# Patient Record
Sex: MALE | Race: WHITE | NOT HISPANIC OR LATINO | Employment: OTHER | URBAN - METROPOLITAN AREA
[De-identification: names, ages, dates, MRNs, and addresses within clinical notes are randomized per-mention and may not be internally consistent; named-entity substitution may affect disease eponyms.]

---

## 2019-02-19 ENCOUNTER — HISTORICAL (OUTPATIENT)
Dept: ADMINISTRATIVE | Facility: HOSPITAL | Age: 54
End: 2019-02-19

## 2019-04-02 ENCOUNTER — HISTORICAL (OUTPATIENT)
Dept: ADMINISTRATIVE | Facility: HOSPITAL | Age: 54
End: 2019-04-02

## 2019-05-16 ENCOUNTER — HISTORICAL (OUTPATIENT)
Dept: ADMINISTRATIVE | Facility: HOSPITAL | Age: 54
End: 2019-05-16

## 2019-06-19 ENCOUNTER — HISTORICAL (OUTPATIENT)
Dept: ADMINISTRATIVE | Facility: HOSPITAL | Age: 54
End: 2019-06-19

## 2019-07-25 ENCOUNTER — HISTORICAL (OUTPATIENT)
Dept: ADMINISTRATIVE | Facility: HOSPITAL | Age: 54
End: 2019-07-25

## 2019-08-22 ENCOUNTER — HISTORICAL (OUTPATIENT)
Dept: ADMINISTRATIVE | Facility: HOSPITAL | Age: 54
End: 2019-08-22

## 2019-09-16 ENCOUNTER — HISTORICAL (OUTPATIENT)
Dept: LAB | Facility: HOSPITAL | Age: 54
End: 2019-09-16

## 2019-09-16 LAB
ABS NEUT (OLG): 4.32 X10(3)/MCL (ref 2.1–9.2)
ALBUMIN SERPL-MCNC: 4.2 GM/DL (ref 3.4–5)
ALBUMIN/GLOB SERPL: 1.2 {RATIO}
ALP SERPL-CCNC: 139 UNIT/L (ref 50–136)
ALT SERPL-CCNC: 31 UNIT/L (ref 12–78)
AST SERPL-CCNC: 31 UNIT/L (ref 15–37)
BASOPHILS # BLD AUTO: 0.1 X10(3)/MCL (ref 0–0.2)
BASOPHILS NFR BLD AUTO: 1 %
BILIRUB SERPL-MCNC: 0.5 MG/DL (ref 0.2–1)
BILIRUBIN DIRECT+TOT PNL SERPL-MCNC: 0.1 MG/DL (ref 0–0.2)
BILIRUBIN DIRECT+TOT PNL SERPL-MCNC: 0.4 MG/DL (ref 0–0.8)
BUN SERPL-MCNC: 7 MG/DL (ref 7–18)
CALCIUM SERPL-MCNC: 9.6 MG/DL (ref 8.5–10.1)
CHLORIDE SERPL-SCNC: 96 MMOL/L (ref 98–107)
CHOLEST SERPL-MCNC: 227 MG/DL (ref 0–200)
CHOLEST/HDLC SERPL: 3.8 {RATIO} (ref 0–5)
CO2 SERPL-SCNC: 31 MMOL/L (ref 21–32)
CREAT SERPL-MCNC: 0.8 MG/DL (ref 0.7–1.3)
EOSINOPHIL # BLD AUTO: 0.2 X10(3)/MCL (ref 0–0.9)
EOSINOPHIL NFR BLD AUTO: 2 %
ERYTHROCYTE [DISTWIDTH] IN BLOOD BY AUTOMATED COUNT: 12.7 % (ref 11.5–17)
GLOBULIN SER-MCNC: 3.6 GM/DL (ref 2.4–3.5)
GLUCOSE SERPL-MCNC: 92 MG/DL (ref 74–106)
HCT VFR BLD AUTO: 51.8 % (ref 42–52)
HDLC SERPL-MCNC: 59 MG/DL (ref 35–60)
HGB BLD-MCNC: 17 GM/DL (ref 14–18)
LDLC SERPL CALC-MCNC: 131 MG/DL (ref 0–129)
LYMPHOCYTES # BLD AUTO: 2.1 X10(3)/MCL (ref 0.6–4.6)
LYMPHOCYTES NFR BLD AUTO: 28 %
MCH RBC QN AUTO: 33.2 PG (ref 27–31)
MCHC RBC AUTO-ENTMCNC: 32.8 GM/DL (ref 33–36)
MCV RBC AUTO: 101.2 FL (ref 80–94)
MONOCYTES # BLD AUTO: 1 X10(3)/MCL (ref 0.1–1.3)
MONOCYTES NFR BLD AUTO: 13 %
NEUTROPHILS # BLD AUTO: 4.32 X10(3)/MCL (ref 2.1–9.2)
NEUTROPHILS NFR BLD AUTO: 56 %
PLATELET # BLD AUTO: 208 X10(3)/MCL (ref 130–400)
PMV BLD AUTO: 10.2 FL (ref 9.4–12.4)
POTASSIUM SERPL-SCNC: 4.3 MMOL/L (ref 3.5–5.1)
PROT SERPL-MCNC: 7.8 GM/DL (ref 6.4–8.2)
PSA SERPL-MCNC: 0.53 NG/ML (ref 0–4)
RBC # BLD AUTO: 5.12 X10(6)/MCL (ref 4.7–6.1)
SODIUM SERPL-SCNC: 135 MMOL/L (ref 136–145)
TRIGL SERPL-MCNC: 184 MG/DL (ref 30–150)
TSH SERPL-ACNC: 1.63 MIU/L (ref 0.36–3.74)
VLDLC SERPL CALC-MCNC: 37 MG/DL
WBC # SPEC AUTO: 7.7 X10(3)/MCL (ref 4.5–11.5)

## 2021-08-24 ENCOUNTER — HISTORICAL (OUTPATIENT)
Dept: ADMINISTRATIVE | Facility: HOSPITAL | Age: 56
End: 2021-08-24

## 2022-01-01 ENCOUNTER — HOSPITAL ENCOUNTER (OUTPATIENT)
Dept: RADIOLOGY | Facility: HOSPITAL | Age: 57
Discharge: HOME | End: 2022-10-31
Attending: STUDENT IN AN ORGANIZED HEALTH CARE EDUCATION/TRAINING PROGRAM
Payer: MEDICARE

## 2022-01-01 ENCOUNTER — HOSPITAL ENCOUNTER (OUTPATIENT)
Dept: RADIOLOGY | Facility: HOSPITAL | Age: 57
Discharge: HOME | End: 2022-12-30
Attending: STUDENT IN AN ORGANIZED HEALTH CARE EDUCATION/TRAINING PROGRAM
Payer: MEDICARE

## 2022-01-01 ENCOUNTER — LAB VISIT (OUTPATIENT)
Dept: LAB | Facility: HOSPITAL | Age: 57
End: 2022-01-01
Attending: NURSE PRACTITIONER
Payer: MEDICARE

## 2022-01-01 ENCOUNTER — HOSPITAL ENCOUNTER (INPATIENT)
Facility: HOSPITAL | Age: 57
LOS: 7 days | Discharge: HOME | DRG: 854 | End: 2022-10-25
Attending: FAMILY MEDICINE | Admitting: STUDENT IN AN ORGANIZED HEALTH CARE EDUCATION/TRAINING PROGRAM
Payer: MEDICARE

## 2022-01-01 ENCOUNTER — TELEPHONE (OUTPATIENT)
Dept: HEMATOLOGY/ONCOLOGY | Facility: HOSPITAL | Age: 57
End: 2022-01-01
Payer: MEDICARE

## 2022-01-01 ENCOUNTER — OFFICE VISIT (OUTPATIENT)
Dept: OTOLARYNGOLOGY | Facility: CLINIC | Age: 57
DRG: 871 | End: 2022-01-01
Payer: MEDICARE

## 2022-01-01 ENCOUNTER — OFFICE VISIT (OUTPATIENT)
Dept: HEMATOLOGY/ONCOLOGY | Facility: CLINIC | Age: 57
End: 2022-01-01
Payer: MEDICARE

## 2022-01-01 ENCOUNTER — INFUSION (OUTPATIENT)
Dept: INFUSION THERAPY | Facility: HOSPITAL | Age: 57
End: 2022-01-01
Attending: INTERNAL MEDICINE
Payer: MEDICARE

## 2022-01-01 ENCOUNTER — ANESTHESIA (OUTPATIENT)
Dept: SURGERY | Facility: HOSPITAL | Age: 57
DRG: 854 | End: 2022-01-01
Payer: MEDICARE

## 2022-01-01 ENCOUNTER — PATIENT OUTREACH (OUTPATIENT)
Dept: ADMINISTRATIVE | Facility: CLINIC | Age: 57
End: 2022-01-01
Payer: MEDICARE

## 2022-01-01 ENCOUNTER — OFFICE VISIT (OUTPATIENT)
Dept: INTERNAL MEDICINE | Facility: CLINIC | Age: 57
End: 2022-01-01
Payer: MEDICARE

## 2022-01-01 ENCOUNTER — LAB VISIT (OUTPATIENT)
Dept: LAB | Facility: HOSPITAL | Age: 57
End: 2022-01-01
Attending: INTERNAL MEDICINE
Payer: MEDICARE

## 2022-01-01 ENCOUNTER — HOSPITAL ENCOUNTER (INPATIENT)
Facility: HOSPITAL | Age: 57
LOS: 12 days | Discharge: HOME HEALTH | DRG: 871 | End: 2022-12-21
Attending: INTERNAL MEDICINE | Admitting: INTERNAL MEDICINE
Payer: MEDICARE

## 2022-01-01 ENCOUNTER — ANESTHESIA EVENT (OUTPATIENT)
Dept: SURGERY | Facility: HOSPITAL | Age: 57
DRG: 854 | End: 2022-01-01
Payer: MEDICARE

## 2022-01-01 ENCOUNTER — CLINICAL SUPPORT (OUTPATIENT)
Dept: HEMATOLOGY/ONCOLOGY | Facility: CLINIC | Age: 57
End: 2022-01-01
Payer: MEDICARE

## 2022-01-01 ENCOUNTER — HOSPITAL ENCOUNTER (OUTPATIENT)
Dept: RADIOLOGY | Facility: HOSPITAL | Age: 57
Discharge: HOME | End: 2022-10-13
Attending: NURSE PRACTITIONER
Payer: MEDICARE

## 2022-01-01 ENCOUNTER — OFFICE VISIT (OUTPATIENT)
Dept: OTOLARYNGOLOGY | Facility: CLINIC | Age: 57
End: 2022-01-01
Payer: MEDICARE

## 2022-01-01 ENCOUNTER — PATIENT MESSAGE (OUTPATIENT)
Dept: HEMATOLOGY/ONCOLOGY | Facility: CLINIC | Age: 57
End: 2022-01-01
Payer: MEDICARE

## 2022-01-01 ENCOUNTER — PATIENT MESSAGE (OUTPATIENT)
Dept: HEMATOLOGY/ONCOLOGY | Facility: CLINIC | Age: 57
End: 2022-01-01

## 2022-01-01 ENCOUNTER — DOCUMENTATION ONLY (OUTPATIENT)
Dept: OTOLARYNGOLOGY | Facility: CLINIC | Age: 57
End: 2022-01-01

## 2022-01-01 ENCOUNTER — TELEPHONE (OUTPATIENT)
Dept: HEMATOLOGY/ONCOLOGY | Facility: CLINIC | Age: 57
End: 2022-01-01
Payer: MEDICARE

## 2022-01-01 ENCOUNTER — DOCUMENTATION ONLY (OUTPATIENT)
Dept: REHABILITATION | Facility: HOSPITAL | Age: 57
End: 2022-01-01
Payer: MEDICARE

## 2022-01-01 VITALS
WEIGHT: 155 LBS | OXYGEN SATURATION: 95 % | TEMPERATURE: 98 F | SYSTOLIC BLOOD PRESSURE: 116 MMHG | DIASTOLIC BLOOD PRESSURE: 79 MMHG | HEART RATE: 104 BPM | BODY MASS INDEX: 22.96 KG/M2 | HEIGHT: 69 IN | DIASTOLIC BLOOD PRESSURE: 71 MMHG | OXYGEN SATURATION: 98 % | TEMPERATURE: 99 F | HEIGHT: 69 IN | SYSTOLIC BLOOD PRESSURE: 122 MMHG | BODY MASS INDEX: 22.51 KG/M2 | WEIGHT: 152 LBS | HEART RATE: 114 BPM

## 2022-01-01 VITALS
OXYGEN SATURATION: 97 % | WEIGHT: 150.56 LBS | BODY MASS INDEX: 22.3 KG/M2 | TEMPERATURE: 99 F | DIASTOLIC BLOOD PRESSURE: 45 MMHG | SYSTOLIC BLOOD PRESSURE: 97 MMHG | HEART RATE: 113 BPM | RESPIRATION RATE: 11 BRPM | HEIGHT: 69 IN

## 2022-01-01 VITALS
DIASTOLIC BLOOD PRESSURE: 75 MMHG | BODY MASS INDEX: 21.43 KG/M2 | HEART RATE: 106 BPM | SYSTOLIC BLOOD PRESSURE: 123 MMHG | WEIGHT: 145.19 LBS

## 2022-01-01 VITALS
SYSTOLIC BLOOD PRESSURE: 109 MMHG | WEIGHT: 151 LBS | TEMPERATURE: 99 F | BODY MASS INDEX: 22.3 KG/M2 | BODY MASS INDEX: 22.36 KG/M2 | HEIGHT: 69 IN | RESPIRATION RATE: 16 BRPM | SYSTOLIC BLOOD PRESSURE: 109 MMHG | DIASTOLIC BLOOD PRESSURE: 67 MMHG | WEIGHT: 151 LBS | HEART RATE: 111 BPM | DIASTOLIC BLOOD PRESSURE: 65 MMHG | HEART RATE: 109 BPM

## 2022-01-01 VITALS
HEART RATE: 125 BPM | WEIGHT: 150.38 LBS | SYSTOLIC BLOOD PRESSURE: 97 MMHG | DIASTOLIC BLOOD PRESSURE: 64 MMHG | TEMPERATURE: 99 F | BODY MASS INDEX: 22.21 KG/M2

## 2022-01-01 VITALS
OXYGEN SATURATION: 95 % | BODY MASS INDEX: 22.36 KG/M2 | SYSTOLIC BLOOD PRESSURE: 103 MMHG | WEIGHT: 151 LBS | TEMPERATURE: 98 F | HEIGHT: 69 IN | HEART RATE: 104 BPM | DIASTOLIC BLOOD PRESSURE: 67 MMHG

## 2022-01-01 VITALS
TEMPERATURE: 98 F | BODY MASS INDEX: 22.9 KG/M2 | WEIGHT: 155.13 LBS | DIASTOLIC BLOOD PRESSURE: 59 MMHG | SYSTOLIC BLOOD PRESSURE: 99 MMHG | OXYGEN SATURATION: 94 % | HEART RATE: 114 BPM

## 2022-01-01 VITALS
BODY MASS INDEX: 22.22 KG/M2 | OXYGEN SATURATION: 94 % | HEIGHT: 69 IN | DIASTOLIC BLOOD PRESSURE: 70 MMHG | HEART RATE: 114 BPM | RESPIRATION RATE: 18 BRPM | WEIGHT: 150 LBS | SYSTOLIC BLOOD PRESSURE: 118 MMHG

## 2022-01-01 VITALS
SYSTOLIC BLOOD PRESSURE: 93 MMHG | OXYGEN SATURATION: 93 % | WEIGHT: 156 LBS | HEART RATE: 115 BPM | HEIGHT: 69 IN | TEMPERATURE: 99 F | DIASTOLIC BLOOD PRESSURE: 56 MMHG | BODY MASS INDEX: 23.11 KG/M2

## 2022-01-01 VITALS
WEIGHT: 152.56 LBS | TEMPERATURE: 98 F | HEIGHT: 69 IN | HEART RATE: 99 BPM | BODY MASS INDEX: 22.6 KG/M2 | DIASTOLIC BLOOD PRESSURE: 67 MMHG | RESPIRATION RATE: 15 BRPM | OXYGEN SATURATION: 92 % | SYSTOLIC BLOOD PRESSURE: 106 MMHG

## 2022-01-01 VITALS
DIASTOLIC BLOOD PRESSURE: 64 MMHG | HEART RATE: 108 BPM | HEIGHT: 69 IN | WEIGHT: 146 LBS | SYSTOLIC BLOOD PRESSURE: 110 MMHG | OXYGEN SATURATION: 99 % | RESPIRATION RATE: 20 BRPM | BODY MASS INDEX: 21.62 KG/M2

## 2022-01-01 DIAGNOSIS — C32.9 LARYNGEAL CANCER: Primary | ICD-10-CM

## 2022-01-01 DIAGNOSIS — C77.9 REGIONAL LYMPH NODE METASTASIS PRESENT: ICD-10-CM

## 2022-01-01 DIAGNOSIS — J44.9 CHRONIC OBSTRUCTIVE PULMONARY DISEASE, UNSPECIFIED COPD TYPE: ICD-10-CM

## 2022-01-01 DIAGNOSIS — C34.92 SQUAMOUS CELL CARCINOMA OF BOTH LUNGS: ICD-10-CM

## 2022-01-01 DIAGNOSIS — C34.91 SQUAMOUS CELL CARCINOMA OF BOTH LUNGS: ICD-10-CM

## 2022-01-01 DIAGNOSIS — C32.9 LARYNGEAL CANCER: ICD-10-CM

## 2022-01-01 DIAGNOSIS — Z45.2 PICC (PERIPHERALLY INSERTED CENTRAL CATHETER) IN PLACE: ICD-10-CM

## 2022-01-01 DIAGNOSIS — C34.91 MALIGNANT NEOPLASM OF RIGHT LUNG, UNSPECIFIED PART OF LUNG: ICD-10-CM

## 2022-01-01 DIAGNOSIS — R56.9 NEW ONSET SEIZURE: Primary | ICD-10-CM

## 2022-01-01 DIAGNOSIS — Z93.0 TRACHEOSTOMY DEPENDENT: ICD-10-CM

## 2022-01-01 DIAGNOSIS — R50.81 NEUTROPENIC FEVER: ICD-10-CM

## 2022-01-01 DIAGNOSIS — J15.1 PNEUMONIA DUE TO PSEUDOMONAS SPECIES, UNSPECIFIED LATERALITY, UNSPECIFIED PART OF LUNG: ICD-10-CM

## 2022-01-01 DIAGNOSIS — B40.9 BLASTOMYCOTIC INFECTION: ICD-10-CM

## 2022-01-01 DIAGNOSIS — R53.83 FATIGUE, UNSPECIFIED TYPE: ICD-10-CM

## 2022-01-01 DIAGNOSIS — R13.10 DYSPHAGIA, UNSPECIFIED TYPE: ICD-10-CM

## 2022-01-01 DIAGNOSIS — D70.9 NEUTROPENIC FEVER: Primary | ICD-10-CM

## 2022-01-01 DIAGNOSIS — L03.221 CELLULITIS AND ABSCESS OF NECK: Primary | ICD-10-CM

## 2022-01-01 DIAGNOSIS — U07.1 PNEUMONIA DUE TO COVID-19 VIRUS: ICD-10-CM

## 2022-01-01 DIAGNOSIS — D72.829 LEUKOCYTOSIS, UNSPECIFIED TYPE: ICD-10-CM

## 2022-01-01 DIAGNOSIS — Z92.3 H/O HEAD AND NECK RADIATION: ICD-10-CM

## 2022-01-01 DIAGNOSIS — R07.9 ACUTE CHEST PAIN: ICD-10-CM

## 2022-01-01 DIAGNOSIS — D70.9 NEUTROPENIC FEVER: ICD-10-CM

## 2022-01-01 DIAGNOSIS — R56.9 NEW ONSET SEIZURE: ICD-10-CM

## 2022-01-01 DIAGNOSIS — L03.221 CELLULITIS AND ABSCESS OF NECK: ICD-10-CM

## 2022-01-01 DIAGNOSIS — R50.81 NEUTROPENIC FEVER: Primary | ICD-10-CM

## 2022-01-01 DIAGNOSIS — L02.11 CELLULITIS AND ABSCESS OF NECK: ICD-10-CM

## 2022-01-01 DIAGNOSIS — A41.9 SEPSIS, DUE TO UNSPECIFIED ORGANISM, UNSPECIFIED WHETHER ACUTE ORGAN DYSFUNCTION PRESENT: ICD-10-CM

## 2022-01-01 DIAGNOSIS — C34.92 SQUAMOUS CELL CARCINOMA OF BOTH LUNGS: Primary | ICD-10-CM

## 2022-01-01 DIAGNOSIS — E87.1 HYPONATREMIA: ICD-10-CM

## 2022-01-01 DIAGNOSIS — J12.82 PNEUMONIA DUE TO COVID-19 VIRUS: ICD-10-CM

## 2022-01-01 DIAGNOSIS — J43.8 OTHER EMPHYSEMA: ICD-10-CM

## 2022-01-01 DIAGNOSIS — C34.91 SQUAMOUS CELL CARCINOMA OF BOTH LUNGS: Primary | ICD-10-CM

## 2022-01-01 DIAGNOSIS — R06.02 SHORTNESS OF BREATH: ICD-10-CM

## 2022-01-01 DIAGNOSIS — J18.9 PNEUMONIA: ICD-10-CM

## 2022-01-01 DIAGNOSIS — L02.11 CELLULITIS AND ABSCESS OF NECK: Primary | ICD-10-CM

## 2022-01-01 DIAGNOSIS — J18.9 COMMUNITY ACQUIRED PNEUMONIA, UNSPECIFIED LATERALITY: Primary | ICD-10-CM

## 2022-01-01 DIAGNOSIS — S22.060S COMPRESSION FRACTURE OF T7 VERTEBRA, SEQUELA: ICD-10-CM

## 2022-01-01 DIAGNOSIS — L25.9 CONTACT DERMATITIS, UNSPECIFIED CONTACT DERMATITIS TYPE, UNSPECIFIED TRIGGER: ICD-10-CM

## 2022-01-01 DIAGNOSIS — J18.9 COMMUNITY ACQUIRED PNEUMONIA OF RIGHT MIDDLE LOBE OF LUNG: ICD-10-CM

## 2022-01-01 LAB
1,3 BETA GLUCAN SER QL: ABNORMAL
1,3 BETA GLUCAN SER QL: NEGATIVE
1,3 BETA GLUCAN SER-MCNC: <31 PG/ML
A FUMIGATUS IGG SER-MCNC: 20.5 MG/L
ABS NEUT CALC (OHS): 0.04 X10(3)/MCL (ref 2.1–9.2)
ABS NEUT CALC (OHS): 0.34 X10(3)/MCL (ref 2.1–9.2)
ABS NEUT CALC (OHS): 1.19 X10(3)/MCL (ref 2.1–9.2)
ABS NEUT CALC (OHS): 2.97 X10(3)/MCL (ref 2.1–9.2)
ABS NEUT CALC (OHS): 3.86 X10(3)/MCL (ref 2.1–9.2)
ADV 40+41 DNA STL QL NAA+NON-PROBE: NOT DETECTED
ALBUMIN SERPL-MCNC: 1.7 GM/DL (ref 3.5–5)
ALBUMIN SERPL-MCNC: 1.8 GM/DL (ref 3.5–5)
ALBUMIN SERPL-MCNC: 1.8 GM/DL (ref 3.5–5)
ALBUMIN SERPL-MCNC: 1.9 G/DL (ref 3.5–5)
ALBUMIN SERPL-MCNC: 1.9 G/DL (ref 3.5–5)
ALBUMIN SERPL-MCNC: 2 G/DL (ref 3.5–5)
ALBUMIN SERPL-MCNC: 2 G/DL (ref 3.5–5)
ALBUMIN SERPL-MCNC: 2.1 G/DL (ref 3.5–5)
ALBUMIN SERPL-MCNC: 2.2 G/DL (ref 3.5–5)
ALBUMIN SERPL-MCNC: 2.2 GM/DL (ref 3.5–5)
ALBUMIN SERPL-MCNC: 2.3 G/DL (ref 3.5–5)
ALBUMIN SERPL-MCNC: 2.8 GM/DL (ref 3.5–5)
ALBUMIN SERPL-MCNC: 2.9 G/DL (ref 3.5–5)
ALBUMIN SERPL-MCNC: 2.9 GM/DL (ref 3.5–5)
ALBUMIN SERPL-MCNC: 3 GM/DL (ref 3.5–5)
ALBUMIN SERPL-MCNC: 3.1 GM/DL (ref 3.5–5)
ALBUMIN SERPL-MCNC: 3.1 GM/DL (ref 3.5–5)
ALBUMIN SERPL-MCNC: 3.6 GM/DL (ref 3.5–5)
ALBUMIN/GLOB SERPL: 0.4 RATIO (ref 1.1–2)
ALBUMIN/GLOB SERPL: 0.5 RATIO (ref 1.1–2)
ALBUMIN/GLOB SERPL: 0.7 RATIO (ref 1.1–2)
ALBUMIN/GLOB SERPL: 0.8 RATIO (ref 1.1–2)
ALBUMIN/GLOB SERPL: 0.9 RATIO (ref 1.1–2)
ALP SERPL-CCNC: 116 UNIT/L (ref 40–150)
ALP SERPL-CCNC: 55 UNIT/L (ref 40–150)
ALP SERPL-CCNC: 55 UNIT/L (ref 40–150)
ALP SERPL-CCNC: 56 UNIT/L (ref 40–150)
ALP SERPL-CCNC: 57 UNIT/L (ref 40–150)
ALP SERPL-CCNC: 59 UNIT/L (ref 40–150)
ALP SERPL-CCNC: 61 UNIT/L (ref 40–150)
ALP SERPL-CCNC: 61 UNIT/L (ref 40–150)
ALP SERPL-CCNC: 63 UNIT/L (ref 40–150)
ALP SERPL-CCNC: 64 UNIT/L (ref 40–150)
ALP SERPL-CCNC: 64 UNIT/L (ref 40–150)
ALP SERPL-CCNC: 67 UNIT/L (ref 40–150)
ALP SERPL-CCNC: 68 UNIT/L (ref 40–150)
ALP SERPL-CCNC: 68 UNIT/L (ref 40–150)
ALP SERPL-CCNC: 70 UNIT/L (ref 40–150)
ALP SERPL-CCNC: 73 UNIT/L (ref 40–150)
ALP SERPL-CCNC: 83 UNIT/L (ref 40–150)
ALP SERPL-CCNC: 86 UNIT/L (ref 40–150)
ALP SERPL-CCNC: 86 UNIT/L (ref 40–150)
ALP SERPL-CCNC: 87 UNIT/L (ref 40–150)
ALT SERPL-CCNC: 10 UNIT/L (ref 0–55)
ALT SERPL-CCNC: 11 UNIT/L (ref 0–55)
ALT SERPL-CCNC: 11 UNIT/L (ref 0–55)
ALT SERPL-CCNC: 12 UNIT/L (ref 0–55)
ALT SERPL-CCNC: 16 UNIT/L (ref 0–55)
ALT SERPL-CCNC: 24 UNIT/L (ref 0–55)
ALT SERPL-CCNC: 6 UNIT/L (ref 0–55)
ALT SERPL-CCNC: 6 UNIT/L (ref 0–55)
ALT SERPL-CCNC: 7 UNIT/L (ref 0–55)
ALT SERPL-CCNC: 8 UNIT/L (ref 0–55)
ALT SERPL-CCNC: 9 UNIT/L (ref 0–55)
ALT SERPL-CCNC: 9 UNIT/L (ref 0–55)
ANION GAP SERPL CALC-SCNC: 11 MEQ/L
ANION GAP SERPL CALC-SCNC: 12 MEQ/L
ANION GAP SERPL CALC-SCNC: 13 MEQ/L
ANION GAP SERPL CALC-SCNC: 9 MEQ/L
ANISOCYTOSIS BLD QL SMEAR: ABNORMAL
ANISOCYTOSIS BLD QL SMEAR: SLIGHT
APPEARANCE UR: ABNORMAL
APPEARANCE UR: CLEAR
AST SERPL-CCNC: 11 UNIT/L (ref 5–34)
AST SERPL-CCNC: 11 UNIT/L (ref 5–34)
AST SERPL-CCNC: 12 UNIT/L (ref 5–34)
AST SERPL-CCNC: 12 UNIT/L (ref 5–34)
AST SERPL-CCNC: 13 UNIT/L (ref 5–34)
AST SERPL-CCNC: 14 UNIT/L (ref 5–34)
AST SERPL-CCNC: 15 UNIT/L (ref 5–34)
AST SERPL-CCNC: 16 UNIT/L (ref 5–34)
AST SERPL-CCNC: 19 UNIT/L (ref 5–34)
AST SERPL-CCNC: 21 UNIT/L (ref 5–34)
AST SERPL-CCNC: 23 UNIT/L (ref 5–34)
AST SERPL-CCNC: 23 UNIT/L (ref 5–34)
AST SERPL-CCNC: 27 UNIT/L (ref 5–34)
ASTRO TYP 1-8 RNA STL QL NAA+NON-PROBE: NOT DETECTED
B DERMAT AB SER QL: NEGATIVE
B DERMAT AG UR QL IA: DETECTED
B DERMAT AG UR QL IA: NOT DETECTED
B-HCG SERPL QL: 1 %
BACTERIA #/AREA URNS AUTO: ABNORMAL /HPF
BACTERIA #/AREA URNS AUTO: ABNORMAL /HPF
BACTERIA BLD CULT: NORMAL
BACTERIA SPEC CULT: ABNORMAL
BACTERIA SPEC CULT: ABNORMAL
BACTERIA SPEC CULT: NO GROWTH
BACTERIA STL CULT: ABNORMAL
BACTERIA STL CULT: ABNORMAL
BASOPHILS # BLD AUTO: 0.01 X10(3)/MCL (ref 0–0.2)
BASOPHILS # BLD AUTO: 0.02 X10(3)/MCL (ref 0–0.2)
BASOPHILS # BLD AUTO: 0.03 X10(3)/MCL (ref 0–0.2)
BASOPHILS # BLD AUTO: 0.04 X10(3)/MCL (ref 0–0.2)
BASOPHILS # BLD AUTO: 0.05 X10(3)/MCL (ref 0–0.2)
BASOPHILS # BLD AUTO: 0.05 X10(3)/MCL (ref 0–0.2)
BASOPHILS # BLD AUTO: 0.06 X10(3)/MCL (ref 0–0.2)
BASOPHILS # BLD AUTO: 0.07 X10(3)/MCL (ref 0–0.2)
BASOPHILS # BLD AUTO: 0.07 X10(3)/MCL (ref 0–0.2)
BASOPHILS # BLD AUTO: 0.09 X10(3)/MCL (ref 0–0.2)
BASOPHILS NFR BLD AUTO: 0.2 %
BASOPHILS NFR BLD AUTO: 0.3 %
BASOPHILS NFR BLD AUTO: 0.4 %
BASOPHILS NFR BLD AUTO: 0.5 %
BASOPHILS NFR BLD AUTO: 0.5 %
BASOPHILS NFR BLD AUTO: 0.6 %
BASOPHILS NFR BLD AUTO: 0.7 %
BASOPHILS NFR BLD AUTO: 1.2 %
BASOPHILS NFR BLD AUTO: 1.3 %
BASOPHILS NFR BLD AUTO: 1.5 %
BASOPHILS NFR BLD MANUAL: 0 X10(3)/MCL (ref 0–0.2)
BASOPHILS NFR BLD MANUAL: 0.06 X10(3)/MCL (ref 0–0.2)
BASOPHILS NFR BLD MANUAL: 1 % (ref 0–2)
BASOPHILS NFR BLD MANUAL: 1 % (ref 0–2)
BILIRUB UR QL STRIP.AUTO: NEGATIVE MG/DL
BILIRUB UR QL STRIP.AUTO: NEGATIVE MG/DL
BILIRUBIN DIRECT+TOT PNL SERPL-MCNC: 0.1 MG/DL
BILIRUBIN DIRECT+TOT PNL SERPL-MCNC: 0.2 MG/DL
BILIRUBIN DIRECT+TOT PNL SERPL-MCNC: 0.3 MG/DL
BILIRUBIN DIRECT+TOT PNL SERPL-MCNC: 0.4 MG/DL
BILIRUBIN DIRECT+TOT PNL SERPL-MCNC: 0.5 MG/DL
BILIRUBIN DIRECT+TOT PNL SERPL-MCNC: 0.5 MG/DL
BILIRUBIN DIRECT+TOT PNL SERPL-MCNC: 0.6 MG/DL
BILIRUBIN DIRECT+TOT PNL SERPL-MCNC: 0.7 MG/DL
BUN SERPL-MCNC: 10.3 MG/DL (ref 8.4–25.7)
BUN SERPL-MCNC: 10.9 MG/DL (ref 8.4–25.7)
BUN SERPL-MCNC: 11 MG/DL (ref 8.4–25.7)
BUN SERPL-MCNC: 11 MG/DL (ref 8.4–25.7)
BUN SERPL-MCNC: 11.6 MG/DL (ref 8.4–25.7)
BUN SERPL-MCNC: 11.7 MG/DL (ref 8.4–25.7)
BUN SERPL-MCNC: 12 MG/DL (ref 8.4–25.7)
BUN SERPL-MCNC: 12 MG/DL (ref 8.4–25.7)
BUN SERPL-MCNC: 12.2 MG/DL (ref 8.4–25.7)
BUN SERPL-MCNC: 12.9 MG/DL (ref 8.4–25.7)
BUN SERPL-MCNC: 13.2 MG/DL (ref 8.4–25.7)
BUN SERPL-MCNC: 13.9 MG/DL (ref 8.4–25.7)
BUN SERPL-MCNC: 13.9 MG/DL (ref 8.4–25.7)
BUN SERPL-MCNC: 14 MG/DL (ref 8.4–25.7)
BUN SERPL-MCNC: 14.2 MG/DL (ref 8.4–25.7)
BUN SERPL-MCNC: 15.6 MG/DL (ref 8.4–25.7)
BUN SERPL-MCNC: 16.5 MG/DL (ref 8.4–25.7)
BUN SERPL-MCNC: 17.5 MG/DL (ref 8.4–25.7)
BUN SERPL-MCNC: 17.6 MG/DL (ref 8.4–25.7)
BUN SERPL-MCNC: 17.8 MG/DL (ref 8.4–25.7)
BUN SERPL-MCNC: 18.5 MG/DL (ref 8.4–25.7)
BUN SERPL-MCNC: 4.3 MG/DL (ref 8.4–25.7)
BUN SERPL-MCNC: 7.6 MG/DL (ref 8.4–25.7)
BUN SERPL-MCNC: 7.9 MG/DL (ref 8.4–25.7)
BUN SERPL-MCNC: 8.4 MG/DL (ref 8.4–25.7)
BUN SERPL-MCNC: 8.5 MG/DL (ref 8.4–25.7)
BUN SERPL-MCNC: 9.2 MG/DL (ref 8.4–25.7)
BUN SERPL-MCNC: 9.3 MG/DL (ref 8.4–25.7)
BUN SERPL-MCNC: 9.6 MG/DL (ref 8.4–25.7)
C CAYETANENSIS DNA STL QL NAA+NON-PROBE: NOT DETECTED
C COLI+JEJ+UPSA DNA STL QL NAA+NON-PROBE: NOT DETECTED
CALCIUM SERPL-MCNC: 10 MG/DL (ref 8.4–10.2)
CALCIUM SERPL-MCNC: 10.1 MG/DL (ref 8.4–10.2)
CALCIUM SERPL-MCNC: 10.2 MG/DL (ref 8.4–10.2)
CALCIUM SERPL-MCNC: 10.2 MG/DL (ref 8.4–10.2)
CALCIUM SERPL-MCNC: 10.4 MG/DL (ref 8.4–10.2)
CALCIUM SERPL-MCNC: 10.6 MG/DL (ref 8.4–10.2)
CALCIUM SERPL-MCNC: 10.8 MG/DL (ref 8.4–10.2)
CALCIUM SERPL-MCNC: 7.8 MG/DL (ref 8.4–10.2)
CALCIUM SERPL-MCNC: 8 MG/DL (ref 8.4–10.2)
CALCIUM SERPL-MCNC: 8.1 MG/DL (ref 8.4–10.2)
CALCIUM SERPL-MCNC: 8.3 MG/DL (ref 8.4–10.2)
CALCIUM SERPL-MCNC: 8.5 MG/DL (ref 8.4–10.2)
CALCIUM SERPL-MCNC: 8.9 MG/DL (ref 8.4–10.2)
CALCIUM SERPL-MCNC: 9 MG/DL (ref 8.4–10.2)
CALCIUM SERPL-MCNC: 9.1 MG/DL (ref 8.4–10.2)
CALCIUM SERPL-MCNC: 9.2 MG/DL (ref 8.4–10.2)
CALCIUM SERPL-MCNC: 9.2 MG/DL (ref 8.4–10.2)
CALCIUM SERPL-MCNC: 9.4 MG/DL (ref 8.4–10.2)
CALCIUM SERPL-MCNC: 9.5 MG/DL (ref 8.4–10.2)
CALCIUM SERPL-MCNC: 9.6 MG/DL (ref 8.4–10.2)
CALCIUM SERPL-MCNC: 9.7 MG/DL (ref 8.4–10.2)
CALCIUM SERPL-MCNC: 9.7 MG/DL (ref 8.4–10.2)
CALCIUM SERPL-MCNC: 9.8 MG/DL (ref 8.4–10.2)
CHLORIDE SERPL-SCNC: 100 MMOL/L (ref 98–107)
CHLORIDE SERPL-SCNC: 100 MMOL/L (ref 98–107)
CHLORIDE SERPL-SCNC: 101 MMOL/L (ref 98–107)
CHLORIDE SERPL-SCNC: 102 MMOL/L (ref 98–107)
CHLORIDE SERPL-SCNC: 103 MMOL/L (ref 98–107)
CHLORIDE SERPL-SCNC: 104 MMOL/L (ref 98–107)
CHLORIDE SERPL-SCNC: 105 MMOL/L (ref 98–107)
CHLORIDE SERPL-SCNC: 88 MMOL/L (ref 98–107)
CHLORIDE SERPL-SCNC: 90 MMOL/L (ref 98–107)
CHLORIDE SERPL-SCNC: 93 MMOL/L (ref 98–107)
CHLORIDE SERPL-SCNC: 93 MMOL/L (ref 98–107)
CHLORIDE SERPL-SCNC: 94 MMOL/L (ref 98–107)
CHLORIDE SERPL-SCNC: 95 MMOL/L (ref 98–107)
CHLORIDE SERPL-SCNC: 95 MMOL/L (ref 98–107)
CHLORIDE SERPL-SCNC: 96 MMOL/L (ref 98–107)
CHLORIDE SERPL-SCNC: 97 MMOL/L (ref 98–107)
CHLORIDE SERPL-SCNC: 97 MMOL/L (ref 98–107)
CHLORIDE SERPL-SCNC: 98 MMOL/L (ref 98–107)
CHLORIDE SERPL-SCNC: 98 MMOL/L (ref 98–107)
CHLORIDE SERPL-SCNC: 99 MMOL/L (ref 98–107)
CLOSTRIDIUM DIFFICILE GDH ANTIGEN (OHS): NEGATIVE
CLOSTRIDIUM DIFFICILE TOXIN A/B (OHS): NEGATIVE
CO2 SERPL-SCNC: 14 MMOL/L (ref 22–29)
CO2 SERPL-SCNC: 20 MMOL/L (ref 22–29)
CO2 SERPL-SCNC: 23 MMOL/L (ref 22–29)
CO2 SERPL-SCNC: 24 MMOL/L (ref 22–29)
CO2 SERPL-SCNC: 25 MMOL/L (ref 22–29)
CO2 SERPL-SCNC: 26 MMOL/L (ref 22–29)
CO2 SERPL-SCNC: 27 MMOL/L (ref 22–29)
CO2 SERPL-SCNC: 28 MMOL/L (ref 22–29)
CO2 SERPL-SCNC: 29 MMOL/L (ref 22–29)
CO2 SERPL-SCNC: 30 MMOL/L (ref 22–29)
CO2 SERPL-SCNC: 31 MMOL/L (ref 22–29)
CO2 SERPL-SCNC: 32 MMOL/L (ref 22–29)
COLOR UR AUTO: YELLOW
COLOR UR AUTO: YELLOW
CORRECTED TEMPERATURE (PCO2): 33 MMHG (ref 35–45)
CORRECTED TEMPERATURE (PH): 7.51 (ref 7.35–7.45)
CORRECTED TEMPERATURE (PO2): 46 MMHG
CORTIS 1H P CHAL SERPL-SCNC: 18.5 UG/DL
CORTIS 30M P CHAL SERPL-SCNC: 17.9 UG/DL
CORTIS SERPL-SCNC: 9.7 UG/DL
CREAT SERPL-MCNC: 0.59 MG/DL (ref 0.73–1.18)
CREAT SERPL-MCNC: 0.64 MG/DL (ref 0.73–1.18)
CREAT SERPL-MCNC: 0.66 MG/DL (ref 0.73–1.18)
CREAT SERPL-MCNC: 0.67 MG/DL (ref 0.73–1.18)
CREAT SERPL-MCNC: 0.68 MG/DL (ref 0.73–1.18)
CREAT SERPL-MCNC: 0.68 MG/DL (ref 0.73–1.18)
CREAT SERPL-MCNC: 0.7 MG/DL (ref 0.73–1.18)
CREAT SERPL-MCNC: 0.72 MG/DL (ref 0.73–1.18)
CREAT SERPL-MCNC: 0.73 MG/DL (ref 0.73–1.18)
CREAT SERPL-MCNC: 0.73 MG/DL (ref 0.73–1.18)
CREAT SERPL-MCNC: 0.74 MG/DL (ref 0.73–1.18)
CREAT SERPL-MCNC: 0.75 MG/DL (ref 0.73–1.18)
CREAT SERPL-MCNC: 0.75 MG/DL (ref 0.73–1.18)
CREAT SERPL-MCNC: 0.77 MG/DL (ref 0.73–1.18)
CREAT SERPL-MCNC: 0.79 MG/DL (ref 0.73–1.18)
CREAT SERPL-MCNC: 0.81 MG/DL (ref 0.73–1.18)
CREAT SERPL-MCNC: 0.82 MG/DL (ref 0.73–1.18)
CREAT SERPL-MCNC: 0.85 MG/DL (ref 0.73–1.18)
CREAT SERPL-MCNC: 0.85 MG/DL (ref 0.73–1.18)
CREAT SERPL-MCNC: 0.87 MG/DL (ref 0.73–1.18)
CREAT SERPL-MCNC: 0.93 MG/DL (ref 0.73–1.18)
CREAT SERPL-MCNC: 0.95 MG/DL (ref 0.73–1.18)
CREAT SERPL-MCNC: 0.98 MG/DL (ref 0.73–1.18)
CREAT SERPL-MCNC: 0.99 MG/DL (ref 0.73–1.18)
CREAT SERPL-MCNC: 0.99 MG/DL (ref 0.73–1.18)
CREAT SERPL-MCNC: 1 MG/DL (ref 0.73–1.18)
CREAT SERPL-MCNC: 1.08 MG/DL (ref 0.73–1.18)
CREAT SERPL-MCNC: 1.09 MG/DL (ref 0.73–1.18)
CREAT SERPL-MCNC: 1.11 MG/DL (ref 0.73–1.18)
CREAT/UREA NIT SERPL: 12
CREAT/UREA NIT SERPL: 14
CREAT/UREA NIT SERPL: 15
CREAT/UREA NIT SERPL: 17
CRYPTOSP DNA STL QL NAA+NON-PROBE: NOT DETECTED
E COLI O157 DNA STL QL NAA+NON-PROBE: NOT DETECTED
E HISTOLYT DNA STL QL NAA+NON-PROBE: NOT DETECTED
EAEC PAA PLAS AGGR+AATA ST NAA+NON-PRB: NOT DETECTED
EC STX1+STX2 GENES STL QL NAA+NON-PROBE: NOT DETECTED
EOSINOPHIL # BLD AUTO: 0 X10(3)/MCL (ref 0–0.9)
EOSINOPHIL # BLD AUTO: 0.01 X10(3)/MCL (ref 0–0.9)
EOSINOPHIL # BLD AUTO: 0.02 X10(3)/MCL (ref 0–0.9)
EOSINOPHIL # BLD AUTO: 0.03 X10(3)/MCL (ref 0–0.9)
EOSINOPHIL # BLD AUTO: 0.04 X10(3)/MCL (ref 0–0.9)
EOSINOPHIL # BLD AUTO: 0.04 X10(3)/MCL (ref 0–0.9)
EOSINOPHIL # BLD AUTO: 0.05 X10(3)/MCL (ref 0–0.9)
EOSINOPHIL # BLD AUTO: 0.05 X10(3)/MCL (ref 0–0.9)
EOSINOPHIL # BLD AUTO: 0.19 X10(3)/MCL (ref 0–0.9)
EOSINOPHIL # BLD AUTO: 0.21 X10(3)/MCL (ref 0–0.9)
EOSINOPHIL # BLD AUTO: 0.23 X10(3)/MCL (ref 0–0.9)
EOSINOPHIL # BLD AUTO: 0.26 X10(3)/MCL (ref 0–0.9)
EOSINOPHIL # BLD AUTO: 0.31 X10(3)/MCL (ref 0–0.9)
EOSINOPHIL # BLD AUTO: 0.34 X10(3)/MCL (ref 0–0.9)
EOSINOPHIL NFR BLD AUTO: 0 %
EOSINOPHIL NFR BLD AUTO: 0.1 %
EOSINOPHIL NFR BLD AUTO: 0.2 %
EOSINOPHIL NFR BLD AUTO: 0.3 %
EOSINOPHIL NFR BLD AUTO: 0.5 %
EOSINOPHIL NFR BLD AUTO: 0.6 %
EOSINOPHIL NFR BLD AUTO: 0.6 %
EOSINOPHIL NFR BLD AUTO: 0.7 %
EOSINOPHIL NFR BLD AUTO: 0.9 %
EOSINOPHIL NFR BLD AUTO: 1.4 %
EOSINOPHIL NFR BLD AUTO: 2.5 %
EOSINOPHIL NFR BLD AUTO: 2.7 %
EOSINOPHIL NFR BLD AUTO: 3.3 %
EOSINOPHIL NFR BLD AUTO: 4.1 %
EOSINOPHIL NFR BLD AUTO: 5.6 %
EOSINOPHIL NFR BLD AUTO: 5.6 %
EOSINOPHIL NFR BLD MANUAL: 0.03 X10(3)/MCL (ref 0–0.9)
EOSINOPHIL NFR BLD MANUAL: 0.39 X10(3)/MCL (ref 0–0.9)
EOSINOPHIL NFR BLD MANUAL: 2 % (ref 0–8)
EOSINOPHIL NFR BLD MANUAL: 7 % (ref 0–8)
EPEC EAE GENE STL QL NAA+NON-PROBE: NOT DETECTED
ERYTHROCYTE [DISTWIDTH] IN BLOOD BY AUTOMATED COUNT: 12 % (ref 11.5–17)
ERYTHROCYTE [DISTWIDTH] IN BLOOD BY AUTOMATED COUNT: 12.4 % (ref 11.6–14.4)
ERYTHROCYTE [DISTWIDTH] IN BLOOD BY AUTOMATED COUNT: 12.5 % (ref 11.5–17)
ERYTHROCYTE [DISTWIDTH] IN BLOOD BY AUTOMATED COUNT: 12.7 % (ref 11.5–17)
ERYTHROCYTE [DISTWIDTH] IN BLOOD BY AUTOMATED COUNT: 12.8 % (ref 11.5–17)
ERYTHROCYTE [DISTWIDTH] IN BLOOD BY AUTOMATED COUNT: 12.9 % (ref 11.5–17)
ERYTHROCYTE [DISTWIDTH] IN BLOOD BY AUTOMATED COUNT: 13 % (ref 11.5–17)
ERYTHROCYTE [DISTWIDTH] IN BLOOD BY AUTOMATED COUNT: 13 % (ref 11.6–14.4)
ERYTHROCYTE [DISTWIDTH] IN BLOOD BY AUTOMATED COUNT: 13.1 % (ref 11.5–17)
ERYTHROCYTE [DISTWIDTH] IN BLOOD BY AUTOMATED COUNT: 13.1 % (ref 11.6–14.4)
ERYTHROCYTE [DISTWIDTH] IN BLOOD BY AUTOMATED COUNT: 13.2 % (ref 11.6–14.4)
ERYTHROCYTE [DISTWIDTH] IN BLOOD BY AUTOMATED COUNT: 15.2 % (ref 11.5–17)
ERYTHROCYTE [DISTWIDTH] IN BLOOD BY AUTOMATED COUNT: 15.4 % (ref 11.5–17)
ERYTHROCYTE [DISTWIDTH] IN BLOOD BY AUTOMATED COUNT: 15.5 % (ref 11.5–17)
ERYTHROCYTE [DISTWIDTH] IN BLOOD BY AUTOMATED COUNT: 15.8 % (ref 11.5–17)
ERYTHROCYTE [DISTWIDTH] IN BLOOD BY AUTOMATED COUNT: 15.9 % (ref 11.5–17)
ERYTHROCYTE [DISTWIDTH] IN BLOOD BY AUTOMATED COUNT: 16 % (ref 11.5–17)
ERYTHROCYTE [DISTWIDTH] IN BLOOD BY AUTOMATED COUNT: 16.1 % (ref 11.5–17)
ETEC LTA+ST1A+ST1B TOX ST NAA+NON-PROBE: NOT DETECTED
FECAL LEUKOCYTE (OHS): POSITIVE
FERRITIN SERPL-MCNC: 1336.18 NG/ML (ref 21.81–274.66)
FLUAV AG UPPER RESP QL IA.RAPID: NOT DETECTED
FLUBV AG UPPER RESP QL IA.RAPID: NOT DETECTED
G LAMBLIA DNA STL QL NAA+NON-PROBE: NOT DETECTED
GALACTOMANNAN AG SERPL IA-ACNC: <0.5 INDEX
GALACTOMANNAN AG SERPL IA-ACNC: NORMAL
GFR SERPLBLD CREATININE-BSD FMLA CKD-EPI: >60 MLS/MIN/1.73/M2
GLOBULIN SER-MCNC: 3.1 GM/DL (ref 2.4–3.5)
GLOBULIN SER-MCNC: 3.3 GM/DL (ref 2.4–3.5)
GLOBULIN SER-MCNC: 3.5 GM/DL (ref 2.4–3.5)
GLOBULIN SER-MCNC: 3.6 GM/DL (ref 2.4–3.5)
GLOBULIN SER-MCNC: 3.6 GM/DL (ref 2.4–3.5)
GLOBULIN SER-MCNC: 3.8 GM/DL (ref 2.4–3.5)
GLOBULIN SER-MCNC: 3.9 GM/DL (ref 2.4–3.5)
GLOBULIN SER-MCNC: 4.1 GM/DL (ref 2.4–3.5)
GLOBULIN SER-MCNC: 4.2 GM/DL (ref 2.4–3.5)
GLOBULIN SER-MCNC: 4.3 GM/DL (ref 2.4–3.5)
GLOBULIN SER-MCNC: 4.5 GM/DL (ref 2.4–3.5)
GLOBULIN SER-MCNC: 4.6 GM/DL (ref 2.4–3.5)
GLOBULIN SER-MCNC: 4.9 GM/DL (ref 2.4–3.5)
GLOBULIN SER-MCNC: 5 GM/DL (ref 2.4–3.5)
GLOBULIN SER-MCNC: 5.1 GM/DL (ref 2.4–3.5)
GLOBULIN SER-MCNC: 5.2 GM/DL (ref 2.4–3.5)
GLOBULIN SER-MCNC: 5.5 GM/DL (ref 2.4–3.5)
GLOBULIN SER-MCNC: 5.8 GM/DL (ref 2.4–3.5)
GLUCOSE SERPL-MCNC: 101 MG/DL (ref 74–100)
GLUCOSE SERPL-MCNC: 102 MG/DL (ref 74–100)
GLUCOSE SERPL-MCNC: 102 MG/DL (ref 74–100)
GLUCOSE SERPL-MCNC: 104 MG/DL (ref 74–100)
GLUCOSE SERPL-MCNC: 105 MG/DL (ref 74–100)
GLUCOSE SERPL-MCNC: 106 MG/DL (ref 74–100)
GLUCOSE SERPL-MCNC: 107 MG/DL (ref 74–100)
GLUCOSE SERPL-MCNC: 109 MG/DL (ref 74–100)
GLUCOSE SERPL-MCNC: 113 MG/DL (ref 74–100)
GLUCOSE SERPL-MCNC: 113 MG/DL (ref 74–100)
GLUCOSE SERPL-MCNC: 116 MG/DL (ref 74–100)
GLUCOSE SERPL-MCNC: 122 MG/DL (ref 74–100)
GLUCOSE SERPL-MCNC: 148 MG/DL (ref 74–100)
GLUCOSE SERPL-MCNC: 148 MG/DL (ref 74–100)
GLUCOSE SERPL-MCNC: 85 MG/DL (ref 74–100)
GLUCOSE SERPL-MCNC: 88 MG/DL (ref 74–100)
GLUCOSE SERPL-MCNC: 91 MG/DL (ref 74–100)
GLUCOSE SERPL-MCNC: 91 MG/DL (ref 74–100)
GLUCOSE SERPL-MCNC: 92 MG/DL (ref 74–100)
GLUCOSE SERPL-MCNC: 92 MG/DL (ref 74–100)
GLUCOSE SERPL-MCNC: 93 MG/DL (ref 74–100)
GLUCOSE SERPL-MCNC: 95 MG/DL (ref 74–100)
GLUCOSE SERPL-MCNC: 96 MG/DL (ref 74–100)
GLUCOSE SERPL-MCNC: 96 MG/DL (ref 74–100)
GLUCOSE SERPL-MCNC: 97 MG/DL (ref 74–100)
GLUCOSE SERPL-MCNC: 98 MG/DL (ref 74–100)
GLUCOSE SERPL-MCNC: 99 MG/DL (ref 74–100)
GLUCOSE UR QL STRIP.AUTO: NORMAL MG/DL
GLUCOSE UR QL STRIP.AUTO: NORMAL MG/DL
GRAM STN SPEC: ABNORMAL
GRAM STN SPEC: NORMAL
GRAN CASTS #/AREA URNS LPF: ABNORMAL /LPF
H CAPSUL AB SERPL ID: NEGATIVE
H CAPSUL AG UR QL IA: NOT DETECTED
H CAPSUL AG UR-MCNC: NOT DETECTED NG/ML
H CAPSUL MYC AB TITR SER CF: NEGATIVE {TITER}
H CAPSUL YST AB TITR SER CF: NEGATIVE {TITER}
HCO3 UR-SCNC: 26.3 MMOL/L (ref 22–26)
HCT VFR BLD AUTO: 28.7 % (ref 42–52)
HCT VFR BLD AUTO: 29.5 % (ref 42–52)
HCT VFR BLD AUTO: 29.8 % (ref 42–52)
HCT VFR BLD AUTO: 29.9 % (ref 42–52)
HCT VFR BLD AUTO: 30.3 % (ref 42–52)
HCT VFR BLD AUTO: 31.1 % (ref 42–52)
HCT VFR BLD AUTO: 31.1 % (ref 42–52)
HCT VFR BLD AUTO: 31.6 % (ref 42–52)
HCT VFR BLD AUTO: 31.9 % (ref 42–52)
HCT VFR BLD AUTO: 32.1 % (ref 42–52)
HCT VFR BLD AUTO: 32.7 % (ref 42–52)
HCT VFR BLD AUTO: 32.9 % (ref 42–52)
HCT VFR BLD AUTO: 33.4 % (ref 42–52)
HCT VFR BLD AUTO: 33.6 % (ref 42–52)
HCT VFR BLD AUTO: 33.6 % (ref 42–52)
HCT VFR BLD AUTO: 34.3 % (ref 42–52)
HCT VFR BLD AUTO: 35.6 % (ref 42–52)
HCT VFR BLD AUTO: 35.7 % (ref 42–52)
HCT VFR BLD AUTO: 35.8 % (ref 42–52)
HCT VFR BLD AUTO: 36.9 % (ref 42–52)
HCT VFR BLD AUTO: 37.4 % (ref 42–52)
HCT VFR BLD AUTO: 39.6 % (ref 42–52)
HCT VFR BLD AUTO: 39.8 % (ref 42–52)
HCT VFR BLD AUTO: 41.9 % (ref 42–52)
HCT VFR BLD AUTO: 44.8 % (ref 42–52)
HEMATOLOGIST REVIEW: NORMAL
HGB BLD-MCNC: 10 GM/DL (ref 14–18)
HGB BLD-MCNC: 10.1 GM/DL (ref 14–18)
HGB BLD-MCNC: 10.2 GM/DL (ref 14–18)
HGB BLD-MCNC: 10.3 GM/DL (ref 14–18)
HGB BLD-MCNC: 10.6 GM/DL (ref 14–18)
HGB BLD-MCNC: 10.6 GM/DL (ref 14–18)
HGB BLD-MCNC: 10.9 GM/DL (ref 14–18)
HGB BLD-MCNC: 11 GM/DL (ref 14–18)
HGB BLD-MCNC: 11.2 GM/DL (ref 14–18)
HGB BLD-MCNC: 11.3 GM/DL (ref 14–18)
HGB BLD-MCNC: 11.7 GM/DL (ref 14–18)
HGB BLD-MCNC: 11.7 GM/DL (ref 14–18)
HGB BLD-MCNC: 11.8 GM/DL (ref 14–18)
HGB BLD-MCNC: 12.4 GM/DL (ref 14–18)
HGB BLD-MCNC: 12.6 GM/DL (ref 14–18)
HGB BLD-MCNC: 13.1 GM/DL (ref 14–18)
HGB BLD-MCNC: 13.2 G/DL (ref 12–18)
HGB BLD-MCNC: 13.5 GM/DL (ref 14–18)
HGB BLD-MCNC: 13.5 GM/DL (ref 14–18)
HGB BLD-MCNC: 14.5 GM/DL (ref 14–18)
HGB BLD-MCNC: 9.6 GM/DL (ref 14–18)
HIV 1+2 AB+HIV1 P24 AG SERPL QL IA: NONREACTIVE
HYALINE CASTS #/AREA URNS LPF: ABNORMAL /LPF
HYALINE CASTS #/AREA URNS LPF: ABNORMAL /LPF
IMM GRANULOCYTES # BLD AUTO: 0 X10(3)/MCL (ref 0–0.04)
IMM GRANULOCYTES # BLD AUTO: 0 X10(3)/MCL (ref 0–0.04)
IMM GRANULOCYTES # BLD AUTO: 0.01 X10(3)/MCL (ref 0–0.04)
IMM GRANULOCYTES # BLD AUTO: 0.03 X10(3)/MCL (ref 0–0.04)
IMM GRANULOCYTES # BLD AUTO: 0.05 X10(3)/MCL (ref 0–0.04)
IMM GRANULOCYTES # BLD AUTO: 0.06 X10(3)/MCL (ref 0–0.04)
IMM GRANULOCYTES # BLD AUTO: 0.06 X10(3)/MCL (ref 0–0.04)
IMM GRANULOCYTES # BLD AUTO: 0.08 X10(3)/MCL (ref 0–0.04)
IMM GRANULOCYTES # BLD AUTO: 0.09 X10(3)/MCL (ref 0–0.04)
IMM GRANULOCYTES # BLD AUTO: 0.1 X10(3)/MCL (ref 0–0.04)
IMM GRANULOCYTES # BLD AUTO: 0.1 X10(3)/MCL (ref 0–0.04)
IMM GRANULOCYTES # BLD AUTO: 0.11 X10(3)/MCL (ref 0–0.04)
IMM GRANULOCYTES # BLD AUTO: 0.12 X10(3)/MCL (ref 0–0.04)
IMM GRANULOCYTES # BLD AUTO: 0.15 X10(3)/MCL (ref 0–0.04)
IMM GRANULOCYTES # BLD AUTO: 0.16 X10(3)/MCL (ref 0–0.04)
IMM GRANULOCYTES # BLD AUTO: 0.28 X10(3)/MCL (ref 0–0.04)
IMM GRANULOCYTES # BLD AUTO: 0.29 X10(3)/MCL (ref 0–0.04)
IMM GRANULOCYTES # BLD AUTO: 0.29 X10(3)/MCL (ref 0–0.04)
IMM GRANULOCYTES # BLD AUTO: 0.33 X10(3)/MCL (ref 0–0.04)
IMM GRANULOCYTES # BLD AUTO: 0.4 X10(3)/MCL (ref 0–0.04)
IMM GRANULOCYTES # BLD AUTO: 0.59 X10(3)/MCL (ref 0–0.04)
IMM GRANULOCYTES NFR BLD AUTO: 0 %
IMM GRANULOCYTES NFR BLD AUTO: 0 %
IMM GRANULOCYTES NFR BLD AUTO: 0.3 %
IMM GRANULOCYTES NFR BLD AUTO: 0.4 %
IMM GRANULOCYTES NFR BLD AUTO: 0.5 %
IMM GRANULOCYTES NFR BLD AUTO: 0.5 %
IMM GRANULOCYTES NFR BLD AUTO: 0.6 %
IMM GRANULOCYTES NFR BLD AUTO: 0.7 %
IMM GRANULOCYTES NFR BLD AUTO: 0.7 %
IMM GRANULOCYTES NFR BLD AUTO: 0.9 %
IMM GRANULOCYTES NFR BLD AUTO: 1 %
IMM GRANULOCYTES NFR BLD AUTO: 1.3 %
IMM GRANULOCYTES NFR BLD AUTO: 1.5 %
IMM GRANULOCYTES NFR BLD AUTO: 1.6 %
IMM GRANULOCYTES NFR BLD AUTO: 1.8 %
IMM GRANULOCYTES NFR BLD AUTO: 1.9 %
IMM GRANULOCYTES NFR BLD AUTO: 2.2 %
IMM GRANULOCYTES NFR BLD AUTO: 2.8 %
IMM GRANULOCYTES NFR BLD AUTO: 2.8 %
IMM GRANULOCYTES NFR BLD AUTO: 4 %
IMM GRANULOCYTES NFR BLD AUTO: 5.2 %
IMM GRANULOCYTES NFR BLD AUTO: 5.4 %
IMM GRANULOCYTES NFR BLD AUTO: 5.6 %
IMM GRANULOCYTES NFR BLD AUTO: 5.6 %
IMM GRANULOCYTES NFR BLD AUTO: 7.2 %
IRON SATN MFR SERPL: 15 % (ref 20–50)
IRON SERPL-MCNC: 21 UG/DL (ref 65–175)
KETONES UR QL STRIP.AUTO: NEGATIVE MG/DL
KETONES UR QL STRIP.AUTO: NEGATIVE MG/DL
LACTATE SERPL-SCNC: 0.8 MMOL/L (ref 0.5–2.2)
LACTATE SERPL-SCNC: 1 MMOL/L (ref 0.5–2.2)
LACTATE SERPL-SCNC: 1.3 MMOL/L (ref 0.5–2.2)
LACTATE SERPL-SCNC: 1.8 MMOL/L (ref 0.5–2.2)
LEGIONELLA AG UR QL: NEGATIVE
LEUKO NEG CONT (OHS): NEGATIVE
LEUKO POS CONT (OHS): POSITIVE
LEUKOCYTE ESTERASE UR QL STRIP.AUTO: NEGATIVE UNIT/L
LEUKOCYTE ESTERASE UR QL STRIP.AUTO: NEGATIVE UNIT/L
LYMPHOCYTES # BLD AUTO: 0.39 X10(3)/MCL (ref 0.6–4.6)
LYMPHOCYTES # BLD AUTO: 0.4 X10(3)/MCL (ref 0.6–4.6)
LYMPHOCYTES # BLD AUTO: 0.42 X10(3)/MCL (ref 0.6–4.6)
LYMPHOCYTES # BLD AUTO: 0.47 X10(3)/MCL (ref 0.6–4.6)
LYMPHOCYTES # BLD AUTO: 0.49 X10(3)/MCL (ref 0.6–4.6)
LYMPHOCYTES # BLD AUTO: 0.57 X10(3)/MCL (ref 0.6–4.6)
LYMPHOCYTES # BLD AUTO: 0.58 X10(3)/MCL (ref 0.6–4.6)
LYMPHOCYTES # BLD AUTO: 0.61 X10(3)/MCL (ref 0.6–4.6)
LYMPHOCYTES # BLD AUTO: 0.66 X10(3)/MCL (ref 0.6–4.6)
LYMPHOCYTES # BLD AUTO: 0.69 X10(3)/MCL (ref 0.6–4.6)
LYMPHOCYTES # BLD AUTO: 0.71 X10(3)/MCL (ref 0.6–4.6)
LYMPHOCYTES # BLD AUTO: 0.74 X10(3)/MCL (ref 0.6–4.6)
LYMPHOCYTES # BLD AUTO: 0.76 X10(3)/MCL (ref 0.6–4.6)
LYMPHOCYTES # BLD AUTO: 0.78 X10(3)/MCL (ref 0.6–4.6)
LYMPHOCYTES # BLD AUTO: 0.89 X10(3)/MCL (ref 0.6–4.6)
LYMPHOCYTES # BLD AUTO: 0.91 X10(3)/MCL (ref 0.6–4.6)
LYMPHOCYTES # BLD AUTO: 0.93 X10(3)/MCL (ref 0.6–4.6)
LYMPHOCYTES # BLD AUTO: 1.07 X10(3)/MCL (ref 0.6–4.6)
LYMPHOCYTES # BLD AUTO: 1.3 X10(3)/MCL (ref 0.6–4.6)
LYMPHOCYTES # BLD AUTO: 1.32 X10(3)/MCL (ref 0.6–4.6)
LYMPHOCYTES NFR BLD AUTO: 10 %
LYMPHOCYTES NFR BLD AUTO: 10.1 %
LYMPHOCYTES NFR BLD AUTO: 11.6 %
LYMPHOCYTES NFR BLD AUTO: 12.2 %
LYMPHOCYTES NFR BLD AUTO: 12.7 %
LYMPHOCYTES NFR BLD AUTO: 12.7 %
LYMPHOCYTES NFR BLD AUTO: 14.5 %
LYMPHOCYTES NFR BLD AUTO: 14.6 %
LYMPHOCYTES NFR BLD AUTO: 15 %
LYMPHOCYTES NFR BLD AUTO: 15.9 %
LYMPHOCYTES NFR BLD AUTO: 16.5 %
LYMPHOCYTES NFR BLD AUTO: 27.3 %
LYMPHOCYTES NFR BLD AUTO: 3.9 %
LYMPHOCYTES NFR BLD AUTO: 3.9 %
LYMPHOCYTES NFR BLD AUTO: 4.4 %
LYMPHOCYTES NFR BLD AUTO: 5.9 %
LYMPHOCYTES NFR BLD AUTO: 7.3 %
LYMPHOCYTES NFR BLD AUTO: 8.8 %
LYMPHOCYTES NFR BLD AUTO: 8.9 %
LYMPHOCYTES NFR BLD AUTO: 9.4 %
LYMPHOCYTES NFR BLD MANUAL: 0.07 X10(3)/MCL
LYMPHOCYTES NFR BLD MANUAL: 0.19 X10(3)/MCL
LYMPHOCYTES NFR BLD MANUAL: 0.26 X10(3)/MCL
LYMPHOCYTES NFR BLD MANUAL: 0.31 X10(3)/MCL
LYMPHOCYTES NFR BLD MANUAL: 0.73 X10(3)/MCL
LYMPHOCYTES NFR BLD MANUAL: 13 % (ref 13–40)
LYMPHOCYTES NFR BLD MANUAL: 18 % (ref 13–40)
LYMPHOCYTES NFR BLD MANUAL: 25 % (ref 13–40)
LYMPHOCYTES NFR BLD MANUAL: 32 % (ref 13–40)
LYMPHOCYTES NFR BLD MANUAL: 8 % (ref 13–40)
M BLASTOMYCES AG VALUE: <1.3 NG/ML
M BLASTOMYCES AG VALUE: NOT DETECTED NG/ML
MAGNESIUM SERPL-MCNC: 1.5 MG/DL (ref 1.6–2.6)
MAGNESIUM SERPL-MCNC: 1.5 MG/DL (ref 1.6–2.6)
MAGNESIUM SERPL-MCNC: 1.6 MG/DL (ref 1.6–2.6)
MAGNESIUM SERPL-MCNC: 1.7 MG/DL (ref 1.6–2.6)
MAGNESIUM SERPL-MCNC: 1.8 MG/DL (ref 1.6–2.6)
MAGNESIUM SERPL-MCNC: 1.8 MG/DL (ref 1.6–2.6)
MAGNESIUM SERPL-MCNC: 1.9 MG/DL (ref 1.6–2.6)
MAGNESIUM SERPL-MCNC: 2 MG/DL (ref 1.6–2.6)
MAGNESIUM SERPL-MCNC: 2 MG/DL (ref 1.6–2.6)
MAGNESIUM SERPL-MCNC: 2.1 MG/DL (ref 1.6–2.6)
MAGNESIUM SERPL-MCNC: 2.1 MG/DL (ref 1.6–2.6)
MAGNESIUM SERPL-MCNC: 2.2 MG/DL (ref 1.6–2.6)
MAGNESIUM SERPL-MCNC: 2.4 MG/DL (ref 1.6–2.6)
MAGNESIUM SERPL-MCNC: 2.6 MG/DL (ref 1.6–2.6)
MAGNESIUM SERPL-MCNC: 2.7 MG/DL (ref 1.6–2.6)
MCH RBC QN AUTO: 31.9 PG
MCH RBC QN AUTO: 32 PG
MCH RBC QN AUTO: 32.1 PG
MCH RBC QN AUTO: 32.2 PG
MCH RBC QN AUTO: 32.2 PG
MCH RBC QN AUTO: 32.3 PG (ref 27–31)
MCH RBC QN AUTO: 32.5 PG (ref 27–31)
MCH RBC QN AUTO: 32.6 PG
MCH RBC QN AUTO: 32.7 PG
MCH RBC QN AUTO: 32.7 PG (ref 27–31)
MCH RBC QN AUTO: 32.8 PG
MCH RBC QN AUTO: 32.8 PG (ref 27–31)
MCH RBC QN AUTO: 32.9 PG (ref 27–31)
MCH RBC QN AUTO: 32.9 PG (ref 27–31)
MCH RBC QN AUTO: 33 PG (ref 27–31)
MCH RBC QN AUTO: 33.1 PG (ref 27–31)
MCH RBC QN AUTO: 33.2 PG (ref 27–31)
MCH RBC QN AUTO: 33.3 PG (ref 27–31)
MCH RBC QN AUTO: 33.4 PG (ref 27–31)
MCH RBC QN AUTO: 33.4 PG (ref 27–31)
MCH RBC QN AUTO: 33.5 PG (ref 27–31)
MCH RBC QN AUTO: 33.6 PG (ref 27–31)
MCH RBC QN AUTO: 33.8 PG (ref 27–31)
MCHC RBC AUTO-ENTMCNC: 31.7 MG/DL (ref 33–36)
MCHC RBC AUTO-ENTMCNC: 32.2 MG/DL (ref 33–36)
MCHC RBC AUTO-ENTMCNC: 32.4 MG/DL (ref 33–36)
MCHC RBC AUTO-ENTMCNC: 32.6 MG/DL (ref 33–36)
MCHC RBC AUTO-ENTMCNC: 32.7 MG/DL (ref 33–36)
MCHC RBC AUTO-ENTMCNC: 32.9 MG/DL (ref 33–36)
MCHC RBC AUTO-ENTMCNC: 32.9 MG/DL (ref 33–36)
MCHC RBC AUTO-ENTMCNC: 33 MG/DL (ref 33–36)
MCHC RBC AUTO-ENTMCNC: 33.1 MG/DL (ref 33–36)
MCHC RBC AUTO-ENTMCNC: 33.2 MG/DL (ref 33–36)
MCHC RBC AUTO-ENTMCNC: 33.2 MG/DL (ref 33–36)
MCHC RBC AUTO-ENTMCNC: 33.3 MG/DL (ref 33–36)
MCHC RBC AUTO-ENTMCNC: 33.4 MG/DL (ref 33–36)
MCHC RBC AUTO-ENTMCNC: 33.6 MG/DL (ref 33–36)
MCHC RBC AUTO-ENTMCNC: 33.7 MG/DL (ref 33–36)
MCHC RBC AUTO-ENTMCNC: 33.9 MG/DL (ref 33–36)
MCHC RBC AUTO-ENTMCNC: 34 MG/DL (ref 33–36)
MCHC RBC AUTO-ENTMCNC: 34.1 MG/DL (ref 33–36)
MCHC RBC AUTO-ENTMCNC: 34.4 MG/DL (ref 33–36)
MCHC RBC AUTO-ENTMCNC: 34.8 MG/DL (ref 33–36)
MCV RBC AUTO: 100.6 FL (ref 80–94)
MCV RBC AUTO: 101 FL (ref 80–94)
MCV RBC AUTO: 101.1 FL (ref 80–94)
MCV RBC AUTO: 101.3 FL (ref 80–94)
MCV RBC AUTO: 101.5 FL (ref 80–94)
MCV RBC AUTO: 101.6 FL (ref 80–94)
MCV RBC AUTO: 102.1 FL (ref 80–94)
MCV RBC AUTO: 102.4 FL (ref 80–94)
MCV RBC AUTO: 105 FL (ref 80–94)
MCV RBC AUTO: 92.8 FL (ref 80–94)
MCV RBC AUTO: 95.3 FL (ref 80–94)
MCV RBC AUTO: 96.4 FL (ref 80–94)
MCV RBC AUTO: 96.6 FL (ref 80–94)
MCV RBC AUTO: 96.7 FL (ref 80–94)
MCV RBC AUTO: 96.8 FL (ref 80–94)
MCV RBC AUTO: 96.8 FL (ref 80–94)
MCV RBC AUTO: 96.9 FL (ref 80–94)
MCV RBC AUTO: 97.3 FL (ref 80–94)
MCV RBC AUTO: 97.3 FL (ref 80–94)
MCV RBC AUTO: 97.4 FL (ref 80–94)
MCV RBC AUTO: 97.5 FL (ref 80–94)
MCV RBC AUTO: 97.6 FL (ref 80–94)
MCV RBC AUTO: 97.7 FL (ref 80–94)
MCV RBC AUTO: 98 FL (ref 80–94)
MCV RBC AUTO: 99.7 FL (ref 80–94)
MONOCYTES # BLD AUTO: 0.02 X10(3)/MCL (ref 0.1–1.3)
MONOCYTES # BLD AUTO: 0.51 X10(3)/MCL (ref 0.1–1.3)
MONOCYTES # BLD AUTO: 0.74 X10(3)/MCL (ref 0.1–1.3)
MONOCYTES # BLD AUTO: 0.81 X10(3)/MCL (ref 0.1–1.3)
MONOCYTES # BLD AUTO: 0.83 X10(3)/MCL (ref 0.1–1.3)
MONOCYTES # BLD AUTO: 0.83 X10(3)/MCL (ref 0.1–1.3)
MONOCYTES # BLD AUTO: 0.86 X10(3)/MCL (ref 0.1–1.3)
MONOCYTES # BLD AUTO: 0.87 X10(3)/MCL (ref 0.1–1.3)
MONOCYTES # BLD AUTO: 0.91 X10(3)/MCL (ref 0.1–1.3)
MONOCYTES # BLD AUTO: 0.96 X10(3)/MCL (ref 0.1–1.3)
MONOCYTES # BLD AUTO: 0.97 X10(3)/MCL (ref 0.1–1.3)
MONOCYTES # BLD AUTO: 1.05 X10(3)/MCL (ref 0.1–1.3)
MONOCYTES # BLD AUTO: 1.05 X10(3)/MCL (ref 0.1–1.3)
MONOCYTES # BLD AUTO: 1.15 X10(3)/MCL (ref 0.1–1.3)
MONOCYTES # BLD AUTO: 1.17 X10(3)/MCL (ref 0.1–1.3)
MONOCYTES # BLD AUTO: 1.18 X10(3)/MCL (ref 0.1–1.3)
MONOCYTES # BLD AUTO: 1.26 X10(3)/MCL (ref 0.1–1.3)
MONOCYTES # BLD AUTO: 1.56 X10(3)/MCL (ref 0.1–1.3)
MONOCYTES # BLD AUTO: 2.82 X10(3)/MCL (ref 0.1–1.3)
MONOCYTES # BLD AUTO: 3.19 X10(3)/MCL (ref 0.1–1.3)
MONOCYTES NFR BLD AUTO: 1.4 %
MONOCYTES NFR BLD AUTO: 10 %
MONOCYTES NFR BLD AUTO: 10.4 %
MONOCYTES NFR BLD AUTO: 13.7 %
MONOCYTES NFR BLD AUTO: 13.9 %
MONOCYTES NFR BLD AUTO: 14.7 %
MONOCYTES NFR BLD AUTO: 14.7 %
MONOCYTES NFR BLD AUTO: 14.8 %
MONOCYTES NFR BLD AUTO: 15.3 %
MONOCYTES NFR BLD AUTO: 15.4 %
MONOCYTES NFR BLD AUTO: 16.4 %
MONOCYTES NFR BLD AUTO: 17.4 %
MONOCYTES NFR BLD AUTO: 17.9 %
MONOCYTES NFR BLD AUTO: 18 %
MONOCYTES NFR BLD AUTO: 18.5 %
MONOCYTES NFR BLD AUTO: 18.5 %
MONOCYTES NFR BLD AUTO: 19.1 %
MONOCYTES NFR BLD AUTO: 19.3 %
MONOCYTES NFR BLD AUTO: 7.2 %
MONOCYTES NFR BLD AUTO: 8.9 %
MONOCYTES NFR BLD MANUAL: 0.07 X10(3)/MCL (ref 0.1–1.3)
MONOCYTES NFR BLD MANUAL: 0.07 X10(3)/MCL (ref 0.1–1.3)
MONOCYTES NFR BLD MANUAL: 0.17 X10(3)/MCL (ref 0.1–1.3)
MONOCYTES NFR BLD MANUAL: 0.18 X10(3)/MCL (ref 0.1–1.3)
MONOCYTES NFR BLD MANUAL: 0.56 X10(3)/MCL (ref 0.1–1.3)
MONOCYTES NFR BLD MANUAL: 10 % (ref 2–11)
MONOCYTES NFR BLD MANUAL: 10 % (ref 2–11)
MONOCYTES NFR BLD MANUAL: 12 % (ref 2–11)
MONOCYTES NFR BLD MANUAL: 2 % (ref 2–11)
MONOCYTES NFR BLD MANUAL: 60 % (ref 2–11)
MRSA PCR SCRN (OHS): NOT DETECTED
MTB AND RIF RESISTANCE PNL ISLT/SPM: NOT DETECTED
MTB AND RIF RESISTANCE PNL ISLT/SPM: NOT DETECTED
MUCOUS THREADS URNS QL MICRO: ABNORMAL /LPF
MYELOCYTES NFR BLD MANUAL: 1 %
MYELOCYTES NFR BLD MANUAL: 1 %
NEUTROPHILS # BLD AUTO: 0.9 X10(3)/MCL (ref 2.1–9.2)
NEUTROPHILS # BLD AUTO: 13.6 X10(3)/MCL (ref 2.1–9.2)
NEUTROPHILS # BLD AUTO: 14.7 X10(3)/MCL (ref 2.1–9.2)
NEUTROPHILS # BLD AUTO: 3 X10(3)/MCL (ref 2.1–9.2)
NEUTROPHILS # BLD AUTO: 3.34 X10(3)/MCL (ref 2.1–9.2)
NEUTROPHILS # BLD AUTO: 3.49 X10(3)/MCL (ref 2.1–9.2)
NEUTROPHILS # BLD AUTO: 3.7 X10(3)/MCL (ref 2.1–9.2)
NEUTROPHILS # BLD AUTO: 3.9 X10(3)/MCL (ref 2.1–9.2)
NEUTROPHILS # BLD AUTO: 4.5 X10(3)/MCL (ref 2.1–9.2)
NEUTROPHILS # BLD AUTO: 4.53 X10(3)/MCL (ref 2.1–9.2)
NEUTROPHILS # BLD AUTO: 4.56 X10(3)/MCL (ref 2.1–9.2)
NEUTROPHILS # BLD AUTO: 4.64 X10(3)/MCL (ref 2.1–9.2)
NEUTROPHILS # BLD AUTO: 4.65 X10(3)/MCL (ref 2.1–9.2)
NEUTROPHILS # BLD AUTO: 6.2 X10(3)/MCL (ref 2.1–9.2)
NEUTROPHILS # BLD AUTO: 6.3 X10(3)/MCL (ref 2.1–9.2)
NEUTROPHILS # BLD AUTO: 8.14 X10(3)/MCL (ref 2.1–9.2)
NEUTROPHILS # BLD AUTO: 8.5 X10(3)/MCL (ref 2.1–9.2)
NEUTROPHILS # BLD AUTO: 8.94 X10(3)/MCL (ref 2.1–9.2)
NEUTROPHILS NFR BLD AUTO: 57.1 %
NEUTROPHILS NFR BLD AUTO: 63 %
NEUTROPHILS NFR BLD AUTO: 63.6 %
NEUTROPHILS NFR BLD AUTO: 63.8 %
NEUTROPHILS NFR BLD AUTO: 63.9 %
NEUTROPHILS NFR BLD AUTO: 65 %
NEUTROPHILS NFR BLD AUTO: 65.7 %
NEUTROPHILS NFR BLD AUTO: 66 %
NEUTROPHILS NFR BLD AUTO: 66.4 %
NEUTROPHILS NFR BLD AUTO: 73.7 %
NEUTROPHILS NFR BLD AUTO: 74.2 %
NEUTROPHILS NFR BLD AUTO: 74.9 %
NEUTROPHILS NFR BLD AUTO: 75.2 %
NEUTROPHILS NFR BLD AUTO: 76.5 %
NEUTROPHILS NFR BLD AUTO: 77.8 %
NEUTROPHILS NFR BLD AUTO: 78.8 %
NEUTROPHILS NFR BLD AUTO: 80 %
NEUTROPHILS NFR BLD AUTO: 86.7 %
NEUTROPHILS NFR BLD MANUAL: 12 % (ref 47–80)
NEUTROPHILS NFR BLD MANUAL: 40 % (ref 47–80)
NEUTROPHILS NFR BLD MANUAL: 60 % (ref 47–80)
NEUTROPHILS NFR BLD MANUAL: 64 % (ref 47–80)
NEUTROPHILS NFR BLD MANUAL: 68 % (ref 47–80)
NEUTS BAND NFR BLD MANUAL: 10 % (ref 0–11)
NEUTS BAND NFR BLD MANUAL: 16 % (ref 0–11)
NEUTS BAND NFR BLD MANUAL: 26 % (ref 0–11)
NITRITE UR QL STRIP.AUTO: NEGATIVE
NITRITE UR QL STRIP.AUTO: NEGATIVE
NOROVIRUS GI+II RNA STL QL NAA+NON-PROBE: NOT DETECTED
NRBC BLD AUTO-RTO: 0 %
NRBC BLD AUTO-RTO: 0 % (ref 0–1)
OSMOLALITY SERPL: 256 MOSM/KG (ref 280–300)
OSMOLALITY UR: 497 MOSM/KG (ref 300–1300)
P SHIGELLOIDES DNA STL QL NAA+NON-PROBE: NOT DETECTED
PCO2 BLDA: 33 MMHG (ref 35–45)
PH SMN: 7.51 [PH] (ref 7.35–7.45)
PH UR STRIP.AUTO: 5.5 [PH]
PH UR STRIP.AUTO: 6.5 [PH]
PHOSPHATE SERPL-MCNC: 1.3 MG/DL (ref 2.3–4.7)
PHOSPHATE SERPL-MCNC: 1.5 MG/DL (ref 2.3–4.7)
PHOSPHATE SERPL-MCNC: 1.6 MG/DL (ref 2.3–4.7)
PHOSPHATE SERPL-MCNC: 1.8 MG/DL (ref 2.3–4.7)
PHOSPHATE SERPL-MCNC: 2.2 MG/DL (ref 2.3–4.7)
PHOSPHATE SERPL-MCNC: 2.6 MG/DL (ref 2.3–4.7)
PHOSPHATE SERPL-MCNC: 2.7 MG/DL (ref 2.3–4.7)
PHOSPHATE SERPL-MCNC: 2.9 MG/DL (ref 2.3–4.7)
PHOSPHATE SERPL-MCNC: 3.1 MG/DL (ref 2.3–4.7)
PHOSPHATE SERPL-MCNC: 3.6 MG/DL (ref 2.3–4.7)
PHOSPHATE SERPL-MCNC: 3.9 MG/DL (ref 2.3–4.7)
PHOSPHATE SERPL-MCNC: 3.9 MG/DL (ref 2.3–4.7)
PHOSPHATE SERPL-MCNC: 4.1 MG/DL (ref 2.3–4.7)
PHOSPHATE SERPL-MCNC: 4.1 MG/DL (ref 2.3–4.7)
PHOSPHATE SERPL-MCNC: 4.2 MG/DL (ref 2.3–4.7)
PHOSPHATE SERPL-MCNC: 4.4 MG/DL (ref 2.3–4.7)
PHOSPHATE SERPL-MCNC: 4.8 MG/DL (ref 2.3–4.7)
PLATELET # BLD AUTO: 110 X10(3)/MCL (ref 130–400)
PLATELET # BLD AUTO: 110 X10(3)/MCL (ref 140–371)
PLATELET # BLD AUTO: 143 X10(3)/MCL (ref 140–371)
PLATELET # BLD AUTO: 196 X10(3)/MCL (ref 130–400)
PLATELET # BLD AUTO: 204 X10(3)/MCL (ref 140–371)
PLATELET # BLD AUTO: 232 X10(3)/MCL (ref 130–400)
PLATELET # BLD AUTO: 240 X10(3)/MCL (ref 140–371)
PLATELET # BLD AUTO: 250 X10(3)/MCL (ref 140–371)
PLATELET # BLD AUTO: 258 X10(3)/MCL (ref 130–400)
PLATELET # BLD AUTO: 259 X10(3)/MCL (ref 130–400)
PLATELET # BLD AUTO: 260 X10(3)/MCL (ref 130–400)
PLATELET # BLD AUTO: 264 X10(3)/MCL (ref 140–371)
PLATELET # BLD AUTO: 265 X10(3)/MCL (ref 130–400)
PLATELET # BLD AUTO: 272 X10(3)/MCL (ref 140–371)
PLATELET # BLD AUTO: 286 X10(3)/MCL (ref 140–371)
PLATELET # BLD AUTO: 313 X10(3)/MCL (ref 130–400)
PLATELET # BLD AUTO: 313 X10(3)/MCL (ref 130–400)
PLATELET # BLD AUTO: 328 X10(3)/MCL (ref 130–400)
PLATELET # BLD AUTO: 361 X10(3)/MCL (ref 130–400)
PLATELET # BLD AUTO: 65 X10(3)/MCL (ref 130–400)
PLATELET # BLD AUTO: 66 X10(3)/MCL (ref 130–400)
PLATELET # BLD AUTO: 71 X10(3)/MCL (ref 130–400)
PLATELET # BLD AUTO: 81 X10(3)/MCL (ref 130–400)
PLATELET # BLD AUTO: 91 X10(3)/MCL (ref 130–400)
PLATELET # BLD AUTO: 95 X10(3)/MCL (ref 130–400)
PLATELET # BLD EST: ABNORMAL 10*3/UL
PLATELET # BLD EST: ADEQUATE 10*3/UL
PLATELETS.RETICULATED NFR BLD AUTO: 3.9 % (ref 0.9–11.2)
PLATELETS.RETICULATED NFR BLD AUTO: 5.7 % (ref 0.9–11.2)
PMV BLD AUTO: 10 FL (ref 9.4–12.4)
PMV BLD AUTO: 10 FL (ref 9.4–12.4)
PMV BLD AUTO: 10.1 FL (ref 7.4–10.4)
PMV BLD AUTO: 10.2 FL (ref 7.4–10.4)
PMV BLD AUTO: 10.2 FL (ref 9.4–12.4)
PMV BLD AUTO: 10.2 FL (ref 9.4–12.4)
PMV BLD AUTO: 10.4 FL (ref 7.4–10.4)
PMV BLD AUTO: 10.6 FL (ref 7.4–10.4)
PMV BLD AUTO: 10.6 FL (ref 7.4–10.4)
PMV BLD AUTO: 10.7 FL (ref 7.4–10.4)
PMV BLD AUTO: 10.9 FL (ref 7.4–10.4)
PMV BLD AUTO: 8.6 FL (ref 9.4–12.4)
PMV BLD AUTO: 8.9 FL (ref 7.4–10.4)
PMV BLD AUTO: 9.4 FL (ref 7.4–10.4)
PMV BLD AUTO: 9.4 FL (ref 7.4–10.4)
PMV BLD AUTO: 9.5 FL (ref 7.4–10.4)
PMV BLD AUTO: 9.6 FL (ref 7.4–10.4)
PMV BLD AUTO: 9.7 FL (ref 7.4–10.4)
PMV BLD AUTO: 9.8 FL (ref 9.4–12.4)
PMV BLD AUTO: 9.9 FL (ref 9.4–12.4)
PMV BLD AUTO: 9.9 FL (ref 9.4–12.4)
PO2 BLDA: 46 MMHG
POC BASE DEFICIT: 3.6 MMOL/L (ref -2–2)
POC COHB: 2.5 % (ref 0.5–1.5)
POC METHB: 1.2 % (ref 0–1.5)
POC O2HB: 86.5 % (ref 94–100)
POC PERFORMED BY: ABNORMAL
POC SATURATED O2: 86.3 % (ref 90–100)
POC TEMPERATURE: 37 C
POCT GLUCOSE: 117 MG/DL (ref 70–110)
POTASSIUM SERPL-SCNC: 2.9 MMOL/L (ref 3.5–5.1)
POTASSIUM SERPL-SCNC: 2.9 MMOL/L (ref 3.5–5.1)
POTASSIUM SERPL-SCNC: 3.3 MMOL/L (ref 3.5–5.1)
POTASSIUM SERPL-SCNC: 3.3 MMOL/L (ref 3.5–5.1)
POTASSIUM SERPL-SCNC: 3.5 MMOL/L (ref 3.5–5.1)
POTASSIUM SERPL-SCNC: 3.6 MMOL/L (ref 3.5–5.1)
POTASSIUM SERPL-SCNC: 3.6 MMOL/L (ref 3.5–5.1)
POTASSIUM SERPL-SCNC: 3.7 MMOL/L (ref 3.5–5.1)
POTASSIUM SERPL-SCNC: 3.8 MMOL/L (ref 3.5–5.1)
POTASSIUM SERPL-SCNC: 3.9 MMOL/L (ref 3.5–5.1)
POTASSIUM SERPL-SCNC: 4 MMOL/L (ref 3.5–5.1)
POTASSIUM SERPL-SCNC: 4 MMOL/L (ref 3.5–5.1)
POTASSIUM SERPL-SCNC: 4.1 MMOL/L (ref 3.5–5.1)
POTASSIUM SERPL-SCNC: 4.2 MMOL/L (ref 3.5–5.1)
POTASSIUM SERPL-SCNC: 4.3 MMOL/L (ref 3.5–5.1)
POTASSIUM SERPL-SCNC: 4.3 MMOL/L (ref 3.5–5.1)
POTASSIUM SERPL-SCNC: 4.4 MMOL/L (ref 3.5–5.1)
POTASSIUM SERPL-SCNC: 4.5 MMOL/L (ref 3.5–5.1)
POTASSIUM SERPL-SCNC: 4.6 MMOL/L (ref 3.5–5.1)
POTASSIUM SERPL-SCNC: 4.6 MMOL/L (ref 3.5–5.1)
POTASSIUM SERPL-SCNC: 5.7 MMOL/L (ref 3.5–5.1)
PROT SERPL-MCNC: 4.8 GM/DL (ref 6.4–8.3)
PROT SERPL-MCNC: 5.8 GM/DL (ref 6.4–8.3)
PROT SERPL-MCNC: 5.9 GM/DL (ref 6.4–8.3)
PROT SERPL-MCNC: 6 GM/DL (ref 6.4–8.3)
PROT SERPL-MCNC: 6.1 GM/DL (ref 6.4–8.3)
PROT SERPL-MCNC: 6.3 GM/DL (ref 6.4–8.3)
PROT SERPL-MCNC: 6.4 GM/DL (ref 6.4–8.3)
PROT SERPL-MCNC: 6.4 GM/DL (ref 6.4–8.3)
PROT SERPL-MCNC: 6.5 GM/DL (ref 6.4–8.3)
PROT SERPL-MCNC: 6.7 GM/DL (ref 6.4–8.3)
PROT SERPL-MCNC: 6.9 GM/DL (ref 6.4–8.3)
PROT SERPL-MCNC: 6.9 GM/DL (ref 6.4–8.3)
PROT SERPL-MCNC: 7.1 GM/DL (ref 6.4–8.3)
PROT SERPL-MCNC: 7.2 GM/DL (ref 6.4–8.3)
PROT SERPL-MCNC: 7.2 GM/DL (ref 6.4–8.3)
PROT SERPL-MCNC: 7.4 GM/DL (ref 6.4–8.3)
PROT SERPL-MCNC: 7.6 GM/DL (ref 6.4–8.3)
PROT SERPL-MCNC: 7.7 GM/DL (ref 6.4–8.3)
PROT SERPL-MCNC: 8.1 GM/DL (ref 6.4–8.3)
PROT UR QL STRIP.AUTO: ABNORMAL MG/DL
PROT UR QL STRIP.AUTO: NEGATIVE MG/DL
RBC # BLD AUTO: 2.93 X10(6)/MCL (ref 4.7–6.1)
RBC # BLD AUTO: 3.05 X10(6)/MCL (ref 4.7–6.1)
RBC # BLD AUTO: 3.06 X10(6)/MCL (ref 4.7–6.1)
RBC # BLD AUTO: 3.08 X10(6)/MCL (ref 4.7–6.1)
RBC # BLD AUTO: 3.09 X10(6)/MCL (ref 4.7–6.1)
RBC # BLD AUTO: 3.1 X10(6)/MCL (ref 4.7–6.1)
RBC # BLD AUTO: 3.12 X10(6)/MCL (ref 4.7–6.1)
RBC # BLD AUTO: 3.16 X10(6)/MCL (ref 4.7–6.1)
RBC # BLD AUTO: 3.2 X10(6)/MCL (ref 4.7–6.1)
RBC # BLD AUTO: 3.22 X10(6)/MCL (ref 4.7–6.1)
RBC # BLD AUTO: 3.29 X10(6)/MCL (ref 4.7–6.1)
RBC # BLD AUTO: 3.34 X10(6)/MCL (ref 4.7–6.1)
RBC # BLD AUTO: 3.35 X10(6)/MCL (ref 4.7–6.1)
RBC # BLD AUTO: 3.36 X10(6)/MCL (ref 4.7–6.1)
RBC # BLD AUTO: 3.4 X10(6)/MCL (ref 4.7–6.1)
RBC # BLD AUTO: 3.4 X10(6)/MCL (ref 4.7–6.1)
RBC # BLD AUTO: 3.62 X10(6)/MCL (ref 4.7–6.1)
RBC # BLD AUTO: 3.66 X10(6)/MCL (ref 4.7–6.1)
RBC # BLD AUTO: 3.67 X10(6)/MCL (ref 4.7–6.1)
RBC # BLD AUTO: 3.86 X10(6)/MCL (ref 4.7–6.1)
RBC # BLD AUTO: 3.87 X10(6)/MCL (ref 4.7–6.1)
RBC # BLD AUTO: 4.07 X10(6)/MCL (ref 4.7–6.1)
RBC # BLD AUTO: 4.09 X10(6)/MCL (ref 4.7–6.1)
RBC # BLD AUTO: 4.13 X10(6)/MCL (ref 4.7–6.1)
RBC # BLD AUTO: 4.43 X10(6)/MCL (ref 4.7–6.1)
RBC #/AREA URNS AUTO: ABNORMAL /HPF
RBC #/AREA URNS AUTO: ABNORMAL /HPF
RBC MORPH BLD: ABNORMAL
RBC MORPH BLD: NORMAL
RBC UR QL AUTO: ABNORMAL UNIT/L
RBC UR QL AUTO: NEGATIVE UNIT/L
RHODAMINE-AURAMINE STN SPEC: NORMAL
RVA RNA STL QL NAA+NON-PROBE: NOT DETECTED
S ENT+BONG DNA STL QL NAA+NON-PROBE: NOT DETECTED
SAPO I+II+IV+V RNA STL QL NAA+NON-PROBE: NOT DETECTED
SARS-COV-2 RNA RESP QL NAA+PROBE: NOT DETECTED
SHIGELLA SP+EIEC IPAH ST NAA+NON-PROBE: NOT DETECTED
SODIUM SERPL-SCNC: 124 MMOL/L (ref 136–145)
SODIUM SERPL-SCNC: 128 MMOL/L (ref 136–145)
SODIUM SERPL-SCNC: 129 MMOL/L (ref 136–145)
SODIUM SERPL-SCNC: 130 MMOL/L (ref 136–145)
SODIUM SERPL-SCNC: 131 MMOL/L (ref 136–145)
SODIUM SERPL-SCNC: 132 MMOL/L (ref 136–145)
SODIUM SERPL-SCNC: 133 MMOL/L (ref 136–145)
SODIUM SERPL-SCNC: 133 MMOL/L (ref 136–145)
SODIUM SERPL-SCNC: 134 MMOL/L (ref 136–145)
SODIUM SERPL-SCNC: 135 MMOL/L (ref 136–145)
SODIUM SERPL-SCNC: 136 MMOL/L (ref 136–145)
SODIUM SERPL-SCNC: 136 MMOL/L (ref 136–145)
SODIUM SERPL-SCNC: 137 MMOL/L (ref 136–145)
SODIUM SERPL-SCNC: 137 MMOL/L (ref 136–145)
SODIUM SERPL-SCNC: 138 MMOL/L (ref 136–145)
SODIUM SERPL-SCNC: 139 MMOL/L (ref 136–145)
SODIUM SERPL-SCNC: 140 MMOL/L (ref 136–145)
SODIUM SERPL-SCNC: 141 MMOL/L (ref 136–145)
SODIUM SERPL-SCNC: 142 MMOL/L (ref 136–145)
SODIUM SERPL-SCNC: 143 MMOL/L (ref 136–145)
SODIUM UR-SCNC: 28.3 MMOL/L
SP GR UR STRIP.AUTO: 1.02
SP GR UR STRIP.AUTO: 1.02
SPECIMEN SOURCE: ABNORMAL
SQUAMOUS #/AREA URNS LPF: ABNORMAL /HPF
SQUAMOUS #/AREA URNS LPF: ABNORMAL /HPF
TIBC SERPL-MCNC: 123 UG/DL (ref 69–240)
TIBC SERPL-MCNC: 144 UG/DL (ref 250–450)
TRANSFERRIN SERPL-MCNC: 125 MG/DL (ref 174–364)
TSH SERPL-ACNC: 2.37 UIU/ML (ref 0.35–4.94)
UROBILINOGEN UR STRIP-ACNC: NORMAL MG/DL
UROBILINOGEN UR STRIP-ACNC: NORMAL MG/DL
V CHOL+PARA+VUL DNA STL QL NAA+NON-PROBE: NOT DETECTED
V CHOLERAE DNA STL QL NAA+NON-PROBE: NOT DETECTED
VANCOMYCIN TROUGH SERPL-MCNC: 9.9 UG/ML (ref 15–20)
VANCOMYCIN TROUGH SERPL-MCNC: <1.4 UG/ML (ref 15–20)
WBC # SPEC AUTO: 0.3 X10(3)/MCL (ref 4.5–11.5)
WBC # SPEC AUTO: 0.6 X10(3)/MCL (ref 4.5–11.5)
WBC # SPEC AUTO: 1.4 X10(3)/MCL (ref 4.5–11.5)
WBC # SPEC AUTO: 1.7 X10(3)/MCL (ref 4.5–11.5)
WBC # SPEC AUTO: 10.3 X10(3)/MCL (ref 4.5–11.5)
WBC # SPEC AUTO: 10.5 X10(3)/MCL (ref 4.5–11.5)
WBC # SPEC AUTO: 11.4 X10(3)/MCL (ref 4.5–11.5)
WBC # SPEC AUTO: 17.8 X10(3)/MCL (ref 4.5–11.5)
WBC # SPEC AUTO: 18.3 X10(3)/MCL (ref 4.5–11.5)
WBC # SPEC AUTO: 3.3 X10(3)/MCL (ref 4.5–11.5)
WBC # SPEC AUTO: 4.5 X10(3)/MCL (ref 4.5–11.5)
WBC # SPEC AUTO: 4.5 X10(3)/MCL (ref 4.5–11.5)
WBC # SPEC AUTO: 4.7 X10(3)/MCL (ref 4.5–11.5)
WBC # SPEC AUTO: 5.2 X10(3)/MCL (ref 4.5–11.5)
WBC # SPEC AUTO: 5.5 X10(3)/MCL (ref 4.5–11.5)
WBC # SPEC AUTO: 5.6 X10(3)/MCL (ref 4.5–11.5)
WBC # SPEC AUTO: 5.6 X10(3)/MCL (ref 4.5–11.5)
WBC # SPEC AUTO: 5.8 X10(3)/MCL (ref 4.5–11.5)
WBC # SPEC AUTO: 6.1 X10(3)/MCL (ref 4.5–11.5)
WBC # SPEC AUTO: 6.2 X10(3)/MCL (ref 4.5–11.5)
WBC # SPEC AUTO: 7 X10(3)/MCL (ref 4.5–11.5)
WBC # SPEC AUTO: 7.1 X10(3)/MCL (ref 4.5–11.5)
WBC # SPEC AUTO: 8.2 X10(3)/MCL (ref 4.5–11.5)
WBC # SPEC AUTO: 8.3 X10(3)/MCL (ref 4.5–11.5)
WBC # SPEC AUTO: 8.4 X10(3)/MCL (ref 4.5–11.5)
WBC #/AREA URNS AUTO: ABNORMAL /HPF
WBC #/AREA URNS AUTO: ABNORMAL /HPF
Y ENTEROCOL DNA STL QL NAA+NON-PROBE: NOT DETECTED

## 2022-01-01 PROCEDURE — 94640 AIRWAY INHALATION TREATMENT: CPT | Mod: XB

## 2022-01-01 PROCEDURE — A9552 F18 FDG: HCPCS

## 2022-01-01 PROCEDURE — 85025 COMPLETE CBC W/AUTO DIFF WBC: CPT

## 2022-01-01 PROCEDURE — 25000003 PHARM REV CODE 250: Performed by: STUDENT IN AN ORGANIZED HEALTH CARE EDUCATION/TRAINING PROGRAM

## 2022-01-01 PROCEDURE — 63600175 PHARM REV CODE 636 W HCPCS: Performed by: STUDENT IN AN ORGANIZED HEALTH CARE EDUCATION/TRAINING PROGRAM

## 2022-01-01 PROCEDURE — 84100 ASSAY OF PHOSPHORUS: CPT | Performed by: STUDENT IN AN ORGANIZED HEALTH CARE EDUCATION/TRAINING PROGRAM

## 2022-01-01 PROCEDURE — 25500020 PHARM REV CODE 255

## 2022-01-01 PROCEDURE — 85027 COMPLETE CBC AUTOMATED: CPT | Performed by: STUDENT IN AN ORGANIZED HEALTH CARE EDUCATION/TRAINING PROGRAM

## 2022-01-01 PROCEDURE — 36415 COLL VENOUS BLD VENIPUNCTURE: CPT | Performed by: STUDENT IN AN ORGANIZED HEALTH CARE EDUCATION/TRAINING PROGRAM

## 2022-01-01 PROCEDURE — 94761 N-INVAS EAR/PLS OXIMETRY MLT: CPT

## 2022-01-01 PROCEDURE — 80053 COMPREHEN METABOLIC PANEL: CPT | Performed by: FAMILY MEDICINE

## 2022-01-01 PROCEDURE — 83735 ASSAY OF MAGNESIUM: CPT

## 2022-01-01 PROCEDURE — 87077 CULTURE AEROBIC IDENTIFY: CPT | Performed by: STUDENT IN AN ORGANIZED HEALTH CARE EDUCATION/TRAINING PROGRAM

## 2022-01-01 PROCEDURE — 99999 PR PBB SHADOW E&M-EST. PATIENT-LVL IV: CPT | Mod: PBBFAC,,, | Performed by: INTERNAL MEDICINE

## 2022-01-01 PROCEDURE — 87556 M.TUBERCULO DNA AMP PROBE: CPT | Performed by: STUDENT IN AN ORGANIZED HEALTH CARE EDUCATION/TRAINING PROGRAM

## 2022-01-01 PROCEDURE — 25000003 PHARM REV CODE 250

## 2022-01-01 PROCEDURE — 87449 NOS EACH ORGANISM AG IA: CPT | Mod: 90 | Performed by: STUDENT IN AN ORGANIZED HEALTH CARE EDUCATION/TRAINING PROGRAM

## 2022-01-01 PROCEDURE — 99285 EMERGENCY DEPT VISIT HI MDM: CPT | Mod: 25

## 2022-01-01 PROCEDURE — 11000001 HC ACUTE MED/SURG PRIVATE ROOM

## 2022-01-01 PROCEDURE — 25000242 PHARM REV CODE 250 ALT 637 W/ HCPCS: Performed by: STUDENT IN AN ORGANIZED HEALTH CARE EDUCATION/TRAINING PROGRAM

## 2022-01-01 PROCEDURE — 63600175 PHARM REV CODE 636 W HCPCS: Performed by: NURSE ANESTHETIST, CERTIFIED REGISTERED

## 2022-01-01 PROCEDURE — 63600175 PHARM REV CODE 636 W HCPCS: Performed by: INTERNAL MEDICINE

## 2022-01-01 PROCEDURE — 99215 OFFICE O/P EST HI 40 MIN: CPT | Mod: S$PBB,,, | Performed by: INTERNAL MEDICINE

## 2022-01-01 PROCEDURE — 99900026 HC AIRWAY MAINTENANCE (STAT)

## 2022-01-01 PROCEDURE — A4216 STERILE WATER/SALINE, 10 ML: HCPCS | Performed by: STUDENT IN AN ORGANIZED HEALTH CARE EDUCATION/TRAINING PROGRAM

## 2022-01-01 PROCEDURE — 94640 AIRWAY INHALATION TREATMENT: CPT

## 2022-01-01 PROCEDURE — 81001 URINALYSIS AUTO W/SCOPE: CPT | Performed by: FAMILY MEDICINE

## 2022-01-01 PROCEDURE — 96413 CHEMO IV INFUSION 1 HR: CPT

## 2022-01-01 PROCEDURE — 21400001 HC TELEMETRY ROOM

## 2022-01-01 PROCEDURE — 87449 NOS EACH ORGANISM AG IA: CPT | Performed by: STUDENT IN AN ORGANIZED HEALTH CARE EDUCATION/TRAINING PROGRAM

## 2022-01-01 PROCEDURE — 96376 TX/PRO/DX INJ SAME DRUG ADON: CPT

## 2022-01-01 PROCEDURE — 85025 COMPLETE CBC W/AUTO DIFF WBC: CPT | Performed by: STUDENT IN AN ORGANIZED HEALTH CARE EDUCATION/TRAINING PROGRAM

## 2022-01-01 PROCEDURE — 27100171 HC OXYGEN HIGH FLOW UP TO 24 HOURS

## 2022-01-01 PROCEDURE — 99999 PR PBB SHADOW E&M-EST. PATIENT-LVL IV: ICD-10-PCS | Mod: PBBFAC,,, | Performed by: NURSE PRACTITIONER

## 2022-01-01 PROCEDURE — 94760 N-INVAS EAR/PLS OXIMETRY 1: CPT

## 2022-01-01 PROCEDURE — 99214 OFFICE O/P EST MOD 30 MIN: CPT | Mod: PBBFAC,25,27 | Performed by: INTERNAL MEDICINE

## 2022-01-01 PROCEDURE — 97162 PT EVAL MOD COMPLEX 30 MIN: CPT

## 2022-01-01 PROCEDURE — 93005 ELECTROCARDIOGRAM TRACING: CPT

## 2022-01-01 PROCEDURE — 80053 COMPREHEN METABOLIC PANEL: CPT | Performed by: STUDENT IN AN ORGANIZED HEALTH CARE EDUCATION/TRAINING PROGRAM

## 2022-01-01 PROCEDURE — 96415 CHEMO IV INFUSION ADDL HR: CPT

## 2022-01-01 PROCEDURE — 97530 THERAPEUTIC ACTIVITIES: CPT

## 2022-01-01 PROCEDURE — 87102 FUNGUS ISOLATION CULTURE: CPT | Performed by: INTERNAL MEDICINE

## 2022-01-01 PROCEDURE — 27000221 HC OXYGEN, UP TO 24 HOURS

## 2022-01-01 PROCEDURE — 96360 HYDRATION IV INFUSION INIT: CPT

## 2022-01-01 PROCEDURE — 80053 COMPREHEN METABOLIC PANEL: CPT

## 2022-01-01 PROCEDURE — 99214 PR OFFICE/OUTPT VISIT, EST, LEVL IV, 30-39 MIN: ICD-10-PCS | Mod: S$PBB,,, | Performed by: NURSE PRACTITIONER

## 2022-01-01 PROCEDURE — 83735 ASSAY OF MAGNESIUM: CPT | Performed by: STUDENT IN AN ORGANIZED HEALTH CARE EDUCATION/TRAINING PROGRAM

## 2022-01-01 PROCEDURE — 99214 OFFICE O/P EST MOD 30 MIN: CPT | Mod: PBBFAC | Performed by: INTERNAL MEDICINE

## 2022-01-01 PROCEDURE — 87385 HISTOPLASMA CAPSUL AG IA: CPT | Performed by: STUDENT IN AN ORGANIZED HEALTH CARE EDUCATION/TRAINING PROGRAM

## 2022-01-01 PROCEDURE — 36000705 HC OR TIME LEV I EA ADD 15 MIN: Performed by: STUDENT IN AN ORGANIZED HEALTH CARE EDUCATION/TRAINING PROGRAM

## 2022-01-01 PROCEDURE — 99900035 HC TECH TIME PER 15 MIN (STAT)

## 2022-01-01 PROCEDURE — 97535 SELF CARE MNGMENT TRAINING: CPT

## 2022-01-01 PROCEDURE — 96368 THER/DIAG CONCURRENT INF: CPT

## 2022-01-01 PROCEDURE — 96372 THER/PROPH/DIAG INJ SC/IM: CPT | Performed by: FAMILY MEDICINE

## 2022-01-01 PROCEDURE — 36415 COLL VENOUS BLD VENIPUNCTURE: CPT

## 2022-01-01 PROCEDURE — 82533 TOTAL CORTISOL: CPT | Performed by: STUDENT IN AN ORGANIZED HEALTH CARE EDUCATION/TRAINING PROGRAM

## 2022-01-01 PROCEDURE — 87040 BLOOD CULTURE FOR BACTERIA: CPT | Performed by: INTERNAL MEDICINE

## 2022-01-01 PROCEDURE — C9113 INJ PANTOPRAZOLE SODIUM, VIA: HCPCS | Performed by: STUDENT IN AN ORGANIZED HEALTH CARE EDUCATION/TRAINING PROGRAM

## 2022-01-01 PROCEDURE — 87389 HIV-1 AG W/HIV-1&-2 AB AG IA: CPT | Performed by: STUDENT IN AN ORGANIZED HEALTH CARE EDUCATION/TRAINING PROGRAM

## 2022-01-01 PROCEDURE — 85027 COMPLETE CBC AUTOMATED: CPT | Performed by: INTERNAL MEDICINE

## 2022-01-01 PROCEDURE — 99999 PR PBB SHADOW E&M-EST. PATIENT-LVL IV: ICD-10-PCS | Mod: PBBFAC,,, | Performed by: INTERNAL MEDICINE

## 2022-01-01 PROCEDURE — 87206 SMEAR FLUORESCENT/ACID STAI: CPT | Performed by: STUDENT IN AN ORGANIZED HEALTH CARE EDUCATION/TRAINING PROGRAM

## 2022-01-01 PROCEDURE — 85007 BL SMEAR W/DIFF WBC COUNT: CPT | Performed by: STUDENT IN AN ORGANIZED HEALTH CARE EDUCATION/TRAINING PROGRAM

## 2022-01-01 PROCEDURE — 27000207 HC ISOLATION

## 2022-01-01 PROCEDURE — 63600175 PHARM REV CODE 636 W HCPCS: Mod: JG | Performed by: STUDENT IN AN ORGANIZED HEALTH CARE EDUCATION/TRAINING PROGRAM

## 2022-01-01 PROCEDURE — 96375 TX/PRO/DX INJ NEW DRUG ADDON: CPT

## 2022-01-01 PROCEDURE — 99215 PR OFFICE/OUTPT VISIT, EST, LEVL V, 40-54 MIN: ICD-10-PCS | Mod: S$PBB,,, | Performed by: INTERNAL MEDICINE

## 2022-01-01 PROCEDURE — 63600175 PHARM REV CODE 636 W HCPCS: Performed by: NURSE PRACTITIONER

## 2022-01-01 PROCEDURE — 36415 COLL VENOUS BLD VENIPUNCTURE: CPT | Performed by: OTOLARYNGOLOGY

## 2022-01-01 PROCEDURE — 37000008 HC ANESTHESIA 1ST 15 MINUTES: Performed by: STUDENT IN AN ORGANIZED HEALTH CARE EDUCATION/TRAINING PROGRAM

## 2022-01-01 PROCEDURE — 87040 BLOOD CULTURE FOR BACTERIA: CPT | Performed by: FAMILY MEDICINE

## 2022-01-01 PROCEDURE — 80202 ASSAY OF VANCOMYCIN: CPT | Performed by: STUDENT IN AN ORGANIZED HEALTH CARE EDUCATION/TRAINING PROGRAM

## 2022-01-01 PROCEDURE — 96361 HYDRATE IV INFUSION ADD-ON: CPT

## 2022-01-01 PROCEDURE — 87305 ASPERGILLUS AG IA: CPT | Performed by: STUDENT IN AN ORGANIZED HEALTH CARE EDUCATION/TRAINING PROGRAM

## 2022-01-01 PROCEDURE — 0240U COVID/FLU A&B PCR: CPT | Performed by: INTERNAL MEDICINE

## 2022-01-01 PROCEDURE — 99214 OFFICE O/P EST MOD 30 MIN: CPT | Mod: PBBFAC,27,25 | Performed by: NURSE PRACTITIONER

## 2022-01-01 PROCEDURE — 85060 BLOOD SMEAR INTERPRETATION: CPT | Performed by: INTERNAL MEDICINE

## 2022-01-01 PROCEDURE — 87070 CULTURE OTHR SPECIMN AEROBIC: CPT | Performed by: INTERNAL MEDICINE

## 2022-01-01 PROCEDURE — 63600175 PHARM REV CODE 636 W HCPCS: Performed by: FAMILY MEDICINE

## 2022-01-01 PROCEDURE — 25000003 PHARM REV CODE 250: Performed by: INTERNAL MEDICINE

## 2022-01-01 PROCEDURE — 63600175 PHARM REV CODE 636 W HCPCS

## 2022-01-01 PROCEDURE — 63600175 PHARM REV CODE 636 W HCPCS: Performed by: SPECIALIST

## 2022-01-01 PROCEDURE — 99214 OFFICE O/P EST MOD 30 MIN: CPT | Mod: S$PBB,,, | Performed by: NURSE PRACTITIONER

## 2022-01-01 PROCEDURE — 25500020 PHARM REV CODE 255: Performed by: STUDENT IN AN ORGANIZED HEALTH CARE EDUCATION/TRAINING PROGRAM

## 2022-01-01 PROCEDURE — 27000186 HC CIRCUIT, HFJV

## 2022-01-01 PROCEDURE — 99214 OFFICE O/P EST MOD 30 MIN: CPT | Mod: ,,, | Performed by: INTERNAL MEDICINE

## 2022-01-01 PROCEDURE — 20000000 HC ICU ROOM

## 2022-01-01 PROCEDURE — 87507 IADNA-DNA/RNA PROBE TQ 12-25: CPT | Performed by: STUDENT IN AN ORGANIZED HEALTH CARE EDUCATION/TRAINING PROGRAM

## 2022-01-01 PROCEDURE — 37000009 HC ANESTHESIA EA ADD 15 MINS: Performed by: STUDENT IN AN ORGANIZED HEALTH CARE EDUCATION/TRAINING PROGRAM

## 2022-01-01 PROCEDURE — 84443 ASSAY THYROID STIM HORMONE: CPT

## 2022-01-01 PROCEDURE — 71260 CT THORAX DX C+: CPT | Mod: TC

## 2022-01-01 PROCEDURE — C1751 CATH, INF, PER/CENT/MIDLINE: HCPCS

## 2022-01-01 PROCEDURE — 99213 OFFICE O/P EST LOW 20 MIN: CPT | Mod: PBBFAC | Performed by: STUDENT IN AN ORGANIZED HEALTH CARE EDUCATION/TRAINING PROGRAM

## 2022-01-01 PROCEDURE — 99232 SBSQ HOSP IP/OBS MODERATE 35: CPT | Mod: GC,,, | Performed by: INTERNAL MEDICINE

## 2022-01-01 PROCEDURE — 87641 MR-STAPH DNA AMP PROBE: CPT | Performed by: STUDENT IN AN ORGANIZED HEALTH CARE EDUCATION/TRAINING PROGRAM

## 2022-01-01 PROCEDURE — 99999 PR PBB SHADOW E&M-EST. PATIENT-LVL IV: CPT | Mod: PBBFAC,,, | Performed by: NURSE PRACTITIONER

## 2022-01-01 PROCEDURE — 63600175 PHARM REV CODE 636 W HCPCS: Mod: JG

## 2022-01-01 PROCEDURE — 84100 ASSAY OF PHOSPHORUS: CPT

## 2022-01-01 PROCEDURE — 83935 ASSAY OF URINE OSMOLALITY: CPT | Performed by: STUDENT IN AN ORGANIZED HEALTH CARE EDUCATION/TRAINING PROGRAM

## 2022-01-01 PROCEDURE — 74220 X-RAY XM ESOPHAGUS 1CNTRST: CPT | Mod: TC

## 2022-01-01 PROCEDURE — 85025 COMPLETE CBC W/AUTO DIFF WBC: CPT | Performed by: FAMILY MEDICINE

## 2022-01-01 PROCEDURE — 78815 PET IMAGE W/CT SKULL-THIGH: CPT | Mod: TC,PS

## 2022-01-01 PROCEDURE — 87102 FUNGUS ISOLATION CULTURE: CPT | Performed by: STUDENT IN AN ORGANIZED HEALTH CARE EDUCATION/TRAINING PROGRAM

## 2022-01-01 PROCEDURE — 82803 BLOOD GASES ANY COMBINATION: CPT

## 2022-01-01 PROCEDURE — 36600 WITHDRAWAL OF ARTERIAL BLOOD: CPT

## 2022-01-01 PROCEDURE — 25000003 PHARM REV CODE 250: Performed by: NURSE PRACTITIONER

## 2022-01-01 PROCEDURE — 99238 HOSP IP/OBS DSCHRG MGMT 30/<: CPT | Mod: GC,,, | Performed by: INTERNAL MEDICINE

## 2022-01-01 PROCEDURE — 83605 ASSAY OF LACTIC ACID: CPT | Performed by: FAMILY MEDICINE

## 2022-01-01 PROCEDURE — 84300 ASSAY OF URINE SODIUM: CPT | Performed by: STUDENT IN AN ORGANIZED HEALTH CARE EDUCATION/TRAINING PROGRAM

## 2022-01-01 PROCEDURE — 86698 HISTOPLASMA ANTIBODY: CPT | Performed by: STUDENT IN AN ORGANIZED HEALTH CARE EDUCATION/TRAINING PROGRAM

## 2022-01-01 PROCEDURE — 97166 OT EVAL MOD COMPLEX 45 MIN: CPT

## 2022-01-01 PROCEDURE — 27200966 HC CLOSED SUCTION SYSTEM

## 2022-01-01 PROCEDURE — 96417 CHEMO IV INFUS EACH ADDL SEQ: CPT

## 2022-01-01 PROCEDURE — 83605 ASSAY OF LACTIC ACID: CPT | Performed by: INTERNAL MEDICINE

## 2022-01-01 PROCEDURE — 83605 ASSAY OF LACTIC ACID: CPT | Performed by: STUDENT IN AN ORGANIZED HEALTH CARE EDUCATION/TRAINING PROGRAM

## 2022-01-01 PROCEDURE — 87116 MYCOBACTERIA CULTURE: CPT | Performed by: INTERNAL MEDICINE

## 2022-01-01 PROCEDURE — 80053 COMPREHEN METABOLIC PANEL: CPT | Performed by: INTERNAL MEDICINE

## 2022-01-01 PROCEDURE — 83540 ASSAY OF IRON: CPT | Performed by: STUDENT IN AN ORGANIZED HEALTH CARE EDUCATION/TRAINING PROGRAM

## 2022-01-01 PROCEDURE — 87116 MYCOBACTERIA CULTURE: CPT | Performed by: STUDENT IN AN ORGANIZED HEALTH CARE EDUCATION/TRAINING PROGRAM

## 2022-01-01 PROCEDURE — 36000704 HC OR TIME LEV I 1ST 15 MIN: Performed by: STUDENT IN AN ORGANIZED HEALTH CARE EDUCATION/TRAINING PROGRAM

## 2022-01-01 PROCEDURE — 86606 ASPERGILLUS ANTIBODY: CPT | Performed by: STUDENT IN AN ORGANIZED HEALTH CARE EDUCATION/TRAINING PROGRAM

## 2022-01-01 PROCEDURE — 31720 CLEARANCE OF AIRWAYS: CPT

## 2022-01-01 PROCEDURE — 87045 FECES CULTURE AEROBIC BACT: CPT | Performed by: STUDENT IN AN ORGANIZED HEALTH CARE EDUCATION/TRAINING PROGRAM

## 2022-01-01 PROCEDURE — 99214 PR OFFICE/OUTPT VISIT, EST, LEVL IV, 30-39 MIN: ICD-10-PCS | Mod: ,,, | Performed by: INTERNAL MEDICINE

## 2022-01-01 PROCEDURE — 36569 INSJ PICC 5 YR+ W/O IMAGING: CPT

## 2022-01-01 PROCEDURE — 31575 DIAGNOSTIC LARYNGOSCOPY: CPT | Mod: PBBFAC | Performed by: STUDENT IN AN ORGANIZED HEALTH CARE EDUCATION/TRAINING PROGRAM

## 2022-01-01 PROCEDURE — 99232 PR SUBSEQUENT HOSPITAL CARE,LEVL II: ICD-10-PCS | Mod: GC,,, | Performed by: INTERNAL MEDICINE

## 2022-01-01 PROCEDURE — 86612 BLASTOMYCES ANTIBODY: CPT | Performed by: STUDENT IN AN ORGANIZED HEALTH CARE EDUCATION/TRAINING PROGRAM

## 2022-01-01 PROCEDURE — 25500020 PHARM REV CODE 255: Performed by: OTOLARYNGOLOGY

## 2022-01-01 PROCEDURE — 99238 PR HOSPITAL DISCHARGE DAY,<30 MIN: ICD-10-PCS | Mod: GC,,, | Performed by: INTERNAL MEDICINE

## 2022-01-01 PROCEDURE — 71000033 HC RECOVERY, INTIAL HOUR: Performed by: STUDENT IN AN ORGANIZED HEALTH CARE EDUCATION/TRAINING PROGRAM

## 2022-01-01 PROCEDURE — 82728 ASSAY OF FERRITIN: CPT | Performed by: STUDENT IN AN ORGANIZED HEALTH CARE EDUCATION/TRAINING PROGRAM

## 2022-01-01 PROCEDURE — 27201112

## 2022-01-01 PROCEDURE — 36415 COLL VENOUS BLD VENIPUNCTURE: CPT | Performed by: FAMILY MEDICINE

## 2022-01-01 PROCEDURE — 83930 ASSAY OF BLOOD OSMOLALITY: CPT | Performed by: STUDENT IN AN ORGANIZED HEALTH CARE EDUCATION/TRAINING PROGRAM

## 2022-01-01 PROCEDURE — 87899 AGENT NOS ASSAY W/OPTIC: CPT | Performed by: STUDENT IN AN ORGANIZED HEALTH CARE EDUCATION/TRAINING PROGRAM

## 2022-01-01 PROCEDURE — 89055 LEUKOCYTE ASSESSMENT FECAL: CPT | Performed by: STUDENT IN AN ORGANIZED HEALTH CARE EDUCATION/TRAINING PROGRAM

## 2022-01-01 PROCEDURE — 96365 THER/PROPH/DIAG IV INF INIT: CPT

## 2022-01-01 PROCEDURE — 96367 TX/PROPH/DG ADDL SEQ IV INF: CPT

## 2022-01-01 PROCEDURE — 99214 OFFICE O/P EST MOD 30 MIN: CPT | Mod: PBBFAC,25 | Performed by: STUDENT IN AN ORGANIZED HEALTH CARE EDUCATION/TRAINING PROGRAM

## 2022-01-01 PROCEDURE — 31624 DX BRONCHOSCOPE/LAVAGE: CPT

## 2022-01-01 PROCEDURE — 81001 URINALYSIS AUTO W/SCOPE: CPT | Performed by: STUDENT IN AN ORGANIZED HEALTH CARE EDUCATION/TRAINING PROGRAM

## 2022-01-01 PROCEDURE — 87070 CULTURE OTHR SPECIMN AEROBIC: CPT | Performed by: STUDENT IN AN ORGANIZED HEALTH CARE EDUCATION/TRAINING PROGRAM

## 2022-01-01 PROCEDURE — 99214 OFFICE O/P EST MOD 30 MIN: CPT | Mod: PBBFAC | Performed by: NURSE PRACTITIONER

## 2022-01-01 PROCEDURE — 25000242 PHARM REV CODE 250 ALT 637 W/ HCPCS: Performed by: INTERNAL MEDICINE

## 2022-01-01 PROCEDURE — 86318 IA INFECTIOUS AGENT ANTIBODY: CPT | Performed by: STUDENT IN AN ORGANIZED HEALTH CARE EDUCATION/TRAINING PROGRAM

## 2022-01-01 RX ORDER — AMPICILLIN AND SULBACTAM 2; 1 G/1; G/1
3 INJECTION, POWDER, FOR SOLUTION INTRAMUSCULAR; INTRAVENOUS
Status: COMPLETED | OUTPATIENT
Start: 2022-01-01 | End: 2022-01-01

## 2022-01-01 RX ORDER — EPINEPHRINE 0.3 MG/.3ML
0.3 INJECTION SUBCUTANEOUS ONCE AS NEEDED
Status: DISCONTINUED | OUTPATIENT
Start: 2022-01-01 | End: 2022-01-01 | Stop reason: HOSPADM

## 2022-01-01 RX ORDER — MIDAZOLAM HYDROCHLORIDE 1 MG/ML
INJECTION INTRAMUSCULAR; INTRAVENOUS
Status: DISPENSED
Start: 2022-01-01 | End: 2022-01-01

## 2022-01-01 RX ORDER — FENTANYL CITRATE 50 UG/ML
INJECTION, SOLUTION INTRAMUSCULAR; INTRAVENOUS
Status: DISCONTINUED | OUTPATIENT
Start: 2022-01-01 | End: 2022-01-01

## 2022-01-01 RX ORDER — DEXAMETHASONE SODIUM PHOSPHATE 4 MG/ML
INJECTION, SOLUTION INTRA-ARTICULAR; INTRALESIONAL; INTRAMUSCULAR; INTRAVENOUS; SOFT TISSUE
Status: DISCONTINUED | OUTPATIENT
Start: 2022-01-01 | End: 2022-01-01

## 2022-01-01 RX ORDER — MAGNESIUM SULFATE HEPTAHYDRATE 40 MG/ML
2 INJECTION, SOLUTION INTRAVENOUS ONCE
Status: COMPLETED | OUTPATIENT
Start: 2022-01-01 | End: 2022-01-01

## 2022-01-01 RX ORDER — ITRACONAZOLE 100 MG/1
200 CAPSULE ORAL DAILY
Status: CANCELLED | OUTPATIENT
Start: 2022-01-01

## 2022-01-01 RX ORDER — BUDESONIDE 0.5 MG/2ML
0.5 INHALANT ORAL EVERY 12 HOURS
Status: DISCONTINUED | OUTPATIENT
Start: 2022-01-01 | End: 2022-01-01 | Stop reason: HOSPADM

## 2022-01-01 RX ORDER — GUAIFENESIN 600 MG/1
600 TABLET, EXTENDED RELEASE ORAL 2 TIMES DAILY PRN
Status: DISCONTINUED | OUTPATIENT
Start: 2022-01-01 | End: 2022-01-01 | Stop reason: HOSPADM

## 2022-01-01 RX ORDER — NALOXONE HCL 0.4 MG/ML
0.02 VIAL (ML) INJECTION
Status: DISCONTINUED | OUTPATIENT
Start: 2022-01-01 | End: 2022-01-01 | Stop reason: HOSPADM

## 2022-01-01 RX ORDER — HYDROCODONE BITARTRATE AND ACETAMINOPHEN 5; 325 MG/1; MG/1
1 TABLET ORAL EVERY 6 HOURS PRN
Status: DISCONTINUED | OUTPATIENT
Start: 2022-01-01 | End: 2022-01-01 | Stop reason: HOSPADM

## 2022-01-01 RX ORDER — SODIUM CHLORIDE 9 MG/ML
INJECTION, SOLUTION INTRAVENOUS
Status: DISCONTINUED | OUTPATIENT
Start: 2022-01-01 | End: 2022-01-01

## 2022-01-01 RX ORDER — BACITRACIN 500 [USP'U]/G
OINTMENT TOPICAL
Status: DISPENSED
Start: 2022-01-01 | End: 2022-01-01

## 2022-01-01 RX ORDER — POTASSIUM CHLORIDE 20 MEQ/1
40 TABLET, EXTENDED RELEASE ORAL ONCE
Status: COMPLETED | OUTPATIENT
Start: 2022-01-01 | End: 2022-01-01

## 2022-01-01 RX ORDER — SODIUM CHLORIDE 0.9 % (FLUSH) 0.9 %
10 SYRINGE (ML) INJECTION
Status: DISCONTINUED | OUTPATIENT
Start: 2022-01-01 | End: 2022-01-01 | Stop reason: HOSPADM

## 2022-01-01 RX ORDER — LIDOCAINE HYDROCHLORIDE 10 MG/ML
10 INJECTION, SOLUTION EPIDURAL; INFILTRATION; INTRACAUDAL; PERINEURAL ONCE
Status: DISCONTINUED | OUTPATIENT
Start: 2022-01-01 | End: 2022-01-01 | Stop reason: HOSPADM

## 2022-01-01 RX ORDER — AMOXICILLIN AND CLAVULANATE POTASSIUM 875; 125 MG/1; MG/1
1 TABLET, FILM COATED ORAL EVERY 12 HOURS
Status: DISCONTINUED | OUTPATIENT
Start: 2022-01-01 | End: 2022-01-01 | Stop reason: HOSPADM

## 2022-01-01 RX ORDER — FAMOTIDINE 10 MG/ML
20 INJECTION INTRAVENOUS
Status: COMPLETED | OUTPATIENT
Start: 2022-01-01 | End: 2022-01-01

## 2022-01-01 RX ORDER — DIPHENHYDRAMINE HCL 25 MG
25 CAPSULE ORAL NIGHTLY PRN
Status: DISCONTINUED | OUTPATIENT
Start: 2022-01-01 | End: 2022-01-01 | Stop reason: HOSPADM

## 2022-01-01 RX ORDER — ONDANSETRON 8 MG/1
8 TABLET, ORALLY DISINTEGRATING ORAL EVERY 8 HOURS PRN
Qty: 60 TABLET | Refills: 5 | Status: ON HOLD | OUTPATIENT
Start: 2022-01-01 | End: 2022-01-01 | Stop reason: HOSPADM

## 2022-01-01 RX ORDER — IPRATROPIUM BROMIDE AND ALBUTEROL SULFATE 2.5; .5 MG/3ML; MG/3ML
3 SOLUTION RESPIRATORY (INHALATION) EVERY 6 HOURS PRN
Status: DISCONTINUED | OUTPATIENT
Start: 2022-01-01 | End: 2022-01-01 | Stop reason: HOSPADM

## 2022-01-01 RX ORDER — TRIPROLIDINE/PSEUDOEPHEDRINE 2.5MG-60MG
600 TABLET ORAL EVERY 6 HOURS PRN
Status: DISCONTINUED | OUTPATIENT
Start: 2022-01-01 | End: 2022-01-01 | Stop reason: HOSPADM

## 2022-01-01 RX ORDER — LEVOFLOXACIN 25 MG/ML
750 SOLUTION ORAL DAILY
Qty: 630 ML | Refills: 0 | Status: SHIPPED | OUTPATIENT
Start: 2022-01-01 | End: 2023-01-01

## 2022-01-01 RX ORDER — DIPHENHYDRAMINE HYDROCHLORIDE 50 MG/ML
50 INJECTION INTRAMUSCULAR; INTRAVENOUS
Status: COMPLETED | OUTPATIENT
Start: 2022-01-01 | End: 2022-01-01

## 2022-01-01 RX ORDER — SODIUM CHLORIDE 9 MG/ML
INJECTION, SOLUTION INTRAVENOUS CONTINUOUS
Status: DISCONTINUED | OUTPATIENT
Start: 2022-01-01 | End: 2022-01-01

## 2022-01-01 RX ORDER — COSYNTROPIN 0.25 MG/ML
0.25 INJECTION, POWDER, FOR SOLUTION INTRAMUSCULAR; INTRAVENOUS ONCE
Status: COMPLETED | OUTPATIENT
Start: 2022-01-01 | End: 2022-01-01

## 2022-01-01 RX ORDER — AMOXICILLIN AND CLAVULANATE POTASSIUM 875; 125 MG/1; MG/1
1 TABLET, FILM COATED ORAL EVERY 12 HOURS
Qty: 28 TABLET | Refills: 0 | Status: SHIPPED | OUTPATIENT
Start: 2022-01-01 | End: 2022-01-01

## 2022-01-01 RX ORDER — FENTANYL CITRATE 50 UG/ML
INJECTION, SOLUTION INTRAMUSCULAR; INTRAVENOUS
Status: DISCONTINUED
Start: 2022-01-01 | End: 2022-01-01 | Stop reason: HOSPADM

## 2022-01-01 RX ORDER — MIDAZOLAM HYDROCHLORIDE 1 MG/ML
6 INJECTION INTRAMUSCULAR; INTRAVENOUS ONCE
Status: COMPLETED | OUTPATIENT
Start: 2022-01-01 | End: 2022-01-01

## 2022-01-01 RX ORDER — IPRATROPIUM BROMIDE AND ALBUTEROL SULFATE 2.5; .5 MG/3ML; MG/3ML
3 SOLUTION RESPIRATORY (INHALATION) EVERY 4 HOURS PRN
Status: DISCONTINUED | OUTPATIENT
Start: 2022-01-01 | End: 2022-01-01 | Stop reason: HOSPADM

## 2022-01-01 RX ORDER — HYDROXYZINE PAMOATE 25 MG/1
25 CAPSULE ORAL EVERY 6 HOURS PRN
Status: DISCONTINUED | OUTPATIENT
Start: 2022-01-01 | End: 2022-01-01 | Stop reason: HOSPADM

## 2022-01-01 RX ORDER — LEVOFLOXACIN 750 MG/1
750 TABLET ORAL DAILY
Qty: 21 TABLET | Refills: 0 | Status: SHIPPED | OUTPATIENT
Start: 2022-01-01 | End: 2022-01-01 | Stop reason: HOSPADM

## 2022-01-01 RX ORDER — IPRATROPIUM BROMIDE AND ALBUTEROL SULFATE 2.5; .5 MG/3ML; MG/3ML
3 SOLUTION RESPIRATORY (INHALATION)
Status: COMPLETED | OUTPATIENT
Start: 2022-01-01 | End: 2022-01-01

## 2022-01-01 RX ORDER — PANTOPRAZOLE SODIUM 40 MG/10ML
40 INJECTION, POWDER, LYOPHILIZED, FOR SOLUTION INTRAVENOUS EVERY 24 HOURS
Status: DISCONTINUED | OUTPATIENT
Start: 2022-01-01 | End: 2022-01-01 | Stop reason: HOSPADM

## 2022-01-01 RX ORDER — DIPHENHYDRAMINE HYDROCHLORIDE 50 MG/ML
50 INJECTION INTRAMUSCULAR; INTRAVENOUS ONCE AS NEEDED
Status: DISCONTINUED | OUTPATIENT
Start: 2022-01-01 | End: 2022-01-01 | Stop reason: HOSPADM

## 2022-01-01 RX ORDER — HYDROMORPHONE HYDROCHLORIDE 1 MG/ML
0.2 INJECTION, SOLUTION INTRAMUSCULAR; INTRAVENOUS; SUBCUTANEOUS EVERY 10 MIN PRN
Status: CANCELLED | OUTPATIENT
Start: 2022-01-01

## 2022-01-01 RX ORDER — LEVOFLOXACIN 5 MG/ML
750 INJECTION, SOLUTION INTRAVENOUS
Status: DISCONTINUED | OUTPATIENT
Start: 2022-01-01 | End: 2022-01-01 | Stop reason: HOSPADM

## 2022-01-01 RX ORDER — FENTANYL CITRATE 50 UG/ML
50 INJECTION, SOLUTION INTRAMUSCULAR; INTRAVENOUS ONCE
Status: COMPLETED | OUTPATIENT
Start: 2022-01-01 | End: 2022-01-01

## 2022-01-01 RX ORDER — TRIPROLIDINE/PSEUDOEPHEDRINE 2.5MG-60MG
600 TABLET ORAL
Status: COMPLETED | OUTPATIENT
Start: 2022-01-01 | End: 2022-01-01

## 2022-01-01 RX ORDER — PROPOFOL 10 MG/ML
VIAL (ML) INTRAVENOUS
Status: DISCONTINUED | OUTPATIENT
Start: 2022-01-01 | End: 2022-01-01

## 2022-01-01 RX ORDER — HEPARIN 100 UNIT/ML
500 SYRINGE INTRAVENOUS
Status: DISCONTINUED | OUTPATIENT
Start: 2022-01-01 | End: 2022-01-01 | Stop reason: HOSPADM

## 2022-01-01 RX ORDER — ACETAMINOPHEN 160 MG/5ML
1000 SUSPENSION ORAL
Status: COMPLETED | OUTPATIENT
Start: 2022-01-01 | End: 2022-01-01

## 2022-01-01 RX ORDER — MAGNESIUM SULFATE HEPTAHYDRATE 40 MG/ML
4 INJECTION, SOLUTION INTRAVENOUS ONCE
Status: COMPLETED | OUTPATIENT
Start: 2022-01-01 | End: 2022-01-01

## 2022-01-01 RX ORDER — MEPERIDINE HYDROCHLORIDE 25 MG/ML
25 INJECTION INTRAMUSCULAR; INTRAVENOUS; SUBCUTANEOUS ONCE
Status: DISCONTINUED | OUTPATIENT
Start: 2022-01-01 | End: 2022-01-01

## 2022-01-01 RX ORDER — DIPHENHYDRAMINE HYDROCHLORIDE 50 MG/ML
INJECTION INTRAMUSCULAR; INTRAVENOUS
Status: DISPENSED
Start: 2022-01-01 | End: 2022-01-01

## 2022-01-01 RX ORDER — ACETYLCYSTEINE 100 MG/ML
4 SOLUTION ORAL; RESPIRATORY (INHALATION) 3 TIMES DAILY
Qty: 30 ML | Refills: 2 | OUTPATIENT
Start: 2022-01-01 | End: 2023-01-01

## 2022-01-01 RX ORDER — SODIUM CHLORIDE 0.9 % (FLUSH) 0.9 %
10 SYRINGE (ML) INJECTION
Status: CANCELLED | OUTPATIENT
Start: 2022-01-01

## 2022-01-01 RX ORDER — HEPARIN 100 UNIT/ML
5 SYRINGE INTRAVENOUS
Status: DISCONTINUED | OUTPATIENT
Start: 2022-01-01 | End: 2022-01-01 | Stop reason: HOSPADM

## 2022-01-01 RX ORDER — MEPERIDINE HYDROCHLORIDE 25 MG/ML
12.5 INJECTION INTRAMUSCULAR; INTRAVENOUS; SUBCUTANEOUS ONCE
Status: ACTIVE | OUTPATIENT
Start: 2022-01-01 | End: 2022-01-01

## 2022-01-01 RX ORDER — POTASSIUM CHLORIDE 20 MEQ/1
40 TABLET, EXTENDED RELEASE ORAL ONCE
Status: DISCONTINUED | OUTPATIENT
Start: 2022-01-01 | End: 2022-01-01

## 2022-01-01 RX ORDER — OXYCODONE AND ACETAMINOPHEN 5; 325 MG/1; MG/1
2 TABLET ORAL ONCE
Status: DISCONTINUED | OUTPATIENT
Start: 2022-01-01 | End: 2022-01-01 | Stop reason: HOSPADM

## 2022-01-01 RX ORDER — HYDROCORTISONE 1 %
CREAM (GRAM) TOPICAL 2 TIMES DAILY
Status: DISCONTINUED | OUTPATIENT
Start: 2022-01-01 | End: 2022-01-01 | Stop reason: HOSPADM

## 2022-01-01 RX ORDER — MIDAZOLAM HYDROCHLORIDE 1 MG/ML
2 INJECTION INTRAMUSCULAR; INTRAVENOUS ONCE AS NEEDED
Status: COMPLETED | OUTPATIENT
Start: 2022-01-01 | End: 2022-01-01

## 2022-01-01 RX ORDER — MEPERIDINE HYDROCHLORIDE 25 MG/ML
12.5 INJECTION INTRAMUSCULAR; INTRAVENOUS; SUBCUTANEOUS EVERY 10 MIN PRN
Status: CANCELLED | OUTPATIENT
Start: 2022-01-01 | End: 2022-01-01

## 2022-01-01 RX ORDER — SODIUM,POTASSIUM PHOSPHATES 280-250MG
2 POWDER IN PACKET (EA) ORAL ONCE
Status: COMPLETED | OUTPATIENT
Start: 2022-01-01 | End: 2022-01-01

## 2022-01-01 RX ORDER — ITRACONAZOLE 100 MG/1
200 CAPSULE ORAL 3 TIMES DAILY
Status: DISCONTINUED | OUTPATIENT
Start: 2022-01-01 | End: 2022-01-01

## 2022-01-01 RX ORDER — SODIUM CHLORIDE 9 MG/ML
1000 INJECTION, SOLUTION INTRAVENOUS
Status: ACTIVE | OUTPATIENT
Start: 2022-01-01 | End: 2022-01-01

## 2022-01-01 RX ORDER — ACETYLCYSTEINE 100 MG/ML
4 SOLUTION ORAL; RESPIRATORY (INHALATION)
Status: DISCONTINUED | OUTPATIENT
Start: 2022-01-01 | End: 2022-01-01 | Stop reason: HOSPADM

## 2022-01-01 RX ORDER — SODIUM CHLORIDE 0.9 % (FLUSH) 0.9 %
10 SYRINGE (ML) INJECTION EVERY 6 HOURS
Status: DISCONTINUED | OUTPATIENT
Start: 2022-01-01 | End: 2022-01-01 | Stop reason: HOSPADM

## 2022-01-01 RX ORDER — HYDROMORPHONE HYDROCHLORIDE 1 MG/ML
0.2 INJECTION, SOLUTION INTRAMUSCULAR; INTRAVENOUS; SUBCUTANEOUS EVERY 5 MIN PRN
Status: DISCONTINUED | OUTPATIENT
Start: 2022-01-01 | End: 2022-01-01

## 2022-01-01 RX ORDER — ONDANSETRON 2 MG/ML
INJECTION INTRAMUSCULAR; INTRAVENOUS
Status: DISCONTINUED | OUTPATIENT
Start: 2022-01-01 | End: 2022-01-01

## 2022-01-01 RX ORDER — FAMOTIDINE 20 MG/1
20 TABLET, FILM COATED ORAL 2 TIMES DAILY
Status: DISCONTINUED | OUTPATIENT
Start: 2022-01-01 | End: 2022-01-01 | Stop reason: HOSPADM

## 2022-01-01 RX ORDER — ACETAMINOPHEN 325 MG/1
650 TABLET ORAL EVERY 8 HOURS PRN
Status: DISCONTINUED | OUTPATIENT
Start: 2022-01-01 | End: 2022-01-01

## 2022-01-01 RX ORDER — NYSTATIN 100000 U/G
CREAM TOPICAL 2 TIMES DAILY
Qty: 15 G | Refills: 5 | Status: ON HOLD | OUTPATIENT
Start: 2022-01-01 | End: 2023-01-01 | Stop reason: HOSPADM

## 2022-01-01 RX ORDER — ACETAMINOPHEN 325 MG/1
650 TABLET ORAL EVERY 4 HOURS PRN
Status: DISCONTINUED | OUTPATIENT
Start: 2022-01-01 | End: 2022-01-01

## 2022-01-01 RX ORDER — FAMOTIDINE 20 MG/1
20 TABLET, FILM COATED ORAL NIGHTLY
Status: DISCONTINUED | OUTPATIENT
Start: 2022-01-01 | End: 2022-01-01 | Stop reason: HOSPADM

## 2022-01-01 RX ORDER — HYDROMORPHONE HYDROCHLORIDE 1 MG/ML
INJECTION, SOLUTION INTRAMUSCULAR; INTRAVENOUS; SUBCUTANEOUS
Status: COMPLETED
Start: 2022-01-01 | End: 2022-01-01

## 2022-01-01 RX ORDER — DIPHENHYDRAMINE HCL 12.5MG/5ML
25 ELIXIR ORAL EVERY 6 HOURS PRN
Status: DISCONTINUED | OUTPATIENT
Start: 2022-01-01 | End: 2022-01-01

## 2022-01-01 RX ORDER — TRIPROLIDINE/PSEUDOEPHEDRINE 2.5MG-60MG
600 TABLET ORAL EVERY 6 HOURS PRN
Qty: 354 ML | Refills: 0 | Status: SHIPPED | OUTPATIENT
Start: 2022-01-01 | End: 2022-01-01

## 2022-01-01 RX ORDER — DIPHENHYDRAMINE HYDROCHLORIDE 50 MG/ML
50 INJECTION INTRAMUSCULAR; INTRAVENOUS EVERY 6 HOURS PRN
Status: DISCONTINUED | OUTPATIENT
Start: 2022-01-01 | End: 2022-01-01 | Stop reason: HOSPADM

## 2022-01-01 RX ORDER — ENOXAPARIN SODIUM 100 MG/ML
40 INJECTION SUBCUTANEOUS EVERY 24 HOURS
Status: DISCONTINUED | OUTPATIENT
Start: 2022-01-01 | End: 2022-01-01 | Stop reason: HOSPADM

## 2022-01-01 RX ORDER — PANTOPRAZOLE SODIUM 20 MG/1
TABLET, DELAYED RELEASE ORAL
COMMUNITY
Start: 2022-07-17 | End: 2022-01-01 | Stop reason: SDUPTHER

## 2022-01-01 RX ORDER — ONDANSETRON 2 MG/ML
4 INJECTION INTRAMUSCULAR; INTRAVENOUS ONCE
Status: DISCONTINUED | OUTPATIENT
Start: 2022-01-01 | End: 2022-01-01 | Stop reason: HOSPADM

## 2022-01-01 RX ORDER — SODIUM CHLORIDE, SODIUM LACTATE, POTASSIUM CHLORIDE, CALCIUM CHLORIDE 600; 310; 30; 20 MG/100ML; MG/100ML; MG/100ML; MG/100ML
INJECTION, SOLUTION INTRAVENOUS CONTINUOUS
Status: CANCELLED | OUTPATIENT
Start: 2022-01-01 | End: 2022-01-01

## 2022-01-01 RX ORDER — ONDANSETRON 2 MG/ML
4 INJECTION INTRAMUSCULAR; INTRAVENOUS ONCE AS NEEDED
Status: CANCELLED | OUTPATIENT
Start: 2022-01-01 | End: 2034-05-17

## 2022-01-01 RX ORDER — PROCHLORPERAZINE EDISYLATE 5 MG/ML
5 INJECTION INTRAMUSCULAR; INTRAVENOUS ONCE AS NEEDED
Status: DISCONTINUED | OUTPATIENT
Start: 2022-01-01 | End: 2022-01-01

## 2022-01-01 RX ORDER — HYDROMORPHONE HYDROCHLORIDE 1 MG/ML
0.5 INJECTION, SOLUTION INTRAMUSCULAR; INTRAVENOUS; SUBCUTANEOUS EVERY 5 MIN PRN
Status: DISCONTINUED | OUTPATIENT
Start: 2022-01-01 | End: 2022-01-01

## 2022-01-01 RX ORDER — GLUCAGON 1 MG
1 KIT INJECTION
Status: DISCONTINUED | OUTPATIENT
Start: 2022-01-01 | End: 2022-01-01 | Stop reason: HOSPADM

## 2022-01-01 RX ORDER — SODIUM CHLORIDE 0.9 % (FLUSH) 0.9 %
10 SYRINGE (ML) INJECTION EVERY 12 HOURS PRN
Status: DISCONTINUED | OUTPATIENT
Start: 2022-01-01 | End: 2022-01-01 | Stop reason: HOSPADM

## 2022-01-01 RX ORDER — FONDAPARINUX SODIUM 2.5 MG/.5ML
2.5 INJECTION SUBCUTANEOUS DAILY
Status: DISCONTINUED | OUTPATIENT
Start: 2022-01-01 | End: 2022-01-01

## 2022-01-01 RX ORDER — FAMOTIDINE 10 MG/ML
20 INJECTION INTRAVENOUS
Status: CANCELLED | OUTPATIENT
Start: 2022-01-01

## 2022-01-01 RX ORDER — IBUPROFEN 200 MG
16 TABLET ORAL
Status: DISCONTINUED | OUTPATIENT
Start: 2022-01-01 | End: 2022-01-01 | Stop reason: HOSPADM

## 2022-01-01 RX ORDER — ACETAMINOPHEN 325 MG/1
650 TABLET ORAL EVERY 6 HOURS PRN
Status: DISCONTINUED | OUTPATIENT
Start: 2022-01-01 | End: 2022-01-01 | Stop reason: HOSPADM

## 2022-01-01 RX ORDER — ACETAMINOPHEN 650 MG/20.3ML
650 LIQUID ORAL EVERY 6 HOURS PRN
Status: DISCONTINUED | OUTPATIENT
Start: 2022-01-01 | End: 2022-01-01 | Stop reason: HOSPADM

## 2022-01-01 RX ORDER — DIPHENHYDRAMINE HYDROCHLORIDE 50 MG/ML
25 INJECTION INTRAMUSCULAR; INTRAVENOUS ONCE AS NEEDED
Status: DISCONTINUED | OUTPATIENT
Start: 2022-01-01 | End: 2022-01-01 | Stop reason: HOSPADM

## 2022-01-01 RX ORDER — POTASSIUM CHLORIDE 7.45 MG/ML
10 INJECTION INTRAVENOUS
Status: COMPLETED | OUTPATIENT
Start: 2022-01-01 | End: 2022-01-01

## 2022-01-01 RX ORDER — HEPARIN 100 UNIT/ML
500 SYRINGE INTRAVENOUS
Status: CANCELLED | OUTPATIENT
Start: 2022-01-01

## 2022-01-01 RX ORDER — SODIUM CHLORIDE 9 MG/ML
INJECTION, SOLUTION INTRAVENOUS CONTINUOUS
Status: ACTIVE | OUTPATIENT
Start: 2022-01-01 | End: 2022-01-01

## 2022-01-01 RX ORDER — MEPERIDINE HYDROCHLORIDE 25 MG/ML
25 INJECTION INTRAMUSCULAR; INTRAVENOUS; SUBCUTANEOUS ONCE
Status: ACTIVE | OUTPATIENT
Start: 2022-01-01 | End: 2022-01-01

## 2022-01-01 RX ORDER — MIDODRINE HYDROCHLORIDE 5 MG/1
5 TABLET ORAL 2 TIMES DAILY WITH MEALS
Status: DISCONTINUED | OUTPATIENT
Start: 2022-01-01 | End: 2022-01-01 | Stop reason: HOSPADM

## 2022-01-01 RX ORDER — IBUPROFEN 200 MG
24 TABLET ORAL
Status: DISCONTINUED | OUTPATIENT
Start: 2022-01-01 | End: 2022-01-01 | Stop reason: HOSPADM

## 2022-01-01 RX ORDER — HYDROCODONE BITARTRATE AND ACETAMINOPHEN 7.5; 325 MG/15ML; MG/15ML
15 SOLUTION ORAL EVERY 8 HOURS PRN
Status: DISCONTINUED | OUTPATIENT
Start: 2022-01-01 | End: 2022-01-01 | Stop reason: HOSPADM

## 2022-01-01 RX ORDER — EPINEPHRINE 0.3 MG/.3ML
0.3 INJECTION SUBCUTANEOUS ONCE AS NEEDED
Status: CANCELLED | OUTPATIENT
Start: 2022-01-01

## 2022-01-01 RX ORDER — ACETAMINOPHEN 325 MG/1
650 TABLET ORAL EVERY 6 HOURS PRN
Status: DISCONTINUED | OUTPATIENT
Start: 2022-01-01 | End: 2022-01-01

## 2022-01-01 RX ORDER — IPRATROPIUM BROMIDE AND ALBUTEROL SULFATE 2.5; .5 MG/3ML; MG/3ML
3 SOLUTION RESPIRATORY (INHALATION) ONCE AS NEEDED
Status: DISCONTINUED | OUTPATIENT
Start: 2022-01-01 | End: 2022-01-01

## 2022-01-01 RX ORDER — SODIUM CHLORIDE, SODIUM LACTATE, POTASSIUM CHLORIDE, CALCIUM CHLORIDE 600; 310; 30; 20 MG/100ML; MG/100ML; MG/100ML; MG/100ML
INJECTION, SOLUTION INTRAVENOUS CONTINUOUS
Status: DISCONTINUED | OUTPATIENT
Start: 2022-01-01 | End: 2022-01-01 | Stop reason: HOSPADM

## 2022-01-01 RX ORDER — DIPHENHYDRAMINE HYDROCHLORIDE 50 MG/ML
50 INJECTION INTRAMUSCULAR; INTRAVENOUS ONCE AS NEEDED
Status: CANCELLED | OUTPATIENT
Start: 2022-01-01

## 2022-01-01 RX ORDER — ENOXAPARIN SODIUM 100 MG/ML
40 INJECTION SUBCUTANEOUS EVERY 24 HOURS
Status: DISCONTINUED | OUTPATIENT
Start: 2022-01-01 | End: 2022-01-01

## 2022-01-01 RX ORDER — LIDOCAINE HYDROCHLORIDE 40 MG/ML
2 INJECTION, SOLUTION RETROBULBAR
Status: DISPENSED | OUTPATIENT
Start: 2022-01-01

## 2022-01-01 RX ORDER — MUPIROCIN 20 MG/G
OINTMENT TOPICAL 2 TIMES DAILY
Status: COMPLETED | OUTPATIENT
Start: 2022-01-01 | End: 2022-01-01

## 2022-01-01 RX ORDER — OXYCODONE HYDROCHLORIDE 5 MG/1
5 TABLET ORAL EVERY 4 HOURS PRN
Qty: 18 TABLET | Refills: 0 | Status: SHIPPED | OUTPATIENT
Start: 2022-01-01 | End: 2022-01-01

## 2022-01-01 RX ADMIN — SODIUM CHLORIDE, PRESERVATIVE FREE 10 ML: 5 INJECTION INTRAVENOUS at 12:12

## 2022-01-01 RX ADMIN — PIPERACILLIN AND TAZOBACTAM 4.5 G: 4; .5 INJECTION, POWDER, LYOPHILIZED, FOR SOLUTION INTRAVENOUS; PARENTERAL at 06:10

## 2022-01-01 RX ADMIN — MAGNESIUM SULFATE HEPTAHYDRATE 2 G: 40 INJECTION, SOLUTION INTRAVENOUS at 08:12

## 2022-01-01 RX ADMIN — HYDROCODONE BITARTRATE AND ACETAMINOPHEN 15 ML: 7.5; 325 SOLUTION ORAL at 09:10

## 2022-01-01 RX ADMIN — FONDAPARINUX SODIUM 2.5 MG: 2.5 INJECTION, SOLUTION SUBCUTANEOUS at 08:12

## 2022-01-01 RX ADMIN — PIPERACILLIN AND TAZOBACTAM 4.5 G: 4; .5 INJECTION, POWDER, LYOPHILIZED, FOR SOLUTION INTRAVENOUS; PARENTERAL at 05:12

## 2022-01-01 RX ADMIN — ACETYLCYSTEINE 4 ML: 100 SOLUTION ORAL; RESPIRATORY (INHALATION) at 03:12

## 2022-01-01 RX ADMIN — HYDROXYZINE PAMOATE 25 MG: 25 CAPSULE ORAL at 08:10

## 2022-01-01 RX ADMIN — HYDROMORPHONE HYDROCHLORIDE 0.5 MG: 1 INJECTION, SOLUTION INTRAMUSCULAR; INTRAVENOUS; SUBCUTANEOUS at 09:10

## 2022-01-01 RX ADMIN — IPRATROPIUM BROMIDE AND ALBUTEROL SULFATE 3 ML: 2.5; .5 SOLUTION RESPIRATORY (INHALATION) at 07:10

## 2022-01-01 RX ADMIN — SODIUM CHLORIDE: 9 INJECTION, SOLUTION INTRAVENOUS at 10:12

## 2022-01-01 RX ADMIN — FAMOTIDINE 20 MG: 20 TABLET, FILM COATED ORAL at 08:12

## 2022-01-01 RX ADMIN — IPRATROPIUM BROMIDE AND ALBUTEROL SULFATE 3 ML: 2.5; .5 SOLUTION RESPIRATORY (INHALATION) at 07:12

## 2022-01-01 RX ADMIN — MAGNESIUM SULFATE HEPTAHYDRATE 2 G: 40 INJECTION, SOLUTION INTRAVENOUS at 09:12

## 2022-01-01 RX ADMIN — PIPERACILLIN AND TAZOBACTAM 4.5 G: 4; .5 INJECTION, POWDER, LYOPHILIZED, FOR SOLUTION INTRAVENOUS; PARENTERAL at 12:12

## 2022-01-01 RX ADMIN — DEXAMETHASONE SODIUM PHOSPHATE 8 MG: 4 INJECTION, SOLUTION INTRA-ARTICULAR; INTRALESIONAL; INTRAMUSCULAR; INTRAVENOUS; SOFT TISSUE at 08:10

## 2022-01-01 RX ADMIN — ACETYLCYSTEINE 4 ML: 100 SOLUTION ORAL; RESPIRATORY (INHALATION) at 07:12

## 2022-01-01 RX ADMIN — MIDAZOLAM 6 MG: 1 INJECTION INTRAMUSCULAR; INTRAVENOUS at 08:12

## 2022-01-01 RX ADMIN — PIPERACILLIN AND TAZOBACTAM 4.5 G: 4; .5 INJECTION, POWDER, LYOPHILIZED, FOR SOLUTION INTRAVENOUS; PARENTERAL at 01:12

## 2022-01-01 RX ADMIN — SODIUM CHLORIDE, PRESERVATIVE FREE 10 ML: 5 INJECTION INTRAVENOUS at 06:12

## 2022-01-01 RX ADMIN — NOREPINEPHRINE BITARTRATE 0.5 MCG/KG/MIN: 1 INJECTION, SOLUTION, CONCENTRATE INTRAVENOUS at 01:12

## 2022-01-01 RX ADMIN — HYDROCODONE BITARTRATE AND ACETAMINOPHEN 15 ML: 7.5; 325 SOLUTION ORAL at 08:10

## 2022-01-01 RX ADMIN — PIPERACILLIN AND TAZOBACTAM 4.5 G: 4; .5 INJECTION, POWDER, LYOPHILIZED, FOR SOLUTION INTRAVENOUS; PARENTERAL at 04:12

## 2022-01-01 RX ADMIN — SODIUM CHLORIDE, POTASSIUM CHLORIDE, SODIUM LACTATE AND CALCIUM CHLORIDE: 600; 310; 30; 20 INJECTION, SOLUTION INTRAVENOUS at 07:10

## 2022-01-01 RX ADMIN — IPRATROPIUM BROMIDE AND ALBUTEROL SULFATE 3 ML: 2.5; .5 SOLUTION RESPIRATORY (INHALATION) at 02:12

## 2022-01-01 RX ADMIN — IPRATROPIUM BROMIDE AND ALBUTEROL SULFATE 3 ML: 2.5; .5 SOLUTION RESPIRATORY (INHALATION) at 11:12

## 2022-01-01 RX ADMIN — ACETAMINOPHEN 1001.6 MG: 160 SUSPENSION ORAL at 11:12

## 2022-01-01 RX ADMIN — BUDESONIDE 0.5 MG: 0.5 SUSPENSION RESPIRATORY (INHALATION) at 09:10

## 2022-01-01 RX ADMIN — MAGNESIUM SULFATE HEPTAHYDRATE 4 G: 40 INJECTION, SOLUTION INTRAVENOUS at 06:12

## 2022-01-01 RX ADMIN — HYDROCORTISONE: 10 CREAM TOPICAL at 11:10

## 2022-01-01 RX ADMIN — IPRATROPIUM BROMIDE AND ALBUTEROL SULFATE 3 ML: .5; 3 SOLUTION RESPIRATORY (INHALATION) at 12:12

## 2022-01-01 RX ADMIN — NOREPINEPHRINE BITARTRATE 0.02 MCG/KG/MIN: 1 INJECTION, SOLUTION, CONCENTRATE INTRAVENOUS at 05:12

## 2022-01-01 RX ADMIN — POTASSIUM CHLORIDE 40 MEQ: 1500 TABLET, EXTENDED RELEASE ORAL at 09:12

## 2022-01-01 RX ADMIN — ENOXAPARIN SODIUM 40 MG: 40 INJECTION SUBCUTANEOUS at 05:12

## 2022-01-01 RX ADMIN — SODIUM CHLORIDE, PRESERVATIVE FREE 10 ML: 5 INJECTION INTRAVENOUS at 11:12

## 2022-01-01 RX ADMIN — IOHEXOL 15 ML: 300 INJECTION, SOLUTION INTRAVENOUS at 03:10

## 2022-01-01 RX ADMIN — IPRATROPIUM BROMIDE AND ALBUTEROL SULFATE 3 ML: 2.5; .5 SOLUTION RESPIRATORY (INHALATION) at 08:10

## 2022-01-01 RX ADMIN — VANCOMYCIN HYDROCHLORIDE 1000 MG: 1 INJECTION, POWDER, LYOPHILIZED, FOR SOLUTION INTRAVENOUS at 06:12

## 2022-01-01 RX ADMIN — IPRATROPIUM BROMIDE AND ALBUTEROL SULFATE 3 ML: 2.5; .5 SOLUTION RESPIRATORY (INHALATION) at 06:12

## 2022-01-01 RX ADMIN — FAMOTIDINE 20 MG: 20 TABLET, FILM COATED ORAL at 09:12

## 2022-01-01 RX ADMIN — MICAFUNGIN SODIUM 100 MG: 100 INJECTION, POWDER, LYOPHILIZED, FOR SOLUTION INTRAVENOUS at 08:12

## 2022-01-01 RX ADMIN — ACETYLCYSTEINE 4 ML: 100 SOLUTION ORAL; RESPIRATORY (INHALATION) at 08:12

## 2022-01-01 RX ADMIN — DOXYCYCLINE 100 MG: 100 INJECTION, POWDER, LYOPHILIZED, FOR SOLUTION INTRAVENOUS at 03:12

## 2022-01-01 RX ADMIN — ONDANSETRON 4 MG: 2 INJECTION INTRAMUSCULAR; INTRAVENOUS at 08:10

## 2022-01-01 RX ADMIN — DIPHENHYDRAMINE HYDROCHLORIDE 25 MG: 12.5 SOLUTION ORAL at 06:10

## 2022-01-01 RX ADMIN — PIPERACILLIN AND TAZOBACTAM 4.5 G: 4; .5 INJECTION, POWDER, LYOPHILIZED, FOR SOLUTION INTRAVENOUS; PARENTERAL at 10:10

## 2022-01-01 RX ADMIN — HYDROCORTISONE: 10 CREAM TOPICAL at 08:10

## 2022-01-01 RX ADMIN — ITRACONAZOLE 200 MG: 100 CAPSULE ORAL at 08:12

## 2022-01-01 RX ADMIN — SODIUM CHLORIDE 1000 ML: 9 INJECTION, SOLUTION INTRAVENOUS at 12:12

## 2022-01-01 RX ADMIN — SODIUM CHLORIDE, PRESERVATIVE FREE 10 ML: 5 INJECTION INTRAVENOUS at 01:12

## 2022-01-01 RX ADMIN — NOREPINEPHRINE BITARTRATE 0.1 MCG/KG/MIN: 1 INJECTION, SOLUTION, CONCENTRATE INTRAVENOUS at 05:12

## 2022-01-01 RX ADMIN — ENOXAPARIN SODIUM 40 MG: 40 INJECTION SUBCUTANEOUS at 04:10

## 2022-01-01 RX ADMIN — PIPERACILLIN AND TAZOBACTAM 4.5 G: 4; .5 INJECTION, POWDER, LYOPHILIZED, FOR SOLUTION INTRAVENOUS; PARENTERAL at 03:10

## 2022-01-01 RX ADMIN — SODIUM CHLORIDE 200 MG: 9 INJECTION, SOLUTION INTRAVENOUS at 10:11

## 2022-01-01 RX ADMIN — POTASSIUM PHOSPHATE, MONOBASIC AND POTASSIUM PHOSPHATE, DIBASIC 30 MMOL: 224; 236 INJECTION, SOLUTION, CONCENTRATE INTRAVENOUS at 07:12

## 2022-01-01 RX ADMIN — SODIUM CHLORIDE 1000 ML: 9 INJECTION, SOLUTION INTRAVENOUS at 02:12

## 2022-01-01 RX ADMIN — SODIUM CHLORIDE, PRESERVATIVE FREE 10 ML: 5 INJECTION INTRAVENOUS at 05:12

## 2022-01-01 RX ADMIN — DIPHENHYDRAMINE HYDROCHLORIDE 50 MG: 50 INJECTION INTRAMUSCULAR; INTRAVENOUS at 03:12

## 2022-01-01 RX ADMIN — FENTANYL CITRATE 50 MCG: 50 INJECTION, SOLUTION INTRAMUSCULAR; INTRAVENOUS at 08:12

## 2022-01-01 RX ADMIN — DIPHENHYDRAMINE HYDROCHLORIDE 25 MG: 12.5 SOLUTION ORAL at 03:10

## 2022-01-01 RX ADMIN — BUDESONIDE 0.5 MG: 0.5 SUSPENSION RESPIRATORY (INHALATION) at 07:10

## 2022-01-01 RX ADMIN — SODIUM CHLORIDE 1000 ML: 9 INJECTION, SOLUTION INTRAVENOUS at 01:12

## 2022-01-01 RX ADMIN — GUAIFENESIN 600 MG: 600 TABLET ORAL at 09:10

## 2022-01-01 RX ADMIN — MUPIROCIN: 20 OINTMENT TOPICAL at 09:12

## 2022-01-01 RX ADMIN — PIPERACILLIN AND TAZOBACTAM 4.5 G: 4; .5 INJECTION, POWDER, LYOPHILIZED, FOR SOLUTION INTRAVENOUS; PARENTERAL at 11:10

## 2022-01-01 RX ADMIN — IPRATROPIUM BROMIDE AND ALBUTEROL SULFATE 3 ML: 2.5; .5 SOLUTION RESPIRATORY (INHALATION) at 03:12

## 2022-01-01 RX ADMIN — IPRATROPIUM BROMIDE AND ALBUTEROL SULFATE 3 ML: 2.5; .5 SOLUTION RESPIRATORY (INHALATION) at 05:12

## 2022-01-01 RX ADMIN — LEVOFLOXACIN 750 MG: 5 INJECTION, SOLUTION INTRAVENOUS at 10:12

## 2022-01-01 RX ADMIN — AZITHROMYCIN MONOHYDRATE 500 MG: 500 INJECTION, POWDER, LYOPHILIZED, FOR SOLUTION INTRAVENOUS at 11:12

## 2022-01-01 RX ADMIN — BUDESONIDE 0.5 MG: 0.5 SUSPENSION RESPIRATORY (INHALATION) at 08:10

## 2022-01-01 RX ADMIN — PIPERACILLIN AND TAZOBACTAM 4.5 G: 4; .5 INJECTION, POWDER, LYOPHILIZED, FOR SOLUTION INTRAVENOUS; PARENTERAL at 07:10

## 2022-01-01 RX ADMIN — MUPIROCIN: 20 OINTMENT TOPICAL at 08:12

## 2022-01-01 RX ADMIN — DIPHENHYDRAMINE HYDROCHLORIDE 25 MG: 25 CAPSULE ORAL at 08:12

## 2022-01-01 RX ADMIN — DOXYCYCLINE 100 MG: 100 INJECTION, POWDER, LYOPHILIZED, FOR SOLUTION INTRAVENOUS at 04:12

## 2022-01-01 RX ADMIN — SODIUM CHLORIDE: 9 INJECTION, SOLUTION INTRAVENOUS at 03:12

## 2022-01-01 RX ADMIN — SODIUM CHLORIDE, POTASSIUM CHLORIDE, SODIUM LACTATE AND CALCIUM CHLORIDE 2124 ML: 600; 310; 30; 20 INJECTION, SOLUTION INTRAVENOUS at 11:10

## 2022-01-01 RX ADMIN — ACETYLCYSTEINE 4 ML: 100 SOLUTION ORAL; RESPIRATORY (INHALATION) at 02:12

## 2022-01-01 RX ADMIN — IPRATROPIUM BROMIDE AND ALBUTEROL SULFATE 3 ML: 2.5; .5 SOLUTION RESPIRATORY (INHALATION) at 01:10

## 2022-01-01 RX ADMIN — POTASSIUM CHLORIDE 10 MEQ: 7.46 INJECTION, SOLUTION INTRAVENOUS at 10:12

## 2022-01-01 RX ADMIN — AMPICILLIN SODIUM AND SULBACTAM SODIUM 3 G: 2; 1 INJECTION, POWDER, FOR SOLUTION INTRAMUSCULAR; INTRAVENOUS at 07:10

## 2022-01-01 RX ADMIN — AMPHOTERICIN B, DIMYRISTOYLPHOSPHATIDYLCHOLINE, DL- AND DIMYRISTOYLPHOSPHATIDYLGLYCEROL, DL- 340 MG: 5; 3.4; 1.5 INJECTION INTRAVENOUS at 02:12

## 2022-01-01 RX ADMIN — FONDAPARINUX SODIUM 2.5 MG: 2.5 INJECTION, SOLUTION SUBCUTANEOUS at 09:12

## 2022-01-01 RX ADMIN — DIPHENHYDRAMINE HYDROCHLORIDE 50 MG: 50 INJECTION, SOLUTION INTRAMUSCULAR; INTRAVENOUS at 09:11

## 2022-01-01 RX ADMIN — MIDODRINE HYDROCHLORIDE 5 MG: 5 TABLET ORAL at 09:12

## 2022-01-01 RX ADMIN — POTASSIUM BICARBONATE 50 MEQ: 978 TABLET, EFFERVESCENT ORAL at 05:12

## 2022-01-01 RX ADMIN — DIPHENHYDRAMINE HYDROCHLORIDE 25 MG: 12.5 SOLUTION ORAL at 11:10

## 2022-01-01 RX ADMIN — DIPHENHYDRAMINE HYDROCHLORIDE 25 MG: 25 CAPSULE ORAL at 09:12

## 2022-01-01 RX ADMIN — ALTEPLASE 2 MG: 2.2 INJECTION, POWDER, LYOPHILIZED, FOR SOLUTION INTRAVENOUS at 12:12

## 2022-01-01 RX ADMIN — PIPERACILLIN AND TAZOBACTAM 4.5 G: 4; .5 INJECTION, POWDER, LYOPHILIZED, FOR SOLUTION INTRAVENOUS; PARENTERAL at 09:12

## 2022-01-01 RX ADMIN — FAMOTIDINE 20 MG: 20 TABLET ORAL at 09:10

## 2022-01-01 RX ADMIN — PIPERACILLIN AND TAZOBACTAM 4.5 G: 4; .5 INJECTION, POWDER, LYOPHILIZED, FOR SOLUTION INTRAVENOUS; PARENTERAL at 08:10

## 2022-01-01 RX ADMIN — ZOLEDRONIC ACID 4 MG: 4 INJECTION, SOLUTION, CONCENTRATE INTRAVENOUS at 11:12

## 2022-01-01 RX ADMIN — HYDROXYZINE PAMOATE 25 MG: 25 CAPSULE ORAL at 09:10

## 2022-01-01 RX ADMIN — SODIUM CHLORIDE: 9 INJECTION, SOLUTION INTRAVENOUS at 05:12

## 2022-01-01 RX ADMIN — DEXTROSE MONOHYDRATE: 50 INJECTION, SOLUTION INTRAVENOUS at 03:12

## 2022-01-01 RX ADMIN — PANTOPRAZOLE SODIUM 40 MG: 40 INJECTION, POWDER, FOR SOLUTION INTRAVENOUS at 09:12

## 2022-01-01 RX ADMIN — PIPERACILLIN AND TAZOBACTAM 4.5 G: 4; .5 INJECTION, POWDER, LYOPHILIZED, FOR SOLUTION INTRAVENOUS; PARENTERAL at 08:12

## 2022-01-01 RX ADMIN — IPRATROPIUM BROMIDE AND ALBUTEROL SULFATE 3 ML: 2.5; .5 SOLUTION RESPIRATORY (INHALATION) at 10:12

## 2022-01-01 RX ADMIN — SODIUM PHOSPHATE, MONOBASIC, MONOHYDRATE AND SODIUM PHOSPHATE, DIBASIC, ANHYDROUS 20.01 MMOL: 276; 142 INJECTION, SOLUTION INTRAVENOUS at 09:12

## 2022-01-01 RX ADMIN — NOREPINEPHRINE BITARTRATE 0.2 MCG/KG/MIN: 1 INJECTION, SOLUTION, CONCENTRATE INTRAVENOUS at 02:12

## 2022-01-01 RX ADMIN — IOHEXOL 100 ML: 350 INJECTION, SOLUTION INTRAVENOUS at 10:10

## 2022-01-01 RX ADMIN — IPRATROPIUM BROMIDE AND ALBUTEROL SULFATE 3 ML: 2.5; .5 SOLUTION RESPIRATORY (INHALATION) at 09:10

## 2022-01-01 RX ADMIN — MICAFUNGIN SODIUM 100 MG: 100 INJECTION, POWDER, LYOPHILIZED, FOR SOLUTION INTRAVENOUS at 06:12

## 2022-01-01 RX ADMIN — POTASSIUM CHLORIDE 10 MEQ: 7.46 INJECTION, SOLUTION INTRAVENOUS at 11:12

## 2022-01-01 RX ADMIN — POTASSIUM PHOSPHATE, MONOBASIC AND POTASSIUM PHOSPHATE, DIBASIC 30 MMOL: 224; 236 INJECTION, SOLUTION, CONCENTRATE INTRAVENOUS at 10:12

## 2022-01-01 RX ADMIN — ACETYLCYSTEINE 4 ML: 100 SOLUTION ORAL; RESPIRATORY (INHALATION) at 06:12

## 2022-01-01 RX ADMIN — DIATRIZOATE MEGLUMINE AND DIATRIZOATE SODIUM 30 ML: 660; 100 LIQUID ORAL; RECTAL at 09:10

## 2022-01-01 RX ADMIN — MIDODRINE HYDROCHLORIDE 5 MG: 5 TABLET ORAL at 08:12

## 2022-01-01 RX ADMIN — HYDROMORPHONE HYDROCHLORIDE 0.5 MG: 1 INJECTION, SOLUTION INTRAMUSCULAR; INTRAVENOUS; SUBCUTANEOUS at 01:10

## 2022-01-01 RX ADMIN — ACETYLCYSTEINE 4 ML: 100 SOLUTION ORAL; RESPIRATORY (INHALATION) at 11:12

## 2022-01-01 RX ADMIN — PANTOPRAZOLE SODIUM 40 MG: 40 INJECTION, POWDER, FOR SOLUTION INTRAVENOUS at 08:12

## 2022-01-01 RX ADMIN — FAMOTIDINE 20 MG: 20 TABLET ORAL at 08:10

## 2022-01-01 RX ADMIN — IOHEXOL 100 ML: 300 INJECTION, SOLUTION INTRAVENOUS at 03:12

## 2022-01-01 RX ADMIN — IPRATROPIUM BROMIDE AND ALBUTEROL SULFATE 3 ML: 2.5; .5 SOLUTION RESPIRATORY (INHALATION) at 08:12

## 2022-01-01 RX ADMIN — DIPHENHYDRAMINE HYDROCHLORIDE 25 MG: 25 CAPSULE ORAL at 07:12

## 2022-01-01 RX ADMIN — MAGNESIUM SULFATE HEPTAHYDRATE 4 G: 40 INJECTION, SOLUTION INTRAVENOUS at 07:12

## 2022-01-01 RX ADMIN — IBUPROFEN 600 MG: 100 SUSPENSION ORAL at 12:12

## 2022-01-01 RX ADMIN — LEVOFLOXACIN 750 MG: 5 INJECTION, SOLUTION INTRAVENOUS at 11:12

## 2022-01-01 RX ADMIN — FENTANYL CITRATE 100 MCG: 50 INJECTION, SOLUTION INTRAMUSCULAR; INTRAVENOUS at 08:10

## 2022-01-01 RX ADMIN — HYDROCORTISONE: 10 CREAM TOPICAL at 09:10

## 2022-01-01 RX ADMIN — IPRATROPIUM BROMIDE AND ALBUTEROL SULFATE 3 ML: 2.5; .5 SOLUTION RESPIRATORY (INHALATION) at 12:10

## 2022-01-01 RX ADMIN — SODIUM CHLORIDE 1 G: 900 INJECTION INTRAVENOUS at 11:12

## 2022-01-01 RX ADMIN — AMOXICILLIN AND CLAVULANATE POTASSIUM 1 TABLET: 875; 125 TABLET, FILM COATED ORAL at 09:10

## 2022-01-01 RX ADMIN — POTASSIUM BICARBONATE 50 MEQ: 978 TABLET, EFFERVESCENT ORAL at 10:12

## 2022-01-01 RX ADMIN — IPRATROPIUM BROMIDE AND ALBUTEROL SULFATE 3 ML: 2.5; .5 SOLUTION RESPIRATORY (INHALATION) at 09:12

## 2022-01-01 RX ADMIN — VANCOMYCIN HYDROCHLORIDE 750 MG: 750 INJECTION, POWDER, LYOPHILIZED, FOR SOLUTION INTRAVENOUS at 05:12

## 2022-01-01 RX ADMIN — ITRACONAZOLE 200 MG: 100 CAPSULE ORAL at 03:12

## 2022-01-01 RX ADMIN — MIDODRINE HYDROCHLORIDE 5 MG: 5 TABLET ORAL at 05:12

## 2022-01-01 RX ADMIN — PIPERACILLIN AND TAZOBACTAM 4.5 G: 4; .5 INJECTION, POWDER, LYOPHILIZED, FOR SOLUTION INTRAVENOUS; PARENTERAL at 04:10

## 2022-01-01 RX ADMIN — AMPICILLIN SODIUM AND SULBACTAM SODIUM 3 G: 2; 1 INJECTION, POWDER, FOR SOLUTION INTRAMUSCULAR; INTRAVENOUS at 04:10

## 2022-01-01 RX ADMIN — NOREPINEPHRINE BITARTRATE 0.3 MCG/KG/MIN: 1 INJECTION, SOLUTION, CONCENTRATE INTRAVENOUS at 02:12

## 2022-01-01 RX ADMIN — SODIUM CHLORIDE 1000 ML: 9 INJECTION, SOLUTION INTRAVENOUS at 04:12

## 2022-01-01 RX ADMIN — MIDAZOLAM 2 MG: 1 INJECTION INTRAMUSCULAR; INTRAVENOUS at 07:10

## 2022-01-01 RX ADMIN — PROPOFOL 140 MG: 10 INJECTION, EMULSION INTRAVENOUS at 08:10

## 2022-01-01 RX ADMIN — VANCOMYCIN HYDROCHLORIDE 1750 MG: 1 INJECTION, POWDER, LYOPHILIZED, FOR SOLUTION INTRAVENOUS at 05:12

## 2022-01-01 RX ADMIN — SODIUM CHLORIDE, PRESERVATIVE FREE 10 ML: 5 INJECTION INTRAVENOUS at 07:12

## 2022-01-01 RX ADMIN — ENOXAPARIN SODIUM 40 MG: 40 INJECTION SUBCUTANEOUS at 04:12

## 2022-01-01 RX ADMIN — COSYNTROPIN 0.25 MG: 0.25 INJECTION, POWDER, LYOPHILIZED, FOR SOLUTION INTRAVENOUS at 08:12

## 2022-01-01 RX ADMIN — AMPICILLIN SODIUM AND SULBACTAM SODIUM 3 G: 2; 1 INJECTION, POWDER, FOR SOLUTION INTRAMUSCULAR; INTRAVENOUS at 03:10

## 2022-01-01 RX ADMIN — IPRATROPIUM BROMIDE AND ALBUTEROL SULFATE 3 ML: 2.5; .5 SOLUTION RESPIRATORY (INHALATION) at 12:12

## 2022-01-01 RX ADMIN — PIPERACILLIN AND TAZOBACTAM 4.5 G: 4; .5 INJECTION, POWDER, LYOPHILIZED, FOR SOLUTION INTRAVENOUS; PARENTERAL at 01:10

## 2022-01-01 RX ADMIN — DOXYCYCLINE 100 MG: 100 INJECTION, POWDER, LYOPHILIZED, FOR SOLUTION INTRAVENOUS at 02:12

## 2022-01-01 RX ADMIN — HYDROCORTISONE: 10 CREAM TOPICAL at 10:10

## 2022-01-01 RX ADMIN — GUAIFENESIN 600 MG: 600 TABLET ORAL at 08:10

## 2022-01-01 RX ADMIN — LEVOFLOXACIN 750 MG: 5 INJECTION, SOLUTION INTRAVENOUS at 01:12

## 2022-01-01 RX ADMIN — HYDROCODONE BITARTRATE AND ACETAMINOPHEN 15 ML: 7.5; 325 SOLUTION ORAL at 10:10

## 2022-01-01 RX ADMIN — PALONOSETRON 0.25 MG: 0.25 INJECTION, SOLUTION INTRAVENOUS at 09:11

## 2022-01-01 RX ADMIN — ACETYLCYSTEINE 4 ML: 100 SOLUTION ORAL; RESPIRATORY (INHALATION) at 05:12

## 2022-01-01 RX ADMIN — SODIUM CHLORIDE: 9 INJECTION, SOLUTION INTRAVENOUS at 11:12

## 2022-01-01 RX ADMIN — HYDROXYZINE PAMOATE 25 MG: 25 CAPSULE ORAL at 10:10

## 2022-01-01 RX ADMIN — PIPERACILLIN AND TAZOBACTAM 4.5 G: 4; .5 INJECTION, POWDER, LYOPHILIZED, FOR SOLUTION INTRAVENOUS; PARENTERAL at 06:12

## 2022-01-01 RX ADMIN — HEPARIN 500 UNITS: 100 SYRINGE at 01:11

## 2022-01-01 RX ADMIN — AMPICILLIN SODIUM AND SULBACTAM SODIUM 3 G: 2; 1 INJECTION, POWDER, FOR SOLUTION INTRAMUSCULAR; INTRAVENOUS at 10:10

## 2022-01-01 RX ADMIN — SODIUM CHLORIDE: 9 INJECTION, SOLUTION INTRAVENOUS at 01:12

## 2022-01-01 RX ADMIN — POTASSIUM, SODIUM PHOSPHATES 280 MG-160 MG-250 MG ORAL POWDER PACKET 2 PACKET: POWDER IN PACKET at 07:12

## 2022-01-01 RX ADMIN — IPRATROPIUM BROMIDE AND ALBUTEROL SULFATE 3 ML: 2.5; .5 SOLUTION RESPIRATORY (INHALATION) at 03:10

## 2022-01-01 RX ADMIN — ACETAMINOPHEN 650 MG: 650 SOLUTION ORAL at 03:12

## 2022-01-01 RX ADMIN — ENOXAPARIN SODIUM 40 MG: 40 INJECTION SUBCUTANEOUS at 06:10

## 2022-01-01 RX ADMIN — APREPITANT 130 MG: 130 INJECTION, EMULSION INTRAVENOUS at 10:11

## 2022-01-01 RX ADMIN — VANCOMYCIN HYDROCHLORIDE 750 MG: 750 INJECTION, POWDER, LYOPHILIZED, FOR SOLUTION INTRAVENOUS at 04:12

## 2022-01-01 RX ADMIN — FAMOTIDINE 20 MG: 20 TABLET ORAL at 10:10

## 2022-01-01 RX ADMIN — FENTANYL CITRATE 50 MCG: 50 INJECTION, SOLUTION INTRAMUSCULAR; INTRAVENOUS at 08:10

## 2022-01-01 RX ADMIN — MEPERIDINE HYDROCHLORIDE 25 MG: 25 INJECTION INTRAMUSCULAR; INTRAVENOUS; SUBCUTANEOUS at 03:12

## 2022-01-01 RX ADMIN — POTASSIUM CHLORIDE 10 MEQ: 7.46 INJECTION, SOLUTION INTRAVENOUS at 12:12

## 2022-01-01 RX ADMIN — CARBOPLATIN 770 MG: 10 INJECTION INTRAVENOUS at 02:11

## 2022-01-01 RX ADMIN — IOHEXOL 100 ML: 350 INJECTION, SOLUTION INTRAVENOUS at 10:12

## 2022-01-01 RX ADMIN — DEXTROSE MONOHYDRATE: 50 INJECTION, SOLUTION INTRAVENOUS at 02:12

## 2022-01-01 RX ADMIN — DIPHENHYDRAMINE HYDROCHLORIDE 25 MG: 12.5 SOLUTION ORAL at 02:10

## 2022-01-01 RX ADMIN — SODIUM CHLORIDE 1000 ML: 9 INJECTION, SOLUTION INTRAVENOUS at 06:12

## 2022-01-01 RX ADMIN — IBUPROFEN 600 MG: 100 SUSPENSION ORAL at 03:10

## 2022-01-01 RX ADMIN — FAMOTIDINE 20 MG: 10 INJECTION INTRAVENOUS at 09:11

## 2022-01-01 RX ADMIN — ENOXAPARIN SODIUM 40 MG: 40 INJECTION SUBCUTANEOUS at 07:10

## 2022-01-01 RX ADMIN — IPRATROPIUM BROMIDE AND ALBUTEROL SULFATE 3 ML: 2.5; .5 SOLUTION RESPIRATORY (INHALATION) at 01:12

## 2022-01-01 RX ADMIN — MICAFUNGIN SODIUM 100 MG: 100 INJECTION, POWDER, LYOPHILIZED, FOR SOLUTION INTRAVENOUS at 07:12

## 2022-01-01 RX ADMIN — MAGNESIUM SULFATE HEPTAHYDRATE 2 G: 40 INJECTION, SOLUTION INTRAVENOUS at 12:12

## 2022-01-01 RX ADMIN — POTASSIUM CHLORIDE 10 MEQ: 7.46 INJECTION, SOLUTION INTRAVENOUS at 03:12

## 2022-01-01 RX ADMIN — PANTOPRAZOLE SODIUM 40 MG: 40 INJECTION, POWDER, FOR SOLUTION INTRAVENOUS at 01:12

## 2022-01-01 RX ADMIN — MAGNESIUM SULFATE HEPTAHYDRATE 2 G: 40 INJECTION, SOLUTION INTRAVENOUS at 11:12

## 2022-01-01 RX ADMIN — PROPOFOL 30 MG: 10 INJECTION, EMULSION INTRAVENOUS at 08:10

## 2022-01-01 RX ADMIN — PACLITAXEL 366 MG: 6 INJECTION, SOLUTION INTRAVENOUS at 11:11

## 2022-01-01 RX ADMIN — SODIUM PHOSPHATE, MONOBASIC, MONOHYDRATE AND SODIUM PHOSPHATE, DIBASIC, ANHYDROUS 15 MMOL: 276; 142 INJECTION, SOLUTION INTRAVENOUS at 08:12

## 2022-01-01 RX ADMIN — LEVOFLOXACIN 750 MG: 5 INJECTION, SOLUTION INTRAVENOUS at 12:12

## 2022-01-01 RX ADMIN — DIPHENHYDRAMINE HYDROCHLORIDE 25 MG: 12.5 SOLUTION ORAL at 09:10

## 2022-01-01 RX ADMIN — ACETAMINOPHEN 650 MG: 325 TABLET ORAL at 11:12

## 2022-01-01 RX ADMIN — Medication 500 UNITS: at 11:12

## 2022-01-01 RX ADMIN — PIPERACILLIN AND TAZOBACTAM 4.5 G: 4; .5 INJECTION, POWDER, LYOPHILIZED, FOR SOLUTION INTRAVENOUS; PARENTERAL at 12:10

## 2022-01-01 RX ADMIN — AMPICILLIN SODIUM AND SULBACTAM SODIUM 3 G: 2; 1 INJECTION, POWDER, FOR SOLUTION INTRAMUSCULAR; INTRAVENOUS at 11:10

## 2022-03-10 ENCOUNTER — HISTORICAL (OUTPATIENT)
Dept: ADMINISTRATIVE | Facility: HOSPITAL | Age: 57
End: 2022-03-10

## 2022-03-23 ENCOUNTER — HISTORICAL (OUTPATIENT)
Dept: RADIOLOGY | Facility: HOSPITAL | Age: 57
End: 2022-03-23

## 2022-03-23 ENCOUNTER — HISTORICAL (OUTPATIENT)
Dept: ADMINISTRATIVE | Facility: HOSPITAL | Age: 57
End: 2022-03-23

## 2022-03-23 LAB
ABS NEUT (OLG): 4.59 (ref 2.1–9.2)
APTT PPP: 26.3 S (ref 23.4–34.9)
BUN SERPL-MCNC: 6.9 MG/DL (ref 8.4–25.7)
CALCIUM SERPL-MCNC: 10.3 MG/DL (ref 8.4–10.2)
CHLORIDE SERPL-SCNC: 95 MMOL/L (ref 98–107)
CO2 SERPL-SCNC: 36 MMOL/L (ref 22–29)
CREAT SERPL-MCNC: 0.9 MG/DL (ref 0.72–1.25)
CREAT/UREA NIT SERPL: 8
ERYTHROCYTE [DISTWIDTH] IN BLOOD BY AUTOMATED COUNT: 13.1 % (ref 11.5–17)
GLUCOSE SERPL-MCNC: 113 MG/DL (ref 74–100)
HCT VFR BLD AUTO: 50.4 % (ref 42–52)
HEMOLYSIS INTERF INDEX SERPL-ACNC: 9
HGB BLD-MCNC: 16.7 G/DL (ref 14–18)
ICTERIC INTERF INDEX SERPL-ACNC: 0
INR PPP: 0.9 (ref 2–3)
LIPEMIC INTERF INDEX SERPL-ACNC: 13
MCH RBC QN AUTO: 34.3 PG (ref 27–31)
MCHC RBC AUTO-ENTMCNC: 33.1 G/DL (ref 33–36)
MCV RBC AUTO: 103.5 FL (ref 80–94)
NRBC BLD AUTO-RTO: 0 % (ref 0–0.2)
PLATELET # BLD AUTO: 249 10*3/UL (ref 130–400)
PMV BLD AUTO: 9.7 FL (ref 7.4–10.4)
POTASSIUM SERPL-SCNC: 5.1 MMOL/L (ref 3.5–5.1)
PROTHROMBIN TIME: 12.3 S (ref 11.7–14.5)
RBC # BLD AUTO: 4.87 10*6/UL (ref 4.7–6.1)
SODIUM SERPL-SCNC: 140 MMOL/L (ref 136–145)
WBC # SPEC AUTO: 8 10*3/UL (ref 4.5–11.5)

## 2022-03-30 ENCOUNTER — HISTORICAL (OUTPATIENT)
Dept: SURGERY | Facility: HOSPITAL | Age: 57
End: 2022-03-30

## 2022-05-10 ENCOUNTER — OFFICE VISIT (OUTPATIENT)
Dept: ORTHOPEDICS | Facility: CLINIC | Age: 57
End: 2022-05-10
Payer: MEDICARE

## 2022-05-10 ENCOUNTER — HOSPITAL ENCOUNTER (OUTPATIENT)
Dept: RADIOLOGY | Facility: CLINIC | Age: 57
Discharge: HOME | End: 2022-05-10
Attending: ORTHOPAEDIC SURGERY
Payer: MEDICARE

## 2022-05-10 VITALS
SYSTOLIC BLOOD PRESSURE: 133 MMHG | DIASTOLIC BLOOD PRESSURE: 81 MMHG | HEIGHT: 69 IN | TEMPERATURE: 98 F | BODY MASS INDEX: 22.81 KG/M2 | WEIGHT: 154 LBS | HEART RATE: 118 BPM

## 2022-05-10 DIAGNOSIS — S42.351D CLOSED DISPLACED COMMINUTED FRACTURE OF SHAFT OF RIGHT HUMERUS WITH ROUTINE HEALING, SUBSEQUENT ENCOUNTER: Primary | ICD-10-CM

## 2022-05-10 PROCEDURE — 99024 POSTOP FOLLOW-UP VISIT: CPT | Mod: POP,,, | Performed by: ORTHOPAEDIC SURGERY

## 2022-05-10 PROCEDURE — 99024 PR POST-OP FOLLOW-UP VISIT: ICD-10-PCS | Mod: POP,,, | Performed by: ORTHOPAEDIC SURGERY

## 2022-05-10 RX ORDER — ASPIRIN 81 MG/1
81 TABLET ORAL DAILY
Status: ON HOLD | COMMUNITY
End: 2022-07-07 | Stop reason: HOSPADM

## 2022-05-10 RX ORDER — FERROUS SULFATE, DRIED 160(50) MG
1 TABLET, EXTENDED RELEASE ORAL 2 TIMES DAILY WITH MEALS
Status: ON HOLD | COMMUNITY
End: 2022-06-23 | Stop reason: SDUPTHER

## 2022-05-10 NOTE — PROGRESS NOTES
History reviewed. No pertinent past medical history.    Past Surgical History:   Procedure Laterality Date    HIP SURGERY      HUMERUS FRACTURE SURGERY         Current Outpatient Medications   Medication Sig    aspirin (ECOTRIN) 81 MG EC tablet Take 81 mg by mouth once daily.    calcium-vitamin D3 (OS-CARMELO 500 + D3) 500 mg-5 mcg (200 unit) per tablet Take 1 tablet by mouth 2 (two) times daily with meals.    multivitamin capsule Take 1 capsule by mouth once daily.     No current facility-administered medications for this visit.       Review of patient's allergies indicates:  No Known Allergies    Family History   Family history unknown: Yes       Social History     Socioeconomic History    Marital status:    Tobacco Use    Smoking status: Light Tobacco Smoker    Smokeless tobacco: Never Used   Substance and Sexual Activity    Alcohol use: Yes    Drug use: Never       Chief Complaint:   Chief Complaint   Patient presents with    Right Shoulder - Pain    Right Elbow - Pain       History of present illness:  Patient is status post ORIF of right proximal humerus.  Overall is doing fairly well.  Has no complaints of pain or other issues.  Has not started her      Review of Systems:    Constitution: Negative for chills, fever, and sweats.  Negative for unexplained weight loss.    HENT:  Negative for headaches and blurry vision.    Cardiovascular:Negative for chest pain or irregular heart beat. Negative for hypertension.    Respiratory:  Negative for cough and shortness of breath.    Gastrointestinal: Negative for abdominal pain, heartburn, melena, nausea, and vomitting.    Genitourinary:  Negative bladder incontinence and dysuria.    Musculoskeletal:  See HPI    Neurological: Negative for numbness.    Psychiatric/Behavioral: Negative for depression.  The patient is not nervous/anxious.      Endocrine: Negative for polyuria    Hematologic/Lymphatic: Negative for bleeding problem.  Does not bruise/bleed  easily.    Skin: Negative for poor would healing and rash      Physical Examination:    Vital Signs:    Vitals:    05/10/22 0937   BP: 133/81   Pulse: (!) 118   Temp: 98.2 °F (36.8 °C)       Body mass index is 22.74 kg/m².    General: No acute distress, alert and oriented, healthy appearing    HEENT: Head is atraumatic, mucous membranes are moist    Neck: Supples, no JVD    Cardiovascular: Palpable dorsalis pedis and posterior tibial pulses, regular rate and rhythm to those pulses    Lungs: Breathing non-labored    Skin: no rashes appreciated    Neurologic: Can flex and extend knees, ankles, and toes. Sensation is grossly intact    ROM:  Patient's incision is clean and dry.  He has brisk capillary refill distally.  Sensation is intact distally.  Has limited range of motion of the shoulder.  Port flexion is only to about 95.  External rotation 30.  Also limited extension of his elbow to 20.  Full flexion.    X-rays:  AP lateral right humerus reviewed.  Patient's fracture alignment is unchanged.  Overall is doing very well from a healing standpoint with interval callus and consolidation of this fracture line.     Assessment::  Status post ORIF of right humerus fracture    Plan:  Plan to get him to physical therapy.  He can begin weight-bearing to his right elbow and shoulder.  Range of motion of right elbow and shoulder pain we will see him back in 6-8 weeks with repeat x-rays of the right humerus.    This note was created using [employer] voice recognition software that occasionally misinterpreted phrases or words.    Consult note is delivered via Epic messaging service.

## 2022-05-14 NOTE — OP NOTE
DATE OF SURGERY:    03/30/2022    SURGEON:  Garo Burrell MD  ASSISTANT:  Dvaid Carrasco    PREOPERATIVE DIAGNOSIS:  Right humeral shaft fracture.    POSTOPERATIVE DIAGNOSIS:  Right humeral shaft fracture.    PROCEDURES:  Open reduction internal fixation, right proximal humerus/humeral shaft fracture.    STATEMENT OF ASSISTANT NECESSITY:  A certified assistant's skilled set of hands was necessary for proper patient positioning as well as assistance with closure.    IMPLANTS:  Include [employer]s periarticular proximal humerus 3.5 plate.  We used a 10 hole plate.  We used 3.5 cortical screws in the shaft x4.  We used 2.7 lag screws x2.  We used 4-0 locking screws in the humeral head x4.    INDICATIONS:  The patient is a 56-year-old male who is well known to me with a history of a fall where he sustained a proximal humerus fracture.  We attempted nonoperative treatment with a brace as well as a sling.  Unfortunately, it went on to develop a malunion.  I discussed all treatment options with the patient.  The risks, benefits, and alternatives to operative and nonoperative intervention were discussed in detail.  All questions answered.  No guarantees were made.  Despite these risks, she elected to proceed with surgical intervention.    PROCEDURE IN DETAIL:  Patient was seen preoperatively in the preop holding area.  She was marked and consented for surgery, taken to the operating room where she was placed under general endotracheal anesthesia.  The right arm was prepped in the normal sterile fashion.  Timeout was performed, verifying correct patient, site, side, and procedure.  All were in agreement.  Standard anterolateral approach to the shoulder was performed centered overlying the coracoid, taken down to the lateral aspect of the distal humerus.  The skin was incised overlying the cephalic vein.  This was taken laterally.  We identified the biceps, developed a plane on the lateral aspect of the biceps.  Took  the biceps medially, identified the brachioradialis and brachialis.  A plane between these 2 was developed and these were both retracted medially and laterally exposing the proximal humerus.  I went down directly on the bone, identified the fracture, extended proximally and distally.  I identified the radial nerve and it was protected laterally.  We then cleaned out the fracture site, which had some early callus.  We were able to get anatomic reduction using point-to-point clamps.  After significant cleaning of the fracture, patient had a long oblique fracture fragment.  After anatomic reduction was performed, we were able to lag this with two 2.7 lag screws.  Fracture line was maintained and confirmed on AP and lateral views.  We then attempted to place a 4.5 large frag plate; however, given the proximal extent of the fracture, we were unable to place this and stay outside of the zone of injury and outside of the fracture fragment.  Therefore, a 3.5 periarticular plate was placed on the lateral aspect of the humerus.  One screw was placed proximally, 1 screw was placed distally, sucking the plate down.  It was slightly distal, therefore, we translated it proximally.  After we got appropriate placement of the plate, we placed 4 screws in the humeral head, all of appropriate length.  These were all lockers.  We then placed 2 more screws distally in the shaft.  Fracture line was maintained rotationally and stability was maintained.  Afterward, we copiously irrigated with normal saline.  We closed with #1 Vicryl, 2-0 Vicryl, 2-0 Monocryl subcutaneous tissues, and staples on the skin.  Patient was placed in a sling.  He will be discharged home today and follow up with me in 2 weeks.        ______________________________  MD JESUSITA Elizabeth/URM  DD:  03/30/2022  Time:  08:39AM  DT:  03/30/2022  Time:  09:02AM  Job #:  [id number]

## 2022-06-09 ENCOUNTER — TELEPHONE (OUTPATIENT)
Dept: ORTHOPEDICS | Facility: CLINIC | Age: 57
End: 2022-06-09
Payer: MEDICARE

## 2022-06-09 DIAGNOSIS — J38.1 POLYP OF VOCAL CORD AND LARYNX: Primary | ICD-10-CM

## 2022-06-09 NOTE — TELEPHONE ENCOUNTER
Patient's wife called to cancel his appointment on 6/14/22.  He has a growth on his vocal cords and has to have a biopsy and will be getting a trache.  She said it is very serious and she will reschedule his appointment after this is taken care of.

## 2022-06-10 ENCOUNTER — ANESTHESIA (OUTPATIENT)
Dept: SURGERY | Facility: HOSPITAL | Age: 57
DRG: 004 | End: 2022-06-10
Payer: MEDICARE

## 2022-06-10 ENCOUNTER — HOSPITAL ENCOUNTER (INPATIENT)
Facility: HOSPITAL | Age: 57
LOS: 13 days | Discharge: HOME HEALTH | DRG: 004 | End: 2022-06-23
Attending: EMERGENCY MEDICINE | Admitting: HOSPITALIST
Payer: MEDICARE

## 2022-06-10 ENCOUNTER — ANESTHESIA EVENT (OUTPATIENT)
Dept: SURGERY | Facility: HOSPITAL | Age: 57
DRG: 004 | End: 2022-06-10
Payer: MEDICARE

## 2022-06-10 DIAGNOSIS — J96.02 ACUTE RESPIRATORY FAILURE WITH HYPERCAPNIA: Primary | ICD-10-CM

## 2022-06-10 DIAGNOSIS — R06.00 DYSPNEA: ICD-10-CM

## 2022-06-10 DIAGNOSIS — Z93.0 TRACHEOSTOMY DEPENDENT: ICD-10-CM

## 2022-06-10 DIAGNOSIS — F17.210 CIGARETTE SMOKER: ICD-10-CM

## 2022-06-10 DIAGNOSIS — R29.898 WEAKNESS OF BOTH LOWER EXTREMITIES: ICD-10-CM

## 2022-06-10 DIAGNOSIS — R00.0 TACHYCARDIA: ICD-10-CM

## 2022-06-10 DIAGNOSIS — R13.10 DYSPHAGIA, UNSPECIFIED TYPE: ICD-10-CM

## 2022-06-10 DIAGNOSIS — I82.409 DVT (DEEP VENOUS THROMBOSIS): ICD-10-CM

## 2022-06-10 DIAGNOSIS — J38.3 VOCAL CORD MASS: ICD-10-CM

## 2022-06-10 DIAGNOSIS — R13.12 OROPHARYNGEAL DYSPHAGIA: ICD-10-CM

## 2022-06-10 DIAGNOSIS — R60.9 SWELLING: ICD-10-CM

## 2022-06-10 DIAGNOSIS — J38.7 MASS OF LARYNX: ICD-10-CM

## 2022-06-10 DIAGNOSIS — J44.1 COPD EXACERBATION: ICD-10-CM

## 2022-06-10 DIAGNOSIS — R79.89 ELEVATED D-DIMER: ICD-10-CM

## 2022-06-10 DIAGNOSIS — R06.02 SHORTNESS OF BREATH: ICD-10-CM

## 2022-06-10 PROBLEM — R03.0 FINDING OF ABOVE NORMAL BLOOD PRESSURE: Status: ACTIVE | Noted: 2022-06-10

## 2022-06-10 PROBLEM — S72.043A: Status: ACTIVE | Noted: 2022-06-10

## 2022-06-10 LAB
ALBUMIN SERPL-MCNC: 3.6 GM/DL (ref 3.5–5)
ALBUMIN/GLOB SERPL: 0.9 RATIO (ref 1.1–2)
ALP SERPL-CCNC: 121 UNIT/L (ref 40–150)
ALT SERPL-CCNC: 45 UNIT/L (ref 0–55)
ANION GAP SERPL CALC-SCNC: 10 MEQ/L
ANION GAP SERPL CALC-SCNC: 12 MEQ/L
ANION GAP SERPL CALC-SCNC: 9 MEQ/L
AST SERPL-CCNC: 53 UNIT/L (ref 5–34)
BASE EXCESS ARTERIAL: NORMAL
BASOPHILS # BLD AUTO: 0.03 X10(3)/MCL (ref 0–0.2)
BASOPHILS NFR BLD AUTO: 0.4 %
BILIRUBIN DIRECT+TOT PNL SERPL-MCNC: 0.6 MG/DL
BNP BLD-MCNC: 427.7 PG/ML
BUN SERPL-MCNC: 10.2 MG/DL (ref 8.4–25.7)
BUN SERPL-MCNC: 10.5 MG/DL (ref 8.4–25.7)
BUN SERPL-MCNC: 10.6 MG/DL (ref 8.4–25.7)
BUN SERPL-MCNC: 9.6 MG/DL (ref 8.4–25.7)
CALCIUM SERPL-MCNC: 8.6 MG/DL (ref 8.4–10.2)
CALCIUM SERPL-MCNC: 8.7 MG/DL (ref 8.4–10.2)
CALCIUM SERPL-MCNC: 9.3 MG/DL (ref 8.4–10.2)
CALCIUM SERPL-MCNC: 9.3 MG/DL (ref 8.4–10.2)
CHLORIDE SERPL-SCNC: 75 MMOL/L (ref 98–107)
CHLORIDE SERPL-SCNC: 75 MMOL/L (ref 98–107)
CHLORIDE SERPL-SCNC: 81 MMOL/L (ref 98–107)
CHLORIDE SERPL-SCNC: 82 MMOL/L (ref 98–107)
CO2 SERPL-SCNC: 32 MMOL/L (ref 22–29)
CO2 SERPL-SCNC: 32 MMOL/L (ref 22–29)
CO2 SERPL-SCNC: 37 MMOL/L (ref 22–29)
CO2 SERPL-SCNC: 38 MMOL/L (ref 22–29)
CORRECTED TEMPERATURE (PCO2): 146 MMHG (ref 19–50)
CORRECTED TEMPERATURE (PCO2): 42 MMHG (ref 35–45)
CORRECTED TEMPERATURE (PCO2): 44 MMHG (ref 35–45)
CORRECTED TEMPERATURE (PCO2): 97 MMHG (ref 19–50)
CORRECTED TEMPERATURE (PH): 7.1 (ref 7.29–7.61)
CORRECTED TEMPERATURE (PH): 7.26 (ref 7.29–7.61)
CORRECTED TEMPERATURE (PH): 7.51 (ref 7.35–7.45)
CORRECTED TEMPERATURE (PH): 7.53 (ref 7.35–7.45)
CORRECTED TEMPERATURE (PO2): 115 MMHG (ref 80–100)
CORRECTED TEMPERATURE (PO2): 129 MMHG (ref 80–100)
CORRECTED TEMPERATURE (PO2): 463 MMHG (ref 80–100)
CORRECTED TEMPERATURE (PO2): 67 MMHG (ref 80–100)
CREAT SERPL-MCNC: 0.65 MG/DL (ref 0.73–1.18)
CREAT SERPL-MCNC: 0.69 MG/DL (ref 0.73–1.18)
CREAT SERPL-MCNC: 0.74 MG/DL (ref 0.73–1.18)
CREAT SERPL-MCNC: 0.77 MG/DL (ref 0.73–1.18)
CREAT/UREA NIT SERPL: 14
CREAT/UREA NIT SERPL: 14
CREAT/UREA NIT SERPL: 16
D DIMER PPP IA.FEU-MCNC: 3.59 UG/ML FEU (ref 0–0.5)
EOSINOPHIL # BLD AUTO: 0 X10(3)/MCL (ref 0–0.9)
EOSINOPHIL NFR BLD AUTO: 0 %
ERYTHROCYTE [DISTWIDTH] IN BLOOD BY AUTOMATED COUNT: 14.3 % (ref 11.5–17)
FLUAV AG UPPER RESP QL IA.RAPID: NOT DETECTED
FLUBV AG UPPER RESP QL IA.RAPID: NOT DETECTED
GLOBULIN SER-MCNC: 4.2 GM/DL (ref 2.4–3.5)
GLUCOSE SERPL-MCNC: 109 MG/DL (ref 74–100)
GLUCOSE SERPL-MCNC: 110 MG/DL (ref 74–100)
GLUCOSE SERPL-MCNC: 118 MG/DL (ref 74–100)
GLUCOSE SERPL-MCNC: 121 MG/DL (ref 74–100)
HCO3 ARTERIAL: NORMAL
HCO3 UR-SCNC: 35.1 MMOL/L
HCO3 UR-SCNC: 35.1 MMOL/L
HCO3 UR-SCNC: 43.5 MMOL/L
HCO3 UR-SCNC: 45.3 MMOL/L
HCT VFR BLD AUTO: 57.6 % (ref 42–52)
HGB BLD-MCNC: 17.6 G/DL (ref 12–16)
HGB BLD-MCNC: 18.1 G/DL (ref 12–16)
HGB BLD-MCNC: 18.1 GM/DL (ref 14–18)
HGB BLD-MCNC: NORMAL G/DL
IMM GRANULOCYTES # BLD AUTO: 0.05 X10(3)/MCL (ref 0–0.02)
IMM GRANULOCYTES NFR BLD AUTO: 0.7 % (ref 0–0.43)
LYMPHOCYTES # BLD AUTO: 0.86 X10(3)/MCL (ref 0.6–4.6)
LYMPHOCYTES NFR BLD AUTO: 11.4 %
MCH RBC QN AUTO: 33 PG (ref 27–31)
MCHC RBC AUTO-ENTMCNC: 31.4 MG/DL (ref 33–36)
MCV RBC AUTO: 105.1 FL (ref 80–94)
MONOCYTES # BLD AUTO: 0.77 X10(3)/MCL (ref 0.1–1.3)
MONOCYTES NFR BLD AUTO: 10.2 %
NEUTROPHILS # BLD AUTO: 5.8 X10(3)/MCL (ref 2.1–9.2)
NEUTROPHILS NFR BLD AUTO: 77.3 %
NRBC BLD AUTO-RTO: 0 %
PCO2 BLDA: 146 MMHG (ref 19–50)
PCO2 BLDA: 42 MMHG (ref 35–45)
PCO2 BLDA: 44 MMHG (ref 35–45)
PCO2 BLDA: 97 MMHG (ref 19–50)
PCO2 BLDA: NORMAL MM[HG]
PH SMN: 7.1 [PH] (ref 7.29–7.61)
PH SMN: 7.26 [PH] (ref 7.29–7.61)
PH SMN: 7.51 [PH] (ref 7.35–7.45)
PH SMN: 7.53 [PH] (ref 7.35–7.45)
PH SMN: NORMAL [PH]
PLATELET # BLD AUTO: 180 X10(3)/MCL (ref 130–400)
PMV BLD AUTO: 10.6 FL (ref 9.4–12.4)
PO2 BLDA: 115 MMHG (ref 80–100)
PO2 BLDA: 129 MMHG (ref 80–100)
PO2 BLDA: 463 MMHG (ref 80–100)
PO2 BLDA: 67 MMHG (ref 80–100)
PO2 BLDA: NORMAL MM[HG]
POC BASE DEFICIT: 10.5 MMOL/L (ref -2–2)
POC BASE DEFICIT: 10.6 MMOL/L (ref -2–2)
POC BASE DEFICIT: 11 MMOL/L (ref -2–2)
POC BASE DEFICIT: 7.8 MMOL/L (ref -2–2)
POC COHB: 11.2 %
POC COHB: 3.2 %
POC COHB: 3.9 %
POC COHB: 8 %
POC COHB: NORMAL
POC IONIZED CALCIUM: 1.06 MMOL/L (ref 1.12–1.23)
POC IONIZED CALCIUM: 1.07 MMOL/L (ref 1.12–1.23)
POC IONIZED CALCIUM: 1.11 MMOL/L (ref 1.12–1.23)
POC IONIZED CALCIUM: 1.12 MMOL/L (ref 1.12–1.23)
POC IONIZED CALCIUM: NORMAL
POC METHB: 0.8 % (ref 0.4–2)
POC METHB: 1.2 % (ref 0.4–1.5)
POC METHB: 1.3 % (ref 0.4–1.5)
POC METHB: 1.4 % (ref 0.4–1.5)
POC METHB: NORMAL
POC O2HB: 82.6 % (ref 94–97)
POC O2HB: 90.5 % (ref 94–97)
POC O2HB: 93.3 % (ref 94–97)
POC O2HB: 94.2 % (ref 94–97)
POC O2HB: NORMAL
POC SATURATED O2: 100 %
POC SATURATED O2: 89.9 %
POC SATURATED O2: 98.9 %
POC SATURATED O2: 99.2 %
POC TEMPERATURE: 37 °C
POTASSIUM BLD-SCNC: 5 MMOL/L (ref 3.5–5)
POTASSIUM BLD-SCNC: 5 MMOL/L (ref 3.5–5)
POTASSIUM BLD-SCNC: 5.4 MMOL/L (ref 3.5–5)
POTASSIUM BLD-SCNC: 5.8 MMOL/L (ref 3.5–5)
POTASSIUM BLD-SCNC: NORMAL MMOL/L
POTASSIUM SERPL-SCNC: 5 MMOL/L (ref 3.5–5.1)
POTASSIUM SERPL-SCNC: 5.4 MMOL/L (ref 3.5–5.1)
POTASSIUM SERPL-SCNC: 5.8 MMOL/L (ref 3.5–5.1)
POTASSIUM SERPL-SCNC: 6.4 MMOL/L (ref 3.5–5.1)
PROT SERPL-MCNC: 7.8 GM/DL (ref 6.4–8.3)
RBC # BLD AUTO: 5.48 X10(6)/MCL (ref 4.7–6.1)
SARS-COV-2 RNA RESP QL NAA+PROBE: NOT DETECTED
SATURATED O2 ARTERIAL, I-STAT: NORMAL
SODIUM BLD-SCNC: 115 MMOL/L (ref 119–161)
SODIUM BLD-SCNC: 116 MMOL/L (ref 119–161)
SODIUM BLD-SCNC: 116 MMOL/L (ref 119–161)
SODIUM BLD-SCNC: 119 MMOL/L (ref 137–145)
SODIUM BLD-SCNC: NORMAL MMOL/L
SODIUM SERPL-SCNC: 121 MMOL/L (ref 136–145)
SODIUM SERPL-SCNC: 122 MMOL/L (ref 136–145)
SODIUM SERPL-SCNC: 123 MMOL/L (ref 136–145)
SODIUM SERPL-SCNC: 126 MMOL/L (ref 136–145)
SPECIMEN SOURCE: ABNORMAL
TROPONIN I SERPL-MCNC: 0.02 NG/ML (ref 0–0.04)
WBC # SPEC AUTO: 7.5 X10(3)/MCL (ref 4.5–11.5)

## 2022-06-10 PROCEDURE — 82803 BLOOD GASES ANY COMBINATION: CPT

## 2022-06-10 PROCEDURE — 84484 ASSAY OF TROPONIN QUANT: CPT | Performed by: EMERGENCY MEDICINE

## 2022-06-10 PROCEDURE — 93005 ELECTROCARDIOGRAM TRACING: CPT

## 2022-06-10 PROCEDURE — 99291 CRITICAL CARE FIRST HOUR: CPT | Mod: 25

## 2022-06-10 PROCEDURE — 96374 THER/PROPH/DIAG INJ IV PUSH: CPT

## 2022-06-10 PROCEDURE — 99900035 HC TECH TIME PER 15 MIN (STAT)

## 2022-06-10 PROCEDURE — 94660 CPAP INITIATION&MGMT: CPT

## 2022-06-10 PROCEDURE — 63600175 PHARM REV CODE 636 W HCPCS: Performed by: NURSE ANESTHETIST, CERTIFIED REGISTERED

## 2022-06-10 PROCEDURE — 94640 AIRWAY INHALATION TREATMENT: CPT

## 2022-06-10 PROCEDURE — 36415 COLL VENOUS BLD VENIPUNCTURE: CPT | Performed by: EMERGENCY MEDICINE

## 2022-06-10 PROCEDURE — 37799 UNLISTED PX VASCULAR SURGERY: CPT

## 2022-06-10 PROCEDURE — 83880 ASSAY OF NATRIURETIC PEPTIDE: CPT | Performed by: EMERGENCY MEDICINE

## 2022-06-10 PROCEDURE — 20000000 HC ICU ROOM

## 2022-06-10 PROCEDURE — 25000003 PHARM REV CODE 250: Performed by: OTOLARYNGOLOGY

## 2022-06-10 PROCEDURE — 87636 SARSCOV2 & INF A&B AMP PRB: CPT | Performed by: STUDENT IN AN ORGANIZED HEALTH CARE EDUCATION/TRAINING PROGRAM

## 2022-06-10 PROCEDURE — 25000003 PHARM REV CODE 250: Performed by: EMERGENCY MEDICINE

## 2022-06-10 PROCEDURE — 85025 COMPLETE CBC W/AUTO DIFF WBC: CPT | Performed by: EMERGENCY MEDICINE

## 2022-06-10 PROCEDURE — 94002 VENT MGMT INPAT INIT DAY: CPT

## 2022-06-10 PROCEDURE — 27000221 HC OXYGEN, UP TO 24 HOURS

## 2022-06-10 PROCEDURE — 36600 WITHDRAWAL OF ARTERIAL BLOOD: CPT

## 2022-06-10 PROCEDURE — 31720 CLEARANCE OF AIRWAYS: CPT

## 2022-06-10 PROCEDURE — 63600175 PHARM REV CODE 636 W HCPCS: Performed by: EMERGENCY MEDICINE

## 2022-06-10 PROCEDURE — 94761 N-INVAS EAR/PLS OXIMETRY MLT: CPT

## 2022-06-10 PROCEDURE — 63600175 PHARM REV CODE 636 W HCPCS: Performed by: INTERNAL MEDICINE

## 2022-06-10 PROCEDURE — 80053 COMPREHEN METABOLIC PANEL: CPT | Performed by: EMERGENCY MEDICINE

## 2022-06-10 PROCEDURE — 25000242 PHARM REV CODE 250 ALT 637 W/ HCPCS: Performed by: INTERNAL MEDICINE

## 2022-06-10 PROCEDURE — 37000008 HC ANESTHESIA 1ST 15 MINUTES: Performed by: OTOLARYNGOLOGY

## 2022-06-10 PROCEDURE — 37000009 HC ANESTHESIA EA ADD 15 MINS: Performed by: OTOLARYNGOLOGY

## 2022-06-10 PROCEDURE — 25000242 PHARM REV CODE 250 ALT 637 W/ HCPCS: Performed by: EMERGENCY MEDICINE

## 2022-06-10 PROCEDURE — 63600175 PHARM REV CODE 636 W HCPCS

## 2022-06-10 PROCEDURE — 27201423 OPTIME MED/SURG SUP & DEVICES STERILE SUPPLY: Performed by: OTOLARYNGOLOGY

## 2022-06-10 PROCEDURE — 99900026 HC AIRWAY MAINTENANCE (STAT)

## 2022-06-10 PROCEDURE — 36000707: Performed by: OTOLARYNGOLOGY

## 2022-06-10 PROCEDURE — 85379 FIBRIN DEGRADATION QUANT: CPT | Performed by: EMERGENCY MEDICINE

## 2022-06-10 PROCEDURE — 25000003 PHARM REV CODE 250: Performed by: NURSE ANESTHETIST, CERTIFIED REGISTERED

## 2022-06-10 PROCEDURE — 36000706: Performed by: OTOLARYNGOLOGY

## 2022-06-10 PROCEDURE — 27000190 HC CPAP FULL FACE MASK W/VALVE

## 2022-06-10 RX ORDER — FENTANYL CITRATE 50 UG/ML
50 INJECTION, SOLUTION INTRAMUSCULAR; INTRAVENOUS
Status: DISCONTINUED | OUTPATIENT
Start: 2022-06-12 | End: 2022-06-16

## 2022-06-10 RX ORDER — LIDOCAINE HYDROCHLORIDE 40 MG/ML
INJECTION, SOLUTION RETROBULBAR
Status: DISPENSED
Start: 2022-06-10 | End: 2022-06-11

## 2022-06-10 RX ORDER — METHYLPREDNISOLONE SOD SUCC 125 MG
125 VIAL (EA) INJECTION ONCE
Status: COMPLETED | OUTPATIENT
Start: 2022-06-10 | End: 2022-06-10

## 2022-06-10 RX ORDER — PROPOFOL 10 MG/ML
VIAL (ML) INTRAVENOUS
Status: DISCONTINUED | OUTPATIENT
Start: 2022-06-10 | End: 2022-06-20

## 2022-06-10 RX ORDER — PROPOFOL 10 MG/ML
0-50 INJECTION, EMULSION INTRAVENOUS CONTINUOUS
Status: DISCONTINUED | OUTPATIENT
Start: 2022-06-10 | End: 2022-06-11

## 2022-06-10 RX ORDER — KETAMINE HYDROCHLORIDE 100 MG/ML
INJECTION, SOLUTION INTRAMUSCULAR; INTRAVENOUS
Status: DISCONTINUED | OUTPATIENT
Start: 2022-06-10 | End: 2022-06-20

## 2022-06-10 RX ORDER — IPRATROPIUM BROMIDE AND ALBUTEROL SULFATE 2.5; .5 MG/3ML; MG/3ML
3 SOLUTION RESPIRATORY (INHALATION) ONCE
Status: COMPLETED | OUTPATIENT
Start: 2022-06-10 | End: 2022-06-10

## 2022-06-10 RX ORDER — ROCURONIUM BROMIDE 10 MG/ML
INJECTION, SOLUTION INTRAVENOUS
Status: DISCONTINUED | OUTPATIENT
Start: 2022-06-10 | End: 2022-06-20

## 2022-06-10 RX ORDER — PROPOFOL 10 MG/ML
INJECTION, EMULSION INTRAVENOUS
Status: COMPLETED
Start: 2022-06-10 | End: 2022-06-10

## 2022-06-10 RX ORDER — LIDOCAINE HYDROCHLORIDE AND EPINEPHRINE 10; 10 MG/ML; UG/ML
INJECTION, SOLUTION INFILTRATION; PERINEURAL
Status: DISCONTINUED | OUTPATIENT
Start: 2022-06-10 | End: 2022-06-10 | Stop reason: HOSPADM

## 2022-06-10 RX ORDER — CALCIUM GLUCONATE 20 MG/ML
INJECTION, SOLUTION INTRAVENOUS
Status: DISCONTINUED
Start: 2022-06-10 | End: 2022-06-10 | Stop reason: WASHOUT

## 2022-06-10 RX ORDER — IPRATROPIUM BROMIDE AND ALBUTEROL SULFATE 2.5; .5 MG/3ML; MG/3ML
3 SOLUTION RESPIRATORY (INHALATION) ONCE
Status: DISCONTINUED | OUTPATIENT
Start: 2022-06-10 | End: 2022-06-10

## 2022-06-10 RX ORDER — ENOXAPARIN SODIUM 100 MG/ML
40 INJECTION SUBCUTANEOUS EVERY 24 HOURS
Status: DISCONTINUED | OUTPATIENT
Start: 2022-06-11 | End: 2022-06-24 | Stop reason: HOSPADM

## 2022-06-10 RX ORDER — LIDOCAINE HYDROCHLORIDE 20 MG/ML
INJECTION, SOLUTION EPIDURAL; INFILTRATION; INTRACAUDAL; PERINEURAL
Status: DISCONTINUED | OUTPATIENT
Start: 2022-06-10 | End: 2022-06-20

## 2022-06-10 RX ORDER — MIDAZOLAM HYDROCHLORIDE 1 MG/ML
INJECTION INTRAMUSCULAR; INTRAVENOUS
Status: DISCONTINUED | OUTPATIENT
Start: 2022-06-10 | End: 2022-06-20

## 2022-06-10 RX ORDER — SODIUM CHLORIDE 9 MG/ML
INJECTION, SOLUTION INTRAVENOUS CONTINUOUS
Status: DISCONTINUED | OUTPATIENT
Start: 2022-06-10 | End: 2022-06-13

## 2022-06-10 RX ORDER — CALCIUM CHLORIDE INJECTION 100 MG/ML
INJECTION, SOLUTION INTRAVENOUS
Status: DISCONTINUED
Start: 2022-06-10 | End: 2022-06-10 | Stop reason: WASHOUT

## 2022-06-10 RX ORDER — PHENYLEPHRINE HCL IN 0.9% NACL 1 MG/10 ML
SYRINGE (ML) INTRAVENOUS
Status: DISCONTINUED | OUTPATIENT
Start: 2022-06-10 | End: 2022-06-20

## 2022-06-10 RX ORDER — IPRATROPIUM BROMIDE AND ALBUTEROL SULFATE 2.5; .5 MG/3ML; MG/3ML
3 SOLUTION RESPIRATORY (INHALATION) EVERY 4 HOURS
Status: DISCONTINUED | OUTPATIENT
Start: 2022-06-10 | End: 2022-06-15

## 2022-06-10 RX ORDER — FENTANYL CITRATE 50 UG/ML
50 INJECTION, SOLUTION INTRAMUSCULAR; INTRAVENOUS
Status: DISPENSED | OUTPATIENT
Start: 2022-06-10 | End: 2022-06-11

## 2022-06-10 RX ADMIN — METHYLPREDNISOLONE SODIUM SUCCINATE 125 MG: 125 INJECTION, POWDER, FOR SOLUTION INTRAMUSCULAR; INTRAVENOUS at 09:06

## 2022-06-10 RX ADMIN — SODIUM CHLORIDE, SODIUM GLUCONATE, SODIUM ACETATE, POTASSIUM CHLORIDE AND MAGNESIUM CHLORIDE: 526; 502; 368; 37; 30 INJECTION, SOLUTION INTRAVENOUS at 01:06

## 2022-06-10 RX ADMIN — IPRATROPIUM BROMIDE AND ALBUTEROL SULFATE 3 ML: 2.5; .5 SOLUTION RESPIRATORY (INHALATION) at 08:06

## 2022-06-10 RX ADMIN — PROPOFOL 10 MCG/KG/MIN: 10 INJECTION, EMULSION INTRAVENOUS at 04:06

## 2022-06-10 RX ADMIN — IPRATROPIUM BROMIDE AND ALBUTEROL SULFATE 3 ML: .5; 2.5 SOLUTION RESPIRATORY (INHALATION) at 09:06

## 2022-06-10 RX ADMIN — ROCURONIUM BROMIDE 50 MG: 10 SOLUTION INTRAVENOUS at 01:06

## 2022-06-10 RX ADMIN — PROPOFOL 50 MG: 10 INJECTION, EMULSION INTRAVENOUS at 02:06

## 2022-06-10 RX ADMIN — LIDOCAINE HYDROCHLORIDE 80 MG: 20 INJECTION, SOLUTION EPIDURAL; INFILTRATION; INTRACAUDAL; PERINEURAL at 01:06

## 2022-06-10 RX ADMIN — FENTANYL CITRATE 50 MCG: 50 INJECTION, SOLUTION INTRAMUSCULAR; INTRAVENOUS at 09:06

## 2022-06-10 RX ADMIN — KETAMINE HYDROCHLORIDE 50 MG: 100 INJECTION, SOLUTION, CONCENTRATE INTRAMUSCULAR; INTRAVENOUS at 01:06

## 2022-06-10 RX ADMIN — MIDAZOLAM HYDROCHLORIDE 2 MG: 1 INJECTION, SOLUTION INTRAMUSCULAR; INTRAVENOUS at 01:06

## 2022-06-10 RX ADMIN — GLYCOPYRROLATE 0.2 MG: 0.2 INJECTION, SOLUTION INTRAMUSCULAR; INTRAVENOUS at 01:06

## 2022-06-10 RX ADMIN — Medication 100 MCG: at 02:06

## 2022-06-10 RX ADMIN — PROPOFOL 20 MG: 10 INJECTION, EMULSION INTRAVENOUS at 02:06

## 2022-06-10 RX ADMIN — KETAMINE HYDROCHLORIDE 30 MG: 100 INJECTION, SOLUTION, CONCENTRATE INTRAMUSCULAR; INTRAVENOUS at 01:06

## 2022-06-10 RX ADMIN — CALCIUM GLUCONATE 1 G: 94 INJECTION, SOLUTION INTRAVENOUS at 01:06

## 2022-06-10 RX ADMIN — SODIUM CHLORIDE: 9 INJECTION, SOLUTION INTRAVENOUS at 12:06

## 2022-06-10 RX ADMIN — PROPOFOL 10 MG: 10 INJECTION, EMULSION INTRAVENOUS at 02:06

## 2022-06-10 NOTE — H&P
[facility] - Periop Services  Otorhinolaryngology-Head & Neck Surgery  History & Physical    Patient Name: Josafat Boudreaux  MRN: [id number]  Admission Date: 6/10/2022  Attending Physician: Junior Alonso MD  Primary Care Provider: Tony Bertrand MD    Patient information was obtained from ER records.     Subjective:     Chief Complaint/Reason for Admission:    57 y/o WM with history of chronic smoking, hypertension and  COPD who was recently diagnosed with a mass on his vocal cords comes to ED after his wife called EMS for SOB.  It is reported a ground EMS crew called [facility] for transfer. Annemarie says pt was on [place] O2 when they arrived and pt did not seem to be in any distress for them.He was given one neb. Pt arrive here on O2 and actually denies felling SOB outside of his usual.  He is not on home O2.   denies CP, fevers, chills, or syncope. +hoarseness since Nov 2021     The history is provided by the patient and the EMS personnel. No  was used.   Shortness of Breath  This is a chronic problem. The current episode started 6 to 12 hours ago. The problem has been rapidly improving. Pertinent negatives include no fever, no headaches, no cough, no sputum production, no hemoptysis, no chest pain, no leg pain and no leg swelling. He has tried beta-agonist inhalers for the symptoms. Associated medical issues include COPD.        History of Present Illness:  56 y.o. male presents with above    No current facility-administered medications on file prior to encounter.     Current Outpatient Medications on File Prior to Encounter   Medication Sig    aspirin (ECOTRIN) 81 MG EC tablet Take 81 mg by mouth once daily.    calcium-vitamin D3 (OS-CARMELO 500 + D3) 500 mg-5 mcg (200 unit) per tablet Take 1 tablet by mouth 2 (two) times daily with meals.    multivitamin capsule Take 1 capsule by mouth once daily.       Review of patient's allergies indicates:  No Known Allergies    Past  Medical History:   Diagnosis Date    COPD (chronic obstructive pulmonary disease)     Hypertension      Past Surgical History:   Procedure Laterality Date    HIP SURGERY      HUMERUS FRACTURE SURGERY       Family History     Family history is unknown by patient.        Tobacco Use    Smoking status: Current Every Day Smoker     Types: Cigarettes    Smokeless tobacco: Never Used   Substance and Sexual Activity    Alcohol use: Yes    Drug use: Never    Sexual activity: Not on file     Review of Systems  Objective:     Vital Signs (Most Recent):  Temp: 99 °F (37.2 °C) (06/10/22 0903)  Pulse: 97 (06/10/22 1323)  Resp: 20 (06/10/22 1323)  BP: (!) 165/100 (06/10/22 1300)  SpO2: (!) 91 % (06/10/22 1323) Vital Signs (24h Range):  Temp:  [97.5 °F (36.4 °C)-99 °F (37.2 °C)] 99 °F (37.2 °C)  Pulse:  [] 97  Resp:  [16-28] 20  SpO2:  [89 %-100 %] 91 %  BP: (126-165)/() 165/100     Weight: 68 kg (150 lb)  Body mass index is 22.15 kg/m².    Physical Exam    Significant Labs:  ABGs:   Recent Labs   Lab 06/10/22  1256   PH 7.10*   PCO2 146*   HCO3 45.3   POCSATURATED 100.0     CMP:   Recent Labs   Lab 06/10/22  0923 06/10/22  1109   CALCIUM 9.3 9.3   ALBUMIN 3.6  --    * 122*   K 6.4* 5.8*   CO2 37* 38*   BUN 10.2 10.5   CREATININE 0.77 0.74   ALKPHOS 121  --    ALT 45  --    AST 53*  --    BILITOT 0.6  --        Significant Diagnostics:  I have reviewed and interpreted all pertinent imaging results/findings within the past 24 hours.    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

## 2022-06-10 NOTE — H&P
Pulmonary & Critical Care Medicine      ICU H&P Note    Reason for Admission: Laryngeal Mass s/p Laryngectomy/Tracheostomy    HPI:   57 y/o  male presenting to our facility via EMS due to shortness of breath. According to the wife, Mr. Boudreaux woke up this morning severely short of breath. She said she checked his pulse ox which read as 30% and stated he was struggling to breath. Ground EMS was called but patient was reportedly severely ill needing [facility] which is how he arrived.  Of note, per the wife he had a history voice hoarseness since November 2021 but had this never investigated until 6/6/2022. 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 ENT was consulted and he was taken to the OR for emergent tracheostomy.     Past Medical History:   Diagnosis Date    COPD (chronic obstructive pulmonary disease)     Hypertension        Past Surgical History:   Procedure Laterality Date    HIP SURGERY      HUMERUS FRACTURE SURGERY         Social History:     Social History     Socioeconomic History    Marital status:    Tobacco Use    Smoking status: Current Every Day Smoker     Types: Cigarettes    Smokeless tobacco: Never Used   Substance and Sexual Activity    Alcohol use: Yes    Drug use: Never       Family History   Family history unknown: Yes       Drug Allergies:   Review of patient's allergies indicates:  No Known Allergies    Current Infusions:   sodium chloride 0.9% 125 mL/hr at 06/10/22 1248       Scheduled Medications:     albuterol-ipratropium  3 mL Nebulization Once    LIDOcaine (PF)           PRN Medications:       Review of Systems:   A comprehensive 14-point review of systems was performed, and is negative except for those items mentioned above in the HPI section of this note.     Vital Signs:    Vitals:    06/10/22 1323   BP:    Pulse: 97   Resp: 20   Temp:        Fluid Balance:     Intake/Output Summary  (Last 24 hours) at 6/10/2022 1444  Last data filed at 6/10/2022 1415  Gross per 24 hour   Intake 500 ml   Output --   Net 500 ml       Physical Exam:   General: Ill appearing  male laying in bed with trach on MV  HEENT: Normocephalic and atraumatic.   Neck: Supple without JVD. Trachea midline with fresh trach in place  Cardiac: Regular rate, normal sinus rhythm, S1,S2 heard, no murmurs, rubs, or gallops, symmetric pulses at 2+ in distal extremities.  Respiratory: Symmetric chest rise. Stridulous breath sounds bilaterally  Abdomen: Soft, NT/ND. Soft quiet bowel sounds  Extremities: No gross deformities. No lower extremity audrey  Skin: warm and dry  Neuro: Sedated and intubated. Non responsive. He does not track with eyes open. Pupils are symmetrically pinpoint. Absent corneal. Absent gag and cough.      Ventilator  Oxygen Concentration (%):  [100] 100  26/500/5/80    Personal Review and Summary of Prior Diagnostics    Laboratory Studies:   Recent Labs   Lab 06/10/22  1256   PH 7.10*   PCO2 146*   PO2 463*   HCO3 45.3   POCSATURATED 100.0     Recent Labs   Lab 06/10/22  0923   WBC 7.5   RBC 5.48   HGB 18.1*   HCT 57.6*      .1*   MCH 33.0*   MCHC 31.4*     Recent Labs   Lab 06/10/22  1109   GLUCOSE 121*   *   K 5.8*   CO2 38*   BUN 10.5   CREATININE 0.74       Microbiology Data:   Microbiology Results (last 7 days)     ** No results found for the last 168 hours. 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 Repeat Bmp in 1 hour if Potassium elevated on ABG      Code:Full  Consults:ENT  GI Ppx:Famotidine  DVT Ppx:Lovenox 40  Fluids:NS @ 125      Harvey MOULTON  DO [place]  6/10/2022  [facility] IM PGY1

## 2022-06-10 NOTE — ANESTHESIA PREPROCEDURE EVALUATION
06/10/2022  Josafat Boudreaux is a 56 y.o., male.      Pre-op Assessment    I have reviewed the Patient Summary Reports.     I have reviewed the Nursing Notes. I have reviewed the NPO Status.   I have reviewed the Medications.     Review of Systems  Anesthesia Hx:  No problems with previous Anesthesia    Cardiovascular:   Hypertension    Pulmonary:   COPD Shortness of breath Hx vocal cord mass per wife       Physical Exam  General: Well nourished, Lethargic and Unconscious        Anesthesia Plan  Type of Anesthesia, risks & benefits discussed:    Anesthesia Type: Gen ETT  Airway Plan: Direct and Video  Informed Consent: Informed consent signed with the Patient representative and all parties understand the risks and agree with anesthesia plan.  All questions answered.   ASA Score: 3 Emergent  Day of Surgery Review of History & Physical: H&P Update referred to the surgeon/provider.    Ready For Surgery From Anesthesia Perspective.     .

## 2022-06-10 NOTE — ED PROVIDER NOTES
Encounter Date: 6/10/2022    SCRIBE #1 NOTE: I, Stephanie Moreau, am scribing for, and in the presence of,  LANI Hernandez MD. I have scribed the following portions of the note - the EKG reading. Other sections scribed: PE, ROS, HPI.       History     Chief Complaint   Patient presents with    Shortness of Breath     SOB, mass on vocal chords dx x1 week ago. Scheduled trach 7/6/2022. Hx COPD. Wheezing present     57 y/o WM with history of chronic smoking, hypertension and  COPD who was recently diagnosed with a mass on his vocal cords comes to ED after his wife called EMS for SOB.  It is reported a ground EMS crew called [facility] for transfer. [facility] says pt was on [place] O2 when they arrived and pt did not seem to be in any distress for them.He was given one neb. Pt arrive here on O2 and actually denies felling SOB outside of his usual.  He is not on home O2.   denies CP, fevers, chills, or syncope. +hoarseness since Nov 2021    The history is provided by the patient and the EMS personnel. No  was used.   Shortness of Breath  This is a chronic problem. The current episode started 6 to 12 hours ago. The problem has been rapidly improving. Pertinent negatives include no fever, no headaches, no cough, no sputum production, no hemoptysis, no chest pain, no leg pain and no leg swelling. He has tried beta-agonist inhalers for the symptoms. Associated medical issues include COPD.     Review of patient's allergies indicates:  No Known Allergies  Past Medical History:   Diagnosis Date    COPD (chronic obstructive pulmonary disease)     Hypertension      Past Surgical History:   Procedure Laterality Date    HIP SURGERY      HUMERUS FRACTURE SURGERY       Family History   Family history unknown: Yes     Social History     Tobacco Use    Smoking status: Current Every Day Smoker     Types: Cigarettes    Smokeless tobacco: Never Used   Substance Use Topics    Alcohol use: Yes    Drug use: Never     Review  of Systems   Constitutional: Negative.  Negative for chills, diaphoresis and fever.   HENT: Positive for voice change.    Eyes: Negative.    Respiratory: Positive for shortness of breath (although pt actually denies to me). Negative for cough, hemoptysis and sputum production.    Cardiovascular: Negative.  Negative for chest pain, palpitations and leg swelling.   Gastrointestinal: Negative.    Endocrine: Negative.    Genitourinary: Negative.    Musculoskeletal: Negative.    Skin: Negative.    Allergic/Immunologic: Negative.    Neurological: Negative.  Negative for headaches.   Hematological: Negative.    Psychiatric/Behavioral: Negative.    All other systems reviewed and are negative.      Physical Exam     Initial Vitals [06/10/22 0806]   BP Pulse Resp Temp SpO2   (!) 145/104 110 (!) 22 97.5 °F (36.4 °C) 100 %      MAP       --         Physical Exam    Nursing note and vitals reviewed.  Constitutional: He does not appear ill.   Looks much older than stated age, speaks in a whisper; looks a bit sob to me   HENT:   Head: Normocephalic and atraumatic.   Gray coloration to nose and face   Eyes: Conjunctivae and EOM are normal. Pupils are equal, round, and reactive to light.   Neck: Trachea normal. Stridor present.   Cardiovascular: Regular rhythm. Tachycardia present.    Pulmonary/Chest: Tachypnea (looks a bit tachynpeic to me) noted. No respiratory distress. He has decreased breath sounds in the left upper field and the left lower field. He has no wheezes. He has no rhonchi. He has no rales.   Abdominal: Abdomen is soft. Bowel sounds are normal. There is no abdominal tenderness.   Musculoskeletal:         General: Normal range of motion.      Lumbar back: No tenderness.      Comments: No edema     Neurological: He is alert and oriented to person, place, and time. He has normal strength.   Skin: Skin is warm and intact.   Nose and face slightly gray   Psychiatric: He has a normal mood and affect.         ED Course    Critical Care    Date/Time: 6/10/2022 10:56 AM  Performed by: Shauna Hernandez MD  Authorized by: Shauna Hernandez MD   Total critical care time (exclusive of procedural time) : 55 minutes  Critical care time was exclusive of separately billable procedures and treating other patients.  Critical care was necessary to treat or prevent imminent or life-threatening deterioration of the following conditions: respiratory failure.  Critical care was time spent personally by me on the following activities: interpretation of cardiac output measurements, evaluation of patient's response to treatment, examination of patient, obtaining history from patient or surrogate, ordering and performing treatments and interventions, ordering and review of laboratory studies, ordering and review of radiographic studies, pulse oximetry, re-evaluation of patient's condition and review of old charts.        Labs Reviewed   COMPREHENSIVE METABOLIC PANEL - Abnormal; Notable for the following components:       Result Value    Sodium Level 121 (*)     Potassium Level 6.4 (*)     Chloride 75 (*)     Carbon Dioxide 37 (*)     Glucose Level 110 (*)     Globulin 4.2 (*)     Albumin/Globulin Ratio 0.9 (*)     Aspartate Aminotransferase 53 (*)     All other components within normal limits   B-TYPE NATRIURETIC PEPTIDE - Abnormal; Notable for the following components:    Natriuretic Peptide 427.7 (*)     All other components within normal limits   D DIMER, QUANTITATIVE - Abnormal; Notable for the following components:    D-Dimer 3.59 (*)     All other components within normal limits   CBC WITH DIFFERENTIAL - Abnormal; Notable for the following components:    Hgb 18.1 (*)     Hct 57.6 (*)     .1 (*)     MCH 33.0 (*)     MCHC 31.4 (*)     IG# 0.05 (*)     IG% 0.7 (*)     All other components within normal limits   BASIC METABOLIC PANEL - Abnormal; Notable for the following components:    Sodium Level 122 (*)     Potassium Level 5.8 (*)      Chloride 75 (*)     Carbon Dioxide 38 (*)     Glucose Level 121 (*)     All other components within normal limits   COVID/FLU A&B PCR - Normal   TROPONIN I - Normal   CBC W/ AUTO DIFFERENTIAL    Narrative:     The following orders were created for panel order CBC Auto Differential.  Procedure                               Abnormality         Status                     ---------                               -----------         ------                     CBC with Differential[[id number]]        Abnormal            Final result                 Please view results for these tests on the individual orders.     EKG Readings: (Independently Interpreted)   Initial Reading: No STEMI. Previous EKG Date: March 2022, no sig change. Rhythm: Sinus Tachycardia. Heart Rate: 109. Ectopy: No Ectopy. T Waves Flipped: V2 and V3.   Time: 0806     ECG Results          EKG 12-lead (In process)  Result time 06/10/22 11:09:55    In process by Interface, Lab In [facility] (06/10/22 11:09:55)                 Narrative:    Test Reason : J96.02,    Vent. Rate : 109 BPM     Atrial Rate : 109 BPM     P-R Int : 144 ms          QRS Dur : 072 ms      QT Int : 330 ms       P-R-T Axes : 086 104 059 degrees     QTc Int : 444 ms    Sinus tachycardia  Right atrial enlargement  Rightward axis  Pulmonary disease pattern  Septal infarct ,age undetermined  Abnormal ECG  No previous ECGs available    Referred By: [email or web address]   SELF           Confirmed By:                             Imaging Results          X-Ray Chest 1 View (Final result)  Result time 06/10/22 09:19:43    Final result by Eric Dwyer MD (06/10/22 09:19:43)                 Impression:      No acute cardiopulmonary process.      Electronically signed by: Eric Dwyer  Date:    06/10/2022  Time:    09:19             Narrative:    EXAMINATION:  XR CHEST 1 VIEW    CLINICAL HISTORY:  sob;    TECHNIQUE:  Single view of the chest    COMPARISON:  03/23/2022    FINDINGS:  No focal opacification,  pleural effusion, or pneumothorax.    The cardiomediastinal silhouette is within normal limits.    No acute osseous abnormality.                                 Medications   albuterol-ipratropium 2.5 mg-0.5 mg/3 mL nebulizer solution 3 mL (has no administration in time range)   calcium gluconate 1g in D5W 50mL (ready to mix system) (1 g Intravenous New Bag 6/10/22 1320)   0.9%  NaCl infusion ( Intravenous New Bag 6/10/22 1248)   methylPREDNISolone sodium succinate injection 125 mg (125 mg Intravenous Given 6/10/22 0941)   albuterol-ipratropium 2.5 mg-0.5 mg/3 mL nebulizer solution 3 mL (3 mLs Nebulization Given 6/10/22 0948)     Medical Decision Making:   History:   I obtained history from: EMS provider.       <> Summary of History: See hpi    Old Medical Records: I decided to obtain old medical records.  Differential Diagnosis:   Copd exacerbation, PE, worsening laryngeal mass/obstruction, USA, MI, CAD, pneumonia  Clinical Tests:   Lab Tests: Ordered and Reviewed  Radiological Study: Ordered and Reviewed  Medical Tests: Reviewed and Ordered            Attending Attestation:           Physician Attestation for Scribe:  Physician Attestation Statement for Scribe #1: ILANI MD, reviewed documentation, as scribed by Stephanie Moreau in my presence, and it is both accurate and complete.             ED Course as of 06/10/22 1325   Fri Casey 10, 2022   0919 Calling ENT to get more information; have ordered cxr, neb, solumedrol, O2 per NC    [RD]   0920 POCT ARTERIAL BLOOD GAS(!!)  PT with acute resp failure; wife at bedside now. She said pt does look better now than a few hours ago. His pox was very low at home before she called 911. (70's down to 40's). She said he tried to smoke as EMS arrived. I will try bipap if pt tolerates but ENT notified: Gavin: Torres alvarez PA called back and made aware.  May need that trach today;  they will see ; pt is awake and alert at this time [RD]   1050 WE will recheck K:serum  is 6.4 but 5.4 on abg;I will recheck; pt is asleep on bipap ; wife at bedside; she says he looks so much better; she spoke personally to Dr MARY Alonso and was told Junior Alonso would come do the trach [place]. I will call ICU for admission; spoke to Dr Vasquez who agrees with ICU admit [RD]   1230 Basic Metabolic Panel(!)  Repeat K still elevated. Will give another neb, calcium gluconate, would like to use amp D50 followed by insulin but no amps available. Staff has experienced dropping glucose levels just using the d10 with insulin so will hold off on that. Check another abg as well [RD]   1322 Repeat ABG is worse with pco2 up to 146 (O2 463). I gave update to Junior Alonso , informed Dr Vasquez in ICU (pt was still here in ED waiting for bed) and called the OR myself informing them of need for OR bed for tracheostomy. Wife aware of plan; remains at bedside [RD]      ED Course User Index  [RD] Shauna Hernandez MD             Clinical Impression:   Final diagnoses:  [J96.02] Acute respiratory failure with hypercapnia (Primary)  [J38.7] Mass of larynx  [F17.210] Cigarette smoker  [J44.1] COPD exacerbation          ED Disposition Condition    Admit               Shauna Hernandez MD  06/10/22 1104       Shauna Hernandez MD  06/10/22 1325

## 2022-06-10 NOTE — TRANSFER OF CARE
Anesthesia Transfer of Care Note    Patient: Josafat Boudreaux    Procedure(s) Performed: Procedure(s) (LRB):  CREATION, TRACHEOSTOMY (N/A)    Patient location: ICU    Anesthesia Type: general    Transport from OR: Upon arrival to PACU/ICU, patient attached to ventilator and auscultated to confirm bilateral breath sounds and adequate TV. Transported from OR intubated on 100% O2 by AMBU with adequate controlled ventilation. Continuous ECG monitoring in transport. Continuous SpO2 monitoring in transport. Continuos invasive BP monitoring in transport    Post pain: adequate analgesia    Post assessment: no apparent anesthetic complications and tolerated procedure well    Post vital signs: stable    Level of consciousness: sedated    Nausea/Vomiting: no nausea/vomiting    Complications: none    Transfer of care protocol was followed      Last vitals:   Visit Vitals  BP (!) 165/100   Pulse 97   Temp 37.2 °C (99 °F) (Oral)   Resp 20   Wt 68 kg (150 lb)   SpO2 (!) 91%   BMI 22.15 kg/m²

## 2022-06-10 NOTE — ANESTHESIA PROCEDURE NOTES
Arterial    Diagnosis: Emergent trach    Patient location during procedure: done in OR  Procedure start time: 6/10/2022 2:05 PM  Timeout: 6/10/2022 2:04 PM  Procedure end time: 6/10/2022 2:07 PM    Staffing  Authorizing Provider: Kyaw Olmstead DO  Performing Provider: Machelle Stone CRNA    Anesthesiologist was present at the time of the procedure.    Preanesthetic Checklist  Completed: patient identified, IV checked, site marked, risks and benefits discussed, surgical consent, monitors and equipment checked, pre-op evaluation, timeout performed and anesthesia consent givenArterial  Skin Prep: chlorhexidine gluconate  Local Infiltration: none  Orientation: right  Location: radial    Catheter Size: 20 G  Catheter placement by Anatomical landmarks. Heme positive aspiration all ports. Insertion Attempts: 1  Assessment  Dressing: secured with tape and tegaderm  Patient: Tolerated well

## 2022-06-11 LAB
ANION GAP SERPL CALC-SCNC: 10 MEQ/L
ANION GAP SERPL CALC-SCNC: 12 MEQ/L
BUN SERPL-MCNC: 10.4 MG/DL (ref 8.4–25.7)
BUN SERPL-MCNC: 10.8 MG/DL (ref 8.4–25.7)
CALCIUM SERPL-MCNC: 8.6 MG/DL (ref 8.4–10.2)
CALCIUM SERPL-MCNC: 8.6 MG/DL (ref 8.4–10.2)
CHLORIDE SERPL-SCNC: 82 MMOL/L (ref 98–107)
CHLORIDE SERPL-SCNC: 83 MMOL/L (ref 98–107)
CO2 SERPL-SCNC: 31 MMOL/L (ref 22–29)
CO2 SERPL-SCNC: 32 MMOL/L (ref 22–29)
CREAT SERPL-MCNC: 0.74 MG/DL (ref 0.73–1.18)
CREAT SERPL-MCNC: 0.74 MG/DL (ref 0.73–1.18)
CREAT/UREA NIT SERPL: 14
CREAT/UREA NIT SERPL: 15
GLUCOSE SERPL-MCNC: 101 MG/DL (ref 74–100)
GLUCOSE SERPL-MCNC: 98 MG/DL (ref 74–100)
POTASSIUM SERPL-SCNC: 5 MMOL/L (ref 3.5–5.1)
POTASSIUM SERPL-SCNC: 5.2 MMOL/L (ref 3.5–5.1)
SODIUM SERPL-SCNC: 124 MMOL/L (ref 136–145)
SODIUM SERPL-SCNC: 126 MMOL/L (ref 136–145)

## 2022-06-11 PROCEDURE — 80048 BASIC METABOLIC PNL TOTAL CA: CPT | Performed by: STUDENT IN AN ORGANIZED HEALTH CARE EDUCATION/TRAINING PROGRAM

## 2022-06-11 PROCEDURE — C9113 INJ PANTOPRAZOLE SODIUM, VIA: HCPCS | Performed by: NURSE PRACTITIONER

## 2022-06-11 PROCEDURE — 94761 N-INVAS EAR/PLS OXIMETRY MLT: CPT

## 2022-06-11 PROCEDURE — 63600175 PHARM REV CODE 636 W HCPCS: Performed by: INTERNAL MEDICINE

## 2022-06-11 PROCEDURE — 94003 VENT MGMT INPAT SUBQ DAY: CPT

## 2022-06-11 PROCEDURE — 20000000 HC ICU ROOM

## 2022-06-11 PROCEDURE — 63600175 PHARM REV CODE 636 W HCPCS: Performed by: STUDENT IN AN ORGANIZED HEALTH CARE EDUCATION/TRAINING PROGRAM

## 2022-06-11 PROCEDURE — 63600175 PHARM REV CODE 636 W HCPCS: Performed by: NURSE ANESTHETIST, CERTIFIED REGISTERED

## 2022-06-11 PROCEDURE — 25000242 PHARM REV CODE 250 ALT 637 W/ HCPCS: Performed by: INTERNAL MEDICINE

## 2022-06-11 PROCEDURE — 63600175 PHARM REV CODE 636 W HCPCS: Performed by: NURSE PRACTITIONER

## 2022-06-11 PROCEDURE — S4991 NICOTINE PATCH NONLEGEND: HCPCS | Performed by: NURSE PRACTITIONER

## 2022-06-11 PROCEDURE — 36415 COLL VENOUS BLD VENIPUNCTURE: CPT | Performed by: STUDENT IN AN ORGANIZED HEALTH CARE EDUCATION/TRAINING PROGRAM

## 2022-06-11 PROCEDURE — 25000003 PHARM REV CODE 250: Performed by: NURSE PRACTITIONER

## 2022-06-11 PROCEDURE — 94640 AIRWAY INHALATION TREATMENT: CPT

## 2022-06-11 PROCEDURE — 99900026 HC AIRWAY MAINTENANCE (STAT)

## 2022-06-11 PROCEDURE — 27200966 HC CLOSED SUCTION SYSTEM

## 2022-06-11 PROCEDURE — 27000221 HC OXYGEN, UP TO 24 HOURS

## 2022-06-11 RX ORDER — DIPHENHYDRAMINE HYDROCHLORIDE 50 MG/ML
25 INJECTION INTRAMUSCULAR; INTRAVENOUS NIGHTLY PRN
Status: DISPENSED | OUTPATIENT
Start: 2022-06-11 | End: 2022-06-16

## 2022-06-11 RX ORDER — PANTOPRAZOLE SODIUM 40 MG/10ML
40 INJECTION, POWDER, LYOPHILIZED, FOR SOLUTION INTRAVENOUS DAILY
Status: DISCONTINUED | OUTPATIENT
Start: 2022-06-11 | End: 2022-06-19

## 2022-06-11 RX ORDER — DIPHENHYDRAMINE HYDROCHLORIDE 50 MG/ML
25 INJECTION INTRAMUSCULAR; INTRAVENOUS NIGHTLY PRN
Status: DISCONTINUED | OUTPATIENT
Start: 2022-06-11 | End: 2022-06-11

## 2022-06-11 RX ORDER — IBUPROFEN 200 MG
1 TABLET ORAL DAILY
Status: DISCONTINUED | OUTPATIENT
Start: 2022-06-11 | End: 2022-06-24 | Stop reason: HOSPADM

## 2022-06-11 RX ADMIN — IPRATROPIUM BROMIDE AND ALBUTEROL SULFATE 3 ML: 2.5; .5 SOLUTION RESPIRATORY (INHALATION) at 03:06

## 2022-06-11 RX ADMIN — IPRATROPIUM BROMIDE AND ALBUTEROL SULFATE 3 ML: 2.5; .5 SOLUTION RESPIRATORY (INHALATION) at 12:06

## 2022-06-11 RX ADMIN — ENOXAPARIN SODIUM 40 MG: 40 INJECTION SUBCUTANEOUS at 05:06

## 2022-06-11 RX ADMIN — PANTOPRAZOLE SODIUM 40 MG: 40 INJECTION, POWDER, LYOPHILIZED, FOR SOLUTION INTRAVENOUS at 12:06

## 2022-06-11 RX ADMIN — NICOTINE 1 PATCH: 21 PATCH, EXTENDED RELEASE TRANSDERMAL at 12:06

## 2022-06-11 RX ADMIN — PROPOFOL 1000 MG: 10 INJECTION, EMULSION INTRAVENOUS at 12:06

## 2022-06-11 RX ADMIN — DIPHENHYDRAMINE HYDROCHLORIDE 25 MG: 50 INJECTION, SOLUTION INTRAMUSCULAR; INTRAVENOUS at 08:06

## 2022-06-11 RX ADMIN — IPRATROPIUM BROMIDE AND ALBUTEROL SULFATE 3 ML: 2.5; .5 SOLUTION RESPIRATORY (INHALATION) at 04:06

## 2022-06-11 RX ADMIN — IPRATROPIUM BROMIDE AND ALBUTEROL SULFATE 3 ML: 2.5; .5 SOLUTION RESPIRATORY (INHALATION) at 08:06

## 2022-06-11 NOTE — PROGRESS NOTES
57 y/o  male presenting to [facility] 6/10/22 via [facility] due to shortness of breath. According to the wife, Mr. Boudreaux woke up this morning severely short of breath. She said she checked his pulse ox which read as 30% and stated he was struggling to breath. Of note, per the wife he had a history voice hoarseness since November 2021 but had this never investigated until 6/6/2022. 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 ENT was consulted and he was taken to the OR for emergent tracheostomy. Tolerated procedure well.     MEDICATIONS: reviewed in the eMR    SUBJECTIVE: no issues overnight reported. On small dose of propofol. On mechanical ventilatory support via ventilator. No IV fluids in progress. Nurse Acosta reports apparently NG was unable to be placed in OR. Discussed plan to consult RT for vent removal to TC tolerance and ST consultation for swallowing evaluation.    OBJECTIVE: vitals: temp 98.6, blood pressure 92/68, HR 98, oxygen saturation 97% on 35% fio2, RR 22  I/O: 979 CC IN / 1,100 CC OUT; BM X1  GEN: Middle aged  male, no acute distress  HEENT: normocephalic, atraumatic, sclera non-icteric, oral mucous membranes moist  NECK: supple, no JVD, no thyromegaly, no adenopathy  LUNGS: clear but diminished bilaterally, non-labored, symmetrical expansion   HEART: RRR, S1, S2, no mummurs, gallops, rubs  ABDOMEN: soft, non-tender, non-distended, active bowel sounds  EXTREMITIES: no clubbing, cyanosis, or edema  NEURO: no focal deficits appreciated    LABORATORY:   Recent Results (from the past 24 hour(s))   Basic Metabolic Panel    Collection Time: 06/10/22 11:09 AM   Result Value Ref Range    Sodium Level 122 (L) 136 - 145 mmol/L    Potassium Level 5.8 (H) 3.5 - 5.1 mmol/L    Chloride 75 (L) 98 - 107 mmol/L    Carbon Dioxide 38 (H) 22 - 29 mmol/L    Glucose Level 121 (H) 74 - 100 mg/dL    Blood Urea Nitrogen 10.5 8.4 - 25.7 mg/dL     Creatinine 0.74 0.73 - 1.18 mg/dL    BUN/Creatinine Ratio 14     Calcium Level Total 9.3 8.4 - 10.2 mg/dL    Estimated GFR-Non  >60 mls/min/1.73/m2    Anion Gap 9.0 mEq/L   POCT ARTERIAL BLOOD GAS    Collection Time: 06/10/22 12:56 PM   Result Value Ref Range    POC PH 7.10 (AA) 7.29 - 7.61    POC PCO2 146 (AA) 19.0 - 50.0 mmHg    POC PO2 463 (A) 80.0 - 100 mmHg    POC HEMOGLOBIN 18.1 (A) 12.0 - 16.0 g/dL    POC SATURATED O2 100.0 %    POC O2Hb 90.5 (A) 94.0 - 97.0 %    POC COHb 8.0 %    POC MetHb 1.4 0.40 - 1.5 %    POC Potassium 5.8 (A) 3.5 - 5.0 mmol/l    POC Sodium 116 (AA) 119 - 161 mmol/l    POC Ionized Calcium 1.12 1.12 - 1.23 mmol/l    Correct Temperature (PH) 7.10 (AA) 7.29 - 7.61    Corrected Temperature (pCO2) 146 (AA) 19.0 - 50.0 mmHg    Corrected Temperature (pO2) 463 (A) 80.0 - 100 mmHg    POC HCO3 45.3 mmol/l    Base Deficit 7.8 (A) -2.0 - 2.0 mmol/l    POC Temp 37.0 °C    Specimen source Arterial sample    POCT ARTERIAL BLOOD GAS Blood Gas    Collection Time: 06/10/22  1:00 PM   Result Value Ref Range    POC PH      POC PCO2      POC PO2      POC Sodium      POC Potassium      POC Ionized Calcium      POC HEMOGLOBIN      POC O2Hb      POC COHb      POC MetHb      POC PCO2      Base Excess, Arterial      O2 Sat, Art      HCO3, Arterial     POCT ARTERIAL BLOOD GAS Blood Gas, Lytes (NA,K,ICA,HCT)    Collection Time: 06/10/22  4:30 PM   Result Value Ref Range    POC PH      POC PCO2      POC PO2      POC Sodium      POC Potassium      POC Ionized Calcium      POC HEMOGLOBIN      POC O2Hb      POC COHb      POC MetHb      POC PCO2      Base Excess, Arterial      O2 Sat, Art      HCO3, Arterial     POCT ARTERIAL BLOOD GAS    Collection Time: 06/10/22  4:32 PM   Result Value Ref Range    POC PH 7.51 (A) 7.35 - 7.45    POC PCO2 44 35 - 45 mmHg    POC PO2 115 (A) 80.0 - 100 mmHg    POC HEMOGLOBIN 17.6 (A) 12.0 - 16.0 g/dL    POC SATURATED O2 98.9 %    POC O2Hb 93.3 (A) 94.0 - 97.0 %    POC  COHb 3.9 %    POC MetHb 1.3 0.40 - 1.5 %    POC Potassium 5.0 3.5 - 5.0 mmol/l    POC Sodium 116 (AA) 119 - 161 mmol/l    POC Ionized Calcium 1.06 (A) 1.12 - 1.23 mmol/l    Correct Temperature (PH) 7.51 (A) 7.35 - 7.45    Corrected Temperature (pCO2) 44 35 - 45 mmHg    Corrected Temperature (pO2) 115 (A) 80.0 - 100 mmHg    POC HCO3 35.1 mmol/l    Base Deficit 10.5 (A) -2.00 - 2.00 mmol/l    POC Temp 37.0 °C    Specimen source Arterial sample    POCT ARTERIAL BLOOD GAS    Collection Time: 06/10/22  6:10 PM   Result Value Ref Range    POC PH 7.53 (A) 7.35 - 7.45    POC PCO2 42 35 - 45 mmHg    POC PO2 129 (A) 80.0 - 100 mmHg    POC HEMOGLOBIN 17.6 (A) 12.0 - 16.0 g/dL    POC SATURATED O2 99.2 %    POC O2Hb 94.2 94.0 - 97.0 %    POC COHb 3.2 %    POC MetHb 1.2 0.40 - 1.5 %    POC Potassium 5.0 3.5 - 5.0 mmol/l    POC Sodium 119 (A) 137 - 145 mmol/l    POC Ionized Calcium 1.07 (A) 1.12 - 1.23 mmol/l    Correct Temperature (PH) 7.53 (A) 7.35 - 7.45    Corrected Temperature (pCO2) 42 35 - 45 mmHg    Corrected Temperature (pO2) 129 (A) 80.0 - 100 mmHg    POC HCO3 35.1 mmol/l    Base Deficit 11.0 (A) -2.00 - 2.00 mmol/l    POC Temp 37.0 °C    Specimen source Arterial sample    POCT ARTERIAL BLOOD GAS Blood Gas    Collection Time: 06/10/22  6:13 PM   Result Value Ref Range    POC PH      POC PCO2      POC PO2      POC Sodium      POC Potassium      POC Ionized Calcium      POC HEMOGLOBIN      POC O2Hb      POC COHb      POC MetHb      POC PCO2      Base Excess, Arterial      O2 Sat, Art      HCO3, Arterial     Basic Metabolic Panel    Collection Time: 06/10/22  7:45 PM   Result Value Ref Range    Sodium Level 123 (L) 136 - 145 mmol/L    Potassium Level 5.4 (H) 3.5 - 5.1 mmol/L    Chloride 81 (L) 98 - 107 mmol/L    Carbon Dioxide 32 (H) 22 - 29 mmol/L    Glucose Level 118 (H) 74 - 100 mg/dL    Blood Urea Nitrogen 10.6 8.4 - 25.7 mg/dL    Creatinine 0.65 (L) 0.73 - 1.18 mg/dL    BUN/Creatinine Ratio 16     Calcium Level Total  8.6 8.4 - 10.2 mg/dL    Estimated GFR-Non  >60 mls/min/1.73/m2    Anion Gap 10.0 mEq/L   Basic Metabolic Panel    Collection Time: 06/10/22 10:03 PM   Result Value Ref Range    Sodium Level 126 (L) 136 - 145 mmol/L    Potassium Level 5.0 3.5 - 5.1 mmol/L    Chloride 82 (L) 98 - 107 mmol/L    Carbon Dioxide 32 (H) 22 - 29 mmol/L    Glucose Level 109 (H) 74 - 100 mg/dL    Blood Urea Nitrogen 9.6 8.4 - 25.7 mg/dL    Creatinine 0.69 (L) 0.73 - 1.18 mg/dL    BUN/Creatinine Ratio 14     Calcium Level Total 8.7 8.4 - 10.2 mg/dL    Estimated GFR-Non  >60 mls/min/1.73/m2    Anion Gap 12.0 mEq/L   Basic Metabolic Panel    Collection Time: 06/11/22  1:45 AM   Result Value Ref Range    Sodium Level 124 (L) 136 - 145 mmol/L    Potassium Level 5.0 3.5 - 5.1 mmol/L    Chloride 83 (L) 98 - 107 mmol/L    Carbon Dioxide 31 (H) 22 - 29 mmol/L    Glucose Level 98 74 - 100 mg/dL    Blood Urea Nitrogen 10.8 8.4 - 25.7 mg/dL    Creatinine 0.74 0.73 - 1.18 mg/dL    BUN/Creatinine Ratio 15     Calcium Level Total 8.6 8.4 - 10.2 mg/dL    Estimated GFR-Non  >60 mls/min/1.73/m2    Anion Gap 10.0 mEq/L   Basic Metabolic Panel    Collection Time: 06/11/22  4:57 AM   Result Value Ref Range    Sodium Level 126 (L) 136 - 145 mmol/L    Potassium Level 5.2 (H) 3.5 - 5.1 mmol/L    Chloride 82 (L) 98 - 107 mmol/L    Carbon Dioxide 32 (H) 22 - 29 mmol/L    Glucose Level 101 (H) 74 - 100 mg/dL    Blood Urea Nitrogen 10.4 8.4 - 25.7 mg/dL    Creatinine 0.74 0.73 - 1.18 mg/dL    BUN/Creatinine Ratio 14     Calcium Level Total 8.6 8.4 - 10.2 mg/dL    Estimated GFR-Non  >60 mls/min/1.73/m2    Anion Gap 12.0 mEq/L     IMAGING/DIAGNOSTICS: no new radiologic data to review for today    IMPRESSION:  1. 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 Type 2 Resp failure  3. COPD  4. Tobacco abuse   5. Alcohol abuse  6. Hypertension - stable   9. Polycythemia likely s/t above  10.  Hyperkalemia - Lokelma not given due to no enteral route access   11. Hyponatremia     PLAN:  1. Discussed case with intensivist. Will consult RT to evaluate for ventilator removal to TC as tolerated. Will also consult ST to evaluate as well.  2. Currently w/out NG due to reported difficulty inserting in surgery  3. Follow up ENT recommendations. Unclear if biopsies taken.  4. DVT prophylaxis w/lovenox; GI prophylaxis - add IV protonix 40 mg  5. D/C Propofol   6. NS IV fluids @ 75 cc/hr  7. Nicotine patch     Further to follow

## 2022-06-12 LAB
ALBUMIN SERPL-MCNC: 2.6 GM/DL (ref 3.5–5)
ALBUMIN/GLOB SERPL: 0.9 RATIO (ref 1.1–2)
ALP SERPL-CCNC: 77 UNIT/L (ref 40–150)
ALT SERPL-CCNC: 16 UNIT/L (ref 0–55)
AST SERPL-CCNC: 15 UNIT/L (ref 5–34)
BASOPHILS # BLD AUTO: 0.02 X10(3)/MCL (ref 0–0.2)
BASOPHILS NFR BLD AUTO: 0.2 %
BILIRUBIN DIRECT+TOT PNL SERPL-MCNC: 0.8 MG/DL
BUN SERPL-MCNC: 6.9 MG/DL (ref 8.4–25.7)
CALCIUM SERPL-MCNC: 8.4 MG/DL (ref 8.4–10.2)
CHLORIDE SERPL-SCNC: 92 MMOL/L (ref 98–107)
CO2 SERPL-SCNC: 32 MMOL/L (ref 22–29)
CREAT SERPL-MCNC: 0.6 MG/DL (ref 0.73–1.18)
EOSINOPHIL # BLD AUTO: 0.01 X10(3)/MCL (ref 0–0.9)
EOSINOPHIL NFR BLD AUTO: 0.1 %
ERYTHROCYTE [DISTWIDTH] IN BLOOD BY AUTOMATED COUNT: 14.7 % (ref 11.5–17)
GLOBULIN SER-MCNC: 2.9 GM/DL (ref 2.4–3.5)
GLUCOSE SERPL-MCNC: 79 MG/DL (ref 74–100)
HCT VFR BLD AUTO: 49.7 % (ref 42–52)
HGB BLD-MCNC: 15.9 GM/DL (ref 14–18)
IMM GRANULOCYTES # BLD AUTO: 0.05 X10(3)/MCL (ref 0–0.02)
IMM GRANULOCYTES NFR BLD AUTO: 0.5 % (ref 0–0.43)
LYMPHOCYTES # BLD AUTO: 0.83 X10(3)/MCL (ref 0.6–4.6)
LYMPHOCYTES NFR BLD AUTO: 8.4 %
MAGNESIUM SERPL-MCNC: 2 MG/DL (ref 1.6–2.6)
MCH RBC QN AUTO: 32.4 PG (ref 27–31)
MCHC RBC AUTO-ENTMCNC: 32 MG/DL (ref 33–36)
MCV RBC AUTO: 101.2 FL (ref 80–94)
MONOCYTES # BLD AUTO: 1.04 X10(3)/MCL (ref 0.1–1.3)
MONOCYTES NFR BLD AUTO: 10.6 %
NEUTROPHILS # BLD AUTO: 7.9 X10(3)/MCL (ref 2.1–9.2)
NEUTROPHILS NFR BLD AUTO: 80.2 %
NRBC BLD AUTO-RTO: 0 %
OSMOLALITY SERPL: 268 MOSM/KG (ref 280–300)
OSMOLALITY UR: 419 MOSM/KG (ref 300–1300)
PHOSPHATE SERPL-MCNC: 3.6 MG/DL (ref 2.3–4.7)
PLATELET # BLD AUTO: 153 X10(3)/MCL (ref 130–400)
PMV BLD AUTO: 9.9 FL (ref 9.4–12.4)
POTASSIUM SERPL-SCNC: 4.5 MMOL/L (ref 3.5–5.1)
PROT SERPL-MCNC: 5.5 GM/DL (ref 6.4–8.3)
RBC # BLD AUTO: 4.91 X10(6)/MCL (ref 4.7–6.1)
SODIUM SERPL-SCNC: 128 MMOL/L (ref 136–145)
SODIUM UR-SCNC: 81 MMOL/L
WBC # SPEC AUTO: 9.8 X10(3)/MCL (ref 4.5–11.5)

## 2022-06-12 PROCEDURE — 20000000 HC ICU ROOM

## 2022-06-12 PROCEDURE — 92611 MOTION FLUOROSCOPY/SWALLOW: CPT

## 2022-06-12 PROCEDURE — 36415 COLL VENOUS BLD VENIPUNCTURE: CPT | Performed by: STUDENT IN AN ORGANIZED HEALTH CARE EDUCATION/TRAINING PROGRAM

## 2022-06-12 PROCEDURE — 25000242 PHARM REV CODE 250 ALT 637 W/ HCPCS: Performed by: INTERNAL MEDICINE

## 2022-06-12 PROCEDURE — 25000003 PHARM REV CODE 250: Performed by: NURSE PRACTITIONER

## 2022-06-12 PROCEDURE — 63600175 PHARM REV CODE 636 W HCPCS: Performed by: STUDENT IN AN ORGANIZED HEALTH CARE EDUCATION/TRAINING PROGRAM

## 2022-06-12 PROCEDURE — 80053 COMPREHEN METABOLIC PANEL: CPT | Performed by: STUDENT IN AN ORGANIZED HEALTH CARE EDUCATION/TRAINING PROGRAM

## 2022-06-12 PROCEDURE — 63600175 PHARM REV CODE 636 W HCPCS: Performed by: INTERNAL MEDICINE

## 2022-06-12 PROCEDURE — 63600175 PHARM REV CODE 636 W HCPCS: Performed by: NURSE PRACTITIONER

## 2022-06-12 PROCEDURE — A9698 NON-RAD CONTRAST MATERIALNOC: HCPCS | Performed by: INTERNAL MEDICINE

## 2022-06-12 PROCEDURE — 85025 COMPLETE CBC W/AUTO DIFF WBC: CPT | Performed by: STUDENT IN AN ORGANIZED HEALTH CARE EDUCATION/TRAINING PROGRAM

## 2022-06-12 PROCEDURE — 83930 ASSAY OF BLOOD OSMOLALITY: CPT | Performed by: INTERNAL MEDICINE

## 2022-06-12 PROCEDURE — 27000221 HC OXYGEN, UP TO 24 HOURS

## 2022-06-12 PROCEDURE — 83735 ASSAY OF MAGNESIUM: CPT | Performed by: STUDENT IN AN ORGANIZED HEALTH CARE EDUCATION/TRAINING PROGRAM

## 2022-06-12 PROCEDURE — C9113 INJ PANTOPRAZOLE SODIUM, VIA: HCPCS | Performed by: NURSE PRACTITIONER

## 2022-06-12 PROCEDURE — 94761 N-INVAS EAR/PLS OXIMETRY MLT: CPT

## 2022-06-12 PROCEDURE — 84300 ASSAY OF URINE SODIUM: CPT | Performed by: INTERNAL MEDICINE

## 2022-06-12 PROCEDURE — 25500020 PHARM REV CODE 255: Performed by: INTERNAL MEDICINE

## 2022-06-12 PROCEDURE — 94640 AIRWAY INHALATION TREATMENT: CPT

## 2022-06-12 PROCEDURE — S4991 NICOTINE PATCH NONLEGEND: HCPCS | Performed by: NURSE PRACTITIONER

## 2022-06-12 PROCEDURE — 99900026 HC AIRWAY MAINTENANCE (STAT)

## 2022-06-12 PROCEDURE — 83935 ASSAY OF URINE OSMOLALITY: CPT | Performed by: INTERNAL MEDICINE

## 2022-06-12 PROCEDURE — 84100 ASSAY OF PHOSPHORUS: CPT | Performed by: STUDENT IN AN ORGANIZED HEALTH CARE EDUCATION/TRAINING PROGRAM

## 2022-06-12 PROCEDURE — 92610 EVALUATE SWALLOWING FUNCTION: CPT

## 2022-06-12 RX ADMIN — DIPHENHYDRAMINE HYDROCHLORIDE 25 MG: 50 INJECTION, SOLUTION INTRAMUSCULAR; INTRAVENOUS at 09:06

## 2022-06-12 RX ADMIN — BARIUM SULFATE 10 ML: 0.81 POWDER, FOR SUSPENSION ORAL at 02:06

## 2022-06-12 RX ADMIN — IPRATROPIUM BROMIDE AND ALBUTEROL SULFATE 3 ML: 2.5; .5 SOLUTION RESPIRATORY (INHALATION) at 12:06

## 2022-06-12 RX ADMIN — ENOXAPARIN SODIUM 40 MG: 40 INJECTION SUBCUTANEOUS at 05:06

## 2022-06-12 RX ADMIN — NICOTINE 1 PATCH: 21 PATCH, EXTENDED RELEASE TRANSDERMAL at 09:06

## 2022-06-12 RX ADMIN — IPRATROPIUM BROMIDE AND ALBUTEROL SULFATE 3 ML: 2.5; .5 SOLUTION RESPIRATORY (INHALATION) at 08:06

## 2022-06-12 RX ADMIN — IPRATROPIUM BROMIDE AND ALBUTEROL SULFATE 3 ML: 2.5; .5 SOLUTION RESPIRATORY (INHALATION) at 03:06

## 2022-06-12 RX ADMIN — PANTOPRAZOLE SODIUM 40 MG: 40 INJECTION, POWDER, LYOPHILIZED, FOR SOLUTION INTRAVENOUS at 09:06

## 2022-06-12 NOTE — PROGRESS NOTES
55 y/o  male presenting to [facility] 6/10/22 via [facility] due to shortness of breath. According to the wife, Mr. Boudreaux woke up this morning severely short of breath. She said she checked his pulse ox which read as 30% and stated he was struggling to breath. Of note, per the wife he had a history voice hoarseness since November 2021 but had this never investigated until 6/6/2022. 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 ENT was consulted and he was taken to the OR for emergent tracheostomy. Tolerated procedure well.     MEDICATIONS: reviewed     SUBJECTIVE: no issues overnight reported.  Off of mechanical ventilation and tolerating trach collar.      OBJECTIVE: vitals: temp 98.8, blood pressure 131/72, , oxygen saturation 93% on trach collar, RR 16  I/O: 979 CC IN / 1,100 CC OUT; BM X1  GEN: Middle aged  male, no acute distress  HEENT: normocephalic, atraumatic, sclera non-icteric, oral mucous membranes moist  NECK: supple, no JVD, no thyromegaly, no adenopathy  LUNGS: clear but diminished bilaterally, non-labored, symmetrical expansion   HEART: RRR, S1, S2, no mummurs, gallops, rubs  ABDOMEN: soft, non-tender, non-distended, active bowel sounds  EXTREMITIES: no clubbing, cyanosis, or edema  NEURO: no focal deficits appreciated    LABORATORY:   Recent Results (from the past 24 hour(s))   Comprehensive Metabolic Panel    Collection Time: 06/12/22  6:32 AM   Result Value Ref Range    Sodium Level 128 (L) 136 - 145 mmol/L    Potassium Level 4.5 3.5 - 5.1 mmol/L    Chloride 92 (L) 98 - 107 mmol/L    Carbon Dioxide 32 (H) 22 - 29 mmol/L    Glucose Level 79 74 - 100 mg/dL    Blood Urea Nitrogen 6.9 (L) 8.4 - 25.7 mg/dL    Creatinine 0.60 (L) 0.73 - 1.18 mg/dL    Calcium Level Total 8.4 8.4 - 10.2 mg/dL    Protein Total 5.5 (L) 6.4 - 8.3 gm/dL    Albumin Level 2.6 (L) 3.5 - 5.0 gm/dL    Globulin 2.9 2.4 - 3.5 gm/dL    Albumin/Globulin  Ratio 0.9 (L) 1.1 - 2.0 ratio    Bilirubin Total 0.8 <=1.5 mg/dL    Alkaline Phosphatase 77 40 - 150 unit/L    Alanine Aminotransferase 16 0 - 55 unit/L    Aspartate Aminotransferase 15 5 - 34 unit/L    Estimated GFR-Non  >60 mls/min/1.73/m2   Magnesium    Collection Time: 06/12/22  6:32 AM   Result Value Ref Range    Magnesium Level 2.00 1.60 - 2.60 mg/dL   Phosphorus    Collection Time: 06/12/22  6:32 AM   Result Value Ref Range    Phosphorus Level 3.6 2.3 - 4.7 mg/dL   CBC with Differential    Collection Time: 06/12/22  6:32 AM   Result Value Ref Range    WBC 9.8 4.5 - 11.5 x10(3)/mcL    RBC 4.91 4.70 - 6.10 x10(6)/mcL    Hgb 15.9 14.0 - 18.0 gm/dL    Hct 49.7 42.0 - 52.0 %    .2 (H) 80.0 - 94.0 fL    MCH 32.4 (H) 27.0 - 31.0 pg    MCHC 32.0 (L) 33.0 - 36.0 mg/dL    RDW 14.7 11.5 - 17.0 %    Platelet 153 130 - 400 x10(3)/mcL    MPV 9.9 9.4 - 12.4 fL    Neut % 80.2 %    Lymph % 8.4 %    Mono % 10.6 %    Eos % 0.1 %    Basophil % 0.2 %    Lymph # 0.83 0.6 - 4.6 x10(3)/mcL    Neut # 7.9 2.1 - 9.2 x10(3)/mcL    Mono # 1.04 0.1 - 1.3 x10(3)/mcL    Eos # 0.01 0 - 0.9 x10(3)/mcL    Baso # 0.02 0 - 0.2 x10(3)/mcL    IG# 0.05 (H) 0 - 0.0155 x10(3)/mcL    IG% 0.5 (H) 0 - 0.43 %    NRBC% 0.0 %     IMAGING/DIAGNOSTICS: no new radiologic data to review for today    IMPRESSION:  1. 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 Tobacco abuse   3. H/o Alcohol abuse  4. Hyponatremia    PLAN:  -Patient tolerating trach collar.  Would keep off of mechanical ventilation as his respiratory issues previously were more related to extrinsic compression from his neck mass.  Would not expect him to require mechanical ventilation at all in the near future.  -Per report ENT is going to evaluate and potentially biopsy neck mass in the near future.  They can always consult pulmonology outpatient if needed 2 biopsy patient's lung mass and mediastinal adenopathy.  Can refer to [facility] if needed in this  regard.  -hemodynamically stable and can downgrade to floor.  -urine, serum osm ordered to workup hyponatremia

## 2022-06-12 NOTE — PROCEDURES
"Speech Language Pathology Department  Clinical Swallow Evaluation    Patient Name:  Josafat Boudreaux   MRN:  [id number]  Admitting Diagnosis: <principal problem not specified>    Recommendations:                 General Recommendations:  Modified barium swallow study  Diet recommendations: NPO  General Precautions: Standard,      History:     Past Medical History:   Diagnosis Date    COPD (chronic obstructive pulmonary disease)     Hypertension        Past Surgical History:   Procedure Laterality Date    HIP SURGERY      HUMERUS FRACTURE SURGERY         Home Diet: Regular and thin liquids  Current Method of Nutrition: NPO    Patient complaint: Mouthed words "I'm thirsty."    Subjective     Patient awake, alert and cooperative, aphonic secondary to trach.    Patient goals: Pt stated he is thirsty.     Pain/Comfort:  · 1/10, c/o "tenderness" at trach site when swallowing    Respiratory Status: trach collar    Objective:     Oral Musculature Evaluation  · WFL    Consistency Fed By Oral Symptoms Pharyngeal Symptoms   Ice Chips self none none   Thin liquids by cup self none Cough after swallow                                     Assessment:     Pt s/p tracheostomy due to mass on vocal cords.  Swallow reflex functional given trials of ice chips and thin liquids, with cough after thin liquid by cup trial.  Instrumental assessment of swallow function indicated (MBS).    Goals: Pt will tolerate least restrictive diet with no clinical s/sx of aspiration.  Multidisciplinary Problems     SLP Goals        Problem: SLP    Goal Priority Disciplines Outcome   SLP Goal     SLP Ongoing, Progressing                   Plan:     Modified Barium Swallow Study     Time Tracking:     SLP Treatment Date:   6/12/2022  Speech Start Time:  0850  Speech Stop Time:   0905    Speech Total Time (min):  15 min    Billable minutes:  Swallow and Oral Function Evaluation, 15 min       06/12/2022           "

## 2022-06-12 NOTE — PLAN OF CARE
Problem: SLP  Goal: SLP Goal   LTG:  Pt will tolerate least restrictive diet with no clinical s/sx of aspiration.   STG:  Pt will perform base of tongue and laryngeal elevation exercises with minimal assistance.      6/12/2022 1516 by Kelsey Gardner, CCC-SLP  Outcome: Ongoing, Progressing

## 2022-06-12 NOTE — PT/OT/SLP EVAL
Speech Language Pathology Department  Modified Barium Swallow Study    Patient Name:  Josafat Boudreaux   MRN:  [id number]  Diagnosis: <principal problem not specified>     Recommendations:     Recommendations:                General Recommendations:  Dysphagia therapy and Speaking valve evaluation  Diet recommendations:  NPO, Liquid Diet Level: NPO   General Precautions: Standard, aspiration    History:     Pt less than 48 hours s/p tracheotomy due to mass on vocal cords.  A Modified Barium Swallow Study was conducted to assess the efficiency of his swallow function, rule out aspiration and make recommendations regarding safe dietary consistencies, effective compensatory strategies, and safe eating environment.     Past Medical History:   Diagnosis Date    COPD (chronic obstructive pulmonary disease)     Hypertension        Home Diet: Regular and thin liquids  Current Method of Nutrition: NPO      Subjective:     Patient awake, alert and cooperative.    Fluoroscopic Results:     Current Respiratory Status: 06/12/22 Room air    Oral Musculature Evaluation:   Oral Musculature: WFL   Dentition: present and adequate   Mandibular Strength and Mobility: WFL   Oral Labial Strength and Mobility: WFL   Lingual Strength and Mobility: WFL   Buccal Strength and Mobility: WFL   Volitional Cough: present   Volitional Swallow: difficulty initiating   Voice Prior to PO Intake: aphonic secondary to trach    Visualization  · Patient was seen in the lateral view  · Tracheostomy tube visualized in place/ One Way Speaking Valve absent    Consistencies trialed:  Thin liquids by cup, mildly-thick liquids by spoon and cup, moderately-thick liquids by spoon, puree and ice chips.    Oral Phase:   Unremarkable    Pharyngeal Phase:   Reduced base of tongue retraction, reduced laryngeal excursion, and reduced UES opening resulting in persistent pharyngeal residue after the swallow, which was silently aspirated during subsequent  swallows.  Silent aspiration of pharyngeal residue occurred throughout the study across all trials.  Aspirated materials appeared to remain superior of trach cuff.  At the end of the trials, the pt used excessive effort to cough a majority of the aspirated material to the larynx for successful re-swallow into the esophagus.  Trace aspiration remained in trachea superior to cuff.  Pt is unable to protect airway with PO intake at this time, recommend NPO, non-oral meds.  SLP to confer with MD regarding candidacy for speaking valve trials.    Cervical Esophageal Phase:   Decreased UES opening with all trials.      Assessment:     Pt presented with moderate/severe pharyngeal dysphagia with silent aspiration of pharyngeal residue across all trials.    Goals:   LTG:  Pt will tolerate least restrictive diet with no clinical s/sx of aspiration.  STG: Pt will perform base of tongue and laryngeal elevation exercises with minimal assistance.    Plan:     · Patient to be seen:  5 x/week   · Plan of Care expires:  07/03/22  · Plan of Care reviewed with:  patient  · SLP Follow-Up:  Yes      Time Tracking:   SLP Treatment Date:   06/12/22  Speech Start Time:  1420  Speech Stop Time:  1450     Speech Total Time (min):  30 min    Billable minutes: Motion Fluoroscopic Evaluation, Video Recording, 30 min       06/12/2022

## 2022-06-13 LAB
ALBUMIN SERPL-MCNC: 2.4 GM/DL (ref 3.5–5)
ALBUMIN/GLOB SERPL: 0.6 RATIO (ref 1.1–2)
ALP SERPL-CCNC: 74 UNIT/L (ref 40–150)
ALT SERPL-CCNC: 13 UNIT/L (ref 0–55)
AST SERPL-CCNC: 14 UNIT/L (ref 5–34)
BASOPHILS # BLD AUTO: 0.04 X10(3)/MCL (ref 0–0.2)
BASOPHILS NFR BLD AUTO: 0.4 %
BILIRUBIN DIRECT+TOT PNL SERPL-MCNC: 0.8 MG/DL
BUN SERPL-MCNC: 5.3 MG/DL (ref 8.4–25.7)
CALCIUM SERPL-MCNC: 8.9 MG/DL (ref 8.4–10.2)
CHLORIDE SERPL-SCNC: 93 MMOL/L (ref 98–107)
CO2 SERPL-SCNC: 28 MMOL/L (ref 22–29)
CREAT SERPL-MCNC: 0.57 MG/DL (ref 0.73–1.18)
EOSINOPHIL # BLD AUTO: 0.01 X10(3)/MCL (ref 0–0.9)
EOSINOPHIL NFR BLD AUTO: 0.1 %
ERYTHROCYTE [DISTWIDTH] IN BLOOD BY AUTOMATED COUNT: 14.5 % (ref 11.5–17)
GLOBULIN SER-MCNC: 3.9 GM/DL (ref 2.4–3.5)
GLUCOSE SERPL-MCNC: 64 MG/DL (ref 74–100)
HCT VFR BLD AUTO: 51.8 % (ref 42–52)
HGB BLD-MCNC: 16 GM/DL (ref 14–18)
IMM GRANULOCYTES # BLD AUTO: 0.06 X10(3)/MCL (ref 0–0.02)
IMM GRANULOCYTES NFR BLD AUTO: 0.6 % (ref 0–0.43)
LYMPHOCYTES # BLD AUTO: 0.82 X10(3)/MCL (ref 0.6–4.6)
LYMPHOCYTES NFR BLD AUTO: 7.7 %
MCH RBC QN AUTO: 32.5 PG (ref 27–31)
MCHC RBC AUTO-ENTMCNC: 30.9 MG/DL (ref 33–36)
MCV RBC AUTO: 105.3 FL (ref 80–94)
MONOCYTES # BLD AUTO: 1.04 X10(3)/MCL (ref 0.1–1.3)
MONOCYTES NFR BLD AUTO: 9.8 %
NEUTROPHILS # BLD AUTO: 8.7 X10(3)/MCL (ref 2.1–9.2)
NEUTROPHILS NFR BLD AUTO: 81.4 %
NRBC BLD AUTO-RTO: 0 %
PLATELET # BLD AUTO: 151 X10(3)/MCL (ref 130–400)
PMV BLD AUTO: 9.7 FL (ref 9.4–12.4)
POTASSIUM SERPL-SCNC: 4 MMOL/L (ref 3.5–5.1)
PROT SERPL-MCNC: 6.3 GM/DL (ref 6.4–8.3)
RBC # BLD AUTO: 4.92 X10(6)/MCL (ref 4.7–6.1)
SODIUM SERPL-SCNC: 135 MMOL/L (ref 136–145)
WBC # SPEC AUTO: 10.7 X10(3)/MCL (ref 4.5–11.5)

## 2022-06-13 PROCEDURE — 63600175 PHARM REV CODE 636 W HCPCS: Performed by: NURSE PRACTITIONER

## 2022-06-13 PROCEDURE — 63600175 PHARM REV CODE 636 W HCPCS: Performed by: STUDENT IN AN ORGANIZED HEALTH CARE EDUCATION/TRAINING PROGRAM

## 2022-06-13 PROCEDURE — S4991 NICOTINE PATCH NONLEGEND: HCPCS | Performed by: NURSE PRACTITIONER

## 2022-06-13 PROCEDURE — 92526 ORAL FUNCTION THERAPY: CPT

## 2022-06-13 PROCEDURE — 80053 COMPREHEN METABOLIC PANEL: CPT | Performed by: OTOLARYNGOLOGY

## 2022-06-13 PROCEDURE — 85025 COMPLETE CBC W/AUTO DIFF WBC: CPT | Performed by: OTOLARYNGOLOGY

## 2022-06-13 PROCEDURE — C9113 INJ PANTOPRAZOLE SODIUM, VIA: HCPCS | Performed by: NURSE PRACTITIONER

## 2022-06-13 PROCEDURE — 27000221 HC OXYGEN, UP TO 24 HOURS

## 2022-06-13 PROCEDURE — 94640 AIRWAY INHALATION TREATMENT: CPT

## 2022-06-13 PROCEDURE — 63600175 PHARM REV CODE 636 W HCPCS: Performed by: INTERNAL MEDICINE

## 2022-06-13 PROCEDURE — 92597 ORAL SPEECH DEVICE EVAL: CPT

## 2022-06-13 PROCEDURE — 36415 COLL VENOUS BLD VENIPUNCTURE: CPT | Performed by: OTOLARYNGOLOGY

## 2022-06-13 PROCEDURE — 20000000 HC ICU ROOM

## 2022-06-13 PROCEDURE — 94761 N-INVAS EAR/PLS OXIMETRY MLT: CPT

## 2022-06-13 PROCEDURE — 25000242 PHARM REV CODE 250 ALT 637 W/ HCPCS: Performed by: INTERNAL MEDICINE

## 2022-06-13 PROCEDURE — 25000003 PHARM REV CODE 250: Performed by: NURSE PRACTITIONER

## 2022-06-13 RX ORDER — DEXTROSE MONOHYDRATE AND SODIUM CHLORIDE 5; .9 G/100ML; G/100ML
INJECTION, SOLUTION INTRAVENOUS CONTINUOUS
Status: DISCONTINUED | OUTPATIENT
Start: 2022-06-13 | End: 2022-06-16

## 2022-06-13 RX ORDER — LIDOCAINE HYDROCHLORIDE 40 MG/ML
4 SOLUTION TOPICAL
Status: DISCONTINUED | OUTPATIENT
Start: 2022-06-14 | End: 2022-06-24 | Stop reason: HOSPADM

## 2022-06-13 RX ORDER — LIDOCAINE HYDROCHLORIDE 20 MG/ML
15 SOLUTION OROPHARYNGEAL ONCE
Status: DISCONTINUED | OUTPATIENT
Start: 2022-06-14 | End: 2022-06-16

## 2022-06-13 RX ADMIN — IPRATROPIUM BROMIDE AND ALBUTEROL SULFATE 3 ML: 2.5; .5 SOLUTION RESPIRATORY (INHALATION) at 04:06

## 2022-06-13 RX ADMIN — DEXTROSE AND SODIUM CHLORIDE: 5; 900 INJECTION, SOLUTION INTRAVENOUS at 06:06

## 2022-06-13 RX ADMIN — IPRATROPIUM BROMIDE AND ALBUTEROL SULFATE 3 ML: 2.5; .5 SOLUTION RESPIRATORY (INHALATION) at 11:06

## 2022-06-13 RX ADMIN — IPRATROPIUM BROMIDE AND ALBUTEROL SULFATE 3 ML: 2.5; .5 SOLUTION RESPIRATORY (INHALATION) at 08:06

## 2022-06-13 RX ADMIN — IPRATROPIUM BROMIDE AND ALBUTEROL SULFATE 3 ML: 2.5; .5 SOLUTION RESPIRATORY (INHALATION) at 07:06

## 2022-06-13 RX ADMIN — PANTOPRAZOLE SODIUM 40 MG: 40 INJECTION, POWDER, LYOPHILIZED, FOR SOLUTION INTRAVENOUS at 08:06

## 2022-06-13 RX ADMIN — DIPHENHYDRAMINE HYDROCHLORIDE 25 MG: 50 INJECTION, SOLUTION INTRAMUSCULAR; INTRAVENOUS at 09:06

## 2022-06-13 RX ADMIN — IPRATROPIUM BROMIDE AND ALBUTEROL SULFATE 3 ML: 2.5; .5 SOLUTION RESPIRATORY (INHALATION) at 12:06

## 2022-06-13 RX ADMIN — NICOTINE 1 PATCH: 21 PATCH, EXTENDED RELEASE TRANSDERMAL at 08:06

## 2022-06-13 NOTE — PT/OT/SLP PROGRESS
Speech Language Pathology Evaluation  One Way Speaking Valve Evaluation    Patient Name:  Josafat Boudreaux   MRN:  [id number]  Admitting Diagnosis: Vocal cord mass    IMPORTANT  CAUTION: CUFF MUST ALWAYS BE DEFLATED WHEN VALVE IN USE    Recommendations:                 General Recommendations:  GI evaluation and Dysphagia therapy  Diet recommendations:  NPO, NPO   General Precautions: Standard, aspiration  Communication strategies:  go to room if call light pushed and white board in room    History:     Past Medical History:   Diagnosis Date    COPD (chronic obstructive pulmonary disease)     Hypertension        Past Surgical History:   Procedure Laterality Date    HIP SURGERY      HUMERUS FRACTURE SURGERY      TRACHEOSTOMY N/A 6/10/2022    Procedure: CREATION, TRACHEOSTOMY;  Surgeon: Junior Alonso MD;  Location: [facility];  Service: ENT;  Laterality: N/A;       Social History: Patient lives with wife.    Modified Barium Swallow 6/12    Subjective     Patient awake, alert and cooperative.    Patient goals: talk/eat/drink     Objective:     Secretion Management:  ·  Nursing in to provide suction via tracheostomy    Pain/Comfort:  Pain Rating 1: 0/10    Respiratory Status: 10 L/min via trach collar    Barriers to Learning: none    Oral Musculature Evaluation  Oral Musculature: WFL  Dentition: present and adequate  Secretion Management: problems swallowing secretions    Trach/Vent:  · Tracheostomy Type  · Shiley  · Tracheostomy Size: 8.0  · Tolerates cuff deflated: No  · If not, were there changes in vitals signs or signs of discomfort: increased cough and discomfort, vitals stable  · Speaking valve NOT placed    Pt participate in dysphagia tx.  Pt tolerated thermal stimulation to the anterior faucial pillars x10 with 100% swallow responses.  Laryngeal excursion limited.  Delay varied 3-4 seconds.  Pt completed the Pam Maneuver x5.    Assessment:     Pt presents with impaired verbal communication secondary  to tracheostomy for laryngeal mass and continues to present with dysphagia and remains unsafe for PO intake.    Plan:     · Patient to be seen:  5 x/week   · Plan of Care expires:  07/03/22  · Plan of Care reviewed with:  patient   · SLP Follow-Up:  Yes       Discharge recommendations:  home health speech therapy   Barriers to Discharge:  Severity of impairment    Time Tracking:     SLP Treatment Date:   06/13/22  Speech Start Time:  1030  Speech Stop Time:  1055     Speech Total Time (min):  25 min  Billable minutes: Evaluation Use/Fit Voice Prosthetic Device, 10 minutes  Treatment of Swallow Dysfunction, 15 minutes       06/13/2022

## 2022-06-13 NOTE — PROGRESS NOTES
[facility] - 7th Floor ICU  Adult Nutrition  Progress Note    SUMMARY       Recommendations    Recommendation/Intervention: Once PEG placed and tube feeding to start, consider: Diabetisource AC @ 80ml/hr (goal rate) -Noted no hx of DM, more closely meets protein needs.    Assessment and Plan    Nutrition Problem  Inadequate Oral Intake    Related to (etiology):   Current condition    Signs and Symptoms (as evidenced by):   NPO    Interventions(treatment strategy):  Modified composition of enteral nutrition, Modified rate of enteral nutrition and Collaboration with other providers    Nutrition Diagnosis Status:   New      Goals: Provide adequate nutrition to meet est needs.  Nutrition Goal Status: new  Communication of RD Recs: reviewed with RN    Malnutrition Assessment  Malnutrition Type: other (see comments) (does not meet criteria)  Weight Loss (Malnutrition): other (see comments) (unable to eval)  Energy Intake (Malnutrition): other (see comments) (unable to eval)  Subcutaneous Fat (Malnutrition): other (see comments) (does not meet criteria)  Muscle Mass (Malnutrition): mild depletion  Fluid Accumulation (Malnutrition): other (see comments) (does not meet criteria)  Hand  Strength, Left (Malnutrition): unable to eval  Hand  Strength, Right (Malnutrition): unable to eval   [place] (Muscle Loss): mild depletion  Clavicle Bone Region (Muscle Loss): mild depletion     Reason for Assessment    Reason For Assessment: other (see comments) (Plans for PEG placement)  Diagnosis: other (see comments) (Acute hypoxemic respiratory failure s/p emergent tracheostomy  2.4 cm supraglottic mass with additional metastatic appearing lymph nodes  Acute hyponatremia)  Relevant Medical History: COPD  General Information Comments:   6/13/22: Pt with recent trach. To remain NPO per SLP. Plans for PEG placement per RN. Will provide tube feeding recommendations for when appropriate to start. No kcal from  "meds.    Nutrition Risk Screen    Nutrition Risk Screen: tube feeding or parenteral nutrition    Anthropometrics    Temp: 98.8 °F (37.1 °C)  Height: 5' 9.02" (175.3 cm)  Height (inches): 69.02 in  Weight Method: Stated  Weight: 68 kg (150 lb)  Weight (lb): 150 lb  Ideal Body Weight (IBW), Male: 160.12 lb  BMI Grade: 18.5-24.9 - normal    Lab/Procedures/Meds    Pertinent Labs Reviewed: reviewed  Pertinent Labs Comments: 6/13/22 Na 135, Glu 64  Pertinent Medications Reviewed: reviewed  Pertinent Medications Comments: D5-1/2NS @ 75ml/hr    Estimated/Assessed Needs    Weight Used For Calorie Calculations: 68 kg (149 lb 14.6 oz)  Energy Calorie Requirements (kcal): 1950kcal  Energy Need Method: [place]-St Jeor (1.3 stress factor)  Protein Requirements: 95gm  Weight Used For Protein Calculations: 68 kg (149 lb 14.6 oz)  Fluid Requirements (mL): 1950ml  RDA Method (mL): 1950    Nutrition Prescription Ordered    Current Diet Order: NPO    Evaluation of Received Nutrient/Fluid Intake    Energy Calories Required: not meeting needs  Protein Required: not meeting needs  % Intake of Estimated Energy Needs: 0 - 25 %  % Meal Intake: NPO    Nutrition Risk    Level of Risk/Frequency of Follow-up: high     Monitor and Evaluation    Food and Nutrient Intake: energy intake     Nutrition Follow-Up    RD Follow-up?: Yes    "

## 2022-06-13 NOTE — CONSULTS
[facility] - 7th Floor ICU  Pulmonary Critical Care Note    Patient Name: Josafat Boudreaux  MRN: [id number]  Admission Date: 6/10/2022  Hospital Length of Stay: 3 days  Code Status: Full Code  Attending Provider: Charanjit Hancock MD  Primary Care Provider: Tony Bertrand MD     Subjective:     HPI:   This is a 56-year-old male who presented to [facility] 6/10/22 via [facility] due to shortness of breath. According to the wife, Mr. Boudreaux woke up this morning severely short of breath. She said she checked his pulse ox which read as 30% and stated he was struggling to breath. Of note, per the wife he had a history voice hoarseness since November 2021 but had this never investigated until 6/6/2022. 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    Hospital Course/Significant events:  ENT was consulted and he was taken to the OR for emergent tracheostomy.  He was removed from mechanical ventilation placed on trach collar on 06/11.  He was transferred to Hospital Medicine on 06/12/2022     24 Hour Interval History:  He remains stable on trach collar at 40% FiO2.  He was evaluated by speech yesterday, demonstrated moderate/severe pharyngeal dysphagia with silent aspiration, remains NPO.  Pulmonology is being consulted for bronchoscopy to evaluate mediastinal adenopathy.    Past Medical History:   Diagnosis Date    COPD (chronic obstructive pulmonary disease)     Hypertension        Social History     Socioeconomic History    Marital status:    Tobacco Use    Smoking status: Current Every Day Smoker     Types: Cigarettes    Smokeless tobacco: Never Used   Substance and Sexual Activity    Alcohol use: Yes    Drug use: Never           Objective:     Current Outpatient Medications   Medication Instructions    aspirin (ECOTRIN) 81 mg, Oral, Daily    calcium-vitamin D3 (OS-CARMELO 500 + D3) 500 mg-5 mcg (200 unit) per tablet 1 tablet, Oral,  2 times daily with meals    multivitamin capsule 1 capsule, Oral, Daily       Current Inpatient Medications   albuterol-ipratropium  3 mL Nebulization Q4H    enoxaparin  40 mg Subcutaneous Daily    nicotine  1 patch Transdermal Daily    pantoprazole  40 mg Intravenous Daily    sodium zirconium cyclosilicate  5 g Oral Once           Intake/Output Summary (Last 24 hours) at 6/13/2022 1046  Last data filed at 6/13/2022 0600  Gross per 24 hour   Intake 1692 ml   Output 1400 ml   Net 292 ml           Vital Signs (Most Recent):  Temp: 98.8 °F (37.1 °C) (06/13/22 0400)  Pulse: 96 (06/13/22 0739)  Resp: 20 (06/13/22 0739)  BP: 127/85 (06/13/22 0600)  SpO2: (!) 94 % (06/13/22 0739)  Body mass index is 22.14 kg/m².  Weight: 68 kg (150 lb) Vital Signs (24h Range):  Temp:  [97.7 °F (36.5 °C)-99 °F (37.2 °C)] 98.8 °F (37.1 °C)  Pulse:  [] 96  Resp:  [10-29] 20  SpO2:  [92 %-100 %] 94 %  BP: (111-156)/(69-85) 127/85     Physical Exam  Constitutional:       Appearance: Normal appearance.   HENT:      Head: Normocephalic and atraumatic.      Mouth/Throat:      Comments: Tracheostomy present  Cardiovascular:      Rate and Rhythm: Normal rate and regular rhythm.   Pulmonary:      Effort: Pulmonary effort is normal.      Breath sounds: Normal breath sounds.   Abdominal:      General: Abdomen is flat.      Palpations: Abdomen is soft.   Neurological:      General: No focal deficit present.      Mental Status: He is alert and oriented to person, place, and time.           Mechanical ventilation support:  Vent Mode: (S) Standby (06/11/22 1820)  Set Rate: 6 BPM (weaned to 6) (06/11/22 1241)  Vt Set: 500 mL (06/11/22 1232)  Pressure Support: 10 cmH20 (06/11/22 1620)  PEEP/CPAP: 5 cmH20 (06/11/22 1620)  Oxygen Concentration (%): 40 (06/13/22 0739)  Peak Airway Pressure: 196 cmH2O (06/11/22 1620)  Total Ve: 7.8 mL (06/11/22 1620)  F/VT Ratio<105 (RSBI): (!) 28.93 (06/11/22 1620)    Lines/Drains/Airways     Airway  Duration                 Airway - Non-Surgical 06/10/22 1331 Endotracheal Tube 2 days         Surgical Airway 06/10/22 1354 Shiley Cuffed 2 days          Arterial Line  Duration           Arterial Line 06/10/22 1405 Right Radial 2 days          Peripheral Intravenous Line  Duration                Peripheral IV - Single Lumen 06/10/22 0911 20 G Right Antecubital 3 days         Peripheral IV - Single Lumen 06/10/22 1325 20 G Left Hand 2 days                Significant Labs:    Lab Results   Component Value Date    WBC 10.7 06/13/2022    HGB 16.0 06/13/2022    HCT 51.8 06/13/2022    .3 (H) 06/13/2022     06/13/2022         BMP  Lab Results   Component Value Date     (L) 06/13/2022    K 4.0 06/13/2022    CO2 28 06/13/2022    BUN 5.3 (L) 06/13/2022    CREATININE 0.57 (L) 06/13/2022    CALCIUM 8.9 06/13/2022    EGFRNONAA >60 06/13/2022       ABG  Recent Labs   Lab 06/10/22  1810   PH 7.53*   PO2 129*   PCO2 42   HCO3 35.1           Significant Imaging:  I have reviewed all pertinent imaging within the past 24 hours.        Assessment/Plan:     Assessment    1. 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 Tobacco abuse   3. H/o Alcohol abuse  4. Hyponatremia    Plan  Patient is slated for laryngectomy on 07/05/2022.  ENT would like bronchoscopy due to mediastinal adenopathy, PEG tube, and PET scan prior to surgery.  Continue supplemental oxygen, wean as tolerated.  Current be on trach collar at 40%  Will discuss with MD regarding possible bronchoscopy this week; patient may warrant CT of thorax, it does not appear this has been previously done    DVT Prophylaxis: lovenox  GI Prophylaxis:  Protonix          OCTAVIO Perales  Pulmonary Critical Care Medicine  [facility] - 7th Floor ICU

## 2022-06-13 NOTE — H&P
[facility] Hospital Medicine History & Physical Examination       Patient Name: Josafat Boudreaux  MRN: [id number]  Patient Class: IP- Inpatient   Admission Date: 6/10/2022  8:59 AM  Length of Stay: 2  Admitting Service: Hospital Medicine   Attending Physician: Nicolas Maria MD   Primary Care Provider: Tony Bertrand MD  History source: EMR, patient and/or patient's family    CHIEF COMPLAINT   Shortness of Breath (SOB, mass on vocal chords dx x1 week ago. Scheduled trach 7/6/2022. Hx COPD. Wheezing present)      HISTORY OF PRESENT ILLNESS:   Mr. Boudreaux is currently s/p tracheostomy with dysphagia, so most of the information is gathered from the medical record.  He is a 56 year old male who presented to the ED on 6/10/22 via [facility] with c/o SOB.  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  He had a trach scheduled for 7/6/2022.  Per ER MD note, the patient's wife states that his RA O2 was down to the 40s when she called EMS.  During his ED stay, he was placed on BiPap, but his CO2 continued to increase, and ENT was consulted for emergent trach and he was admitted to ICU.  He was extubated on 6/11, and is tolerating trach collar well.  Today's ED lab work was notable for Na 128, improved from previous.  He was deemed appropriate to transfer to the floor and he was admitted to hospital medicine for further management with ENT to continue to follow.    PAST MEDICAL HISTORY:     COPD  Tobacco use    PAST SURGICAL HISTORY:     Right hip replacement  Right humerus ORIF    ALLERGIES:   Patient has no known allergies.    FAMILY HISTORY:   Reviewed and non-contributory     SOCIAL HISTORY:     Social History     Tobacco Use    Smoking status: Current Every Day Smoker     Types: Cigarettes     Smokeless tobacco: Never Used   Substance Use Topics    Alcohol use: Yes        HOME MEDICATIONS:     Prior to Admission medications    Medication Sig Start Date End Date Taking? Authorizing Provider   aspirin (ECOTRIN) 81 MG EC tablet Take 81 mg by mouth once daily.   Yes Historical Provider   calcium-vitamin D3 (OS-CARMELO 500 + D3) 500 mg-5 mcg (200 unit) per tablet Take 1 tablet by mouth 2 (two) times daily with meals.   Yes Historical Provider   multivitamin capsule Take 1 capsule by mouth once daily.   Yes Historical Provider       REVIEW OF SYSTEMS:   Except as documented, all other systems reviewed and negative     PHYSICAL EXAM:   T 99 °F (37.2 °C)   /73   P 105   RR 19   O2 (!) 94 %  GENERAL: awake, alert, oriented and in no acute distress, non-toxic appearing   HEENT: normocephalic atraumatic. Tracheostomy present  NECK: supple   LUNGS: Clear bilaterally, no wheezing or rales, no accessory muscle use   CVS: Regular rate and rhythm, normal peripheral perfusion  ABD: Soft, non-tender, non-distended, bowel sounds present  EXTREMITIES: no clubbing or cyanosis  SKIN: Warm, dry.   NEURO: alert and oriented, grossly without focal deficits   PSYCHIATRIC: Cooperative    LABS AND IMAGING:     Recent Labs     06/10/22  0923 06/12/22  0632   WBC 7.5 9.8   RBC 5.48 4.91   HGB 18.1* 15.9   HCT 57.6* 49.7   .1* 101.2*   MCH 33.0* 32.4*   MCHC 31.4* 32.0*   RDW 14.3 14.7    153     No results for input(s): LACTIC in the last 72 hours.  Recent Labs     06/10/22  0923   D-DIMER 3.59*     No results for input(s): HGBA1C, CHOL, TRIG, LDL, VLDL, HDL in the last 72 hours.   Recent Labs     06/10/22  0923 06/10/22  1109 06/11/22  0457 06/12/22  0632   *   < > 126* 128*   K 6.4*   < > 5.2* 4.5   CHLORIDE 75*   < > 82* 92*   CO2 37*   < > 32* 32*   BUN 10.2   < > 10.4 6.9*   CREATININE 0.77   < > 0.74 0.60*   GLUCOSE 110*   < > 101* 79   CALCIUM 9.3   < > 8.6 8.4   MG  --   --   --  2.00   PHOS  --   --    --  3.6   ALBUMIN 3.6  --   --  2.6*   GLOBULIN 4.2*  --   --  2.9   ALKPHOS 121  --   --  77   ALT 45  --   --  16   AST 53*  --   --  15   BILITOT 0.6  --   --  0.8    < > = values in this interval not displayed.     Recent Labs     06/10/22  0923   .7*   TROPONINI 0.016          Fl Modified Barium Swallow Speech  See Speech Therapist Notes    This procedure was auto-finalized.      ASSESSMENT & PLAN:     1.  Acute hypoxemic respiratory failure s/p emergent tracheostomy  2.  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  Acute hyponatremia    Hx:COPD, tobacco use    PLAN:  -Continue O2 delivery per trach collar  -ENT continuing to follow  -Continue IVF hydration  -Pain control  -Duonebs as needed  -Labs in AM      I, Sirisha Giordano NP have reviewed and discussed this case with Dr. Maria.  Please see addendum for further assessment and plan from attending MD.    DVT prophylaxis: Lovenox  Code status: Full       __________________________________________________________________  I, Dr. Nicolas Maria assumed care of this patient.  For the patient encounter, I performed the substantive portion of the visit, I reviewed the NPPA documentation, treatment plan, and medical decision making.  I had face to face time with this patient.  I have personally reviewed the labs and test results that are presently available. I have reviewed or attempted to review medical records based upon their availability. If patient was admitted under observational status it is with my approval.      Patient is a 56-year-old male with recently diagnosed laryngeal mass, who presented in acute respiratory distress requiring emergent tracheostomy due to extrinsic compression on 06/10/2022.  ENT is following, performed a tracheostomy, and plans to biopsy the laryngeal mass or pathological lymph nodes for diagnostic evaluation soon.  He did have a elevated D-dimer on admission so will obtain a V/Q scan  since he is still requiring oxygen supplementation through his tracheostomy, as well as bilateral lower extremity venous ultrasounds.  He still has not passed his swallow evaluation, and is yet to be determined if he will ultimately require PEG tube placement.  Continuing daily speech therapy evals.  Add dextrose to IV fluids.    Time seen: 11PM   Nicolas Maria MD

## 2022-06-13 NOTE — PROGRESS NOTES
[facility]  Hospital Medicine Progress Note        Chief Complaint: Inpatient Follow-up for respiratory failure    HPI:   56 year old  s/p tracheostomy with dysphagia, so most of the information is gathered from the medical record.  He is a 56 year old male who presented to the ED on 6/10/22 via [facility] with c/o SOB.  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  He had a trach scheduled for 7/6/2022.  Per ER MD note, the patient's wife states that his RA O2 was down to the 40s when she called EMS.  During his ED stay, he was placed on BiPap, but his CO2 continued to increase, and ENT was consulted for emergent trach and he was admitted to ICU.  He was extubated on 6/11, and is tolerating trach collar well.  Today's ED lab work was notable for Na 128, improved from previous.  He was deemed appropriate to transfer to the floor and he was admitted to hospital medicine for further management with ENT to continue to follow.    Interval Hx:  Patient remains stable with trach collar. 94% oxygen saturation at 40% concentration. NPO for now, failed swallow study.  More awake today.     Objective/physical exam:  Gen: NC AT, Awake, alert, and oriented x4  HEENT: PERRL EOMI normal conjunctiva, neck supple +Trach  Cardiac: Regular rhythm and rate, no murmur, rubs, or gallops  Respiratory: Clear to auscultation bilaterally. No wheezes, rales, or crackles  Gastrointestinal: soft, nondistended, nontender, +bowel sounds  Musculoskeletal: Normal ROM, no pertinent deformities  Neuro: no focal deficits noted, speech clear  Psych: appropriate mood/affect      VITAL SIGNS: 24 HRS MIN & MAX LAST   Temp  Min: 97.7 °F (36.5 °C)  Max: 99 °F (37.2 °C) 98.8 °F (37.1 °C)   BP  Min: 111/72  Max: 156/84 127/85   Pulse  Min: 89  Max:  112  96   Resp  Min: 10  Max: 29 20   SpO2  Min: 92 %  Max: 100 % (!) 94 %       Recent Labs   Lab 06/10/22  0922   WBC 7.5 9.8 10.7   RBC 5.48 4.91 4.92   HGB 18.1* 15.9 16.0   HCT 57.6* 49.7 51.8   .1* 101.2* 105.3*   MCH 33.0* 32.4* 32.5*   MCHC 31.4* 32.0* 30.9*   RDW 14.3 14.7 14.5    153 151   MPV 10.6 9.9 9.7       Recent Labs   Lab 06/10/22  0923 06/10/22  0925 06/10/22  1256 06/10/22  1632 06/10/22  1810 06/10/22  1945 227 22   *   < >  --   --   --    < > 126* 128* 135*   K 6.4*   < >  --   --   --    < > 5.2* 4.5 4.0   CO2 37*   < >  --   --   --    < > 32* 32* 28   BUN 10.2   < >  --   --   --    < > 10.4 6.9* 5.3*   CREATININE 0.77   < >  --   --   --    < > 0.74 0.60* 0.57*   CALCIUM 9.3   < >  --   --   --    < > 8.6 8.4 8.9   PH  --    < > 7.10* 7.51* 7.53*  --   --   --   --    MG  --   --   --   --   --   --   --  2.00  --    ALBUMIN 3.6  --   --   --   --   --   --  2.6* 2.4*   ALKPHOS 121  --   --   --   --   --   --  77 74   ALT 45  --   --   --   --   --   --  16 13   AST 53*  --   --   --   --   --   --  15 14   BILITOT 0.6  --   --   --   --   --   --  0.8 0.8    < > = values in this interval not displayed.          Microbiology Results (last 7 days)     ** No results found for the last 168 hours. 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  Speech therapy to re-evaluate today.  Oxygen saturation stable on trach collar.  Otherwise labs and vitals are stable.  Continue p.r.n. analgesia as needed.  Supraglottic mass to be biopsied by ENT    Patient condition:  Stable    Anticipated discharge and Disposition: TBD      All diagnosis and differential diagnosis have been reviewed; assessment and plan has been documented; I have personally reviewed the labs and test results that are presently available; I have reviewed the patients medication list; I have reviewed the consulting providers response and recommendations. I have reviewed or attempted to review medical records based upon their availability    All of the patient's questions have been  addressed and answered. Patient's is agreeable to the above stated plan. I will continue to monitor closely and make adjustments to medical management as needed.  _____________________________________________________________________      Radiology:  X-Ray Chest 1 View  Narrative: EXAMINATION:  XR CHEST 1 VIEW    CLINICAL HISTORY:  V/Q scan;    TECHNIQUE:  AP chest    COMPARISON:  Chest x-ray dated 06/10/2022    FINDINGS:  A tracheostomy is in place. There is increased opacity in the right upper lung with likely layering right pleural effusion. There is also a small left basilar pleural effusion. There is no definite visible pneumothorax.  Impression: Small bilateral pleural effusions with increased airspace opacity in the right upper lung.    Electronically signed by: Gela Liao  Date:    06/13/2022  Time:    06:57      Charanjit Hancock MD   06/13/2022

## 2022-06-14 ENCOUNTER — ANESTHESIA EVENT (OUTPATIENT)
Dept: ENDOSCOPY | Facility: HOSPITAL | Age: 57
DRG: 004 | End: 2022-06-14
Payer: MEDICARE

## 2022-06-14 ENCOUNTER — ANESTHESIA (OUTPATIENT)
Dept: ENDOSCOPY | Facility: HOSPITAL | Age: 57
DRG: 004 | End: 2022-06-14
Payer: MEDICARE

## 2022-06-14 PROCEDURE — 27201423 OPTIME MED/SURG SUP & DEVICES STERILE SUPPLY: Performed by: INTERNAL MEDICINE

## 2022-06-14 PROCEDURE — 63600175 PHARM REV CODE 636 W HCPCS: Performed by: INTERNAL MEDICINE

## 2022-06-14 PROCEDURE — 25000003 PHARM REV CODE 250: Performed by: NURSE ANESTHETIST, CERTIFIED REGISTERED

## 2022-06-14 PROCEDURE — 27000221 HC OXYGEN, UP TO 24 HOURS

## 2022-06-14 PROCEDURE — 37000009 HC ANESTHESIA EA ADD 15 MINS: Performed by: INTERNAL MEDICINE

## 2022-06-14 PROCEDURE — 31629 BRONCHOSCOPY/NEEDLE BX EACH: CPT

## 2022-06-14 PROCEDURE — 20000000 HC ICU ROOM

## 2022-06-14 PROCEDURE — 37000008 HC ANESTHESIA 1ST 15 MINUTES: Performed by: INTERNAL MEDICINE

## 2022-06-14 PROCEDURE — 94640 AIRWAY INHALATION TREATMENT: CPT

## 2022-06-14 PROCEDURE — 25000003 PHARM REV CODE 250: Performed by: NURSE PRACTITIONER

## 2022-06-14 PROCEDURE — 63600175 PHARM REV CODE 636 W HCPCS: Performed by: NURSE PRACTITIONER

## 2022-06-14 PROCEDURE — 43246 EGD PLACE GASTROSTOMY TUBE: CPT | Performed by: INTERNAL MEDICINE

## 2022-06-14 PROCEDURE — 25000242 PHARM REV CODE 250 ALT 637 W/ HCPCS: Performed by: INTERNAL MEDICINE

## 2022-06-14 PROCEDURE — C9113 INJ PANTOPRAZOLE SODIUM, VIA: HCPCS | Performed by: NURSE PRACTITIONER

## 2022-06-14 PROCEDURE — 99900026 HC AIRWAY MAINTENANCE (STAT)

## 2022-06-14 PROCEDURE — 63600175 PHARM REV CODE 636 W HCPCS: Performed by: NURSE ANESTHETIST, CERTIFIED REGISTERED

## 2022-06-14 PROCEDURE — S4991 NICOTINE PATCH NONLEGEND: HCPCS | Performed by: NURSE PRACTITIONER

## 2022-06-14 PROCEDURE — 63600175 PHARM REV CODE 636 W HCPCS

## 2022-06-14 PROCEDURE — 63600175 PHARM REV CODE 636 W HCPCS: Performed by: STUDENT IN AN ORGANIZED HEALTH CARE EDUCATION/TRAINING PROGRAM

## 2022-06-14 PROCEDURE — 94761 N-INVAS EAR/PLS OXIMETRY MLT: CPT

## 2022-06-14 PROCEDURE — 99900025 HC BRONCHOSCOPY-ASST (STAT)

## 2022-06-14 PROCEDURE — 27202055 HC BRONCHOSCOPE, DISP

## 2022-06-14 RX ORDER — MIDAZOLAM HYDROCHLORIDE 1 MG/ML
INJECTION INTRAMUSCULAR; INTRAVENOUS
Status: COMPLETED
Start: 2022-06-14 | End: 2022-06-14

## 2022-06-14 RX ORDER — GLYCOPYRROLATE 0.2 MG/ML
INJECTION INTRAMUSCULAR; INTRAVENOUS
Status: DISCONTINUED | OUTPATIENT
Start: 2022-06-14 | End: 2022-06-14

## 2022-06-14 RX ORDER — GLYCOPYRROLATE 0.2 MG/ML
INJECTION INTRAMUSCULAR; INTRAVENOUS
Status: COMPLETED
Start: 2022-06-14 | End: 2022-06-14

## 2022-06-14 RX ORDER — PROPOFOL 10 MG/ML
VIAL (ML) INTRAVENOUS
Status: COMPLETED
Start: 2022-06-14 | End: 2022-06-14

## 2022-06-14 RX ORDER — CEFAZOLIN SODIUM 1 G/3ML
INJECTION, POWDER, FOR SOLUTION INTRAMUSCULAR; INTRAVENOUS
Status: DISCONTINUED | OUTPATIENT
Start: 2022-06-14 | End: 2022-06-14

## 2022-06-14 RX ORDER — FENTANYL CITRATE 50 UG/ML
INJECTION, SOLUTION INTRAMUSCULAR; INTRAVENOUS
Status: COMPLETED
Start: 2022-06-14 | End: 2022-06-14

## 2022-06-14 RX ORDER — LIDOCAINE HYDROCHLORIDE 10 MG/ML
INJECTION, SOLUTION EPIDURAL; INFILTRATION; INTRACAUDAL; PERINEURAL
Status: DISPENSED
Start: 2022-06-14 | End: 2022-06-15

## 2022-06-14 RX ORDER — CEFAZOLIN SODIUM 1 G/3ML
INJECTION, POWDER, FOR SOLUTION INTRAMUSCULAR; INTRAVENOUS
Status: COMPLETED
Start: 2022-06-14 | End: 2022-06-14

## 2022-06-14 RX ORDER — PROPOFOL 10 MG/ML
INJECTION, EMULSION INTRAVENOUS CONTINUOUS PRN
Status: DISCONTINUED | OUTPATIENT
Start: 2022-06-14 | End: 2022-06-14

## 2022-06-14 RX ADMIN — FENTANYL CITRATE 150 MCG: 50 INJECTION, SOLUTION INTRAMUSCULAR; INTRAVENOUS at 08:06

## 2022-06-14 RX ADMIN — PANTOPRAZOLE SODIUM 40 MG: 40 INJECTION, POWDER, LYOPHILIZED, FOR SOLUTION INTRAVENOUS at 09:06

## 2022-06-14 RX ADMIN — PROPOFOL 100 MCG/KG/MIN: 10 INJECTION, EMULSION INTRAVENOUS at 01:06

## 2022-06-14 RX ADMIN — IPRATROPIUM BROMIDE AND ALBUTEROL SULFATE 3 ML: 2.5; .5 SOLUTION RESPIRATORY (INHALATION) at 08:06

## 2022-06-14 RX ADMIN — GLYCOPYRROLATE 0.2 MG: 0.2 INJECTION INTRAMUSCULAR; INTRAVENOUS at 01:06

## 2022-06-14 RX ADMIN — CEFAZOLIN 1 G: 330 INJECTION, POWDER, FOR SOLUTION INTRAMUSCULAR; INTRAVENOUS at 01:06

## 2022-06-14 RX ADMIN — SODIUM CHLORIDE, SODIUM GLUCONATE, SODIUM ACETATE, POTASSIUM CHLORIDE AND MAGNESIUM CHLORIDE: 526; 502; 368; 37; 30 INJECTION, SOLUTION INTRAVENOUS at 01:06

## 2022-06-14 RX ADMIN — MIDAZOLAM HYDROCHLORIDE 5 MG: 1 INJECTION, SOLUTION INTRAMUSCULAR; INTRAVENOUS at 08:06

## 2022-06-14 RX ADMIN — NICOTINE 1 PATCH: 21 PATCH, EXTENDED RELEASE TRANSDERMAL at 09:06

## 2022-06-14 RX ADMIN — ENOXAPARIN SODIUM 40 MG: 40 INJECTION SUBCUTANEOUS at 04:06

## 2022-06-14 RX ADMIN — DIPHENHYDRAMINE HYDROCHLORIDE 25 MG: 50 INJECTION, SOLUTION INTRAMUSCULAR; INTRAVENOUS at 09:06

## 2022-06-14 RX ADMIN — IPRATROPIUM BROMIDE AND ALBUTEROL SULFATE 3 ML: 2.5; .5 SOLUTION RESPIRATORY (INHALATION) at 04:06

## 2022-06-14 RX ADMIN — DEXTROSE MONOHYDRATE 1 G: 5 INJECTION INTRAVENOUS at 01:06

## 2022-06-14 RX ADMIN — IPRATROPIUM BROMIDE AND ALBUTEROL SULFATE 3 ML: 2.5; .5 SOLUTION RESPIRATORY (INHALATION) at 12:06

## 2022-06-14 NOTE — ANESTHESIA PREPROCEDURE EVALUATION
06/14/2022  Mr. Boudreaux is currently s/p tracheostomy with dysphagia, so most of the information is gathered from the medical record.  He is a 56 year old male who presented to the ED on 6/10/22 via [facility] with c/o SOB.  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  He had a trach scheduled for 7/6/2022.  Per ER MD note, the patient's wife states that his RA O2 was down to the 40s when she called EMS.  During his ED stay, he was placed on BiPap, but his CO2 continued to increase, and ENT was consulted for emergent trach and he was admitted to ICU.  He was extubated on 6/11, and is tolerating trach collar well.  Today's ED lab work was notable for Na 128, improved from previous.  He was deemed appropriate to transfer to the floor and he was admitted to hospital medicine for further management with ENT to continue to follow.The history is provided by the patient and the EMS personnel.    PEG TUBE (N/A )    Pre-op Assessment    I have reviewed the Patient Summary Reports.     I have reviewed the Nursing Notes. I have reviewed the NPO Status.   I have reviewed the Medications.     Review of Systems  Anesthesia Hx:  No problems with previous Anesthesia  Denies Family Hx of Anesthesia complications.   Denies Personal Hx of Anesthesia complications.   Social:  Former Smoker    Hematology/Oncology:  Hematology Normal   Oncology Normal     EENT/Dental:   See PI   Cardiovascular:   Exercise tolerance: poor Denies Hypertension.  Denies CAD.     Denies Orthopnea.  Functional Capacity good / => 4 METS    Pulmonary:  Pulmonary Normal    Renal/:   Denies Chronic Renal Disease.     Hepatic/GI:  Hepatic/GI Normal     Musculoskeletal:  Musculoskeletal Normal    Endocrine:  Endocrine Normal  Denies Morbid Obesity / BMI > 40  Dermatological:  Skin Normal    Psych:  Psychiatric Normal           Physical Exam  General: Somnolent, Alert and Oriented    Airway:  Mallampati: III   Mouth Opening: Normal  TM Distance: Normal  Tongue: Normal  Neck ROM: Normal ROM  Pre-Existing Airway: Tracheostomy tube    Dental:  Intact    Chest/Lungs:  Clear to auscultation, Normal Respiratory Rate    Heart:  Rate: Normal  Rhythm: Regular Rhythm        Anesthesia Plan  Type of Anesthesia, risks & benefits discussed:    Anesthesia Type: Gen ETT, Gen Natural Airway  Intra-op Monitoring Plan: Standard ASA Monitors  Post Op Pain Control Plan: multimodal analgesia  Induction:  IV  Airway Plan: Direct  Informed Consent: Informed consent signed with the Patient and all parties understand the risks and agree with anesthesia plan.  All questions answered. Patient consented to blood products? Yes  ASA Score: 4  Day of Surgery Review of History & Physical: H&P Update referred to the surgeon/provider.    Ready For Surgery From Anesthesia Perspective.     .

## 2022-06-14 NOTE — PROCEDURES
[facility]  Bronchoscopy   Procedure Note    SUMMARY     Date of Procedure: 6/14/2022    Procedure: Endobronchial biopsy    Performed by: Bridger Jordan MD    Pre-Procedure Diagnosis: lung mass/neck mass    Post-Procedure Diagnosis: same but additionally endobronchial lesion at RUL    Anesthesia: Moderate Sedation        Description of the Findings of the Procedure:     Patient was consented for the procedure with all risk and benefit of the procedure explained in detail.  Patient was given the opportunity to ask questions and all concerns were answered.  The bronchocope was inserted into the main airway via the tracheotomy. An anatomical survey was done of the main airways and the subsegmental bronchus.      Tracheotomy appears in adequate positioning above malissa.  Patient had moderate thick white secretions throughout especially a proximal regions.  These airways were cleared.  Left lung evaluation showed no endobronchial lesions.  Right lung evaluation showed no endobronchial lesions at right middle lobe, right lower lobe, however patient did have an endobronchial lesion at right upper lobe takeoff.  This lesion was biopsied multiple times at bedside in the ICU.  Adequate tissue obtained.  Mild bleeding noted which stops subsequently with cold saline.  Patient tolerated procedure well.  Versed and fentanyl used for sedation at bedside.  No significant hypoxic episodes or hypotensive episodes noted during procedure.      Complications: None; patient tolerated the procedure well.    Estimated Blood Loss (EBL): Minimal           Specimens: Sent        Condition: Good        Disposition: ICU - extubated and stable.    Bridger Jordan MD  [facility]

## 2022-06-14 NOTE — PROGRESS NOTES
[facility]  Hospital Medicine Progress Note        Chief Complaint: Inpatient Follow-up for vocal cord mass    HPI:   56 year old  s/p tracheostomy with dysphagia, so most of the information is gathered from the medical record.  He is a 56 year old male who presented to the ED on 6/10/22 via [facility] with c/o SOB.  It appears that he had some hoarseness starting in November 2021 that kept getting progressively worse and was recently diagnosed with a 2.4 cm supraglottic mass with additional metastatic appearing lymph nodes. on his vocal cords.  He had a trach scheduled for 7/6/2022.  Per ER MD note, the patient's wife states that his RA O2 was down to the 40s when she called EMS.  During his ED stay, he was placed on BiPap, but his CO2 continued to increase, and ENT was consulted for emergent trach and he was admitted to ICU.  He was extubated on 6/11, and is tolerating trach collar well.  Today's ED lab work was notable for Na 128, improved from previous.  He was deemed appropriate to transfer to the floor and he was admitted to hospital medicine for further management with ENT to continue to follow.    Interval Hx:  Patient sitting up in bed watching TV.  He communicates with a white board.  Denies any current pain.    Objective/physical exam:  General: In no acute distress, afebrile  HEENT: PERRL EOMI normal conjunctiva, neck supple +Trach  Chest: Clear to auscultation bilaterally  Heart: RRR, +S1, S2, no appreciable murmur  Abdomen: Soft, nontender, BS +  MSK: Warm, no lower extremity edema, no clubbing or cyanosis  Neurologic: Alert and oriented x4, Cranial nerve II-XII intact, Strength 5/5 in all 4 extremities    VITAL SIGNS: 24 HRS MIN & MAX LAST   Temp  Min: 98 °F (36.7 °C)  Max: 98.8 °F (37.1 °C) 98 °F (36.7 °C)   BP  Min: 116/77  Max: 148/82 134/76   Pulse  Min: 95  Max: 117  98   Resp  Min: 0  Max: 29 (!) 6   SpO2  Min: 86 %  Max: 97 % (!) 90 %       Recent Labs   Lab  06/10/22  0923 06/12/22  0632 06/13/22  0229   WBC 7.5 9.8 10.7   RBC 5.48 4.91 4.92   HGB 18.1* 15.9 16.0   HCT 57.6* 49.7 51.8   .1* 101.2* 105.3*   MCH 33.0* 32.4* 32.5*   MCHC 31.4* 32.0* 30.9*   RDW 14.3 14.7 14.5    153 151   MPV 10.6 9.9 9.7       Recent Labs   Lab 06/10/22  0923 06/10/22  0925 06/10/22  1256 06/10/22  1632 06/10/22  1810 06/10/22  1945 227 22   *   < >  --   --   --    < > 126* 128* 135*   K 6.4*   < >  --   --   --    < > 5.2* 4.5 4.0   CO2 37*   < >  --   --   --    < > 32* 32* 28   BUN 10.2   < >  --   --   --    < > 10.4 6.9* 5.3*   CREATININE 0.77   < >  --   --   --    < > 0.74 0.60* 0.57*   CALCIUM 9.3   < >  --   --   --    < > 8.6 8.4 8.9   PH  --    < > 7.10* 7.51* 7.53*  --   --   --   --    MG  --   --   --   --   --   --   --  2.00  --    ALBUMIN 3.6  --   --   --   --   --   --  2.6* 2.4*   ALKPHOS 121  --   --   --   --   --   --  77 74   ALT 45  --   --   --   --   --   --  16 13   AST 53*  --   --   --   --   --   --  15 14   BILITOT 0.6  --   --   --   --   --   --  0.8 0.8    < > = values in this interval not displayed.          Microbiology Results (last 7 days)     ** No results found for the last 168 hours. **           See below for Radiology    Scheduled Med:   fentaNYL        midazolam        albuterol-ipratropium  3 mL Nebulization Q4H    ceFAZolin (ANCEF) IVPB  1 g Intravenous Once    enoxaparin  40 mg Subcutaneous Daily    LIDOcaine HCl 2%  15 mL Mucous Membrane Once    nicotine  1 patch Transdermal Daily    pantoprazole  40 mg Intravenous Daily    sodium zirconium cyclosilicate  5 g Oral Once        Continuous Infusions:   dextrose 5 % and 0.9 % NaCl 75 mL/hr at 22 1827        PRN Meds:  diphenhydrAMINE, [] fentaNYL **FOLLOWED BY** fentaNYL, LIDOcaine HCL 4%, lorazepam       Assessment/Plan:   Acute hypoxemic respiratory failure s/p emergent tracheostomy  2.4 cm supraglottic mass  with additional metastatic appearing lymph nodes  Acute hyponatremia  Hx:COPD, tobacco use     Continue trach care per ENT.  Pulmonary was consulted for possible EUS but unfortunately unable to pass the scope through trach.  Cardiovascular surgery has been consulted for potential mediastinoscopy with biopsy.  GI on board as well for PEG tube placement.  Otherwise his vitals are stable.  He is comfortable.  Continue p.r.n. analgesia     Patient condition:  Stable    Anticipated discharge and Disposition: TBD      All diagnosis and differential diagnosis have been reviewed; assessment and plan has been documented; I have personally reviewed the labs and test results that are presently available; I have reviewed the patients medication list; I have reviewed the consulting providers response and recommendations. I have reviewed or attempted to review medical records based upon their availability    All of the patient's questions have been  addressed and answered. Patient's is agreeable to the above stated plan. I will continue to monitor closely and make adjustments to medical management as needed.  _____________________________________________________________________      Radiology:  NM Lung Scan Ventilation Perfusion  Narrative: EXAMINATION:  NM LUNG VENTILATION AND PERFUSION IMAGING    CLINICAL HISTORY:  Pulmonary embolism (PE) suspected, positive D-dimer;    TECHNIQUE:  Ventilation and perfusion scans were performed in multiple projections. 37.4 mCi of Technetium-99m Pyrophosphate aerosol was used for ventilation scan and 5.5 mCi of Technitium-99m MAA was administered intravenously for the perfusion scan.    COMPARISON:  Chest radiograph June 13, 2022    FINDINGS:  Patient is on ventilator and ventilation images were not successful.    Perfusion images show unremarkable MAA activity within bilateral lungs.  There are no segmental or subsegmental perfusion defects  Impression: Very low probability for acute pulmonary  embolism.    Electronically signed by: Clay Campos  Date:    06/13/2022  Time:    10:12  X-Ray Chest 1 View  Narrative: EXAMINATION:  XR CHEST 1 VIEW    CLINICAL HISTORY:  V/Q scan;    TECHNIQUE:  AP chest    COMPARISON:  Chest x-ray dated 06/10/2022    FINDINGS:  A tracheostomy is in place. There is increased opacity in the right upper lung with likely layering right pleural effusion. There is also a small left basilar pleural effusion. There is no definite visible pneumothorax.  Impression: Small bilateral pleural effusions with increased airspace opacity in the right upper lung.    Electronically signed by: Gela Liao  Date:    06/13/2022  Time:    06:57      Charanjit Hanccok MD   06/14/2022

## 2022-06-14 NOTE — TRANSFER OF CARE
"Anesthesia Transfer of Care Note    Patient: Josafat Boudreaux    Procedure(s) Performed: Procedure(s) (LRB):  PEG TUBE (N/A)    Patient location: PACU    Anesthesia Type: general    Transport from OR: Transported from OR on room air with adequate spontaneous ventilation. Transported from OR on 2-3 L/min O2 by NC with adequate spontaneous ventilation    Post pain: adequate analgesia    Post assessment: no apparent anesthetic complications    Post vital signs: stable    Level of consciousness: awake    Nausea/Vomiting: no nausea/vomiting    Complications: none    Transfer of care protocol was followed      Last vitals:   Visit Vitals  /74 (BP Location: Left arm, Patient Position: Lying)   Pulse (!) 115   Temp 36.8 °C (98.2 °F) (Temporal)   Resp 19   Ht 5' 9.02" (1.753 m)   Wt 68 kg (150 lb)   SpO2 95%   BMI 22.14 kg/m²     "

## 2022-06-14 NOTE — PT/OT/SLP PROGRESS
Attempted swallow therapy; pt undergoing procedure at bedside with MD present.Attempted swallow therapy; pt undergoing procedure at bedside with MD present.

## 2022-06-14 NOTE — PROGRESS NOTES
[facility] - 7th Floor ICU  Pulmonary Critical Care Note    Patient Name: Josafat Boudreaux  MRN: [id number]  Admission Date: 6/10/2022  Hospital Length of Stay: 4 days  Code Status: Full Code  Attending Provider: Charanjit Hancock MD  Primary Care Provider: Tony Bertrand MD     Subjective:     HPI:   This is a 56-year-old male who presented to [facility] 6/10/22 via [facility] due to shortness of breath. According to the wife, Mr. Boudreaux woke up this morning severely short of breath. She said she checked his pulse ox which read as 30% and stated he was struggling to breath. Of note, per the wife he had a history voice hoarseness since November 2021 but had this never investigated until 6/6/2022. 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    Hospital Course/Significant events:  ENT was consulted and he was taken to the OR for emergent tracheostomy.  He was removed from mechanical ventilation placed on trach collar on 06/11.  He was transferred to Hospital Medicine on 06/12/2022     24 Hour Interval History:  Resting comfortably this morning on trach collar.  Still has significant cough and moderate mucus production.  Denies shortness of breath this morning.  Plans for possible PEG tube noted per Dr. Conrad.    Past Medical History:   Diagnosis Date    COPD (chronic obstructive pulmonary disease)     Hypertension        Social History     Socioeconomic History    Marital status:    Tobacco Use    Smoking status: Current Every Day Smoker     Types: Cigarettes    Smokeless tobacco: Never Used   Substance and Sexual Activity    Alcohol use: Yes    Drug use: Never           Objective:     Current Outpatient Medications   Medication Instructions    aspirin (ECOTRIN) 81 mg, Oral, Daily    calcium-vitamin D3 (OS-CARMELO 500 + D3) 500 mg-5 mcg (200 unit) per tablet 1 tablet, Oral, 2 times daily with meals    multivitamin capsule 1  capsule, Oral, Daily       Current Inpatient Medications   albuterol-ipratropium  3 mL Nebulization Q4H    ceFAZolin (ANCEF) IVPB  1 g Intravenous Once    enoxaparin  40 mg Subcutaneous Daily    LIDOcaine HCl 2%  15 mL Mucous Membrane Once    nicotine  1 patch Transdermal Daily    pantoprazole  40 mg Intravenous Daily    sodium zirconium cyclosilicate  5 g Oral Once           Intake/Output Summary (Last 24 hours) at 6/14/2022 0709  Last data filed at 6/14/2022 0600  Gross per 24 hour   Intake 999.25 ml   Output 950 ml   Net 49.25 ml         Vital Signs (Most Recent):  Temp: 98 °F (36.7 °C) (06/14/22 0400)  Pulse: 98 (06/14/22 0600)  Resp: (!) 6 (06/14/22 0600)  BP: 134/76 (06/14/22 0600)  SpO2: (!) 90 % (06/14/22 0600)  Body mass index is 22.14 kg/m².  Weight: 68 kg (150 lb) Vital Signs (24h Range):  Temp:  [98 °F (36.7 °C)-98.8 °F (37.1 °C)] 98 °F (36.7 °C)  Pulse:  [] 98  Resp:  [0-29] 6  SpO2:  [86 %-97 %] 90 %  BP: (116-148)/(74-92) 134/76     Physical Exam    Constitutional:       Appearance: Normal appearance.   HENT:      Head: Normocephalic and atraumatic.      Mouth/Throat:      Comments: Tracheostomy present  Cardiovascular:      Rate and Rhythm: Normal rate and regular rhythm.   Pulmonary:      Effort: Pulmonary effort is normal.      Breath sounds: Normal breath sounds.   Abdominal:      General: Abdomen is flat.      Palpations: Abdomen is soft.   Neurological:      General: No focal deficit present.      Mental Status: He is alert and oriented to person, place, and time.      Lines/Drains/Airways     Airway  Duration                Airway - Non-Surgical 06/10/22 1331 Endotracheal Tube 3 days         Surgical Airway 06/10/22 1354 Shiley Cuffed 3 days          Arterial Line  Duration           Arterial Line 06/10/22 1405 Right Radial 3 days          Peripheral Intravenous Line  Duration                Peripheral IV - Single Lumen 06/10/22 0911 20 G Right Antecubital 3 days         Peripheral  IV - Single Lumen 06/10/22 1325 20 G Left Hand 3 days                Significant Labs:    Lab Results   Component Value Date    WBC 10.7 06/13/2022    HGB 16.0 06/13/2022    HCT 51.8 06/13/2022    .3 (H) 06/13/2022     06/13/2022         BMP  Lab Results   Component Value Date     (L) 06/13/2022    K 4.0 06/13/2022    CO2 28 06/13/2022    BUN 5.3 (L) 06/13/2022    CREATININE 0.57 (L) 06/13/2022    CALCIUM 8.9 06/13/2022    EGFRNONAA >60 06/13/2022       ABG  Recent Labs   Lab 06/10/22  1810   PH 7.53*   PO2 129*   PCO2 42   HCO3 35.1           Significant Imaging:  I have reviewed all pertinent imaging within the past 24 hours.        Assessment/Plan:     Assessment  1. 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 Tobacco abuse   3. H/o Alcohol abuse  4. Hyponatremia    Plan  EBUS is not an option with patient's current tracheostomy tube.  These cope will not pass through this tracheostomy tube.  Performing through the vocal cords also not a good option given the location of tumors on his vocal cord.  I believe mediastinoscopy is the appropriate choice for biopsy of the mediastinal nodes.  I will consult CV surgery to evaluate.    DVT Prophylaxis:  Lovenox  GI Prophylaxis:  Protonix     Greater than 30 minutes of critical care was time spent personally by me on the following activities: development of treatment plan with patient or surrogate and bedside caregivers, discussions with consultants, evaluation of patient's response to treatment, examination of patient, ordering and performing treatments and interventions, ordering and review of laboratory studies, ordering and review of radiographic studies, pulse oximetry, re-evaluation of patient's condition.  This critical care time did not overlap with that of any other provider or involve time for any procedures.     JAVIER Tena MD  Pulmonary Critical Care Medicine  [facility] - 7th Floor ICU

## 2022-06-15 PROBLEM — R13.10 DYSPHAGIA: Status: ACTIVE | Noted: 2022-06-15

## 2022-06-15 PROCEDURE — 63600175 PHARM REV CODE 636 W HCPCS: Performed by: INTERNAL MEDICINE

## 2022-06-15 PROCEDURE — 94640 AIRWAY INHALATION TREATMENT: CPT

## 2022-06-15 PROCEDURE — 81001 URINALYSIS AUTO W/SCOPE: CPT | Performed by: INTERNAL MEDICINE

## 2022-06-15 PROCEDURE — C9113 INJ PANTOPRAZOLE SODIUM, VIA: HCPCS | Performed by: NURSE PRACTITIONER

## 2022-06-15 PROCEDURE — S4991 NICOTINE PATCH NONLEGEND: HCPCS | Performed by: NURSE PRACTITIONER

## 2022-06-15 PROCEDURE — 11000001 HC ACUTE MED/SURG PRIVATE ROOM

## 2022-06-15 PROCEDURE — 87040 BLOOD CULTURE FOR BACTERIA: CPT | Performed by: INTERNAL MEDICINE

## 2022-06-15 PROCEDURE — 25000242 PHARM REV CODE 250 ALT 637 W/ HCPCS: Performed by: INTERNAL MEDICINE

## 2022-06-15 PROCEDURE — 63600175 PHARM REV CODE 636 W HCPCS: Performed by: NURSE PRACTITIONER

## 2022-06-15 PROCEDURE — 99900026 HC AIRWAY MAINTENANCE (STAT)

## 2022-06-15 PROCEDURE — 25000003 PHARM REV CODE 250: Performed by: INTERNAL MEDICINE

## 2022-06-15 PROCEDURE — 87641 MR-STAPH DNA AMP PROBE: CPT | Performed by: INTERNAL MEDICINE

## 2022-06-15 PROCEDURE — 92526 ORAL FUNCTION THERAPY: CPT

## 2022-06-15 PROCEDURE — 36415 COLL VENOUS BLD VENIPUNCTURE: CPT | Performed by: INTERNAL MEDICINE

## 2022-06-15 PROCEDURE — 94761 N-INVAS EAR/PLS OXIMETRY MLT: CPT

## 2022-06-15 PROCEDURE — 25000003 PHARM REV CODE 250: Performed by: NURSE PRACTITIONER

## 2022-06-15 PROCEDURE — 63600175 PHARM REV CODE 636 W HCPCS: Performed by: STUDENT IN AN ORGANIZED HEALTH CARE EDUCATION/TRAINING PROGRAM

## 2022-06-15 PROCEDURE — 87077 CULTURE AEROBIC IDENTIFY: CPT | Performed by: INTERNAL MEDICINE

## 2022-06-15 PROCEDURE — 93005 ELECTROCARDIOGRAM TRACING: CPT

## 2022-06-15 PROCEDURE — 27000221 HC OXYGEN, UP TO 24 HOURS

## 2022-06-15 RX ORDER — ACETAMINOPHEN 650 MG/20.3ML
650 LIQUID ORAL ONCE
Status: COMPLETED | OUTPATIENT
Start: 2022-06-15 | End: 2022-06-15

## 2022-06-15 RX ORDER — LEVALBUTEROL 1.25 MG/.5ML
1.25 SOLUTION, CONCENTRATE RESPIRATORY (INHALATION) EVERY 8 HOURS
Status: DISCONTINUED | OUTPATIENT
Start: 2022-06-15 | End: 2022-06-24 | Stop reason: HOSPADM

## 2022-06-15 RX ADMIN — ACETAMINOPHEN 650 MG: 650 SOLUTION ORAL at 07:06

## 2022-06-15 RX ADMIN — BACITRACIN ZINC, NEOMYCIN SULFATE, AND POLYMYXIN B SULFATE: 400; 3.5; 5 OINTMENT TOPICAL at 08:06

## 2022-06-15 RX ADMIN — LORAZEPAM 0.5 MG: 2 INJECTION, SOLUTION INTRAMUSCULAR; INTRAVENOUS at 05:06

## 2022-06-15 RX ADMIN — IPRATROPIUM BROMIDE AND ALBUTEROL SULFATE 3 ML: 2.5; .5 SOLUTION RESPIRATORY (INHALATION) at 01:06

## 2022-06-15 RX ADMIN — PANTOPRAZOLE SODIUM 40 MG: 40 INJECTION, POWDER, LYOPHILIZED, FOR SOLUTION INTRAVENOUS at 08:06

## 2022-06-15 RX ADMIN — ENOXAPARIN SODIUM 40 MG: 40 INJECTION SUBCUTANEOUS at 05:06

## 2022-06-15 RX ADMIN — NICOTINE 1 PATCH: 21 PATCH, EXTENDED RELEASE TRANSDERMAL at 08:06

## 2022-06-15 RX ADMIN — DIPHENHYDRAMINE HYDROCHLORIDE 25 MG: 50 INJECTION, SOLUTION INTRAMUSCULAR; INTRAVENOUS at 09:06

## 2022-06-15 RX ADMIN — IPRATROPIUM BROMIDE AND ALBUTEROL SULFATE 3 ML: 2.5; .5 SOLUTION RESPIRATORY (INHALATION) at 04:06

## 2022-06-15 RX ADMIN — IPRATROPIUM BROMIDE AND ALBUTEROL SULFATE 3 ML: 2.5; .5 SOLUTION RESPIRATORY (INHALATION) at 08:06

## 2022-06-15 NOTE — ANESTHESIA POSTPROCEDURE EVALUATION
Anesthesia Post Evaluation    Patient: Josafat Boudreaux    Procedure(s) Performed: Procedure(s) (LRB):  PEG TUBE (N/A)    Final Anesthesia Type: general      Patient location during evaluation: PACU  Patient participation: Yes- Able to Participate  Level of consciousness: awake and alert and oriented  Post-procedure vital signs: reviewed and stable  Pain management: adequate  Airway patency: patent  STACEY mitigation strategies: Verification of full reversal of neuromuscular block  PONV status at discharge: No PONV  Anesthetic complications: no      Cardiovascular status: blood pressure returned to baseline and stable  Respiratory status: spontaneous ventilation and unassisted  Hydration status: euvolemic  Follow-up not needed.  Comments: [facility]          Vitals Value Taken Time   /78 06/15/22 1030   Temp 37.7 °C (99.9 °F) 06/15/22 1030   Pulse 121 06/15/22 1030   Resp 22 06/15/22 1011   SpO2 91 % 06/15/22 1030   Vitals shown include unvalidated device data.      No case tracking events are documented in the log.      Pain/Geetha Score: Pain Rating Prior to Med Admin: -- (procedural) (6/14/2022  8:45 AM)  Geetha Score: 9 (6/14/2022  2:28 PM)

## 2022-06-15 NOTE — SUBJECTIVE & OBJECTIVE
Subjective:     Interval History:    Initial History of Present Illness (HPI):  6/13/22: This is a 56 y.o. male  Who was brought to ER by EMS on 6/10/22 with shortness of breath. 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 He was scheduled for trach placement on 7/6/22. Emergency Trach was placed on day of admission. He is now on trach collar.  We are consulted for PEG placement.  ST evaluation revealed moderate/severe pharyngeal dysphagia with silent aspiration of pharyngeal residue across all trials.  No upper abdominal wall scars.  No fever. WBC WNL.  On low dose Lovenox. Platelet count WNL.     6/14/22: PEG tube placed without difficulty     6/15/22: Tape removed from PEG site. Gauze removed from PEG site. Site satisfactory without bleeding, erythema, or excoriation. Abdominal binder on and to remain on AAT. Patient denies pain at PEG site. Okay to use PEG tube for feedings and medications. Change all medications to liquid form when possible.     Patient c/o dry mouth and feeling thirsty. Due to the severe pharyngeal dysphagia and silent aspiration prior to PEG placement, will continue with orders for nothing by mouth.    Review of Systems   Constitutional:  Negative for chills, fever and unexpected weight change.   HENT:  Positive for trouble swallowing. Negative for mouth sores and sore throat.         Dry mouth   Eyes:  Negative for discharge and visual disturbance.   Respiratory:  Negative for apnea, cough, shortness of breath and wheezing.    Cardiovascular:  Negative for chest pain, palpitations and leg swelling.   Gastrointestinal:  Negative for abdominal distention, abdominal pain, blood in stool, nausea and vomiting.        Denies heartburn, nausea, vomiting, dysphagia, globus sensation, hematemesis, epigastric pain.   Denies Abdominal pain, tenderness  Denies constipation, diarrhea, BRBPR   Skin:  Negative for color change.   Neurological:  Negative for dizziness, syncope, weakness and  headaches.   Psychiatric/Behavioral:  Negative for agitation, confusion and hallucinations.    Objective:     Vital Signs (Most Recent):  Temp: 100.1 °F (37.8 °C) (06/15/22 0800)  Pulse: (!) 113 (06/15/22 0853)  Resp: (!) 26 (06/15/22 0853)  BP: (!) 145/81 (06/15/22 0430)  SpO2: (!) 91 % (06/15/22 0853)   Vital Signs (24h Range):  Temp:  [97.9 °F (36.6 °C)-100.1 °F (37.8 °C)] 100.1 °F (37.8 °C)  Pulse:  [] 113  Resp:  [14-40] 26  SpO2:  [85 %-98 %] 91 %  BP: (104-157)/(66-92) 145/81     Weight: 68 kg (150 lb) (06/10/22 0942)  Body mass index is 22.14 kg/m².      Intake/Output Summary (Last 24 hours) at 6/15/2022 1116  Last data filed at 6/15/2022 0600  Gross per 24 hour   Intake 1920 ml   Output 0 ml   Net 1920 ml       Lines/Drains/Airways       Drain  Duration                  Gastrostomy/Enterostomy 06/14/22 1415 Percutaneous endoscopic gastrostomy (PEG) feeding <1 day              Airway  Duration                  Airway - Non-Surgical 06/10/22 1331 Endotracheal Tube 4 days         Surgical Airway 06/10/22 1354 Shiley Cuffed 4 days              Arterial Line  Duration             Arterial Line 06/10/22 1405 Right Radial 4 days              Peripheral Intravenous Line  Duration                  Peripheral IV - Single Lumen 06/10/22 0911 20 G Right Antecubital 5 days         Peripheral IV - Single Lumen 06/10/22 1325 20 G Left Hand 4 days                    Physical Exam  Constitutional:       General: He is not in acute distress.  HENT:      Head: Normocephalic.      Nose: No congestion or rhinorrhea.      Mouth/Throat:      Mouth: Mucous membranes are dry.   Eyes:      Extraocular Movements: Extraocular movements intact.      Pupils: Pupils are equal, round, and reactive to light.   Cardiovascular:      Rate and Rhythm: Normal rate and regular rhythm.      Pulses: Normal pulses.      Heart sounds: Normal heart sounds.   Pulmonary:      Effort: Pulmonary effort is normal. No respiratory distress.       Breath sounds: Normal breath sounds. No stridor. No wheezing or rhonchi.      Comments: Via tracheostomy  Abdominal:      General: Bowel sounds are normal. There is no distension.      Palpations: Abdomen is soft.      Tenderness: There is no abdominal tenderness. There is no guarding.   Skin:     General: Skin is warm and dry.      Coloration: Skin is not jaundiced or pale.   Neurological:      General: No focal deficit present.      Mental Status: He is alert and oriented to person, place, and time.   Psychiatric:         Mood and Affect: Mood normal.         Behavior: Behavior normal.       Significant Labs:  Recent Lab Results       None              Significant Imaging:  none

## 2022-06-15 NOTE — PROVATION PATIENT INSTRUCTIONS
Discharge Summary/Instructions after an Endoscopic Procedure  Patient Name: Josafat Boudreaux  Patient MRN: [id number]  Patient YOB: 1965 Tuesday, June 14, 2022  Aaliyah Conrad III, MD  Dear patient,  As a result of recent federal legislation (The Federal Cures Act), you may   receive lab or pathology results from your procedure in your [facility]   account before your physician is able to contact you. Your physician or   their representative will relay the results to you with their   recommendations at their soonest availability.  Thank you,  RESTRICTIONS:  During your procedure today, you received medications for sedation.  These   medications may affect your judgment, balance and coordination.  Therefore,   for 24 hours, you have the following restrictions:   - DO NOT drive a car, operate machinery, make legal/financial decisions,   sign important papers or drink alcohol.    ACTIVITY:  Today: no heavy lifting, straining or running due to procedural   sedation/anesthesia.  The following day: return to full activity including work.  DIET:  Eat and drink normally unless instructed otherwise.     TREATMENT FOR COMMON SIDE EFFECTS:  - Mild abdominal pain, nausea, belching, bloating or excessive gas:  rest,   eat lightly and use a heating pad.  - Sore Throat: treat with throat lozenges and/or gargle with warm salt   water.  - Because air was used during the procedure, expelling large amounts of air   from your rectum or belching is normal.  - If a bowel prep was taken, you may not have a bowel movement for 1-3 days.    This is normal.  SYMPTOMS TO WATCH FOR AND REPORT TO YOUR PHYSICIAN:  1. Abdominal pain or bloating, other than gas cramps.  2. Chest pain.  3. Back pain.  4. Signs of infection such as: chills or fever occurring within 24 hours   after the procedure.  5. Rectal bleeding, which would show as bright red, maroon, or black stools.   (A tablespoon of blood from the rectum is not serious, especially  if   hemorrhoids are present.)  6. Vomiting.  7. Weakness or dizziness.  GO DIRECTLY TO THE NEAREST EMERGENCY ROOM IF YOU HAVE ANY OF THE FOLLOWING:      Difficulty breathing              Chills and/or fever over 101 F   Persistent vomiting and/or vomiting blood   Severe abdominal pain   Severe chest pain   Black, tarry stools   Bleeding- more than one tablespoon   Any other symptom or condition that you feel may need urgent attention  Your doctor recommends these additional instructions:  If any biopsies were taken, your doctors clinic will contact you in 1 to 2   weeks with any results.  - Please follow the post-PEG recommendations.  For questions, problems or results please call your physician - Aaliyah Conrad III, MD at Work:  [phone].  [facility], EMERGENCY ROOM PHONE NUMBER: [phone]  IF A COMPLICATION OR EMERGENCY SITUATION ARISES AND YOU ARE UNABLE TO REACH   YOUR PHYSICIAN - GO DIRECTLY TO THE EMERGENCY ROOM.  Aaliyah Conrad III, MD  6/14/2022 1:43:18 PM  This report has been verified and signed electronically.  Dear patient,  As a result of recent federal legislation (The Federal Cures Act), you may   receive lab or pathology results from your procedure in your [facility]   account before your physician is able to contact you. Your physician or   their representative will relay the results to you with their   recommendations at their soonest availability.  Thank you,  PROVATION

## 2022-06-15 NOTE — ASSESSMENT & PLAN NOTE
Okay to use PEG tube for feedings, flushes, medications.   Abdominal binder on at all times  Site satisfactory with external bumper noted at 3 cm to skin

## 2022-06-15 NOTE — PT/OT/SLP PROGRESS
Speech Language Pathology Department  Dysphagia Therapy    Patient Name:  Josafat Boudreaux   MRN:  [id number]  Admitting Diagnosis: Vocal cord mass    Recommendations:                 Diet recommendations:  NPO, Liquid Diet Level: NPO       Subjective     Patient awake and appeared irritated.    Patient goals: Mouthed he wanted ice chips.     Respiratory Status: trach collar, 6L    Objective:     Oral Musculature Evaluation:   Oral Musculature: WFL   Dentition: present and adequate   Secretion Management: problems swallowing secretions   Mandibular Strength and Mobility: WFL   Oral Labial Strength and Mobility: WFL   Lingual Strength and Mobility: WFL   Buccal Strength and Mobility: WFL   Volitional Cough: present   Volitional Swallow: difficulty initiating   Voice Prior to PO Intake: aphonic secondary to trach    Pt completed Pam maneuver independently x3 with reactionary coughing and mucus production via trach after each.  RN entered room to suction x1.  SLP also provided external suction at mouth of trach.    Educate pt and wife on importance of oral care given increased risk of aspiration, as shown on recent MBS.  Dr. Emory Alonso entered the room at the end of the session; SLP updated him on MBS results.  Dr. Alonso requested the MBS be repeated to trial ice chips and thin liquids with the cuff inflated with 3 CC's of O2 and a verbal cue for the pt to reswallow.      Assessment:     Pt continues with oropharyngeal dysphagia requiring alternate means of nutrition.  Limited repetitions of swallow exercises due to reactionary cough.  Pt reported he does swallow exercises independently throughout the day.    Goals:   Multidisciplinary Problems     SLP Goals        Problem: SLP    Goal Priority Disciplines Outcome   SLP Goal     SLP Ongoing, Progressing                   Plan:     Will continue to follow and tx as appropriate.    · Patient to be seen:  5 x/week   · Plan of Care expires:   07/04/22  · Plan of Care reviewed with:  patient   · SLP Follow-Up:  Yes      Time Tracking:     SLP Treatment Date:   06/15/22  Speech Start Time:  1210  Speech Stop Time:  1230     Speech Total Time (min):  20 min    Billable minutes:  Treatment of Swallow Dysfunction, 20 min       06/15/2022

## 2022-06-15 NOTE — PLAN OF CARE
06/15/22 1322   Discharge Assessment   Assessment Type Discharge Planning Assessment   Source of Information other (see comments)  (pt's wifekymberly--[phone])   Reason For Admission vocal cord mass   Lives With spouse  (Pt lives with wifeKymberly in a single story home w 4 steps to enter and one rail)   Do you expect to return to your current living situation? Yes   Do you have help at home or someone to help you manage your care at home? Yes   Who are your caregiver(s) and their phone number(s)? pt's wifeKymberly---[phone]   Prior to hospitilization cognitive status: Alert/Oriented   Current cognitive status: Alert/Oriented   Walking or Climbing Stairs Difficulty none   Dressing/Bathing Difficulty none   Home Layout Able to live on 1st floor   Equipment Currently Used at Home lift device  (lift chair)   Patient currently being followed by outpatient case management? No   Do you currently have service(s) that help you manage your care at home? No   Who is going to help you get home at discharge? pt's wifeKymberly   How do you get to doctors appointments?   (Driving himeself prior to hospital stay)   Are you on dialysis? No   Discharge Plan A   (Home with HH services)   DME Needed Upon Discharge  other (see comments)  (TBD pt will require tube feeds)   Discharge Plan discussed with: Spouse/sig other   Name(s) and Number(s) pt's wifeKymberly---[phone]   Discharge Barriers Identified None   Relationship/Environment   Name(s) of Who Lives With Patient Pt's kymberly mancia       Pt's PCP is Dr Bertrand. Pt had hip sx in the past and used [facility]. Pt uses [facility] in [place]. Pt was not working due to being disabled. Pt was able to drive and cook prior to hospital stay.    Pt's wife does not want pt to be placed in a SNF, swing bed or ltac. She wants to bring pt home and she can care for pt. She wants to use [facility] upon dc. Follow for dc needs.   ambulatory

## 2022-06-15 NOTE — PROGRESS NOTES
Pulmonary & Critical Care Medicine   Progress Note      Presenting History:    This is a 56-year-old male who presented to [facility] 6/10/22 via [facility] due to shortness of breath. According to the wife, Mr. Boudreaux woke up this morning severely short of breath. She said she checked his pulse ox which read as 30% and stated he was struggling to breath. Of note, per the wife he had a history voice hoarseness since 2021 but had this never investigated until 2022. 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    Objective/Subjective:  Bronchoscopy was performed yesterday by Dr Reyes.  Endobronchial lesion was identified  In the right upper lobe.  Biopsy results are pending.  Patient is resting comfortably this morning on trach collar.  Complains of being thirsty.    Scheduled Medications:    albuterol-ipratropium  3 mL Nebulization Q4H    enoxaparin  40 mg Subcutaneous Daily    LIDOcaine HCl 2%  15 mL Mucous Membrane Once    neomycin-bacitracin-polymyxin   Topical (Top) Daily    nicotine  1 patch Transdermal Daily    pantoprazole  40 mg Intravenous Daily    sodium zirconium cyclosilicate  5 g Oral Once       PRN Medications:   diphenhydrAMINE, [] fentaNYL **FOLLOWED BY** fentaNYL, LIDOcaine HCL 4%, lorazepam      Infusions:     dextrose 5 % and 0.9 % NaCl 75 mL/hr at 22 1827         Fluid Balance:     Intake/Output Summary (Last 24 hours) at 6/15/2022 0852  Last data filed at 6/15/2022 0600  Gross per 24 hour   Intake 1920 ml   Output 0 ml   Net 1920 ml           Vital Signs:   Vitals:    06/15/22 0430   BP: (!) 145/81   Pulse: 104   Resp: (!) 31   Temp:          Physical Exam  Constitutional:       Appearance: Normal appearance.   HENT:      Head: Normocephalic and atraumatic.      Mouth/Throat:      Comments: Tracheostomy present  Cardiovascular:      Rate and Rhythm: Normal rate and regular rhythm.   Pulmonary:       Effort: Pulmonary effort is normal.      Breath sounds: Normal breath sounds.   Abdominal:      General: Abdomen is flat.      Palpations: Abdomen is soft.   Neurological:      General: No focal deficit present.      Mental Status: He is alert and oriented to person, place, and time.    Laboratory Studies:   No results for input(s): PH, PCO2, PO2, HCO3, POCSATURATED, BE in the last 24 hours.  No results for input(s): WBC, RBC, HGB, HCT, PLT, MCV, MCH, MCHC in the last 24 hours.  No results for input(s): GLUCOSE, NA, K, CL, CO2, BUN, CREATININE, MG in the last 24 hours.    Invalid input(s):  CALCIUM      Microbiology Data:   Microbiology Results (last 7 days)     ** No results found for the last 168 hours. **            Imaging:   CV Ultrasound doppler venous legs bilat  There was no evidence of deep or superficial vein thrombosis in the   bilateral lower extremities.          Assessment and Plan    Assessment  1. 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 Tobacco abuse   3. H/o Alcohol abuse  4. Hyponatremia        Plan:  Endobronchial lesion found on bronchoscopy yesterday by Dr Reyes.  Await results of that.  No reason for mediastinoscopy at this time.  Respiratory status stable.  Continue speech therapy and mobilize as tolerated.        JAVIER Tena MD  6/15/2022  Pulmonology/Critical Care

## 2022-06-15 NOTE — HPI
Initial History of Present Illness (HPI):  6/13/22: This is a 56 y.o. male  Who was brought to ER by EMS on 6/10/22 with shortness of breath. 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 He was scheduled for trach placement on 7/6/22. Emergency Trach was placed on day of admission. He is now on trach collar.  We are consulted for PEG placement.  ST evaluation revealed moderate/severe pharyngeal dysphagia with silent aspiration of pharyngeal residue across all trials.  No upper abdominal wall scars.  No fever. WBC WNL.  On low dose Lovenox. Platelet count WNL.    6/14/22: PEG tube placed without difficulty    6/15/22: Tape removed from PEG site. Gauze removed from PEG site. Site satisfactory without bleeding, erythema, or excoriation. Abdominal binder on and to remain on AAT. Patient denies pain at PEG site. Okay to use PEG tube for feedings and medications. Change all medications to liquid form when possible.    Patient c/o dry mouth and feeling thirsty. Due to the severe pharyngeal dysphagia and silent aspiration prior to PEG placement, will continue with orders for nothing by mouth.

## 2022-06-15 NOTE — PROGRESS NOTES
The documentation recorded by the scribe/NP accurately reflects the service I personally performed and the decisions made by me.       Estuardo Klein MD  [facility]  Gastroenterology  Progress Note    Patient Name: Josafat Boudreaux  MRN: [id number]  Admission Date: 6/10/2022  Hospital Length of Stay: 5 days  Code Status: Full Code   Attending Provider: Kristin Todd MD  Consulting Provider: MD Noemi Matamoros FNP  Primary Care Physician: Tony Bertrand MD  Principal Problem: Vocal cord mass      Subjective:     Interval History:    Initial History of Present Illness (HPI):  6/13/22: This is a 56 y.o. male  Who was brought to ER by EMS on 6/10/22 with shortness of breath. 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 He was scheduled for trach placement on 7/6/22. Emergency Trach was placed on day of admission. He is now on trach collar.  We are consulted for PEG placement.  ST evaluation revealed moderate/severe pharyngeal dysphagia with silent aspiration of pharyngeal residue across all trials.  No upper abdominal wall scars.  No fever. WBC WNL.  On low dose Lovenox. Platelet count WNL.     6/14/22: PEG tube placed without difficulty     6/15/22: Tape removed from PEG site. Gauze removed from PEG site. Site satisfactory without bleeding, erythema, or excoriation. Abdominal binder on and to remain on AAT. Patient denies pain at PEG site. Okay to use PEG tube for feedings and medications. Change all medications to liquid form when possible.     Patient c/o dry mouth and feeling thirsty. Due to the severe pharyngeal dysphagia and silent aspiration prior to PEG placement, will continue with orders for nothing by mouth.    Review of Systems   Constitutional:  Negative for chills, fever and unexpected weight change.   HENT:  Positive for trouble swallowing. Negative for mouth sores and sore throat.         Dry mouth   Eyes:   Negative for discharge and visual disturbance.   Respiratory:  Negative for apnea, cough, shortness of breath and wheezing.    Cardiovascular:  Negative for chest pain, palpitations and leg swelling.   Gastrointestinal:  Negative for abdominal distention, abdominal pain, blood in stool, nausea and vomiting.        Denies heartburn, nausea, vomiting, dysphagia, globus sensation, hematemesis, epigastric pain.   Denies Abdominal pain, tenderness  Denies constipation, diarrhea, BRBPR   Skin:  Negative for color change.   Neurological:  Negative for dizziness, syncope, weakness and headaches.   Psychiatric/Behavioral:  Negative for agitation, confusion and hallucinations.    Objective:     Vital Signs (Most Recent):  Temp: 100.1 °F (37.8 °C) (06/15/22 0800)  Pulse: (!) 113 (06/15/22 0853)  Resp: (!) 26 (06/15/22 0853)  BP: (!) 145/81 (06/15/22 0430)  SpO2: (!) 91 % (06/15/22 0853)   Vital Signs (24h Range):  Temp:  [97.9 °F (36.6 °C)-100.1 °F (37.8 °C)] 100.1 °F (37.8 °C)  Pulse:  [] 113  Resp:  [14-40] 26  SpO2:  [85 %-98 %] 91 %  BP: (104-157)/(66-92) 145/81     Weight: 68 kg (150 lb) (06/10/22 0942)  Body mass index is 22.14 kg/m².      Intake/Output Summary (Last 24 hours) at 6/15/2022 1116  Last data filed at 6/15/2022 0600  Gross per 24 hour   Intake 1920 ml   Output 0 ml   Net 1920 ml       Lines/Drains/Airways       Drain  Duration                  Gastrostomy/Enterostomy 06/14/22 1415 Percutaneous endoscopic gastrostomy (PEG) feeding <1 day              Airway  Duration                  Airway - Non-Surgical 06/10/22 1331 Endotracheal Tube 4 days         Surgical Airway 06/10/22 1354 Shiley Cuffed 4 days              Arterial Line  Duration             Arterial Line 06/10/22 1405 Right Radial 4 days              Peripheral Intravenous Line  Duration                  Peripheral IV - Single Lumen 06/10/22 0911 20 G Right Antecubital 5 days         Peripheral IV - Single Lumen 06/10/22 1325 20 G Left Hand 4  days                    Physical Exam  Constitutional:       General: He is not in acute distress.  HENT:      Head: Normocephalic.      Nose: No congestion or rhinorrhea.      Mouth/Throat:      Mouth: Mucous membranes are dry.   Eyes:      Extraocular Movements: Extraocular movements intact.      Pupils: Pupils are equal, round, and reactive to light.   Cardiovascular:      Rate and Rhythm: Normal rate and regular rhythm.      Pulses: Normal pulses.      Heart sounds: Normal heart sounds.   Pulmonary:      Effort: Pulmonary effort is normal. No respiratory distress.      Breath sounds: Normal breath sounds. No stridor. No wheezing or rhonchi.      Comments: Via tracheostomy  Abdominal:      General: Bowel sounds are normal. There is no distension.      Palpations: Abdomen is soft.      Tenderness: There is no abdominal tenderness. There is no guarding.   Skin:     General: Skin is warm and dry.      Coloration: Skin is not jaundiced or pale.   Neurological:      General: No focal deficit present.      Mental Status: He is alert and oriented to person, place, and time.   Psychiatric:         Mood and Affect: Mood normal.         Behavior: Behavior normal.       Significant Labs:  Recent Lab Results       None              Significant Imaging:  none    Assessment/Plan:     Dysphagia  Okay to use PEG tube for feedings, flushes, medications.   Abdominal binder on at all times  Site satisfactory with external bumper noted at 3 cm to skin  Continue PEG tube feedings per dietary's recommendations      Thank you for your consult. I will sign off. Please contact us if you have any additional questions.    OCTAVIO Elaien acting as scribe for Estuardo Klein MD  Gastroenterology  [facility]

## 2022-06-15 NOTE — PROGRESS NOTES
[facility]  Hospital Medicine Progress Note        Chief Complaint: Inpatient Follow-up for Acute hypoxic respiratory failure    HPI:   56 year old  s/p tracheostomy with dysphagia, so most of the information is gathered from the medical record.  He is a 56 year old male who presented to the ED on 6/10/22 via [facility] with c/o SOB.  It appears that he had some hoarseness starting in November 2021 that kept getting progressively worse and was recently diagnosed with a 2.4 cm supraglottic mass with additional metastatic appearing lymph nodes. on his vocal cords.  He had a trach scheduled for 7/6/2022.  Per ER MD note, the patient's wife states that his RA O2 was down to the 40s when she called EMS.  During his ED stay, he was placed on BiPap, but his CO2 continued to increase, and ENT was consulted for emergent trach and he was admitted to ICU.  He was extubated on 6/11, and is tolerating trach collar well.  Today's ED lab work was notable for Na 128, improved from previous.  He was deemed appropriate to transfer to the floor and he was admitted to hospital medicine for further management with ENT to continue to follow.    Interval Hx:   T-max 99.9.  Persistently tachycardic, tachypneic.  Currently saturating well on trach.  When seen and examined at bedside he was alert, comfortably resting in the bed.  Daughter was at bedside.  He has no new complaints or concerns except for intermittent cough and copious secretions.  No labs were available for this morning    Objective/physical exam:  Vitals:    06/15/22 0930 06/15/22 0945 06/15/22 1000 06/15/22 1030   BP: 139/74 129/72 (!) 141/75 131/78   Pulse: (!) 120 (!) 118 (!) 118 (!) 121   Resp: (!) 21 (!) 32 17    Temp:    99.9 °F (37.7 °C)   TempSrc:    Oral   SpO2: (!) 86% (!) 88% (!) 88% (!) 91%   Weight:       Height:         General: In no acute distress, afebrile, flushed face  HEENT: Trach  Respiratory: Clear to auscultation  bilaterally  Cardiovascular: S1, S2, no appreciable murmur  Abdomen: Soft, nontender, BS +  MSK: Warm, no lower extremity edema, no clubbing or cyanosis  Neurologic: Alert and oriented x4, moving all extremities with good strength     Lab Results   Component Value Date     (L) 2022    K 4.0 2022    CO2 28 2022    BUN 5.3 (L) 2022    CREATININE 0.57 (L) 2022    CALCIUM 8.9 2022    EGFRNONAA >60 2022      Lab Results   Component Value Date    ALT 13 2022    AST 14 2022    ALKPHOS 74 2022    BILITOT 0.8 2022      Lab Results   Component Value Date    WBC 10.7 2022    HGB 16.0 2022    HCT 51.8 2022    .3 (H) 2022     2022           Medications:   albuterol-ipratropium  3 mL Nebulization Q4H    enoxaparin  40 mg Subcutaneous Daily    LIDOcaine HCl 2%  15 mL Mucous Membrane Once    neomycin-bacitracin-polymyxin   Topical (Top) Daily    nicotine  1 patch Transdermal Daily    pantoprazole  40 mg Intravenous Daily    sodium zirconium cyclosilicate  5 g Oral Once      diphenhydrAMINE, [] fentaNYL **FOLLOWED BY** fentaNYL, LIDOcaine HCL 4%, lorazepam     Assessment/Plan:    Acute hypoxic respiratory failure requiring emergent tracheostomy  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  Nutrition consulted.  Keep NPO.  Tube feeds as tolerated.  GI signed off.  SLP following  - Bronchoscopy revealed endobronchial lesion in the right upper lobe and biopsies were obtained.  Follow-up on pathology.  Pulmonary following.  No indication for mediastinoscopy now..  Continue trach care.  Continue bronchodilator therapy    Lovenox  Protonix  Labs for tomorrow      Alonzo Tian MD

## 2022-06-16 LAB
APPEARANCE UR: CLEAR
BACTERIA #/AREA URNS AUTO: NORMAL /HPF
BILIRUB UR QL STRIP.AUTO: ABNORMAL MG/DL
COLOR UR AUTO: ABNORMAL
GLUCOSE UR QL STRIP.AUTO: NEGATIVE MG/DL
KETONES UR QL STRIP.AUTO: NEGATIVE MG/DL
LEUKOCYTE ESTERASE UR QL STRIP.AUTO: NEGATIVE UNIT/L
MRSA PCR SCRN (OHS): NOT DETECTED
NITRITE UR QL STRIP.AUTO: NEGATIVE
PH UR STRIP.AUTO: 6 [PH]
PROT UR QL STRIP.AUTO: ABNORMAL MG/DL
RBC #/AREA URNS AUTO: <5 /HPF
RBC UR QL AUTO: NEGATIVE UNIT/L
SP GR UR STRIP.AUTO: 1.02 (ref 1–1.03)
SQUAMOUS #/AREA URNS AUTO: <4 /HPF
UROBILINOGEN UR STRIP-ACNC: 2 MG/DL
WBC #/AREA URNS AUTO: <5 /HPF

## 2022-06-16 PROCEDURE — 25500020 PHARM REV CODE 255: Performed by: INTERNAL MEDICINE

## 2022-06-16 PROCEDURE — 27000221 HC OXYGEN, UP TO 24 HOURS

## 2022-06-16 PROCEDURE — 25000003 PHARM REV CODE 250: Performed by: INTERNAL MEDICINE

## 2022-06-16 PROCEDURE — 11000001 HC ACUTE MED/SURG PRIVATE ROOM

## 2022-06-16 PROCEDURE — 63600175 PHARM REV CODE 636 W HCPCS: Performed by: INTERNAL MEDICINE

## 2022-06-16 PROCEDURE — 63600175 PHARM REV CODE 636 W HCPCS: Performed by: NURSE PRACTITIONER

## 2022-06-16 PROCEDURE — C9113 INJ PANTOPRAZOLE SODIUM, VIA: HCPCS | Performed by: NURSE PRACTITIONER

## 2022-06-16 PROCEDURE — 25000003 PHARM REV CODE 250: Performed by: NURSE PRACTITIONER

## 2022-06-16 PROCEDURE — 99900035 HC TECH TIME PER 15 MIN (STAT)

## 2022-06-16 PROCEDURE — 25000242 PHARM REV CODE 250 ALT 637 W/ HCPCS: Performed by: INTERNAL MEDICINE

## 2022-06-16 PROCEDURE — 92611 MOTION FLUOROSCOPY/SWALLOW: CPT

## 2022-06-16 PROCEDURE — 99900031 HC PATIENT EDUCATION (STAT)

## 2022-06-16 PROCEDURE — 99900026 HC AIRWAY MAINTENANCE (STAT)

## 2022-06-16 PROCEDURE — S4991 NICOTINE PATCH NONLEGEND: HCPCS | Performed by: NURSE PRACTITIONER

## 2022-06-16 PROCEDURE — 94761 N-INVAS EAR/PLS OXIMETRY MLT: CPT

## 2022-06-16 PROCEDURE — A9698 NON-RAD CONTRAST MATERIALNOC: HCPCS | Performed by: INTERNAL MEDICINE

## 2022-06-16 PROCEDURE — 94640 AIRWAY INHALATION TREATMENT: CPT

## 2022-06-16 RX ORDER — DOCUSATE SODIUM 50 MG/5ML
100 LIQUID ORAL 2 TIMES DAILY
Status: DISCONTINUED | OUTPATIENT
Start: 2022-06-16 | End: 2022-06-24 | Stop reason: HOSPADM

## 2022-06-16 RX ORDER — HYDROCODONE BITARTRATE AND ACETAMINOPHEN 5; 325 MG/1; MG/1
1 TABLET ORAL EVERY 6 HOURS PRN
Status: DISCONTINUED | OUTPATIENT
Start: 2022-06-16 | End: 2022-06-24 | Stop reason: HOSPADM

## 2022-06-16 RX ORDER — DIPHENHYDRAMINE HYDROCHLORIDE 50 MG/ML
25 INJECTION INTRAMUSCULAR; INTRAVENOUS NIGHTLY PRN
Status: DISCONTINUED | OUTPATIENT
Start: 2022-06-16 | End: 2022-06-19

## 2022-06-16 RX ORDER — POLYETHYLENE GLYCOL 3350 17 G/17G
17 POWDER, FOR SOLUTION ORAL DAILY PRN
Status: DISCONTINUED | OUTPATIENT
Start: 2022-06-16 | End: 2022-06-18

## 2022-06-16 RX ORDER — MUPIROCIN 20 MG/G
OINTMENT TOPICAL 2 TIMES DAILY
Status: DISCONTINUED | OUTPATIENT
Start: 2022-06-16 | End: 2022-06-24 | Stop reason: HOSPADM

## 2022-06-16 RX ORDER — MORPHINE SULFATE 4 MG/ML
4 INJECTION, SOLUTION INTRAMUSCULAR; INTRAVENOUS EVERY 6 HOURS PRN
Status: DISCONTINUED | OUTPATIENT
Start: 2022-06-16 | End: 2022-06-24 | Stop reason: HOSPADM

## 2022-06-16 RX ADMIN — HYDROCODONE BITARTRATE AND ACETAMINOPHEN 1 TABLET: 5; 325 TABLET ORAL at 08:06

## 2022-06-16 RX ADMIN — LEVALBUTEROL HYDROCHLORIDE 1.25 MG: 1.25 SOLUTION, CONCENTRATE RESPIRATORY (INHALATION) at 04:06

## 2022-06-16 RX ADMIN — DIPHENHYDRAMINE HYDROCHLORIDE 25 MG: 50 INJECTION, SOLUTION INTRAMUSCULAR; INTRAVENOUS at 10:06

## 2022-06-16 RX ADMIN — NICOTINE 1 PATCH: 21 PATCH, EXTENDED RELEASE TRANSDERMAL at 09:06

## 2022-06-16 RX ADMIN — BARIUM SULFATE 10 ML: 0.81 POWDER, FOR SUSPENSION ORAL at 02:06

## 2022-06-16 RX ADMIN — PANTOPRAZOLE SODIUM 40 MG: 40 INJECTION, POWDER, LYOPHILIZED, FOR SOLUTION INTRAVENOUS at 09:06

## 2022-06-16 RX ADMIN — LEVALBUTEROL HYDROCHLORIDE 1.25 MG: 1.25 SOLUTION, CONCENTRATE RESPIRATORY (INHALATION) at 08:06

## 2022-06-16 RX ADMIN — ENOXAPARIN SODIUM 40 MG: 40 INJECTION SUBCUTANEOUS at 04:06

## 2022-06-16 RX ADMIN — LEVALBUTEROL HYDROCHLORIDE 1.25 MG: 1.25 SOLUTION, CONCENTRATE RESPIRATORY (INHALATION) at 12:06

## 2022-06-16 RX ADMIN — BACITRACIN ZINC, NEOMYCIN SULFATE, AND POLYMYXIN B SULFATE: 400; 3.5; 5 OINTMENT TOPICAL at 09:06

## 2022-06-16 RX ADMIN — DEXTROSE AND SODIUM CHLORIDE: 5; 900 INJECTION, SOLUTION INTRAVENOUS at 04:06

## 2022-06-16 RX ADMIN — DOCUSATE SODIUM LIQUID 100 MG: 100 LIQUID ORAL at 08:06

## 2022-06-16 RX ADMIN — MUPIROCIN: 20 OINTMENT TOPICAL at 12:06

## 2022-06-16 RX ADMIN — MUPIROCIN: 20 OINTMENT TOPICAL at 09:06

## 2022-06-16 NOTE — PLAN OF CARE
Problem: SLP  Goal: SLP Goal  Description: LTG: Tolerate least restrictive PO diet with no clinical signs/sx aspiration    STGs:  1.  Complete base of tongue and laryngeal strengthening exercises with min cues  2.  Tolerate thermal stim x10 with 100% effortful swallows and delay less than 2 seconds.  Outcome: Ongoing, Progressing

## 2022-06-16 NOTE — PROCEDURES
Speech Language Pathology Department  Modified Barium Swallow Study    Patient Name:  Josafat Boudreaux   MRN:  [id number]  Diagnosis: Vocal cord mass     Recommendations:     Recommendations:                General Recommendations:  Dysphagia therapy and One Way Speaking Valve trials with SLP  Diet recommendations:  NPO (except for ice chips)  Swallow Strategies/Precautions: additional swallow per bite/sip and medications via PEG  General Precautions: Standard, aspiration    History:     A Modified Barium Swallow Study was repeated to assess the efficiency of his/her swallow function, rule out aspiration and make recommendations regarding safe dietary consistencies, effective compensatory strategies, and safe eating environment.     Past Medical History:   Diagnosis Date    COPD (chronic obstructive pulmonary disease)     Hypertension        Home Diet: Regular and thin liquids  Current Method of Nutrition: Tube feeding (PEG)    Subjective:     Patient awake, alert and cooperative.    Fluoroscopic Results:     Current Respiratory Status: 06/16/22    Oral Musculature Evaluation:   Oral Musculature: WFL   Dentition: present and adequate   Secretion Management: adequate   Mucosal Quality: good   Mandibular Strength and Mobility: WFL   Oral Labial Strength and Mobility: WFL   Lingual Strength and Mobility: WFL   Buccal Strength and Mobility: WFL   Volitional Cough: present   Volitional Swallow: difficulty initiating   Voice Prior to PO Intake: no phonation    Visualization  · Patient was seen in the lateral view    Oral Phase:    Prolonged/disorganized bolus formation   Loss of bolus control    Pharyngeal Phase:     Consistency Laryngeal Penetration Aspiration Residue Strategy   Ice chips None None None N/A   Thin liquid by cup After, did NOT clear None Moderate vallecular/pyriform sinus Additional swallows did NOT fully clear residue   Puree None None None None   Thin liquid by straw After, did NOT  clear None Moderate vallecular/pyriform sinus       Swallow delay with spill to the pyriform sinuses   Reduced base of tongue retraction   Reduced hyolaryngeal excursion   Reduced airway protection    Cervical Esophageal Phase:    UES appeared to accommodate all bolus types without stasis or retrograde movement visualized    Additional Comments:  Additional swallows to clear pharyngeal residue resulted in fatigue    Assessment:     Pt continues to exhibit moderate oropharyngeal dysphagia with laryngeal penetration of thin liquids which did NOT clear placing pt at HIGH risk for aspiration.    Goals:   Multidisciplinary Problems     SLP Goals        Problem: SLP    Goal Priority Disciplines Outcome   SLP Goal     SLP Ongoing, Progressing   Description: LTG: Tolerate least restrictive PO diet with no clinical signs/sx aspiration    STGs:  1.  Complete base of tongue and laryngeal strengthening exercises with min cues  2.  Tolerate thermal stim x10 with 100% effortful swallows and delay less than 2 seconds.                   Plan:     · Patient to be seen:  5 x/week   · Plan of Care expires:  06/26/22  · Plan of Care reviewed with:  patient   · SLP Follow-Up:  Yes      Time Tracking:   SLP Treatment Date:   06/16/22  Speech Start Time:  1415  Speech Stop Time:  1435     Speech Total Time (min):  20 min    Billable minutes: Motion Fluoroscopic Evaluation, Video Recording, 20 minutes       06/16/2022

## 2022-06-16 NOTE — NURSING
WOCN consult-57 y/o male in with vocal cord mass.   .  Hx of smoker htn and copd.     He has a trach with some erosion noted-see photo.  Care put in place and care instructions to nurse Linda.

## 2022-06-16 NOTE — PROGRESS NOTES
Pulmonary & Critical Care Medicine   Progress Note      Presenting History:    This is a 56-year-old male who presented to [facility] 6/10/22 via [facility] due to shortness of breath. According to the wife, Mr. Boudreaux woke up severely short of breath. She said she checked his pulse ox which read as 30% and stated he was struggling to breath. Of note, per the wife he had a history voice hoarseness since 2021 but had this never investigated until 2022. 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 He was taken for emergent tracheostomy on 6/10/22. He underwent bronchoscopy with biopsies of right upper lobe endobronchial lesion on . Pathology pending.     Objective/Subjective:  On 10L trach collar this morning. Still with secretions from trach and wife reports foul smell from under trach dressing.     Scheduled Medications:    enoxaparin  40 mg Subcutaneous Daily    levalbuterol  1.25 mg Nebulization Q8H    LIDOcaine HCl 2%  15 mL Mucous Membrane Once    neomycin-bacitracin-polymyxin   Topical (Top) Daily    nicotine  1 patch Transdermal Daily    pantoprazole  40 mg Intravenous Daily    sodium zirconium cyclosilicate  5 g Oral Once       PRN Medications:   diphenhydrAMINE, [] fentaNYL **FOLLOWED BY** fentaNYL, LIDOcaine HCL 4%, lorazepam      Infusions:     dextrose 5 % and 0.9 % NaCl 75 mL/hr at 22 1827         Fluid Balance:     Intake/Output Summary (Last 24 hours) at 2022 1015  Last data filed at 2022 0700  Gross per 24 hour   Intake 900 ml   Output 350 ml   Net 550 ml           Vital Signs:   Vitals:    22 0818   BP: 117/70   Pulse: (!) 118   Resp: 19   Temp: 98.9 °F (37.2 °C)         Physical Exam  Constitutional:       Appearance: Normal appearance.   HENT:      Head: Normocephalic and atraumatic.      Mouth/Throat:      Comments: Tracheostomy present  Cardiovascular:      Rate and Rhythm: Normal  rate and regular rhythm.   Pulmonary:      Effort: Pulmonary effort is normal.      Breath sounds: Rhonchi throughout  Abdominal:      General: Abdomen is flat.      Palpations: Abdomen is soft.   Neurological:      General: No focal deficit present.      Mental Status: He is alert and oriented to person, place, and time.    Laboratory Studies:   No results for input(s): PH, PCO2, PO2, HCO3, POCSATURATED, BE in the last 24 hours.  No results for input(s): WBC, RBC, HGB, HCT, PLT, MCV, MCH, MCHC in the last 24 hours.  No results for input(s): GLUCOSE, NA, K, CL, CO2, BUN, CREATININE, MG in the last 24 hours.    Invalid input(s):  CALCIUM      Microbiology Data:   Microbiology Results (last 7 days)     Procedure Component Value Units Date/Time    Respiratory Culture [[id number]] Collected: 06/15/22 2232    Order Status: Resulted Specimen: Sputum, Expectorated Updated: 06/15/22 2232    Blood Culture [[id number]] Collected: 06/15/22 1842    Order Status: Resulted Specimen: Blood Updated: 06/15/22 1848            Imaging:   CV Ultrasound doppler venous legs bilat  There was no evidence of deep or superficial vein thrombosis in the   bilateral lower extremities.        Assessment and Plan    Assessment  1. Laryngeal mass s/p tracheostomy 6/10/22 and EBUS with biopsy of RUL endobronchial lesion on 6/14 path pending  2. Tobacco abuse   3. H/o Alcohol abuse  4.  Hypoxemic respiratory failure         Plan:  Respiratory and blood cultures pending  Labs in AM  Pathology pending  Encouraged mobilization and IS      OCTAVIO Rodriguez  6/16/2022  Pulmonology/Critical Care

## 2022-06-16 NOTE — PROGRESS NOTES
[facility] MEDICINE PROGRESS NOTE      Patient Name: Josafat Boudreaux  MRN: [id number]  Admission Date: 6/10/2022  8:59 AM  Status: IP- Inpatient   Length of Stay: 6 days  Face-to-Face encounter date: 06/16/2022         CHIEF COMPLAINT  Hospital follow up for Acute hypoxic respiratory failure    HOSPITAL COURSE  56 year old  s/p tracheostomy with dysphagia, so most of the information is gathered from the medical record.  He is a 56 year old male who presented to the ED on 6/10/22 via [facility] with c/o SOB.  It appears that he had some hoarseness starting in November 2021 that kept getting progressively worse and was recently diagnosed with a 2.4 cm supraglottic mass with additional metastatic appearing lymph nodes. on his vocal cords.  He had a trach scheduled for 7/6/2022.  Per ER MD note, the patient's wife states that his RA O2 was down to the 40s when she called EMS.  During his ED stay, he was placed on BiPap, but his CO2 continued to increase, and ENT was consulted for emergent trach and he was admitted to ICU.  He was extubated on 6/11, and is tolerating trach collar well.  Today's ED lab work was notable for Na 128, improved from previous.  He was deemed appropriate to transfer to the floor and he was admitted to hospital medicine for further management with ENT to continue to follow. He underwent bronchoscopy with biopsies of right upper lobe endobronchial lesion on 6/14. Pathology pending.    Today (06/16/2022):  He denies any chest pain or shortness of breath.  He reports no bowel movement in about 5 days.  No nausea, vomiting abdominal pain.  He is passing flatus.  Wife at bedside.    OBJECTIVE    VITAL SIGNS: 24 HRS MIN & MAX LAST   Temp  Min: 98.7 °F (37.1 °C)  Max: 101.8 °F (38.8 °C) 99.1 °F (37.3 °C)   BP  Min: 109/70  Max: 129/80 116/86   Pulse  Min: 106  Max: 119  (Abnormal) 118   Resp  Min: 14  Max: 20 18   SpO2  Min: 92 %  Max: 97 % 96 %        LABS/MICROBIOLOGY/MEDICATIONS/DIAGNOSTICS  I have reviewed all pertinent lab results within the past 24 hours.    Recent Labs   Lab 06/10/22  0923 06/12/22 0632 06/13/22 0229   WBC 7.5 9.8 10.7   RBC 5.48 4.91 4.92   HGB 18.1* 15.9 16.0   HCT 57.6* 49.7 51.8   .1* 101.2* 105.3*   MCH 33.0* 32.4* 32.5*   MCHC 31.4* 32.0* 30.9*   RDW 14.3 14.7 14.5    153 151   MPV 10.6 9.9 9.7       Recent Labs   Lab 06/10/22  0923 06/10/22  0925 06/10/22  1256 06/10/22  1632 06/10/22  1810 06/10/22  1945 06/11/22  0457 06/12/22 0632 06/13/22 0229   *   < >  --   --   --    < > 126* 128* 135*   K 6.4*   < >  --   --   --    < > 5.2* 4.5 4.0   CO2 37*   < >  --   --   --    < > 32* 32* 28   BUN 10.2   < >  --   --   --    < > 10.4 6.9* 5.3*   CREATININE 0.77   < >  --   --   --    < > 0.74 0.60* 0.57*   CALCIUM 9.3   < >  --   --   --    < > 8.6 8.4 8.9   PH  --    < > 7.10* 7.51* 7.53*  --   --   --   --    MG  --   --   --   --   --   --   --  2.00  --    ALBUMIN 3.6  --   --   --   --   --   --  2.6* 2.4*   ALKPHOS 121  --   --   --   --   --   --  77 74   ALT 45  --   --   --   --   --   --  16 13   AST 53*  --   --   --   --   --   --  15 14   BILITOT 0.6  --   --   --   --   --   --  0.8 0.8    < > = values in this interval not displayed.       No results for input(s): WBC, RBC, HGB, HCT, MCV, MCH, MCHC, RDW, ESR in the last 72 hours.    Invalid input(s): PLATELET, CRP2  No results for input(s): CHLORIDE, CO2, BUN, CREATININE, GLUCOSE, CALCIUM, MAGNESIUM, PHOSPHORUS, ALBUMIN, ALKPHOS, ALT, AST, INR, APTT, DDIMER, BNP, CPK, TROPONINI in the last 72 hours.    Invalid input(s): SODIUM, POTASSIUM    Microbiology Results (last 7 days)     Procedure Component Value Units Date/Time    Respiratory Culture [[id number]] Collected: 06/15/22 2232    Order Status: Resulted Specimen: Sputum, Expectorated Updated: 06/15/22 2232    Blood Culture [[id number]] Collected: 06/15/22 1842    Order Status: Resulted  Specimen: Blood Updated: 06/15/22 1848           Fl Modified Barium Swallow Speech  See Speech Therapist Notes    This procedure was auto-finalized.        Scheduled Med:   enoxaparin  40 mg Subcutaneous Daily    levalbuterol  1.25 mg Nebulization Q8H    LIDOcaine HCl 2%  15 mL Mucous Membrane Once    mupirocin   Topical (Top) BID    neomycin-bacitracin-polymyxin   Topical (Top) Daily    nicotine  1 patch Transdermal Daily    pantoprazole  40 mg Intravenous Daily    sodium zirconium cyclosilicate  5 g Oral Once        Continuous Infusions:   dextrose 5 % and 0.9 % NaCl 75 mL/hr at 22 1656        PRN Meds:  diphenhydrAMINE, [] fentaNYL **FOLLOWED BY** fentaNYL, LIDOcaine HCL 4%, lorazepam       PHYSICAL EXAM  Constitutional: Appears well-developed, well-nourished and appears stated age. No acute distress.   Head/Eyes/Ears/Nose/Mouth/Throat: Normocephalic, atraumatic. PERRLA, EOM intact. Conjunctive clear, sclera white. Pinna no masses, lesions, tenderness, drainage. Pharynx pink.  Neck: No JVD present. Normal alignment and mobility. + tracheostomy, on trach collar.  Cardiovascular: Regular rate and rhythm, S1 / S2, no systolic or diastolic murmur, no rubs or gallops.  Pulmonary/Chest: Effort normal. No respiratory distress. Breath sounds normal. No rales, rhonchi, rubs or wheezes.    Abdominal: Flat, soft, non-tender, non-distended, no rebound tenderness or guarding, normal bowel sounds in all four quadrants, + PEG tube.  Musculoskeletal: Extremities symmetric, no tenderness, weakness, or swelling. Maintains flexion against resistance.   Extremities:  Nails: No clubbing, cyanosis, petechiae or nodes. 2+ bilateral pedal and radial pulses with absence of bilateral lower ext edema.  Neurological: Alert and oriented to person, place, and time. Cooperative. Speech clear, appropriate. No dysarthria. Cranial nerves II-XII grossly intact. No focal deficits.  Psychiatric: No depression, anxiety or  agitation. Normal affect, no hallucinations or suicidal ideations, no pressured speech.  Skin: Skin is warm and dry. Intact, no clubbing, decubitis, lesions, rashes or erythema.      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  Follow-up on pathology.  Pulmonary following.  No indication for mediastinoscopy now.  Continue trach care  Continue bronchodilator therapy  DVT Prophylaxis: Lovenox    Patient condition:  Fair    Anticipated discharge and disposition: Home with  when medically ready  __________________________________________________________________________    All diagnosis and differential diagnosis have been reviewed; assessment and plan have been documented. I have personally reviewed the test results that are presently available; I have reviewed the patient's medication list. I have reviewed the consulting providers' recommendations. I have reviewed or attempted to review medical records based upon their availability.  All of the patient's questions have been addressed and answered. Patient is agreeable to the above stated plan. I will continue to monitor closely and make adjustment to medical management as needed.    This document was created with the assistance of a voice recognition software. There may be transcription errors as a result of using this technology, however minimal. Effort has been made to ensure accuracy of  transcription but any obvious errors or omissions should be clarified with the author of this document.        Ra Quintanilla MD   [facility]  06/16/2022

## 2022-06-16 NOTE — PLAN OF CARE
Problem: Adult Inpatient Plan of Care  Goal: Plan of Care Review  Outcome: Ongoing, Progressing  Goal: Patient-Specific Goal (Individualized)  Outcome: Ongoing, Progressing  Goal: Absence of Hospital-Acquired Illness or Injury  Outcome: Ongoing, Progressing  Goal: Optimal Comfort and Wellbeing  Outcome: Ongoing, Progressing  Goal: Readiness for Transition of Care  Outcome: Ongoing, Progressing     Problem: Communication Impairment (Mechanical Ventilation, Invasive)  Goal: Effective Communication  Outcome: Ongoing, Progressing     Problem: Device-Related Complication Risk (Mechanical Ventilation, Invasive)  Goal: Optimal Device Function  Outcome: Ongoing, Progressing     Problem: Inability to Wean (Mechanical Ventilation, Invasive)  Goal: Mechanical Ventilation Liberation  Outcome: Ongoing, Progressing     Problem: Nutrition Impairment (Mechanical Ventilation, Invasive)  Goal: Optimal Nutrition Delivery  Outcome: Ongoing, Progressing     Problem: Skin and Tissue Injury (Mechanical Ventilation, Invasive)  Goal: Absence of Device-Related Skin and Tissue Injury  Outcome: Ongoing, Progressing     Problem: Ventilator-Induced Lung Injury (Mechanical Ventilation, Invasive)  Goal: Absence of Ventilator-Induced Lung Injury  Outcome: Ongoing, Progressing     Problem: Communication Impairment (Artificial Airway)  Goal: Effective Communication  Outcome: Ongoing, Progressing     Problem: Device-Related Complication Risk (Artificial Airway)  Goal: Optimal Device Function  Outcome: Ongoing, Progressing     Problem: Skin and Tissue Injury (Artificial Airway)  Goal: Absence of Device-Related Skin or Tissue Injury  Outcome: Ongoing, Progressing     Problem: Noninvasive Ventilation Acute  Goal: Effective Unassisted Ventilation and Oxygenation  Outcome: Ongoing, Progressing     Problem: Skin Injury Risk Increased  Goal: Skin Health and Integrity  Outcome: Ongoing, Progressing

## 2022-06-17 LAB
ALBUMIN SERPL-MCNC: 1.9 GM/DL (ref 3.5–5)
ALBUMIN/GLOB SERPL: 0.5 RATIO (ref 1.1–2)
ALP SERPL-CCNC: 77 UNIT/L (ref 40–150)
ALT SERPL-CCNC: 19 UNIT/L (ref 0–55)
AST SERPL-CCNC: 25 UNIT/L (ref 5–34)
BASOPHILS # BLD AUTO: 0.05 X10(3)/MCL (ref 0–0.2)
BASOPHILS NFR BLD AUTO: 0.6 %
BILIRUBIN DIRECT+TOT PNL SERPL-MCNC: 0.4 MG/DL
BUN SERPL-MCNC: 10.5 MG/DL (ref 8.4–25.7)
CALCIUM SERPL-MCNC: 8.4 MG/DL (ref 8.4–10.2)
CHLORIDE SERPL-SCNC: 96 MMOL/L (ref 98–107)
CO2 SERPL-SCNC: 36 MMOL/L (ref 22–29)
CREAT SERPL-MCNC: 0.54 MG/DL (ref 0.73–1.18)
EOSINOPHIL # BLD AUTO: 0.28 X10(3)/MCL (ref 0–0.9)
EOSINOPHIL NFR BLD AUTO: 3.6 %
ERYTHROCYTE [DISTWIDTH] IN BLOOD BY AUTOMATED COUNT: 14.6 % (ref 11.5–17)
FOLATE SERPL-MCNC: 15.5 NG/ML (ref 7–31.4)
GLOBULIN SER-MCNC: 3.5 GM/DL (ref 2.4–3.5)
GLUCOSE SERPL-MCNC: 118 MG/DL (ref 74–100)
HCT VFR BLD AUTO: 50.1 % (ref 42–52)
HGB BLD-MCNC: 15.2 GM/DL (ref 14–18)
IMM GRANULOCYTES # BLD AUTO: 0.04 X10(3)/MCL (ref 0–0.02)
IMM GRANULOCYTES NFR BLD AUTO: 0.5 % (ref 0–0.43)
LYMPHOCYTES # BLD AUTO: 0.71 X10(3)/MCL (ref 0.6–4.6)
LYMPHOCYTES NFR BLD AUTO: 9.1 %
MAGNESIUM SERPL-MCNC: 1.8 MG/DL (ref 1.6–2.6)
MCH RBC QN AUTO: 31.8 PG (ref 27–31)
MCHC RBC AUTO-ENTMCNC: 30.3 MG/DL (ref 33–36)
MCV RBC AUTO: 104.8 FL (ref 80–94)
MONOCYTES # BLD AUTO: 1.01 X10(3)/MCL (ref 0.1–1.3)
MONOCYTES NFR BLD AUTO: 13 %
NEUTROPHILS # BLD AUTO: 5.7 X10(3)/MCL (ref 2.1–9.2)
NEUTROPHILS NFR BLD AUTO: 73.2 %
NRBC BLD AUTO-RTO: 0 %
PHOSPHATE SERPL-MCNC: 2.9 MG/DL (ref 2.3–4.7)
PLATELET # BLD AUTO: 196 X10(3)/MCL (ref 130–400)
PMV BLD AUTO: 10.9 FL (ref 9.4–12.4)
POTASSIUM SERPL-SCNC: 4.2 MMOL/L (ref 3.5–5.1)
PROT SERPL-MCNC: 5.4 GM/DL (ref 6.4–8.3)
RBC # BLD AUTO: 4.78 X10(6)/MCL (ref 4.7–6.1)
SODIUM SERPL-SCNC: 137 MMOL/L (ref 136–145)
VIT B12 SERPL-MCNC: 563 PG/ML (ref 213–816)
WBC # SPEC AUTO: 7.8 X10(3)/MCL (ref 4.5–11.5)

## 2022-06-17 PROCEDURE — 63600175 PHARM REV CODE 636 W HCPCS: Performed by: INTERNAL MEDICINE

## 2022-06-17 PROCEDURE — 25000003 PHARM REV CODE 250: Performed by: NURSE PRACTITIONER

## 2022-06-17 PROCEDURE — 99900031 HC PATIENT EDUCATION (STAT)

## 2022-06-17 PROCEDURE — 99900026 HC AIRWAY MAINTENANCE (STAT)

## 2022-06-17 PROCEDURE — 27000221 HC OXYGEN, UP TO 24 HOURS

## 2022-06-17 PROCEDURE — 92597 ORAL SPEECH DEVICE EVAL: CPT

## 2022-06-17 PROCEDURE — 25000242 PHARM REV CODE 250 ALT 637 W/ HCPCS: Performed by: INTERNAL MEDICINE

## 2022-06-17 PROCEDURE — 94761 N-INVAS EAR/PLS OXIMETRY MLT: CPT

## 2022-06-17 PROCEDURE — 11000001 HC ACUTE MED/SURG PRIVATE ROOM

## 2022-06-17 PROCEDURE — 82746 ASSAY OF FOLIC ACID SERUM: CPT | Performed by: INTERNAL MEDICINE

## 2022-06-17 PROCEDURE — 97162 PT EVAL MOD COMPLEX 30 MIN: CPT

## 2022-06-17 PROCEDURE — 63600175 PHARM REV CODE 636 W HCPCS: Performed by: NURSE PRACTITIONER

## 2022-06-17 PROCEDURE — 80053 COMPREHEN METABOLIC PANEL: CPT | Performed by: INTERNAL MEDICINE

## 2022-06-17 PROCEDURE — 85025 COMPLETE CBC W/AUTO DIFF WBC: CPT | Performed by: INTERNAL MEDICINE

## 2022-06-17 PROCEDURE — 84100 ASSAY OF PHOSPHORUS: CPT | Performed by: INTERNAL MEDICINE

## 2022-06-17 PROCEDURE — C9113 INJ PANTOPRAZOLE SODIUM, VIA: HCPCS | Performed by: NURSE PRACTITIONER

## 2022-06-17 PROCEDURE — 36415 COLL VENOUS BLD VENIPUNCTURE: CPT | Performed by: INTERNAL MEDICINE

## 2022-06-17 PROCEDURE — 94640 AIRWAY INHALATION TREATMENT: CPT

## 2022-06-17 PROCEDURE — 82607 VITAMIN B-12: CPT | Performed by: INTERNAL MEDICINE

## 2022-06-17 PROCEDURE — 25000003 PHARM REV CODE 250: Performed by: INTERNAL MEDICINE

## 2022-06-17 PROCEDURE — 97535 SELF CARE MNGMENT TRAINING: CPT

## 2022-06-17 PROCEDURE — 83735 ASSAY OF MAGNESIUM: CPT | Performed by: INTERNAL MEDICINE

## 2022-06-17 PROCEDURE — S4991 NICOTINE PATCH NONLEGEND: HCPCS | Performed by: NURSE PRACTITIONER

## 2022-06-17 PROCEDURE — 97166 OT EVAL MOD COMPLEX 45 MIN: CPT

## 2022-06-17 RX ORDER — BUDESONIDE 0.5 MG/2ML
0.5 INHALANT ORAL EVERY 12 HOURS
Status: DISCONTINUED | OUTPATIENT
Start: 2022-06-17 | End: 2022-06-24 | Stop reason: HOSPADM

## 2022-06-17 RX ORDER — FUROSEMIDE 10 MG/ML
20 INJECTION INTRAMUSCULAR; INTRAVENOUS ONCE
Status: DISCONTINUED | OUTPATIENT
Start: 2022-06-17 | End: 2022-06-17

## 2022-06-17 RX ORDER — FUROSEMIDE 10 MG/ML
40 INJECTION INTRAMUSCULAR; INTRAVENOUS ONCE
Status: COMPLETED | OUTPATIENT
Start: 2022-06-17 | End: 2022-06-17

## 2022-06-17 RX ADMIN — DOCUSATE SODIUM LIQUID 100 MG: 100 LIQUID ORAL at 08:06

## 2022-06-17 RX ADMIN — BACITRACIN ZINC, NEOMYCIN SULFATE, AND POLYMYXIN B SULFATE: 400; 3.5; 5 OINTMENT TOPICAL at 08:06

## 2022-06-17 RX ADMIN — MUPIROCIN: 20 OINTMENT TOPICAL at 08:06

## 2022-06-17 RX ADMIN — HYDROCODONE BITARTRATE AND ACETAMINOPHEN 1 TABLET: 5; 325 TABLET ORAL at 09:06

## 2022-06-17 RX ADMIN — DOCUSATE SODIUM LIQUID 100 MG: 100 LIQUID ORAL at 09:06

## 2022-06-17 RX ADMIN — DIPHENHYDRAMINE HYDROCHLORIDE 25 MG: 50 INJECTION, SOLUTION INTRAMUSCULAR; INTRAVENOUS at 09:06

## 2022-06-17 RX ADMIN — LEVALBUTEROL HYDROCHLORIDE 1.25 MG: 1.25 SOLUTION, CONCENTRATE RESPIRATORY (INHALATION) at 04:06

## 2022-06-17 RX ADMIN — LEVALBUTEROL HYDROCHLORIDE 1.25 MG: 1.25 SOLUTION, CONCENTRATE RESPIRATORY (INHALATION) at 08:06

## 2022-06-17 RX ADMIN — ENOXAPARIN SODIUM 40 MG: 40 INJECTION SUBCUTANEOUS at 06:06

## 2022-06-17 RX ADMIN — PANTOPRAZOLE SODIUM 40 MG: 40 INJECTION, POWDER, LYOPHILIZED, FOR SOLUTION INTRAVENOUS at 08:06

## 2022-06-17 RX ADMIN — LEVALBUTEROL HYDROCHLORIDE 1.25 MG: 1.25 SOLUTION, CONCENTRATE RESPIRATORY (INHALATION) at 12:06

## 2022-06-17 RX ADMIN — NICOTINE 1 PATCH: 21 PATCH, EXTENDED RELEASE TRANSDERMAL at 08:06

## 2022-06-17 RX ADMIN — FUROSEMIDE 40 MG: 10 INJECTION, SOLUTION INTRAMUSCULAR; INTRAVENOUS at 01:06

## 2022-06-17 RX ADMIN — MUPIROCIN: 20 OINTMENT TOPICAL at 09:06

## 2022-06-17 RX ADMIN — BUDESONIDE 0.5 MG: 0.5 INHALANT ORAL at 07:06

## 2022-06-17 NOTE — PROGRESS NOTES
[facility] [address]  Adult Nutrition  Progress Note    SUMMARY       Recommendations    Recommendation/Intervention: Tube Feeding recs: Diabetisource goal rate 80ml/hr (based on est run time 20h/day); provides: 1950 kcal, 96 gm protein, 1305 ml free water - free water flush rec:80ml q3h  Goals: Provide adequate nutrition to meet est needs.  Nutrition Goal Status: goal met  Communication of RD Recs: reviewed with RN    Assessment and Plan    Nutrition Problem  Inadequate Oral Intake     Related to (etiology):   Current condition     Signs and Symptoms (as evidenced by):   NPO     Interventions(treatment strategy):  Modified composition of enteral nutrition, Modified rate of enteral nutrition and Collaboration with other providers     Nutrition Diagnosis Status:   continues       Malnutrition Assessment  Malnutrition Type: other (see comments) (does not meet criteria)          Weight Loss (Malnutrition): other (see comments) (unable to eval)  Energy Intake (Malnutrition): other (see comments) (unable to eval)  Subcutaneous Fat (Malnutrition): other (see comments) (does not meet criteria)  Muscle Mass (Malnutrition): mild depletion  Fluid Accumulation (Malnutrition): other (see comments) (does not meet criteria)  Hand  Strength, Left (Malnutrition): unable to eval  Hand  Strength, Right (Malnutrition): unable to eval       Rastafarian Region (Muscle Loss): mild depletion  Clavicle Bone Region (Muscle Loss): mild depletion                 Reason for Assessment    Reason For Assessment: other (see comments) (Plans for PEG placement)  Diagnosis: other (see comments) (Acute hypoxemic respiratory failure s/p emergent tracheostomy  2.4 cm supraglottic mass with additional metastatic appearing lymph nodes  Acute hyponatremia)  Relevant Medical History: COPD  General Information Comments: tolerating tube feeding a goal    Nutrition Risk Screen    Nutrition Risk Screen: tube feeding or  "parenteral nutrition    Nutrition/Diet History    Spiritual, Cultural Beliefs, Scientology Practices, Values that Affect Care: no    Anthropometrics    Temp: 98.5 °F (36.9 °C)  Height: 5' 9.02" (175.3 cm)  Height (inches): 69.02 in  Weight Method: Standard Scale  Weight: 68 kg (150 lb)  Weight (lb): 150 lb  Ideal Body Weight (IBW), Male: 160.12 lb  BMI Grade: 18.5-24.9 - normal       Lab/Procedures/Meds    Pertinent Labs Reviewed: reviewed  Pertinent Labs Comments: 6/17: Cl 96, Glu 118, Crea0.54  Pertinent Medications Reviewed: reviewed  Pertinent Medications Comments: docusate    Physical Findings/Assessment         Estimated/Assessed Needs    Weight Used For Calorie Calculations: 68 kg (149 lb 14.6 oz)  Energy Calorie Requirements (kcal): 1950kcal  Energy Need Method: Lincoln-St Jeor (1.3 stress factor)  Protein Requirements: 95gm  Weight Used For Protein Calculations: 68 kg (149 lb 14.6 oz)  Fluid Requirements (mL): 1950ml     RDA Method (mL): 1950         Nutrition Prescription Ordered    Current Diet Order: NPO    Evaluation of Received Nutrient/Fluid Intake    Enteral Calories (kcal): 1920  Enteral Protein (gm): 96  % Kcal Needs: 100  % Protein Needs: 100  Energy Calories Required: meeting needs  Protein Required: meeting needs  Tolerance: tolerating  % Intake of Estimated Energy Needs: 75 - 100 %  % Meal Intake: NPO    Nutrition Risk    Level of Risk/Frequency of Follow-up: high     Monitor and Evaluation    Food and Nutrient Intake: enteral nutrition intake  Anthropometric Measurements: weight change     Nutrition Follow-Up    RD Follow-up?: Yes    "

## 2022-06-17 NOTE — PROGRESS NOTES
Pulmonary & Critical Care Medicine   Progress Note      Presenting History:    This is a 56-year-old male who presented to [facility] 6/10/22 via [facility] due to shortness of breath. According to the wife, Mr. Boudreaux woke up severely short of breath. She said she checked his pulse ox which read as 30% and stated he was struggling to breath. Of note, per the wife he had a history voice hoarseness since November 2021 but had this never investigated until 6/6/2022. 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 He was taken for emergent tracheostomy on 6/10/22. He underwent bronchoscopy with biopsies of right upper lobe endobronchial lesion on 6/14. Pathology pending.     Objective/Subjective:  On 10L 60% FiO2 trach collar this morning.  No major issues or changes overnight.    Scheduled Medications:    budesonide  0.5 mg Nebulization Q12H    docusate  100 mg Per G Tube BID    enoxaparin  40 mg Subcutaneous Daily    furosemide (LASIX) injection  40 mg Intravenous Once    levalbuterol  1.25 mg Nebulization Q8H    mupirocin   Topical (Top) BID    neomycin-bacitracin-polymyxin   Topical (Top) Daily    nicotine  1 patch Transdermal Daily    pantoprazole  40 mg Intravenous Daily       PRN Medications:   diphenhydrAMINE, HYDROcodone-acetaminophen, LIDOcaine HCL 4%, lorazepam, morphine, polyethylene glycol      Infusions:          Fluid Balance:   No intake or output data in the 24 hours ending 06/17/22 1132        Vital Signs:   Vitals:    06/17/22 1128   BP: 125/79   Pulse: (!) 112   Resp: 19   Temp: 98.5 °F (36.9 °C)         Physical Exam  Constitutional:       Appearance: Normal appearance.   HENT:      Head: Normocephalic and atraumatic.      Mouth/Throat:      Comments: Tracheostomy present  Cardiovascular:      Rate and Rhythm: Normal rate and regular rhythm.   Pulmonary:      Effort: Pulmonary effort is normal.      Breath sounds: Rhonchi  throughout, diminished breath sounds in all lung fields  Abdominal:      General: Abdomen is flat.      Palpations: Abdomen is soft.   Neurological:      General: No focal deficit present.      Mental Status: He is alert and oriented to person, place, and time.    Laboratory Studies:   No results for input(s): PH, PCO2, PO2, HCO3, POCSATURATED, BE in the last 24 hours.  Recent Labs   Lab 06/17/22  0438   WBC 7.8   RBC 4.78   HGB 15.2   HCT 50.1      .8*   MCH 31.8*   MCHC 30.3*     Recent Labs   Lab 06/17/22  0438   GLUCOSE 118*      K 4.2   CO2 36*   BUN 10.5   CREATININE 0.54*   MG 1.80         Microbiology Data:   Microbiology Results (last 7 days)     Procedure Component Value Units Date/Time    Respiratory Culture [[id number]]  (Abnormal) Collected: 06/15/22 2232    Order Status: Completed Specimen: Sputum, Expectorated Updated: 06/17/22 0810     Respiratory Culture Normal respiratory julieth     GRAM STAIN Quality 1+       Moderate Gram positive cocci      Few Gram Negative Rods      Moderate Gram Positive Rods    Blood Culture [[id number]]  (Normal) Collected: 06/15/22 1842    Order Status: Completed Specimen: Blood Updated: 06/16/22 2203     CULTURE, BLOOD ([facility]) No Growth At 24 Hours          No new imaging to review    Assessment and Plan    Assessment  1. Laryngeal mass s/p tracheostomy 6/10/22 and EBUS with biopsy of RUL endobronchial lesion on 6/14 path pending  2. Tobacco abuse   3. H/o Alcohol abuse  4.  Hypoxemic respiratory failure         Plan:  -Continue supplement oxygen to maintain saturation 88-92%  -sputum culture is polymicrobial may be normal oral julieth, wait for final data, currently stable not on antibiotics  -BNP elevated in the 400s patient would benefit from IV diuresis will give a dose of Lasix, last chest x-ray several days ago demonstrates pleural effusions in addition to edema and some mild atelectasis in the right upper lobe, may require further diuresis  -maintain  tracheostomy until patient is evaluated by ENT as an outpatient with plans for laryngeal surgery for the above mass  -Pathology pending from EBUS  -Encouraged mobilization and IS    Will need formal pulmonary function testing given to significant tobacco abuse history when appropriate as an outpatient    Nothing further to add from a pulmonology perspective.  Continue to titrate oxygen to the above goals.  Patient to be discharged to home or to rehab facility with oxygen.  Recommendations as above.  We will sign off.  Please call with questions.    Tony Mejia MD  6/17/2022  Pulmonology/Critical Care

## 2022-06-17 NOTE — PT/OT/SLP EVAL
Speech Language Pathology Evaluation  One Way Speaking Valve Evaluation    Patient Name:  Josafat Boudreaux   MRN:  [id number]  Admitting Diagnosis: Vocal cord mass    IMPORTANT  CAUTION: CUFF MUST ALWAYS BE DEFLATED WHEN VALVE IN USE    Recommendations:                 General Recommendations:  One Way Speaking Valve trials with SLP only  Diet recommendations:  NPO, NPO   Aspiration Precautions: Medications via PEG   General Precautions: Standard, aspiration  Communication strategies:  personal white board    History:     Past Medical History:   Diagnosis Date    COPD (chronic obstructive pulmonary disease)     Dysphagia     Hypertension     Vocal cord mass        Past Surgical History:   Procedure Laterality Date    HIP SURGERY      HUMERUS FRACTURE SURGERY      TRACHEOSTOMY N/A 6/10/2022    Procedure: CREATION, TRACHEOSTOMY;  Surgeon: Junior Alonso MD;  [place] [facility] [place]  Service: ENT;  Laterality: N/A;       Social History: Patient lives with spouse.    Modified Barium Swallow 6/16/2022    Subjective     Patient awake, alert and oriented x4.    Patient goals: to talk     Objective:     Pain/Comfort:  · Pain Rating 1: 0/10    Respiratory Status: Trach collar    Barriers to Learning: none    Oral Musculature Evaluation  · Oral Musculature: WFL  · Dentition: present and adequate  · Secretion Management: adequate  · Mucosal Quality: good  · Mandibular Strength and Mobility: WFL  · Oral Labial Strength and Mobility: WFL  · Lingual Strength and Mobility: WFL  · Buccal Strength and Mobility: WFL  · Volitional Cough: present  · Volitional Swallow: difficulty initiating  · Voice Prior to PO Intake: no phonation    Trach/Vent:  · Tracheostomy Type  · Shiley  · Tracheostomy Size: 8.0  · Tolerates cuff deflated: Yes  · If not, were there changes in vitals signs or signs of discomfort: n/a    One Way Speaking Valve Placement:  Type of speaking valve: One Way Speaking Valve  Purple    Sp02 before trial: 93  Sp02  during trial: 95  Time on valve: 10 minutes  Phonation on exhalation: none  Achieved phonation on 0% of words  Overall speech intelligibility: poor    Patient reaction: Flat Affect    Education provided: Laryngectomy patient guide      Assessment:     Pt presents with impaired verbal communication secondary to tracheostomy due to laryngeal mass.    Goals:   Multidisciplinary Problems     SLP Goals        Problem: SLP    Goal Priority Disciplines Outcome   SLP Goal     SLP Ongoing, Progressing   Description: LTG: Tolerate least restrictive PO diet with no clinical signs/sx aspiration    STGs:  1.  Complete base of tongue and laryngeal strengthening exercises with min cues  2.  Tolerate thermal stim x10 with 100% effortful swallows and delay less than 2 seconds.    LTG: Tolerate speaking valve during all waking hours with no signs/sx respiratory distress/compromise  STG: Tolerate speaking valve x1 hour with no signs/sx respiratory distress/compromise                   Plan:     · Patient to be seen:  5 x/week   · Plan of Care expires:  06/26/22  · Plan of Care reviewed with:  patient, spouse   · SLP Follow-Up:  Yes       Discharge recommendations:  outpatient speech therapy   Barriers to Discharge:  None    Time Tracking:     SLP Treatment Date:   06/17/22  Speech Start Time:  1445  Speech Stop Time:  1515     Speech Total Time (min):  30 min    Billable minutes: Evaluation Use/Fit Voice Prosthetic Device, 15 minutes  Self Care/Home Management, 15 minutes      06/17/2022

## 2022-06-17 NOTE — PLAN OF CARE
Problem: Physical Therapy  Goal: Physical Therapy Goal  Description: Goals to be met by: 22     Patient will increase functional independence with mobility by performin. Supine to sit with Set-up [place]  2. Sit to supine with Set-up [place]  3. Sit to stand transfer with Supervision  4. Gait  x 400 feet with Supervision using No Assistive Device.   5. Increased functional strength to 5/5 in BLE's    Outcome: Ongoing, Progressing

## 2022-06-17 NOTE — PROGRESS NOTES
[facility] MEDICINE PROGRESS NOTE      Patient Name: Josafat Boudreaux  MRN: [id number]  Admission Date: 6/10/2022  8:59 AM  Status: IP- Inpatient   Length of Stay: 7 days  Face-to-Face encounter date: 06/17/2022       CHIEF COMPLAINT  Hospital follow up for Acute hypoxic respiratory failure    HOSPITAL COURSE  56 year old  s/p tracheostomy with dysphagia, so most of the information is gathered from the medical record.  He is a 56 year old male who presented to the ED on 6/10/22 via [facility] with c/o SOB.  It appears that he had some hoarseness starting in November 2021 that kept getting progressively worse and was recently diagnosed with a 2.4 cm supraglottic mass with additional metastatic appearing lymph nodes. on his vocal cords.  He had a trach scheduled for 7/6/2022.  Per ER MD note, the patient's wife states that his RA O2 was down to the 40s when she called EMS.  During his ED stay, he was placed on BiPap, but his CO2 continued to increase, and ENT was consulted for emergent trach and he was admitted to ICU.  He was extubated on 6/11, and is tolerating trach collar well.  Today's ED lab work was notable for Na 128, improved from previous.  He was deemed appropriate to transfer to the floor and he was admitted to hospital medicine for further management with ENT to continue to follow. He underwent bronchoscopy with biopsies of right upper lobe endobronchial lesion on 6/14. Pathology pending.    Today (06/17/2022):  He denies any chest pain or shortness of breath.  He is on 10 L via trach collar.  He is yet to have a bowel movement but feels as if he would call soon and does not want any escalation in his laxatives.  Denies abdominal pain. No nausea, vomiting.  He is passing flatus.  Wife at bedside.    OBJECTIVE    VITAL SIGNS: 24 HRS MIN & MAX LAST   Temp  Min: 98.3 °F (36.8 °C)  Max: 99.9 °F (37.7 °C) 99.9 °F (37.7 °C)   BP  Min: 99/69  Max: 143/74 (Abnormal) 143/74    Pulse  Min: 109  Max: 122  (Abnormal) 120   Resp  Min: 16  Max: 20 18   SpO2  Min: 90 %  Max: 99 % 95 %       LABS/MICROBIOLOGY/MEDICATIONS/DIAGNOSTICS  I have reviewed all pertinent lab results within the past 24 hours.    Recent Labs   Lab 06/12/22 0632 06/13/22 0229 06/17/22 0438   WBC 9.8 10.7 7.8   RBC 4.91 4.92 4.78   HGB 15.9 16.0 15.2   HCT 49.7 51.8 50.1   .2* 105.3* 104.8*   MCH 32.4* 32.5* 31.8*   MCHC 32.0* 30.9* 30.3*   RDW 14.7 14.5 14.6    151 196   MPV 9.9 9.7 10.9       Recent Labs   Lab 06/10/22  1632 06/10/22  1810 06/10/22  1945 06/12/22 0632 06/13/22 0229 06/17/22 0438   NA  --   --    < > 128* 135* 137   K  --   --    < > 4.5 4.0 4.2   CO2  --   --    < > 32* 28 36*   BUN  --   --    < > 6.9* 5.3* 10.5   CREATININE  --   --    < > 0.60* 0.57* 0.54*   CALCIUM  --   --    < > 8.4 8.9 8.4   PH 7.51* 7.53*  --   --   --   --    MG  --   --   --  2.00  --  1.80   ALBUMIN  --   --   --  2.6* 2.4* 1.9*   ALKPHOS  --   --   --  77 74 77   ALT  --   --   --  16 13 19   AST  --   --   --  15 14 25   BILITOT  --   --   --  0.8 0.8 0.4    < > = values in this interval not displayed.       Recent Labs     06/17/22 0438   WBC 7.8   RBC 4.78   HGB 15.2   HCT 50.1   .8*   MCH 31.8*   MCHC 30.3*   RDW 14.6     Recent Labs     06/17/22 0438   CHLORIDE 96*   CO2 36*   BUN 10.5   CREATININE 0.54*   GLUCOSE 118*   CALCIUM 8.4   ALBUMIN 1.9*   ALKPHOS 77   ALT 19   AST 25       Microbiology Results (last 7 days)     Procedure Component Value Units Date/Time    Respiratory Culture [[id number]]  (Abnormal) Collected: 06/15/22 2232    Order Status: Completed Specimen: Sputum, Expectorated Updated: 06/17/22 0810     Respiratory Culture Normal respiratory julieth     GRAM STAIN Quality 1+       Moderate Gram positive cocci      Few Gram Negative Rods      Moderate Gram Positive Rods    Blood Culture [[id number]]  (Normal) Collected: 06/15/22 1842    Order Status: Completed Specimen: Blood  Updated: 06/16/22 2203     CULTURE, BLOOD ([facility]) No Growth At 24 Hours           CV Ultrasound doppler venous legs bilat  · There is no evidence of a right lower extremity DVT.  · There is no evidence of a left lower extremity DVT.     There is no evidence of deep or superficial vein thrombosis in bilateral   lower extremities.         Scheduled Med:   budesonide  0.5 mg Nebulization Q12H    docusate  100 mg Per G Tube BID    enoxaparin  40 mg Subcutaneous Daily    levalbuterol  1.25 mg Nebulization Q8H    mupirocin   Topical (Top) BID    neomycin-bacitracin-polymyxin   Topical (Top) Daily    nicotine  1 patch Transdermal Daily    pantoprazole  40 mg Intravenous Daily        Continuous Infusions:       PRN Meds:  diphenhydrAMINE, HYDROcodone-acetaminophen, LIDOcaine HCL 4%, lorazepam, morphine, polyethylene glycol       PHYSICAL EXAM  Constitutional: Appears well-developed, well-nourished and appears stated age. No acute distress.   Head/Eyes/Ears/Nose/Mouth/Throat: Normocephalic, atraumatic. PERRLA, EOM intact. Conjunctive clear, sclera white. Pinna no masses, lesions, tenderness, drainage. Pharynx pink.  Neck: No JVD present. Normal alignment and mobility. + tracheostomy, on trach collar.  Cardiovascular: Regular rate and rhythm, S1 / S2, no systolic or diastolic murmur, no rubs or gallops.  Pulmonary/Chest: Effort normal. No respiratory distress. Breath sounds normal. No rales, rhonchi, rubs or wheezes.    Abdominal: Flat, soft, non-tender, non-distended, no rebound tenderness or guarding, normal bowel sounds in all four quadrants, + PEG tube.  Musculoskeletal: Extremities symmetric, no tenderness, weakness, or swelling. Maintains flexion against resistance.   Extremities:  Nails: No clubbing, cyanosis, petechiae or nodes. 2+ bilateral pedal and radial pulses with 1+ bilateral lower ext edema.  Neurological: Alert and oriented to person, place, and time. Cooperative. Speech clear, appropriate. No  dysarthria. Cranial nerves II-XII grossly intact. No focal deficits.  Psychiatric: No depression, anxiety or agitation. Normal affect, no hallucinations or suicidal ideations, no pressured speech.  Skin: Skin is warm and dry. Intact, no clubbing, decubitis, lesions, rashes or erythema.      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  Follow-up on pathology.  Pulmonary following.  No indication for mediastinoscopy now  Continue trach care. Wean oxygen as tolerated.  Keep SaO2 greater than 88%  Continue bronchodilator therapy  Patient to follow-up with his oncologist Dr. Cao after discharge.  He is also to follow up with his ENT physician for scheduled laryngeal surgery.  Outpatient PFTs  DVT Prophylaxis: Lovenox    Patient condition:  Fair    Anticipated discharge and disposition: Home with  when medically ready  __________________________________________________________________________    All diagnosis and differential diagnosis have been reviewed; assessment and plan have been documented. I have personally reviewed the test results that are presently available; I have reviewed the patient's medication list. I have reviewed the consulting providers' recommendations. I have reviewed or attempted to review medical records based upon their availability.  All of the patient's questions have been addressed and  answered. Patient is agreeable to the above stated plan. I will continue to monitor closely and make adjustment to medical management as needed.    This document was created with the assistance of a voice recognition software. There may be transcription errors as a result of using this technology, however minimal. Effort has been made to ensure accuracy of transcription but any obvious errors or omissions should be clarified with the author of this document.        Ra Quintanilla MD   [facility]  06/17/2022

## 2022-06-17 NOTE — PLAN OF CARE
Problem: Occupational Therapy  Goal: Occupational Therapy Goal  Description: Goals to be met by: 7/15    Patient will increase functional independence with ADLs by performing:    UE Dressing with Modified [place].  LE Dressing with Modified Ong.  Grooming while standing with Modified Ong.  Toileting from bedside commode with Modified Ong for hygiene and clothing management.   Toilet transfer to bedside commode with Modified Ong.    Outcome: Ongoing, Progressing

## 2022-06-17 NOTE — PT/OT/SLP EVAL
Occupational Therapy   Evaluation    Name: Josafat Boudreaux  MRN: [id number]  Admitting Diagnosis:  Vocal cord mass  Recent Surgery: Procedure(s) (LRB):  PEG TUBE (N/A) 3 Days Post-Op    Recommendations:     Discharge Recommendations: home health PT, outpatient PT, outpatient OT  Discharge Equipment Recommendations:     Barriers to discharge:       Assessment:     Josafat Boudreaux is a 56 y.o. male with a medical diagnosis of Vocal cord mass.  He presents with the following Performance deficits affecting function: weakness, impaired endurance, impaired functional mobilty, gait instability, impaired balance, decreased safety awareness, impaired self care skills.      Rehab Prognosis: Good; patient would benefit from acute skilled OT services to address these deficits and reach maximum level of function.       Plan:     Patient to be seen 5 x/week to address the above listed problems via self-care/home management, therapeutic activities, therapeutic exercises  · Plan of Care Expires:    · Plan of Care Reviewed with:      Subjective     Chief Complaint: --  Patient/Family Comments/goals: --    Occupational Profile:  Living Environment: lives with wife in mobile home with 5 steps rail on L   Previous level of function: independent   Roles and Routines: drives   Equipment Used at Home:     Assistance upon Discharge: wife     Pain/Comfort:  ·      Patients cultural, spiritual, [employer]ar[employer] conflicts given the current situation:      Objective:     Communicated with: nrsg prior to session.  Patient found HOB elevated with oxygen, telemetry, Tracheostomy upon OT entry to room.    General Precautions: Standard,     Orthopedic Precautions:    Braces:    Respiratory Status: trach collar 10 liters    Occupational Performance:    Bed Mobility:    · Patient completed Supine to Sit with supervision  · Patient completed Sit to Supine with supervision    Functional Mobility/Transfers:  · Patient completed Sit <> Stand Transfer  with minimum assistance  with  rolling walker   · Functional Mobility: slight unsteadiness when ambulating backwards with RW     Activities of Daily Living:  · Grooming: supervision .  · Upper Body Dressing: minimum assistance .  · Lower Body Dressing: minimum assistance for balance  · Toileting: minimum assistance with RW     Cognitive/Visual Perceptual:  Cognitive/Psychosocial Skills:     -       Follows Commands/attention:Follows multistep  commands    Physical Exam:  Upper Extremity Strength:    -       Right Upper Extremity: Deficits: previous injury limiting ROM above shoulder height  -       Left Upper Extremity: 5/5    AMPAC 6 Click ADL:  AMPAC Total Score: 18        Education:    Patient left up in chair with all lines intact, call button in reach and wife present    GOALS:   Multidisciplinary Problems     Occupational Therapy Goals        Problem: Occupational Therapy    Goal Priority Disciplines Outcome Interventions   Occupational Therapy Goal     OT, PT/OT Ongoing, Progressing    Description: Goals to be met by: 7/15    Patient will increase functional independence with ADLs by performing:    UE Dressing with Modified [place].  LE Dressing with Modified [place].  Grooming while standing with Modified [place].  Toileting from bedside commode with Modified [place] for hygiene and clothing management.   Toilet transfer to bedside commode with Modified [place].                     History:     Past Medical History:   Diagnosis Date    COPD (chronic obstructive pulmonary disease)     Dysphagia     Hypertension     Vocal cord mass        Past Surgical History:   Procedure Laterality Date    HIP SURGERY      HUMERUS FRACTURE SURGERY      TRACHEOSTOMY N/A 6/10/2022    Procedure: CREATION, TRACHEOSTOMY;  Surgeon: Junior Alonso MD;  Location: [facility];  Service: ENT;  Laterality: N/A;       Time Tracking:     OT Date of Treatment:    OT Start Time: 0922  OT Stop Time: 0943  OT  Total Time (min): 21 min    Billable Minutes:Evaluation Moderate Complexity 21min    6/17/2022

## 2022-06-17 NOTE — PT/OT/SLP EVAL
Physical Therapy Evaluation    Patient Name:  Josafat Boudreaux   MRN:  [id number]    Recommendations:     Discharge Recommendations:  home health PT, outpatient PT   Discharge Equipment Recommendations: none   Barriers to discharge: None    Assessment:     Josafat Boudreaux is a 56 y.o. male admitted with a medical diagnosis of Vocal cord mass. Patient with hoarseness since November of 2021, but was never investigated until 6/6/22. CT showed left supraglottic mass. ENT was consulted and he was taken to OR for emergent tracheostomy. Patient lives with spouse in mobile home with 5 steps (rail on left) to enter. He was independent.  He presents with the following impairments/functional limitations:  weakness, impaired endurance, impaired functional mobilty, gait instability, impaired balance, decreased safety awareness. He required MIN A for bed mobility. Ambulated ~15 ft (forward & backwards) with CGA. Balance was fair/poor. Attribute unsteadiness to extended time in bed over the last week. I am recommending outpatient PT at discharge. Progress patient as tolerated.     Rehab Prognosis: Good; patient would benefit from acute skilled PT services to address these deficits and reach maximum level of function.    Recent Surgery: Procedure(s) (LRB):  PEG TUBE (N/A) 3 Days Post-Op    Plan:     During this hospitalization, patient to be seen 6 x/week to address the identified rehab impairments via gait training, therapeutic activities, therapeutic exercises, neuromuscular re-education and progress toward the following goals:    · Plan of Care Expires:  07/17/22    Subjective     Chief Complaint: none  Patient/Family Comments/goals: none  Pain/Comfort:  · Pain Rating 1: 0/10    Patients cultural, spiritual, Yazdanism conflicts given the current situation: no    Living Environment:  Mobile home with 5 steps (rail on left) to enter.   Prior to admission, patients level of function was independent.  Equipment used at  home: none.  DME owned (not currently used): rolling walker.  Upon discharge, patient will have assistance from spouse.    Objective:     Communicated with nurse prior to session.  Patient found HOB elevated with Tracheostomy, oxygen, telemetry  upon PT entry to room.    General Precautions: Standard, aspiration   Orthopedic Precautions:N/A   Braces: N/A  Respiratory Status: Tracheostomy. On 10L/min. Sats 90%-95% during session    Exams:  · Cognitive Exam:  Patient is oriented to Person, Place, Time and Situation  · Sensation:    · -       Intact  · RLE ROM: WFL  · RLE Strength: 4/5 grossly  · LLE ROM: WFL  · LLE Strength: 4/5 grossly    Functional Mobility:  · Bed Mobility:     · Supine to Sit: minimum assistance  · Transfers:     · Sit to Stand:  contact guard assistance with rolling walker  · Gait: 15 ft with RW & CGA  · Balance: fair    AM-PAC 6 CLICK MOBILITY  Total Score:19     Patient left up in chair with all lines intact, call button in reach and spouse present.    GOALS:   Multidisciplinary Problems     Physical Therapy Goals        Problem: Physical Therapy    Goal Priority Disciplines Outcome Goal Variances Interventions   Physical Therapy Goal     PT, PT/OT Ongoing, Progressing     Description: Goals to be met by: 22     Patient will increase functional independence with mobility by performin. Supine to sit with Set-up [place]  2. Sit to supine with Set-up [place]  3. Sit to stand transfer with Supervision  4. Gait  x 400 feet with Supervision using No Assistive Device.   5. Increased functional strength to 5/5 in BLE's                     History:     Past Medical History:   Diagnosis Date    COPD (chronic obstructive pulmonary disease)     Dysphagia     Hypertension     Vocal cord mass        Past Surgical History:   Procedure Laterality Date    HIP SURGERY      HUMERUS FRACTURE SURGERY      TRACHEOSTOMY N/A 6/10/2022    Procedure: CREATION, TRACHEOSTOMY;  Surgeon: Junior ALMANZAR  MD Gavin;  Location: [facility];  Service: ENT;  Laterality: N/A;       Time Tracking:     PT Received On: 06/17/22  PT Start Time: 0922     PT Stop Time: 0945  PT Total Time (min): 23 min     Billable Minutes: Evaluation 23 minutes      06/17/2022

## 2022-06-18 LAB
ANION GAP SERPL CALC-SCNC: 9 MEQ/L
AV INDEX (PROSTH): 0.83
AV MEAN GRADIENT: 4 MMHG
AV PEAK GRADIENT: 8 MMHG
AV VALVE AREA: 2.6 CM2
AV VELOCITY RATIO: 0.83
B-LACTAMASE USUAL SUSC ISLT: POSITIVE
BACTERIA SPEC CULT: ABNORMAL
BASOPHILS # BLD AUTO: 0.06 X10(3)/MCL (ref 0–0.2)
BASOPHILS NFR BLD AUTO: 0.7 %
BUN SERPL-MCNC: 13.3 MG/DL (ref 8.4–25.7)
CALCIUM SERPL-MCNC: 8.7 MG/DL (ref 8.4–10.2)
CHLORIDE SERPL-SCNC: 87 MMOL/L (ref 98–107)
CO2 SERPL-SCNC: 39 MMOL/L (ref 22–29)
CREAT SERPL-MCNC: 0.59 MG/DL (ref 0.73–1.18)
CREAT/UREA NIT SERPL: 23
DOP CALC AO PEAK VEL: 1.38 M/S
DOP CALC AO VTI: 19.9 CM
DOP CALC LVOT AREA: 3.1 CM2
DOP CALC LVOT DIAMETER: 2 CM
DOP CALC LVOT PEAK VEL: 1.14 M/S
DOP CALC LVOT STROKE VOLUME: 51.81 CM3
DOP CALC MV VTI: 15.9 CM
DOP CALCLVOT PEAK VEL VTI: 16.5 CM
E WAVE DECELERATION TIME: 198 MSEC
E/A RATIO: 0.86
E/E' RATIO: 6.75 M/S
EJECTION FRACTION: 65 %
EOSINOPHIL # BLD AUTO: 0.33 X10(3)/MCL (ref 0–0.9)
EOSINOPHIL NFR BLD AUTO: 3.8 %
ERYTHROCYTE [DISTWIDTH] IN BLOOD BY AUTOMATED COUNT: 14.5 % (ref 11.5–17)
GLUCOSE SERPL-MCNC: 125 MG/DL (ref 74–100)
GRAM STN SPEC: ABNORMAL
HCT VFR BLD AUTO: 50.8 % (ref 42–52)
HGB BLD-MCNC: 16.1 GM/DL (ref 14–18)
IMM GRANULOCYTES # BLD AUTO: 0.03 X10(3)/MCL (ref 0–0.02)
IMM GRANULOCYTES NFR BLD AUTO: 0.3 % (ref 0–0.43)
LEFT ATRIUM SIZE: 2.1 CM
LEFT ATRIUM VOLUME INDEX MOD: 14.2 ML/M2
LEFT ATRIUM VOLUME MOD: 26 CM3
LEFT VENTRICLE DIASTOLIC VOLUME INDEX: 32.79 ML/M2
LEFT VENTRICLE DIASTOLIC VOLUME: 60 ML
LEFT VENTRICLE SYSTOLIC VOLUME INDEX: 12.8 ML/M2
LEFT VENTRICLE SYSTOLIC VOLUME: 23.4 ML
LV LATERAL E/E' RATIO: 5.4 M/S
LV SEPTAL E/E' RATIO: 9 M/S
LVOT MG: 3 MMHG
LVOT MV: 0.72 CM/S
LYMPHOCYTES # BLD AUTO: 1.05 X10(3)/MCL (ref 0.6–4.6)
LYMPHOCYTES NFR BLD AUTO: 12.2 %
MAGNESIUM SERPL-MCNC: 1.9 MG/DL (ref 1.6–2.6)
MCH RBC QN AUTO: 32.7 PG (ref 27–31)
MCHC RBC AUTO-ENTMCNC: 31.7 MG/DL (ref 33–36)
MCV RBC AUTO: 103 FL (ref 80–94)
MONOCYTES # BLD AUTO: 1.09 X10(3)/MCL (ref 0.1–1.3)
MONOCYTES NFR BLD AUTO: 12.7 %
MV MEAN GRADIENT: 2 MMHG
MV PEAK A VEL: 0.94 M/S
MV PEAK E VEL: 0.81 M/S
MV PEAK GRADIENT: 3 MMHG
MV STENOSIS PRESSURE HALF TIME: 53 MS
MV VALVE AREA BY CONTINUITY EQUATION: 3.26 CM2
MV VALVE AREA P 1/2 METHOD: 4.15 CM2
NEUTROPHILS # BLD AUTO: 6 X10(3)/MCL (ref 2.1–9.2)
NEUTROPHILS NFR BLD AUTO: 70.3 %
NRBC BLD AUTO-RTO: 0 %
PLATELET # BLD AUTO: 225 X10(3)/MCL (ref 130–400)
PMV BLD AUTO: 10.9 FL (ref 9.4–12.4)
POTASSIUM SERPL-SCNC: 3.8 MMOL/L (ref 3.5–5.1)
PV PEAK VELOCITY: 1.02 CM/S
RA PRESSURE: 3 MMHG
RBC # BLD AUTO: 4.93 X10(6)/MCL (ref 4.7–6.1)
SODIUM SERPL-SCNC: 135 MMOL/L (ref 136–145)
TDI LATERAL: 0.15 M/S
TDI SEPTAL: 0.09 M/S
TDI: 0.12 M/S
TRICUSPID ANNULAR PLANE SYSTOLIC EXCURSION: 2.32 CM
WBC # SPEC AUTO: 8.6 X10(3)/MCL (ref 4.5–11.5)

## 2022-06-18 PROCEDURE — 63600175 PHARM REV CODE 636 W HCPCS: Performed by: NURSE PRACTITIONER

## 2022-06-18 PROCEDURE — S4991 NICOTINE PATCH NONLEGEND: HCPCS | Performed by: NURSE PRACTITIONER

## 2022-06-18 PROCEDURE — 94761 N-INVAS EAR/PLS OXIMETRY MLT: CPT

## 2022-06-18 PROCEDURE — 11000001 HC ACUTE MED/SURG PRIVATE ROOM

## 2022-06-18 PROCEDURE — 94640 AIRWAY INHALATION TREATMENT: CPT

## 2022-06-18 PROCEDURE — 25000003 PHARM REV CODE 250: Performed by: INTERNAL MEDICINE

## 2022-06-18 PROCEDURE — 99900035 HC TECH TIME PER 15 MIN (STAT)

## 2022-06-18 PROCEDURE — C9113 INJ PANTOPRAZOLE SODIUM, VIA: HCPCS | Performed by: NURSE PRACTITIONER

## 2022-06-18 PROCEDURE — 85025 COMPLETE CBC W/AUTO DIFF WBC: CPT | Performed by: INTERNAL MEDICINE

## 2022-06-18 PROCEDURE — 83735 ASSAY OF MAGNESIUM: CPT | Performed by: INTERNAL MEDICINE

## 2022-06-18 PROCEDURE — 36415 COLL VENOUS BLD VENIPUNCTURE: CPT | Performed by: INTERNAL MEDICINE

## 2022-06-18 PROCEDURE — 25000242 PHARM REV CODE 250 ALT 637 W/ HCPCS: Performed by: INTERNAL MEDICINE

## 2022-06-18 PROCEDURE — 80048 BASIC METABOLIC PNL TOTAL CA: CPT | Performed by: INTERNAL MEDICINE

## 2022-06-18 PROCEDURE — 27000221 HC OXYGEN, UP TO 24 HOURS

## 2022-06-18 PROCEDURE — 99900026 HC AIRWAY MAINTENANCE (STAT)

## 2022-06-18 PROCEDURE — 63600175 PHARM REV CODE 636 W HCPCS: Performed by: INTERNAL MEDICINE

## 2022-06-18 PROCEDURE — 25000003 PHARM REV CODE 250: Performed by: NURSE PRACTITIONER

## 2022-06-18 RX ORDER — POLYETHYLENE GLYCOL 3350 17 G/17G
17 POWDER, FOR SOLUTION ORAL 2 TIMES DAILY
Status: DISCONTINUED | OUTPATIENT
Start: 2022-06-18 | End: 2022-06-19

## 2022-06-18 RX ORDER — LACTULOSE 10 G/15ML
15 SOLUTION ORAL EVERY 6 HOURS PRN
Status: DISCONTINUED | OUTPATIENT
Start: 2022-06-18 | End: 2022-06-24 | Stop reason: HOSPADM

## 2022-06-18 RX ADMIN — NICOTINE 1 PATCH: 21 PATCH, EXTENDED RELEASE TRANSDERMAL at 08:06

## 2022-06-18 RX ADMIN — LEVALBUTEROL HYDROCHLORIDE 1.25 MG: 1.25 SOLUTION, CONCENTRATE RESPIRATORY (INHALATION) at 12:06

## 2022-06-18 RX ADMIN — LEVOFLOXACIN 750 MG: 500 TABLET, FILM COATED ORAL at 09:06

## 2022-06-18 RX ADMIN — MUPIROCIN: 20 OINTMENT TOPICAL at 08:06

## 2022-06-18 RX ADMIN — BACITRACIN ZINC, NEOMYCIN SULFATE, AND POLYMYXIN B SULFATE: 400; 3.5; 5 OINTMENT TOPICAL at 08:06

## 2022-06-18 RX ADMIN — POLYETHYLENE GLYCOL 3350 17 G: 17 POWDER, FOR SOLUTION ORAL at 09:06

## 2022-06-18 RX ADMIN — BUDESONIDE 0.5 MG: 0.5 INHALANT ORAL at 08:06

## 2022-06-18 RX ADMIN — MUPIROCIN: 20 OINTMENT TOPICAL at 09:06

## 2022-06-18 RX ADMIN — PANTOPRAZOLE SODIUM 40 MG: 40 INJECTION, POWDER, LYOPHILIZED, FOR SOLUTION INTRAVENOUS at 08:06

## 2022-06-18 RX ADMIN — LEVALBUTEROL HYDROCHLORIDE 1.25 MG: 1.25 SOLUTION, CONCENTRATE RESPIRATORY (INHALATION) at 08:06

## 2022-06-18 RX ADMIN — HYDROCODONE BITARTRATE AND ACETAMINOPHEN 1 TABLET: 5; 325 TABLET ORAL at 09:06

## 2022-06-18 RX ADMIN — DIPHENHYDRAMINE HYDROCHLORIDE 25 MG: 50 INJECTION, SOLUTION INTRAMUSCULAR; INTRAVENOUS at 09:06

## 2022-06-18 RX ADMIN — DOCUSATE SODIUM LIQUID 100 MG: 100 LIQUID ORAL at 08:06

## 2022-06-18 RX ADMIN — DOCUSATE SODIUM LIQUID 100 MG: 100 LIQUID ORAL at 09:06

## 2022-06-18 RX ADMIN — LEVALBUTEROL HYDROCHLORIDE 1.25 MG: 1.25 SOLUTION, CONCENTRATE RESPIRATORY (INHALATION) at 11:06

## 2022-06-18 RX ADMIN — LEVALBUTEROL HYDROCHLORIDE 1.25 MG: 1.25 SOLUTION, CONCENTRATE RESPIRATORY (INHALATION) at 04:06

## 2022-06-18 RX ADMIN — ENOXAPARIN SODIUM 40 MG: 40 INJECTION SUBCUTANEOUS at 05:06

## 2022-06-18 NOTE — PLAN OF CARE
Problem: Adult Inpatient Plan of Care  Goal: Plan of Care Review  Outcome: Ongoing, Progressing  Goal: Patient-Specific Goal (Individualized)  Outcome: Ongoing, Progressing  Goal: Absence of Hospital-Acquired Illness or Injury  Outcome: Ongoing, Progressing  Goal: Optimal Comfort and Wellbeing  Outcome: Ongoing, Progressing  Goal: Readiness for Transition of Care  Outcome: Ongoing, Progressing     Problem: Communication Impairment (Mechanical Ventilation, Invasive)  Goal: Effective Communication  Outcome: Ongoing, Progressing     Problem: Device-Related Complication Risk (Mechanical Ventilation, Invasive)  Goal: Optimal Device Function  Outcome: Ongoing, Progressing     Problem: Inability to Wean (Mechanical Ventilation, Invasive)  Goal: Mechanical Ventilation Liberation  Outcome: Ongoing, Progressing     Problem: Nutrition Impairment (Mechanical Ventilation, Invasive)  Goal: Optimal Nutrition Delivery  Outcome: Ongoing, Progressing     Problem: Skin and Tissue Injury (Mechanical Ventilation, Invasive)  Goal: Absence of Device-Related Skin and Tissue Injury  Outcome: Ongoing, Progressing     Problem: Ventilator-Induced Lung Injury (Mechanical Ventilation, Invasive)  Goal: Absence of Ventilator-Induced Lung Injury  Outcome: Ongoing, Progressing     Problem: Communication Impairment (Artificial Airway)  Goal: Effective Communication  Outcome: Ongoing, Progressing     Problem: Device-Related Complication Risk (Artificial Airway)  Goal: Optimal Device Function  Outcome: Ongoing, Progressing     Problem: Skin and Tissue Injury (Artificial Airway)  Goal: Absence of Device-Related Skin or Tissue Injury  Outcome: Ongoing, Progressing     Problem: Noninvasive Ventilation Acute  Goal: Effective Unassisted Ventilation and Oxygenation  Outcome: Ongoing, Progressing     Problem: Skin Injury Risk Increased  Goal: Skin Health and Integrity  Outcome: Ongoing, Progressing     Problem: Fall Injury Risk  Goal: Absence of Fall and  Fall-Related Injury  Outcome: Ongoing, Progressing

## 2022-06-18 NOTE — PLAN OF CARE
Problem: Adult Inpatient Plan of Care  Goal: Plan of Care Review  6/18/2022 0640 by Alia Liao RN  Outcome: Ongoing, Progressing  6/18/2022 0639 by Alia Liao RN  Outcome: Ongoing, Progressing  Goal: Patient-Specific Goal (Individualized)  6/18/2022 0640 by Alia Liao RN  Outcome: Ongoing, Progressing  6/18/2022 0639 by Alia Liao RN  Outcome: Ongoing, Progressing  Goal: Absence of Hospital-Acquired Illness or Injury  6/18/2022 0640 by Alia Liao RN  Outcome: Ongoing, Progressing  6/18/2022 0639 by Alia Liao RN  Outcome: Ongoing, Progressing  Goal: Optimal Comfort and Wellbeing  6/18/2022 0640 by Alia Liao RN  Outcome: Ongoing, Progressing  6/18/2022 0639 by Alia Liao RN  Outcome: Ongoing, Progressing  Goal: Readiness for Transition of Care  6/18/2022 0640 by Alia Liao RN  Outcome: Ongoing, Progressing  6/18/2022 0639 by Alia Liao RN  Outcome: Ongoing, Progressing     Problem: Communication Impairment (Mechanical Ventilation, Invasive)  Goal: Effective Communication  6/18/2022 0640 by Alia Liao RN  Outcome: Ongoing, Progressing  6/18/2022 0639 by Alia Liao RN  Outcome: Ongoing, Progressing     Problem: Device-Related Complication Risk (Mechanical Ventilation, Invasive)  Goal: Optimal Device Function  6/18/2022 0640 by Alia Liao RN  Outcome: Ongoing, Progressing  6/18/2022 0639 by Alia Liao RN  Outcome: Ongoing, Progressing     Problem: Inability to Wean (Mechanical Ventilation, Invasive)  Goal: Mechanical Ventilation Liberation  6/18/2022 0640 by Alia Liao RN  Outcome: Ongoing, Progressing  6/18/2022 0639 by Alia Liao RN  Outcome: Ongoing, Progressing     Problem: Nutrition Impairment (Mechanical Ventilation, Invasive)  Goal: Optimal Nutrition Delivery  6/18/2022 0640 by Alia Liao RN  Outcome: Ongoing, Progressing  6/18/2022 0639 by Alia Liao RN  Outcome: Ongoing, Progressing     Problem: Skin and Tissue Injury (Mechanical Ventilation,  Invasive)  Goal: Absence of Device-Related Skin and Tissue Injury  6/18/2022 0640 by Alia Liao RN  Outcome: Ongoing, Progressing  6/18/2022 0639 by Alia Liao RN  Outcome: Ongoing, Progressing     Problem: Ventilator-Induced Lung Injury (Mechanical Ventilation, Invasive)  Goal: Absence of Ventilator-Induced Lung Injury  6/18/2022 0640 by Alia Liao RN  Outcome: Ongoing, Progressing  6/18/2022 0639 by Alia Liao RN  Outcome: Ongoing, Progressing     Problem: Communication Impairment (Artificial Airway)  Goal: Effective Communication  6/18/2022 0640 by Alia Liao RN  Outcome: Ongoing, Progressing  6/18/2022 0639 by Alia Liao RN  Outcome: Ongoing, Progressing     Problem: Device-Related Complication Risk (Artificial Airway)  Goal: Optimal Device Function  6/18/2022 0640 by Alia Liao RN  Outcome: Ongoing, Progressing  6/18/2022 0639 by Alia Liao RN  Outcome: Ongoing, Progressing     Problem: Skin and Tissue Injury (Artificial Airway)  Goal: Absence of Device-Related Skin or Tissue Injury  6/18/2022 0640 by Alia Liao RN  Outcome: Ongoing, Progressing  6/18/2022 0639 by Alia Liao RN  Outcome: Ongoing, Progressing     Problem: Noninvasive Ventilation Acute  Goal: Effective Unassisted Ventilation and Oxygenation  6/18/2022 0640 by Alia Liao RN  Outcome: Ongoing, Progressing  6/18/2022 0639 by Alia Liao RN  Outcome: Ongoing, Progressing     Problem: Skin Injury Risk Increased  Goal: Skin Health and Integrity  6/18/2022 0640 by Alia Liao RN  Outcome: Ongoing, Progressing  6/18/2022 0639 by Alia Liao RN  Outcome: Ongoing, Progressing     Problem: Fall Injury Risk  Goal: Absence of Fall and Fall-Related Injury  6/18/2022 0640 by Alia Liao RN  Outcome: Ongoing, Progressing  6/18/2022 0639 by Alia Liao RN  Outcome: Ongoing, Progressing

## 2022-06-18 NOTE — PROGRESS NOTES
[facility]  HOSPITAL MEDICINE PROGRESS NOTE      Patient Name: Josafat Boudreaux  MRN: [id number]  Admission Date: 6/10/2022  8:59 AM  Status: IP- Inpatient   Length of Stay: 8 days  Face-to-Face encounter date: 06/18/2022       CHIEF COMPLAINT  Hospital follow up for Acute hypoxic respiratory failure    HOSPITAL COURSE  56 year old  s/p tracheostomy with dysphagia, so most of the information is gathered from the medical record.  He is a 56 year old male who presented to the ED on 6/10/22 via [facility] with c/o SOB.  It appears that he had some hoarseness starting in November 2021 that kept getting progressively worse and was recently diagnosed with a 2.4 cm supraglottic mass with additional metastatic appearing lymph nodes. on his vocal cords.  He had a trach scheduled for 7/6/2022.  Per ER MD note, the patient's wife states that his RA O2 was down to the 40s when she called EMS.  During his ED stay, he was placed on BiPap, but his CO2 continued to increase, and ENT was consulted for emergent trach and he was admitted to ICU.  He was extubated on 6/11, and is tolerating trach collar well.  Today's ED lab work was notable for Na 128, improved from previous.  He was deemed appropriate to transfer to the floor and he was admitted to hospital medicine for further management with ENT to continue to follow. He underwent bronchoscopy with biopsies of right upper lobe endobronchial lesion on 6/14. Pathology pending.    Today (06/18/2022):  He denies any chest pain or shortness of breath.  He remains on 10 L via trach collar.  He is yet to have a bowel movement.  Denies abdominal pain, nausea or vomiting.  He is passing flatus. He has swelling to his right upper extremity. Denies any pain. He had surgical repair of fracture in his right arm few months ago. Patient had low grade fever yesterday evening. None so far since then. Wife at bedside. Reports patient's HR usually 105,  106.    OBJECTIVE    VITAL SIGNS: 24 HRS MIN & MAX LAST   Temp  Min: 98.5 °F (36.9 °C)  Max: 100.6 °F (38.1 °C) 98.5 °F (36.9 °C)   BP  Min: 109/66  Max: 143/74 110/71   Pulse  Min: 111  Max: 121  (Abnormal) 111   Resp  Min: 11  Max: 20 12   SpO2  Min: 91 %  Max: 97 % 97 %       LABS/MICROBIOLOGY/MEDICATIONS/DIAGNOSTICS  I have reviewed all pertinent lab results within the past 24 hours.    Recent Labs   Lab 06/13/22 0229 06/17/22 0438 06/18/22  0404   WBC 10.7 7.8 8.6   RBC 4.92 4.78 4.93   HGB 16.0 15.2 16.1   HCT 51.8 50.1 50.8   .3* 104.8* 103.0*   MCH 32.5* 31.8* 32.7*   MCHC 30.9* 30.3* 31.7*   RDW 14.5 14.6 14.5    196 225   MPV 9.7 10.9 10.9       Recent Labs   Lab 06/12/22 0632 06/13/22 0229 06/17/22 0438 06/18/22  0404   * 135* 137 135*   K 4.5 4.0 4.2 3.8   CO2 32* 28 36* 39*   BUN 6.9* 5.3* 10.5 13.3   CREATININE 0.60* 0.57* 0.54* 0.59*   CALCIUM 8.4 8.9 8.4 8.7   MG 2.00  --  1.80 1.90   ALBUMIN 2.6* 2.4* 1.9*  --    ALKPHOS 77 74 77  --    ALT 16 13 19  --    AST 15 14 25  --    BILITOT 0.8 0.8 0.4  --        Recent Labs     06/17/22 0438 06/18/22  0404   WBC 7.8 8.6   RBC 4.78 4.93   HGB 15.2 16.1   HCT 50.1 50.8   .8* 103.0*   MCH 31.8* 32.7*   MCHC 30.3* 31.7*   RDW 14.6 14.5     Recent Labs     06/17/22 0438 06/18/22  0404   CHLORIDE 96* 87*   CO2 36* 39*   BUN 10.5 13.3   CREATININE 0.54* 0.59*   GLUCOSE 118* 125*   CALCIUM 8.4 8.7   ALBUMIN 1.9*  --    ALKPHOS 77  --    ALT 19  --    AST 25  --        Microbiology Results (last 7 days)     Procedure Component Value Units Date/Time    Respiratory Culture [[id number]]  (Abnormal) Collected: 06/15/22 2232    Order Status: Completed Specimen: Sputum, Expectorated Updated: 06/18/22 1209     Respiratory Culture Many Moraxella catarrhalis     Comment: with normal respiratory julieth  Beta-Lactamase Positive  For indicated treatment of Moraxella catarrhalis the drugs of choice are as follows:  Cephalosporins,  Beta-lactamase- stable Penicillins, Tetracycline, and Trimethoprim- Sulfamethoxazole.        Beta Lactamase Positive     GRAM STAIN Quality 1+       Moderate Gram positive cocci      Few Gram Negative Rods      Moderate Gram Positive Rods    Narrative:      Resistant to Penicillin by beta-lactamase    Blood Culture [[id number]]  (Normal) Collected: 06/15/22 1842    Order Status: Completed Specimen: Blood Updated: 06/17/22 2203     CULTURE, BLOOD ([facility]) No Growth At 48 Hours           Echo  · The estimated ejection fraction is 60-65%.  · Normal systolic function.  · Grade I left ventricular diastolic dysfunction.  · Mild right atrial enlargement.  · Normal central venous pressure (3 mmHg).           Scheduled Med:   budesonide  0.5 mg Nebulization Q12H    docusate  100 mg Per G Tube BID    enoxaparin  40 mg Subcutaneous Daily    levalbuterol  1.25 mg Nebulization Q8H    mupirocin   Topical (Top) BID    neomycin-bacitracin-polymyxin   Topical (Top) Daily    nicotine  1 patch Transdermal Daily    pantoprazole  40 mg Intravenous Daily        Continuous Infusions:       PRN Meds:  diphenhydrAMINE, HYDROcodone-acetaminophen, LIDOcaine HCL 4%, lorazepam, morphine, polyethylene glycol       PHYSICAL EXAM  Constitutional: Appears well-developed, well-nourished and appears stated age. No acute distress.   Head/Eyes/Ears/Nose/Mouth/Throat: Normocephalic, atraumatic. PERRLA, EOM intact. Conjunctive clear, sclera white. Pinna no masses, lesions, tenderness, drainage. Pharynx pink.  Neck: No JVD present. Normal alignment and mobility. + tracheostomy, on trach collar.  Cardiovascular: Regular rate and rhythm, S1 / S2, no systolic or diastolic murmur, no rubs or gallops.  Pulmonary/Chest: Effort normal. No respiratory distress. Breath sounds normal. No rales, rhonchi, rubs or wheezes.    Abdominal: Flat, soft, non-tender, non-distended, no rebound tenderness or guarding, normal bowel sounds in all four quadrants, + PEG  tube.  Musculoskeletal: Extremities symmetric, no tenderness, weakness, or swelling. Maintains flexion against resistance.   Extremities:  Nails: No clubbing, cyanosis, petechiae or nodes. 2+ bilateral pedal and radial pulses with 1+ bilateral lower ext edema. Right upper extremity diffuse swelling, non tender, no erythema.  Neurological: Alert and oriented to person, place, and time. Cooperative. Speech clear, appropriate. No dysarthria. Cranial nerves II-XII grossly intact. No focal deficits.  Psychiatric: No depression, anxiety or agitation. Normal affect, no hallucinations or suicidal ideations, no pressured speech.  Skin: Skin is warm and dry. Intact, no clubbing, decubitis, lesions, rashes or erythema.      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 SIRS  Moraxella catarrhalis + Respiratory culture   Right upper extremity swelling      PLAN  Currently on 10 liters/minute oxygen via trach collar.  Wean oxygen as tolerated.  Keep SaO2 greater than 88%  Respiratory culture grew Moraxella catarrhalis and blood cultures NGTD  Levaquin x 5 days  Incentive spirometry  Continue ST, PT and OT   Bowel regimen  Tube feeds as tolerated  Bronchoscopy revealed endobronchial lesion in the right upper lobe and biopsies were obtained.  Follow-up on pathology.  No indication for mediastinoscopy now  Continue [facility] care.   Continue bronchodilator therapy  Patient to follow-up with his oncologist Dr. Cao after discharge.  He is also to follow up with his ENT physician for scheduled laryngeal surgery.  Outpatient PFTs  Check right upper  extremity Doppler ultrasound to rule out DVT  DVT Prophylaxis: Lovenox    Patient condition:  Fair    Anticipated discharge and disposition: Home with HH when medically ready  __________________________________________________________________________    All diagnosis and differential diagnosis have been reviewed; assessment and plan have been documented. I have personally reviewed the test results that are presently available; I have reviewed the patient's medication list. I have reviewed the consulting providers' recommendations. I have reviewed or attempted to review medical records based upon their availability.  All of the patient's questions have been addressed and answered. Patient is agreeable to the above stated plan. I will continue to monitor closely and make adjustment to medical management as needed.    This document was created with the assistance of a voice recognition software. There may be transcription errors as a result of using this technology, however minimal. Effort has been made to ensure accuracy of transcription but any obvious errors or omissions should be clarified with the author of this document.        Ra Quintanilla MD   [facility]  06/18/2022

## 2022-06-19 PROCEDURE — 63600175 PHARM REV CODE 636 W HCPCS: Performed by: INTERNAL MEDICINE

## 2022-06-19 PROCEDURE — 25000242 PHARM REV CODE 250 ALT 637 W/ HCPCS: Performed by: INTERNAL MEDICINE

## 2022-06-19 PROCEDURE — 99900035 HC TECH TIME PER 15 MIN (STAT)

## 2022-06-19 PROCEDURE — 63600175 PHARM REV CODE 636 W HCPCS: Performed by: NURSE PRACTITIONER

## 2022-06-19 PROCEDURE — 25000003 PHARM REV CODE 250: Performed by: NURSE PRACTITIONER

## 2022-06-19 PROCEDURE — 94640 AIRWAY INHALATION TREATMENT: CPT

## 2022-06-19 PROCEDURE — 99900026 HC AIRWAY MAINTENANCE (STAT)

## 2022-06-19 PROCEDURE — 94761 N-INVAS EAR/PLS OXIMETRY MLT: CPT

## 2022-06-19 PROCEDURE — 27000221 HC OXYGEN, UP TO 24 HOURS

## 2022-06-19 PROCEDURE — 11000001 HC ACUTE MED/SURG PRIVATE ROOM

## 2022-06-19 PROCEDURE — S4991 NICOTINE PATCH NONLEGEND: HCPCS | Performed by: NURSE PRACTITIONER

## 2022-06-19 PROCEDURE — 25000003 PHARM REV CODE 250: Performed by: INTERNAL MEDICINE

## 2022-06-19 PROCEDURE — C9113 INJ PANTOPRAZOLE SODIUM, VIA: HCPCS | Performed by: NURSE PRACTITIONER

## 2022-06-19 RX ORDER — POLYETHYLENE GLYCOL 3350 17 G/17G
17 POWDER, FOR SOLUTION ORAL 2 TIMES DAILY PRN
Status: DISCONTINUED | OUTPATIENT
Start: 2022-06-19 | End: 2022-06-24 | Stop reason: HOSPADM

## 2022-06-19 RX ORDER — DIPHENHYDRAMINE HCL 25 MG
25 CAPSULE ORAL EVERY 6 HOURS PRN
Status: DISCONTINUED | OUTPATIENT
Start: 2022-06-19 | End: 2022-06-24 | Stop reason: HOSPADM

## 2022-06-19 RX ORDER — HYDROCORTISONE 1 %
CREAM (GRAM) TOPICAL 2 TIMES DAILY
Status: DISCONTINUED | OUTPATIENT
Start: 2022-06-19 | End: 2022-06-24 | Stop reason: HOSPADM

## 2022-06-19 RX ORDER — PANTOPRAZOLE SODIUM 40 MG/1
40 FOR SUSPENSION ORAL DAILY
Status: DISCONTINUED | OUTPATIENT
Start: 2022-06-20 | End: 2022-06-24 | Stop reason: HOSPADM

## 2022-06-19 RX ADMIN — LEVALBUTEROL HYDROCHLORIDE 1.25 MG: 1.25 SOLUTION, CONCENTRATE RESPIRATORY (INHALATION) at 11:06

## 2022-06-19 RX ADMIN — LEVOFLOXACIN 750 MG: 500 TABLET, FILM COATED ORAL at 09:06

## 2022-06-19 RX ADMIN — ENOXAPARIN SODIUM 40 MG: 40 INJECTION SUBCUTANEOUS at 04:06

## 2022-06-19 RX ADMIN — BUDESONIDE 0.5 MG: 0.5 INHALANT ORAL at 09:06

## 2022-06-19 RX ADMIN — POLYETHYLENE GLYCOL 3350 17 G: 17 POWDER, FOR SOLUTION ORAL at 09:06

## 2022-06-19 RX ADMIN — HYDROCORTISONE: 1 CREAM TOPICAL at 08:06

## 2022-06-19 RX ADMIN — DIPHENHYDRAMINE HYDROCHLORIDE 25 MG: 25 CAPSULE ORAL at 08:06

## 2022-06-19 RX ADMIN — MUPIROCIN: 20 OINTMENT TOPICAL at 08:06

## 2022-06-19 RX ADMIN — BACITRACIN ZINC, NEOMYCIN SULFATE, AND POLYMYXIN B SULFATE: 400; 3.5; 5 OINTMENT TOPICAL at 09:06

## 2022-06-19 RX ADMIN — DOCUSATE SODIUM LIQUID 100 MG: 100 LIQUID ORAL at 08:06

## 2022-06-19 RX ADMIN — NICOTINE 1 PATCH: 21 PATCH, EXTENDED RELEASE TRANSDERMAL at 09:06

## 2022-06-19 RX ADMIN — PANTOPRAZOLE SODIUM 40 MG: 40 INJECTION, POWDER, LYOPHILIZED, FOR SOLUTION INTRAVENOUS at 09:06

## 2022-06-19 RX ADMIN — LEVALBUTEROL HYDROCHLORIDE 1.25 MG: 1.25 SOLUTION, CONCENTRATE RESPIRATORY (INHALATION) at 03:06

## 2022-06-19 RX ADMIN — BUDESONIDE 0.5 MG: 0.5 INHALANT ORAL at 11:06

## 2022-06-19 RX ADMIN — DOCUSATE SODIUM LIQUID 100 MG: 100 LIQUID ORAL at 09:06

## 2022-06-19 RX ADMIN — MUPIROCIN: 20 OINTMENT TOPICAL at 09:06

## 2022-06-19 NOTE — PROGRESS NOTES
[facility] HOSPITAL MEDICINE PROGRESS NOTE      Patient Name: Josafat Boudreaux  MRN: [id number]  Admission Date: 6/10/2022  8:59 AM  Status: IP- Inpatient   Length of Stay: 9 days  Face-to-Face encounter date: 06/19/2022       CHIEF COMPLAINT  Hospital follow up for Acute hypoxic respiratory failure    HOSPITAL COURSE  56 year old  s/p tracheostomy with dysphagia, so most of the information is gathered from the medical record.  He is a 56 year old male who presented to the ED on 6/10/22 via [facility] with c/o SOB.  It appears that he had some hoarseness starting in November 2021 that kept getting progressively worse and was recently diagnosed with a 2.4 cm supraglottic mass with additional metastatic appearing lymph nodes. on his vocal cords.  He had a trach scheduled for 7/6/2022.  Per ER MD note, the patient's wife states that his RA O2 was down to the 40s when she called EMS.  During his ED stay, he was placed on BiPap, but his CO2 continued to increase, and ENT was consulted for emergent trach and he was admitted to ICU.  He was extubated on 6/11, and is tolerating trach collar well.  Today's ED lab work was notable for Na 128, improved from previous.  He was deemed appropriate to transfer to the floor and he was admitted to hospital medicine for further management with ENT to continue to follow. He underwent bronchoscopy with biopsies of right upper lobe endobronchial lesion on 6/14. Pathology pending.  Weaned to 6L/Min via trach collar.     Today (06/19/2022):  He had 2 bowel movements. He denies any chest pain or shortness of breath. No further fever. He has a rash on his abdomen and proximal thighs, itching, has been present for about 2 days, started before he began taking the Levaquin.    OBJECTIVE    VITAL SIGNS: 24 HRS MIN & MAX LAST   Temp  Min: 98.6 °F (37 °C)  Max: 99.4 °F (37.4 °C) 98.6 °F (37 °C)   BP  Min: 109/67  Max: 129/74 113/69   Pulse  Min: 89  Max: 116  107    Resp  Min: 17  Max: 20 20   SpO2  Min: 91 %  Max: 96 % 95 %       LABS/MICROBIOLOGY/MEDICATIONS/DIAGNOSTICS  I have reviewed all pertinent lab results within the past 24 hours.    Recent Labs   Lab 06/13/22 0229 06/17/22 0438 06/18/22  0404   WBC 10.7 7.8 8.6   RBC 4.92 4.78 4.93   HGB 16.0 15.2 16.1   HCT 51.8 50.1 50.8   .3* 104.8* 103.0*   MCH 32.5* 31.8* 32.7*   MCHC 30.9* 30.3* 31.7*   RDW 14.5 14.6 14.5    196 225   MPV 9.7 10.9 10.9       Recent Labs   Lab 06/13/22 0229 06/17/22 0438 06/18/22  0404   * 137 135*   K 4.0 4.2 3.8   CO2 28 36* 39*   BUN 5.3* 10.5 13.3   CREATININE 0.57* 0.54* 0.59*   CALCIUM 8.9 8.4 8.7   MG  --  1.80 1.90   ALBUMIN 2.4* 1.9*  --    ALKPHOS 74 77  --    ALT 13 19  --    AST 14 25  --    BILITOT 0.8 0.4  --        Recent Labs     06/17/22 0438 06/18/22  0404   WBC 7.8 8.6   RBC 4.78 4.93   HGB 15.2 16.1   HCT 50.1 50.8   .8* 103.0*   MCH 31.8* 32.7*   MCHC 30.3* 31.7*   RDW 14.6 14.5     Recent Labs     06/17/22 0438 06/18/22  0404   CHLORIDE 96* 87*   CO2 36* 39*   BUN 10.5 13.3   CREATININE 0.54* 0.59*   GLUCOSE 118* 125*   CALCIUM 8.4 8.7   ALBUMIN 1.9*  --    ALKPHOS 77  --    ALT 19  --    AST 25  --        Microbiology Results (last 7 days)     Procedure Component Value Units Date/Time    Blood Culture [[id number]]  (Normal) Collected: 06/15/22 1842    Order Status: Completed Specimen: Blood Updated: 06/18/22 2203     CULTURE, BLOOD ([facility]) No Growth At 72 Hours    Respiratory Culture [[id number]]  (Abnormal) Collected: 06/15/22 2232    Order Status: Completed Specimen: Sputum, Expectorated Updated: 06/18/22 1209     Respiratory Culture Many Moraxella catarrhalis     Comment: with normal respiratory julieth  Beta-Lactamase Positive  For indicated treatment of Moraxella catarrhalis the drugs of choice are as follows:  Cephalosporins, Beta-lactamase- stable Penicillins, Tetracycline, and Trimethoprim- Sulfamethoxazole.        Beta Lactamase  Positive     GRAM STAIN Quality 1+       Moderate Gram positive cocci      Few Gram Negative Rods      Moderate Gram Positive Rods    Narrative:      Resistant to Penicillin by beta-lactamase           Echo  · The estimated ejection fraction is 60-65%.  · Normal systolic function.  · Grade I left ventricular diastolic dysfunction.  · Mild right atrial enlargement.  · Normal central venous pressure (3 mmHg).           Scheduled Med:   budesonide  0.5 mg Nebulization Q12H    docusate  100 mg Per G Tube BID    enoxaparin  40 mg Subcutaneous Daily    levalbuterol  1.25 mg Nebulization Q8H    levoFLOXacin  750 mg Per G Tube Daily    mupirocin   Topical (Top) BID    neomycin-bacitracin-polymyxin   Topical (Top) Daily    nicotine  1 patch Transdermal Daily    pantoprazole  40 mg Intravenous Daily    polyethylene glycol  17 g Per G Tube BID        Continuous Infusions:       PRN Meds:  diphenhydrAMINE, HYDROcodone-acetaminophen, lactulose 10 gram/15 ml, LIDOcaine HCL 4%, lorazepam, morphine       PHYSICAL EXAM  Constitutional: Appears well-developed, well-nourished and appears stated age. No acute distress.   Head/Eyes/Ears/Nose/Mouth/Throat: Normocephalic, atraumatic. PERRLA, EOM intact. Conjunctive clear, sclera white. Pinna no masses, lesions, tenderness, drainage. Pharynx pink.  Neck: No JVD present. Normal alignment and mobility. + tracheostomy, on trach collar.  Cardiovascular: Regular rate and rhythm, S1 / S2, no systolic or diastolic murmur, no rubs or gallops.  Pulmonary/Chest: Effort normal. No respiratory distress. Breath sounds normal. No rales, rhonchi, rubs or wheezes.    Abdominal: Flat, soft, non-tender, non-distended, no rebound tenderness or guarding, normal bowel sounds in all four quadrants, + PEG tube.  Musculoskeletal: Extremities symmetric, no tenderness, weakness, or swelling. Maintains flexion against resistance.   Extremities:  Nails: No clubbing, cyanosis, petechiae or nodes. 2+  bilateral pedal and radial pulses with 1+ bilateral lower ext edema. Right upper extremity diffuse swelling, non tender, no erythema.  Neurological: Alert and oriented to person, place, and time. Cooperative. Speech clear, appropriate. No dysarthria. Cranial nerves II-XII grossly intact. No focal deficits.  Psychiatric: No depression, anxiety or agitation. Normal affect, no hallucinations or suicidal ideations, no pressured speech.  Skin: Erythematous macular rash on abdomen, back, proximal thighs.      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 SIRS  Moraxella catarrhalis + Respiratory culture   Right upper extremity swelling  Superficial right basilic vein thrombosis   Rash: unclear etiology, ? Drug allergy    PLAN  Currently on 6 liters/minute oxygen via trach collar.  Wean oxygen as tolerated.  Keep SaO2 greater than 88%  Respiratory culture grew Moraxella catarrhalis and blood cultures NGTD  On Levaquin x 5 days course  Incentive spirometry  Continue ST, PT and OT   Bowel regimen  Tube feeds as tolerated  Bronchoscopy revealed endobronchial lesion in the right upper lobe and biopsies were obtained.  Follow-up on pathology.  No indication for mediastinoscopy now  Continue trach care.   Continue bronchodilator therapy  Patient to follow-up with his oncologist Dr. Cao after discharge.  He is also to follow up with his ENT physician for scheduled laryngeal surgery.  Outpatient PFTs  Warm compress, elevate right upper extremity  Topical hydrocortisone, prn benadryl, monitor rash    DVT Prophylaxis:  Lovenox    Patient condition:  Fair    Anticipated discharge and disposition: Home with HH when medically ready  __________________________________________________________________________    All diagnosis and differential diagnosis have been reviewed; assessment and plan have been documented. I have personally reviewed the test results that are presently available; I have reviewed the patient's medication list. I have reviewed the consulting providers' recommendations. I have reviewed or attempted to review medical records based upon their availability.  All of the patient's questions have been addressed and answered. Patient is agreeable to the above stated plan. I will continue to monitor closely and make adjustment to medical management as needed.    This document was created with the assistance of a voice recognition software. There may be transcription errors as a result of using this technology, however minimal. Effort has been made to ensure accuracy of transcription but any obvious errors or omissions should be clarified with the author of this document.        Ra Quintanilla MD   [facility]  06/19/2022

## 2022-06-20 LAB
ANION GAP SERPL CALC-SCNC: 7 MEQ/L
BACTERIA BLD CULT: NORMAL
BASOPHILS # BLD AUTO: 0.04 X10(3)/MCL (ref 0–0.2)
BASOPHILS NFR BLD AUTO: 0.5 %
BUN SERPL-MCNC: 11.4 MG/DL (ref 8.4–25.7)
CALCIUM SERPL-MCNC: 9.1 MG/DL (ref 8.4–10.2)
CHLORIDE SERPL-SCNC: 90 MMOL/L (ref 98–107)
CO2 SERPL-SCNC: 33 MMOL/L (ref 22–29)
CREAT SERPL-MCNC: 0.55 MG/DL (ref 0.73–1.18)
CREAT/UREA NIT SERPL: 21
DHEA SERPL-MCNC: NORMAL
EOSINOPHIL # BLD AUTO: 0.26 X10(3)/MCL (ref 0–0.9)
EOSINOPHIL NFR BLD AUTO: 3.5 %
ERYTHROCYTE [DISTWIDTH] IN BLOOD BY AUTOMATED COUNT: 14.3 % (ref 11.5–17)
ESTROGEN SERPL-MCNC: NORMAL PG/ML
GLUCOSE SERPL-MCNC: 123 MG/DL (ref 74–100)
HCT VFR BLD AUTO: 53.5 % (ref 42–52)
HGB BLD-MCNC: 16.3 GM/DL (ref 14–18)
IMM GRANULOCYTES # BLD AUTO: 0.04 X10(3)/MCL (ref 0–0.02)
IMM GRANULOCYTES NFR BLD AUTO: 0.5 % (ref 0–0.43)
INSULIN SERPL-ACNC: NORMAL U[IU]/ML
LAB AP CLINICAL INFORMATION: NORMAL
LAB AP GROSS DESCRIPTION: NORMAL
LAB AP REPORT FOOTNOTES: NORMAL
LYMPHOCYTES # BLD AUTO: 0.76 X10(3)/MCL (ref 0.6–4.6)
LYMPHOCYTES NFR BLD AUTO: 10.3 %
MCH RBC QN AUTO: 31.4 PG (ref 27–31)
MCHC RBC AUTO-ENTMCNC: 30.5 MG/DL (ref 33–36)
MCV RBC AUTO: 103.1 FL (ref 80–94)
MONOCYTES # BLD AUTO: 0.79 X10(3)/MCL (ref 0.1–1.3)
MONOCYTES NFR BLD AUTO: 10.7 %
NEUTROPHILS # BLD AUTO: 5.5 X10(3)/MCL (ref 2.1–9.2)
NEUTROPHILS NFR BLD AUTO: 74.5 %
NRBC BLD AUTO-RTO: 0 %
PLATELET # BLD AUTO: 269 X10(3)/MCL (ref 130–400)
PMV BLD AUTO: 11.2 FL (ref 9.4–12.4)
POTASSIUM SERPL-SCNC: 4.7 MMOL/L (ref 3.5–5.1)
RBC # BLD AUTO: 5.19 X10(6)/MCL (ref 4.7–6.1)
SODIUM SERPL-SCNC: 130 MMOL/L (ref 136–145)
T3RU NFR SERPL: NORMAL %
WBC # SPEC AUTO: 7.4 X10(3)/MCL (ref 4.5–11.5)

## 2022-06-20 PROCEDURE — 25000242 PHARM REV CODE 250 ALT 637 W/ HCPCS: Performed by: INTERNAL MEDICINE

## 2022-06-20 PROCEDURE — 92526 ORAL FUNCTION THERAPY: CPT

## 2022-06-20 PROCEDURE — 85025 COMPLETE CBC W/AUTO DIFF WBC: CPT | Performed by: INTERNAL MEDICINE

## 2022-06-20 PROCEDURE — 36415 COLL VENOUS BLD VENIPUNCTURE: CPT | Performed by: INTERNAL MEDICINE

## 2022-06-20 PROCEDURE — 94640 AIRWAY INHALATION TREATMENT: CPT

## 2022-06-20 PROCEDURE — 25000003 PHARM REV CODE 250: Performed by: NURSE PRACTITIONER

## 2022-06-20 PROCEDURE — 99900035 HC TECH TIME PER 15 MIN (STAT)

## 2022-06-20 PROCEDURE — 25000003 PHARM REV CODE 250: Performed by: INTERNAL MEDICINE

## 2022-06-20 PROCEDURE — 27000221 HC OXYGEN, UP TO 24 HOURS

## 2022-06-20 PROCEDURE — 97530 THERAPEUTIC ACTIVITIES: CPT | Mod: CQ

## 2022-06-20 PROCEDURE — 63600175 PHARM REV CODE 636 W HCPCS: Performed by: INTERNAL MEDICINE

## 2022-06-20 PROCEDURE — 92507 TX SP LANG VOICE COMM INDIV: CPT

## 2022-06-20 PROCEDURE — 94761 N-INVAS EAR/PLS OXIMETRY MLT: CPT

## 2022-06-20 PROCEDURE — 99900026 HC AIRWAY MAINTENANCE (STAT)

## 2022-06-20 PROCEDURE — 80048 BASIC METABOLIC PNL TOTAL CA: CPT | Performed by: INTERNAL MEDICINE

## 2022-06-20 PROCEDURE — 11000001 HC ACUTE MED/SURG PRIVATE ROOM

## 2022-06-20 PROCEDURE — 99900031 HC PATIENT EDUCATION (STAT)

## 2022-06-20 PROCEDURE — S4991 NICOTINE PATCH NONLEGEND: HCPCS | Performed by: NURSE PRACTITIONER

## 2022-06-20 RX ORDER — IPRATROPIUM BROMIDE AND ALBUTEROL SULFATE 2.5; .5 MG/3ML; MG/3ML
3 SOLUTION RESPIRATORY (INHALATION) EVERY 4 HOURS PRN
Status: DISCONTINUED | OUTPATIENT
Start: 2022-06-20 | End: 2022-06-24 | Stop reason: HOSPADM

## 2022-06-20 RX ORDER — FUROSEMIDE 10 MG/ML
40 INJECTION INTRAMUSCULAR; INTRAVENOUS ONCE
Status: COMPLETED | OUTPATIENT
Start: 2022-06-20 | End: 2022-06-20

## 2022-06-20 RX ADMIN — FUROSEMIDE 40 MG: 10 INJECTION, SOLUTION INTRAMUSCULAR; INTRAVENOUS at 02:06

## 2022-06-20 RX ADMIN — MUPIROCIN: 20 OINTMENT TOPICAL at 09:06

## 2022-06-20 RX ADMIN — BUDESONIDE 0.5 MG: 0.5 INHALANT ORAL at 07:06

## 2022-06-20 RX ADMIN — BUDESONIDE 0.5 MG: 0.5 INHALANT ORAL at 09:06

## 2022-06-20 RX ADMIN — PANTOPRAZOLE SODIUM 40 MG: 40 GRANULE, DELAYED RELEASE ORAL at 08:06

## 2022-06-20 RX ADMIN — MUPIROCIN: 20 OINTMENT TOPICAL at 08:06

## 2022-06-20 RX ADMIN — LEVALBUTEROL HYDROCHLORIDE 1.25 MG: 1.25 SOLUTION, CONCENTRATE RESPIRATORY (INHALATION) at 03:06

## 2022-06-20 RX ADMIN — DOCUSATE SODIUM LIQUID 100 MG: 100 LIQUID ORAL at 09:06

## 2022-06-20 RX ADMIN — NICOTINE 1 PATCH: 21 PATCH, EXTENDED RELEASE TRANSDERMAL at 08:06

## 2022-06-20 RX ADMIN — HYDROCODONE BITARTRATE AND ACETAMINOPHEN 1 TABLET: 5; 325 TABLET ORAL at 09:06

## 2022-06-20 RX ADMIN — LEVALBUTEROL HYDROCHLORIDE 1.25 MG: 1.25 SOLUTION, CONCENTRATE RESPIRATORY (INHALATION) at 12:06

## 2022-06-20 RX ADMIN — HYDROCORTISONE: 1 CREAM TOPICAL at 09:06

## 2022-06-20 RX ADMIN — HYDROCORTISONE: 1 CREAM TOPICAL at 08:06

## 2022-06-20 RX ADMIN — BACITRACIN ZINC, NEOMYCIN SULFATE, AND POLYMYXIN B SULFATE: 400; 3.5; 5 OINTMENT TOPICAL at 08:06

## 2022-06-20 RX ADMIN — LEVOFLOXACIN 750 MG: 500 TABLET, FILM COATED ORAL at 09:06

## 2022-06-20 RX ADMIN — ENOXAPARIN SODIUM 40 MG: 40 INJECTION SUBCUTANEOUS at 05:06

## 2022-06-20 RX ADMIN — LEVALBUTEROL HYDROCHLORIDE 1.25 MG: 1.25 SOLUTION, CONCENTRATE RESPIRATORY (INHALATION) at 07:06

## 2022-06-20 RX ADMIN — DOCUSATE SODIUM LIQUID 100 MG: 100 LIQUID ORAL at 08:06

## 2022-06-20 RX ADMIN — DIPHENHYDRAMINE HYDROCHLORIDE 25 MG: 25 CAPSULE ORAL at 09:06

## 2022-06-20 NOTE — PT/OT/SLP PROGRESS
Speech Language Pathology Department  Progress Note    Patient Name:  Josafat Boudreaux   MRN:  [id number]  Admitting Diagnosis: Vocal cord mass    Recommendations:                 General Recommendations:  Dysphagia therapy and One Way Speaking Valve trials with SLP only  Communication strategies:  white board in room    Discharge recommendations:  outpatient speech therapy   Barriers to Discharge:  None    Subjective     Patient awake, alert and cooperative.    Patient goals: to eat/drink     Pain/Comfort:  · Pain Rating 1: 0/10    Respiratory Status: 10L/min via trach collar    Objective:     Cuff deflate and speaking valve placed with no phonation appreciated.  O2 saturation remained above 97, however pt c/o SOB.  Valve removed and cuff reinflated.    Pt tolerated thermal stimulation to the anterior faucial pillars x10 with 100% swallow responses.  Laryngeal excursion good.  Delay 2-3 seconds.  Pt completed the Pam maneuver x5.    Assessment:     Pt continues to present with dysphagia requiring alternate means of nutrition as well as impaired verbal communication secondary to tracheostomy and VF mass.    Goals:   Multidisciplinary Problems     SLP Goals        Problem: SLP    Goal Priority Disciplines Outcome   SLP Goal     SLP Ongoing, Progressing   Description: LTG: Tolerate least restrictive PO diet with no clinical signs/sx aspiration    STGs:  1.  Complete base of tongue and laryngeal strengthening exercises with min cues  2.  Tolerate thermal stim x10 with 100% effortful swallows and delay less than 2 seconds.    LTG: Tolerate speaking valve during all waking hours with no signs/sx respiratory distress/compromise  STG: Tolerate speaking valve x1 hour with no signs/sx respiratory distress/compromise                   Plan:     Will continue to follow and tx as appropriate.    Patient to be seen:  5 x/week   Plan of Care expires:  06/26/22  Plan of Care reviewed with:  patient, spouse   SLP Follow-Up:   Yes      Time Tracking:     SLP Treatment Date:   06/20/22  Speech Start Time:  1500  Speech Stop Time:  1520     Speech Total Time (min):  20 min    Billable minutes:  Speech/Language Therapy, 10 minutes  Treatment of Swallow Dysfunction, 10 minutes       06/20/2022

## 2022-06-20 NOTE — ANESTHESIA POSTPROCEDURE EVALUATION
Anesthesia Post Evaluation    Patient: Josafat Boudreaux    Procedure(s) Performed: Procedure(s) (LRB):  CREATION, TRACHEOSTOMY (N/A)    Final Anesthesia Type: general      Patient location during evaluation: PACU  Patient participation: Yes- Able to Participate  Level of consciousness: awake and alert  Post-procedure vital signs: reviewed and stable  Pain management: adequate  Airway patency: patent    PONV status at discharge: No PONV  Anesthetic complications: no      Cardiovascular status: blood pressure returned to baseline  Respiratory status: spontaneous ventilation and unassisted  Hydration status: euvolemic  Follow-up needed           Vitals Value Taken Time   /67 06/20/22 0416   Temp 37.3 °C (99.1 °F) 06/20/22 0416   Pulse 108 06/20/22 0416   Resp 18 06/20/22 0020   SpO2 97 % 06/20/22 0416         No case tracking events are documented in the log.      Pain/Geetha Score: No data recorded

## 2022-06-20 NOTE — PT/OT/SLP PROGRESS
Physical Therapy         Treatment        Joasfat Boudreaux   MRN: [id number]     PT Received On: 06/20/22  PT Start Time: 1014     PT Stop Time: 1040    PT Total Time (min): 26 min       Billable Minutes:  Therapeutic Activity 26  Total Minutes: 26    Treatment Type: Treatment  PT/PTA: PTA     PTA Visit Number: 1       General Precautions: Standard, aspiration  Orthopedic Precautions: Orthopedic Precautions : N/A   Braces:      Spiritual, Cultural Beliefs, Sabianism Practices, Values that Affect Care: no    Subjective:  Communicated with nurse prior to session.         Objective:  Patient found supine in bed, with Patient found with: Tracheostomy, oxygen, telemetry    Functional Mobility:  Bed Mobility:   Supine to sit: Contact Guard Assistance   Sit to supine: Activity did not occur   Rolling: Contact Guard Assistance   Scooting: Contact Guard Assistance    Balance:   Static Sit: FAIR+: Able to take MINIMAL challenges from all directions  Dynamic Sit:  FAIR+: Maintains balance through MINIMAL excursions of active trunk motion  Static Stand: FAIR+: Takes MINIMAL challenges from all directions  Dynamic stand: FAIR+: Needs CLOSE SUPERVISION during gait and is able to right self with minor LOB    Transfer Training:  Sit to stand:Contact Guard Assistance with Rolling Walker x5 trials     Gait Training:  Pt amb x5 ft from EOB to bedside chair without AD with CGA and assistance for line management.      Additional Treatment:  Pt performed standing BLE therex with RW for UE support x15 reps including hip flexion and calf raises.     Activity Tolerance:  Patient tolerated treatment well    Patient left up in chair with all lines intact and call button in reach.    Assessment:  Josafat Boudreaux is a 56 y.o. male with a medical diagnosis of Vocal cord mass. He presents with increased standing balance and endurance.    Rehab potential is excellent.    Activity tolerance: Excellent    Discharge recommendations: Discharge  Facility/Level of Care Needs: home health PT     Equipment recommendations: Equipment Needed After Discharge: walker, rolling     GOALS:   Multidisciplinary Problems     Physical Therapy Goals        Problem: Physical Therapy    Goal Priority Disciplines Outcome Goal Variances Interventions   Physical Therapy Goal     PT, PT/OT Ongoing, Progressing     Description: Goals to be met by: 22     Patient will increase functional independence with mobility by performin. Supine to sit with Set-up Lawn  2. Sit to supine with Set-up Lawn  3. Sit to stand transfer with Supervision  4. Gait  x 400 feet with Supervision using No Assistive Device.   5. Increased functional strength to 5/5 in BLE's                     PLAN:    Patient to be seen 6 x/week  to address the above listed problems via gait training, therapeutic activities  Plan of Care expires: 22  Plan of Care reviewed with: patient, spouse         2022

## 2022-06-20 NOTE — ANESTHESIA POSTPROCEDURE EVALUATION
Anesthesia Post Evaluation    Patient: Josafat Boudreaux    Procedure(s) Performed: Procedure(s) (LRB):  CREATION, TRACHEOSTOMY (N/A)    [facility] Anesthesia Post Op Evaluation      Vitals Value Taken Time   /67 06/20/22 0416   Temp 37.3 °C (99.1 °F) 06/20/22 0416   Pulse 108 06/20/22 0416   Resp 18 06/20/22 0020   SpO2 97 % 06/20/22 0416         No case tracking events are documented in the log.      Pain/Geetha Score: No data recorded

## 2022-06-20 NOTE — PROGRESS NOTES
[facility] - [facility]  Adult Nutrition  Progress Note    SUMMARY       Recommendations    Recommendation/Intervention: Tube Feeding recs: Diabetisource goal rate 80ml/hr (based on est run time 20h/day); provides: 1950 kcal, 96 gm protein, 1305 ml free water - free water flush rec:80ml q3h    Bolus tube feeding: Diabetisource 250ml 6-7 times daily; 100ml free water flush 6-7 times daily  - may give feeding as 500ml tid with 250 ml extra feed or spread out as best tolerated by pt    Goals: Provide adequate nutrition to meet est needs.  Nutrition Goal Status: goal met  Communication of RD Recs: reviewed with RN    Assessment and Plan    Nutrition Problem  Inadequate Oral Intake     Related to (etiology):   Current condition     Signs and Symptoms (as evidenced by):   NPO     Interventions(treatment strategy):  Modified composition of enteral nutrition, Modified rate of enteral nutrition and Collaboration with other providers     Nutrition Diagnosis Status:   continues       Malnutrition Assessment  Malnutrition Type: other (see comments) (does not meet criteria)          Weight Loss (Malnutrition): other (see comments) (unable to eval)  Energy Intake (Malnutrition): other (see comments) (unable to eval)  Subcutaneous Fat (Malnutrition): other (see comments) (does not meet criteria)  Muscle Mass (Malnutrition): mild depletion  Fluid Accumulation (Malnutrition): other (see comments) (does not meet criteria)  Hand  Strength, Left (Malnutrition): unable to eval  Hand  Strength, Right (Malnutrition): unable to eval       Protestant Region (Muscle Loss): mild depletion  Clavicle Bone Region (Muscle Loss): mild depletion                 Reason for Assessment    Reason For Assessment: other (see comments) (Plans for PEG placement)  Diagnosis: other (see comments) (Acute hypoxemic respiratory failure s/p emergent tracheostomy  2.4 cm supraglottic mass with additional metastatic appearing lymph  "nodes  Acute hyponatremia)  Relevant Medical History: COPD  General Information Comments: tolerating tube feeding a goal  6/20: tolerating tube feeding; bolus recs provided    Nutrition Risk Screen    Nutrition Risk Screen: tube feeding or parenteral nutrition    Nutrition/Diet History    Spiritual, Cultural Beliefs, Hindu Practices, Values that Affect Care: no    Anthropometrics    Temp: 98.4 °F (36.9 °C)  Height: 5' 9.02" (175.3 cm)  Height (inches): 69.02 in  Weight Method: Standard Scale  Weight: 68 kg (150 lb)  Weight (lb): 150 lb  Ideal Body Weight (IBW), Male: 160.12 lb  BMI Grade: 18.5-24.9 - normal       Lab/Procedures/Meds    Pertinent Labs Reviewed: reviewed  Pertinent Labs Comments: 6/20: Na 130, Cl 90, Crea 0.55,  Glu 123  Pertinent Medications Reviewed: reviewed  Pertinent Medications Comments: docusate    Physical Findings/Assessment         Estimated/Assessed Needs    Weight Used For Calorie Calculations: 68 kg (149 lb 14.6 oz)  Energy Calorie Requirements (kcal): 1950kcal  Energy Need Method: Hemphill-St Jeor (1.3 stress factor)  Protein Requirements: 95gm  Weight Used For Protein Calculations: 68 kg (149 lb 14.6 oz)  Fluid Requirements (mL): 1950ml     RDA Method (mL): 1950         Nutrition Prescription Ordered    Current Diet Order: NPO    Evaluation of Received Nutrient/Fluid Intake    Enteral Calories (kcal): 1920  Enteral Protein (gm): 96  % Kcal Needs: 100  % Protein Needs: 100  Energy Calories Required: meeting needs  Protein Required: meeting needs  Tolerance: tolerating  % Intake of Estimated Energy Needs: 75 - 100 %  % Meal Intake: NPO    Nutrition Risk    Level of Risk/Frequency of Follow-up: high     Monitor and Evaluation    Food and Nutrient Intake: enteral nutrition intake  Anthropometric Measurements: weight change     Nutrition Follow-Up    RD Follow-up?: Yes    "

## 2022-06-20 NOTE — PROGRESS NOTES
[facility]  HOSPITAL MEDICINE PROGRESS NOTE      Patient Name: Josafat Boudreaux  MRN: [id number]  Admission Date: 6/10/2022  8:59 AM  Status: IP- Inpatient   Length of Stay: 10 days  Face-to-Face encounter date: 06/20/2022       CHIEF COMPLAINT  Hospital follow up for Acute hypoxic respiratory failure    HOSPITAL COURSE  56 year old  s/p tracheostomy with dysphagia, so most of the information is gathered from the medical record.  He is a 56 year old male who presented to the ED on 6/10/22 via [facility] with c/o SOB.  It appears that he had some hoarseness starting in November 2021 that kept getting progressively worse and was recently diagnosed with a 2.4 cm supraglottic mass with additional metastatic appearing lymph nodes. on his vocal cords.  He had a trach scheduled for 7/6/2022.  Per ER MD note, the patient's wife states that his RA O2 was down to the 40s when she called EMS.  During his ED stay, he was placed on BiPap, but his CO2 continued to increase, and ENT was consulted for emergent trach and he was admitted to ICU.  He was extubated on 6/11, and is tolerating trach collar well.  Today's ED lab work was notable for Na 128, improved from previous.  He was deemed appropriate to transfer to the floor and he was admitted to hospital medicine for further management with ENT to continue to follow. He underwent bronchoscopy with biopsies of right upper lobe endobronchial lesion on 6/14. Pathology pending.  Weaned to 6L/Min via trach collar. VQ scan showed low prob of PE. Doppler US showed no evidence of deep or superficial vein thrombosis in bilateral lower extremities. RUE doppler US showed no evidence of deep vein thrombosis in the right upper extremity. A superficial thrombosis was identified in the right basilic vein.    Today (06/20/2022):  He was weaned to 5L/min yesterday but was increased back to 10L/min overnight due to hypoxia. He denies any chest pain or shortness of  breath. No further fever. Rash on his abdomen and proximal thighs, persists but not increasing. Still with itching. Wife at bedside.    OBJECTIVE    VITAL SIGNS: 24 HRS MIN & MAX LAST   Temp  Min: 98.3 °F (36.8 °C)  Max: 99.3 °F (37.4 °C) 98.4 °F (36.9 °C)   BP  Min: 110/67  Max: 122/58 117/66   Pulse  Min: 79  Max: 108  103   Resp  Min: 18  Max: 20 20   SpO2  Min: 93 %  Max: 97 % 97 %       LABS/MICROBIOLOGY/MEDICATIONS/DIAGNOSTICS  I have reviewed all pertinent lab results within the past 24 hours.    Recent Labs   Lab 06/17/22 0438 06/18/22 0404 06/20/22 0445   WBC 7.8 8.6 7.4   RBC 4.78 4.93 5.19   HGB 15.2 16.1 16.3   HCT 50.1 50.8 53.5*   .8* 103.0* 103.1*   MCH 31.8* 32.7* 31.4*   MCHC 30.3* 31.7* 30.5*   RDW 14.6 14.5 14.3    225 269   MPV 10.9 10.9 11.2       Recent Labs   Lab 06/17/22 0438 06/18/22 0404 06/20/22 0445    135* 130*   K 4.2 3.8 4.7   CO2 36* 39* 33*   BUN 10.5 13.3 11.4   CREATININE 0.54* 0.59* 0.55*   CALCIUM 8.4 8.7 9.1   MG 1.80 1.90  --    ALBUMIN 1.9*  --   --    ALKPHOS 77  --   --    ALT 19  --   --    AST 25  --   --    BILITOT 0.4  --   --        Recent Labs     06/18/22 0404 06/20/22 0445   WBC 8.6 7.4   RBC 4.93 5.19   HGB 16.1 16.3   HCT 50.8 53.5*   .0* 103.1*   MCH 32.7* 31.4*   MCHC 31.7* 30.5*   RDW 14.5 14.3     Recent Labs     06/18/22 0404 06/20/22 0445   CHLORIDE 87* 90*   CO2 39* 33*   BUN 13.3 11.4   CREATININE 0.59* 0.55*   GLUCOSE 125* 123*   CALCIUM 8.7 9.1       Microbiology Results (last 7 days)     Procedure Component Value Units Date/Time    Blood Culture [[id number]]  (Normal) Collected: 06/15/22 1842    Order Status: Completed Specimen: Blood Updated: 06/19/22 2203     CULTURE, BLOOD ([facility]) No Growth At 96 Hours    Respiratory Culture [[id number]]  (Abnormal) Collected: 06/15/22 2232    Order Status: Completed Specimen: Sputum, Expectorated Updated: 06/18/22 1209     Respiratory Culture Many Moraxella catarrhalis     Comment:  with normal respiratory julieth  Beta-Lactamase Positive  For indicated treatment of Moraxella catarrhalis the drugs of choice are as follows:  Cephalosporins, Beta-lactamase- stable Penicillins, Tetracycline, and Trimethoprim- Sulfamethoxazole.        Beta Lactamase Positive     GRAM STAIN Quality 1+       Moderate Gram positive cocci      Few Gram Negative Rods      Moderate Gram Positive Rods    Narrative:      Resistant to Penicillin by beta-lactamase         ENDOBRONCHIAL MASS RUL, CYTOLOGY (TWO CELL BLOCK SLIDES):     NEARLY ACELLULAR SPECIMEN CONSISTING OF FRESH BLOOD CLOT.     NO ATYPICAL OR MALIGNANT CELLS IDENTIFIED      Echo  · The estimated ejection fraction is 60-65%.  · Normal systolic function.  · Grade I left ventricular diastolic dysfunction.  · Mild right atrial enlargement.  · Normal central venous pressure (3 mmHg).         Scheduled Med:   budesonide  0.5 mg Nebulization Q12H    docusate  100 mg Per G Tube BID    enoxaparin  40 mg Subcutaneous Daily    hydrocortisone   Topical (Top) BID    levalbuterol  1.25 mg Nebulization Q8H    levoFLOXacin  750 mg Per G Tube Daily    mupirocin   Topical (Top) BID    neomycin-bacitracin-polymyxin   Topical (Top) Daily    nicotine  1 patch Transdermal Daily    pantoprazole  40 mg Per G Tube Daily        Continuous Infusions:       PRN Meds:  albuterol-ipratropium, diphenhydrAMINE, HYDROcodone-acetaminophen, lactulose 10 gram/15 ml, LIDOcaine HCL 4%, lorazepam, morphine, polyethylene glycol       PHYSICAL EXAM  Constitutional: Appears well-developed, well-nourished and appears stated age. No acute distress.   Head/Eyes/Ears/Nose/Mouth/Throat: Normocephalic, atraumatic. PERRLA, EOM intact. Conjunctive clear, sclera white. Pinna no masses, lesions, tenderness, drainage. Pharynx pink.  Neck: No JVD present. Normal alignment and mobility. + tracheostomy, on trach collar.  Cardiovascular: Regular rate and rhythm, S1 / S2, no systolic or diastolic murmur, no  rubs or gallops.  Pulmonary/Chest: Effort normal. No respiratory distress. Breath sounds normal. No rales, rhonchi, rubs or wheezes.    Abdominal: Flat, soft, non-tender, non-distended, no rebound tenderness or guarding, normal bowel sounds in all four quadrants, + PEG tube.  Musculoskeletal: Extremities symmetric, no tenderness, weakness, or swelling. Maintains flexion against resistance.   Extremities:  Nails: No clubbing, cyanosis, petechiae or nodes. 2+ bilateral pedal and radial pulses with 1+ bilateral lower ext edema. Right upper extremity diffuse swelling, non tender, no erythema.  Neurological: Alert and oriented to person, place, and time. Cooperative. Speech clear, appropriate. No dysarthria. Cranial nerves II-XII grossly intact. No focal deficits.  Psychiatric: No depression, anxiety or agitation. Normal affect, no hallucinations or suicidal ideations, no pressured speech.  Skin: Erythematous macular rash on abdomen, back, proximal thighs.      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 SIRS  Moraxella catarrhalis + Respiratory culture   Right upper extremity swelling  Superficial right basilic vein thrombosis   Rash: unclear etiology, ? Drug allergy  Small bilateral pleural effusions     PLAN  Currently on 10 liters/minute oxygen via trach collar.  Wean oxygen as tolerated.  Keep SaO2 greater than 88%  Give IV Lasix 40 mg x1, Check repeat CXR  Respiratory culture grew Moraxella catarrhalis and blood cultures NGTD  On Levaquin x 5 days course  Incentive spirometry  Continue ST, PT and OT   Bowel  regimen  Tube feeds as tolerated  Bronchoscopy revealed endobronchial lesion in the right upper lobe and biopsies were obtained. No indication for mediastinoscopy now  Continue trach care.   Continue bronchodilator therapy  Patient to follow-up with his oncologist Dr. Cao after discharge.  He is also to follow up with his ENT physician for scheduled laryngeal surgery.  Outpatient PFTs  Warm compress, elevate right upper extremity  Topical hydrocortisone, prn benadryl, monitor rash    DVT Prophylaxis: Lovenox    Patient condition:  Fair    Anticipated discharge and disposition: Home with  when medically ready  __________________________________________________________________________    All diagnosis and differential diagnosis have been reviewed; assessment and plan have been documented. I have personally reviewed the test results that are presently available; I have reviewed the patient's medication list. I have reviewed the consulting providers' recommendations. I have reviewed or attempted to review medical records based upon their availability.  All of the patient's questions have been addressed and answered. Patient is agreeable to the above stated plan. I will continue to monitor closely and make adjustment to medical management as needed.    This document was created with the assistance of a voice recognition software. There may be transcription errors as a result of using this technology, however minimal. Effort has been made to ensure accuracy of transcription but any obvious errors or omissions should be clarified with the author of this document.        Ra Quintanilla MD   [facility]  06/20/2022

## 2022-06-21 PROBLEM — Z93.0 TRACHEOSTOMY DEPENDENT: Status: ACTIVE | Noted: 2022-06-21

## 2022-06-21 LAB
ANION GAP SERPL CALC-SCNC: 8 MEQ/L
BUN SERPL-MCNC: 14.3 MG/DL (ref 8.4–25.7)
CALCIUM SERPL-MCNC: 9.2 MG/DL (ref 8.4–10.2)
CHLORIDE SERPL-SCNC: 87 MMOL/L (ref 98–107)
CO2 SERPL-SCNC: 39 MMOL/L (ref 22–29)
CREAT SERPL-MCNC: 0.6 MG/DL (ref 0.73–1.18)
CREAT/UREA NIT SERPL: 24
GLUCOSE SERPL-MCNC: 120 MG/DL (ref 74–100)
POTASSIUM SERPL-SCNC: 4.5 MMOL/L (ref 3.5–5.1)
SODIUM SERPL-SCNC: 134 MMOL/L (ref 136–145)

## 2022-06-21 PROCEDURE — S4991 NICOTINE PATCH NONLEGEND: HCPCS | Performed by: NURSE PRACTITIONER

## 2022-06-21 PROCEDURE — 99900026 HC AIRWAY MAINTENANCE (STAT)

## 2022-06-21 PROCEDURE — 99900031 HC PATIENT EDUCATION (STAT)

## 2022-06-21 PROCEDURE — 25000242 PHARM REV CODE 250 ALT 637 W/ HCPCS: Performed by: INTERNAL MEDICINE

## 2022-06-21 PROCEDURE — 94761 N-INVAS EAR/PLS OXIMETRY MLT: CPT

## 2022-06-21 PROCEDURE — 25000003 PHARM REV CODE 250: Performed by: INTERNAL MEDICINE

## 2022-06-21 PROCEDURE — 63600175 PHARM REV CODE 636 W HCPCS: Performed by: INTERNAL MEDICINE

## 2022-06-21 PROCEDURE — 80048 BASIC METABOLIC PNL TOTAL CA: CPT | Performed by: INTERNAL MEDICINE

## 2022-06-21 PROCEDURE — 92526 ORAL FUNCTION THERAPY: CPT

## 2022-06-21 PROCEDURE — 94640 AIRWAY INHALATION TREATMENT: CPT

## 2022-06-21 PROCEDURE — 27000221 HC OXYGEN, UP TO 24 HOURS

## 2022-06-21 PROCEDURE — 25000003 PHARM REV CODE 250: Performed by: NURSE PRACTITIONER

## 2022-06-21 PROCEDURE — 11000001 HC ACUTE MED/SURG PRIVATE ROOM

## 2022-06-21 PROCEDURE — 36415 COLL VENOUS BLD VENIPUNCTURE: CPT | Performed by: INTERNAL MEDICINE

## 2022-06-21 PROCEDURE — 97530 THERAPEUTIC ACTIVITIES: CPT | Mod: CQ

## 2022-06-21 RX ADMIN — LEVALBUTEROL HYDROCHLORIDE 1.25 MG: 1.25 SOLUTION, CONCENTRATE RESPIRATORY (INHALATION) at 08:06

## 2022-06-21 RX ADMIN — LEVALBUTEROL HYDROCHLORIDE 1.25 MG: 1.25 SOLUTION, CONCENTRATE RESPIRATORY (INHALATION) at 12:06

## 2022-06-21 RX ADMIN — BACITRACIN ZINC, NEOMYCIN SULFATE, AND POLYMYXIN B SULFATE: 400; 3.5; 5 OINTMENT TOPICAL at 08:06

## 2022-06-21 RX ADMIN — LEVALBUTEROL HYDROCHLORIDE 1.25 MG: 1.25 SOLUTION, CONCENTRATE RESPIRATORY (INHALATION) at 03:06

## 2022-06-21 RX ADMIN — DOCUSATE SODIUM LIQUID 100 MG: 100 LIQUID ORAL at 08:06

## 2022-06-21 RX ADMIN — LEVOFLOXACIN 750 MG: 500 TABLET, FILM COATED ORAL at 09:06

## 2022-06-21 RX ADMIN — MUPIROCIN: 20 OINTMENT TOPICAL at 08:06

## 2022-06-21 RX ADMIN — PANTOPRAZOLE SODIUM 40 MG: 40 GRANULE, DELAYED RELEASE ORAL at 08:06

## 2022-06-21 RX ADMIN — DIPHENHYDRAMINE HYDROCHLORIDE 25 MG: 25 CAPSULE ORAL at 09:06

## 2022-06-21 RX ADMIN — HYDROCORTISONE: 1 CREAM TOPICAL at 08:06

## 2022-06-21 RX ADMIN — BUDESONIDE 0.5 MG: 0.5 INHALANT ORAL at 08:06

## 2022-06-21 RX ADMIN — NICOTINE 1 PATCH: 21 PATCH, EXTENDED RELEASE TRANSDERMAL at 08:06

## 2022-06-21 RX ADMIN — ENOXAPARIN SODIUM 40 MG: 40 INJECTION SUBCUTANEOUS at 05:06

## 2022-06-21 RX ADMIN — MUPIROCIN: 20 OINTMENT TOPICAL at 09:06

## 2022-06-21 RX ADMIN — HYDROCORTISONE: 1 CREAM TOPICAL at 09:06

## 2022-06-21 RX ADMIN — DOCUSATE SODIUM LIQUID 100 MG: 100 LIQUID ORAL at 09:06

## 2022-06-21 RX ADMIN — HYDROCODONE BITARTRATE AND ACETAMINOPHEN 1 TABLET: 5; 325 TABLET ORAL at 09:06

## 2022-06-21 NOTE — PROGRESS NOTES
[facility]  HOSPITAL MEDICINE PROGRESS NOTE      Patient Name: Josafat Boudreaux  MRN: [id number]  Admission Date: 6/10/2022  8:59 AM  Status: IP- Inpatient   Length of Stay: 11 days  Face-to-Face encounter date: 06/21/2022       CHIEF COMPLAINT  Hospital follow up for Acute hypoxic respiratory failure    HOSPITAL COURSE  56 year old  s/p tracheostomy with dysphagia, so most of the information is gathered from the medical record.  He is a 56 year old male who presented to the ED on 6/10/22 via [facility] with c/o SOB.  It appears that he had some hoarseness starting in November 2021 that kept getting progressively worse and was recently diagnosed with a 2.4 cm supraglottic mass with additional metastatic appearing lymph nodes. on his vocal cords.  He had a trach scheduled for 7/6/2022.  Per ER MD note, the patient's wife states that his RA O2 was down to the 40s when she called EMS.  During his ED stay, he was placed on BiPap, but his CO2 continued to increase, and ENT was consulted for emergent trach and he was admitted to ICU.  He was extubated on 6/11, and is tolerating trach collar well.  Today's ED lab work was notable for Na 128, improved from previous.  He was deemed appropriate to transfer to the floor and he was admitted to hospital medicine for further management with ENT to continue to follow. He underwent bronchoscopy with biopsies of right upper lobe endobronchial lesion on 6/14. Pathology pending.  Weaned to 6L/Min via trach collar. VQ scan showed low prob of PE. Doppler US showed no evidence of deep or superficial vein thrombosis in bilateral lower extremities. RUE doppler US showed no evidence of deep vein thrombosis in the right upper extremity. A superficial thrombosis was identified in the right basilic vein.    Today (06/21/2022):  He is on 40% oxygen (4L/min). He denies any chest pain or shortness of breath. No further fever. Rash on his abdomen and proximal thighs,  persists but not increasing. Wife at bedside.    OBJECTIVE    VITAL SIGNS: 24 HRS MIN & MAX LAST   Temp  Min: 97.7 °F (36.5 °C)  Max: 99 °F (37.2 °C) 98.1 °F (36.7 °C)   BP  Min: 106/74  Max: 132/80 113/72   Pulse  Min: 92  Max: 105  101   Resp  Min: 16  Max: 22 16   SpO2  Min: 92 %  Max: 98 % 96 %       LABS/MICROBIOLOGY/MEDICATIONS/DIAGNOSTICS  I have reviewed all pertinent lab results within the past 24 hours.    Recent Labs   Lab 06/17/22 0438 06/18/22 0404 06/20/22 0445   WBC 7.8 8.6 7.4   RBC 4.78 4.93 5.19   HGB 15.2 16.1 16.3   HCT 50.1 50.8 53.5*   .8* 103.0* 103.1*   MCH 31.8* 32.7* 31.4*   MCHC 30.3* 31.7* 30.5*   RDW 14.6 14.5 14.3    225 269   MPV 10.9 10.9 11.2       Recent Labs   Lab 06/17/22 0438 06/18/22 0404 06/20/22 0445 06/21/22  0330    135* 130* 134*   K 4.2 3.8 4.7 4.5   CO2 36* 39* 33* 39*   BUN 10.5 13.3 11.4 14.3   CREATININE 0.54* 0.59* 0.55* 0.60*   CALCIUM 8.4 8.7 9.1 9.2   MG 1.80 1.90  --   --    ALBUMIN 1.9*  --   --   --    ALKPHOS 77  --   --   --    ALT 19  --   --   --    AST 25  --   --   --    BILITOT 0.4  --   --   --        Recent Labs     06/20/22 0445   WBC 7.4   RBC 5.19   HGB 16.3   HCT 53.5*   .1*   MCH 31.4*   MCHC 30.5*   RDW 14.3     Recent Labs     06/20/22 0445 06/21/22  0330   CHLORIDE 90* 87*   CO2 33* 39*   BUN 11.4 14.3   CREATININE 0.55* 0.60*   GLUCOSE 123* 120*   CALCIUM 9.1 9.2       Microbiology Results (last 7 days)     Procedure Component Value Units Date/Time    Blood Culture [[id number]]  (Normal) Collected: 06/15/22 1842    Order Status: Completed Specimen: Blood Updated: 06/20/22 2203     CULTURE, BLOOD ([facility]) No Growth at 5 days    Respiratory Culture [[id number]]  (Abnormal) Collected: 06/15/22 2232    Order Status: Completed Specimen: Sputum, Expectorated Updated: 06/18/22 1209     Respiratory Culture Many Moraxella catarrhalis     Comment: with normal respiratory julieth  Beta-Lactamase Positive  For indicated  treatment of Moraxella catarrhalis the drugs of choice are as follows:  Cephalosporins, Beta-lactamase- stable Penicillins, Tetracycline, and Trimethoprim- Sulfamethoxazole.        Beta Lactamase Positive     GRAM STAIN Quality 1+       Moderate Gram positive cocci      Few Gram Negative Rods      Moderate Gram Positive Rods    Narrative:      Resistant to Penicillin by beta-lactamase         ENDOBRONCHIAL MASS RUL, CYTOLOGY (TWO CELL BLOCK SLIDES):     NEARLY ACELLULAR SPECIMEN CONSISTING OF FRESH BLOOD CLOT.     NO ATYPICAL OR MALIGNANT CELLS IDENTIFIED      CV Ultrasound doppler venous arm right  No evidence of deep vein thrombosis in the right upper extremity.  A superficial thrombosis was identified in the right basilic vein.  X-Ray Chest 1 View  Narrative: EXAMINATION:  XR CHEST 1 VIEW    CPT 97695    CLINICAL HISTORY:  hypoxia;    COMPARISON:  June 13, 2022    FINDINGS:  Mediastinal and cardiac silhouettes to be within normal limits there is slight blunting of the right costophrenic angle which may indicate the presence of a right-sided pleural effusion.    Slightly more confluent opacities identified at the right base early infiltrate cannot be completely excluded.    No other focal consolidative changes.    Improved aeration in the right upper lobe  Impression: Changes of right-sided pleural effusion.    Slightly more confluent opacities at the right base infiltrate cannot be completely excluded.    Improved aeration in the right upper lobe.    No other focal consolidative changes    Electronically signed by: Madhav Lockhart  Date:    06/20/2022  Time:    14:24      Scheduled Med:   budesonide  0.5 mg Nebulization Q12H    docusate  100 mg Per G Tube BID    enoxaparin  40 mg Subcutaneous Daily    hydrocortisone   Topical (Top) BID    levalbuterol  1.25 mg Nebulization Q8H    levoFLOXacin  750 mg Per G Tube Daily    mupirocin   Topical (Top) BID    neomycin-bacitracin-polymyxin   Topical (Top) Daily     nicotine  1 patch Transdermal Daily    pantoprazole  40 mg Per G Tube Daily        Continuous Infusions:       PRN Meds:  albuterol-ipratropium, diphenhydrAMINE, HYDROcodone-acetaminophen, lactulose 10 gram/15 ml, LIDOcaine HCL 4%, lorazepam, morphine, polyethylene glycol       PHYSICAL EXAM  Constitutional: Appears well-developed, well-nourished and appears stated age. No acute distress.   Head/Eyes/Ears/Nose/Mouth/Throat: Normocephalic, atraumatic. PERRLA, EOM intact. Conjunctive clear, sclera white. Pinna no masses, lesions, tenderness, drainage. Pharynx pink.  Neck: No JVD present. Normal alignment and mobility. + tracheostomy, on trach collar.  Cardiovascular: Regular rate and rhythm, S1 / S2, no systolic or diastolic murmur, no rubs or gallops.  Pulmonary/Chest: Effort normal. No respiratory distress. Breath sounds normal. No rales, rhonchi, rubs or wheezes.    Abdominal: Flat, soft, non-tender, non-distended, no rebound tenderness or guarding, normal bowel sounds in all four quadrants, + PEG tube.  Musculoskeletal: Extremities symmetric, no tenderness, weakness, or swelling. Maintains flexion against resistance.   Extremities:  Nails: No clubbing, cyanosis, petechiae or nodes. 2+ bilateral pedal and radial pulses with 1+ bilateral lower ext edema. Right upper extremity diffuse swelling, non tender, no erythema.  Neurological: Alert and oriented to person, place, and time. Cooperative. Speech clear, appropriate. No dysarthria. Cranial nerves II-XII grossly intact. No focal deficits.  Psychiatric: No depression, anxiety or agitation. Normal affect, no hallucinations or suicidal ideations, no pressured speech.  Skin: Erythematous macular rash on abdomen, back, proximal thighs.      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 SIRS  Moraxella catarrhalis + Respiratory culture   Right upper extremity swelling  Superficial right basilic vein thrombosis   Rash: unclear etiology, ? Drug allergy  Small bilateral pleural effusions     PLAN  Currently on 4 liters/minute oxygen via trach collar.  Wean oxygen as tolerated.  Keep SaO2 greater than 88%  Respiratory culture grew Moraxella catarrhalis and blood cultures NGTD  On Levaquin x 5 days course  Incentive spirometry  Continue ST, PT and OT   Bowel regimen  Tube feeds as tolerated  Bronchoscopy revealed endobronchial lesion in the right upper lobe and biopsies were obtained. No indication for mediastinoscopy now  Continue trach care.   Continue bronchodilator therapy  Patient to follow-up with his oncologist Dr. Cao after discharge.  He is also to follow up with his ENT physician for scheduled laryngeal surgery.  Outpatient PFTs  Warm compress, elevate right upper extremity  Topical hydrocortisone, prn benadryl, monitor rash  Family educated on trach care, PEG tube feedings  DVT Prophylaxis: Lovenox    Patient condition:  Fair    Anticipated discharge and disposition: Home with  PT and OT, skilled nurse in 1-2 days. Outpatient speech therapy following laryngectomy  __________________________________________________________________________    All diagnosis and differential diagnosis have been reviewed; assessment and plan have been documented. I have personally reviewed the test results that are presently available; I have reviewed the patient's medication list. I have reviewed the consulting providers' recommendations. I have reviewed or attempted to review medical records based upon their availability.  All of the patient's questions have been addressed and answered.  Patient is agreeable to the above stated plan. I will continue to monitor closely and make adjustment to medical management as needed.    This document was created with the assistance of a voice recognition software. There may be transcription errors as a result of using this technology, however minimal. Effort has been made to ensure accuracy of transcription but any obvious errors or omissions should be clarified with the author of this document.        Ra Quintanilla MD   [facility] Medicine  06/21/2022

## 2022-06-21 NOTE — PT/OT/SLP PROGRESS
Physical Therapy         Treatment        Josafat Boudreaux   MRN: [id number]     PT Received On: 06/21/22  PT Start Time: 1126     PT Stop Time: 1145    PT Total Time (min): 19 min       Billable Minutes:  Therapeutic Activity 19  Total Minutes: 19    Treatment Type: Treatment  PT/PTA: PTA     PTA Visit Number: 2       General Precautions: Standard, aspiration  Orthopedic Precautions: Orthopedic Precautions : N/A   Braces:      Spiritual, Cultural Beliefs, Baptism Practices, Values that Affect Care: no    Subjective:  Communicated with nurse prior to session.         Objective:  Patient found supine in bed, with Patient found with: oxygen, Tracheostomy, telemetry, PEG Tube    Functional Mobility:  Bed Mobility:   Supine to sit: Standby Assistance   Sit to supine: Standby Assistance   Rolling: Standby Assistance   Scooting: Standby Assistance    Balance:   Static Sit: GOOD: Takes MODERATE challenges from all directions  Dynamic Sit:  GOOD: Maintains balance through MODERATE excursions of active trunk movement  Static Stand: FAIR+: Takes MINIMAL challenges from all directions  Dynamic stand: FAIR: Needs CONTACT GUARD during gait    Transfer Training:  Sit to stand:Stand-by Assistance with No Assistive Device x3 trials with assistance for line management    Gait Training:  Pt performed 5 trials of forward/backward stepping for 10ft without AD and Marilou/CGA with 2 noted self righted LOB.       Additional Treatment:  Pt performed standing step taps to 5 inch step x10 reps with L HHA and Marilou to maintain balance with 2 LOB requiring assistance to correct.     Activity Tolerance:  Patient tolerated treatment well    Patient left up in chair with all lines intact and call button in reach.    Assessment:  Josafat Boudreaux is a 56 y.o. male with a medical diagnosis of Tracheostomy dependent. He presents with increased balance and endurance.    Rehab potential is good.    Activity tolerance: Good    Discharge  recommendations: Discharge Facility/Level of Care Needs: home health PT     Equipment recommendations: Equipment Needed After Discharge: walker, rolling     GOALS:   Multidisciplinary Problems     Physical Therapy Goals        Problem: Physical Therapy    Goal Priority Disciplines Outcome Goal Variances Interventions   Physical Therapy Goal     PT, PT/OT Ongoing, Progressing     Description: Goals to be met by: 22     Patient will increase functional independence with mobility by performin. Supine to sit with Set-up Catron  2. Sit to supine with Set-up Catron  3. Sit to stand transfer with Supervision  4. Gait  x 400 feet with Supervision using No Assistive Device.   5. Increased functional strength to 5/5 in BLE's                     PLAN:    Patient to be seen 6 x/week  to address the above listed problems via gait training, therapeutic activities  Plan of Care expires: 22  Plan of Care reviewed with: patient, spouse         2022

## 2022-06-21 NOTE — PT/OT/SLP PROGRESS
Speech Language Pathology Department  Dysphagia Therapy    Patient Name:  Josafat Boudreaux   MRN:  [id number]  Admitting Diagnosis: Laryngeal mass    Recommendations:                 General Recommendations:  Dysphagia therapy  Diet recommendations:  NPO, Liquid Diet Level: NPO   Aspiration Precautions: medications via PEG    Discharge recommendations:  outpatient speech therapy following laryngectomy  Barriers to Discharge:  None    Subjective     Patient awake, alert and cooperative.    Patient goals: to eat/drink     Pain/Comfort:  · Pain Rating 1: 0/10    Respiratory Status: 10L/min via trach collar    Objective:     Oral Musculature Evaluation:   Oral Musculature: WFL   Dentition: present and adequate   Secretion Management: adequate   Mucosal Quality: good   Mandibular Strength and Mobility: WFL   Oral Labial Strength and Mobility: WFL   Lingual Strength and Mobility: WFL   Buccal Strength and Mobility: WFL   Voice Prior to PO Intake: no phonation    Pt tolerated thermal stimulation to the anterior faucial pillars x10 with 100% swallow responses.  Laryngeal excursion fair.  Delay varied 2-3 seconds.  Pt completed the Pam maneuver x2.  Pt tolerated 4oz puree with no overt/clinical signs/sx aspiration.    Assessment:     Pt continues to present with dysphagia requiring alternate means of nutrition.    Goals:   Multidisciplinary Problems     SLP Goals        Problem: SLP    Goal Priority Disciplines Outcome   SLP Goal     SLP Ongoing, Progressing   Description: LTG: Tolerate least restrictive PO diet with no clinical signs/sx aspiration    STGs:  1.  Complete base of tongue and laryngeal strengthening exercises with min cues  2.  Tolerate thermal stim x10 with 100% effortful swallows and delay less than 2 seconds.    LTG: Tolerate speaking valve during all waking hours with no signs/sx respiratory distress/compromise  STG: Tolerate speaking valve x1 hour with no signs/sx respiratory  distress/compromise                   Plan:     Will continue to follow and tx as appropriate.    Patient to be seen:  5 x/week   Plan of Care expires:  06/26/22  Plan of Care reviewed with:  patient, spouse   SLP Follow-Up:  Yes       Time Tracking:     SLP Treatment Date:   06/21/22  Speech Start Time:  1355  Speech Stop Time:  1415     Speech Total Time (min):  20 min    Billable minutes:  Treatment of Swallow Dysfunction, 20 minutes       06/21/2022

## 2022-06-21 NOTE — PT/OT/SLP PROGRESS
Occupational Therapy      Patient Name:  Josafat Boudreaux   MRN:  [id number]    Patient not seen today secondary to refusal 2/2 fatigue  . Will follow-up 6/22.    6/21/2022

## 2022-06-22 PROCEDURE — 25000003 PHARM REV CODE 250: Performed by: INTERNAL MEDICINE

## 2022-06-22 PROCEDURE — 97116 GAIT TRAINING THERAPY: CPT | Mod: CQ

## 2022-06-22 PROCEDURE — 99900031 HC PATIENT EDUCATION (STAT)

## 2022-06-22 PROCEDURE — 11000001 HC ACUTE MED/SURG PRIVATE ROOM

## 2022-06-22 PROCEDURE — 92526 ORAL FUNCTION THERAPY: CPT

## 2022-06-22 PROCEDURE — 27000221 HC OXYGEN, UP TO 24 HOURS

## 2022-06-22 PROCEDURE — 94761 N-INVAS EAR/PLS OXIMETRY MLT: CPT

## 2022-06-22 PROCEDURE — 97530 THERAPEUTIC ACTIVITIES: CPT | Mod: CQ

## 2022-06-22 PROCEDURE — 25000242 PHARM REV CODE 250 ALT 637 W/ HCPCS: Performed by: INTERNAL MEDICINE

## 2022-06-22 PROCEDURE — 99900035 HC TECH TIME PER 15 MIN (STAT)

## 2022-06-22 PROCEDURE — 63600175 PHARM REV CODE 636 W HCPCS: Performed by: INTERNAL MEDICINE

## 2022-06-22 PROCEDURE — S4991 NICOTINE PATCH NONLEGEND: HCPCS | Performed by: NURSE PRACTITIONER

## 2022-06-22 PROCEDURE — 94640 AIRWAY INHALATION TREATMENT: CPT

## 2022-06-22 PROCEDURE — 99900026 HC AIRWAY MAINTENANCE (STAT)

## 2022-06-22 PROCEDURE — 97535 SELF CARE MNGMENT TRAINING: CPT | Mod: CO

## 2022-06-22 PROCEDURE — 25000003 PHARM REV CODE 250: Performed by: NURSE PRACTITIONER

## 2022-06-22 RX ADMIN — MUPIROCIN: 20 OINTMENT TOPICAL at 09:06

## 2022-06-22 RX ADMIN — DOCUSATE SODIUM LIQUID 100 MG: 100 LIQUID ORAL at 08:06

## 2022-06-22 RX ADMIN — LEVALBUTEROL HYDROCHLORIDE 1.25 MG: 1.25 SOLUTION, CONCENTRATE RESPIRATORY (INHALATION) at 04:06

## 2022-06-22 RX ADMIN — BUDESONIDE 0.5 MG: 0.5 INHALANT ORAL at 12:06

## 2022-06-22 RX ADMIN — LEVALBUTEROL HYDROCHLORIDE 1.25 MG: 1.25 SOLUTION, CONCENTRATE RESPIRATORY (INHALATION) at 12:06

## 2022-06-22 RX ADMIN — HYDROCORTISONE: 1 CREAM TOPICAL at 09:06

## 2022-06-22 RX ADMIN — MUPIROCIN: 20 OINTMENT TOPICAL at 08:06

## 2022-06-22 RX ADMIN — PANTOPRAZOLE SODIUM 40 MG: 40 GRANULE, DELAYED RELEASE ORAL at 09:06

## 2022-06-22 RX ADMIN — DIPHENHYDRAMINE HYDROCHLORIDE 25 MG: 25 CAPSULE ORAL at 08:06

## 2022-06-22 RX ADMIN — BACITRACIN ZINC, NEOMYCIN SULFATE, AND POLYMYXIN B SULFATE: 400; 3.5; 5 OINTMENT TOPICAL at 09:06

## 2022-06-22 RX ADMIN — DOCUSATE SODIUM LIQUID 100 MG: 100 LIQUID ORAL at 09:06

## 2022-06-22 RX ADMIN — NICOTINE 1 PATCH: 21 PATCH, EXTENDED RELEASE TRANSDERMAL at 09:06

## 2022-06-22 RX ADMIN — BUDESONIDE 0.5 MG: 0.5 INHALANT ORAL at 08:06

## 2022-06-22 RX ADMIN — LEVOFLOXACIN 750 MG: 500 TABLET, FILM COATED ORAL at 09:06

## 2022-06-22 RX ADMIN — ENOXAPARIN SODIUM 40 MG: 40 INJECTION SUBCUTANEOUS at 05:06

## 2022-06-22 RX ADMIN — LEVALBUTEROL HYDROCHLORIDE 1.25 MG: 1.25 SOLUTION, CONCENTRATE RESPIRATORY (INHALATION) at 08:06

## 2022-06-22 RX ADMIN — HYDROCORTISONE: 1 CREAM TOPICAL at 08:06

## 2022-06-22 NOTE — PROGRESS NOTES
[facility]  HOSPITAL MEDICINE PROGRESS NOTE      Patient Name: Josafat Boudreaux  MRN: [id number]  Admission Date: 6/10/2022  8:59 AM  Status: IP- Inpatient   Length of Stay: 12 days  Face-to-Face encounter date: 06/22/2022       CHIEF COMPLAINT  Hospital follow up for Acute hypoxic respiratory failure    HOSPITAL COURSE  56 year old  s/p tracheostomy with dysphagia, so most of the information is gathered from the medical record.  He is a 56 year old male who presented to the ED on 6/10/22 via [facility] with c/o SOB.  It appears that he had some hoarseness starting in November 2021 that kept getting progressively worse and was recently diagnosed with a 2.4 cm supraglottic mass with additional metastatic appearing lymph nodes. on his vocal cords.  He had a trach scheduled for 7/6/2022.  Per ER MD note, the patient's wife states that his RA O2 was down to the 40s when she called EMS.  During his ED stay, he was placed on BiPap, but his CO2 continued to increase, and ENT was consulted for emergent trach and he was admitted to ICU.  He was extubated on 6/11, and is tolerating trach collar well.  Today's ED lab work was notable for Na 128, improved from previous.  He was deemed appropriate to transfer to the floor and he was admitted to hospital medicine for further management with ENT to continue to follow. He underwent bronchoscopy with biopsies of right upper lobe endobronchial lesion on 6/14. Pathology pending.  Weaned to 6L/Min via trach collar. VQ scan showed low prob of PE. Doppler US showed no evidence of deep or superficial vein thrombosis in bilateral lower extremities. RUE doppler US showed no evidence of deep vein thrombosis in the right upper extremity. A superficial thrombosis was identified in the right basilic vein.    Today (06/22/2022):  He is on 35% oxygen (3-4L/min). He denies any chest pain or shortness of breath. No further fever. Rash on his abdomen and proximal thighs  improving. Wife at bedside. PT to work with patient on going up and down stairs today.    OBJECTIVE    VITAL SIGNS: 24 HRS MIN & MAX LAST   Temp  Min: 97.9 °F (36.6 °C)  Max: 98.9 °F (37.2 °C) 98.8 °F (37.1 °C)   BP  Min: 113/73  Max: 133/63 117/62   Pulse  Min: 97  Max: 102  100   Resp  Min: 16  Max: 20 19   SpO2  Min: 92 %  Max: 97 % (Abnormal) 94 %       LABS/MICROBIOLOGY/MEDICATIONS/DIAGNOSTICS  I have reviewed all pertinent lab results within the past 24 hours.    Recent Labs   Lab 06/17/22 0438 06/18/22 0404 06/20/22 0445   WBC 7.8 8.6 7.4   RBC 4.78 4.93 5.19   HGB 15.2 16.1 16.3   HCT 50.1 50.8 53.5*   .8* 103.0* 103.1*   MCH 31.8* 32.7* 31.4*   MCHC 30.3* 31.7* 30.5*   RDW 14.6 14.5 14.3    225 269   MPV 10.9 10.9 11.2       Recent Labs   Lab 06/17/22 0438 06/18/22 0404 06/20/22 0445 06/21/22  0330    135* 130* 134*   K 4.2 3.8 4.7 4.5   CO2 36* 39* 33* 39*   BUN 10.5 13.3 11.4 14.3   CREATININE 0.54* 0.59* 0.55* 0.60*   CALCIUM 8.4 8.7 9.1 9.2   MG 1.80 1.90  --   --    ALBUMIN 1.9*  --   --   --    ALKPHOS 77  --   --   --    ALT 19  --   --   --    AST 25  --   --   --    BILITOT 0.4  --   --   --        Recent Labs     06/20/22 0445   WBC 7.4   RBC 5.19   HGB 16.3   HCT 53.5*   .1*   MCH 31.4*   MCHC 30.5*   RDW 14.3     Recent Labs     06/20/22 0445 06/21/22  0330   CHLORIDE 90* 87*   CO2 33* 39*   BUN 11.4 14.3   CREATININE 0.55* 0.60*   GLUCOSE 123* 120*   CALCIUM 9.1 9.2       Microbiology Results (last 7 days)     Procedure Component Value Units Date/Time    Blood Culture [[id number]]  (Normal) Collected: 06/15/22 1842    Order Status: Completed Specimen: Blood Updated: 06/20/22 2203     CULTURE, BLOOD ([facility]) No Growth at 5 days    Respiratory Culture [[id number]]  (Abnormal) Collected: 06/15/22 2232    Order Status: Completed Specimen: Sputum, Expectorated Updated: 06/18/22 1209     Respiratory Culture Many Moraxella catarrhalis     Comment: with normal respiratory  julieth  Beta-Lactamase Positive  For indicated treatment of Moraxella catarrhalis the drugs of choice are as follows:  Cephalosporins, Beta-lactamase- stable Penicillins, Tetracycline, and Trimethoprim- Sulfamethoxazole.        Beta Lactamase Positive     GRAM STAIN Quality 1+       Moderate Gram positive cocci      Few Gram Negative Rods      Moderate Gram Positive Rods    Narrative:      Resistant to Penicillin by beta-lactamase         ENDOBRONCHIAL MASS RUL, CYTOLOGY (TWO CELL BLOCK SLIDES):     NEARLY ACELLULAR SPECIMEN CONSISTING OF FRESH BLOOD CLOT.     NO ATYPICAL OR MALIGNANT CELLS IDENTIFIED      CV Ultrasound doppler venous arm right  No evidence of deep vein thrombosis in the right upper extremity.  A superficial thrombosis was identified in the right basilic vein.  X-Ray Chest 1 View  Narrative: EXAMINATION:  XR CHEST 1 VIEW    CPT 07279    CLINICAL HISTORY:  hypoxia;    COMPARISON:  June 13, 2022    FINDINGS:  Mediastinal and cardiac silhouettes to be within normal limits there is slight blunting of the right costophrenic angle which may indicate the presence of a right-sided pleural effusion.    Slightly more confluent opacities identified at the right base early infiltrate cannot be completely excluded.    No other focal consolidative changes.    Improved aeration in the right upper lobe  Impression: Changes of right-sided pleural effusion.    Slightly more confluent opacities at the right base infiltrate cannot be completely excluded.    Improved aeration in the right upper lobe.    No other focal consolidative changes    Electronically signed by: Madhav Lockhart  Date:    06/20/2022  Time:    14:24      Scheduled Med:   budesonide  0.5 mg Nebulization Q12H    docusate  100 mg Per G Tube BID    enoxaparin  40 mg Subcutaneous Daily    hydrocortisone   Topical (Top) BID    levalbuterol  1.25 mg Nebulization Q8H    mupirocin   Topical (Top) BID    neomycin-bacitracin-polymyxin   Topical (Top)  Daily    nicotine  1 patch Transdermal Daily    pantoprazole  40 mg Per G Tube Daily        Continuous Infusions:       PRN Meds:  albuterol-ipratropium, diphenhydrAMINE, HYDROcodone-acetaminophen, lactulose 10 gram/15 ml, LIDOcaine HCL 4%, lorazepam, morphine, polyethylene glycol       PHYSICAL EXAM  Constitutional: Appears well-developed, well-nourished and appears stated age. No acute distress.   Head/Eyes/Ears/Nose/Mouth/Throat: Normocephalic, atraumatic. PERRLA, EOM intact. Conjunctive clear, sclera white. Pinna no masses, lesions, tenderness, drainage. Pharynx pink.  Neck: No JVD present. Normal alignment and mobility. + tracheostomy, on trach collar.  Cardiovascular: Regular rate and rhythm, S1 / S2, no systolic or diastolic murmur, no rubs or gallops.  Pulmonary/Chest: Effort normal. No respiratory distress. Breath sounds normal. No rales, rhonchi, rubs or wheezes.    Abdominal: Flat, soft, non-tender, non-distended, no rebound tenderness or guarding, normal bowel sounds in all four quadrants, + PEG tube.  Musculoskeletal: Extremities symmetric, no tenderness, weakness, or swelling. Maintains flexion against resistance.   Extremities:  Nails: No clubbing, cyanosis, petechiae or nodes. 2+ bilateral pedal and radial pulses with 1+ bilateral lower ext edema. Right upper extremity diffuse swelling, non tender, no erythema.  Neurological: Alert and oriented to person, place, and time. Cooperative. Speech clear, appropriate. No dysarthria. Cranial nerves II-XII grossly intact. No focal deficits.  Psychiatric: No depression, anxiety or agitation. Normal affect, no hallucinations or suicidal ideations, no pressured speech.  Skin: Erythematous macular rash on abdomen, back, proximal thighs, improving.      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 SIRS  Moraxella catarrhalis + Respiratory culture   Right upper extremity swelling  Superficial right basilic vein thrombosis   Rash: unclear etiology, ? Drug allergy  Small bilateral pleural effusions     PLAN  Currently on 3-4 liters/minute oxygen via trach collar.  Wean oxygen as tolerated.  Keep SaO2 greater than 88%  Respiratory culture grew Moraxella catarrhalis and blood cultures NGTD  Completed Levaquin x 5 days course  Incentive spirometry  Continue ST, PT and OT   Bowel regimen  Tube feeds as tolerated  Bronchoscopy revealed endobronchial lesion in the right upper lobe and biopsies were obtained. No indication for mediastinoscopy now  Continue trach care.   Continue bronchodilator therapy  Patient to follow-up with his oncologist Dr. Cao after discharge.  He is also to follow up with his ENT physician for scheduled laryngeal surgery.  Outpatient PFTs  Warm compress, elevate right upper extremity  Topical hydrocortisone, prn benadryl, monitor rash  Family educated on trach care, PEG tube feedings  DVT Prophylaxis: Lovenox    Patient condition:  Fair    Anticipated discharge and disposition: Home with  PT and OT, skilled nurse tomorrow. Outpatient speech therapy following laryngectomy  __________________________________________________________________________    All diagnosis and differential diagnosis have been reviewed; assessment and plan have been documented. I have personally reviewed the test results that are presently available; I have reviewed the patient's medication list. I have reviewed the consulting providers' recommendations. I have reviewed or attempted to review medical records based upon their availability.  All of the patient's questions have been  addressed and answered. Patient is agreeable to the above stated plan. I will continue to monitor closely and make adjustment to medical management as needed.    This document was created with the assistance of a voice recognition software. There may be transcription errors as a result of using this technology, however minimal. Effort has been made to ensure accuracy of transcription but any obvious errors or omissions should be clarified with the author of this document.        Ra Quintanilla MD   [facility] Medicine  06/22/2022

## 2022-06-22 NOTE — PT/OT/SLP PROGRESS
Occupational Therapy  Treatment    Josafat Boudreaux   MRN: [id number]   Admitting Diagnosis: Tracheostomy dependent    OT Date of Treatment: 06/22/22   OT Start Time: 1527  OT Stop Time: 1555  OT Total Time (min): 28 min     Billable Minutes:  Self Care/Home Management 28  Total Minutes: 28     OT/MOR: MOR     MOR Visit Number: 1    General Precautions: Standard, fall  Spiritual, Cultural Beliefs, Buddhism Practices, Values that Affect Care: no    Subjective:  Communicated with nurse prior to session.    Objective:  Patient found with: PEG Tube, peripheral IV, Tracheostomy, telemetry, oxygen    Functional Mobility:  Bed Mobility:   Supine to sit: Standby Assistance   Sit to supine: Standby Assistance   Rolling: Supervision or Set-up Assistance   Scooting: Standby Assistance    Transfer Training:   Toilet Transfer:  Pt Step Transfer with SBA/CGA with No Assistive Device    Grooming:  Patient performed face washing with SBA/CGA at standing at sink.    Toilet Training:  Pt performed toileting with Minimal Assistance at Commode.    Balance:   Static Sit: GOOD: Takes MODERATE challenges from all directions  Dynamic Sit:  GOOD: Maintains balance through MODERATE excursions of active trunk movement  Static Stand: GOOD: Takes MODERATE challenges from all directions  Dynamic stand: GOOD: Needs SUPERVISION only during gait and able to self right with moderate LOB    Patient left HOB elevated with call button in reach and wife present    ASSESSMENT:  Josafat Boudreaux is a 56 y.o. male with a medical diagnosis of Tracheostomy dependent and presents with decreased ADL skills, decreased strength and decreased Ax tolerance.    Rehab potential is good    Activity tolerance: Good    Discharge recommendations: home with home health     Equipment recommendations: walker, rolling     GOALS:   Multidisciplinary Problems     Occupational Therapy Goals        Problem: Occupational Therapy    Goal Priority Disciplines Outcome  Interventions   Occupational Therapy Goal     OT, PT/OT Ongoing, Progressing    Description: Goals to be met by: 7/15    Patient will increase functional independence with ADLs by performing:    UE Dressing with Modified [place].  LE Dressing with Modified [place].  Grooming while standing with Modified [place].  Toileting from bedside commode with Modified [place] for hygiene and clothing management.   Toilet transfer to bedside commode with Modified [place].                     Plan:  Patient to be seen 5 x/week to address the above listed problems via self-care/home management, therapeutic activities, therapeutic exercises  Plan of Care reviewed with: patient, spouse    06/22/2022

## 2022-06-22 NOTE — PT/OT/SLP PROGRESS
Physical Therapy         Treatment        Josafat Boudreaux   MRN: [id number]     PT Received On: 06/22/22  PT Start Time: 1527     PT Stop Time: 1555    PT Total Time (min): 28 min       Billable Minutes:  Gait [id number] and Therapeutic Activity 13  Total Minutes: 28    Treatment Type: Treatment  PT/PTA: PTA     PTA Visit Number: 3       General Precautions: Standard, aspiration  Orthopedic Precautions: Orthopedic Precautions : N/A   Braces:      Spiritual, Cultural Beliefs, Sabianism Practices, Values that Affect Care: no    Subjective:  Communicated with nurse prior to session.         Objective:  Patient found supine in bed, with Patient found with: oxygen, PEG Tube, Tracheostomy    Functional Mobility:  Bed Mobility:   Supine to sit: Standby Assistance   Sit to supine: Standby Assistance   Rolling: Standby Assistance   Scooting: Standby Assistance    Balance:   Static Sit: GOOD: Takes MODERATE challenges from all directions  Dynamic Sit:  GOOD: Maintains balance through MODERATE excursions of active trunk movement  Static Stand: FAIR+: Takes MINIMAL challenges from all directions  Dynamic stand: FAIR+: Needs CLOSE SUPERVISION during gait and is able to right self with minor LOB    Transfer Training:  Sit to stand:Contact Guard Assistance with No Assistive Device x4 trials with assistance for line management    Gait Training:  Patient gait trained   35  feet on level tile with No Assistive Device with Contact Guard Assistance.  Pt with demonstarting a  reciprocal gait with decreased stephanie and decreased step length.Impairments contributing to gait deviations include impaired balance      Activity Tolerance:  Patient tolerated treatment well    Patient left HOB elevated with all lines intact, call button in reach and family present.    Assessment:  Josafat Boudreaux is a 56 y.o. male with a medical diagnosis of Tracheostomy dependent. He presents with increased gait endurance.    Rehab potential is  excellent.    Activity tolerance: Excellent    Discharge recommendations: Discharge Facility/Level of Care Needs: home health PT     Equipment recommendations: Equipment Needed After Discharge: walker, rolling     GOALS:   Multidisciplinary Problems     Physical Therapy Goals        Problem: Physical Therapy    Goal Priority Disciplines Outcome Goal Variances Interventions   Physical Therapy Goal     PT, PT/OT Ongoing, Progressing     Description: Goals to be met by: 22     Patient will increase functional independence with mobility by performin. Supine to sit with Set-up [place]  2. Sit to supine with Set-up [place]  3. Sit to stand transfer with Supervision  4. Gait  x 400 feet with Supervision using No Assistive Device.   5. Increased functional strength to 5/5 in BLE's                     PLAN:    Patient to be seen 6 x/week  to address the above listed problems via gait training, therapeutic activities  Plan of Care expires: 22  Plan of Care reviewed with: patient, spouse, friend         2022

## 2022-06-22 NOTE — PT/OT/SLP PROGRESS
Speech Language Pathology Department  Dysphagia Therapy    Patient Name:  Josafat Boudreaux   MRN:  [id number]  Admitting Diagnosis: Tracheostomy dependent    Recommendations:                 General Recommendations:  Dysphagia therapy  Diet recommendations:  NPO, Liquid Diet Level: NPO   Aspiration Precautions: medications via PEG    Discharge recommendations:  outpatient speech therapy (after laryngectomy)   Barriers to Discharge:  None    Subjective     Patient awake, alert and cooperative.    Patient goals: to eat/drink     Pain/Comfort:  · Pain Rating 1: 0/10    Respiratory Status: trach collar    Objective:     Oral Musculature Evaluation:   Oral Musculature: WFL   Dentition: present and adequate   Secretion Management: adequate   Mucosal Quality: good   Mandibular Strength and Mobility: WFL   Oral Labial Strength and Mobility: WFL   Lingual Strength and Mobility: WFL   Buccal Strength and Mobility: WFL    Pt tolerated thermal stimulation to the anterior faucial pillars x10 with 100% swallow responses.  Laryngeal excursion fair.  Delay 3-4 seconds.  Pt tolerate 4oz puree trial with no overt/clinical signs/sx aspiration.  Occasional cues required for an additional swallow.    Assessment:     Pt continues to present with dysphagia requiring alternate means of nutrition.    Goals:   Multidisciplinary Problems     SLP Goals        Problem: SLP    Goal Priority Disciplines Outcome   SLP Goal     SLP Ongoing, Progressing   Description: LTG: Tolerate least restrictive PO diet with no clinical signs/sx aspiration    STGs:  1.  Complete base of tongue and laryngeal strengthening exercises with min cues  2.  Tolerate thermal stim x10 with 100% effortful swallows and delay less than 2 seconds.    LTG: Tolerate speaking valve during all waking hours with no signs/sx respiratory distress/compromise  STG: Tolerate speaking valve x1 hour with no signs/sx respiratory distress/compromise                   Plan:      Will continue to follow and tx as appropriate.    Patient to be seen:  5 x/week   Plan of Care expires:  06/26/22  Plan of Care reviewed with:  patient   SLP Follow-Up:  Yes       Time Tracking:     SLP Treatment Date:   06/22/22  Speech Start Time:  1330  Speech Stop Time:  1350     Speech Total Time (min):  20 min    Billable minutes:  Treatment of Swallow Dysfunction, 20 minutes       06/22/2022

## 2022-06-23 VITALS
BODY MASS INDEX: 22.22 KG/M2 | SYSTOLIC BLOOD PRESSURE: 112 MMHG | HEART RATE: 98 BPM | RESPIRATION RATE: 18 BRPM | OXYGEN SATURATION: 94 % | HEIGHT: 69 IN | WEIGHT: 150 LBS | TEMPERATURE: 99 F | DIASTOLIC BLOOD PRESSURE: 71 MMHG

## 2022-06-23 LAB
ALBUMIN SERPL-MCNC: 2.4 GM/DL (ref 3.5–5)
ALBUMIN/GLOB SERPL: 0.5 RATIO (ref 1.1–2)
ALP SERPL-CCNC: 83 UNIT/L (ref 40–150)
ALT SERPL-CCNC: 12 UNIT/L (ref 0–55)
AST SERPL-CCNC: 18 UNIT/L (ref 5–34)
BASOPHILS # BLD AUTO: 0.08 X10(3)/MCL (ref 0–0.2)
BASOPHILS NFR BLD AUTO: 1 %
BILIRUBIN DIRECT+TOT PNL SERPL-MCNC: 0.4 MG/DL
BUN SERPL-MCNC: 13.2 MG/DL (ref 8.4–25.7)
CALCIUM SERPL-MCNC: 9.7 MG/DL (ref 8.4–10.2)
CHLORIDE SERPL-SCNC: 90 MMOL/L (ref 98–107)
CO2 SERPL-SCNC: 34 MMOL/L (ref 22–29)
CREAT SERPL-MCNC: 0.61 MG/DL (ref 0.73–1.18)
EOSINOPHIL # BLD AUTO: 0.21 X10(3)/MCL (ref 0–0.9)
EOSINOPHIL NFR BLD AUTO: 2.7 %
ERYTHROCYTE [DISTWIDTH] IN BLOOD BY AUTOMATED COUNT: 13.8 % (ref 11.5–17)
GLOBULIN SER-MCNC: 4.9 GM/DL (ref 2.4–3.5)
GLUCOSE SERPL-MCNC: 117 MG/DL (ref 74–100)
HCT VFR BLD AUTO: 50.2 % (ref 42–52)
HGB BLD-MCNC: 16 GM/DL (ref 14–18)
IMM GRANULOCYTES # BLD AUTO: 0.05 X10(3)/MCL (ref 0–0.02)
IMM GRANULOCYTES NFR BLD AUTO: 0.6 % (ref 0–0.43)
LYMPHOCYTES # BLD AUTO: 1.29 X10(3)/MCL (ref 0.6–4.6)
LYMPHOCYTES NFR BLD AUTO: 16.7 %
MAGNESIUM SERPL-MCNC: 2.4 MG/DL (ref 1.6–2.6)
MCH RBC QN AUTO: 31.5 PG (ref 27–31)
MCHC RBC AUTO-ENTMCNC: 31.9 MG/DL (ref 33–36)
MCV RBC AUTO: 98.8 FL (ref 80–94)
MONOCYTES # BLD AUTO: 0.92 X10(3)/MCL (ref 0.1–1.3)
MONOCYTES NFR BLD AUTO: 11.9 %
NEUTROPHILS # BLD AUTO: 5.2 X10(3)/MCL (ref 2.1–9.2)
NEUTROPHILS NFR BLD AUTO: 67.1 %
NRBC BLD AUTO-RTO: 0 %
PHOSPHATE SERPL-MCNC: 3.6 MG/DL (ref 2.3–4.7)
PLATELET # BLD AUTO: 351 X10(3)/MCL (ref 130–400)
PMV BLD AUTO: 11.1 FL (ref 9.4–12.4)
POTASSIUM SERPL-SCNC: 4.3 MMOL/L (ref 3.5–5.1)
PROT SERPL-MCNC: 7.3 GM/DL (ref 6.4–8.3)
RBC # BLD AUTO: 5.08 X10(6)/MCL (ref 4.7–6.1)
SODIUM SERPL-SCNC: 134 MMOL/L (ref 136–145)
WBC # SPEC AUTO: 7.7 X10(3)/MCL (ref 4.5–11.5)

## 2022-06-23 PROCEDURE — 25000242 PHARM REV CODE 250 ALT 637 W/ HCPCS: Performed by: INTERNAL MEDICINE

## 2022-06-23 PROCEDURE — 99900031 HC PATIENT EDUCATION (STAT)

## 2022-06-23 PROCEDURE — 25000003 PHARM REV CODE 250: Performed by: NURSE PRACTITIONER

## 2022-06-23 PROCEDURE — 97535 SELF CARE MNGMENT TRAINING: CPT

## 2022-06-23 PROCEDURE — 25000003 PHARM REV CODE 250: Performed by: INTERNAL MEDICINE

## 2022-06-23 PROCEDURE — 94640 AIRWAY INHALATION TREATMENT: CPT

## 2022-06-23 PROCEDURE — 36415 COLL VENOUS BLD VENIPUNCTURE: CPT | Performed by: INTERNAL MEDICINE

## 2022-06-23 PROCEDURE — 85025 COMPLETE CBC W/AUTO DIFF WBC: CPT | Performed by: INTERNAL MEDICINE

## 2022-06-23 PROCEDURE — 99900026 HC AIRWAY MAINTENANCE (STAT)

## 2022-06-23 PROCEDURE — 94761 N-INVAS EAR/PLS OXIMETRY MLT: CPT

## 2022-06-23 PROCEDURE — 27000221 HC OXYGEN, UP TO 24 HOURS

## 2022-06-23 PROCEDURE — 80053 COMPREHEN METABOLIC PANEL: CPT | Performed by: INTERNAL MEDICINE

## 2022-06-23 PROCEDURE — 83735 ASSAY OF MAGNESIUM: CPT | Performed by: INTERNAL MEDICINE

## 2022-06-23 PROCEDURE — S4991 NICOTINE PATCH NONLEGEND: HCPCS | Performed by: NURSE PRACTITIONER

## 2022-06-23 PROCEDURE — 84100 ASSAY OF PHOSPHORUS: CPT | Performed by: INTERNAL MEDICINE

## 2022-06-23 RX ORDER — IBUPROFEN 200 MG
1 TABLET ORAL DAILY
Refills: 0
Start: 2022-06-23 | End: 2022-07-27

## 2022-06-23 RX ORDER — FERROUS SULFATE, DRIED 160(50) MG
1 TABLET, EXTENDED RELEASE ORAL 2 TIMES DAILY WITH MEALS
Start: 2022-06-23 | End: 2022-08-09

## 2022-06-23 RX ORDER — IPRATROPIUM BROMIDE AND ALBUTEROL SULFATE 2.5; .5 MG/3ML; MG/3ML
3 SOLUTION RESPIRATORY (INHALATION) EVERY 4 HOURS PRN
Qty: 90 ML | Refills: 1 | Status: SHIPPED | OUTPATIENT
Start: 2022-06-23 | End: 2023-01-01 | Stop reason: SDUPTHER

## 2022-06-23 RX ORDER — DIPHENHYDRAMINE HCL 25 MG
25 CAPSULE ORAL EVERY 6 HOURS PRN
Qty: 90 CAPSULE | Refills: 1 | Status: ON HOLD | OUTPATIENT
Start: 2022-06-23 | End: 2023-01-01 | Stop reason: HOSPADM

## 2022-06-23 RX ORDER — POLYETHYLENE GLYCOL 3350 17 G/17G
17 POWDER, FOR SOLUTION ORAL 2 TIMES DAILY PRN
Qty: 30 PACKET | Refills: 1 | Status: SHIPPED | OUTPATIENT
Start: 2022-06-23 | End: 2022-07-27

## 2022-06-23 RX ORDER — HYDROCORTISONE 1 %
CREAM (GRAM) TOPICAL 2 TIMES DAILY PRN
Qty: 30 G | Refills: 1 | Status: SHIPPED | OUTPATIENT
Start: 2022-06-23 | End: 2022-01-01

## 2022-06-23 RX ORDER — BUDESONIDE 0.5 MG/2ML
0.5 INHALANT ORAL EVERY 12 HOURS
Qty: 90 ML | Refills: 1 | Status: SHIPPED | OUTPATIENT
Start: 2022-06-23 | End: 2022-01-01

## 2022-06-23 RX ORDER — DOCUSATE SODIUM 50 MG/5ML
100 LIQUID ORAL 2 TIMES DAILY PRN
Qty: 473 ML | Refills: 1 | Status: SHIPPED | OUTPATIENT
Start: 2022-06-23 | End: 2022-07-27

## 2022-06-23 RX ADMIN — BACITRACIN ZINC, NEOMYCIN SULFATE, AND POLYMYXIN B SULFATE: 400; 3.5; 5 OINTMENT TOPICAL at 10:06

## 2022-06-23 RX ADMIN — DOCUSATE SODIUM LIQUID 100 MG: 100 LIQUID ORAL at 10:06

## 2022-06-23 RX ADMIN — LEVALBUTEROL HYDROCHLORIDE 1.25 MG: 1.25 SOLUTION, CONCENTRATE RESPIRATORY (INHALATION) at 11:06

## 2022-06-23 RX ADMIN — PANTOPRAZOLE SODIUM 40 MG: 40 GRANULE, DELAYED RELEASE ORAL at 10:06

## 2022-06-23 RX ADMIN — NICOTINE 1 PATCH: 21 PATCH, EXTENDED RELEASE TRANSDERMAL at 10:06

## 2022-06-23 RX ADMIN — BUDESONIDE 0.5 MG: 0.5 INHALANT ORAL at 11:06

## 2022-06-23 RX ADMIN — MUPIROCIN: 20 OINTMENT TOPICAL at 10:06

## 2022-06-23 RX ADMIN — HYDROCORTISONE: 1 CREAM TOPICAL at 10:06

## 2022-06-23 RX ADMIN — LEVALBUTEROL HYDROCHLORIDE 1.25 MG: 1.25 SOLUTION, CONCENTRATE RESPIRATORY (INHALATION) at 12:06

## 2022-06-23 NOTE — PLAN OF CARE
06/23/22 1148   Medicare Message   Important Message from Medicare regarding Discharge Appeal Rights Explained to patient/caregiver

## 2022-06-23 NOTE — OP NOTE
[facility]                       [address] [place] [address]    PATIENT NAME:      ABILIO MATUTE  YOB: 1965  CSN:               [id number]  MRN:               [id number]  ADMIT DATE:        06/10/2022 08:59:00  PHYSICIAN:         Junior Alonso MD                          OPERATIVE REPORT      DATE OF SURGERY:    06/10/2022 20:36:00    SURGEON:  Junior Alonso MD    PREOPERATIVE DIAGNOSES:    1. Respiratory failure.  2. Laryngeal mass.    PROCEDURE PERFORMED:  Emergent tracheotomy.    ANESTHESIA:  General.    INDICATION FOR PROCEDURE:  This is a gentleman with a laryngeal mass with   progressively worsening stridor and difficulty with breathing.  The patient was   emergently brought up to the OR, where I attempted to intubate the patient and   it turned into emergent tracheotomy.    PROCEDURE IN DETAIL:  Patient was brought to the operative suite, placed in   supine position.  After multiple attempts at intubation, the patient was   beginning to lose his airway, and so it was elected to go ahead and perform   emergent awake trach.  A horizontal incision was made through the skin and   subcutaneous tissue with the 15 blade and Bovie cautery was used to incise the   subcutaneous tissue and dissection through retraction with the trach retractor   allowed for lateralization of the strap muscles.  The thyroid gland was divided   with the Bovie cautery and the patient's tracheal rings were identified.  A 15   blade was used to incise the trachea and it was exposing the patient's   endolumen.  A #8 Shiley was placed.  End-tidal CO2 was used to confirm   placement.  The patient tolerated the procedure well without complications.      ______________________________  Junior Alonso MD    RPC/AQS  DD:  06/22/2022  Time:  01:50PM  DT:  06/22/2022  Time:  08:47PM  Job #:  [id number]      OPERATIVE  REPORT

## 2022-06-23 NOTE — PLAN OF CARE
06/23/22 1025   Final Note   Assessment Type Final Discharge Note   Anticipated Discharge Disposition Home-Health   Hospital Resources/Appts/Education Provided Post-Acute resouces added to AVS   Post-Acute Status   Post-Acute Authorization Home Health;HME   HME Status Set-up Complete/Auth obtained  ([facility] for all DME and home O2; [facility] for tube feeds)   Home Health Status Set-up Complete/Auth obtained  (Complete Home Health)

## 2022-06-23 NOTE — DISCHARGE SUMMARY
[facility] Hospital Medicine Discharge Summary        Patient Name: Josafat Boudreaux  MRN: [id number]  Admit Date: 6/10/2022  Discharge Date and Time: 6/23/2022  12:34 PM  Status: IP- Inpatient   Hospital Length of Stay: 13 days  Admitting Physician:Nicolas Maria MD    Discharging Physician: Ra Quintanilla MD.  Primary Care Physician: Tony Bertrand MD  Principal Problem: Tracheostomy dependent    Consults (From admission, onward)        Status Ordering Provider     Inpatient consult to Gastroenterology  Once        Provider:  Aaliyah Conrad III, MD    Completed MARK NAVARRO     Inpatient consult to Pulmonology  Once        Provider:  OLIVIA Tena MD    Completed MARK NAVARRO     Inpatient consult to Respiratory Care  Once        Provider:  (Not yet assigned)    DARIUSZ Sin     Inpatient consult to ENT  Once        Provider:  Emory Alonso MD    Acknowledged VLADIMIR MEDRANO          Discharge Diagnoses:  Acute hypoxic respiratory failure requiring emergent tracheostomy  6/10/22   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 SIRS  Moraxella catarrhalis + Respiratory culture   Right upper extremity swelling  Superficial right basilic vein thrombosis   Rash: unclear etiology, much improved  Small bilateral pleural effusions     Hospital Course:   56 year old  s/p tracheostomy with dysphagia, so most of the information is gathered from the medical record.  He is a 56 year old male who presented to the ED on 6/10/22 via [facility] with c/o SOB.  It appears that he had some hoarseness starting in November 2021 that kept getting progressively  worse and was recently diagnosed with a 2.4 cm supraglottic mass with additional metastatic appearing lymph nodes. on his vocal cords.  He had a trach scheduled for 7/6/2022.  Per ER MD note, the patient's wife states that his RA O2 was down to the 40s when she called EMS.  During his ED stay, he was placed on BiPap, but his CO2 continued to increase, and ENT was consulted for emergent trach and he was admitted to ICU.  He was extubated on 6/11, and is tolerating trach collar well.  He was deemed appropriate to transfer to the floor and he was admitted to hospital medicine for further management on 6/12/22. He had PEG tube placed on 6/14/22. He underwent bronchoscopy with biopsies of right upper lobe endobronchial lesion on 6/14. Pathology with no atypical cells or malignancy identified. Weaned to 35% oxygen (3-4L/min) via trach collar. VQ scan showed low prob of PE. Doppler US showed no evidence of deep or superficial vein thrombosis in bilateral lower extremities. RUE doppler US showed no evidence of deep vein thrombosis in the right upper extremity. A superficial thrombosis was identified in the right basilic vein. via trach collar. VQ scan showed low prob of PE. Doppler US showed no evidence of deep or superficial vein thrombosis in bilateral lower extremities. RUE doppler US showed no evidence of deep vein thrombosis in the right upper extremity. A superficial thrombosis was identified in the right basilic vein. Respiratory culture grew Moraxella catarrhalis. He received Levaquin x 5 days. Blood cultures NGTD. He had rash on his abdomen and proximal thighs which started before he was placed on Levaquin This improved with topical steroid cream. He is clinically improved and medically stable for discharge home. He denies any complaints today. He is to follow-up with his oncologist Dr. Cao and with his ENT physician for scheduled laryngeal surgery. His PCP is to refer him for outpatient PFTs when he is recovered  from his laryngeal surgery.        VITAL SIGNS: 24 HRS MIN & MAX LAST   Temp  Min: 98.7 °F (37.1 °C)  Max: 99.7 °F (37.6 °C) 99.1 °F (37.3 °C)   BP  Min: 111/69  Max: 123/80 112/71   Pulse  Min: 98  Max: 107  98   Resp  Min: 10  Max: 20 18   SpO2  Min: 91 %  Max: 97 % (Abnormal) 94 %       Physical Exam:  Constitutional: Appears well-developed, well-nourished and appears stated age. No acute distress.   Head/Eyes/Ears/Nose/Mouth/Throat: Normocephalic, atraumatic. PERRLA, EOM intact. Conjunctive clear, sclera white. Pinna no masses, lesions, tenderness, drainage. Pharynx pink.  Neck: No JVD present. Normal alignment and mobility. + tracheostomy, on trach collar.  Cardiovascular: Regular rate and rhythm, S1 / S2, no systolic or diastolic murmur, no rubs or gallops.  Pulmonary/Chest: Effort normal. No respiratory distress. Breath sounds normal. No rales, rhonchi, rubs or wheezes.    Abdominal: Flat, soft, non-tender, non-distended, no rebound tenderness or guarding, normal bowel sounds in all four quadrants, + PEG tube.  Musculoskeletal: Extremities symmetric, no tenderness, weakness, or swelling. Maintains flexion against resistance.   Extremities:  Nails: No clubbing, cyanosis, petechiae or nodes. 2+ bilateral pedal and radial pulses with trace bilateral lower ext edema. Right upper extremity diffuse swelling, non tender, no erythema, improved.  Neurological: Alert and oriented to person, place, and time. Cooperative. Speech clear, appropriate. No dysarthria. Cranial nerves II-XII grossly intact. No focal deficits.  Psychiatric: No depression, anxiety or agitation. Normal affect, no hallucinations or suicidal ideations, no pressured speech.  Skin: Erythematous macular rash on abdomen, back, proximal thighs, much improved.    Procedures Performed: Procedure(s) (LRB):  PEG TUBE (N/A)       Significant Diagnostic Studies: See Full reports for all details  Recent Labs   Lab 06/18/22  0404 06/20/22  9260 06/23/22  8655    WBC 8.6 7.4 7.7   RBC 4.93 5.19 5.08   HGB 16.1 16.3 16.0   HCT 50.8 53.5* 50.2   .0* 103.1* 98.8*   MCH 32.7* 31.4* 31.5*   MCHC 31.7* 30.5* 31.9*   RDW 14.5 14.3 13.8    269 351   MPV 10.9 11.2 11.1       Recent Labs   Lab 06/17/22  0438 06/18/22  0404 06/20/22  0445 06/21/22  0330 06/23/22  0333    135* 130* 134* 134*   K 4.2 3.8 4.7 4.5 4.3   CO2 36* 39* 33* 39* 34*   BUN 10.5 13.3 11.4 14.3 13.2   CREATININE 0.54* 0.59* 0.55* 0.60* 0.61*   CALCIUM 8.4 8.7 9.1 9.2 9.7   MG 1.80 1.90  --   --  2.40   ALBUMIN 1.9*  --   --   --  2.4*   ALKPHOS 77  --   --   --  83   ALT 19  --   --   --  12   AST 25  --   --   --  18   BILITOT 0.4  --   --   --  0.4       Recent Labs     06/23/22  0333   WBC 7.7   RBC 5.08   HGB 16.0   HCT 50.2   MCV 98.8*   MCH 31.5*   MCHC 31.9*   RDW 13.8     Recent Labs     06/21/22  0330 06/23/22  0333   CHLORIDE 87* 90*   CO2 39* 34*   BUN 14.3 13.2   CREATININE 0.60* 0.61*   GLUCOSE 120* 117*   CALCIUM 9.2 9.7   ALBUMIN  --  2.4*   ALKPHOS  --  83   ALT  --  12   AST  --  18       Microbiology Results (last 7 days)     Procedure Component Value Units Date/Time    Blood Culture [[id number]]  (Normal) Collected: 06/15/22 1842    Order Status: Completed Specimen: Blood Updated: 06/20/22 2203     CULTURE, BLOOD ([facility]) No Growth at 5 days    Respiratory Culture [[id number]]  (Abnormal) Collected: 06/15/22 1168    Order Status: Completed Specimen: Sputum, Expectorated Updated: 06/18/22 1209     Respiratory Culture Many Moraxella catarrhalis     Comment: with normal respiratory julieth  Beta-Lactamase Positive  For indicated treatment of Moraxella catarrhalis the drugs of choice are as follows:  Cephalosporins, Beta-lactamase- stable Penicillins, Tetracycline, and Trimethoprim- Sulfamethoxazole.        Beta Lactamase Positive     GRAM STAIN Quality 1+       Moderate Gram positive cocci      Few Gram Negative Rods      Moderate Gram Positive Rods    Narrative:      Resistant to  Penicillin by beta-lactamase           CV Ultrasound doppler venous arm right  No evidence of deep vein thrombosis in the right upper extremity.  A superficial thrombosis was identified in the right basilic vein.  X-Ray Chest 1 View  Narrative: EXAMINATION:  XR CHEST 1 VIEW    CPT 27637    CLINICAL HISTORY:  hypoxia;    COMPARISON:  June 13, 2022    FINDINGS:  Mediastinal and cardiac silhouettes to be within normal limits there is slight blunting of the right costophrenic angle which may indicate the presence of a right-sided pleural effusion.    Slightly more confluent opacities identified at the right base early infiltrate cannot be completely excluded.    No other focal consolidative changes.    Improved aeration in the right upper lobe  Impression: Changes of right-sided pleural effusion.    Slightly more confluent opacities at the right base infiltrate cannot be completely excluded.    Improved aeration in the right upper lobe.    No other focal consolidative changes    Electronically signed by: Madhav Lockhart  Date:    06/20/2022  Time:    14:24        Recent Results (from the past 24 hour(s))   Comprehensive Metabolic Panel    Collection Time: 06/23/22  3:33 AM   Result Value Ref Range    Sodium Level 134 (L) 136 - 145 mmol/L    Potassium Level 4.3 3.5 - 5.1 mmol/L    Chloride 90 (L) 98 - 107 mmol/L    Carbon Dioxide 34 (H) 22 - 29 mmol/L    Glucose Level 117 (H) 74 - 100 mg/dL    Blood Urea Nitrogen 13.2 8.4 - 25.7 mg/dL    Creatinine 0.61 (L) 0.73 - 1.18 mg/dL    Calcium Level Total 9.7 8.4 - 10.2 mg/dL    Protein Total 7.3 6.4 - 8.3 gm/dL    Albumin Level 2.4 (L) 3.5 - 5.0 gm/dL    Globulin 4.9 (H) 2.4 - 3.5 gm/dL    Albumin/Globulin Ratio 0.5 (L) 1.1 - 2.0 ratio    Bilirubin Total 0.4 <=1.5 mg/dL    Alkaline Phosphatase 83 40 - 150 unit/L    Alanine Aminotransferase 12 0 - 55 unit/L    Aspartate Aminotransferase 18 5 - 34 unit/L    Estimated GFR-Non  >60 mls/min/1.73/m2   Phosphorus     Collection Time: 06/23/22  3:33 AM   Result Value Ref Range    Phosphorus Level 3.6 2.3 - 4.7 mg/dL   Magnesium    Collection Time: 06/23/22  3:33 AM   Result Value Ref Range    Magnesium Level 2.40 1.60 - 2.60 mg/dL   CBC with Differential    Collection Time: 06/23/22  3:33 AM   Result Value Ref Range    WBC 7.7 4.5 - 11.5 x10(3)/mcL    RBC 5.08 4.70 - 6.10 x10(6)/mcL    Hgb 16.0 14.0 - 18.0 gm/dL    Hct 50.2 42.0 - 52.0 %    MCV 98.8 (H) 80.0 - 94.0 fL    MCH 31.5 (H) 27.0 - 31.0 pg    MCHC 31.9 (L) 33.0 - 36.0 mg/dL    RDW 13.8 11.5 - 17.0 %    Platelet 351 130 - 400 x10(3)/mcL    MPV 11.1 9.4 - 12.4 fL    Neut % 67.1 %    Lymph % 16.7 %    Mono % 11.9 %    Eos % 2.7 %    Basophil % 1.0 %    Lymph # 1.29 0.6 - 4.6 x10(3)/mcL    Neut # 5.2 2.1 - 9.2 x10(3)/mcL    Mono # 0.92 0.1 - 1.3 x10(3)/mcL    Eos # 0.21 0 - 0.9 x10(3)/mcL    Baso # 0.08 0 - 0.2 x10(3)/mcL    IG# 0.05 (H) 0 - 0.0155 x10(3)/mcL    IG% 0.6 (H) 0 - 0.43 %    NRBC% 0.0 %       X-Ray Chest 1 View    Result Date: 6/20/2022  EXAMINATION: XR CHEST 1 VIEW CPT 59578 CLINICAL HISTORY: hypoxia; COMPARISON: June 13, 2022 FINDINGS: Mediastinal and cardiac silhouettes to be within normal limits there is slight blunting of the right costophrenic angle which may indicate the presence of a right-sided pleural effusion. Slightly more confluent opacities identified at the right base early infiltrate cannot be completely excluded. No other focal consolidative changes. Improved aeration in the right upper lobe     Changes of right-sided pleural effusion. Slightly more confluent opacities at the right base infiltrate cannot be completely excluded. Improved aeration in the right upper lobe. No other focal consolidative changes Electronically signed by: Madhav Lockhart Date:    06/20/2022 Time:    14:24    X-Ray Chest 1 View    Result Date: 6/13/2022  EXAMINATION: XR CHEST 1 VIEW CLINICAL HISTORY: V/Q scan; TECHNIQUE: AP chest COMPARISON: Chest x-ray dated  06/10/2022 FINDINGS: A tracheostomy is in place. There is increased opacity in the right upper lung with likely layering right pleural effusion. There is also a small left basilar pleural effusion. There is no definite visible pneumothorax.     Small bilateral pleural effusions with increased airspace opacity in the right upper lung. Electronically signed by: Gela Liao Date:    06/13/2022 Time:    06:57    X-Ray Chest 1 View    Result Date: 6/10/2022  EXAMINATION: XR CHEST 1 VIEW CLINICAL HISTORY: sob; TECHNIQUE: Single view of the chest COMPARISON: 03/23/2022 FINDINGS: No focal opacification, pleural effusion, or pneumothorax. The cardiomediastinal silhouette is within normal limits. No acute osseous abnormality.     No acute cardiopulmonary process. Electronically signed by: Eric Dwyer Date:    06/10/2022 Time:    09:19    X-Ray Abdomen AP 1 View    Result Date: 6/16/2022  EXAMINATION: XR ABDOMEN AP 1 VIEW CLINICAL HISTORY: constipation; TECHNIQUE: AP view of the abdomen. COMPARISON: Chest x-ray dated 06/13/2022 FINDINGS: Contrast is seen within the distal small bowel and colon.  There is a normal colonic stool volume.  The bowel gas pattern is nonobstructive.  There is a small right pleural effusion with left basilar subsegmental atelectasis.     Nonobstructive bowel gas pattern. Electronically signed by: Gela Liao Date:    06/16/2022 Time:    17:48    CT Soft Tissue Neck W WO Contrast    Result Date: 6/6/2022  EXAMINATION: CT SOFT TISSUE NECK W WO CONTRAST CLINICAL HISTORY: Polyp of vocal cord and larynx TECHNIQUE: Helical noncontrast and postcontrast acquisition of the neck is performed from the skull base through the aortopulmonic window.  Axial, sagittal, coronal reformats reviewed. Automated exposure control or weight adjusted mA / kV utilized per standard protocol. COMPARISON: None FINDINGS: The included brain shows no hemorrhage, mass, mass effect, or midline shift.  There is no abnormal  intracranial enhancement. The paranasal sinuses and mastoid air cells are well aerated.  The parotid glands, submandibular glands, and thyroid gland appear unremarkable. A metastatic right level III cervical lymph node is present with short axis measurement of 1.6 cm.  This lymph node contains internal cystic change, typical of metastatic squamous cell carcinoma.  A left level III cervical lymph node shows short axis measurements of 1 cm, and is suspicious. A left supraglottic mass measures approximately 2.4 cm in diameter (axial post-contrast image 39), presumably corresponding to the primary neoplasm.  Metastatic lymph nodes are also present in the inferior right paratracheal position, measuring 1.3 cm in short axis.  Metastatic lymph nodes are present in the superior right hilum, measuring 1.8 cm in short axis. Mild emphysema is observed in the lung apices.  No pulmonary nodule is detected.  Disc disease is advanced at C5-C6.  The subcutaneous soft tissues are unremarkable.     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 Metastatic right level III lymph node with internal cystic change.  Suspicious left level III lymph node. Metastatic lymph nodes in the inferior right paratracheal position and superior right hilum. Electronically signed by: Gamal Hastings Date:    06/06/2022 Time:    15:01    Fl Modified Barium Swallow Speech    Result Date: 6/16/2022  See Speech Therapist Notes This procedure was auto-finalized.    Fl Modified Barium Swallow Speech    Result Date: 6/12/2022  See Speech Therapist Notes This procedure was auto-finalized.    Echo    Result Date: 6/18/2022  · The estimated ejection fraction is 60-65%. · Normal systolic function. · Grade I left ventricular diastolic dysfunction. · Mild right atrial enlargement. · Normal central venous pressure (3 mmHg).      CV Ultrasound doppler venous arm right    Result Date: 6/20/2022  No evidence of  deep vein thrombosis in the right upper extremity. A superficial thrombosis was identified in the right basilic vein.    CV Ultrasound doppler venous legs bilat    Result Date: 6/17/2022  · There is no evidence of a right lower extremity DVT. · There is no evidence of a left lower extremity DVT.  There is no evidence of deep or superficial vein thrombosis in bilateral lower extremities.     CV Ultrasound doppler venous legs bilat    Result Date: 6/14/2022  There was no evidence of deep or superficial vein thrombosis in the bilateral lower extremities.     NM Lung Scan Ventilation Perfusion    Result Date: 6/13/2022  EXAMINATION: NM LUNG VENTILATION AND PERFUSION IMAGING CLINICAL HISTORY: Pulmonary embolism (PE) suspected, positive D-dimer; TECHNIQUE: Ventilation and perfusion scans were performed in multiple projections. 37.4 mCi of Technetium-99m Pyrophosphate aerosol was used for ventilation scan and 5.5 mCi of Technitium-99m MAA was administered intravenously for the perfusion scan. COMPARISON: Chest radiograph June 13, 2022 FINDINGS: Patient is on ventilator and ventilation images were not successful. Perfusion images show unremarkable MAA activity within bilateral lungs.  There are no segmental or subsegmental perfusion defects     Very low probability for acute pulmonary embolism. Electronically signed by: Clay Campos Date:    06/13/2022 Time:    10:12  - pulls last radiology orders       Explained in detail to the patient about the discharge plan, medications, and follow-up visits. Pt understands and agrees with the treatment plan    Discharged Condition: stable  Disposition: Home-Health Care [facility]   Discharge Medications:      Medication List      Start taking these medications    albuterol-ipratropium 2.5 mg-0.5 mg/3 mL nebulizer solution  Commonly known as: DUO-NEB  Take 3 mLs by nebulization every 4 (four) hours as needed for Shortness of Breath or Wheezing. Rescue     budesonide 0.5 mg/2 mL nebulizer  solution  Commonly known as: PULMICORT  Take 2 mLs (0.5 mg total) by nebulization every 12 (twelve) hours. Controller     diphenhydrAMINE 25 mg capsule  Commonly known as: BENADRYL  1 capsule (25 mg total) by Per G Tube route every 6 (six) hours as needed for Itching.     docusate 50 mg/5 mL liquid  Commonly known as: COLACE  10 mLs (100 mg total) by Per G Tube route 2 (two) times daily as needed (constipation).     hydrocortisone 1 % cream  Apply topically 2 (two) times daily as needed (rash). Apply to rash     neomycin-bacitracin-polymyxin ointment  Commonly known as: NEOSPORIN  Apply topically once daily. Apply to PEG site     nicotine 21 mg/24 hr  Commonly known as: NICODERM CQ  Place 1 patch onto the skin once daily.     polyethylene glycol 17 gram Pwpk  Commonly known as: GLYCOLAX  17 g by Per G Tube route 2 (two) times daily as needed (constipation).        Change how you take these medications    calcium-vitamin D3 500 mg-5 mcg (200 unit) per tablet  Commonly known as: OS-CARMELO 500 + D3  1 tablet by Per G Tube route 2 (two) times daily with meals.  What changed: how to take this     multivitamin capsule  1 capsule by Per G Tube route once daily.  What changed: how to take this        Continue taking these medications    aspirin 81 MG EC tablet  Commonly known as: ECOTRIN           Where to Get Your Medications      These medications were sent to [facility] - [address] [address]    Phone: [phone]   · albuterol-ipratropium 2.5 mg-0.5 mg/3 mL nebulizer solution  · budesonide 0.5 mg/2 mL nebulizer solution  · diphenhydrAMINE 25 mg capsule  · docusate 50 mg/5 mL liquid  · hydrocortisone 1 % cream  · neomycin-bacitracin-polymyxin ointment  · polyethylene glycol 17 gram Pwpk     Information about where to get these medications is not yet available    Ask your nurse or doctor about these medications  · calcium-vitamin D3 500 mg-5 mcg (200  "unit) per tablet  · multivitamin capsule  · nicotine 21 mg/24 hr        Follow up:     Follow-up Information     Tony Bertrand MD Follow up in 1 week(s).    Specialty: Internal Medicine  Contact information:  [address] [place] [address] [phone]             Tony Cao MD. Schedule an appointment as soon as possible for a visit.    Specialties: Oncology, Hematology and Oncology  Contact information:  [address] [facility] [address]  [place] [id number] [phone]             Follow up with ENT surgeon as scheduled Follow up.           [facility] [place] Follow up.    Contact information:  [address]  [place] [id number] [phone]             [facility] Follow up.    Why: for PEG tube feedings  Contact information:  [address] [facility] [address]  [facility] [id number] [phone]           [facility] Follow up.    Specialties: DME Provider, Pharmacist  Contact information:  [address] [facility] [address]  [phone]                       Discharge Procedure Orders   TRACH CARE SUPPLIES FOR HOME USE     Order Specific Question Answer Comments   Height: 5' 9.02" (1.753 m)    Weight: 68 kg (150 lb)    Length of need (1-99 months): 99 months   Trach type: Shiley    Trach cannula: Cuff    Size Romansh: 8    Trach care kits (per month)(1-30): 30    Trach collar? Yes    Disposable inter-cannula? Yes    Speaking valve? No    Trach cap? Yes    Split drain gauze? Yes    Does patient have medical equipment at home? none      ENTERAL NUTRITION FOR HOME USE     Order Specific Question Answer Comments   Height: 5' 9.02" (1.753 m)    Weight: 68 kg (150 lb)    Does the patient have permanent non-function or disease of the structures that normally permit food to reach or be absorbed from the small bowel? Yes    Does the patient require tube feedings to provide sufficient nutrients to maintain weight and strength " "commensurate with the patient's overall health status? Yes    Select Formula: Other (specify in comments) Diabetiscource AC   Method of administration: Gravity    Does the patient have a documented allergy or intolerance to semi-synthetic nutrients? No    Rate/frequency of administration and/or number of calories per 24 hr period: 250ml 6-7 times per day; free water flush 100ml 6-7 times per day or 50ml before and after feeds    List of separately billed items: Supply kits    Length of need (1-99 months)? 99 months     COMMODE FOR HOME USE     Order Specific Question Answer Comments   Type: Standard    Height: 5' 9.02" (1.753 m)    Weight: 68 kg (150 lb)    Does patient have medical equipment at home? none    Length of need (1-99 months): 99 months   Vendor: Other (use comments)    Expected Date of Delivery: 6/21/2022      SUCTION MACHINE FOR HOME USE   Order Comments: Include portable suction machine also     Order Specific Question Answer Comments   Height: 5' 9.02" (1.753 m)    Weight: 68 kg (150 lb)    Type of suction: Intermittent    Frequency: QID and PRN   Disposable cannisters? Yes    Connective tubing? Yes    Yankauer? Yes    Disposable suction catheters? Yes    Catheter size Bulgarian: 14    Quantity of catheters (per month): 60    Length of need (1-99 months): 99 months   Does patient have medical equipment at home? none    Vendor: Other (use comments)    Expected Date of Delivery: 6/21/2022      OXYGEN FOR HOME USE     Order Specific Question Answer Comments   Liter Flow 4    Duration Continuous    Qualifying Test Performed at: Rest    Oxygen saturation: 86    Portable mode: continuous    Route mask trach collar   Device: home concentrator with portable concentrator    Length of need (in months): 99 mos    Patient condition with qualifying saturation COPD    Height: 5' 9.02" (1.753 m)    Weight: 68 kg (150 lb)    Alternative treatment measures have been tried or considered and deemed clinically ineffective. " "Yes    Vendor: Other (use comments)    Expected Date of Delivery: 6/21/2022      NEBULIZER FOR HOME USE     Order Specific Question Answer Comments   Height: 5' 9.02" (1.753 m)    Weight: 68 kg (150 lb)    Does patient have medical equipment at home? none    Length of need (1-99 months): 99 months   Vendor: Other (use comments)    Expected Date of Delivery: 6/21/2022      NEBULIZER KIT (SUPPLIES) FOR HOME USE     Order Specific Question Answer Comments   Height: 5' 9.02" (1.753 m)    Weight: 68 kg (150 lb)    Does patient have medical equipment at home? none    Length of need (1-99 months): 99 months   Mask or Mouthpiece? Mask trach collar   Vendor: Other (use comments)    Expected Date of Delivery: 6/21/2022      WALKER FOR HOME USE     Order Specific Question Answer Comments   Type of Walker: Adult (5'4"-6'6")    With wheels? Yes    Height: 5' 9.02" (1.753 m)    Weight: 68 kg (150 lb)    Length of need (1-99 months): 99 months   Does patient have medical equipment at home? none    Please check all that apply: Patient's condition impairs ambulation.    Please check all that apply: Patient is unable to safely ambulate without equipment.    Please check all that apply: Patient needs help to get in and out of chair.    Vendor: Other (use comments)    Expected Date of Delivery: 6/21/2022      SUBSEQUENT HOME HEALTH ORDERS   Order Comments: Arrange Home Health services including PT/OT  PCP: Jerzy     Order Specific Question Answer Comments   What Home Health Agency is the patient currently using? Other/External [facility]     Tube Feedings/Formulas   Order Comments: Diabetisource 250ml 6-7 times daily; 100ml free water flush 6-7 times daily  - may give feeding as 500ml tid with 250 ml extra feed or spread out as best tolerated by pt     Order Specific Question Answer Comments   Select Adult Formula: Diabetasource AC    Route: Gastrostomy      Activity as tolerated       For further questions, please contact " hospitalist office    Discharge time: 50 minutes        Ra Quintanilla MD   [facility]  06/23/2022

## 2022-06-24 ENCOUNTER — PATIENT OUTREACH (OUTPATIENT)
Dept: ADMINISTRATIVE | Facility: CLINIC | Age: 57
End: 2022-06-24
Payer: MEDICARE

## 2022-06-24 ENCOUNTER — TELEPHONE (OUTPATIENT)
Dept: PULMONOLOGY | Facility: HOSPITAL | Age: 57
End: 2022-06-24
Payer: MEDICARE

## 2022-06-24 NOTE — PROGRESS NOTES
C3 nurse attempted to contact Tony Bertrand MD for a TCC post hospital discharge follow up call. No answer. Left voicemail with callback information. The patient does not have a scheduled HOSFU appointment. Message sent to PCP staff for assistance with scheduling visit with patient.

## 2022-06-24 NOTE — TELEPHONE ENCOUNTER
Called to inform patient's wife that biopsy was negative for malignancy however specimen was acellular.  This does not mean that this is not malignancy related, just at biopsy did not show malignant cells.  Not able to leave voicemail.  Additionally tried calling chain afterwards however patient does have a trach so therefore would not be able to talk on phone.  No answer on his phone either.  Not able to leave voicemail.

## 2022-06-26 ENCOUNTER — HOSPITAL ENCOUNTER (INPATIENT)
Facility: HOSPITAL | Age: 57
LOS: 11 days | Discharge: HOME | DRG: 208 | End: 2022-07-07
Attending: EMERGENCY MEDICINE | Admitting: INTERNAL MEDICINE
Payer: MEDICARE

## 2022-06-26 DIAGNOSIS — J96.22 ACUTE ON CHRONIC RESPIRATORY FAILURE WITH HYPOXIA AND HYPERCAPNIA: Primary | ICD-10-CM

## 2022-06-26 DIAGNOSIS — R06.02 SHORTNESS OF BREATH: ICD-10-CM

## 2022-06-26 DIAGNOSIS — J38.3 VOCAL CORD MASS: ICD-10-CM

## 2022-06-26 DIAGNOSIS — R65.10 SIRS (SYSTEMIC INFLAMMATORY RESPONSE SYNDROME): ICD-10-CM

## 2022-06-26 DIAGNOSIS — G93.40 ACUTE ENCEPHALOPATHY: ICD-10-CM

## 2022-06-26 DIAGNOSIS — J96.02 ACUTE RESPIRATORY ACIDOSIS: ICD-10-CM

## 2022-06-26 DIAGNOSIS — Z93.0 TRACHEOSTOMY DEPENDENT: ICD-10-CM

## 2022-06-26 DIAGNOSIS — B99.9 INFECTION: ICD-10-CM

## 2022-06-26 DIAGNOSIS — R56.9 SEIZURE-LIKE ACTIVITY: ICD-10-CM

## 2022-06-26 DIAGNOSIS — E87.5 HYPERKALEMIA: ICD-10-CM

## 2022-06-26 DIAGNOSIS — J96.21 ACUTE ON CHRONIC RESPIRATORY FAILURE WITH HYPOXIA AND HYPERCAPNIA: Primary | ICD-10-CM

## 2022-06-26 DIAGNOSIS — R13.10 DYSPHAGIA, UNSPECIFIED TYPE: ICD-10-CM

## 2022-06-26 LAB
ALBUMIN SERPL-MCNC: 0.6 GM/DL (ref 3.5–5)
ALBUMIN/GLOB SERPL: 0.7 RATIO (ref 1.1–2)
ALP SERPL-CCNC: 20 UNIT/L (ref 40–150)
ALT SERPL-CCNC: <5 UNIT/L (ref 0–55)
AMPHET UR QL SCN: NEGATIVE
APPEARANCE UR: CLEAR
APPEARANCE UR: CLEAR
APTT PPP: 30.6 SECONDS (ref 23.2–33.7)
AST SERPL-CCNC: 6 UNIT/L (ref 5–34)
BACTERIA #/AREA URNS AUTO: ABNORMAL /HPF
BACTERIA #/AREA URNS AUTO: NORMAL /HPF
BARBITURATE SCN PRESENT UR: NEGATIVE
BASOPHILS # BLD AUTO: 0.17 X10(3)/MCL (ref 0–0.2)
BASOPHILS # BLD AUTO: 0.18 X10(3)/MCL (ref 0–0.2)
BASOPHILS NFR BLD AUTO: 1 %
BASOPHILS NFR BLD AUTO: 1.1 %
BENZODIAZ UR QL SCN: NEGATIVE
BILIRUB UR QL STRIP.AUTO: NEGATIVE MG/DL
BILIRUB UR QL STRIP.AUTO: NEGATIVE MG/DL
BILIRUBIN DIRECT+TOT PNL SERPL-MCNC: 0.1 MG/DL
BNP BLD-MCNC: 37.4 PG/ML
BUN SERPL-MCNC: 6.2 MG/DL (ref 8.4–25.7)
CALCIUM SERPL-MCNC: 6.7 MG/DL (ref 8.4–10.2)
CANNABINOIDS UR QL SCN: NEGATIVE
CHLORIDE SERPL-SCNC: 104 MMOL/L (ref 98–107)
CK SERPL-CCNC: 8 U/L (ref 30–200)
CO2 SERPL-SCNC: 16 MMOL/L (ref 22–29)
COCAINE UR QL SCN: NEGATIVE
COLOR UR AUTO: YELLOW
COLOR UR AUTO: YELLOW
CORRECTED TEMPERATURE (PCO2): 89 MMHG (ref 19–50)
CORRECTED TEMPERATURE (PH): 7.23 (ref 7.29–7.61)
CORRECTED TEMPERATURE (PO2): 181 MMHG (ref 80–100)
CREAT SERPL-MCNC: 0.23 MG/DL (ref 0.73–1.18)
EOSINOPHIL # BLD AUTO: 0.06 X10(3)/MCL (ref 0–0.9)
EOSINOPHIL # BLD AUTO: 0.11 X10(3)/MCL (ref 0–0.9)
EOSINOPHIL NFR BLD AUTO: 0.4 %
EOSINOPHIL NFR BLD AUTO: 0.7 %
ERYTHROCYTE [DISTWIDTH] IN BLOOD BY AUTOMATED COUNT: 14.1 % (ref 11.5–17)
ERYTHROCYTE [DISTWIDTH] IN BLOOD BY AUTOMATED COUNT: 14.5 % (ref 11.5–17)
ETHANOL SERPL-MCNC: <10 MG/DL
FENTANYL UR QL SCN: NEGATIVE
GLOBULIN SER-MCNC: 0.9 GM/DL (ref 2.4–3.5)
GLUCOSE SERPL-MCNC: 83 MG/DL (ref 74–100)
GLUCOSE UR QL STRIP.AUTO: NEGATIVE MG/DL
GLUCOSE UR QL STRIP.AUTO: NEGATIVE MG/DL
HCO3 UR-SCNC: 37.3 MMOL/L
HCT VFR BLD AUTO: 55.3 % (ref 42–52)
HCT VFR BLD AUTO: 56.2 % (ref 42–52)
HGB BLD-MCNC: 17.1 G/DL (ref 12–16)
HGB BLD-MCNC: 17.3 GM/DL (ref 14–18)
HGB BLD-MCNC: 18.2 GM/DL (ref 14–18)
IMM GRANULOCYTES # BLD AUTO: 0.18 X10(3)/MCL (ref 0–0.02)
IMM GRANULOCYTES # BLD AUTO: 0.19 X10(3)/MCL (ref 0–0.02)
IMM GRANULOCYTES NFR BLD AUTO: 1.1 % (ref 0–0.43)
IMM GRANULOCYTES NFR BLD AUTO: 1.1 % (ref 0–0.43)
INR BLD: 0.99 (ref 0–1.3)
KETONES UR QL STRIP.AUTO: NEGATIVE MG/DL
KETONES UR QL STRIP.AUTO: NEGATIVE MG/DL
LACTATE SERPL-SCNC: 0.8 MMOL/L (ref 0.5–2.2)
LACTATE SERPL-SCNC: 4.2 MMOL/L (ref 0.5–2.2)
LEUKOCYTE ESTERASE UR QL STRIP.AUTO: NEGATIVE UNIT/L
LEUKOCYTE ESTERASE UR QL STRIP.AUTO: NEGATIVE UNIT/L
LYMPHOCYTES # BLD AUTO: 0.96 X10(3)/MCL (ref 0.6–4.6)
LYMPHOCYTES # BLD AUTO: 4.57 X10(3)/MCL (ref 0.6–4.6)
LYMPHOCYTES NFR BLD AUTO: 27 %
LYMPHOCYTES NFR BLD AUTO: 5.9 %
MCH RBC QN AUTO: 31.9 PG (ref 27–31)
MCH RBC QN AUTO: 32 PG (ref 27–31)
MCHC RBC AUTO-ENTMCNC: 31.3 MG/DL (ref 33–36)
MCHC RBC AUTO-ENTMCNC: 32.4 MG/DL (ref 33–36)
MCV RBC AUTO: 102 FL (ref 80–94)
MCV RBC AUTO: 98.8 FL (ref 80–94)
MDMA UR QL SCN: NEGATIVE
MONOCYTES # BLD AUTO: 1.34 X10(3)/MCL (ref 0.1–1.3)
MONOCYTES # BLD AUTO: 1.74 X10(3)/MCL (ref 0.1–1.3)
MONOCYTES NFR BLD AUTO: 10.3 %
MONOCYTES NFR BLD AUTO: 8.2 %
NEUTROPHILS # BLD AUTO: 10.1 X10(3)/MCL (ref 2.1–9.2)
NEUTROPHILS # BLD AUTO: 13.7 X10(3)/MCL (ref 2.1–9.2)
NEUTROPHILS NFR BLD AUTO: 59.9 %
NEUTROPHILS NFR BLD AUTO: 83.3 %
NITRITE UR QL STRIP.AUTO: NEGATIVE
NITRITE UR QL STRIP.AUTO: NEGATIVE
NRBC BLD AUTO-RTO: 0 %
NRBC BLD AUTO-RTO: 0 %
OPIATES UR QL SCN: NEGATIVE
PCO2 BLDA: 89 MMHG (ref 19–50)
PCP UR QL: NEGATIVE
PH SMN: 7.23 [PH] (ref 7.29–7.61)
PH UR STRIP.AUTO: 5.5 [PH]
PH UR STRIP.AUTO: 6 [PH]
PH UR: 5.5 [PH] (ref 3–11)
PLATELET # BLD AUTO: 431 X10(3)/MCL (ref 130–400)
PLATELET # BLD AUTO: 523 X10(3)/MCL (ref 130–400)
PMV BLD AUTO: 12.6 FL (ref 9.4–12.4)
PMV BLD AUTO: 9.9 FL (ref 9.4–12.4)
PO2 BLDA: 181 MMHG (ref 80–100)
POC BASE DEFICIT: 5.4 MMOL/L (ref -2–2)
POC COHB: 1.5 %
POC IONIZED CALCIUM: 1.28 MMOL/L (ref 1.12–1.23)
POC METHB: 1.1 % (ref 0.4–1.5)
POC O2HB: 97 % (ref 94–97)
POC SATURATED O2: 99.4 %
POC TEMPERATURE: 37 °C
POTASSIUM BLD-SCNC: 4.1 MMOL/L (ref 3.5–5)
POTASSIUM SERPL-SCNC: 4.3 MMOL/L (ref 3.5–5.1)
PROT SERPL-MCNC: 1.5 GM/DL (ref 6.4–8.3)
PROT UR QL STRIP.AUTO: ABNORMAL MG/DL
PROT UR QL STRIP.AUTO: NEGATIVE MG/DL
PROTHROMBIN TIME: 13 SECONDS (ref 12.5–14.5)
RBC # BLD AUTO: 5.42 X10(6)/MCL (ref 4.7–6.1)
RBC # BLD AUTO: 5.69 X10(6)/MCL (ref 4.7–6.1)
RBC #/AREA URNS AUTO: <5 /HPF
RBC #/AREA URNS AUTO: <5 /HPF
RBC UR QL AUTO: NEGATIVE UNIT/L
RBC UR QL AUTO: NEGATIVE UNIT/L
SODIUM BLD-SCNC: 134 MMOL/L (ref 137–145)
SODIUM SERPL-SCNC: 134 MMOL/L (ref 136–145)
SP GR UR STRIP.AUTO: 1.01 (ref 1–1.03)
SP GR UR STRIP.AUTO: 1.01 (ref 1–1.03)
SPECIFIC GRAVITY, URINE AUTO (.000) (OHS): 1.01 (ref 1–1.03)
SPECIMEN SOURCE: ABNORMAL
SQUAMOUS #/AREA URNS AUTO: <5 /HPF
SQUAMOUS #/AREA URNS AUTO: <5 /HPF
UROBILINOGEN UR STRIP-ACNC: 0.2 MG/DL
UROBILINOGEN UR STRIP-ACNC: 1 MG/DL
WBC # SPEC AUTO: 16.4 X10(3)/MCL (ref 4.5–11.5)
WBC # SPEC AUTO: 16.9 X10(3)/MCL (ref 4.5–11.5)
WBC #/AREA URNS AUTO: 7 /HPF
WBC #/AREA URNS AUTO: <5 /HPF

## 2022-06-26 PROCEDURE — 80053 COMPREHEN METABOLIC PANEL: CPT | Performed by: EMERGENCY MEDICINE

## 2022-06-26 PROCEDURE — 87070 CULTURE OTHR SPECIMN AEROBIC: CPT | Performed by: STUDENT IN AN ORGANIZED HEALTH CARE EDUCATION/TRAINING PROGRAM

## 2022-06-26 PROCEDURE — 25000003 PHARM REV CODE 250: Performed by: EMERGENCY MEDICINE

## 2022-06-26 PROCEDURE — 63600175 PHARM REV CODE 636 W HCPCS: Performed by: STUDENT IN AN ORGANIZED HEALTH CARE EDUCATION/TRAINING PROGRAM

## 2022-06-26 PROCEDURE — 36620 INSERTION CATHETER ARTERY: CPT

## 2022-06-26 PROCEDURE — 94660 CPAP INITIATION&MGMT: CPT

## 2022-06-26 PROCEDURE — 99900035 HC TECH TIME PER 15 MIN (STAT)

## 2022-06-26 PROCEDURE — 94761 N-INVAS EAR/PLS OXIMETRY MLT: CPT

## 2022-06-26 PROCEDURE — 85730 THROMBOPLASTIN TIME PARTIAL: CPT | Performed by: EMERGENCY MEDICINE

## 2022-06-26 PROCEDURE — 99291 CRITICAL CARE FIRST HOUR: CPT | Mod: 25

## 2022-06-26 PROCEDURE — 85025 COMPLETE CBC W/AUTO DIFF WBC: CPT | Performed by: EMERGENCY MEDICINE

## 2022-06-26 PROCEDURE — 25000003 PHARM REV CODE 250

## 2022-06-26 PROCEDURE — 20000000 HC ICU ROOM

## 2022-06-26 PROCEDURE — 63600175 PHARM REV CODE 636 W HCPCS: Performed by: EMERGENCY MEDICINE

## 2022-06-26 PROCEDURE — 87040 BLOOD CULTURE FOR BACTERIA: CPT | Performed by: EMERGENCY MEDICINE

## 2022-06-26 PROCEDURE — 96374 THER/PROPH/DIAG INJ IV PUSH: CPT

## 2022-06-26 PROCEDURE — 82077 ASSAY SPEC XCP UR&BREATH IA: CPT | Performed by: EMERGENCY MEDICINE

## 2022-06-26 PROCEDURE — 93005 ELECTROCARDIOGRAM TRACING: CPT

## 2022-06-26 PROCEDURE — 96375 TX/PRO/DX INJ NEW DRUG ADDON: CPT

## 2022-06-26 PROCEDURE — 27000221 HC OXYGEN, UP TO 24 HOURS

## 2022-06-26 PROCEDURE — 80307 DRUG TEST PRSMV CHEM ANLYZR: CPT | Performed by: EMERGENCY MEDICINE

## 2022-06-26 PROCEDURE — 81001 URINALYSIS AUTO W/SCOPE: CPT | Performed by: STUDENT IN AN ORGANIZED HEALTH CARE EDUCATION/TRAINING PROGRAM

## 2022-06-26 PROCEDURE — 85610 PROTHROMBIN TIME: CPT | Performed by: EMERGENCY MEDICINE

## 2022-06-26 PROCEDURE — 96365 THER/PROPH/DIAG IV INF INIT: CPT

## 2022-06-26 PROCEDURE — 51702 INSERT TEMP BLADDER CATH: CPT

## 2022-06-26 PROCEDURE — 81001 URINALYSIS AUTO W/SCOPE: CPT | Performed by: EMERGENCY MEDICINE

## 2022-06-26 PROCEDURE — 99292 CRITICAL CARE ADDL 30 MIN: CPT

## 2022-06-26 PROCEDURE — 36415 COLL VENOUS BLD VENIPUNCTURE: CPT | Performed by: EMERGENCY MEDICINE

## 2022-06-26 PROCEDURE — 63600175 PHARM REV CODE 636 W HCPCS

## 2022-06-26 PROCEDURE — 99900026 HC AIRWAY MAINTENANCE (STAT)

## 2022-06-26 PROCEDURE — 25000003 PHARM REV CODE 250: Performed by: STUDENT IN AN ORGANIZED HEALTH CARE EDUCATION/TRAINING PROGRAM

## 2022-06-26 PROCEDURE — 83605 ASSAY OF LACTIC ACID: CPT | Performed by: EMERGENCY MEDICINE

## 2022-06-26 PROCEDURE — 83880 ASSAY OF NATRIURETIC PEPTIDE: CPT | Performed by: EMERGENCY MEDICINE

## 2022-06-26 PROCEDURE — 82550 ASSAY OF CK (CPK): CPT | Performed by: EMERGENCY MEDICINE

## 2022-06-26 RX ORDER — FAMOTIDINE 10 MG/ML
20 INJECTION INTRAVENOUS EVERY 12 HOURS
Status: DISCONTINUED | OUTPATIENT
Start: 2022-06-26 | End: 2022-07-07 | Stop reason: HOSPADM

## 2022-06-26 RX ORDER — LORAZEPAM 2 MG/ML
2 INJECTION INTRAMUSCULAR
Status: COMPLETED | OUTPATIENT
Start: 2022-06-26 | End: 2022-06-26

## 2022-06-26 RX ORDER — NOREPINEPHRINE BITARTRATE/D5W 8 MG/250ML
0-3 PLASTIC BAG, INJECTION (ML) INTRAVENOUS CONTINUOUS
Status: DISCONTINUED | OUTPATIENT
Start: 2022-06-26 | End: 2022-07-02

## 2022-06-26 RX ORDER — SODIUM CHLORIDE, SODIUM LACTATE, POTASSIUM CHLORIDE, CALCIUM CHLORIDE 600; 310; 30; 20 MG/100ML; MG/100ML; MG/100ML; MG/100ML
INJECTION, SOLUTION INTRAVENOUS CONTINUOUS
Status: DISCONTINUED | OUTPATIENT
Start: 2022-06-26 | End: 2022-07-02

## 2022-06-26 RX ORDER — LORAZEPAM 2 MG/ML
INJECTION INTRAMUSCULAR
Status: COMPLETED
Start: 2022-06-26 | End: 2022-06-26

## 2022-06-26 RX ORDER — CALCIUM GLUCONATE 20 MG/ML
1 INJECTION, SOLUTION INTRAVENOUS ONCE
Status: COMPLETED | OUTPATIENT
Start: 2022-06-26 | End: 2022-06-26

## 2022-06-26 RX ORDER — SODIUM CHLORIDE 0.9 % (FLUSH) 0.9 %
10 SYRINGE (ML) INJECTION
Status: DISCONTINUED | OUTPATIENT
Start: 2022-06-26 | End: 2022-07-07 | Stop reason: HOSPADM

## 2022-06-26 RX ORDER — NOREPINEPHRINE BITARTRATE/D5W 8 MG/250ML
PLASTIC BAG, INJECTION (ML) INTRAVENOUS
Status: COMPLETED
Start: 2022-06-26 | End: 2022-06-26

## 2022-06-26 RX ORDER — FAMOTIDINE 10 MG/ML
20 INJECTION INTRAVENOUS 2 TIMES DAILY
Status: DISCONTINUED | OUTPATIENT
Start: 2022-06-26 | End: 2022-06-26

## 2022-06-26 RX ORDER — VANCOMYCIN HCL IN 5 % DEXTROSE 1G/250ML
1000 PLASTIC BAG, INJECTION (ML) INTRAVENOUS
Status: DISCONTINUED | OUTPATIENT
Start: 2022-06-27 | End: 2022-06-28

## 2022-06-26 RX ORDER — LEVETIRACETAM 15 MG/ML
1500 INJECTION INTRAVASCULAR
Status: COMPLETED | OUTPATIENT
Start: 2022-06-26 | End: 2022-06-26

## 2022-06-26 RX ORDER — ENOXAPARIN SODIUM 100 MG/ML
40 INJECTION SUBCUTANEOUS EVERY 24 HOURS
Status: DISCONTINUED | OUTPATIENT
Start: 2022-06-26 | End: 2022-06-26

## 2022-06-26 RX ORDER — ENOXAPARIN SODIUM 100 MG/ML
40 INJECTION SUBCUTANEOUS EVERY 24 HOURS
Status: DISCONTINUED | OUTPATIENT
Start: 2022-06-26 | End: 2022-07-07 | Stop reason: HOSPADM

## 2022-06-26 RX ORDER — IPRATROPIUM BROMIDE AND ALBUTEROL SULFATE 2.5; .5 MG/3ML; MG/3ML
3 SOLUTION RESPIRATORY (INHALATION) EVERY 4 HOURS PRN
Status: DISCONTINUED | OUTPATIENT
Start: 2022-06-26 | End: 2022-07-07 | Stop reason: HOSPADM

## 2022-06-26 RX ORDER — CHLORHEXIDINE GLUCONATE ORAL RINSE 1.2 MG/ML
15 SOLUTION DENTAL 2 TIMES DAILY
Status: DISCONTINUED | OUTPATIENT
Start: 2022-06-26 | End: 2022-07-07 | Stop reason: HOSPADM

## 2022-06-26 RX ORDER — LEVETIRACETAM 10 MG/ML
1000 INJECTION INTRAVASCULAR EVERY 12 HOURS
Status: DISCONTINUED | OUTPATIENT
Start: 2022-06-26 | End: 2022-06-29

## 2022-06-26 RX ADMIN — Medication 0.02 MCG/KG/MIN: at 09:06

## 2022-06-26 RX ADMIN — NOREPINEPHRINE BITARTRATE 0.02 MCG/KG/MIN: 8 INJECTION, SOLUTION INTRAVENOUS at 09:06

## 2022-06-26 RX ADMIN — SODIUM CHLORIDE, POTASSIUM CHLORIDE, SODIUM LACTATE AND CALCIUM CHLORIDE 1770 ML: 600; 310; 30; 20 INJECTION, SOLUTION INTRAVENOUS at 06:06

## 2022-06-26 RX ADMIN — VANCOMYCIN HYDROCHLORIDE 2000 MG: 1 INJECTION, POWDER, LYOPHILIZED, FOR SOLUTION INTRAVENOUS at 06:06

## 2022-06-26 RX ADMIN — SODIUM CHLORIDE, POTASSIUM CHLORIDE, SODIUM LACTATE AND CALCIUM CHLORIDE: 600; 310; 30; 20 INJECTION, SOLUTION INTRAVENOUS at 08:06

## 2022-06-26 RX ADMIN — CALCIUM GLUCONATE 1 G: 20 INJECTION, SOLUTION INTRAVENOUS at 09:06

## 2022-06-26 RX ADMIN — LEVETIRACETAM 1000 MG: 10 INJECTION INTRAVENOUS at 11:06

## 2022-06-26 RX ADMIN — DOXYCYCLINE 100 MG: 100 INJECTION, POWDER, LYOPHILIZED, FOR SOLUTION INTRAVENOUS at 10:06

## 2022-06-26 RX ADMIN — SODIUM CHLORIDE, POTASSIUM CHLORIDE, SODIUM LACTATE AND CALCIUM CHLORIDE: 600; 310; 30; 20 INJECTION, SOLUTION INTRAVENOUS at 09:06

## 2022-06-26 RX ADMIN — SODIUM CHLORIDE 1000 ML: 9 INJECTION, SOLUTION INTRAVENOUS at 04:06

## 2022-06-26 RX ADMIN — FAMOTIDINE 20 MG: 10 INJECTION INTRAVENOUS at 09:06

## 2022-06-26 RX ADMIN — LORAZEPAM 2 MG: 2 INJECTION INTRAMUSCULAR; INTRAVENOUS at 09:06

## 2022-06-26 RX ADMIN — SODIUM CHLORIDE, POTASSIUM CHLORIDE, SODIUM LACTATE AND CALCIUM CHLORIDE 1000 ML: 600; 310; 30; 20 INJECTION, SOLUTION INTRAVENOUS at 09:06

## 2022-06-26 RX ADMIN — LORAZEPAM 2 MG: 2 INJECTION INTRAMUSCULAR; INTRAVENOUS at 04:06

## 2022-06-26 RX ADMIN — ENOXAPARIN SODIUM 40 MG: 40 INJECTION SUBCUTANEOUS at 08:06

## 2022-06-26 RX ADMIN — LORAZEPAM 1 MG: 2 INJECTION INTRAMUSCULAR; INTRAVENOUS at 09:06

## 2022-06-26 RX ADMIN — CHLORHEXIDINE GLUCONATE 0.12% ORAL RINSE 15 ML: 1.2 LIQUID ORAL at 09:06

## 2022-06-26 RX ADMIN — LEVETIRACETAM 1500 MG: 15 INJECTION INTRAVENOUS at 04:06

## 2022-06-26 RX ADMIN — PIPERACILLIN SODIUM AND TAZOBACTAM SODIUM 4.5 G: 4; .5 INJECTION, POWDER, LYOPHILIZED, FOR SOLUTION INTRAVENOUS at 06:06

## 2022-06-26 NOTE — ED NOTES
Assumed care at this time. Pt unresponsive and not responding to pain/verbal. Recent trach. RT at bedside. Pt placed on bipap. Tachycardic. Hx of seizures.

## 2022-06-26 NOTE — ED PROVIDER NOTES
Encounter Date: 6/26/2022       History     Chief Complaint   Patient presents with    Shortness of Breath     C/o worsening intermittent sob. Seen in er yesterday for same complaint. Recent trach placement. Unresponsive on arrival to er     56-year-old male who just discharged this study after admission for shortness of breath presents to the ED via EMS initially for evaluation of shortness of breath however upon pulling up into the ambulance [place] he began having a generalized tonic-clonic seizure.  EMS reports that their truck is not well ventilated and he was becoming overheated.  He was tachycardic EN route but saturating well on his home oxygen and was alert and oriented throughout transport  He has no h/o seizure, apparently inner cannula was out of place    The history is provided by the EMS personnel. The history is limited by the condition of the patient. No  was used.   Illness   The current episode started just prior to arrival. The problem occurs frequently. The problem has been unchanged. The pain is at a severity of 0/10. Nothing relieves the symptoms. Nothing aggravates the symptoms.     Review of patient's allergies indicates:  No Known Allergies  Past Medical History:   Diagnosis Date    COPD (chronic obstructive pulmonary disease)     Dysphagia     Hypertension     Vocal cord mass      Past Surgical History:   Procedure Laterality Date    HIP SURGERY      HUMERUS FRACTURE SURGERY      TRACHEOSTOMY N/A 6/10/2022    Procedure: CREATION, TRACHEOSTOMY;  Surgeon: Junior Alonso MD;  Location: [facility];  Service: ENT;  Laterality: N/A;     Family History   Family history unknown: Yes     Social History     Tobacco Use    Smoking status: Current Every Day Smoker     Types: Cigarettes    Smokeless tobacco: Never Used   Substance Use Topics    Alcohol use: Yes    Drug use: Never     Review of Systems   Unable to perform ROS: Mental status change       Physical Exam      Initial Vitals   BP Pulse Resp Temp SpO2   06/26/22 1634 06/26/22 1634 06/26/22 1634 06/26/22 1638 06/26/22 1634   (!) 190/103 (!) 143 20 97.9 °F (36.6 °C) (!) 94 %      MAP       --                Physical Exam    Nursing note and vitals reviewed.  Constitutional: He appears well-developed. He is diaphoretic. He appears distressed.   HENT:   Head: Normocephalic and atraumatic.   Nose: Nose normal.   Eyes: Conjunctivae are normal. Pupils are equal, round, and reactive to light.   Neck: No tracheal deviation present.   Trach in place.  Inner cannula clogged and being suctioned   Cardiovascular: Regular rhythm, normal heart sounds and intact distal pulses.   tachycardic   Pulmonary/Chest: He is in respiratory distress. He has no wheezes. He exhibits no tenderness.   coarse   Abdominal: Abdomen is soft. Bowel sounds are normal. He exhibits no distension and no mass. There is no abdominal tenderness.   Peg in place There is no rebound and no guarding.   Musculoskeletal:         General: No tenderness or edema.     Neurological:   unresponsive   Skin: Skin is warm. Capillary refill takes less than 2 seconds. No rash and no abscess noted. No erythema. No pallor.   Purple face         ED Course   Critical Care    Date/Time: 6/26/2022 5:44 PM  Performed by: Deidre Mederos MD  Authorized by: Deidre Mederos MD   Direct patient critical care time: 35 minutes  Additional history critical care time: 10 minutes  Ordering / reviewing critical care time: 15 minutes  Documentation critical care time: 12 minutes  Consulting other physicians critical care time: 8 minutes  Consult with family critical care time: 8 minutes  Total critical care time (exclusive of procedural time) : 88 minutes  Critical care was necessary to treat or prevent imminent or life-threatening deterioration of the following conditions: circulatory failure, CNS failure or compromise, respiratory failure and sepsis.  Critical care was time spent  personally by me on the following activities: ventilator management, discussions with consultants, evaluation of patient's response to treatment, obtaining history from patient or surrogate, ordering and review of laboratory studies, pulse oximetry, review of old charts, development of treatment plan with patient or surrogate, examination of patient, ordering and performing treatments and interventions, ordering and review of radiographic studies and re-evaluation of patient's condition.        Labs Reviewed   CK - Abnormal; Notable for the following components:       Result Value    Creatine Kinase 8 (*)     All other components within normal limits   URINALYSIS, REFLEX TO URINE CULTURE - Abnormal; Notable for the following components:    Protein, UA Trace (*)     All other components within normal limits   CBC WITH DIFFERENTIAL - Abnormal; Notable for the following components:    WBC 16.9 (*)     Hgb 18.2 (*)     Hct 56.2 (*)     MCV 98.8 (*)     MCH 32.0 (*)     MCHC 32.4 (*)     Platelet 523 (*)     MPV 12.6 (*)     Neut # 10.1 (*)     Mono # 1.74 (*)     IG# 0.19 (*)     IG% 1.1 (*)     All other components within normal limits   LACTIC ACID, PLASMA - Abnormal; Notable for the following components:    Lactic Acid Level 4.2 (*)     All other components within normal limits   CBC WITH DIFFERENTIAL - Abnormal; Notable for the following components:    WBC 16.4 (*)     Hct 55.3 (*)     .0 (*)     MCH 31.9 (*)     MCHC 31.3 (*)     Platelet 431 (*)     Neut # 13.7 (*)     Mono # 1.34 (*)     IG# 0.18 (*)     IG% 1.1 (*)     All other components within normal limits   URINALYSIS, MICROSCOPIC - Abnormal; Notable for the following components:    WBC, UA 7 (*)     All other components within normal limits   B-TYPE NATRIURETIC PEPTIDE - Normal   PROTIME-INR - Normal   APTT - Normal   DRUG SCREEN, URINE (BEAKER) - Normal    Narrative:     Cut off concentrations:    Amphetamines - 1000 ng/ml  Barbiturates - 200  ng/ml  Benzodiazepine - 200 ng/ml  Cannabinoids (THC) - 50 ng/ml  Cocaine - 300 ng/ml  Fentanyl - 1.0 ng/ml  MDMA - 500 ng/ml  Opiates - 300 ng/ml   Phencyclidine (PCP) - 25 ng/ml    Specimen submitted for drug analysis and tested for pH and specific gravity in order to evaluate sample integrity. Suspect tampering if specific gravity is <1.003 and/or pH is not within the range of 4.5 - 8.0  False negatives may result form substances such as bleach added to urine.  False positives may result for the presence of a substance with similar chemical structure to the drug or its metabolite.    This test provides only a PRELIMINARY analytical test result. A more specific alternate chemical method must be used in order to obtain a confirmed analytical result. Gas chromatography/mass spectrometry (GC/MS) is the preferred confirmatory method. Other chemical confirmation methods are available. Clinical consideration and professional judgement should be applied to any drug of abuse test result, particularly when preliminary positive results are used.    Positive results will be confirmed only at the physicians request. Unconfirmed screening results are to be used only for medical purposes (treatment).        ALCOHOL,MEDICAL (ETHANOL) - Normal   BLOOD CULTURE [facility]   BLOOD CULTURE [facility]   RESPIRATORY CULTURE ([facility])   CBC W/ AUTO DIFFERENTIAL    Narrative:     The following orders were created for panel order CBC auto differential.  Procedure                               Abnormality         Status                     ---------                               -----------         ------                     CBC with Differential[[id number]]        Abnormal            Final result                 Please view results for these tests on the individual orders.   CBC W/ AUTO DIFFERENTIAL    Narrative:     The following orders were created for panel order CBC auto differential.  Procedure                               Abnormality         Status                      ---------                               -----------         ------                     CBC with Differential[[id number]]        Abnormal            Final result                 Please view results for these tests on the individual orders.   COMPREHENSIVE METABOLIC PANEL   COMPREHENSIVE METABOLIC PANEL   URINALYSIS, REFLEX TO URINE CULTURE   LACTIC ACID, PLASMA     EKG Readings: (Independently Interpreted)   Initial Reading: No STEMI. Rhythm: Sinus Tachycardia. Heart Rate: 138. Clinical Impression: Sinus Tachycardia   1744  Sinus tachycardia with right atrial enlargement  Rightward axis, poor r wave progression       Imaging Results          X-Ray Chest 1 View (In process)                CT Head Without Contrast (Final result)  Result time 06/26/22 17:02:16    Final result by Simone Valencia MD (06/26/22 17:02:16)                 Impression:      No acute intracranial abnormality.  Findings consistent with microangiopathy.  Other secondary findings as noted.      Electronically signed by: Simone Valencia MD  Date:    06/26/2022  Time:    17:02             Narrative:    EXAMINATION:  CT HEAD WITHOUT CONTRAST    CLINICAL HISTORY:  Seizure, new-onset, no history of trauma;    TECHNIQUE:  Multiple cross-section of the head were obtained without the use of contrast.  Coronal and sagittal reformatted images were obtained.  An automated dose exposure technique was utilized.  This limits radiation does the patient.    COMPARISON:  None    FINDINGS:  No acute ischemic changes or infarct.  No intraparenchymal hemorrhage or contusion.  No mass, mass effect midline shift, edema, or extra-axial fluid collection.  The gray-white matter differentiation maintained.  Hypodensities in the periventricular deep cortical white matter consistent with nonspecific small-vessel ischemia.  The cerebral sulci and basal cisterns are normal.  No hydrocephalus.    The globes are intact.  The paranasal sinuses and mastoid air cells are  well pneumatized.  Dental amalgam is identified.                              X-Rays:   Independently Interpreted Readings:   Chest X-Ray: Bilateral reticulonodular opacities     Medications   piperacillin-tazobactam (ZOSYN) 4.5 g in dextrose 5 % in water (D5W) 5 % 100 mL IVPB (MB+) (0 g Intravenous Stopped 6/26/22 1830)   vancomycin (VANCOCIN) 2,000 mg in dextrose 5 % 500 mL IVPB (2,000 mg Intravenous New Bag 6/26/22 1800)   sodium chloride 0.9% flush 10 mL (has no administration in time range)   chlorhexidine 0.12 % solution 15 mL (has no administration in time range)   enoxaparin injection 40 mg (40 mg Subcutaneous Given 6/26/22 2000)   famotidine (PF) injection 20 mg (has no administration in time range)   vancomycin - pharmacy to dose (has no administration in time range)   lactated ringers infusion ( Intravenous New Bag 6/26/22 2015)   albuterol-ipratropium 2.5 mg-0.5 mg/3 mL nebulizer solution 3 mL (has no administration in time range)   vancomycin in dextrose 5 % 1 gram/250 mL IVPB 1,000 mg (has no administration in time range)   sodium chloride 0.9% bolus 1,000 mL (0 mLs Intravenous Stopped 6/26/22 1745)   lorazepam injection 2 mg (2 mg Intravenous Given 6/26/22 1645)   levETIRAcetam in NaCl (iso-os) IVPB 1,500 mg (0 mg Intravenous Stopped 6/26/22 1715)   lactated ringers bolus 1,770 mL (0 mL/kg × 59 kg Intravenous Stopped 6/26/22 1900)     Medical Decision Making:   History:   I obtained history from: EMS provider and someone other than patient.  Old Medical Records: I decided to obtain old medical records.  Old Records Summarized: records from previous admission(s).  Initial Assessment:   Seizure, shortness of breath  Differential Diagnosis:   Hypoxia and hypercapnia, mets, electrolyte derangement, sepsis, uti, acidosis  Independently Interpreted Test(s):   I have ordered and independently interpreted X-rays - see prior notes.  I have ordered and independently interpreted EKG Reading(s) - see prior  "notes  Clinical Tests:   Lab Tests: Ordered and Reviewed  The following lab test(s) were unremarkable: CBC, CMP, Troponin, BNP, Urinalysis and Lactate  Radiological Study: Ordered and Reviewed  Medical Tests: Reviewed and Ordered  Sepsis Perfusion Assessment: "I attest a sepsis perfusion exam was performed within 6 hours of sepsis, severe sepsis, or septic shock presentation, following fluid resuscitation."    Sepsis Perfusion Assessment Complete: 6/26/2022 7:34 PM    ED Management:  Suspect he was relatively hypoxic and hypercapneic compounded with being in a hot environemtn over an hour resulting in seizure, unclear?  Given ivf resuscitation and broad specrum antibiotics  Other:   I have discussed this case with another health care provider.             ED Course as of 06/26/22 1935   Sun Jun 26, 2022   1652 Mucous plug removed [BS]   1711 Became more hypoxic, attempted saline suction without relief, bagged with improvement,w ill obtain abg and place on bipap on vent [BS]   1713 Pt was en route with ems for quite some time as theyr eportedly had issues finding the hospital, he potentially was retaining and then worsened causing ?seizure? unclear [BS]   1730 I update dhte patient's wife [BS]   1734 Per his wife he was doing well and then began having some trouble breathing.  She changed his inner cannula with improvement and given albuterol which resulted in tachycardia but he was having worsening shortness of breath and she was unable to reach his ENT in the on call to order to come to the ED for evaluation prompting called EMS [BS]   1804 Discussed with icu resident [BS]   1816 There is no wheezing, no longer diminished with adjusting trach. Suspect his acidosis was much more profound as his abg was obtaineda fter bagging and being ont eh vent for over a half hour [BS]   1822 Vent settings adjusted [BS]      ED Course User Index  [BS] Deidre Mederos MD             Clinical Impression:   Final " diagnoses:  [R06.02] Shortness of breath  [R65.10] SIRS (systemic inflammatory response syndrome)  [R56.9] Seizure-like activity  [G93.40] Acute encephalopathy  [J96.21, J96.22] Acute on chronic respiratory failure with hypoxia and hypercapnia (Primary)  [E87.2] Acute respiratory acidosis          ED Disposition Condition    Admit               Deidre Mederos MD  06/26/22 1938

## 2022-06-27 PROBLEM — R56.9 NEW ONSET SEIZURE: Status: ACTIVE | Noted: 2022-06-27

## 2022-06-27 LAB
ANION GAP SERPL CALC-SCNC: 7 MEQ/L
BUN SERPL-MCNC: 7.9 MG/DL (ref 8.4–25.7)
CALCIUM SERPL-MCNC: 9.6 MG/DL (ref 8.4–10.2)
CHLORIDE SERPL-SCNC: 99 MMOL/L (ref 98–107)
CO2 SERPL-SCNC: 31 MMOL/L (ref 22–29)
CREAT SERPL-MCNC: 0.5 MG/DL (ref 0.73–1.18)
CREAT/UREA NIT SERPL: 16
GLUCOSE SERPL-MCNC: 137 MG/DL (ref 74–100)
MAGNESIUM SERPL-MCNC: 1.8 MG/DL (ref 1.6–2.6)
PHOSPHATE SERPL-MCNC: 3 MG/DL (ref 2.3–4.7)
POTASSIUM SERPL-SCNC: 3.9 MMOL/L (ref 3.5–5.1)
SODIUM SERPL-SCNC: 137 MMOL/L (ref 136–145)

## 2022-06-27 PROCEDURE — 36415 COLL VENOUS BLD VENIPUNCTURE: CPT | Performed by: STUDENT IN AN ORGANIZED HEALTH CARE EDUCATION/TRAINING PROGRAM

## 2022-06-27 PROCEDURE — 63600175 PHARM REV CODE 636 W HCPCS: Performed by: STUDENT IN AN ORGANIZED HEALTH CARE EDUCATION/TRAINING PROGRAM

## 2022-06-27 PROCEDURE — 95816 EEG AWAKE AND DROWSY: CPT | Mod: 26,,, | Performed by: SPECIALIST

## 2022-06-27 PROCEDURE — 27000221 HC OXYGEN, UP TO 24 HOURS

## 2022-06-27 PROCEDURE — 94002 VENT MGMT INPAT INIT DAY: CPT

## 2022-06-27 PROCEDURE — 99223 1ST HOSP IP/OBS HIGH 75: CPT | Mod: ,,, | Performed by: SPECIALIST

## 2022-06-27 PROCEDURE — 25000003 PHARM REV CODE 250: Performed by: STUDENT IN AN ORGANIZED HEALTH CARE EDUCATION/TRAINING PROGRAM

## 2022-06-27 PROCEDURE — 99900026 HC AIRWAY MAINTENANCE (STAT)

## 2022-06-27 PROCEDURE — 84100 ASSAY OF PHOSPHORUS: CPT | Performed by: STUDENT IN AN ORGANIZED HEALTH CARE EDUCATION/TRAINING PROGRAM

## 2022-06-27 PROCEDURE — 99223 PR INITIAL HOSPITAL CARE,LEVL III: ICD-10-PCS | Mod: ,,, | Performed by: SPECIALIST

## 2022-06-27 PROCEDURE — 80048 BASIC METABOLIC PNL TOTAL CA: CPT | Performed by: STUDENT IN AN ORGANIZED HEALTH CARE EDUCATION/TRAINING PROGRAM

## 2022-06-27 PROCEDURE — 25500020 PHARM REV CODE 255: Performed by: INTERNAL MEDICINE

## 2022-06-27 PROCEDURE — 63600175 PHARM REV CODE 636 W HCPCS: Performed by: EMERGENCY MEDICINE

## 2022-06-27 PROCEDURE — 25000003 PHARM REV CODE 250: Performed by: EMERGENCY MEDICINE

## 2022-06-27 PROCEDURE — C9113 INJ PANTOPRAZOLE SODIUM, VIA: HCPCS | Performed by: STUDENT IN AN ORGANIZED HEALTH CARE EDUCATION/TRAINING PROGRAM

## 2022-06-27 PROCEDURE — 99900035 HC TECH TIME PER 15 MIN (STAT)

## 2022-06-27 PROCEDURE — 20000000 HC ICU ROOM

## 2022-06-27 PROCEDURE — 83735 ASSAY OF MAGNESIUM: CPT | Performed by: STUDENT IN AN ORGANIZED HEALTH CARE EDUCATION/TRAINING PROGRAM

## 2022-06-27 PROCEDURE — 27200966 HC CLOSED SUCTION SYSTEM

## 2022-06-27 PROCEDURE — 94761 N-INVAS EAR/PLS OXIMETRY MLT: CPT

## 2022-06-27 PROCEDURE — 95816 PR EEG,W/AWAKE & DROWSY RECORD: ICD-10-PCS | Mod: 26,,, | Performed by: SPECIALIST

## 2022-06-27 RX ORDER — MUPIROCIN 20 MG/G
OINTMENT TOPICAL DAILY
Status: DISCONTINUED | OUTPATIENT
Start: 2022-06-27 | End: 2022-07-07 | Stop reason: HOSPADM

## 2022-06-27 RX ORDER — DIPHENHYDRAMINE HCL 25 MG
25 CAPSULE ORAL ONCE
Status: COMPLETED | OUTPATIENT
Start: 2022-06-27 | End: 2022-06-27

## 2022-06-27 RX ORDER — PANTOPRAZOLE SODIUM 40 MG/10ML
40 INJECTION, POWDER, LYOPHILIZED, FOR SOLUTION INTRAVENOUS ONCE
Status: COMPLETED | OUTPATIENT
Start: 2022-06-27 | End: 2022-06-27

## 2022-06-27 RX ADMIN — IOPAMIDOL 100 ML: 755 INJECTION, SOLUTION INTRAVENOUS at 01:06

## 2022-06-27 RX ADMIN — DOXYCYCLINE 100 MG: 100 INJECTION, POWDER, LYOPHILIZED, FOR SOLUTION INTRAVENOUS at 11:06

## 2022-06-27 RX ADMIN — PIPERACILLIN SODIUM AND TAZOBACTAM SODIUM 4.5 G: 4; .5 INJECTION, POWDER, LYOPHILIZED, FOR SOLUTION INTRAVENOUS at 02:06

## 2022-06-27 RX ADMIN — PIPERACILLIN SODIUM AND TAZOBACTAM SODIUM 4.5 G: 4; .5 INJECTION, POWDER, LYOPHILIZED, FOR SOLUTION INTRAVENOUS at 09:06

## 2022-06-27 RX ADMIN — CHLORHEXIDINE GLUCONATE 0.12% ORAL RINSE 15 ML: 1.2 LIQUID ORAL at 08:06

## 2022-06-27 RX ADMIN — CHLORHEXIDINE GLUCONATE 0.12% ORAL RINSE 15 ML: 1.2 LIQUID ORAL at 09:06

## 2022-06-27 RX ADMIN — DIPHENHYDRAMINE HYDROCHLORIDE 25 MG: 25 CAPSULE ORAL at 08:06

## 2022-06-27 RX ADMIN — FAMOTIDINE 20 MG: 10 INJECTION INTRAVENOUS at 08:06

## 2022-06-27 RX ADMIN — PANTOPRAZOLE SODIUM 40 MG: 40 INJECTION, POWDER, FOR SOLUTION INTRAVENOUS at 02:06

## 2022-06-27 RX ADMIN — VANCOMYCIN HYDROCHLORIDE 1000 MG: 1 INJECTION, POWDER, LYOPHILIZED, FOR SOLUTION INTRAVENOUS at 05:06

## 2022-06-27 RX ADMIN — FAMOTIDINE 20 MG: 10 INJECTION INTRAVENOUS at 09:06

## 2022-06-27 RX ADMIN — ENOXAPARIN SODIUM 40 MG: 40 INJECTION SUBCUTANEOUS at 05:06

## 2022-06-27 RX ADMIN — LEVETIRACETAM 1000 MG: 10 INJECTION INTRAVENOUS at 08:06

## 2022-06-27 RX ADMIN — LEVETIRACETAM 1000 MG: 10 INJECTION INTRAVENOUS at 09:06

## 2022-06-27 NOTE — H&P
[facility] - Emergency Dept  Pulmonary Critical Care Note    Patient Name: Josafat Boudreaux  MRN: [id number]  Admission Date: 6/26/2022  Hospital Length of Stay: 0 days  Code Status: Full Code  Attending Provider: Deidre Mederos MD  Primary Care Provider: Tony Bertrand MD     Subjective:     HPI:   This is a 56-year old male w/ hx of laryngeal mass, COPD (no PFTs on file) and hypertension who presented to the ED with a chief complaint of shortness of breath. Patient also presented to the ED yesterday for the same complaint but was sent home. Family notes that he was doing fine last night but woke up this AM complaining of shortness of breath. Per family, was noted to be saturating in the low 90s initially but then started to desaturate, prompting EMS to be called. Per family, EMS was took longer to get to the hospital and by the time he got to [facility], he was tachypnic and desaturating. Patient was bagged in the ED but unable to get good saturation and had to be placed on the ventilator. Of note, patient reportedly had a seizure in the ED that was resolved with 2 mg of Ativan. Patient was started on Keppra 1500 mg. CXR was concerning for a pneumonia and he was started on Vancomycin and Zosyn. The patient was then admitted to the ICU.    Hospital Course/Significant events:      24 Hour Interval History:      Past Medical History:   Diagnosis Date    COPD (chronic obstructive pulmonary disease)     Dysphagia     Hypertension     Vocal cord mass        Social History     Socioeconomic History    Marital status:    Tobacco Use    Smoking status: Current Every Day Smoker     Types: Cigarettes    Smokeless tobacco: Never Used   Substance and Sexual Activity    Alcohol use: Yes    Drug use: Never           Objective:     Current Outpatient Medications   Medication Instructions    albuterol-ipratropium (DUO-NEB) 2.5 mg-0.5 mg/3 mL nebulizer solution 3 mLs, Nebulization, Every 4  hours PRN, Rescue    aspirin (ECOTRIN) 81 mg, Oral, Daily    budesonide (PULMICORT) 0.5 mg, Nebulization, Every 12 hours, Controller    calcium-vitamin D3 (OS-CARMELO 500 + D3) 500 mg-5 mcg (200 unit) per tablet 1 tablet, Per G Tube, 2 times daily with meals    diphenhydrAMINE (BENADRYL) 25 mg, Per G Tube, Every 6 hours PRN    docusate (COLACE) 100 mg, Per G Tube, 2 times daily PRN    hydrocortisone 1 % cream Topical (Top), 2 times daily PRN, Apply to rash    multivitamin capsule 1 capsule, Per G Tube, Daily    neomycin-bacitracin-polymyxin (NEOSPORIN) ointment Topical (Top), Daily, Apply to PEG site    nicotine (NICODERM CQ) 21 mg/24 hr 1 patch, Transdermal, Daily    polyethylene glycol (GLYCOLAX) 17 g, Per G Tube, 2 times daily PRN       Current Inpatient Medications   chlorhexidine  15 mL Mouth/Throat BID    enoxaparin  40 mg Subcutaneous Daily    famotidine (PF)  20 mg Intravenous Q12H    levetiracetam IV  1,500 mg Intravenous ED 1 Time    lorazepam  2 mg Intravenous ED 1 Time    piperacillin-tazobactam (ZOSYN) IVPB  4.5 g Intravenous Q8H    sodium chloride 0.9%  1,000 mL Intravenous ED 1 Time    vancomycin (VANCOCIN) IVPB  2,000 mg Intravenous ED 1 Time         No intake or output data in the 24 hours ending 06/26/22 1900    Review of Systems   Unable to perform ROS: Mental status change        Vital Signs (Most Recent):  Temp: 97.9 °F (36.6 °C) (06/26/22 1638)  Pulse: (!) 115 (06/26/22 1824)  Resp: (!) 22 (06/26/22 1824)  BP: 100/61 (06/26/22 1824)  SpO2: 100 % (06/26/22 1824)  Body mass index is 19.2 kg/m².  Weight: 59 kg (130 lb) Vital Signs (24h Range):  Temp:  [97.9 °F (36.6 °C)] 97.9 °F (36.6 °C)  Pulse:  [115-143] 115  Resp:  [20-25] 22  SpO2:  [93 %-100 %] 100 %  BP: ()/() 100/61     Physical Exam  Vitals and nursing note reviewed.   Constitutional:       Comments: Patient is lying comfortably in the bed but unable to fully arouse, likely s/p seizure   HENT:      Right Ear:  Tympanic membrane normal.      Left Ear: Tympanic membrane normal.      Nose: Nose normal. No congestion.      Mouth/Throat:      Mouth: Mucous membranes are moist.   Eyes:      Extraocular Movements: Extraocular movements intact.      Pupils: Pupils are equal, round, and reactive to light.      Comments: Pupils are 2+ mm bilaterally   Cardiovascular:      Rate and Rhythm: Normal rate and regular rhythm.      Pulses: Normal pulses.      Heart sounds: No murmur heard.  Pulmonary:      Effort: Pulmonary effort is normal. No respiratory distress.      Breath sounds: Normal breath sounds. No wheezing.   Abdominal:      General: Abdomen is flat. There is no distension.      Palpations: Abdomen is soft.      Tenderness: There is no abdominal tenderness.   Musculoskeletal:      Cervical back: Normal range of motion and neck supple. No tenderness.   Skin:     General: Skin is warm and dry.      Findings: No erythema.   Neurological:      Comments: Unable to assess neurologically           Mechanical ventilation support:  Oxygen Concentration (%): 60 (06/26/22 1800)    Lines/Drains/Airways     Drain  Duration                Gastrostomy/Enterostomy 06/14/22 1415 Percutaneous endoscopic gastrostomy (PEG) feeding 12 days          Airway  Duration                Surgical Airway 06/10/22 1354 Shiley Cuffed 16 days          Peripheral Intravenous Line  Duration                Peripheral IV - Single Lumen 06/19/22 1231 20 G Distal;Left;Posterior Forearm 7 days         Peripheral IV - Single Lumen 06/25/22 0945 20 G Anterior;Left Forearm 1 day         Peripheral IV - Single Lumen 06/26/22 1753 20 G Anterior;Distal;Right Wrist <1 day                Significant Labs:    Lab Results   Component Value Date    WBC 16.4 (H) 06/26/2022    HGB 17.3 06/26/2022    HCT 55.3 (H) 06/26/2022    .0 (H) 06/26/2022     (H) 06/26/2022         BMP  Lab Results   Component Value Date     (L) 06/25/2022    K 4.8 06/25/2022    CO2 34  (H) 06/25/2022    BUN 14.4 06/25/2022    CREATININE 0.67 (L) 06/25/2022    CALCIUM 10.2 06/25/2022    EGFRNONAA >60 06/25/2022       ABG  Recent Labs   Lab 06/26/22  1810   PH 7.23*   PO2 181*   PCO2 89*   HCO3 37.3           Significant Imaging:  I have reviewed all pertinent imaging within the past 24 hours.        Assessment/Plan:     Assessment  1. Type II respiratory failure requiring mechanical ventilation  2. Lactic Acidosis  3. Community-Acquired Pneumonia  4. New-onset seizure  5. 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 Hx of COPD  7. Hx of HTN      Plan  1. Continue with mechanical ventilation. Wean FiO2 as tolerated. Goal SpO2 > 88%.  2. Given 1 L bolus of NS. Will start maintenance fluids and check next lactic acid.  3. Started on Vancomycin and Zosyn in the ED. Will continue. Blood cultures and respiratory cultures are pending.  4. Will order EEG. Started on Keppra 1500 mg by ED.  5. Will started duonebs q4 PRN.  6. Will hold off antihypertensives in the setting of hypotension.  7. Spoke to the patient's wife, who confirms that he is FULL CODE.    DVT Prophylaxis: Lovenox  GI Prophylaxis: famotidine     Greater than 30 minutes of critical care was time spent personally by me on the following activities: development of treatment plan with patient or surrogate and bedside caregivers, discussions with consultants, evaluation of patient's response to treatment, examination of patient, ordering and performing treatments and interventions, ordering and review of laboratory studies, ordering and review of radiographic studies, pulse oximetry, re-evaluation of patient's condition.  This critical care time did not overlap with that of any other provider or involve time for any procedures.     Junior Jones MD  Pulmonary Critical Care Medicine  [facility] - Emergency Dept

## 2022-06-27 NOTE — PROCEDURES
"     Procedures    2022     EEG REPORT    PATIENT: Josafat Boudreaux  : 1965    INTERPRETING PHYSICIAN: Bijan Arguello     Reason for EEG: seizures yest      Duration of recordin minutes     This EEG is performed with the patient described as awake and drowsy.     The resting posterior background activity consists of symmetrical background theta and beta activity.    Lateralizing, focal, or epileptiform features were not present.    Technical character: good      EKG: sinus      IMPRESSION: This is a nonspecific EEG with diffuse slow activity.     Bijan Arguello MD    Josafat Boudreaux is a 56 y.o. male patient.    Temp: 97.8 °F (36.6 °C) (22 0400)  Pulse: 81 (22 0715)  Resp: (!) 24 (22 0715)  BP: (!) 162/92 (22 0700)  SpO2: 99 % (22 0715)  Weight: 59 kg (130 lb) (22 1634)  Height: 5' 9" (175.3 cm) (22 1634)      Current Facility-Administered Medications:     albuterol-ipratropium 2.5 mg-0.5 mg/3 mL nebulizer solution 3 mL, 3 mL, Nebulization, Q4H PRN, Junior Jones MD    chlorhexidine 0.12 % solution 15 mL, 15 mL, Mouth/Throat, BID, Junior Jones MD, 15 mL at 22 0926    doxycycline (VIBRAMYCIN) 100 mg in dextrose 5 % 250 mL IVPB, 100 mg, Intravenous, Q12H, Franc Taylor MD, Stopped at 22 2333    enoxaparin injection 40 mg, 40 mg, Subcutaneous, Daily, Junior Jones MD, 40 mg at 22    famotidine (PF) injection 20 mg, 20 mg, Intravenous, Q12H, Junior Jones MD, 20 mg at 22 09    lactated ringers infusion, , Intravenous, Continuous, Junior Jones MD, Last Rate: 100 mL/hr at 22 2100, New Bag at 22    levETIRAcetam in NaCl (iso-os) IVPB 1,000 mg, 1,000 mg, Intravenous, Q12H, Franc Taylor MD, Last Rate: 200 mL/hr at 22, 1,000 mg at 22    NORepinephrine bitartrate 8 mg in dextrose 5% 250 mL infusion, 0-3 mcg/kg/min, Intravenous, Continuous, Junior Jones MD, Last Rate: 11.1 " mL/hr at 06/26/22 2216, 0.1 mcg/kg/min at 06/26/22 2216    sodium chloride 0.9% flush 10 mL, 10 mL, Intravenous, PRN, Junior Jones MD    Pharmacy to dose Vancomycin consult, , , Once **AND** vancomycin - pharmacy to dose, , Intravenous, pharmacy to manage frequency, Junior Jones MD    vancomycin in dextrose 5 % 1 gram/250 mL IVPB 1,000 mg, 1,000 mg, Intravenous, Q12H, Junior Jones MD, Last Rate: 166.7 mL/hr at 06/27/22 0532, 1,000 mg at 06/27/22 0532  Current Outpatient Medications   Medication Instructions    albuterol-ipratropium (DUO-NEB) 2.5 mg-0.5 mg/3 mL nebulizer solution 3 mLs, Nebulization, Every 4 hours PRN, Rescue    aspirin (ECOTRIN) 81 mg, Oral, Daily    budesonide (PULMICORT) 0.5 mg, Nebulization, Every 12 hours, Controller    calcium-vitamin D3 (OS-CARMELO 500 + D3) 500 mg-5 mcg (200 unit) per tablet 1 tablet, Per G Tube, 2 times daily with meals    diphenhydrAMINE (BENADRYL) 25 mg, Per G Tube, Every 6 hours PRN    docusate (COLACE) 100 mg, Per G Tube, 2 times daily PRN    hydrocortisone 1 % cream Topical (Top), 2 times daily PRN, Apply to rash    multivitamin capsule 1 capsule, Per G Tube, Daily    neomycin-bacitracin-polymyxin (NEOSPORIN) ointment Topical (Top), Daily, Apply to PEG site    nicotine (NICODERM CQ) 21 mg/24 hr 1 patch, Transdermal, Daily    polyethylene glycol (GLYCOLAX) 17 g, Per G Tube, 2 times daily PRN

## 2022-06-27 NOTE — CONSULTS
[facility] - 7th Floor ICU  Neurology  Consult Note    Patient Name: Josafat Boudreaux  MRN: [id number]  Admission Date: 6/26/2022  Hospital Length of Stay: 1 days  Code Status: Full Code   Attending Provider: OLIVIA Tena MD   Consulting Provider: Janette Rowell [facility]  Primary Care Physician: Tony Bertrand MD  Principal Problem:<principal problem not specified>    Inpatient consult to Neurology  Consult performed by: OCTAVIO San  Consult ordered by: OLIVIA Tena MD         Subjective:     Chief Complaint:       HPI:   56-year-old male with PMH laryngeal mass, COPD, and HTN who presented to ED on 06/26 for c/o shortness of breath.  Per EMS report patient became tachypneic, desaturating, and required bag-mask ventilation en route.  Subsequently placed on mechanical ventilator.  Patient was noted to have seizure-like activity in ED which resolved with administration of 2 mg of Ativan and was started on Keppra 1000 mg BID. CT head unrevealing for acute intracranial abnormalities. CTA head/neck negative for hemodynamically significant stenosis, LVO, or aneurysm, revealed pulmonary infectious process to RUL and laryngeal mass. Labs on admission revealed WBC 16.4 and plts 431.       Past Medical History:   Diagnosis Date    COPD (chronic obstructive pulmonary disease)     Dysphagia     Hypertension     Vocal cord mass        Past Surgical History:   Procedure Laterality Date    HIP SURGERY      HUMERUS FRACTURE SURGERY      INSERT ARTERIAL LINE  6/26/2022         TRACHEOSTOMY N/A 6/10/2022    Procedure: CREATION, TRACHEOSTOMY;  Surgeon: Junior Alonso MD;  Location: [facility];  Service: ENT;  Laterality: N/A;       Review of patient's allergies indicates:  No Known Allergies    Current Neurological Medications:     No current facility-administered medications on file prior to encounter.     Current Outpatient Medications on File Prior to Encounter   Medication Sig     albuterol-ipratropium (DUO-NEB) 2.5 mg-0.5 mg/3 mL nebulizer solution Take 3 mLs by nebulization every 4 (four) hours as needed for Shortness of Breath or Wheezing. Rescue    aspirin (ECOTRIN) 81 MG EC tablet Take 81 mg by mouth once daily.    budesonide (PULMICORT) 0.5 mg/2 mL nebulizer solution Take 2 mLs (0.5 mg total) by nebulization every 12 (twelve) hours. Controller    calcium-vitamin D3 (OS-CARMELO 500 + D3) 500 mg-5 mcg (200 unit) per tablet 1 tablet by Per G Tube route 2 (two) times daily with meals.    diphenhydrAMINE (BENADRYL) 25 mg capsule 1 capsule (25 mg total) by Per G Tube route every 6 (six) hours as needed for Itching.    docusate (COLACE) 50 mg/5 mL liquid 10 mLs (100 mg total) by Per G Tube route 2 (two) times daily as needed (constipation).    hydrocortisone 1 % cream Apply topically 2 (two) times daily as needed (rash). Apply to rash    multivitamin capsule 1 capsule by Per G Tube route once daily.    neomycin-bacitracin-polymyxin (NEOSPORIN) ointment Apply topically once daily. Apply to PEG site    nicotine (NICODERM CQ) 21 mg/24 hr Place 1 patch onto the skin once daily.    polyethylene glycol (GLYCOLAX) 17 gram PwPk 17 g by Per G Tube route 2 (two) times daily as needed (constipation).     Family History       Family history is unknown by patient.          Tobacco Use    Smoking status: Current Every Day Smoker     Types: Cigarettes    Smokeless tobacco: Never Used   Substance and Sexual Activity    Alcohol use: Yes    Drug use: Never    Sexual activity: Not on file     Review of Systems  Limited 2/2 tracheostomy  Objective:     Vital Signs (Most Recent):  Temp: 97.8 °F (36.6 °C) (06/27/22 0400)  Pulse: 81 (06/27/22 0715)  Resp: (!) 24 (06/27/22 0715)  BP: (!) 162/92 (06/27/22 0700)  SpO2: 99 % (06/27/22 0715)   Vital Signs (24h Range):  Temp:  [97.8 °F (36.6 °C)-98.7 °F (37.1 °C)] 97.8 °F (36.6 °C)  Pulse:  [] 81  Resp:  [18-32] 24  SpO2:  [87 %-100 %] 99 %  BP:  ()/() 162/92  Arterial Line BP: ()/(53-83) 61/53     Weight: 59 kg (130 lb)  Body mass index is 19.2 kg/m².    Physical Exam  Constitutional:       Comments: Tracheostomy on mechanical ventilation   HENT:      Head: Normocephalic.      Nose: Nose normal.      Mouth/Throat:      Mouth: Mucous membranes are moist.      Pharynx: Oropharynx is clear.   Eyes:      Extraocular Movements: Extraocular movements intact and EOM normal.      Pupils: Pupils are equal, round, and reactive to light.   Skin:     General: Skin is warm.   Neurological:      Mental Status: He is easily aroused.   Psychiatric:         Behavior: Behavior is cooperative.       NEUROLOGICAL EXAMINATION:     MENTAL STATUS   Follows 2 step commands.   Speech: (UTO 2/2 tracheostomy  )  Level of consciousness: arousable by verbal stimuli    CRANIAL NERVES     CN II   Visual acuity: (makes eye contact and tracks examiner)    CN III, IV, VI   Pupils are equal, round, and reactive to light.  Extraocular motions are normal.   Conjugate gaze: present    CN V   Right corneal reflex: normal  Left corneal reflex: normal    CN VII   Facial expression full, symmetric.     CN XII   CN XII normal.     MOTOR EXAM   Muscle bulk: decreased    Strength   Right deltoid: 4/5  Left deltoid: 4/5  Right biceps: 4/5  Left biceps: 4/5  Right triceps: 4/5  Left triceps: 4/5  Right quadriceps: 3/5  Left quadriceps: 3/5  Right hamstring: 3/5  Left hamstring: 3/5    REFLEXES     Reflexes   Right ankle clonus: absent  Left ankle clonus: absent    SENSORY EXAM   Light touch normal.     Significant Labs:   Recent Lab Results  (Last 5 results in the past 24 hours)        06/27/22  0548   06/26/22  2230   06/26/22  1847   06/26/22  1815   06/26/22  1810        Base Deficit         5.4  Comment: Result is outside patient normal (reference) range.       Correct Temperature (PH)         7.23  Comment: Result is outside panic range (critical limits).       Corrected  Temperature (pCO2)         89  Comment: Result is outside panic range (critical limits).       Corrected Temperature (pO2)         181  Comment: Result is outside patient normal (reference) range.       Phencyclidine       Negative         POC COHb         1.5       POC MetHb         1.1       POC O2Hb         97.0       Specimen source         Arterial sample       Albumin/Globulin Ratio     0.7           Albumin     0.6           Alcohol, Serum     <10.0           Alkaline Phosphatase     20           ALT     <5           Amphetamine Screen, Ur       Negative         Anion Gap 7.0               Appearance, UA   Clear     Clear         AST     6           Bacteria, UA   None Seen     None Seen         Barbiturate Screen, Ur       Negative         Benzodiazepine Screen, Urine       Negative         BILIRUBIN TOTAL     0.1           Bilirubin, UA   Negative     Negative         BUN 7.9     6.2           BUN/CREAT RATIO 16               Calcium 9.6     6.7  Comment: Critical Result called and verified by verbal readback to: Seda Knight on 06/26/2022 at 19:42. Reported by [id number].           Cannabinoids, Urine       Negative         Chloride 99     104           CO2 31     16           Cocaine (Metab.)       Negative         Color, UA   Yellow     Yellow         CPK     8           Creatinine 0.50     0.23           eGFR if non African American >60     >60           Fentanyl, Urine       Negative         Globulin, Total     0.9           Glucose 137     83           Glucose, UA   Negative     Negative         Gram Stain Result   Quality 3+  [P]                No bacteria seen   [P]             Ketones, UA   Negative     Negative         Lactate, Tay   0.8             Leukocytes, UA   Negative     Negative         Magnesium 1.80               MDMA, Urine       Negative         NITRITE UA   Negative     Negative         Occult Blood UA   Negative     Negative         Opiate Scrn, Ur       Negative         pH, UA   6.0      5.5         pH, Urine       5.5         Phosphorus 3.0               POC HCO3         37.3       POC HEMOGLOBIN         17.1  Comment: Result is outside patient normal (reference) range.       POC Ionized Calcium         1.28  Comment: Result is outside patient normal (reference) range.       POC PCO2         89  Comment: Result is outside panic range (critical limits).       POC PH         7.23  Comment: Result is outside panic range (critical limits).       POC PO2         181  Comment: Result is outside patient normal (reference) range.       POC Potassium         4.1       POC SATURATED O2         99.4       POC Sodium         134  Comment: Result is outside patient normal (reference) range.       POC Temp         37.0       Potassium 3.9     4.3           PROTEIN TOTAL     1.5           Protein, UA   Negative     Trace         RBC, UA   <5     <5         Sodium 137     134           Specific Gravity,UA   1.008     1.013         Specific Gravity, Urine Auto       1.013         Squam Epithel, UA   <5     <5         Urobilinogen, UA   0.2     1.0         WBC, UA   <5     7                                 [P] - Preliminary Result               Significant Imaging:   CT head w/o 6/26/2022:  FINDINGS:  No acute ischemic changes or infarct.  No intraparenchymal hemorrhage or contusion.  No mass, mass effect midline shift, edema, or extra-axial fluid collection.  The gray-white matter differentiation maintained.  Hypodensities in the periventricular deep cortical white matter consistent with nonspecific small-vessel ischemia.  The cerebral sulci and basal cisterns are normal.  No hydrocephalus.     The globes are intact.  The paranasal sinuses and mastoid air cells are well pneumatized.  Dental amalgam is identified.     Impression:     No acute intracranial abnormality.  Findings consistent with microangiopathy.  Other secondary findings as noted.    CTA head/neck 6/26/2022:     Findings:     Carotid arteries were  assessed in accordance with the NASCET criteria.     Intracranial Vascular structures:     Internal carotid arteries:Unremarkable.     Middle cerebral arteries:Unremarkable.     Anterior cerebral arteries:Unremarkable.     Vertebral arteries:The vertebrobasilar junction is unremarkable. Both vertebral arteries are patent and normal in caliber.     Basilar artery:Unremarkable.     Posterior cerebral arteries:Unremarkable.     Neck Vascular structures:The visualized aorta and origin of the great vessels of the neck appear unremarkable.     Carotids:     Common carotid arteries:The right common carotid arteries appear unremarkable.     Internal carotid artery:The right internal carotid arteries appear unremarkable. There is mild stenosis at the origin of left proximal internal carotid artery (series 6, image 75 and series 15, image 20) secondary to dense calcified atheromatous plaque. The remainder of the cervical segment of the left internal carotid artery appears unremarkable.     Vertebral arteries:The origins of both vertebral arteries are unremarkable. Both vertebral arteries are patent and normal in caliber.     Brain parenchyma:No abnormal brain parenchymal enhancement is seen.     Miscellaneous:There is consolidation is right upper lobe. Additional ground-glass opacities are also seen on the remainder of the visualized lungs. These findings are consistent with an infectious process. Small right pleural effusion is seen. A tracheostomy tube is seen in stable position. There is an ill-defined soft tissue mass in the right perihilar region which measures approximately 3.1 x 2.9 x 4.6 cm (series 6, image 136), of concern for neoplasm. There is an ill-defined enhancing soft tissue mass in the glottis infiltrating into the paraglottic spaces and the subglottic region with obliteration of the airway, of concern for a neoplasm (series 6, image 86).  This was also described on recent CT soft tissue neck June 6, 2022      Impression:  Impression:     1. There is mild stenosis at the origin of left proximal internal carotid artery (series 6, image 75 and series 15, image 20) secondary to dense calcified atheromatous plaque.     2. There is consolidation is right upper lobe. Additional ground-glass opacities are also seen on the remainder of the visualized lungs. These findings are consistent with an infectious process. Small right pleural effusion is seen. There is an ill-defined soft tissue mass in the right perihilar region which measures approximately 3.1 x 2.9 x 4.6 cm (series 6, image 136), of concern for neoplasm. Correlate clinically as regards further evaluation and followup with CT chest. There is an ill-defined enhancing soft tissue mass in the glottis infiltrating into the paraglottic spaces and the subglottic region with obliteration of the airway, of concern for a neoplasm (series 6, image 86).     3. Unremarkable CT angiogram of the     I have reviewed all pertinent imaging results/findings within the past 24 hours.    Assessment and Plan:     New onset seizure  Await EEG results  Continue keppra 1 gm BID  Seizure precautions  Antimicrobial management per primary team          VTE Risk Mitigation (From admission, onward)         Ordered     enoxaparin injection 40 mg  Daily         06/26/22 1854     Place sequential compression device  Until discontinued         06/26/22 1854     IP VTE HIGH RISK PATIENT  Once         06/26/22 1854                Thank you for your consult. Further recommendations to follow per Md Janette Rowell, [facility]  Inpatient Neurology  [facility] - 7th Floor ICU

## 2022-06-27 NOTE — NURSING
Pt brought downstairs for a STAT CTA of head/neck. Pt unresponsive the entire time downstairs. Vitals WNL still on levo gtt. Once we arrived back to the unit, the pt opened his eyes, followed all commands, and gave a thumbs up. Pt was reoriented to what had happened to him and the care he was receiving. Pt states he is in no pain.     Dr. Arguello called soon after pt awoke and RN updated Dr. Arguello on pt's response.     Pt's wife, Flor, called and updated on pt's response and all questions were answered. Will continue to monitor pt closely.

## 2022-06-27 NOTE — HPI
56-year-old male with PMH laryngeal mass, COPD, and HTN who presented to ED on 06/26 for c/o shortness of breath.  Per EMS report patient became tachypneic, desaturating, and required bag-mask ventilation en route.  Subsequently placed on mechanical ventilator.  Patient was noted to have seizure-like activity in ED which resolved with administration of 2 mg of Ativan and was started on Keppra 1000 mg BID. CT head unrevealing for acute intracranial abnormalities. CTA head/neck negative for hemodynamically significant stenosis, LVO, or aneurysm, revealed pulmonary infectious process to RUL and laryngeal mass. Labs on admission revealed WBC 16.4 and plts 431.

## 2022-06-27 NOTE — SIGNIFICANT EVENT
Shortly after admit, @ 2144 pt started having a tonic clonic seizure, going apneic on the vent, and desatting.   2144- Dr. Greene called to come evaluate pt  2145- 2mg ativan IV given  Bagging pt, pt still desatting, pt saline/bag suctioned and removed large plug  2149- pt still seizing, 1mg ativan IV given  2150- pt stopped seizing, placed back on ventilator on SIMV mode. Increased O2 requirements @ this time. Pt now on 70%.   [place] of labs drawn, resp culture,  chest xray done to make sure trach in place.   2318- Arterial line placed due to pt needing levo gtt since admission to the ICU.   2331- Neurology consult placed and paged. Waiting on call back.     Will continue to monitor closely. Pt placed on his side and seizure precautions maintained.     Updated pt's wife Flor on phone of all events and answered all questions.

## 2022-06-27 NOTE — SUBJECTIVE & OBJECTIVE
Past Medical History:   Diagnosis Date    COPD (chronic obstructive pulmonary disease)     Dysphagia     Hypertension     Vocal cord mass        Past Surgical History:   Procedure Laterality Date    HIP SURGERY      HUMERUS FRACTURE SURGERY      INSERT ARTERIAL LINE  6/26/2022         TRACHEOSTOMY N/A 6/10/2022    Procedure: CREATION, TRACHEOSTOMY;  Surgeon: Junior Alonso MD;  Location: [facility];  Service: ENT;  Laterality: N/A;       Review of patient's allergies indicates:  No Known Allergies    Current Neurological Medications:     No current facility-administered medications on file prior to encounter.     Current Outpatient Medications on File Prior to Encounter   Medication Sig    albuterol-ipratropium (DUO-NEB) 2.5 mg-0.5 mg/3 mL nebulizer solution Take 3 mLs by nebulization every 4 (four) hours as needed for Shortness of Breath or Wheezing. Rescue    aspirin (ECOTRIN) 81 MG EC tablet Take 81 mg by mouth once daily.    budesonide (PULMICORT) 0.5 mg/2 mL nebulizer solution Take 2 mLs (0.5 mg total) by nebulization every 12 (twelve) hours. Controller    calcium-vitamin D3 (OS-CARMELO 500 + D3) 500 mg-5 mcg (200 unit) per tablet 1 tablet by Per G Tube route 2 (two) times daily with meals.    diphenhydrAMINE (BENADRYL) 25 mg capsule 1 capsule (25 mg total) by Per G Tube route every 6 (six) hours as needed for Itching.    docusate (COLACE) 50 mg/5 mL liquid 10 mLs (100 mg total) by Per G Tube route 2 (two) times daily as needed (constipation).    hydrocortisone 1 % cream Apply topically 2 (two) times daily as needed (rash). Apply to rash    multivitamin capsule 1 capsule by Per G Tube route once daily.    neomycin-bacitracin-polymyxin (NEOSPORIN) ointment Apply topically once daily. Apply to PEG site    nicotine (NICODERM CQ) 21 mg/24 hr Place 1 patch onto the skin once daily.    polyethylene glycol (GLYCOLAX) 17 gram PwPk 17 g by Per G Tube route 2 (two) times daily as needed (constipation).     Family History        Family history is unknown by patient.          Tobacco Use    Smoking status: Current Every Day Smoker     Types: Cigarettes    Smokeless tobacco: Never Used   Substance and Sexual Activity    Alcohol use: Yes    Drug use: Never    Sexual activity: Not on file     Review of Systems  Limited 2/2 tracheostomy  Objective:     Vital Signs (Most Recent):  Temp: 97.8 °F (36.6 °C) (06/27/22 0400)  Pulse: 81 (06/27/22 0715)  Resp: (!) 24 (06/27/22 0715)  BP: (!) 162/92 (06/27/22 0700)  SpO2: 99 % (06/27/22 0715)   Vital Signs (24h Range):  Temp:  [97.8 °F (36.6 °C)-98.7 °F (37.1 °C)] 97.8 °F (36.6 °C)  Pulse:  [] 81  Resp:  [18-32] 24  SpO2:  [87 %-100 %] 99 %  BP: ()/() 162/92  Arterial Line BP: ()/(53-83) 61/53     Weight: 59 kg (130 lb)  Body mass index is 19.2 kg/m².    Physical Exam  Constitutional:       Comments: Tracheostomy on mechanical ventilation   HENT:      Head: Normocephalic.      Nose: Nose normal.      Mouth/Throat:      Mouth: Mucous membranes are moist.      Pharynx: Oropharynx is clear.   Eyes:      Extraocular Movements: Extraocular movements intact and EOM normal.      Pupils: Pupils are equal, round, and reactive to light.   Skin:     General: Skin is warm.   Neurological:      Mental Status: He is easily aroused.   Psychiatric:         Behavior: Behavior is cooperative.       NEUROLOGICAL EXAMINATION:     MENTAL STATUS   Follows 2 step commands.   Speech: (UTO 2/2 tracheostomy  )  Level of consciousness: arousable by verbal stimuli    CRANIAL NERVES     CN II   Visual acuity: (makes eye contact and tracks examiner)    CN III, IV, VI   Pupils are equal, round, and reactive to light.  Extraocular motions are normal.   Conjugate gaze: present    CN V   Right corneal reflex: normal  Left corneal reflex: normal    CN VII   Facial expression full, symmetric.     CN XII   CN XII normal.     MOTOR EXAM   Muscle bulk: decreased    Strength   Right deltoid: 4/5  Left deltoid:  4/5  Right biceps: 4/5  Left biceps: 4/5  Right triceps: 4/5  Left triceps: 4/5  Right quadriceps: 3/5  Left quadriceps: 3/5  Right hamstring: 3/5  Left hamstring: 3/5    REFLEXES     Reflexes   Right ankle clonus: absent  Left ankle clonus: absent    SENSORY EXAM   Light touch normal.     Significant Labs:   Recent Lab Results  (Last 5 results in the past 24 hours)        06/27/22  0548   06/26/22  2230   06/26/22  1847   06/26/22  1815   06/26/22  1810        Base Deficit         5.4  Comment: Result is outside patient normal (reference) range.       Correct Temperature (PH)         7.23  Comment: Result is outside panic range (critical limits).       Corrected Temperature (pCO2)         89  Comment: Result is outside panic range (critical limits).       Corrected Temperature (pO2)         181  Comment: Result is outside patient normal (reference) range.       Phencyclidine       Negative         POC COHb         1.5       POC MetHb         1.1       POC O2Hb         97.0       Specimen source         Arterial sample       Albumin/Globulin Ratio     0.7           Albumin     0.6           Alcohol, Serum     <10.0           Alkaline Phosphatase     20           ALT     <5           Amphetamine Screen, Ur       Negative         Anion Gap 7.0               Appearance, UA   Clear     Clear         AST     6           Bacteria, UA   None Seen     None Seen         Barbiturate Screen, Ur       Negative         Benzodiazepine Screen, Urine       Negative         BILIRUBIN TOTAL     0.1           Bilirubin, UA   Negative     Negative         BUN 7.9     6.2           BUN/CREAT RATIO 16               Calcium 9.6     6.7  Comment: Critical Result called and verified by verbal readback to: Seda Knight on 06/26/2022 at 19:42. Reported by [id number].           Cannabinoids, Urine       Negative         Chloride 99     104           CO2 31     16           Cocaine (Metab.)       Negative         Color, UA   Yellow     Yellow          CPK     8           Creatinine 0.50     0.23           eGFR if non African American >60     >60           Fentanyl, Urine       Negative         Globulin, Total     0.9           Glucose 137     83           Glucose, UA   Negative     Negative         Gram Stain Result   Quality 3+  [P]                No bacteria seen   [P]             Ketones, UA   Negative     Negative         Lactate, Tay   0.8             Leukocytes, UA   Negative     Negative         Magnesium 1.80               MDMA, Urine       Negative         NITRITE UA   Negative     Negative         Occult Blood UA   Negative     Negative         Opiate Scrn, Ur       Negative         pH, UA   6.0     5.5         pH, Urine       5.5         Phosphorus 3.0               POC HCO3         37.3       POC HEMOGLOBIN         17.1  Comment: Result is outside patient normal (reference) range.       POC Ionized Calcium         1.28  Comment: Result is outside patient normal (reference) range.       POC PCO2         89  Comment: Result is outside panic range (critical limits).       POC PH         7.23  Comment: Result is outside panic range (critical limits).       POC PO2         181  Comment: Result is outside patient normal (reference) range.       POC Potassium         4.1       POC SATURATED O2         99.4       POC Sodium         134  Comment: Result is outside patient normal (reference) range.       POC Temp         37.0       Potassium 3.9     4.3           PROTEIN TOTAL     1.5           Protein, UA   Negative     Trace         RBC, UA   <5     <5         Sodium 137     134           Specific Gravity,UA   1.008     1.013         Specific Gravity, Urine Auto       1.013         Squam Epithel, UA   <5     <5         Urobilinogen, UA   0.2     1.0         WBC, UA   <5     7                                 [P] - Preliminary Result               Significant Imaging:   CT head w/o 6/26/2022:  FINDINGS:  No acute ischemic changes or infarct.  No  intraparenchymal hemorrhage or contusion.  No mass, mass effect midline shift, edema, or extra-axial fluid collection.  The gray-white matter differentiation maintained.  Hypodensities in the periventricular deep cortical white matter consistent with nonspecific small-vessel ischemia.  The cerebral sulci and basal cisterns are normal.  No hydrocephalus.     The globes are intact.  The paranasal sinuses and mastoid air cells are well pneumatized.  Dental amalgam is identified.     Impression:     No acute intracranial abnormality.  Findings consistent with microangiopathy.  Other secondary findings as noted.    CTA head/neck 6/26/2022:     Findings:     Carotid arteries were assessed in accordance with the NASCET criteria.     Intracranial Vascular structures:     Internal carotid arteries:Unremarkable.     Middle cerebral arteries:Unremarkable.     Anterior cerebral arteries:Unremarkable.     Vertebral arteries:The vertebrobasilar junction is unremarkable. Both vertebral arteries are patent and normal in caliber.     Basilar artery:Unremarkable.     Posterior cerebral arteries:Unremarkable.     Neck Vascular structures:The visualized aorta and origin of the great vessels of the neck appear unremarkable.     Carotids:     Common carotid arteries:The right common carotid arteries appear unremarkable.     Internal carotid artery:The right internal carotid arteries appear unremarkable. There is mild stenosis at the origin of left proximal internal carotid artery (series 6, image 75 and series 15, image 20) secondary to dense calcified atheromatous plaque. The remainder of the cervical segment of the left internal carotid artery appears unremarkable.     Vertebral arteries:The origins of both vertebral arteries are unremarkable. Both vertebral arteries are patent and normal in caliber.     Brain parenchyma:No abnormal brain parenchymal enhancement is seen.     Miscellaneous:There is consolidation is right upper lobe.  Additional ground-glass opacities are also seen on the remainder of the visualized lungs. These findings are consistent with an infectious process. Small right pleural effusion is seen. A tracheostomy tube is seen in stable position. There is an ill-defined soft tissue mass in the right perihilar region which measures approximately 3.1 x 2.9 x 4.6 cm (series 6, image 136), of concern for neoplasm. There is an ill-defined enhancing soft tissue mass in the glottis infiltrating into the paraglottic spaces and the subglottic region with obliteration of the airway, of concern for a neoplasm (series 6, image 86).  This was also described on recent CT soft tissue neck June 6, 2022     Impression:  Impression:     1. There is mild stenosis at the origin of left proximal internal carotid artery (series 6, image 75 and series 15, image 20) secondary to dense calcified atheromatous plaque.     2. There is consolidation is right upper lobe. Additional ground-glass opacities are also seen on the remainder of the visualized lungs. These findings are consistent with an infectious process. Small right pleural effusion is seen. There is an ill-defined soft tissue mass in the right perihilar region which measures approximately 3.1 x 2.9 x 4.6 cm (series 6, image 136), of concern for neoplasm. Correlate clinically as regards further evaluation and followup with CT chest. There is an ill-defined enhancing soft tissue mass in the glottis infiltrating into the paraglottic spaces and the subglottic region with obliteration of the airway, of concern for a neoplasm (series 6, image 86).     3. Unremarkable CT angiogram of the     I have reviewed all pertinent imaging results/findings within the past 24 hours.

## 2022-06-27 NOTE — ASSESSMENT & PLAN NOTE
Await EEG results  Continue keppra 1 gm BID  Seizure precautions  Antimicrobial management per primary team

## 2022-06-27 NOTE — NURSING
WOCN consult-55 y/o male discharged less than a week ago with issues of vocal cord mass hx of htn and copd.    Came back with new onset seizure.     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   See photo-care initiated and will follow up in a few days.

## 2022-06-27 NOTE — PROGRESS NOTES
Received a page last night about Mr Boudreaux having a seizure for which he was started on Keppra 1000 mg BID. CT Head and EEG ordered per Neuro recs. Neurology consulted.

## 2022-06-27 NOTE — PROGRESS NOTES
Pharmacokinetic Initial Assessment: IV Vancomycin    Assessment/Plan:    Initiate intravenous vancomycin with loading dose of 2000 mg once followed by a maintenance dose of vancomycin 1000mg IV every 12 hours  Desired empiric serum trough concentration is 15 to 20 mcg/mL  Draw vancomycin trough level 60 min prior to fourth dose on 6/28 at approximately 0500  Pharmacy will continue to follow and monitor vancomycin.      Please contact pharmacy at extension [phone] with any questions regarding this assessment.     Thank you for the consult,   Natalio De La Rosa       Patient brief summary:  Josafat Boudreaux is a 56 y.o. male initiated on antimicrobial therapy with IV Vancomycin for treatment of suspected  PNA    Drug Allergies:   Review of patient's allergies indicates:  No Known Allergies    Actual Body Weight:   59 kg    Renal Function:   Estimated Creatinine Clearance: 102.7 mL/min (A) (based on SCr of 0.67 mg/dL (L)).,     Dialysis Method (if applicable):  N/A    CBC (last 72 hours):  Recent Labs   Lab Result Units 06/25/22  0745 06/26/22  1713 06/26/22  1754   WBC x10(3)/mcL 11.3 16.9* 16.4*   Hgb gm/dL 17.2 18.2* 17.3   Hct % 53.4* 56.2* 55.3*   Platelet x10(3)/mcL 455* 523* 431*   Mono % % 10.3 10.3 8.2   Eos % % 1.0 0.7 0.4   Basophil % % 1.2 1.0 1.1       Metabolic Panel (last 72 hours):  Recent Labs   Lab Result Units 06/25/22  0745 06/26/22  1815   Sodium Level mmol/L 135*  --    Potassium Level mmol/L 4.8  --    Chloride mmol/L 93*  --    Carbon Dioxide mmol/L 34*  --    Glucose Level mg/dL 113*  --    Glucose, UA mg/dL  --  Negative   Blood Urea Nitrogen mg/dL 14.4  --    Creatinine mg/dL 0.67*  --    Albumin Level gm/dL 2.7*  --    Bilirubin Total mg/dL 0.5  --    Alkaline Phosphatase unit/L 89  --    Aspartate Aminotransferase unit/L 20  --    Alanine Aminotransferase unit/L 15  --        Drug levels (last 3 results):  No results for input(s): VANCOMYCINRA, VANCORANDOM, VANCOMYCINPE, VANCOPEAK,  VANCOMYCINTR, VANCOTROUGH in the last 72 hours.    Microbiologic Results:  Microbiology Results (last 7 days)       Procedure Component Value Units Date/Time    Respiratory Culture [[id number]]     Order Status: Sent Specimen: Sputum from Endotracheal Aspirate     Blood culture x two cultures. Draw prior to antibiotics. [[id number]] Collected: 06/26/22 1802    Order Status: Resulted Specimen: Blood Updated: 06/26/22 1810    Blood culture x two cultures. Draw prior to antibiotics. [[id number]] Collected: 06/26/22 1754    Order Status: Resulted Specimen: Blood Updated: 06/26/22 1810

## 2022-06-27 NOTE — PROCEDURES
Arterial Catheter Insertion Procedure Note     Procedure: Insertion of Arterial Catheter     Indications: Hemodynamic Monitoring     Procedure Details   Procedure consent covered under global consent.    Maximum sterile technique was used including antiseptics, cap, sterile gloves, sterile gown, hand hygiene, mask and sterile maximum barrier drape.     Under sterile conditions the skin above the L radial artery was prepped with Chloroprep and covered with a sterile drape. A 20-gauge needle was used to identify the artery which was then inserted into the artery. A guide wire was then passed easily through the needle. The needle was then withdrawn. A arterial catheter was then inserted into the vessel over the guide wire. The catheter was sutured into place.     Ultrasound/Sonosite was used during the procedure.     Findings:   There were no changes to vital signs. Catheter was flushed with 10 cc NS. Patient tolerated procedure well. EBL < 5 ML.

## 2022-06-28 LAB
ANION GAP SERPL CALC-SCNC: 4 MEQ/L
BUN SERPL-MCNC: 7.7 MG/DL (ref 8.4–25.7)
CALCIUM SERPL-MCNC: 9.2 MG/DL (ref 8.4–10.2)
CHLORIDE SERPL-SCNC: 100 MMOL/L (ref 98–107)
CO2 SERPL-SCNC: 30 MMOL/L (ref 22–29)
CREAT SERPL-MCNC: 0.54 MG/DL (ref 0.73–1.18)
CREAT/UREA NIT SERPL: 14
GLUCOSE SERPL-MCNC: 126 MG/DL (ref 74–100)
MAGNESIUM SERPL-MCNC: 1.8 MG/DL (ref 1.6–2.6)
PCO2 BLDA: 48 MMHG
PH SMN: 7.48 [PH] (ref 7.35–7.45)
PHOSPHATE SERPL-MCNC: 2.8 MG/DL (ref 2.3–4.7)
PO2 BLDA: 77 MMHG
POC BASE DEFICIT: 10.7 MMOL/L
POC HCO3: 35.7 MMOL/L
POC IONIZED CALCIUM: 1.25 MMOL/L (ref 1.12–1.23)
POC SATURATED O2: 96 %
POC TEMPERATURE: 37 C
POTASSIUM BLD-SCNC: 3.4 MMOL/L
POTASSIUM SERPL-SCNC: 4.4 MMOL/L (ref 3.5–5.1)
SODIUM BLD-SCNC: 132 MMOL/L (ref 137–145)
SODIUM SERPL-SCNC: 134 MMOL/L (ref 136–145)
SPECIMEN SOURCE: ABNORMAL
VANCOMYCIN TROUGH SERPL-MCNC: 31.4 UG/ML (ref 15–20)

## 2022-06-28 PROCEDURE — 99900031 HC PATIENT EDUCATION (STAT)

## 2022-06-28 PROCEDURE — 94003 VENT MGMT INPAT SUBQ DAY: CPT

## 2022-06-28 PROCEDURE — 99900026 HC AIRWAY MAINTENANCE (STAT)

## 2022-06-28 PROCEDURE — 25000003 PHARM REV CODE 250: Performed by: STUDENT IN AN ORGANIZED HEALTH CARE EDUCATION/TRAINING PROGRAM

## 2022-06-28 PROCEDURE — 27200966 HC CLOSED SUCTION SYSTEM

## 2022-06-28 PROCEDURE — 83735 ASSAY OF MAGNESIUM: CPT | Performed by: STUDENT IN AN ORGANIZED HEALTH CARE EDUCATION/TRAINING PROGRAM

## 2022-06-28 PROCEDURE — 36600 WITHDRAWAL OF ARTERIAL BLOOD: CPT

## 2022-06-28 PROCEDURE — 82803 BLOOD GASES ANY COMBINATION: CPT

## 2022-06-28 PROCEDURE — 20000000 HC ICU ROOM

## 2022-06-28 PROCEDURE — 63600175 PHARM REV CODE 636 W HCPCS: Performed by: STUDENT IN AN ORGANIZED HEALTH CARE EDUCATION/TRAINING PROGRAM

## 2022-06-28 PROCEDURE — 36415 COLL VENOUS BLD VENIPUNCTURE: CPT | Performed by: STUDENT IN AN ORGANIZED HEALTH CARE EDUCATION/TRAINING PROGRAM

## 2022-06-28 PROCEDURE — 80202 ASSAY OF VANCOMYCIN: CPT | Performed by: STUDENT IN AN ORGANIZED HEALTH CARE EDUCATION/TRAINING PROGRAM

## 2022-06-28 PROCEDURE — 84100 ASSAY OF PHOSPHORUS: CPT | Performed by: STUDENT IN AN ORGANIZED HEALTH CARE EDUCATION/TRAINING PROGRAM

## 2022-06-28 PROCEDURE — 80048 BASIC METABOLIC PNL TOTAL CA: CPT | Performed by: STUDENT IN AN ORGANIZED HEALTH CARE EDUCATION/TRAINING PROGRAM

## 2022-06-28 PROCEDURE — 99900035 HC TECH TIME PER 15 MIN (STAT)

## 2022-06-28 PROCEDURE — 25000003 PHARM REV CODE 250: Performed by: INTERNAL MEDICINE

## 2022-06-28 PROCEDURE — 94761 N-INVAS EAR/PLS OXIMETRY MLT: CPT

## 2022-06-28 PROCEDURE — 27000221 HC OXYGEN, UP TO 24 HOURS

## 2022-06-28 RX ORDER — DIPHENHYDRAMINE HCL 25 MG
25 CAPSULE ORAL NIGHTLY PRN
Status: DISCONTINUED | OUTPATIENT
Start: 2022-06-28 | End: 2022-07-07 | Stop reason: HOSPADM

## 2022-06-28 RX ORDER — IBUPROFEN 200 MG
1 TABLET ORAL DAILY
Status: DISCONTINUED | OUTPATIENT
Start: 2022-06-29 | End: 2022-07-07 | Stop reason: HOSPADM

## 2022-06-28 RX ADMIN — LEVETIRACETAM 1000 MG: 10 INJECTION INTRAVENOUS at 08:06

## 2022-06-28 RX ADMIN — DIPHENHYDRAMINE HYDROCHLORIDE 25 MG: 25 CAPSULE ORAL at 08:06

## 2022-06-28 RX ADMIN — FAMOTIDINE 20 MG: 10 INJECTION INTRAVENOUS at 08:06

## 2022-06-28 RX ADMIN — CHLORHEXIDINE GLUCONATE 0.12% ORAL RINSE 15 ML: 1.2 LIQUID ORAL at 12:06

## 2022-06-28 RX ADMIN — DOXYCYCLINE 100 MG: 100 INJECTION, POWDER, LYOPHILIZED, FOR SOLUTION INTRAVENOUS at 01:06

## 2022-06-28 RX ADMIN — DOXYCYCLINE 100 MG: 100 INJECTION, POWDER, LYOPHILIZED, FOR SOLUTION INTRAVENOUS at 10:06

## 2022-06-28 RX ADMIN — MUPIROCIN: 20 OINTMENT TOPICAL at 12:06

## 2022-06-28 RX ADMIN — ENOXAPARIN SODIUM 40 MG: 40 INJECTION SUBCUTANEOUS at 06:06

## 2022-06-28 RX ADMIN — VANCOMYCIN HYDROCHLORIDE 1000 MG: 1 INJECTION, POWDER, LYOPHILIZED, FOR SOLUTION INTRAVENOUS at 05:06

## 2022-06-28 RX ADMIN — CHLORHEXIDINE GLUCONATE 0.12% ORAL RINSE 15 ML: 1.2 LIQUID ORAL at 08:06

## 2022-06-28 NOTE — PLAN OF CARE
Problem: Adult Inpatient Plan of Care  Goal: Absence of Hospital-Acquired Illness or Injury  Outcome: Ongoing, Progressing  Goal: Optimal Comfort and Wellbeing  Outcome: Ongoing, Progressing     Problem: Skin Injury Risk Increased  Goal: Skin Health and Integrity  Outcome: Ongoing, Progressing     Problem: Impaired Wound Healing  Goal: Optimal Wound Healing  Outcome: Ongoing, Progressing     Problem: Fall Injury Risk  Goal: Absence of Fall and Fall-Related Injury  Outcome: Ongoing, Progressing     Problem: Communication Impairment (Mechanical Ventilation, Invasive)  Goal: Effective Communication  Outcome: Ongoing, Progressing

## 2022-06-28 NOTE — PROGRESS NOTES
"[facility] - 7th Floor ICU  Adult Nutrition  Progress Note    SUMMARY       Recommendations    Recommendation/Intervention: Tube feeding recommendation: Impact Peptide 1.5 @ 55ml/hr (goal rate)      Assessment and Plan    Nutrition Problem  Malnutrition    Related to (etiology):   Chronic condition    Signs and Symptoms (as evidenced by):   Wt loss, muscle wasting    Interventions(treatment strategy):  Modified composition of enteral nutrition, Modified rate of enteral nutrition and Collaboration with other providers    Nutrition Diagnosis Status:   New    Goals: Provides adequate nutrition to meet est needs.  Nutrition Goal Status: new  Communication of RD Recs: reviewed with RN    Malnutrition Assessment  Malnutrition Type: acute illness or injury (mild)  Weight Loss (Malnutrition): greater than 5% in 1 month (comparing previous admit wt to current wt.)  Energy Intake (Malnutrition): other (see comments) (unable to eval)  Subcutaneous Fat (Malnutrition): other (see comments) (does not meet criteria)  Muscle Mass (Malnutrition): mild depletion  Hand  Strength, Left (Malnutrition): unable to eval  Hand  Strength, Right (Malnutrition): unable to eval   [place] (Muscle Loss): mild depletion   Edema (Fluid Accumulation): 0-->no edema present     Reason for Assessment    Reason For Assessment: consult, new tube feeding    Diagnosis: 1. Type II respiratory failure requiring mechanical ventilation  2. Lactic Acidosis  3. Community-Acquired Pneumonia  4. 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 No kcal from meds.    Nutrition Risk Screen    Nutrition Risk Screen: tube feeding or parenteral nutrition    Nutrition/Diet History    Food Allergies: NKFA    Anthropometrics    Temp: 98.6 °F (37 °C)  Height: 5' 9.02" (175.3 cm)  Height (inches): 69.02 " in  Weight Method: Stated  Weight: 59 kg (130 lb)  Weight (lb): 130 lb  Ideal Body Weight (IBW), Male: 160.12 lb  % Ideal Body Weight, Male (lb): 81.25 %  BMI (Calculated): 19.2  BMI Grade: 18.5-24.9 - normal  Usual Body Weight (UBW), k.9 kg (Previous admit wt)  % Usual Body Weight: 84.54  % Weight Change From Usual Weight: -15.64 %     Lab/Procedures/Meds    Pertinent Labs Reviewed: reviewed  Pertinent Labs Comments:  Na 134, Glu 126  Pertinent Medications Reviewed: reviewed  Pertinent Medications Comments: LR @ 100ml/hr    Estimated/Assessed Needs    Weight Used For Calorie Calculations: 59 kg (130 lb 1.1 oz)  Energy Calorie Requirements (kcal): 1616kcal  Energy Need Method: [facility]  Protein Requirements: 89gm  Weight Used For Protein Calculations: 59 kg (130 lb 1.1 oz)  Estimated Fluid Requirement Method: RDA Method  RDA Method (mL): 1616     Nutrition Prescription Ordered    Current Diet Order: NPO    Evaluation of Received Nutrient/Fluid Intake    Energy Calories Required: not meeting needs  Protein Required: not meeting needs  % Intake of Estimated Energy Needs: 0 - 25 %  % Meal Intake: NPO    Nutrition Risk    Level of Risk/Frequency of Follow-up: high     Monitor and Evaluation    Food and Nutrient Intake: energy intake     Nutrition Follow-Up    RD Follow-up?: Yes

## 2022-06-28 NOTE — PROGRESS NOTES
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  Emergent tracheotomy was performed during prior hospitalization and PEG tube was eventually performed also.  Additionally was noted to have a right upper lobe endobronchial lesion on bronchoscopy which was biopsied however nondiagnostic in regards to pathology.  He is readmitted for hypoxic respiratory failure and imaging consistent with right upper lobe pneumonia, concern for postobstructive pneumonia in this setting.  Was started on broad-spectrum antibiotics, culture negative thus far.  Was on trach collar at home without any major issues prior to his pneumonia episode.      Interval History/Significant Events:  No major events noted overnight.  Patient reports he is feeling better.  Leukocytosis persists.  Respiratory cultures have not grown any pathogens.  He    Objective:     Vital Signs (Most Recent):  Temp: 98.6 °F (37 °C) (06/28/22 0400)  Pulse: 92 (06/28/22 0700)  Resp: (!) 22 (06/28/22 0700)  BP: 127/74 (06/28/22 0700)  SpO2: (!) 94 % (06/28/22 0700) Vital Signs (24h Range):  Temp:  [98.3 °F (36.8 °C)-98.6 °F (37 °C)] 98.6 °F (37 °C)  Pulse:  [] 92  Resp:  [16-25] 22  SpO2:  [90 %-100 %] 94 %  BP: ()/() 127/74  Arterial Line BP: ()/(45-52) 104/52     Weight: 59 kg (130 lb)  Body mass index is 19.19 kg/m².      Intake/Output Summary (Last 24 hours) at 6/28/2022 1009  Last data filed at 6/28/2022 0502  Gross per 24 hour   Intake 787 ml   Output 500 ml   Net 287 ml       Physical  Exam  GENERAL: NAD, A & 0 X 3, calm, trach in place  CARDIAC: RRR, NO m/g/r  PULM: CTA BL, No wheezing or rales  ABD: NT/ND/S. Bowel sounds heard.  Peg tube in place tolerating tube feeds  EXT: no cyanosis, clubbing, or edema, peripheral pulses intact  NEURO: no obvious neurosensory deficits, no dysarthria noted  PSYCH: no agitation or anxiety   EYES: tracks examiner around room, pupil reactive to light  NECK: trachea midline, no obvious JVD      Vents:  Vent Mode: PRVC SIMV (06/28/22 0450)  Ventilator Initiated: Yes (06/27/22 0020)  Set Rate: 22 BPM (06/28/22 0450)  Vt Set: 480 mL (06/28/22 0450)  Pressure Support: 10 cmH20 (06/28/22 0450)  PEEP/CPAP: 8 cmH20 (06/28/22 0450)  Oxygen Concentration (%): 30 (06/28/22 0450)  Peak Airway Pressure: 20 cmH2O (06/28/22 0450)  Total Ve: 9.5 mL (06/28/22 0450)  F/VT Ratio<105 (RSBI): (!) 50.34 (06/28/22 0450)    Lines/Drains/Airways     Drain  Duration                Gastrostomy/Enterostomy 06/14/22 1415 Percutaneous endoscopic gastrostomy (PEG) feeding 13 days          Airway  Duration                Surgical Airway 06/10/22 1354 Shiley Cuffed 17 days          Peripheral Intravenous Line  Duration                Peripheral IV - Single Lumen 06/19/22 1231 20 G Distal;Left;Posterior Forearm 8 days         Peripheral IV - Single Lumen 06/26/22 2100 18 G Right Antecubital 1 day                Significant Labs:    CBC/Anemia Profile:  Recent Labs   Lab 06/26/22  1713 06/26/22  1754   WBC 16.9* 16.4*   HGB 18.2* 17.3   HCT 56.2* 55.3*   * 431*   MCV 98.8* 102.0*   RDW 14.5 14.1        Chemistries:  Recent Labs   Lab 06/26/22  1847 06/27/22  0548 06/28/22  0648   * 137 134*   K 4.3 3.9 4.4   CO2 16* 31* 30*   BUN 6.2* 7.9* 7.7*   CREATININE 0.23* 0.50* 0.54*   CALCIUM 6.7* 9.6 9.2   ALBUMIN 0.6*  --   --    BILITOT 0.1  --   --    ALKPHOS 20*  --   --    ALT <5  --   --    AST 6  --   --    GLUCOSE 83 137* 126*   MG  --  1.80 1.80   PHOS  --  3.0 2.8          Significant Imaging:  I have reviewed all pertinent imaging results/findings within the past 24 hours.    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  Continue Zosyn for now.  Will transition to levofloxacin tomorrow and continue for a total of 7 days including antibiotics already received.  -will continue with attempts to get patient back to trach collar tomorrow.  For now continue with mechanical ventilation.  Continue with tube feeds.  Continue with trach care.  -still plan is for eventual ENT surgical intervention with biopsy of laryngeal mass.  This is not need to be done inpatient and once patient is stable enough for discharge home and they can follow-up with ENT with plan for eventual surgical intervention.  -will assume and a bronchial lesion and adenopathy on CT scan is the same pathological process as patient's laryngeal mass      Critical Care Time: 31 minutes       Critical care was time spent personally by me on the following activities: development of treatment plan with patient or surrogate and bedside caregivers, discussions with consultants, evaluation of patient's response to treatment, examination of patient, ordering and performing treatments and interventions, ordering and review of laboratory studies, ordering and review of radiographic studies, pulse oximetry, re-evaluation of patient's condition.  This critical care time did not overlap with that of any other provider or involve time for any procedures.     Bridger Jordan MD  Critical Care - Medicine  [facility] - 7th Floor ICU

## 2022-06-28 NOTE — PLAN OF CARE
Problem: Adult Inpatient Plan of Care  Goal: Plan of Care Review  Outcome: Ongoing, Progressing  Goal: Patient-Specific Goal (Individualized)  Outcome: Ongoing, Progressing  Goal: Absence of Hospital-Acquired Illness or Injury  Outcome: Ongoing, Progressing  Goal: Optimal Comfort and Wellbeing  Outcome: Ongoing, Progressing  Goal: Readiness for Transition of Care  Outcome: Ongoing, Progressing     Problem: Skin Injury Risk Increased  Goal: Skin Health and Integrity  Outcome: Ongoing, Progressing     Problem: Infection  Goal: Absence of Infection Signs and Symptoms  Outcome: Ongoing, Progressing     Problem: Impaired Wound Healing  Goal: Optimal Wound Healing  Outcome: Ongoing, Progressing     Problem: Fall Injury Risk  Goal: Absence of Fall and Fall-Related Injury  Outcome: Ongoing, Progressing     Problem: Fall Injury Risk  Goal: Absence of Fall and Fall-Related Injury  Outcome: Ongoing, Progressing     Problem: Communication Impairment (Mechanical Ventilation, Invasive)  Goal: Effective Communication  Outcome: Ongoing, Progressing     Problem: Device-Related Complication Risk (Mechanical Ventilation, Invasive)  Goal: Optimal Device Function  Outcome: Ongoing, Progressing     Problem: Inability to Wean (Mechanical Ventilation, Invasive)  Goal: Mechanical Ventilation Liberation  Outcome: Ongoing, Progressing     Problem: Nutrition Impairment (Mechanical Ventilation, Invasive)  Goal: Optimal Nutrition Delivery  Outcome: Ongoing, Progressing     Problem: Ventilator-Induced Lung Injury (Mechanical Ventilation, Invasive)  Goal: Absence of Ventilator-Induced Lung Injury  Outcome: Ongoing, Progressing     Problem: Skin and Tissue Injury (Mechanical Ventilation, Invasive)  Goal: Absence of Device-Related Skin and Tissue Injury  Outcome: Ongoing, Progressing     Problem: Communication Impairment (Artificial Airway)  Goal: Effective Communication  Outcome: Ongoing, Progressing     Problem: Device-Related Complication Risk  (Artificial Airway)  Goal: Optimal Device Function  Outcome: Ongoing, Progressing     Problem: Skin and Tissue Injury (Artificial Airway)  Goal: Absence of Device-Related Skin or Tissue Injury  Outcome: Ongoing, Progressing

## 2022-06-28 NOTE — PROGRESS NOTES
Pharmacokinetic Assessment Follow Up: IV Vancomycin    Vancomycin serum concentration assessment(s):    The trough level was drawn incorrectly and cannot be used to guide therapy at this time.    Vancomycin Regimen Plan:    Continue regimen to Vancomycin 1000 mg IV every 12 hours with next serum trough concentration measured at 1700 prior to next dose on 6/28    Drug levels (last 3 results):  Recent Labs   Lab Result Units 06/28/22  0648   Vancomycin Trough ug/ml 31.4*       Pharmacy will continue to follow and monitor vancomycin.    Please contact pharmacy at extension [phone] for questions regarding this assessment.    Thank you for the consult,   Natalio De La Rosa       Patient brief summary:  Josafat Boudreaux is a 56 y.o. male initiated on antimicrobial therapy with IV Vancomycin for treatment of PNA    The patient's current regimen is 1000 mg IV vancomycin every 12 hours    Drug Allergies:   Review of patient's allergies indicates:  No Known Allergies    Actual Body Weight:   59 kg    Renal Function:   Estimated Creatinine Clearance: 127.5 mL/min (A) (based on SCr of 0.54 mg/dL (L)).,     Dialysis Method (if applicable):  N/A    CBC (last 72 hours):  Recent Labs   Lab Result Units 06/26/22  1713 06/26/22  1754   WBC x10(3)/mcL 16.9* 16.4*   Hgb gm/dL 18.2* 17.3   Hct % 56.2* 55.3*   Platelet x10(3)/mcL 523* 431*   Mono % % 10.3 8.2   Eos % % 0.7 0.4   Basophil % % 1.0 1.1       Metabolic Panel (last 72 hours):  Recent Labs   Lab Result Units 06/26/22  1815 06/26/22  1847 06/26/22  2230 06/27/22  0548 06/28/22  0648   Sodium Level mmol/L  --  134*  --  137 134*   Potassium Level mmol/L  --  4.3  --  3.9 4.4   Chloride mmol/L  --  104  --  99 100   Carbon Dioxide mmol/L  --  16*  --  31* 30*   Glucose Level mg/dL  --  83  --  137* 126*   Glucose, UA mg/dL Negative  --  Negative  --   --    Blood Urea Nitrogen mg/dL  --  6.2*  --  7.9* 7.7*   Creatinine mg/dL  --  0.23*  --  0.50* 0.54*   Albumin Level gm/dL  --   0.6*  --   --   --    Bilirubin Total mg/dL  --  0.1  --   --   --    Alkaline Phosphatase unit/L  --  20*  --   --   --    Aspartate Aminotransferase unit/L  --  6  --   --   --    Alanine Aminotransferase unit/L  --  <5  --   --   --    Magnesium Level mg/dL  --   --   --  1.80 1.80   Phosphorus Level mg/dL  --   --   --  3.0 2.8       Vancomycin Administrations:  vancomycin given in the last 96 hours                     vancomycin in dextrose 5 % 1 gram/250 mL IVPB 1,000 mg (mg) 1,000 mg New Bag 06/28/22 0502     1,000 mg New Bag 06/27/22 1748     1,000 mg New Bag  0532    vancomycin (VANCOCIN) 2,000 mg in dextrose 5 % 500 mL IVPB (mg) 2,000 mg New Bag 06/26/22 1800                    Microbiologic Results:  Microbiology Results (last 7 days)       Procedure Component Value Units Date/Time    Respiratory Culture [[id number]] Collected: 06/26/22 2230    Order Status: Completed Specimen: Sputum from Endotracheal Aspirate Updated: 06/28/22 0648     Respiratory Culture No Growth At 24 Hours     GRAM STAIN Quality 3+      No bacteria seen     Blood culture x two cultures. Draw prior to antibiotics. [[id number]]  (Normal) Collected: 06/26/22 1754    Order Status: Completed Specimen: Blood Updated: 06/27/22 1903     CULTURE, BLOOD ([facility]) No Growth At 24 Hours    Blood culture x two cultures. Draw prior to antibiotics. [[id number]]  (Normal) Collected: 06/26/22 1802    Order Status: Completed Specimen: Blood Updated: 06/27/22 1903     CULTURE, BLOOD ([facility]) No Growth At 24 Hours

## 2022-06-29 LAB
ANION GAP SERPL CALC-SCNC: 4 MEQ/L
BACTERIA SPT CULT: NORMAL
BUN SERPL-MCNC: 8.7 MG/DL (ref 8.4–25.7)
CALCIUM SERPL-MCNC: 9.7 MG/DL (ref 8.4–10.2)
CHLORIDE SERPL-SCNC: 102 MMOL/L (ref 98–107)
CO2 SERPL-SCNC: 33 MMOL/L (ref 22–29)
CREAT SERPL-MCNC: 0.53 MG/DL (ref 0.73–1.18)
CREAT/UREA NIT SERPL: 16
GLUCOSE SERPL-MCNC: 122 MG/DL (ref 74–100)
GRAM STN SPEC: NORMAL
GRAM STN SPEC: NORMAL
MAGNESIUM SERPL-MCNC: 1.8 MG/DL (ref 1.6–2.6)
PHOSPHATE SERPL-MCNC: 3.6 MG/DL (ref 2.3–4.7)
POTASSIUM SERPL-SCNC: 3.6 MMOL/L (ref 3.5–5.1)
SODIUM SERPL-SCNC: 139 MMOL/L (ref 136–145)

## 2022-06-29 PROCEDURE — 63600175 PHARM REV CODE 636 W HCPCS: Performed by: STUDENT IN AN ORGANIZED HEALTH CARE EDUCATION/TRAINING PROGRAM

## 2022-06-29 PROCEDURE — 94761 N-INVAS EAR/PLS OXIMETRY MLT: CPT

## 2022-06-29 PROCEDURE — 84100 ASSAY OF PHOSPHORUS: CPT | Performed by: STUDENT IN AN ORGANIZED HEALTH CARE EDUCATION/TRAINING PROGRAM

## 2022-06-29 PROCEDURE — 25000003 PHARM REV CODE 250: Performed by: INTERNAL MEDICINE

## 2022-06-29 PROCEDURE — 99900035 HC TECH TIME PER 15 MIN (STAT)

## 2022-06-29 PROCEDURE — S4991 NICOTINE PATCH NONLEGEND: HCPCS | Performed by: STUDENT IN AN ORGANIZED HEALTH CARE EDUCATION/TRAINING PROGRAM

## 2022-06-29 PROCEDURE — 36415 COLL VENOUS BLD VENIPUNCTURE: CPT | Performed by: STUDENT IN AN ORGANIZED HEALTH CARE EDUCATION/TRAINING PROGRAM

## 2022-06-29 PROCEDURE — 99900026 HC AIRWAY MAINTENANCE (STAT)

## 2022-06-29 PROCEDURE — 80048 BASIC METABOLIC PNL TOTAL CA: CPT | Performed by: STUDENT IN AN ORGANIZED HEALTH CARE EDUCATION/TRAINING PROGRAM

## 2022-06-29 PROCEDURE — 20000000 HC ICU ROOM

## 2022-06-29 PROCEDURE — 92610 EVALUATE SWALLOWING FUNCTION: CPT

## 2022-06-29 PROCEDURE — 27000221 HC OXYGEN, UP TO 24 HOURS

## 2022-06-29 PROCEDURE — 27200966 HC CLOSED SUCTION SYSTEM

## 2022-06-29 PROCEDURE — 25000003 PHARM REV CODE 250: Performed by: STUDENT IN AN ORGANIZED HEALTH CARE EDUCATION/TRAINING PROGRAM

## 2022-06-29 PROCEDURE — 94003 VENT MGMT INPAT SUBQ DAY: CPT

## 2022-06-29 PROCEDURE — 83735 ASSAY OF MAGNESIUM: CPT | Performed by: STUDENT IN AN ORGANIZED HEALTH CARE EDUCATION/TRAINING PROGRAM

## 2022-06-29 RX ADMIN — DOXYCYCLINE 100 MG: 100 INJECTION, POWDER, LYOPHILIZED, FOR SOLUTION INTRAVENOUS at 11:06

## 2022-06-29 RX ADMIN — MUPIROCIN: 20 OINTMENT TOPICAL at 12:06

## 2022-06-29 RX ADMIN — DOXYCYCLINE 100 MG: 100 INJECTION, POWDER, LYOPHILIZED, FOR SOLUTION INTRAVENOUS at 12:06

## 2022-06-29 RX ADMIN — FAMOTIDINE 20 MG: 10 INJECTION INTRAVENOUS at 09:06

## 2022-06-29 RX ADMIN — DIPHENHYDRAMINE HYDROCHLORIDE 25 MG: 25 CAPSULE ORAL at 09:06

## 2022-06-29 RX ADMIN — LEVETIRACETAM 1000 MG: 10 INJECTION INTRAVENOUS at 08:06

## 2022-06-29 RX ADMIN — ENOXAPARIN SODIUM 40 MG: 40 INJECTION SUBCUTANEOUS at 04:06

## 2022-06-29 RX ADMIN — NICOTINE 1 PATCH: 21 PATCH, EXTENDED RELEASE TRANSDERMAL at 08:06

## 2022-06-29 RX ADMIN — CHLORHEXIDINE GLUCONATE 0.12% ORAL RINSE 15 ML: 1.2 LIQUID ORAL at 08:06

## 2022-06-29 NOTE — PLAN OF CARE
Problem: Adult Inpatient Plan of Care  Goal: Plan of Care Review  Outcome: Ongoing, Progressing  Goal: Patient-Specific Goal (Individualized)  Outcome: Ongoing, Progressing  Goal: Absence of Hospital-Acquired Illness or Injury  Outcome: Ongoing, Progressing  Goal: Optimal Comfort and Wellbeing  Outcome: Ongoing, Progressing  Goal: Readiness for Transition of Care  Outcome: Ongoing, Progressing     Problem: Skin Injury Risk Increased  Goal: Skin Health and Integrity  Outcome: Ongoing, Progressing     Problem: Infection  Goal: Absence of Infection Signs and Symptoms  Outcome: Ongoing, Progressing     Problem: Communication Impairment (Artificial Airway)  Goal: Effective Communication  Outcome: Ongoing, Progressing     Problem: Communication Impairment (Mechanical Ventilation, Invasive)  Goal: Effective Communication  Outcome: Ongoing, Progressing     Problem: Skin and Tissue Injury (Artificial Airway)  Goal: Absence of Device-Related Skin or Tissue Injury  Outcome: Ongoing, Progressing

## 2022-06-29 NOTE — PROGRESS NOTES
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  Emergent tracheotomy was performed during prior hospitalization and PEG tube was eventually performed also.  Additionally was noted to have a right upper lobe endobronchial lesion on bronchoscopy which was biopsied however nondiagnostic in regards to pathology.  He is readmitted for hypoxic respiratory failure and imaging consistent with right upper lobe pneumonia, concern for postobstructive pneumonia in this setting.  Was started on broad-spectrum antibiotics, culture negative thus far.  Was on trach collar at home without any major issues prior to his pneumonia episode.      Interval History/Significant Events:  No acute events overnight.  Patients family at bedside; report significant improvement.  Pt interactive and reports feeling well overall. Cultures Negative. Afebrile.     Objective:     Vital Signs (Most Recent):  Temp: 98.4 °F (36.9 °C) (06/29/22 0000)  Pulse: 93 (06/29/22 0500)  Resp: 20 (06/29/22 0500)  BP: 119/68 (06/29/22 0500)  SpO2: (!) 93 % (06/29/22 0500) Vital Signs (24h Range):  Temp:  [98.4 °F (36.9 °C)-98.9 °F (37.2 °C)] 98.4 °F (36.9 °C)  Pulse:  [] 93  Resp:  [13-25] 20  SpO2:  [93 %-100 %] 93 %  BP: (101-140)/(64-80) 119/68     Weight: 59 kg (130 lb)  Body mass index is 19.19 kg/m².      Intake/Output Summary (Last 24 hours) at 6/29/2022 0954  Last data filed at 6/29/2022 0800  Gross per 24 hour   Intake 556 ml   Output 2400 ml   Net -1844 ml       Physical Exam  GENERAL: NAD, A & 0 X 3,  calm, trach in place  CARDIAC: RRR  PULM: CTA BL, No wheezing   ABD: NT/ND/S. + Bowel sounds. Peg tube in place.  NEURO: no obvious neurosensory deficits  PSYCH: no agitation or anxiety   EYES: tracks examiner around room, pupil reactive to light  NECK: trachea midline, no obvious JVD      Vents:  Vent Mode: PRVC SIMV (06/29/22 0410)  Ventilator Initiated: Yes (06/27/22 0020)  Set Rate: 18 BPM (06/29/22 0410)  Vt Set: 480 mL (06/29/22 0410)  Pressure Support: 10 cmH20 (06/29/22 0410)  PEEP/CPAP: 8 cmH20 (06/29/22 0410)  Oxygen Concentration (%): 30 (06/29/22 0410)  Peak Airway Pressure: 22 cmH2O (06/29/22 0410)  Total Ve: 8.7 mL (06/29/22 0410)  F/VT Ratio<105 (RSBI): (!) 49.59 (06/29/22 0410)    Lines/Drains/Airways     Drain  Duration                Gastrostomy/Enterostomy 06/14/22 1415 Percutaneous endoscopic gastrostomy (PEG) feeding 14 days          Airway  Duration                Surgical Airway 06/10/22 1354 Shiley Cuffed 18 days          Peripheral Intravenous Line  Duration                Peripheral IV - Single Lumen 06/19/22 1231 20 G Distal;Left;Posterior Forearm 9 days         Peripheral IV - Single Lumen 06/26/22 2100 18 G Right Antecubital 2 days                Significant Labs:    CBC/Anemia Profile:  No results for input(s): WBC, HGB, HCT, PLT, MCV, RDW, IRON, FERRITIN, RETIC, FOLATE, [id number], OCCULTBLOOD in the last 48 hours.     Chemistries:  Recent Labs   Lab 06/28/22  0648 06/29/22  0217   * 139   K 4.4 3.6   CO2 30* 33*   BUN 7.7* 8.7   CREATININE 0.54* 0.53*   CALCIUM 9.2 9.7   GLUCOSE 126* 122*   MG 1.80 1.80   PHOS 2.8 3.6         Significant Imaging:  I have reviewed all pertinent imaging results/findings within the past 24 hours.    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 Continue for total 7 days.   - Currently on CPAP. Attempt transition to trach collar today.    - Discontinue Keppra  - Continue with tube feeds.    - Continue with trach care.  - Plan for ENT surgical intervention with biopsy of laryngeal mass outpatient .  - will assume bronchial lesion and adenopathy on CT scan is the same pathological process as patient's laryngeal mass      Critical Care Time: 31 minutes       Critical care was time spent personally by me on the following activities: development of treatment plan with patient or surrogate and bedside caregivers, discussions with consultants, evaluation of patient's response to treatment, examination of patient, ordering and performing treatments and interventions, ordering and review of laboratory studies, ordering and review of radiographic studies, pulse oximetry, re-evaluation of patient's condition.  This critical care time did not overlap with that of any other provider or involve time for any procedures.       Kelsey De Dios MD  Critical Care - Medicine  [facility] - 7th Floor ICU

## 2022-06-29 NOTE — PT/OT/SLP EVAL
Speech Language Pathology Department  Clinical Swallow Evaluation    Patient Name:  Josafat Boudreaux   MRN:  [id number]  Admitting Diagnosis: hypoxic respiratory failure likely secondary to postobstructive pneumonia    Recommendations:                 General Recommendations:  SLP follow up with nursing and pt x1 regarding tolerance of ice chips/sips of water and continued laryngectomy education  Diet recommendations:  NPO, ok for ice chips and small sips of water  Swallow Strategies/Precautions: medications via PEG  General Precautions: Standard, aspiration    History:     Past Medical History:   Diagnosis Date    COPD (chronic obstructive pulmonary disease)     Dysphagia     Hypertension     Vocal cord mass        Past Surgical History:   Procedure Laterality Date    HIP SURGERY      HUMERUS FRACTURE SURGERY      INSERT ARTERIAL LINE  6/26/2022         TRACHEOSTOMY N/A 6/10/2022    Procedure: CREATION, TRACHEOSTOMY;  Surgeon: Junior Alonso MD;  Location[facility];  Service: ENT;  Laterality: N/A;     Pt known to this clinician from previous admission at which time MBS completed and pt cleared for ice chips and small sips of water.  At that time, pt tolerated trials of puree solids with no clinical signs/sx aspiration.    Home Diet: PEG tube feedings  Current Method of Nutrition: Tube feeding (PEG)    Subjective     Patient awake, alert and oriented x4.    Patient goals: to eat/drink     Pain/Comfort: Pain Rating 1: 0/10    Respiratory Status: trach collar    Objective:     Oral Musculature Evaluation  · Oral Musculature: WFL  · Dentition: present and adequate  · Secretion Management: adequate  · Mucosal Quality: good  · Mandibular Strength and Mobility: WFL  · Oral Labial Strength and Mobility: WFL  · Lingual Strength and Mobility: WFL  · Voice Prior to PO Intake: aphonic due to laryngeal mass/tracheostomy    Consistency Fed By Oral Symptoms Pharyngeal Symptoms   Ice chips SLP None None   Thin liquid  by spoon SLP None None   Thin liquid by cup Self None None     Assessment:     Pt continues to present with dysphagia requiring nonoral means of nutrition.  Dysphagia tx is not indicated at this time as pt is scheduled for a laryngectomy on 7/5.  Pt to follow up with SLP post-operatively as an OP.    Plan:     Plan of Care reviewed with:  patient   SLP Follow-Up:  Yes x1    Time Tracking:     SLP Treatment Date:   06/29/22  Speech Start Time:  1500  Speech Stop Time:  1515     Speech Total Time (min):  15 min    Billable minutes:  Swallow and Oral Function Evaluation, 15 minutes     06/29/2022

## 2022-06-29 NOTE — PLAN OF CARE
Problem: Adult Inpatient Plan of Care  Goal: Plan of Care Review  Outcome: Ongoing, Progressing  Goal: Patient-Specific Goal (Individualized)  Outcome: Ongoing, Progressing  Goal: Absence of Hospital-Acquired Illness or Injury  Outcome: Ongoing, Progressing  Goal: Optimal Comfort and Wellbeing  Outcome: Ongoing, Progressing  Goal: Readiness for Transition of Care  Outcome: Ongoing, Progressing     Problem: Skin Injury Risk Increased  Goal: Skin Health and Integrity  Outcome: Ongoing, Progressing     Problem: Infection  Goal: Absence of Infection Signs and Symptoms  Outcome: Ongoing, Progressing     Problem: Impaired Wound Healing  Goal: Optimal Wound Healing  Outcome: Ongoing, Progressing     Problem: Fall Injury Risk  Goal: Absence of Fall and Fall-Related Injury  Outcome: Ongoing, Progressing     Problem: Communication Impairment (Mechanical Ventilation, Invasive)  Goal: Effective Communication  Outcome: Ongoing, Progressing     Problem: Device-Related Complication Risk (Mechanical Ventilation, Invasive)  Goal: Optimal Device Function  Outcome: Ongoing, Progressing     Problem: Inability to Wean (Mechanical Ventilation, Invasive)  Goal: Mechanical Ventilation Liberation  Outcome: Ongoing, Progressing     Problem: Nutrition Impairment (Mechanical Ventilation, Invasive)  Goal: Optimal Nutrition Delivery  Outcome: Ongoing, Progressing     Problem: Skin and Tissue Injury (Mechanical Ventilation, Invasive)  Goal: Absence of Device-Related Skin and Tissue Injury  Outcome: Ongoing, Progressing     Problem: Ventilator-Induced Lung Injury (Mechanical Ventilation, Invasive)  Goal: Absence of Ventilator-Induced Lung Injury  Outcome: Ongoing, Progressing     Problem: Communication Impairment (Artificial Airway)  Goal: Effective Communication  Outcome: Ongoing, Progressing     Problem: Device-Related Complication Risk (Artificial Airway)  Goal: Optimal Device Function  Outcome: Ongoing, Progressing     Problem: Skin and  Tissue Injury (Artificial Airway)  Goal: Absence of Device-Related Skin or Tissue Injury  Outcome: Ongoing, Progressing

## 2022-06-30 LAB
ANION GAP SERPL CALC-SCNC: 6 MEQ/L
BASOPHILS # BLD AUTO: 0.11 X10(3)/MCL (ref 0–0.2)
BASOPHILS NFR BLD AUTO: 1.2 %
BUN SERPL-MCNC: 9.8 MG/DL (ref 8.4–25.7)
CALCIUM SERPL-MCNC: 10.3 MG/DL (ref 8.4–10.2)
CHLORIDE SERPL-SCNC: 101 MMOL/L (ref 98–107)
CO2 SERPL-SCNC: 33 MMOL/L (ref 22–29)
CREAT SERPL-MCNC: 0.54 MG/DL (ref 0.73–1.18)
CREAT/UREA NIT SERPL: 18
EOSINOPHIL # BLD AUTO: 0.4 X10(3)/MCL (ref 0–0.9)
EOSINOPHIL NFR BLD AUTO: 4.3 %
ERYTHROCYTE [DISTWIDTH] IN BLOOD BY AUTOMATED COUNT: 13.9 % (ref 11.5–17)
GLUCOSE SERPL-MCNC: 116 MG/DL (ref 74–100)
HCT VFR BLD AUTO: 46.7 % (ref 42–52)
HGB BLD-MCNC: 14.7 GM/DL (ref 14–18)
IMM GRANULOCYTES # BLD AUTO: 0.05 X10(3)/MCL (ref 0–0.02)
IMM GRANULOCYTES NFR BLD AUTO: 0.5 % (ref 0–0.43)
LYMPHOCYTES # BLD AUTO: 1.71 X10(3)/MCL (ref 0.6–4.6)
LYMPHOCYTES NFR BLD AUTO: 18.5 %
MAGNESIUM SERPL-MCNC: 1.9 MG/DL (ref 1.6–2.6)
MCH RBC QN AUTO: 31 PG (ref 27–31)
MCHC RBC AUTO-ENTMCNC: 31.5 MG/DL (ref 33–36)
MCV RBC AUTO: 98.5 FL (ref 80–94)
MONOCYTES # BLD AUTO: 1.18 X10(3)/MCL (ref 0.1–1.3)
MONOCYTES NFR BLD AUTO: 12.8 %
NEUTROPHILS # BLD AUTO: 5.8 X10(3)/MCL (ref 2.1–9.2)
NEUTROPHILS NFR BLD AUTO: 62.7 %
NRBC BLD AUTO-RTO: 0 %
PHOSPHATE SERPL-MCNC: 3.6 MG/DL (ref 2.3–4.7)
PLATELET # BLD AUTO: 370 X10(3)/MCL (ref 130–400)
PMV BLD AUTO: 11 FL (ref 7.4–10.4)
POTASSIUM SERPL-SCNC: 3.8 MMOL/L (ref 3.5–5.1)
RBC # BLD AUTO: 4.74 X10(6)/MCL (ref 4.7–6.1)
SODIUM SERPL-SCNC: 140 MMOL/L (ref 136–145)
WBC # SPEC AUTO: 9.2 X10(3)/MCL (ref 4.5–11.5)

## 2022-06-30 PROCEDURE — 36415 COLL VENOUS BLD VENIPUNCTURE: CPT | Performed by: STUDENT IN AN ORGANIZED HEALTH CARE EDUCATION/TRAINING PROGRAM

## 2022-06-30 PROCEDURE — 85025 COMPLETE CBC W/AUTO DIFF WBC: CPT | Performed by: INTERNAL MEDICINE

## 2022-06-30 PROCEDURE — 83735 ASSAY OF MAGNESIUM: CPT | Performed by: STUDENT IN AN ORGANIZED HEALTH CARE EDUCATION/TRAINING PROGRAM

## 2022-06-30 PROCEDURE — 99223 1ST HOSP IP/OBS HIGH 75: CPT | Mod: ,,, | Performed by: INTERNAL MEDICINE

## 2022-06-30 PROCEDURE — 25000003 PHARM REV CODE 250: Performed by: STUDENT IN AN ORGANIZED HEALTH CARE EDUCATION/TRAINING PROGRAM

## 2022-06-30 PROCEDURE — S4991 NICOTINE PATCH NONLEGEND: HCPCS | Performed by: STUDENT IN AN ORGANIZED HEALTH CARE EDUCATION/TRAINING PROGRAM

## 2022-06-30 PROCEDURE — 97535 SELF CARE MNGMENT TRAINING: CPT

## 2022-06-30 PROCEDURE — 84100 ASSAY OF PHOSPHORUS: CPT | Performed by: STUDENT IN AN ORGANIZED HEALTH CARE EDUCATION/TRAINING PROGRAM

## 2022-06-30 PROCEDURE — 27000221 HC OXYGEN, UP TO 24 HOURS

## 2022-06-30 PROCEDURE — 99223 PR INITIAL HOSPITAL CARE,LEVL III: ICD-10-PCS | Mod: ,,, | Performed by: INTERNAL MEDICINE

## 2022-06-30 PROCEDURE — 80048 BASIC METABOLIC PNL TOTAL CA: CPT | Performed by: STUDENT IN AN ORGANIZED HEALTH CARE EDUCATION/TRAINING PROGRAM

## 2022-06-30 PROCEDURE — 20000000 HC ICU ROOM

## 2022-06-30 PROCEDURE — 94761 N-INVAS EAR/PLS OXIMETRY MLT: CPT

## 2022-06-30 RX ADMIN — FAMOTIDINE 20 MG: 10 INJECTION INTRAVENOUS at 08:06

## 2022-06-30 RX ADMIN — CHLORHEXIDINE GLUCONATE 0.12% ORAL RINSE 15 ML: 1.2 LIQUID ORAL at 08:06

## 2022-06-30 RX ADMIN — DOXYCYCLINE 100 MG: 100 INJECTION, POWDER, LYOPHILIZED, FOR SOLUTION INTRAVENOUS at 11:06

## 2022-06-30 RX ADMIN — NICOTINE 1 PATCH: 21 PATCH, EXTENDED RELEASE TRANSDERMAL at 08:06

## 2022-06-30 RX ADMIN — DIPHENHYDRAMINE HYDROCHLORIDE 25 MG: 25 CAPSULE ORAL at 08:06

## 2022-06-30 NOTE — PLAN OF CARE
Problem: Adult Inpatient Plan of Care  Goal: Plan of Care Review  6/30/2022 0314 by Junior Malloy RN  Outcome: Ongoing, Progressing  6/30/2022 0310 by Junior Malloy RN  Outcome: Ongoing, Progressing  Goal: Patient-Specific Goal (Individualized)  6/30/2022 0314 by Junior Malloy RN  Outcome: Ongoing, Progressing  6/30/2022 0310 by Junior Malloy RN  Outcome: Ongoing, Progressing  Goal: Absence of Hospital-Acquired Illness or Injury  6/30/2022 0314 by Junior Malloy RN  Outcome: Ongoing, Progressing  6/30/2022 0310 by Junior Malloy RN  Outcome: Ongoing, Progressing  Goal: Optimal Comfort and Wellbeing  6/30/2022 0314 by Junior Malloy RN  Outcome: Ongoing, Progressing  6/30/2022 0310 by Junior Malloy RN  Outcome: Ongoing, Progressing  Goal: Readiness for Transition of Care  6/30/2022 0314 by Junior Malloy RN  Outcome: Ongoing, Progressing  6/30/2022 0310 by Junior Malloy RN  Outcome: Ongoing, Progressing     Problem: Skin Injury Risk Increased  Goal: Skin Health and Integrity  6/30/2022 0314 by Junior Malloy RN  Outcome: Ongoing, Progressing  6/30/2022 0310 by Junior Malloy RN  Outcome: Ongoing, Progressing     Problem: Infection  Goal: Absence of Infection Signs and Symptoms  6/30/2022 0314 by Junior Malloy RN  Outcome: Ongoing, Progressing  6/30/2022 0310 by Junior Malloy RN  Outcome: Ongoing, Progressing     Problem: Impaired Wound Healing  Goal: Optimal Wound Healing  6/30/2022 0314 by Junior Malloy RN  Outcome: Ongoing, Progressing  6/30/2022 0310 by Junior Malloy RN  Outcome: Ongoing, Progressing     Problem: Fall Injury Risk  Goal: Absence of Fall and Fall-Related Injury  6/30/2022 0314 by Junior Malloy RN  Outcome: Ongoing, Progressing  6/30/2022 0310 by Junior Malloy RN  Outcome: Ongoing, Progressing     Problem: Communication Impairment (Mechanical Ventilation, Invasive)  Goal: Effective Communication  6/30/2022 0314 by Junior Malloy RN  Outcome: Ongoing, Progressing  6/30/2022 0310 by Junior  JESSENIA Malloy  Outcome: Ongoing, Progressing     Problem: Device-Related Complication Risk (Mechanical Ventilation, Invasive)  Goal: Optimal Device Function  6/30/2022 0314 by Junior Malloy RN  Outcome: Ongoing, Progressing  6/30/2022 0310 by Junior Malloy RN  Outcome: Ongoing, Progressing     Problem: Inability to Wean (Mechanical Ventilation, Invasive)  Goal: Mechanical Ventilation Liberation  6/30/2022 0314 by Junior Malloy RN  Outcome: Ongoing, Progressing  6/30/2022 0310 by Junior Malloy RN  Outcome: Ongoing, Progressing     Problem: Nutrition Impairment (Mechanical Ventilation, Invasive)  Goal: Optimal Nutrition Delivery  6/30/2022 0314 by Junior Malloy RN  Outcome: Ongoing, Progressing  6/30/2022 0310 by Junior Malloy RN  Outcome: Ongoing, Progressing     Problem: Skin and Tissue Injury (Mechanical Ventilation, Invasive)  Goal: Absence of Device-Related Skin and Tissue Injury  6/30/2022 0314 by Junior Malloy RN  Outcome: Ongoing, Progressing  6/30/2022 0310 by Junior Malloy RN  Outcome: Ongoing, Progressing     Problem: Ventilator-Induced Lung Injury (Mechanical Ventilation, Invasive)  Goal: Absence of Ventilator-Induced Lung Injury  6/30/2022 0314 by Junior Malloy RN  Outcome: Ongoing, Progressing  6/30/2022 0310 by Junior Malloy RN  Outcome: Ongoing, Progressing     Problem: Communication Impairment (Artificial Airway)  Goal: Effective Communication  6/30/2022 0314 by Junior Malloy RN  Outcome: Ongoing, Progressing  6/30/2022 0310 by Junior Malloy RN  Outcome: Ongoing, Progressing     Problem: Device-Related Complication Risk (Artificial Airway)  Goal: Optimal Device Function  6/30/2022 0314 by Junior Malloy RN  Outcome: Ongoing, Progressing  6/30/2022 0310 by Junior Malloy RN  Outcome: Ongoing, Progressing     Problem: Skin and Tissue Injury (Artificial Airway)  Goal: Absence of Device-Related Skin or Tissue Injury  6/30/2022 0314 by Junior Malloy RN  Outcome: Ongoing, Progressing  6/30/2022  0310 by Junior Malloy, RN  Outcome: Ongoing, Progressing

## 2022-06-30 NOTE — PROGRESS NOTES
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  Emergent tracheotomy was performed during prior hospitalization and PEG tube was eventually performed also.  Additionally was noted to have a right upper lobe endobronchial lesion on bronchoscopy which was biopsied however nondiagnostic in regards to pathology.  He is readmitted for hypoxic respiratory failure and imaging consistent with right upper lobe pneumonia, concern for postobstructive pneumonia in this setting.  Was started on broad-spectrum antibiotics, culture negative thus far.  Was on trach collar at home without any major issues prior to his pneumonia episode.      Interval History/Significant Events:  No major events noted overnight.  Trach collar last 24h without any issues.     Objective:     Vital Signs (Most Recent):  Temp: 98.4 °F (36.9 °C) (06/30/22 0400)  Pulse: 91 (06/30/22 0700)  Resp: (!) 29 (06/30/22 0700)  BP: 112/68 (06/30/22 0700)  SpO2: (!) 94 % (06/30/22 0700) Vital Signs (24h Range):  Temp:  [98.4 °F (36.9 °C)-99.1 °F (37.3 °C)] 98.4 °F (36.9 °C)  Pulse:  [] 91  Resp:  [13-36] 29  SpO2:  [89 %-98 %] 94 %  BP: (112-145)/(66-81) 112/68     Weight: 59 kg (130 lb)  Body mass index is 19.19 kg/m².      Intake/Output Summary (Last 24 hours) at 6/30/2022 0832  Last data filed at 6/30/2022 0600  Gross per 24 hour   Intake 855 ml   Output 1550 ml   Net -695 ml       Physical Exam  GENERAL: NAD, A & 0 X 3, calm, trach in place  CARDIAC: RRR, NO m/g/r  PULM: CTA BL, No wheezing or rales  ABD:  NT/ND/S. Bowel sounds heard.  Peg tube in place tolerating tube feeds  EXT: no cyanosis, clubbing, or edema, peripheral pulses intact  NEURO: no obvious neurosensory deficits, no dysarthria noted  PSYCH: no agitation or anxiety   EYES: tracks examiner around room, pupil reactive to light  NECK: trachea midline, no obvious JVD      Vents:  Vent Mode: CPAP PSV (06/29/22 0815)  Ventilator Initiated: Yes (06/27/22 0020)  Set Rate: 18 BPM (06/29/22 0410)  Vt Set: 480 mL (06/29/22 0410)  Pressure Support: 10 cmH20 (06/29/22 0815)  PEEP/CPAP: 8 cmH20 (06/29/22 0815)  Oxygen Concentration (%): 60 (06/30/22 0430)  Peak Airway Pressure: 19 cmH2O (06/29/22 0815)  Total Ve: 7.4 mL (06/29/22 0815)  F/VT Ratio<105 (RSBI): (!) 20.65 (06/29/22 0815)    Lines/Drains/Airways     Drain  Duration                Gastrostomy/Enterostomy 06/14/22 1415 Percutaneous endoscopic gastrostomy (PEG) feeding 15 days          Airway  Duration                Surgical Airway 06/10/22 1354 Shiley Cuffed 19 days          Peripheral Intravenous Line  Duration                Peripheral IV - Single Lumen 06/29/22 2146 20 G Anterior;Left Wrist <1 day                Significant Labs:    CBC/Anemia Profile:  Recent Labs   Lab 06/30/22  0142   WBC 9.2   HGB 14.7   HCT 46.7      MCV 98.5*   RDW 13.9        Chemistries:  Recent Labs   Lab 06/29/22  0217 06/30/22  0142    140   K 3.6 3.8   CO2 33* 33*   BUN 8.7 9.8   CREATININE 0.53* 0.54*   CALCIUM 9.7 10.3*   GLUCOSE 122* 116*   MG 1.80 1.90   PHOS 3.6 3.6         Significant Imaging:  I have reviewed all pertinent imaging results/findings within the past 24 hours.    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  Continue with trach care.  -still plan is for eventual ENT surgical intervention with biopsy of laryngeal mass.  This is not need to be done inpatient and once patient is stable enough for discharge home and they can follow-up with ENT with plan for eventual surgical intervention.  -will assume endobbronchial lesion and adenopathy on CT scan is the same pathological process as patient's laryngeal mass  -lovenox for dvt prophylaxis  -can downgrade to tele floor    Bridger Jordan MD  Critical Care - Medicine  [facility] - 7th Floor ICU

## 2022-06-30 NOTE — PT/OT/SLP DISCHARGE
Speech Language Pathology Department  Discharge Summary    Patient Name:  Josafat Boudreaux   MRN:  [id number]  Admitting Diagnosis: hypoxic respiratory failure likely secondary to postobstructive pneumonia    Recommendations:     General Recommendations:  Follow-up not indicated   Diet recommendations:  NPO, Liquid Diet Level: NPO   Aspiration Precautions: Medications via PEG, OK for ice chips and small sips of water as tolerated   General Precautions: Standard, aspiration   Communication strategies:  provide increased time to answer    Evaluations :     Clinical Swallow Evaluation: 6/29/2022    Assessment:     Pt presents with dysphagia requiring nonoral means of nutrition.    Patient Education:     Education provided to patient and spouse regarding safety of PO intake, continued home exercise program, and SLP POC following planned laryngectomy.  Pt's wife encouraged to call with any questions.    Time Tracking:     SLP Treatment Date:   06/30/22  Speech Start Time:  0930  Speech Stop Time:  0940     Speech Total Time (min):  10 min    Billable minutes:  Self Care/Home Management, 10 minutes       06/30/2022

## 2022-07-01 LAB
ANION GAP SERPL CALC-SCNC: 8 MEQ/L
BACTERIA BLD CULT: NORMAL
BACTERIA BLD CULT: NORMAL
BUN SERPL-MCNC: 14.3 MG/DL (ref 8.4–25.7)
CALCIUM SERPL-MCNC: 10.2 MG/DL (ref 8.4–10.2)
CHLORIDE SERPL-SCNC: 98 MMOL/L (ref 98–107)
CO2 SERPL-SCNC: 32 MMOL/L (ref 22–29)
CREAT SERPL-MCNC: 0.56 MG/DL (ref 0.73–1.18)
CREAT/UREA NIT SERPL: 26
GLUCOSE SERPL-MCNC: 122 MG/DL (ref 74–100)
MAGNESIUM SERPL-MCNC: 1.9 MG/DL (ref 1.6–2.6)
PHOSPHATE SERPL-MCNC: 4.1 MG/DL (ref 2.3–4.7)
POTASSIUM SERPL-SCNC: 4.8 MMOL/L (ref 3.5–5.1)
SODIUM SERPL-SCNC: 138 MMOL/L (ref 136–145)

## 2022-07-01 PROCEDURE — 36415 COLL VENOUS BLD VENIPUNCTURE: CPT | Performed by: STUDENT IN AN ORGANIZED HEALTH CARE EDUCATION/TRAINING PROGRAM

## 2022-07-01 PROCEDURE — 84100 ASSAY OF PHOSPHORUS: CPT | Performed by: STUDENT IN AN ORGANIZED HEALTH CARE EDUCATION/TRAINING PROGRAM

## 2022-07-01 PROCEDURE — 27000221 HC OXYGEN, UP TO 24 HOURS

## 2022-07-01 PROCEDURE — 25000003 PHARM REV CODE 250: Performed by: INTERNAL MEDICINE

## 2022-07-01 PROCEDURE — 25000003 PHARM REV CODE 250: Performed by: STUDENT IN AN ORGANIZED HEALTH CARE EDUCATION/TRAINING PROGRAM

## 2022-07-01 PROCEDURE — 99233 PR SUBSEQUENT HOSPITAL CARE,LEVL III: ICD-10-PCS | Mod: ,,, | Performed by: INTERNAL MEDICINE

## 2022-07-01 PROCEDURE — 97162 PT EVAL MOD COMPLEX 30 MIN: CPT

## 2022-07-01 PROCEDURE — 20000000 HC ICU ROOM

## 2022-07-01 PROCEDURE — S4991 NICOTINE PATCH NONLEGEND: HCPCS | Performed by: STUDENT IN AN ORGANIZED HEALTH CARE EDUCATION/TRAINING PROGRAM

## 2022-07-01 PROCEDURE — 80048 BASIC METABOLIC PNL TOTAL CA: CPT | Performed by: STUDENT IN AN ORGANIZED HEALTH CARE EDUCATION/TRAINING PROGRAM

## 2022-07-01 PROCEDURE — 99233 SBSQ HOSP IP/OBS HIGH 50: CPT | Mod: ,,, | Performed by: INTERNAL MEDICINE

## 2022-07-01 PROCEDURE — 94761 N-INVAS EAR/PLS OXIMETRY MLT: CPT

## 2022-07-01 PROCEDURE — 83735 ASSAY OF MAGNESIUM: CPT | Performed by: STUDENT IN AN ORGANIZED HEALTH CARE EDUCATION/TRAINING PROGRAM

## 2022-07-01 PROCEDURE — 99900035 HC TECH TIME PER 15 MIN (STAT)

## 2022-07-01 PROCEDURE — 63600175 PHARM REV CODE 636 W HCPCS: Performed by: STUDENT IN AN ORGANIZED HEALTH CARE EDUCATION/TRAINING PROGRAM

## 2022-07-01 RX ADMIN — CHLORHEXIDINE GLUCONATE 0.12% ORAL RINSE 15 ML: 1.2 LIQUID ORAL at 08:07

## 2022-07-01 RX ADMIN — FAMOTIDINE 20 MG: 10 INJECTION INTRAVENOUS at 09:07

## 2022-07-01 RX ADMIN — MUPIROCIN: 20 OINTMENT TOPICAL at 08:07

## 2022-07-01 RX ADMIN — DIPHENHYDRAMINE HYDROCHLORIDE 25 MG: 25 CAPSULE ORAL at 09:07

## 2022-07-01 RX ADMIN — NICOTINE 1 PATCH: 21 PATCH, EXTENDED RELEASE TRANSDERMAL at 08:07

## 2022-07-01 RX ADMIN — ENOXAPARIN SODIUM 40 MG: 40 INJECTION SUBCUTANEOUS at 06:07

## 2022-07-01 RX ADMIN — FAMOTIDINE 20 MG: 10 INJECTION INTRAVENOUS at 08:07

## 2022-07-01 RX ADMIN — DOXYCYCLINE 100 MG: 100 INJECTION, POWDER, LYOPHILIZED, FOR SOLUTION INTRAVENOUS at 12:07

## 2022-07-01 RX ADMIN — DOXYCYCLINE 100 MG: 100 INJECTION, POWDER, LYOPHILIZED, FOR SOLUTION INTRAVENOUS at 11:07

## 2022-07-01 NOTE — PROGRESS NOTES
Progress Note  Hospital Medicine    Patient Name: Josafat Boudreaux  YOB: 1965    Admit Date: 6/26/2022                     LOS: 5    SUBJECTIVE:     Reason for Admission:  <principal problem not specified>  See H&P for detailed presentating history and ROS.      Interval history:  56-year-old male who last week underwent trach went back home, developed some hypoxemia and was brought to the hospital by ambulance, and is expecting to have surgery on Tuesday by Dr. Emory sahni.  Patient already being downgraded the pending on a regular room.  Case discussed with the nurse Ms. Allen and there is no acute concerns with him apparently he will complete vancomycin and continues diabetes treatment.  Patient tolerating well tube feeding, physical therapy will get involved today with him.  I will be rounding through the holiday this week in a in a prone is expected to be discharged home on the and to have surgery on Tuesday      OBJECTIVE:     Vital Signs Range (Last 24H):  Temp:  [98.1 °F (36.7 °C)-99 °F (37.2 °C)]   Pulse:  []   Resp:  [9-30]   BP: (109-150)/(64-87)   SpO2:  [91 %-98 %] Body mass index is 19.19 kg/m².  Wt Readings from Last 3 Encounters:   06/28/22 1001 59 kg (130 lb)   06/26/22 1634 59 kg (130 lb)   06/25/22 0634 70.3 kg (155 lb)   06/10/22 0942 68 kg (150 lb)       I & O (Last 24H):    Intake/Output Summary (Last 24 hours) at 7/1/2022 0922  Last data filed at 7/1/2022 0822  Gross per 24 hour   Intake 2032 ml   Output 1650 ml   Net 382 ml       Physical Exam:  Patient alert awake in bed in no obvious distress in the company of his wife and family member.  Vital signs reviewed all within normal limits saturating at 93%  Trach site looks clean, patient unable to talk due to the recent trach  Heart is slightly tachy no murmurs  Lungs diffuse rhonchi bilaterally on anterior and posterior approach  Abdomen is peg site looks clean bowel sounds are positive no  tenderness  Extremities no clubbing cyanosis or edema with full range of motion    Diagnostic Results:  Lab Results   Component Value Date    WBC 9.2 06/30/2022    HGB 14.7 06/30/2022    HCT 46.7 06/30/2022    MCV 98.5 (H) 06/30/2022     06/30/2022     Recent Labs   Lab 07/01/22  0548      K 4.8   CO2 32*   BUN 14.3   CREATININE 0.56*   CALCIUM 10.2   MG 1.90     Lab Results   Component Value Date    INR 0.99 06/26/2022    INR 0.99 06/25/2022    INR 0.9 03/23/2022    PROTIME 13.0 06/26/2022    PROTIME 13.0 06/25/2022    PROTIME 12.3 03/23/2022     No results found for: HGBA1C  No results for input(s): POCTGLUCOSE in the last 72 hours.    ASSESSMENT/PLAN:     Active Hospital Problems    Diagnosis  POA    New onset seizure [R56.9]  No      Resolved Hospital Problems   No resolved problems to display.        Problems Addressed Today:    New onset seizure  Await EEG results  Continue keppra 1 gm BID  Seizure precautions  Antimicrobial management per primary team          DISCHARGE PLANNING:       Signing Physician:  Heraclio Garcia MD

## 2022-07-01 NOTE — PLAN OF CARE
Problem: Physical Therapy  Goal: Physical Therapy Goal  Description: Goals to be met by: 2022    Patient will increase functional independence with mobility by performin. Sit to stand transfer with [place].  2. Gait  x 200 feet with Modified [place] using LRAD.   3. Patient will ascend/descend 4 steps with Modified [place] using LRAD.    2022 1450 by Marely Johnson, GUERO  Outcome: Ongoing, Progressing

## 2022-07-01 NOTE — PT/OT/SLP EVAL
Physical Therapy Evaluation    Patient Name:  Josafat Boudreaux   MRN:  [id number]    Recommendations:     Discharge Recommendations:  home, home with home health (family care)   Discharge Equipment Recommendations:  (TBD)   Barriers to discharge:  Impaired functional mobility tolerance    Assessment:     Josafat Boudreaux is a 56 y.o. male admitted with a medical diagnosis of new onset seizure, hypoxic respiratory failure s/p trach & PEG placement.  He presents with the following impairments/functional limitations:  impaired endurance, impaired functional mobilty.    Rehab Prognosis: Good; patient would benefit from acute skilled PT services to address these deficits and reach maximum level of function.    Recent Surgery: s/p trach & PEG  Plan:     During this hospitalization, patient to be seen daily to address the identified rehab impairments via gait training, therapeutic activities, therapeutic exercises, neuromuscular re-education and progress toward the following goals:    Plan of Care Expires:  7/22/22    Subjective     Chief Complaint: none stated  Patient/Family Comments/goals: return to PLOF  Pain/Comfort:  Pain Rating 1: 0/10  Pain Rating 2: 0/10    Patients cultural, spiritual, Judaism conflicts given the current situation: no    Living Environment:  Patient lives with wife in [facility] with 4 steps to enter.  Prior to admission, patients level of function was independent with ADLs.  Equipment used at home: walker, rolling, bedside commode, shower chair, oxygen, suction machine.  DME owned (not currently used): none.  Upon discharge, patient will have assistance from wife and daughter.    Objective:     Communicated with RN prior to session.  Patient found HOB elevated with PEG Tube, oxygen, peripheral IV, pulse ox (continuous), telemetry, Tracheostomy, SCD, blood pressure cuff  upon PT entry to room.    General Precautions: Standard,     Orthopedic Precautions:N/A   Braces: N/A  Respiratory Status:  High flow [id number] L/min, concentration 60% trach collar at rest. **respiratory therapist present to assist in transitioning pt to oxytrach mask for ambulation** Pt on 8L via O2 tank w/ oxytrach mask on to walk.    Exams:  RLE ROM: WNL  RLE Strength: grossly 5/5  LLE ROM: WNL  LLE Strength: grossly 5/5    Functional Mobility:  Bed Mobility:     Supine to Sit: minimum assistance  Transfers:     Sit to Stand:  stand by assistance with rolling walker  Gait: Patient ambulated with decreased gait speed and decreased bilateral step length. He ambulated a total of 200 feet with RW and CGA-Marilou.      AM-PAC 6 CLICK MOBILITY  Total Score:22     Patient left HOB elevated with all lines intact and call button in reach.    GOALS:   Multidisciplinary Problems       Physical Therapy Goals          Problem: Physical Therapy    Goal Priority Disciplines Outcome Goal Variances Interventions   Physical Therapy Goal     PT, PT/OT Ongoing, Progressing     Description: Goals to be met by: 2022    Patient will increase functional independence with mobility by performin. Sit to stand transfer with [place].  2. Gait  x 200 feet with Modified [place] using LRAD.   3. Patient will ascend/descend 4 steps with Modified [place] using LRAD.                         History:     Past Medical History:   Diagnosis Date    COPD (chronic obstructive pulmonary disease)     Dysphagia     Hypertension     Vocal cord mass        Past Surgical History:   Procedure Laterality Date    HIP SURGERY      HUMERUS FRACTURE SURGERY      INSERT ARTERIAL LINE  2022         TRACHEOSTOMY N/A 6/10/2022    Procedure: CREATION, TRACHEOSTOMY;  Surgeon: Junior Alonso MD;  Location: [facility];  Service: ENT;  Laterality: N/A;       Time Tracking:     PT Received On: 22  PT Start Time: 956     PT Stop Time:   PT Total Time (min): 39 min     Billable Minutes: Evaluation x 39 minutes; mod complexity      2022

## 2022-07-01 NOTE — PLAN OF CARE
Problem: Physical Therapy  Goal: Physical Therapy Goal  Description: Goals to be met by: 2022    Patient will increase functional independence with mobility by performin. Sit to stand transfer with Modified [place]  2. Gait  x 200 feet with Modified [place] using Rolling Walker.     Outcome: Ongoing, Progressing

## 2022-07-01 NOTE — PROGRESS NOTES
[facility] - 7th Floor ICU  Adult Nutrition  Progress Note    SUMMARY       Recommendations    Recommendation/Intervention: Tube feeding recommendation: Impact Peptide 1.5 @ 65ml/hr (goal rate)  Provides:  1950kcal (106% est needs)  121gm protein (136% est needs)  990ml free water     Will need additional ~40ml/hr free water flushes to meet est fluid needs. (Total free water: 1790ml /98% est needs)      Assessment and Plan    Nutrition Problem  Malnutrition    Related to (etiology):   Chronic condition    Signs and Symptoms (as evidenced by):   Wt loss, muscle wasting    Interventions(treatment strategy):  Modified composition of enteral nutrition, Modified rate of enteral nutrition and Collaboration with other providers    Nutrition Diagnosis Status:   Continues    Goals: Provides adequate nutrition to meet est needs.  Nutrition Goal Status: Continues  Communication of RD Recs: reviewed with RN    Malnutrition Assessment  Malnutrition Type: acute illness or injury (mild)  Weight Loss (Malnutrition): greater than 5% in 1 month (comparing previous admit wt to current wt.)  Energy Intake (Malnutrition): other (see comments) (unable to eval)  Subcutaneous Fat (Malnutrition): other (see comments) (does not meet criteria)  Muscle Mass (Malnutrition): mild depletion  Hand  Strength, Left (Malnutrition): unable to eval  Hand  Strength, Right (Malnutrition): unable to eval   [place] (Muscle Loss): mild depletion   Edema (Fluid Accumulation): 0-->no edema present     Reason for Assessment    Reason For Assessment: consult, new tube feeding    Diagnosis: 1. Type II respiratory failure requiring mechanical ventilation  2. Lactic Acidosis  3. Community-Acquired Pneumonia  4. 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 No kcal from meds.    7/1/22: Pt now  "off vent. Updated est needs. Tolerating at current rate per RN. Noted partial thickness wound (coccyx).    Nutrition Risk Screen    Nutrition Risk Screen: tube feeding or parenteral nutrition    Nutrition/Diet History    Spiritual, Cultural Beliefs, Protestant Practices, Values that Affect Care: no  Food Allergies: NKFA    Anthropometrics    Temp: 98.1 °F (36.7 °C)  Height: 5' 9.02" (175.3 cm)  Height (inches): 69.02 in  Weight Method: Stated  Weight: 59 kg (130 lb)  Weight (lb): 130 lb  Ideal Body Weight (IBW), Male: 160.12 lb  % Ideal Body Weight, Male (lb): 81.25 %  BMI (Calculated): 19.2  BMI Grade: 18.5-24.9 - normal  Usual Body Weight (UBW), k.9 kg (Previous admit wt)  % Usual Body Weight: 84.54  % Weight Change From Usual Weight: -15.64 %        Lab/Procedures/Meds    Pertinent Labs Reviewed: reviewed  Pertinent Labs Comments:  Glu 122  Pertinent Medications Reviewed: reviewed  Pertinent Medications Comments: noted    Estimated/Assessed Needs    Weight Used For Calorie Calculations: 59 kg (130 lb 1.1 oz)  Energy Calorie Requirements (kcal): 1833kcal  Energy Need Method: [place]  Protein Requirements: 89gm (1.5g/kg)  Weight Used For Protein Calculations: 59 kg (130 lb 1.1 oz)  Estimated Fluid Requirement Method: RDA Method  RDA Method (mL): 1833     Nutrition Prescription Ordered    Current Diet Order: NPO  Current Nutrition Support Formula Ordered: Impact Peptide 1.5  Current Nutrition Support Rate Ordered: 55 (ml)    Evaluation of Received Nutrient/Fluid Intake    Enteral Calories (kcal): 1650  Enteral Protein (gm): 102.3  Enteral (Free Water) Fluid (mL): 836  % Kcal Needs: 90%  % Protein Needs: 115%  Energy Calories Required: meeting needs  Protein Required: meeting needs  % Intake of Estimated Energy Needs: 75 - 100 %  % Meal Intake: NPO    Nutrition Risk    Level of Risk/Frequency of Follow-up: high     Monitor and Evaluation    Food and Nutrient Intake: energy intake     Nutrition " Follow-Up    RD Follow-up?: Yes

## 2022-07-01 NOTE — H&P
[facility]                       [address] [id number]    PATIENT NAME:       ABILIO MATUTE  YOB: 1965  CSN:                [id number]   MRN:                [id number]  ADMIT DATE:         06/26/2022 17:04:00  PHYSICIAN:          Tony Bertrand MD                        HISTORY AND PHYSICAL      CHIEF COMPLAINT:  Admitted for respiratory failure and seizure, now down graded   from ICU to floor and being transferred to my service.    HISTORY OF PRESENT ILLNESS:  The patient is a 56-year-old man not very well   known to me, but I did see him nearly 3 years ago in the office once.  He was a   new patient to be established.  He was medically stable at that time, but I did   recommend that he quit smoking and limit his alcohol intake.  At any rate, he   was lost to followup until recently when he was moved out of the ICU.  He was   admitted a few weeks ago for respiratory failure related to a recently diagnosed   laryngeal mass.  There was also a right upper lobe lung lesion that was   eventually biopsied but the pathology was nondiagnostic.  The patient was   scheduled for outpatient biopsy of the lung lesion by Dr. Emory Alonso, Sr.,   and that is actually scheduled for July 5th.  He developed respiratory failure   and was admitted about 3 weeks ago, and was discharged home for a few days with   a trach, but had another episode of respiratory failure and required   readmission.  On that readmission, he also had an apparent grand mal seizure   that was witnessed, but without evidence of recurrence.  He was found to have a   right upper lobe pneumonia and is on day 4 of 7 days of IV antibiotics for that   condition.      Currently, he is awake and alert and has a trach and has no complaints.    CURRENT MEDICATIONS:    1. DuoNeb treatments q.4 hours.   2. Doxycycline 100 IV q.12.   3. Lovenox  40 q.24 hours.   4. Pepcid 20 q.12 IV.   5. Nicotine patch 21 mg daily.     He was on Keppra intravenously, that has been stopped.  He also was on   vancomycin for a period of time and Zosyn, but was later switched to vibramycin.    He was being treated by the intensive care service.    ALLERGIES:  NONE KNOWN.     PAST MEDICAL HISTORY:  Largely unremarkable except for some orthopedic problems   including fractures and surgical repairs.  He denies a history of hypertension.    The chart mentions COPD, but I think that has been unquantitated.  There is no   history of seizure disorder.    SOCIAL HISTORY:  He was drinking about 4 beers a day, but has not had a drink in   probably 3 weeks.  He was smoking only a few cigarettes per day at time of   admission.    FAMILY HISTORY:  Not available.    REVIEW OF SYSTEMS:  Essentially not available.  He is only able to nod yes or no.  He is certainly   in no distress and not having any pain.  No shortness of breath either.    PHYSICAL EXAMINATION:  VITAL SIGNS:  He is afebrile.  Blood pressure 112/68, heart rate 100,   respiratory rate 18, O2 sat 96%.   GENERAL:  He is in no distress.  He is of average build.  He is alert and   appropriate.   HEENT:  Grossly unremarkable.   NECK:  Trach in place.   HEART:  Regular rate and rhythm.  Again the rate is borderline fast.   LUNGS:  Mild diffuse rhonchi.   ABDOMEN:  Nontender without mass or hepatosplenomegaly.   EXTREMITIES:  Without clubbing, cyanosis or edema.  Foot pulses are palpable.    LABORATORY STUDIES:  Include an essentially normal CBC now.  His white count and   H and H were elevated on admission.  Chemistry profile is notable for a mildly   elevated blood sugar at 116, calcium slightly high at 10.3 but prior levels had   been okay.    IMAGING:  Chest x-ray:  Reveals a mass in the right upper lobe and also some new   infiltrates compared to x-rays done a couple of weeks ago.      CTA head and neck:  Revealed no significant  abnormality.  There was evidence of   mild carotid stenosis.  That was at the origin of the left proximal internal   carotid artery.  Also consolidation of right upper lobe was noted.      EEG:  Showed no specific findings.    IMPRESSION:    1. Status post respiratory failure.  This has occurred twice related to   obstruction of his larynx.  He appears to be stable now.   2. Right upper lobe pneumonia.  Perhaps aspiration.  Perhaps postobstructive   related to tumor.   3. Seizure disorder.   4. Probable chronic obstructive pulmonary disease.   5. History of ethanol excess.  Sounds like he has been off alcohol at least 3   weeks.   6. Status post percutaneous endoscopic gastrostomy placement for nutritional   purposes.   7. Mild carotid stenosis.    PLAN:  Will continue with current meds.  Will follow Dr. Arguello's advice for   continuing Keppra 500 b.i.d. and for him not to drive for the near future.  Will   complete 7 days of antibiotic therapy and likely discharge him at the end of   that time so that he can get his outpatient surgical center tracheolaryngeal   biopsy done.        ______________________________  Tony Bertrand MD    MSA/AQS  DD:  06/30/2022  Time:  06:56PM  DT:  06/30/2022  Time:  09:55PM  Job #:  [id number]      HISTORY AND PHYSICAL

## 2022-07-02 LAB
ANION GAP SERPL CALC-SCNC: 4 MEQ/L
BUN SERPL-MCNC: 18.9 MG/DL (ref 8.4–25.7)
CALCIUM SERPL-MCNC: 10.5 MG/DL (ref 8.4–10.2)
CHLORIDE SERPL-SCNC: 95 MMOL/L (ref 98–107)
CO2 SERPL-SCNC: 37 MMOL/L (ref 22–29)
CREAT SERPL-MCNC: 0.59 MG/DL (ref 0.73–1.18)
CREAT/UREA NIT SERPL: 32
GLUCOSE SERPL-MCNC: 111 MG/DL (ref 74–100)
MAGNESIUM SERPL-MCNC: 2 MG/DL (ref 1.6–2.6)
PHOSPHATE SERPL-MCNC: 3.6 MG/DL (ref 2.3–4.7)
POTASSIUM SERPL-SCNC: 4.7 MMOL/L (ref 3.5–5.1)
SODIUM SERPL-SCNC: 136 MMOL/L (ref 136–145)

## 2022-07-02 PROCEDURE — 99232 SBSQ HOSP IP/OBS MODERATE 35: CPT | Mod: ,,, | Performed by: INTERNAL MEDICINE

## 2022-07-02 PROCEDURE — 84100 ASSAY OF PHOSPHORUS: CPT | Performed by: STUDENT IN AN ORGANIZED HEALTH CARE EDUCATION/TRAINING PROGRAM

## 2022-07-02 PROCEDURE — 99900026 HC AIRWAY MAINTENANCE (STAT)

## 2022-07-02 PROCEDURE — S4991 NICOTINE PATCH NONLEGEND: HCPCS | Performed by: STUDENT IN AN ORGANIZED HEALTH CARE EDUCATION/TRAINING PROGRAM

## 2022-07-02 PROCEDURE — 25000003 PHARM REV CODE 250: Performed by: STUDENT IN AN ORGANIZED HEALTH CARE EDUCATION/TRAINING PROGRAM

## 2022-07-02 PROCEDURE — 63600175 PHARM REV CODE 636 W HCPCS: Performed by: STUDENT IN AN ORGANIZED HEALTH CARE EDUCATION/TRAINING PROGRAM

## 2022-07-02 PROCEDURE — 94761 N-INVAS EAR/PLS OXIMETRY MLT: CPT

## 2022-07-02 PROCEDURE — 80048 BASIC METABOLIC PNL TOTAL CA: CPT | Performed by: STUDENT IN AN ORGANIZED HEALTH CARE EDUCATION/TRAINING PROGRAM

## 2022-07-02 PROCEDURE — 99232 PR SUBSEQUENT HOSPITAL CARE,LEVL II: ICD-10-PCS | Mod: ,,, | Performed by: INTERNAL MEDICINE

## 2022-07-02 PROCEDURE — 36415 COLL VENOUS BLD VENIPUNCTURE: CPT | Performed by: STUDENT IN AN ORGANIZED HEALTH CARE EDUCATION/TRAINING PROGRAM

## 2022-07-02 PROCEDURE — 11000001 HC ACUTE MED/SURG PRIVATE ROOM

## 2022-07-02 PROCEDURE — 83735 ASSAY OF MAGNESIUM: CPT | Performed by: STUDENT IN AN ORGANIZED HEALTH CARE EDUCATION/TRAINING PROGRAM

## 2022-07-02 PROCEDURE — 27000221 HC OXYGEN, UP TO 24 HOURS

## 2022-07-02 RX ORDER — ACETAMINOPHEN 325 MG/1
650 TABLET ORAL EVERY 6 HOURS PRN
Status: DISCONTINUED | OUTPATIENT
Start: 2022-07-02 | End: 2022-07-07 | Stop reason: HOSPADM

## 2022-07-02 RX ADMIN — CHLORHEXIDINE GLUCONATE 0.12% ORAL RINSE 15 ML: 1.2 LIQUID ORAL at 08:07

## 2022-07-02 RX ADMIN — ENOXAPARIN SODIUM 40 MG: 40 INJECTION SUBCUTANEOUS at 05:07

## 2022-07-02 RX ADMIN — DOXYCYCLINE 100 MG: 100 INJECTION, POWDER, LYOPHILIZED, FOR SOLUTION INTRAVENOUS at 11:07

## 2022-07-02 RX ADMIN — DIPHENHYDRAMINE HYDROCHLORIDE 25 MG: 25 CAPSULE ORAL at 08:07

## 2022-07-02 RX ADMIN — FAMOTIDINE 20 MG: 10 INJECTION INTRAVENOUS at 08:07

## 2022-07-02 RX ADMIN — NICOTINE 1 PATCH: 21 PATCH, EXTENDED RELEASE TRANSDERMAL at 08:07

## 2022-07-02 RX ADMIN — MUPIROCIN: 20 OINTMENT TOPICAL at 09:07

## 2022-07-02 NOTE — PROGRESS NOTES
Progress Note  Hospital Medicine    Patient Name: Josafat Boudreaux  YOB: 1965    Admit Date: 6/26/2022                     LOS: 6    SUBJECTIVE:     Reason for Admission:  <principal problem not specified>  See H&P for detailed presentating history and ROS.      Interval history:  Patient pending on surgery by ENT, at this moment patient has been downgraded to room 9 all 7. Other than constipation no other major concerns referred by the wife with the patient or the nurse Ms. Allen      OBJECTIVE:     Vital Signs Range (Last 24H):  Temp:  [98.1 °F (36.7 °C)-98.6 °F (37 °C)]   Pulse:  []   Resp:  [12-34]   BP: (112-124)/(59-73)   SpO2:  [90 %-98 %] Body mass index is 19.19 kg/m².  Wt Readings from Last 3 Encounters:   06/28/22 1001 59 kg (130 lb)   06/26/22 1634 59 kg (130 lb)   06/25/22 0634 70.3 kg (155 lb)   06/10/22 0942 68 kg (150 lb)       I & O (Last 24H):    Intake/Output Summary (Last 24 hours) at 7/2/2022 1142  Last data filed at 7/2/2022 0600  Gross per 24 hour   Intake 3068 ml   Output 1100 ml   Net 1968 ml       Physical Exam:  Patient sitting in the recliner in the company of his wife  No obvious respiratory distress  Patient alert follow commands answers all questions appropriately  HEENT trach site looks clean  Heart distant heart sounds regular rhythm  Lungs diffuse rhonchi bilaterally with prolonged expiratory phase  Abdomen peg site looks clean bowel sounds are positive  Extremities no clubbing cyanosis or edema    Diagnostic Results:  Lab Results   Component Value Date    WBC 9.2 06/30/2022    HGB 14.7 06/30/2022    HCT 46.7 06/30/2022    MCV 98.5 (H) 06/30/2022     06/30/2022     Recent Labs   Lab 07/02/22  0158      K 4.7   CO2 37*   BUN 18.9   CREATININE 0.59*   CALCIUM 10.5*   MG 2.00     Lab Results   Component Value Date    INR 0.99 06/26/2022    INR 0.99 06/25/2022    INR 0.9 03/23/2022    PROTIME 13.0 06/26/2022    PROTIME 13.0 06/25/2022    PROTIME  12.3 03/23/2022     No results found for: HGBA1C  No results for input(s): POCTGLUCOSE in the last 72 hours.    ASSESSMENT/PLAN:     Active Hospital Problems    Diagnosis  POA    New onset seizure [R56.9]  No      Resolved Hospital Problems   No resolved problems to display.        Problems Addressed Today:    New onset seizure  Await EEG results  Continue keppra 1 gm BID  Seizure precautions  Antimicrobial management per primary team          DISCHARGE PLANNING:       Signing Physician:  Heraclio Garcia MD

## 2022-07-03 LAB
ANION GAP SERPL CALC-SCNC: 7 MEQ/L
BUN SERPL-MCNC: 21.6 MG/DL (ref 8.4–25.7)
CALCIUM SERPL-MCNC: 10.8 MG/DL (ref 8.4–10.2)
CHLORIDE SERPL-SCNC: 94 MMOL/L (ref 98–107)
CO2 SERPL-SCNC: 35 MMOL/L (ref 22–29)
CREAT SERPL-MCNC: 0.58 MG/DL (ref 0.73–1.18)
CREAT/UREA NIT SERPL: 37
GLUCOSE SERPL-MCNC: 124 MG/DL (ref 74–100)
MAGNESIUM SERPL-MCNC: 2 MG/DL (ref 1.6–2.6)
PHOSPHATE SERPL-MCNC: 3.6 MG/DL (ref 2.3–4.7)
POTASSIUM SERPL-SCNC: 5 MMOL/L (ref 3.5–5.1)
SODIUM SERPL-SCNC: 136 MMOL/L (ref 136–145)

## 2022-07-03 PROCEDURE — 27000221 HC OXYGEN, UP TO 24 HOURS

## 2022-07-03 PROCEDURE — 99232 PR SUBSEQUENT HOSPITAL CARE,LEVL II: ICD-10-PCS | Mod: ,,, | Performed by: INTERNAL MEDICINE

## 2022-07-03 PROCEDURE — 80048 BASIC METABOLIC PNL TOTAL CA: CPT | Performed by: STUDENT IN AN ORGANIZED HEALTH CARE EDUCATION/TRAINING PROGRAM

## 2022-07-03 PROCEDURE — 94761 N-INVAS EAR/PLS OXIMETRY MLT: CPT

## 2022-07-03 PROCEDURE — 99900026 HC AIRWAY MAINTENANCE (STAT)

## 2022-07-03 PROCEDURE — 99232 SBSQ HOSP IP/OBS MODERATE 35: CPT | Mod: ,,, | Performed by: INTERNAL MEDICINE

## 2022-07-03 PROCEDURE — 25000003 PHARM REV CODE 250: Performed by: STUDENT IN AN ORGANIZED HEALTH CARE EDUCATION/TRAINING PROGRAM

## 2022-07-03 PROCEDURE — S4991 NICOTINE PATCH NONLEGEND: HCPCS | Performed by: STUDENT IN AN ORGANIZED HEALTH CARE EDUCATION/TRAINING PROGRAM

## 2022-07-03 PROCEDURE — 36415 COLL VENOUS BLD VENIPUNCTURE: CPT | Performed by: STUDENT IN AN ORGANIZED HEALTH CARE EDUCATION/TRAINING PROGRAM

## 2022-07-03 PROCEDURE — 25000242 PHARM REV CODE 250 ALT 637 W/ HCPCS: Performed by: INTERNAL MEDICINE

## 2022-07-03 PROCEDURE — 99900035 HC TECH TIME PER 15 MIN (STAT)

## 2022-07-03 PROCEDURE — 25000003 PHARM REV CODE 250: Performed by: INTERNAL MEDICINE

## 2022-07-03 PROCEDURE — 11000001 HC ACUTE MED/SURG PRIVATE ROOM

## 2022-07-03 PROCEDURE — 83735 ASSAY OF MAGNESIUM: CPT | Performed by: STUDENT IN AN ORGANIZED HEALTH CARE EDUCATION/TRAINING PROGRAM

## 2022-07-03 PROCEDURE — 63600175 PHARM REV CODE 636 W HCPCS: Performed by: STUDENT IN AN ORGANIZED HEALTH CARE EDUCATION/TRAINING PROGRAM

## 2022-07-03 PROCEDURE — 84100 ASSAY OF PHOSPHORUS: CPT | Performed by: STUDENT IN AN ORGANIZED HEALTH CARE EDUCATION/TRAINING PROGRAM

## 2022-07-03 RX ORDER — IPRATROPIUM BROMIDE 0.5 MG/2.5ML
0.5 SOLUTION RESPIRATORY (INHALATION) EVERY 8 HOURS
Status: DISCONTINUED | OUTPATIENT
Start: 2022-07-03 | End: 2022-07-07 | Stop reason: HOSPADM

## 2022-07-03 RX ADMIN — CHLORHEXIDINE GLUCONATE 0.12% ORAL RINSE 15 ML: 1.2 LIQUID ORAL at 08:07

## 2022-07-03 RX ADMIN — FAMOTIDINE 20 MG: 10 INJECTION INTRAVENOUS at 08:07

## 2022-07-03 RX ADMIN — ACETAMINOPHEN 650 MG: 325 TABLET, FILM COATED ORAL at 09:07

## 2022-07-03 RX ADMIN — ACETAMINOPHEN 650 MG: 325 TABLET, FILM COATED ORAL at 08:07

## 2022-07-03 RX ADMIN — DIPHENHYDRAMINE HYDROCHLORIDE 25 MG: 25 CAPSULE ORAL at 08:07

## 2022-07-03 RX ADMIN — DOXYCYCLINE 100 MG: 100 INJECTION, POWDER, LYOPHILIZED, FOR SOLUTION INTRAVENOUS at 11:07

## 2022-07-03 RX ADMIN — FAMOTIDINE 20 MG: 10 INJECTION INTRAVENOUS at 09:07

## 2022-07-03 RX ADMIN — DOXYCYCLINE 100 MG: 100 INJECTION, POWDER, LYOPHILIZED, FOR SOLUTION INTRAVENOUS at 10:07

## 2022-07-03 RX ADMIN — CHLORHEXIDINE GLUCONATE 0.12% ORAL RINSE 15 ML: 1.2 LIQUID ORAL at 09:07

## 2022-07-03 RX ADMIN — ENOXAPARIN SODIUM 40 MG: 40 INJECTION SUBCUTANEOUS at 05:07

## 2022-07-03 RX ADMIN — ACETAMINOPHEN 650 MG: 325 TABLET, FILM COATED ORAL at 12:07

## 2022-07-03 RX ADMIN — MUPIROCIN: 20 OINTMENT TOPICAL at 09:07

## 2022-07-03 RX ADMIN — IPRATROPIUM BROMIDE 0.5 MG: 0.5 SOLUTION RESPIRATORY (INHALATION) at 11:07

## 2022-07-03 RX ADMIN — NICOTINE 1 PATCH: 21 PATCH, EXTENDED RELEASE TRANSDERMAL at 09:07

## 2022-07-03 NOTE — PROGRESS NOTES
Progress Note  Hospital Medicine    Patient Name: Josafat Boudreaux  YOB: 1965    Admit Date: 6/26/2022                     LOS: 7    SUBJECTIVE:     Reason for Admission:  <principal problem not specified>  See H&P for detailed presentating history and ROS.      Interval history:  Still pending on possible surgery in the near future by ENT.    OBJECTIVE:     Vital Signs Range (Last 24H):  Temp:  [98 °F (36.7 °C)-98.5 °F (36.9 °C)]   Pulse:  []   Resp:  [18-20]   BP: (115-129)/(70-76)   SpO2:  [90 %-100 %] Body mass index is 19.19 kg/m².  Wt Readings from Last 3 Encounters:   06/28/22 1001 59 kg (130 lb)   06/26/22 1634 59 kg (130 lb)   06/25/22 0634 70.3 kg (155 lb)   06/10/22 0942 68 kg (150 lb)       I & O (Last 24H):    Intake/Output Summary (Last 24 hours) at 7/3/2022 1507  Last data filed at 7/3/2022 0500  Gross per 24 hour   Intake 2009 ml   Output 650 ml   Net 1359 ml       Physical Exam:  Patient alert sitting in the chair watching TV no obvious respiratory distress the company of his wife  Thick sputum is noted through the trach, with that said will go ahead and start him on neb treatments around the clock neck supple  Heart distant heart sounds regular rhythm  Lungs diffuse rhonchi bilaterally with prolonged expiratory phase  Abdomen benign peg site  clean  Extremities no edema    Diagnostic Results:  Lab Results   Component Value Date    WBC 9.2 06/30/2022    HGB 14.7 06/30/2022    HCT 46.7 06/30/2022    MCV 98.5 (H) 06/30/2022     06/30/2022     Recent Labs   Lab 07/03/22  0607      K 5.0   CO2 35*   BUN 21.6   CREATININE 0.58*   CALCIUM 10.8*   MG 2.00     Lab Results   Component Value Date    INR 0.99 06/26/2022    INR 0.99 06/25/2022    INR 0.9 03/23/2022    PROTIME 13.0 06/26/2022    PROTIME 13.0 06/25/2022    PROTIME 12.3 03/23/2022     No results found for: HGBA1C  No results for input(s): POCTGLUCOSE in the last 72 hours.    ASSESSMENT/PLAN:     Active  Hospital Problems    Diagnosis  POA    New onset seizure [R56.9]  No      Resolved Hospital Problems   No resolved problems to display.        Problems Addressed Today:    New onset seizure  Await EEG results  Continue keppra 1 gm BID  Seizure precautions  Antimicrobial management per primary team          DISCHARGE PLANNING:       Signing Physician:  Heraclio Garcia MD

## 2022-07-03 NOTE — PLAN OF CARE
Problem: Adult Inpatient Plan of Care  Goal: Plan of Care Review  Outcome: Ongoing, Progressing  Goal: Patient-Specific Goal (Individualized)  Outcome: Ongoing, Progressing  Goal: Absence of Hospital-Acquired Illness or Injury  Outcome: Ongoing, Progressing  Goal: Optimal Comfort and Wellbeing  Outcome: Ongoing, Progressing  Goal: Readiness for Transition of Care  Outcome: Ongoing, Progressing     Problem: Skin Injury Risk Increased  Goal: Skin Health and Integrity  Outcome: Ongoing, Progressing     Problem: Infection  Goal: Absence of Infection Signs and Symptoms  Outcome: Ongoing, Progressing     Problem: Impaired Wound Healing  Goal: Optimal Wound Healing  Outcome: Ongoing, Progressing     Problem: Fall Injury Risk  Goal: Absence of Fall and Fall-Related Injury  Outcome: Ongoing, Progressing     Problem: Communication Impairment (Mechanical Ventilation, Invasive)  Goal: Effective Communication  Outcome: Ongoing, Progressing     Problem: Device-Related Complication Risk (Mechanical Ventilation, Invasive)  Goal: Optimal Device Function  Outcome: Ongoing, Progressing     Problem: Inability to Wean (Mechanical Ventilation, Invasive)  Goal: Mechanical Ventilation Liberation  Outcome: Ongoing, Progressing     Problem: Skin and Tissue Injury (Mechanical Ventilation, Invasive)  Goal: Absence of Device-Related Skin and Tissue Injury  Outcome: Ongoing, Progressing     Problem: Communication Impairment (Artificial Airway)  Goal: Effective Communication  Outcome: Ongoing, Progressing     Problem: Device-Related Complication Risk (Artificial Airway)  Goal: Optimal Device Function  Outcome: Ongoing, Progressing     Problem: Skin and Tissue Injury (Artificial Airway)  Goal: Absence of Device-Related Skin or Tissue Injury  Outcome: Ongoing, Progressing

## 2022-07-04 LAB
ANION GAP SERPL CALC-SCNC: 10 MEQ/L
BUN SERPL-MCNC: 21 MG/DL (ref 8.4–25.7)
CALCIUM SERPL-MCNC: 11.1 MG/DL (ref 8.4–10.2)
CHLORIDE SERPL-SCNC: 94 MMOL/L (ref 98–107)
CO2 SERPL-SCNC: 33 MMOL/L (ref 22–29)
CREAT SERPL-MCNC: 0.56 MG/DL (ref 0.73–1.18)
CREAT/UREA NIT SERPL: 38
GLUCOSE SERPL-MCNC: 118 MG/DL (ref 74–100)
MAGNESIUM SERPL-MCNC: 1.9 MG/DL (ref 1.6–2.6)
PHOSPHATE SERPL-MCNC: 3.9 MG/DL (ref 2.3–4.7)
POTASSIUM SERPL-SCNC: 5.2 MMOL/L (ref 3.5–5.1)
SODIUM SERPL-SCNC: 137 MMOL/L (ref 136–145)

## 2022-07-04 PROCEDURE — 63600175 PHARM REV CODE 636 W HCPCS: Performed by: STUDENT IN AN ORGANIZED HEALTH CARE EDUCATION/TRAINING PROGRAM

## 2022-07-04 PROCEDURE — 36415 COLL VENOUS BLD VENIPUNCTURE: CPT | Performed by: STUDENT IN AN ORGANIZED HEALTH CARE EDUCATION/TRAINING PROGRAM

## 2022-07-04 PROCEDURE — 99900035 HC TECH TIME PER 15 MIN (STAT)

## 2022-07-04 PROCEDURE — 99233 PR SUBSEQUENT HOSPITAL CARE,LEVL III: ICD-10-PCS | Mod: ,,, | Performed by: INTERNAL MEDICINE

## 2022-07-04 PROCEDURE — S4991 NICOTINE PATCH NONLEGEND: HCPCS | Performed by: STUDENT IN AN ORGANIZED HEALTH CARE EDUCATION/TRAINING PROGRAM

## 2022-07-04 PROCEDURE — 25000003 PHARM REV CODE 250: Performed by: STUDENT IN AN ORGANIZED HEALTH CARE EDUCATION/TRAINING PROGRAM

## 2022-07-04 PROCEDURE — 94761 N-INVAS EAR/PLS OXIMETRY MLT: CPT

## 2022-07-04 PROCEDURE — 25000242 PHARM REV CODE 250 ALT 637 W/ HCPCS: Performed by: INTERNAL MEDICINE

## 2022-07-04 PROCEDURE — 25000242 PHARM REV CODE 250 ALT 637 W/ HCPCS: Performed by: STUDENT IN AN ORGANIZED HEALTH CARE EDUCATION/TRAINING PROGRAM

## 2022-07-04 PROCEDURE — 27000221 HC OXYGEN, UP TO 24 HOURS

## 2022-07-04 PROCEDURE — 84100 ASSAY OF PHOSPHORUS: CPT | Performed by: STUDENT IN AN ORGANIZED HEALTH CARE EDUCATION/TRAINING PROGRAM

## 2022-07-04 PROCEDURE — 99900026 HC AIRWAY MAINTENANCE (STAT)

## 2022-07-04 PROCEDURE — 80048 BASIC METABOLIC PNL TOTAL CA: CPT | Performed by: STUDENT IN AN ORGANIZED HEALTH CARE EDUCATION/TRAINING PROGRAM

## 2022-07-04 PROCEDURE — 99233 SBSQ HOSP IP/OBS HIGH 50: CPT | Mod: ,,, | Performed by: INTERNAL MEDICINE

## 2022-07-04 PROCEDURE — 25000003 PHARM REV CODE 250: Performed by: INTERNAL MEDICINE

## 2022-07-04 PROCEDURE — 94640 AIRWAY INHALATION TREATMENT: CPT

## 2022-07-04 PROCEDURE — 83735 ASSAY OF MAGNESIUM: CPT | Performed by: STUDENT IN AN ORGANIZED HEALTH CARE EDUCATION/TRAINING PROGRAM

## 2022-07-04 PROCEDURE — 11000001 HC ACUTE MED/SURG PRIVATE ROOM

## 2022-07-04 RX ORDER — POLYETHYLENE GLYCOL 3350 17 G/17G
17 POWDER, FOR SOLUTION ORAL DAILY
Status: DISCONTINUED | OUTPATIENT
Start: 2022-07-04 | End: 2022-07-07 | Stop reason: HOSPADM

## 2022-07-04 RX ORDER — GUAIFENESIN 100 MG/5ML
200 SOLUTION ORAL EVERY 4 HOURS PRN
Status: DISCONTINUED | OUTPATIENT
Start: 2022-07-04 | End: 2022-07-07 | Stop reason: HOSPADM

## 2022-07-04 RX ADMIN — DOXYCYCLINE 100 MG: 100 INJECTION, POWDER, LYOPHILIZED, FOR SOLUTION INTRAVENOUS at 11:07

## 2022-07-04 RX ADMIN — ENOXAPARIN SODIUM 40 MG: 40 INJECTION SUBCUTANEOUS at 05:07

## 2022-07-04 RX ADMIN — MUPIROCIN: 20 OINTMENT TOPICAL at 09:07

## 2022-07-04 RX ADMIN — GUAIFENESIN 200 MG: 200 SOLUTION ORAL at 05:07

## 2022-07-04 RX ADMIN — CHLORHEXIDINE GLUCONATE 0.12% ORAL RINSE 15 ML: 1.2 LIQUID ORAL at 09:07

## 2022-07-04 RX ADMIN — IPRATROPIUM BROMIDE 0.5 MG: 0.5 SOLUTION RESPIRATORY (INHALATION) at 11:07

## 2022-07-04 RX ADMIN — ACETAMINOPHEN 650 MG: 325 TABLET, FILM COATED ORAL at 09:07

## 2022-07-04 RX ADMIN — IPRATROPIUM BROMIDE 0.5 MG: 0.5 SOLUTION RESPIRATORY (INHALATION) at 08:07

## 2022-07-04 RX ADMIN — NICOTINE 1 PATCH: 21 PATCH, EXTENDED RELEASE TRANSDERMAL at 09:07

## 2022-07-04 RX ADMIN — DOXYCYCLINE 100 MG: 100 INJECTION, POWDER, LYOPHILIZED, FOR SOLUTION INTRAVENOUS at 10:07

## 2022-07-04 RX ADMIN — IPRATROPIUM BROMIDE AND ALBUTEROL SULFATE 3 ML: 2.5; .5 SOLUTION RESPIRATORY (INHALATION) at 05:07

## 2022-07-04 RX ADMIN — FAMOTIDINE 20 MG: 10 INJECTION INTRAVENOUS at 09:07

## 2022-07-04 RX ADMIN — POLYETHYLENE GLYCOL 3350 17 G: 17 POWDER, FOR SOLUTION ORAL at 05:07

## 2022-07-04 RX ADMIN — IPRATROPIUM BROMIDE 0.5 MG: 0.5 SOLUTION RESPIRATORY (INHALATION) at 05:07

## 2022-07-04 RX ADMIN — DIPHENHYDRAMINE HYDROCHLORIDE 25 MG: 25 CAPSULE ORAL at 09:07

## 2022-07-04 NOTE — PROGRESS NOTES
Progress Note  Hospital Medicine    Patient Name: Josafat Boudreaux  YOB: 1965    Admit Date: 6/26/2022                     LOS: 8    SUBJECTIVE:     Reason for Admission:  <principal problem not specified>  See H&P for detailed presentating history and ROS.      Interval history:  Patient's white refers secretions through the trach and more thick at this moment, we will go ahead and add a mucolytic, also requesting Protonix, and pain medication as well as stool softer.  This moment the plan was to discharge him today I will think is appropriate even though his oxygen now is down to 40%, will re-evaluate on daily basis.      OBJECTIVE:     Vital Signs Range (Last 24H):  Temp:  [97.5 °F (36.4 °C)-98.9 °F (37.2 °C)]   Pulse:  []   Resp:  [16-22]   BP: (110-133)/(68-80)   SpO2:  [94 %-99 %] Body mass index is 19.19 kg/m².  Wt Readings from Last 3 Encounters:   06/28/22 1001 59 kg (130 lb)   06/26/22 1634 59 kg (130 lb)   06/25/22 0634 70.3 kg (155 lb)   06/10/22 0942 68 kg (150 lb)       I & O (Last 24H):    Intake/Output Summary (Last 24 hours) at 7/4/2022 1346  Last data filed at 7/4/2022 0400  Gross per 24 hour   Intake 1812 ml   Output 850 ml   Net 962 ml       Physical Exam:  Patient alert no distress watching TV in his bed.  HEENT trachea looks good thick secretions are noted today trach no obvious respiratory distress no evidence of respiratory distress by using accessory muscles  Heart distant heart sounds regular rhythm  Lungs diffuse rhonchi bilaterally no expiratory wheezing  Peg site looks clean bowel sounds are positive  Extremities no edema    Diagnostic Results:  Lab Results   Component Value Date    WBC 9.2 06/30/2022    HGB 14.7 06/30/2022    HCT 46.7 06/30/2022    MCV 98.5 (H) 06/30/2022     06/30/2022     Recent Labs   Lab 07/04/22  0517      K 5.2*   CO2 33*   BUN 21.0   CREATININE 0.56*   CALCIUM 11.1*   MG 1.90     Lab Results   Component Value Date    INR  0.99 06/26/2022    INR 0.99 06/25/2022    INR 0.9 03/23/2022    PROTIME 13.0 06/26/2022    PROTIME 13.0 06/25/2022    PROTIME 12.3 03/23/2022     No results found for: HGBA1C  No results for input(s): POCTGLUCOSE in the last 72 hours.    ASSESSMENT/PLAN:     Active Hospital Problems    Diagnosis  POA    New onset seizure [R56.9]  No      Resolved Hospital Problems   No resolved problems to display.        Problems Addressed Today:    New onset seizure  Await EEG results  Continue keppra 1 gm BID  Seizure precautions  Antimicrobial management per primary team          DISCHARGE PLANNING:       Signing Physician:  Heraclio Garcia MD

## 2022-07-05 LAB
ANION GAP SERPL CALC-SCNC: 6 MEQ/L
BASOPHILS # BLD AUTO: 0.1 X10(3)/MCL (ref 0–0.2)
BASOPHILS NFR BLD AUTO: 0.9 %
BUN SERPL-MCNC: 21.9 MG/DL (ref 8.4–25.7)
CALCIUM SERPL-MCNC: 11.1 MG/DL (ref 8.4–10.2)
CHLORIDE SERPL-SCNC: 94 MMOL/L (ref 98–107)
CO2 SERPL-SCNC: 37 MMOL/L (ref 22–29)
CREAT SERPL-MCNC: 0.56 MG/DL (ref 0.73–1.18)
CREAT/UREA NIT SERPL: 39
EOSINOPHIL # BLD AUTO: 0.57 X10(3)/MCL (ref 0–0.9)
EOSINOPHIL NFR BLD AUTO: 5 %
ERYTHROCYTE [DISTWIDTH] IN BLOOD BY AUTOMATED COUNT: 14.1 % (ref 11.5–17)
GLUCOSE SERPL-MCNC: 130 MG/DL (ref 74–100)
HCT VFR BLD AUTO: 45.9 % (ref 42–52)
HGB BLD-MCNC: 15 GM/DL (ref 14–18)
IMM GRANULOCYTES # BLD AUTO: 0.06 X10(3)/MCL (ref 0–0.04)
IMM GRANULOCYTES NFR BLD AUTO: 0.5 %
LYMPHOCYTES # BLD AUTO: 2.34 X10(3)/MCL (ref 0.6–4.6)
LYMPHOCYTES NFR BLD AUTO: 20.6 %
MAGNESIUM SERPL-MCNC: 2 MG/DL (ref 1.6–2.6)
MCH RBC QN AUTO: 31.4 PG (ref 27–31)
MCHC RBC AUTO-ENTMCNC: 32.7 MG/DL (ref 33–36)
MCV RBC AUTO: 96 FL (ref 80–94)
MONOCYTES # BLD AUTO: 1.22 X10(3)/MCL (ref 0.1–1.3)
MONOCYTES NFR BLD AUTO: 10.7 %
NEUTROPHILS # BLD AUTO: 7.1 X10(3)/MCL (ref 2.1–9.2)
NEUTROPHILS NFR BLD AUTO: 62.3 %
NRBC BLD AUTO-RTO: 0 %
PHOSPHATE SERPL-MCNC: 3.7 MG/DL (ref 2.3–4.7)
PLATELET # BLD AUTO: 282 X10(3)/MCL (ref 130–400)
PMV BLD AUTO: 10.4 FL (ref 7.4–10.4)
POTASSIUM SERPL-SCNC: 4.7 MMOL/L (ref 3.5–5.1)
RBC # BLD AUTO: 4.78 X10(6)/MCL (ref 4.7–6.1)
SODIUM SERPL-SCNC: 137 MMOL/L (ref 136–145)
WBC # SPEC AUTO: 11.4 X10(3)/MCL (ref 4.5–11.5)

## 2022-07-05 PROCEDURE — 85025 COMPLETE CBC W/AUTO DIFF WBC: CPT | Performed by: INTERNAL MEDICINE

## 2022-07-05 PROCEDURE — 11000001 HC ACUTE MED/SURG PRIVATE ROOM

## 2022-07-05 PROCEDURE — 25000003 PHARM REV CODE 250: Performed by: STUDENT IN AN ORGANIZED HEALTH CARE EDUCATION/TRAINING PROGRAM

## 2022-07-05 PROCEDURE — 36415 COLL VENOUS BLD VENIPUNCTURE: CPT | Performed by: INTERNAL MEDICINE

## 2022-07-05 PROCEDURE — S4991 NICOTINE PATCH NONLEGEND: HCPCS | Performed by: STUDENT IN AN ORGANIZED HEALTH CARE EDUCATION/TRAINING PROGRAM

## 2022-07-05 PROCEDURE — 25000242 PHARM REV CODE 250 ALT 637 W/ HCPCS: Performed by: INTERNAL MEDICINE

## 2022-07-05 PROCEDURE — 94640 AIRWAY INHALATION TREATMENT: CPT

## 2022-07-05 PROCEDURE — 99233 PR SUBSEQUENT HOSPITAL CARE,LEVL III: ICD-10-PCS | Mod: ,,, | Performed by: INTERNAL MEDICINE

## 2022-07-05 PROCEDURE — 99233 SBSQ HOSP IP/OBS HIGH 50: CPT | Mod: ,,, | Performed by: INTERNAL MEDICINE

## 2022-07-05 PROCEDURE — 97116 GAIT TRAINING THERAPY: CPT

## 2022-07-05 PROCEDURE — 25000003 PHARM REV CODE 250: Performed by: INTERNAL MEDICINE

## 2022-07-05 PROCEDURE — 36415 COLL VENOUS BLD VENIPUNCTURE: CPT | Performed by: STUDENT IN AN ORGANIZED HEALTH CARE EDUCATION/TRAINING PROGRAM

## 2022-07-05 PROCEDURE — 94761 N-INVAS EAR/PLS OXIMETRY MLT: CPT

## 2022-07-05 PROCEDURE — 27000221 HC OXYGEN, UP TO 24 HOURS

## 2022-07-05 PROCEDURE — 99900035 HC TECH TIME PER 15 MIN (STAT)

## 2022-07-05 PROCEDURE — 80048 BASIC METABOLIC PNL TOTAL CA: CPT | Performed by: STUDENT IN AN ORGANIZED HEALTH CARE EDUCATION/TRAINING PROGRAM

## 2022-07-05 PROCEDURE — 84100 ASSAY OF PHOSPHORUS: CPT | Performed by: STUDENT IN AN ORGANIZED HEALTH CARE EDUCATION/TRAINING PROGRAM

## 2022-07-05 PROCEDURE — 83735 ASSAY OF MAGNESIUM: CPT | Performed by: STUDENT IN AN ORGANIZED HEALTH CARE EDUCATION/TRAINING PROGRAM

## 2022-07-05 PROCEDURE — 63600175 PHARM REV CODE 636 W HCPCS: Performed by: STUDENT IN AN ORGANIZED HEALTH CARE EDUCATION/TRAINING PROGRAM

## 2022-07-05 RX ADMIN — FAMOTIDINE 20 MG: 10 INJECTION INTRAVENOUS at 10:07

## 2022-07-05 RX ADMIN — DOXYCYCLINE 100 MG: 100 INJECTION, POWDER, LYOPHILIZED, FOR SOLUTION INTRAVENOUS at 11:07

## 2022-07-05 RX ADMIN — ENOXAPARIN SODIUM 40 MG: 40 INJECTION SUBCUTANEOUS at 05:07

## 2022-07-05 RX ADMIN — DIPHENHYDRAMINE HYDROCHLORIDE 25 MG: 25 CAPSULE ORAL at 09:07

## 2022-07-05 RX ADMIN — IPRATROPIUM BROMIDE 0.5 MG: 0.5 SOLUTION RESPIRATORY (INHALATION) at 08:07

## 2022-07-05 RX ADMIN — MUPIROCIN: 20 OINTMENT TOPICAL at 09:07

## 2022-07-05 RX ADMIN — CHLORHEXIDINE GLUCONATE 0.12% ORAL RINSE 15 ML: 1.2 LIQUID ORAL at 09:07

## 2022-07-05 RX ADMIN — DOXYCYCLINE 100 MG: 100 INJECTION, POWDER, LYOPHILIZED, FOR SOLUTION INTRAVENOUS at 12:07

## 2022-07-05 RX ADMIN — ACETAMINOPHEN 650 MG: 325 TABLET, FILM COATED ORAL at 09:07

## 2022-07-05 RX ADMIN — GUAIFENESIN 200 MG: 200 SOLUTION ORAL at 09:07

## 2022-07-05 RX ADMIN — NICOTINE 1 PATCH: 21 PATCH, EXTENDED RELEASE TRANSDERMAL at 09:07

## 2022-07-05 RX ADMIN — POLYETHYLENE GLYCOL 3350 17 G: 17 POWDER, FOR SOLUTION ORAL at 09:07

## 2022-07-05 RX ADMIN — IPRATROPIUM BROMIDE 0.5 MG: 0.5 SOLUTION RESPIRATORY (INHALATION) at 04:07

## 2022-07-05 RX ADMIN — FAMOTIDINE 20 MG: 10 INJECTION INTRAVENOUS at 09:07

## 2022-07-05 NOTE — PROGRESS NOTES
[facility] - 7th Floor ICU  Adult Nutrition  Progress Note    SUMMARY       Recommendations    Recommendation/Intervention: Tube feeding recommendation: Impact Peptide 1.5 @ 65ml/hr (goal rate)  Provides:  1950kcal (106% est needs)  121gm protein (136% est needs)  990ml free water     Will need additional ~40ml/hr free water flushes to meet est fluid needs. (Total free water: 1790ml /98% est needs)      Assessment and Plan    Nutrition Problem  Malnutrition    Related to (etiology):   Chronic condition    Signs and Symptoms (as evidenced by):   Wt loss, muscle wasting    Interventions(treatment strategy):  Modified composition of enteral nutrition, Modified rate of enteral nutrition and Collaboration with other providers    Nutrition Diagnosis Status:   Continues    Goals: Provides adequate nutrition to meet est needs.  Nutrition Goal Status: Continues  Communication of RD Recs: reviewed with RN    Malnutrition Assessment  Malnutrition Type: acute illness or injury (mild)  Weight Loss (Malnutrition): greater than 5% in 1 month (comparing previous admit wt to current wt.)  Energy Intake (Malnutrition): other (see comments) (unable to eval)  Subcutaneous Fat (Malnutrition): other (see comments) (does not meet criteria)  Muscle Mass (Malnutrition): mild depletion  Hand  Strength, Left (Malnutrition): unable to eval  Hand  Strength, Right (Malnutrition): unable to eval   [place] (Muscle Loss): mild depletion   Edema (Fluid Accumulation): 0-->no edema present     Reason for Assessment    Reason For Assessment: consult, new tube feeding    Diagnosis: 1. Type II respiratory failure requiring mechanical ventilation  2. Lactic Acidosis  3. Community-Acquired Pneumonia  4. 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 No kcal from meds.    7/1/22: Pt now  "off vent. Updated est needs. Tolerating at current rate per RN. Noted partial thickness wound (coccyx).    : pt tolerating tube feeding per nurse; had stopped it earlier due to residual of 100ml, now restarted; did report a very small BM this morning per family member; noted miralax started yesterday    Nutrition Risk Screen    Nutrition Risk Screen: tube feeding or parenteral nutrition    Nutrition/Diet History    Spiritual, Cultural Beliefs, Oriental orthodox Practices, Values that Affect Care: no  Food Allergies: NKFA    Anthropometrics    Temp: 97.6 °F (36.4 °C)  Height: 5' 9.02" (175.3 cm)  Height (inches): 69.02 in  Weight Method: Stated  Weight: 59 kg (130 lb)  Weight (lb): 130 lb  Ideal Body Weight (IBW), Male: 160.12 lb  % Ideal Body Weight, Male (lb): 81.25 %  BMI (Calculated): 19.2  BMI Grade: 18.5-24.9 - normal  Usual Body Weight (UBW), k.9 kg (Previous admit wt)  % Usual Body Weight: 84.54  % Weight Change From Usual Weight: -15.64 %        Lab/Procedures/Meds    Pertinent Labs Reviewed: reviewed  Pertinent Labs Comments:  Cl 94 Crea 0.56 Glu 130 Ca 11.1  Pertinent Medications Reviewed: reviewed  Pertinent Medications Comments: famotidine, miralax    Estimated/Assessed Needs    Weight Used For Calorie Calculations: 59 kg (130 lb 1.1 oz)  Energy Calorie Requirements (kcal): 1833kcal  Energy Need Method: [facility]  Protein Requirements: 89gm (1.5g/kg)  Weight Used For Protein Calculations: 59 kg (130 lb 1.1 oz)  Estimated Fluid Requirement Method: RDA Method  RDA Method (mL): 1833     Nutrition Prescription Ordered    Current Diet Order: NPO  Current Nutrition Support Formula Ordered: Impact Peptide 1.5  Current Nutrition Support Rate Ordered: 55 (ml)    Evaluation of Received Nutrient/Fluid Intake    Enteral Calories (kcal): 1650  Enteral Protein (gm): 102.3  Enteral (Free Water) Fluid (mL): 836  % Kcal Needs: 90%  % Protein Needs: 115%  Energy Calories Required: meeting needs  Protein Required: " meeting needs  % Intake of Estimated Energy Needs: 75 - 100 %  % Meal Intake: NPO    Nutrition Risk    Level of Risk/Frequency of Follow-up: high     Monitor and Evaluation    Food and Nutrient Intake: energy intake     Nutrition Follow-Up    RD Follow-up?: Yes

## 2022-07-05 NOTE — PROGRESS NOTES
Progress Note  Hospital Medicine    Patient Name: Josafat Boudreaux  YOB: 1965    Admit Date: 6/26/2022                     LOS: 9    SUBJECTIVE:     Reason for Admission:  <principal problem not specified>  See H&P for detailed presentating history and ROS.      Interval history:  Patient with at uneventful night except for cough.  Patient very tired this morning, we still pending on next plan of care regarding surgery.  With that said family is requesting and re-evaluation of the trach and by pulmonologist, a consult has been placed.      OBJECTIVE:     Vital Signs Range (Last 24H):  Temp:  [97.6 °F (36.4 °C)-98.7 °F (37.1 °C)]   Pulse:  []   Resp:  [15-20]   BP: (106-140)/(51-82)   SpO2:  [90 %-100 %] Body mass index is 19.19 kg/m².  Wt Readings from Last 3 Encounters:   06/28/22 1001 59 kg (130 lb)   06/26/22 1634 59 kg (130 lb)   06/25/22 0634 70.3 kg (155 lb)   06/10/22 0942 68 kg (150 lb)       I & O (Last 24H):    Intake/Output Summary (Last 24 hours) at 7/5/2022 1202  Last data filed at 7/4/2022 2237  Gross per 24 hour   Intake 0 ml   Output 150 ml   Net -150 ml       Physical Exam:  Patient sleepy but arousable looks extremely tired, according to the wife in the room he did not sleep well at all last night  HEENT is facial muscles are symmetric trach area looks clean but there was increased amount of secretions very thick in no labored breathing  Heart distant heart sounds  Lungs diffuse rhonchi bilaterally on posterior approach is sort of a silent chest  Abdomen peg site looks clean  Extremities no edema    Diagnostic Results:  Lab Results   Component Value Date    WBC 11.4 07/05/2022    HGB 15.0 07/05/2022    HCT 45.9 07/05/2022    MCV 96.0 (H) 07/05/2022     07/05/2022     Recent Labs   Lab 07/05/22  0533      K 4.7   CO2 37*   BUN 21.9   CREATININE 0.56*   CALCIUM 11.1*   MG 2.00     Lab Results   Component Value Date    INR 0.99 06/26/2022    INR 0.99 06/25/2022     INR 0.9 03/23/2022    PROTIME 13.0 06/26/2022    PROTIME 13.0 06/25/2022    PROTIME 12.3 03/23/2022     No results found for: HGBA1C  No results for input(s): POCTGLUCOSE in the last 72 hours.    ASSESSMENT/PLAN:     Active Hospital Problems    Diagnosis  POA    New onset seizure [R56.9]  No      Resolved Hospital Problems   No resolved problems to display.        Problems Addressed Today:    New onset seizure  Await EEG results  Continue keppra 1 gm BID  Seizure precautions  Antimicrobial management per primary team      Pre consult pulmonologist for opinion please  Continue same medication treatment, we may have to reconsult the ENT as well for next plan of care regarding surgery.  Patient still on 40% or higher of oxygen, wife very anxious to go home like this since apparently the surgery has been rescheduled for the 14    DISCHARGE PLANNING:       Signing Physician:  Heraclio Garcia MD

## 2022-07-05 NOTE — PHYSICIAN QUERY
PT Name: Josafat Boudreaux  MR #: [id number]     Documentation Clarification      CDS/: Areli GARZA, RN               Contact information: [email or web address]    This form is a permanent document in the medical record.     Query Date: July 5, 2022    By submitting this query, we are merely seeking further clarification of documentation. Please utilize your independent clinical judgment when addressing the question(s) below.    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      He underwent bronchoscopy with biopsies of right upper lobe endobronchial lesion on 6/14. Pathology with no atypical cells or malignancy identified.      Discharge Summary 6/23/22      3) concern for malignancy-undifferentiated at this time   -will assume and a bronchial lesion and adenopathy on CT scan is the same pathological process as patient's laryngeal mass     PROCEDURE PERFORMED:  Emergent tracheotomy.    INDICATION FOR PROCEDURE:  This is a gentleman with a laryngeal mass with progressively worsening stridor and difficulty with breathing.  The patient was emergently brought up to the OR, where I attempted to intubate the patient and it turned into emergent tracheotomy.      Additionally was noted to have a right upper lobe endobronchial lesion on bronchoscopy which was biopsied however nondiagnostic in regards to pathology.      Critical care medicine 6/28/22       Otolaryngology Op note 6/10/22             Critical Care Medicine progress 6/30/22                                                                          Provider, please provide diagnosis or diagnoses associated with above clinical findings. Thank you.    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

## 2022-07-05 NOTE — PT/OT/SLP PROGRESS
Physical Therapy  Treatment    Josafat Boudreaux   MRN: [id number]   Admitting Diagnosis: <principal problem not specified>    PT Received On: 07/05/22  PT Start Time: 1000     PT Stop Time: 1017    PT Total Time (min): 17 min       Billable Minutes:  Gait Training 17 min    Treatment Type: Treatment  PT/PTA: PTA     PTA Visit Number: 1       General Precautions: Standard,    Orthopedic Precautions: N/A   Respiratory Status: oxy trach    Spiritual, Cultural Beliefs, Holiness Practices, Values that Affect Care: no    Subjective:  Communicated with RN prior to session.      Objective:   Pt semi supine in bed upon arrival to room.     Functional Mobility:  Bed Mobility:   Sup<>sit, SBA for safety    Transfers:  Sit<>std w/ RW, CGA for safety    Gait:   Pt ambulated 200'x2 w/ seated rest break inbetween w/ RW, CGA for safety. Pt returned to sitting up in chair w/ all needs in reach. Spouse present throughout.        AM-PAC 6 CLICK MOBILITY  How much help from another person does this patient currently need?   1 = Unable, Total/Dependent Assistance  2 = A lot, Maximum/Moderate Assistance  3 = A little, Minimum/Contact Guard/Supervision  4 = None, Modified [place]/Independent    Turning over in bed (including adjusting bedclothes, sheets and blankets)?: 3  Sitting down on and standing up from a chair with arms (e.g., wheelchair, bedside commode, etc.): 3  Moving from lying on back to sitting on the side of the bed?: 3  Moving to and from a bed to a chair (including a wheelchair)?: 3  Need to walk in hospital room?: 3  Climbing 3-5 steps with a railing?: 3  Basic Mobility Total Score: 18    AM-PAC Raw Score CMS G-Code Modifier Level of Impairment Assistance   6 % Total / Unable   7 - 9 CM 80 - 100% Maximal Assist   10 - 14 CL 60 - 80% Moderate Assist   15 - 19 CK 40 - 60% Moderate Assist   20 - 22 CJ 20 - 40% Minimal Assist   23 CI 1-20% SBA / CGA   24 CH 0% Independent/ Mod I     Patient left up in chair  with all lines intact.    Assessment:  Josafat Boudreaux is a 56 y.o. male with a medical diagnosis of seizure, respiratory failure. Pt tolerated mobility well today. Continue POC.    Rehab identified problem list/impairments: Rehab identified problem list/impairments: weakness, impaired endurance, impaired balance    Rehab potential is excellent.    Activity tolerance: Good    GOALS:   Multidisciplinary Problems     Physical Therapy Goals        Problem: Physical Therapy    Goal Priority Disciplines Outcome Goal Variances Interventions   Physical Therapy Goal     PT, PT/OT Ongoing, Progressing     Description: Goals to be met by: 2022    Patient will increase functional independence with mobility by performin. Sit to stand transfer with [place].  2. Gait  x 200 feet with Modified [place] using LRAD.   3. Patient will ascend/descend 4 steps with Modified [place] using LRAD.                     PLAN:    Patient to be seen daily  to address the above listed problems via gait training, therapeutic activities, therapeutic exercises  Plan of Care expires:    Plan of Care reviewed with: patient, spouse         2022

## 2022-07-06 DIAGNOSIS — J38.7 LARYNGEAL MASS: Primary | ICD-10-CM

## 2022-07-06 DIAGNOSIS — C32.9 LARYNGEAL CANCER: ICD-10-CM

## 2022-07-06 LAB
ANION GAP SERPL CALC-SCNC: 7 MEQ/L
BUN SERPL-MCNC: 24 MG/DL (ref 8.4–25.7)
CALCIUM SERPL-MCNC: 11.6 MG/DL (ref 8.4–10.2)
CHLORIDE SERPL-SCNC: 96 MMOL/L (ref 98–107)
CO2 SERPL-SCNC: 34 MMOL/L (ref 22–29)
CREAT SERPL-MCNC: 0.62 MG/DL (ref 0.73–1.18)
CREAT/UREA NIT SERPL: 39
GLUCOSE SERPL-MCNC: 132 MG/DL (ref 74–100)
MAGNESIUM SERPL-MCNC: 2.1 MG/DL (ref 1.6–2.6)
PHOSPHATE SERPL-MCNC: 3.6 MG/DL (ref 2.3–4.7)
POTASSIUM SERPL-SCNC: 4.2 MMOL/L (ref 3.5–5.1)
SODIUM SERPL-SCNC: 137 MMOL/L (ref 136–145)

## 2022-07-06 PROCEDURE — 25000003 PHARM REV CODE 250: Performed by: STUDENT IN AN ORGANIZED HEALTH CARE EDUCATION/TRAINING PROGRAM

## 2022-07-06 PROCEDURE — 97116 GAIT TRAINING THERAPY: CPT | Mod: CQ

## 2022-07-06 PROCEDURE — 94761 N-INVAS EAR/PLS OXIMETRY MLT: CPT

## 2022-07-06 PROCEDURE — 94640 AIRWAY INHALATION TREATMENT: CPT

## 2022-07-06 PROCEDURE — 25000003 PHARM REV CODE 250: Performed by: INTERNAL MEDICINE

## 2022-07-06 PROCEDURE — 63600175 PHARM REV CODE 636 W HCPCS: Performed by: STUDENT IN AN ORGANIZED HEALTH CARE EDUCATION/TRAINING PROGRAM

## 2022-07-06 PROCEDURE — 25000242 PHARM REV CODE 250 ALT 637 W/ HCPCS: Performed by: INTERNAL MEDICINE

## 2022-07-06 PROCEDURE — 84100 ASSAY OF PHOSPHORUS: CPT | Performed by: STUDENT IN AN ORGANIZED HEALTH CARE EDUCATION/TRAINING PROGRAM

## 2022-07-06 PROCEDURE — 99900026 HC AIRWAY MAINTENANCE (STAT)

## 2022-07-06 PROCEDURE — 83735 ASSAY OF MAGNESIUM: CPT | Performed by: STUDENT IN AN ORGANIZED HEALTH CARE EDUCATION/TRAINING PROGRAM

## 2022-07-06 PROCEDURE — 97530 THERAPEUTIC ACTIVITIES: CPT | Mod: CQ

## 2022-07-06 PROCEDURE — 25000242 PHARM REV CODE 250 ALT 637 W/ HCPCS: Performed by: STUDENT IN AN ORGANIZED HEALTH CARE EDUCATION/TRAINING PROGRAM

## 2022-07-06 PROCEDURE — S4991 NICOTINE PATCH NONLEGEND: HCPCS | Performed by: STUDENT IN AN ORGANIZED HEALTH CARE EDUCATION/TRAINING PROGRAM

## 2022-07-06 PROCEDURE — 11000001 HC ACUTE MED/SURG PRIVATE ROOM

## 2022-07-06 PROCEDURE — 80048 BASIC METABOLIC PNL TOTAL CA: CPT | Performed by: STUDENT IN AN ORGANIZED HEALTH CARE EDUCATION/TRAINING PROGRAM

## 2022-07-06 PROCEDURE — 36415 COLL VENOUS BLD VENIPUNCTURE: CPT | Performed by: STUDENT IN AN ORGANIZED HEALTH CARE EDUCATION/TRAINING PROGRAM

## 2022-07-06 PROCEDURE — 27000221 HC OXYGEN, UP TO 24 HOURS

## 2022-07-06 PROCEDURE — 99900035 HC TECH TIME PER 15 MIN (STAT)

## 2022-07-06 RX ORDER — SODIUM CHLORIDE 0.9 % (FLUSH) 0.9 %
10 SYRINGE (ML) INJECTION
Status: DISCONTINUED | OUTPATIENT
Start: 2022-07-06 | End: 2022-07-07 | Stop reason: HOSPADM

## 2022-07-06 RX ORDER — LIDOCAINE HYDROCHLORIDE 10 MG/ML
1 INJECTION, SOLUTION EPIDURAL; INFILTRATION; INTRACAUDAL; PERINEURAL ONCE
Status: CANCELLED | OUTPATIENT
Start: 2022-07-06 | End: 2022-07-06

## 2022-07-06 RX ORDER — METRONIDAZOLE 500 MG/100ML
500 INJECTION, SOLUTION INTRAVENOUS
Status: DISCONTINUED | OUTPATIENT
Start: 2022-07-06 | End: 2022-07-07 | Stop reason: HOSPADM

## 2022-07-06 RX ORDER — DEXTROSE MONOHYDRATE AND SODIUM CHLORIDE 5; .45 G/100ML; G/100ML
INJECTION, SOLUTION INTRAVENOUS CONTINUOUS
Status: CANCELLED | OUTPATIENT
Start: 2022-07-06

## 2022-07-06 RX ORDER — NYSTATIN 100000 U/G
CREAM TOPICAL 2 TIMES DAILY
Status: DISCONTINUED | OUTPATIENT
Start: 2022-07-06 | End: 2022-07-07 | Stop reason: HOSPADM

## 2022-07-06 RX ADMIN — GUAIFENESIN 200 MG: 200 SOLUTION ORAL at 08:07

## 2022-07-06 RX ADMIN — NYSTATIN: 100000 CREAM TOPICAL at 12:07

## 2022-07-06 RX ADMIN — CHLORHEXIDINE GLUCONATE 0.12% ORAL RINSE 15 ML: 1.2 LIQUID ORAL at 09:07

## 2022-07-06 RX ADMIN — IPRATROPIUM BROMIDE 0.5 MG: 0.5 SOLUTION RESPIRATORY (INHALATION) at 07:07

## 2022-07-06 RX ADMIN — CHLORHEXIDINE GLUCONATE 0.12% ORAL RINSE 15 ML: 1.2 LIQUID ORAL at 08:07

## 2022-07-06 RX ADMIN — FAMOTIDINE 20 MG: 10 INJECTION INTRAVENOUS at 09:07

## 2022-07-06 RX ADMIN — NICOTINE 1 PATCH: 21 PATCH, EXTENDED RELEASE TRANSDERMAL at 08:07

## 2022-07-06 RX ADMIN — IPRATROPIUM BROMIDE AND ALBUTEROL SULFATE 3 ML: 2.5; .5 SOLUTION RESPIRATORY (INHALATION) at 12:07

## 2022-07-06 RX ADMIN — IPRATROPIUM BROMIDE AND ALBUTEROL SULFATE 3 ML: 2.5; .5 SOLUTION RESPIRATORY (INHALATION) at 04:07

## 2022-07-06 RX ADMIN — MUPIROCIN: 20 OINTMENT TOPICAL at 09:07

## 2022-07-06 RX ADMIN — DIPHENHYDRAMINE HYDROCHLORIDE 25 MG: 25 CAPSULE ORAL at 09:07

## 2022-07-06 RX ADMIN — DOXYCYCLINE 100 MG: 100 INJECTION, POWDER, LYOPHILIZED, FOR SOLUTION INTRAVENOUS at 11:07

## 2022-07-06 RX ADMIN — ENOXAPARIN SODIUM 40 MG: 40 INJECTION SUBCUTANEOUS at 05:07

## 2022-07-06 RX ADMIN — POLYETHYLENE GLYCOL 3350 17 G: 17 POWDER, FOR SOLUTION ORAL at 08:07

## 2022-07-06 RX ADMIN — NYSTATIN: 100000 CREAM TOPICAL at 09:07

## 2022-07-06 RX ADMIN — FAMOTIDINE 20 MG: 10 INJECTION INTRAVENOUS at 10:07

## 2022-07-06 RX ADMIN — IPRATROPIUM BROMIDE 0.5 MG: 0.5 SOLUTION RESPIRATORY (INHALATION) at 12:07

## 2022-07-06 RX ADMIN — IPRATROPIUM BROMIDE AND ALBUTEROL SULFATE 3 ML: 2.5; .5 SOLUTION RESPIRATORY (INHALATION) at 08:07

## 2022-07-06 NOTE — PROGRESS NOTES
Progress Note  Hospital Medicine    Patient Name: Josafat Boudreaux  YOB: 1965    Admit Date: 6/26/2022                     LOS: 10    SUBJECTIVE:     Reason for Admission:  <principal problem not specified>  See H&P for detailed presentating history and ROS.      Interval history:       OBJECTIVE:     Vital Signs Range (Last 24H):  Temp:  [97.5 °F (36.4 °C)-99.3 °F (37.4 °C)]   Pulse:  []   Resp:  [18-20]   BP: (113-121)/(58-77)   SpO2:  [95 %-99 %] Body mass index is 19.19 kg/m².  Wt Readings from Last 3 Encounters:   06/28/22 1001 59 kg (130 lb)   06/26/22 1634 59 kg (130 lb)   06/25/22 0634 70.3 kg (155 lb)   06/10/22 0942 68 kg (150 lb)       I & O (Last 24H):    Intake/Output Summary (Last 24 hours) at 7/6/2022 1323  Last data filed at 7/6/2022 0900  Gross per 24 hour   Intake 5159 ml   Output 725 ml   Net 4434 ml       Physical Exam:  Patient awake in bed in the company of his wife in no obvious distress, but feels very tired and fatigued.  Again the family was requesting to read consult pulmonologist and Dr. Vasquez very kindly and promptly came already evaluated the patient and per his recommendations.  Thank you so much Dr. Vasquez       HEENT trach in place no erythema around it thickening mucous as noted patient in no distress  Heart is distant heart sounds  Lungs diffuse rhonchi bilaterally no audible wheezing  Abdomen benign soft and depressible  Extremities no edema    Diagnostic Results:  Lab Results   Component Value Date    WBC 11.4 07/05/2022    HGB 15.0 07/05/2022    HCT 45.9 07/05/2022    MCV 96.0 (H) 07/05/2022     07/05/2022     Recent Labs   Lab 07/06/22 0512      K 4.2   CO2 34*   BUN 24.0   CREATININE 0.62*   CALCIUM 11.6*   MG 2.10     Lab Results   Component Value Date    INR 0.99 06/26/2022    INR 0.99 06/25/2022    INR 0.9 03/23/2022    PROTIME 13.0 06/26/2022    PROTIME 13.0 06/25/2022    PROTIME 12.3 03/23/2022     No results found for: HGBA1C  No  results for input(s): POCTGLUCOSE in the last 72 hours.    ASSESSMENT/PLAN:     Active Hospital Problems    Diagnosis  POA    New onset seizure [R56.9]  No      Resolved Hospital Problems   No resolved problems to display.        Problems Addressed Today:    New onset seizure  Await EEG results  Continue keppra 1 gm BID  Seizure precautions  Antimicrobial management per primary team          DISCHARGE PLANNING:     Consult case management for home health all services at home, oxygen, PT OT  Signing Physician:  Heraclio Garcia MD

## 2022-07-06 NOTE — PT/OT/SLP PROGRESS
Physical Therapy         Treatment        Josafat Boudreaux   MRN: [id number]     PT Received On: 07/06/22  PT Start Time: 1048     PT Stop Time: 1114    PT Total Time (min): 26 min       Billable Minutes:  Gait [id number] and Therapeutic Activity 10  Total Minutes: 26    Treatment Type: Treatment  PT/PTA: PTA     PTA Visit Number: 2       General Precautions: Standard,    Orthopedic Precautions: Orthopedic Precautions : N/A   Braces:      Spiritual, Cultural Beliefs, Islam Practices, Values that Affect Care: no    Subjective:  Communicated with nurse prior to session.         Objective:  Patient found sitting in chair, with Patient found with: PEG Tube, Tracheostomy, telemetry    Balance:   Static Stand: GOOD-: Takes MODERATE challenges from all directions inconsistently  Dynamic stand: GOOD-: Needs SUPERVISION only during gait and able to self right with moderate LOB inconsistently    Transfer Training:  Sit to stand:Supervision or Set-up Assistance with No Assistive Device x3 trials    Gait Training:  Patient gait trained   350  feet on level tile with No Assistive Device with Contact Guard Assistance.  Pt with demonstarting a  reciprocal gait with decreased stephanie and decreased step length.Impairments contributing to gait deviations include impaired balance and decreased strength        Activity Tolerance:  Patient tolerated treatment well    Patient left HOB elevated with all lines intact and call button in reach.    Assessment:  Josafat Boudreaux is a 56 y.o. male with a medical diagnosis of <principal problem not specified>. He presents with increased endurance and balance.    Rehab potential is excellent.    Activity tolerance: Excellent    Discharge recommendations: Discharge Facility/Level of Care Needs: home health PT     Equipment recommendations:       GOALS:   Multidisciplinary Problems     Physical Therapy Goals        Problem: Physical Therapy    Goal Priority Disciplines Outcome Goal  Variances Interventions   Physical Therapy Goal     PT, PT/OT Ongoing, Progressing     Description: Goals to be met by: 2022    Patient will increase functional independence with mobility by performin. Sit to stand transfer with [place].  2. Gait  x 200 feet with Modified [place] using LRAD.   3. Patient will ascend/descend 4 steps with Modified [place] using LRAD.                     PLAN:    Patient to be seen daily  to address the above listed problems via gait training, therapeutic activities  Plan of Care expires:    Plan of Care reviewed with: patient, spouse         2022

## 2022-07-06 NOTE — PROGRESS NOTES
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  Emergent tracheotomy was performed during prior hospitalization and PEG tube was eventually performed also.  Additionally was noted to have a right upper lobe endobronchial lesion on bronchoscopy which was biopsied however nondiagnostic in regards to pathology.  He is readmitted for hypoxic respiratory failure and imaging consistent with right upper lobe pneumonia, concern for postobstructive pneumonia in this setting.  Was started on broad-spectrum antibiotics, culture negative thus far.  Was on trach collar at home without any major issues prior to his pneumonia episode.      Interval History/Significant Events:    Pulmonary I was asked to re-evaluate the patient today.  He currently is awake and alert.  He is sitting up in a chair.  He has some thick mucus production.  It is fairly stable.  He has been afebrile.  His FiO2 requirement is 35%.  Outpatient ENT surgery is planned in the next week or 2.     Objective:     Vital Signs (Most Recent):  Temp: 98.1 °F (36.7 °C) (07/06/22 0700)  Pulse: 105 (07/06/22 0724)  Resp: 18 (07/06/22 0724)  BP: 114/72 (07/06/22 0700)  SpO2: 95 % (07/06/22 0724) Vital Signs (24h Range):  Temp:  [97.6 °F (36.4 °C)-99.3 °F (37.4 °C)] 98.1 °F (36.7 °C)  Pulse:  [] 105  Resp:  [18-20] 18  SpO2:  [95 %-99 %] 95 %  BP: (113-126)/(58-82) 114/72     Weight: 59 kg (130 lb)  Body mass index is 19.19 kg/m².      Intake/Output Summary (Last 24 hours) at 7/6/2022 1023  Last data filed at  7/6/2022 0900  Gross per 24 hour   Intake 5159 ml   Output 725 ml   Net 4434 ml       Physical Exam  GENERAL: NAD, A & 0 X 3, calm, trach in place  CARDIAC: RRR, NO m/g/r  PULM: CTA BL, No wheezing or rales.  Bilateral rhonchi  ABD: NT/ND/S. Bowel sounds heard.  Peg tube in place tolerating tube feeds  EXT: no cyanosis, clubbing, or edema, peripheral pulses intact  NEURO: no obvious neurosensory deficits, no dysarthria noted  PSYCH: no agitation or anxiety   EYES: tracks examiner around room, pupil reactive to light  NECK: trachea midline, no obvious JVD      Vents:  35% trach collar    Lines/Drains/Airways     Drain  Duration                Gastrostomy/Enterostomy 06/14/22 1415 Percutaneous endoscopic gastrostomy (PEG) feeding 21 days          Airway  Duration                Surgical Airway 06/10/22 1354 Shiley Cuffed 25 days          Peripheral Intravenous Line  Duration                Peripheral IV - Single Lumen 07/02/22 1000 20 G Anterior;Left;Proximal Forearm 4 days                Significant Labs:    CBC/Anemia Profile:  Recent Labs   Lab 07/05/22  0009   WBC 11.4   HGB 15.0   HCT 45.9      MCV 96.0*   RDW 14.1        Chemistries:  Recent Labs   Lab 07/05/22  0533 07/06/22  0512    137   K 4.7 4.2   CO2 37* 34*   BUN 21.9 24.0   CREATININE 0.56* 0.62*   CALCIUM 11.1* 11.6*   GLUCOSE 130* 132*   MG 2.00 2.10   PHOS 3.7 3.6         Significant Imaging:  I have reviewed all pertinent imaging results/findings within the past 24 hours.  Chest x-ray shows near complete resolution of right lung infiltrate.  There is a right hilar mass.    Medication: Current meds reviewed    Assessment/Plan:     Assessment  1) hypoxic respiratory failure likely secondary to postobstructive pneumonia.  This is both clinically and radiographically essentially cleared.  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  The patient has a nearly month old surgically placed trach and therefore the tract should be stable.  He has supplemental oxygen and a suction machine at home.  Home health also evaluates the patient.  The wife appears to be  capable of caring for him.  Other family members are also available including an RN.    Plan  - Plan is for eventual ENT surgical intervention with biopsy of laryngeal mass as an outpatient in the next week or 2.   -will assume endobbronchial lesion and adenopathy on CT scan is the same pathological process as patient's laryngeal mass  -lovenox for dvt prophylaxis  -outpatient follow-up with Oncology and/or radiation therapy has already been arranged.  He likely will need a PET scan with those appointments.  - long discussion with the patient in the a.m. wife at bedside.  They are satisfied with the discussion and have no further questions.  Pulmonary will sign off at this time.    Arnold Vasquez MD  Critical Care - Medicine  [facility] - 7th Floor ICU

## 2022-07-07 ENCOUNTER — ANESTHESIA EVENT (OUTPATIENT)
Dept: SURGERY | Facility: HOSPITAL | Age: 57
DRG: 012 | End: 2022-07-07
Payer: MEDICARE

## 2022-07-07 VITALS
OXYGEN SATURATION: 96 % | DIASTOLIC BLOOD PRESSURE: 75 MMHG | HEIGHT: 69 IN | SYSTOLIC BLOOD PRESSURE: 127 MMHG | TEMPERATURE: 97 F | WEIGHT: 130.06 LBS | HEART RATE: 100 BPM | RESPIRATION RATE: 20 BRPM | BODY MASS INDEX: 19.26 KG/M2

## 2022-07-07 DIAGNOSIS — Z01.818 OTHER SPECIFIED PRE-OPERATIVE EXAMINATION: Primary | ICD-10-CM

## 2022-07-07 LAB
ANION GAP SERPL CALC-SCNC: 7 MEQ/L
BUN SERPL-MCNC: 23.2 MG/DL (ref 8.4–25.7)
CALCIUM SERPL-MCNC: 11.1 MG/DL (ref 8.4–10.2)
CHLORIDE SERPL-SCNC: 95 MMOL/L (ref 98–107)
CO2 SERPL-SCNC: 34 MMOL/L (ref 22–29)
CREAT SERPL-MCNC: 0.61 MG/DL (ref 0.73–1.18)
CREAT/UREA NIT SERPL: 38
GLUCOSE SERPL-MCNC: 155 MG/DL (ref 74–100)
MAGNESIUM SERPL-MCNC: 2.1 MG/DL (ref 1.6–2.6)
PHOSPHATE SERPL-MCNC: 3.4 MG/DL (ref 2.3–4.7)
POTASSIUM SERPL-SCNC: 4.2 MMOL/L (ref 3.5–5.1)
SODIUM SERPL-SCNC: 136 MMOL/L (ref 136–145)

## 2022-07-07 PROCEDURE — 80048 BASIC METABOLIC PNL TOTAL CA: CPT | Performed by: STUDENT IN AN ORGANIZED HEALTH CARE EDUCATION/TRAINING PROGRAM

## 2022-07-07 PROCEDURE — 25000003 PHARM REV CODE 250: Performed by: STUDENT IN AN ORGANIZED HEALTH CARE EDUCATION/TRAINING PROGRAM

## 2022-07-07 PROCEDURE — 97530 THERAPEUTIC ACTIVITIES: CPT | Mod: CQ

## 2022-07-07 PROCEDURE — 94640 AIRWAY INHALATION TREATMENT: CPT

## 2022-07-07 PROCEDURE — S4991 NICOTINE PATCH NONLEGEND: HCPCS | Performed by: STUDENT IN AN ORGANIZED HEALTH CARE EDUCATION/TRAINING PROGRAM

## 2022-07-07 PROCEDURE — 84100 ASSAY OF PHOSPHORUS: CPT | Performed by: STUDENT IN AN ORGANIZED HEALTH CARE EDUCATION/TRAINING PROGRAM

## 2022-07-07 PROCEDURE — 83735 ASSAY OF MAGNESIUM: CPT | Performed by: STUDENT IN AN ORGANIZED HEALTH CARE EDUCATION/TRAINING PROGRAM

## 2022-07-07 PROCEDURE — 25000003 PHARM REV CODE 250: Performed by: INTERNAL MEDICINE

## 2022-07-07 PROCEDURE — 94761 N-INVAS EAR/PLS OXIMETRY MLT: CPT

## 2022-07-07 PROCEDURE — 25000242 PHARM REV CODE 250 ALT 637 W/ HCPCS: Performed by: INTERNAL MEDICINE

## 2022-07-07 PROCEDURE — 97116 GAIT TRAINING THERAPY: CPT | Mod: CQ

## 2022-07-07 PROCEDURE — 36415 COLL VENOUS BLD VENIPUNCTURE: CPT | Performed by: STUDENT IN AN ORGANIZED HEALTH CARE EDUCATION/TRAINING PROGRAM

## 2022-07-07 PROCEDURE — 99900026 HC AIRWAY MAINTENANCE (STAT)

## 2022-07-07 PROCEDURE — 27000221 HC OXYGEN, UP TO 24 HOURS

## 2022-07-07 PROCEDURE — 99900035 HC TECH TIME PER 15 MIN (STAT)

## 2022-07-07 PROCEDURE — 25000242 PHARM REV CODE 250 ALT 637 W/ HCPCS: Performed by: STUDENT IN AN ORGANIZED HEALTH CARE EDUCATION/TRAINING PROGRAM

## 2022-07-07 RX ORDER — IPRATROPIUM BROMIDE AND ALBUTEROL SULFATE 2.5; .5 MG/3ML; MG/3ML
3 SOLUTION RESPIRATORY (INHALATION) EVERY 6 HOURS
Status: DISCONTINUED | OUTPATIENT
Start: 2022-07-07 | End: 2022-07-07 | Stop reason: HOSPADM

## 2022-07-07 RX ORDER — GUAIFENESIN 100 MG/5ML
200 SOLUTION ORAL EVERY 4 HOURS PRN
Qty: 118 ML | Refills: 0 | Status: SHIPPED | OUTPATIENT
Start: 2022-07-07 | End: 2022-07-07

## 2022-07-07 RX ORDER — IPRATROPIUM BROMIDE AND ALBUTEROL SULFATE 2.5; .5 MG/3ML; MG/3ML
3 SOLUTION RESPIRATORY (INHALATION) EVERY 6 HOURS
Qty: 75 ML | Refills: 0 | Status: SHIPPED | OUTPATIENT
Start: 2022-07-07 | End: 2022-08-09

## 2022-07-07 RX ORDER — NYSTATIN 100000 U/G
CREAM TOPICAL 2 TIMES DAILY
Qty: 30 G | Refills: 1 | Status: SHIPPED | OUTPATIENT
Start: 2022-07-07 | End: 2022-07-27

## 2022-07-07 RX ORDER — DOXYCYCLINE 100 MG/1
100 CAPSULE ORAL 2 TIMES DAILY
Qty: 12 CAPSULE | Refills: 0 | Status: ON HOLD | OUTPATIENT
Start: 2022-07-07 | End: 2022-07-16 | Stop reason: HOSPADM

## 2022-07-07 RX ORDER — MUPIROCIN 20 MG/G
OINTMENT TOPICAL DAILY
Qty: 15 G | Refills: 0 | Status: SHIPPED | OUTPATIENT
Start: 2022-07-07 | End: 2022-07-27

## 2022-07-07 RX ORDER — ACETAMINOPHEN 325 MG/1
650 TABLET ORAL EVERY 6 HOURS PRN
Qty: 30 TABLET | Refills: 1 | Status: SHIPPED | OUTPATIENT
Start: 2022-07-07 | End: 2022-08-09

## 2022-07-07 RX ORDER — IPRATROPIUM BROMIDE 0.5 MG/2.5ML
500 SOLUTION RESPIRATORY (INHALATION) EVERY 8 HOURS
Qty: 75 ML | Refills: 0 | Status: SHIPPED | OUTPATIENT
Start: 2022-07-07 | End: 2022-07-27

## 2022-07-07 RX ADMIN — POLYETHYLENE GLYCOL 3350 17 G: 17 POWDER, FOR SOLUTION ORAL at 10:07

## 2022-07-07 RX ADMIN — IPRATROPIUM BROMIDE AND ALBUTEROL SULFATE 3 ML: 2.5; .5 SOLUTION RESPIRATORY (INHALATION) at 12:07

## 2022-07-07 RX ADMIN — IPRATROPIUM BROMIDE AND ALBUTEROL SULFATE 3 ML: 2.5; .5 SOLUTION RESPIRATORY (INHALATION) at 04:07

## 2022-07-07 RX ADMIN — MUPIROCIN: 20 OINTMENT TOPICAL at 09:07

## 2022-07-07 RX ADMIN — NYSTATIN: 100000 CREAM TOPICAL at 10:07

## 2022-07-07 RX ADMIN — NICOTINE 1 PATCH: 21 PATCH, EXTENDED RELEASE TRANSDERMAL at 10:07

## 2022-07-07 RX ADMIN — DOXYCYCLINE 100 MG: 100 INJECTION, POWDER, LYOPHILIZED, FOR SOLUTION INTRAVENOUS at 10:07

## 2022-07-07 RX ADMIN — IPRATROPIUM BROMIDE AND ALBUTEROL SULFATE 3 ML: 2.5; .5 SOLUTION RESPIRATORY (INHALATION) at 11:07

## 2022-07-07 RX ADMIN — IPRATROPIUM BROMIDE AND ALBUTEROL SULFATE 3 ML: 2.5; .5 SOLUTION RESPIRATORY (INHALATION) at 01:07

## 2022-07-07 RX ADMIN — CHLORHEXIDINE GLUCONATE 0.12% ORAL RINSE 15 ML: 1.2 LIQUID ORAL at 10:07

## 2022-07-07 RX ADMIN — FAMOTIDINE 20 MG: 10 INJECTION INTRAVENOUS at 10:07

## 2022-07-07 RX ADMIN — IPRATROPIUM BROMIDE AND ALBUTEROL SULFATE 3 ML: 2.5; .5 SOLUTION RESPIRATORY (INHALATION) at 07:07

## 2022-07-07 NOTE — PT/OT/SLP PROGRESS
Physical Therapy         Treatment        Josafat Boudreaux   MRN: [id number]     PT Received On: 07/07/22  PT Start Time: 1144     PT Stop Time: 1207    PT Total Time (min): 23 min       Billable Minutes:  Gait Rounldat34 and Therapeutic Activity 10  Total Minutes: 23    Treatment Type: Treatment  PT/PTA: PTA     PTA Visit Number: 3       General Precautions: Standard,    Orthopedic Precautions: Orthopedic Precautions : N/A   Braces:      Spiritual, Cultural Beliefs, Mandaen Practices, Values that Affect Care: no    Subjective:  Communicated with nurse prior to session.         Objective:  Patient found sitting up in chair, with Patient found with: Tracheostomy, PEG Tube, oxygen, telemetry      Balance:   Static Stand: GOOD-: Takes MODERATE challenges from all directions inconsistently  Dynamic stand: GOOD-: Needs SUPERVISION only during gait and able to self right with moderate LOB inconsistently    Transfer Training:  Sit to stand:Supervision or Set-up Assistance with No Assistive Device x3 trials    Gait Training:  Patient gait trained   300  feet on level tile with No Assistive Device with Stand-by Assistance.  Pt with demonstarting a  reciprocal gait with decreased stephanie and decreased step length.Impairments contributing to gait deviations include impaired balance    Activity Tolerance:  Patient tolerated treatment well    Patient left up in chair with all lines intact and call button in reach.    Assessment:  Josafat Boudreaux is a 56 y.o. male with a medical diagnosis of Vocal cord mass. He presents with increased gait and balance.    Rehab potential is excellent.    Activity tolerance: Excellent    Discharge recommendations: Discharge Facility/Level of Care Needs: home health PT     Equipment recommendations:       GOALS:   Multidisciplinary Problems     Physical Therapy Goals        Problem: Physical Therapy    Goal Priority Disciplines Outcome Goal Variances Interventions   Physical Therapy Goal      PT, PT/OT Ongoing, Progressing     Description: Goals to be met by: 2022    Patient will increase functional independence with mobility by performin. Sit to stand transfer with [place].  2. Gait  x 200 feet with Modified [place] using LRAD.   3. Patient will ascend/descend 4 steps with Modified [place] using LRAD.                     PLAN:    Patient to be seen daily  to address the above listed problems via gait training, therapeutic activities  Plan of Care expires:    Plan of Care reviewed with: patient, spouse         2022

## 2022-07-07 NOTE — PROGRESS NOTES
[facility] - 7th Floor ICU  Adult Nutrition  Progress Note    SUMMARY       Recommendations    Recommendation/Intervention:   May switch to Diabetisource 7 cans per day for home tube feeding (was on this prior to admit)  Provides:  2100 kcal, 105 gm protein, 1428 ml free water    Free water flush rec: 100ml 7 times /day      Assessment and Plan    Nutrition Problem  Malnutrition    Related to (etiology):   Chronic condition    Signs and Symptoms (as evidenced by):   Wt loss, muscle wasting    Interventions(treatment strategy):  Modified composition of enteral nutrition, Modified rate of enteral nutrition and Collaboration with other providers    Nutrition Diagnosis Status:   Continues    Goals: Provides adequate nutrition to meet est needs.  Nutrition Goal Status: Continues  Communication of RD Recs: reviewed with RN    Malnutrition Assessment  Malnutrition Type: acute illness or injury (mild)  Weight Loss (Malnutrition): greater than 5% in 1 month (comparing previous admit wt to current wt.)  Energy Intake (Malnutrition): other (see comments) (unable to eval)  Subcutaneous Fat (Malnutrition): other (see comments) (does not meet criteria)  Muscle Mass (Malnutrition): mild depletion  Hand  Strength, Left (Malnutrition): unable to eval  Hand  Strength, Right (Malnutrition): unable to eval   [place] (Muscle Loss): mild depletion   Edema (Fluid Accumulation): 0-->no edema present     Reason for Assessment    Reason For Assessment: consult, new tube feeding    Diagnosis: 1. Type II respiratory failure requiring mechanical ventilation  2. Lactic Acidosis  3. Community-Acquired Pneumonia  4. 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 No kcal from meds.    7/1/22: Pt now off vent. Updated est needs. Tolerating at current rate per RN. Noted partial  "thickness wound (coccyx).    : pt tolerating tube feeding per nurse; had stopped it earlier due to residual of 100ml, now restarted; did report a very small BM this morning per family member; noted miralax started yesterday    : pt tolerating tube feeding; plans to d/c today, may switch back to home tube feeding Diabetisource 7 cans per day bolus    Nutrition Risk Screen    Nutrition Risk Screen: tube feeding or parenteral nutrition    Nutrition/Diet History    Spiritual, Cultural Beliefs, Presybeterian Practices, Values that Affect Care: no  Food Allergies: NKFA    Anthropometrics    Temp: 98.2 °F (36.8 °C)  Height: 5' 9" (175.3 cm)  Height (inches): 69 in  Weight Method: Bed Scale  Weight: 59 kg (130 lb 1.1 oz)  Weight (lb): 130.07 lb  Ideal Body Weight (IBW), Male: 160 lb  % Ideal Body Weight, Male (lb): 81.29 %  BMI (Calculated): 19.2  BMI Grade: 18.5-24.9 - normal  Usual Body Weight (UBW), k.9 kg (Previous admit wt)  % Usual Body Weight: 84.54  % Weight Change From Usual Weight: -15.64 %        Lab/Procedures/Meds    Pertinent Labs Reviewed: reviewed  Pertinent Labs Comments:  Cl 94 Crea 0.56 Glu 130 Ca 11.1  Pertinent Medications Reviewed: reviewed  Pertinent Medications Comments: famotidine, miralax    Estimated/Assessed Needs    Weight Used For Calorie Calculations: 59 kg (130 lb 1.1 oz)  Energy Calorie Requirements (kcal): 1833kcal  Energy Need Method: Nicholas- Otisor  Protein Requirements: 89gm (1.5g/kg)  Weight Used For Protein Calculations: 59 kg (130 lb 1.1 oz)  Estimated Fluid Requirement Method: RDA Method  RDA Method (mL): 1833     Nutrition Prescription Ordered    Current Diet Order: NPO  Current Nutrition Support Formula Ordered: Impact Peptide 1.5  Current Nutrition Support Rate Ordered: 65 (ml)    Evaluation of Received Nutrient/Fluid Intake    Enteral Calories (kcal): 1950  Enteral Protein (gm): 122.3  Enteral (Free Water) Fluid (mL): 836  % Kcal Needs: 100%  % Protein Needs: " 137%  Energy Calories Required: meeting needs  Protein Required: meeting needs  % Intake of Estimated Energy Needs: 75 - 100 %  % Meal Intake: NPO    Nutrition Risk    Level of Risk/Frequency of Follow-up: high     Monitor and Evaluation    Food and Nutrient Intake: energy intake     Nutrition Follow-Up    RD Follow-up?: Yes

## 2022-07-07 NOTE — ANESTHESIA PREPROCEDURE EVALUATION
07/07/2022  Josafat Boudreaux is a 56 y.o., male.    COVID STATUS: VACCINE X 2 (MODERNA, LAST DOSE 9/23/21)  BETA-BLOCKER: NONE    PAT NURSE PHONE INTERVIEW 7/7/22    PROBLEM LIST:  -  LARYNGEAL MASS, DYSPHAGIA (Dx 6/6/22)      - HOSP. 6/10-6/22/22         - S/P 6/10/22 TRACH         - S/P 6/14/22 PEG, RUL ENDOBRONCHIAL MASS Bx         Final Diagnosis   ENDOBRONCHIAL MASS RUL, CYTOLOGY (TWO CELL BLOCK SLIDES):     NEARLY ACELLULAR SPECIMEN CONSISTING OF FRESH BLOOD CLOT.     NO ATYPICAL OR MALIGNANT CELLS IDENTIFIED.         - HOSP. 6/25/22 - 7/7/22 w/ACUTE on CHRONIC RESP. FAILURE, HYPOXIA, HYPERCAPNIA, SIRS, ACUTE ENCEPHALOPATHY, HYPERKALEMIA, SEIZURE  -  HTN (NO MEDs)  -  NEW ONSET SEIZURE 6/25/22 x 2  -  MILD LEFT CAROTID ARTERY DISEASE  -  6/18/22 RIGHT UE VENOUS US = A superficial thrombosis was identified in the right basilic vein.  -  PAST SMOKER - QUIT 6/5/22, 20+ PPY - O2 @ 3L/NC CONTINUOUS; NEB Tx 4x/DAY      - 7/6/22 CXR = Bibasilar subsegmental atelectasis.  -  Hx ETOH DAILY (per PATRICIA)    AM Rx DOS: DUONEB TX PRN, PULMICORT    ORDERS -   SURGEON: 6/26/22 EKG; 7/5/22 CBC; 7/6/22 CXR; 7/7/22 BMP; NO NEW ORDERS  ANESTHESIA: T&S    Pre-op Assessment          Review of Systems      Physical Exam  General: Well nourished    Airway:  TM Distance: Normal  Tongue: Normal  Neck ROM: Normal ROM  Pre-Existing Airway: Tracheostomy tube    Dental:  Intact    Chest/Lungs:  Clear to auscultation, Normal Respiratory Rate    Heart:  Rate: Normal  Rhythm: Regular Rhythm        Anesthesia Plan  Type of Anesthesia, risks & benefits discussed:    Anesthesia Type: Gen ETT  Intra-op Monitoring Plan: Standard ASA Monitors  Post Op Pain Control Plan: multimodal analgesia and IV/PO Opioids PRN  Induction:  IV  Airway Plan: Direct and Via Tracheostomy  Informed Consent: Informed consent signed with the Patient and all  parties understand the risks and agree with anesthesia plan.  All questions answered.   ASA Score: 3  Day of Surgery Review of History & Physical: H&P Update referred to the surgeon/provider.I have interviewed and examined the patient. I have reviewed the patient's H&P dated: There are no significant changes. H&P completed by Anesthesiologist.    Ready For Surgery From Anesthesia Perspective.     .      6/18/22 ECHO  Summary    · The estimated ejection fraction is 60-65%.  · Normal systolic function.  · Grade I left ventricular diastolic dysfunction.  · Mild right atrial enlargement.  · Normal central venous pressure (3 mmHg).      6/17/22 CT HEAD/NECK  Findings:     Carotid arteries were assessed in accordance with the NASCET criteria.     Intracranial Vascular structures:     Internal carotid arteries:Unremarkable.     Middle cerebral arteries:Unremarkable.     Anterior cerebral arteries:Unremarkable.     Vertebral arteries:The vertebrobasilar junction is unremarkable. Both vertebral arteries are patent and normal in caliber.     Basilar artery:Unremarkable.     Posterior cerebral arteries:Unremarkable.     Neck Vascular structures:The visualized aorta and origin of the great vessels of the neck appear unremarkable.     Carotids:     Common carotid arteries:The right common carotid arteries appear unremarkable.     Internal carotid artery:The right internal carotid arteries appear unremarkable. There is mild stenosis at the origin of left proximal internal carotid artery (series 6, image 75 and series 15, image 20) secondary to dense calcified atheromatous plaque. The remainder of the cervical segment of the left internal carotid artery appears unremarkable.     Vertebral arteries:The origins of both vertebral arteries are unremarkable. Both vertebral arteries are patent and normal in caliber.     Brain parenchyma:No abnormal brain parenchymal enhancement is seen.     Miscellaneous:There is consolidation is right  upper lobe. Additional ground-glass opacities are also seen on the remainder of the visualized lungs. These findings are consistent with an infectious process. Small right pleural effusion is seen. A tracheostomy tube is seen in stable position. There is an ill-defined soft tissue mass in the right perihilar region which measures approximately 3.1 x 2.9 x 4.6 cm (series 6, image 136), of concern for neoplasm. There is an ill-defined enhancing soft tissue mass in the glottis infiltrating into the paraglottic spaces and the subglottic region with obliteration of the airway, of concern for a neoplasm (series 6, image 86).  This was also described on recent CT soft tissue neck June 6, 2022     Impression:  Impression:     1. There is mild stenosis at the origin of left proximal internal carotid artery (series 6, image 75 and series 15, image 20) secondary to dense calcified atheromatous plaque.     2. There is consolidation is right upper lobe. Additional ground-glass opacities are also seen on the remainder of the visualized lungs. These findings are consistent with an infectious process. Small right pleural effusion is seen. There is an ill-defined soft tissue mass in the right perihilar region which measures approximately 3.1 x 2.9 x 4.6 cm (series 6, image 136), of concern for neoplasm. Correlate clinically as regards further evaluation and followup with CT chest. There is an ill-defined enhancing soft tissue mass in the glottis infiltrating into the paraglottic spaces and the subglottic region with obliteration of the airway, of concern for a neoplasm (series 6, image 86).     3. Unremarkable CT angiogram of the head. Details and other findings as described above.    7/6/22 CXR  FINDINGS:  Tracheostomy is in place.  There is improved aeration of the right upper lung.  There is bibasilar subsegmental atelectasis.  There is no definite visible pneumothorax.     Impression:     1. Improved aeration of the right upper  lung.  2. Bibasilar subsegmental atelectasis.    6/14/22 ANESTHESIA

## 2022-07-07 NOTE — PLAN OF CARE
D/C order noted- spoke to pt and spouse re: d/c.  Spouse states they have all DME at home, but would like to continue with DuoNebs at home as it does not raise his heart rate.  Pt is worried about the cost- called [facility] in [facility] and was informed as he is taking here the cost would be $24 for the month.  Updated Ms. Boudreaux and she states they can afford that cost.  Spoke to nurse Mckeon with request to call Dr. Sage to order Duo Nebs for d/c and sent communication on the Chat.  Pt's spouse also wants to d/c home on the TF he has been on here- Impact Peptide 1.5.  Called Enteral provider [facility]# [phone] and spoke to Ronda- she states that the pt currently is on Diabeta Source   7 can bolus/day  which provides 2100 kcal/24 hr.  Ronda further states they cannot get Impact Peptide but can provide Isosource 1.5 if he needs more calories.  I contacted MELISSA hawk [phone] seeing pt here and she reviewed pt's EMR and states that he could d/c back on Diabeta Source as was taking at home.  I called Mrs. Boudreaux and informed of above re: TF formula- she states she is agreeable to d/c home with Diabeta Source as before, but she would like to speak with MELISSA as she does have a question.  I contacted Ronda and informed pt will resume his home enterals as before- no change at this time.  D/C info sent to [facility]  per [facility]- Ms. Boudreaux states she has contacted them about d/c today.

## 2022-07-07 NOTE — PROGRESS NOTES
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       Discharged Condition: admit problems have stabilized   resolved  (patient significantly improved, on oxygen, trach looks good, secretions are slowly decreasing, Dr. Medrano and seen you has contacted the patient and the wife and he needs to be discharged home today for a follow-up appointment with the ENT clinic at [facility] tomorrow to have surgery on Monday.      HOSPITAL COURSE:    Initial Presentation:     56-year-old male patient Dr. 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  Since then he he went home and came back diagnosed with right upper lobe pneumonia the concerns were for postobstructive pneumonia.  Patient retrieved vancomycin and doxycycline, to hospitalization patient has been seen by pulmonology is, as well as the ENT and the plan has been prior to the 4th of July to discharge him home to have surgery at [facility] on Monday.  Throughout  Hospitalization  main concern was constipation and increased secretions through the trach, for that we added some Mucinex increase the fluid intake per PEG tube cough syrup etc..  Due to aggressive antibiotic therapy patient is not circumcised, developed some phimosis  some ketoconazole cream to apply twice a day.  Other than that he is in good spirits ready  to go home, wife had a phone conversation with Dr. Alonso  on yesterday came will proceed to  discharge him home so he can make an appointment to [facility] clinic.    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 Tony Bertrand  Future Appointments   Date Time Provider Department Center   7/8/2022 10:30 AM OCTAVIO Stuart [facility] ENT [facility]   7/12/2022  2:00 PM [facility]       Follow-up Plans from This Hospitalization:  Sx on Monday  Then FU with Dr birch in 2 weeks     Last CBC/BMP/HgbA1c (if applicable):  Recent Results (from the past 336 hour(s))   CBC with Differential    Collection Time: 07/05/22 12:09 AM   Result Value Ref Range    WBC 11.4 4.5 - 11.5 x10(3)/mcL    Hgb 15.0 14.0 - 18.0 gm/dL    Hct 45.9 42.0 - 52.0 %    Platelet 282 130 - 400 x10(3)/mcL   CBC with Differential    Collection Time: 06/30/22  1:42 AM   Result Value Ref Range    WBC 9.2 4.5 - 11.5 x10(3)/mcL    Hgb 14.7 14.0 - 18.0 gm/dL    Hct 46.7 42.0 - 52.0 %    Platelet 370 130 - 400 x10(3)/mcL   CBC with Differential    Collection Time: 06/26/22  5:54 PM   Result Value Ref Range    WBC 16.4 (H) 4.5 - 11.5 x10(3)/mcL    Hgb 17.3 14.0 - 18.0 gm/dL    Hct 55.3 (H) 42.0 - 52.0 %    Platelet 431 (H) 130 - 400 x10(3)/mcL     Recent Results (from the past 336 hour(s))   Basic metabolic panel    Collection Time: 07/07/22  6:07 AM   Result Value Ref Range    Sodium Level 136 136 - 145 mmol/L    Potassium Level 4.2 3.5 - 5.1 mmol/L     Carbon Dioxide 34 (H) 22 - 29 mmol/L    Glucose Level 155 (H) 74 - 100 mg/dL    Blood Urea Nitrogen 23.2 8.4 - 25.7 mg/dL    Creatinine 0.61 (L) 0.73 - 1.18 mg/dL    Calcium Level Total 11.1 (H) 8.4 - 10.2 mg/dL   Basic metabolic panel    Collection Time: 07/06/22  5:12 AM   Result Value Ref Range    Sodium Level 137 136 - 145 mmol/L    Potassium Level 4.2 3.5 - 5.1 mmol/L    Carbon Dioxide 34 (H) 22 - 29 mmol/L    Glucose Level 132 (H) 74 - 100 mg/dL    Blood Urea Nitrogen 24.0 8.4 - 25.7 mg/dL    Creatinine 0.62 (L) 0.73 - 1.18 mg/dL    Calcium Level Total 11.6 (H) 8.4 - 10.2 mg/dL   Basic metabolic panel    Collection Time: 07/05/22  5:33 AM   Result Value Ref Range    Sodium Level 137 136 - 145 mmol/L    Potassium Level 4.7 3.5 - 5.1 mmol/L    Carbon Dioxide 37 (H) 22 - 29 mmol/L    Glucose Level 130 (H) 74 - 100 mg/dL    Blood Urea Nitrogen 21.9 8.4 - 25.7 mg/dL    Creatinine 0.56 (L) 0.73 - 1.18 mg/dL    Calcium Level Total 11.1 (H) 8.4 - 10.2 mg/dL     No results found for: HGBA1C    Discharge Medication List:     Medication List      START taking these medications    acetaminophen 325 MG tablet  Commonly known as: TYLENOL  2 tablets (650 mg total) by Per G Tube route every 6 (six) hours as needed.     doxycycline 100 MG Cap  Commonly known as: VIBRAMYCIN  Take 1 capsule (100 mg total) by mouth 2 (two) times daily.     ipratropium 0.02 % nebulizer solution  Commonly known as: ATROVENT  Take 2.5 mLs (500 mcg total) by nebulization every 8 (eight) hours. Rescue     mupirocin 2 % ointment  Commonly known as: BACTROBAN  Apply topically once daily.     nystatin cream  Commonly known as: MYCOSTATIN  Apply topically 2 (two) times daily.        CONTINUE taking these medications    albuterol-ipratropium 2.5 mg-0.5 mg/3 mL nebulizer solution  Commonly known as: DUO-NEB  Take 3 mLs by nebulization every 4 (four) hours as needed for Shortness of Breath or Wheezing. Rescue     budesonide 0.5 mg/2 mL nebulizer  solution  Commonly known as: PULMICORT  Take 2 mLs (0.5 mg total) by nebulization every 12 (twelve) hours. Controller     calcium-vitamin D3 500 mg-5 mcg (200 unit) per tablet  Commonly known as: OS-CARMELO 500 + D3  1 tablet by Per G Tube route 2 (two) times daily with meals.     diphenhydrAMINE 25 mg capsule  Commonly known as: BENADRYL  1 capsule (25 mg total) by Per G Tube route every 6 (six) hours as needed for Itching.     docusate 50 mg/5 mL liquid  Commonly known as: COLACE  10 mLs (100 mg total) by Per G Tube route 2 (two) times daily as needed (constipation).     hydrocortisone 1 % cream  Apply topically 2 (two) times daily as needed (rash). Apply to rash     nicotine 21 mg/24 hr  Commonly known as: NICODERM CQ  Place 1 patch onto the skin once daily.     polyethylene glycol 17 gram Pwpk  Commonly known as: GLYCOLAX  17 g by Per G Tube route 2 (two) times daily as needed (constipation).        STOP taking these medications    aspirin 81 MG EC tablet  Commonly known as: ECOTRIN     multivitamin capsule     neomycin-bacitracin-polymyxin ointment  Commonly known as: NEOSPORIN           Where to Get Your Medications      [facility] [address] [address]    Phone: [phone]   · acetaminophen 325 MG tablet  · doxycycline 100 MG Cap  · ipratropium 0.02 % nebulizer solution  · mupirocin 2 % ointment  · nystatin cream         Patient Instructions:  No discharge procedures on file.    Signing Physician:  Heraclio Garcia MD

## 2022-07-08 ENCOUNTER — PATIENT OUTREACH (OUTPATIENT)
Dept: ADMINISTRATIVE | Facility: CLINIC | Age: 57
End: 2022-07-08
Payer: MEDICARE

## 2022-07-08 ENCOUNTER — OFFICE VISIT (OUTPATIENT)
Dept: OTOLARYNGOLOGY | Facility: CLINIC | Age: 57
DRG: 012 | End: 2022-07-08
Payer: MEDICARE

## 2022-07-08 VITALS
BODY MASS INDEX: 19.99 KG/M2 | HEART RATE: 112 BPM | TEMPERATURE: 99 F | WEIGHT: 135 LBS | HEIGHT: 69 IN | SYSTOLIC BLOOD PRESSURE: 112 MMHG | DIASTOLIC BLOOD PRESSURE: 73 MMHG | RESPIRATION RATE: 18 BRPM

## 2022-07-08 DIAGNOSIS — J38.3 VOCAL CORD MASS: ICD-10-CM

## 2022-07-08 DIAGNOSIS — Z93.0 TRACHEOSTOMY DEPENDENT: Primary | ICD-10-CM

## 2022-07-08 PROCEDURE — 31525 DX LARYNGOSCOPY EXCL NB: CPT | Mod: PBBFAC | Performed by: STUDENT IN AN ORGANIZED HEALTH CARE EDUCATION/TRAINING PROGRAM

## 2022-07-08 PROCEDURE — 99215 OFFICE O/P EST HI 40 MIN: CPT | Mod: PBBFAC | Performed by: NURSE PRACTITIONER

## 2022-07-08 PROCEDURE — 99204 OFFICE O/P NEW MOD 45 MIN: CPT | Mod: 25,S$PBB,, | Performed by: NURSE PRACTITIONER

## 2022-07-08 PROCEDURE — 99204 PR OFFICE/OUTPT VISIT, NEW, LEVL IV, 45-59 MIN: ICD-10-PCS | Mod: 25,S$PBB,, | Performed by: NURSE PRACTITIONER

## 2022-07-08 NOTE — PROGRESS NOTES
The scope used for the exam was:  Flexible scope ENF-P4  Serial Number:  1)    [id number]    [x]   2)    [id number]    []   3)    [id number]    []   4)    [id number]    []   5)    [id number]    []   6)    [id number]    []       The scope used for the exam was:  Rigid scope   Serial Number:  1)   [id number]    []   2)   6282    []   3)   7330    []   4)    3384   []   5)    0824   []   6)    5554   []     7)   7425    []   8)   2240    []   9)   1109    []

## 2022-07-08 NOTE — PROGRESS NOTES
C3 nurse attempted to contact Josafat Alfonso Boudreaux for a TCC post hospital discharge follow up call. The patient is unable to conduct the call @ this time. The patient requested a callback.  The patient does have an appointment with Tawanna Beauchamp (ENT) on 7/8/2022 @ 1030.       The patient does not have a scheduled HOSFU appointment within 7-14 days post hospital discharge date 7/7/2022. Message sent to Physician staff to assist with HOSFU appointment scheduling.

## 2022-07-08 NOTE — PROGRESS NOTES
[facility]  Otolaryngology Clinic Note    Josafat Boudreaux  Encounter Date: 7/8/2022  YOB: 1965  Physician: Lisseth Krueger MD    Chief Complaint: Laryngeal mass    HPI: Josafat Boudreaux is a 56 y.o. male referred to clinic by Dr. Emory Alonso for laryngeal mass. Patients wife provides history. She states that patient first noticed hoarseness in November 2021. Got progressively worse. Presented to Dr. Alonso in early June 2022 for these complaints. Was noted to have a laryngeal mass on flexible laryngoscopy. Several days after the flexible laryngoscopy patient developed worsening swelling of the airway and presented to the ER in respiratory distress. He underwent emergent tracheostomy with Dr. Junior Alonso. Patient was subsequently discharged home but readmitted shortly after with seizures. He spent 3 days in the ICU on the ventilator. During hospitalization, patient underwent PEG tube placement. He reports that he was not having any issues with PO intake prior to the tracheostomy placement. Now he is PEG dependent. Only taking some ice chips by mouth. He presents today to discuss laryngectomy.     ROS:   General: Negative except per HPI  Skin: Denies rash, ulcer, or lesion.  Eyes: Denies vision changes or diplopia.  Ears: Negative except per HPI  Nose: Negative except per HPI  Throat/mouth: Negative except per HPI  Cardiovascular: Negative except per HPI  Respiratory: Negative except per HPI  Neck: Negative except per HPI  Endocrine: Negative except per HPI  Neurologic: Negative except per HPI    Other 10-point review of systems negative except per HPI      Review of patient's allergies indicates:  No Known Allergies    Past Medical History:   Diagnosis Date    Cancer     COPD (chronic obstructive pulmonary disease)     Dysphagia     Hypertension     Vocal cord mass        Past Surgical History:   Procedure Laterality Date    HIP SURGERY      HUMERUS  FRACTURE SURGERY      INSERT ARTERIAL LINE  2022         TRACHEOSTOMY N/A 6/10/2022    Procedure: CREATION, TRACHEOSTOMY;  Surgeon: Junior Alonso MD;  Location: [facility];  Service: ENT;  Laterality: N/A;       Social History     Socioeconomic History    Marital status:    Tobacco Use    Smoking status: Former Smoker     Types: Cigarettes     Quit date: 2022     Years since quittin.0    Smokeless tobacco: Never Used   Substance and Sexual Activity    Alcohol use: Never    Drug use: Never       Family History   Family history unknown: Yes       Outpatient Encounter Medications as of 2022   Medication Sig Dispense Refill    acetaminophen (TYLENOL) 325 MG tablet 2 tablets (650 mg total) by Per G Tube route every 6 (six) hours as needed. 30 tablet 1    albuterol-ipratropium (DUO-NEB) 2.5 mg-0.5 mg/3 mL nebulizer solution Take 3 mLs by nebulization every 4 (four) hours as needed for Shortness of Breath or Wheezing. Rescue 90 mL 1    albuterol-ipratropium (DUO-NEB) 2.5 mg-0.5 mg/3 mL nebulizer solution Take 3 mLs by nebulization every 6 (six) hours. Rescue 75 mL 0    calcium-vitamin D3 (OS-CARMELO 500 + D3) 500 mg-5 mcg (200 unit) per tablet 1 tablet by Per G Tube route 2 (two) times daily with meals.      diphenhydrAMINE (BENADRYL) 25 mg capsule 1 capsule (25 mg total) by Per G Tube route every 6 (six) hours as needed for Itching. 90 capsule 1    doxycycline (VIBRAMYCIN) 100 MG Cap Take 1 capsule (100 mg total) by mouth 2 (two) times daily. 12 capsule 0    hydrocortisone 1 % cream Apply topically 2 (two) times daily as needed (rash). Apply to rash 30 g 1    ipratropium (ATROVENT) 0.02 % nebulizer solution Take 2.5 mLs (500 mcg total) by nebulization every 8 (eight) hours. Rescue 75 mL 0    mupirocin (BACTROBAN) 2 % ointment Apply topically once daily. 15 g 0    nicotine (NICODERM CQ) 21 mg/24 hr Place 1 patch onto the skin once daily.  0    nystatin (MYCOSTATIN) cream Apply  topically 2 (two) times daily. 30 g 1    budesonide (PULMICORT) 0.5 mg/2 mL nebulizer solution Take 2 mLs (0.5 mg total) by nebulization every 12 (twelve) hours. Controller (Patient not taking: Reported on 7/8/2022) 90 mL 1    docusate (COLACE) 50 mg/5 mL liquid 10 mLs (100 mg total) by Per G Tube route 2 (two) times daily as needed (constipation). (Patient not taking: Reported on 7/8/2022) 473 mL 1    polyethylene glycol (GLYCOLAX) 17 gram PwPk 17 g by Per G Tube route 2 (two) times daily as needed (constipation). (Patient not taking: Reported on 7/8/2022) 30 packet 1    [DISCONTINUED] aspirin (ECOTRIN) 81 MG EC tablet Take 81 mg by mouth once daily.       Facility-Administered Encounter Medications as of 7/8/2022   Medication Dose Route Frequency Provider Last Rate Last Admin    [DISCONTINUED] acetaminophen tablet 650 mg  650 mg Per G Tube Q6H PRN Heraclio Sage MD   650 mg at 07/05/22 2121    [DISCONTINUED] albuterol-ipratropium 2.5 mg-0.5 mg/3 mL nebulizer solution 3 mL  3 mL Nebulization Q4H PRN Junior Jones MD   3 mL at 07/07/22 1126    [DISCONTINUED] albuterol-ipratropium 2.5 mg-0.5 mg/3 mL nebulizer solution 3 mL  3 mL Nebulization Q6H Heraclio Sage MD   3 mL at 07/07/22 1336    [DISCONTINUED] chlorhexidine 0.12 % solution 15 mL  15 mL Mouth/Throat BID Junior Jones MD   15 mL at 07/07/22 1036    [DISCONTINUED] diphenhydrAMINE capsule 25 mg  25 mg Oral Nightly PRN Tony Jorge DO   25 mg at 07/06/22 2134    [DISCONTINUED] doxycycline (VIBRAMYCIN) 100 mg in dextrose 5 % 250 mL IVPB  100 mg Intravenous Q12H Heraclio Sage  mL/hr at 07/07/22 1052 100 mg at 07/07/22 1052    [DISCONTINUED] enoxaparin injection 40 mg  40 mg Subcutaneous Daily Junior Jones MD   40 mg at 07/06/22 1706    [DISCONTINUED] famotidine (PF) injection 20 mg  20 mg Intravenous Q12H Junior Jones MD   20 mg at 07/07/22 1036    [DISCONTINUED] guaiFENesin 100 mg/5 ml syrup 200 mg  200 mg Oral Q4H PRN Heraclio Sage MD    "200 mg at 07/06/22 0811    [DISCONTINUED] ipratropium 0.02 % nebulizer solution 0.5 mg  0.5 mg Nebulization Q8H Heraclio Sage MD   0.5 mg at 07/06/22 0724    [DISCONTINUED] mupirocin 2 % ointment   Topical (Top) Daily OLIVIA Tena MD   Given at 07/07/22 0900    [DISCONTINUED] nicotine 21 mg/24 hr 1 patch  1 patch Transdermal Daily Tony Jorge DO   1 patch at 07/07/22 1041    [DISCONTINUED] nystatin cream   Topical (Top) BID Heraclio Sage MD   Given at 07/07/22 1042    [DISCONTINUED] polyethylene glycol packet 17 g  17 g Oral Daily Heraclio Sage MD   17 g at 07/07/22 1037    [DISCONTINUED] sodium chloride 0.9% flush 10 mL  10 mL Intravenous PRN Junior Jones MD           Physical Exam:  Vitals:    07/08/22 1029   BP: 112/73   BP Location: Right arm   Patient Position: Sitting   BP Method: Medium (Automatic)   Pulse: (!) 112   Resp: 18   Temp: 98.5 °F (36.9 °C)   TempSrc: Oral   Weight: 61.2 kg (135 lb)   Height: 5' 9" (1.753 m)       Constitutional  General Appearance: ill appearing, no respiratory distress.   HEENT  Eyes: PEERLA, EOMI, normal conjunctivae  Ears: Hearing well at conversation level   AD: auricle normal, EAC normal, TM intact, no RICHARD   AS: auricle normal, EAC normal, TM intact, no RICHARD  Nose: septum midline, no inferior turbinate hypertrophy, no polyps  OC/OP: dentition moderate, no oral lesions, tongue/FOM/BOT- soft, no leukoplakia/ulcerations/ tenderness  Nasopharynx, Hypopharynx, and Larynx:    Indirect: attempted, limited view due to patient intolerance  Neck: soft, non-tender, no palpable lymph nodes; tracheostomy in place with trach collar   Neuro: CN II - XII intact bilaterally  Cardiovascular: peripheral pulses palpable  Respiratory: non-labored respirations  Psychiatric: oriented to time, place and person, no depression, anxiety or agitation    Procedures:Flexible Fiberoptic Laryngoscopy/Nasopharyngoscopy via right nare    Procedure in Detail: Informed consent was obtained from " the patient after explanation of procedure, indications, risks and benefits. Flexible endoscopy was performed through the nasal passages. The nasal cavity, nasopharynx, oropharynx, hypopharynx and larynx were adequately visualized. The true vocal cords and arytenoids were examined during phonation and repose.    Anesthesia: None  Adverse Events: None  Resident Surgeon: Lisseth Krueger MD  Blood loss: none  Condition: good    Findings:  NP/OP: no masses/lesions of NC, eustachian tube, fossa of Rosenmuller, no adenoid hypertrophy  BOT/vallecula: no lingual hypertrophy, no masses/lesions  Piriform sinuses/post-cricoid: no masses/lesions, no pooling of secretions  Epiglottis: lingual and laryngeal surfaces within normal limits  Endolarynx with large lesion of the glottis obscuring visualization of the false and true vocal cords. There is significant pooling of secretions.   Bilateral piriforms appear uninvolved.      Pertinent Data:  ? LABS:  ? AUDIO:  ? CT Scan:    Imaging:   I personally reviewed the following images:    Summary of Outside Records:      Assessment/Plan:  Josafat Boudreuax is a 56 y.o. male with laryngeal mass s/p tracheostomy. He presents as a referral from Dr. Emory Alonso for consideration of total laryngectomy.   We discussed the procedure in detail. We will plan to begin with a panendoscopy with biopsy and frozen section for diagnosis. We discussed that if the pathology is equivocal or nondiagnostic we likely will not proceed with surgical intervention. However if biopsy confirms squamous cell carcinoma, we will proceed with laryngectomy. We discussed the expected post operative course for laryngectomy including likely 5-7 day hospitalization. We discussed potential complications including pain, bleeding, infection, breakdown of wounds, pharyngeal leak, hypothyroidism, hypocalcemia related to parathyroid etiology. Patient and wife expressed understanding and wish to proceed. We will plan for  intervention on Monday 7/11.    -       Lisseth Krueger MD  [facility] Department of Otolaryngology  -[place]

## 2022-07-08 NOTE — H&P (VIEW-ONLY)
[facility]  Otolaryngology Clinic Note    Josafat Boudreaux  Encounter Date: 7/8/2022  YOB: 1965  Physician: Lisseth Krueger MD    Chief Complaint: Laryngeal mass    HPI: Josafat Boudreaux is a 56 y.o. male referred to clinic by Dr. Emory Alonso for laryngeal mass. Patients wife provides history. She states that patient first noticed hoarseness in November 2021. Got progressively worse. Presented to Dr. Alonso in early June 2022 for these complaints. Was noted to have a laryngeal mass on flexible laryngoscopy. Several days after the flexible laryngoscopy patient developed worsening swelling of the airway and presented to the ER in respiratory distress. He underwent emergent tracheostomy with Dr. Junior Alonso. Patient was subsequently discharged home but readmitted shortly after with seizures. He spent 3 days in the ICU on the ventilator. During hospitalization, patient underwent PEG tube placement. He reports that he was not having any issues with PO intake prior to the tracheostomy placement. Now he is PEG dependent. Only taking some ice chips by mouth. He presents today to discuss laryngectomy.     ROS:   General: Negative except per HPI  Skin: Denies rash, ulcer, or lesion.  Eyes: Denies vision changes or diplopia.  Ears: Negative except per HPI  Nose: Negative except per HPI  Throat/mouth: Negative except per HPI  Cardiovascular: Negative except per HPI  Respiratory: Negative except per HPI  Neck: Negative except per HPI  Endocrine: Negative except per HPI  Neurologic: Negative except per HPI    Other 10-point review of systems negative except per HPI      Review of patient's allergies indicates:  No Known Allergies    Past Medical History:   Diagnosis Date    Cancer     COPD (chronic obstructive pulmonary disease)     Dysphagia     Hypertension     Vocal cord mass        Past Surgical History:   Procedure Laterality Date    HIP SURGERY      HUMERUS  FRACTURE SURGERY      INSERT ARTERIAL LINE  2022         TRACHEOSTOMY N/A 6/10/2022    Procedure: CREATION, TRACHEOSTOMY;  Surgeon: Junior Alonso MD;  Location: [facility];  Service: ENT;  Laterality: N/A;       Social History     Socioeconomic History    Marital status:    Tobacco Use    Smoking status: Former Smoker     Types: Cigarettes     Quit date: 2022     Years since quittin.0    Smokeless tobacco: Never Used   Substance and Sexual Activity    Alcohol use: Never    Drug use: Never       Family History   Family history unknown: Yes       Outpatient Encounter Medications as of 2022   Medication Sig Dispense Refill    acetaminophen (TYLENOL) 325 MG tablet 2 tablets (650 mg total) by Per G Tube route every 6 (six) hours as needed. 30 tablet 1    albuterol-ipratropium (DUO-NEB) 2.5 mg-0.5 mg/3 mL nebulizer solution Take 3 mLs by nebulization every 4 (four) hours as needed for Shortness of Breath or Wheezing. Rescue 90 mL 1    albuterol-ipratropium (DUO-NEB) 2.5 mg-0.5 mg/3 mL nebulizer solution Take 3 mLs by nebulization every 6 (six) hours. Rescue 75 mL 0    calcium-vitamin D3 (OS-CARMELO 500 + D3) 500 mg-5 mcg (200 unit) per tablet 1 tablet by Per G Tube route 2 (two) times daily with meals.      diphenhydrAMINE (BENADRYL) 25 mg capsule 1 capsule (25 mg total) by Per G Tube route every 6 (six) hours as needed for Itching. 90 capsule 1    doxycycline (VIBRAMYCIN) 100 MG Cap Take 1 capsule (100 mg total) by mouth 2 (two) times daily. 12 capsule 0    hydrocortisone 1 % cream Apply topically 2 (two) times daily as needed (rash). Apply to rash 30 g 1    ipratropium (ATROVENT) 0.02 % nebulizer solution Take 2.5 mLs (500 mcg total) by nebulization every 8 (eight) hours. Rescue 75 mL 0    mupirocin (BACTROBAN) 2 % ointment Apply topically once daily. 15 g 0    nicotine (NICODERM CQ) 21 mg/24 hr Place 1 patch onto the skin once daily.  0    nystatin (MYCOSTATIN) cream Apply  topically 2 (two) times daily. 30 g 1    budesonide (PULMICORT) 0.5 mg/2 mL nebulizer solution Take 2 mLs (0.5 mg total) by nebulization every 12 (twelve) hours. Controller (Patient not taking: Reported on 7/8/2022) 90 mL 1    docusate (COLACE) 50 mg/5 mL liquid 10 mLs (100 mg total) by Per G Tube route 2 (two) times daily as needed (constipation). (Patient not taking: Reported on 7/8/2022) 473 mL 1    polyethylene glycol (GLYCOLAX) 17 gram PwPk 17 g by Per G Tube route 2 (two) times daily as needed (constipation). (Patient not taking: Reported on 7/8/2022) 30 packet 1    [DISCONTINUED] aspirin (ECOTRIN) 81 MG EC tablet Take 81 mg by mouth once daily.       Facility-Administered Encounter Medications as of 7/8/2022   Medication Dose Route Frequency Provider Last Rate Last Admin    [DISCONTINUED] acetaminophen tablet 650 mg  650 mg Per G Tube Q6H PRN Heraclio Sage MD   650 mg at 07/05/22 2121    [DISCONTINUED] albuterol-ipratropium 2.5 mg-0.5 mg/3 mL nebulizer solution 3 mL  3 mL Nebulization Q4H PRN Junior Jones MD   3 mL at 07/07/22 1126    [DISCONTINUED] albuterol-ipratropium 2.5 mg-0.5 mg/3 mL nebulizer solution 3 mL  3 mL Nebulization Q6H Heraclio Sage MD   3 mL at 07/07/22 1336    [DISCONTINUED] chlorhexidine 0.12 % solution 15 mL  15 mL Mouth/Throat BID Junior Jones MD   15 mL at 07/07/22 1036    [DISCONTINUED] diphenhydrAMINE capsule 25 mg  25 mg Oral Nightly PRN Tony Jorge DO   25 mg at 07/06/22 2134    [DISCONTINUED] doxycycline (VIBRAMYCIN) 100 mg in dextrose 5 % 250 mL IVPB  100 mg Intravenous Q12H Heraclio Sage  mL/hr at 07/07/22 1052 100 mg at 07/07/22 1052    [DISCONTINUED] enoxaparin injection 40 mg  40 mg Subcutaneous Daily Junior Jones MD   40 mg at 07/06/22 1706    [DISCONTINUED] famotidine (PF) injection 20 mg  20 mg Intravenous Q12H Junior Jones MD   20 mg at 07/07/22 1036    [DISCONTINUED] guaiFENesin 100 mg/5 ml syrup 200 mg  200 mg Oral Q4H PRN Heraclio Sage MD    "200 mg at 07/06/22 0811    [DISCONTINUED] ipratropium 0.02 % nebulizer solution 0.5 mg  0.5 mg Nebulization Q8H Heraclio Sage MD   0.5 mg at 07/06/22 0724    [DISCONTINUED] mupirocin 2 % ointment   Topical (Top) Daily OLIVIA Tena MD   Given at 07/07/22 0900    [DISCONTINUED] nicotine 21 mg/24 hr 1 patch  1 patch Transdermal Daily Tony Jorge DO   1 patch at 07/07/22 1041    [DISCONTINUED] nystatin cream   Topical (Top) BID Heraclio Sage MD   Given at 07/07/22 1042    [DISCONTINUED] polyethylene glycol packet 17 g  17 g Oral Daily Heraclio Sage MD   17 g at 07/07/22 1037    [DISCONTINUED] sodium chloride 0.9% flush 10 mL  10 mL Intravenous PRN Junior Jones MD           Physical Exam:  Vitals:    07/08/22 1029   BP: 112/73   BP Location: Right arm   Patient Position: Sitting   BP Method: Medium (Automatic)   Pulse: (!) 112   Resp: 18   Temp: 98.5 °F (36.9 °C)   TempSrc: Oral   Weight: 61.2 kg (135 lb)   Height: 5' 9" (1.753 m)       Constitutional  General Appearance: ill appearing, no respiratory distress.   HEENT  Eyes: PEERLA, EOMI, normal conjunctivae  Ears: Hearing well at conversation level   AD: auricle normal, EAC normal, TM intact, no RICHARD   AS: auricle normal, EAC normal, TM intact, no RICHARD  Nose: septum midline, no inferior turbinate hypertrophy, no polyps  OC/OP: dentition moderate, no oral lesions, tongue/FOM/BOT- soft, no leukoplakia/ulcerations/ tenderness  Nasopharynx, Hypopharynx, and Larynx:    Indirect: attempted, limited view due to patient intolerance  Neck: soft, non-tender, no palpable lymph nodes; tracheostomy in place with trach collar   Neuro: CN II - XII intact bilaterally  Cardiovascular: peripheral pulses palpable  Respiratory: non-labored respirations  Psychiatric: oriented to time, place and person, no depression, anxiety or agitation    Procedures:Flexible Fiberoptic Laryngoscopy/Nasopharyngoscopy via right nare    Procedure in Detail: Informed consent was obtained from " the patient after explanation of procedure, indications, risks and benefits. Flexible endoscopy was performed through the nasal passages. The nasal cavity, nasopharynx, oropharynx, hypopharynx and larynx were adequately visualized. The true vocal cords and arytenoids were examined during phonation and repose.    Anesthesia: None  Adverse Events: None  Resident Surgeon: Lisseth Krueger MD  Blood loss: none  Condition: good    Findings:  NP/OP: no masses/lesions of NC, eustachian tube, fossa of Rosenmuller, no adenoid hypertrophy  BOT/vallecula: no lingual hypertrophy, no masses/lesions  Piriform sinuses/post-cricoid: no masses/lesions, no pooling of secretions  Epiglottis: lingual and laryngeal surfaces within normal limits  Endolarynx with large lesion of the glottis obscuring visualization of the false and true vocal cords. There is significant pooling of secretions.   Bilateral piriforms appear uninvolved.      Pertinent Data:  ? LABS:  ? AUDIO:  ? CT Scan:    Imaging:   I personally reviewed the following images:    Summary of Outside Records:      Assessment/Plan:  Josafat Boudreaux is a 56 y.o. male with laryngeal mass s/p tracheostomy. He presents as a referral from Dr. Emory Alonso for consideration of total laryngectomy.   We discussed the procedure in detail. We will plan to begin with a panendoscopy with biopsy and frozen section for diagnosis. We discussed that if the pathology is equivocal or nondiagnostic we likely will not proceed with surgical intervention. However if biopsy confirms squamous cell carcinoma, we will proceed with laryngectomy. We discussed the expected post operative course for laryngectomy including likely 5-7 day hospitalization. We discussed potential complications including pain, bleeding, infection, breakdown of wounds, pharyngeal leak, hypothyroidism, hypocalcemia related to parathyroid etiology. Patient and wife expressed understanding and wish to proceed. We will plan for  intervention on Monday 7/11.    -       Lisseth Krueger MD  [facility] Department of Otolaryngology  -[place]

## 2022-07-11 ENCOUNTER — HOSPITAL ENCOUNTER (INPATIENT)
Facility: HOSPITAL | Age: 57
LOS: 5 days | Discharge: HOME HEALTH | DRG: 012 | End: 2022-07-16
Attending: OTOLARYNGOLOGY | Admitting: OTOLARYNGOLOGY
Payer: MEDICARE

## 2022-07-11 ENCOUNTER — ANESTHESIA (OUTPATIENT)
Dept: SURGERY | Facility: HOSPITAL | Age: 57
DRG: 012 | End: 2022-07-11
Payer: MEDICARE

## 2022-07-11 DIAGNOSIS — Z01.818 OTHER SPECIFIED PRE-OPERATIVE EXAMINATION: ICD-10-CM

## 2022-07-11 DIAGNOSIS — C32.9 LARYNGEAL CANCER: Primary | ICD-10-CM

## 2022-07-11 DIAGNOSIS — J38.7 LARYNGEAL MASS: ICD-10-CM

## 2022-07-11 LAB
ABORH RETYPE: NORMAL
ALBUMIN SERPL-MCNC: 2.4 GM/DL (ref 3.5–5)
ALBUMIN/GLOB SERPL: 0.5 RATIO (ref 1.1–2)
ALP SERPL-CCNC: 66 UNIT/L (ref 40–150)
ALT SERPL-CCNC: 15 UNIT/L (ref 0–55)
AST SERPL-CCNC: 20 UNIT/L (ref 5–34)
BASOPHILS # BLD AUTO: 0.03 X10(3)/MCL (ref 0–0.2)
BASOPHILS NFR BLD AUTO: 0.2 %
BILIRUBIN DIRECT+TOT PNL SERPL-MCNC: 0.5 MG/DL
BUN SERPL-MCNC: 15.6 MG/DL (ref 8.4–25.7)
CALCIUM SERPL-MCNC: 9.9 MG/DL (ref 8.4–10.2)
CHLORIDE SERPL-SCNC: 100 MMOL/L (ref 98–107)
CO2 SERPL-SCNC: 28 MMOL/L (ref 22–29)
CREAT SERPL-MCNC: 0.65 MG/DL (ref 0.73–1.18)
EOSINOPHIL # BLD AUTO: 0.01 X10(3)/MCL (ref 0–0.9)
EOSINOPHIL NFR BLD AUTO: 0.1 %
ERYTHROCYTE [DISTWIDTH] IN BLOOD BY AUTOMATED COUNT: 14.2 % (ref 11.5–17)
GLOBULIN SER-MCNC: 4.4 GM/DL (ref 2.4–3.5)
GLUCOSE SERPL-MCNC: 154 MG/DL (ref 74–100)
GROUP & RH: NORMAL
HCT VFR BLD AUTO: 41.8 % (ref 42–52)
HGB BLD-MCNC: 13.4 GM/DL (ref 14–18)
IMM GRANULOCYTES # BLD AUTO: 0.07 X10(3)/MCL (ref 0–0.04)
IMM GRANULOCYTES NFR BLD AUTO: 0.5 %
INDIRECT COOMBS GEL: NORMAL
LYMPHOCYTES # BLD AUTO: 1.36 X10(3)/MCL (ref 0.6–4.6)
LYMPHOCYTES NFR BLD AUTO: 9.5 %
MCH RBC QN AUTO: 30.7 PG (ref 27–31)
MCHC RBC AUTO-ENTMCNC: 32.1 MG/DL (ref 33–36)
MCV RBC AUTO: 95.7 FL (ref 80–94)
MONOCYTES # BLD AUTO: 0.83 X10(3)/MCL (ref 0.1–1.3)
MONOCYTES NFR BLD AUTO: 5.8 %
NEUTROPHILS # BLD AUTO: 12 X10(3)/MCL (ref 2.1–9.2)
NEUTROPHILS NFR BLD AUTO: 83.9 %
NRBC BLD AUTO-RTO: 0 %
PLATELET # BLD AUTO: 214 X10(3)/MCL (ref 130–400)
PMV BLD AUTO: 10.7 FL (ref 7.4–10.4)
POTASSIUM SERPL-SCNC: 4.5 MMOL/L (ref 3.5–5.1)
PROT SERPL-MCNC: 6.8 GM/DL (ref 6.4–8.3)
PTH-INTACT SERPL-MCNC: 21.7 PG/ML (ref 8.7–77)
RBC # BLD AUTO: 4.37 X10(6)/MCL (ref 4.7–6.1)
SODIUM SERPL-SCNC: 139 MMOL/L (ref 136–145)
T4 FREE SERPL-MCNC: 0.97 NG/DL (ref 0.7–1.48)
TSH SERPL-ACNC: 2.12 UIU/ML (ref 0.35–4.94)
WBC # SPEC AUTO: 14.3 X10(3)/MCL (ref 4.5–11.5)

## 2022-07-11 PROCEDURE — 83970 ASSAY OF PARATHORMONE: CPT | Performed by: STUDENT IN AN ORGANIZED HEALTH CARE EDUCATION/TRAINING PROGRAM

## 2022-07-11 PROCEDURE — 25000003 PHARM REV CODE 250

## 2022-07-11 PROCEDURE — 63600175 PHARM REV CODE 636 W HCPCS: Performed by: NURSE ANESTHETIST, CERTIFIED REGISTERED

## 2022-07-11 PROCEDURE — 84439 ASSAY OF FREE THYROXINE: CPT | Performed by: STUDENT IN AN ORGANIZED HEALTH CARE EDUCATION/TRAINING PROGRAM

## 2022-07-11 PROCEDURE — 63600175 PHARM REV CODE 636 W HCPCS: Performed by: STUDENT IN AN ORGANIZED HEALTH CARE EDUCATION/TRAINING PROGRAM

## 2022-07-11 PROCEDURE — 20000000 HC ICU ROOM

## 2022-07-11 PROCEDURE — 27100171 HC OXYGEN HIGH FLOW UP TO 24 HOURS

## 2022-07-11 PROCEDURE — 37000009 HC ANESTHESIA EA ADD 15 MINS: Performed by: OTOLARYNGOLOGY

## 2022-07-11 PROCEDURE — 85025 COMPLETE CBC W/AUTO DIFF WBC: CPT | Performed by: STUDENT IN AN ORGANIZED HEALTH CARE EDUCATION/TRAINING PROGRAM

## 2022-07-11 PROCEDURE — 36000709 HC OR TIME LEV III EA ADD 15 MIN: Performed by: OTOLARYNGOLOGY

## 2022-07-11 PROCEDURE — 63600175 PHARM REV CODE 636 W HCPCS: Performed by: ANESTHESIOLOGY

## 2022-07-11 PROCEDURE — 36000708 HC OR TIME LEV III 1ST 15 MIN: Performed by: OTOLARYNGOLOGY

## 2022-07-11 PROCEDURE — L8509 TRACH-ESOPH VOICE PROS MD IN: HCPCS | Performed by: OTOLARYNGOLOGY

## 2022-07-11 PROCEDURE — 71000033 HC RECOVERY, INTIAL HOUR: Performed by: OTOLARYNGOLOGY

## 2022-07-11 PROCEDURE — 80053 COMPREHEN METABOLIC PANEL: CPT | Performed by: STUDENT IN AN ORGANIZED HEALTH CARE EDUCATION/TRAINING PROGRAM

## 2022-07-11 PROCEDURE — 86850 RBC ANTIBODY SCREEN: CPT | Performed by: NURSE PRACTITIONER

## 2022-07-11 PROCEDURE — 36415 COLL VENOUS BLD VENIPUNCTURE: CPT | Performed by: OTOLARYNGOLOGY

## 2022-07-11 PROCEDURE — 25000003 PHARM REV CODE 250: Performed by: NURSE ANESTHETIST, CERTIFIED REGISTERED

## 2022-07-11 PROCEDURE — 36415 COLL VENOUS BLD VENIPUNCTURE: CPT

## 2022-07-11 PROCEDURE — 37000008 HC ANESTHESIA 1ST 15 MINUTES: Performed by: OTOLARYNGOLOGY

## 2022-07-11 PROCEDURE — 25000003 PHARM REV CODE 250: Performed by: STUDENT IN AN ORGANIZED HEALTH CARE EDUCATION/TRAINING PROGRAM

## 2022-07-11 PROCEDURE — 27201423 OPTIME MED/SURG SUP & DEVICES STERILE SUPPLY: Performed by: OTOLARYNGOLOGY

## 2022-07-11 PROCEDURE — S5010 5% DEXTROSE AND 0.45% SALINE: HCPCS | Performed by: STUDENT IN AN ORGANIZED HEALTH CARE EDUCATION/TRAINING PROGRAM

## 2022-07-11 PROCEDURE — 84443 ASSAY THYROID STIM HORMONE: CPT | Performed by: STUDENT IN AN ORGANIZED HEALTH CARE EDUCATION/TRAINING PROGRAM

## 2022-07-11 PROCEDURE — S0030 INJECTION, METRONIDAZOLE: HCPCS | Performed by: STUDENT IN AN ORGANIZED HEALTH CARE EDUCATION/TRAINING PROGRAM

## 2022-07-11 RX ORDER — ENOXAPARIN SODIUM 100 MG/ML
40 INJECTION SUBCUTANEOUS DAILY
Status: DISCONTINUED | OUTPATIENT
Start: 2022-07-12 | End: 2022-07-16 | Stop reason: HOSPADM

## 2022-07-11 RX ORDER — DIPHENHYDRAMINE HYDROCHLORIDE 50 MG/ML
6.25 INJECTION INTRAMUSCULAR; INTRAVENOUS ONCE AS NEEDED
Status: DISCONTINUED | OUTPATIENT
Start: 2022-07-11 | End: 2022-07-11

## 2022-07-11 RX ORDER — CEFAZOLIN SODIUM 2 G/50ML
2 SOLUTION INTRAVENOUS
Status: COMPLETED | OUTPATIENT
Start: 2022-07-11 | End: 2022-07-11

## 2022-07-11 RX ORDER — CELECOXIB 200 MG/1
200 CAPSULE ORAL 2 TIMES DAILY
Status: DISCONTINUED | OUTPATIENT
Start: 2022-07-11 | End: 2022-07-16 | Stop reason: HOSPADM

## 2022-07-11 RX ORDER — DEXTROSE MONOHYDRATE AND SODIUM CHLORIDE 5; .45 G/100ML; G/100ML
INJECTION, SOLUTION INTRAVENOUS CONTINUOUS
Status: DISCONTINUED | OUTPATIENT
Start: 2022-07-11 | End: 2022-07-11

## 2022-07-11 RX ORDER — LIDOCAINE HYDROCHLORIDE 10 MG/ML
1 INJECTION, SOLUTION EPIDURAL; INFILTRATION; INTRACAUDAL; PERINEURAL ONCE
Status: DISCONTINUED | OUTPATIENT
Start: 2022-07-11 | End: 2022-07-11

## 2022-07-11 RX ORDER — OXYCODONE HYDROCHLORIDE 5 MG/1
5 TABLET ORAL
Status: DISCONTINUED | OUTPATIENT
Start: 2022-07-11 | End: 2022-07-11

## 2022-07-11 RX ORDER — DROPERIDOL 2.5 MG/ML
0.25 INJECTION, SOLUTION INTRAMUSCULAR; INTRAVENOUS ONCE AS NEEDED
Status: DISCONTINUED | OUTPATIENT
Start: 2022-07-11 | End: 2022-07-11

## 2022-07-11 RX ORDER — HYDROMORPHONE HYDROCHLORIDE 1 MG/ML
0.2 INJECTION, SOLUTION INTRAMUSCULAR; INTRAVENOUS; SUBCUTANEOUS EVERY 5 MIN PRN
Status: DISCONTINUED | OUTPATIENT
Start: 2022-07-11 | End: 2022-07-11

## 2022-07-11 RX ORDER — SODIUM CHLORIDE, SODIUM LACTATE, POTASSIUM CHLORIDE, CALCIUM CHLORIDE 600; 310; 30; 20 MG/100ML; MG/100ML; MG/100ML; MG/100ML
INJECTION, SOLUTION INTRAVENOUS CONTINUOUS
Status: DISCONTINUED | OUTPATIENT
Start: 2022-07-11 | End: 2022-07-11

## 2022-07-11 RX ORDER — GABAPENTIN 300 MG/1
300 CAPSULE ORAL 3 TIMES DAILY
Status: DISCONTINUED | OUTPATIENT
Start: 2022-07-11 | End: 2022-07-16 | Stop reason: HOSPADM

## 2022-07-11 RX ORDER — ONDANSETRON 2 MG/ML
INJECTION INTRAMUSCULAR; INTRAVENOUS
Status: DISCONTINUED | OUTPATIENT
Start: 2022-07-11 | End: 2022-07-11

## 2022-07-11 RX ORDER — HYDROMORPHONE HYDROCHLORIDE 1 MG/ML
INJECTION, SOLUTION INTRAMUSCULAR; INTRAVENOUS; SUBCUTANEOUS
Status: DISCONTINUED | OUTPATIENT
Start: 2022-07-11 | End: 2022-07-11

## 2022-07-11 RX ORDER — ACETAMINOPHEN 325 MG/1
650 TABLET ORAL EVERY 6 HOURS
Status: DISCONTINUED | OUTPATIENT
Start: 2022-07-11 | End: 2022-07-16 | Stop reason: HOSPADM

## 2022-07-11 RX ORDER — POLYETHYLENE GLYCOL 3350 17 G/17G
17 POWDER, FOR SOLUTION ORAL DAILY
Status: DISCONTINUED | OUTPATIENT
Start: 2022-07-11 | End: 2022-07-16 | Stop reason: HOSPADM

## 2022-07-11 RX ORDER — DEXTROSE MONOHYDRATE AND SODIUM CHLORIDE 5; .45 G/100ML; G/100ML
INJECTION, SOLUTION INTRAVENOUS CONTINUOUS
Status: DISCONTINUED | OUTPATIENT
Start: 2022-07-11 | End: 2022-07-13

## 2022-07-11 RX ORDER — MORPHINE SULFATE 10 MG/ML
2 INJECTION INTRAMUSCULAR; INTRAVENOUS; SUBCUTANEOUS EVERY 4 HOURS PRN
Status: DISCONTINUED | OUTPATIENT
Start: 2022-07-11 | End: 2022-07-16 | Stop reason: HOSPADM

## 2022-07-11 RX ORDER — LIDOCAINE HCL/EPINEPHRINE/PF 2%-1:200K
VIAL (ML) INJECTION
Status: DISPENSED
Start: 2022-07-11 | End: 2022-07-11

## 2022-07-11 RX ORDER — LIDOCAINE HYDROCHLORIDE 20 MG/ML
INJECTION, SOLUTION EPIDURAL; INFILTRATION; INTRACAUDAL; PERINEURAL
Status: DISCONTINUED | OUTPATIENT
Start: 2022-07-11 | End: 2022-07-11

## 2022-07-11 RX ORDER — BACITRACIN 500 [USP'U]/G
OINTMENT TOPICAL
Status: DISPENSED
Start: 2022-07-11 | End: 2022-07-11

## 2022-07-11 RX ORDER — OXYCODONE HYDROCHLORIDE 5 MG/1
5 TABLET ORAL EVERY 6 HOURS PRN
Status: DISCONTINUED | OUTPATIENT
Start: 2022-07-11 | End: 2022-07-16 | Stop reason: HOSPADM

## 2022-07-11 RX ORDER — METRONIDAZOLE 500 MG/100ML
500 INJECTION, SOLUTION INTRAVENOUS
Status: COMPLETED | OUTPATIENT
Start: 2022-07-11 | End: 2022-07-14

## 2022-07-11 RX ORDER — ONDANSETRON 2 MG/ML
4 INJECTION INTRAMUSCULAR; INTRAVENOUS EVERY 8 HOURS PRN
Status: DISCONTINUED | OUTPATIENT
Start: 2022-07-11 | End: 2022-07-16 | Stop reason: HOSPADM

## 2022-07-11 RX ORDER — PROPOFOL 10 MG/ML
VIAL (ML) INTRAVENOUS
Status: DISCONTINUED | OUTPATIENT
Start: 2022-07-11 | End: 2022-07-11

## 2022-07-11 RX ORDER — DEXAMETHASONE SODIUM PHOSPHATE 4 MG/ML
INJECTION, SOLUTION INTRA-ARTICULAR; INTRALESIONAL; INTRAMUSCULAR; INTRAVENOUS; SOFT TISSUE
Status: DISCONTINUED | OUTPATIENT
Start: 2022-07-11 | End: 2022-07-11

## 2022-07-11 RX ORDER — DOCUSATE SODIUM 100 MG/1
100 CAPSULE, LIQUID FILLED ORAL 2 TIMES DAILY
Status: DISCONTINUED | OUTPATIENT
Start: 2022-07-11 | End: 2022-07-14

## 2022-07-11 RX ORDER — ROCURONIUM BROMIDE 10 MG/ML
INJECTION, SOLUTION INTRAVENOUS
Status: DISCONTINUED | OUTPATIENT
Start: 2022-07-11 | End: 2022-07-11

## 2022-07-11 RX ORDER — BACITRACIN ZINC 500 UNIT/G
OINTMENT (GRAM) TOPICAL
Status: DISCONTINUED | OUTPATIENT
Start: 2022-07-11 | End: 2022-07-11 | Stop reason: HOSPADM

## 2022-07-11 RX ORDER — FENTANYL CITRATE 50 UG/ML
INJECTION, SOLUTION INTRAMUSCULAR; INTRAVENOUS
Status: DISCONTINUED | OUTPATIENT
Start: 2022-07-11 | End: 2022-07-11

## 2022-07-11 RX ADMIN — SODIUM CHLORIDE 2 G: 9 INJECTION, SOLUTION INTRAVENOUS at 08:07

## 2022-07-11 RX ADMIN — OXYCODONE HYDROCHLORIDE 5 MG: 5 TABLET ORAL at 04:07

## 2022-07-11 RX ADMIN — CELECOXIB 200 MG: 200 CAPSULE ORAL at 08:07

## 2022-07-11 RX ADMIN — WHITE PETROLATUM: 1.75 OINTMENT TOPICAL at 08:07

## 2022-07-11 RX ADMIN — FENTANYL CITRATE 50 MCG: 50 INJECTION, SOLUTION INTRAMUSCULAR; INTRAVENOUS at 08:07

## 2022-07-11 RX ADMIN — PHENYLEPHRINE HYDROCHLORIDE 20 MCG/MIN: 10 INJECTION INTRAVENOUS at 07:07

## 2022-07-11 RX ADMIN — FENTANYL CITRATE 25 MCG: 50 INJECTION, SOLUTION INTRAMUSCULAR; INTRAVENOUS at 09:07

## 2022-07-11 RX ADMIN — GABAPENTIN 300 MG: 300 CAPSULE ORAL at 03:07

## 2022-07-11 RX ADMIN — ACETAMINOPHEN 650 MG: 325 TABLET ORAL at 05:07

## 2022-07-11 RX ADMIN — SUGAMMADEX 200 MG: 100 INJECTION, SOLUTION INTRAVENOUS at 01:07

## 2022-07-11 RX ADMIN — CEFAZOLIN SODIUM 2 G: 2 SOLUTION INTRAVENOUS at 11:07

## 2022-07-11 RX ADMIN — HYDROMORPHONE HYDROCHLORIDE 0.2 MG: 1 INJECTION, SOLUTION INTRAMUSCULAR; INTRAVENOUS; SUBCUTANEOUS at 02:07

## 2022-07-11 RX ADMIN — PROPOFOL 150 MG: 10 INJECTION, EMULSION INTRAVENOUS at 07:07

## 2022-07-11 RX ADMIN — DOCUSATE SODIUM 100 MG: 100 CAPSULE, LIQUID FILLED ORAL at 08:07

## 2022-07-11 RX ADMIN — HYDROMORPHONE HYDROCHLORIDE 0.25 MG: 1 INJECTION, SOLUTION INTRAMUSCULAR; INTRAVENOUS; SUBCUTANEOUS at 01:07

## 2022-07-11 RX ADMIN — DEXTROSE AND SODIUM CHLORIDE: 5; 450 INJECTION, SOLUTION INTRAVENOUS at 03:07

## 2022-07-11 RX ADMIN — ONDANSETRON 4 MG: 2 INJECTION INTRAMUSCULAR; INTRAVENOUS at 11:07

## 2022-07-11 RX ADMIN — ROCURONIUM BROMIDE 30 MG: 10 SOLUTION INTRAVENOUS at 07:07

## 2022-07-11 RX ADMIN — OXYCODONE HYDROCHLORIDE 5 MG: 5 TABLET ORAL at 10:07

## 2022-07-11 RX ADMIN — FENTANYL CITRATE 25 MCG: 50 INJECTION, SOLUTION INTRAMUSCULAR; INTRAVENOUS at 11:07

## 2022-07-11 RX ADMIN — GABAPENTIN 300 MG: 300 CAPSULE ORAL at 08:07

## 2022-07-11 RX ADMIN — SODIUM CHLORIDE, POTASSIUM CHLORIDE, SODIUM LACTATE AND CALCIUM CHLORIDE: 600; 310; 30; 20 INJECTION, SOLUTION INTRAVENOUS at 09:07

## 2022-07-11 RX ADMIN — DEXAMETHASONE SODIUM PHOSPHATE 12 MG: 4 INJECTION, SOLUTION INTRA-ARTICULAR; INTRALESIONAL; INTRAMUSCULAR; INTRAVENOUS; SOFT TISSUE at 07:07

## 2022-07-11 RX ADMIN — ROCURONIUM BROMIDE 30 MG: 10 SOLUTION INTRAVENOUS at 09:07

## 2022-07-11 RX ADMIN — METRONIDAZOLE 500 MG: 500 INJECTION, SOLUTION INTRAVENOUS at 03:07

## 2022-07-11 RX ADMIN — CEFAZOLIN SODIUM 2 G: 2 SOLUTION INTRAVENOUS at 07:07

## 2022-07-11 RX ADMIN — LIDOCAINE HYDROCHLORIDE 100 MG: 20 INJECTION, SOLUTION EPIDURAL; INFILTRATION; INTRACAUDAL; PERINEURAL at 07:07

## 2022-07-11 RX ADMIN — SODIUM CHLORIDE: 9 INJECTION, SOLUTION INTRAVENOUS at 07:07

## 2022-07-11 RX ADMIN — FENTANYL CITRATE 100 MCG: 50 INJECTION, SOLUTION INTRAMUSCULAR; INTRAVENOUS at 07:07

## 2022-07-11 RX ADMIN — SODIUM CHLORIDE, POTASSIUM CHLORIDE, SODIUM LACTATE AND CALCIUM CHLORIDE: 600; 310; 30; 20 INJECTION, SOLUTION INTRAVENOUS at 06:07

## 2022-07-11 RX ADMIN — METRONIDAZOLE 500 MG: 500 INJECTION, SOLUTION INTRAVENOUS at 11:07

## 2022-07-11 RX ADMIN — POLYETHYLENE GLYCOL 3350 17 G: 17 POWDER, FOR SOLUTION ORAL at 03:07

## 2022-07-11 NOTE — ANESTHESIA POSTPROCEDURE EVALUATION
Anesthesia Post Evaluation    Patient: Josafat Boudreaux    Procedure(s) Performed: Procedure(s) (LRB):  LARYNGECTOMY, WITH RADICAL NECK DISSECTION (Bilateral)  LARYNGOSCOPY, DIRECT, DIAGNOSTIC, WITH BRONCHOSCOPY AND ESOPHAGOSCOPY (N/A)    Final Anesthesia Type: general      Patient location during evaluation: PACU  Patient participation: Yes- Able to Participate  Level of consciousness: awake and alert  Post-procedure vital signs: reviewed and stable  Pain management: adequate  Airway patency: patent    PONV status at discharge: No PONV  Anesthetic complications: no      Cardiovascular status: hemodynamically stable  Respiratory status: unassisted  Hydration status: euvolemic  Follow-up not needed.          Vitals Value Taken Time   BP 96/56 07/11/22 1407   Temp 36.2 °C (97.2 °F) 07/11/22 1357   Pulse 107 07/11/22 1407   Resp 16 07/11/22 1410   SpO2 93 % 07/11/22 1407         No case tracking events are documented in the log.      Pain/Geetha Score: Pain Rating Prior to Med Admin: 8 (7/11/2022  2:10 PM)

## 2022-07-11 NOTE — BRIEF OP NOTE
[facility]  Surgery Department  Operative Note    SUMMARY     Date of Procedure: 7/11/2022     Procedure:   Direct laryngoscopy, bronchoscopy, esophagoscopy   Laryngectomy  Bilateral neck dissection     Surgeon(s) and Role:     * Emory Alonso MD - Primary     * Lisseth Krueger MD - Co-Surgeon    Assisting Surgeon: None    Pre-Operative Diagnosis: Laryngeal mass    Post-Operative Diagnosis: Squamous cell carcinoma larynx    Anesthesia: General    Operative Findings (including complications, if any): Large ulcerative mass of the endolarynx.     Description of Technical Procedures: See operative dictation    Estimated Blood Loss (EBL): See anesthesia records           Implants: * No implants in log *    Specimens:   Specimen (24h ago, onward)             Start     Ordered    07/11/22 0815  Cytology, Fluid/Wash/[place]  RELEASE UPON ORDERING        Question:  Release to patient  Answer:  Immediate    07/11/22 0815    07/11/22 0749  Specimen to Pathology  RELEASE UPON ORDERING        References:    Click here for ordering Quick Tip   Question:  Release to patient  Answer:  Immediate    07/11/22 0749                        Condition: Stable    Disposition: ICU - extubated and stable.    Attestation: I was present and scrubbed for the entire procedure.

## 2022-07-11 NOTE — ANESTHESIA PROCEDURE NOTES
Arterial    Diagnosis: laryngeal mass    Patient location during procedure: done in OR  Procedure start time: 7/11/2022 7:52 AM  Timeout: 7/11/2022 7:52 AM  Procedure end time: 7/11/2022 8:00 AM    Staffing  Authorizing Provider: Keisha Wilson MD  Performing Provider: Gamal Vaughan CRNA    Anesthesiologist was present at the time of the procedure.    Preanesthetic Checklist  Completed: patient identified, risks and benefits discussed, pre-op evaluation and timeout performedArterial  Skin Prep: alcohol swabs  Local Infiltration: none  Orientation: right  Location: radial    Catheter Size: 20 G  Catheter placement by Anatomical landmarks. Heme positive aspiration all ports. Insertion Attempts: 2  Assessment  Dressing: secured with tape and tegaderm

## 2022-07-11 NOTE — TRANSFER OF CARE
Anesthesia Transfer of Care Note    Patient: Josafat Boudreaux    Procedure(s) Performed: Procedure(s) (LRB):  LARYNGECTOMY, WITH RADICAL NECK DISSECTION (Bilateral)  LARYNGOSCOPY, DIRECT, DIAGNOSTIC, WITH BRONCHOSCOPY AND ESOPHAGOSCOPY (N/A)    Patient location: PACU    Anesthesia Type: general    Transport from OR: Transported from OR on room air with adequate spontaneous ventilation    Post pain: adequate analgesia    Post assessment: no apparent anesthetic complications and tolerated procedure well    Post vital signs: stable    Level of consciousness: awake and sedated    Nausea/Vomiting: no nausea/vomiting    Complications: none    Transfer of care protocol was followed      Last vitals:   Visit Vitals  /72 (BP Location: Right arm, Patient Position: Lying)   Pulse 102   Temp 36 °C (96.8 °F) (Temporal)   Resp 20   Wt 61.2 kg (134 lb 14.7 oz)   SpO2 100%   BMI 19.92 kg/m²

## 2022-07-11 NOTE — PLAN OF CARE
Problem: Adult Inpatient Plan of Care  Goal: Plan of Care Review  Outcome: Ongoing, Progressing  Goal: Optimal Comfort and Wellbeing  Outcome: Ongoing, Progressing  Goal: Readiness for Transition of Care  Outcome: Ongoing, Progressing     Problem: Infection  Goal: Absence of Infection Signs and Symptoms  Outcome: Ongoing, Progressing

## 2022-07-11 NOTE — ANESTHESIA PROCEDURE NOTES
Intubation    Date/Time: 7/11/2022 7:25 AM  Performed by: Gamal Vaughan CRNA  Authorized by: Keisha Wilson MD     Intubation:     Induction:  Intravenous    Intubated:  Postinduction    Mask Ventilation:  Easy mask    Attempts:  1    Attempted By:  Student (OLEGARIO Herrera)    Method of Intubation:  Direct    Difficult Airway Encountered?: No      Complications:  None    Airway Device:  Coil wire tube    Airway Device Size:  8.0    Style/Cuff Inflation:  Cuffed (inflated to minimal occlusive pressure)    Inflation Amount (mL):  7    Secured at:  The skin level of trach    Placement Verified By:  Capnometry    Complicating Factors:  None    Findings Post-Intubation:  BS equal bilateral

## 2022-07-12 ENCOUNTER — TELEPHONE (OUTPATIENT)
Dept: INTERNAL MEDICINE | Facility: CLINIC | Age: 57
End: 2022-07-12
Payer: MEDICARE

## 2022-07-12 LAB
ANION GAP SERPL CALC-SCNC: 9 MEQ/L
BASOPHILS # BLD AUTO: 0.03 X10(3)/MCL (ref 0–0.2)
BASOPHILS NFR BLD AUTO: 0.3 %
BUN SERPL-MCNC: 17.5 MG/DL (ref 8.4–25.7)
CALCIUM SERPL-MCNC: 9.1 MG/DL (ref 8.4–10.2)
CHLORIDE SERPL-SCNC: 100 MMOL/L (ref 98–107)
CO2 SERPL-SCNC: 27 MMOL/L (ref 22–29)
CREAT SERPL-MCNC: 0.67 MG/DL (ref 0.73–1.18)
CREAT/UREA NIT SERPL: 26
EOSINOPHIL # BLD AUTO: 0.1 X10(3)/MCL (ref 0–0.9)
EOSINOPHIL NFR BLD AUTO: 0.9 %
ERYTHROCYTE [DISTWIDTH] IN BLOOD BY AUTOMATED COUNT: 14.4 % (ref 11.5–17)
GLUCOSE SERPL-MCNC: 120 MG/DL (ref 74–100)
HCT VFR BLD AUTO: 37.7 % (ref 42–52)
HGB BLD-MCNC: 11.7 GM/DL (ref 14–18)
IMM GRANULOCYTES # BLD AUTO: 0.05 X10(3)/MCL (ref 0–0.04)
IMM GRANULOCYTES NFR BLD AUTO: 0.4 %
LYMPHOCYTES # BLD AUTO: 2.46 X10(3)/MCL (ref 0.6–4.6)
LYMPHOCYTES NFR BLD AUTO: 21.9 %
M AVIUM PARATB TISS QL ZN STN: NORMAL
MCH RBC QN AUTO: 30.7 PG (ref 27–31)
MCHC RBC AUTO-ENTMCNC: 31 MG/DL (ref 33–36)
MCV RBC AUTO: 99 FL (ref 80–94)
MONOCYTES # BLD AUTO: 1.4 X10(3)/MCL (ref 0.1–1.3)
MONOCYTES NFR BLD AUTO: 12.5 %
NEUTROPHILS # BLD AUTO: 7.2 X10(3)/MCL (ref 2.1–9.2)
NEUTROPHILS NFR BLD AUTO: 64 %
NRBC BLD AUTO-RTO: 0 %
PLATELET # BLD AUTO: 193 X10(3)/MCL (ref 130–400)
PMV BLD AUTO: 10.5 FL (ref 7.4–10.4)
POTASSIUM SERPL-SCNC: 3.7 MMOL/L (ref 3.5–5.1)
RBC # BLD AUTO: 3.81 X10(6)/MCL (ref 4.7–6.1)
SODIUM SERPL-SCNC: 136 MMOL/L (ref 136–145)
WBC # SPEC AUTO: 11.2 X10(3)/MCL (ref 4.5–11.5)

## 2022-07-12 PROCEDURE — 11000001 HC ACUTE MED/SURG PRIVATE ROOM

## 2022-07-12 PROCEDURE — 80048 BASIC METABOLIC PNL TOTAL CA: CPT | Performed by: STUDENT IN AN ORGANIZED HEALTH CARE EDUCATION/TRAINING PROGRAM

## 2022-07-12 PROCEDURE — S5010 5% DEXTROSE AND 0.45% SALINE: HCPCS | Performed by: STUDENT IN AN ORGANIZED HEALTH CARE EDUCATION/TRAINING PROGRAM

## 2022-07-12 PROCEDURE — 94761 N-INVAS EAR/PLS OXIMETRY MLT: CPT

## 2022-07-12 PROCEDURE — 27100171 HC OXYGEN HIGH FLOW UP TO 24 HOURS

## 2022-07-12 PROCEDURE — S0030 INJECTION, METRONIDAZOLE: HCPCS | Performed by: STUDENT IN AN ORGANIZED HEALTH CARE EDUCATION/TRAINING PROGRAM

## 2022-07-12 PROCEDURE — 97530 THERAPEUTIC ACTIVITIES: CPT

## 2022-07-12 PROCEDURE — 97162 PT EVAL MOD COMPLEX 30 MIN: CPT

## 2022-07-12 PROCEDURE — 63600175 PHARM REV CODE 636 W HCPCS: Performed by: STUDENT IN AN ORGANIZED HEALTH CARE EDUCATION/TRAINING PROGRAM

## 2022-07-12 PROCEDURE — 25000003 PHARM REV CODE 250: Performed by: STUDENT IN AN ORGANIZED HEALTH CARE EDUCATION/TRAINING PROGRAM

## 2022-07-12 PROCEDURE — 25000003 PHARM REV CODE 250: Performed by: OTOLARYNGOLOGY

## 2022-07-12 PROCEDURE — 36415 COLL VENOUS BLD VENIPUNCTURE: CPT | Performed by: STUDENT IN AN ORGANIZED HEALTH CARE EDUCATION/TRAINING PROGRAM

## 2022-07-12 PROCEDURE — 85025 COMPLETE CBC W/AUTO DIFF WBC: CPT | Performed by: STUDENT IN AN ORGANIZED HEALTH CARE EDUCATION/TRAINING PROGRAM

## 2022-07-12 RX ORDER — MUPIROCIN 20 MG/G
OINTMENT TOPICAL 2 TIMES DAILY
Status: DISCONTINUED | OUTPATIENT
Start: 2022-07-12 | End: 2022-07-16 | Stop reason: HOSPADM

## 2022-07-12 RX ORDER — PANTOPRAZOLE SODIUM 40 MG/1
40 FOR SUSPENSION ORAL DAILY
Status: DISCONTINUED | OUTPATIENT
Start: 2022-07-12 | End: 2022-07-16 | Stop reason: HOSPADM

## 2022-07-12 RX ADMIN — CELECOXIB 200 MG: 200 CAPSULE ORAL at 08:07

## 2022-07-12 RX ADMIN — GABAPENTIN 300 MG: 300 CAPSULE ORAL at 08:07

## 2022-07-12 RX ADMIN — MUPIROCIN: 20 OINTMENT TOPICAL at 08:07

## 2022-07-12 RX ADMIN — SODIUM CHLORIDE 2 G: 9 INJECTION, SOLUTION INTRAVENOUS at 04:07

## 2022-07-12 RX ADMIN — ENOXAPARIN SODIUM 40 MG: 40 INJECTION SUBCUTANEOUS at 09:07

## 2022-07-12 RX ADMIN — ACETAMINOPHEN 650 MG: 325 TABLET ORAL at 06:07

## 2022-07-12 RX ADMIN — ACETAMINOPHEN 650 MG: 325 TABLET ORAL at 12:07

## 2022-07-12 RX ADMIN — MUPIROCIN: 20 OINTMENT TOPICAL at 09:07

## 2022-07-12 RX ADMIN — OXYCODONE HYDROCHLORIDE 5 MG: 5 TABLET ORAL at 08:07

## 2022-07-12 RX ADMIN — SODIUM CHLORIDE 2 G: 9 INJECTION, SOLUTION INTRAVENOUS at 08:07

## 2022-07-12 RX ADMIN — WHITE PETROLATUM: 1.75 OINTMENT TOPICAL at 08:07

## 2022-07-12 RX ADMIN — METRONIDAZOLE 500 MG: 500 INJECTION, SOLUTION INTRAVENOUS at 03:07

## 2022-07-12 RX ADMIN — ACETAMINOPHEN 650 MG: 325 TABLET ORAL at 01:07

## 2022-07-12 RX ADMIN — GABAPENTIN 300 MG: 300 CAPSULE ORAL at 09:07

## 2022-07-12 RX ADMIN — OXYCODONE HYDROCHLORIDE 5 MG: 5 TABLET ORAL at 10:07

## 2022-07-12 RX ADMIN — PANTOPRAZOLE SODIUM 40 MG: 40 GRANULE, DELAYED RELEASE ORAL at 04:07

## 2022-07-12 RX ADMIN — METRONIDAZOLE 500 MG: 500 INJECTION, SOLUTION INTRAVENOUS at 11:07

## 2022-07-12 RX ADMIN — DOCUSATE SODIUM 100 MG: 100 CAPSULE, LIQUID FILLED ORAL at 08:07

## 2022-07-12 RX ADMIN — OXYCODONE HYDROCHLORIDE 5 MG: 5 TABLET ORAL at 05:07

## 2022-07-12 RX ADMIN — POLYETHYLENE GLYCOL 3350 17 G: 17 POWDER, FOR SOLUTION ORAL at 09:07

## 2022-07-12 RX ADMIN — WHITE PETROLATUM: 1.75 OINTMENT TOPICAL at 09:07

## 2022-07-12 RX ADMIN — DEXTROSE AND SODIUM CHLORIDE: 5; 450 INJECTION, SOLUTION INTRAVENOUS at 01:07

## 2022-07-12 RX ADMIN — SODIUM CHLORIDE 2 G: 9 INJECTION, SOLUTION INTRAVENOUS at 02:07

## 2022-07-12 RX ADMIN — METRONIDAZOLE 500 MG: 500 INJECTION, SOLUTION INTRAVENOUS at 07:07

## 2022-07-12 RX ADMIN — DOCUSATE SODIUM 100 MG: 100 CAPSULE, LIQUID FILLED ORAL at 09:07

## 2022-07-12 RX ADMIN — GABAPENTIN 300 MG: 300 CAPSULE ORAL at 03:07

## 2022-07-12 RX ADMIN — CELECOXIB 200 MG: 200 CAPSULE ORAL at 09:07

## 2022-07-12 RX ADMIN — MORPHINE SULFATE 2 MG: 10 INJECTION INTRAVENOUS at 03:07

## 2022-07-12 RX ADMIN — ACETAMINOPHEN 650 MG: 325 TABLET ORAL at 11:07

## 2022-07-12 RX ADMIN — ACETAMINOPHEN 650 MG: 325 TABLET ORAL at 05:07

## 2022-07-12 NOTE — PROGRESS NOTES
[facility] OTOLARYNGOLOGY PROGRESS NOTE    Josafat Boudreaux 1965 07/12/2022    Interval HPI: Josafat Boudreaux is a 56 y.o. 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    Subjective: No issues overnight. Pressures occasionally soft but asymptomatic. Pain well controlled.     Diet: Diet NPO    Allergies: Review of patient's allergies indicates:  No Known Allergies     Medications:   Current Facility-Administered Medications   Medication Dose Route Frequency Provider Last Rate Last Admin    acetaminophen tablet 650 mg  650 mg Per G Tube Q6H Lisseth Krueger MD   650 mg at 07/12/22 0521    ceFAZolin (ANCEF) 2 g in sodium chloride 0.9% 50 mL IVPB  2 g Intravenous Q8H Lisseth Krueger MD   Stopped at 07/12/22 0430    celecoxib capsule 200 mg  200 mg Oral BID Lisseth Krueger MD   200 mg at 07/11/22 2030    dextrose 5 % and 0.45 % NaCl infusion   Intravenous Continuous Lisseth Krueger  mL/hr at 07/12/22 0109 New Bag at 07/12/22 0109    docusate sodium capsule 100 mg  100 mg Oral BID Lisseth Krueger MD   100 mg at 07/11/22 2029    enoxaparin injection 40 mg  40 mg Subcutaneous Daily Lisseth Krueger MD        gabapentin capsule 300 mg  300 mg Oral TID Lisseth Krueger MD   300 mg at 07/11/22 2029    metronidazole IVPB 500 mg  500 mg Intravenous Q8H Lisseth Krueger  mL/hr at 07/12/22 0709 500 mg at 07/12/22 0709    morphine injection 2 mg  2 mg Intravenous Q4H PRN Lisseth Krueger MD   2 mg at 07/12/22 0331    ondansetron injection 4 mg  4 mg Intravenous Q8H PRN Lisseth Krueger MD        oxyCODONE immediate release tablet 5 mg  5 mg Oral Q6H PRN Lisseth Krueger MD   5 mg at 07/12/22 0500    polyethylene glycol packet 17 g  17 g Oral Daily Lisseth Krueger MD   17 g at 07/11/22 1549    white petrolatum 41 % ointment   Topical (Top) BID Lisseth Krueger MD   Given at 07/11/22 2030     Scheduled Meds:   acetaminophen  650 mg Per G Tube Q6H    ceFAZolin (ANCEF) IVPB  2 g  Intravenous Q8H    celecoxib  200 mg Oral BID    docusate sodium  100 mg Oral BID    enoxaparin  40 mg Subcutaneous Daily    gabapentin  300 mg Oral TID    metronidazole  500 mg Intravenous Q8H    polyethylene glycol  17 g Oral Daily    white petrolatum   Topical (Top) BID     Continuous Infusions:   dextrose 5 % and 0.45 % NaCl 100 mL/hr at 07/12/22 0109     PRN Meds:.morphine, ondansetron, oxyCODONE    Objective:  VITAL SIGNS: 24 HR MIN & MAX LAST   Temp  Min: 97.7 °F (36.5 °C)  Max: 99 °F (37.2 °C)  97.7 °F (36.5 °C)   BP  Min: 86/52  Max: 107/60  106/65    Pulse  Min: 82  Max: 110  86    Resp  Min: 14  Max: 21  16    SpO2  Min: 92 %  Max: 100 %  100 %         Physical Exam:  GEN- NAD, AAO  HEAD/FACE-  NC, AT  EYES - EOMI PERRLA  NOSE-  Midline, no rhinorrhea, atraumatic; no external deformity  EARS - external ears normal, grossly normal hearing.   ORAL CAVITY/ORPHARYNX - MMM, no lesions.   NECK - bilateral apron incisions with staples c/d/i. Neck is soft without significant swelling, edema, or induration. Stoma patent with sutures intact. Pam tube in place, removed and cleaned. UCHE drains with appropriate serosanguinous output  RESP - Non labored breathing, on humidified trach collar   EXT- Moves all extremities freely    Laboratory:   Lab Results   Component Value Date/Time    WBC 11.2 07/12/2022 04:17 AM    HGB 11.7 (L) 07/12/2022 04:17 AM    HCT 37.7 (L) 07/12/2022 04:17 AM    CHLORIDE 100 07/12/2022 04:17 AM    CO2 27 07/12/2022 04:17 AM    BUN 17.5 07/12/2022 04:17 AM    CREATININE 0.67 (L) 07/12/2022 04:17 AM    GLUCOSE 120 (H) 07/12/2022 04:17 AM    CALCIUM 9.1 07/12/2022 04:17 AM    ALBUMIN 2.4 (L) 07/11/2022 02:44 PM    AST 20 07/11/2022 02:44 PM    ALT 15 07/11/2022 02:44 PM    ALKPHOS 66 07/11/2022 02:44 PM       Cultures:  Microbiology Results (last 7 days)     Procedure Component Value Units Date/Time    Fungal Culture [[id number]] Collected: 07/11/22 0814    Order Status: Sent Specimen:  Respiratory from Bronchial Wash, RLL Updated: 07/11/22 1005    AFB Smear [[id number]] Collected: 07/11/22 0859    Order Status: Sent Specimen: Respiratory from Bronchial Wash, LLL     Fungal Culture [[id number]] Collected: 07/11/22 0859    Order Status: Sent Specimen: Respiratory from Bronchial Wash, LLL     AFB Smear [[id number]] Collected: 07/11/22 0814    Order Status: Sent Specimen: Respiratory from Bronchial Wash, RLL Updated: 07/11/22 0836    AFB Smear [[id number]] Collected: 07/11/22 0801    Order Status: Canceled Specimen: Respiratory from Bronchial Wash, LLL     Fungal Culture [[id number]] Collected: 07/11/22 0801    Order Status: Canceled Specimen: Respiratory from Bronchial Wash, LLL           Radiology:     Assessment/Plan:   Josafat Boudreaux is a 56 y.o. male s/p total laryngectomy, bilateral neck dissections 7/11/22. POD1.     - Remove roque tube at least twice daily for cleaning, and as necessary   - Clean crusting on neck with half strength peroxide. Apply thin layer of aquaphor to incisions twice daily   - Continue humidified trach collar at all times   - Remove calixto today. Follow up UOP.   - Discontinue arterial line  - Start trickle tube feeds today. Advance to goal. Nutrition consulted for additional recommendations.   - OOB ambulate, PT consult placed  - Will consult speech therapy for laryngectomy teaching   - Continue multimodal pain control.    Dispo: transfer to floor today

## 2022-07-12 NOTE — PLAN OF CARE
07/12/22 1107   Discharge Assessment   Assessment Type Discharge Planning Assessment   Confirmed/corrected address, phone number and insurance Yes   Source of Information family   If unable to respond/provide information was family/caregiver contacted? Yes   Contact Name/Number Kymberly Boudreaux ([phone])   When was your last doctors appointment?   (Tony Bertrand)   Communicated MOISES with patient/caregiver Date not available/Unable to determine   Reason For Admission Laryngeal Berny/Andres   Lives With spouse   Facility Arrived From: Home   Do you expect to return to your current living situation? Yes   Do you have help at home or someone to help you manage your care at home? Yes   Who are your caregiver(s) and their phone number(s)? Kymberly Boudreaux ([phone])   Prior to hospitilization cognitive status: Alert/Oriented   Current cognitive status: Alert/Oriented   Walking or Climbing Stairs Difficulty ambulation difficulty, requires equipment   Mobility Management RW   Dressing/Bathing Difficulty bathing difficulty, requires equipment   Dressing/Bathing Management Shower Chair   Equipment Currently Used at Home suction machine;oxygen;walker, rolling;shower chair;bedside commode   Patient currently being followed by outpatient case management? No   Do you currently have service(s) that help you manage your care at home? Yes   Name and Contact number of agency [facility]   Is the pt/caregiver preference to resume services with current agency Yes   Do you take prescription medications? Yes  (Melissa Rendon)   Do you have any problems affording any of your prescribed medications? No   Is the patient taking medications as prescribed? yes   Who is going to help you get home at discharge? Spouse   How do you get to doctors appointments? family or friend will provide   Are you on dialysis? No   Discharge Plan A Home with family;Home Health   DME Needed Upon Discharge  none   Discharge Plan discussed with:  Spouse/sig other   Name(s) and Number(s) Kymberly Boudreaux   Discharge Barriers Identified None   Relationship/Environment   Name(s) of Who Lives With Patient Kymberly Boudreaux   Left message requesting order to resume  services upon discharge with Dr. Emory Alonso's Care Team ([phone])

## 2022-07-12 NOTE — PT/OT/SLP PROGRESS
Missed Treatment Session - cancel note      Physical Therapy      Patient Name:  Josafat Boudreaux   MRN:  [id number]    Patient not seen today secondary to Other (Comment) (patient just placed back in bed w/ assistance from pt's nurse Bubba and JASMIN ARSHAD, pt's wife in room, therapy session cx'ed). Will follow-up 07/13/2022.

## 2022-07-12 NOTE — PT/OT/SLP EVAL
Physical Therapy Evaluation    Patient Name:  Josafat Boudreaux   MRN:  [id number]    Recommendations:     Discharge Recommendations:  other (see comments) (home w/ responsible caregiver w/ HH)   Discharge Equipment Recommendations: walker, rolling, shower chair   Barriers to discharge: None    Assessment:     Josafat Boudreaux is a 56 y.o. male admitted with a medical diagnosis of    s/p total laryngectomy, bilateral neck dissections 7/11/22    Patient Active Problem List   Diagnosis    Finding of above normal blood pressure    Closed basicervical fracture of femur    Shortness of breath    Vocal cord mass    Dysphagia    Tracheostomy dependent    New onset seizure     He presents with the following impairments/functional limitations:  weakness, impaired endurance, impaired functional mobilty, gait instability, impaired balance, impaired self care skills.    Rehab Prognosis: Good; patient would benefit from acute skilled PT services to address these deficits and reach maximum level of function.    -continued OOB, up to chair, ambulation w/ rolling walker w/ supervision/assistance w/ progression as tolerated/appropriate    Recent Surgery: Procedure(s) (LRB):  LARYNGECTOMY, WITH RADICAL NECK DISSECTION (Bilateral)  LARYNGOSCOPY, DIRECT, DIAGNOSTIC, WITH BRONCHOSCOPY AND ESOPHAGOSCOPY (N/A) 1 Day Post-Op    Plan:     During this hospitalization, patient to be seen  (3-5 TIMES/WEEK) to address the identified rehab impairments via gait training, therapeutic activities, therapeutic exercises and progress toward the following goals:    · Plan of Care Expires:  08/09/22    Subjective     Chief Complaint: hurting  Patient/Family Comments/goals: return home to [facility]  Pain/Comfort:  · Pain Rating 1: 7/10 (pre-activity)  · Location 1:  (surgical site)  · Pain Addressed 1: Nurse notified  · Pain Rating Post-Intervention 1: 7/10 (post-activity)    Patients cultural, spiritual, Jainism conflicts given the current  situation: no    Living Environment:  -lives w/ wife in mobile home w/ 4 steps w/ handrail on Lt ascending steps w/ tub/shower & tub-shower combo (pt bathes in tub)    Prior to admission, patients level of function was independent in ADL's and self care.  Equipment used at home: walker, rolling, cane, straight, shower chair, bedside commode, oxygen, suction machine.  DME owned (not currently used): single point cane.  Upon discharge, patient will have assistance from pt's wife.    -pt is Rt hand dominant    -pain complaints as documented  -pt denied - extremity tingling/numbness, lightheadedness/dizziness, new vision impairment    Objective:     Communicated with pt's nurse Bubba prior to session.  Patient's nurse Bubba into room during treatment session.  Patient found supine in bed w/ HOB rails up and HOB elevated with arterial line, oxygen, NG tube, UCHE drain, peripheral IV, blood pressure cuff, pulse ox (continuous), PEG Tube (Rt wrist splint)  upon PT entry to room.    General Precautions: Standard, other (see comments) (post-op, standard)   Orthopedic Precautions:Full weight bearing   Braces:  (Rt wrist splint)  Respiratory Status: trach collar 02 35% humidified    Exams:  · Cognitive Exam:  Patient is oriented to Person  · Fine Motor Coordination:    · -       Intact  Left hand thumb/finger opposition skills and Right hand thumb/finger opposition skills  · Gross Motor Coordination:  bilat LE toe taps - WFL's  · Sensation:    · -       Intact  light/touch bilat distal feet/toes  · RUE ROM: WFL  · RUE Strength: WFL  · LUE ROM: WFL  · LUE Strength: WFL  · RLE ROM: WFL  · RLE Strength: WFL  · LLE ROM: WFL  · LLE Strength: WFL    Functional Mobility:  · Bed Mobility:     · Rolling Right: minimum assistance  · Scooting: minimum assistance  · Supine to Sit: minimum assistance  · Transfers:     · Sit to Stand:  minimum assistance with rolling walker  · Gait: minimum assistance w/ rolling walker 130ft x1 w/ standing  pause x10 seconds      ADDITIONAL TREATMENT SESSION  THER ACT x2 - 23 minutes  -edge of bed sitting balance activities: wt shifting, scooting, repositioning at edge of bed  -bilat  socks adjusted  -2nd back gown donned  -slowed/guarded mvmts  -cues for proper sequencing/technique  -pt transitioned from bed to bedside chair (recliner) w/ pillow support behind pt's back  -time consuming - all activities    Balance Training  Static Sitting:  Patient performed static sitting on level surface  using no device with Calvert and no verbal cues.     Dynamic Sitting:  Patient performed dynamic sitting on level surface using no device with Modified Calvert and minimal verbal cues during minimal excursions.    Balance Training  Static Standing:  Patient performed static standing on level surface  using rolling walker with Supervision and no verbal cues.      07/12/22  Supine  Pre-activity 07/12/22  Sitting  Post activity   BP: 110/86 124/80   Pulse: 69 96   Resp: 23    SpO2:  100%       Patient left up in chair with all lines intact, call button in reach, pt's nurse Bubba notified and tray table at bedside, suction yanker to wall suction, trach collar 02, NGT clamped.    GOALS:   Multidisciplinary Problems     Physical Therapy Goals        Problem: Physical Therapy    Goal Priority Disciplines Outcome Goal Variances Interventions   Physical Therapy Goal     PT, PT/OT      Description: Goals to be met by: DISCHARGE     Patient will increase functional independence with mobility by performing:    -. Supine to sit with Modified Calvert  -. Sit to supine with Modified Calvert  -. Rolling to Left and Right with Modified Calvert  -. Sit to stand transfer with Supervision  -. Gait  x 130ft x2 with Supervision using Rolling Walker  -. Ascend/descend 4 steps with left Handrails Supervision using No Assistive Device                     History:     Past Medical History:   Diagnosis Date    Cancer     COPD  (chronic obstructive pulmonary disease)     Dysphagia     Hypertension     Vocal cord mass        Past Surgical History:   Procedure Laterality Date    HIP SURGERY      HUMERUS FRACTURE SURGERY      INSERT ARTERIAL LINE  6/26/2022         TRACHEOSTOMY N/A 6/10/2022    Procedure: CREATION, TRACHEOSTOMY;  Surgeon: Junior Alonso MD;  Location: [facility];  Service: ENT;  Laterality: N/A;       Time Tracking:     PT Received On: 07/12/22  PT Start Time: 0925     PT Stop Time: 1011  PT Total Time (min): 46 min     Billable Minutes: Evaluation 23 and Therapeutic Activity 23      07/12/2022

## 2022-07-12 NOTE — CONSULTS
[facility] - ICU  Adult Nutrition  Consult Note    SUMMARY     Recommendations    Recommendation/Intervention: 1. Consulted for TF recs; changed TF to Isosource 1.5--@ 15ml/hr; to increase to goal rate 60ml/hr (23 hr cycle ) to provide 2070 calories, 94 gm protein, 1054 ml free water. Flush tube with 125ml water every 3 hrs.   2. When stable/tolerating goal TF rate--then may consider change to bolus feeding schedule. Pt will need 6 cans per day to meet nutrient needs; Isosource 1.5--6 can per day = 2250 calories, 102 gm protein  . 3. MVI/fe   4. biweekly wt.   Will monitor nutrition status  Goals: Meet > 75% nutrition needs  Nutrition Goal Status: new  Communication of RD Recs: reviewed with RN    Assessment and Plan  Pt NPO; sitting in chair; requested for me to obtain hx from his wife. Called wife; discussed case--pt was on bolus TF PTA --Diabetic source 6 can per day; no hx DM; + wt loss ; approx 10% over past month; wife reported pt had lost a lot wt at time of peg placement; was not receiving nutriton approx 1 week during that time frame. Discussed changing to Isosource --more concentrated formula to provide additional calories/assist wt gain.    Labs acknowledged. + fat/muscle wasting noted during PA> . Per EMR--? + coccyx wound ; TF should have ample protein to promote healing.     Nutrition Problem  Malnutrition    Related to (etiology):   Laryngeal CA     Signs and Symptoms (as evidenced by):   10% wt loss past month; + orbital fat loss; + temporal wasting     Interventions(treatment strategy):  Modified composition of enteral nutrition, Modified rate of enteral nutrition and Collaboration with other providers    Nutrition Diagnosis Status:   New      Malnutrition Assessment  Malnutrition Type: chronic illness, other (see comments) (moderate chronic malnutrition)          Weight Loss (Malnutrition): greater than 5% in 1 month  Energy Intake (Malnutrition): other (see comments) (pt on TF; per wife  "tolerated well; does not meet criteria)  Subcutaneous Fat (Malnutrition): mild depletion  Muscle Mass (Malnutrition): mild depletion  Fluid Accumulation (Malnutrition):  (does not meet criteia)   Orbital Region (Subcutaneous Fat Loss): mild depletion  Upper Arm Region (Subcutaneous Fat Loss): mild depletion   [place] Region (Muscle Loss): mild depletion  Clavicle Bone Region (Muscle Loss): mild depletion  Clavicle and Acromion Bone Region (Muscle Loss): mild depletion   Reason for Assessment    Reason For Assessment: consult, new tube feeding, NPO/clear liquids x 5 days  Diagnosis: cancer diagnosis/related complications, other (see comments) (Laryngeal Mass--S/p total laryngectomy, B neck dissection)  Relevant Medical History: laryngeal mass, seizure, trach, peg, COPD, HTN, dysphagia    Nutrition Risk Screen    Nutrition Risk Screen: dysphagia or difficulty swallowing, tube feeding or parenteral nutrition    Nutrition/Diet History    Patient Reported Diet/Restrictions/Preferences: other (see comments) (home TF Diabetic source AC --6 can per day via peg)  Spiritual, Cultural Beliefs, Sikh Practices, Values that Affect Care: no  Factors Affecting Nutritional Intake: altered gastrointestinal function, difficulty/impaired swallowing, NPO  Nutrition Support Formula Prior to Admit: Diabetasource  Nutrtion Support Frequency Prior to Admit: Diabetic source 6 can per day--1800 calories, 90 gm protein ,1224 ml free water    Anthropometrics    Temp: 98.5 °F (36.9 °C)  Height: 5' 9" (175.3 cm)  Height (inches): 69 in  Weight Method: Standard Scale  Weight: 61.2 kg (134 lb 14.7 oz)  Weight (lb): 134.92 lb  Ideal Body Weight (IBW), Male: 160 lb  % Ideal Body Weight, Male (lb): 84.33 %  % Ideal Body Weight Malnutrition: 80-90% - mild deficit  BMI (Calculated): 19.9  BMI Grade: 18.5-24.9 - normal  Weight Loss: unintentional  Usual Body Weight (UBW), k kg (apporx 10% wt loss over 1 month)  % Usual Body Weight: 90.19  % " Weight Change From Usual Weight: -10 %       Lab/Procedures/Meds    Pertinent Labs Reviewed: reviewed  Pertinent Labs Comments: (7-12) H/H 11.7/37.7(L) Gluc 120 Bun 17.5 Cr 0.6 K 3.7  Pertinent Medications Reviewed: reviewed  Pertinent Medications Comments: IVF D5 & 0.45% NaCL @ 50ml/hr; ancef      Estimated/Assessed Needs    Weight Used For Calorie Calculations: 61.2 kg (134 lb 14.7 oz)  Energy Calorie Requirements (kcal): 2142kcal/d; 35 wilma/kg --wt gain  Energy Need Method: Kcal/kg  Protein Requirements: 92 gm protein/d ; 1.5 gm/kg  Weight Used For Protein Calculations: 61.2 kg (134 lb 14.7 oz)     Estimated Fluid Requirement Method: RDA Method  RDA Method (mL): 2142         Nutrition Prescription Ordered    Current Diet Order: NPO / peg tube  Current Nutrition Support Formula Ordered: Isosource 1.5  Current Nutrition Support Rate Ordered: 60 (ml)    Evaluation of Received Nutrient/Fluid Intake    Energy Calories Required: not meeting needs, other (see comments) (TF to begin today; adjusted TF formula/goal rate)  Protein Required: not meeting needs  Fluid Required: not meeting needs  Comments: Pt to begin TF today; changed TF formula to Isosource 1.5--goal rate 60ml/hr; 125ml water flush every 3 hr.  Tolerance: other (see comments) (to begin TF today; per wife pt was tolerating TF at home PTA)  % Intake of Estimated Energy Needs: 0 - 25 %  % Meal Intake: 0 - 25 %    Nutrition Risk    Level of Risk/Frequency of Follow-up: high (4,0,3,?/0,4-11)       Monitor and Evaluation    Food and Nutrient Intake: enteral nutrition intake  Food and Nutrient Adminstration: enteral and parenteral nutrition administration  Anthropometric Measurements: weight  Biochemical Data, Medical Tests and Procedures: electrolyte and renal panel       Nutrition Follow-Up    RD Follow-up?: Yes

## 2022-07-13 DIAGNOSIS — Z93.0 TRACHEOSTOMY DEPENDENT: Primary | ICD-10-CM

## 2022-07-13 DIAGNOSIS — R13.10 DYSPHAGIA, UNSPECIFIED TYPE: ICD-10-CM

## 2022-07-13 LAB
ANION GAP SERPL CALC-SCNC: 8 MEQ/L
BASOPHILS # BLD AUTO: 0.05 X10(3)/MCL (ref 0–0.2)
BASOPHILS NFR BLD AUTO: 0.6 %
BUN SERPL-MCNC: 11.7 MG/DL (ref 8.4–25.7)
CALCIUM SERPL-MCNC: 9.2 MG/DL (ref 8.4–10.2)
CHLORIDE SERPL-SCNC: 103 MMOL/L (ref 98–107)
CO2 SERPL-SCNC: 30 MMOL/L (ref 22–29)
CREAT SERPL-MCNC: 0.59 MG/DL (ref 0.73–1.18)
CREAT/UREA NIT SERPL: 20
EOSINOPHIL # BLD AUTO: 0.2 X10(3)/MCL (ref 0–0.9)
EOSINOPHIL NFR BLD AUTO: 2.2 %
ERYTHROCYTE [DISTWIDTH] IN BLOOD BY AUTOMATED COUNT: 14.2 % (ref 11.5–17)
GLUCOSE SERPL-MCNC: 126 MG/DL (ref 74–100)
HCT VFR BLD AUTO: 37 % (ref 42–52)
HGB BLD-MCNC: 11.5 GM/DL (ref 14–18)
IMM GRANULOCYTES # BLD AUTO: 0.03 X10(3)/MCL (ref 0–0.04)
IMM GRANULOCYTES NFR BLD AUTO: 0.3 %
LYMPHOCYTES # BLD AUTO: 2.02 X10(3)/MCL (ref 0.6–4.6)
LYMPHOCYTES NFR BLD AUTO: 22.5 %
MCH RBC QN AUTO: 30.3 PG (ref 27–31)
MCHC RBC AUTO-ENTMCNC: 31.1 MG/DL (ref 33–36)
MCV RBC AUTO: 97.4 FL (ref 80–94)
MONOCYTES # BLD AUTO: 1.06 X10(3)/MCL (ref 0.1–1.3)
MONOCYTES NFR BLD AUTO: 11.8 %
NEUTROPHILS # BLD AUTO: 5.6 X10(3)/MCL (ref 2.1–9.2)
NEUTROPHILS NFR BLD AUTO: 62.6 %
NRBC BLD AUTO-RTO: 0 %
PLATELET # BLD AUTO: 191 X10(3)/MCL (ref 130–400)
PMV BLD AUTO: 11.1 FL (ref 7.4–10.4)
POTASSIUM SERPL-SCNC: 3.5 MMOL/L (ref 3.5–5.1)
RBC # BLD AUTO: 3.8 X10(6)/MCL (ref 4.7–6.1)
SODIUM SERPL-SCNC: 141 MMOL/L (ref 136–145)
WBC # SPEC AUTO: 9 X10(3)/MCL (ref 4.5–11.5)

## 2022-07-13 PROCEDURE — 36415 COLL VENOUS BLD VENIPUNCTURE: CPT | Performed by: STUDENT IN AN ORGANIZED HEALTH CARE EDUCATION/TRAINING PROGRAM

## 2022-07-13 PROCEDURE — 94761 N-INVAS EAR/PLS OXIMETRY MLT: CPT

## 2022-07-13 PROCEDURE — S0030 INJECTION, METRONIDAZOLE: HCPCS | Performed by: STUDENT IN AN ORGANIZED HEALTH CARE EDUCATION/TRAINING PROGRAM

## 2022-07-13 PROCEDURE — 63600175 PHARM REV CODE 636 W HCPCS: Performed by: STUDENT IN AN ORGANIZED HEALTH CARE EDUCATION/TRAINING PROGRAM

## 2022-07-13 PROCEDURE — 85025 COMPLETE CBC W/AUTO DIFF WBC: CPT | Performed by: STUDENT IN AN ORGANIZED HEALTH CARE EDUCATION/TRAINING PROGRAM

## 2022-07-13 PROCEDURE — 11000001 HC ACUTE MED/SURG PRIVATE ROOM

## 2022-07-13 PROCEDURE — 27000221 HC OXYGEN, UP TO 24 HOURS

## 2022-07-13 PROCEDURE — 31720 CLEARANCE OF AIRWAYS: CPT

## 2022-07-13 PROCEDURE — 25000003 PHARM REV CODE 250: Performed by: STUDENT IN AN ORGANIZED HEALTH CARE EDUCATION/TRAINING PROGRAM

## 2022-07-13 PROCEDURE — 80048 BASIC METABOLIC PNL TOTAL CA: CPT | Performed by: STUDENT IN AN ORGANIZED HEALTH CARE EDUCATION/TRAINING PROGRAM

## 2022-07-13 PROCEDURE — 92524 BEHAVRAL QUALIT ANALYS VOICE: CPT

## 2022-07-13 RX ADMIN — POLYETHYLENE GLYCOL 3350 17 G: 17 POWDER, FOR SOLUTION ORAL at 12:07

## 2022-07-13 RX ADMIN — ACETAMINOPHEN 650 MG: 325 TABLET ORAL at 05:07

## 2022-07-13 RX ADMIN — METRONIDAZOLE 500 MG: 500 INJECTION, SOLUTION INTRAVENOUS at 03:07

## 2022-07-13 RX ADMIN — SODIUM CHLORIDE 2 G: 9 INJECTION, SOLUTION INTRAVENOUS at 05:07

## 2022-07-13 RX ADMIN — METRONIDAZOLE 500 MG: 500 INJECTION, SOLUTION INTRAVENOUS at 07:07

## 2022-07-13 RX ADMIN — GABAPENTIN 300 MG: 300 CAPSULE ORAL at 04:07

## 2022-07-13 RX ADMIN — ENOXAPARIN SODIUM 40 MG: 40 INJECTION SUBCUTANEOUS at 09:07

## 2022-07-13 RX ADMIN — GABAPENTIN 300 MG: 300 CAPSULE ORAL at 09:07

## 2022-07-13 RX ADMIN — ACETAMINOPHEN 650 MG: 325 TABLET ORAL at 06:07

## 2022-07-13 RX ADMIN — SODIUM CHLORIDE 2 G: 9 INJECTION, SOLUTION INTRAVENOUS at 02:07

## 2022-07-13 RX ADMIN — OXYCODONE HYDROCHLORIDE 5 MG: 5 TABLET ORAL at 10:07

## 2022-07-13 RX ADMIN — CELECOXIB 200 MG: 200 CAPSULE ORAL at 09:07

## 2022-07-13 RX ADMIN — WHITE PETROLATUM: 1.75 OINTMENT TOPICAL at 10:07

## 2022-07-13 RX ADMIN — MUPIROCIN: 20 OINTMENT TOPICAL at 10:07

## 2022-07-13 RX ADMIN — DOCUSATE SODIUM 100 MG: 100 CAPSULE, LIQUID FILLED ORAL at 09:07

## 2022-07-13 RX ADMIN — PANTOPRAZOLE SODIUM 40 MG: 40 GRANULE, DELAYED RELEASE ORAL at 09:07

## 2022-07-13 RX ADMIN — ACETAMINOPHEN 650 MG: 325 TABLET ORAL at 02:07

## 2022-07-13 RX ADMIN — SODIUM CHLORIDE 2 G: 9 INJECTION, SOLUTION INTRAVENOUS at 10:07

## 2022-07-13 RX ADMIN — CELECOXIB 200 MG: 200 CAPSULE ORAL at 12:07

## 2022-07-13 RX ADMIN — MUPIROCIN: 20 OINTMENT TOPICAL at 09:07

## 2022-07-13 NOTE — TELEPHONE ENCOUNTER
Advised Mayela at [facility] that order was placed into system. Verbalized understanding.

## 2022-07-13 NOTE — TELEPHONE ENCOUNTER
Mayela with [facility] is calling regarding previous message about home health services.  Please return her call at [phone].

## 2022-07-13 NOTE — PT/OT/SLP EVAL
[facility]  Speech Therapy Evaluation -INPATIENT  Laryngectomy  Date: 07/13/2022    Name: Josafat Boudreaux   MRN: [id number]    Therapy Diagnosis: s/p Total laryngectomy  Physician: Emory Alonso II,*    PATIENT IS A NECK-BREATHER - DO NOT ORALLY INTUBATE    Time In:  1310   Time Out:  1400     Procedure Min.   Speech Language Voice Therapy  50   Total Untimed Units: 50  Charges Billed/# of units: 1    Precautions: Standard  Subjective    Chief Complaint: neck soreness    AFFECTnormal affect    Pain:   5/10  Pain Location / Description: neck  Current Medical History: Josafat Boudreaux is a 56 y.o. male referred by Dr. Alonso for post laryngectomy and alaryngeal communication education to maximize alaryngeal communication without respiratory compromise.      Past Medical History:   Past Medical History:   Diagnosis Date    Cancer     COPD (chronic obstructive pulmonary disease)     Dysphagia     Hypertension     Vocal cord mass     Josafat Boudreaux  has a past surgical history that includes Humerus fracture surgery; Hip surgery; Tracheostomy (N/A, 6/10/2022); and Insert arterial line (6/26/2022).     ENT Progress Note 7/8/2022  Chief Complaint: Laryngeal mass     HPI: Josafat Boudreaux is a 56 y.o. male referred to clinic by Dr. Emory Alonso for laryngeal mass. Patients wife provides history. She states that patient first noticed hoarseness in November 2021. Got progressively worse. Presented to Dr. Alonso in early June 2022 for these complaints. Was noted to have a laryngeal mass on flexible laryngoscopy. Several days after the flexible laryngoscopy patient developed worsening swelling of the airway and presented to the ER in respiratory distress. He underwent emergent tracheostomy with Dr. Junior Alonso. Patient was subsequently discharged home but readmitted shortly after with seizures. He spent 3 days in the ICU on the ventilator. During hospitalization,  patient underwent PEG tube placement. He reports that he was not having any issues with PO intake prior to the tracheostomy placement. Now he is PEG dependent. Only taking some ice chips by mouth. He presents today to discuss laryngectomy.    Assessment/Plan:  Josafat Boudreaux is a 56 y.o. male with laryngeal mass s/p tracheostomy. He presents as a referral from Dr. Emory Alonso for consideration of total laryngectomy.   We discussed the procedure in detail. We will plan to begin with a panendoscopy with biopsy and frozen section for diagnosis. We discussed that if the pathology is equivocal or nondiagnostic we likely will not proceed with surgical intervention. However if biopsy confirms squamous cell carcinoma, we will proceed with laryngectomy. We discussed the expected post operative course for laryngectomy including likely 5-7 day hospitalization. We discussed potential complications including pain, bleeding, infection, breakdown of wounds, pharyngeal leak, hypothyroidism, hypocalcemia related to parathyroid etiology. Patient and wife expressed understanding and wish to proceed. We will plan for intervention on Monday 7/11.     Lisseth Krueger MD  [facility] Department of Otolaryngology  HO-V    Medical Hx and Allergies: [facility] Review of patient's allergies indicates:  No Known Allergies    Current Voice Function: aphonic s/p total laryngectomy   Current Level of Swallow Function/Complaints:  NPO, currently using suction to manage secretions  Prior Therapy:  None noted    TEP ASSESSMENT   Initial Fitting: no TEP in place    Re-fitting:no    TEP Initial Fitting Date: NA    TEP Current Status:Pt is currently wearing a 10/55 [employer] Standard Pam tube with [employer] Life Home Push to talk HME    TEP Patient Complaints:NA    TEP Accessories: 10/55 [employer] Standard Pam tube with [employer] Push to talk HME    Stoma Size:  adequate, staples remain in place     Lymphedema:  yes    Treatment   Total  Treatment Time Separate from Evaluation: n/a   no treatment performed 2/2 time to complete evaluation.    Education: Plan of Care, role of SLP in care and stoma, HME, roque tube care  were discussed with pt. Patient and family members expressed understanding. SLP provided educational handouts from [employer] for patient and family to review.    Assessment       LongTerm Goals:  Current Progress:   1.  Patient will utilize alaryngeal communication to express basic and medical wants and needs >90% of the time.  Initial       Short Term Goals:  Current Progress:   1. Patient will demonstrate care and use of HME system for maximum pulmonary benefit >90% of the time. Initial   2.  Patient will effectively use alaryngeal communciation via written language, mouthing of words with >90% accuracy.  Initial   3. Patient will increase laryngectomy tube usage daily or PRN to maintain and increase stomal patency.  Initial     Mr. 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 He is currently utilizing written language and mouthing of words to communication with medical staff. He will not use alaryngeal speech until he receives a secondary voice prosthesis. Patient with seondary TEP planned post radiation per Dr. Larsen, ENT.  Mr. Boudreaux requires the use of a laryngectomy tube at all times to keep the airway patent and prevent stomal stenosis. HMEs are required daily for mucus management and pulmonary optimization .     Plan     SLP intervention is warranted 1-3 times a week or PRN for communication needs for a minimum of 1 year due to the chronic nature of the impairment.     Additional Information     Educated patient on pre and post-op anatomy with the use of illustrations, discussing changes in communication, swallowing, and pulmonary function.    Educated patient on communication options following total laryngectomy, including electrolarynx, esophageal speech, and TEP (trachea-esophageal  voice prosthesis). Patient is interested in using TEP for primary means of communication. Educated patient on use and care of TEP. Samples of TEP provided for patient to see.      Educated patient on post-op changes in pulmonary function - primary and single respiratory tract is through stoma, kept patent through the use of the laryngectomy tube.   HME cassettes provide moisture and humidification and should be worn 24 hours daily, changed every 24 hours for maximum benefit and mucus management. Patient agrees with the use of HME cassettes. Samples of roque tube and HMEs provided for patient to see.     All comments and questions addressed during session. Educational written material provided for patient, including those provided by [employer].     Courtney Padilla MS, CCC-SLP  07/13/2022

## 2022-07-13 NOTE — PROGRESS NOTES
[facility] OTOLARYNGOLOGY PROGRESS NOTE    Josafat Boudreaux 1965 07/13/2022    CC: No chief complaint on file.    Interval HPI: Josafat Boudreaux is a 56 y.o. 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     Subjective: No issues overnight. Dexter removed yesterday and voiding appropriately. Tolerating tube feeds via PEG at 50cc/hr this morning. Had some reflux last night for which he was started on protonix. Reflux improved this morning. Ambulated around floor yesterday.     Diet: Diet NPO    Allergies: Review of patient's allergies indicates:  No Known Allergies  Medications:   Current Facility-Administered Medications   Medication Dose Route Frequency Provider Last Rate Last Admin    acetaminophen tablet 650 mg  650 mg Per G Tube Q6H Lisseth Krueger MD   650 mg at 07/13/22 0509    ceFAZolin (ANCEF) 2 g in sodium chloride 0.9% 50 mL IVPB  2 g Intravenous Q8H Lisseth Krueger MD   Stopped at 07/13/22 0540    celecoxib capsule 200 mg  200 mg Oral BID Lisseth Krueger MD   200 mg at 07/12/22 2033    docusate sodium capsule 100 mg  100 mg Oral BID Lisseth Krueger MD   100 mg at 07/12/22 2033    enoxaparin injection 40 mg  40 mg Subcutaneous Daily Lisseth Krueger MD   40 mg at 07/13/22 0921    gabapentin capsule 300 mg  300 mg Oral TID Lisseth Krueger MD   300 mg at 07/13/22 0917    metronidazole IVPB 500 mg  500 mg Intravenous Q8H Lisseth Krueger MD   Stopped at 07/13/22 0815    morphine injection 2 mg  2 mg Intravenous Q4H PRN Lisseth Krueger MD   2 mg at 07/12/22 0331    mupirocin 2 % ointment   Nasal BID Emory Alonso MD   Given at 07/13/22 0925    ondansetron injection 4 mg  4 mg Intravenous Q8H PRN Lisseth Krueger MD        oxyCODONE immediate release tablet 5 mg  5 mg Oral Q6H PRN Lisseth Krueger MD   5 mg at 07/12/22 2033    pantoprazole suspension 40 mg  40 mg Per G Tube Daily Candi Mckeon MD   40 mg at 07/13/22 0902    polyethylene glycol packet 17  g  17 g Oral Daily Lisseth Krueger MD   17 g at 07/12/22 0905    white petrolatum 41 % ointment   Topical (Top) BID Lisseth Krueger MD   Given at 07/12/22 2039     Scheduled Meds:   acetaminophen  650 mg Per G Tube Q6H    ceFAZolin (ANCEF) IVPB  2 g Intravenous Q8H    celecoxib  200 mg Oral BID    docusate sodium  100 mg Oral BID    enoxaparin  40 mg Subcutaneous Daily    gabapentin  300 mg Oral TID    metronidazole  500 mg Intravenous Q8H    mupirocin   Nasal BID    pantoprazole  40 mg Per G Tube Daily    polyethylene glycol  17 g Oral Daily    white petrolatum   Topical (Top) BID     Continuous Infusions:  PRN Meds:.morphine, ondansetron, oxyCODONE    Objective:  VITAL SIGNS: 24 HR MIN & MAX LAST   Temp  Min: 98.4 °F (36.9 °C)  Max: 98.8 °F (37.1 °C)  98.4 °F (36.9 °C)   BP  Min: 102/63  Max: 124/80  104/64    Pulse  Min: 88  Max: 109  96    Resp  Min: 16  Max: 18  16    SpO2  Min: 93 %  Max: 99 %  (!) 93 %         Physical Exam:  GEN- NAD, AAO  HEAD/FACE-  NC, AT  EYES - EOMI PERRLA  NOSE-  Midline, no rhinorrhea, atraumatic; no external deformity  EARS - external ears normal, grossly normal hearing.   ORAL CAVITY/ORPHARYNX - MMM, no lesions.   NECK - bilateral apron incisions with staples c/d/i. Neck is soft without significant swelling, edema, or induration. Stoma patent with sutures intact. Pam tube in place, removed and cleaned. UCHE drains with appropriate serosanguinous output  RESP - Non labored breathing, on humidified trach collar   EXT- Moves all extremities freely    Laboratory:   Lab Results   Component Value Date/Time    WBC 9.0 07/13/2022 03:33 AM    HGB 11.5 (L) 07/13/2022 03:33 AM    HCT 37.0 (L) 07/13/2022 03:33 AM    CHLORIDE 103 07/13/2022 03:33 AM    CO2 30 (H) 07/13/2022 03:33 AM    BUN 11.7 07/13/2022 03:33 AM    CREATININE 0.59 (L) 07/13/2022 03:33 AM    GLUCOSE 126 (H) 07/13/2022 03:33 AM    CALCIUM 9.2 07/13/2022 03:33 AM    ALBUMIN 2.4 (L) 07/11/2022 02:44 PM    AST 20  07/11/2022 02:44 PM    ALT 15 07/11/2022 02:44 PM    ALKPHOS 66 07/11/2022 02:44 PM       Cultures:  Microbiology Results (last 7 days)     Procedure Component Value Units Date/Time    AFB Smear [[id number]] Collected: 07/11/22 0814    Order Status: Completed Specimen: Respiratory from Bronchial Wash, RLL Updated: 07/12/22 0952     AFB Smear No AFB seen (Direct smear only)    Fungal Culture [[id number]] Collected: 07/11/22 0814    Order Status: Resulted Specimen: Respiratory from Bronchial Wash, RLL Updated: 07/11/22 1005    AFB Smear [[id number]] Collected: 07/11/22 0859    Order Status: Sent Specimen: Respiratory from Bronchial Wash, LLL     Fungal Culture [[id number]] Collected: 07/11/22 0859    Order Status: Sent Specimen: Respiratory from Bronchial Wash, LLL     AFB Smear [[id number]] Collected: 07/11/22 0801    Order Status: Canceled Specimen: Respiratory from Bronchial Wash, LLL     Fungal Culture [[id number]] Collected: 07/11/22 0801    Order Status: Canceled Specimen: Respiratory from Bronchial Wash, LLL           Radiology:     Assessment/Plan:   Assessment/Plan:   Josafat Boudreaux is a 56 y.o. male s/p total laryngectomy, bilateral neck dissections 7/11/22. POD1.      - Remove roque tube at least twice daily for cleaning, and as necessary   - Clean crusting on neck with half strength peroxide. Apply thin layer of aquaphor to incisions twice daily   - Continue humidified trach collar at all times or wear HME  - Tube feeds via PEG tube. Advance to goal. Appreciate nutrition recommendations.   - OOB ambulate, PT consult placed  - Unfortunately patient was initially seen at an outside office and did not have much laryngectomy education prior to surgery. Wife has been very good about reading online however will consult speech therapy for laryngectomy teaching   - Continue multimodal pain control.

## 2022-07-13 NOTE — PLAN OF CARE
Call placed to Dr. Emory Alonso's care team (ENT) at [phone] and was informed that patient is not one of their patients. Call placed to Dr. Emory Alonso's (internest) care team, P: [phone] and left message stating that an order is needed to resume HH. Will follow.

## 2022-07-13 NOTE — PT/OT/SLP PROGRESS
Physical Therapy  Missed Treatment Note    Patient Name:  Josafat Boudreaux   MRN:  [id number]    Patient not seen today secondary to Other (Comment) (ST in room for teaching). Will follow-up as scheduled.

## 2022-07-14 PROCEDURE — 25000003 PHARM REV CODE 250: Performed by: STUDENT IN AN ORGANIZED HEALTH CARE EDUCATION/TRAINING PROGRAM

## 2022-07-14 PROCEDURE — 11000001 HC ACUTE MED/SURG PRIVATE ROOM

## 2022-07-14 PROCEDURE — 63600175 PHARM REV CODE 636 W HCPCS: Performed by: STUDENT IN AN ORGANIZED HEALTH CARE EDUCATION/TRAINING PROGRAM

## 2022-07-14 PROCEDURE — 36410 VNPNXR 3YR/> PHY/QHP DX/THER: CPT

## 2022-07-14 PROCEDURE — 97535 SELF CARE MNGMENT TRAINING: CPT

## 2022-07-14 PROCEDURE — 94761 N-INVAS EAR/PLS OXIMETRY MLT: CPT

## 2022-07-14 PROCEDURE — 97116 GAIT TRAINING THERAPY: CPT

## 2022-07-14 PROCEDURE — C1751 CATH, INF, PER/CENT/MIDLINE: HCPCS

## 2022-07-14 PROCEDURE — 76937 US GUIDE VASCULAR ACCESS: CPT

## 2022-07-14 PROCEDURE — S0030 INJECTION, METRONIDAZOLE: HCPCS | Performed by: STUDENT IN AN ORGANIZED HEALTH CARE EDUCATION/TRAINING PROGRAM

## 2022-07-14 RX ORDER — DOCUSATE SODIUM 50 MG/5ML
100 LIQUID ORAL DAILY
Status: DISCONTINUED | OUTPATIENT
Start: 2022-07-14 | End: 2022-07-16 | Stop reason: HOSPADM

## 2022-07-14 RX ORDER — FAMOTIDINE 20 MG/1
20 TABLET, FILM COATED ORAL NIGHTLY
Status: DISCONTINUED | OUTPATIENT
Start: 2022-07-14 | End: 2022-07-16 | Stop reason: HOSPADM

## 2022-07-14 RX ADMIN — ACETAMINOPHEN 650 MG: 325 TABLET ORAL at 06:07

## 2022-07-14 RX ADMIN — GABAPENTIN 300 MG: 300 CAPSULE ORAL at 09:07

## 2022-07-14 RX ADMIN — WHITE PETROLATUM: 1.75 OINTMENT TOPICAL at 09:07

## 2022-07-14 RX ADMIN — ACETAMINOPHEN 650 MG: 325 TABLET ORAL at 12:07

## 2022-07-14 RX ADMIN — MUPIROCIN: 20 OINTMENT TOPICAL at 08:07

## 2022-07-14 RX ADMIN — POLYETHYLENE GLYCOL 3350 17 G: 17 POWDER, FOR SOLUTION ORAL at 09:07

## 2022-07-14 RX ADMIN — DOCUSATE SODIUM 100 MG: 50 LIQUID ORAL at 09:07

## 2022-07-14 RX ADMIN — METRONIDAZOLE 500 MG: 500 INJECTION, SOLUTION INTRAVENOUS at 12:07

## 2022-07-14 RX ADMIN — CELECOXIB 200 MG: 200 CAPSULE ORAL at 09:07

## 2022-07-14 RX ADMIN — PANTOPRAZOLE SODIUM 40 MG: 40 GRANULE, DELAYED RELEASE ORAL at 09:07

## 2022-07-14 RX ADMIN — ENOXAPARIN SODIUM 40 MG: 40 INJECTION SUBCUTANEOUS at 09:07

## 2022-07-14 RX ADMIN — SODIUM CHLORIDE 2 G: 9 INJECTION, SOLUTION INTRAVENOUS at 04:07

## 2022-07-14 RX ADMIN — GABAPENTIN 300 MG: 300 CAPSULE ORAL at 08:07

## 2022-07-14 RX ADMIN — WHITE PETROLATUM: 1.75 OINTMENT TOPICAL at 08:07

## 2022-07-14 RX ADMIN — GABAPENTIN 300 MG: 300 CAPSULE ORAL at 03:07

## 2022-07-14 RX ADMIN — FAMOTIDINE 20 MG: 20 TABLET, FILM COATED ORAL at 08:07

## 2022-07-14 RX ADMIN — ACETAMINOPHEN 650 MG: 325 TABLET ORAL at 11:07

## 2022-07-14 RX ADMIN — MUPIROCIN: 20 OINTMENT TOPICAL at 09:07

## 2022-07-14 RX ADMIN — METRONIDAZOLE 500 MG: 500 INJECTION, SOLUTION INTRAVENOUS at 06:07

## 2022-07-14 RX ADMIN — OXYCODONE HYDROCHLORIDE 5 MG: 5 TABLET ORAL at 08:07

## 2022-07-14 RX ADMIN — CELECOXIB 200 MG: 200 CAPSULE ORAL at 08:07

## 2022-07-14 NOTE — PLAN OF CARE
Problem: Adult Inpatient Plan of Care  Goal: Plan of Care Review  Outcome: Ongoing, Progressing  Goal: Patient-Specific Goal (Individualized)  Outcome: Ongoing, Progressing  Goal: Absence of Hospital-Acquired Illness or Injury  Outcome: Ongoing, Progressing  Goal: Optimal Comfort and Wellbeing  Outcome: Ongoing, Progressing  Goal: Readiness for Transition of Care  Outcome: Ongoing, Progressing     Problem: Impaired Wound Healing  Goal: Optimal Wound Healing  Outcome: Ongoing, Progressing     Problem: Infection  Goal: Absence of Infection Signs and Symptoms  Outcome: Ongoing, Progressing     Problem: Fall Injury Risk  Goal: Absence of Fall and Fall-Related Injury  Outcome: Ongoing, Progressing

## 2022-07-14 NOTE — PROGRESS NOTES
[facility] OTOLARYNGOLOGY PROGRESS NOTE    Josafat Boudreaux 1965 07/14/2022    CC: No chief complaint on file.    Interval HPI: Josafat Boudreaux is a 56 y.o. 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     Subjective: No issues overnight. Ambulating independently. Urinating appropriately. Tolerating continuous tube feeds at goal. Still with intermittent reflux.     Diet: Diet NPO    Allergies: Review of patient's allergies indicates:  No Known Allergies  Medications:   Current Facility-Administered Medications   Medication Dose Route Frequency Provider Last Rate Last Admin    acetaminophen tablet 650 mg  650 mg Per G Tube Q6H Lisseth Krueger MD   650 mg at 07/14/22 0613    ceFAZolin (ANCEF) 2 g in sodium chloride 0.9% 50 mL IVPB  2 g Intravenous Q8H Lisseth Krueger MD   Stopped at 07/14/22 0450    celecoxib capsule 200 mg  200 mg Oral BID Lisseth Krueger MD   200 mg at 07/13/22 2143    docusate sodium capsule 100 mg  100 mg Oral BID Lisseth Krueger MD   100 mg at 07/13/22 2143    enoxaparin injection 40 mg  40 mg Subcutaneous Daily Lisseth Krueger MD   40 mg at 07/13/22 0921    gabapentin capsule 300 mg  300 mg Oral TID Lisseth Krueger MD   300 mg at 07/13/22 2142    morphine injection 2 mg  2 mg Intravenous Q4H PRN Lisseth Krueger MD   2 mg at 07/12/22 0331    mupirocin 2 % ointment   Nasal BID Emory Alonso MD   Given at 07/13/22 2212    ondansetron injection 4 mg  4 mg Intravenous Q8H PRN Lisseth Krueger MD        oxyCODONE immediate release tablet 5 mg  5 mg Oral Q6H PRN Lisseth Krueger MD   5 mg at 07/13/22 2218    pantoprazole suspension 40 mg  40 mg Per G Tube Daily Candi Mckeon MD   40 mg at 07/13/22 0902    polyethylene glycol packet 17 g  17 g Oral Daily Lisseth Krueger MD   17 g at 07/13/22 1224    white petrolatum 41 % ointment   Topical (Top) BID Lisseth Krueger MD   Given at 07/13/22 6415     Scheduled Meds:   acetaminophen  650 mg Per  G Tube Q6H    ceFAZolin (ANCEF) IVPB  2 g Intravenous Q8H    celecoxib  200 mg Oral BID    docusate sodium  100 mg Oral BID    enoxaparin  40 mg Subcutaneous Daily    gabapentin  300 mg Oral TID    mupirocin   Nasal BID    pantoprazole  40 mg Per G Tube Daily    polyethylene glycol  17 g Oral Daily    white petrolatum   Topical (Top) BID     Continuous Infusions:  PRN Meds:.morphine, ondansetron, oxyCODONE    Objective:  VITAL SIGNS: 24 HR MIN & MAX LAST   Temp  Min: 97.8 °F (36.6 °C)  Max: 98.4 °F (36.9 °C)  98.1 °F (36.7 °C)   BP  Min: 111/70  Max: 123/69  123/69    Pulse  Min: 92  Max: 107  92    Resp  Min: 16  Max: 18  18    SpO2  Min: 91 %  Max: 98 %  98 %         Physical Exam:  GEN- NAD, AAO  HEAD/FACE-  NC, AT  EYES - EOMI PERRLA  NOSE-  Midline, no rhinorrhea, atraumatic; no external deformity  EARS - external ears normal, grossly normal hearing.   ORAL CAVITY/ORPHARYNX - MMM, no lesions.   NECK - bilateral apron incisions with staples c/d/i. Neck is soft with mild lymphedema. Stoma patent with sutures intact. Pam tube in place, removed and cleaned. UCHE drains with appropriate serosanguinous output  RESP - Non labored breathing, on humidified trach collar   EXT- Moves all extremities freely    Laboratory:   Lab Results   Component Value Date/Time    WBC 9.0 07/13/2022 03:33 AM    HGB 11.5 (L) 07/13/2022 03:33 AM    HCT 37.0 (L) 07/13/2022 03:33 AM    CHLORIDE 103 07/13/2022 03:33 AM    CO2 30 (H) 07/13/2022 03:33 AM    BUN 11.7 07/13/2022 03:33 AM    CREATININE 0.59 (L) 07/13/2022 03:33 AM    GLUCOSE 126 (H) 07/13/2022 03:33 AM    CALCIUM 9.2 07/13/2022 03:33 AM    ALBUMIN 2.4 (L) 07/11/2022 02:44 PM    AST 20 07/11/2022 02:44 PM    ALT 15 07/11/2022 02:44 PM    ALKPHOS 66 07/11/2022 02:44 PM       Cultures:  Microbiology Results (last 7 days)     Procedure Component Value Units Date/Time    AFB Smear [[id number]] Collected: 07/11/22 0814    Order Status: Completed Specimen: Respiratory from  Bronchial Wash, RLL Updated: 07/12/22 0952     AFB Smear No AFB seen (Direct smear only)    Fungal Culture [[id number]] Collected: 07/11/22 0814    Order Status: Resulted Specimen: Respiratory from Bronchial Wash, RLL Updated: 07/11/22 1005    AFB Smear [[id number]] Collected: 07/11/22 0859    Order Status: Sent Specimen: Respiratory from Bronchial Wash, LLL     Fungal Culture [[id number]] Collected: 07/11/22 0859    Order Status: Sent Specimen: Respiratory from Bronchial Wash, LLL     AFB Smear [[id number]] Collected: 07/11/22 0801    Order Status: Canceled Specimen: Respiratory from Bronchial Wash, LLL     Fungal Culture [[id number]] Collected: 07/11/22 0801    Order Status: Canceled Specimen: Respiratory from Bronchial Wash, LLL           Radiology:     Assessment/Plan:   Josafat Boudreaux is a 56 y.o. male s/p total laryngectomy, bilateral neck dissections 7/11/22. POD3.      - Remove roque tube at least twice daily for cleaning, and as necessary   - Clean crusting on neck with half strength peroxide. Apply thin layer of aquaphor to incisions twice daily   - Continue humidified trach collar at all times or wear HME  - Tube feeds via PEG tube. Will transition to bolus feeds. Appreciate nutrition recommendations.   - OOB ambulate, PT consult placed  - Continue multimodal pain control.

## 2022-07-14 NOTE — PT/OT/SLP PROGRESS
Physical Therapy Treatment    Patient Name:  Josafat Boudreaux   MRN:  [id number]    Recommendations:     Discharge Recommendations:  home with home health, outpatient speech therapy   Discharge Equipment Recommendations: shower chair, walker, rolling   Barriers to discharge: Level of Skilled Assistance Needed    Assessment:     Josafat Boudreaux is a 56 y.o. male admitted with a medical diagnosis of <principal problem not specified>.  He presents with the following impairments/functional limitations:  impaired endurance, weakness, impaired functional mobilty, gait instability, impaired balance, impaired self care skills .    Rehab Prognosis: Good; patient would benefit from acute skilled PT services to address these deficits and reach maximum level of function.    Recent Surgery: Procedure(s) (LRB):  LARYNGECTOMY, WITH RADICAL NECK DISSECTION (Bilateral)  LARYNGOSCOPY, DIRECT, DIAGNOSTIC, WITH BRONCHOSCOPY AND ESOPHAGOSCOPY (N/A) 3 Days Post-Op    Plan:     During this hospitalization, patient to be seen  (3-5xwk) to address the identified rehab impairments via gait training, therapeutic activities and progress toward the following goals:    · Plan of Care Expires:  08/09/22    Subjective     Chief Complaint: none  Patient/Family Comments/goals: return home  Pain/Comfort:  · Pain Rating 1: 5/10  · Location 1: throat  · Pain Rating Post-Intervention 1: 5/10      Objective:     Communicated with nurse prior to session.  Patient found supine with arterial line, NG tube, UCHE drain, peripheral IV, PEG Tube upon PT entry to room.     General Precautions: Standard, other (see comments) (post-op, standard)   Orthopedic Precautions:N/A   Braces: N/A  Respiratory Status: humidifyer     Functional Mobility:  · Bed Mobility:     · Supine to Sit: supervision  · Transfers:     · Sit to Stand:  stand by assistance with rolling walker  · Gait: Pt. ambulated with SBA x1 using a RW x 600 ft in irene. No loss of balance observed.  no SOB  · Balance: good with RW      Patient left sitting edge of bed with left with Speech therapy on side of the bed, wife in room. ..    GOALS:   Multidisciplinary Problems     Physical Therapy Goals        Problem: Physical Therapy    Goal Priority Disciplines Outcome Goal Variances Interventions   Physical Therapy Goal     PT, PT/OT Ongoing, Progressing     Description: Goals to be met by: DISCHARGE     Patient will increase functional independence with mobility by performing:    -. Supine to sit with Modified [place]  -. Sit to supine with Modified [place]  -. Rolling to Left and Right with Modified [place]  -. Sit to stand transfer with Supervision  -. Gait  x 130ft x2 with Supervision using Rolling Walker  -. Ascend/descend 4 steps with left Handrails Supervision using No Assistive Device                     Time Tracking:     PT Received On: 07/14/22  PT Start Time: 0905     PT Stop Time: 0925  PT Total Time (min): 20 min     Billable Minutes: Gait Training 20    Treatment Type: Treatment  PT/PTA: PT           07/14/2022

## 2022-07-14 NOTE — PT/OT/SLP PROGRESS
[facility] - [address] [place] [address] Telemetry    Total Laryngectomy Treatment Note    Patient Name:  Josafat Boudreaux   MRN:  [id number]  Admitting Diagnosis: s/p total laryngectomy  PATIENT IS A NECK-BREATHER - DO NOT ORALLY INTUBATE    Recommendations:                 General Recommendations:  Speech/language therapy  Diet recommendations:  Patient NPO until cleared by ENT  Aspiration Precautions: pt is currently NPO until cleared per ENT  General Precautions: Standard,      Communication:      Communication strategies: Communication board, Yes/No questions only, Go to room if call light pushed and Whiteboard/Paper-pencil provided   Patient communicating all wants and needs? yes   Supplies labeled with patient information?  yes   RN notified if supplies brought to patient's room?  yes    History:     Past Medical History:   Diagnosis Date    Cancer     COPD (chronic obstructive pulmonary disease)     Dysphagia     Hypertension     Vocal cord mass        Past Surgical History:   Procedure Laterality Date    HIP SURGERY      HUMERUS FRACTURE SURGERY      INSERT ARTERIAL LINE  6/26/2022         TRACHEOSTOMY N/A 6/10/2022    Procedure: CREATION, TRACHEOSTOMY;  Surgeon: Junior Alonso MD;  Location: [facility];  Service: ENT;  Laterality: N/A;       Date of Surgery: 07/11/2022    The patient received a total laryngectomy with bilateral neck dissection.  PEG placed at time of surgery?  no  TEP placed at time of surgery?  no    Subjective:     Pt walking with PT prior to SLP entering room  Patient Goals:  To use alaryngeal communication to express wants and need with family, friends and medical providers.    Pain/Comfort:  · Pain Rating 1: 4/10  · Location - Side 1: Bilateral  · Location 1: neck  · Pain Addressed 1: Nurse notified    Respiratory Status: Room air    Objective:     Communication:  · Writing:   Legible  · Gestures/Facial Expressions  · Mouthing Words    Additional Treatment:  SLP providing  "education on HME usage 24 hours 7 days a week. Atos video of "benefits of HME" shown to patient and spouse, Raisa, due to spouse reported she removed HME to place humidified 02 over stoma due to low 02 overt night. SLP review all education provided on 7/13/2022. No questions at this time. SLP reported that Dr. Larsen, ENT stated that secondary TEP would be placed post radiation. SLP questioned pt's interest in using electrolarynx (EL). Pt reported he would be interested. SLP stated she would initiate obtaining EL from [facility]. Spouse requesting to use [facility] in [place] and stated she would reach out to her contact there to see what information is needed for EL.     Education:     General Post-Operative Education Provided:  · SLP discussed:   Anatomical changes that will occur in respiration, communication, coughing, sneezing, blowing nose, and swallowing   · Timelines of swallowing assessments and progression of textures were detailed  · Patient will be a total neck breather and that in the event of emergency, oxygen must be provided to stoma   · Educated on stoma care and how to perform   · Use and benefits of heat and moisture exchanges (HMEs) to provide humidity to stoma   · Basic surgical course and recovery  · Expectations for follow up and discharge   · Four primary options for communication post total laryngectomy, including speech with an artificial larynx, TEP speech, esophageal speech, and use of a writing/AAC device  · Patient engaged in education and demonstrating adequate understanding of all topics reviewed.  · patient and family demonstrated understanding.  · patient and family demonstrated ability to verbally teachback education principals reviewed.  · Ongoing education and support warranted.    Alternate Airway Precautions in Place:  · RN notified to enter room immediately when called to attend patient.    Impressions:  · Patient would benefit from ongoing education/support of  " Post-operative anatomy, physical and functional limitations  · Permanency of thacheostoma  · The ability to perform Stoma care.  · The  patient and family  does demonstrate the ability to care for the TEP prosthesis.  · Patient  does present with adequate communication skills.    Goals:   Multidisciplinary Problems     SLP Goals        Problem: SLP    Goal Priority Disciplines Outcome   SLP Goal     SLP    Description: Pt will participate in alaryngeal communication to effectively communicate basic and medical wants and needs                    Plan:     · Patient to be seen:   (1-3 times a week)   · Plan of Care expires:     · Plan of Care reviewed with:  patient, spouse   · SLP Follow-Up:  Yes       Discharge recommendations:  home, outpatient speech therapy   Barriers to Discharge:  None    Time Tracking:     SLP Treatment Date:   07/14/22  Speech Start Time:  0920  Speech Stop Time:  0940     Speech Total Time (min):  20 min    Billable Minutes: Self Care/Home Management Training 20    Courtney Padilla MS, CCC-SLP  07/14/2022

## 2022-07-14 NOTE — PLAN OF CARE
Orders to resume HH faxed to [facility], F: [phone]. HH referral packet sent to [facility] via [employer]. Will follow.

## 2022-07-14 NOTE — PROCEDURES
"Josafat Boudreaux is a 56 y.o. male patient.    Temp: 98.3 °F (36.8 °C) (07/14/22 0021)  Pulse: 97 (07/14/22 0021)  Resp: 18 (07/13/22 2218)  BP: 122/73 (07/14/22 0021)  SpO2: (!) 94 % (07/14/22 0021)  Weight: 61.2 kg (134 lb 14.7 oz) (07/12/22 1228)  Height: 5' 9" (175.3 cm) (07/12/22 1228)    PICC  Date/Time: 7/14/2022 1:12 AM  Performed by: Rasheed Rome RN  Consent Done: Yes  Time out: Immediately prior to procedure a time out was called to verify the correct patient, procedure, equipment, support staff and site/side marked as required  Indications: med administration and vascular access  Local anesthetic: lidocaine 1% with epinephrine and lidocaine 1% without epinephrine  Anesthetic Total (mL): 5  Description of findings: LUE basilic completely noncompressible. RUE edematous, but all vessels patent.  Preparation: skin prepped with ChloraPrep  Skin prep agent dried: skin prep agent completely dried prior to procedure  Sterile barriers: all five maximum sterile barriers used - cap, mask, sterile gown, sterile gloves, and large sterile sheet  Hand hygiene: hand hygiene performed prior to central venous catheter insertion  Location details: right basilic  Catheter type: single lumen  Catheter size: 4 Fr  Catheter Length: 15cm    Ultrasound guidance: yes  Vessel Caliber: large and patent, compressibility normal  Vascular Doppler: not done  Needle advanced into vessel with real time Ultrasound guidance.  Guidewire confirmed in vessel.  Sterile sheath used.  no esophageal manometryNumber of attempts: 1  Post-procedure: blood return through all ports, chlorhexidine patch and sterile dressing applied  Technical procedures used: modified seldinger    Assessment: successful placement  Comments: 32cm circ          Rasheed Rome RN  7/14/2022  "

## 2022-07-15 LAB
ESTROGEN SERPL-MCNC: NORMAL PG/ML
ESTROGEN SERPL-MCNC: NORMAL PG/ML
INSULIN SERPL-ACNC: NORMAL U[IU]/ML
INSULIN SERPL-ACNC: NORMAL U[IU]/ML
LAB AP CLINICAL INFORMATION: NORMAL
LAB AP CLINICAL INFORMATION: NORMAL
LAB AP GROSS DESCRIPTION: NORMAL
LAB AP GROSS DESCRIPTION: NORMAL
LAB AP INTRA OP: NORMAL
LAB AP REPORT FOOTNOTES: NORMAL
LAB AP SYNOPTIC CHECKLIST: NORMAL
T3RU NFR SERPL: NORMAL %
T3RU NFR SERPL: NORMAL %

## 2022-07-15 PROCEDURE — 97116 GAIT TRAINING THERAPY: CPT

## 2022-07-15 PROCEDURE — 25000003 PHARM REV CODE 250: Performed by: STUDENT IN AN ORGANIZED HEALTH CARE EDUCATION/TRAINING PROGRAM

## 2022-07-15 PROCEDURE — 97535 SELF CARE MNGMENT TRAINING: CPT

## 2022-07-15 PROCEDURE — 63600175 PHARM REV CODE 636 W HCPCS: Performed by: STUDENT IN AN ORGANIZED HEALTH CARE EDUCATION/TRAINING PROGRAM

## 2022-07-15 PROCEDURE — 94761 N-INVAS EAR/PLS OXIMETRY MLT: CPT

## 2022-07-15 PROCEDURE — 11000001 HC ACUTE MED/SURG PRIVATE ROOM

## 2022-07-15 RX ADMIN — MUPIROCIN: 20 OINTMENT TOPICAL at 08:07

## 2022-07-15 RX ADMIN — PANTOPRAZOLE SODIUM 40 MG: 40 GRANULE, DELAYED RELEASE ORAL at 08:07

## 2022-07-15 RX ADMIN — CELECOXIB 200 MG: 200 CAPSULE ORAL at 08:07

## 2022-07-15 RX ADMIN — ENOXAPARIN SODIUM 40 MG: 40 INJECTION SUBCUTANEOUS at 08:07

## 2022-07-15 RX ADMIN — GABAPENTIN 300 MG: 300 CAPSULE ORAL at 04:07

## 2022-07-15 RX ADMIN — DOCUSATE SODIUM 100 MG: 50 LIQUID ORAL at 08:07

## 2022-07-15 RX ADMIN — WHITE PETROLATUM: 1.75 OINTMENT TOPICAL at 08:07

## 2022-07-15 RX ADMIN — GABAPENTIN 300 MG: 300 CAPSULE ORAL at 08:07

## 2022-07-15 RX ADMIN — ACETAMINOPHEN 650 MG: 325 TABLET ORAL at 06:07

## 2022-07-15 RX ADMIN — FAMOTIDINE 20 MG: 20 TABLET, FILM COATED ORAL at 08:07

## 2022-07-15 RX ADMIN — ACETAMINOPHEN 650 MG: 325 TABLET ORAL at 12:07

## 2022-07-15 RX ADMIN — ACETAMINOPHEN 650 MG: 325 TABLET ORAL at 05:07

## 2022-07-15 NOTE — PROGRESS NOTES
[facility] OTOLARYNGOLOGY PROGRESS NOTE    Josafat Boudreaux 1965  07/15/2022    CC: No chief complaint on file.    Interval HPI: Josafat Boudreaux is a 56 y.o. 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     Subjective: No issues overnight. Ambulating independently. Urinating appropriately, having BMs. Tolerating continuous tube feeds at goal. Reflux improved. Working with SLP.    Diet: Diet NPO    Allergies: Review of patient's allergies indicates:  No Known Allergies  Medications:   Current Facility-Administered Medications   Medication Dose Route Frequency Provider Last Rate Last Admin    acetaminophen tablet 650 mg  650 mg Per G Tube Q6H Lisseth Krueger MD   650 mg at 07/15/22 0510    celecoxib capsule 200 mg  200 mg Oral BID Lisseth Krueger MD   200 mg at 07/14/22 2040    docusate 50 mg/5 mL liquid 100 mg  100 mg Oral Daily Lisseth Krueger MD   100 mg at 07/14/22 0942    enoxaparin injection 40 mg  40 mg Subcutaneous Daily Lisseth Krueger MD   40 mg at 07/14/22 0942    famotidine tablet 20 mg  20 mg Oral QHS Lisseth Krueger MD   20 mg at 07/14/22 2037    gabapentin capsule 300 mg  300 mg Oral TID Lisseth Krueger MD   300 mg at 07/14/22 2039    morphine injection 2 mg  2 mg Intravenous Q4H PRN Lisseth Krueger MD   2 mg at 07/12/22 0331    mupirocin 2 % ointment   Nasal BID Emory Alonso MD   Given at 07/14/22 2036    ondansetron injection 4 mg  4 mg Intravenous Q8H PRN Lisseth Krueger MD        oxyCODONE immediate release tablet 5 mg  5 mg Oral Q6H PRN Lisseth Krueger MD   5 mg at 07/14/22 2037    pantoprazole suspension 40 mg  40 mg Per G Tube Daily Candi Mckeon MD   40 mg at 07/14/22 0942    polyethylene glycol packet 17 g  17 g Oral Daily Lisseth Krueger MD   17 g at 07/14/22 0942    white petrolatum 41 % ointment   Topical (Top) BID Lisseth Krugeer MD   Given at 07/14/22 2036     Scheduled Meds:   acetaminophen  650 mg Per G Tube Q6H     celecoxib  200 mg Oral BID    docusate  100 mg Oral Daily    enoxaparin  40 mg Subcutaneous Daily    famotidine  20 mg Oral QHS    gabapentin  300 mg Oral TID    mupirocin   Nasal BID    pantoprazole  40 mg Per G Tube Daily    polyethylene glycol  17 g Oral Daily    white petrolatum   Topical (Top) BID     Continuous Infusions:  PRN Meds:.morphine, ondansetron, oxyCODONE    Objective:  VITAL SIGNS: 24 HR MIN & MAX LAST   Temp  Min: 97.9 °F (36.6 °C)  Max: 98.9 °F (37.2 °C)  98.9 °F (37.2 °C)   BP  Min: 115/74  Max: 133/77  117/64    Pulse  Min: 86  Max: 101  86    Resp  Min: 18  Max: 18  18    SpO2  Min: 91 %  Max: 98 %  (!) 93 %         Physical Exam:  GEN- NAD, AAO  HEAD/FACE-  NC, AT  EYES - EOMI PERRLA  NOSE-  Midline, no rhinorrhea, atraumatic; no external deformity  EARS - external ears normal, grossly normal hearing.   ORAL CAVITY/ORPHARYNX - MMM, no lesions.   NECK - bilateral apron incisions with staples c/d/i. Neck is soft with mild lymphedema. Stoma patent with small superficial dehiscence at right trifurcation. Pam tube in place, removed and cleaned. UCHE drains with appropriate serosanguinous output  RESP - Non labored breathing, on humidified trach collar   EXT- Moves all extremities freely    Laboratory:   Lab Results   Component Value Date/Time    WBC 9.0 07/13/2022 03:33 AM    HGB 11.5 (L) 07/13/2022 03:33 AM    HCT 37.0 (L) 07/13/2022 03:33 AM    CHLORIDE 103 07/13/2022 03:33 AM    CO2 30 (H) 07/13/2022 03:33 AM    BUN 11.7 07/13/2022 03:33 AM    CREATININE 0.59 (L) 07/13/2022 03:33 AM    GLUCOSE 126 (H) 07/13/2022 03:33 AM    CALCIUM 9.2 07/13/2022 03:33 AM    ALBUMIN 2.4 (L) 07/11/2022 02:44 PM    AST 20 07/11/2022 02:44 PM    ALT 15 07/11/2022 02:44 PM    ALKPHOS 66 07/11/2022 02:44 PM       Cultures:  Microbiology Results (last 7 days)     Procedure Component Value Units Date/Time    AFB Smear [[id number]] Collected: 07/11/22 0814    Order Status: Completed Specimen: Respiratory from  Bronchial Wash, RLL Updated: 07/12/22 0952     AFB Smear No AFB seen (Direct smear only)    Fungal Culture [[id number]] Collected: 07/11/22 0814    Order Status: Resulted Specimen: Respiratory from Bronchial Wash, RLL Updated: 07/11/22 1005    AFB Smear [[id number]] Collected: 07/11/22 0859    Order Status: Sent Specimen: Respiratory from Bronchial Wash, LLL     Fungal Culture [[id number]] Collected: 07/11/22 0859    Order Status: Sent Specimen: Respiratory from Bronchial Wash, LLL     AFB Smear [[id number]] Collected: 07/11/22 0801    Order Status: Canceled Specimen: Respiratory from Bronchial Wash, LLL     Fungal Culture [[id number]] Collected: 07/11/22 0801    Order Status: Canceled Specimen: Respiratory from Bronchial Wash, LLL               Assessment/Plan:   Josafat Boudreaux is a 56 y.o. male s/p total laryngectomy, bilateral neck dissections 7/11/22. POD4.      - Esophagram this morning to assess for leak. Pending results, may remove NGT and start ice chips.  - Remove roque tube at least twice daily for cleaning, and as necessary   - Clean crusting on neck with half strength peroxide. Apply thin layer of aquaphor to incisions twice daily   - Continue humidified trach collar at all times or wear HME  - Tube feeds via PEG tube. Will transition to bolus feeds. Appreciate nutrition recommendations.   - OOB ambulate, PT consult placed  - Continue multimodal pain control.    Candi Mckeon MD  [facility]

## 2022-07-15 NOTE — PT/OT/SLP PROGRESS
Physical Therapy Treatment Note  and Discharge Summary    Name: Josafat Boudreaux  MRN: [id number]   Principal Problem: <principal problem not specified>       Recommendations:     Discharge Recommendations:  home with home health  Discharge Equipment Recommendations:  shower chair, walker, rolling  Barriers to discharge: None    Subjective     Chief Complaint: None stated today.  Patient/Family Comments/goals: Go home.  Pain/Comfort:  ·  Pain Rating 1: 0/10      Objective:     Communicated with nurse (Lore_ prior to session.  Patient found HOB elevated with arterial line, NG tube, UCHE drain, peripheral IV, PEG Tube, Tracheostomy upon PT entry to room.     General Precautions: Standard, other (see comments) (post-op, standard)   Orthopedic Precautions:N/A   Braces: N/A  Respiratory Status: Room air     Functional Mobility:  · Bed Mobility:     · Rolling Right: modified independence  · Scooting: independence  · Supine to Sit: modified independence  · Sit to Supine: modified independence  · Transfers:     · Sit to Stand:  modified independence with rolling walker  · Gait: x300 feet with RW and MOD I  · Stairs:  Pt ascended/descended 4 stair(s) with No Assistive Device with left handrail with Supervision or Set-up Assistance.       Patient left HOB elevated with all lines intact, call button in reach and nurse notified..      Time Tracking:     PT Received On: 07/15/22  PT Start Time: 0918     PT Stop Time: 0937  PT Total Time (min): 19 min       Billable Minutes: Gait Training 19 minutes       Patient Discharged from acute Physical Therapy on 7/15/2022.  Please refer to prior PT noted date on 7/15/2022 for functional status.     Assessment:     Patient has met all goals and is not appropriate for therapy.    Objective:     GOALS:   Multidisciplinary Problems     Physical Therapy Goals     Not on file          Multidisciplinary Problems (Resolved)        Problem: Physical Therapy    Goal Priority Disciplines  Outcome Goal Variances Interventions   Physical Therapy Goal   (Resolved)     PT, PT/OT Met     Description: Goals to be met by: DISCHARGE     Patient will increase functional independence with mobility by performing:    -. Supine to sit with Modified [place]-MET  -. Sit to supine with Modified [place]-MET  -. Rolling to Left and Right with Modified [place]-MET  -. Sit to stand transfer with Supervision-MET  -. Gait  x 130ft x2 with Supervision using Rolling Walker-MET  -. Ascend/descend 4 steps with left Handrails Supervision using No Assistive Device-MET                     Reasons for Discontinuation of Therapy Services  Satisfactory goal achievement. and Therapist determines that the patient will no longer benefit from therapy services.      Plan:     Patient Discharged to: Home with Home Health Service.      7/15/2022

## 2022-07-15 NOTE — PT/OT/SLP PROGRESS
[facility] - [address] [place] [address] Telemetry    Total Laryngectomy Treatment Note    Patient Name:  Josafat Boudreaux   MRN:  [id number]  Admitting Diagnosis: s/p total laryngectomy  PATIENT IS A NECK-BREATHER - DO NOT ORALLY INTUBATE    Recommendations:                 General Recommendations:  Speech/language therapy  Diet recommendations:  Patient NPO until cleared by ENT  Aspiration Precautions: pt is currently NPO until cleared per ENT  General Precautions: Standard,      Communication:      Communication strategies: Communication board, Yes/No questions only, Go to room if call light pushed and Whiteboard/Paper-pencil provided   Patient communicating all wants and needs? yes   Supplies labeled with patient information?  yes   RN notified if supplies brought to patient's room?  yes    History:       Past Medical History:   Diagnosis Date    Cancer     COPD (chronic obstructive pulmonary disease)     Dysphagia     Hypertension     Vocal cord mass        Past Surgical History:   Procedure Laterality Date    HIP SURGERY      HUMERUS FRACTURE SURGERY      INSERT ARTERIAL LINE  6/26/2022         TRACHEOSTOMY N/A 6/10/2022    Procedure: CREATION, TRACHEOSTOMY;  Surgeon: Junior Alonso MD;  Location: [facility];  Service: ENT;  Laterality: N/A;       Date of Surgery: 07/11/2022    The patient received a total laryngectomy with bilateral neck dissection.  PEG placed at time of surgery?  no  TEP placed at time of surgery?  no    Subjective:     Pt resting comfortably when SLP entering room  Patient Goals:  To use alaryngeal communication to express wants and need with family, friends and medical providers.    Pain/Comfort:  Pain Rating 1: 3/10  Location - Side 1: Bilateral  Location 1: neck  Pain Addressed 1: Nurse notified    Respiratory Status: Room air    Objective:     Communication:  · Writing:   Legible  · Gestures/Facial Expressions  · Mouthing Words    Additional Treatment:  SLP entered and to  "discuss if pt and spouse had any questions regarding education provided on 7/14/2022. No questions noted. Spouse requested name of electrolarynx (EL) so that she could reach out to contact at [facility] to see about obtaining EL there. Pt and spouse reported that "swallow study" was to be scheduled for today. SLP educated on purpose of esophagram and difference between esophagram and MBSS as pt noted to have MBSS previously. Spouse stated she would try to have daughter present for training and education prior to DC, SLP acknowledged.     Education:     General Post-Operative Education Provided:  · SLP discussed:   Anatomical changes that will occur in respiration, communication, coughing, sneezing, blowing nose, and swallowing   · Timelines of swallowing assessments and progression of textures were detailed  · Patient will be a total neck breather and that in the event of emergency, oxygen must be provided to stoma   · Educated on stoma care and how to perform   · Use and benefits of heat and moisture exchanges (HMEs) to provide humidity to stoma   · Basic surgical course and recovery  · Expectations for follow up and discharge   · Four primary options for communication post total laryngectomy, including speech with an artificial larynx, TEP speech, esophageal speech, and use of a writing/AAC device  · Patient engaged in education and demonstrating adequate understanding of all topics reviewed.  · patient and family demonstrated understanding.  · patient and family demonstrated ability to verbally teachback education principals reviewed.  · Ongoing education and support warranted.    Alternate Airway Precautions in Place:  · RN notified to enter room immediately when called to attend patient.    Impressions:  · Patient would benefit from ongoing education/support of  Post-operative anatomy, physical and functional limitations  · Permanency of thacheostoma  · The ability to perform Stoma care.  · The  patient " and family  does demonstrate the ability to care for the TEP prosthesis.  · Patient  does present with adequate communication skills.    Goals:   Multidisciplinary Problems     SLP Goals        Problem: SLP    Goal Priority Disciplines Outcome   SLP Goal     SLP Ongoing, Progressing   Description: Pt will participate in alaryngeal communication to effectively communicate basic and medical wants and needs                    Plan:     · Patient to be seen:   (1-3 times a week)   · Plan of Care expires:     · Plan of Care reviewed with:  patient, spouse   · SLP Follow-Up:  Yes       Discharge recommendations:  outpatient speech therapy, home   Barriers to Discharge:  None    Time Tracking:     SLP Treatment Date:   07/15/22  Speech Start Time:  0840  Speech Stop Time:  0900     Speech Total Time (min):  20 min    Billable Minutes: Self Care/Home Management Training 20    Courtney Padilla MS, CCC-SLP  07/15/2022

## 2022-07-15 NOTE — PROGRESS NOTES
[facility] - [address] [place] [address] Surg Telemetry  Adult Nutrition  Progress Note    SUMMARY     Recommendations    1.  Continue current Bolus TF of Isosource 1.5 (1) can every 4 hours for total of 6 cans daily   2.  Water Flushes 125 ml every 4 hours    3.  Monitor Weights Weekly    Goals: Meet > 75% nutrition needs by follow up; Maintain weight throughout hospitalization  Nutrition Goal Status: goal met  Communication of RD Recs: reviewed with RN    Nutrition Problem  Malnutrition    Related to (etiology):   Laryngeal CA    Signs and Symptoms (as evidenced by):   10% wt loss x 1 month, fat/muscle wasting     Interventions(treatment strategy):  Modified rate of enteral nutrition and Collaboration with other providers    Nutrition Diagnosis Status:   Continues      Assessment and Plan    Pt not in room during visit, nsing reports pt performing esophogram this am; reports tolerating bolus TF well; + BMs;  Current TF meeting 100% of nutrition needs       Malnutrition Assessment  Malnutrition Type: chronic illness, other (see comments) (moderate chronic malnutrition)          Weight Loss (Malnutrition): greater than 5% in 1 month  Energy Intake (Malnutrition): other (see comments) (pt on TF; per wife tolerated well; does not meet criteria)  Subcutaneous Fat (Malnutrition): mild depletion  Muscle Mass (Malnutrition): mild depletion  Fluid Accumulation (Malnutrition):  (does not meet criteia)   Orbital Region (Subcutaneous Fat Loss): mild depletion  Upper Arm Region (Subcutaneous Fat Loss): mild depletion   [place] Region (Muscle Loss): mild depletion  Clavicle Bone Region (Muscle Loss): mild depletion  Clavicle and Acromion Bone Region (Muscle Loss): mild depletion              Reason for Assessment    Reason For Assessment: RD follow-up  Diagnosis: cancer diagnosis/related complications, other (see comments) (Laryngeal Mass--S/p total laryngectomy, B neck dissection)  Relevant Medical History: laryngeal mass, seizure,  "trach, peg, COPD, HTN, dysphagia    Nutrition Risk Screen    Nutrition Risk Screen: dysphagia or difficulty swallowing    Nutrition/Diet History    Patient Reported Diet/Restrictions/Preferences: other (see comments) (home TF Diabetic source AC --6 can per day via peg)  Spiritual, Cultural Beliefs, Buddhism Practices, Values that Affect Care: no  Food Allergies: NKFA  Factors Affecting Nutritional Intake: NPO, difficulty/impaired swallowing  Nutrition Support Formula Prior to Admit: Diabetasource  Nutrtion Support Frequency Prior to Admit: Diabetic source 6 can per day--1800 calories, 90 gm protein ,1224 ml free water    Anthropometrics    Temp: 98.6 °F (37 °C)  Height: 5' 9" (175.3 cm)  Height (inches): 69 in  Weight Method: Standard Scale  Weight: 61.2 kg (134 lb 14.7 oz)  Weight (lb): 134.92 lb  Ideal Body Weight (IBW), Male: 160 lb  % Ideal Body Weight, Male (lb): 84.33 %  % Ideal Body Weight Malnutrition: 80-90% - mild deficit  BMI (Calculated): 19.9  BMI Grade: 18.5-24.9 - normal  Weight Loss: unintentional  Usual Body Weight (UBW), k kg (apporx 10% wt loss over 1 month)  % Usual Body Weight: 90.19  % Weight Change From Usual Weight: -10 %     Wt Readings from Last 5 Encounters:   22 61.2 kg (134 lb 14.7 oz)   22 61.2 kg (135 lb)   22 59 kg (130 lb 1.1 oz)   22 70.3 kg (155 lb)   06/10/22 68 kg (150 lb)   6/15/22 -- no new wt; pt not available during visit to weigh    Lab/Procedures/Meds    Pertinent Labs Reviewed: reviewed  Pertinent Labs Comments: 22 -- K 3.5, BUN 11.7, Cr 0.59, Glu 126  Pertinent Medications Reviewed: reviewed  Pertinent Medications Comments: Docusate, Protonix, Miralax    Estimated/Assessed Needs    Weight Used For Calorie Calculations: 61.2 kg (134 lb 14.7 oz)  Energy Calorie Requirements (kcal): 2142kcal/d; 35 wilma/kg --wt gain  Energy Need Method: Kcal/kg  Protein Requirements: 92 gm protein/d ; 1.5 gm/kg  Weight Used For Protein Calculations: 61.2 kg " (134 lb 14.7 oz)     Estimated Fluid Requirement Method: RDA Method  RDA Method (mL): 2142         Nutrition Prescription Ordered    Current Diet Order: NPO / peg tube  Current Nutrition Support Formula Ordered: Isosource 1.5  Current Nutrition Support Rate Ordered: 0 (ml)  Current Nutrition Support Frequency Ordered: 1 can every 4 hours    Evaluation of Received Nutrient/Fluid Intake    Enteral Calories (kcal): 2250  Enteral Protein (gm): 102  Enteral (Free Water) Fluid (mL): 1146  Free Water Flush Fluid (mL): 750  % Kcal Needs: 105  % Protein Needs: 110  Energy Calories Required: meeting needs  Protein Required: meeting needs  Fluid Required: meeting needs  Comments: Tolerating transition to bolus TF since 7/14  Tolerance: tolerating  % Intake of Estimated Energy Needs: 75 - 100 %  % Meal Intake: NPO    Nutrition Risk    Level of Risk/Frequency of Follow-up: moderate (2,0,3,0,4 = 9)     Monitor and Evaluation    Food and Nutrient Intake: energy intake, enteral nutrition intake  Food and Nutrient Adminstration: enteral and parenteral nutrition administration  Anthropometric Measurements: weight change  Biochemical Data, Medical Tests and Procedures: gastrointestinal profile, glucose/endocrine profile, electrolyte and renal panel     Nutrition Follow-Up    RD Follow-up?: Yes

## 2022-07-16 VITALS
BODY MASS INDEX: 19.99 KG/M2 | SYSTOLIC BLOOD PRESSURE: 147 MMHG | WEIGHT: 134.94 LBS | HEIGHT: 69 IN | TEMPERATURE: 98 F | OXYGEN SATURATION: 95 % | RESPIRATION RATE: 18 BRPM | DIASTOLIC BLOOD PRESSURE: 81 MMHG | HEART RATE: 93 BPM

## 2022-07-16 PROBLEM — J38.7 LARYNGEAL MASS: Status: ACTIVE | Noted: 2022-07-16

## 2022-07-16 PROCEDURE — 25000003 PHARM REV CODE 250: Performed by: STUDENT IN AN ORGANIZED HEALTH CARE EDUCATION/TRAINING PROGRAM

## 2022-07-16 PROCEDURE — 94761 N-INVAS EAR/PLS OXIMETRY MLT: CPT

## 2022-07-16 RX ORDER — PANTOPRAZOLE SODIUM 20 MG/1
20 TABLET, DELAYED RELEASE ORAL DAILY
Qty: 30 TABLET | Refills: 11 | Status: SHIPPED | OUTPATIENT
Start: 2022-07-16 | End: 2023-01-01 | Stop reason: ALTCHOICE

## 2022-07-16 RX ORDER — ONDANSETRON 4 MG/1
4 TABLET, FILM COATED ORAL EVERY 8 HOURS PRN
Qty: 15 TABLET | Refills: 0 | Status: SHIPPED | OUTPATIENT
Start: 2022-07-16 | End: 2022-07-27

## 2022-07-16 RX ORDER — TRIPROLIDINE/PSEUDOEPHEDRINE 2.5MG-60MG
600 TABLET ORAL EVERY 6 HOURS PRN
Qty: 354 ML | Refills: 0 | Status: SHIPPED | OUTPATIENT
Start: 2022-07-16 | End: 2022-01-01 | Stop reason: SDUPTHER

## 2022-07-16 RX ORDER — OXYCODONE HYDROCHLORIDE 5 MG/1
5 TABLET ORAL EVERY 6 HOURS PRN
Qty: 25 TABLET | Refills: 0 | Status: SHIPPED | OUTPATIENT
Start: 2022-07-16 | End: 2022-07-27

## 2022-07-16 RX ORDER — ACETAMINOPHEN 160 MG/5ML
650 LIQUID ORAL EVERY 6 HOURS PRN
Qty: 473 ML | Refills: 0 | Status: SHIPPED | OUTPATIENT
Start: 2022-07-16 | End: 2022-01-01

## 2022-07-16 RX ORDER — AMOXICILLIN AND CLAVULANATE POTASSIUM 875; 125 MG/1; MG/1
1 TABLET, FILM COATED ORAL EVERY 12 HOURS
Qty: 14 TABLET | Refills: 0 | Status: SHIPPED | OUTPATIENT
Start: 2022-07-16 | End: 2022-07-23

## 2022-07-16 RX ORDER — FAMOTIDINE 20 MG/1
20 TABLET, FILM COATED ORAL NIGHTLY
Qty: 30 TABLET | Refills: 11 | Status: SHIPPED | OUTPATIENT
Start: 2022-07-16 | End: 2023-01-01 | Stop reason: ALTCHOICE

## 2022-07-16 RX ADMIN — ACETAMINOPHEN 650 MG: 325 TABLET ORAL at 06:07

## 2022-07-16 RX ADMIN — ACETAMINOPHEN 650 MG: 325 TABLET ORAL at 12:07

## 2022-07-16 NOTE — DISCHARGE SUMMARY
[facility] - [facility] Surg Telemetry  Otorhinolaryngology-Head & Neck Surgery  Discharge Summary      Patient Name: Josafat Boudreaux  MRN: [id number]  Admission Date: 7/11/2022  Hospital Length of Stay: 5 days  Discharge Date and Time:  07/16/2022 8:25 AM  Attending Physician: Emory Alonso MD   Discharging Provider: Lisseth Krueger MD  Primary Care Provider: Tony Bertrand MD     HPI: 56yM with laryngeal mass treated by Dr. Alonso. He was taken to the operating room on 7/11 for total laryngectomy with bilateral neck dissections. Admitted post operatively for monitoring, initially to the ICU but steped down to floor on POD1. Patient progressed well throughout the hospitalization. He was tolerating tube feeds at goal, ambulating, voiding appropriately, having bowel movements. Patient was seen by speech therapy during admission and he an wife educated on laryngectomy care. Swallow study was performed on POD4 with no apparent leak. He was started on ice chips which was tolerated well. Patietn discharged home on POD5 in stable condition. He will follow up with Dr. Alonso for drain pull and NG pull on Monday.     Procedure(s) (LRB):  LARYNGECTOMY, WITH RADICAL NECK DISSECTION (Bilateral)  LARYNGOSCOPY, DIRECT, DIAGNOSTIC, WITH BRONCHOSCOPY AND ESOPHAGOSCOPY (N/A)       Consults:   Consults (From admission, onward)        Status Ordering Provider     Inpatient consult to Midline team  Once        Provider:  (Not yet assigned)    JARRETT Barney     Inpatient consult to Registered Dietitian/Nutritionist  Once        Provider:  (Not yet assigned)    Completed LISSETH KRUEGER          Significant Diagnostic Studies: Esophagram 7/15/22    Pending Diagnostic Studies:     None        Final Active Diagnoses:    Diagnosis Date Noted POA    PRINCIPAL PROBLEM:  Laryngeal mass [J38.7] 07/16/2022 Yes      Problems Resolved During this Admission:      Discharged Condition:  good    Disposition: Home or Self Care    Follow Up: Dr. Alonso 7/18    Patient Instructions:      Diet NPO   Order Comments: OK for ice chips     No dressing needed   Order Comments: Clean crusting from neck with half strength hydrogen peroxide. Apply aqauphor to incisions twice daily. Strip and record drain outputs twice daily.     Activity as tolerated     Medications:  Reconciled Home Medications:      Medication List      START taking these medications    amoxicillin-clavulanate 875-125mg 875-125 mg per tablet  Commonly known as: AUGMENTIN  Take 1 tablet by mouth every 12 (twelve) hours. for 7 days     famotidine 20 MG tablet  Commonly known as: PEPCID  Take 1 tablet (20 mg total) by mouth every evening.     ibuprofen 100 mg/5 mL suspension  Commonly known as: ADVIL,MOTRIN  Take 30 mLs (600 mg total) by mouth every 6 (six) hours as needed for Pain (mild to moderate).     ondansetron 4 MG tablet  Commonly known as: ZOFRAN  Take 1 tablet (4 mg total) by mouth every 8 (eight) hours as needed for Nausea.     oxyCODONE 5 MG immediate release tablet  Commonly known as: ROXICODONE  Take 1 tablet (5 mg total) by mouth every 6 (six) hours as needed for Pain.     pantoprazole 20 MG tablet  Commonly known as: PROTONIX  Take 1 tablet (20 mg total) by mouth once daily.        CHANGE how you take these medications    * acetaminophen 325 MG tablet  Commonly known as: TYLENOL  2 tablets (650 mg total) by Per G Tube route every 6 (six) hours as needed.  What changed: Another medication with the same name was added. Make sure you understand how and when to take each.     * acetaminophen 160 mg/5 mL Liqd  Commonly known as: TYLENOL  Take 20.3 mLs (649.6 mg total) by mouth every 6 (six) hours as needed (Mild pain).  What changed: You were already taking a medication with the same name, and this prescription was added. Make sure you understand how and when to take each.     budesonide 0.5 mg/2 mL nebulizer solution  Commonly  known as: PULMICORT  Take 2 mLs (0.5 mg total) by nebulization every 12 (twelve) hours. Controller  What changed: how much to take         * This list has 2 medication(s) that are the same as other medications prescribed for you. Read the directions carefully, and ask your doctor or other care provider to review them with you.            CONTINUE taking these medications    * albuterol-ipratropium 2.5 mg-0.5 mg/3 mL nebulizer solution  Commonly known as: DUO-NEB  Take 3 mLs by nebulization every 4 (four) hours as needed for Shortness of Breath or Wheezing. Rescue     * albuterol-ipratropium 2.5 mg-0.5 mg/3 mL nebulizer solution  Commonly known as: DUO-NEB  Take 3 mLs by nebulization every 6 (six) hours. Rescue     calcium-vitamin D3 500 mg-5 mcg (200 unit) per tablet  Commonly known as: OS-CARMELO 500 + D3  1 tablet by Per G Tube route 2 (two) times daily with meals.     diphenhydrAMINE 25 mg capsule  Commonly known as: BENADRYL  1 capsule (25 mg total) by Per G Tube route every 6 (six) hours as needed for Itching.     docusate 50 mg/5 mL liquid  Commonly known as: COLACE  10 mLs (100 mg total) by Per G Tube route 2 (two) times daily as needed (constipation).     hydrocortisone 1 % cream  Apply topically 2 (two) times daily as needed (rash). Apply to rash     ipratropium 0.02 % nebulizer solution  Commonly known as: ATROVENT  Take 2.5 mLs (500 mcg total) by nebulization every 8 (eight) hours. Rescue     mupirocin 2 % ointment  Commonly known as: BACTROBAN  Apply topically once daily.     nicotine 21 mg/24 hr  Commonly known as: NICODERM CQ  Place 1 patch onto the skin once daily.     nystatin cream  Commonly known as: MYCOSTATIN  Apply topically 2 (two) times daily.     polyethylene glycol 17 gram Pwpk  Commonly known as: GLYCOLAX  17 g by Per G Tube route 2 (two) times daily as needed (constipation).         * This list has 2 medication(s) that are the same as other medications prescribed for you. Read the directions  carefully, and ask your doctor or other care provider to review them with you.            STOP taking these medications    aspirin 81 MG EC tablet  Commonly known as: ECOTRIN     doxycycline 100 MG Cap  Commonly known as: VIBRAMYCIN            Lisseth Krueger MD  Otorhinolaryngology-Head & Neck Surgery  [facility] - [facility] Surg Telemetry

## 2022-07-16 NOTE — PLAN OF CARE
Problem: Adult Inpatient Plan of Care  Goal: Plan of Care Review  Outcome: Met  Goal: Patient-Specific Goal (Individualized)  Outcome: Met  Goal: Absence of Hospital-Acquired Illness or Injury  Outcome: Met  Goal: Optimal Comfort and Wellbeing  Outcome: Met  Goal: Readiness for Transition of Care  Outcome: Met     Problem: Impaired Wound Healing  Goal: Optimal Wound Healing  Outcome: Met     Problem: Infection  Goal: Absence of Infection Signs and Symptoms  Outcome: Met     Problem: SLP  Goal: SLP Goal  Description: Pt will participate in alaryngeal communication to effectively communicate basic and medical wants and needs   Outcome: Met     Problem: Fall Injury Risk  Goal: Absence of Fall and Fall-Related Injury  Outcome: Met     Problem: Fall Injury Risk  Goal: Absence of Fall and Fall-Related Injury  Outcome: Met

## 2022-07-18 ENCOUNTER — PATIENT OUTREACH (OUTPATIENT)
Dept: ADMINISTRATIVE | Facility: CLINIC | Age: 57
End: 2022-07-18
Payer: MEDICARE

## 2022-07-18 DIAGNOSIS — C44.92 SQUAMOUS CELL CARCINOMA OF SKIN: Primary | ICD-10-CM

## 2022-07-18 NOTE — PT/OT/SLP DISCHARGE
Speech Language Pathology Discharge Summary    Josafat Boudreaux  MRN: [id number]   Laryngeal mass     Date of Last Treatment Session: 07/14/2022    Past Medical History:   Diagnosis Date    Cancer     COPD (chronic obstructive pulmonary disease)     Dysphagia     Hypertension     Vocal cord mass        Status at initiation of therapy: Pt able to functionally produce alaryngeal communication via mouthing of words and written language.     Treatment Area(s):  Communication    Goals:   Multidisciplinary Problems     SLP Goals     Not on file          Multidisciplinary Problems (Resolved)        Problem: SLP    Goal Priority Disciplines Outcome   SLP Goal   (Resolved)     SLP Met   Description: Pt will participate in alaryngeal communication to effectively communicate basic and medical wants and needs                    Participation in Treatment (at discharge):  Cooperative    Functional Status at time of Discharge:    Cognition: Patient demonstrates no cognitive deficits.    Communication: Patient demonstrates fucnctional alaryngeal communication skills via mouthing of words and written language.     Patient is discharged to Home. Outpatient SLP intervention is warranted to continue to address alaryngeal communication and patient/spouse education.     Courtney Padilla MS, CCC-SLP

## 2022-07-18 NOTE — PROGRESS NOTES
C3 nurse spoke with Josafat Boudreaux's wife, Kymberly, for a TCC post hospital discharge follow up call. The patient has a scheduled HOSFU appointment with Dr. Alonso on 7/18/2022. She will see if Dr. Alonso wants them to see Tony Bertrand MD.

## 2022-07-18 NOTE — PT/OT/SLP DISCHARGE
Physical Therapy Discharge Summary    Name: Josafat Boudreaux  MRN: [id number]   Principal Problem: Laryngeal mass     Patient Discharged from acute Physical Therapy on 7/18/2022  Please refer to prior PT note for functional status.     Assessment:     Patient was discharged unexpectedly.  Information required to complete an accurate discharge summary is unknown.  Refer to therapy initial evaluation and last progress note for initial and most recent functional status and goal achievement.  Recommendations made may be found in medical record.    Objective:     GOALS:   Multidisciplinary Problems     Physical Therapy Goals     Not on file          Multidisciplinary Problems (Resolved)        Problem: Physical Therapy    Goal Priority Disciplines Outcome Goal Variances Interventions   Physical Therapy Goal   (Resolved)     PT, PT/OT Met     Description: Goals to be met by: DISCHARGE     Patient will increase functional independence with mobility by performing:    -. Supine to sit with Modified [place]-MET  -. Sit to supine with Modified [place]-MET  -. Rolling to Left and Right with Modified [place]-MET  -. Sit to stand transfer with Supervision-MET  -. Gait  x 130ft x2 with Supervision using Rolling Walker-MET  -. Ascend/descend 4 steps with left Handrails Supervision using No Assistive Device-MET                   Goals were partially met.  Reasons for Discontinuation of Therapy Services  Pt. Was DC from the hospital.      Plan:     Patient Discharged to: unknown.      7/18/2022

## 2022-07-18 NOTE — PHYSICIAN QUERY
PT Name: Josafat Boudreaux  MR #: [id number]    DOCUMENTATION CLARIFICATION     CDS/: Raymond Melendez, RN, BSN  Contact information: [email or web address]    This form is a permanent document in the medical record.     Query Date: July 18, 2022    By submitting this query, we are merely seeking further clarification of documentation.  Please utilize your independent clinical judgment when addressing the question(s) below.    The medical record contains the following:  Pathology Findings Location in Medical Record   Final Diagnosis     1. Larynx, laryngeal biopsy frozen section :   - Squamous cell carcinoma with necrosis.      2. Neck, Anterior, left neck level 3:  - Number of lymph nodes involved by carcinoma:  1  - Number of lymph nodes examined:                        7  - Size of metastatic deposit:  2.5 mm  - Extranodal extension:  Not identified        3. Larynx, resection without nodes, total layrngectomy :   - Squamous cell carcinoma.  - See synoptic checklist for CAP Cancer protocol.     4. Neck, Anterior, left neck level 4:  - Number of lymph nodes involved by carcinoma:  0  - Number of lymph nodes examined:                        10     5. Neck, Anterior, right neck level 4:   - Number of lymph nodes involved by carcinoma:  0  - Number of lymph nodes examined:                        11     6. Neck, Anterior, right neck level 3:  - Number of lymph nodes involved by carcinoma:  0  - Number of lymph nodes examined:                        9     7. Nasophaynx, inferior pharyngeal mucosa :   - Benign squamous mucosa.  - No evidence of malignancy.       8. Cartilage, superior cartilage margin :   - No evidence of malignancy.       9. Lymph Node, pretracheal lymph  node :    - Number of lymph nodes involved by carcinoma:  0  - Number of lymph nodes examined:                         3       SPECIMEN   Procedure  Total laryngectomy      Neck (lymph node) dissection: Left and Right Neck       Pretracheal        Pathology Report 7/11/22                                                                          Pathology Report 7/11/22       Please clarify:    [ x ] Pathology findings noted above are ruled in/confirmed as diagnoses     [  ] Pathology findings noted above are not confirmed as diagnoses     [  ] Pathology findings noted above are incidental     [  ] Other diagnosis (please specify): ___________     [  ] Clinically Undetermined       Please document in your progress notes daily for the duration of treatment until resolved and include in your discharge summary.    Form No. [id number]

## 2022-07-20 ENCOUNTER — DOCUMENTATION ONLY (OUTPATIENT)
Dept: HEMATOLOGY/ONCOLOGY | Facility: CLINIC | Age: 57
End: 2022-07-20
Payer: MEDICARE

## 2022-07-20 NOTE — PROGRESS NOTES
I have reviewed the notes, assessments, and/or procedures performed this visit, and I concur with the documentation.    Ryan Barrett M.D.

## 2022-07-21 ENCOUNTER — OFFICE VISIT (OUTPATIENT)
Dept: HEMATOLOGY/ONCOLOGY | Facility: CLINIC | Age: 57
End: 2022-07-21
Payer: MEDICARE

## 2022-07-21 VITALS
BODY MASS INDEX: 20.29 KG/M2 | DIASTOLIC BLOOD PRESSURE: 67 MMHG | HEART RATE: 107 BPM | WEIGHT: 137 LBS | OXYGEN SATURATION: 94 % | HEIGHT: 69 IN | SYSTOLIC BLOOD PRESSURE: 100 MMHG | TEMPERATURE: 99 F

## 2022-07-21 DIAGNOSIS — C34.91 SQUAMOUS CELL CARCINOMA OF BOTH LUNGS: ICD-10-CM

## 2022-07-21 DIAGNOSIS — C32.9 LARYNGEAL CANCER: ICD-10-CM

## 2022-07-21 DIAGNOSIS — C77.9 REGIONAL LYMPH NODE METASTASIS PRESENT: ICD-10-CM

## 2022-07-21 DIAGNOSIS — C34.92 SQUAMOUS CELL CARCINOMA OF BOTH LUNGS: ICD-10-CM

## 2022-07-21 DIAGNOSIS — J38.1 POLYP OF VOCAL CORD AND LARYNX: ICD-10-CM

## 2022-07-21 PROCEDURE — 99205 OFFICE O/P NEW HI 60 MIN: CPT | Mod: S$PBB,,, | Performed by: INTERNAL MEDICINE

## 2022-07-21 PROCEDURE — 99999 PR PBB SHADOW E&M-EST. PATIENT-LVL V: ICD-10-PCS | Mod: PBBFAC,,, | Performed by: INTERNAL MEDICINE

## 2022-07-21 PROCEDURE — 99205 PR OFFICE/OUTPT VISIT, NEW, LEVL V, 60-74 MIN: ICD-10-PCS | Mod: S$PBB,,, | Performed by: INTERNAL MEDICINE

## 2022-07-21 PROCEDURE — 99999 PR PBB SHADOW E&M-EST. PATIENT-LVL V: CPT | Mod: PBBFAC,,, | Performed by: INTERNAL MEDICINE

## 2022-07-21 PROCEDURE — 99215 OFFICE O/P EST HI 40 MIN: CPT | Mod: PBBFAC | Performed by: INTERNAL MEDICINE

## 2022-07-21 NOTE — PROGRESS NOTES
HEMATOLOGY/ONCOLOGY OFFICE CLINIC VISIT    Visit Information:    Initial Evaluation:   Referring Provider:   Other providers:  Code status:    Diagnosis:  [id number] JENNYFER Laryngeal Squamous cell carcinoma     Present treatment:    Treatment/Oncology history:  --7/11/2022: total laryngectomy    Plan of care:     Imaging:  Ct neck 6/6/2022: A metastatic right level III cervical lymph node is present with short axis measurement of 1.6 cm.  This lymph node contains internal cystic change, typical of metastatic squamous cell carcinoma.  A left level III cervical lymph node shows short axis measurements of 1 cm, and is suspicious.    A left supraglottic mass measures approximately 2.4 cm in diameter (axial post-contrast image 39), presumably corresponding to the primary neoplasm.  Metastatic lymph nodes are also present in the inferior right paratracheal position, measuring 1.3 cm in short axis.  Metastatic lymph nodes are present in the superior right hilum, measuring 1.8 cm in short axis.  Ct H&N 6/27/2022: 1. There is mild stenosis at the origin of left proximal internal carotid artery secondary to dense calcified atheromatous plaque. 2. There is consolidation is right upper lobe. Additional ground-glass opacities are also seen on the remainder of the visualized lungs. These findings are consistent with an infectious process. Small right pleural effusion is seen. There is an ill-defined soft tissue mass in the right perihilar region which measures approximately 3.1 x 2.9 x 4.6 cm, of concern for neoplasm. Correlate clinically as regards further evaluation and followup with CT chest. There is an ill-defined enhancing soft tissue mass in the glottis infiltrating into the paraglottic spaces and the subglottic region with obliteration of the airway, of concern for a neoplasm. 3. Unremarkable CT angiogram of the head. Details and other findings as described above.    Pathology:  6/14/2022:  EBUS ENDOBRONCHIAL MASS RUL,  CYTOLOGY: NEARLY ACELLULAR SPECIMEN CONSISTING OF FRESH BLOOD CLOT. NO ATYPICAL OR MALIGNANT CELLS IDENTIFIED.   7/11/2022: Bronchial Wash, RLL, right bronchial mass washings:  - Squamous cell carcinoma.    Bronchial Wash, LLL, left bronchial mass washings: - Squamous cell carcinoma.     7/11/2022 Laryngectomy:  1. Larynx, laryngeal biopsy frozen section : - Squamous cell carcinoma with necrosis.    2. Neck, Anterior, left neck level 3: - Number of lymph nodes involved by carcinoma:  1/7       - Size of metastatic deposit:  2.5 mm - Extranodal extension:  Not identified      3. Larynx, resection without nodes, total layrngectomy :  - Squamous cell carcinoma.  4. Neck, Anterior, left neck level 4: - Number of lymph nodes involved by carcinoma:  0/10  5. Neck, Anterior, right neck level 4:  - Number of lymph nodes involved by carcinoma:  0/11  6. Neck, Anterior, right neck level 3: - Number of lymph nodes involved by carcinoma:  0/9  7. Nasophaynx, inferior pharyngeal mucosa : - Benign squamous mucosa. - No evidence of malignancy.     8. Cartilage, superior cartilage margin : - No evidence of malignancy.    9. Lymph Node, pretracheal lymph  node :  - Number of lymph nodes involved by carcinoma:  0/3  [id number]       CLINICAL HISTORY:       Patient: Josafat Boudreaux is a 56 y.o. male kindly refer her for laryngeal carcinoma.  Patient  was referred to ENT for further evaluation of hoarseness.  He did not have any difficulty swallowing or dry mouth at the time.  Hoarseness has been present for at least 4 months prior to evaluation. 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     Patient was scheduled to trach but on 06/10/2022 he was brought to the emergency room via air met with is short of breath.  He was found to have and oxygenation on the 40s when EMS was called.  He was placed on BiPAP but pCO2 increased so he has an emergent tracheostomy performed.   He has a PEG tube placed same hospitalization.  He underwent bronchoscopy with biopsy of the right upper lobe endobronchial lesion on 6/14/2022 cytology was negative for malignant cells.      On 7/11/2022 he underwent LARYNGECTOMY, WITH RADICAL NECK DISSECTION - Bilateral LARYNGOSCOPY, DIRECT, DIAGNOSTIC, WITH BRONCHOSCOPY AND ESOPHAGOSCOPY pathology report as above.  1/40 lymph nodes were involved with metastatic disease.  Bronchoscopy was repeated and biopsy of the left lower lobe and right lower lobe were both positive for squamous cell carcinoma.    He is here today with his wife.  He has recovered from surgery relatively well.  Tracheostomy in place.  He is unable to talk so the history was taken by electronic medical record and wife.    Patient has a brother with history of throat cancer. Patient used to smoke 1 and half pack per day since he was 60 years old.  He was smoking less recently.    Chief Complaint: NP (Referral from Dr. Merritt of Vocal cord or Larynx)      Interval History:      HPI  Past Medical History:   Diagnosis Date    Cancer     COPD (chronic obstructive pulmonary disease)     Dysphagia     Lung cancer     Vocal cord mass       Past Surgical History:   Procedure Laterality Date    DIRECT DIAGNOSTIC LARYNGOSCOPY WITH BRONCHOSCOPY AND ESOPHAGOSCOPY N/A 7/11/2022    Procedure: LARYNGOSCOPY, DIRECT, DIAGNOSTIC, WITH BRONCHOSCOPY AND ESOPHAGOSCOPY;  Surgeon: Emory Alonso MD;  Location: [facility];  Service: ENT;  Laterality: N/A;    HIP SURGERY      HUMERUS FRACTURE SURGERY      INSERT ARTERIAL LINE  6/26/2022         TRACHEOSTOMY N/A 6/10/2022    Procedure: CREATION, TRACHEOSTOMY;  Surgeon: Junior Alonso MD;  Location: [facility];  Service: ENT;  Laterality: N/A;     Family History   Problem Relation Age of Onset    Lung cancer Father     Throat cancer Brother      Social Connections: Not on file       Review of patient's allergies indicates:  No Known Allergies   Current  Outpatient Medications on File Prior to Visit   Medication Sig Dispense Refill    acetaminophen (TYLENOL) 160 mg/5 mL Liqd Take 20.3 mLs (649.6 mg total) by mouth every 6 (six) hours as needed (Mild pain). 473 mL 0    amoxicillin-clavulanate 875-125mg (AUGMENTIN) 875-125 mg per tablet Take 1 tablet by mouth every 12 (twelve) hours. for 7 days 14 tablet 0    diphenhydrAMINE (BENADRYL) 25 mg capsule 1 capsule (25 mg total) by Per G Tube route every 6 (six) hours as needed for Itching. 90 capsule 1    docusate (COLACE) 50 mg/5 mL liquid 10 mLs (100 mg total) by Per G Tube route 2 (two) times daily as needed (constipation). (Patient taking differently: 100 mg by Per G Tube route 2 (two) times daily as needed (constipation). Prn) 473 mL 1    famotidine (PEPCID) 20 MG tablet Take 1 tablet (20 mg total) by mouth every evening. 30 tablet 11    hydrocortisone 1 % cream Apply topically 2 (two) times daily as needed (rash). Apply to rash 30 g 1    ibuprofen (ADVIL,MOTRIN) 100 mg/5 mL suspension Take 30 mLs (600 mg total) by mouth every 6 (six) hours as needed for Pain (mild to moderate). 354 mL 0    nicotine (NICODERM CQ) 21 mg/24 hr Place 1 patch onto the skin once daily.  0    pantoprazole (PROTONIX) 20 MG tablet Take 1 tablet (20 mg total) by mouth once daily. 30 tablet 11    polyethylene glycol (GLYCOLAX) 17 gram PwPk 17 g by Per G Tube route 2 (two) times daily as needed (constipation). (Patient taking differently: 17 g by Per G Tube route 2 (two) times daily as needed (constipation). prn) 30 packet 1    acetaminophen (TYLENOL) 325 MG tablet 2 tablets (650 mg total) by Per G Tube route every 6 (six) hours as needed. (Patient not taking: Reported on 7/21/2022) 30 tablet 1    albuterol-ipratropium (DUO-NEB) 2.5 mg-0.5 mg/3 mL nebulizer solution Take 3 mLs by nebulization every 4 (four) hours as needed for Shortness of Breath or Wheezing. Rescue (Patient not taking: No sig reported) 90 mL 1     albuterol-ipratropium (DUO-NEB) 2.5 mg-0.5 mg/3 mL nebulizer solution Take 3 mLs by nebulization every 6 (six) hours. Rescue (Patient not taking: No sig reported) 75 mL 0    budesonide (PULMICORT) 0.5 mg/2 mL nebulizer solution Take 2 mLs (0.5 mg total) by nebulization every 12 (twelve) hours. Controller (Patient not taking: No sig reported) 90 mL 1    calcium-vitamin D3 (OS-CARMELO 500 + D3) 500 mg-5 mcg (200 unit) per tablet 1 tablet by Per G Tube route 2 (two) times daily with meals. (Patient not taking: No sig reported)      ipratropium (ATROVENT) 0.02 % nebulizer solution Take 2.5 mLs (500 mcg total) by nebulization every 8 (eight) hours. Rescue (Patient not taking: No sig reported) 75 mL 0    mupirocin (BACTROBAN) 2 % ointment Apply topically once daily. (Patient not taking: Reported on 7/21/2022) 15 g 0    nystatin (MYCOSTATIN) cream Apply topically 2 (two) times daily. (Patient not taking: No sig reported) 30 g 1    ondansetron (ZOFRAN) 4 MG tablet Take 1 tablet (4 mg total) by mouth every 8 (eight) hours as needed for Nausea. (Patient not taking: No sig reported) 15 tablet 0    oxyCODONE (ROXICODONE) 5 MG immediate release tablet Take 1 tablet (5 mg total) by mouth every 6 (six) hours as needed for Pain. (Patient not taking: No sig reported) 25 tablet 0    [DISCONTINUED] aspirin (ECOTRIN) 81 MG EC tablet Take 81 mg by mouth once daily.       No current facility-administered medications on file prior to visit.      Review of Systems   Constitutional: Negative for activity change, appetite change, chills, diaphoresis, fatigue, fever and unexpected weight change.   HENT: Negative for nasal congestion, mouth sores, nosebleeds, postnasal drip, sinus pressure/congestion, sore throat and trouble swallowing.    Eyes: Negative for visual disturbance.   Respiratory: Negative for cough and shortness of breath.    Cardiovascular: Negative for chest pain and palpitations.   Gastrointestinal: Negative for abdominal  "distention, abdominal pain, blood in stool, change in bowel habit, constipation, diarrhea, nausea, vomiting and change in bowel habit.   Endocrine: Negative.    Genitourinary: Negative for bladder incontinence, decreased urine volume, difficulty urinating, dysuria, frequency, hematuria, scrotal swelling, testicular pain and urgency.   Musculoskeletal: Negative for arthralgias, back pain, gait problem, joint swelling, leg pain, myalgias and neck pain.   Integumentary:  Negative for rash.   Neurological: Negative for dizziness, tremors, seizures, syncope, speech difficulty, weakness, light-headedness, numbness, headaches and memory loss.   Hematological: Negative for adenopathy. Does not bruise/bleed easily.   Psychiatric/Behavioral: Negative for agitation, confusion, hallucinations, sleep disturbance and suicidal ideas. The patient is nervous/anxious.               Vitals:    07/21/22 1356   BP: 100/67   BP Location: Right arm   Patient Position: Sitting   Pulse: 107   Temp: 99.1 °F (37.3 °C)   SpO2: (!) 94%   Weight: 62.1 kg (137 lb)   Height: 5' 9" (1.753 m)      Physical Exam  Vitals and nursing note reviewed.   Constitutional:       General: He is not in acute distress.     Appearance: Normal appearance.      Comments: thin   HENT:      Head: Normocephalic and atraumatic.      Mouth/Throat:      Mouth: Mucous membranes are moist.   Eyes:      General: No scleral icterus.     Extraocular Movements: Extraocular movements intact.      Conjunctiva/sclera: Conjunctivae normal.   Neck:      Vascular: No JVD.      Trachea: Tracheostomy present.      Comments: In place. Surgical site well healed.  Cardiovascular:      Rate and Rhythm: Normal rate and regular rhythm.      Heart sounds: No murmur heard.  Pulmonary:      Effort: Pulmonary effort is normal.      Breath sounds: Normal breath sounds. No wheezing or rhonchi.   Chest:      Chest wall: No tenderness.   Breasts:      Right: No axillary adenopathy or " supraclavicular adenopathy.      Left: No axillary adenopathy or supraclavicular adenopathy.       Abdominal:      General: The ostomy site is clean. Bowel sounds are normal. There is no distension.      Palpations: Abdomen is soft.      Tenderness: There is no abdominal tenderness.   Musculoskeletal:         General: No swelling or deformity.      Cervical back: Neck supple.   Lymphadenopathy:      Cervical: No cervical adenopathy.      Upper Body:      Right upper body: No supraclavicular or axillary adenopathy.      Left upper body: No supraclavicular or axillary adenopathy.      Lower Body: No right inguinal adenopathy. No left inguinal adenopathy.   Skin:     General: Skin is warm.      Coloration: Skin is not jaundiced.      Findings: No rash.   Neurological:      General: No focal deficit present.      Mental Status: He is alert and oriented to person, place, and time.   Psychiatric:         Attention and Perception: Attention normal.         Mood and Affect: Affect normal. Mood is anxious.         Behavior: Behavior is cooperative.         Cognition and Memory: Cognition normal.         Judgment: Judgment normal.       ECOG SCORE    1 - Restricted in strenuous activity-ambulatory and able to carry out work of a light nature         Laboratory:  CBC with Differential:  Lab Results   Component Value Date    WBC 9.0 07/13/2022    RBC 3.80 (L) 07/13/2022    HGB 11.5 (L) 07/13/2022    HCT 37.0 (L) 07/13/2022    MCV 97.4 (H) 07/13/2022    MCH 30.3 07/13/2022    MCHC 31.1 (L) 07/13/2022    RDW 14.2 07/13/2022     07/13/2022    MPV 11.1 (H) 07/13/2022        CMP:  Sodium Level   Date Value Ref Range Status   07/13/2022 141 136 - 145 mmol/L Final     Potassium Level   Date Value Ref Range Status   07/13/2022 3.5 3.5 - 5.1 mmol/L Final     Carbon Dioxide   Date Value Ref Range Status   07/13/2022 30 (H) 22 - 29 mmol/L Final     Blood Urea Nitrogen   Date Value Ref Range Status   07/13/2022 11.7 8.4 - 25.7 mg/dL  Final     Creatinine   Date Value Ref Range Status   07/13/2022 0.59 (L) 0.73 - 1.18 mg/dL Final     Calcium Level Total   Date Value Ref Range Status   07/13/2022 9.2 8.4 - 10.2 mg/dL Final     Albumin Level   Date Value Ref Range Status   07/11/2022 2.4 (L) 3.5 - 5.0 gm/dL Final     Bilirubin Total   Date Value Ref Range Status   07/11/2022 0.5 <=1.5 mg/dL Final     Alkaline Phosphatase   Date Value Ref Range Status   07/11/2022 66 40 - 150 unit/L Final     Aspartate Aminotransferase   Date Value Ref Range Status   07/11/2022 20 5 - 34 unit/L Final     Alanine Aminotransferase   Date Value Ref Range Status   07/11/2022 15 0 - 55 unit/L Final     Estimated GFR-Non    Date Value Ref Range Status   07/13/2022 >60 mls/min/1.73/m2 Final             Assessment:       1. Laryngeal cancer    2. Regional lymph node metastasis present    3. Polyp of vocal cord and larynx    4. Squamous cell carcinoma of both lungs      Laryngeal Squamous cell carcinoma -s/p total laryngectomy, 1/40 LN with metastatic disease.   Lung masses x 2, bilateral--> Squamous cell carcinoma    I discussed with the patient and his wife, diagnosis, prognosis and treatment recommendations.  Discussed briefly chemotherapy, discussed risk versus benefits as well as toxicities associated with it.   Patient with long history of smoking.  I discussed with them that head and neck cancer lung cancer are very common in smokers.  I explained to them that lung cancer could or could not be associated with his head and neck cancer and lung cancer could be a 2nd primary.  Either way he has it in both lungs therefore a stage IV.  As per patient report, I do not have the official report, he has 1 mass in each of the lung bases and nothing other placed.   He will see Dr. Das Monday.  I would like to discuss case with him after review his scans to see what will be best treatment strategy.      He most likely need RT to his neck and he will benefit of  concommittant chemotherapy.  If he only have one lesion in each lung and not too big, maybe RT to each lesions may be an alternative as well +/- chemotherapy during and after RT.        Plan.:       Keep appt with Dr Das, Monday   I will discuss case with Dr Das  Request imaging from [facility]  RT 2 weeks for more definitive treatment recommendations  Encourage to call or message us for any questions or problems  The patient was given ample opportunity to ask questions, and to the best of my abilities, all questions answered to satisfaction; patient demonstrated understanding of what we discussed and agreeable to the plan.     I'd like to thank for referring and allowing me the opportunity to participate in the care of this patient and if any questions, please do not hesitate to call the office at [phone].       LENKA NELSON MD

## 2022-07-22 PROBLEM — C34.91 SQUAMOUS CELL CARCINOMA OF BOTH LUNGS: Status: ACTIVE | Noted: 2022-07-22

## 2022-07-22 PROBLEM — C34.92 SQUAMOUS CELL CARCINOMA OF BOTH LUNGS: Status: ACTIVE | Noted: 2022-07-22

## 2022-07-27 ENCOUNTER — OFFICE VISIT (OUTPATIENT)
Dept: INTERNAL MEDICINE | Facility: CLINIC | Age: 57
End: 2022-07-27
Payer: MEDICARE

## 2022-07-27 VITALS
HEART RATE: 107 BPM | DIASTOLIC BLOOD PRESSURE: 70 MMHG | BODY MASS INDEX: 20.73 KG/M2 | OXYGEN SATURATION: 95 % | WEIGHT: 140 LBS | SYSTOLIC BLOOD PRESSURE: 112 MMHG | HEIGHT: 69 IN | TEMPERATURE: 98 F | RESPIRATION RATE: 18 BRPM

## 2022-07-27 DIAGNOSIS — C34.91 SQUAMOUS CELL CARCINOMA OF BOTH LUNGS: ICD-10-CM

## 2022-07-27 DIAGNOSIS — J38.7 LARYNGEAL MASS: Primary | ICD-10-CM

## 2022-07-27 DIAGNOSIS — C34.92 SQUAMOUS CELL CARCINOMA OF BOTH LUNGS: ICD-10-CM

## 2022-07-27 DIAGNOSIS — R56.9 NEW ONSET SEIZURE: ICD-10-CM

## 2022-07-27 DIAGNOSIS — J44.9 CHRONIC OBSTRUCTIVE PULMONARY DISEASE, UNSPECIFIED COPD TYPE: ICD-10-CM

## 2022-07-27 PROCEDURE — 99213 PR OFFICE/OUTPT VISIT, EST, LEVL III, 20-29 MIN: ICD-10-PCS | Mod: ,,, | Performed by: INTERNAL MEDICINE

## 2022-07-27 PROCEDURE — 99213 OFFICE O/P EST LOW 20 MIN: CPT | Mod: ,,, | Performed by: INTERNAL MEDICINE

## 2022-07-27 NOTE — PROGRESS NOTES
Subjective:       Patient ID: Josafat Boudreaux is a 56 y.o. male.    Chief Complaint: Follow-up (Pt had a laryngectomy on 7/11)      The patient is a 56-year-old man seen in hospital follow-up.  I saw him nearly a month ago when he was transferred out of the ICU.  He had suffered respiratory distress complicated by a seizure and pneumonia.  He was hospitalized in the ICU for few days in downgraded to me.  He was medically stable at that time.  I saw him shortly before I left for vacation and when I return he had been transferred out.  Since I saw him he did have his biopsy done of the larynx and also of the lung and was found to have squamous cell carcinoma in both lungs and also the laryngeal cancer.  He now has a permanent trach.  He has a PEG for feeding purposes but he is about to have advanced his diet to full liquid orally.  He had been on various nebulizer treatments but he has stopped that.  He is getting radiation treatment for the lung an larynx.  He is being followed by the radiation oncologist as well as Dr. Atwood from Oncology.    Follow-up      Review of Systems     Current Outpatient Medications on File Prior to Visit   Medication Sig Dispense Refill    acetaminophen (TYLENOL) 160 mg/5 mL Liqd Take 20.3 mLs (649.6 mg total) by mouth every 6 (six) hours as needed (Mild pain). 473 mL 0    diphenhydrAMINE (BENADRYL) 25 mg capsule 1 capsule (25 mg total) by Per G Tube route every 6 (six) hours as needed for Itching. 90 capsule 1    famotidine (PEPCID) 20 MG tablet Take 1 tablet (20 mg total) by mouth every evening. 30 tablet 11    hydrocortisone 1 % cream Apply topically 2 (two) times daily as needed (rash). Apply to rash 30 g 1    ibuprofen (ADVIL,MOTRIN) 100 mg/5 mL suspension Take 30 mLs (600 mg total) by mouth every 6 (six) hours as needed for Pain (mild to moderate). 354 mL 0    pantoprazole (PROTONIX) 20 MG tablet Take 1 tablet (20 mg total) by mouth once daily. 30 tablet 11     [DISCONTINUED] nicotine (NICODERM CQ) 21 mg/24 hr Place 1 patch onto the skin once daily.  0    [DISCONTINUED] polyethylene glycol (GLYCOLAX) 17 gram PwPk 17 g by Per G Tube route 2 (two) times daily as needed (constipation). (Patient taking differently: 17 g by Per G Tube route 2 (two) times daily as needed (constipation). prn) 30 packet 1    acetaminophen (TYLENOL) 325 MG tablet 2 tablets (650 mg total) by Per G Tube route every 6 (six) hours as needed. (Patient not taking: No sig reported) 30 tablet 1    albuterol-ipratropium (DUO-NEB) 2.5 mg-0.5 mg/3 mL nebulizer solution Take 3 mLs by nebulization every 4 (four) hours as needed for Shortness of Breath or Wheezing. Rescue (Patient not taking: No sig reported) 90 mL 1    albuterol-ipratropium (DUO-NEB) 2.5 mg-0.5 mg/3 mL nebulizer solution Take 3 mLs by nebulization every 6 (six) hours. Rescue (Patient not taking: No sig reported) 75 mL 0    budesonide (PULMICORT) 0.5 mg/2 mL nebulizer solution Take 2 mLs (0.5 mg total) by nebulization every 12 (twelve) hours. Controller (Patient not taking: No sig reported) 90 mL 1    calcium-vitamin D3 (OS-CARMELO 500 + D3) 500 mg-5 mcg (200 unit) per tablet 1 tablet by Per G Tube route 2 (two) times daily with meals. (Patient not taking: No sig reported)      [DISCONTINUED] aspirin (ECOTRIN) 81 MG EC tablet Take 81 mg by mouth once daily.      [DISCONTINUED] docusate (COLACE) 50 mg/5 mL liquid 10 mLs (100 mg total) by Per G Tube route 2 (two) times daily as needed (constipation). (Patient not taking: Reported on 7/27/2022) 473 mL 1    [DISCONTINUED] ipratropium (ATROVENT) 0.02 % nebulizer solution Take 2.5 mLs (500 mcg total) by nebulization every 8 (eight) hours. Rescue (Patient not taking: No sig reported) 75 mL 0    [DISCONTINUED] mupirocin (BACTROBAN) 2 % ointment Apply topically once daily. (Patient not taking: No sig reported) 15 g 0    [DISCONTINUED] nystatin (MYCOSTATIN) cream Apply topically 2 (two) times  "daily. (Patient not taking: No sig reported) 30 g 1    [DISCONTINUED] ondansetron (ZOFRAN) 4 MG tablet Take 1 tablet (4 mg total) by mouth every 8 (eight) hours as needed for Nausea. (Patient not taking: No sig reported) 15 tablet 0    [DISCONTINUED] oxyCODONE (ROXICODONE) 5 MG immediate release tablet Take 1 tablet (5 mg total) by mouth every 6 (six) hours as needed for Pain. (Patient not taking: No sig reported) 25 tablet 0     No current facility-administered medications on file prior to visit.     Objective:      /70 (BP Location: Left arm, Patient Position: Sitting, BP Method: Large (Manual))   Pulse 107   Temp 97.7 °F (36.5 °C) (Temporal)   Resp 18   Ht 5' 9" (1.753 m)   Wt 63.5 kg (140 lb)   SpO2 95%   BMI 20.67 kg/m²     Physical Exam  Vitals reviewed.   Constitutional:       Appearance: Normal appearance.   HENT:      Head: Normocephalic and atraumatic.      Mouth/Throat:      Pharynx: Oropharynx is clear.   Eyes:      Pupils: Pupils are equal, round, and reactive to light.   Neck:      Comments: A trach is in place.  Cardiovascular:      Rate and Rhythm: Normal rate and regular rhythm.      Pulses: Normal pulses.      Heart sounds: Normal heart sounds.   Pulmonary:      Breath sounds: Normal breath sounds.      Comments: Mild diffuse rhonchi  Abdominal:      General: Abdomen is flat.      Palpations: Abdomen is soft.   Musculoskeletal:      Cervical back: Neck supple.   Skin:     General: Skin is warm and dry.   Neurological:      Mental Status: He is alert.         Assessment:       1. Laryngeal mass    2. New onset seizure    3. Squamous cell carcinoma of both lungs    4. Chronic obstructive pulmonary disease, unspecified COPD type          The patient has laryngeal carcinoma as well as squamous cell carcinoma of the lung.  Being treated with radiation and perhaps chemo.  Further decisions regarding treatment pending.    He has had a rough road of to recover from his surgery but he is " improving.  His diet is being advanced.  He has been encouraged to gain weight.    He is no longer drinking alcohol or smoking cigarettes.      Plan:           will plan on having him see us back here in 3 months.  I will not  order a blood test as I know he will have plenty done by his other doctors in the near future.

## 2022-07-27 NOTE — OP NOTE
Otolaryngology Operative Note    Date of procedure: 7/11/22    Attending Surgeon: Emory Alonso MD    Resident Surgeon: Lisseth Krueger MD    Pre-operative diagnosis:  1. Neoplasm of larynx     Post-operative diagnosis:   1. Squamous cell carcinoma of glottis  2. Suspicious lesions of bilateral bronchi    Procedure:  1. Direct laryngoscopy  2. Bronchoscopy  3. Esophagoscopy  4. Total laryngectomy  5. Bilateral neck dissections levels III and IV    Anesthesia: General    Complications: None    Specimens:   ID Type Source Tests Collected by Time Destination   A : laryngeal biopsy frozen section  Tissue Larynx SPECIMEN TO PATHOLOGY Emory Alonso MD 7/11/2022 0747    B : right bronchial mass washings Respiratory Bronchial Wash, RLL CYTOLOGY SPECIMEN- MEDICAL CYTOLOGY (FLUID/WASH/BRUSH), AFB SMEAR OLG, FUNGAL CULTURE OLG Emory Alonso MD 7/11/2022 0814    C : left bronchial mass washings  Respiratory Bronchial Wash, LLL CYTOLOGY SPECIMEN- MEDICAL CYTOLOGY (FLUID/WASH/BRUSH), AFB SMEAR OLG, FUNGAL CULTURE OLG Emory Alonso MD 7/11/2022 0859    D : left neck level 3  Tissue Neck, Anterior SPECIMEN TO PATHOLOGY Emory Alonso MD 7/11/2022 1010    E : total layrngectomy  Tissue Larynx, resection without nodes SPECIMEN TO PATHOLOGY Emory Alonso MD 7/11/2022 1012    F : left neck level 4  Tissue Neck, Anterior SPECIMEN TO PATHOLOGY Emory Alonso MD 7/11/2022 1013    G : right neck level 4  Tissue Neck, Anterior SPECIMEN TO PATHOLOGY Emory Alonso MD 7/11/2022 1035    H : right neck level 3 Tissue Neck, Anterior SPECIMEN TO PATHOLOGY Emory Alonso MD 7/11/2022 1042    I : inferior pharyngeal mucosa  Tissue Nasophaynx SPECIMEN TO PATHOLOGY Emory Alonso MD 7/11/2022 1048    J : superior cartilage margin  Tissue Cartilage SPECIMEN TO PATHOLOGY Emory Alonso MD 7/11/2022 1049    K : pretracheal lymph  node  Tissue Lymph Node SPECIMEN TO PATHOLOGY Emory Alonso MD  7/11/2022 1155        Blood loss: 75cc    Indication:  Josafat Boudreaux is a 56 y.o. male seen for workup of hoarseness. In office flexible scope as well as imaging concerning for a laryngeal mass. Patient was counseled on the management options for this mass and decision was made to proceed with definitive surgical resection. All questions answered. Consent obtained.     Procedure in detail:  After informed consent was obtained, the patient was taken to the operating room, placed in supine position. General anesthesia was induced and ETT placed into tracheostomy stoma. The tube was secured with silk suture to the chest. A preoperative time out was performed.     The table was then turned 90 degrees away from anesthesia. The procedure began with a systematic evaluation of the cervical neck.  This was followed by a evaluation of the oral cavity and oropharynx including bimanual palpation of the floor of mouth, base of tongue and tonsillar fossa.  Next the direct laryngoscopy was performed by inserting a dedo laryngoscope into the oral cavity. Examination of the tonsils, glossotonsillar sulci, base of tongue, vallecula revealed no lesions. There were no lesions of the posterior pharyngeal wall mucosa and soft palate mucosa.  Next the laryngoscope was advanced down into the right piriform swept over for examination of the larynx followed by the left piriform sinus. This revealed a large friable tumor confined to the endolarynx. The vocal cords were obliterated by this mass and were not visualized. Only a small opening to the subglottis was appreciated. There were no lesions of the bilateral pyriform sinuses.  Finally the post cricoid mucosa and esophageal introitus were visualized with no lesions. A biopsy of the laryngeal mass was taken and sent for frozen section. This returned as suspicious for squamous cell carcinoma.     The dedo laryngoscope was then suspended and flexible bronchoscopy was performed. There  were two concerning lesions, one in the left bronchi and one in the right bronchi.  Brushing and biopsy was performed of each of these lesions separately, tissue was collected in a Lukins trap and sent for cytology. A flexible esophagoscope was then performed. No suspicious lesions were identified in the esophagus or stomach.     The bed was then rotated an additional 90 degrees from anesthesia and the patient was prepared for laryngectomy. The proposed gabo apron incision was marked and injected with 1% lidocaine with epinephrine. The neck was then prepped and draped in the standard sterile fashion.     Using a 15 blade, the skin incision was made and carried down through the subcutaneous tissue and platysmal layer. Subplatysmal flaps were elevated superiorly and inferiorly. The midline was identified and the strap muscles were split along the midline raphe and elevated laterally off of the laryngeal framework to expose the thyroid and cricoid cartilages. The hyoid bone was palpated and grasped with an Allis clamp. The hyoid bone was then skeletonized by releasing the suprahyoid musculature attachments on the superior aspect of the hyoid bone and the stylohyoid ligaments bilaterally. The hyoid was rotated to allow for identification of the greater cornu which was skeletonized bilaterally. The larynx was then entered from above at the level of the vallecula. The epiglottis was grasped with an Allis clamp and brought down into the neck. Aquino scissors were used to cut the mucosa along the lateral pharyngeal wall taking care to stay close to the epiglottis to preserve pyriform sinus mucosa. Next skeletonization of the larynx continued by releasing the constrictor musculature from the thyroid cartilage bilaterally. Bilateral superior and recurrent laryngeal nerves were identified and and cut.     Attention was then turned to the tracheal portion. Using a 15 blade, the trachea was incised between the tracheal rings. A  plane was defined between the larynx and esophagus, allowing for separation while keep the esophagus intact. Any remaining soft tissue attachments were freed and the larynx was removed en bloc. The sternal heads of the SCM were cut bilaterally to expose the clavicle.  The trachea was then isolated from the surrounding soft tissue and pulled up into the neck and secured to the clavicle on either side with 0 PDS.?    Next attention was turned to the left lateral neck. The investing fascia of the sternocleidomastoid muscle was incised and the SCM was unwrapped in the standard fashion. The SCM was retracted laterally to expose the lou packet. Next the omohyoid was dissected from the soft tissues of the neck contents and retracted inferiorly. This allowed for identification of the internal jugular vein inferiorly. The transverse cervical vessels were identified as they crossed over the floor of the neck. ?These were preserved as well. The soft tissues and lou contents was elevated off of the SCM down to the level of the cervical rootlets. The cervical rootlets and fascia overlying the phrenic nerve were preserved. ?The lou contents was then elevated off of the deep layer of the deep cervical fascia up to the carotid sheath. Sharp dissection was used to elevate the remaining lou contents off of the jugular vein.The lou contents was divided into levels III and IV appropriately and these were passed off for permanent pathological examination. The same procedure was then performed on the right side. ?    Copious amounts of irrigation were used to irrigate the neck and cautery was used to obtain adequate hemostasis.?Positive airway pressure was applied by the anesthesia team and adequate hemostasis had been obtained. There is no visible chyle from the region of the thoracic duct. ?    We then turned our attention to the pharyngeal closure. An NG tube was placed and the pharynx closed around the tube. Using a 3-0  vicryl suture, the pharynx was closed vertically with a [place] suture starting inferiorly. A second layer of 3-0 vicryl interrupted sutures was used to close the adjacent constrictor muscles over the closure. A third layer of 3-0 vicryl interrupted sutures was used to loosely re-approximate the over lying soft tissues.      A small elliptical segment was removed from the lower skin flap below the tracheostoma. The lower skin flap was sutured to the tracheostoma with 2-0 vicryl half mattress sutures to allow the skin flap to cover the exposed cartilage. Two suction drains were then placed in the neck and secured with nylon suture. The superior skin flap was then secured to the superior aspect of the tracheostoma with 3-0 vicryl simple interrupted sutures. The neck was closed in a layered fashion. The platysma was then reapproximated. The skin was closed with staples.     This marked the conclusion of the case and care is turned back over to the anesthesia team. Patient tolerated the procedure well.

## 2022-07-29 NOTE — PHYSICIAN QUERY
PT Name: Josafat Boudreaux  MR #: [id number]    DOCUMENTATION CLARIFICATION     CDS/: Raymond Melendez, RN, BSN   Contact information: [email or web address]    This form is a permanent document in the medical record.     Query Date: July 29, 2022    By submitting this query, we are merely seeking further clarification of documentation.  Please utilize your independent clinical judgment when addressing the question(s) below.    The medical record contains the following:  Pathology Findings Location in Medical Record   Final Diagnosis  1. Bronchial Wash, RLL, right bronchial mass washings:   - Squamous cell carcinoma.      2. Bronchial Wash, LLL, left bronchial mass washings:  - Squamous cell carcinoma.   Cytology results 7/11/22       Please clarify:    [X  ] Pathology findings noted above are ruled in/confirmed as diagnoses     [  ] Pathology findings noted above are not confirmed as diagnoses     [  ] Pathology findings noted above are incidental     [  ] Other diagnosis (please specify): ___________     [  ] Clinically Undetermined       Please document in your progress notes daily for the duration of treatment until resolved and include in your discharge summary.    Form No. [id number]

## 2022-08-01 ENCOUNTER — HOSPITAL ENCOUNTER (OUTPATIENT)
Dept: RADIOLOGY | Facility: HOSPITAL | Age: 57
Discharge: HOME | End: 2022-08-01
Attending: OTOLARYNGOLOGY
Payer: MEDICARE

## 2022-08-01 DIAGNOSIS — C44.92 SQUAMOUS CELL CARCINOMA OF SKIN: ICD-10-CM

## 2022-08-01 PROCEDURE — 78815 PET IMAGE W/CT SKULL-THIGH: CPT | Mod: TC

## 2022-08-02 ENCOUNTER — LAB VISIT (OUTPATIENT)
Dept: LAB | Facility: HOSPITAL | Age: 57
End: 2022-08-02
Attending: INTERNAL MEDICINE
Payer: MEDICARE

## 2022-08-02 DIAGNOSIS — C77.9 REGIONAL LYMPH NODE METASTASIS PRESENT: ICD-10-CM

## 2022-08-02 DIAGNOSIS — C32.9 LARYNGEAL CANCER: ICD-10-CM

## 2022-08-02 DIAGNOSIS — J38.1 POLYP OF VOCAL CORD AND LARYNX: ICD-10-CM

## 2022-08-02 LAB
ALBUMIN SERPL-MCNC: 3.4 GM/DL (ref 3.5–5)
ALBUMIN/GLOB SERPL: 0.8 RATIO (ref 1.1–2)
ALP SERPL-CCNC: 117 UNIT/L (ref 40–150)
ALT SERPL-CCNC: 11 UNIT/L (ref 0–55)
AST SERPL-CCNC: 18 UNIT/L (ref 5–34)
BASOPHILS # BLD AUTO: 0.06 X10(3)/MCL (ref 0–0.2)
BASOPHILS NFR BLD AUTO: 0.6 %
BILIRUBIN DIRECT+TOT PNL SERPL-MCNC: 0.4 MG/DL
BUN SERPL-MCNC: 12.1 MG/DL (ref 8.4–25.7)
CALCIUM SERPL-MCNC: 9.8 MG/DL (ref 8.4–10.2)
CHLORIDE SERPL-SCNC: 99 MMOL/L (ref 98–107)
CO2 SERPL-SCNC: 31 MMOL/L (ref 22–29)
CREAT SERPL-MCNC: 0.64 MG/DL (ref 0.73–1.18)
EOSINOPHIL # BLD AUTO: 0.3 X10(3)/MCL (ref 0–0.9)
EOSINOPHIL NFR BLD AUTO: 3.1 %
ERYTHROCYTE [DISTWIDTH] IN BLOOD BY AUTOMATED COUNT: 13.9 % (ref 11.5–17)
GLOBULIN SER-MCNC: 4.2 GM/DL (ref 2.4–3.5)
GLUCOSE SERPL-MCNC: 106 MG/DL (ref 74–100)
HCT VFR BLD AUTO: 40 % (ref 42–52)
HGB BLD-MCNC: 13.1 GM/DL (ref 14–18)
IMM GRANULOCYTES # BLD AUTO: 0.02 X10(3)/MCL (ref 0–0.04)
IMM GRANULOCYTES NFR BLD AUTO: 0.2 %
LDH SERPL-CCNC: 170 U/L (ref 125–220)
LYMPHOCYTES # BLD AUTO: 2.89 X10(3)/MCL (ref 0.6–4.6)
LYMPHOCYTES NFR BLD AUTO: 29.7 %
MCH RBC QN AUTO: 31.2 PG (ref 27–31)
MCHC RBC AUTO-ENTMCNC: 32.8 MG/DL (ref 33–36)
MCV RBC AUTO: 95.2 FL (ref 80–94)
MONOCYTES # BLD AUTO: 1.07 X10(3)/MCL (ref 0.1–1.3)
MONOCYTES NFR BLD AUTO: 11 %
NEUTROPHILS # BLD AUTO: 5.4 X10(3)/MCL (ref 2.1–9.2)
NEUTROPHILS NFR BLD AUTO: 55.4 %
PLATELET # BLD AUTO: 215 X10(3)/MCL (ref 130–400)
PMV BLD AUTO: 10.8 FL (ref 7.4–10.4)
POTASSIUM SERPL-SCNC: 4.7 MMOL/L (ref 3.5–5.1)
PROT SERPL-MCNC: 7.6 GM/DL (ref 6.4–8.3)
RBC # BLD AUTO: 4.2 X10(6)/MCL (ref 4.7–6.1)
SODIUM SERPL-SCNC: 138 MMOL/L (ref 136–145)
WBC # SPEC AUTO: 9.7 X10(3)/MCL (ref 4.5–11.5)

## 2022-08-02 PROCEDURE — 83615 LACTATE (LD) (LDH) ENZYME: CPT

## 2022-08-02 PROCEDURE — 36415 COLL VENOUS BLD VENIPUNCTURE: CPT

## 2022-08-02 PROCEDURE — 80053 COMPREHEN METABOLIC PANEL: CPT

## 2022-08-02 PROCEDURE — 85025 COMPLETE CBC W/AUTO DIFF WBC: CPT

## 2022-08-04 ENCOUNTER — OFFICE VISIT (OUTPATIENT)
Dept: HEMATOLOGY/ONCOLOGY | Facility: CLINIC | Age: 57
End: 2022-08-04
Payer: MEDICARE

## 2022-08-04 VITALS
OXYGEN SATURATION: 97 % | HEIGHT: 69 IN | DIASTOLIC BLOOD PRESSURE: 71 MMHG | WEIGHT: 144 LBS | HEART RATE: 108 BPM | TEMPERATURE: 98 F | BODY MASS INDEX: 21.33 KG/M2 | SYSTOLIC BLOOD PRESSURE: 111 MMHG

## 2022-08-04 DIAGNOSIS — C32.9 LARYNGEAL CANCER: Primary | ICD-10-CM

## 2022-08-04 DIAGNOSIS — C34.91 SQUAMOUS CELL CARCINOMA OF BOTH LUNGS: ICD-10-CM

## 2022-08-04 DIAGNOSIS — C34.92 SQUAMOUS CELL CARCINOMA OF BOTH LUNGS: ICD-10-CM

## 2022-08-04 PROCEDURE — 99999 PR PBB SHADOW E&M-EST. PATIENT-LVL V: ICD-10-PCS | Mod: PBBFAC,,, | Performed by: INTERNAL MEDICINE

## 2022-08-04 PROCEDURE — 99999 PR PBB SHADOW E&M-EST. PATIENT-LVL V: CPT | Mod: PBBFAC,,, | Performed by: INTERNAL MEDICINE

## 2022-08-04 PROCEDURE — 99215 OFFICE O/P EST HI 40 MIN: CPT | Mod: PBBFAC | Performed by: INTERNAL MEDICINE

## 2022-08-04 PROCEDURE — 99214 PR OFFICE/OUTPT VISIT, EST, LEVL IV, 30-39 MIN: ICD-10-PCS | Mod: S$PBB,,, | Performed by: INTERNAL MEDICINE

## 2022-08-04 PROCEDURE — 99214 OFFICE O/P EST MOD 30 MIN: CPT | Mod: S$PBB,,, | Performed by: INTERNAL MEDICINE

## 2022-08-05 ENCOUNTER — TELEPHONE (OUTPATIENT)
Dept: HEMATOLOGY/ONCOLOGY | Facility: CLINIC | Age: 57
End: 2022-08-05
Payer: MEDICARE

## 2022-08-05 RX ORDER — HEPARIN 100 UNIT/ML
500 SYRINGE INTRAVENOUS
Status: CANCELLED | OUTPATIENT
Start: 2022-08-17

## 2022-08-05 RX ORDER — FAMOTIDINE 10 MG/ML
20 INJECTION INTRAVENOUS
Status: CANCELLED | OUTPATIENT
Start: 2022-08-17

## 2022-08-05 RX ORDER — DIPHENHYDRAMINE HYDROCHLORIDE 50 MG/ML
50 INJECTION INTRAMUSCULAR; INTRAVENOUS ONCE AS NEEDED
Status: CANCELLED | OUTPATIENT
Start: 2022-08-17

## 2022-08-05 RX ORDER — EPINEPHRINE 0.3 MG/.3ML
0.3 INJECTION SUBCUTANEOUS ONCE AS NEEDED
Status: CANCELLED | OUTPATIENT
Start: 2022-08-17

## 2022-08-05 RX ORDER — SODIUM CHLORIDE 0.9 % (FLUSH) 0.9 %
10 SYRINGE (ML) INJECTION
Status: CANCELLED | OUTPATIENT
Start: 2022-08-17

## 2022-08-05 NOTE — TELEPHONE ENCOUNTER
Patient will need appt for chemo teaching next week and chemotherapy appt as well. Chemo will need to start with RT.I put for 8/11 but this may change depending if has education and Mediport placed.

## 2022-08-08 NOTE — PROGRESS NOTES
Subjective:       Patient ID: Josafat Boudreaux is a 56 y.o. male.      Chief Complaint: Chemotherapy Education      Diagnosis:  [id number] JENNYFER Laryngeal Squamous cell carcinoma      Present treatment:     Treatment/Oncology history:  --7/11/2022: total laryngectomy     Plan of care:      Imaging:  Ct neck 6/6/2022: A metastatic right level III cervical lymph node is present with short axis measurement of 1.6 cm.  This lymph node contains internal cystic change, typical of metastatic squamous cell carcinoma.  A left level III cervical lymph node shows short axis measurements of 1 cm, and is suspicious.    A left supraglottic mass measures approximately 2.4 cm in diameter (axial post-contrast image 39), presumably corresponding to the primary neoplasm.  Metastatic lymph nodes are also present in the inferior right paratracheal position, measuring 1.3 cm in short axis.  Metastatic lymph nodes are present in the superior right hilum, measuring 1.8 cm in short axis.  Ct H&N 6/27/2022: 1. There is mild stenosis at the origin of left proximal internal carotid artery secondary to dense calcified atheromatous plaque. 2. There is consolidation is right upper lobe. Additional ground-glass opacities are also seen on the remainder of the visualized lungs. These findings are consistent with an infectious process. Small right pleural effusion is seen. There is an ill-defined soft tissue mass in the right perihilar region which measures approximately 3.1 x 2.9 x 4.6 cm, of concern for neoplasm. Correlate clinically as regards further evaluation and followup with CT chest. There is an ill-defined enhancing soft tissue mass in the glottis infiltrating into the paraglottic spaces and the subglottic region with obliteration of the airway, of concern for a neoplasm. 3. Unremarkable CT angiogram of the head. Details and other findings as described above.  NM PET CT 8/1/2022: Postoperative changes of recent total laryngectomy and  lou dissection. bilateral hypermetabolic cervical lymphadenopathy.  A right cervical lymph node 1.7 x 1.8 cm, compared to 1.6 x 1.5 cm previously, SUV of 7.7. left cervical lymph node 8 x 10 mm and has a max SUV of 3.4.  A fluid collection posterior to the left internal jugular vein measures 2.4 x 2.4 cm. There is right hilar and mediastinal lymphadenopathy.  A precarinal lymph node 1.6 x 2.3 cm, compared to 1.3 x 1.7 cm previously, SUV of 13.8.  Right hilar lymphadenopathy measures 3 x 2.9 cm, compared to 2.1 x 2.3 cm previously, and has a max SUV of 9.8.  A suspected right hilar mass measures 1.8 x 3.6 cm SUV of 14.8      Pathology:  6/14/2022:  EBUS ENDOBRONCHIAL MASS RUL, CYTOLOGY: NEARLY ACELLULAR SPECIMEN CONSISTING OF FRESH BLOOD CLOT. NO ATYPICAL OR MALIGNANT CELLS IDENTIFIED.   7/11/2022: Bronchial Wash, RLL, right bronchial mass washings:  - Squamous cell carcinoma.    Bronchial Wash, LLL, left bronchial mass washings: - Squamous cell carcinoma.     7/11/2022 Laryngectomy:  1. Larynx, laryngeal biopsy frozen section : - Squamous cell carcinoma with necrosis.    2. Neck, Anterior, left neck level 3: - Number of lymph nodes involved by carcinoma:  1/7       - Size of metastatic deposit:  2.5 mm - Extranodal extension:  Not identified      3. Larynx, resection without nodes, total layrngectomy :  - Squamous cell carcinoma.  4. Neck, Anterior, left neck level 4: - Number of lymph nodes involved by carcinoma:  0/10  5. Neck, Anterior, right neck level 4:  - Number of lymph nodes involved by carcinoma:  0/11  6. Neck, Anterior, right neck level 3: - Number of lymph nodes involved by carcinoma:  0/9  7. Nasophaynx, inferior pharyngeal mucosa : - Benign squamous mucosa. - No evidence of malignancy.     8. Cartilage, superior cartilage margin : - No evidence of malignancy.    9. Lymph Node, pretracheal lymph  node :  - Number of lymph nodes involved by carcinoma:  0/3  [id number]        CLINICAL HISTORY:        Patient: Josafat Boudreaux is a 56 y.o. male kindly refer her for laryngeal carcinoma.  Patient  was referred to ENT for further evaluation of hoarseness.  He did not have any difficulty swallowing or dry mouth at the time.  Hoarseness has been present for at least 4 months prior to evaluation. 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      Patient was scheduled to trach but on 06/10/2022 he was brought to the emergency room via air met with is short of breath.  He was found to have and oxygenation on the 40s when EMS was called.  He was placed on BiPAP but pCO2 increased so he has an emergent tracheostomy performed.  He has a PEG tube placed same hospitalization.  He underwent bronchoscopy with biopsy of the right upper lobe endobronchial lesion on 6/14/2022 cytology was negative for malignant cells.       On 7/11/2022 he underwent LARYNGECTOMY, WITH RADICAL NECK DISSECTION - Bilateral LARYNGOSCOPY, DIRECT, DIAGNOSTIC, WITH BRONCHOSCOPY AND ESOPHAGOSCOPY pathology report as above.  1/40 lymph nodes were involved with metastatic disease.  Bronchoscopy was repeated and biopsy of the left lower lobe and right lower lobe were both positive for squamous cell carcinoma.     7/21/2022 seen by Dr. Atkinson with his wife.  He has recovered from surgery relatively well.  Tracheostomy in place.  He is unable to talk so the history was taken by electronic medical record and wife.     Patient has a brother with history of throat cancer. Patient used to smoke 1 and half pack per day since he was 60 years old.  He was smoking less recently.    Patient presents to clinic 8/4/2022 for follow up to discuss PET CT results and to discuss treatment recommendations/options. He is with his wife. Dr. Das recommends starting RT in a couple of weeks to allow more recovery time. He has follow up with Dr. Das this afternoon, RT tentatively scheduled to start Monday. Overall,  he is recovering well. Denies fever, chills or sweats. No chest pain or shortness of breath. Labs reviewed.  Peg tube and tracheostomy in placed noted during physical exam.     Past Medical History:   Diagnosis Date    Cancer     COPD (chronic obstructive pulmonary disease)     Dysphagia     Lung cancer     Vocal cord mass       Review of patient's allergies indicates:  No Known Allergies     Current Outpatient Medications:     acetaminophen (TYLENOL) 160 mg/5 mL Liqd, Take 20.3 mLs (649.6 mg total) by mouth every 6 (six) hours as needed (Mild pain)., Disp: 473 mL, Rfl: 0    albuterol-ipratropium (DUO-NEB) 2.5 mg-0.5 mg/3 mL nebulizer solution, Take 3 mLs by nebulization every 4 (four) hours as needed for Shortness of Breath or Wheezing. Rescue, Disp: 90 mL, Rfl: 1    diphenhydrAMINE (BENADRYL) 25 mg capsule, 1 capsule (25 mg total) by Per G Tube route every 6 (six) hours as needed for Itching., Disp: 90 capsule, Rfl: 1    famotidine (PEPCID) 20 MG tablet, Take 1 tablet (20 mg total) by mouth every evening., Disp: 30 tablet, Rfl: 11    glutamine 10 gram PwPk, Take 10 g by mouth 2 (two) times a day., Disp: , Rfl:     hydrocortisone 1 % cream, Apply topically 2 (two) times daily as needed (rash). Apply to rash, Disp: 30 g, Rfl: 1    ibuprofen (ADVIL,MOTRIN) 100 mg/5 mL suspension, Take 30 mLs (600 mg total) by mouth every 6 (six) hours as needed for Pain (mild to moderate)., Disp: 354 mL, Rfl: 0    ondansetron (ZOFRAN-ODT) 8 MG TbDL, Take 8 mg by mouth every 8 (eight) hours as needed for Nausea. Tab 1 ODT in AM for 2 days after chemotherapy, Disp: , Rfl:     pantoprazole (PROTONIX) 20 MG tablet, Take 1 tablet (20 mg total) by mouth once daily., Disp: 30 tablet, Rfl: 11    budesonide (PULMICORT) 0.5 mg/2 mL nebulizer solution, Take 2 mLs (0.5 mg total) by nebulization every 12 (twelve) hours. Controller (Patient not taking: No sig reported), Disp: 90 mL, Rfl: 1  Review of Systems   Constitutional:  Negative.  Negative for appetite change and unexpected weight change.   HENT: Negative.  Negative for mouth sores.    Eyes: Negative.  Negative for visual disturbance.   Respiratory: Negative.  Negative for cough and shortness of breath.         Tracheotomy present   Cardiovascular: Negative.  Negative for chest pain.        Upper chest wall mediport consulltation visit with Dr. Maria 8/172022    Week#1 on 8/11/2022 will need a peripheral IV   Gastrointestinal: Negative.  Negative for abdominal pain and diarrhea.        PEG tube present. Bolus feedings 7 cans per day.   Endocrine: Negative.    Genitourinary: Negative.  Negative for frequency.   Musculoskeletal: Negative.  Negative for back pain.   Integumentary:  Negative for rash. Negative.   Allergic/Immunologic: Negative.    Neurological: Negative.  Negative for headaches.   Hematological: Negative.  Negative for adenopathy.   Psychiatric/Behavioral: Negative.  The patient is not nervous/anxious.    All other systems reviewed and are negative.             ECOG SCORE    2 - Capable of all selfcare but unable to carry out any work activities, active > 50% of hours       No visits with results within 1 Week(s) from this visit.   Latest known visit with results is:   Lab Visit on 08/02/2022   Component Date Value    Sodium Level 08/02/2022 138     Potassium Level 08/02/2022 4.7     Chloride 08/02/2022 99     Carbon Dioxide 08/02/2022 31 (A)    Glucose Level 08/02/2022 106 (A)    Blood Urea Nitrogen 08/02/2022 12.1     Creatinine 08/02/2022 0.64 (A)    Calcium Level Total 08/02/2022 9.8     Protein Total 08/02/2022 7.6     Albumin Level 08/02/2022 3.4 (A)    Globulin 08/02/2022 4.2 (A)    Albumin/Globulin Ratio 08/02/2022 0.8 (A)    Bilirubin Total 08/02/2022 0.4     Alkaline Phosphatase 08/02/2022 117     Alanine Aminotransferase 08/02/2022 11     Aspartate Aminotransfera* 08/02/2022 18     Estimated GFR-Non Ann Marie* 08/02/2022 >60     Lactate  Dehydrogenase 08/02/2022 170     WBC 08/02/2022 9.7     RBC 08/02/2022 4.20 (A)    Hgb 08/02/2022 13.1 (A)    Hct 08/02/2022 40.0 (A)    MCV 08/02/2022 95.2 (A)    MCH 08/02/2022 31.2 (A)    MCHC 08/02/2022 32.8 (A)    RDW 08/02/2022 13.9     Platelet 08/02/2022 215     MPV 08/02/2022 10.8 (A)    Neut % 08/02/2022 55.4     Lymph % 08/02/2022 29.7     Mono % 08/02/2022 11.0     Eos % 08/02/2022 3.1     Basophil % 08/02/2022 0.6     Lymph # 08/02/2022 2.89     Neut # 08/02/2022 5.4     Mono # 08/02/2022 1.07     Eos # 08/02/2022 0.30     Baso # 08/02/2022 0.06     IG# 08/02/2022 0.02     IG% 08/02/2022 0.2             Chemotherapy Patient Education  Education  Chemo Medications: Carboplatin/Paclitaxel + XRT  Intoduction to Unit Hours, Services, Routines, After Hours Telephone Number: Verbal Instructions given to patient/family, Written Instructions given to patient/family, Patient/Family verbalizes understanding  Consent Form Signed: Verbal Instructions given to patient/family, Written Instructions given to patient/family, Patient/Family verbalizes understanding  Chemo Teaching Packet: Verbal Instructions given to patient/family, Written Instructions given to patient/family, Patient/Family verbalizes understanding  Community Resources: Verbal Instructions given to patient/family, Patient/Family verbalizes understanding  Dental Care During Chemo: Verbal Instructions given to patient/family, Written Instructions given to patient/family, Patient/Family verbalizes understanding  Vascular Access/Intravenous Therapy: Verbal Instructions given to patient/family, Written Instructions given to patient/family, Patient/Family verbalizes understanding  Bowel Prophylaxis/Protocol: Verbal Instructions given to patient/family, Written Instructions given to patient/family, Patient/Family verbalizes understanding    Instructions in expected side effects  Nausea/Vomiting : Verbal Instructions given to  patient/family, Written Instructions given to patient/family, Patient/Family verbalizes understanding  Myelosuppression: Verbal Instructions given to patient/family, Written Instructions given to patient/family, Patient/Family verbalizes understanding  Fatigue: Verbal Instructions given to patient/family, Written Instructions given to patient/family, Patient/Family verbalizes understanding  Alopecia: Verbal Instructions given to patient/family, Written Instructions given to patient/family, Patient/Family verbalizes understanding  Stomatitis: Verbal Instructions given to patient/family, Written Instructions given to patient/family, Patient/Family verbalizes understanding  Diarrhea: Verbal Instructions given to patient/family, Written Instructions given to patient/family, Patient/Family verbalizes understanding  Gastritis: Verbal Instructions given to patient/family, Written Instructions given to patient/family, Patient/Family verbalizes understanding  Instructed to report increase of 2-3 loose stools/day over pretreatment: Verbal Instructions given to patient/family, Written Instructions given to patient/family, Patient/Family verbalizes understanding    S/S Infection   Report Temperature of 100.4 or >: Verbal Instructions given to patient/family, Written Instructions given to patient/family, Patient/Family verbalizes understanding    Skin Care  Use of Sunscreen: Verbal Instructions given to patient/family, Written Instructions given to patient/family, Patient/Family verbalizes understanding    Nutritional Screening   Nutritional Screening for Dietary Consult:  (Seen by Dietician)    Basic Nutritional Instructions  Taste Alterations: Verbal Instructions given to patient/family, Written Instructions given to patient/family, Patient/Family verbalizes understanding    Hydration Instructions  Renal Toxicity: Verbal Instructions given to patient/family, Written Instructions given to patient/family, Patient/Family  verbalizes understanding  Drink 2 Liters of fluids daily: Verbal Instructions given to patient/family, Written Instructions given to patient/family, Patient/Family verbalizes understanding    Thrombocytopenia Precautions  Bleeding: Verbal Instructions given to patient/family, Written Instructions given to patient/family, Patient/Family verbalizes understanding    Assessment:       Problem List Items Addressed This Visit        Pulmonary    COPD (chronic obstructive pulmonary disease)       Oncology    Laryngeal cancer - Primary    Squamous cell carcinoma of both lungs    Regional lymph node metastasis present             Plan:         Prescription per Dr. Atkinson called in to [facility]: Zofran ODT 8mg tab 1 PO in AM for 2 days after chemoterapy and every 8 hours PRN nausea. Dispense 30 refill 2    OTC: Glutamine powder 10Grams in liquid PO BID    RTC 8/11/2022 at 1100 week #1 of chemotherapy. 8/30/2022 at 1040 TD with Dr. Atkinson    Treatment Plan Information   OP NSCLC PACLITAXEL + CARBOPLATIN (AUC) QW + RADIATION   Yara Atkinson MD   Upcoming Treatment Dates - OP NSCLC PACLITAXEL + CARBOPLATIN (AUC) QW + RADIATION    8/11/2022       Pre-Medications       diphenhydrAMINE (BENADRYL) 50 mg in sodium chloride 0.9% 50 mL IVPB       famotidine (PF) injection 20 mg       Chemotherapy       PACLitaxeL (TAXOL) 45 mg/m2 = 78 mg in sodium chloride 0.9% 250 mL chemo infusion       CARBOplatin (PARAPLATIN) 290 mg in sodium chloride 0.9% 250 mL chemo infusion       Antiemetics       palonosetron (ALOXI) 0.25 mg with Dexamethasone (DECADRON) 12 mg in NS 50 mL IVPB  8/18/2022       Pre-Medications       DIPHENHYDRAMINE IV ORDERABLE       FAMOTIDINE (PF) 20 MG/2 ML IV SOLN       Chemotherapy       PACLitaxeL (TAXOL) 45 mg/m2 = 78 mg in sodium chloride 0.9% 250 mL chemo infusion       CARBOplatin (PARAPLATIN) 290 mg in sodium chloride 0.9% 250 mL chemo infusion       Antiemetics        PALONOSETRON 0.25MG/DEXAMETHASONE 12MG ORDERABLE  8/25/2022       Pre-Medications       DIPHENHYDRAMINE IV ORDERABLE       FAMOTIDINE (PF) 20 MG/2 ML IV SOLN       Chemotherapy       PACLitaxeL (TAXOL) 45 mg/m2 = 78 mg in sodium chloride 0.9% 250 mL chemo infusion       CARBOplatin (PARAPLATIN) 290 mg in sodium chloride 0.9% 250 mL chemo infusion       Antiemetics       PALONOSETRON 0.25MG/DEXAMETHASONE 12MG ORDERABLE  9/1/2022       Pre-Medications       DIPHENHYDRAMINE IV ORDERABLE       FAMOTIDINE (PF) 20 MG/2 ML IV SOLN       Chemotherapy       PACLitaxeL (TAXOL) 45 mg/m2 = 78 mg in sodium chloride 0.9% 250 mL chemo infusion       CARBOplatin (PARAPLATIN) 290 mg in sodium chloride 0.9% 250 mL chemo infusion       Antiemetics       PALONOSETRON 0.25MG/DEXAMETHASONE 12MG ORDERABLE        Patient has dental appointment 8/9/2022 at 1500. Appointment with Dr. Das 8/11/2022 at 0800 for simulation.

## 2022-08-08 NOTE — PATIENT INSTRUCTIONS
Instructions for Anti nausea regimen Carboplatin/ TAXOL (Paclitaxel) + RADIATION  Day 1 of treatment: Eat breakfast and take regular morning medications.  If you have a mediport: Wear a button down shirt and apply LMX numbing cream to mediport site at least 30 minutes prior to chemotherapy. Cover with plastic wrap or a large band aid. Bring something to read, listen to music or laptop computer. There is free wireless internet available.  Eating ice starting 30 minutes before chemotherapy and continuing for at least 30 minutes after completion may help reduce risk of mouth sores.  Day 2 & 3 of treatment:    Ondansetron (Zofran) ODT 8mg 1 tab in AM. May take up to 3 times a day.  Continue every day throughout treatment:  Nausea: Ondansetron (Zofran) ODT 8 mg 1 every 8 hours as needed for nausea  Diarrhea (loose bowels): Imodium AD as needed  Constipation: Stool softener (Dulcolax, Senokot S, Colace, Surfak, Miralax) may take generic of any medication.  Indigestion/Heartburn: Over the counter Pepcid, Prilosec, Rolaids, Tums, Mylanta, Gaviscon   [facility]: Glutamine powder 10Grams in liquid twice a day  Mouth rinse 3 to 4 times a day. Swish and spit every day throughout treatment  1 cup warm water  ½ tsp salt & ½ tsp baking soda  And/or use Biotene toothpaste and/or mouthwash or any alcohol free mouthwash  Drink 4-6 pints of liquids a day (non-caffeine, non alcohol). Can have caffeine but does not count toward liquid intake.    What safety precautions must I take while on chemotherapy?  Chemotherapy medicines are very strong. They will be in all of your bodily fluids, including your urine, stool, saliva, sweat, tears, vaginal secretions, and semen. You must carefully follow some safety precautions to prevent harm to others while you are using these medicines. Here are some recommended precautions:     Make sure that people who help care for you wear disposable gloves if they are going to come into contact with any of  your bodily fluids for 48 hours. Women who are pregnant or breastfeeding should not handle any of your bodily fluids.     Wash any clothes, towels, and linens that may have your bodily fluids on them twice in a washing machine using very hot water.     Dispose of adult diapers, tampons, and sanitary napkins by first sealing them in a plastic bag.     Use a condom when having sex for at least 2 weeks after receiving your chemotherapy.     Do not share beverages or food.     If you are issued a hazardous waste container, make sure you understand the directions for using it.     Wash your hands thoroughly with warm water and soap after using the bathroom. Dry your hands with disposable paper towels.    When using the toilet for 48 hours after chemotherapy:     Close the lid of the toilet prior to flushing. This helps to avoid splashing.     Both men and women should sit to use the toilet. This helps avoid splashing.      Flush it twice after each use, including after vomiting.      Patient Treatment Information  Carboplatin + Paclitaxel + XRT  Your chemotherapy treatment is called carboplatin + paclitaxel + radiation therapy (XRT). It is commonly used  to treat lung cancer and has also been used to treat other diseases. This treatment is made up of two  chemotherapy drugs plus radiation:   carboplatin (STC-xds-oku-tin)   paclitaxel (DWO-fa-PHU-el) or Taxol® (TAX-all)  These drugs prevent cancer cells from dividing and growing, and can eventually cause the cancer cells to  shrink and die. Radiation also kills cancer cells and shrinks the tumor.  What Do I Need to Know Before Starting Treatment?  Be sure to tell your healthcare provider about any prescription or over-the-counter products you are taking,  including dietary supplements, vitamins, herbal medicines and homeopathic remedies.  Use an effective birth control method during your treatment. This treatment can cause harm to a fetus, so tell  your healthcare  provider right away if you or your partner becomes pregnant.  Avoid breastfeeding during treatment. It is not known if these drugs pass into breast milk.  Some types of chemotherapy and radiation can cause sterility. Talk with your healthcare provider about your  options if you want to have children in the future.  Do not get any immunizations or vaccinations while taking chemotherapy drugs without the approval of your  healthcare provider.  Chemotherapy can sometimes cause changes in the amount of electrolytes in your body, such as sodium,  potassium and magnesium. Your healthcare provider will check your blood for these changes and treat any  problems that are found.  What Do I Need to Know Before Starting  Carboplatin + Paclitaxel + XRT?  Carboplatin and paclitaxel can cause an allergic reaction. Your healthcare provider will give you medicine  before your treatment to reduce your risk of an allergic reaction. Tell your healthcare provider right away if you  have trouble breathing, sudden swelling, hives or a rash during your treatment.  Carboplatin and paclitaxel increase your risk of infection and bleeding (see What Are the Possible Side  Effects?). You may be at higher risk for these side effects if you have ever used a drug called cisplatin or if you  have kidney problems. Carboplatin, and in rare cases paclitaxel, may also cause kidney damage. Your risk of  kidney damage increases if you use carboplatin with other drugs that can damage your kidneys. Tell your  healthcare provider if you have a history of kidney disease or decreased kidney function or if you have ever  taken cisplatin. Tell your healthcare provider immediately if you have any signs of infection, such as a fever;  cough; flu-like symptoms; painful or frequent urination; warm, red or painful skin; or red bumps or bruises on  your skin. Also tell your healthcare provider if you have an illness that puts you at high risk for infections,  such  as other cancers, HIV or diabetes that is not well controlled. Your healthcare provider will check your white  blood cells and platelets during treatment and decide if your dose needs to be changed or if your treatment  needs to be stopped.  Carboplatin and paclitaxel can cause some of the nerve cells in your hands and feet to stop working properly.  This condition is called neuropathy (see What Are the Possible Side Effects?). Neuropathy usually improves  slowly a few weeks after treatment is done. You may be at higher risk for developing neuropathy if you are age  65 or older, had neuropathy before starting treatment, or have used the drug cisplatin in the past. Tell your  doctor if you have numbness, tingling, pain or a burning sensation in your hands or feet during treatment.  [facility]  Phone Number: [phone]  Carboplatin + Paclitaxel + XRT (NSCLC) 2  In rare cases, carboplatin can cause loss of vision, especially of light and colors. This symptom usually gets  better a few weeks after the treatment is done.  Carboplatin commonly causes damage to the ear. Your risk of this side effect increases with higher doses of  carboplatin and the longer you are treated. In rare cases, paclitaxel can also cause decreased hearing or  ringing in the ears. Tell your healthcare provider if you have ringing in the ears, hearing loss in one or both ears,  dizziness or loss of balance. The hearing loss first occurs in the high frequency range. You may also notice a  decreased ability to hear normal conversation.  Paclitaxel can cause skin irritation if it accidentally leaks out of your vein during the infusion. Tell your  healthcare provider right away if you have redness, pain, burning or swelling around the injection site during  your treatment.  In rare cases, paclitaxel can cause the heart to beat in an irregular pattern. Tell your healthcare provider if your  heart is beating faster or slower  than usual or if it is beating erratically. You may need a pacemaker to treat an  irregular heartbeat.  Paclitaxel can cause blood pressure changes during the infusion. Tell your healthcare provider if you feel  lightheaded or if you have a headache during your treatment.  In very rare cases, paclitaxel can cause Blunt-Roney syndrome or toxic epidermal necrolysis, two serious  skin conditions. Tell your healthcare provider immediately if you have swelling around the eyes, a rash that is  severe, or a rash that looks like pimples or blisters on the skin, in the mouth or on the genitals.  Tell your healthcare provider if you have liver disease or decreased liver function. Your risk of side effects from  paclitaxel is higher if you have liver problems. Paclitaxel and carboplatin can also cause liver problems. Tell  your healthcare provider if your skin or the whites of your eyes look yellow.  Radiation therapy commonly causes skin, mouth and throat changes (see What Are the Possible Side  Effects?) when it is used on the head and neck area. Skin changes may include hair loss, itching, redness,  peeling or blistering of the treated area. Mouth and throat changes can include a stiff jaw, changes in taste,  dryness, mouth sores, sore throat, tooth decay, heartburn, and infections in the gums, teeth or tongue. This  chemotherapy treatment can also cause mouth sores and pain. Tell your healthcare provider if you have any  skin, mouth or throat changes.  Radiation therapy for lung cancer can cause two lung problems: pneumonitis and pulmonary fibrosis.  Pneumonitis is inflammation of the lung tissue that can occur up to 6 months after radiation treatment is done. It  usually goes away over time. Pulmonary fibrosis is a more permanent scarring of the lung tissue Tell your  healthcare provider immediately if you have any new or worsening respiratory symptoms including a cough,  shortness of breath, trouble breathing,  wheezing, or breathlessness during exercise.  In some cases, radiation therapy for lung cancer can cause secondary cancers or leukemia years after  treatment.  In rare cases, the allergic reactions, skin conditions, infections, bleeding, secondary cancers, and heart, lung  and liver problems caused by this treatment can be severe and life threatening.  Your treatment can interact with other substances, including:   Drugs that cause harm to the kidneys, such as amphotericin B and aminoglycosides, including tobramycin,  gentamicin and amikacin   Phenytoin (Dilantin®), phenobarbital (Luminal®) or carbamazepine (Tegretol®)   Rifabutin (Mycobutin®), rifampin (Rifadin®) or isoniazid   [facility]   Fluconazole (Diflucan®), itraconazole (Sporanox®), ketoconazole (Nizoral®), voriconazole (Vfend®) or  posaconazole (Noxafil®)   Aprepitant (Emend®)   Fluvoxamine (Luvox®)   Amiodarone (Cordarone® or Nexterone®)   Verapamil (Verelan®, Calan® or Covera-HS®), diltiazem (Cardizem®) or nifedipine (Procardia XL®,  Procardia® or Adalat® CC)   Drugs used to treat HIV, such as amprenavir (Agenerase®), atazanavir (Reyataz®), boceprevir (Victrelis®),  darunavir (Prezista®), etravirine (Intelence®), indinavir (Crixivan®), nelfinavir (Viracept®), ritonavir (Norvir®),  Carboplatin + Paclitaxel + XRT (NSCLC) 3  saquinavir (Invirase®), delavirdine (Rescriptor®), fosamprenavir (Lexiva®), efavirenz (Sustiva®) or  nevirapine (Viramune®)   Boceprevir (Victrelis®) or telaprevir (IncivekÔ)   Cyclosporine (Gengraf®, Neoral® or Sandimmune®), sirolimus (Rapamune®) or tacrolimus (Prograf®)   Clarithromycin (Biaxin®), erythromycin (E-mycin®) or telithromycin (Ketek®)   Grapefruit juice or starfruit   Rosiglitazone (Avandia®) or repaglinide (Prandin®)   Gemfibrozil (Lopid®)   Midazolam (Versed®) or triazolam (Halcion®)   Buspirone (Buspar®)   Eletriptan (Relpax®)   Sildenafil (Viagra®)   Lovastatin (Mevacor® or Altoprev®) or simvastatin (Zocor®)    Montelukast (Singulair®)  Please note this list is a summary and does not contain all possible interactions. Contact your healthcare  provider if you are taking any medications that can interact with your treatment.  You should not take this treatment if you:   Are allergic to carboplatin, cisplatin, other platinum-containing medicines or any components of these drugs.   Are allergic to paclitaxel; other drugs that contain Cremophor EL® (polyoxyethylated castor oil), such as  cyclosporine injection (Sandimmune®) or teniposide (Vumon®); or any components of these drugs.  How Is the Treatment Given?  Your healthcare provider will give you carboplatin and paclitaxel by injection into a vein. Your doses will be  based on your weight and height. Your healthcare provider will determine your number of treatments.  You will get radiation therapy by a machine at the hospital or a radiation center. Your healthcare provider will  decide how much radiation to give you and how often.  Your healthcare provider will give you medicines before your treatment to help prevent and control nausea and  vomiting. You may take these medicines either by mouth or by injection into a vein.  Your healthcare provider may give you a medicine called dexamethasone (dex-a-METH-a-sone) to take by  mouth at home before your treatment with paclitaxel. You may also get other medicines by injection before your  treatment. These medicines help prevent certain side effects caused by paclitaxel. If you take dexamethasone  at home, make sure to take it as directed. If you miss a dose or take the medicine at the wrong time, tell your  healthcare provider before your treatment.  Do not share any medication with others. Sharing medication with anyone else could be harmful.  When Should I Call My Healthcare Provider?  Call your healthcare provider right away if you have any of the following symptoms:   Shaking chills or a fever of 100.5º F or higher   Unusual  bleeding, easy bruising or pinpoint red spots on your skin   Vomiting that is severe or that lasts several hours   Painful or frequent urination, blood in your urine, decreased urination or not being able to urinate   Diarrhea that causes an additional four bowel movements a day, diarrhea that lasts more than one day,  diarrhea at night, or diarrhea with fever, cramps or bloody stools   Constipation that lasts more than two to three days or constipation with abdominal pain   Severe fluid retention that causes sudden weight gain or swelling in the abdomen, hands or feet   Pain or blisters in your mouth or on your stomach or genitals   Swelling around the eyes   Irregular or rapid heartbeat, chest pain, chest tightness or shortness of breath   Pain in the arms or legs or sudden shortness of breath   Seizures or vision changes   Severe rash with itching, shortness of breath, or a swollen tongue or lips   Dizziness or feeling lightheaded  Carboplatin + Paclitaxel + XRT (NSCLC) 4   Inability to eat or weight loss   Blistering of the skin where you have the radiation or moist areas in the folds of your skin that peel away  What Are the Possible Side Effects?  All drugs can cause side effects, but every person reacts differently to each drug. The following chart lists the  possible side effects that can occur with your treatment, how to recognize and minimize symptoms, and  possible treatments. The side effects are grouped by how often the side effect occurs: Common (occurs in more  than 25 percent of patients), Less Common (occurs in 5 to 25 percent of patients) or Rare (occurs in fewer than  5 percent of patients).  Side Effect How to Minimize Side Effect Possible Treatments  Risk of Infection (Common)   Fever and chills   Painful urination   Sore throat and cough   Nasal congestion   Swelling or redness of the skin at the  site of a wound   Wash your hands often.   Brush and floss your teeth daily.   Clean cuts right  away with warm water, soap and  antiseptic.   When your white blood cell count is low, stay away  from crowds and people with colds or other  illnesses.   Your healthcare provider may give  you an antibiotic to treat or prevent  infection or a medicine to increase  your white blood cell count.   Your healthcare provider may  decrease your dose or delay further  treatment.  Radiation Mouth Changes, Mouth  Sores or Mouth Pain (Common.  Symptoms can be severe.)   Dry mouth or throat   Pain, swelling or redness of the  mouth, tongue and throat   Coated tongue   Difficulty talking, swallowing or eating   Bleeding ulcers and infection   Thick, rope-like saliva   Stiff jaw   Taste changes   Tooth decay or mouth infections   Suck on ice chips or sugar-free candy and sip  water throughout the day.   Use a saliva substitute to help moisten your  mouth.   Rinse your mouth every 1 to 2 hours with a  solution of 1/4 tsp. baking soda and 1/8 tsp. salt  mixed in 1 cup of warm water.   Open and close your mouth as far as you can  without causing pain. Repeat 20 times. Do this  exercise 3 times a day, even if your jaw isn't stiff.   Brush teeth two to four times a day using a soft  bristle brush and fluoride toothpaste. If you have  dentures, keep them clean and limit how often you  wear them.   Use non-waxed dental floss daily.   Ask your healthcare provider to recommend a  mouthwash that does not contain alcohol.   Sip water during the day and use sugar-free candy  or gum to keep your mouth wet.   Eat food cold or at room temperature.   Eat soft or pureed food.   Avoid food that is acidic, spicy, salty, dry or rough,  such as toast.   Your healthcare provider may give  you medicine to treat pain or dry  mouth.   Your healthcare provider may give  you medicine to treat fungal or viral  infections.  Radiation Skin Damage (Symptoms  are generally mild to moderate but  can be severe.)   May appear as a mild sunburn on the  area  treated   May be red or itchy   May cause blistering of the skin   May cause peeling in the folds of the  skin   Don't use tanning beds and avoid sun exposure.   Avoid using creams or moisturizers right before  radiation treatment.   Avoid perfume, cologne and aftershave; these  products can irritate the skin.   When showering or bathing, gently wash and dry  the treated area of skin.   Wear loose fitting clothes and keep the treated  areas covered.   Your healthcare provider may  prescribe creams, gels or drying  pastes depending on the affected  area of skin.   Peeling of the skin can lead to  infection. If you have an infection,  your healthcare provider will treat it  with antibiotics.  Nausea/Vomiting (Symptoms are  generally mild to moderate but can  be severe.)   Feeling queasy or sick to your  stomach   Eat small, frequent meals and bland foods, such  as bananas, rice, applesauce and toast.   Eat food cold or at room temperature so the smell  of food will not bother you.   Avoid fried, spicy or fatty foods.   Eat and drink slowly.   Drink plenty of liquids during the day, but to avoid  bloating, drink small amounts of liquid during  meals.   Your healthcare provider will give  you medicine to help reduce  nausea and vomiting.  Carboplatin + Paclitaxel + XRT (NSCLC) 5  Side Effect How to Minimize Side Effect Possible Treatments  Rash (Symptoms are generally mild  to moderate but can be severe.)   Usually mild and short-lived   Generally appears on the arms and  trunk (occasionally on the face)   May be itchy   May appear as a flat, discolored area  on the skin or as a small raised bump   Avoid prolonged exposure to heat.   Use creams or moisturizers regularly. Try wearing  cotton gloves.   Avoid perfume, cologne and aftershave; these  products can irritate the skin.   Your healthcare provider may  prescribe creams (mild steroids,  antihistamines or antibiotics) to help  treat the rash.   The rash may improve  on its own  without any treatment.  Constipation (Symptoms are  generally mild to moderate but can  be severe.)   No bowel movement for one to two  days   Small, hard, dry stools   Bloating, gas, cramps and pain   Drink plenty of fluids to help loosen your bowels.   Drink warm or hot liquids if you do not have mouth  sores.   Your healthcare provider may suggest eating  foods that are high in fiber, such as bran,  vegetables, whole wheat breads and fruit.   Have prunes or prune juice, which act like  laxatives.   Try exercising to help loosen bowels.   Your healthcare provider may  recommend a stool softener.  Anorexia or Appetite Loss  (Symptoms are generally mild to  moderate but can be severe.)   Not having an appetite   Feeling too nauseated to eat   Metallic or medicinal taste   Change in taste causing dislike for  certain foods   Try eating six to eight small meals or snacks each  day instead of three larger meals.   Vary your diet and try new foods and recipes.   Take a walk before meals, when possible. This  may make you feel hungrier.   Eat with friends or family. When eating alone,  listen to the radio or watch TV.   Cook dinners ahead of time and  freeze them in small portions to  minimize cooking smells.   Let others help with meals, but ask  that they prepare food in small  portions that you can freeze. Dont  hesitate to tell others which foods  you dont like.   Add mild spices to change flavor.   Have meals delivered to you  through a program such as Meals  on Wheels.  Neuropathy (Symptoms are generally  mild to moderate but can be severe.)   Numbness or tingling feeling in the  hands or feet   Muscle cramps   Loss of balance   Difficulty buttoning buttons or picking  up objects   Decreased awareness of heat or cold  in fingertips and toes   Difficulty hearing   Try to avoid the cold or extreme heat.   Wear mittens or gloves, and socks and scarves.   If your fingers are numb, be careful with  sharp  objects.   Beware of hot coffee mugs, pots and pans and  dishwater--you may not feel the heat until you are  burned.   If you feel unsteady, be careful on stairs and in the  shower.   Your healthcare provider may  decrease your dose or delay further  treatment.  Risk of Bleeding (Less Common)   Unusual bleeding or easy bruising   Black or tar-like stools   Blood in your urine   Pinpoint red spots on your skin   Bleeding gums or nosebleeds   Avoid aspirin and aspirin-like drugs, such as  ibuprofen.   Use caution with sharp objects, such as razors and  nail cutters.   Avoid activities that can cause cuts, bumps and  bruises.   Your healthcare provider may give  you medicine to increase your  platelet count.   Your healthcare provider may  decrease your dose or delay further  treatment.  Anemia (Less Common)   Fatigue or weakness   Dizziness   Pale skin   Feeling out of breath   Feeling cold   Plan rest periods throughout the day.   Organize daily activities so that you conserve your  energy.   Try to eat a well balanced diet and drink plenty of  fluids.   Stand up slowly to avoid getting dizzy.   Your healthcare provider may give  you a medicine to increase your red  blood cell count.   Your healthcare provider may  decrease your dose or delay further  treatment.  Fluid Retention (Symptoms are  generally mild to moderate.)   Swelling around the eyes, lower legs,  ankles, feet or abdominal area   Slight weight gain   Check your weight regularly.   Try to avoid eating salty foods, as this can cause  fluid retention.   Your healthcare provider may give  you a diuretic (water pill) to reduce  the amount of fluid in your body.   Your healthcare provider may  decrease your dose or delay further  treatment.  Carboplatin + Paclitaxel + XRT (NSCLC) 6  Side Effect How to Minimize Side Effect Possible Treatments  The following side effects can occur with the individual components in this treatment but were not  reported  in the clinical trial.  Diarrhea   Loose or watery stools several times  a day   Abdominal cramping, gas and  bloating   Eat small, frequent meals and bland foods, such  as bananas, rice, applesauce and toast.   Avoid caffeine; alcohol; raw fruits and vegetables;  raw eggs; undercooked meats; spicy, fatty and  greasy foods; milk and dairy products; foods that  cause gas, such as beans and other legumes; high  fiber and high-fat foods; foods left un-refrigerated  for more than two hours (one hour for egg dishes  and cream or mayonnaise-based foods); bulk  laxatives; and stool softeners.   Drink eight to ten glasses of clear  liquids every day.   Your healthcare provider may  prescribe medicine to help treat  diarrhea.  Alopecia or Hair Loss   More than normal amount of hair loss  in your brush, in the shower or on  your pillow after sleeping   Loss of body hair  You may not be able to prevent alopecia, but here are tips to help with hair loss:   Use a soft hairbrush. Do not use brush rollers, color treat your hair or get a permanent.   Dont wash your hair every day. When you do wash, use a mild shampoo.   Avoid using a hairdryer, or use a low setting if you must use one.   Have your hair cut short; this will make it look siddiqui.   Your insurance might cover a wig. Ask your healthcare provider for a prescription for a hair  prosthesis. You can match your hair color and style more easily if you shop for a wig before  losing a lot of hair.   Use sunscreen or wear a hat or scarf to protect your scalp from the sun.  Nail Changes   Darkening of the nails that usually  grows out with the nail   Vertical or horizontal bands on the  nails   Trim nails often and keep them clean.   Wear gloves for cleaning and gardening to  minimize damage and prevent infection.   Dont wear nail polish or false fingernails until the  nails have grown out and returned to normal.   Your healthcare provider may give  you an antibiotic  if you have an  infection in the nail bed.  Radiation Recall   Redness, tenderness or swelling on  areas of the skin that have previously  been treated with radiation   May cause wet sores, peeling skin or  discoloration after the skin has  healed   Stay out of the sun and avoid tanning beds.   If you are in the sun, wear protective clothing and  use sunscreen with an SPF of 30 or higher.   You may be given a corticosteroid  to reduce swelling.  What Are the Other Possible Side Effects?  The chart below lists additional side effects found with the individual components in this treatment. It does not  list all possible side effects. For more information, talk with your healthcare provider.  Common Side Effects Less Common Side Effects Rare Side Effects   Muscle or joint pain   Weakness or fatigue   Generalized pain   Low blood pressure   Stomach pain   High blood sugar   High blood pressure   Heart failure or heart attack   Dizziness or headache   Pneumonia or respiratory failure   Blood clots in the veins or lungs   Seizures   Vision changes, eye irritation or  increased tearing   Injection site reactions  Notes  Carboplatin + Paclitaxel + XRT (NSCLC) 7  The information presented herein is prepared by [email or web address] and/or its affiliates (collectively, [employer]) and is furnished for informational purposes  only and for your personal use only. All rights herein are reserved by [employer] and you may not publish, copy or otherwise use or disseminate the contents  hereof without permission.  Disclaimer. The drug information is presented as a guide for reference purposes only and is not intended as a substitute for specific advice from a physician or  other health care professional. Each patients situation is unique and the information presented herein may not be applicable to your situation. In addition,  [employer] makes no warranty as to the accuracy or completeness of information contained herein. This document  was prepared as of the date indicated below.  You should be aware that information regarding medical conditions and their treatment, including the information in this document, changes frequently, and  [facility] undertakes no responsibility to update the information contained in this document. This document is not a substitute for the expertise, skill,  knowledge and judgment of healthcare practitioners. Additional, important information about this product, including black box warnings and precautions, is  available from the  and other sources. THIS DRUG INFORMATION DOES NOT OFFER OR PROVIDE, AND IS NOT A REPLACEMENT FOR, MEDICAL  EVALUATION, ADVICE, DIAGNOSIS, OR TREATMENT. ADDITIONALLY, NO PART OF THIS DOCUMENT SHALL BE DEEMED TO CREATE A PHYSICIAN-PATIENT  RELATIONSHIP OR SIMILAR RELATIONSHIP BETWEEN [facility] (OR ANY [facility] AFFILIATED PARTY) AND YOU. YOU SHOULD CONSULT WITH AN  APPROPRIATE PROFESSIONAL FOR SPECIFIC EVALUATION, ADVICE, DIAGNOSIS, AND TREATMENT (AS APPLICABLE) TAILORED TO YOUR  SITUATION.[facility] PROVIDES THIS DRUG INFORMATION ON AN AS IS BASIS. ALL WARRANTIES OF ANY KIND, EXPRESS OR IMPLIED, INCLUDING BUT  NOT LIMITED TO THE IMPLIED WARRANTIES OF MERCHANTABILITY AND FITNESS FOR A PARTICULAR PURPOSE ARE DISCLAIMED.  Limitation of Liability. Neither [facility] nor any of its employees, agents, contractors, directors or affiliates shall be liable for any improper or incorrect use of  the information described and contained herein, nor do any of them assume any responsibility for anyones use of the information. IN NO EVENT SHALL  [facility] BE LIABLE FOR ANY DIRECT, INDIRECT, INCIDENTAL, SPECIAL, EXEMPLARY, OR CONSEQUENTIAL DAMAGES (INCLUDING, BUT NOT LIMITED TO,  PROCUREMENT OR SUBSTITUTE GOODS OR SERVICES; LOSS OF USE, DATA OR PROFITS; OR BUSINESS INTERRUPTION) HOWEVER CAUSED AND ON ANY  THEORY OF LIABILITY, WHETHER IN CONTRACT, STRICT LIABILITY, OR TORT (INCLUDING NEGLIGENCE OR  OTHERWISE) ARISING IN ANY WAY OUT OF THE  USE OF THIS INFORMATION, EVEN IF ADVISED OF THE POSSIBILITY OF SUCH DAMAGE.  November 2012 © 2012 [employer] All rights reserved.  References:  1. Taxol [package insert]. [place], [place]; [employer]; 2011.  2. Carboplatin [package insert]. [place], [place]: [employer]; 2007. Available at:  [email or web address]. Accessed November 7, 2012.  3. Robles CP, Rachel H, Jm P, et al. Combined chemoradiotherapy regimens of paclitaxel and carboplatin for locally  advanced non-small-cell lung cancer: a randomized phase II locally advanced multi-modality protocol. J Clin  Oncol.2005;23:3148-4617.  4. Wm T, Salvador SE, Jason HOLGUIN, et al. Randomized phase III study of thoracic radiation in combination with  paclitaxel and carboplatin with or without thalidomide in patients with stage III non-small cell lung cancer: The ECOG  3598 study. J Clin Oncol. 2012; 30:616-622.

## 2022-08-09 ENCOUNTER — OFFICE VISIT (OUTPATIENT)
Dept: HEMATOLOGY/ONCOLOGY | Facility: CLINIC | Age: 57
End: 2022-08-09
Payer: MEDICARE

## 2022-08-09 ENCOUNTER — DOCUMENTATION ONLY (OUTPATIENT)
Dept: HEMATOLOGY/ONCOLOGY | Facility: CLINIC | Age: 57
End: 2022-08-09

## 2022-08-09 VITALS
TEMPERATURE: 98 F | RESPIRATION RATE: 19 BRPM | SYSTOLIC BLOOD PRESSURE: 135 MMHG | WEIGHT: 148.63 LBS | DIASTOLIC BLOOD PRESSURE: 70 MMHG | HEART RATE: 110 BPM | OXYGEN SATURATION: 96 % | BODY MASS INDEX: 21.94 KG/M2

## 2022-08-09 DIAGNOSIS — J43.8 OTHER EMPHYSEMA: ICD-10-CM

## 2022-08-09 DIAGNOSIS — C34.91 SQUAMOUS CELL CARCINOMA OF BOTH LUNGS: ICD-10-CM

## 2022-08-09 DIAGNOSIS — C32.9 LARYNGEAL CANCER: Primary | ICD-10-CM

## 2022-08-09 DIAGNOSIS — C77.9 REGIONAL LYMPH NODE METASTASIS PRESENT: ICD-10-CM

## 2022-08-09 DIAGNOSIS — C34.92 SQUAMOUS CELL CARCINOMA OF BOTH LUNGS: ICD-10-CM

## 2022-08-09 PROCEDURE — 99215 OFFICE O/P EST HI 40 MIN: CPT | Mod: S$PBB,,, | Performed by: CLINICAL NURSE SPECIALIST

## 2022-08-09 PROCEDURE — 99215 PR OFFICE/OUTPT VISIT, EST, LEVL V, 40-54 MIN: ICD-10-PCS | Mod: S$PBB,,, | Performed by: CLINICAL NURSE SPECIALIST

## 2022-08-09 PROCEDURE — 99214 OFFICE O/P EST MOD 30 MIN: CPT | Mod: PBBFAC | Performed by: CLINICAL NURSE SPECIALIST

## 2022-08-09 PROCEDURE — 99999 PR PBB SHADOW E&M-EST. PATIENT-LVL IV: ICD-10-PCS | Mod: PBBFAC,,, | Performed by: CLINICAL NURSE SPECIALIST

## 2022-08-09 PROCEDURE — 99999 PR PBB SHADOW E&M-EST. PATIENT-LVL IV: CPT | Mod: PBBFAC,,, | Performed by: CLINICAL NURSE SPECIALIST

## 2022-08-09 RX ORDER — ONDANSETRON 8 MG/1
8 TABLET, ORALLY DISINTEGRATING ORAL EVERY 8 HOURS PRN
COMMUNITY

## 2022-08-09 NOTE — NURSING
I met with Alfonso and his wife to introduce myself and discuss my role and talk about his emotional well being. He is discouraged but hopeful.

## 2022-08-11 ENCOUNTER — APPOINTMENT (OUTPATIENT)
Dept: RADIATION THERAPY | Facility: HOSPITAL | Age: 57
End: 2022-08-11
Attending: RADIOLOGY
Payer: MEDICARE

## 2022-08-11 ENCOUNTER — PATIENT MESSAGE (OUTPATIENT)
Dept: HEMATOLOGY/ONCOLOGY | Facility: CLINIC | Age: 57
End: 2022-08-11
Payer: MEDICARE

## 2022-08-11 PROCEDURE — 77470 SPECIAL RADIATION TREATMENT: CPT | Performed by: RADIOLOGY

## 2022-08-11 PROCEDURE — 77334 RADIATION TREATMENT AID(S): CPT | Performed by: RADIOLOGY

## 2022-08-12 ENCOUNTER — TELEPHONE (OUTPATIENT)
Dept: HEMATOLOGY/ONCOLOGY | Facility: CLINIC | Age: 57
End: 2022-08-12
Payer: MEDICARE

## 2022-08-12 ENCOUNTER — DOCUMENTATION ONLY (OUTPATIENT)
Dept: HEMATOLOGY/ONCOLOGY | Facility: CLINIC | Age: 57
End: 2022-08-12
Payer: MEDICARE

## 2022-08-12 NOTE — PROGRESS NOTES
Called and spoke to pt/wife. She reports he is bolusing 7 cartons Diabetisource per day and tolerating well. He is gaining wt--10# in 3 weeks. No c/o n/v/c/d. They have no questions/concerns, and have met with Joan TORRES at [facility]. I will f/u with him at next provider visit, but encouraged them to call anytime if needed.    Vidhya Brown MS, RD, , LDN

## 2022-08-15 LAB — FUNGUS SPEC CULT: NORMAL

## 2022-08-15 PROCEDURE — 77301 RADIOTHERAPY DOSE PLAN IMRT: CPT | Performed by: RADIOLOGY

## 2022-08-15 PROCEDURE — 77338 DESIGN MLC DEVICE FOR IMRT: CPT | Performed by: RADIOLOGY

## 2022-08-15 PROCEDURE — 77300 RADIATION THERAPY DOSE PLAN: CPT | Performed by: RADIOLOGY

## 2022-08-17 ENCOUNTER — INFUSION (OUTPATIENT)
Dept: INFUSION THERAPY | Facility: HOSPITAL | Age: 57
End: 2022-08-17
Attending: INTERNAL MEDICINE
Payer: MEDICARE

## 2022-08-17 ENCOUNTER — APPOINTMENT (OUTPATIENT)
Dept: RADIATION THERAPY | Facility: HOSPITAL | Age: 57
End: 2022-08-17
Attending: RADIOLOGY
Payer: MEDICARE

## 2022-08-17 VITALS
OXYGEN SATURATION: 95 % | TEMPERATURE: 98 F | WEIGHT: 156.81 LBS | RESPIRATION RATE: 16 BRPM | HEART RATE: 102 BPM | BODY MASS INDEX: 23.16 KG/M2 | DIASTOLIC BLOOD PRESSURE: 67 MMHG | SYSTOLIC BLOOD PRESSURE: 102 MMHG

## 2022-08-17 DIAGNOSIS — C32.9 LARYNGEAL CANCER: ICD-10-CM

## 2022-08-17 DIAGNOSIS — J38.7 LARYNGEAL MASS: Primary | ICD-10-CM

## 2022-08-17 DIAGNOSIS — C77.9 REGIONAL LYMPH NODE METASTASIS PRESENT: ICD-10-CM

## 2022-08-17 DIAGNOSIS — C34.92 SQUAMOUS CELL CARCINOMA OF BOTH LUNGS: ICD-10-CM

## 2022-08-17 DIAGNOSIS — C34.91 SQUAMOUS CELL CARCINOMA OF BOTH LUNGS: ICD-10-CM

## 2022-08-17 DIAGNOSIS — C34.92 SQUAMOUS CELL CARCINOMA OF BOTH LUNGS: Primary | ICD-10-CM

## 2022-08-17 DIAGNOSIS — C34.91 SQUAMOUS CELL CARCINOMA OF BOTH LUNGS: Primary | ICD-10-CM

## 2022-08-17 PROCEDURE — 96417 CHEMO IV INFUS EACH ADDL SEQ: CPT

## 2022-08-17 PROCEDURE — 96413 CHEMO IV INFUSION 1 HR: CPT

## 2022-08-17 PROCEDURE — A4216 STERILE WATER/SALINE, 10 ML: HCPCS | Performed by: INTERNAL MEDICINE

## 2022-08-17 PROCEDURE — 96375 TX/PRO/DX INJ NEW DRUG ADDON: CPT

## 2022-08-17 PROCEDURE — 63600175 PHARM REV CODE 636 W HCPCS: Performed by: INTERNAL MEDICINE

## 2022-08-17 PROCEDURE — 25000003 PHARM REV CODE 250: Performed by: INTERNAL MEDICINE

## 2022-08-17 PROCEDURE — 77386 HC IMRT, COMPLEX: CPT | Performed by: RADIOLOGY

## 2022-08-17 RX ORDER — DIPHENHYDRAMINE HYDROCHLORIDE 50 MG/ML
50 INJECTION INTRAMUSCULAR; INTRAVENOUS ONCE AS NEEDED
Status: DISCONTINUED | OUTPATIENT
Start: 2022-08-17 | End: 2022-08-17 | Stop reason: HOSPADM

## 2022-08-17 RX ORDER — EPINEPHRINE 0.3 MG/.3ML
0.3 INJECTION SUBCUTANEOUS ONCE AS NEEDED
Status: DISCONTINUED | OUTPATIENT
Start: 2022-08-17 | End: 2022-08-17 | Stop reason: HOSPADM

## 2022-08-17 RX ORDER — HEPARIN 100 UNIT/ML
500 SYRINGE INTRAVENOUS
Status: DISCONTINUED | OUTPATIENT
Start: 2022-08-17 | End: 2022-08-17 | Stop reason: HOSPADM

## 2022-08-17 RX ORDER — FAMOTIDINE 10 MG/ML
20 INJECTION INTRAVENOUS
Status: COMPLETED | OUTPATIENT
Start: 2022-08-17 | End: 2022-08-17

## 2022-08-17 RX ORDER — DIPHENHYDRAMINE HYDROCHLORIDE 50 MG/ML
50 INJECTION INTRAMUSCULAR; INTRAVENOUS
Status: COMPLETED | OUTPATIENT
Start: 2022-08-17 | End: 2022-08-17

## 2022-08-17 RX ORDER — SODIUM CHLORIDE 0.9 % (FLUSH) 0.9 %
10 SYRINGE (ML) INJECTION
Status: DISCONTINUED | OUTPATIENT
Start: 2022-08-17 | End: 2022-08-17 | Stop reason: HOSPADM

## 2022-08-17 RX ADMIN — HEPARIN 500 UNITS: 100 SYRINGE at 02:08

## 2022-08-17 RX ADMIN — FAMOTIDINE 20 MG: 10 INJECTION, SOLUTION INTRAVENOUS at 12:08

## 2022-08-17 RX ADMIN — DIPHENHYDRAMINE HYDROCHLORIDE 50 MG: 50 INJECTION, SOLUTION INTRAMUSCULAR; INTRAVENOUS at 11:08

## 2022-08-17 RX ADMIN — CARBOPLATIN 290 MG: 10 INJECTION, SOLUTION INTRAVENOUS at 01:08

## 2022-08-17 RX ADMIN — PACLITAXEL 78 MG: 6 INJECTION, SOLUTION INTRAVENOUS at 12:08

## 2022-08-17 RX ADMIN — PALONOSETRON 0.25 MG: 0.25 INJECTION, SOLUTION INTRAVENOUS at 11:08

## 2022-08-17 RX ADMIN — Medication 10 ML: at 02:08

## 2022-08-17 NOTE — PLAN OF CARE
Develop a fall prevention plan with the patient and caregiver/family.  Provide reorientation, appropriate sensory stimulation and routines with changes in mental status to decrease risk of fall.  Promote use of personal vision and auditory aids.  Assess assistance level required for safe and effective self-care; provide support as needed, such as toileting, mobilization. For age 65 and older, implement timed toileting with assistance.  Encourage physical activity, such as performance of mobility and self-care at highest level of patient ability, multicomponent exercise program and provision of appropriate assistive devices.  If fall occurs, assess the severity of injury; implement fall injury protocol. Determine the cause and revise fall injury prevention plan.  Regularly review medication contribution to fall risk; adjust medication administration times to minimize risk of falling.  Consider risk related to polypharmacy and age.  Balance adequate pain management with potential for oversedation.    Provide a safe, barrier-free environment that encourages independent activity.  Keep care area uncluttered and well-lighted.  Determine need for increased observation or monitoring.  Avoid use of devices that minimize mobility, such as restraints or indwelling urinary catheter.

## 2022-08-18 PROCEDURE — 77386 HC IMRT, COMPLEX: CPT | Performed by: RADIOLOGY

## 2022-08-19 PROCEDURE — 77386 HC IMRT, COMPLEX: CPT | Performed by: RADIOLOGY

## 2022-08-22 PROCEDURE — 77336 RADIATION PHYSICS CONSULT: CPT | Performed by: RADIOLOGY

## 2022-08-22 PROCEDURE — 77386 HC IMRT, COMPLEX: CPT | Performed by: RADIOLOGY

## 2022-08-23 PROCEDURE — 77386 HC IMRT, COMPLEX: CPT | Performed by: RADIOLOGY

## 2022-08-24 ENCOUNTER — LAB VISIT (OUTPATIENT)
Dept: LAB | Facility: HOSPITAL | Age: 57
End: 2022-08-24
Attending: INTERNAL MEDICINE
Payer: MEDICARE

## 2022-08-24 ENCOUNTER — INFUSION (OUTPATIENT)
Dept: INFUSION THERAPY | Facility: HOSPITAL | Age: 57
End: 2022-08-24
Attending: INTERNAL MEDICINE
Payer: MEDICARE

## 2022-08-24 VITALS
TEMPERATURE: 98 F | RESPIRATION RATE: 18 BRPM | HEART RATE: 88 BPM | OXYGEN SATURATION: 98 % | WEIGHT: 156.75 LBS | BODY MASS INDEX: 23.22 KG/M2 | SYSTOLIC BLOOD PRESSURE: 107 MMHG | HEIGHT: 69 IN | DIASTOLIC BLOOD PRESSURE: 69 MMHG

## 2022-08-24 DIAGNOSIS — C32.9 LARYNGEAL CANCER: ICD-10-CM

## 2022-08-24 DIAGNOSIS — L92.9 GRANULATION TISSUE: Primary | ICD-10-CM

## 2022-08-24 DIAGNOSIS — C34.92 SQUAMOUS CELL CARCINOMA OF BOTH LUNGS: ICD-10-CM

## 2022-08-24 DIAGNOSIS — C34.92 SQUAMOUS CELL CARCINOMA OF BOTH LUNGS: Primary | ICD-10-CM

## 2022-08-24 DIAGNOSIS — J38.7 LARYNGEAL MASS: ICD-10-CM

## 2022-08-24 DIAGNOSIS — C34.91 SQUAMOUS CELL CARCINOMA OF BOTH LUNGS: ICD-10-CM

## 2022-08-24 DIAGNOSIS — C77.9 REGIONAL LYMPH NODE METASTASIS PRESENT: ICD-10-CM

## 2022-08-24 DIAGNOSIS — C34.91 SQUAMOUS CELL CARCINOMA OF BOTH LUNGS: Primary | ICD-10-CM

## 2022-08-24 LAB
ALBUMIN SERPL-MCNC: 3.5 GM/DL (ref 3.5–5)
ALBUMIN/GLOB SERPL: 0.9 RATIO (ref 1.1–2)
ALP SERPL-CCNC: 101 UNIT/L (ref 40–150)
ALT SERPL-CCNC: 14 UNIT/L (ref 0–55)
AST SERPL-CCNC: 16 UNIT/L (ref 5–34)
BASOPHILS # BLD AUTO: 0.04 X10(3)/MCL (ref 0–0.2)
BASOPHILS NFR BLD AUTO: 0.5 %
BILIRUBIN DIRECT+TOT PNL SERPL-MCNC: 0.5 MG/DL
BUN SERPL-MCNC: 17 MG/DL (ref 8.4–25.7)
CALCIUM SERPL-MCNC: 9.7 MG/DL (ref 8.4–10.2)
CHLORIDE SERPL-SCNC: 99 MMOL/L (ref 98–107)
CO2 SERPL-SCNC: 26 MMOL/L (ref 22–29)
CREAT SERPL-MCNC: 0.7 MG/DL (ref 0.73–1.18)
EOSINOPHIL # BLD AUTO: 0.15 X10(3)/MCL (ref 0–0.9)
EOSINOPHIL NFR BLD AUTO: 1.9 %
ERYTHROCYTE [DISTWIDTH] IN BLOOD BY AUTOMATED COUNT: 15 % (ref 11.5–17)
GFR SERPLBLD CREATININE-BSD FMLA CKD-EPI: >60 MLS/MIN/1.73/M2
GLOBULIN SER-MCNC: 3.7 GM/DL (ref 2.4–3.5)
GLUCOSE SERPL-MCNC: 131 MG/DL (ref 74–100)
HCT VFR BLD AUTO: 41.6 % (ref 42–52)
HGB BLD-MCNC: 13.4 GM/DL (ref 14–18)
IMM GRANULOCYTES # BLD AUTO: 0.09 X10(3)/MCL (ref 0–0.04)
IMM GRANULOCYTES NFR BLD AUTO: 1.2 %
LDH SERPL-CCNC: 172 U/L (ref 125–220)
LYMPHOCYTES # BLD AUTO: 1.78 X10(3)/MCL (ref 0.6–4.6)
LYMPHOCYTES NFR BLD AUTO: 23 %
MCH RBC QN AUTO: 31.2 PG (ref 27–31)
MCHC RBC AUTO-ENTMCNC: 32.2 MG/DL (ref 33–36)
MCV RBC AUTO: 97 FL (ref 80–94)
MONOCYTES # BLD AUTO: 1.11 X10(3)/MCL (ref 0.1–1.3)
MONOCYTES NFR BLD AUTO: 14.3 %
NEUTROPHILS # BLD AUTO: 4.6 X10(3)/MCL (ref 2.1–9.2)
NEUTROPHILS NFR BLD AUTO: 59.1 %
PLATELET # BLD AUTO: 263 X10(3)/MCL (ref 130–400)
PMV BLD AUTO: 9.4 FL (ref 7.4–10.4)
POTASSIUM SERPL-SCNC: 4.6 MMOL/L (ref 3.5–5.1)
PROT SERPL-MCNC: 7.2 GM/DL (ref 6.4–8.3)
RBC # BLD AUTO: 4.29 X10(6)/MCL (ref 4.7–6.1)
SODIUM SERPL-SCNC: 136 MMOL/L (ref 136–145)
WBC # SPEC AUTO: 7.8 X10(3)/MCL (ref 4.5–11.5)

## 2022-08-24 PROCEDURE — 96417 CHEMO IV INFUS EACH ADDL SEQ: CPT

## 2022-08-24 PROCEDURE — 96413 CHEMO IV INFUSION 1 HR: CPT

## 2022-08-24 PROCEDURE — 36415 COLL VENOUS BLD VENIPUNCTURE: CPT

## 2022-08-24 PROCEDURE — 77386 HC IMRT, COMPLEX: CPT | Performed by: RADIOLOGY

## 2022-08-24 PROCEDURE — 80053 COMPREHEN METABOLIC PANEL: CPT

## 2022-08-24 PROCEDURE — 25000003 PHARM REV CODE 250: Performed by: NURSE PRACTITIONER

## 2022-08-24 PROCEDURE — 83615 LACTATE (LD) (LDH) ENZYME: CPT

## 2022-08-24 PROCEDURE — 96376 TX/PRO/DX INJ SAME DRUG ADON: CPT

## 2022-08-24 PROCEDURE — 85025 COMPLETE CBC W/AUTO DIFF WBC: CPT

## 2022-08-24 PROCEDURE — 96367 TX/PROPH/DG ADDL SEQ IV INF: CPT

## 2022-08-24 PROCEDURE — 63600175 PHARM REV CODE 636 W HCPCS: Performed by: NURSE PRACTITIONER

## 2022-08-24 RX ORDER — EPINEPHRINE 0.3 MG/.3ML
0.3 INJECTION SUBCUTANEOUS ONCE AS NEEDED
Status: CANCELLED | OUTPATIENT
Start: 2022-08-24

## 2022-08-24 RX ORDER — DIPHENHYDRAMINE HYDROCHLORIDE 50 MG/ML
25 INJECTION INTRAMUSCULAR; INTRAVENOUS
Status: COMPLETED | OUTPATIENT
Start: 2022-08-24 | End: 2022-08-24

## 2022-08-24 RX ORDER — EPINEPHRINE 0.3 MG/.3ML
0.3 INJECTION SUBCUTANEOUS ONCE AS NEEDED
Status: DISCONTINUED | OUTPATIENT
Start: 2022-08-24 | End: 2022-08-24 | Stop reason: HOSPADM

## 2022-08-24 RX ORDER — FAMOTIDINE 10 MG/ML
20 INJECTION INTRAVENOUS
Status: CANCELLED | OUTPATIENT
Start: 2022-08-24

## 2022-08-24 RX ORDER — HEPARIN 100 UNIT/ML
500 SYRINGE INTRAVENOUS
Status: DISCONTINUED | OUTPATIENT
Start: 2022-08-24 | End: 2022-08-24 | Stop reason: HOSPADM

## 2022-08-24 RX ORDER — DIPHENHYDRAMINE HYDROCHLORIDE 50 MG/ML
50 INJECTION INTRAMUSCULAR; INTRAVENOUS ONCE AS NEEDED
Status: CANCELLED | OUTPATIENT
Start: 2022-08-24

## 2022-08-24 RX ORDER — SODIUM CHLORIDE 0.9 % (FLUSH) 0.9 %
10 SYRINGE (ML) INJECTION
Status: CANCELLED | OUTPATIENT
Start: 2022-08-24

## 2022-08-24 RX ORDER — SODIUM CHLORIDE 0.9 % (FLUSH) 0.9 %
10 SYRINGE (ML) INJECTION
Status: DISCONTINUED | OUTPATIENT
Start: 2022-08-24 | End: 2022-08-24 | Stop reason: HOSPADM

## 2022-08-24 RX ORDER — FAMOTIDINE 10 MG/ML
20 INJECTION INTRAVENOUS
Status: COMPLETED | OUTPATIENT
Start: 2022-08-24 | End: 2022-08-24

## 2022-08-24 RX ORDER — DIPHENHYDRAMINE HYDROCHLORIDE 50 MG/ML
25 INJECTION INTRAMUSCULAR; INTRAVENOUS
Status: CANCELLED
Start: 2022-08-24

## 2022-08-24 RX ORDER — SILVER NITRATE 38.21; 12.74 MG/1; MG/1
STICK TOPICAL
Qty: 1 APPLICATOR | Refills: 0 | Status: ON HOLD | OUTPATIENT
Start: 2022-08-24 | End: 2022-01-01 | Stop reason: HOSPADM

## 2022-08-24 RX ORDER — HEPARIN 100 UNIT/ML
500 SYRINGE INTRAVENOUS
Status: CANCELLED | OUTPATIENT
Start: 2022-08-24

## 2022-08-24 RX ORDER — DIPHENHYDRAMINE HYDROCHLORIDE 50 MG/ML
50 INJECTION INTRAMUSCULAR; INTRAVENOUS ONCE AS NEEDED
Status: DISCONTINUED | OUTPATIENT
Start: 2022-08-24 | End: 2022-08-24 | Stop reason: HOSPADM

## 2022-08-24 RX ADMIN — HEPARIN 500 UNITS: 100 SYRINGE at 02:08

## 2022-08-24 RX ADMIN — PALONOSETRON 0.25 MG: 0.25 INJECTION, SOLUTION INTRAVENOUS at 11:08

## 2022-08-24 RX ADMIN — CARBOPLATIN 290 MG: 10 INJECTION, SOLUTION INTRAVENOUS at 01:08

## 2022-08-24 RX ADMIN — FAMOTIDINE 20 MG: 10 INJECTION, SOLUTION INTRAVENOUS at 11:08

## 2022-08-24 RX ADMIN — PACLITAXEL 78 MG: 6 INJECTION, SOLUTION INTRAVENOUS at 12:08

## 2022-08-24 RX ADMIN — DIPHENHYDRAMINE HYDROCHLORIDE 25 MG: 50 INJECTION, SOLUTION INTRAMUSCULAR; INTRAVENOUS at 11:08

## 2022-08-25 PROCEDURE — 77386 HC IMRT, COMPLEX: CPT | Performed by: RADIOLOGY

## 2022-08-26 PROCEDURE — 77386 HC IMRT, COMPLEX: CPT | Performed by: RADIOLOGY

## 2022-08-29 PROCEDURE — 77336 RADIATION PHYSICS CONSULT: CPT | Performed by: RADIOLOGY

## 2022-08-29 PROCEDURE — 77386 HC IMRT, COMPLEX: CPT | Performed by: RADIOLOGY

## 2022-08-30 ENCOUNTER — CLINICAL SUPPORT (OUTPATIENT)
Dept: HEMATOLOGY/ONCOLOGY | Facility: CLINIC | Age: 57
End: 2022-08-30
Payer: MEDICARE

## 2022-08-30 ENCOUNTER — OFFICE VISIT (OUTPATIENT)
Dept: HEMATOLOGY/ONCOLOGY | Facility: CLINIC | Age: 57
End: 2022-08-30
Payer: MEDICARE

## 2022-08-30 VITALS
TEMPERATURE: 99 F | WEIGHT: 156 LBS | DIASTOLIC BLOOD PRESSURE: 57 MMHG | BODY MASS INDEX: 23.04 KG/M2 | HEART RATE: 115 BPM | SYSTOLIC BLOOD PRESSURE: 102 MMHG | OXYGEN SATURATION: 95 %

## 2022-08-30 DIAGNOSIS — C34.91 SQUAMOUS CELL CARCINOMA OF BOTH LUNGS: ICD-10-CM

## 2022-08-30 DIAGNOSIS — C34.92 SQUAMOUS CELL CARCINOMA OF BOTH LUNGS: ICD-10-CM

## 2022-08-30 DIAGNOSIS — C32.9 LARYNGEAL CANCER: Primary | ICD-10-CM

## 2022-08-30 DIAGNOSIS — C77.9 REGIONAL LYMPH NODE METASTASIS PRESENT: ICD-10-CM

## 2022-08-30 DIAGNOSIS — J38.7 LARYNGEAL MASS: Primary | ICD-10-CM

## 2022-08-30 DIAGNOSIS — C32.9 LARYNGEAL CANCER: ICD-10-CM

## 2022-08-30 PROCEDURE — 99215 OFFICE O/P EST HI 40 MIN: CPT | Mod: S$PBB,,, | Performed by: INTERNAL MEDICINE

## 2022-08-30 PROCEDURE — 99213 OFFICE O/P EST LOW 20 MIN: CPT | Mod: PBBFAC,25 | Performed by: INTERNAL MEDICINE

## 2022-08-30 PROCEDURE — 99215 PR OFFICE/OUTPT VISIT, EST, LEVL V, 40-54 MIN: ICD-10-PCS | Mod: S$PBB,,, | Performed by: INTERNAL MEDICINE

## 2022-08-30 PROCEDURE — 77386 HC IMRT, COMPLEX: CPT | Performed by: RADIOLOGY

## 2022-08-30 PROCEDURE — 99999 PR PBB SHADOW E&M-EST. PATIENT-LVL III: ICD-10-PCS | Mod: PBBFAC,,, | Performed by: INTERNAL MEDICINE

## 2022-08-30 PROCEDURE — 99999 PR PBB SHADOW E&M-EST. PATIENT-LVL III: CPT | Mod: PBBFAC,,, | Performed by: INTERNAL MEDICINE

## 2022-08-30 RX ORDER — HEPARIN 100 UNIT/ML
500 SYRINGE INTRAVENOUS
Status: CANCELLED | OUTPATIENT
Start: 2022-08-31

## 2022-08-30 RX ORDER — SODIUM CHLORIDE 0.9 % (FLUSH) 0.9 %
10 SYRINGE (ML) INJECTION
Status: CANCELLED | OUTPATIENT
Start: 2022-08-31

## 2022-08-30 RX ORDER — DIPHENHYDRAMINE HYDROCHLORIDE 50 MG/ML
25 INJECTION INTRAMUSCULAR; INTRAVENOUS
Status: CANCELLED
Start: 2022-08-31

## 2022-08-30 RX ORDER — EPINEPHRINE 0.3 MG/.3ML
0.3 INJECTION SUBCUTANEOUS ONCE AS NEEDED
Status: CANCELLED | OUTPATIENT
Start: 2022-08-31

## 2022-08-30 RX ORDER — FAMOTIDINE 10 MG/ML
20 INJECTION INTRAVENOUS
Status: CANCELLED | OUTPATIENT
Start: 2022-08-31

## 2022-08-30 RX ORDER — DIPHENHYDRAMINE HYDROCHLORIDE 50 MG/ML
50 INJECTION INTRAMUSCULAR; INTRAVENOUS ONCE AS NEEDED
Status: CANCELLED | OUTPATIENT
Start: 2022-08-31

## 2022-08-30 NOTE — PROGRESS NOTES
HEMATOLOGY/ONCOLOGY OFFICE CLINIC VISIT    Visit Information:    Initial Evaluation: 7/21/2022  Referring Provider:   Other providers:  Code status:    Diagnosis:  [id number] JENNYFER Laryngeal Squamous cell carcinoma     Present treatment:  Carbo/Taxol + RT    Treatment/Oncology history:  --7/11/2022: total laryngectomy    Plan of care:     Imaging:  Ct neck 6/6/2022: A metastatic right level III cervical lymph node is present with short axis measurement of 1.6 cm.  This lymph node contains internal cystic change, typical of metastatic squamous cell carcinoma.  A left level III cervical lymph node shows short axis measurements of 1 cm, and is suspicious.    A left supraglottic mass measures approximately 2.4 cm in diameter (axial post-contrast image 39), presumably corresponding to the primary neoplasm.  Metastatic lymph nodes are also present in the inferior right paratracheal position, measuring 1.3 cm in short axis.  Metastatic lymph nodes are present in the superior right hilum, measuring 1.8 cm in short axis.  Ct H&N 6/27/2022: 1. There is mild stenosis at the origin of left proximal internal carotid artery secondary to dense calcified atheromatous plaque. 2. There is consolidation is right upper lobe. Additional ground-glass opacities are also seen on the remainder of the visualized lungs. These findings are consistent with an infectious process. Small right pleural effusion is seen. There is an ill-defined soft tissue mass in the right perihilar region which measures approximately 3.1 x 2.9 x 4.6 cm, of concern for neoplasm. Correlate clinically as regards further evaluation and followup with CT chest. There is an ill-defined enhancing soft tissue mass in the glottis infiltrating into the paraglottic spaces and the subglottic region with obliteration of the airway, of concern for a neoplasm. 3. Unremarkable CT angiogram of the head. Details and other findings as described above.  NM PET CT 8/1/2022:  Postoperative changes of recent total laryngectomy and lou dissection. bilateral hypermetabolic cervical lymphadenopathy.  A right cervical lymph node 1.7 x 1.8 cm, compared to 1.6 x 1.5 cm previously, SUV of 7.7. left cervical lymph node 8 x 10 mm and has a max SUV of 3.4.  A fluid collection posterior to the left internal jugular vein measures 2.4 x 2.4 cm. There is right hilar and mediastinal lymphadenopathy.  A precarinal lymph node 1.6 x 2.3 cm, compared to 1.3 x 1.7 cm previously, SUV of 13.8.  Right hilar lymphadenopathy measures 3 x 2.9 cm, compared to 2.1 x 2.3 cm previously, and has a max SUV of 9.8.  A suspected right hilar mass measures 1.8 x 3.6 cm SUV of 14.8     Pathology:  6/14/2022:  EBUS ENDOBRONCHIAL MASS RUL, CYTOLOGY: NEARLY ACELLULAR SPECIMEN CONSISTING OF FRESH BLOOD CLOT. NO ATYPICAL OR MALIGNANT CELLS IDENTIFIED.   7/11/2022: Bronchial Wash, RLL, right bronchial mass washings:  - Squamous cell carcinoma.    Bronchial Wash, LLL, left bronchial mass washings: - Squamous cell carcinoma.     7/11/2022 Laryngectomy:  1. Larynx, laryngeal biopsy frozen section : - Squamous cell carcinoma with necrosis.    2. Neck, Anterior, left neck level 3: - Number of lymph nodes involved by carcinoma:  1/7       - Size of metastatic deposit:  2.5 mm - Extranodal extension:  Not identified      3. Larynx, resection without nodes, total layrngectomy :  - Squamous cell carcinoma.  4. Neck, Anterior, left neck level 4: - Number of lymph nodes involved by carcinoma:  0/10  5. Neck, Anterior, right neck level 4:  - Number of lymph nodes involved by carcinoma:  0/11  6. Neck, Anterior, right neck level 3: - Number of lymph nodes involved by carcinoma:  0/9  7. Nasophaynx, inferior pharyngeal mucosa : - Benign squamous mucosa. - No evidence of malignancy.     8. Cartilage, superior cartilage margin : - No evidence of malignancy.    9. Lymph Node, pretracheal lymph  node :  - Number of lymph nodes involved by  carcinoma:  0/3  [id number]       CLINICAL HISTORY:       Patient: Josafat Boudreaux is a 56 y.o. male kindly refer her for laryngeal carcinoma.  Patient  was referred to ENT for further evaluation of hoarseness.  He did not have any difficulty swallowing or dry mouth at the time.  Hoarseness has been present for at least 4 months prior to evaluation. 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     Patient was scheduled to trach but on 06/10/2022 he was brought to the emergency room via air met with is short of breath.  He was found to have and oxygenation on the 40s when EMS was called.  He was placed on BiPAP but pCO2 increased so he has an emergent tracheostomy performed.  He has a PEG tube placed same hospitalization.  He underwent bronchoscopy with biopsy of the right upper lobe endobronchial lesion on 6/14/2022 cytology was negative for malignant cells.      On 7/11/2022 he underwent LARYNGECTOMY, WITH RADICAL NECK DISSECTION - Bilateral LARYNGOSCOPY, DIRECT, DIAGNOSTIC, WITH BRONCHOSCOPY AND ESOPHAGOSCOPY pathology report as above.  1/40 lymph nodes were involved with metastatic disease.  Bronchoscopy was repeated and biopsy of the left lower lobe and right lower lobe were both positive for squamous cell carcinoma.    He is here today with his wife.  He has recovered from surgery relatively well.  Tracheostomy in place.  He is unable to talk so the history was taken by electronic medical record and wife.    Patient has a brother with history of throat cancer. Patient used to smoke 1 and half pack per day since he was 60 years old.  He was smoking less recently.    Chief Complaint: No Concerns today      Interval History:  Last visit Discussed with Dr Das. PET/CT showed progression of disease in his neck with bilateral hypermetabolic cervical lymphadenopathy. This progressed rather quick. He will need chemoRT to controlled disease in the neck. It  also showed Hypermetabolic right hilar mass with mediastinal and right hilar lymphadenopathy and he has  Left mass biopsy proven Sq cell Carcinoma. Once he complete chemoradiation to neck will scan him again to eval lung disease and may benefit of RT to lung mass vs systemic therapy for his stage IV Lung cancer.      Patient presents to clinic today for follow and continuation of his treatment. Started Carbo/Taxol on 8/17/2022.  He is currently on chemoradiation.  Tolerating well.  His weight is stable.  He has some p.o. intake but also PEG tube feeding.  He is with his wife. Denies fever, chills or sweats. No chest pain or shortness of breath. Labs reviewed.  Peg tube and tracheostomy in placed noted during physical exam.      Past Medical History:   Diagnosis Date    Cancer     COPD (chronic obstructive pulmonary disease)     Dysphagia     Lung cancer     Vocal cord mass       Past Surgical History:   Procedure Laterality Date    DIRECT DIAGNOSTIC LARYNGOSCOPY WITH BRONCHOSCOPY AND ESOPHAGOSCOPY N/A 7/11/2022    Procedure: LARYNGOSCOPY, DIRECT, DIAGNOSTIC, WITH BRONCHOSCOPY AND ESOPHAGOSCOPY;  Surgeon: Emory Alonso MD;  [facility];  Service: ENT;  Laterality: N/A;    HIP SURGERY      HUMERUS FRACTURE SURGERY      INSERT ARTERIAL LINE  6/26/2022         TRACHEOSTOMY N/A 6/10/2022    Procedure: CREATION, TRACHEOSTOMY;  Surgeon: Junior Alonso MD;  Location: [facility] OR;  Service: ENT;  Laterality: N/A;     Family History   Problem Relation Age of Onset    Lung cancer Father     Throat cancer Brother      Social Connections: Not on file       Review of patient's allergies indicates:  No Known Allergies   Current Outpatient Medications on File Prior to Visit   Medication Sig Dispense Refill    acetaminophen (TYLENOL) 160 mg/5 mL Liqd Take 20.3 mLs (649.6 mg total) by mouth every 6 (six) hours as needed (Mild pain). 473 mL 0    albuterol-ipratropium (DUO-NEB) 2.5 mg-0.5 mg/3 mL nebulizer solution Take 3  mLs by nebulization every 4 (four) hours as needed for Shortness of Breath or Wheezing. Rescue 90 mL 1    diphenhydrAMINE (BENADRYL) 25 mg capsule 1 capsule (25 mg total) by Per G Tube route every 6 (six) hours as needed for Itching. 90 capsule 1    famotidine (PEPCID) 20 MG tablet Take 1 tablet (20 mg total) by mouth every evening. 30 tablet 11    glutamine 10 gram PwPk Take 10 g by mouth 2 (two) times a day.      hydrocortisone 1 % cream Apply topically 2 (two) times daily as needed (rash). Apply to rash 30 g 1    ibuprofen (ADVIL,MOTRIN) 100 mg/5 mL suspension Take 30 mLs (600 mg total) by mouth every 6 (six) hours as needed for Pain (mild to moderate). 354 mL 0    ondansetron (ZOFRAN-ODT) 8 MG TbDL Take 8 mg by mouth every 8 (eight) hours as needed for Nausea. Tab 1 ODT in AM for 2 days after chemotherapy      pantoprazole (PROTONIX) 20 MG tablet Take 1 tablet (20 mg total) by mouth once daily. 30 tablet 11    silver nitrate applicators 75-25 % applicator Apply topically 3 (three) times a week. 1 applicator 0    budesonide (PULMICORT) 0.5 mg/2 mL nebulizer solution Take 2 mLs (0.5 mg total) by nebulization every 12 (twelve) hours. Controller (Patient not taking: No sig reported) 90 mL 1    [DISCONTINUED] aspirin (ECOTRIN) 81 MG EC tablet Take 81 mg by mouth once daily.       No current facility-administered medications on file prior to visit.      Review of Systems   Constitutional:  Negative for activity change, appetite change, chills, diaphoresis, fatigue, fever and unexpected weight change.   HENT:  Negative for nasal congestion, mouth sores, nosebleeds, postnasal drip, sinus pressure/congestion, sore throat and trouble swallowing.    Eyes:  Negative for visual disturbance.   Respiratory:  Negative for cough and shortness of breath.    Cardiovascular:  Negative for chest pain and palpitations.   Gastrointestinal:  Negative for abdominal distention, abdominal pain, blood in stool, change in bowel habit,  constipation, diarrhea, nausea, vomiting and change in bowel habit.   Endocrine: Negative.    Genitourinary:  Negative for bladder incontinence, decreased urine volume, difficulty urinating, dysuria, frequency, hematuria, scrotal swelling, testicular pain and urgency.   Musculoskeletal:  Negative for arthralgias, back pain, gait problem, joint swelling, leg pain, myalgias and neck pain.   Integumentary:  Negative for rash.   Neurological:  Negative for dizziness, tremors, seizures, syncope, speech difficulty, weakness, light-headedness, numbness, headaches and memory loss.   Hematological:  Negative for adenopathy. Does not bruise/bleed easily.   Psychiatric/Behavioral:  Negative for agitation, confusion, hallucinations, sleep disturbance and suicidal ideas. The patient is nervous/anxious.             Vitals:    08/30/22 1055   BP: (!) 102/57   BP Location: Left arm   Patient Position: Sitting   Pulse: (!) 115   Temp: 99 °F (37.2 °C)   TempSrc: Oral   SpO2: 95%   Weight: 70.8 kg (156 lb)      Physical Exam  Vitals and nursing note reviewed.   Constitutional:       General: He is not in acute distress.     Appearance: Normal appearance.      Comments: thin   HENT:      Head: Normocephalic and atraumatic.      Mouth/Throat:      Mouth: Mucous membranes are moist.   Eyes:      General: No scleral icterus.     Extraocular Movements: Extraocular movements intact.      Conjunctiva/sclera: Conjunctivae normal.   Neck:      Vascular: No JVD.      Trachea: Tracheostomy present.      Comments: In place. Surgical site well healed.  Cardiovascular:      Rate and Rhythm: Normal rate and regular rhythm.      Heart sounds: No murmur heard.  Pulmonary:      Effort: Pulmonary effort is normal.      Breath sounds: Normal breath sounds. No wheezing or rhonchi.   Chest:      Chest wall: No tenderness.   Abdominal:      General: The ostomy site is clean. Bowel sounds are normal. There is no distension.      Palpations: Abdomen is soft.       Tenderness: There is no abdominal tenderness.   Musculoskeletal:         General: No swelling or deformity.      Cervical back: Neck supple.   Lymphadenopathy:      Cervical: No cervical adenopathy.      Upper Body:      Right upper body: No supraclavicular or axillary adenopathy.      Left upper body: No supraclavicular or axillary adenopathy.      Lower Body: No right inguinal adenopathy. No left inguinal adenopathy.   Skin:     General: Skin is warm.      Coloration: Skin is not jaundiced.      Findings: No rash.   Neurological:      General: No focal deficit present.      Mental Status: He is alert and oriented to person, place, and time.   Psychiatric:         Attention and Perception: Attention normal.         Mood and Affect: Mood and affect normal. Mood is not anxious.         Behavior: Behavior is cooperative.         Cognition and Memory: Cognition normal.         Judgment: Judgment normal.     ECOG SCORE             Laboratory:  CBC with Differential:  Lab Results   Component Value Date    WBC 7.3 08/30/2022    RBC 4.35 (L) 08/30/2022    HGB 13.6 (L) 08/30/2022    HCT 41.9 (L) 08/30/2022    MCV 96.3 (H) 08/30/2022    MCH 31.3 (H) 08/30/2022    MCHC 32.5 (L) 08/30/2022    RDW 15.1 08/30/2022     08/30/2022    MPV 9.0 08/30/2022        CMP:  Sodium Level   Date Value Ref Range Status   08/30/2022 134 (L) 136 - 145 mmol/L Final     Potassium Level   Date Value Ref Range Status   08/30/2022 4.6 3.5 - 5.1 mmol/L Final     Carbon Dioxide   Date Value Ref Range Status   08/30/2022 30 (H) 22 - 29 mmol/L Final     Blood Urea Nitrogen   Date Value Ref Range Status   08/30/2022 11.8 8.4 - 25.7 mg/dL Final     Creatinine   Date Value Ref Range Status   08/30/2022 0.70 (L) 0.73 - 1.18 mg/dL Final     Calcium Level Total   Date Value Ref Range Status   08/30/2022 9.6 8.4 - 10.2 mg/dL Final     Albumin Level   Date Value Ref Range Status   08/30/2022 3.6 3.5 - 5.0 gm/dL Final     Bilirubin Total   Date Value  Ref Range Status   08/30/2022 0.5 <=1.5 mg/dL Final     Alkaline Phosphatase   Date Value Ref Range Status   08/30/2022 92 40 - 150 unit/L Final     Aspartate Aminotransferase   Date Value Ref Range Status   08/30/2022 20 5 - 34 unit/L Final     Alanine Aminotransferase   Date Value Ref Range Status   08/30/2022 12 0 - 55 unit/L Final     Estimated GFR-Non    Date Value Ref Range Status   08/02/2022 >60 mls/min/1.73/m2 Final             Assessment:       1. Laryngeal cancer    2. Regional lymph node metastasis present    3. Squamous cell carcinoma of both lungs        Laryngeal Squamous cell carcinoma -s/p total laryngectomy, 1/40 LN with metastatic disease.   Lung masses x 2, bilateral--> Squamous cell carcinoma    I discussed with the patient and his wife, diagnosis, prognosis and treatment recommendations.  Discussed briefly chemotherapy, discussed risk versus benefits as well as toxicities associated with it.   Patient with long history of smoking.  I discussed with them that head and neck cancer lung cancer are very common in smokers.  I explained to them that lung cancer could or could not be associated with his head and neck cancer and lung cancer could be a 2nd primary.  Either way he has it in both lungs therefore a stage IV.  As per patient report, I do not have the official report, he has 1 mass in each of the lung bases and nothing other placed.   He will see Dr. Das Monday.  I would like to discuss case with him after review his scans to see what will be best treatment strategy.      He most likely need RT to his neck and he will benefit of concommittant chemotherapy.  If he only have one lesion in each lung and not too big, maybe RT to each lesions may be an alternative as well +/- chemotherapy during and after RT.       Plan.:       Will cont chemotherapy as planned  Cont weekly carbo/Taxol  RTC in 2 weeks for Td with NP  RTC 1 week for chemotherapy  CBC, CMP weekly and prior to  chemotherapy.  Encourage to call or message us for any questions or problems  The patient was given ample opportunity to ask questions, and to the best of my abilities, all questions answered to satisfaction; patient demonstrated understanding of what we discussed and agreeable to the plan.       LENKA NELSON MD

## 2022-08-30 NOTE — PROGRESS NOTES
Oncology Nutrition Assessment for Medical Nutrition Therapy      Josafat Boudreaux   1965    Referring Provider:   Yara Atkinson MD    Reason for Visit:  Physician Consult    Nutrition Recommendations:  1. Continue po intake as tolerated  2. 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 He is bolusing 6 cartons of Diabetisource via PEG per day and tolerating well. He is also eating some by mouth at this time, although I am not sure he was cleared by SLP for this. Notes that soreness to throat has started now that he is on XRT. He has no other complaints today. Per EMR his UBW was ~155#, but he dropped down to ~130# with diagnosis and surgery. However, he is back up to 156# at this time.       Nutrition Factors Affecting Intake  difficulty/impaired swallowing    PMHx:   Past Medical History:   Diagnosis Date    Cancer     COPD (chronic obstructive pulmonary disease)     Dysphagia     Lung cancer     Vocal cord mass        Allergies: Patient has no known allergies.    Current Medications:    Current Outpatient Medications:     acetaminophen (TYLENOL) 160 mg/5 mL Liqd, Take 20.3 mLs (649.6 mg total) by mouth every 6 (six) hours as needed (Mild pain)., Disp: 473 mL, Rfl: 0    albuterol-ipratropium (DUO-NEB) 2.5 mg-0.5 mg/3 mL nebulizer solution, Take 3 mLs by nebulization every 4 (four) hours as needed for Shortness of Breath or Wheezing. Rescue, Disp: 90 mL, Rfl: 1    budesonide (PULMICORT) 0.5 mg/2 mL nebulizer solution, Take 2 mLs (0.5 mg total) by nebulization every 12 (twelve) hours. Controller (Patient not taking: No sig reported), Disp: 90 mL, Rfl: 1    diphenhydrAMINE (BENADRYL) 25 mg capsule, 1 capsule (25 mg total) by Per G Tube route every 6 (six) hours as needed for Itching., Disp: 90 capsule, Rfl: 1    famotidine (PEPCID) 20 MG tablet,  "Take 1 tablet (20 mg total) by mouth every evening., Disp: 30 tablet, Rfl: 11    glutamine 10 gram PwPk, Take 10 g by mouth 2 (two) times a day., Disp: , Rfl:     hydrocortisone 1 % cream, Apply topically 2 (two) times daily as needed (rash). Apply to rash, Disp: 30 g, Rfl: 1    ibuprofen (ADVIL,MOTRIN) 100 mg/5 mL suspension, Take 30 mLs (600 mg total) by mouth every 6 (six) hours as needed for Pain (mild to moderate)., Disp: 354 mL, Rfl: 0    ondansetron (ZOFRAN-ODT) 8 MG TbDL, Take 8 mg by mouth every 8 (eight) hours as needed for Nausea. Tab 1 ODT in AM for 2 days after chemotherapy, Disp: , Rfl:     pantoprazole (PROTONIX) 20 MG tablet, Take 1 tablet (20 mg total) by mouth once daily., Disp: 30 tablet, Rfl: 11    silver nitrate applicators 75-25 % applicator, Apply topically 3 (three) times a week., Disp: 1 applicator, Rfl: 0    Labs: 8/30/22 Na 134 (L), glucose 111 (elevated)    Anthropometrics    Height:   Ht Readings from Last 1 Encounters:   08/24/22 5' 9" (1.753 m)      Weight:   Wt Readings from Last 5 Encounters:   08/30/22 70.8 kg (156 lb)   08/24/22 71.1 kg (156 lb 12 oz)   08/17/22 71.1 kg (156 lb 12.8 oz)   08/09/22 67.4 kg (148 lb 9.6 oz)   08/04/22 65.3 kg (144 lb)        Usual Body Weight: 70 kg (154 lb)  % Weight Change: -15.5% in six months to lowest wt 130#, however pt has gained back 26#    BMI: 23 (normal)    Ideal Weight: 72.7 kg (160 lb)  % Ideal Weight: 98%    Malnutrition in the context of chronic illness  Degree of Malnutrition: does not meet criteria  Energy Intake: does not meet criteria  Interpretation of Weight Loss: does not meet criteria  Body Fat: does not meet criteria  Area of Body Fat Loss: does not meet criteria  Muscle Mass Loss: does not meet criteria  Area of Muscle Mass Loss: does not meet criteria  Fluid Accumulation: does not meet criteria  Edema: does not meet criteria  Reduced  Strength: unable to obtain  A minimum of two characteristics is recommended for " diagnosis of either severe or non-severe malnutrition.    Estimated Needs  2185 kcal/day  [place] St. Jeor x 1.1 activity factor x 1.3 stress factor  92 g protein/day 1.3 g/kg CBW  2185 mL fluid/day 1 mL/kcal    Formula: Diabetisource AC  Rate/Volume: 6 cartons/day  Water Flushes: 60 mL with each carton  Additives/Modulars: none at this time  Route: PEG tube  Method: bolus  Total Nutrition Provided by Tube Feeding, Additives, and Flushes:  Calories Provided  1800 kcal/d, 82% needs   Protein Provided  90 g/d, 97% needs   Fluid Provided  1584 ml/d, 72% needs   Continuous feeding calculations based on estimated 20 hr/d run time unless otherwise stated.     Nutrition Diagnosis    PES: Swallowing difficulty related to laryngeal CA as evidenced by need for PEG to provide majority of nutrition. (new)     Nutrition Risk  moderate    Nutrition Intervention    Interventions(treatment strategy):  Collaboration with other providers      Nutrition Monitoring and Evaluation    Monitor: enteral nutrition intake, weight change, and electrolyte and renal panel    Follow up at next provider visit.        Vidhya Brown, MS, RD, , LDN

## 2022-08-31 ENCOUNTER — INFUSION (OUTPATIENT)
Dept: INFUSION THERAPY | Facility: HOSPITAL | Age: 57
End: 2022-08-31
Attending: INTERNAL MEDICINE
Payer: MEDICARE

## 2022-08-31 VITALS
WEIGHT: 156.38 LBS | BODY MASS INDEX: 23.1 KG/M2 | SYSTOLIC BLOOD PRESSURE: 98 MMHG | HEART RATE: 70 BPM | TEMPERATURE: 99 F | DIASTOLIC BLOOD PRESSURE: 55 MMHG | OXYGEN SATURATION: 99 %

## 2022-08-31 DIAGNOSIS — C32.9 LARYNGEAL CANCER: ICD-10-CM

## 2022-08-31 DIAGNOSIS — C34.91 SQUAMOUS CELL CARCINOMA OF BOTH LUNGS: Primary | ICD-10-CM

## 2022-08-31 DIAGNOSIS — C77.9 REGIONAL LYMPH NODE METASTASIS PRESENT: ICD-10-CM

## 2022-08-31 DIAGNOSIS — C34.92 SQUAMOUS CELL CARCINOMA OF BOTH LUNGS: Primary | ICD-10-CM

## 2022-08-31 PROCEDURE — 77386 HC IMRT, COMPLEX: CPT | Performed by: RADIOLOGY

## 2022-08-31 PROCEDURE — 96417 CHEMO IV INFUS EACH ADDL SEQ: CPT

## 2022-08-31 PROCEDURE — 96375 TX/PRO/DX INJ NEW DRUG ADDON: CPT

## 2022-08-31 PROCEDURE — 25000003 PHARM REV CODE 250: Performed by: INTERNAL MEDICINE

## 2022-08-31 PROCEDURE — 96413 CHEMO IV INFUSION 1 HR: CPT

## 2022-08-31 PROCEDURE — 63600175 PHARM REV CODE 636 W HCPCS: Performed by: INTERNAL MEDICINE

## 2022-08-31 PROCEDURE — 96367 TX/PROPH/DG ADDL SEQ IV INF: CPT

## 2022-08-31 RX ORDER — EPINEPHRINE 0.3 MG/.3ML
0.3 INJECTION SUBCUTANEOUS ONCE AS NEEDED
Status: DISCONTINUED | OUTPATIENT
Start: 2022-08-31 | End: 2022-08-31 | Stop reason: HOSPADM

## 2022-08-31 RX ORDER — SODIUM CHLORIDE 0.9 % (FLUSH) 0.9 %
10 SYRINGE (ML) INJECTION
Status: DISCONTINUED | OUTPATIENT
Start: 2022-08-31 | End: 2022-08-31 | Stop reason: HOSPADM

## 2022-08-31 RX ORDER — DIPHENHYDRAMINE HYDROCHLORIDE 50 MG/ML
25 INJECTION INTRAMUSCULAR; INTRAVENOUS
Status: COMPLETED | OUTPATIENT
Start: 2022-08-31 | End: 2022-08-31

## 2022-08-31 RX ORDER — DIPHENHYDRAMINE HYDROCHLORIDE 50 MG/ML
50 INJECTION INTRAMUSCULAR; INTRAVENOUS ONCE AS NEEDED
Status: DISCONTINUED | OUTPATIENT
Start: 2022-08-31 | End: 2022-08-31 | Stop reason: HOSPADM

## 2022-08-31 RX ORDER — FAMOTIDINE 10 MG/ML
20 INJECTION INTRAVENOUS
Status: COMPLETED | OUTPATIENT
Start: 2022-08-31 | End: 2022-08-31

## 2022-08-31 RX ORDER — HEPARIN 100 UNIT/ML
500 SYRINGE INTRAVENOUS
Status: DISCONTINUED | OUTPATIENT
Start: 2022-08-31 | End: 2022-08-31 | Stop reason: HOSPADM

## 2022-08-31 RX ADMIN — PACLITAXEL 78 MG: 6 INJECTION, SOLUTION INTRAVENOUS at 01:08

## 2022-08-31 RX ADMIN — HEPARIN 500 UNITS: 100 SYRINGE at 04:08

## 2022-08-31 RX ADMIN — PALONOSETRON 0.25 MG: 0.25 INJECTION, SOLUTION INTRAVENOUS at 01:08

## 2022-08-31 RX ADMIN — CARBOPLATIN 290 MG: 10 INJECTION, SOLUTION INTRAVENOUS at 02:08

## 2022-08-31 RX ADMIN — DIPHENHYDRAMINE HYDROCHLORIDE 25 MG: 50 INJECTION, SOLUTION INTRAMUSCULAR; INTRAVENOUS at 12:08

## 2022-08-31 RX ADMIN — FAMOTIDINE 20 MG: 10 INJECTION, SOLUTION INTRAVENOUS at 01:08

## 2022-09-01 ENCOUNTER — APPOINTMENT (OUTPATIENT)
Dept: RADIATION THERAPY | Facility: HOSPITAL | Age: 57
End: 2022-09-01
Attending: RADIOLOGY
Payer: MEDICARE

## 2022-09-01 PROCEDURE — 77386 HC IMRT, COMPLEX: CPT | Performed by: RADIOLOGY

## 2022-09-02 PROCEDURE — 77386 HC IMRT, COMPLEX: CPT | Performed by: RADIOLOGY

## 2022-09-06 ENCOUNTER — LAB VISIT (OUTPATIENT)
Dept: LAB | Facility: HOSPITAL | Age: 57
End: 2022-09-06
Attending: INTERNAL MEDICINE
Payer: MEDICARE

## 2022-09-06 DIAGNOSIS — C34.91 SQUAMOUS CELL CARCINOMA OF BOTH LUNGS: ICD-10-CM

## 2022-09-06 DIAGNOSIS — C77.9 REGIONAL LYMPH NODE METASTASIS PRESENT: ICD-10-CM

## 2022-09-06 DIAGNOSIS — C34.92 SQUAMOUS CELL CARCINOMA OF BOTH LUNGS: ICD-10-CM

## 2022-09-06 DIAGNOSIS — C32.9 LARYNGEAL CANCER: ICD-10-CM

## 2022-09-06 LAB
ALBUMIN SERPL-MCNC: 3.6 GM/DL (ref 3.5–5)
ALBUMIN/GLOB SERPL: 1 RATIO (ref 1.1–2)
ALP SERPL-CCNC: 79 UNIT/L (ref 40–150)
ALT SERPL-CCNC: 13 UNIT/L (ref 0–55)
AST SERPL-CCNC: 23 UNIT/L (ref 5–34)
BASOPHILS # BLD AUTO: 0.03 X10(3)/MCL (ref 0–0.2)
BASOPHILS NFR BLD AUTO: 0.7 %
BILIRUBIN DIRECT+TOT PNL SERPL-MCNC: 0.5 MG/DL
BUN SERPL-MCNC: 16.8 MG/DL (ref 8.4–25.7)
CALCIUM SERPL-MCNC: 9.3 MG/DL (ref 8.4–10.2)
CHLORIDE SERPL-SCNC: 96 MMOL/L (ref 98–107)
CO2 SERPL-SCNC: 28 MMOL/L (ref 22–29)
CREAT SERPL-MCNC: 0.74 MG/DL (ref 0.73–1.18)
EOSINOPHIL # BLD AUTO: 0.02 X10(3)/MCL (ref 0–0.9)
EOSINOPHIL NFR BLD AUTO: 0.5 %
ERYTHROCYTE [DISTWIDTH] IN BLOOD BY AUTOMATED COUNT: 15.2 % (ref 11.5–17)
GFR SERPLBLD CREATININE-BSD FMLA CKD-EPI: >60 MLS/MIN/1.73/M2
GLOBULIN SER-MCNC: 3.6 GM/DL (ref 2.4–3.5)
GLUCOSE SERPL-MCNC: 77 MG/DL (ref 74–100)
HCT VFR BLD AUTO: 46.6 % (ref 42–52)
HGB BLD-MCNC: 15 GM/DL (ref 14–18)
IMM GRANULOCYTES # BLD AUTO: 0.04 X10(3)/MCL (ref 0–0.04)
IMM GRANULOCYTES NFR BLD AUTO: 0.9 %
LYMPHOCYTES # BLD AUTO: 0.96 X10(3)/MCL (ref 0.6–4.6)
LYMPHOCYTES NFR BLD AUTO: 21.8 %
MCH RBC QN AUTO: 31.4 PG (ref 27–31)
MCHC RBC AUTO-ENTMCNC: 32.2 MG/DL (ref 33–36)
MCV RBC AUTO: 97.5 FL (ref 80–94)
MONOCYTES # BLD AUTO: 0.56 X10(3)/MCL (ref 0.1–1.3)
MONOCYTES NFR BLD AUTO: 12.7 %
NEUTROPHILS # BLD AUTO: 2.8 X10(3)/MCL (ref 2.1–9.2)
NEUTROPHILS NFR BLD AUTO: 63.4 %
PLATELET # BLD AUTO: 165 X10(3)/MCL (ref 130–400)
PMV BLD AUTO: 9.6 FL (ref 7.4–10.4)
POTASSIUM SERPL-SCNC: 4.8 MMOL/L (ref 3.5–5.1)
PROT SERPL-MCNC: 7.2 GM/DL (ref 6.4–8.3)
RBC # BLD AUTO: 4.78 X10(6)/MCL (ref 4.7–6.1)
SODIUM SERPL-SCNC: 133 MMOL/L (ref 136–145)
WBC # SPEC AUTO: 4.4 X10(3)/MCL (ref 4.5–11.5)

## 2022-09-06 PROCEDURE — 77336 RADIATION PHYSICS CONSULT: CPT | Performed by: RADIOLOGY

## 2022-09-06 PROCEDURE — 80053 COMPREHEN METABOLIC PANEL: CPT

## 2022-09-06 PROCEDURE — 77386 HC IMRT, COMPLEX: CPT | Performed by: RADIOLOGY

## 2022-09-06 PROCEDURE — 85025 COMPLETE CBC W/AUTO DIFF WBC: CPT

## 2022-09-06 PROCEDURE — 36415 COLL VENOUS BLD VENIPUNCTURE: CPT

## 2022-09-06 RX ORDER — HEPARIN 100 UNIT/ML
500 SYRINGE INTRAVENOUS
Status: CANCELLED | OUTPATIENT
Start: 2022-09-07

## 2022-09-06 RX ORDER — DIPHENHYDRAMINE HYDROCHLORIDE 50 MG/ML
25 INJECTION INTRAMUSCULAR; INTRAVENOUS
Status: CANCELLED
Start: 2022-09-07

## 2022-09-06 RX ORDER — SODIUM CHLORIDE 0.9 % (FLUSH) 0.9 %
10 SYRINGE (ML) INJECTION
Status: CANCELLED | OUTPATIENT
Start: 2022-09-07

## 2022-09-06 RX ORDER — DIPHENHYDRAMINE HYDROCHLORIDE 50 MG/ML
50 INJECTION INTRAMUSCULAR; INTRAVENOUS ONCE AS NEEDED
Status: CANCELLED | OUTPATIENT
Start: 2022-09-07

## 2022-09-06 RX ORDER — EPINEPHRINE 0.3 MG/.3ML
0.3 INJECTION SUBCUTANEOUS ONCE AS NEEDED
Status: CANCELLED | OUTPATIENT
Start: 2022-09-07

## 2022-09-06 RX ORDER — FAMOTIDINE 10 MG/ML
20 INJECTION INTRAVENOUS
Status: CANCELLED | OUTPATIENT
Start: 2022-09-07

## 2022-09-07 ENCOUNTER — INFUSION (OUTPATIENT)
Dept: INFUSION THERAPY | Facility: HOSPITAL | Age: 57
End: 2022-09-07
Attending: INTERNAL MEDICINE
Payer: MEDICARE

## 2022-09-07 VITALS — SYSTOLIC BLOOD PRESSURE: 100 MMHG | DIASTOLIC BLOOD PRESSURE: 59 MMHG | TEMPERATURE: 100 F | HEART RATE: 123 BPM

## 2022-09-07 DIAGNOSIS — C77.9 REGIONAL LYMPH NODE METASTASIS PRESENT: ICD-10-CM

## 2022-09-07 DIAGNOSIS — C34.92 SQUAMOUS CELL CARCINOMA OF BOTH LUNGS: Primary | ICD-10-CM

## 2022-09-07 DIAGNOSIS — E86.0 DEHYDRATION: ICD-10-CM

## 2022-09-07 DIAGNOSIS — C32.9 LARYNGEAL CANCER: ICD-10-CM

## 2022-09-07 DIAGNOSIS — C34.91 SQUAMOUS CELL CARCINOMA OF BOTH LUNGS: Primary | ICD-10-CM

## 2022-09-07 PROCEDURE — 96417 CHEMO IV INFUS EACH ADDL SEQ: CPT

## 2022-09-07 PROCEDURE — 96375 TX/PRO/DX INJ NEW DRUG ADDON: CPT

## 2022-09-07 PROCEDURE — 96367 TX/PROPH/DG ADDL SEQ IV INF: CPT

## 2022-09-07 PROCEDURE — 63600175 PHARM REV CODE 636 W HCPCS: Performed by: NURSE PRACTITIONER

## 2022-09-07 PROCEDURE — 25000003 PHARM REV CODE 250: Performed by: NURSE PRACTITIONER

## 2022-09-07 PROCEDURE — 96413 CHEMO IV INFUSION 1 HR: CPT

## 2022-09-07 PROCEDURE — 77386 HC IMRT, COMPLEX: CPT | Performed by: RADIOLOGY

## 2022-09-07 RX ORDER — DIPHENHYDRAMINE HYDROCHLORIDE 50 MG/ML
25 INJECTION INTRAMUSCULAR; INTRAVENOUS
Status: COMPLETED | OUTPATIENT
Start: 2022-09-07 | End: 2022-09-07

## 2022-09-07 RX ORDER — FAMOTIDINE 10 MG/ML
20 INJECTION INTRAVENOUS
Status: COMPLETED | OUTPATIENT
Start: 2022-09-07 | End: 2022-09-07

## 2022-09-07 RX ORDER — HEPARIN 100 UNIT/ML
500 SYRINGE INTRAVENOUS
Status: DISCONTINUED | OUTPATIENT
Start: 2022-09-07 | End: 2022-09-07 | Stop reason: HOSPADM

## 2022-09-07 RX ORDER — DIPHENHYDRAMINE HYDROCHLORIDE 50 MG/ML
50 INJECTION INTRAMUSCULAR; INTRAVENOUS ONCE AS NEEDED
Status: DISCONTINUED | OUTPATIENT
Start: 2022-09-07 | End: 2022-09-07 | Stop reason: HOSPADM

## 2022-09-07 RX ORDER — SODIUM CHLORIDE 0.9 % (FLUSH) 0.9 %
10 SYRINGE (ML) INJECTION
Status: DISCONTINUED | OUTPATIENT
Start: 2022-09-07 | End: 2022-09-07 | Stop reason: HOSPADM

## 2022-09-07 RX ORDER — EPINEPHRINE 0.3 MG/.3ML
0.3 INJECTION SUBCUTANEOUS ONCE AS NEEDED
Status: DISCONTINUED | OUTPATIENT
Start: 2022-09-07 | End: 2022-09-07 | Stop reason: HOSPADM

## 2022-09-07 RX ADMIN — PALONOSETRON 0.25 MG: 0.25 INJECTION, SOLUTION INTRAVENOUS at 11:09

## 2022-09-07 RX ADMIN — CARBOPLATIN 290 MG: 10 INJECTION, SOLUTION INTRAVENOUS at 01:09

## 2022-09-07 RX ADMIN — DIPHENHYDRAMINE HYDROCHLORIDE 25 MG: 50 INJECTION, SOLUTION INTRAMUSCULAR; INTRAVENOUS at 11:09

## 2022-09-07 RX ADMIN — PACLITAXEL 78 MG: 6 INJECTION, SOLUTION INTRAVENOUS at 12:09

## 2022-09-07 RX ADMIN — FAMOTIDINE 20 MG: 10 INJECTION INTRAVENOUS at 11:09

## 2022-09-08 PROCEDURE — 77386 HC IMRT, COMPLEX: CPT | Performed by: RADIOLOGY

## 2022-09-09 PROCEDURE — 77386 HC IMRT, COMPLEX: CPT | Performed by: RADIOLOGY

## 2022-09-12 PROCEDURE — 77386 HC IMRT, COMPLEX: CPT | Performed by: RADIOLOGY

## 2022-09-12 PROCEDURE — 77336 RADIATION PHYSICS CONSULT: CPT | Performed by: RADIOLOGY

## 2022-09-13 ENCOUNTER — OFFICE VISIT (OUTPATIENT)
Dept: HEMATOLOGY/ONCOLOGY | Facility: CLINIC | Age: 57
End: 2022-09-13
Payer: MEDICARE

## 2022-09-13 ENCOUNTER — CLINICAL SUPPORT (OUTPATIENT)
Dept: HEMATOLOGY/ONCOLOGY | Facility: CLINIC | Age: 57
End: 2022-09-13
Payer: MEDICARE

## 2022-09-13 VITALS
HEIGHT: 69 IN | TEMPERATURE: 98 F | BODY MASS INDEX: 23.11 KG/M2 | DIASTOLIC BLOOD PRESSURE: 72 MMHG | OXYGEN SATURATION: 95 % | SYSTOLIC BLOOD PRESSURE: 118 MMHG | HEART RATE: 112 BPM | WEIGHT: 156 LBS

## 2022-09-13 DIAGNOSIS — C34.91 SQUAMOUS CELL CARCINOMA OF BOTH LUNGS: Primary | ICD-10-CM

## 2022-09-13 DIAGNOSIS — C34.92 SQUAMOUS CELL CARCINOMA OF BOTH LUNGS: Primary | ICD-10-CM

## 2022-09-13 PROCEDURE — 99999 PR PBB SHADOW E&M-EST. PATIENT-LVL IV: CPT | Mod: PBBFAC,,, | Performed by: NURSE PRACTITIONER

## 2022-09-13 PROCEDURE — 99215 OFFICE O/P EST HI 40 MIN: CPT | Mod: S$PBB,,, | Performed by: NURSE PRACTITIONER

## 2022-09-13 PROCEDURE — 99215 PR OFFICE/OUTPT VISIT, EST, LEVL V, 40-54 MIN: ICD-10-PCS | Mod: S$PBB,,, | Performed by: NURSE PRACTITIONER

## 2022-09-13 PROCEDURE — 99214 OFFICE O/P EST MOD 30 MIN: CPT | Mod: PBBFAC,25 | Performed by: NURSE PRACTITIONER

## 2022-09-13 PROCEDURE — 77386 HC IMRT, COMPLEX: CPT | Performed by: RADIOLOGY

## 2022-09-13 PROCEDURE — 99999 PR PBB SHADOW E&M-EST. PATIENT-LVL IV: ICD-10-PCS | Mod: PBBFAC,,, | Performed by: NURSE PRACTITIONER

## 2022-09-13 RX ORDER — HEPARIN 100 UNIT/ML
500 SYRINGE INTRAVENOUS
Status: CANCELLED | OUTPATIENT
Start: 2022-09-14

## 2022-09-13 RX ORDER — DIPHENHYDRAMINE HYDROCHLORIDE 50 MG/ML
25 INJECTION INTRAMUSCULAR; INTRAVENOUS
Status: CANCELLED
Start: 2022-09-14

## 2022-09-13 RX ORDER — SODIUM CHLORIDE 0.9 % (FLUSH) 0.9 %
10 SYRINGE (ML) INJECTION
Status: CANCELLED | OUTPATIENT
Start: 2022-09-14

## 2022-09-13 RX ORDER — HEPARIN 100 UNIT/ML
500 SYRINGE INTRAVENOUS
Status: CANCELLED | OUTPATIENT
Start: 2022-09-21

## 2022-09-13 RX ORDER — DIPHENHYDRAMINE HYDROCHLORIDE 50 MG/ML
50 INJECTION INTRAMUSCULAR; INTRAVENOUS ONCE AS NEEDED
Status: CANCELLED | OUTPATIENT
Start: 2022-09-14

## 2022-09-13 RX ORDER — SODIUM CHLORIDE 0.9 % (FLUSH) 0.9 %
10 SYRINGE (ML) INJECTION
Status: CANCELLED | OUTPATIENT
Start: 2022-09-21

## 2022-09-13 RX ORDER — EPINEPHRINE 0.3 MG/.3ML
0.3 INJECTION SUBCUTANEOUS ONCE AS NEEDED
Status: CANCELLED | OUTPATIENT
Start: 2022-09-14

## 2022-09-13 RX ORDER — FAMOTIDINE 10 MG/ML
20 INJECTION INTRAVENOUS
Status: CANCELLED | OUTPATIENT
Start: 2022-09-14

## 2022-09-13 NOTE — PROGRESS NOTES
Oncology Nutrition Assessment for Medical Nutrition Therapy      Josafat Boudreaux   1965    Referring Provider:   Yara Atkinson MD     Reason for Visit:  Physician Consult     Nutrition Recommendations:  1. Continue po intake as tolerated  2. Continue Diabetisource 6 cartons per day as tolerated  3. 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 He is bolusing 6 cartons of Diabetisource via PEG per day and tolerating well. He is also eating some by mouth at this time, although I am not sure he was cleared by SLP for this. Notes that soreness to throat has started now that he is on XRT. He has no other complaints today. Per EMR his UBW was ~155#, but he dropped down to ~130# with diagnosis and surgery. However, he is back up to 156# at this time.     9/13/22: pt here for NP f/u. He reports stable wt, continues using PEG with no changes in his formula. No c/o n/v/c/d.     Nutrition Factors Affecting Intake  difficulty/impaired swallowing    PMHx:   Past Medical History:   Diagnosis Date    Cancer     COPD (chronic obstructive pulmonary disease)     Dysphagia     Lung cancer     Vocal cord mass        Allergies: Patient has no known allergies.    Current Medications:    Current Outpatient Medications:     acetaminophen (TYLENOL) 160 mg/5 mL Liqd, Take 20.3 mLs (649.6 mg total) by mouth every 6 (six) hours as needed (Mild pain)., Disp: 473 mL, Rfl: 0    albuterol-ipratropium (DUO-NEB) 2.5 mg-0.5 mg/3 mL nebulizer solution, Take 3 mLs by nebulization every 4 (four) hours as needed for Shortness of Breath or Wheezing. Rescue, Disp: 90 mL, Rfl: 1    budesonide (PULMICORT) 0.5 mg/2 mL nebulizer solution, Take 2 mLs (0.5 mg total) by nebulization every 12 (twelve) hours. Controller (Patient not taking: No sig reported), Disp: 90 mL, Rfl: 1    diphenhydrAMINE (BENADRYL) 25 mg capsule, 1 capsule (25 mg  "total) by Per G Tube route every 6 (six) hours as needed for Itching., Disp: 90 capsule, Rfl: 1    famotidine (PEPCID) 20 MG tablet, Take 1 tablet (20 mg total) by mouth every evening., Disp: 30 tablet, Rfl: 11    glutamine 10 gram PwPk, Take 10 g by mouth 2 (two) times a day., Disp: , Rfl:     hydrocortisone 1 % cream, Apply topically 2 (two) times daily as needed (rash). Apply to rash, Disp: 30 g, Rfl: 1    ibuprofen (ADVIL,MOTRIN) 100 mg/5 mL suspension, Take 30 mLs (600 mg total) by mouth every 6 (six) hours as needed for Pain (mild to moderate)., Disp: 354 mL, Rfl: 0    ondansetron (ZOFRAN-ODT) 8 MG TbDL, Take 8 mg by mouth every 8 (eight) hours as needed for Nausea. Tab 1 ODT in AM for 2 days after chemotherapy, Disp: , Rfl:     pantoprazole (PROTONIX) 20 MG tablet, Take 1 tablet (20 mg total) by mouth once daily., Disp: 30 tablet, Rfl: 11    silver nitrate applicators 75-25 % applicator, Apply topically 3 (three) times a week., Disp: 1 applicator, Rfl: 0    Labs: 9/13 chloride 95 (L)    Anthropometrics    Height:   Ht Readings from Last 1 Encounters:   09/13/22 5' 9" (1.753 m)      Weight:   Wt Readings from Last 5 Encounters:   09/13/22 70.8 kg (156 lb)   08/31/22 70.9 kg (156 lb 6.4 oz)   08/30/22 70.8 kg (156 lb)   08/24/22 71.1 kg (156 lb 12 oz)   08/17/22 71.1 kg (156 lb 12.8 oz)        Usual Body Weight: 70 kg (154 lb)  % Weight Change: -15.5% in six months to lowest wt 130#, however pt has gained back 26#    BMI: 23 (normal)    Ideal Weight: 72.7 kg (160 lb)  % Ideal Weight: 98%    Estimated Needs  1849 kcal/day              [place] St. Jeor x 1.1 activity factor x 1.1 stress factor  78 g protein/day          1.1 g/kg CBW  1849 mL fluid/day       1 mL/kcal    Formula: Diabetisource AC  Rate/Volume: 6 cartons/day  Water Flushes: 60 mL with each carton  Additives/Modulars: none at this time  Route: PEG tube  Method: bolus  Total Nutrition Provided by Tube Feeding, Additives, and Flushes:  Calories " Provided  1800 kcal/d, 97% needs   Protein Provided  90 g/d, 115% needs   Fluid Provided  1584 ml/d, 86% needs   Continuous feeding calculations based on estimated 20 hr/d run time unless otherwise stated.    Nutrition Diagnosis    PES: Swallowing difficulty related to laryngeal CA as evidenced by need for PEG to provide majority of nutrition. (continues)     Nutrition Risk  moderate    Nutrition Intervention    Interventions(treatment strategy):  Collaboration with other providers      Nutrition Monitoring and Evaluation    Monitor: enteral nutrition intake, weight change, and electrolyte/renal panel    Follow up at next provider visit.        Vidhya Brown, MS, RD, , LDN

## 2022-09-13 NOTE — PROGRESS NOTES
HEMATOLOGY/ONCOLOGY OFFICE CLINIC VISIT    Visit Information:    Initial Evaluation: 7/21/2022  Referring Provider:   Other providers:  Code status:    Diagnosis:  [id number] JENNYFER Laryngeal Squamous cell carcinoma     Present treatment:  Carbo/Taxol + RT    Treatment/Oncology history:  --7/11/2022: total laryngectomy    Plan of care:     Imaging:  Ct neck 6/6/2022: A metastatic right level III cervical lymph node is present with short axis measurement of 1.6 cm.  This lymph node contains internal cystic change, typical of metastatic squamous cell carcinoma.  A left level III cervical lymph node shows short axis measurements of 1 cm, and is suspicious.    A left supraglottic mass measures approximately 2.4 cm in diameter (axial post-contrast image 39), presumably corresponding to the primary neoplasm.  Metastatic lymph nodes are also present in the inferior right paratracheal position, measuring 1.3 cm in short axis.  Metastatic lymph nodes are present in the superior right hilum, measuring 1.8 cm in short axis.  Ct H&N 6/27/2022: 1. There is mild stenosis at the origin of left proximal internal carotid artery secondary to dense calcified atheromatous plaque. 2. There is consolidation is right upper lobe. Additional ground-glass opacities are also seen on the remainder of the visualized lungs. These findings are consistent with an infectious process. Small right pleural effusion is seen. There is an ill-defined soft tissue mass in the right perihilar region which measures approximately 3.1 x 2.9 x 4.6 cm, of concern for neoplasm. Correlate clinically as regards further evaluation and followup with CT chest. There is an ill-defined enhancing soft tissue mass in the glottis infiltrating into the paraglottic spaces and the subglottic region with obliteration of the airway, of concern for a neoplasm. 3. Unremarkable CT angiogram of the head. Details and other findings as described above.  NM PET CT 8/1/2022:  Postoperative changes of recent total laryngectomy and lou dissection. bilateral hypermetabolic cervical lymphadenopathy.  A right cervical lymph node 1.7 x 1.8 cm, compared to 1.6 x 1.5 cm previously, SUV of 7.7. left cervical lymph node 8 x 10 mm and has a max SUV of 3.4.  A fluid collection posterior to the left internal jugular vein measures 2.4 x 2.4 cm. There is right hilar and mediastinal lymphadenopathy.  A precarinal lymph node 1.6 x 2.3 cm, compared to 1.3 x 1.7 cm previously, SUV of 13.8.  Right hilar lymphadenopathy measures 3 x 2.9 cm, compared to 2.1 x 2.3 cm previously, and has a max SUV of 9.8.  A suspected right hilar mass measures 1.8 x 3.6 cm SUV of 14.8     Pathology:  6/14/2022:  EBUS ENDOBRONCHIAL MASS RUL, CYTOLOGY: NEARLY ACELLULAR SPECIMEN CONSISTING OF FRESH BLOOD CLOT. NO ATYPICAL OR MALIGNANT CELLS IDENTIFIED.   7/11/2022: Bronchial Wash, RLL, right bronchial mass washings:  - Squamous cell carcinoma.    Bronchial Wash, LLL, left bronchial mass washings: - Squamous cell carcinoma.     7/11/2022 Laryngectomy:  1. Larynx, laryngeal biopsy frozen section : - Squamous cell carcinoma with necrosis.    2. Neck, Anterior, left neck level 3: - Number of lymph nodes involved by carcinoma:  1/7       - Size of metastatic deposit:  2.5 mm - Extranodal extension:  Not identified      3. Larynx, resection without nodes, total layrngectomy :  - Squamous cell carcinoma.  4. Neck, Anterior, left neck level 4: - Number of lymph nodes involved by carcinoma:  0/10  5. Neck, Anterior, right neck level 4:  - Number of lymph nodes involved by carcinoma:  0/11  6. Neck, Anterior, right neck level 3: - Number of lymph nodes involved by carcinoma:  0/9  7. Nasophaynx, inferior pharyngeal mucosa : - Benign squamous mucosa. - No evidence of malignancy.     8. Cartilage, superior cartilage margin : - No evidence of malignancy.    9. Lymph Node, pretracheal lymph  node :  - Number of lymph nodes involved by  carcinoma:  0/3  [id number]       CLINICAL HISTORY:       Patient: Josafat Boudreaux is a 56 y.o. male kindly refer her for laryngeal carcinoma.  Patient  was referred to ENT for further evaluation of hoarseness.  He did not have any difficulty swallowing or dry mouth at the time.  Hoarseness has been present for at least 4 months prior to evaluation. 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     Patient was scheduled to trach but on 06/10/2022 he was brought to the emergency room via air met with is short of breath.  He was found to have and oxygenation on the 40s when EMS was called.  He was placed on BiPAP but pCO2 increased so he has an emergent tracheostomy performed.  He has a PEG tube placed same hospitalization.  He underwent bronchoscopy with biopsy of the right upper lobe endobronchial lesion on 6/14/2022 cytology was negative for malignant cells.      On 7/11/2022 he underwent LARYNGECTOMY, WITH RADICAL NECK DISSECTION - Bilateral LARYNGOSCOPY, DIRECT, DIAGNOSTIC, WITH BRONCHOSCOPY AND ESOPHAGOSCOPY pathology report as above.  1/40 lymph nodes were involved with metastatic disease.  Bronchoscopy was repeated and biopsy of the left lower lobe and right lower lobe were both positive for squamous cell carcinoma.    He is here today with his wife.  He has recovered from surgery relatively well.  Tracheostomy in place.  He is unable to talk so the history was taken by electronic medical record and wife.    Patient has a brother with history of throat cancer. Patient used to smoke 1 and half pack per day since he was 60 years old.  He was smoking less recently.    Chief Complaint: States his blood pressure have been running low      Interval History:  Last visit Discussed with Dr Das. PET/CT showed progression of disease in his neck with bilateral hypermetabolic cervical lymphadenopathy. This progressed rather quick. He will need chemoRT to  controlled disease in the neck. It also showed Hypermetabolic right hilar mass with mediastinal and right hilar lymphadenopathy and he has  Left mass biopsy proven Sq cell Carcinoma. Once he complete chemoradiation to neck will scan him again to eval lung disease and may benefit of RT to lung mass vs systemic therapy for his stage IV Lung cancer.      9/13/22: Patient presents to clinic today for follow and continuation of his treatment. Started Carbo/Taxol on 8/17/2022.  He is currently receiving chemoradiation.  Tolerating fairly well.  He has some p.o. intake but mostly PEG tube feeding.   Denies fever, chills or sweats. No chest pain or shortness of breath. Bowels are moving well. Denies bleeding. Pain controlled with 5 mg Norco 1 tab at night only.    Past Medical History:   Diagnosis Date    Cancer     COPD (chronic obstructive pulmonary disease)     Dysphagia     Lung cancer     Vocal cord mass       Past Surgical History:   Procedure Laterality Date    DIRECT DIAGNOSTIC LARYNGOSCOPY WITH BRONCHOSCOPY AND ESOPHAGOSCOPY N/A 7/11/2022    Procedure: LARYNGOSCOPY, DIRECT, DIAGNOSTIC, WITH BRONCHOSCOPY AND ESOPHAGOSCOPY;  Surgeon: Emory Alonso MD;  Location: [facility];  Service: ENT;  Laterality: N/A;    HIP SURGERY      HUMERUS FRACTURE SURGERY      INSERT ARTERIAL LINE  6/26/2022         TRACHEOSTOMY N/A 6/10/2022    Procedure: CREATION, TRACHEOSTOMY;  Surgeon: Junior Alonso MD;  Location: [facility];  Service: ENT;  Laterality: N/A;     Family History   Problem Relation Age of Onset    Lung cancer Father     Throat cancer Brother      Social Connections: Not on file       Review of patient's allergies indicates:  No Known Allergies   Current Outpatient Medications on File Prior to Visit   Medication Sig Dispense Refill    acetaminophen (TYLENOL) 160 mg/5 mL Liqd Take 20.3 mLs (649.6 mg total) by mouth every 6 (six) hours as needed (Mild pain). 473 mL 0    albuterol-ipratropium (DUO-NEB) 2.5 mg-0.5 mg/3 mL  nebulizer solution Take 3 mLs by nebulization every 4 (four) hours as needed for Shortness of Breath or Wheezing. Rescue 90 mL 1    diphenhydrAMINE (BENADRYL) 25 mg capsule 1 capsule (25 mg total) by Per G Tube route every 6 (six) hours as needed for Itching. 90 capsule 1    famotidine (PEPCID) 20 MG tablet Take 1 tablet (20 mg total) by mouth every evening. 30 tablet 11    glutamine 10 gram PwPk Take 10 g by mouth 2 (two) times a day.      hydrocortisone 1 % cream Apply topically 2 (two) times daily as needed (rash). Apply to rash 30 g 1    ibuprofen (ADVIL,MOTRIN) 100 mg/5 mL suspension Take 30 mLs (600 mg total) by mouth every 6 (six) hours as needed for Pain (mild to moderate). 354 mL 0    ondansetron (ZOFRAN-ODT) 8 MG TbDL Take 8 mg by mouth every 8 (eight) hours as needed for Nausea. Tab 1 ODT in AM for 2 days after chemotherapy      pantoprazole (PROTONIX) 20 MG tablet Take 1 tablet (20 mg total) by mouth once daily. 30 tablet 11    silver nitrate applicators 75-25 % applicator Apply topically 3 (three) times a week. 1 applicator 0    budesonide (PULMICORT) 0.5 mg/2 mL nebulizer solution Take 2 mLs (0.5 mg total) by nebulization every 12 (twelve) hours. Controller (Patient not taking: No sig reported) 90 mL 1    [DISCONTINUED] aspirin (ECOTRIN) 81 MG EC tablet Take 81 mg by mouth once daily.       No current facility-administered medications on file prior to visit.      Review of Systems   Constitutional:  Negative for activity change, appetite change, chills, diaphoresis, fatigue, fever and unexpected weight change.   HENT:  Positive for trouble swallowing. Negative for nasal congestion, nosebleeds, postnasal drip, sinus pressure/congestion and sore throat.    Eyes:  Negative for visual disturbance.   Respiratory:  Negative for cough and shortness of breath.    Cardiovascular:  Negative for chest pain and palpitations.   Gastrointestinal:  Negative for abdominal distention, abdominal pain, blood in stool,  "change in bowel habit, constipation, diarrhea, nausea, vomiting and change in bowel habit.   Endocrine: Negative.    Genitourinary:  Negative for bladder incontinence, decreased urine volume, difficulty urinating, dysuria, frequency, hematuria, scrotal swelling, testicular pain and urgency.   Musculoskeletal:  Negative for arthralgias, back pain, gait problem, joint swelling, leg pain, myalgias and neck pain.   Integumentary:  Negative for rash.   Neurological:  Negative for dizziness, tremors, seizures, syncope, speech difficulty, weakness, light-headedness, numbness, headaches and memory loss.   Hematological:  Negative for adenopathy. Does not bruise/bleed easily.   Psychiatric/Behavioral:  Negative for agitation, confusion, hallucinations, sleep disturbance and suicidal ideas. The patient is not nervous/anxious.             Vitals:    09/13/22 1358   BP: 118/72   BP Location: Left arm   Patient Position: Sitting   Pulse: (!) 112   Temp: 97.5 °F (36.4 °C)   TempSrc: Oral   SpO2: 95%   Weight: 70.8 kg (156 lb)   Height: 5' 9" (1.753 m)      Physical Exam  Vitals and nursing note reviewed.   Constitutional:       General: He is not in acute distress.     Appearance: He is ill-appearing.      Comments: thin   HENT:      Head: Normocephalic and atraumatic.      Mouth/Throat:      Mouth: Mucous membranes are moist.   Eyes:      General: No scleral icterus.     Extraocular Movements: Extraocular movements intact.      Conjunctiva/sclera: Conjunctivae normal.   Neck:      Vascular: No JVD.      Trachea: Tracheostomy present.      Comments: In place. Surgical site well healed.  Cardiovascular:      Rate and Rhythm: Normal rate and regular rhythm.      Heart sounds: No murmur heard.  Pulmonary:      Effort: Pulmonary effort is normal.      Breath sounds: Normal breath sounds. No wheezing or rhonchi.   Chest:      Chest wall: No tenderness.   Abdominal:      General: The ostomy site is clean. Bowel sounds are normal. There " is no distension.      Palpations: Abdomen is soft.      Tenderness: There is no abdominal tenderness.   Musculoskeletal:         General: No swelling or deformity.      Cervical back: Neck supple.   Lymphadenopathy:      Cervical: No cervical adenopathy.      Upper Body:      Right upper body: No supraclavicular or axillary adenopathy.      Left upper body: No supraclavicular or axillary adenopathy.      Lower Body: No right inguinal adenopathy. No left inguinal adenopathy.   Skin:     General: Skin is warm.      Coloration: Skin is not jaundiced.      Findings: No rash.   Neurological:      General: No focal deficit present.      Mental Status: He is alert and oriented to person, place, and time.   Psychiatric:         Attention and Perception: Attention normal.         Mood and Affect: Mood and affect normal. Mood is not anxious.         Behavior: Behavior is cooperative.         Cognition and Memory: Cognition normal.         Judgment: Judgment normal.     ECOG SCORE             Laboratory:  CBC with Differential:  Lab Results   Component Value Date    WBC 5.4 09/13/2022    RBC 4.04 (L) 09/13/2022    HGB 12.8 (L) 09/13/2022    HCT 39.0 (L) 09/13/2022    MCV 96.5 (H) 09/13/2022    MCH 31.7 (H) 09/13/2022    MCHC 32.8 (L) 09/13/2022    RDW 14.9 09/13/2022     09/13/2022    MPV 9.0 09/13/2022        CMP:  Sodium Level   Date Value Ref Range Status   09/13/2022 136 136 - 145 mmol/L Final     Potassium Level   Date Value Ref Range Status   09/13/2022 4.5 3.5 - 5.1 mmol/L Final     Carbon Dioxide   Date Value Ref Range Status   09/13/2022 31 (H) 22 - 29 mmol/L Final     Blood Urea Nitrogen   Date Value Ref Range Status   09/13/2022 12.4 8.4 - 25.7 mg/dL Final     Creatinine   Date Value Ref Range Status   09/13/2022 0.66 (L) 0.73 - 1.18 mg/dL Final     Calcium Level Total   Date Value Ref Range Status   09/13/2022 9.9 8.4 - 10.2 mg/dL Final     Albumin Level   Date Value Ref Range Status   09/13/2022 3.7 3.5 -  5.0 gm/dL Final     Bilirubin Total   Date Value Ref Range Status   09/13/2022 0.6 <=1.5 mg/dL Final     Alkaline Phosphatase   Date Value Ref Range Status   09/13/2022 83 40 - 150 unit/L Final     Aspartate Aminotransferase   Date Value Ref Range Status   09/13/2022 16 5 - 34 unit/L Final     Alanine Aminotransferase   Date Value Ref Range Status   09/13/2022 11 0 - 55 unit/L Final     Estimated GFR-Non    Date Value Ref Range Status   08/02/2022 >60 mls/min/1.73/m2 Final             Assessment:       No diagnosis found.      Laryngeal Squamous cell carcinoma -s/p total laryngectomy, 1/40 LN with metastatic disease.   Lung masses x 2, bilateral--> Squamous cell carcinoma    I discussed with the patient and his wife, diagnosis, prognosis and treatment recommendations.  Discussed briefly chemotherapy, discussed risk versus benefits as well as toxicities associated with it.   Patient with long history of smoking.  I discussed with them that head and neck cancer lung cancer are very common in smokers.  I explained to them that lung cancer could or could not be associated with his head and neck cancer and lung cancer could be a 2nd primary.  Either way he has it in both lungs therefore a stage IV.  As per patient report, I do not have the official report, he has 1 mass in each of the lung bases and nothing other placed.   He will see Dr. Das Monday.  I would like to discuss case with him after review his scans to see what will be best treatment strategy.      He most likely need RT to his neck and he will benefit of concommittant chemotherapy.  If he only have one lesion in each lung and not too big, maybe RT to each lesions may be an alternative as well +/- chemotherapy during and after RT.       Plan.:       Will cont chemotherapy as planned  Instructed to increase water intake through PEG (2) 8 oz cups per 24 hours to prevent dehydration.   Cont weekly carbo/Taxol  Add 1L of NS bolus with chemo each  week.  RTC in 2 weeks for Td with MD/NP    CBC, CMP weekly and prior to chemotherapy.  Encourage to call or message us for any questions or problems  The patient was given ample opportunity to ask questions, and to the best of my abilities, all questions answered to satisfaction; patient demonstrated understanding of what we discussed and agreeable to the plan.       OZZY Rod

## 2022-09-14 ENCOUNTER — INFUSION (OUTPATIENT)
Dept: INFUSION THERAPY | Facility: HOSPITAL | Age: 57
End: 2022-09-14
Attending: INTERNAL MEDICINE
Payer: MEDICARE

## 2022-09-14 VITALS
SYSTOLIC BLOOD PRESSURE: 107 MMHG | HEART RATE: 112 BPM | DIASTOLIC BLOOD PRESSURE: 61 MMHG | OXYGEN SATURATION: 96 % | BODY MASS INDEX: 23.27 KG/M2 | TEMPERATURE: 99 F | WEIGHT: 157.63 LBS

## 2022-09-14 DIAGNOSIS — C77.9 REGIONAL LYMPH NODE METASTASIS PRESENT: ICD-10-CM

## 2022-09-14 DIAGNOSIS — C34.92 SQUAMOUS CELL CARCINOMA OF BOTH LUNGS: ICD-10-CM

## 2022-09-14 DIAGNOSIS — C32.9 LARYNGEAL CANCER: Primary | ICD-10-CM

## 2022-09-14 DIAGNOSIS — C34.91 SQUAMOUS CELL CARCINOMA OF BOTH LUNGS: ICD-10-CM

## 2022-09-14 PROCEDURE — 96375 TX/PRO/DX INJ NEW DRUG ADDON: CPT

## 2022-09-14 PROCEDURE — 96417 CHEMO IV INFUS EACH ADDL SEQ: CPT

## 2022-09-14 PROCEDURE — 96367 TX/PROPH/DG ADDL SEQ IV INF: CPT

## 2022-09-14 PROCEDURE — 25000003 PHARM REV CODE 250: Performed by: NURSE PRACTITIONER

## 2022-09-14 PROCEDURE — 96413 CHEMO IV INFUSION 1 HR: CPT

## 2022-09-14 PROCEDURE — 77386 HC IMRT, COMPLEX: CPT | Performed by: RADIOLOGY

## 2022-09-14 PROCEDURE — 63600175 PHARM REV CODE 636 W HCPCS: Performed by: NURSE PRACTITIONER

## 2022-09-14 RX ORDER — DIPHENHYDRAMINE HYDROCHLORIDE 50 MG/ML
25 INJECTION INTRAMUSCULAR; INTRAVENOUS
Status: COMPLETED | OUTPATIENT
Start: 2022-09-14 | End: 2022-09-14

## 2022-09-14 RX ORDER — HEPARIN 100 UNIT/ML
500 SYRINGE INTRAVENOUS
Status: DISCONTINUED | OUTPATIENT
Start: 2022-09-14 | End: 2022-09-14 | Stop reason: HOSPADM

## 2022-09-14 RX ORDER — SODIUM CHLORIDE 0.9 % (FLUSH) 0.9 %
10 SYRINGE (ML) INJECTION
Status: DISCONTINUED | OUTPATIENT
Start: 2022-09-14 | End: 2022-09-14 | Stop reason: HOSPADM

## 2022-09-14 RX ORDER — DIPHENHYDRAMINE HYDROCHLORIDE 50 MG/ML
50 INJECTION INTRAMUSCULAR; INTRAVENOUS ONCE AS NEEDED
Status: DISCONTINUED | OUTPATIENT
Start: 2022-09-14 | End: 2022-09-14 | Stop reason: HOSPADM

## 2022-09-14 RX ORDER — FAMOTIDINE 10 MG/ML
20 INJECTION INTRAVENOUS
Status: COMPLETED | OUTPATIENT
Start: 2022-09-14 | End: 2022-09-14

## 2022-09-14 RX ORDER — EPINEPHRINE 0.3 MG/.3ML
0.3 INJECTION SUBCUTANEOUS ONCE AS NEEDED
Status: DISCONTINUED | OUTPATIENT
Start: 2022-09-14 | End: 2022-09-14 | Stop reason: HOSPADM

## 2022-09-14 RX ADMIN — PALONOSETRON 0.25 MG: 0.25 INJECTION, SOLUTION INTRAVENOUS at 08:09

## 2022-09-14 RX ADMIN — FAMOTIDINE 20 MG: 10 INJECTION, SOLUTION INTRAVENOUS at 08:09

## 2022-09-14 RX ADMIN — DIPHENHYDRAMINE HYDROCHLORIDE 25 MG: 50 INJECTION, SOLUTION INTRAMUSCULAR; INTRAVENOUS at 08:09

## 2022-09-14 RX ADMIN — PACLITAXEL 78 MG: 6 INJECTION, SOLUTION INTRAVENOUS at 09:09

## 2022-09-14 RX ADMIN — SODIUM CHLORIDE 1000 ML: 9 INJECTION, SOLUTION INTRAVENOUS at 08:09

## 2022-09-14 RX ADMIN — CARBOPLATIN 290 MG: 10 INJECTION, SOLUTION INTRAVENOUS at 10:09

## 2022-09-14 RX ADMIN — HEPARIN 500 UNITS: 100 SYRINGE at 11:09

## 2022-09-14 NOTE — PROCEDURES
Patient:   Josafat Boudreaux            MRN: [id number]            FIN: [id number]               Age:   56 years     Sex:  Male     :  1965   Associated Diagnoses:   None   Author:   Trevon Mar MD      Department of Anesthesiology  Procedure Note: Peripheral Nerve Block    Date: _3.30.22  Procedure performed in holding prior to the induction of anesthesia  Start Time: _1000  Stop Time: _1002  Procedure Requested for Post-Op Pain Management  Surgical Procedure: __ humerus ORIF  Procedure Indication: Post Operative Pain Management    Procedure:  Right _x_        Left: __      single Injection    Block Type: supraclav.  post op block for post op pain    Time Out Performed and site marked  Risks, benefits, alternatives, post-op block activity discussed and consent obtained  Patient name and ID number identified and confirmed  Procedure and site identified and confirmed    IV Sedation - IV sedation used and titrated for patient comfort - see MAR    Technique  Aseptic skin prep with chlorhexidine   Area draped with sterile towels and sterile technique used  Local anesthetic injected through the stimulating needle      No pain on injection  No Paresthesia on Injection  Local anesthetic _____ropiv 0.5______ % to a total of ___40____mL    Ultrasound guidance used to visualize the nerve bundle and sheath as well as to confirm needle placement and deposition of the local anesthetic.     Ultrasound photos archived with medical records.  Patient tolerated procedure well.  There were no immediate complications.    Trevon Mar M.D.

## 2022-09-15 PROCEDURE — 77386 HC IMRT, COMPLEX: CPT | Performed by: RADIOLOGY

## 2022-09-16 PROCEDURE — 77386 HC IMRT, COMPLEX: CPT | Performed by: RADIOLOGY

## 2022-09-19 PROCEDURE — 77336 RADIATION PHYSICS CONSULT: CPT | Performed by: RADIOLOGY

## 2022-09-19 PROCEDURE — 77386 HC IMRT, COMPLEX: CPT | Performed by: RADIOLOGY

## 2022-09-20 ENCOUNTER — LAB VISIT (OUTPATIENT)
Dept: LAB | Facility: HOSPITAL | Age: 57
End: 2022-09-20
Attending: INTERNAL MEDICINE
Payer: MEDICARE

## 2022-09-20 DIAGNOSIS — C34.92 SQUAMOUS CELL CARCINOMA OF BOTH LUNGS: ICD-10-CM

## 2022-09-20 DIAGNOSIS — C77.9 REGIONAL LYMPH NODE METASTASIS PRESENT: ICD-10-CM

## 2022-09-20 DIAGNOSIS — C34.91 SQUAMOUS CELL CARCINOMA OF BOTH LUNGS: ICD-10-CM

## 2022-09-20 DIAGNOSIS — C32.9 LARYNGEAL CANCER: ICD-10-CM

## 2022-09-20 LAB
ABS NEUT CALC (OHS): 2.58 X10(3)/MCL (ref 2.1–9.2)
ALBUMIN SERPL-MCNC: 3.4 GM/DL (ref 3.5–5)
ALBUMIN/GLOB SERPL: 1.1 RATIO (ref 1.1–2)
ALP SERPL-CCNC: 69 UNIT/L (ref 40–150)
ALT SERPL-CCNC: 9 UNIT/L (ref 0–55)
AST SERPL-CCNC: 15 UNIT/L (ref 5–34)
BILIRUBIN DIRECT+TOT PNL SERPL-MCNC: 0.6 MG/DL
BUN SERPL-MCNC: 18.5 MG/DL (ref 8.4–25.7)
CALCIUM SERPL-MCNC: 9.3 MG/DL (ref 8.4–10.2)
CHLORIDE SERPL-SCNC: 94 MMOL/L (ref 98–107)
CO2 SERPL-SCNC: 31 MMOL/L (ref 22–29)
CREAT SERPL-MCNC: 0.79 MG/DL (ref 0.73–1.18)
ERYTHROCYTE [DISTWIDTH] IN BLOOD BY AUTOMATED COUNT: 15.7 % (ref 11.5–17)
GFR SERPLBLD CREATININE-BSD FMLA CKD-EPI: >60 MLS/MIN/1.73/M2
GLOBULIN SER-MCNC: 3.2 GM/DL (ref 2.4–3.5)
GLUCOSE SERPL-MCNC: 81 MG/DL (ref 74–100)
HCT VFR BLD AUTO: 34.5 % (ref 42–52)
HGB BLD-MCNC: 11.4 GM/DL (ref 14–18)
IMM GRANULOCYTES # BLD AUTO: 0.01 X10(3)/MCL (ref 0–0.04)
IMM GRANULOCYTES NFR BLD AUTO: 0.3 %
LYMPHOCYTES NFR BLD MANUAL: 0.57 X10(3)/MCL
LYMPHOCYTES NFR BLD MANUAL: 15 % (ref 13–40)
MCH RBC QN AUTO: 32.5 PG (ref 27–31)
MCHC RBC AUTO-ENTMCNC: 33 MG/DL (ref 33–36)
MCV RBC AUTO: 98.3 FL (ref 80–94)
MONOCYTES NFR BLD MANUAL: 0.65 X10(3)/MCL (ref 0.1–1.3)
MONOCYTES NFR BLD MANUAL: 17 % (ref 2–11)
NEUTROPHILS NFR BLD MANUAL: 68 % (ref 47–80)
PLATELET # BLD AUTO: 161 X10(3)/MCL (ref 130–400)
PLATELET # BLD EST: NORMAL 10*3/UL
PMV BLD AUTO: 8.5 FL (ref 7.4–10.4)
POTASSIUM SERPL-SCNC: 4.8 MMOL/L (ref 3.5–5.1)
PROT SERPL-MCNC: 6.6 GM/DL (ref 6.4–8.3)
RBC # BLD AUTO: 3.51 X10(6)/MCL (ref 4.7–6.1)
RBC MORPH BLD: NORMAL
SODIUM SERPL-SCNC: 133 MMOL/L (ref 136–145)
WBC # SPEC AUTO: 3.8 X10(3)/MCL (ref 4.5–11.5)

## 2022-09-20 PROCEDURE — 36415 COLL VENOUS BLD VENIPUNCTURE: CPT

## 2022-09-20 PROCEDURE — 85027 COMPLETE CBC AUTOMATED: CPT

## 2022-09-20 PROCEDURE — 80053 COMPREHEN METABOLIC PANEL: CPT

## 2022-09-20 PROCEDURE — 77386 HC IMRT, COMPLEX: CPT | Performed by: RADIOLOGY

## 2022-09-20 RX ORDER — SODIUM CHLORIDE 0.9 % (FLUSH) 0.9 %
10 SYRINGE (ML) INJECTION
Status: CANCELLED | OUTPATIENT
Start: 2022-09-21

## 2022-09-20 RX ORDER — DIPHENHYDRAMINE HYDROCHLORIDE 50 MG/ML
50 INJECTION INTRAMUSCULAR; INTRAVENOUS ONCE AS NEEDED
Status: CANCELLED | OUTPATIENT
Start: 2022-09-21

## 2022-09-20 RX ORDER — DIPHENHYDRAMINE HYDROCHLORIDE 50 MG/ML
25 INJECTION INTRAMUSCULAR; INTRAVENOUS
Status: CANCELLED
Start: 2022-09-21

## 2022-09-20 RX ORDER — EPINEPHRINE 0.3 MG/.3ML
0.3 INJECTION SUBCUTANEOUS ONCE AS NEEDED
Status: CANCELLED | OUTPATIENT
Start: 2022-09-21

## 2022-09-20 RX ORDER — FAMOTIDINE 10 MG/ML
20 INJECTION INTRAVENOUS
Status: CANCELLED | OUTPATIENT
Start: 2022-09-21

## 2022-09-20 RX ORDER — HEPARIN 100 UNIT/ML
500 SYRINGE INTRAVENOUS
Status: CANCELLED | OUTPATIENT
Start: 2022-09-21

## 2022-09-21 ENCOUNTER — INFUSION (OUTPATIENT)
Dept: INFUSION THERAPY | Facility: HOSPITAL | Age: 57
End: 2022-09-21
Attending: INTERNAL MEDICINE
Payer: MEDICARE

## 2022-09-21 VITALS
TEMPERATURE: 99 F | SYSTOLIC BLOOD PRESSURE: 92 MMHG | HEART RATE: 105 BPM | DIASTOLIC BLOOD PRESSURE: 58 MMHG | OXYGEN SATURATION: 95 %

## 2022-09-21 DIAGNOSIS — C32.9 LARYNGEAL CANCER: ICD-10-CM

## 2022-09-21 DIAGNOSIS — C77.9 REGIONAL LYMPH NODE METASTASIS PRESENT: ICD-10-CM

## 2022-09-21 DIAGNOSIS — C34.92 SQUAMOUS CELL CARCINOMA OF BOTH LUNGS: Primary | ICD-10-CM

## 2022-09-21 DIAGNOSIS — C34.91 SQUAMOUS CELL CARCINOMA OF BOTH LUNGS: Primary | ICD-10-CM

## 2022-09-21 PROCEDURE — 25000003 PHARM REV CODE 250: Performed by: NURSE PRACTITIONER

## 2022-09-21 PROCEDURE — 96413 CHEMO IV INFUSION 1 HR: CPT

## 2022-09-21 PROCEDURE — 77386 HC IMRT, COMPLEX: CPT | Performed by: RADIOLOGY

## 2022-09-21 PROCEDURE — 63600175 PHARM REV CODE 636 W HCPCS: Performed by: NURSE PRACTITIONER

## 2022-09-21 PROCEDURE — 96375 TX/PRO/DX INJ NEW DRUG ADDON: CPT

## 2022-09-21 PROCEDURE — 96417 CHEMO IV INFUS EACH ADDL SEQ: CPT

## 2022-09-21 PROCEDURE — 96367 TX/PROPH/DG ADDL SEQ IV INF: CPT

## 2022-09-21 RX ORDER — FAMOTIDINE 10 MG/ML
20 INJECTION INTRAVENOUS
Status: COMPLETED | OUTPATIENT
Start: 2022-09-21 | End: 2022-09-21

## 2022-09-21 RX ORDER — DIPHENHYDRAMINE HYDROCHLORIDE 50 MG/ML
50 INJECTION INTRAMUSCULAR; INTRAVENOUS ONCE AS NEEDED
Status: DISCONTINUED | OUTPATIENT
Start: 2022-09-21 | End: 2022-09-21 | Stop reason: HOSPADM

## 2022-09-21 RX ORDER — DIPHENHYDRAMINE HYDROCHLORIDE 50 MG/ML
25 INJECTION INTRAMUSCULAR; INTRAVENOUS
Status: COMPLETED | OUTPATIENT
Start: 2022-09-21 | End: 2022-09-21

## 2022-09-21 RX ORDER — EPINEPHRINE 0.3 MG/.3ML
0.3 INJECTION SUBCUTANEOUS ONCE AS NEEDED
Status: DISCONTINUED | OUTPATIENT
Start: 2022-09-21 | End: 2022-09-21 | Stop reason: HOSPADM

## 2022-09-21 RX ORDER — HEPARIN 100 UNIT/ML
500 SYRINGE INTRAVENOUS
Status: DISCONTINUED | OUTPATIENT
Start: 2022-09-21 | End: 2022-09-21 | Stop reason: HOSPADM

## 2022-09-21 RX ORDER — SODIUM CHLORIDE 0.9 % (FLUSH) 0.9 %
10 SYRINGE (ML) INJECTION
Status: DISCONTINUED | OUTPATIENT
Start: 2022-09-21 | End: 2022-09-21 | Stop reason: HOSPADM

## 2022-09-21 RX ADMIN — CARBOPLATIN 290 MG: 10 INJECTION, SOLUTION INTRAVENOUS at 11:09

## 2022-09-21 RX ADMIN — PALONOSETRON 0.25 MG: 0.25 INJECTION, SOLUTION INTRAVENOUS at 09:09

## 2022-09-21 RX ADMIN — FAMOTIDINE 20 MG: 10 INJECTION INTRAVENOUS at 09:09

## 2022-09-21 RX ADMIN — PACLITAXEL 78 MG: 6 INJECTION, SOLUTION INTRAVENOUS at 10:09

## 2022-09-21 RX ADMIN — DIPHENHYDRAMINE HYDROCHLORIDE 25 MG: 50 INJECTION, SOLUTION INTRAMUSCULAR; INTRAVENOUS at 09:09

## 2022-09-21 RX ADMIN — HEPARIN 500 UNITS: 100 SYRINGE at 12:09

## 2022-09-21 RX ADMIN — SODIUM CHLORIDE 1000 ML: 9 INJECTION, SOLUTION INTRAVENOUS at 09:09

## 2022-09-22 PROCEDURE — 77386 HC IMRT, COMPLEX: CPT | Performed by: RADIOLOGY

## 2022-09-23 PROCEDURE — 77386 HC IMRT, COMPLEX: CPT | Performed by: RADIOLOGY

## 2022-09-26 ENCOUNTER — LAB VISIT (OUTPATIENT)
Dept: LAB | Facility: HOSPITAL | Age: 57
End: 2022-09-26
Attending: INTERNAL MEDICINE
Payer: MEDICARE

## 2022-09-26 DIAGNOSIS — C34.92 SQUAMOUS CELL CARCINOMA OF BOTH LUNGS: ICD-10-CM

## 2022-09-26 DIAGNOSIS — C34.91 SQUAMOUS CELL CARCINOMA OF BOTH LUNGS: ICD-10-CM

## 2022-09-26 DIAGNOSIS — C77.9 REGIONAL LYMPH NODE METASTASIS PRESENT: ICD-10-CM

## 2022-09-26 DIAGNOSIS — C32.9 LARYNGEAL CANCER: ICD-10-CM

## 2022-09-26 LAB
ALBUMIN SERPL-MCNC: 3.3 GM/DL (ref 3.5–5)
ALBUMIN/GLOB SERPL: 1 RATIO (ref 1.1–2)
ALP SERPL-CCNC: 68 UNIT/L (ref 40–150)
ALT SERPL-CCNC: 7 UNIT/L (ref 0–55)
AST SERPL-CCNC: 12 UNIT/L (ref 5–34)
BASOPHILS # BLD AUTO: 0.01 X10(3)/MCL (ref 0–0.2)
BASOPHILS NFR BLD AUTO: 0.4 %
BILIRUBIN DIRECT+TOT PNL SERPL-MCNC: 0.7 MG/DL
BUN SERPL-MCNC: 15.1 MG/DL (ref 8.4–25.7)
CALCIUM SERPL-MCNC: 9 MG/DL (ref 8.4–10.2)
CHLORIDE SERPL-SCNC: 94 MMOL/L (ref 98–107)
CO2 SERPL-SCNC: 31 MMOL/L (ref 22–29)
CREAT SERPL-MCNC: 0.68 MG/DL (ref 0.73–1.18)
EOSINOPHIL # BLD AUTO: 0.01 X10(3)/MCL (ref 0–0.9)
EOSINOPHIL NFR BLD AUTO: 0.4 %
ERYTHROCYTE [DISTWIDTH] IN BLOOD BY AUTOMATED COUNT: 16 % (ref 11.5–17)
GFR SERPLBLD CREATININE-BSD FMLA CKD-EPI: >60 MLS/MIN/1.73/M2
GLOBULIN SER-MCNC: 3.3 GM/DL (ref 2.4–3.5)
GLUCOSE SERPL-MCNC: 123 MG/DL (ref 74–100)
HCT VFR BLD AUTO: 31.2 % (ref 42–52)
HGB BLD-MCNC: 10.4 GM/DL (ref 14–18)
IMM GRANULOCYTES # BLD AUTO: 0 X10(3)/MCL (ref 0–0.04)
IMM GRANULOCYTES NFR BLD AUTO: 0 %
LYMPHOCYTES # BLD AUTO: 0.34 X10(3)/MCL (ref 0.6–4.6)
LYMPHOCYTES NFR BLD AUTO: 12.2 %
MCH RBC QN AUTO: 32.5 PG (ref 27–31)
MCHC RBC AUTO-ENTMCNC: 33.3 MG/DL (ref 33–36)
MCV RBC AUTO: 97.5 FL (ref 80–94)
MONOCYTES # BLD AUTO: 0.44 X10(3)/MCL (ref 0.1–1.3)
MONOCYTES NFR BLD AUTO: 15.8 %
NEUTROPHILS # BLD AUTO: 2 X10(3)/MCL (ref 2.1–9.2)
NEUTROPHILS NFR BLD AUTO: 71.2 %
PLATELET # BLD AUTO: 201 X10(3)/MCL (ref 130–400)
PMV BLD AUTO: 9.1 FL (ref 7.4–10.4)
POTASSIUM SERPL-SCNC: 4.7 MMOL/L (ref 3.5–5.1)
PROT SERPL-MCNC: 6.6 GM/DL (ref 6.4–8.3)
RBC # BLD AUTO: 3.2 X10(6)/MCL (ref 4.7–6.1)
SODIUM SERPL-SCNC: 135 MMOL/L (ref 136–145)
WBC # SPEC AUTO: 2.8 X10(3)/MCL (ref 4.5–11.5)

## 2022-09-26 PROCEDURE — 85025 COMPLETE CBC W/AUTO DIFF WBC: CPT

## 2022-09-26 PROCEDURE — 77386 HC IMRT, COMPLEX: CPT | Performed by: RADIOLOGY

## 2022-09-26 PROCEDURE — 77336 RADIATION PHYSICS CONSULT: CPT | Performed by: RADIOLOGY

## 2022-09-26 PROCEDURE — 36415 COLL VENOUS BLD VENIPUNCTURE: CPT

## 2022-09-26 PROCEDURE — 80053 COMPREHEN METABOLIC PANEL: CPT

## 2022-09-27 ENCOUNTER — CLINICAL SUPPORT (OUTPATIENT)
Dept: HEMATOLOGY/ONCOLOGY | Facility: CLINIC | Age: 57
End: 2022-09-27
Payer: MEDICARE

## 2022-09-27 ENCOUNTER — OFFICE VISIT (OUTPATIENT)
Dept: HEMATOLOGY/ONCOLOGY | Facility: CLINIC | Age: 57
End: 2022-09-27
Payer: MEDICARE

## 2022-09-27 VITALS
HEART RATE: 102 BPM | RESPIRATION RATE: 18 BRPM | HEIGHT: 69 IN | WEIGHT: 156.63 LBS | OXYGEN SATURATION: 98 % | SYSTOLIC BLOOD PRESSURE: 87 MMHG | DIASTOLIC BLOOD PRESSURE: 51 MMHG | BODY MASS INDEX: 23.2 KG/M2 | TEMPERATURE: 99 F

## 2022-09-27 DIAGNOSIS — J38.7 LARYNGEAL MASS: Primary | ICD-10-CM

## 2022-09-27 DIAGNOSIS — C34.92 SQUAMOUS CELL CARCINOMA OF BOTH LUNGS: Primary | ICD-10-CM

## 2022-09-27 DIAGNOSIS — C77.9 REGIONAL LYMPH NODE METASTASIS PRESENT: ICD-10-CM

## 2022-09-27 DIAGNOSIS — C32.9 LARYNGEAL CANCER: ICD-10-CM

## 2022-09-27 DIAGNOSIS — C34.91 SQUAMOUS CELL CARCINOMA OF BOTH LUNGS: Primary | ICD-10-CM

## 2022-09-27 PROCEDURE — 99214 PR OFFICE/OUTPT VISIT, EST, LEVL IV, 30-39 MIN: ICD-10-PCS | Mod: S$PBB,,, | Performed by: NURSE PRACTITIONER

## 2022-09-27 PROCEDURE — 99214 OFFICE O/P EST MOD 30 MIN: CPT | Mod: PBBFAC | Performed by: NURSE PRACTITIONER

## 2022-09-27 PROCEDURE — 77386 HC IMRT, COMPLEX: CPT | Performed by: RADIOLOGY

## 2022-09-27 PROCEDURE — 99999 PR PBB SHADOW E&M-EST. PATIENT-LVL IV: ICD-10-PCS | Mod: PBBFAC,,, | Performed by: NURSE PRACTITIONER

## 2022-09-27 PROCEDURE — 99214 OFFICE O/P EST MOD 30 MIN: CPT | Mod: S$PBB,,, | Performed by: NURSE PRACTITIONER

## 2022-09-27 PROCEDURE — 99999 PR PBB SHADOW E&M-EST. PATIENT-LVL IV: CPT | Mod: PBBFAC,,, | Performed by: NURSE PRACTITIONER

## 2022-09-27 RX ORDER — HYDROCODONE BITARTRATE AND ACETAMINOPHEN 5; 325 MG/1; MG/1
1 TABLET ORAL EVERY 4 HOURS PRN
Status: ON HOLD | COMMUNITY
End: 2023-01-01 | Stop reason: HOSPADM

## 2022-09-27 NOTE — PROGRESS NOTES
Oncology Nutrition Assessment for Medical Nutrition Therapy        Josafat Boudreaux   1965     Referring Provider:   Yara Atkinson MD     Reason for Visit:  nutrition f/u     Nutrition Recommendations:  1. Continue po intake as tolerated  2. Continue Diabetisource 6 cartons per day as tolerated  3. 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 He is bolusing 6 cartons of Diabetisource via PEG per day and tolerating well. He is also eating some by mouth at this time, although I am not sure he was cleared by SLP for this. Notes that soreness to throat has started now that he is on XRT. He has no other complaints today. Per EMR his UBW was ~155#, but he dropped down to ~130# with diagnosis and surgery. However, he is back up to 156# at this time.     9/13/22: pt here for NP f/u. He reports stable wt, continues using PEG with no changes in his formula. No c/o n/v/c/d.     9/27/22: pt here for NP f/u. Wt is stable, enteral nutrition stable. He is tolerating feedings well with no new issues.     Nutrition Factors Affecting Intake  difficulty/impaired swallowing    PMHx:        Past Medical History:   Diagnosis Date    Cancer      COPD (chronic obstructive pulmonary disease)      Dysphagia      Lung cancer      Vocal cord mass         Allergies: Patient has no known allergies.     Current Medications:     Current Outpatient Medications:     acetaminophen (TYLENOL) 160 mg/5 mL Liqd, Take 20.3 mLs (649.6 mg total) by mouth every 6 (six) hours as needed (Mild pain)., Disp: 473 mL, Rfl: 0    albuterol-ipratropium (DUO-NEB) 2.5 mg-0.5 mg/3 mL nebulizer solution, Take 3 mLs by nebulization every 4 (four) hours as needed for Shortness of Breath or Wheezing. Rescue, Disp: 90 mL, Rfl: 1    budesonide (PULMICORT) 0.5 mg/2 mL nebulizer solution, Take 2 mLs (0.5 mg total) by nebulization every 12 (twelve)  "hours. Controller (Patient not taking: No sig reported), Disp: 90 mL, Rfl: 1    diphenhydrAMINE (BENADRYL) 25 mg capsule, 1 capsule (25 mg total) by Per G Tube route every 6 (six) hours as needed for Itching., Disp: 90 capsule, Rfl: 1    famotidine (PEPCID) 20 MG tablet, Take 1 tablet (20 mg total) by mouth every evening., Disp: 30 tablet, Rfl: 11    glutamine 10 gram PwPk, Take 10 g by mouth 2 (two) times a day., Disp: , Rfl:     hydrocortisone 1 % cream, Apply topically 2 (two) times daily as needed (rash). Apply to rash, Disp: 30 g, Rfl: 1    ibuprofen (ADVIL,MOTRIN) 100 mg/5 mL suspension, Take 30 mLs (600 mg total) by mouth every 6 (six) hours as needed for Pain (mild to moderate)., Disp: 354 mL, Rfl: 0    ondansetron (ZOFRAN-ODT) 8 MG TbDL, Take 8 mg by mouth every 8 (eight) hours as needed for Nausea. Tab 1 ODT in AM for 2 days after chemotherapy, Disp: , Rfl:     pantoprazole (PROTONIX) 20 MG tablet, Take 1 tablet (20 mg total) by mouth once daily., Disp: 30 tablet, Rfl: 11    silver nitrate applicators 75-25 % applicator, Apply topically 3 (three) times a week., Disp: 1 applicator, Rfl: 0     Labs: 9/26 Na 135 (L), chloride 94 (L)    Anthropometrics    Height:       Ht Readings from Last 1 Encounters:   09/13/22 5' 9" (1.753 m)      Weight:       Wt Readings from Last 5 Encounters:   09/13/22 70.8 kg (156 lb)   08/31/22 70.9 kg (156 lb 6.4 oz)   08/30/22 70.8 kg (156 lb)   08/24/22 71.1 kg (156 lb 12 oz)   08/17/22 71.1 kg (156 lb 12.8 oz)        Usual Body Weight: 70 kg (154 lb)  % Weight Change: -15.5% in six months to lowest wt 130#, however pt has gained back 26#    BMI: 23 (normal)    Ideal Weight: 72.7 kg (160 lb)  % Ideal Weight: 98%    Estimated Needs  1849 kcal/day              [place] St[employer] Jeor x 1.1 activity factor x 1.1 stress factor  78 g protein/day          1.1 g/kg CBW  1849 mL fluid/day       1 mL/kcal    Formula: Diabetisource AC  Rate/Volume: 6 cartons/day  Water Flushes: 60 mL with each " carton  Additives/Modulars: none at this time  Route: PEG tube  Method: bolus  Total Nutrition Provided by Tube Feeding, Additives, and Flushes:  Calories Provided  1800 kcal/d, 97% needs   Protein Provided  90 g/d, 115% needs   Fluid Provided  1584 ml/d, 86% needs   Continuous feeding calculations based on estimated 20 hr/d run time unless otherwise stated.     Nutrition Diagnosis    PES: Swallowing difficulty related to laryngeal CA as evidenced by need for PEG to provide majority of nutrition. (continues)    Nutrition Risk  low     Nutrition Intervention    Interventions(treatment strategy):  Collaboration with other providers        Nutrition Monitoring and Evaluation    Monitor: enteral nutrition intake, weight change, and electrolyte/renal panel     Follow up in one month.          Vidhya Brown, MS, RD, , LDN

## 2022-09-27 NOTE — PROGRESS NOTES
HEMATOLOGY/ONCOLOGY OFFICE CLINIC VISIT    Visit Information:    Initial Evaluation: 7/21/2022  Referring Provider:   Other providers:  Code status:    Diagnosis:  [id number] JENNYFER Laryngeal Squamous cell carcinoma     Present treatment:  Carbo/Taxol + RT    Treatment/Oncology history:  --7/11/2022: total laryngectomy    Plan of care:     Imaging:  Ct neck 6/6/2022: A metastatic right level III cervical lymph node is present with short axis measurement of 1.6 cm.  This lymph node contains internal cystic change, typical of metastatic squamous cell carcinoma.  A left level III cervical lymph node shows short axis measurements of 1 cm, and is suspicious.    A left supraglottic mass measures approximately 2.4 cm in diameter (axial post-contrast image 39), presumably corresponding to the primary neoplasm.  Metastatic lymph nodes are also present in the inferior right paratracheal position, measuring 1.3 cm in short axis.  Metastatic lymph nodes are present in the superior right hilum, measuring 1.8 cm in short axis.  Ct H&N 6/27/2022: 1. There is mild stenosis at the origin of left proximal internal carotid artery secondary to dense calcified atheromatous plaque. 2. There is consolidation is right upper lobe. Additional ground-glass opacities are also seen on the remainder of the visualized lungs. These findings are consistent with an infectious process. Small right pleural effusion is seen. There is an ill-defined soft tissue mass in the right perihilar region which measures approximately 3.1 x 2.9 x 4.6 cm, of concern for neoplasm. Correlate clinically as regards further evaluation and followup with CT chest. There is an ill-defined enhancing soft tissue mass in the glottis infiltrating into the paraglottic spaces and the subglottic region with obliteration of the airway, of concern for a neoplasm. 3. Unremarkable CT angiogram of the head. Details and other findings as described above.  NM PET CT 8/1/2022:  Postoperative changes of recent total laryngectomy and lou dissection. bilateral hypermetabolic cervical lymphadenopathy.  A right cervical lymph node 1.7 x 1.8 cm, compared to 1.6 x 1.5 cm previously, SUV of 7.7. left cervical lymph node 8 x 10 mm and has a max SUV of 3.4.  A fluid collection posterior to the left internal jugular vein measures 2.4 x 2.4 cm. There is right hilar and mediastinal lymphadenopathy.  A precarinal lymph node 1.6 x 2.3 cm, compared to 1.3 x 1.7 cm previously, SUV of 13.8.  Right hilar lymphadenopathy measures 3 x 2.9 cm, compared to 2.1 x 2.3 cm previously, and has a max SUV of 9.8.  A suspected right hilar mass measures 1.8 x 3.6 cm SUV of 14.8     Pathology:  6/14/2022:  EBUS ENDOBRONCHIAL MASS RUL, CYTOLOGY: NEARLY ACELLULAR SPECIMEN CONSISTING OF FRESH BLOOD CLOT. NO ATYPICAL OR MALIGNANT CELLS IDENTIFIED.   7/11/2022: Bronchial Wash, RLL, right bronchial mass washings:  - Squamous cell carcinoma.    Bronchial Wash, LLL, left bronchial mass washings: - Squamous cell carcinoma.     7/11/2022 Laryngectomy:  1. Larynx, laryngeal biopsy frozen section : - Squamous cell carcinoma with necrosis.    2. Neck, Anterior, left neck level 3: - Number of lymph nodes involved by carcinoma:  1/7       - Size of metastatic deposit:  2.5 mm - Extranodal extension:  Not identified      3. Larynx, resection without nodes, total layrngectomy :  - Squamous cell carcinoma.  4. Neck, Anterior, left neck level 4: - Number of lymph nodes involved by carcinoma:  0/10  5. Neck, Anterior, right neck level 4:  - Number of lymph nodes involved by carcinoma:  0/11  6. Neck, Anterior, right neck level 3: - Number of lymph nodes involved by carcinoma:  0/9  7. Nasophaynx, inferior pharyngeal mucosa : - Benign squamous mucosa. - No evidence of malignancy.     8. Cartilage, superior cartilage margin : - No evidence of malignancy.    9. Lymph Node, pretracheal lymph  node :  - Number of lymph nodes involved by  carcinoma:  0/3  [id number]       CLINICAL HISTORY:       Patient: Josafat Boudreaux is a 56 y.o. male kindly refer her for laryngeal carcinoma.  Patient  was referred to ENT for further evaluation of hoarseness.  He did not have any difficulty swallowing or dry mouth at the time.  Hoarseness has been present for at least 4 months prior to evaluation. 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     Patient was scheduled to trach but on 06/10/2022 he was brought to the emergency room via air met with is short of breath.  He was found to have and oxygenation on the 40s when EMS was called.  He was placed on BiPAP but pCO2 increased so he has an emergent tracheostomy performed.  He has a PEG tube placed same hospitalization.  He underwent bronchoscopy with biopsy of the right upper lobe endobronchial lesion on 6/14/2022 cytology was negative for malignant cells.      On 7/11/2022 he underwent LARYNGECTOMY, WITH RADICAL NECK DISSECTION - Bilateral LARYNGOSCOPY, DIRECT, DIAGNOSTIC, WITH BRONCHOSCOPY AND ESOPHAGOSCOPY pathology report as above.  1/40 lymph nodes were involved with metastatic disease.  Bronchoscopy was repeated and biopsy of the left lower lobe and right lower lobe were both positive for squamous cell carcinoma.    He is here today with his wife.  He has recovered from surgery relatively well.  Tracheostomy in place.  He is unable to talk so the history was taken by electronic medical record and wife.    Patient has a brother with history of throat cancer. Patient used to smoke 1 and half pack per day since he was 60 years old.  He was smoking less recently.    Chief Complaint: no complaints today      Interval History:  Last visit Discussed with Dr Das. PET/CT showed progression of disease in his neck with bilateral hypermetabolic cervical lymphadenopathy. This progressed rather quick. He will need chemoRT to controlled disease in the neck. It  also showed Hypermetabolic right hilar mass with mediastinal and right hilar lymphadenopathy and he has  Left mass biopsy proven Sq cell Carcinoma. Once he complete chemoradiation to neck will scan him again to eval lung disease and may benefit of RT to lung mass vs systemic therapy for his stage IV Lung cancer.      9/13/22: Patient presents to clinic today for follow and continuation of his treatment. Started Carbo/Taxol on 8/17/2022.  He is currently receiving chemoradiation.  Tolerating fairly well.  He has some p.o. intake but mostly PEG tube feeding.   Denies fever, chills or sweats. No chest pain or shortness of breath. Bowels are moving well. Denies bleeding. Pain controlled with 5 mg Norco 1 tab at night only.    9/27/22:  Patient presents today for 2 week follow-up.  He completed his 6th cycle of weekly carbo Taxol last week.  He has his last day of radiation tomorrow.  Labs drawn yesterday show a CMP is consistent with dehydration.  He will increase his H2O intake through his PEG tube.  The excoriation around his neck secondary to XRT.    Past Medical History:   Diagnosis Date    Cancer     COPD (chronic obstructive pulmonary disease)     Dysphagia     Lung cancer     Vocal cord mass       Past Surgical History:   Procedure Laterality Date    DIRECT DIAGNOSTIC LARYNGOSCOPY WITH BRONCHOSCOPY AND ESOPHAGOSCOPY N/A 7/11/2022    Procedure: LARYNGOSCOPY, DIRECT, DIAGNOSTIC, WITH BRONCHOSCOPY AND ESOPHAGOSCOPY;  Surgeon: Emory Alonso MD;  Location: [facility];  Service: ENT;  Laterality: N/A;    HIP SURGERY      HUMERUS FRACTURE SURGERY      INSERT ARTERIAL LINE  6/26/2022         TRACHEOSTOMY N/A 6/10/2022    Procedure: CREATION, TRACHEOSTOMY;  Surgeon: Junior Alonso MD;  Location: [facility];  Service: ENT;  Laterality: N/A;     Family History   Problem Relation Age of Onset    Lung cancer Father     Throat cancer Brother      Social Connections: Not on file       Review of patient's allergies  indicates:  No Known Allergies   Current Outpatient Medications on File Prior to Visit   Medication Sig Dispense Refill    acetaminophen (TYLENOL) 160 mg/5 mL Liqd Take 20.3 mLs (649.6 mg total) by mouth every 6 (six) hours as needed (Mild pain). 473 mL 0    albuterol-ipratropium (DUO-NEB) 2.5 mg-0.5 mg/3 mL nebulizer solution Take 3 mLs by nebulization every 4 (four) hours as needed for Shortness of Breath or Wheezing. Rescue 90 mL 1    budesonide (PULMICORT) 0.5 mg/2 mL nebulizer solution Take 2 mLs (0.5 mg total) by nebulization every 12 (twelve) hours. Controller 90 mL 1    diphenhydrAMINE (BENADRYL) 25 mg capsule 1 capsule (25 mg total) by Per G Tube route every 6 (six) hours as needed for Itching. 90 capsule 1    famotidine (PEPCID) 20 MG tablet Take 1 tablet (20 mg total) by mouth every evening. 30 tablet 11    glutamine 10 gram PwPk Take 10 g by mouth 2 (two) times a day.      HYDROcodone-acetaminophen (NORCO) 5-325 mg per tablet Take 1 tablet by mouth every 4 (four) hours as needed for Pain.      hydrocortisone 1 % cream Apply topically 2 (two) times daily as needed (rash). Apply to rash 30 g 1    ibuprofen (ADVIL,MOTRIN) 100 mg/5 mL suspension Take 30 mLs (600 mg total) by mouth every 6 (six) hours as needed for Pain (mild to moderate). 354 mL 0    ondansetron (ZOFRAN-ODT) 8 MG TbDL Take 8 mg by mouth every 8 (eight) hours as needed for Nausea. Tab 1 ODT in AM for 2 days after chemotherapy      pantoprazole (PROTONIX) 20 MG tablet Take 1 tablet (20 mg total) by mouth once daily. 30 tablet 11    silver nitrate applicators 75-25 % applicator Apply topically 3 (three) times a week. 1 applicator 0    UNABLE TO FIND medication name: ATOMIC TONIC (LIDO/DELTA/MAALOX)  gargle and swallow 1 to 2 teaspoonsful by mouth 5 minutes before meals and every 3 hours as needed for pain with swallowing      [DISCONTINUED] aspirin (ECOTRIN) 81 MG EC tablet Take 81 mg by mouth once daily.       No current facility-administered  "medications on file prior to visit.      Review of Systems   Constitutional:  Negative for activity change, appetite change, chills, diaphoresis, fatigue, fever and unexpected weight change.   HENT:  Positive for trouble swallowing. Negative for nasal congestion, nosebleeds, postnasal drip, sinus pressure/congestion and sore throat.    Eyes:  Negative for visual disturbance.   Respiratory:  Negative for cough and shortness of breath.    Cardiovascular:  Negative for chest pain and palpitations.   Gastrointestinal:  Negative for abdominal distention, abdominal pain, blood in stool, change in bowel habit, constipation, diarrhea, nausea, vomiting and change in bowel habit.   Endocrine: Negative.    Genitourinary:  Negative for bladder incontinence, decreased urine volume, difficulty urinating, dysuria, frequency, hematuria, scrotal swelling, testicular pain and urgency.   Musculoskeletal:  Negative for arthralgias, back pain, gait problem, joint swelling, leg pain, myalgias and neck pain.   Integumentary:  Negative for rash.   Neurological:  Negative for dizziness, tremors, seizures, syncope, speech difficulty, weakness, light-headedness, numbness, headaches and memory loss.   Hematological:  Negative for adenopathy. Does not bruise/bleed easily.   Psychiatric/Behavioral:  Negative for agitation, confusion, hallucinations, sleep disturbance and suicidal ideas. The patient is not nervous/anxious.             Vitals:    09/27/22 1410   BP: (!) 87/51   BP Location: Left arm   Patient Position: Sitting   BP Method: Medium (Automatic)   Pulse: 102   Resp: 18   Temp: 99 °F (37.2 °C)   TempSrc: Oral   SpO2: 98%   Weight: 71 kg (156 lb 9.6 oz)   Height: 5' 9.02" (1.753 m)      Physical Exam  Vitals and nursing note reviewed.   Constitutional:       General: He is not in acute distress.     Appearance: He is ill-appearing.      Comments: thin   HENT:      Head: Normocephalic and atraumatic.      Mouth/Throat:      Mouth: Mucous " membranes are moist.   Eyes:      General: No scleral icterus.     Extraocular Movements: Extraocular movements intact.      Conjunctiva/sclera: Conjunctivae normal.   Neck:      Vascular: No JVD.      Trachea: Tracheostomy present.      Comments: In place. Surgical site well healed.  Cardiovascular:      Rate and Rhythm: Normal rate and regular rhythm.      Heart sounds: No murmur heard.  Pulmonary:      Effort: Pulmonary effort is normal.      Breath sounds: Normal breath sounds. No wheezing or rhonchi.   Chest:      Chest wall: No tenderness.   Abdominal:      General: The ostomy site is clean. Bowel sounds are normal. There is no distension.      Palpations: Abdomen is soft.      Tenderness: There is no abdominal tenderness.   Musculoskeletal:         General: No swelling or deformity.      Cervical back: Neck supple.   Lymphadenopathy:      Cervical: No cervical adenopathy.      Upper Body:      Right upper body: No supraclavicular or axillary adenopathy.      Left upper body: No supraclavicular or axillary adenopathy.      Lower Body: No right inguinal adenopathy. No left inguinal adenopathy.   Skin:     General: Skin is warm.      Coloration: Skin is not jaundiced.      Findings: No rash.   Neurological:      General: No focal deficit present.      Mental Status: He is alert and oriented to person, place, and time.   Psychiatric:         Attention and Perception: Attention normal.         Mood and Affect: Mood and affect normal. Mood is not anxious.         Behavior: Behavior is cooperative.         Cognition and Memory: Cognition normal.         Judgment: Judgment normal.     ECOG SCORE             Laboratory:  CBC with Differential:  Lab Results   Component Value Date    WBC 2.8 (L) 09/26/2022    RBC 3.20 (L) 09/26/2022    HGB 10.4 (L) 09/26/2022    HCT 31.2 (L) 09/26/2022    MCV 97.5 (H) 09/26/2022    MCH 32.5 (H) 09/26/2022    MCHC 33.3 09/26/2022    RDW 16.0 09/26/2022     09/26/2022    MPV 9.1  09/26/2022        CMP:  Sodium Level   Date Value Ref Range Status   09/26/2022 135 (L) 136 - 145 mmol/L Final     Potassium Level   Date Value Ref Range Status   09/26/2022 4.7 3.5 - 5.1 mmol/L Final     Carbon Dioxide   Date Value Ref Range Status   09/26/2022 31 (H) 22 - 29 mmol/L Final     Blood Urea Nitrogen   Date Value Ref Range Status   09/26/2022 15.1 8.4 - 25.7 mg/dL Final     Creatinine   Date Value Ref Range Status   09/26/2022 0.68 (L) 0.73 - 1.18 mg/dL Final     Calcium Level Total   Date Value Ref Range Status   09/26/2022 9.0 8.4 - 10.2 mg/dL Final     Albumin Level   Date Value Ref Range Status   09/26/2022 3.3 (L) 3.5 - 5.0 gm/dL Final     Bilirubin Total   Date Value Ref Range Status   09/26/2022 0.7 <=1.5 mg/dL Final     Alkaline Phosphatase   Date Value Ref Range Status   09/26/2022 68 40 - 150 unit/L Final     Aspartate Aminotransferase   Date Value Ref Range Status   09/26/2022 12 5 - 34 unit/L Final     Alanine Aminotransferase   Date Value Ref Range Status   09/26/2022 7 0 - 55 unit/L Final     Estimated GFR-Non    Date Value Ref Range Status   08/02/2022 >60 mls/min/1.73/m2 Final             Assessment:       1. Squamous cell carcinoma of both lungs    2. Regional lymph node metastasis present    3. Laryngeal cancer          Laryngeal Squamous cell carcinoma -s/p total laryngectomy, 1/40 LN with metastatic disease.   Lung masses x 2, bilateral--> Squamous cell carcinoma    I discussed with the patient and his wife, diagnosis, prognosis and treatment recommendations.  Discussed briefly chemotherapy, discussed risk versus benefits as well as toxicities associated with it.   Patient with long history of smoking.  I discussed with them that head and neck cancer lung cancer are very common in smokers.  I explained to them that lung cancer could or could not be associated with his head and neck cancer and lung cancer could be a 2nd primary.  Either way he has it in both lungs  therefore a stage IV.  As per patient report, I do not have the official report, he has 1 mass in each of the lung bases and nothing other placed.   He will see Dr. Das Monday.  I would like to discuss case with him after review his scans to see what will be best treatment strategy.      He most likely need RT to his neck and he will benefit of concommittant chemotherapy.  If he only have one lesion in each lung and not too big, maybe RT to each lesions may be an alternative as well +/- chemotherapy during and after RT.       Plan.:       Instructed to increase water intake through PEG (2) 8 oz cups per 24 hours to prevent dehydration.   Completed weekly carbo/Taxol  Will request NGS on lung biopsy.   Will plan to start carbo/Taxol q3  +/- immunotherapy in 3 weeks.  RTC in 3 weeks with MD    Encourage to call or message us for any questions or problems  The patient was given ample opportunity to ask questions, and to the best of my abilities, all questions answered to satisfaction; patient demonstrated understanding of what we discussed and agreeable to the plan.       OZZY Rod

## 2022-09-28 PROCEDURE — 77386 HC IMRT, COMPLEX: CPT | Performed by: RADIOLOGY

## 2022-10-03 ENCOUNTER — TELEPHONE (OUTPATIENT)
Dept: HEMATOLOGY/ONCOLOGY | Facility: CLINIC | Age: 57
End: 2022-10-03
Payer: MEDICARE

## 2022-10-03 ENCOUNTER — APPOINTMENT (OUTPATIENT)
Dept: RADIATION THERAPY | Facility: HOSPITAL | Age: 57
End: 2022-10-03
Attending: RADIOLOGY
Payer: MEDICARE

## 2022-10-03 PROCEDURE — 77336 RADIATION PHYSICS CONSULT: CPT | Performed by: RADIOLOGY

## 2022-10-03 NOTE — TELEPHONE ENCOUNTER
[facility] called to let us know that they are not able to perform MI profiling on patient; not enough tissue.

## 2022-10-05 ENCOUNTER — PATIENT MESSAGE (OUTPATIENT)
Dept: HEMATOLOGY/ONCOLOGY | Facility: CLINIC | Age: 57
End: 2022-10-05
Payer: MEDICARE

## 2022-10-05 ENCOUNTER — LAB VISIT (OUTPATIENT)
Dept: LAB | Facility: HOSPITAL | Age: 57
End: 2022-10-05
Attending: INTERNAL MEDICINE
Payer: MEDICARE

## 2022-10-05 DIAGNOSIS — C32.9 LARYNGEAL CANCER: ICD-10-CM

## 2022-10-05 DIAGNOSIS — J38.7 LARYNGEAL MASS: ICD-10-CM

## 2022-10-05 DIAGNOSIS — C77.9 REGIONAL LYMPH NODE METASTASIS PRESENT: ICD-10-CM

## 2022-10-05 LAB
ALBUMIN SERPL-MCNC: 3.3 GM/DL (ref 3.5–5)
ALBUMIN/GLOB SERPL: 0.9 RATIO (ref 1.1–2)
ALP SERPL-CCNC: 74 UNIT/L (ref 40–150)
ALT SERPL-CCNC: 7 UNIT/L (ref 0–55)
AST SERPL-CCNC: 16 UNIT/L (ref 5–34)
BASOPHILS # BLD AUTO: 0.02 X10(3)/MCL (ref 0–0.2)
BASOPHILS NFR BLD AUTO: 0.5 %
BILIRUBIN DIRECT+TOT PNL SERPL-MCNC: 0.4 MG/DL
BUN SERPL-MCNC: 15.9 MG/DL (ref 8.4–25.7)
CALCIUM SERPL-MCNC: 9.7 MG/DL (ref 8.4–10.2)
CHLORIDE SERPL-SCNC: 96 MMOL/L (ref 98–107)
CO2 SERPL-SCNC: 33 MMOL/L (ref 22–29)
CREAT SERPL-MCNC: 0.62 MG/DL (ref 0.73–1.18)
EOSINOPHIL # BLD AUTO: 0.01 X10(3)/MCL (ref 0–0.9)
EOSINOPHIL NFR BLD AUTO: 0.3 %
ERYTHROCYTE [DISTWIDTH] IN BLOOD BY AUTOMATED COUNT: 17 % (ref 11.5–17)
GFR SERPLBLD CREATININE-BSD FMLA CKD-EPI: >60 MLS/MIN/1.73/M2
GLOBULIN SER-MCNC: 3.6 GM/DL (ref 2.4–3.5)
GLUCOSE SERPL-MCNC: 92 MG/DL (ref 74–100)
HCT VFR BLD AUTO: 34.4 % (ref 42–52)
HGB BLD-MCNC: 11.4 GM/DL (ref 14–18)
IMM GRANULOCYTES # BLD AUTO: 0.06 X10(3)/MCL (ref 0–0.04)
IMM GRANULOCYTES NFR BLD AUTO: 1.6 %
LYMPHOCYTES # BLD AUTO: 0.54 X10(3)/MCL (ref 0.6–4.6)
LYMPHOCYTES NFR BLD AUTO: 14.3 %
MCH RBC QN AUTO: 33.3 PG (ref 27–31)
MCHC RBC AUTO-ENTMCNC: 33.1 MG/DL (ref 33–36)
MCV RBC AUTO: 100.6 FL (ref 80–94)
MONOCYTES # BLD AUTO: 1.22 X10(3)/MCL (ref 0.1–1.3)
MONOCYTES NFR BLD AUTO: 32.3 %
NEUTROPHILS # BLD AUTO: 1.9 X10(3)/MCL (ref 2.1–9.2)
NEUTROPHILS NFR BLD AUTO: 51 %
PLATELET # BLD AUTO: 327 X10(3)/MCL (ref 130–400)
PMV BLD AUTO: 9 FL (ref 7.4–10.4)
POTASSIUM SERPL-SCNC: 4.7 MMOL/L (ref 3.5–5.1)
PROT SERPL-MCNC: 6.9 GM/DL (ref 6.4–8.3)
RBC # BLD AUTO: 3.42 X10(6)/MCL (ref 4.7–6.1)
SODIUM SERPL-SCNC: 136 MMOL/L (ref 136–145)
WBC # SPEC AUTO: 3.8 X10(3)/MCL (ref 4.5–11.5)

## 2022-10-05 PROCEDURE — 36415 COLL VENOUS BLD VENIPUNCTURE: CPT

## 2022-10-05 PROCEDURE — 85025 COMPLETE CBC W/AUTO DIFF WBC: CPT

## 2022-10-05 PROCEDURE — 80053 COMPREHEN METABOLIC PANEL: CPT

## 2022-10-08 ENCOUNTER — HOSPITAL ENCOUNTER (EMERGENCY)
Facility: HOSPITAL | Age: 57
Discharge: HOME | End: 2022-10-08
Attending: FAMILY MEDICINE
Payer: MEDICARE

## 2022-10-08 VITALS
BODY MASS INDEX: 19.52 KG/M2 | TEMPERATURE: 97 F | HEART RATE: 110 BPM | WEIGHT: 131.81 LBS | SYSTOLIC BLOOD PRESSURE: 112 MMHG | HEIGHT: 69 IN | RESPIRATION RATE: 24 BRPM | OXYGEN SATURATION: 98 % | DIASTOLIC BLOOD PRESSURE: 68 MMHG

## 2022-10-08 DIAGNOSIS — Z43.0 ATTENTION TO TRACHEOSTOMY TUBE: Primary | ICD-10-CM

## 2022-10-08 PROCEDURE — 99284 EMERGENCY DEPT VISIT MOD MDM: CPT | Mod: 25

## 2022-10-08 PROCEDURE — 31502 CHANGE OF WINDPIPE AIRWAY: CPT

## 2022-10-08 NOTE — ED PROVIDER NOTES
Encounter Date: 10/8/2022       History     Chief Complaint   Patient presents with    Tracheostomy Tube Change     C/o trach tube broke this am. Needs new tube.      56-year-old gentleman presents emergency room for evaluation due to a tear in the strap for the laryngoscopy tube.  Patient denies fever chills.  Denies nausea or vomiting.  Patient feels in his normal state of health otherwise.    The history is provided by the patient.   Review of patient's allergies indicates:  No Known Allergies  Past Medical History:   Diagnosis Date    Cancer     COPD (chronic obstructive pulmonary disease)     Dysphagia     Lung cancer     Vocal cord mass      Past Surgical History:   Procedure Laterality Date    DIRECT DIAGNOSTIC LARYNGOSCOPY WITH BRONCHOSCOPY AND ESOPHAGOSCOPY N/A 2022    Procedure: LARYNGOSCOPY, DIRECT, DIAGNOSTIC, WITH BRONCHOSCOPY AND ESOPHAGOSCOPY;  Surgeon: Emroy Alonso MD;  Location: [facility];  Service: ENT;  Laterality: N/A;    HIP SURGERY      HUMERUS FRACTURE SURGERY      INSERT ARTERIAL LINE  2022         TRACHEOSTOMY N/A 6/10/2022    Procedure: CREATION, TRACHEOSTOMY;  Surgeon: Junior Alonso MD;  Location: [facility];  Service: ENT;  Laterality: N/A;     Family History   Problem Relation Age of Onset    Lung cancer Father     Throat cancer Brother      Social History     Tobacco Use    Smoking status: Former     Types: Cigarettes     Quit date: 2022     Years since quittin.3    Smokeless tobacco: Never   Substance Use Topics    Alcohol use: Never    Drug use: Never     Review of Systems   Constitutional:  Negative for chills, fatigue and fever.   HENT:  Negative for ear pain, rhinorrhea and sore throat.    Eyes:  Negative for photophobia and pain.   Respiratory:  Negative for cough, shortness of breath and wheezing.    Cardiovascular:  Negative for chest pain.   Gastrointestinal:  Negative for abdominal pain, diarrhea, nausea and vomiting.   Genitourinary:  Negative for  dysuria.   Neurological:  Negative for dizziness, weakness and headaches.   All other systems reviewed and are negative.    Physical Exam     Initial Vitals [10/08/22 1052]   BP Pulse Resp Temp SpO2   112/68 110 (!) 24 97.2 °F (36.2 °C) 98 %      MAP       --         Physical Exam    Nursing note and vitals reviewed.  Constitutional: He appears well-developed and well-nourished.   HENT:   Head: Normocephalic and atraumatic.   Tear of the strap alberto on the laryngoscopy tube on the right side.   Eyes: EOM are normal. Pupils are equal, round, and reactive to light.   Neck: Neck supple.   Normal range of motion.  Cardiovascular:  Normal rate, regular rhythm and normal heart sounds.     Exam reveals no gallop and no friction rub.       No murmur heard.  Pulmonary/Chest: Breath sounds normal. No respiratory distress.   Abdominal: Abdomen is soft. Bowel sounds are normal. He exhibits no distension. There is no abdominal tenderness.   Musculoskeletal:         General: Normal range of motion.      Cervical back: Normal range of motion and neck supple.     Neurological: He is alert and oriented to person, place, and time. He has normal strength.   Skin: Skin is warm and dry.   Psychiatric: He has a normal mood and affect. His behavior is normal. Judgment and thought content normal.       ED Course   Procedures  Labs Reviewed - No data to display       Imaging Results    None          Medications - No data to display              ED Course as of 10/08/22 1142   Sat Oct 08, 2022   1139 Able to find replacement and exchange.  Patient tolerated well.  Stable for discharge to home. [MW]      ED Course User Index  [MW] Rasta Chandler MD                 Clinical Impression:   Final diagnoses:  [Z43.0] Attention to tracheostomy tube (Primary)      ED Disposition Condition    Discharge Stable          ED Prescriptions    None       Follow-up Information       Follow up With Specialties Details Why Contact Info    [facility]  [place] - Emergency Dept Emergency Medicine  As needed, If symptoms worsen [address] [facility] [id number] [phone]    Primary Care Physician  In 5 days               Rasta Chandler MD  10/08/22 114

## 2022-10-18 PROBLEM — L03.221 CELLULITIS AND ABSCESS OF NECK: Status: ACTIVE | Noted: 2022-01-01

## 2022-10-18 PROBLEM — D72.829 LEUKOCYTOSIS: Status: ACTIVE | Noted: 2022-01-01

## 2022-10-18 PROBLEM — L02.11 CELLULITIS AND ABSCESS OF NECK: Status: ACTIVE | Noted: 2022-01-01

## 2022-10-18 NOTE — H&P
[facility] Medicine Wards History & Physical Note     Resident Team: [facility] Medicine List 2  Attending Physician: Brigder Lott MD  Resident: Ania Fry  Intern: Kasia Mortensen     Date of Admit: 10/18/2022    Chief Complaint     Abscess (C/o draining facial abscess with facial swelling. Draining yellow, brown and red in color with foul odor. Sent by oncologist for IV abx. Pt has an alry tube and non verbal. )      Subjective:      History of Present Illness:  Josafat Boudreaux is a 56 y.o. male with PMH of laryngeal squamous cell carcinoma s/p total laryngectomy/radical neck dissection on 7/11/22 (at [facility]), 1/40 LN with metastatic disease, lung masses x2 bilateral (Squamous cell carcinoma), and COPD (unquantitated) who presented to [facility] ED on 10/18/2022  with a primary complaint of a draining abscess to his right neck/submandibular area. He states that the swelling started on Saturday/Sunday and progressively worsened with erythema and warmth, as well as firmness. This spontaneously ruptured on inferior aspect near midline of his neck and drained around 2 oz purulent/bloody fluid. Patient is nonverbal, but nods yes/no and mouths words. Wife on phone, daughter at bedside helping to answer with patient confirmation. He denies fever, chills, SOB, chest pain, palpitations, n/v, diarrhea, diaphoresis, trauma, animal/insect bite. Tolerating TF's via his PEG. Last chemo and radiation were approximately 3 weeks ago with plan to restart chemo next week.    In the ED, WBC elevated at 18K, HR initially 103 now in 90s, afebrile, RR 18. CT soft tissue w/o contrast showed small collection of mostly air/small fluid to right anterior neck with extensive tissue swelling. Admitting for Sepsis secondary to abscess/cellulitis, starting IV antibiotics, consulting ENT for assistance given location and Hx of radical neck/laryngeal cancer.       Past Medical History:  Past Medical History:   Diagnosis Date    Cancer     COPD  (chronic obstructive pulmonary disease)     Dysphagia     Lung cancer     Vocal cord mass        Past Surgical History:  Past Surgical History:   Procedure Laterality Date    DIRECT DIAGNOSTIC LARYNGOSCOPY WITH BRONCHOSCOPY AND ESOPHAGOSCOPY N/A 2022    Procedure: LARYNGOSCOPY, DIRECT, DIAGNOSTIC, WITH BRONCHOSCOPY AND ESOPHAGOSCOPY;  Surgeon: Emory Alonso MD;  Location: [facility];  Service: ENT;  Laterality: N/A;    HIP SURGERY      HUMERUS FRACTURE SURGERY      INSERT ARTERIAL LINE  2022         TRACHEOSTOMY N/A 6/10/2022    Procedure: CREATION, TRACHEOSTOMY;  Surgeon: Junior Alonso MD;  Location: [facility];  Service: ENT;  Laterality: N/A;       Family History:  Family History   Problem Relation Age of Onset    Lung cancer Father     Throat cancer Brother        Social History:  Social History     Tobacco Use    Smoking status: Former     Types: Cigarettes     Quit date: 2022     Years since quittin.3    Smokeless tobacco: Never   Substance Use Topics    Alcohol use: Never    Drug use: Never       Allergies:  Review of patient's allergies indicates:  No Known Allergies    Home Medications:  Prior to Admission medications    Medication Sig Start Date End Date Taking? Authorizing Provider   albuterol-ipratropium (DUO-NEB) 2.5 mg-0.5 mg/3 mL nebulizer solution Take 3 mLs by nebulization every 4 (four) hours as needed for Shortness of Breath or Wheezing. Rescue 22   Ra Quintanilla MD   budesonide (PULMICORT) 0.5 mg/2 mL nebulizer solution Take 2 mLs (0.5 mg total) by nebulization every 12 (twelve) hours. Controller 22   Ra Quintanilla MD   diphenhydrAMINE (BENADRYL) 25 mg capsule 1 capsule (25 mg total) by Per G Tube route every 6 (six) hours as needed for Itching. 22   Ra Quintanilla MD   famotidine (PEPCID) 20 MG tablet Take 1 tablet (20 mg total) by mouth every evening. 22  Lisseth Krueger MD   glutamine 10 gram PwPk Take 10 g by mouth 2 (two) times a day.     Historical Provider   HYDROcodone-acetaminophen (NORCO) 5-325 mg per tablet Take 1 tablet by mouth every 4 (four) hours as needed for Pain.    Historical Provider   hydrocortisone 1 % cream Apply topically 2 (two) times daily as needed (rash). Apply to rash 6/23/22   Ra Quintanilla MD   ibuprofen (ADVIL,MOTRIN) 100 mg/5 mL suspension Take 30 mLs (600 mg total) by mouth every 6 (six) hours as needed for Pain (mild to moderate). 7/16/22   Lisseth Krueger MD   ondansetron (ZOFRAN-ODT) 8 MG TbDL Take 8 mg by mouth every 8 (eight) hours as needed for Nausea. Tab 1 ODT in AM for 2 days after chemotherapy    Historical Provider   pantoprazole (PROTONIX) 20 MG tablet Take 1 tablet (20 mg total) by mouth once daily. 7/16/22 7/16/23  Lisseth Krueger MD   silver nitrate applicators 75-25 % applicator Apply topically 3 (three) times a week. 8/24/22   Tony Bertrand MD   UNABLE TO FIND medication name: ATOMIC TONIC (LIDO/DELTA/MAALOX)  gargle and swallow 1 to 2 teaspoonsful by mouth 5 minutes before meals and every 3 hours as needed for pain with swallowing    Historical Provider   acetaminophen (TYLENOL) 160 mg/5 mL Liqd Take 20.3 mLs (649.6 mg total) by mouth every 6 (six) hours as needed (Mild pain). 7/16/22 10/18/22  Lisseth Krueger MD   aspirin (ECOTRIN) 81 MG EC tablet Take 81 mg by mouth once daily.  7/7/22  Historical Provider         Review of Systems:  Constitutional: no fever, fatigue, weakness  Eye: no vision loss, eye redness, drainage, or pain  ENMT: no sore throat, ear pain, sinus pain/congestion, nasal congestion/drainage  Respiratory: no cough, no wheezing, no shortness of breath; trach dependent  Cardiovascular: no chest pain, no palpitations, no edema  Gastrointestinal: no nausea, vomiting, or diarrhea. No abdominal pain, PEG dependent  Genitourinary: no dysuria, no urinary frequency or urgency, no hematuria  Hema/Lymph: no abnormal bruising or bleeding  Endocrine: no heat or cold intolerance, no excessive  "thirst or excessive urination  Musculoskeletal: no muscle or joint pain, no joint swelling  Integumentary: +swelling to right side neck starting Saturday 10/15 progressively worsening with spontaneous drainage yesterday which stopped today. Also pt has maculopapular rash to bilateral hands/forearms   Neurologic: no headache, no dizziness, no weakness or numbness         Objective:   Last 24 Hour Vital Signs:  BP  Min: 93/56  Max: 103/68  Temp  Av °F (36.7 °C)  Min: 97 °F (36.1 °C)  Max: 98.9 °F (37.2 °C)  Pulse  Av.6  Min: 90  Max: 115  Resp  Av  Min: 16  Max: 16  SpO2  Av.8 %  Min: 93 %  Max: 97 %  Height  Av' 9" (175.3 cm)  Min: 5' 9" (175.3 cm)  Max: 5' 9" (175.3 cm)  Weight  Av.8 kg (156 lb)  Min: 70.8 kg (156 lb)  Max: 70.8 kg (156 lb)  Body mass index is 23.04 kg/m².  No intake/output data recorded.    Physical Examination:  General: well-developed well-nourished in no acute distress  Eye: PERRLA, EOMI, clear conjunctiva, eyelids normal  HENT: NC/AT, moist mucus membranes  Neck: full range of motion, no thyromegaly or lymphadenopathy  Respiratory: clear to auscultation bilaterally, respirations non-labored, laryngectomy valve in place to room air  Cardiovascular: regular rate and rhythm without murmurs, gallops or rubs  Gastrointestinal: soft, non-tender, non-distended with normal bowel sounds, without masses to palpation, PEG clamped  Musculoskeletal: no obvious deformities, full range of motion of all extremities/spine without limitation or discomfort  Integumentary: Pt has erythema to right side neck under mandible which is slightly fluctuant with moderate induration and drainage to inferior/medial aspect of abscess with purulent white drainage  Extremities: radial and DP pulses 2+ bilaterally, no LE edema  Neurologic: CN II-XII intact, no signs of peripheral neurological deficit, motor/sensory function intact    Laboratory:  Most Recent Data:  CBC:   Lab Results   Component " Value Date    WBC 17.8 (H) 10/18/2022    HGB 10.3 (L) 10/18/2022    HCT 29.9 (L) 10/18/2022     10/18/2022    MCV 96.8 (H) 10/18/2022    RDW 16.0 10/18/2022     WBC Differential:   Recent Labs   Lab 10/18/22  1012 10/18/22  1210   WBC 18.3* 17.8*   HGB 11.3* 10.3*   HCT 34.3* 29.9*    260   .1* 96.8*     BMP:   Lab Results   Component Value Date     (L) 10/18/2022    K 4.3 10/18/2022    CO2 27 10/18/2022    BUN 12.0 10/18/2022    CREATININE 0.64 (L) 10/18/2022    CALCIUM 9.0 10/18/2022    MG 2.10 07/07/2022    PHOS 3.4 07/07/2022     LFTs:   Lab Results   Component Value Date    ALBUMIN 2.8 (L) 10/18/2022    BILITOT 0.4 10/18/2022    AST 23 10/18/2022    ALKPHOS 64 10/18/2022    ALT 12 10/18/2022     Coags:   Lab Results   Component Value Date    INR 0.99 06/26/2022    PROTIME 13.0 06/26/2022    PTT 30.6 06/26/2022     FLP:   Lab Results   Component Value Date    CHOL 227 (H) 09/16/2019    HDL 59 09/16/2019    TRIG 184 (H) 09/16/2019     DM:   Lab Results   Component Value Date    CREATININE 0.64 (L) 10/18/2022     Thyroid:   Lab Results   Component Value Date    TSH 2.1169 07/11/2022     Anemia:   Lab Results   Component Value Date    TWUNLWHZ11 563 06/17/2022    FOLATE 15.5 06/17/2022     Cardiac:   Lab Results   Component Value Date    TROPONINI <0.010 06/25/2022    BNP 37.4 06/26/2022     Urinalysis:   Lab Results   Component Value Date    COLORU YELLOW 06/27/2019    PHUA 6.0 06/26/2022    NITRITE Negative 06/27/2019    KETONESU Negative 06/27/2019    UROBILINOGEN 0.2 06/26/2022    WBCUA <5 06/26/2022       Trended Lab Data:  Recent Labs   Lab 10/18/22  1012 10/18/22  1210   WBC 18.3* 17.8*   HGB 11.3* 10.3*   HCT 34.3* 29.9*    260   .1* 96.8*   RDW 15.8 16.0   * 132*   K 4.3 4.3   CO2 28 27   BUN 12.2 12.0   CREATININE 0.74 0.64*   ALBUMIN 3.1* 2.8*   BILITOT 0.6 0.4   AST 19 23   ALKPHOS 68 64   ALT 12 12       Trended Cardiac Data:  No results for input(s):  TROPONINI, CKTOTAL, CKMB, BNP in the last 168 hours.    Microbiology Data:  Blood cultures pending    Other Results:  EKG (my interpretation): no new EKG performed/warranted    Radiology:  Imaging Results              CT Soft Tissue Neck WO Contrast (Final result)  Result time 10/18/22 13:01:40      Final result by Gela Liao MD (10/18/22 13:01:40)                   Impression:      Collection of mostly air and small amount of fluid in the right anterior neck with extensive soft tissue swelling.      Electronically signed by: Gela Liao  Date:    10/18/2022  Time:    13:01               Narrative:    EXAMINATION:  CT SOFT TISSUE NECK WITHOUT CONTRAST    CLINICAL HISTORY:  Neck abscess, deep tissue;laryngeal cancer s/p chemoradiation, right jaw swelling with drainage;    TECHNIQUE:  Axial CT images of the neck were obtained without intravenous contrast. Reformatted images in the sagittal and coronal plane were included.    Automatic exposure control was utilized to limit radiation dose.    DLP: 882 mGy-cm    COMPARISON:  PET-CT dated 10/13/2022    FINDINGS:  There are postoperative changes of prior total laryngectomy.  Tracheostomy is in place. There is a collection of mostly air and small amount of fluid in the right anterior neck (series 2, images 77 through 82).  Small amount of fluid measures approximately 9 x 11 mm in size (series 2, image 77).    There is soft tissue swelling in the neck bilaterally, right greater than left.  The parotid glands, submandibular glands and thyroid gland are unremarkable.  There is no acute abnormality of the orbits or visualized brain parenchyma.  There is no destructive bone lesion.  Right hilar nodularity and mediastinal lymphadenopathy are redemonstrated.                                       X-Ray Chest AP Portable (Final result)  Result time 10/18/22 12:28:47      Final result by Gela Liao MD (10/18/22 12:28:47)                   Impression:      No  acute abnormality of the chest.      Electronically signed by: Gela Liao  Date:    10/18/2022  Time:    12:28               Narrative:    EXAMINATION:  XR CHEST AP PORTABLE    CLINICAL HISTORY:  Sepsis;    COMPARISON:  X-ray dated 07/11/2022    FINDINGS:  Left chest wall port catheter has its tip over the superior vena cava.  The heart is normal in size.  The lungs are clear without focal consolidation.  There is no pleural effusion or visible pneumothorax.                                           Assessment & Plan:     Sepsis secondary to Abscess of Right Side Neck       - SIRS 2/4 (WBC and HR) with known source- right neck abscess draining spontaneously       - Swelling started 10/15       - s/p 3 g Unasyn in ED; s/p 30 cc/kg IVF bolus, BP stable and skin exam WNL/mucosal membranes moist at this time       - Bld cultures pending x2       - Starting Unasyn 3 grams q8h per ENT recs       - ENT consulted given Hx of radical neck for cancer, appreciate recs       - Wound care consulted       - Lortab elixer for pain q8h, benadryl for itching    Laryngeal Squamous Cell Ca  S/P Radical Neck and Laryngectomy       - Radical Neck w/trach on 7/11/22, follows up with ENT at [facility]       - Also follows with Onc, Dr. Atkinson- undergoing chemo and radiation, last dose of both around 3 weeks ago with further plans for chemo       - Laryngectomy valve in place       - Breathing comfortably on ambient air, no concern for airway obstruction       - ENT on board, appreciate recs    PEG Dependent       - S/p PEG with trach       - Weight stable        - Dietary consulted for TF recs       - Will start TF based on outpatient dietary's recommendations    COPD       - No PFT's on file, not on controller inhalers       - Continue home Pulmicort nebs BID, duonebs Q6H PRN SOB/Wheezing       - Will give O2 to keep sats 88-92% if needed    CODE STATUS: Full  Access: PIV  Antibiotics: Unasyn D1  Diet: Bolus TF  DVT Prophylaxis:  Lovenox  GI Prophylaxis: Pepcid  Fluids: none      Disposition: Admit to inpatient for IV abx, ENT to flex-scope pt in AM.           Melony Fry MD  [facility] [place]

## 2022-10-18 NOTE — CONSULTS
OTOLARYNGOLOGY CONSULT NOTE    SERVICE: [facility] Otolaryngology  RESIDENT PHYSICIANS: Kevin Palomino MD PGY4  ATTENDING PHYSICIAN: Ryan Barrett MD    REASON FOR CONSULT: s/p laryngectomy, neck abscess  CHIEF COMPLAINT:   Chief Complaint   Patient presents with    Abscess     C/o draining facial abscess with facial swelling. Draining yellow, brown and red in color with foul odor. Sent by oncologist for IV abx. Pt has an alry tube and non verbal.          HISTORY OF PRESENT ILLNESS  Josafat Boudreaux is a 56 y.o. male with PMH laryngeal squamous cell carcinoma s/p total laryngectomy, bilateral neck dissections III-IV who presents with several day history of right neck erythema, tenderness, warmth, and drainage out of a hole proximally 5 cm from the laryngeal stoma.  Over the past day, cellulitic changes have spread to the left side.  No breathing problems, no fever/chills.  Drainage site stopped yesterday.      REVIEW OF SYSTEMS:  Review of Systems   All other systems reviewed and are negative.    As above and otherwise negative X 10-point review.    PAST MEDICAL HISTORY  Past Medical History:   Diagnosis Date    Cancer     COPD (chronic obstructive pulmonary disease)     Dysphagia     Lung cancer     Vocal cord mass        PAST SURGICAL HISTORY  Past Surgical History:   Procedure Laterality Date    DIRECT DIAGNOSTIC LARYNGOSCOPY WITH BRONCHOSCOPY AND ESOPHAGOSCOPY N/A 7/11/2022    Procedure: LARYNGOSCOPY, DIRECT, DIAGNOSTIC, WITH BRONCHOSCOPY AND ESOPHAGOSCOPY;  Surgeon: Emory Alonso MD;  Location: [facility];  Service: ENT;  Laterality: N/A;    HIP SURGERY      HUMERUS FRACTURE SURGERY      INSERT ARTERIAL LINE  6/26/2022         TRACHEOSTOMY N/A 6/10/2022    Procedure: CREATION, TRACHEOSTOMY;  Surgeon: Junior Alonso MD;  Location: [facility];  Service: ENT;  Laterality: N/A;       SOCIAL HISTORY  Social History     Socioeconomic History    Marital status:    Tobacco Use    Smoking status: Former      Types: Cigarettes     Quit date: 2022     Years since quittin.3    Smokeless tobacco: Never   Substance and Sexual Activity    Alcohol use: Never    Drug use: Never       ALLERGIES  Review of patient's allergies indicates:  No Known Allergies    FAMILY HISTORY  Family History   Problem Relation Age of Onset    Lung cancer Father     Throat cancer Brother        Meds  Scheduled Meds:   sodium chloride 0.9%  1,000 mL Intravenous ED 1 Time    budesonide  0.5 mg Nebulization Q12H    ceFEPime (MAXIPIME) IVPB  2 g Intravenous Q8H    enoxaparin  40 mg Subcutaneous Daily    vancomycin (VANCOCIN) IVPB  1,250 mg Intravenous Once    [START ON 10/19/2022] vancomycin (VANCOCIN) IVPB  1,250 mg Intravenous Q12H     Continuous Infusions:  PRN Meds: albuterol-ipratropium, dextrose 10%, dextrose 10%, diphenhydrAMINE, glucagon (human recombinant), glucose, glucose, hydrocodone-apap 7.5-325 MG/15 ML, ibuprofen, naloxone, sodium chloride 0.9%, Pharmacy to dose Vancomycin consult **AND** vancomycin - pharmacy to dose    Physical Exam  GENERAL: NAD, AAO, no dyspnea/inc WOB, no stridor, tolerates secretions   NEURO: CN II - XII exam: intact bilaterally except  EYES: EOMI, PERRLA  EARS: Hearing well at normal conversation volume  NOSE: nares normal, septum midline, MMM, no drainage or sinus tenderness, no polyps, no crusting or bleeding points   ORAL CAVITY: MMM, no lesions or ulcerations, no leukoplakia, no edema; BOT and FOM are soft/NT and without lesions/ ulcerations/ leukoplakia; dentitions is fair  OROPHARYNX: No uvular deviation or deviation of the oropharyngeal wall noted, no erythema/ petechia/ clots; No effacement of the palatopharyngeal and/or palatoglossal folds. No fluctuance; tonsils are symmetric and 1+ without erythema/ exudate  VOICE: communicates effectively via voicing which is normal  NECK: no asymmetry, masses, or scars, no adenopathy, trachea midline, palpable landmarks; thyroid is soft, mobile, normal in size  without nodules or tenderness; no carotid bruit, no JVD  CARDIOVASCULAR: peripheral pulses palpable  RESPIRATORY: non-labored respirations with symmetric chest rise  ABDOMEN: s/nt/nd  EXT: moving with coordination  PSYCHIATRIC: orientation to time/place/person, no depression, no anxiety or agitation, mood and affect wnl  LABORATORY RESULTS  Lab Results   Component Value Date/Time    WBC 17.8 (H) 10/18/2022 12:10 PM    HGB 10.3 (L) 10/18/2022 12:10 PM    HCT 29.9 (L) 10/18/2022 12:10 PM    CHLORIDE 93 (L) 10/18/2022 12:10 PM    CO2 27 10/18/2022 12:10 PM    BUN 12.0 10/18/2022 12:10 PM    CREATININE 0.64 (L) 10/18/2022 12:10 PM    GLUCOSE 122 (H) 10/18/2022 12:10 PM    CALCIUM 9.0 10/18/2022 12:10 PM    ALBUMIN 2.8 (L) 10/18/2022 12:10 PM    AST 23 10/18/2022 12:10 PM    ALT 12 10/18/2022 12:10 PM    ALKPHOS 64 10/18/2022 12:10 PM       RADIOLOGY (I personally reviewed the images)    ? CT Scan:  EXAMINATION:  CT SOFT TISSUE NECK WITHOUT CONTRAST     CLINICAL HISTORY:  Neck abscess, deep tissue;laryngeal cancer s/p chemoradiation, right jaw swelling with drainage;     TECHNIQUE:  Axial CT images of the neck were obtained without intravenous contrast. Reformatted images in the sagittal and coronal plane were included.     Automatic exposure control was utilized to limit radiation dose.     DLP: 882 mGy-cm     COMPARISON:  PET-CT dated 10/13/2022     FINDINGS:  There are postoperative changes of prior total laryngectomy.  Tracheostomy is in place. There is a collection of mostly air and small amount of fluid in the right anterior neck (series 2, images 77 through 82).  Small amount of fluid measures approximately 9 x 11 mm in size (series 2, image 77).     There is soft tissue swelling in the neck bilaterally, right greater than left.  The parotid glands, submandibular glands and thyroid gland are unremarkable.  There is no acute abnormality of the orbits or visualized brain parenchyma.  There is no destructive bone  lesion.  Right hilar nodularity and mediastinal lymphadenopathy are redemonstrated.     Impression:     Collection of mostly air and small amount of fluid in the right anterior neck with extensive soft tissue swelling.        Electronically signed by: Gela Liao        A/P:  Josafat Boudreaux is a 56 y.o. male with laryngeal cancer s/p TL, BND, now with right neck cellulitis and subQair pocket    - Recommend IV Unasyn  - Will perform flexible neopharyngoscopy in the morning  - Will continue to follow    Thank you for allowing us to participate in the care of this patient.    Kevin Palomino MD  [facility] Department of Otolaryngology  -IV

## 2022-10-18 NOTE — ED PROVIDER NOTES
Encounter Date: 10/18/2022       History     Chief Complaint   Patient presents with    Abscess     C/o draining facial abscess with facial swelling. Draining yellow, brown and red in color with foul odor. Sent by oncologist for IV abx. Pt has an alry tube and non verbal.      55 y/o Male with hx of metastatic laryngeal cancer s/p total laryngectomy s/p laryngeal tube s/p chemo radiation presents to Ed with complaint of right jaw swelling and abscess.  Pt is nonverbal at baseline due to total laryngectomy therefore most HPI obtained from wife at bedside. Wife reports that they noticed some right jaw and neck swelling that started on Saturday. They thought it was lymphedema secondary to the radiation that patient received. Yesterday, pt noticed an abscess that popped and started draining yellowish foul odor. He saw his oncologist today who sent him to the ED to get IV antibiotics. Pt denies fever, chills, weakness, dizziness, nausea vomiting diarrhea, chest pain, shortness of breath.    The history is provided by the spouse. The history is limited by the condition of the patient (pt is nonverbal at baseline and speaks with lips and mouths words). No  was used.   Abscess   This is a new problem. Illness onset: 3 days ago. The problem has been unchanged (right neck/submandibular area). The pain is at a severity of 1/10. The abscess is characterized by redness, painfulness and draining. Pertinent negatives include no fever, no diarrhea, no vomiting, no congestion, no decreased responsiveness and no cough. Recent Medical Care: cancer patient on chemoradaition.   Review of patient's allergies indicates:  No Known Allergies  Past Medical History:   Diagnosis Date    Cancer     COPD (chronic obstructive pulmonary disease)     Dysphagia     Lung cancer     Vocal cord mass      Past Surgical History:   Procedure Laterality Date    DIRECT DIAGNOSTIC LARYNGOSCOPY WITH BRONCHOSCOPY AND ESOPHAGOSCOPY N/A  2022    Procedure: LARYNGOSCOPY, DIRECT, DIAGNOSTIC, WITH BRONCHOSCOPY AND ESOPHAGOSCOPY;  Surgeon: Emory Alonso MD;  Location: [facility] OR;  Service: ENT;  Laterality: N/A;    HIP SURGERY      HUMERUS FRACTURE SURGERY      INSERT ARTERIAL LINE  2022         TRACHEOSTOMY N/A 6/10/2022    Procedure: CREATION, TRACHEOSTOMY;  Surgeon: Junior Alonso MD;  Location: [facility] OR;  Service: ENT;  Laterality: N/A;     Family History   Problem Relation Age of Onset    Lung cancer Father     Throat cancer Brother      Social History     Tobacco Use    Smoking status: Former     Types: Cigarettes     Quit date: 2022     Years since quittin.3    Smokeless tobacco: Never   Substance Use Topics    Alcohol use: Never    Drug use: Never     Review of Systems   Constitutional:  Negative for decreased responsiveness and fever.   HENT:  Negative for congestion and drooling.    Respiratory:  Negative for cough and wheezing.    Cardiovascular:  Negative for chest pain.   Gastrointestinal:  Negative for diarrhea and vomiting.   Skin:  Positive for wound.   Neurological:  Negative for dizziness and weakness.   All other systems reviewed and are negative.    Physical Exam     Initial Vitals [10/18/22 1106]   BP Pulse Resp Temp SpO2   103/68 (!) 111 16 97 °F (36.1 °C) 97 %      MAP       --         Physical Exam    Nursing note and vitals reviewed.  Constitutional: He appears well-developed and well-nourished. No distress.   HENT:   Head: Normocephalic and atraumatic.   Laryngeal tube noted to neck.   Right submandibular area-large area of erythema, warmth, tenderness to palpation. Inferior to this area is a pinpoint hole that is tenderness to palpation with no active drainage upon palpation at this time ( wife reports that is where the abscess opened and started draining )    Eyes: Conjunctivae are normal.   Cardiovascular:  Regular rhythm.           Pulmonary/Chest: Breath sounds normal.   Abdominal: Abdomen is soft.  Bowel sounds are normal. There is no abdominal tenderness. There is no rebound and no guarding.   Musculoskeletal:         General: Normal range of motion.     Neurological: He is alert and oriented to person, place, and time. He has normal strength. No sensory deficit. Gait normal. GCS score is 15. GCS eye subscore is 4. GCS verbal subscore is 5. GCS motor subscore is 6.   Skin: Skin is warm and dry.   Psychiatric: He has a normal mood and affect. His behavior is normal. Judgment and thought content normal.       ED Course   Procedures  Labs Reviewed   COMPREHENSIVE METABOLIC PANEL - Abnormal; Notable for the following components:       Result Value    Sodium Level 132 (*)     Chloride 93 (*)     Glucose Level 122 (*)     Creatinine 0.64 (*)     Protein Total 6.3 (*)     Albumin Level 2.8 (*)     Albumin/Globulin Ratio 0.8 (*)     All other components within normal limits   CBC WITH DIFFERENTIAL - Abnormal; Notable for the following components:    WBC 17.8 (*)     RBC 3.09 (*)     Hgb 10.3 (*)     Hct 29.9 (*)     MCV 96.8 (*)     MCH 33.3 (*)     Neut # 13.6 (*)     Mono # 3.19 (*)     IG# 0.09 (*)     All other components within normal limits   LACTIC ACID, PLASMA - Normal   BLOOD CULTURE OLG   BLOOD CULTURE OLG   CBC W/ AUTO DIFFERENTIAL    Narrative:     The following orders were created for panel order CBC Auto Differential.  Procedure                               Abnormality         Status                     ---------                               -----------         ------                     CBC with Differential[[id number]]        Abnormal            Final result                 Please view results for these tests on the individual orders.   URINALYSIS, REFLEX TO URINE CULTURE   EXTRA TUBES    Narrative:     The following orders were created for panel order EXTRA TUBES.  Procedure                               Abnormality         Status                     ---------                                -----------         ------                     Light Blue Top Hold[[id number]]                              In process                 Pink Top Hold[[id number]]                                    In process                   Please view results for these tests on the individual orders.   LIGHT BLUE TOP HOLD   PINK TOP HOLD          Imaging Results              CT Soft Tissue Neck WO Contrast (Final result)  Result time 10/18/22 13:01:40      Final result by Gela Liao MD (10/18/22 13:01:40)                   Impression:      Collection of mostly air and small amount of fluid in the right anterior neck with extensive soft tissue swelling.      Electronically signed by: Gela Liao  Date:    10/18/2022  Time:    13:01               Narrative:    EXAMINATION:  CT SOFT TISSUE NECK WITHOUT CONTRAST    CLINICAL HISTORY:  Neck abscess, deep tissue;laryngeal cancer s/p chemoradiation, right jaw swelling with drainage;    TECHNIQUE:  Axial CT images of the neck were obtained without intravenous contrast. Reformatted images in the sagittal and coronal plane were included.    Automatic exposure control was utilized to limit radiation dose.    DLP: 882 mGy-cm    COMPARISON:  PET-CT dated 10/13/2022    FINDINGS:  There are postoperative changes of prior total laryngectomy.  Tracheostomy is in place. There is a collection of mostly air and small amount of fluid in the right anterior neck (series 2, images 77 through 82).  Small amount of fluid measures approximately 9 x 11 mm in size (series 2, image 77).    There is soft tissue swelling in the neck bilaterally, right greater than left.  The parotid glands, submandibular glands and thyroid gland are unremarkable.  There is no acute abnormality of the orbits or visualized brain parenchyma.  There is no destructive bone lesion.  Right hilar nodularity and mediastinal lymphadenopathy are redemonstrated.                                       X-Ray Chest AP Portable (Final  result)  Result time 10/18/22 12:28:47      Final result by Gela Liao MD (10/18/22 12:28:47)                   Impression:      No acute abnormality of the chest.      Electronically signed by: Gela Liao  Date:    10/18/2022  Time:    12:28               Narrative:    EXAMINATION:  XR CHEST AP PORTABLE    CLINICAL HISTORY:  Sepsis;    COMPARISON:  X-ray dated 07/11/2022    FINDINGS:  Left chest wall port catheter has its tip over the superior vena cava.  The heart is normal in size.  The lungs are clear without focal consolidation.  There is no pleural effusion or visible pneumothorax.                                       Medications   0.9%  NaCl infusion (has no administration in time range)   vancomycin - pharmacy to dose (has no administration in time range)   sodium chloride 0.9% flush 10 mL (has no administration in time range)   naloxone 0.4 mg/mL injection 0.02 mg (has no administration in time range)   glucose chewable tablet 16 g (has no administration in time range)   glucose chewable tablet 24 g (has no administration in time range)   glucagon (human recombinant) injection 1 mg (has no administration in time range)   dextrose 10% bolus 125 mL (has no administration in time range)   dextrose 10% bolus 250 mL (has no administration in time range)   enoxaparin injection 40 mg (40 mg Subcutaneous Given 10/18/22 1812)   budesonide nebulizer solution 0.5 mg (0.5 mg Nebulization Not Given 10/18/22 2100)   ibuprofen 100 mg/5 mL suspension 600 mg (has no administration in time range)   hydrocodone-apap 7.5-325 MG/15 ML oral solution 15 mL (15 mLs Oral Given 10/18/22 2102)   diphenhydrAMINE 12.5 mg/5 mL elixir 25 mg (25 mg Oral Given 10/19/22 0659)   albuterol-ipratropium 2.5 mg-0.5 mg/3 mL nebulizer solution 3 mL (3 mLs Nebulization Not Given 10/18/22 1949)   ampicillin-sulbactam (UNASYN) 3 g in sodium chloride 0.9 % 100 mL IVPB (MB+) (3 g Intravenous New Bag 10/19/22 0401)   famotidine tablet 20  mg (20 mg Per G Tube Given 10/18/22 2116)   lactated ringers bolus 2,124 mL (0 mLs Intravenous Stopped 10/18/22 1438)   ampicillin-sulbactam injection 3 g (3 g Intramuscular Given 10/18/22 1514)     Medical Decision Making:   Reviewed labs and imaging. Medicine consulted for admission. Pt agrees to be admitted.                          Clinical Impression:   Final diagnoses:  [L03.221, L02.11] Cellulitis and abscess of neck (Primary)      ED Disposition Condition    Admit                 Bryon Ross MD  10/19/22 1936

## 2022-10-18 NOTE — PROGRESS NOTES
Pharmacokinetic Initial Assessment: IV Vancomycin    Assessment/Plan:    Initiate intravenous vancomycin with loading dose of 1250 mg once followed by a maintenance dose of vancomycin 1250 mg mg IV every 12 hours  Desired empiric serum trough concentration is 10 to 20 mcg/mL  Draw vancomycin trough level 60 min prior to fourth dose on 10/20 at approximately 0500  Pharmacy will continue to follow and monitor vancomycin.      Please contact pharmacy at extension [phone] with any questions regarding this assessment.     Thank you for the consult,   Evangelista Buck       Patient brief summary:  Josafat Boudreaux is a 56 y.o. male initiated on antimicrobial therapy with IV Vancomycin for treatment of suspected skin & soft tissue infection    Drug Allergies:   Review of patient's allergies indicates:  No Known Allergies    Actual Body Weight:   70.8    Renal Function:   Estimated Creatinine Clearance: 128.9 mL/min (A) (based on SCr of 0.64 mg/dL (L)).,       CBC (last 72 hours):  Recent Labs   Lab Result Units 10/18/22  1012 10/18/22  1210   WBC x10(3)/mcL 18.3* 17.8*   Hgb gm/dL 11.3* 10.3*   Hct % 34.3* 29.9*   Platelet x10(3)/mcL 265 260   Mono % % 15.4 18.0   Eos % % 0.2 0.3   Basophil % % 0.2 0.3       Metabolic Panel (last 72 hours):  Recent Labs   Lab Result Units 10/18/22  1012 10/18/22  1210   Sodium Level mmol/L 128* 132*   Potassium Level mmol/L 4.3 4.3   Chloride mmol/L 90* 93*   Carbon Dioxide mmol/L 28 27   Glucose Level mg/dL 148* 122*   Blood Urea Nitrogen mg/dL 12.2 12.0   Creatinine mg/dL 0.74 0.64*   Albumin Level gm/dL 3.1* 2.8*   Bilirubin Total mg/dL 0.6 0.4   Alkaline Phosphatase unit/L 68 64   Aspartate Aminotransferase unit/L 19 23   Alanine Aminotransferase unit/L 12 12       Drug levels (last 3 results):  No results for input(s): VANCOMYCINRA, VANCORANDOM, VANCOMYCINPE, VANCOPEAK, VANCOMYCINTR, VANCOTROUGH in the last 72 hours.    Microbiologic Results:  Microbiology Results (last 7 days)        Procedure Component Value Units Date/Time    Blood Culture #2 **CANNOT BE ORDERED STAT** [[id number]] Collected: 10/18/22 1210    Order Status: Resulted Specimen: Blood Updated: 10/18/22 1217    Blood Culture #1 **CANNOT BE ORDERED STAT** [[id number]] Collected: 10/18/22 1210    Order Status: Resulted Specimen: Blood Updated: 10/18/22 1216

## 2022-10-18 NOTE — PROGRESS NOTES
HEMATOLOGY/ONCOLOGY OFFICE CLINIC VISIT    Visit Information:    Initial Evaluation: 7/21/2022  Referring Provider:   Other providers:  Code status:    Diagnosis:  [id number] JENNYFER Laryngeal Squamous cell carcinoma     Present treatment:  Carbo/Taxol + RT    Treatment/Oncology history:  --7/11/2022: total laryngectomy    Plan of care:     Imaging:  Ct neck 6/6/2022: A metastatic right level III cervical lymph node is present with short axis measurement of 1.6 cm.  This lymph node contains internal cystic change, typical of metastatic squamous cell carcinoma.  A left level III cervical lymph node shows short axis measurements of 1 cm, and is suspicious.    A left supraglottic mass measures approximately 2.4 cm in diameter (axial post-contrast image 39), presumably corresponding to the primary neoplasm.  Metastatic lymph nodes are also present in the inferior right paratracheal position, measuring 1.3 cm in short axis.  Metastatic lymph nodes are present in the superior right hilum, measuring 1.8 cm in short axis.  Ct H&N 6/27/2022: 1. There is mild stenosis at the origin of left proximal internal carotid artery secondary to dense calcified atheromatous plaque. 2. There is consolidation is right upper lobe. Additional ground-glass opacities are also seen on the remainder of the visualized lungs. These findings are consistent with an infectious process. Small right pleural effusion is seen. There is an ill-defined soft tissue mass in the right perihilar region which measures approximately 3.1 x 2.9 x 4.6 cm, of concern for neoplasm. Correlate clinically as regards further evaluation and followup with CT chest. There is an ill-defined enhancing soft tissue mass in the glottis infiltrating into the paraglottic spaces and the subglottic region with obliteration of the airway, of concern for a neoplasm. 3. Unremarkable CT angiogram of the head. Details and other findings as described above.  NM PET CT 8/1/2022:  Postoperative changes of recent total laryngectomy and lou dissection. bilateral hypermetabolic cervical lymphadenopathy.  A right cervical lymph node 1.7 x 1.8 cm, compared to 1.6 x 1.5 cm previously, SUV of 7.7. left cervical lymph node 8 x 10 mm and has a max SUV of 3.4.  A fluid collection posterior to the left internal jugular vein measures 2.4 x 2.4 cm. There is right hilar and mediastinal lymphadenopathy.  A precarinal lymph node 1.6 x 2.3 cm, compared to 1.3 x 1.7 cm previously, SUV of 13.8.  Right hilar lymphadenopathy measures 3 x 2.9 cm, compared to 2.1 x 2.3 cm previously, and has a max SUV of 9.8.  A suspected right hilar mass measures 1.8 x 3.6 cm SUV of 14.8   PET CT 10/13/2022: IMPRESSION 1. Findings suggestive of mixed disease response. Right lower cervical chain nodes are less metabolically active. Right hilar mass and mediastinal/right hilar lymph nodes are decreased in size, but demonstrate increased metabolic activity. 2. No findings to suggest new or worsening FDG-avid metastatic disease within the left thoracic cavity, abdomen, or pelvis. 3. Previously visualized left neck fluid collection is no longer clearly identified.    Pathology:  6/14/2022:  EBUS ENDOBRONCHIAL MASS RUL, CYTOLOGY: NEARLY ACELLULAR SPECIMEN CONSISTING OF FRESH BLOOD CLOT. NO ATYPICAL OR MALIGNANT CELLS IDENTIFIED.   7/11/2022: Bronchial Wash, RLL, right bronchial mass washings:  - Squamous cell carcinoma.    Bronchial Wash, LLL, left bronchial mass washings: - Squamous cell carcinoma.     7/11/2022 Laryngectomy:  1. Larynx, laryngeal biopsy frozen section : - Squamous cell carcinoma with necrosis.    2. Neck, Anterior, left neck level 3: - Number of lymph nodes involved by carcinoma:  1/7       - Size of metastatic deposit:  2.5 mm - Extranodal extension:  Not identified      3. Larynx, resection without nodes, total layrngectomy :  - Squamous cell carcinoma.  4. Neck, Anterior, left neck level 4: - Number of lymph  nodes involved by carcinoma:  0/10  5. Neck, Anterior, right neck level 4:  - Number of lymph nodes involved by carcinoma:  0/11  6. Neck, Anterior, right neck level 3: - Number of lymph nodes involved by carcinoma:  0/9  7. Nasophaynx, inferior pharyngeal mucosa : - Benign squamous mucosa. - No evidence of malignancy.     8. Cartilage, superior cartilage margin : - No evidence of malignancy.    9. Lymph Node, pretracheal lymph  node :  - Number of lymph nodes involved by carcinoma:  0/3  [id number]       CLINICAL HISTORY:       Patient: Josafat Boudreaux is a 56 y.o. male kindly refer her for laryngeal carcinoma.  Patient  was referred to ENT for further evaluation of hoarseness.  He did not have any difficulty swallowing or dry mouth at the time.  Hoarseness has been present for at least 4 months prior to evaluation. 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     Patient was scheduled to trach but on 06/10/2022 he was brought to the emergency room via air met with is short of breath.  He was found to have and oxygenation on the 40s when EMS was called.  He was placed on BiPAP but pCO2 increased so he has an emergent tracheostomy performed.  He has a PEG tube placed same hospitalization.  He underwent bronchoscopy with biopsy of the right upper lobe endobronchial lesion on 6/14/2022 cytology was negative for malignant cells.      On 7/11/2022 he underwent LARYNGECTOMY, WITH RADICAL NECK DISSECTION - Bilateral LARYNGOSCOPY, DIRECT, DIAGNOSTIC, WITH BRONCHOSCOPY AND ESOPHAGOSCOPY pathology report as above.  1/40 lymph nodes were involved with metastatic disease.  Bronchoscopy was repeated and biopsy of the left lower lobe and right lower lobe were both positive for squamous cell carcinoma.    He is here today with his wife.  He has recovered from surgery relatively well.  Tracheostomy in place.  He is unable to talk so the history was taken by electronic  medical record and wife.    Patient has a brother with history of throat cancer. Patient used to smoke 1 and half pack per day since he was 60 years old.  He was smoking less recently.    Chief Complaint: Abcess on the right side of his face      Interval History:  Patient presents today for 3 week follow-up and to discuss PET CT results, he is with his wife. PET/CT done to eval lung disease. He completed 6th cycle of weekly Carbo/Taxol and radiation. He tolerated well. He has some p.o. intake but mostly PEG tube feeding. Pain controlled with 5 mg Norco 1 tab at night only. His wife states she noticed swelling to face and neck area on Saturday. Noted an abscess to neck on right side. Thick, brown-purulent, foul smelling drainage started this morning. Also reports itchy rash to hands. During physical exam, neck was tender to touch, redness and swelling also noted. They deny any fever, chills or sweats. No chest pain or shortness of breath. No bleeding or neurological changes.      Past Medical History:   Diagnosis Date    Cancer     COPD (chronic obstructive pulmonary disease)     Dysphagia     Lung cancer     Vocal cord mass       Past Surgical History:   Procedure Laterality Date    DIRECT DIAGNOSTIC LARYNGOSCOPY WITH BRONCHOSCOPY AND ESOPHAGOSCOPY N/A 7/11/2022    Procedure: LARYNGOSCOPY, DIRECT, DIAGNOSTIC, WITH BRONCHOSCOPY AND ESOPHAGOSCOPY;  Surgeon: Emory Alonso MD;  Location: [facility];  Service: ENT;  Laterality: N/A;    HIP SURGERY      HUMERUS FRACTURE SURGERY      INSERT ARTERIAL LINE  6/26/2022         TRACHEOSTOMY N/A 6/10/2022    Procedure: CREATION, TRACHEOSTOMY;  Surgeon: Junior Alonso MD;  Location: [facility];  Service: ENT;  Laterality: N/A;     Family History   Problem Relation Age of Onset    Lung cancer Father     Throat cancer Brother      Social Connections: Not on file       Review of patient's allergies indicates:  No Known Allergies   No current facility-administered medications on  file prior to visit.     Current Outpatient Medications on File Prior to Visit   Medication Sig Dispense Refill    albuterol-ipratropium (DUO-NEB) 2.5 mg-0.5 mg/3 mL nebulizer solution Take 3 mLs by nebulization every 4 (four) hours as needed for Shortness of Breath or Wheezing. Rescue 90 mL 1    budesonide (PULMICORT) 0.5 mg/2 mL nebulizer solution Take 2 mLs (0.5 mg total) by nebulization every 12 (twelve) hours. Controller 90 mL 1    diphenhydrAMINE (BENADRYL) 25 mg capsule 1 capsule (25 mg total) by Per G Tube route every 6 (six) hours as needed for Itching. 90 capsule 1    famotidine (PEPCID) 20 MG tablet Take 1 tablet (20 mg total) by mouth every evening. 30 tablet 11    glutamine 10 gram PwPk Take 10 g by mouth 2 (two) times a day.      HYDROcodone-acetaminophen (NORCO) 5-325 mg per tablet Take 1 tablet by mouth every 4 (four) hours as needed for Pain.      hydrocortisone 1 % cream Apply topically 2 (two) times daily as needed (rash). Apply to rash 30 g 1    ibuprofen (ADVIL,MOTRIN) 100 mg/5 mL suspension Take 30 mLs (600 mg total) by mouth every 6 (six) hours as needed for Pain (mild to moderate). 354 mL 0    ondansetron (ZOFRAN-ODT) 8 MG TbDL Take 8 mg by mouth every 8 (eight) hours as needed for Nausea. Tab 1 ODT in AM for 2 days after chemotherapy      pantoprazole (PROTONIX) 20 MG tablet Take 1 tablet (20 mg total) by mouth once daily. 30 tablet 11    silver nitrate applicators 75-25 % applicator Apply topically 3 (three) times a week. 1 applicator 0    UNABLE TO FIND medication name: ATOMIC TONIC (LIDO/DELTA/MAALOX)  gargle and swallow 1 to 2 teaspoonsful by mouth 5 minutes before meals and every 3 hours as needed for pain with swallowing      [DISCONTINUED] acetaminophen (TYLENOL) 160 mg/5 mL Liqd Take 20.3 mLs (649.6 mg total) by mouth every 6 (six) hours as needed (Mild pain). 473 mL 0    [DISCONTINUED] aspirin (ECOTRIN) 81 MG EC tablet Take 81 mg by mouth once daily.        Review of Systems  "  Constitutional:  Negative for activity change, appetite change, chills, diaphoresis, fatigue, fever and unexpected weight change.   HENT:  Positive for trouble swallowing. Negative for nasal congestion, nosebleeds, postnasal drip, sinus pressure/congestion and sore throat.    Eyes:  Negative for visual disturbance.   Respiratory:  Negative for cough and shortness of breath.    Cardiovascular:  Negative for chest pain and palpitations.   Gastrointestinal:  Negative for abdominal distention, abdominal pain, blood in stool, change in bowel habit, constipation, diarrhea, nausea, vomiting and change in bowel habit.   Endocrine: Negative.    Genitourinary:  Negative for bladder incontinence, decreased urine volume, difficulty urinating, dysuria, frequency, hematuria, scrotal swelling, testicular pain and urgency.   Musculoskeletal:  Negative for arthralgias, back pain, gait problem, joint swelling, leg pain, myalgias and neck pain.   Integumentary:  Negative for rash.   Neurological:  Negative for dizziness, tremors, seizures, syncope, speech difficulty, weakness, light-headedness, numbness, headaches and memory loss.   Hematological:  Negative for adenopathy. Does not bruise/bleed easily.   Psychiatric/Behavioral:  Negative for agitation, confusion, hallucinations, sleep disturbance and suicidal ideas. The patient is not nervous/anxious.             Vitals:    10/18/22 1023   BP: (!) 93/56   BP Location: Left arm   Patient Position: Sitting   Pulse: (!) 115   Temp: 98.9 °F (37.2 °C)   TempSrc: Oral   SpO2: (!) 93%   Weight: 70.8 kg (156 lb)   Height: 5' 9" (1.753 m)        Physical Exam  Vitals and nursing note reviewed.   Constitutional:       General: He is not in acute distress.     Appearance: He is ill-appearing.      Comments: thin   HENT:      Head: Normocephalic and atraumatic.      Mouth/Throat:      Mouth: Mucous membranes are moist.   Eyes:      General: No scleral icterus.     Extraocular Movements: " Extraocular movements intact.      Conjunctiva/sclera: Conjunctivae normal.   Neck:      Vascular: No JVD.      Trachea: Tracheostomy present.      Comments: Tracheotomy in place.  Edema neck bilaterally R>>L  Right open wound with purulent d/c.   Tender to palpation  Skin changes c/w RT  Cardiovascular:      Rate and Rhythm: Normal rate and regular rhythm.      Heart sounds: No murmur heard.  Pulmonary:      Effort: Pulmonary effort is normal.      Breath sounds: Normal breath sounds. No wheezing or rhonchi.   Chest:      Chest wall: No tenderness.   Abdominal:      General: The ostomy site is clean. Bowel sounds are normal. There is no distension.      Palpations: Abdomen is soft.      Tenderness: There is no abdominal tenderness.   Musculoskeletal:         General: No swelling or deformity.      Cervical back: Neck supple.   Lymphadenopathy:      Cervical: No cervical adenopathy.      Upper Body:      Right upper body: No supraclavicular or axillary adenopathy.      Left upper body: No supraclavicular or axillary adenopathy.      Lower Body: No right inguinal adenopathy. No left inguinal adenopathy.   Skin:     General: Skin is warm.      Coloration: Skin is not jaundiced.      Findings: No rash.   Neurological:      General: No focal deficit present.      Mental Status: He is alert and oriented to person, place, and time.   Psychiatric:         Attention and Perception: Attention normal.         Mood and Affect: Mood and affect normal. Mood is not anxious.         Behavior: Behavior is cooperative.         Cognition and Memory: Cognition normal.         Judgment: Judgment normal.     ECOG SCORE    0 - Fully active-able to carry on all pre-disease performance without restriction         Laboratory:  CBC with Differential:  Lab Results   Component Value Date    WBC 17.8 (H) 10/18/2022    RBC 3.09 (L) 10/18/2022    HGB 10.3 (L) 10/18/2022    HCT 29.9 (L) 10/18/2022    MCV 96.8 (H) 10/18/2022    MCH 33.3 (H)  10/18/2022    MCHC 34.4 10/18/2022    RDW 16.0 10/18/2022     10/18/2022    MPV 10.1 10/18/2022        CMP:  Sodium Level   Date Value Ref Range Status   10/18/2022 132 (L) 136 - 145 mmol/L Final     Potassium Level   Date Value Ref Range Status   10/18/2022 4.3 3.5 - 5.1 mmol/L Final     Carbon Dioxide   Date Value Ref Range Status   10/18/2022 27 22 - 29 mmol/L Final     Blood Urea Nitrogen   Date Value Ref Range Status   10/18/2022 12.0 8.4 - 25.7 mg/dL Final     Creatinine   Date Value Ref Range Status   10/18/2022 0.64 (L) 0.73 - 1.18 mg/dL Final     Calcium Level Total   Date Value Ref Range Status   10/18/2022 9.0 8.4 - 10.2 mg/dL Final     Albumin Level   Date Value Ref Range Status   10/18/2022 2.8 (L) 3.5 - 5.0 gm/dL Final     Bilirubin Total   Date Value Ref Range Status   10/18/2022 0.4 <=1.5 mg/dL Final     Alkaline Phosphatase   Date Value Ref Range Status   10/18/2022 64 40 - 150 unit/L Final     Aspartate Aminotransferase   Date Value Ref Range Status   10/18/2022 23 5 - 34 unit/L Final     Alanine Aminotransferase   Date Value Ref Range Status   10/18/2022 12 0 - 55 unit/L Final     Estimated GFR-Non    Date Value Ref Range Status   08/02/2022 >60 mls/min/1.73/m2 Final             Assessment:       1. Laryngeal cancer    2. Squamous cell carcinoma of both lungs    3. Tracheostomy dependent    4. Regional lymph node metastasis present            Laryngeal Squamous cell carcinoma -s/p total laryngectomy, 1/40 LN with metastatic disease.   Lung masses x 2, bilateral--> Squamous cell carcinoma    I discussed with the patient and his wife, diagnosis, prognosis and treatment recommendations.  Discussed briefly chemotherapy, discussed risk versus benefits as well as toxicities associated with it.   Patient with long history of smoking.  I discussed with them that head and neck cancer lung cancer are very common in smokers.  I explained to them that lung cancer could or could not be  associated with his head and neck cancer and lung cancer could be a 2nd primary.  Either way he has it in both lungs therefore a stage IV.  As per patient report, I do not have the official report, he has 1 mass in each of the lung bases and nothing other placed.   He will see Dr. Das Monday.  I would like to discuss case with him after review his scans to see what will be best treatment strategy.      He most likely need RT to his neck and he will benefit of concommittant chemotherapy.  If he only have one lesion in each lung and not too big, maybe RT to each lesions may be an alternative as well +/- chemotherapy during and after RT.       Plan.:       Completed weekly carbo/Taxol and radiation    Leukocytosis, edema of the neck with purulent discharge r/o abscess  Instructed to go to ER for evaluation of abscess to neck, per patient and wife, [facility]'d [facility] go to [facility]  Instructed to increase water intake through PEG (2) 8 oz cups per 24 hours to prevent dehydration.   Requested NGS on lung biopsy, received notification there was not enough tissue sample, requested NGS on Larynx biopsy--pending  Will plan to start carbo/Taxol q3  +/- immunotherapy once better and infection gone  RTC in 2 weeks with labs  Labs: CBC, CMP    Encourage to call or message us for any questions or problems  The patient was given ample opportunity to ask questions, and to the best of my abilities, all questions answered to satisfaction; patient demonstrated understanding of what we discussed and agreeable to the plan.     LENKA NELSON MD    Professional Services   I, Bessie Horowitz LPN, acted solely as a scribe for and in the presence of Dr. Lenka Nelson, who performed these services.

## 2022-10-18 NOTE — TELEPHONE ENCOUNTER
Received message from Diana, nurse @ [facility]. Patient's wife called there this a.m. stating that patient has what seems to be an abcess to his neck that is draining. Diana states patient did not have this abcess at their last visit with him. Patient has a 10:00 a.m. appt here today. She was wondering if this could be cultured here or if patient needs to go into ED for eval. Please advise.

## 2022-10-18 NOTE — DISCHARGE INSTRUCTIONS
Pt agree and understands discharge, pt was given prescriptions, advise other meds sent to outside pharmacy.

## 2022-10-18 NOTE — PROGRESS NOTES
Oncology Nutrition Assessment for Medical Nutrition Therapy      Josafat Boudreaux   1965     Referring Provider:   Yara Atkinson MD     Reason for Visit:  nutrition f/u     Nutrition Recommendations:  1. Continue po intake as tolerated  2. Continue Diabetisource 6 cartons per day as tolerated  3. 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 He is bolusing 6 cartons of Diabetisource via PEG per day and tolerating well. He is also eating some by mouth at this time, although I am not sure he was cleared by SLP for this. Notes that soreness to throat has started now that he is on XRT. He has no other complaints today. Per EMR his UBW was ~155#, but he dropped down to ~130# with diagnosis and surgery. However, he is back up to 156# at this time.     9/13/22: pt here for NP f/u. He reports stable wt, continues using PEG with no changes in his formula. No c/o n/v/c/d.     9/27/22: pt here for NP f/u. Wt is stable, enteral nutrition stable. He is tolerating feedings well with no new issues.    10/18/22: pt here for MD f/u. Wt stable, enteral nutrition stable. Tolerating feedings with no new complaints.     Nutrition Factors Affecting Intake  difficulty/impaired swallowing    PMHx:           Past Medical History:   Diagnosis Date    Cancer      COPD (chronic obstructive pulmonary disease)      Dysphagia      Lung cancer      Vocal cord mass         Allergies: Patient has no known allergies.     Current Medications:     Current Outpatient Medications:     acetaminophen (TYLENOL) 160 mg/5 mL Liqd, Take 20.3 mLs (649.6 mg total) by mouth every 6 (six) hours as needed (Mild pain)., Disp: 473 mL, Rfl: 0    albuterol-ipratropium (DUO-NEB) 2.5 mg-0.5 mg/3 mL nebulizer solution, Take 3 mLs by nebulization every 4 (four) hours as needed for Shortness of Breath or Wheezing. Rescue, Disp: 90 mL, Rfl: 1     "budesonide (PULMICORT) 0.5 mg/2 mL nebulizer solution, Take 2 mLs (0.5 mg total) by nebulization every 12 (twelve) hours. Controller (Patient not taking: No sig reported), Disp: 90 mL, Rfl: 1    diphenhydrAMINE (BENADRYL) 25 mg capsule, 1 capsule (25 mg total) by Per G Tube route every 6 (six) hours as needed for Itching., Disp: 90 capsule, Rfl: 1    famotidine (PEPCID) 20 MG tablet, Take 1 tablet (20 mg total) by mouth every evening., Disp: 30 tablet, Rfl: 11    glutamine 10 gram PwPk, Take 10 g by mouth 2 (two) times a day., Disp: , Rfl:     hydrocortisone 1 % cream, Apply topically 2 (two) times daily as needed (rash). Apply to rash, Disp: 30 g, Rfl: 1    ibuprofen (ADVIL,MOTRIN) 100 mg/5 mL suspension, Take 30 mLs (600 mg total) by mouth every 6 (six) hours as needed for Pain (mild to moderate)., Disp: 354 mL, Rfl: 0    ondansetron (ZOFRAN-ODT) 8 MG TbDL, Take 8 mg by mouth every 8 (eight) hours as needed for Nausea. Tab 1 ODT in AM for 2 days after chemotherapy, Disp: , Rfl:     pantoprazole (PROTONIX) 20 MG tablet, Take 1 tablet (20 mg total) by mouth once daily., Disp: 30 tablet, Rfl: 11    silver nitrate applicators 75-25 % applicator, Apply topically 3 (three) times a week., Disp: 1 applicator, Rfl: 0     Labs: none    Anthropometrics    Height:         Ht Readings from Last 1 Encounters:   09/13/22 5' 9" (1.753 m)      Weight:   Wt Readings from Last 5 Encounters:   10/18/22 70.8 kg (156 lb)   10/18/22 70.8 kg (156 lb)   10/08/22 59.8 kg (131 lb 13.4 oz)   09/27/22 71 kg (156 lb 9.6 oz)   09/14/22 71.5 kg (157 lb 9.6 oz)    *wt from 10/8 in error*    Usual Body Weight: 70 kg (154 lb)  % Weight Change: -15.5% in six months to lowest wt 130#, however pt has gained back 26#    BMI: 23 (normal)    Ideal Weight: 72.7 kg (160 lb)  % Ideal Weight: 98%    Estimated Needs  1849 kcal/day              [place] Jeor x 1.1 activity factor x 1.1 stress factor  78 g protein/day          1.1 g/kg CBW  1849 mL fluid/day "       1 mL/kcal    Formula: Diabetisource AC  Rate/Volume: 6 cartons/day  Water Flushes: 60 mL with each carton  Additives/Modulars: none at this time  Route: PEG tube  Method: bolus  Total Nutrition Provided by Tube Feeding, Additives, and Flushes:  Calories Provided  1800 kcal/d, 97% needs   Protein Provided  90 g/d, 115% needs   Fluid Provided  1584 ml/d, 86% needs   Continuous feeding calculations based on estimated 20 hr/d run time unless otherwise stated.     Nutrition Diagnosis    PES: Swallowing difficulty related to laryngeal CA as evidenced by need for PEG to provide majority of nutrition. (continues)    Nutrition Risk  low     Nutrition Intervention    Interventions(treatment strategy):  Collaboration with other providers        Nutrition Monitoring and Evaluation    Monitor: enteral nutrition intake, weight change, and electrolyte/renal panel     Follow up in one month.          Vidhya Brown MS, RD, , LDN

## 2022-10-19 NOTE — PROGRESS NOTES
"[facility] Medicine Wards Progress Note     Resident Team: [facility] Medicine List 2  Attending Physician: Bridger Lott MD  Resident: Lisandra/Ania  Intern: Festus/Kasia       Subjective:      Brief HPI:  Josafat Boudreaux is a 56 y.o. male with PMH of laryngeal squamous cell carcinoma s/p total laryngectomy/radical neck dissection on 22 (at [facility]),  LN with metastatic disease, lung masses x2 bilateral (Squamous cell carcinoma), and COPD (unquantitated) who presented to [facility] ED on 10/18/2022  with a primary complaint of a draining abscess to his right neck/submandibular area. Admitted 10/18 for IV abx, ENT following.    Interval History: NAEO. Pt comfortable in bed, states pain/swelling decreased. Denies fever, chills, n/v, chest pain, SOB, abdominal pain. Erythema and leukocytosis improved this morning, plan for observation with decision on surgical intervention to drain/debride tomorrow, possibly for Friday. Rash to b/l UE remains unchanged, red-maculopapular rash.      Review of Systems:  ROS completed and negative except as indicated above.     Objective:     Last 24 Hour Vital Signs:  BP  Min: 92/55  Max: 109/59  Temp  Av.3 °F (36.8 °C)  Min: 97 °F (36.1 °C)  Max: 99.2 °F (37.3 °C)  Pulse  Av.3  Min: 88  Max: 115  Resp  Av  Min: 16  Max: 18  SpO2  Av.3 %  Min: 93 %  Max: 97 %  Height  Av' 9" (175.3 cm)  Min: 5' 9" (175.3 cm)  Max: 5' 9.02" (175.3 cm)  Weight  Av.8 kg (156 lb)  Min: 70.8 kg (156 lb)  Max: 70.8 kg (156 lb)  I/O last 3 completed shifts:  In: 3200 [IV Piggyback:3200]  Out: -     Physical Examination:  General: well-developed well-nourished in no acute distress  Eye: PERRLA, EOMI, clear conjunctiva, eyelids normal  HENT: NC/AT, moist mucus membranes  Neck: full range of motion, no thyromegaly or lymphadenopathy  Respiratory: clear to auscultation bilaterally, respirations non-labored, laryngectomy valve in place to room air  Cardiovascular: regular rate " and rhythm without murmurs, gallops or rubs  Gastrointestinal: soft, non-tender, non-distended with normal bowel sounds, without masses to palpation, PEG clamped  Musculoskeletal: no obvious deformities, full range of motion of all extremities/spine without limitation or discomfort  Integumentary: Erythema to right side neck under mandible is improved- slightly fluctuant with moderate induration and drainage to inferior/medial aspect of abscess with purulent white drainage  Extremities: radial and DP pulses 2+ bilaterally, no LE edema  Neurologic: CN II-XII intact, no signs of peripheral neurological deficit, motor/sensory function intact    Laboratory:  Most Recent Data:  CBC:   Lab Results   Component Value Date    WBC 8.3 10/19/2022    HGB 11.0 (L) 10/19/2022    HCT 33.4 (L) 10/19/2022     10/19/2022    .5 (H) 10/19/2022    RDW 15.9 10/19/2022     WBC Differential:   Recent Labs   Lab 10/18/22  1012 10/18/22  1210 10/19/22  0653   WBC 18.3* 17.8* 8.3   HGB 11.3* 10.3* 11.0*   HCT 34.3* 29.9* 33.4*    260 232   .1* 96.8* 101.5*     BMP:   Lab Results   Component Value Date     10/19/2022    K 4.2 10/19/2022    CO2 31 (H) 10/19/2022    BUN 9.2 10/19/2022    CREATININE 0.59 (L) 10/19/2022    CALCIUM 10.2 10/19/2022    MG 2.20 10/19/2022    PHOS 4.4 10/19/2022     LFTs:   Lab Results   Component Value Date    ALBUMIN 2.8 (L) 10/19/2022    BILITOT 0.4 10/19/2022    AST 15 10/19/2022    ALKPHOS 55 10/19/2022    ALT 9 10/19/2022     Coags:   Lab Results   Component Value Date    INR 0.99 06/26/2022    PROTIME 13.0 06/26/2022    PTT 30.6 06/26/2022     FLP:   Lab Results   Component Value Date    CHOL 227 (H) 09/16/2019    HDL 59 09/16/2019    TRIG 184 (H) 09/16/2019     DM:   Lab Results   Component Value Date    CREATININE 0.59 (L) 10/19/2022     Thyroid:   Lab Results   Component Value Date    TSH 2.1169 07/11/2022     Anemia:   Lab Results   Component Value Date    JNNUOTQX02 563  06/17/2022    FOLATE 15.5 06/17/2022     Cardiac:   Lab Results   Component Value Date    TROPONINI <0.010 06/25/2022    BNP 37.4 06/26/2022       Microbiology Data Reviewed: yes  Pertinent Findings:  Pending bld cx    Other Results:  EKG (my interpretation): no new EKG.    Radiology:  Imaging Results              CT Soft Tissue Neck WO Contrast (Final result)  Result time 10/18/22 13:01:40      Final result by Gela Liao MD (10/18/22 13:01:40)                   Impression:      Collection of mostly air and small amount of fluid in the right anterior neck with extensive soft tissue swelling.      Electronically signed by: Gela Liao  Date:    10/18/2022  Time:    13:01               Narrative:    EXAMINATION:  CT SOFT TISSUE NECK WITHOUT CONTRAST    CLINICAL HISTORY:  Neck abscess, deep tissue;laryngeal cancer s/p chemoradiation, right jaw swelling with drainage;    TECHNIQUE:  Axial CT images of the neck were obtained without intravenous contrast. Reformatted images in the sagittal and coronal plane were included.    Automatic exposure control was utilized to limit radiation dose.    DLP: 882 mGy-cm    COMPARISON:  PET-CT dated 10/13/2022    FINDINGS:  There are postoperative changes of prior total laryngectomy.  Tracheostomy is in place. There is a collection of mostly air and small amount of fluid in the right anterior neck (series 2, images 77 through 82).  Small amount of fluid measures approximately 9 x 11 mm in size (series 2, image 77).    There is soft tissue swelling in the neck bilaterally, right greater than left.  The parotid glands, submandibular glands and thyroid gland are unremarkable.  There is no acute abnormality of the orbits or visualized brain parenchyma.  There is no destructive bone lesion.  Right hilar nodularity and mediastinal lymphadenopathy are redemonstrated.                                       X-Ray Chest AP Portable (Final result)  Result time 10/18/22 12:28:47       Final result by Gela Liao MD (10/18/22 12:28:47)                   Impression:      No acute abnormality of the chest.      Electronically signed by: Gela Liao  Date:    10/18/2022  Time:    12:28               Narrative:    EXAMINATION:  XR CHEST AP PORTABLE    CLINICAL HISTORY:  Sepsis;    COMPARISON:  X-ray dated 07/11/2022    FINDINGS:  Left chest wall port catheter has its tip over the superior vena cava.  The heart is normal in size.  The lungs are clear without focal consolidation.  There is no pleural effusion or visible pneumothorax.                                      Current Medications:     Infusions:       Scheduled:   ampicillin-sulbactim (UNASYN) IVPB  3 g Intravenous Q8H    budesonide  0.5 mg Nebulization Q12H    enoxaparin  40 mg Subcutaneous Daily    famotidine  20 mg Per G Tube BID        PRN:  albuterol-ipratropium, dextrose 10%, dextrose 10%, diphenhydrAMINE, glucagon (human recombinant), glucose, glucose, hydrocodone-apap 7.5-325 MG/15 ML, ibuprofen, naloxone, sodium chloride 0.9%, Pharmacy to dose Vancomycin consult **AND** vancomycin - pharmacy to dose    Antibiotics and Day Number of Therapy:  Unasyn D2    Lines and Day Number of Therapy:  PIV D2    Assessment & Plan:   Sepsis secondary to Abscess of Right Side Neck       - SIRS 2/4 (WBC and HR) with known source- right neck abscess draining spontaneously on admission. Currently 1/4 with leukocytosis resolved; family states this is about his baseline HR though       - Swelling started 10/15       - s/p 3 g Unasyn in ED; s/p 30 cc/kg IVF bolus, BP stable and skin exam WNL/mucosal membranes moist at this time       - Bld cultures pending x2       - Starting Unasyn 3 grams q8h (D2) per ENT recs       - ENT consulted given Hx of radical neck for cancer, appreciate recs       - Wound care consult cancelled- will reassess after possible surgery       - Lortab elixer for pain q8h, benadryl for itching     Laryngeal Squamous Cell  Ca  S/P Radical Neck and Laryngectomy       - Radical Neck w/trach on 7/11/22, [facility] up with ENT at [facility]       - Also follows with Onc, Dr. Atkinson- undergoing chemo and radiation, last dose of both around 3 weeks ago with further plans for chemo       - Laryngectomy valve in place       - Breathing comfortably on ambient air, no concern for airway obstruction       - ENT on board, appreciate recs     Maculopapular rash to b/l UE       - Began yesterday at oncology appointment       - Maculopapular, pruritic rash to bilateral UE hands and forearms       - Possibly urticaria vs infectious etiology; pt on Unasyn which does cover MSSA- will observe for improvement, if not may need to expand coverage       - Bendadryl q8h PRN itching    PEG Dependent       - S/p PEG with laryngectomy       - Weight stable        - Dietary consulted for TF recs       - Will start TF based on outpatient dietary's recommendations     COPD       - No PFT's on file, not on controller inhalers       - Continue home Pulmicort nebs BID, duonebs Q6H PRN SOB/Wheezing       - Will give O2 to keep sats 88-92% if needed     CODE STATUS: Full  Access: PIV  Antibiotics: Unasyn D2  Diet: Bolus TF  DVT Prophylaxis: Lovenox  GI Prophylaxis: Pepcid  Fluids: none     Disposition: Remain inpatient for IV abx, reassess by ENT tomorrow if surgery needed to drain/washout wound          Melony Fry MD  [facility]

## 2022-10-19 NOTE — ANESTHESIA PREPROCEDURE EVALUATION
10/19/2022  Josafat Boudreaux is a 56 y.o., male for I and D of neck  - history of Laryngeal SCCA s/p TL July 2022; Mets to Lung; COPD  With right neck submandibular abscess     Vitals:    10/21/22 0100 10/21/22 0458 10/21/22 0711 10/21/22 0747   BP:  113/74  132/75   Pulse:  84 80 96   Resp:   20 20   Temp:  36.6 °C (97.9 °F)  36.9 °C (98.4 °F)   TempSrc:  Oral  Temporal   SpO2: 97% 97% 95% 100%   Weight:       Height:           Lab Results   Component Value Date    WBC 8.3 10/19/2022    HGB 11.0 (L) 10/19/2022    HCT 33.4 (L) 10/19/2022     10/19/2022    CHOL 227 (H) 09/16/2019    TRIG 184 (H) 09/16/2019    HDL 59 09/16/2019    ALT 9 10/19/2022    AST 15 10/19/2022     10/19/2022    K 4.2 10/19/2022    CREATININE 0.59 (L) 10/19/2022    BUN 9.2 10/19/2022    CO2 31 (H) 10/19/2022    TSH 2.1169 07/11/2022    PSA 0.53 09/16/2019    INR 0.99 06/26/2022       Reading physician: Dov Putnam MD Ordering physician: Ra Quintanilla MD Study date: 6/18/22     Reason for Exam  Priority: Routine  Dx: Dyspnea [R06.00 (ICD-10-CM)]     View Images Vital Vitrea     Show images for Echo  Summary     The estimated ejection fraction is 60-65%.   Normal systolic function.   Grade I left ventricular diastolic dysfunction.   Mild right atrial enlargement.   Normal central venous pressure (3 mmHg).        Vent. Rate : 138 BPM     Atrial Rate : 138 BPM      P-R Int : 146 ms          QRS Dur : 064 ms       QT Int : 284 ms       P-R-T Axes : 078 092 072 degrees      QTc Int : 430 ms     Sinus tachycardia   Right atrial enlargement   Rightward axis   Septal infarct ,age undetermined   Abnormal ECG     Confirmed by Dov Putnam MD ([id number]) on 6/28/2022 2:10:56 AM   Active Ambulatory Problems     Diagnosis Date Noted    Finding of above normal blood pressure 06/10/2022    Closed basicervical fracture of femur  06/10/2022    Shortness of breath 06/10/2022    Vocal cord mass 06/10/2022    Dysphagia 06/15/2022    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  Past Surgical History:   Procedure Laterality Date    DIRECT DIAGNOSTIC LARYNGOSCOPY WITH BRONCHOSCOPY AND ESOPHAGOSCOPY N/A 7/11/2022    Procedure: LARYNGOSCOPY, DIRECT, DIAGNOSTIC, WITH BRONCHOSCOPY AND ESOPHAGOSCOPY;  Surgeon: Emory Alonso MD;  Location: [facility];  Service: ENT;  Laterality: N/A;    HIP SURGERY      HUMERUS FRACTURE SURGERY      INSERT ARTERIAL LINE  6/26/2022         TRACHEOSTOMY N/A 6/10/2022    Procedure: CREATION, TRACHEOSTOMY;  Surgeon: Junior Alonso MD;  Location: [facility];  Service: ENT;  Laterality: N/A;         Pre-op Assessment    I have reviewed the Patient Summary Reports.     I have reviewed the Nursing Notes. I have reviewed the NPO Status.   I have reviewed the Medications.     Review of Systems  Anesthesia Hx:  No problems with previous Anesthesia  History of prior surgery of interest to airway management or planning: Denies Family Hx of Anesthesia complications.   Denies Personal Hx of Anesthesia complications.   Hematology/Oncology:  Hematology Normal   Oncology Normal     EENT/Dental:EENT/Dental Normal   Cardiovascular:  Cardiovascular Normal     Pulmonary:  Pulmonary Normal    Renal/:  Renal/ Normal     Hepatic/GI:  Hepatic/GI Normal    Musculoskeletal:  Musculoskeletal Normal    Neurological:  Neurology Normal    Endocrine:  Endocrine Normal    Dermatological:  Skin Normal     Psych:  Psychiatric Normal           Physical Exam  General: Well nourished, Cooperative, Alert and Oriented    Airway:  Mallampati: I / I  Mouth Opening: Normal  TM Distance: Normal  Tongue: Normal  Neck ROM: Normal ROM    Dental:  Intact        Anesthesia Plan  Type of Anesthesia, risks & benefits discussed:    Anesthesia Type: Gen ETT  Intra-op Monitoring Plan: Standard ASA Monitors  Post Op Pain Control Plan: multimodal analgesia and IV/PO Opioids PRN  Induction:  IV  Airway Plan: Via Tracheostomy  Informed Consent: Informed consent signed with the Patient and all parties understand the risks and agree with anesthesia plan.  All questions answered. Patient consented to blood products? No  ASA Score: 4  Day of Surgery Review of History & Physical: H&P Update referred to the surgeon/provider.    Ready For Surgery From Anesthesia Perspective.     .

## 2022-10-19 NOTE — CONSULTS
Wound care was consulted on this patient upon admission. Case was discussed with ENT who is managing this pt and will reach out later if wound care assistance needed. Consult now cancelled. Pt was seen at bedside with photos taken. Wife at bedside appears competent to continue care at home after discharge. Wound care available as needed.

## 2022-10-19 NOTE — PLAN OF CARE
Problem: Adult Inpatient Plan of Care  Goal: Plan of Care Review  Outcome: Ongoing, Progressing  Goal: Patient-Specific Goal (Individualized)  Outcome: Ongoing, Progressing  Goal: Absence of Hospital-Acquired Illness or Injury  Outcome: Ongoing, Progressing  Goal: Optimal Comfort and Wellbeing  Outcome: Ongoing, Progressing  Goal: Readiness for Transition of Care  Outcome: Ongoing, Progressing     Problem: Impaired Wound Healing  Goal: Optimal Wound Healing  Outcome: Ongoing, Progressing     Problem: Violence Risk or Actual  Goal: Anger and Impulse Control  Outcome: Ongoing, Progressing

## 2022-10-19 NOTE — PROGRESS NOTES
[facility] OTOLARYNGOLOGY PROGRESS NOTE    Josafat Boudreaux 1965  10/19/2022    CC:   Chief Complaint   Patient presents with    Abscess     C/o draining facial abscess with facial swelling. Draining yellow, brown and red in color with foul odor. Sent by oncologist for IV abx. Pt has an alry tube and non verbal.      Interval HPI: Josafat Boudreaux is a 56 y.o. male PMH laryngeal squamous cell carcinoma s/p total laryngectomy, bilateral neck dissections III-IV who presents with several day history of right neck erythema, tenderness, warmth, and drainage out of a hole proximally 5 cm from the laryngeal stoma.  Over the past day, cellulitic changes have spread to the left side.  No breathing problems, no fever/chills.  Drainage site stopped yesterday    Subjective: NAEON, has some improvement in pain. Breathing comfortably, no subjective fever or chills.    Diet: Diet NPO    Allergies: Review of patient's allergies indicates:  No Known Allergies  Medications:   Current Facility-Administered Medications   Medication Dose Route Frequency Provider Last Rate Last Admin    albuterol-ipratropium 2.5 mg-0.5 mg/3 mL nebulizer solution 3 mL  3 mL Nebulization Q6H PRN Melony Fry MD   3 mL at 10/19/22 0928    ampicillin-sulbactam (UNASYN) 3 g in sodium chloride 0.9 % 100 mL IVPB (MB+)  3 g Intravenous Q8H Kevin Palomino  mL/hr at 10/19/22 0401 3 g at 10/19/22 0401    budesonide nebulizer solution 0.5 mg  0.5 mg Nebulization Q12H Melony Fry MD   0.5 mg at 10/19/22 0928    dextrose 10% bolus 125 mL  12.5 g Intravenous PRN Melony Fry MD        dextrose 10% bolus 250 mL  25 g Intravenous PRN Melony Fry MD        diphenhydrAMINE 12.5 mg/5 mL elixir 25 mg  25 mg Oral Q6H PRN Melony Fry MD   25 mg at 10/19/22 0659    enoxaparin injection 40 mg  40 mg Subcutaneous Daily Melony Fry MD   40 mg at 10/18/22 1812    famotidine tablet 20 mg  20 mg Per G Tube  BID Melony Fry MD   20 mg at 10/18/22 2116    glucagon (human recombinant) injection 1 mg  1 mg Intramuscular PRN Melony Fry MD        glucose chewable tablet 16 g  16 g Oral PRN Melony Fry MD        glucose chewable tablet 24 g  24 g Oral PRN Melony Fry MD        hydrocodone-apap 7.5-325 MG/15 ML oral solution 15 mL  15 mL Oral Q8H PRN Melony Fry MD   15 mL at 10/18/22 2102    ibuprofen 100 mg/5 mL suspension 600 mg  600 mg Oral Q6H PRN Melony Fry MD        naloxone 0.4 mg/mL injection 0.02 mg  0.02 mg Intravenous PRN Melony Fry MD        sodium chloride 0.9% flush 10 mL  10 mL Intravenous Q12H PRN Melony Fry MD         Scheduled Meds:   ampicillin-sulbactim (UNASYN) IVPB  3 g Intravenous Q8H    budesonide  0.5 mg Nebulization Q12H    enoxaparin  40 mg Subcutaneous Daily    famotidine  20 mg Per G Tube BID     Continuous Infusions:  PRN Meds:.albuterol-ipratropium, dextrose 10%, dextrose 10%, diphenhydrAMINE, glucagon (human recombinant), glucose, glucose, hydrocodone-apap 7.5-325 MG/15 ML, ibuprofen, naloxone, sodium chloride 0.9%    Objective:  VITAL SIGNS: 24 HR MIN & MAX LAST   Temp  Min: 97 °F (36.1 °C)  Max: 99.2 °F (37.3 °C)  97.9 °F (36.6 °C)   BP  Min: 92/55  Max: 109/59  (!) 109/59    Pulse  Min: 88  Max: 115  105    Resp  Min: 16  Max: 18  17    SpO2  Min: 93 %  Max: 97 %  95 %           Physical Exam:  GENERAL: NAD, AAO, no dyspnea/inc WOB, no stridor, tolerates secretions   NEURO: CN II - XII exam: intact bilaterally except  EYES: EOMI, PERRLA  EARS: Hearing well at normal conversation volume  NOSE: nares normal, septum midline, MMM, no drainage or sinus tenderness, no polyps, no crusting or bleeding points   ORAL CAVITY: MMM, no lesions or ulcerations, no leukoplakia, no edema; BOT and FOM are soft/NT and without lesions/ ulcerations/ leukoplakia; dentitions is poor  OROPHARYNX: No uvular deviation or deviation of  the oropharyngeal wall noted, no erythema/ petechia/ clots; No effacement of the palatopharyngeal and/or palatoglossal folds. No fluctuance; tonsils are symmetric and 1+ without erythema/ exudate  VOICE: communicates effectively via voicing which is normal  NECK: neck with erythema, induration, draining site closed off approximately 4 cm from laryngectomy stoma.  Purulent drainage in crevasse of neck; Laryngectomy stoma with pinhole opening at 1030/11 o'clock position, stoma widely patent  CARDIOVASCULAR: peripheral pulses palpable  RESPIRATORY: non-labored respirations with symmetric chest rise  ABDOMEN: s/nt/nd  EXT: moving with coordination  PSYCHIATRIC: orientation to time/place/person, no depression, no anxiety or agitation, mood and affect wnl    Laboratory:   Lab Results   Component Value Date/Time    WBC 8.3 10/19/2022 06:53 AM    HGB 11.0 (L) 10/19/2022 06:53 AM    HCT 33.4 (L) 10/19/2022 06:53 AM    CHLORIDE 99 10/19/2022 06:53 AM    CO2 31 (H) 10/19/2022 06:53 AM    BUN 9.2 10/19/2022 06:53 AM    CREATININE 0.59 (L) 10/19/2022 06:53 AM    GLUCOSE 91 10/19/2022 06:53 AM    CALCIUM 10.2 10/19/2022 06:53 AM    ALBUMIN 2.8 (L) 10/19/2022 06:53 AM    AST 15 10/19/2022 06:53 AM    ALT 9 10/19/2022 06:53 AM    ALKPHOS 55 10/19/2022 06:53 AM       Cultures:  Microbiology Results (last 7 days)       Procedure Component Value Units Date/Time    Blood Culture #2 **CANNOT BE ORDERED STAT** [[id number]] Collected: 10/18/22 1210    Order Status: Resulted Specimen: Blood Updated: 10/18/22 1217    Blood Culture #1 **CANNOT BE ORDERED STAT** [[id number]] Collected: 10/18/22 1210    Order Status: Resulted Specimen: Blood Updated: 10/18/22 1216              Assessment/Plan:   Josafat Boudreaux is a 56 y.o. male with laryngeal cancer s/p TL, BND, now with right neck cellulitis and subQair pocket, small pinhole at laryngectomy stoma at 1030 position    - CT Neck with contrast   - Continue IV Unasyn  - Will put in cuffed  trach to divert air from pinhole that is likely leading to infection into neck  - Will tentatively plan for neck washout on Friday if not improving on IV antibiotics    Kevin Palomino MD  [facility] Department of Otolaryngology  -IV

## 2022-10-19 NOTE — CONSULTS
Inpatient Nutrition Assessment    Admit Date: 10/18/2022   Total duration of encounter: 1 day     Nutrition Recommendation/Prescription     Consider Diabetisource 7 cartons/day with 50mL water flushes before and after each carton; to provide 2100 kcals, 105g pro, 2128mL fluid  SLP eval to determine appropriate oral diet texture/consistency  Monitor TF tolerance, wt, labs    Communication of Recommendations: reviewed with patient/caregiver and reviewed with nurse    Nutrition Assessment     Malnutrition Assessment/Nutrition-Focused Physical Exam    Malnutrition in the context of chronic illness  Degree of Malnutrition: does not meet criteria  Energy Intake: does not meet criteria  Interpretation of Weight Loss: does not meet criteria  Body Fat: does not meet criteria  Area of Body Fat Loss: does not meet criteria  Muscle Mass Loss: does not meet criteria  Area of Muscle Mass Loss: does not meet criteria  Fluid Accumulation: does not meet criteria  Edema: does not meet criteria  Reduced  Strength: unable to obtain  A minimum of two characteristics is recommended for diagnosis of either severe or non-severe malnutrition.    Chart Review    Reason Seen: continuous nutrition monitoring, malnutrition screening tool, and physician consult    Diagnosis: Sepsis secondary to Abscess of Right Side Neck  Laryngeal Squamous Cell Ca  S/P Radical Neck and Laryngectomy  Maculopapular rash to b/l UE  PEG Dependent  COPD    Relevant Medical History: laryngeal squamous cell carcinoma s/p total laryngectomy/radical neck dissection s/p PEG and trach, 1/40 LN with metastatic disease, lung masses x2 bilateral (Squamous cell carcinoma), and COPD (unquantitated)    Nutrition-Related Medications: famotidine  Calorie Containing IV Medications: no significant kcals from medications at this time    Nutrition-Related Labs: 10/19-CO2 31, Creat 0.59, Alb 2.8, GFR >60      Diet/PN Order: Diet NPO  Oral Supplement Order: none  Tube Feeding  "Order:  Diabetisource AC (see below for calculation)  Appetite/Oral Intake: NPO/NPO  Factors Affecting Nutritional Intake: altered gastrointestinal function, difficulty/impaired swallowing, and NPO  Food/Christianity/Cultural Preferences:  Wife reports pt consuming soft foods and thin liquids at home  Food Allergies: no known food allergies       Wound(s):   N/A    Comments  10/19: RD consulted for TF recs. Pt with hx of laryngeal cancer s/p PEG and trach; non-verbal. Pt currently NPO. Spoke to wife; reports pt receiving 7 cartons of Diabetisource/day with 30-60mL water flushes before and after. Wife reports pt was also consuming soft foods and thin liquids by mouth; states plan for bedside swallow eval today. Wife reports pt with no GI complaints and no recent wt loss; #. Spoke with nsg; reports pt received one carton of Diabetisource with 60mL water flush. TF recs provided in note.      Anthropometrics    Height: 5' 9.02" (175.3 cm) Height Method: Stated  Last Weight: 70.8 kg (156 lb) (10/18/22 1828) Weight Method: Bed Scale  BMI (Calculated): 23  BMI Classification: normal (BMI 18.5-24.9)        Ideal Body Weight (IBW), Male: 160.12 lb     % Ideal Body Weight, Male (lb): 97.43 %                 Usual Body Weight (UBW), k.8 kg (# per pt fly)  % Usual Body Weight: 100.15     Usual Weight Provided By: family/caregiver    Wt Readings from Last 5 Encounters:   10/18/22 70.8 kg (156 lb)   10/18/22 70.8 kg (156 lb)   10/08/22 59.8 kg (131 lb 13.4 oz)   22 71 kg (156 lb 9.6 oz)   22 71.5 kg (157 lb 9.6 oz)     Weight Change(s) Since Admission:  Admit Weight: 70.8 kg (156 lb) (10/18/22 1106)  10/19: Wt stable -- no new wt    Estimated Needs    Weight Used For Calorie Calculations: 70.8 kg (156 lb 1.4 oz)  Energy Calorie Requirements (kcal): 9236-1193 kcals (28-30 kcal/kg)  Energy Need Method: Kcal/kg  Weight Used For Protein Calculations: 70.8 kg (156 lb 1.4 oz)  Protein Requirements: 85-99g " (1.2-1.4 g/kg)  Fluid Requirements (mL): 9263-8514 mL (1mL/kcal)  Temp: 97.9 °F (36.6 °C)       Enteral Nutrition    Formula: Diabetisource AC  Rate/Volume: 250 mL bolus  Water Flushes: 60 mL  Additives/Modulars: none at this time  Route: PEG tube  Method: bolus  Total Nutrition Provided by Tube Feeding, Additives, and Flushes:  Calories Provided  300 kcal/d, 15% needs   Protein Provided  15 g/d, 18% needs   Fluid Provided  264 ml/d, 13% needs   Continuous feeding calculations based on estimated 20 hr/d run time unless otherwise stated.    Parenteral Nutrition    Patient not receiving parenteral nutrition support at this time.    Evaluation of Received Nutrient Intake    Calories: not meeting estimated needs  Protein: not meeting estimated needs    Patient Education    Not applicable.    Nutrition Diagnosis     PES: Swallowing difficulty related to laryngeal squamous cell carcinoma s/p total laryngectomy/radical neck dissection as evidenced by PEG and trach. (new)    Interventions/Goals     Intervention(s): modified composition of enteral nutrition, modified rate of enteral nutrition, and collaboration with other providers  Goal: Meet greater than 75% of nutritional needs by follow-up. (new)    Monitoring & Evaluation     Dietitian will monitor food and beverage intake, enteral nutrition intake, weight, electrolyte/renal panel, glucose/endocrine profile, and gastrointestinal profile.  Nutrition Risk/Follow-Up: low (follow-up in 5-7 days)   Please consult if re-assessment needed sooner.

## 2022-10-19 NOTE — MEDICAL/APP STUDENT
"[facility] Progress Note     Resident Team: [facility] Medicine List         Subjective:      Brief HPI:  Josafat Boudreaux is a 56 y.o. male with PMH of laryngeal squamous cell carcinoma s/p total laryngectomy/radical neck dissection on 22 (at [facility]),  LN with metastatic disease, lung masses x2 bilateral (Squamous cell carcinoma), and COPD (unquantitated) who presented to [facility] ED on 10/18/2022  with a primary complaint of a draining abscess to his right neck/submandibular area. Admitted 10/18 for IV abx, ENT following.    Interval History:   NAEON. Patient was alert and resting comfortably in bed this morning. Complains of some itchiness present bilaterally on both hands, with erythema and maculopapular rash present. Currently being evaluated by Otolaryngology with plans for possible washout of the neck on Friday. He denies: chest pain, SOB, nausea, vomiting, fever, chills, headaches, dizziness, change in sensation in the extremities, constipation or diarrhea.    Review of Systems:  Negative outside of Interval Hx.     Objective:     Last 24 Hour Vital Signs:  BP  Min: 92/55  Max: 109/59  Temp  Av.2 °F (36.8 °C)  Min: 97 °F (36.1 °C)  Max: 99.2 °F (37.3 °C)  Pulse  Av.5  Min: 88  Max: 111  Resp  Av  Min: 16  Max: 18  SpO2  Av.5 %  Min: 94 %  Max: 97 %  Height  Av' 9" (175.3 cm)  Min: 5' 9" (175.3 cm)  Max: 5' 9.02" (175.3 cm)  Weight  Av.8 kg (156 lb)  Min: 70.8 kg (156 lb)  Max: 70.8 kg (156 lb)  I/O last 3 completed shifts:  In: 3200 [IV Piggyback:3200]  Out: -     Physical Examination:  Physical Exam  Vitals and nursing note reviewed.   Constitutional:       General: He is not in acute distress.     Appearance: He is normal weight. He is ill-appearing. He is not toxic-appearing.   HENT:      Head: Normocephalic and atraumatic.      Right Ear: External ear normal.      Left Ear: External ear normal.   Eyes:      General:         Right eye: No discharge.         Left eye: No discharge. "      Conjunctiva/sclera: Conjunctivae normal.   Neck:      Trachea: Tracheostomy present.      Comments: Right neck swelling present w/ purulent discharge seen. Patient is able to achieve normal ROM, with some amount of discomfort.   Cardiovascular:      Rate and Rhythm: Regular rhythm. Tachycardia present.      Pulses: Normal pulses.      Heart sounds: Normal heart sounds. No murmur heard.    No friction rub. No gallop.   Pulmonary:      Effort: Pulmonary effort is normal. No respiratory distress.      Breath sounds: Normal breath sounds. No stridor. No wheezing.   Abdominal:      General: Bowel sounds are normal. There is no distension.      Palpations: Abdomen is soft. There is no mass.      Tenderness: There is no abdominal tenderness. There is no guarding.   Musculoskeletal:         General: No tenderness. Normal range of motion.      Cervical back: Normal range of motion. No rigidity.      Right lower leg: No edema.      Left lower leg: No edema.   Skin:     General: Skin is warm and dry.      Comments: Bilateral maculopapular rash present to the wrist with linear excoriations present.   Neurological:      Mental Status: He is alert.   Psychiatric:         Behavior: Behavior normal.         Laboratory:  Most Recent Data:  CBC:   Lab Results   Component Value Date    WBC 8.3 10/19/2022    HGB 11.0 (L) 10/19/2022    HCT 33.4 (L) 10/19/2022     10/19/2022    .5 (H) 10/19/2022    RDW 15.9 10/19/2022     WBC Differential:   Recent Labs   Lab 10/18/22  1012 10/18/22  1210 10/19/22  0653   WBC 18.3* 17.8* 8.3   HGB 11.3* 10.3* 11.0*   HCT 34.3* 29.9* 33.4*    260 232   .1* 96.8* 101.5*     BMP:   Lab Results   Component Value Date     10/19/2022    K 4.2 10/19/2022    CO2 31 (H) 10/19/2022    BUN 9.2 10/19/2022    CREATININE 0.59 (L) 10/19/2022    CALCIUM 10.2 10/19/2022    MG 2.20 10/19/2022    PHOS 4.4 10/19/2022     LFTs:   Lab Results   Component Value Date    ALBUMIN 2.8 (L)  10/19/2022    BILITOT 0.4 10/19/2022    AST 15 10/19/2022    ALKPHOS 55 10/19/2022    ALT 9 10/19/2022     Coags:   Lab Results   Component Value Date    INR 0.99 06/26/2022    PROTIME 13.0 06/26/2022    PTT 30.6 06/26/2022     FLP:   Lab Results   Component Value Date    CHOL 227 (H) 09/16/2019    HDL 59 09/16/2019    TRIG 184 (H) 09/16/2019     DM:   Lab Results   Component Value Date    CREATININE 0.59 (L) 10/19/2022     Thyroid:   Lab Results   Component Value Date    TSH 2.1169 07/11/2022      Anemia: No results found for: IRON, TIBC, FERRITIN, SATURATEDIRO    Lab Results   Component Value Date    [id number] 563 06/17/2022       Lab Results   Component Value Date    FOLATE 15.5 06/17/2022        Cardiac:   Lab Results   Component Value Date    TROPONINI <0.010 06/25/2022    BNP 37.4 06/26/2022         Microbiology Data:  Microbiology Results (last 7 days)       Procedure Component Value Units Date/Time    Blood Culture #2 **CANNOT BE ORDERED STAT** [[id number]] Collected: 10/18/22 1210    Order Status: Resulted Specimen: Blood Updated: 10/18/22 1217    Blood Culture #1 **CANNOT BE ORDERED STAT** [[id number]] Collected: 10/18/22 1210    Order Status: Resulted Specimen: Blood Updated: 10/18/22 1216             Radiology:  Imaging Results              CT Soft Tissue Neck WO Contrast (Final result)  Result time 10/18/22 13:01:40      Final result by Gela Liao MD (10/18/22 13:01:40)                   Impression:      Collection of mostly air and small amount of fluid in the right anterior neck with extensive soft tissue swelling.      Electronically signed by: Gela Liao  Date:    10/18/2022  Time:    13:01               Narrative:    EXAMINATION:  CT SOFT TISSUE NECK WITHOUT CONTRAST    CLINICAL HISTORY:  Neck abscess, deep tissue;laryngeal cancer s/p chemoradiation, right jaw swelling with drainage;    TECHNIQUE:  Axial CT images of the neck were obtained without intravenous contrast. Reformatted  images in the sagittal and coronal plane were included.    Automatic exposure control was utilized to limit radiation dose.    DLP: 882 mGy-cm    COMPARISON:  PET-CT dated 10/13/2022    FINDINGS:  There are postoperative changes of prior total laryngectomy.  Tracheostomy is in place. There is a collection of mostly air and small amount of fluid in the right anterior neck (series 2, images 77 through 82).  Small amount of fluid measures approximately 9 x 11 mm in size (series 2, image 77).    There is soft tissue swelling in the neck bilaterally, right greater than left.  The parotid glands, submandibular glands and thyroid gland are unremarkable.  There is no acute abnormality of the orbits or visualized brain parenchyma.  There is no destructive bone lesion.  Right hilar nodularity and mediastinal lymphadenopathy are redemonstrated.                                       X-Ray Chest AP Portable (Final result)  Result time 10/18/22 12:28:47      Final result by Gela Liao MD (10/18/22 12:28:47)                   Impression:      No acute abnormality of the chest.      Electronically signed by: Gela Liao  Date:    10/18/2022  Time:    12:28               Narrative:    EXAMINATION:  XR CHEST AP PORTABLE    CLINICAL HISTORY:  Sepsis;    COMPARISON:  X-ray dated 07/11/2022    FINDINGS:  Left chest wall port catheter has its tip over the superior vena cava.  The heart is normal in size.  The lungs are clear without focal consolidation.  There is no pleural effusion or visible pneumothorax.                                      Current Medications:     Infusions:       Scheduled:   ampicillin-sulbactim (UNASYN) IVPB  3 g Intravenous Q8H    budesonide  0.5 mg Nebulization Q12H    enoxaparin  40 mg Subcutaneous Daily    famotidine  20 mg Per G Tube BID    iohexoL            PRN:  albuterol-ipratropium, dextrose 10%, dextrose 10%, diphenhydrAMINE, glucagon (human recombinant), glucose, glucose, hydrocodone-apap  7.5-325 MG/15 ML, ibuprofen, naloxone, sodium chloride 0.9%    Antibiotics and Day Number of Therapy:  Unasyn-Day 2 (10/18-Current)    Assessment & Plan:   Sepsis secondary to Abscess of Right Side Neck       - SIRS 2/4 (WBC and HR) With source 2/2 Right neck abscess       - Remains tachycardic on 10/19, pt's family state that he is often tachycardic on home measurements       - Progressive swelling present since 10/15 with purulent drainage prior to discharge       - s/p 3 g Unasyn in ED; s/p 30 cc/kg IVF bolus, BP stable and skin exam WNL/mucosal membranes moist at this time       - BCx pending x2        - Continuing Unasyn 3 grams q8h (D2) per ENT recs       - ENT consulted given Hx of radical neck for cancer, tentative plan for surgery on 10/21 w/ washout       - CT w/o contrast shows pocket of air present in the R neck, with corresponding pinhole visualized in trachea       - ENT recommends f/u CT w/ contrast       - Wound care consult cancelled- will reassess after possible surgery       - Lortab elixer for pain q8h, benadryl for itching     Laryngeal Squamous Cell Ca  S/P Radical Neck and Laryngectomy       - Radical Neck w/trach on 7/11/22, follows up with ENT at [facility]       - Also follows with Onc, Dr. Atkinson- undergoing chemo and radiation, last dose of both around 3 weeks ago with further plans for chemo       - 2 lung nodules present on PET scan, further f/u in outpatient       - Laryngectomy valve in place       - Breathing comfortably on ambient air, no concern for airway obstruction       - ENT on board, appreciate recs     Maculopapular rash to b/l UE       - Began yesterday at oncology appointment       - Maculopapular, pruritic rash to bilateral UE hands and wrists       - Possibly urticaria vs infectious etiology; pt on Unasyn which does cover MSSA- will observe for improvement, if not may need to expand coverage       - Bendadryl q8h PRN itching     PEG Dependent       - S/p PEG with  laryngectomy       - Weight stable        - Dietary consulted for TF recs       - Currently NPO, will start TF based on outpatient dietary's recommendations     COPD       - No PFT's on file, not on controller inhalers       - Continue home Pulmicort nebs BID, duonebs Q6H PRN SOB/Wheezing       - Will give O2 to keep sats 88-92% if needed     CODE STATUS: Full  Access: PIV  Antibiotics: Unasyn D2  Diet: NPO  DVT Prophylaxis: Lovenox  GI Prophylaxis: Pepcid  Fluids: none     Disposition: Remain inpatient for IV abx, reassess by ENT tomorrow if surgery needed to drain/washout wound     Paul Nava  MS4

## 2022-10-20 PROBLEM — L25.9 CONTACT DERMATITIS: Status: ACTIVE | Noted: 2022-01-01

## 2022-10-20 NOTE — PROGRESS NOTES
[facility] Medicine Wards Progress Note     Resident Team: [facility] Medicine List 2  Attending Physician: Bridger Lott MD  Resident: Lisandra/Ania  Intern: Festus/Kasia       Subjective:      Brief HPI:  Josafat Boudreaux is a 56 y.o. male with PMH of laryngeal squamous cell carcinoma s/p total laryngectomy/radical neck dissection on 22 (at [facility]),  LN with metastatic disease, lung masses x2 bilateral (Squamous cell carcinoma), and COPD (unquantitated) who presented to [facility] ED on 10/18/2022  with a primary complaint of a draining abscess to his right neck/submandibular area. Admitted 10/18 for IV abx, ENT following.    Interval History: NAEO. Pt comfortable in bed, states pain/swelling decreased. Denies fever, chills, n/v, chest pain, SOB, abdominal pain. Itching improved with Benadryl and hydrocortisone cream. NPO tonight for washout tomorrow.      Review of Systems:  ROS completed and negative except as indicated above.     Objective:     Last 24 Hour Vital Signs:  BP  Min: 94/59  Max: 122/70  Temp  Av.2 °F (36.8 °C)  Min: 97.6 °F (36.4 °C)  Max: 98.9 °F (37.2 °C)  Pulse  Av.3  Min: 81  Max: 110  Resp  Av.1  Min: 16  Max: 20  SpO2  Av.8 %  Min: 92 %  Max: 98 %  I/O last 3 completed shifts:  In: 300 [IV Piggyback:300]  Out: -     Physical Examination:  General: well-developed well-nourished in no acute distress  Eye: PERRLA, EOMI, clear conjunctiva, eyelids normal  HENT: NC/AT, moist mucus membranes  Neck: full range of motion, no thyromegaly or lymphadenopathy  Respiratory: clear to auscultation bilaterally, respirations non-labored, laryngectomy valve in place to room air  Cardiovascular: regular rate and rhythm without murmurs, gallops or rubs  Gastrointestinal: soft, non-tender, non-distended with normal bowel sounds, without masses to palpation, PEG clamped  Musculoskeletal: no obvious deformities, full range of motion of all extremities/spine without limitation or  discomfort  Integumentary: Erythema to right side neck under mandible is improved- slightly fluctuant with moderate induration and drainage to inferior/medial aspect of abscess with purulent white drainage  Extremities: radial and DP pulses 2+ bilaterally, no LE edema  Neurologic: CN II-XII intact, no signs of peripheral neurological deficit, motor/sensory function intact    Laboratory:  Most Recent Data:  CBC:   Lab Results   Component Value Date    WBC 4.7 10/20/2022    HGB 10.3 (L) 10/20/2022    HCT 31.6 (L) 10/20/2022     10/20/2022    .3 (H) 10/20/2022    RDW 15.9 10/20/2022     WBC Differential:   Recent Labs   Lab 10/18/22  1012 10/18/22  1210 10/19/22  0653 10/20/22  0420   WBC 18.3* 17.8* 8.3 4.7   HGB 11.3* 10.3* 11.0* 10.3*   HCT 34.3* 29.9* 33.4* 31.6*    260 232 258   .1* 96.8* 101.5* 101.3*       BMP:   Lab Results   Component Value Date     10/20/2022    K 4.1 10/20/2022    CO2 31 (H) 10/20/2022    BUN 10.3 10/20/2022    CREATININE 0.67 (L) 10/20/2022    CALCIUM 9.2 10/20/2022    MG 2.20 10/19/2022    PHOS 4.4 10/19/2022     LFTs:   Lab Results   Component Value Date    ALBUMIN 2.8 (L) 10/20/2022    BILITOT 0.4 10/20/2022    AST 12 10/20/2022    ALKPHOS 57 10/20/2022    ALT 10 10/20/2022     Coags:   Lab Results   Component Value Date    INR 0.99 06/26/2022    PROTIME 13.0 06/26/2022    PTT 30.6 06/26/2022     FLP:   Lab Results   Component Value Date    CHOL 227 (H) 09/16/2019    HDL 59 09/16/2019    TRIG 184 (H) 09/16/2019     DM:   Lab Results   Component Value Date    CREATININE 0.67 (L) 10/20/2022     Thyroid:   Lab Results   Component Value Date    TSH 2.1169 07/11/2022     Anemia:   Lab Results   Component Value Date    DCQUAIKL57 563 06/17/2022    FOLATE 15.5 06/17/2022     Cardiac:   Lab Results   Component Value Date    TROPONINI <0.010 06/25/2022    BNP 37.4 06/26/2022       Microbiology Data Reviewed: yes  Pertinent Findings:  Pending bld cx    Other  Results:  EKG (my interpretation): no new EKG.    Radiology:  Imaging Results              CT Soft Tissue Neck WO Contrast (Final result)  Result time 10/18/22 13:01:40      Final result by Gela Liao MD (10/18/22 13:01:40)                   Impression:      Collection of mostly air and small amount of fluid in the right anterior neck with extensive soft tissue swelling.      Electronically signed by: Gela Liao  Date:    10/18/2022  Time:    13:01               Narrative:    EXAMINATION:  CT SOFT TISSUE NECK WITHOUT CONTRAST    CLINICAL HISTORY:  Neck abscess, deep tissue;laryngeal cancer s/p chemoradiation, right jaw swelling with drainage;    TECHNIQUE:  Axial CT images of the neck were obtained without intravenous contrast. Reformatted images in the sagittal and coronal plane were included.    Automatic exposure control was utilized to limit radiation dose.    DLP: 882 mGy-cm    COMPARISON:  PET-CT dated 10/13/2022    FINDINGS:  There are postoperative changes of prior total laryngectomy.  Tracheostomy is in place. There is a collection of mostly air and small amount of fluid in the right anterior neck (series 2, images 77 through 82).  Small amount of fluid measures approximately 9 x 11 mm in size (series 2, image 77).    There is soft tissue swelling in the neck bilaterally, right greater than left.  The parotid glands, submandibular glands and thyroid gland are unremarkable.  There is no acute abnormality of the orbits or visualized brain parenchyma.  There is no destructive bone lesion.  Right hilar nodularity and mediastinal lymphadenopathy are redemonstrated.                                       X-Ray Chest AP Portable (Final result)  Result time 10/18/22 12:28:47      Final result by Gela Liao MD (10/18/22 12:28:47)                   Impression:      No acute abnormality of the chest.      Electronically signed by: Gela Liao  Date:    10/18/2022  Time:    12:28                Narrative:    EXAMINATION:  XR CHEST AP PORTABLE    CLINICAL HISTORY:  Sepsis;    COMPARISON:  X-ray dated 07/11/2022    FINDINGS:  Left chest wall port catheter has its tip over the superior vena cava.  The heart is normal in size.  The lungs are clear without focal consolidation.  There is no pleural effusion or visible pneumothorax.                                      Current Medications:     Infusions:       Scheduled:   ampicillin-sulbactim (UNASYN) IVPB  3 g Intravenous Q8H    budesonide  0.5 mg Nebulization Q12H    enoxaparin  40 mg Subcutaneous Daily    famotidine  20 mg Per G Tube BID    hydrocortisone   Topical (Top) BID        PRN:  albuterol-ipratropium, dextrose 10%, dextrose 10%, diphenhydrAMINE, glucagon (human recombinant), glucose, glucose, hydrocodone-apap 7.5-325 MG/15 ML, ibuprofen, naloxone, sodium chloride 0.9%    Antibiotics and Day Number of Therapy:  Unasyn D3    Lines and Day Number of Therapy:  PIV D3    Assessment & Plan:   Sepsis secondary to Abscess of Right Side Neck       - SIRS 2/4 (WBC and HR) with known source- right neck abscess draining spontaneously on admission. Currently 1/4 with leukocytosis resolved; family states this is about his baseline HR though       - Swelling started 10/15       - s/p 3 g Unasyn in ED; s/p 30 cc/kg IVF bolus, BP stable and skin exam WNL/mucosal membranes moist at this time       - Bld cultures pending x2       - Continue Unasyn 3 grams q8h (D3) per ENT recs       - ENT consulted given Hx of radical neck for cancer, appreciate recs. Plan for OR tomorrow for washout       - Lortab elixer for pain q8h, benadryl for itching     Laryngeal Squamous Cell Ca  S/P Radical Neck and Laryngectomy       - Radical Neck w/trach on 7/11/22, [facility]NT [facility]       - Also follows with Onc, Dr. Atkinson- undergoing chemo and radiation, last dose of both around 3 weeks ago with further plans for chemo       - Imaging showed tracking of abscess to trachea  so trach w/ cuff placed to help prevent drainage from entering lungs/causing pneumonia       - ENT on board, appreciate recs     Maculopapular rash to b/l UE       - Began yesterday at oncology appointment       - Maculopapular, pruritic rash to bilateral UE hands and forearms       - Possibly urticaria vs infectious etiology; pt on Unasyn which does cover MSSA- will observe for improvement, if not may need to expand coverage       - Bendadryl q8h PRN itching    PEG Dependent       - S/p PEG with laryngectomy       - Weight stable        - TF at goal; NPO at MN     COPD       - No PFT's on file, not on controller inhalers       - Continue home Pulmicort nebs BID, duonebs Q6H PRN SOB/Wheezing       - Will give O2 to keep sats 88-92% if needed     CODE STATUS: Full  Access: PIV  Antibiotics: Unasyn D3  Diet: Bolus TF  DVT Prophylaxis: Lovenox  GI Prophylaxis: Pepcid  Fluids: none     Disposition: Remain inpatient for IV Abx and NPO at [facility] for washout tomorrow by ENT          Melony Fry MD  [facility]

## 2022-10-20 NOTE — PLAN OF CARE
10/20/22 1448   Discharge Assessment   Assessment Type Discharge Planning Assessment   Confirmed/corrected address, phone number and insurance Yes   Confirmed Demographics Correct on Facesheet   Source of Information patient;family   When was your last doctors appointment?   (PCP: Dr. Tony Bertrand)   Does patient/caregiver understand observation status   (Inpatient)   Communicated MOISES with patient/caregiver Date not available/Unable to determine   Reason For Admission Cellulitis and abscess of neck   Lives With spouse   Do you expect to return to your current living situation? Yes   Do you have help at home or someone to help you manage your care at home? Yes   Who are your caregiver(s) and their phone number(s)? Kymberly Boudreaux, wife, P: [phone]   Prior to hospitilization cognitive status: Alert/Oriented;No Deficits   Current cognitive status: Alert/Oriented;No Deficits   Walking or Climbing Stairs Difficulty none   Dressing/Bathing Difficulty none   Home Accessibility not wheelchair accessible;stairs to enter home   Number of Stairs, Main Entrance four   Stair Railings, Main Entrance railings safe and in good condition;railing on left side (ascending)   Home Layout Able to live on 1st floor   Equipment Currently Used at Home bedside commode;cane, quad;nebulizer;suction machine;rollator;shower chair;oxygen  (Has O2 concentrator, no tanks; uses [address] in [place])   Readmission within 30 days? No   Patient currently being followed by outpatient case management? No   Do you currently have service(s) that help you manage your care at home? Yes   Name and Contact number of agency [facility]   Is the pt/caregiver preference to resume services with current agency Yes   Do you take prescription medications? Yes   Do you have prescription coverage? No   Do you have any problems affording any of your prescribed medications? No   Is the patient taking medications as prescribed? yes   Who is  going to help you get home at discharge? Kymberly Boudreaux, wife, P: [phone]   How do you get to doctors appointments? family or friend will provide   Are you on dialysis? No   Do you take coumadin? No   Discharge Plan A Home with family;Home Health   DME Needed Upon Discharge  none   Discharge Plan discussed with: Spouse/sig other;Patient   Name(s) and Number(s) Kymberly Boudreaux, wife, P: [phone]   Discharge Barriers Identified None   Physical Activity   On average, how many days per week do you engage in moderate to strenuous exercise (like a brisk walk)? 0 days   On average, how many minutes do you engage in exercise at this level? 0 min   Financial Resource Strain   How hard is it for you to pay for the very basics like food, housing, medical care, and heating? Not hard   Housing Stability   In the last 12 months, was there a time when you were not able to pay the mortgage or rent on time? N   In the last 12 months, how many places have you lived? 1   In the last 12 months, was there a time when you did not have a steady place to sleep or slept in a shelter (including now)? N   Transportation Needs   In the past 12 months, has lack of transportation kept you from medical appointments or from getting medications? no   In the past 12 months, has lack of transportation kept you from meetings, work, or from getting things needed for daily living? No   Food Insecurity   Within the past 12 months, you worried that your food would run out before you got the money to buy more. Never true   Within the past 12 months, the food you bought just didn't last and you didn't have money to get more. Never true   Stress   Do you feel stress - tense, restless, nervous, or anxious, or unable to sleep at night because your mind is troubled all the time - these days? Not at all   Social Connections   In a typical week, how many times do you talk on the phone with family, friends, or neighbors? More than 3   How often do you  get together with friends or relatives? More than 3   How often do you attend Nondenominational or Judaism services? Never   Do you belong to any clubs or organizations such as Nondenominational groups, unions, fraternal or athletic groups, or school groups? No   How often do you attend meetings of the clubs or organizations you belong to? Never   Are you , , , , never , or living with a partner?    Alcohol Use   Q1: How often do you have a drink containing alcohol? 4 or more ti   Q2: How many drinks containing alcohol do you have on a typical day when you are drinking? 1 or 2   Q3: How often do you have six or more drinks on one occasion? Never   Relationship/Environment   Name(s) of Who Lives With Patient Kymberly Boudreaux, wife, P: [phone]

## 2022-10-20 NOTE — PROGRESS NOTES
[facility] OTOLARYNGOLOGY PROGRESS NOTE    Josafat Boudreaux 1965  10/20/2022    CC:   Chief Complaint   Patient presents with    Abscess     C/o draining facial abscess with facial swelling. Draining yellow, brown and red in color with foul odor. Sent by oncologist for IV abx. Pt has an alry tube and non verbal.      Interval HPI: Josafat Boudreaux is a 56 y.o. male PMH laryngeal squamous cell carcinoma s/p total laryngectomy, bilateral neck dissections III-IV who presents with several day history of right neck erythema, tenderness, warmth, and drainage out of a hole proximally 5 cm from the laryngeal stoma.  Over the past day, cellulitic changes have spread to the left side.  No breathing problems, no fever/chills.  Drainage site stopped yesterday    Subjective: NAEO, patient slept well. Breathing comfortably with trach in place. No fevers or SOB. Drainage stable. Patient is getting benadryl q6 for rash on his hands.     Diet: Diet NPO    Allergies: Review of patient's allergies indicates:  No Known Allergies  Medications:   Current Facility-Administered Medications   Medication Dose Route Frequency Provider Last Rate Last Admin    albuterol-ipratropium 2.5 mg-0.5 mg/3 mL nebulizer solution 3 mL  3 mL Nebulization Q6H PRN Melony Fry MD   3 mL at 10/19/22 1938    ampicillin-sulbactam (UNASYN) 3 g in sodium chloride 0.9 % 100 mL IVPB (MB+)  3 g Intravenous Q8H Kevin Palomino MD   Stopped at 10/20/22 0456    budesonide nebulizer solution 0.5 mg  0.5 mg Nebulization Q12H Melony Fry MD   0.5 mg at 10/19/22 1934    dextrose 10% bolus 125 mL  12.5 g Intravenous PRN Melony Fry MD        dextrose 10% bolus 250 mL  25 g Intravenous PRN Melony Fry MD        diphenhydrAMINE 12.5 mg/5 mL elixir 25 mg  25 mg Oral Q6H PRN Melony Fry MD   25 mg at 10/20/22 0355    enoxaparin injection 40 mg  40 mg Subcutaneous Daily Melony Fry MD   40 mg at 10/19/22 1806     famotidine tablet 20 mg  20 mg Per G Tube BID Melony Fry MD   20 mg at 10/19/22 2134    glucagon (human recombinant) injection 1 mg  1 mg Intramuscular PRN Melony Fry MD        glucose chewable tablet 16 g  16 g Oral PRN Melony Fry MD        glucose chewable tablet 24 g  24 g Oral PRN Melony Fry MD        hydrocodone-apap 7.5-325 MG/15 ML oral solution 15 mL  15 mL Oral Q8H PRN Melony Fry MD   15 mL at 10/19/22 2133    hydrocortisone 1 % cream   Topical (Top) BID Wm Cassidy MD   Given at 10/19/22 2328    ibuprofen 100 mg/5 mL suspension 600 mg  600 mg Oral Q6H PRN Melony Fry MD   600 mg at 10/20/22 0355    naloxone 0.4 mg/mL injection 0.02 mg  0.02 mg Intravenous PRN Melony Fry MD        sodium chloride 0.9% flush 10 mL  10 mL Intravenous Q12H PRN Melony Fry MD         Scheduled Meds:   ampicillin-sulbactim (UNASYN) IVPB  3 g Intravenous Q8H    budesonide  0.5 mg Nebulization Q12H    enoxaparin  40 mg Subcutaneous Daily    famotidine  20 mg Per G Tube BID    hydrocortisone   Topical (Top) BID     Continuous Infusions:  PRN Meds:.albuterol-ipratropium, dextrose 10%, dextrose 10%, diphenhydrAMINE, glucagon (human recombinant), glucose, glucose, hydrocodone-apap 7.5-325 MG/15 ML, ibuprofen, naloxone, sodium chloride 0.9%    Objective:  VITAL SIGNS: 24 HR MIN & MAX LAST   Temp  Min: 97.7 °F (36.5 °C)  Max: 98.9 °F (37.2 °C)  97.7 °F (36.5 °C)   BP  Min: 102/62  Max: 122/70  105/69    Pulse  Min: 81  Max: 110  81    Resp  Min: 16  Max: 20  16    SpO2  Min: 93 %  Max: 100 %  98 %           Physical Exam:  GEN- NAD, AAO  HEAD/FACE-  NC, AT  EYES - EOMI PERRLA  NOSE-  Midline, no rhinorrhea  EARS - no external deformity  ORAL CAVITY/ORPHARYNX - MMM  NECK: neck with erythema, induration, draining site closed off approximately 4 cm from laryngectomy stoma.  Purulent drainage in crevasse of neck; Laryngectomy stoma with  pinhole opening at 1030/11 o'clock position, stoma widely patent, 6CN cuff inflated  RESP - trach collar for humidification, no increased WOB    Laboratory:   Lab Results   Component Value Date/Time    WBC 4.7 10/20/2022 04:20 AM    HGB 10.3 (L) 10/20/2022 04:20 AM    HCT 31.6 (L) 10/20/2022 04:20 AM    CHLORIDE 99 10/20/2022 04:50 AM    CO2 31 (H) 10/20/2022 04:50 AM    BUN 10.3 10/20/2022 04:50 AM    CREATININE 0.67 (L) 10/20/2022 04:50 AM    GLUCOSE 91 10/20/2022 04:50 AM    CALCIUM 9.2 10/20/2022 04:50 AM    ALBUMIN 2.8 (L) 10/20/2022 04:50 AM    AST 12 10/20/2022 04:50 AM    ALT 10 10/20/2022 04:50 AM    ALKPHOS 57 10/20/2022 04:50 AM       Cultures:  Microbiology Results (last 7 days)       Procedure Component Value Units Date/Time    Blood Culture #2 **CANNOT BE ORDERED STAT** [[id number]]  (Normal) Collected: 10/18/22 1210    Order Status: Completed Specimen: Blood Updated: 10/19/22 1701     CULTURE, BLOOD ([facility]) No Growth At 24 Hours    Blood Culture #1 **CANNOT BE ORDERED STAT** [[id number]]  (Normal) Collected: 10/18/22 1210    Order Status: Completed Specimen: Blood Updated: 10/19/22 1701     CULTURE, BLOOD ([facility]) No Growth At 24 Hours          Imaging:  CT 10/19  Impression:     Rim enhancing collection of fluid and air in the right anterior neck, with diffuse soft tissue swelling.      Assessment/Plan:   Josafat Boudreaux is a 56 y.o. male with laryngeal cancer s/p TL, BND, now with right neck cellulitis and subQair pocket, small pinhole at laryngectomy stoma at 1030 position. Patient does have a rash over hands but this has been present since before admission.      - Continue IV Unasyn  - continue routine trach/stoma care.  - OK for full liquid diet; please make NPO at midnight  - plan for neck washout Friday 10/21  - will obtain consent today  - can consider triamcinolone topical for rash on patient.    MALIK Cavanaugh MD  [facility] Department of Otolaryngology  [facility]

## 2022-10-21 NOTE — ANESTHESIA PROCEDURE NOTES
Intubation    Date/Time: 10/21/2022 8:09 AM  Performed by: Keo Parks CRNA  Authorized by: Yulissa Butterfield MD     Intubation:     Induction:  Inhalational - ETT/trach    Intubated:  Postinduction    Mask Ventilation:  N/a    Attempts:  1    Attempted By:  Student    Method of Intubation:  ETT into pre-existing tracheostomy    Difficult Airway Encountered?: No      Complications:  None    Airway Device:  Coil wire tube    Airway Device Size:  6.0    Style/Cuff Inflation:  Cuffed (inflated to minimal occlusive pressure)    Placement Verified By:  Capnometry    Complicating Factors:  None    Findings Post-Intubation:  BS equal bilateral

## 2022-10-21 NOTE — OP NOTE
Otolaryngology Operative Note    Date of procedure: 10/21/2022    Attending Surgeon: Madison Worley MD    Resident Surgeon: Kevin Palomino PGY IV  Medical Student: Glo Villafana MS4    Pre-operative diagnosis:  1. Right neck abscess      Post-operative diagnosis:   1. Right neck abscess    Procedure:  1. Incision and drainage of neck abscess    Anesthesia: General via laryngectomy stoma    Complications: None    Specimens:   ID Type Source Tests Collected by Time Destination   1 :  Abscess Neck, Anterior ANAEROBIC CULTURE [facility], FUNGAL CULTURE [facility], WOUND CULTURE ([facility]) Madison Worley MD 10/21/2022 0854        Blood loss: < 10 cc    Indication:  Josafat Boudreaux is a 56 y.o. male seen and examined by  with clinical presentation suggesting neck abscess. Patient was offered a incision and drainage. After risks, benefits and alternatives were discussed patient was scheduled for this on 10/21/2022 at [facility].    Procedure in detail:  The patient was brought to the operating theater and placed supine on the operating table.  General endotracheal anesthesia was induced via reinforced tube through laryngectomy stoma.  The neck was prepped and draped in the usual sterile fashion.  Timeout was performed.  Perioperative antibiotics were administered.     With a 15 blade scalpel a 4 cm cm incision was made overlying the abscess.  Immediate rind was appreciated with small amount purulent fluid.  Cavity was opened with blunt finger dissection and hemostats.  Culture swab was obtained from purulence.  One tract was heading medially approximately 3 cm superior to the laryngectomy stoma.  It tracted superiolaterally another 1 cm.  Ten flat UCHE drain was placed in the cavity.  Wound was closed with Vicryl and Monocryl.  This was dressed with bacitracin and telfa.  Tracheostomy tube was placed back into the laryngectomy stoma.    The patient tolerated the procedure well and  without complications.  The patient's care was delivered into the hands of the anesthesia team thereafter.  All counts were correct at the end of the procedure.    Dr. Bravo was present and scrubbed for the entire procedure.    10/21/2022  9:06 AM    Kevin Palomino MD  [facility] Otolaryngology, PGY IV

## 2022-10-21 NOTE — PROGRESS NOTES
"[facility]  Otolaryngology-Head & Neck Surgery    Progress Note    Name: Josafat Boudreaux  : 1965  Date: 10/21/2022 6:09 AM    S: ROSEANNE ON. AF. VSS. NPO since MN.     O: /74   Pulse 84   Temp 97.9 °F (36.6 °C) (Oral)   Resp 16   Ht 5' 9.02" (1.753 m)   Wt 70.8 kg (156 lb)   SpO2 97%   BMI 23.03 kg/m²     Sitting up in bed, comfortable  R > L neck erythema and edema, exquisitely tender to palpation but clinically improved erythema, less indurated.   Small pinhole at laryngectomy stoma at 1030 positionGreen drainage on trach tie.   Trach in place, secretions appropriate    A: Josafat Boudreaux is a 56 y.o. male with laryngeal cancer s/p TL, BND, now with right neck abscess s/p TCF from stoma    P: OR for I&D.  Change antibiotics for pseudomonal coverage  Counseled patient on plan for post-op drain placement, continued trach placement    Rimma Horn MD  Otolaryngology-Head & Neck Surgery  [employer] PGY5    "

## 2022-10-21 NOTE — PROGRESS NOTES
Pharmacist Dose Adjustment Note    Josafat Boudreaux is a 56 y.o. male being treated with the medication zosyn    Patient Data:    Vital Signs (Most Recent):  Temp: 97.9 °F (36.6 °C) (10/21/22 0458)  Pulse: 84 (10/21/22 0458)  Resp: 16 (10/20/22 2106)  BP: 113/74 (10/21/22 0458)  SpO2: 97 % (10/21/22 0458) Vital Signs (72h Range):  Temp:  [97 °F (36.1 °C)-99.2 °F (37.3 °C)]   Pulse:  []   Resp:  [16-20]   BP: ()/(55-74)   SpO2:  [92 %-100 %]      Recent Labs   Lab 10/18/22  1210 10/19/22  0653 10/20/22  0450   CREATININE 0.64* 0.59* 0.67*     Serum creatinine: 0.67 mg/dL (L) 10/20/22 0450  Estimated creatinine clearance: 123.1 mL/min (A)    Medication:zosyn dose: 3.375g frequency q8h will be changed to medication:zosyn dose:4.5g frequency:q8h per policy.    Pharmacist's Name: Ruth Ann Moctezuma  Pharmacist's Extension: [phone]

## 2022-10-21 NOTE — TRANSFER OF CARE
"Anesthesia Transfer of Care Note    Patient: Josafat Boudreaux    Procedure(s) Performed: Procedure(s) (LRB):  INCISION AND DRAINAGE,NECK (Bilateral)    Patient location: PACU    Anesthesia Type: general    Transport from OR: Transported from OR on room air with adequate spontaneous ventilation    Post pain: adequate analgesia    Post assessment: no apparent anesthetic complications    Post vital signs: stable    Level of consciousness: awake    Nausea/Vomiting: no nausea/vomiting    Complications: none    Transfer of care protocol was followed      Last vitals:   Visit Vitals  /75   Pulse 96   Temp 36.9 °C (98.4 °F) (Temporal)   Resp 20   Ht 5' 9.02" (1.753 m)   Wt 70.8 kg (156 lb)   SpO2 100%   BMI 23.03 kg/m²     "

## 2022-10-21 NOTE — ANESTHESIA POSTPROCEDURE EVALUATION
Anesthesia Post Evaluation    Patient: Josafat Boudreaux    Procedure(s) Performed: Procedure(s) (LRB):  INCISION AND DRAINAGE,NECK (Bilateral)    Final Anesthesia Type: general      Patient location during evaluation: PACU  Patient participation: Yes- Able to Participate  Level of consciousness: awake and responds to stimulation  Post-procedure vital signs: reviewed and stable  Pain management: adequate  Airway patency: patent    PONV status at discharge: No PONV  Anesthetic complications: no      Cardiovascular status: blood pressure returned to baseline  Respiratory status: unassisted  Hydration status: euvolemic  Follow-up not needed.          Vitals Value Taken Time   /71 10/21/22 1146   Temp 36.7 °C (98 °F) 10/21/22 1146   Pulse 88 10/21/22 1146   Resp 20 10/21/22 1352   SpO2 96 % 10/21/22 1318         Event Time   Out of Recovery 10/21/2022 09:50:00         Pain/Geetha Score: Pain Rating Prior to Med Admin: 7 (10/21/2022  1:52 PM)  Pain Rating Post Med Admin: 0 (10/21/2022  9:51 AM)  Geetha Score: 9 (10/21/2022  9:44 AM)

## 2022-10-22 NOTE — PROGRESS NOTES
[facility] OTOLARYNGOLOGY PROGRESS NOTE    S: NAEO, doing well from a pain standpoint. Drain did pull out a bit and was not holding suction.     O:   Vitals:    10/22/22 0917   BP: (!) 97/56   Pulse: 106   Resp:    Temp: 98.6 °F (37 °C)     Sitting in bed  Right neck TTP, UCHE in place with minimal output, drain was slightly out and not holding suction (adjusted this morning), incision site cdi  6CN inflated with intact stoma and no purulent drainage noted, trach collar and ties on  Breathing well without issues    A: Josafat Boudreaux is a 56 y.o. male with laryngeal cancer s/p TL, BND, now with right neck abscess s/p TCF from stoma s/p I&D 10/21. Doing well.     P:  Follow up cultures (NGTD); currently on Unasyn  Continue trachestomy and routine care  Will continue to follow   Please call with questions or concerns    MALIK Cavanaugh MD  [facility] Otolaryngology PGY III

## 2022-10-22 NOTE — PROGRESS NOTES
[facility] Medicine Wards Progress Note     Resident Team: [facility] Medicine List 2  Attending Physician: Bridger Lott MD  Resident: Lisandra/Ania  Intern: Festus/Kasia       Subjective:      Brief HPI:  Josafat Boudreaux is a 56 y.o. 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 Admitted 10/18 for IV abx, ENT following.    Interval History: NAEO. ENT performed w/o which pt tolerated well. UCHE drain to bulb suction which is leaking, but serosanguinous drainage noted. Denies fever, chills, headache, chest pain, SOB, significant pain. Itching has improved as well.      Review of Systems:  ROS completed and negative except as indicated above.     Objective:     Last 24 Hour Vital Signs:  BP  Min: 92/48  Max: 115/74  Temp  Av.9 °F (36.6 °C)  Min: 97.6 °F (36.4 °C)  Max: 98.6 °F (37 °C)  Pulse  Av.5  Min: 71  Max: 106  Resp  Av.3  Min: 18  Max: 20  SpO2  Av %  Min: 93 %  Max: 100 %  I/O last 3 completed shifts:  In: 1725 [P.O.:1225; I.V.:300; IV Piggyback:200]  Out: 700 [Urine:700]    Physical Examination:  General: well-developed well-nourished in no acute distress  Eye: PERRLA, EOMI, clear conjunctiva, eyelids normal  HENT: NC/AT, moist mucus membranes  Neck: full range of motion, see integ for further details  Respiratory: clear to auscultation bilaterally, respirations non-labored, tracheostomy in place to humidified air  Cardiovascular: regular rate and rhythm without murmurs, gallops or rubs  Gastrointestinal: soft, non-tender, non-distended with normal bowel sounds, without masses to palpation, PEG clamped  Musculoskeletal: no obvious deformities, full range of motion of all  extremities/spine without limitation or discomfort  Integumentary: Erythema to right side neck under mandible is improved- still TTP, UCHE drain with serosanguinous drainage noted  Extremities: radial and DP pulses 2+ bilaterally, no LE edema  Neurologic: CN II-XII intact, no signs of peripheral neurological deficit, motor/sensory function intact    Laboratory:  Most Recent Data:  CBC:   Lab Results   Component Value Date    WBC 8.4 10/22/2022    HGB 10.6 (L) 10/22/2022    HCT 31.9 (L) 10/22/2022     10/22/2022    MCV 99.7 (H) 10/22/2022    RDW 15.2 10/22/2022     WBC Differential:   Recent Labs   Lab 10/18/22  1210 10/19/22  0653 10/20/22  0420 10/21/22  0941 10/22/22  0609   WBC 17.8* 8.3 4.7 5.8 8.4   HGB 10.3* 11.0* 10.3* 11.3* 10.6*   HCT 29.9* 33.4* 31.6* 33.6* 31.9*    232 258 259 313   MCV 96.8* 101.5* 101.3* 100.6* 99.7*       BMP:   Lab Results   Component Value Date     10/22/2022    K 3.6 10/22/2022    CO2 28 10/22/2022    BUN 11.0 10/22/2022    CREATININE 0.70 (L) 10/22/2022    CALCIUM 9.5 10/22/2022    MG 2.00 10/21/2022    PHOS 4.8 (H) 10/21/2022     LFTs:   Lab Results   Component Value Date    ALBUMIN 2.8 (L) 10/22/2022    BILITOT 0.3 10/22/2022    AST 13 10/22/2022    ALKPHOS 59 10/22/2022    ALT 7 10/22/2022     Coags:   Lab Results   Component Value Date    INR 0.99 06/26/2022    PROTIME 13.0 06/26/2022    PTT 30.6 06/26/2022     FLP:   Lab Results   Component Value Date    CHOL 227 (H) 09/16/2019    HDL 59 09/16/2019    TRIG 184 (H) 09/16/2019     DM:   Lab Results   Component Value Date    CREATININE 0.70 (L) 10/22/2022     Thyroid:   Lab Results   Component Value Date    TSH 2.1169 07/11/2022     Anemia:   Lab Results   Component Value Date    WQRGFCAM79 563 06/17/2022    FOLATE 15.5 06/17/2022     Cardiac:   Lab Results   Component Value Date    TROPONINI <0.010 06/25/2022    BNP 37.4 06/26/2022       Microbiology Data Reviewed: yes  Pertinent Findings:  Pending bld  cx    Other Results:  EKG (my interpretation): no new EKG.    Radiology:  Imaging Results              CT Soft Tissue Neck WO Contrast (Final result)  Result time 10/18/22 13:01:40      Final result by Gela Liao MD (10/18/22 13:01:40)                   Impression:      Collection of mostly air and small amount of fluid in the right anterior neck with extensive soft tissue swelling.      Electronically signed by: Gela Liao  Date:    10/18/2022  Time:    13:01               Narrative:    EXAMINATION:  CT SOFT TISSUE NECK WITHOUT CONTRAST    CLINICAL HISTORY:  Neck abscess, deep tissue;laryngeal cancer s/p chemoradiation, right jaw swelling with drainage;    TECHNIQUE:  Axial CT images of the neck were obtained without intravenous contrast. Reformatted images in the sagittal and coronal plane were included.    Automatic exposure control was utilized to limit radiation dose.    DLP: 882 mGy-cm    COMPARISON:  PET-CT dated 10/13/2022    FINDINGS:  There are postoperative changes of prior total laryngectomy.  Tracheostomy is in place. There is a collection of mostly air and small amount of fluid in the right anterior neck (series 2, images 77 through 82).  Small amount of fluid measures approximately 9 x 11 mm in size (series 2, image 77).    There is soft tissue swelling in the neck bilaterally, right greater than left.  The parotid glands, submandibular glands and thyroid gland are unremarkable.  There is no acute abnormality of the orbits or visualized brain parenchyma.  There is no destructive bone lesion.  Right hilar nodularity and mediastinal lymphadenopathy are redemonstrated.                                       X-Ray Chest AP Portable (Final result)  Result time 10/18/22 12:28:47      Final result by Gela Liao MD (10/18/22 12:28:47)                   Impression:      No acute abnormality of the chest.      Electronically signed by: Gela Liao  Date:    10/18/2022  Time:    12:28                Narrative:    EXAMINATION:  XR CHEST AP PORTABLE    CLINICAL HISTORY:  Sepsis;    COMPARISON:  X-ray dated 07/11/2022    FINDINGS:  Left chest wall port catheter has its tip over the superior vena cava.  The heart is normal in size.  The lungs are clear without focal consolidation.  There is no pleural effusion or visible pneumothorax.                                      Current Medications:     Infusions:   lactated ringers Stopped (10/21/22 0852)        Scheduled:   budesonide  0.5 mg Nebulization Q12H    enoxaparin  40 mg Subcutaneous Daily    famotidine  20 mg Per G Tube BID    hydrocortisone   Topical (Top) BID    ondansetron  4 mg Intravenous Once    oxyCODONE-acetaminophen  2 tablet Oral Once    piperacillin-tazobactam (ZOSYN) IVPB  4.5 g Intravenous Q8H        PRN:  albuterol-ipratropium, albuterol-ipratropium, dextrose 10%, dextrose 10%, diphenhydrAMINE, glucagon (human recombinant), glucose, glucose, guaiFENesin, hydrocodone-apap 7.5-325 MG/15 ML, HYDROmorphone, HYDROmorphone, hydrOXYzine pamoate, ibuprofen, naloxone, prochlorperazine, sodium chloride 0.9%    Antibiotics and Day Number of Therapy:  Unasyn D3    Lines and Day Number of Therapy:  PIV D3    Assessment & Plan:   Sepsis secondary to Abscess of Right Side Neck  S/p Washout by ENT, POD #1       - SIRS 2/4 (WBC and HR) with known source- right neck abscess draining spontaneously on admission. Currently 1/4 with leukocytosis resolved; family states this is about his baseline HR though       - Swelling started 10/15       - s/p 3 g Unasyn in ED; s/p 30 cc/kg IVF bolus, BP stable and skin exam WNL/mucosal membranes moist at this time       - Bld cultures NGTD x72h; wound cultures pending from washout       - Continue Zosyn D2 for pseudomonal coverage; s/p Unasyn x3 days       - ENT consulted given Hx of radical neck for cancer, appreciate recs       - Right neck UCHE drain in place       - Lortab elixer for pain q8h, benadryl for  itching       - ENT is taking over as primary service at this time     Laryngeal Squamous Cell Ca  S/P Radical Neck and Laryngectomy       - Radical Neck w/trach on 7/11/22, follows up with ENT at [facility]       - Also follows with Onc, Dr. Atkinson- undergoing chemo and radiation, last dose of both around 3 weeks ago with further plans for chemo       - Imaging showed tracking of abscess to trachea so trach w/ cuff placed to help prevent drainage from entering lungs/causing pneumonia       - ENT on board, appreciate assistance     Maculopapular rash to b/l UE- significantly improved       - Began yesterday at oncology appointment       - Maculopapular, pruritic rash to bilateral UE hands and forearms       - Hydroxyzine PRN itching    PEG Dependent       - S/p PEG with laryngectomy       - Weight stable        - TF at goal; NPO at MN     COPD       - No PFT's on file, not on controller inhalers       - Continue home Pulmicort nebs BID, duonebs Q6H PRN SOB/Wheezing       - Will give O2 to keep sats 88-92% if needed     CODE STATUS: Full  Access: PIV  Antibiotics: Zosyn D2, s/p Unasyn x3 days  Diet: Bolus TF  DVT Prophylaxis: Lovenox  GI Prophylaxis: Pepcid  Fluids: none     Disposition: Pt to remain inpatient for continued IV abx, POD #1 from w/o of right neck abscess with UCHE drain still in place. Ent taking over as primary team.     Thank you very much for the assistance, please reach out if there is anything you need.       Melony Fry MD  [facility] Internal Medicine -II

## 2022-10-24 NOTE — CONSULTS
Inpatient Nutrition Assessment    Admit Date: 10/18/2022   Total duration of encounter: 6 days     Nutrition Recommendation/Prescription     Continue Diabetisource 6-7 cartons/day with 50mL water flushes before and after each carton  SLP eval to determine appropriate oral diet texture/consistency  3.  Monitor Weights Weekly     Communication of Recommendations: reviewed with patient/caregiver and reviewed with nurse    Nutrition Assessment     Malnutrition Assessment/Nutrition-Focused Physical Exam    Malnutrition in the context of chronic illness  Degree of Malnutrition: does not meet criteria  Energy Intake: does not meet criteria  Interpretation of Weight Loss: does not meet criteria  Body Fat: does not meet criteria  Area of Body Fat Loss: does not meet criteria  Muscle Mass Loss: does not meet criteria  Area of Muscle Mass Loss: does not meet criteria  Fluid Accumulation: does not meet criteria  Edema: does not meet criteria  Reduced  Strength: unable to obtain  A minimum of two characteristics is recommended for diagnosis of either severe or non-severe malnutrition.    Chart Review    Reason Seen: continuous nutrition monitoring, malnutrition screening tool, physician consult, and follow-up    Diagnosis: Sepsis secondary to Abscess of Right Side Neck s/p I&D on 10/21  Laryngeal Squamous Cell Ca  S/P Radical Neck and Laryngectomy  Maculopapular rash to b/l UE  PEG Dependent  COPD    Relevant Medical History: laryngeal squamous cell carcinoma s/p total laryngectomy/radical neck dissection s/p PEG and trach, 1/40 LN with metastatic disease, lung masses x2 bilateral (Squamous cell carcinoma), and COPD (unquantitated)    Nutrition-Related Medications: famotidine  Calorie Containing IV Medications: no significant kcals from medications at this time    Nutrition-Related Labs: 10/19-CO2 31, Creat 0.59, Alb 2.8, GFR >60      Diet/PN Order: NPO  Oral Supplement Order: none  Tube Feeding Order:  [place] VILLARREAL (see  "below for calculation)  Appetite/Oral Intake: NPO/NPO  Factors Affecting Nutritional Intake: altered gastrointestinal function, difficulty/impaired swallowing, and NPO  Food/Orthodox/Cultural Preferences:  Wife reports pt consuming soft foods and thin liquids at home  Food Allergies: no known food allergies       Wound(s):   Incision site to neck with UCHE drain    Comments    10/19: RD consulted for TF recs. Pt with hx of laryngeal cancer s/p PEG and trach; non-verbal. Pt currently NPO. Spoke to wife; reports pt receiving 7 cartons of Diabetisource/day with 30-60mL water flushes before and after. Wife reports pt was also consuming soft foods and thin liquids by mouth; states plan for bedside swallow eval today. Wife reports pt with no GI complaints and no recent wt loss; #. Spoke with nsg; reports pt received one carton of Diabetisource with 60mL water flush. TF recs provided in note.    10/24/22 -- Pt remains NPO with bolus TF via peg; Tolerating TF @ goal, taking a total of 6-7 cartons daily; Weights Stable; LBM 10/23; swallow study pending      Anthropometrics    Height: 5' 9.02" (175.3 cm) Height Method: Stated  Last Weight: 69.2 kg (152 lb 8.9 oz) (10/24/22 1140) Weight Method: Bed Scale  BMI (Calculated): 22.5  BMI Classification: normal (BMI 18.5-24.9)        Ideal Body Weight (IBW), Male: 160.12 lb     % Ideal Body Weight, Male (lb): 97.43 %                 Usual Body Weight (UBW), k.8 kg (# per pt fly)  % Usual Body Weight: 100.15     Usual Weight Provided By: family/caregiver    Wt Readings from Last 5 Encounters:   10/24/22 69.2 kg (152 lb 8.9 oz)   10/18/22 70.8 kg (156 lb)   10/08/22 59.8 kg (131 lb 13.4 oz)   22 71 kg (156 lb 9.6 oz)   22 71.5 kg (157 lb 9.6 oz)     Weight Change(s) Since Admission:  Admit Weight: 70.8 kg (156 lb) (10/18/22 1106)  10/19: Wt stable -- no new wt  10/24/22 -- 69.2 kg    Estimated Needs    Weight Used For Calorie Calculations: 70.8 kg (156 lb " 1.4 oz)  Energy Calorie Requirements (kcal): 1982-2124 kcals (28-30 kcal/kg)  Energy Need Method: Kcal/kg  Weight Used For Protein Calculations: 70.8 kg (156 lb 1.4 oz)  Protein Requirements: 85-99g (1.2-1.4 g/kg)  Fluid Requirements (mL): 1982-2124 mL (1mL/kcal)  Temp: 98.1 °F (36.7 °C)       Enteral Nutrition    Formula: Diabetisource AC  Rate/Volume: 6-7 cartons daily  Water Flushes: 50 mL  Additives/Modulars: none at this time  Route: PEG tube  Method: bolus  Total Nutrition Provided by Tube Feeding, Additives, and Flushes:  Calories Provided 1950 kcal/d, 98% needs avg   Protein Provided  98 g/d, 100% needs avg   Fluid Provided 1976 ml/d, 100% needs avg   Continuous feeding calculations based on estimated 20 hr/d run time unless otherwise stated.    Parenteral Nutrition    Patient not receiving parenteral nutrition support at this time.    Evaluation of Received Nutrient Intake    Calories: meeting estimated needs  Protein: meeting estimated needs    Patient Education    Not applicable.    Nutrition Diagnosis     PES: Swallowing difficulty related to laryngeal squamous cell carcinoma s/p total laryngectomy/radical neck dissection as evidenced by PEG and trach. (continues)    Interventions/Goals     Intervention(s): modified composition of enteral nutrition, modified rate of enteral nutrition, and collaboration with other providers  Goal: Meet greater than 75% of nutritional needs by follow-up. (goal met)    Monitoring & Evaluation     Dietitian will monitor food and beverage intake, enteral nutrition intake, weight, electrolyte/renal panel, glucose/endocrine profile, and gastrointestinal profile.  Nutrition Risk/Follow-Up: low (follow-up in 5-7 days)   Please consult if re-assessment needed sooner.

## 2022-10-24 NOTE — PROGRESS NOTES
[facility] OTOLARYNGOLOGY PROGRESS NOTE    S: NAEO, pain controlled. No further concerns    O:   Vitals:    10/24/22 0342   BP: 112/73   Pulse: 83   Resp:    Temp: 98 °F (36.7 °C)     Sitting in bed  Right neck slightly TTP, UCHE in place with minimal output, drain needed resuturing yesterday and neck incision with some opening. incision site cdi  6CN inflated with intact stoma and no purulent drainage noted, trach collar and ties on  Breathing well without issues    Cultures: G+ cocci    A: Josafat Boudreaux is a 56 y.o. male with laryngeal cancer s/p TL, BND, now with right neck abscess s/p TCF from stoma s/p I&D 10/21. Doing well.     P:  Follow up cultures (G+ cocci); continue Unasyn  Continue trachestomy and routine care  Plan for swallow study today to assess for leak.   Please call with questions or concerns    MALIK Cavanaugh MD  [facility] Otolaryngology PGY III

## 2022-10-25 NOTE — NURSING
10/25/22 2354 tried reaching Dr. Rimma Horn regarding possible need for a picc line . No answer at this time call went straight to voicemail X2.

## 2022-10-25 NOTE — DISCHARGE SUMMARY
OTOLARYNGOLOGY DISCHARGE SUMMARY    Patient: Josafat Boudreaux  MRN: [id number]    Date of Admission: 10/18/2022  Date of Discharge: 10/25/2022  Admitting Physician: Emory Alonso MD  Discharge Physician:Ryan Barrett MD    Admitting Diagnosis: Other emphysema [J43.8]  Cellulitis and abscess of neck [L03.221, L02.11], s/p total laryngectomy    Discharge Diagnosis: Other emphysema [J43.8]  Cellulitis and abscess of neck [L03.221, L02.11], s/p total laryngectomy      Procedures: Neck incision and drainage    Consults: Medicine    Complications: None    Hospital Course  Josafat Boudreaux is a 56 y.o. male admitted 10/18/2022. Patient was seen and examined in the ED by . Clinical presentation suggested neck abscess. Patient was offered a admission for IV antibiotics and incision and drainage. After risks, benefits and alternatives were discussed patient was scheduled for this on 10/21/22 at [facility]. Patient underwent the aforementioned procedure without complication. For further details please refer to the operative report. Over the ensuing hospital course patients clinical condition continued to improve and on 10/25/2022 all discharge criteria had been met and patient was deemed stable enough for discharge home.     Physical Exam:  Vitals:    10/24/22 2117 10/25/22 0007 10/25/22 0722 10/25/22 0815   BP:  99/63  106/67   BP Location:    Right arm   Patient Position:    Lying   Pulse:  85 80 99   Resp: 20  20 15   Temp:  97.9 °F (36.6 °C)  97.6 °F (36.4 °C)   TempSrc:  Oral  Axillary   SpO2:  99% (!) 91% (!) 92%   Weight:       Height:           Constitutional  General Appearance: well nourished, well-developed, AAO x3, NAD  HEENT  Eyes: PEERLA, EOMI, normal conjunctivae  Ears: Hearing well at conversation level  Nose: septum midline, no inferior turbinate hypertrophy, no polyps, moist mucosa without erythema or blue hue  OC/OP: dentition moderate, no oral lesions,  tongue/FOM/BOT- soft, no leukoplakia/ulcerations/ tenderness   Nasopharynx, Hypopharynx, and Larynx:    Indirect: attempted, limited view due to patient intolerance  Neck: soft, non-tender, no palpable lymph nodes   Thyroid region- no nodules or goiter  Right neck slightly TTP, UCHE in place with minimal serous output holding suction, incision site cdi  Laryngectomy tube in place, no purulent drainage noted, trach collar and ties on  Breathing well without issues  Neuro: CN II - XII intact bilaterally  Cardiovascular: peripheral pulses palpable  Respiratory: non-labored respirations  Psychiatric: oriented to time, place and person, no depression, anxiety or agitation    Discharge To: Home  Discharge Diet: The patient is asked to make an attempt to improve diet and exercise patterns to aid in medical management of this problem.    Discharge Instructions: Wound care discussed    Discharge Medications:     Medication List        START taking these medications      amoxicillin-clavulanate 875-125mg 875-125 mg per tablet  Commonly known as: AUGMENTIN  Take 1 tablet by mouth every 12 (twelve) hours. for 14 days     oxyCODONE 5 MG immediate release tablet  Commonly known as: ROXICODONE  Take 1 tablet (5 mg total) by mouth every 4 (four) hours as needed for Pain.            CONTINUE taking these medications      albuterol-ipratropium 2.5 mg-0.5 mg/3 mL nebulizer solution  Commonly known as: DUO-NEB  Take 3 mLs by nebulization every 4 (four) hours as needed for Shortness of Breath or Wheezing. Rescue     budesonide 0.5 mg/2 mL nebulizer solution  Commonly known as: PULMICORT  Take 2 mLs (0.5 mg total) by nebulization every 12 (twelve) hours. Controller     diphenhydrAMINE 25 mg capsule  Commonly known as: BENADRYL  1 capsule (25 mg total) by Per G Tube route every 6 (six) hours as needed for Itching.     famotidine 20 MG tablet  Commonly known as: PEPCID  Take 1 tablet (20 mg total) by mouth every evening.     glutamine 10  gram Pwpk     HYDROcodone-acetaminophen 5-325 mg per tablet  Commonly known as: NORCO     hydrocortisone 1 % cream  Apply topically 2 (two) times daily as needed (rash). Apply to rash     ibuprofen 100 mg/5 mL suspension  Commonly known as: ADVIL,MOTRIN  Take 30 mLs (600 mg total) by mouth every 6 (six) hours as needed for Pain (mild to moderate).     ondansetron 8 MG Tbdl  Commonly known as: ZOFRAN-ODT     pantoprazole 20 MG tablet  Commonly known as: PROTONIX  Take 1 tablet (20 mg total) by mouth once daily.     silver nitrate applicators 75-25 % applicator  Apply topically 3 (three) times a week.     UNABLE TO FIND               Where to Get Your Medications        These medications were sent to [facility] - [address] [address]      Phone: [phone]   amoxicillin-clavulanate 875-125mg 875-125 mg per tablet       You can get these medications from any pharmacy    Bring a paper prescription for each of these medications  oxyCODONE 5 MG immediate release tablet           Follow up  With [facility] in 1 week    10/25/2022  6:07 PM    Kevin Palomino MD  [facility] Otolaryngology, PGY IV

## 2022-10-27 NOTE — PROGRESS NOTES
Subjective:       Patient ID: Josafat Boudreaux is a 56 y.o. male.    Chief Complaint: Follow-up (3 month f/u)      Patient is a 56-year-old man with a seen in hospital follow-up.  He also had a 3 month visit schedule.  Since I saw him last he developed cellulitis of the neck with an abscess requiring I&D.  A strep grew out he has been on appropriate antibiotic therapy and currently on Augmentin 875 b.i.d..  He left the hospital 2 days ago.  He has had radiation therapy for the lung cancer and his most recent PET scan shows strength history of the tumor.  Once his neck heels he is going to see his oncologist for further chemotherapy.  Currently he is doing okay.  His significant other was concerned about some redness at the PEG site.  A repeat swallowing study is pending.  He is status post laryngectomy.    Follow-up    Review of Systems     Current Outpatient Medications on File Prior to Visit   Medication Sig Dispense Refill    albuterol-ipratropium (DUO-NEB) 2.5 mg-0.5 mg/3 mL nebulizer solution Take 3 mLs by nebulization every 4 (four) hours as needed for Shortness of Breath or Wheezing. Rescue 90 mL 1    amoxicillin-clavulanate 875-125mg (AUGMENTIN) 875-125 mg per tablet Take 1 tablet by mouth every 12 (twelve) hours. for 14 days 28 tablet 0    budesonide (PULMICORT) 0.5 mg/2 mL nebulizer solution Take 2 mLs (0.5 mg total) by nebulization every 12 (twelve) hours. Controller 90 mL 1    diphenhydrAMINE (BENADRYL) 25 mg capsule 1 capsule (25 mg total) by Per G Tube route every 6 (six) hours as needed for Itching. 90 capsule 1    famotidine (PEPCID) 20 MG tablet Take 1 tablet (20 mg total) by mouth every evening. 30 tablet 11    glutamine 10 gram PwPk Take 10 g by mouth 2 (two) times a day.      HYDROcodone-acetaminophen (NORCO) 5-325 mg per tablet Take 1 tablet by mouth every 4 (four) hours as needed for Pain.      hydrocortisone 1 % cream Apply topically 2 (two) times daily as needed (rash). Apply to rash 30 g  "1    ibuprofen (ADVIL,MOTRIN) 100 mg/5 mL suspension Take 30 mLs (600 mg total) by mouth every 6 (six) hours as needed for Pain (mild to moderate). 354 mL 0    ondansetron (ZOFRAN-ODT) 8 MG TbDL Take 8 mg by mouth every 8 (eight) hours as needed for Nausea. Tab 1 ODT in AM for 2 days after chemotherapy      oxyCODONE (ROXICODONE) 5 MG immediate release tablet Take 1 tablet (5 mg total) by mouth every 4 (four) hours as needed for Pain. 18 tablet 0    pantoprazole (PROTONIX) 20 MG tablet Take 1 tablet (20 mg total) by mouth once daily. 30 tablet 11    silver nitrate applicators 75-25 % applicator Apply topically 3 (three) times a week. 1 applicator 0    UNABLE TO FIND medication name: ATOMIC TONIC (LIDO/DELTA/MAALOX)  gargle and swallow 1 to 2 teaspoonsful by mouth 5 minutes before meals and every 3 hours as needed for pain with swallowing      [DISCONTINUED] aspirin (ECOTRIN) 81 MG EC tablet Take 81 mg by mouth once daily.       No current facility-administered medications on file prior to visit.     Objective:      /70 (BP Location: Right arm, Patient Position: Sitting)   Pulse (!) 114   Resp 18   Ht 5' 9" (1.753 m)   Wt 68 kg (150 lb)   SpO2 (!) 94%   BMI 22.15 kg/m²     Physical Exam  Vitals reviewed.   Constitutional:       Appearance: Normal appearance.   HENT:      Head: Normocephalic and atraumatic.      Mouth/Throat:      Pharynx: Oropharynx is clear.   Eyes:      Pupils: Pupils are equal, round, and reactive to light.   Neck:      Comments: Neck as a bandage on the right side of his neck not far from the trach.  No significant swelling or redness of the nearby skin are neck itself.  Cardiovascular:      Rate and Rhythm: Normal rate and regular rhythm.      Pulses: Normal pulses.      Heart sounds: Normal heart sounds.   Pulmonary:      Breath sounds: Normal breath sounds.   Abdominal:      General: Abdomen is flat.      Palpations: Abdomen is soft.      Comments: Peg tube looks okay with the site " showing some granulation tissue in all some some mild redness around the PEG at the skin.  Has the appearance of yeast   Musculoskeletal:      Cervical back: Neck supple.   Skin:     General: Skin is warm and dry.   Neurological:      Mental Status: He is alert.     Hospital records lab work etc. reviewed.  Lab work from 2 days ago [facility]  Assessment:       1. New onset seizure    2. Chronic obstructive pulmonary disease, unspecified COPD type    3. Laryngeal cancer    4. Squamous cell carcinoma of both lungs    5. Cellulitis and abscess of neck    6. Compression fracture of T7 vertebra, sequela        1. Seizure disorder.  No recurrence    2. COPD and lung cancer.  Currently stable with plans for additional chemotherapy in the near future    3. Recent cellulitis and abscess of the neck.  Apparently portal of entry was near the trach site itself    4. History of laryngeal cancer and laryngectomy     5. Incidental finding of compression fracture at T7.  Likely old and asymptomatic  Plan:       Continue same meds and follow-up with me 3 months.  Again I will not order additional lab test as he has numerous studies done by his multiple other doctors.

## 2022-11-02 NOTE — PROGRESS NOTES
HEMATOLOGY/ONCOLOGY OFFICE CLINIC VISIT    Visit Information:    Initial Evaluation: 7/21/2022  Referring Provider:   Other providers:  Code status:    Diagnosis:  [id number]-Stage JENNYFER Laryngeal Squamous cell carcinoma     Present treatment:  Carbo/Taxol + RT (8/17/22-9/21/22)    Treatment/Oncology history:  --7/11/2022: total laryngectomy    Plan of care:     Imaging:  Ct neck 6/6/2022: A metastatic right level III cervical lymph node is present with short axis measurement of 1.6 cm.  This lymph node contains internal cystic change, typical of metastatic squamous cell carcinoma.  A left level III cervical lymph node shows short axis measurements of 1 cm, and is suspicious.    A left supraglottic mass measures approximately 2.4 cm in diameter (axial post-contrast image 39), presumably corresponding to the primary neoplasm.  Metastatic lymph nodes are also present in the inferior right paratracheal position, measuring 1.3 cm in short axis.  Metastatic lymph nodes are present in the superior right hilum, measuring 1.8 cm in short axis.  Ct H&N 6/27/2022: 1. There is mild stenosis at the origin of left proximal internal carotid artery secondary to dense calcified atheromatous plaque. 2. There is consolidation is right upper lobe. Additional ground-glass opacities are also seen on the remainder of the visualized lungs. These findings are consistent with an infectious process. Small right pleural effusion is seen. There is an ill-defined soft tissue mass in the right perihilar region which measures approximately 3.1 x 2.9 x 4.6 cm, of concern for neoplasm. Correlate clinically as regards further evaluation and followup with CT chest. There is an ill-defined enhancing soft tissue mass in the glottis infiltrating into the paraglottic spaces and the subglottic region with obliteration of the airway, of concern for a neoplasm. 3. Unremarkable CT angiogram of the head. Details and other findings as described above.  NM PET CT  8/1/2022: Postoperative changes of recent total laryngectomy and lou dissection. bilateral hypermetabolic cervical lymphadenopathy.  A right cervical lymph node 1.7 x 1.8 cm, compared to 1.6 x 1.5 cm previously, SUV of 7.7. left cervical lymph node 8 x 10 mm and has a max SUV of 3.4.  A fluid collection posterior to the left internal jugular vein measures 2.4 x 2.4 cm. There is right hilar and mediastinal lymphadenopathy.  A precarinal lymph node 1.6 x 2.3 cm, compared to 1.3 x 1.7 cm previously, SUV of 13.8.  Right hilar lymphadenopathy measures 3 x 2.9 cm, compared to 2.1 x 2.3 cm previously, and has a max SUV of 9.8.  A suspected right hilar mass measures 1.8 x 3.6 cm SUV of 14.8   PET CT 10/13/2022: IMPRESSION 1. Findings suggestive of mixed disease response. Right lower cervical chain nodes are less metabolically active. Right hilar mass and mediastinal/right hilar lymph nodes are decreased in size, but demonstrate increased metabolic activity. 2. No findings to suggest new or worsening FDG-avid metastatic disease within the left thoracic cavity, abdomen, or pelvis. 3. Previously visualized left neck fluid collection is no longer clearly identified.    Pathology:  6/14/2022:  EBUS ENDOBRONCHIAL MASS RUL, CYTOLOGY: NEARLY ACELLULAR SPECIMEN CONSISTING OF FRESH BLOOD CLOT. NO ATYPICAL OR MALIGNANT CELLS IDENTIFIED.   7/11/2022: Bronchial Wash, RLL, right bronchial mass washings:  - Squamous cell carcinoma.    Bronchial Wash, LLL, left bronchial mass washings: - Squamous cell carcinoma.     7/11/2022 Laryngectomy:  1. Larynx, laryngeal biopsy frozen section : - Squamous cell carcinoma with necrosis.    2. Neck, Anterior, left neck level 3: - Number of lymph nodes involved by carcinoma:  1/7       - Size of metastatic deposit:  2.5 mm - Extranodal extension:  Not identified      3. Larynx, resection without nodes, total layrngectomy :  - Squamous cell carcinoma.  4. Neck, Anterior, left neck level 4: - Number of  lymph nodes involved by carcinoma:  0/10  5. Neck, Anterior, right neck level 4:  - Number of lymph nodes involved by carcinoma:  0/11  6. Neck, Anterior, right neck level 3: - Number of lymph nodes involved by carcinoma:  0/9  7. Nasophaynx, inferior pharyngeal mucosa : - Benign squamous mucosa. - No evidence of malignancy.     8. Cartilage, superior cartilage margin : - No evidence of malignancy.    9. Lymph Node, pretracheal lymph  node :  - Number of lymph nodes involved by carcinoma:  0/3  [id number]       CLINICAL HISTORY:       Patient: Josafat Boudreaux is a 56 y.o. male kindly refer her for laryngeal carcinoma.  Patient  was referred to ENT for further evaluation of hoarseness.  He did not have any difficulty swallowing or dry mouth at the time.  Hoarseness has been present for at least 4 months prior to evaluation. 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     Patient was scheduled to trach but on 06/10/2022 he was brought to the emergency room via air met with is short of breath.  He was found to have and oxygenation on the 40s when EMS was called.  He was placed on BiPAP but pCO2 increased so he has an emergent tracheostomy performed.  He has a PEG tube placed same hospitalization.  He underwent bronchoscopy with biopsy of the right upper lobe endobronchial lesion on 6/14/2022 cytology was negative for malignant cells.      On 7/11/2022 he underwent LARYNGECTOMY, WITH RADICAL NECK DISSECTION - Bilateral LARYNGOSCOPY, DIRECT, DIAGNOSTIC, WITH BRONCHOSCOPY AND ESOPHAGOSCOPY pathology report as above.  1/40 lymph nodes were involved with metastatic disease.  Bronchoscopy was repeated and biopsy of the left lower lobe and right lower lobe were both positive for squamous cell carcinoma.    He is here today with his wife.  He has recovered from surgery relatively well.  Tracheostomy in place.  He is unable to talk so the history was taken by  electronic medical record and wife.    Patient has a brother with history of throat cancer. Patient used to smoke 1 and half pack per day since he was 60 years old.  He was smoking less recently.    Chief Complaint: No chief complaint on file.      Interval History:  Patient presents today for 2 week follow-up accompanied by his wife. He completed 6th cycle of weekly Carbo/Taxol and radiation on 9/21/22. On 10/18/22, he presented to ED with abscess on the right side of his neck requiring I&D. He is followed by Dr. Cortez and Dr. Alonso (at [facility]). The wounds (s/p I&d) on the right side of his neck are clean, dry no drainage. Still not quite healed enough to start chemo though. No evidence of infection at this time.       Past Medical History:   Diagnosis Date    Cancer     COPD (chronic obstructive pulmonary disease)     Dysphagia     Lung cancer     Vocal cord mass       Past Surgical History:   Procedure Laterality Date    DIRECT DIAGNOSTIC LARYNGOSCOPY WITH BRONCHOSCOPY AND ESOPHAGOSCOPY N/A 7/11/2022    Procedure: LARYNGOSCOPY, DIRECT, DIAGNOSTIC, WITH BRONCHOSCOPY AND ESOPHAGOSCOPY;  Surgeon: Emory Alonso MD;  Location: [facility];  Service: ENT;  Laterality: N/A;    HIP SURGERY      HUMERUS FRACTURE SURGERY      INCISION AND DRAINAGE, NECK Bilateral 10/21/2022    Procedure: INCISION AND DRAINAGE,NECK;  Surgeon: Madison Worley MD;  Location: [facility] OR;  Service: ENT;  Laterality: Bilateral;    INSERT ARTERIAL LINE  6/26/2022         TRACHEOSTOMY N/A 6/10/2022    Procedure: CREATION, TRACHEOSTOMY;  Surgeon: Junior Alonso MD;  Location: [facility] OR;  Service: ENT;  Laterality: N/A;     Family History   Problem Relation Age of Onset    Lung cancer Father     Throat cancer Brother      Social Connections: Moderately Isolated    Frequency of Communication with Friends and Family: More than three times a week    Frequency of Social Gatherings with Friends and Family: More than three times a week    Attends  Rastafarian Services: Never    Active Member of Clubs or Organizations: No    Attends Club or Organization Meetings: Never    Marital Status:        Review of patient's allergies indicates:  No Known Allergies   Current Outpatient Medications on File Prior to Visit   Medication Sig Dispense Refill    albuterol-ipratropium (DUO-NEB) 2.5 mg-0.5 mg/3 mL nebulizer solution Take 3 mLs by nebulization every 4 (four) hours as needed for Shortness of Breath or Wheezing. Rescue 90 mL 1    amoxicillin-clavulanate 875-125mg (AUGMENTIN) 875-125 mg per tablet Take 1 tablet by mouth every 12 (twelve) hours. for 14 days 28 tablet 0    budesonide (PULMICORT) 0.5 mg/2 mL nebulizer solution Take 2 mLs (0.5 mg total) by nebulization every 12 (twelve) hours. Controller 90 mL 1    diphenhydrAMINE (BENADRYL) 25 mg capsule 1 capsule (25 mg total) by Per G Tube route every 6 (six) hours as needed for Itching. 90 capsule 1    famotidine (PEPCID) 20 MG tablet Take 1 tablet (20 mg total) by mouth every evening. 30 tablet 11    glutamine 10 gram PwPk Take 10 g by mouth 2 (two) times a day.      HYDROcodone-acetaminophen (NORCO) 5-325 mg per tablet Take 1 tablet by mouth every 4 (four) hours as needed for Pain.      hydrocortisone 1 % cream Apply topically 2 (two) times daily as needed (rash). Apply to rash 30 g 1    ondansetron (ZOFRAN-ODT) 8 MG TbDL Take 8 mg by mouth every 8 (eight) hours as needed for Nausea. Tab 1 ODT in AM for 2 days after chemotherapy      oxyCODONE (ROXICODONE) 5 MG immediate release tablet Take 1 tablet (5 mg total) by mouth every 4 (four) hours as needed for Pain. 18 tablet 0    pantoprazole (PROTONIX) 20 MG tablet Take 1 tablet (20 mg total) by mouth once daily. 30 tablet 11    silver nitrate applicators 75-25 % applicator Apply topically 3 (three) times a week. 1 applicator 0    UNABLE TO FIND medication name: ATOMIC TONIC (LIDO/DELTA/MAALOX)  gargle and swallow 1 to 2 teaspoonsful by mouth 5 minutes before  meals and every 3 hours as needed for pain with swallowing      ibuprofen (ADVIL,MOTRIN) 100 mg/5 mL suspension Take 30 mLs (600 mg total) by mouth every 6 (six) hours as needed for Pain (mild to moderate). (Patient not taking: Reported on 11/2/2022) 354 mL 0    [DISCONTINUED] aspirin (ECOTRIN) 81 MG EC tablet Take 81 mg by mouth once daily.       No current facility-administered medications on file prior to visit.      Review of Systems   Constitutional:  Negative for activity change, appetite change, chills, diaphoresis, fatigue, fever and unexpected weight change.   HENT:  Positive for trouble swallowing. Negative for nasal congestion, nosebleeds, postnasal drip, sinus pressure/congestion and sore throat.    Eyes:  Negative for visual disturbance.   Respiratory:  Negative for cough and shortness of breath.    Cardiovascular:  Negative for chest pain and palpitations.   Gastrointestinal:  Negative for abdominal distention, abdominal pain, blood in stool, change in bowel habit, constipation, diarrhea, nausea, vomiting and change in bowel habit.   Endocrine: Negative.    Genitourinary:  Negative for bladder incontinence, decreased urine volume, difficulty urinating, dysuria, frequency, hematuria, scrotal swelling, testicular pain and urgency.   Musculoskeletal:  Negative for arthralgias, back pain, gait problem, joint swelling, leg pain, myalgias and neck pain.   Integumentary:  Negative for rash.   Neurological:  Negative for dizziness, tremors, seizures, syncope, speech difficulty, weakness, light-headedness, numbness, headaches and memory loss.   Hematological:  Negative for adenopathy. Does not bruise/bleed easily.   Psychiatric/Behavioral:  Negative for agitation, confusion, hallucinations, sleep disturbance and suicidal ideas. The patient is not nervous/anxious.             Vitals:    11/02/22 1311   BP: 103/67   BP Location: Left arm   Patient Position: Sitting   Pulse: 104   Temp: 98.1 °F (36.7 °C)  "  TempSrc: Oral   SpO2: 95%   Weight: 68.5 kg (151 lb)   Height: 5' 9" (1.753 m)          Physical Exam  Vitals and nursing note reviewed.   Constitutional:       General: He is not in acute distress.     Appearance: He is ill-appearing.      Comments: thin   HENT:      Head: Normocephalic and atraumatic.      Mouth/Throat:      Mouth: Mucous membranes are moist.   Eyes:      General: No scleral icterus.     Extraocular Movements: Extraocular movements intact.      Conjunctiva/sclera: Conjunctivae normal.   Neck:      Vascular: No JVD.      Trachea: Tracheostomy present.      Comments: Tracheotomy in place.  Right side - two small 0.5-1 cm open wounds clean dry, no drainage.  Skin changes c/w RT  Cardiovascular:      Rate and Rhythm: Normal rate and regular rhythm.      Heart sounds: No murmur heard.  Pulmonary:      Effort: Pulmonary effort is normal.      Breath sounds: Normal breath sounds. No wheezing or rhonchi.   Chest:      Chest wall: No tenderness.   Abdominal:      General: The ostomy site is clean. Bowel sounds are normal. There is no distension.      Palpations: Abdomen is soft.      Tenderness: There is no abdominal tenderness.   Musculoskeletal:         General: No swelling or deformity.      Cervical back: Neck supple.   Lymphadenopathy:      Cervical: No cervical adenopathy.      Upper Body:      Right upper body: No supraclavicular or axillary adenopathy.      Left upper body: No supraclavicular or axillary adenopathy.      Lower Body: No right inguinal adenopathy. No left inguinal adenopathy.   Skin:     General: Skin is warm.      Coloration: Skin is not jaundiced.      Findings: No rash.   Neurological:      General: No focal deficit present.      Mental Status: He is alert and oriented to person, place, and time.   Psychiatric:         Attention and Perception: Attention normal.         Mood and Affect: Mood and affect normal. Mood is not anxious.         Behavior: Behavior is cooperative.       "   Cognition and Memory: Cognition normal.         Judgment: Judgment normal.     ECOG SCORE    1 - Restricted in strenuous activity-ambulatory and able to carry out work of a light nature         Laboratory:  CBC with Differential:  Lab Results   Component Value Date    WBC 11.4 11/02/2022    RBC 4.09 (L) 11/02/2022    HGB 13.5 (L) 11/02/2022    HCT 41.9 (L) 11/02/2022    .4 (H) 11/02/2022    MCH 33.0 (H) 11/02/2022    MCHC 32.2 (L) 11/02/2022    RDW 15.4 11/02/2022     11/02/2022    MPV 9.7 11/02/2022        CMP:  Sodium Level   Date Value Ref Range Status   10/25/2022 142 136 - 145 mmol/L Final     Potassium Level   Date Value Ref Range Status   10/25/2022 3.7 3.5 - 5.1 mmol/L Final     Carbon Dioxide   Date Value Ref Range Status   10/25/2022 26 22 - 29 mmol/L Final     Blood Urea Nitrogen   Date Value Ref Range Status   10/25/2022 12.0 8.4 - 25.7 mg/dL Final     Creatinine   Date Value Ref Range Status   10/25/2022 0.75 0.73 - 1.18 mg/dL Final     Calcium Level Total   Date Value Ref Range Status   10/25/2022 10.2 8.4 - 10.2 mg/dL Final     Albumin Level   Date Value Ref Range Status   10/25/2022 3.1 (L) 3.5 - 5.0 gm/dL Final     Bilirubin Total   Date Value Ref Range Status   10/25/2022 0.3 <=1.5 mg/dL Final     Alkaline Phosphatase   Date Value Ref Range Status   10/25/2022 61 40 - 150 unit/L Final     Aspartate Aminotransferase   Date Value Ref Range Status   10/25/2022 15 5 - 34 unit/L Final     Alanine Aminotransferase   Date Value Ref Range Status   10/25/2022 8 0 - 55 unit/L Final     Estimated GFR-Non    Date Value Ref Range Status   08/02/2022 >60 mls/min/1.73/m2 Final             Assessment:       1. Laryngeal cancer    2. Squamous cell carcinoma of both lungs              Laryngeal Squamous cell carcinoma -s/p total laryngectomy, 1/40 LN with metastatic disease.   Lung masses x 2, bilateral--> Squamous cell carcinoma    I discussed with the patient and his wife,  diagnosis, prognosis and treatment recommendations.  Discussed briefly chemotherapy, discussed risk versus benefits as well as toxicities associated with it.   Patient with long history of smoking.  I discussed with them that head and neck cancer lung cancer are very common in smokers.  I explained to them that lung cancer could or could not be associated with his head and neck cancer and lung cancer could be a 2nd primary.  Either way he has it in both lungs therefore a stage IV.  As per patient report, I do not have the official report, he has 1 mass in each of the lung bases and nothing other placed.   He will see Dr. Das Monday.  I would like to discuss case with him after review his scans to see what will be best treatment strategy.      He most likely need RT to his neck and he will benefit of concommittant chemotherapy.  If he only have one lesion in each lung and not too big, maybe RT to each lesions may be an alternative as well +/- chemotherapy during and after RT.       Plan.:       Completed weekly carbo/Taxol and radiation on 9/21/22.    Right neck cellulitis s/p I&D looks much better but we will allow for more healing time before we resume chemotherapy.   Requested NGS on lung biopsy, received notification there was not enough tissue sample, requested NGS on Larynx biopsy--pending  Will plan to start carbo/Taxol q3  +/- immunotherapy in 2 weeks.   RTC in 2 weeks with labs  Labs: CBC, CMP    Encourage to call or message us for any questions or problems  The patient was given ample opportunity to ask questions, and to the best of my abilities, all questions answered to satisfaction; patient demonstrated understanding of what we discussed and agreeable to the plan.     OZZY Rod

## 2022-11-02 NOTE — PROGRESS NOTES
The scope used for the exam was:  Flexible scope ENF-P4  Serial Number:  1)    [id number]    []   2)    [id number]    []   3)    [id number]    []   4)    [id number]    [x]   5)    [id number]    []   6)    [id number]    []       The scope used for the exam was:  Rigid scope   Serial Number:  1)   [id number]    []   2)   6282    []   3)   7330    []   4)    3384   []   5)    0824   []   6)    5554   []     7)   7425    []   8)   2240    []   9)   1109    []      Answers submitted by the patient for this visit:  Review of Symptoms Questionnaire  (Submitted on 10/31/2022)  None of these: Yes  None of these: Yes  None of these : Yes  None of these: Yes  None of these : Yes  None of these: Yes  None of these: Yes  None of these: Yes  None of these : Yes  None of these: Yes  None of these : Yes  None of these: Yes  None of these: Yes  None of these: Yes

## 2022-11-07 NOTE — PROGRESS NOTES
[facility]  Otolaryngology Clinic Note    Josafat Boudreaux  Encounter Date: 11/7/2022  YOB: 1965  Physician: Lisseth Krueger MD    Chief Complaint: Laryngeal mass    HPI: Josafat Boudreaux is a 57 y.o. male referred to clinic by Dr. Emory Alonso for laryngeal mass. Patients wife provides history. She states that patient first noticed hoarseness in November 2021. Got progressively worse. Presented to Dr. Alonso in early June 2022 for these complaints. Was noted to have a laryngeal mass on flexible laryngoscopy. Several days after the flexible laryngoscopy patient developed worsening swelling of the airway and presented to the ER in respiratory distress. He underwent emergent tracheostomy with Dr. Juinor Alonso. Patient was subsequently discharged home but readmitted shortly after with seizures. He spent 3 days in the ICU on the ventilator. During hospitalization, patient underwent PEG tube placement. He reports that he was not having any issues with PO intake prior to the tracheostomy placement. Now he is PEG dependent. Only taking some ice chips by mouth. He presents today to discuss laryngectomy.     11/2/22:  Since last visit, patient total laryngectomy with bilateral neck dissections level 3 and 4, primary closure, he did not undergo TEP placement.  He was also found to have synchronous primary or lung metastasis in the lung that was also squamous cell carcinoma.  He underwent radiation completed this in the middle of September 2022.  He received carboplatin and Taxol which he still has a few more treatments of.  He presented to the emergency department on 10/08/2022 with a right neck infection.  This was treated with IV antibiotics and incision and drainage with UCHE drain placement.  He was kept NPO and did feeds through his PEG tube.  He met criteria for discharge on 10/25/22 with UCHE drain.  He had outpatient swallow study which did not demonstrate any  evidence of a leak.  He returns to clinic today reporting improvement in his symptoms including right neck pain and drainage.  He has not had to empty his UCHE drain on a daily basis at this point.  He had a PET performed on 10/13/2022 to assess response to chemotherapy.  On that PET scan, right hilar and mediastinal lymph nodes were decreased in size but had increased metabolic activity.  He denies any other issues today    11/7/22: Reports worsening swelling under the right jawline the past few days.  Denies any erythema, infection, fever or chills.    ROS:   General: Negative except per HPI  Skin: Denies rash, ulcer, or lesion.  Eyes: Denies vision changes or diplopia.  Ears: Negative except per HPI  Nose: Negative except per HPI  Throat/mouth: Negative except per HPI  Cardiovascular: Negative except per HPI  Respiratory: Negative except per HPI  Neck: Negative except per HPI  Endocrine: Negative except per HPI  Neurologic: Negative except per HPI    Other 10-point review of systems negative except per HPI      Review of patient's allergies indicates:  No Known Allergies    Past Medical History:   Diagnosis Date    Cancer     COPD (chronic obstructive pulmonary disease)     Dysphagia     Lung cancer     Vocal cord mass        Past Surgical History:   Procedure Laterality Date    DIRECT DIAGNOSTIC LARYNGOSCOPY WITH BRONCHOSCOPY AND ESOPHAGOSCOPY N/A 7/11/2022    Procedure: LARYNGOSCOPY, DIRECT, DIAGNOSTIC, WITH BRONCHOSCOPY AND ESOPHAGOSCOPY;  Surgeon: Emory Alonso MD;  Location: [facility];  Service: ENT;  Laterality: N/A;    HIP SURGERY      HUMERUS FRACTURE SURGERY      INCISION AND DRAINAGE, NECK Bilateral 10/21/2022    Procedure: INCISION AND DRAINAGE,NECK;  Surgeon: Madison Worley MD;  Location: [facility] OR;  Service: ENT;  Laterality: Bilateral;    INSERT ARTERIAL LINE  6/26/2022         TRACHEOSTOMY N/A 6/10/2022    Procedure: CREATION, TRACHEOSTOMY;  Surgeon: Junior Alonso MD;  Location: [facility] OR;   Service: ENT;  Laterality: N/A;       Social History     Socioeconomic History    Marital status:    Tobacco Use    Smoking status: Former     Types: Cigarettes     Quit date: 2022     Years since quittin.4    Smokeless tobacco: Never   Substance and Sexual Activity    Alcohol use: Never    Drug use: Never     Social Determinants of Health     Financial Resource Strain: Low Risk     Difficulty of Paying Living Expenses: Not hard at all   Food Insecurity: No Food Insecurity    Worried About Running Out of Food in the Last Year: Never true    Ran Out of Food in the Last Year: Never true   Transportation Needs: No Transportation Needs    Lack of Transportation (Medical): No    Lack of Transportation (Non-Medical): No   Physical Activity: Inactive    Days of Exercise per Week: 0 days    Minutes of Exercise per Session: 0 min   Stress: No Stress Concern Present    Feeling of Stress : Not at all   Social Connections: Moderately Isolated    Frequency of Communication with Friends and Family: More than three times a week    Frequency of Social Gatherings with Friends and Family: More than three times a week    Attends Rastafarian Services: Never    Active Member of Clubs or Organizations: No    Attends Club or Organization Meetings: Never    Marital Status:    Housing Stability: Low Risk     Unable to Pay for Housing in the Last Year: No    Number of Places Lived in the Last Year: 1    Unstable Housing in the Last Year: No       Family History   Problem Relation Age of Onset    Lung cancer Father     Throat cancer Brother        Outpatient Encounter Medications as of 2022   Medication Sig Dispense Refill    albuterol-ipratropium (DUO-NEB) 2.5 mg-0.5 mg/3 mL nebulizer solution Take 3 mLs by nebulization every 4 (four) hours as needed for Shortness of Breath or Wheezing. Rescue 90 mL 1    amoxicillin-clavulanate 875-125mg (AUGMENTIN) 875-125 mg per tablet Take 1 tablet by mouth every 12 (twelve)  hours. for 14 days 28 tablet 0    budesonide (PULMICORT) 0.5 mg/2 mL nebulizer solution Take 2 mLs (0.5 mg total) by nebulization every 12 (twelve) hours. Controller 90 mL 1    diphenhydrAMINE (BENADRYL) 25 mg capsule 1 capsule (25 mg total) by Per G Tube route every 6 (six) hours as needed for Itching. 90 capsule 1    famotidine (PEPCID) 20 MG tablet Take 1 tablet (20 mg total) by mouth every evening. 30 tablet 11    glutamine 10 gram PwPk Take 10 g by mouth 2 (two) times a day.      HYDROcodone-acetaminophen (NORCO) 5-325 mg per tablet Take 1 tablet by mouth every 4 (four) hours as needed for Pain.      hydrocortisone 1 % cream Apply topically 2 (two) times daily as needed (rash). Apply to rash 30 g 1    ibuprofen (ADVIL,MOTRIN) 100 mg/5 mL suspension Take 30 mLs (600 mg total) by mouth every 6 (six) hours as needed for Pain (mild to moderate). (Patient not taking: Reported on 11/2/2022) 354 mL 0    ondansetron (ZOFRAN-ODT) 8 MG TbDL Take 8 mg by mouth every 8 (eight) hours as needed for Nausea. Tab 1 ODT in AM for 2 days after chemotherapy      oxyCODONE (ROXICODONE) 5 MG immediate release tablet Take 1 tablet (5 mg total) by mouth every 4 (four) hours as needed for Pain. 18 tablet 0    pantoprazole (PROTONIX) 20 MG tablet Take 1 tablet (20 mg total) by mouth once daily. 30 tablet 11    silver nitrate applicators 75-25 % applicator Apply topically 3 (three) times a week. 1 applicator 0    UNABLE TO FIND medication name: ATOMIC TONIC (LIDO/DELTA/MAALOX)  gargle and swallow 1 to 2 teaspoonsful by mouth 5 minutes before meals and every 3 hours as needed for pain with swallowing      [DISCONTINUED] aspirin (ECOTRIN) 81 MG EC tablet Take 81 mg by mouth once daily.       No facility-administered encounter medications on file as of 11/7/2022.       Physical Exam:  Vitals:    11/07/22 1126   BP: 109/65   Pulse: (!) 111   Weight: 68.5 kg (151 lb)       Constitutional  General Appearance: well nourished, well-developed, AAO  x3, NAD  HEENT  Eyes: PEERLA, EOMI, normal conjunctivae  Ears: Hearing well at conversation level  Nose: septum midline, no inferior turbinate hypertrophy, no polyps, moist mucosa without erythema or blue hue  OC/OP: dentition moderate, no oral lesions, tongue/FOM/BOT- soft, no leukoplakia/ulcerations/ tenderness   Nasopharynx, Hypopharynx, and Larynx:               Indirect: attempted, limited view due to patient intolerance  Neck: right submandibular region with soft edema  Right neck nontender, UCHE hole closing, other infection eruption site slowly healing  Laryngectomy tube in place, no purulent drainage noted, trach collar and ties on  Breathing well without issues  Neuro: CN II - XII intact bilaterally  Cardiovascular: peripheral pulses palpable  Respiratory: non-labored respirations  Psychiatric: oriented to time, place and person, no depression, anxiety or agitation    Procedures:Flexible Fiberoptic Laryngoscopy/Nasopharyngoscopy via right nare    Procedure in Detail: Informed consent was obtained from the patient after explanation of procedure, indications, risks and benefits. Flexible endoscopy was performed through the nasal passages. The nasal cavity, nasopharynx, oropharynx, hypopharynx and larynx were adequately visualized. The true vocal cords and arytenoids were examined during phonation and repose.    Anesthesia: None  Adverse Events: None  Resident Surgeon: Lisseth Krueger MD  Blood loss: none  Condition: good    Findings:  NP/OP: no masses/lesions of NC, eustachian tube, fossa of Rosenmuller, no adenoid hypertrophy  BOT/vallecula: no lingual hypertrophy, no masses/lesions  Neopharynx without evidence of discrete lesion but with mild bulkiness of the right hypopharyngeal area      Pertinent Data:  NM PET CT ROUTINE     CLINICAL HISTORY  Non-small cell lung cancer (NSCLC), metastatic, assess treatment response; Malignant neoplasm of unspecified part of right bronchus or lung; status post 2 weeks of  chemo/radiotherapy (surveillance/restaging)     TECHNIQUE  Intravenous injection of 9.8 mCi 18F-FDG was performed, with subsequent PET imaging from skull base through the upper thighs.  Corresponding non-contrast helical-acquisition CT images were obtained for anatomic correlation and attenuation correction.  Fused-modality multiplanar, PET rotational planar, and PET coronal MIP reconstructions were accomplished by a technologist at a separate workstation and submitted to PACS for physician review.     COMPARISON  1 August 2022     FINDINGS  Exam quality: adequate for evaluation        HEAD / NECK:     There is symmetric radiotracer activity through the visualized brain.     Ill-defined hypermetabolic tissue is again appreciated at the left neck, just superior and lateral to the tracheostomy insertion site (fused axial series, images 40-50).  The regional maximum SUV is 6.8, with discrete CT-correlate lesion poorly delineated secondary to non-contrast protocol; prior SUV max 8.3. An adjacent, better delineated right cervical chain lymph node is visualized posteriorly, measuring 1.1 cm short axis with SUV max of 6 (image 45); this previously measured 1.5 cm in least dimension with maximum SUV of 7.7.  No convincing new or enlarging cervical adenopathy or newly hypermetabolic lymph nodes are visualized.  The prior left neck fluid collection is no longer evident.        CHEST:     The somewhat lobulated right upper lobe hilar mass is now approximately 3.4 cm x 1.4 cm and maximum SUV 23 (series 3, image 86); previously 3.6 cm x 1.8 cm and SUV max 14.8.  No new or enlarging hypermetabolic lung lesion, and no development of organized airspace consolidation or pleural effusion.     No suspicious abnormality of the mediastinal vasculature is identified.  There is interval placement of a left chest wall subcutaneous port with catheter terminating at the mid SVC region.  The heart chambers are of normal-appearing volume.  There is no significant pericardial fluid.     Mediastinal and right hilar FDG-avid adenopathy is again appreciated.  The index right precarinal lymph node measures 1.2 cm short axis with SUV max of 16 (series 3, image 88); previously 1.6 cm and maximum SUV of 13.8.  The reference right hilar lymph node is poorly delineated from adjacent vascular structures secondary to non-contrast protocol, but is estimated to measure 1.8 cm short axis and has maximum SUV of 21 (series 3, image 92); this node previously measured 2.9 cm in least dimension with maximum SUV of 9.8.        ABDOMEN / PELVIS:     Normal physiologic distribution of activity is appreciated through the abdomen and pelvis.     No findings to suggest new or worsened focal FDG-avid process or CT-evident lesion involving the upper abdominal solid organs, and no acute findings appreciated.  The urinary bladder and reproductive structures are unremarkable for PET-CT evaluation.     Abdominopelvic vascular structures are unchanged. No evidence of new/worsening hypermetabolic lou disease.        MUSCULOSKELETAL / SUBCUTANEOUS:     No development of suspicious FDG uptake, destructive lesion, or acute process.     IMPRESSION  1. Findings suggestive of mixed disease response.  Right lower cervical chain nodes are less metabolically active.  Right hilar mass and mediastinal/right hilar lymph nodes are decreased in size, but demonstrate increased metabolic activity.  2. No findings to suggest new or worsening FDG-avid metastatic disease within the left thoracic cavity, abdomen, or pelvis.  3. Previously visualized left neck fluid collection is no longer clearly identified.     RADIATION DOSE  Automated tube current modulation, weight-based exposure dosing, and/or iterative reconstruction technique utilized to reach lowest reasonably achievable exposure rate.     DLP: 661 mGy*cm        Electronically signed by: Haja Paulson  Date:                                             10/13/2022  Time:                                           16:25    FL ESOPHAGRAM COMPLETE     CLIICAL HISTORY:  Cellulitis of neck     TECHNIQUE:  The patient was given oral contrast and multiple fluoroscopic images of the esophagus obtained in various positions. Total fluoroscopy time is 0.7 minutes with absorbed dose of 7.245 mGy.     COMPARISON:  Esophagram performed 07/15/2022     FINDINGS:  Exam performed with Gastrografin and thin barium.  Patient swallowed contrast without difficulty.  No cervical esophageal contrast extravasation identified.     Impression:     No cervical esophageal leak identified.        Electronically signed by: Lance Janusz  Date:                                            10/31/2022  Time:                                           11:10      Assessment/Plan:  Josafat Boudreaux is a 57 y.o. 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 Emory Alonso  Medical Oncologist: Yara Atkinson MD (Last seen: 10/18/22)  Radiation Oncologist: Romie Das MD (Last seen: Unknown)    Primary tumor: Endolaryngeal [id number] SCCA    Date of Diagnosis: 7/11/22  Surgery Date: 7/11/22  Induction Chemotherapy: No  Adjuvant Therapy: CRT  Completion Date: Sept 2022 for RT, still undergoing chemo    Pertinent Treatment History:  CRT    Place date if completed   3 6 12 18 24 36 48 60   CT Neck X X X X X X X X   PET/CT X          CXR  X X X X X X X   TFT X  X  X X X X     Current Surveillance Interval: <1 year post-treatment, q6 wks

## 2022-11-16 NOTE — PROGRESS NOTES
HEMATOLOGY/ONCOLOGY OFFICE CLINIC VISIT    Visit Information:    Initial Evaluation: 7/21/2022  Referring Provider:   Other providers:  Code status:    Diagnosis:  [id number] JENNYFER Laryngeal Squamous cell carcinoma     Present treatment:  Carbo/Taxol/Keytruda planned 11/30/2022    Treatment/Oncology history:  --7/11/2022: total laryngectomy  --Carbo/Taxol + RT (8/17/22-9/21/22)  --8/17/2022-9/28/2022:  6000 cGy, 200 cGy/fraction, 30/30 fx    Plan of care: Carbo/Taxol/Keytruda x 4-6 --> maintenance Keytruda    Imaging:  Ct neck 6/6/2022: A metastatic right level III cervical lymph node is present with short axis measurement of 1.6 cm.  This lymph node contains internal cystic change, typical of metastatic squamous cell carcinoma.  A left level III cervical lymph node shows short axis measurements of 1 cm, and is suspicious.    A left supraglottic mass measures approximately 2.4 cm in diameter (axial post-contrast image 39), presumably corresponding to the primary neoplasm.  Metastatic lymph nodes are also present in the inferior right paratracheal position, measuring 1.3 cm in short axis.  Metastatic lymph nodes are present in the superior right hilum, measuring 1.8 cm in short axis.  Ct H&N 6/27/2022: 1. There is mild stenosis at the origin of left proximal internal carotid artery secondary to dense calcified atheromatous plaque. 2. There is consolidation is right upper lobe. Additional ground-glass opacities are also seen on the remainder of the visualized lungs. These findings are consistent with an infectious process. Small right pleural effusion is seen. There is an ill-defined soft tissue mass in the right perihilar region which measures approximately 3.1 x 2.9 x 4.6 cm, of concern for neoplasm. Correlate clinically as regards further evaluation and followup with CT chest. There is an ill-defined enhancing soft tissue mass in the glottis infiltrating into the paraglottic spaces and the subglottic region with  obliteration of the airway, of concern for a neoplasm. 3. Unremarkable CT angiogram of the head. Details and other findings as described above.  NM PET CT 8/1/2022: Postoperative changes of recent total laryngectomy and lou dissection. bilateral hypermetabolic cervical lymphadenopathy.  A right cervical lymph node 1.7 x 1.8 cm, compared to 1.6 x 1.5 cm previously, SUV of 7.7. left cervical lymph node 8 x 10 mm and has a max SUV of 3.4.  A fluid collection posterior to the left internal jugular vein measures 2.4 x 2.4 cm. There is right hilar and mediastinal lymphadenopathy.  A precarinal lymph node 1.6 x 2.3 cm, compared to 1.3 x 1.7 cm previously, SUV of 13.8.  Right hilar lymphadenopathy measures 3 x 2.9 cm, compared to 2.1 x 2.3 cm previously, and has a max SUV of 9.8.  A suspected right hilar mass measures 1.8 x 3.6 cm SUV of 14.8   PET CT 10/13/2022: Ill-defined hypermetabolic tissue is again appreciated at the left neck, just superior and lateral to the tracheostomy insertion site. The regional maximum SUV is 6.8, with discrete CT-correlate lesion poorly delineated secondary to non-contrast protocol; prior SUV max 8.3. An adjacent, better delineated right cervical chain lymph node is visualized posteriorly, measuring 1.1 cm short axis with SUV max of 6; this previously measured 1.5 cm in least dimension with maximum SUV of 7.7.  No convincing new or enlarging cervical adenopathy or newly hypermetabolic lymph nodes are visualized.  The prior left neck fluid collection is no longer evident.The somewhat lobulated right upper lobe hilar mass is now approximately 3.4 cm x 1.4 cm and maximum SUV 23; previously 3.6 cm x 1.8 cm and SUV max 14.8.  No new or enlarging hypermetabolic lung lesion, and no development of organized airspace consolidation or pleural effusion.    Pathology:  6/14/2022:  EBUS ENDOBRONCHIAL MASS RUL, CYTOLOGY: NEARLY ACELLULAR SPECIMEN CONSISTING OF FRESH BLOOD CLOT. NO ATYPICAL OR MALIGNANT  CELLS IDENTIFIED.   7/11/2022: Bronchial Wash, RLL, right bronchial mass washings:  - Squamous cell carcinoma.    Bronchial Wash, LLL, left bronchial mass washings: - Squamous cell carcinoma.     7/11/2022 Laryngectomy:  1. Larynx, laryngeal biopsy frozen section : - Squamous cell carcinoma with necrosis.    2. Neck, Anterior, left neck level 3: - Number of lymph nodes involved by carcinoma:  1/7       - Size of metastatic deposit:  2.5 mm - Extranodal extension:  Not identified      3. Larynx, resection without nodes, total layrngectomy :  - Squamous cell carcinoma.  4. Neck, Anterior, left neck level 4: - Number of lymph nodes involved by carcinoma:  0/10  5. Neck, Anterior, right neck level 4:  - Number of lymph nodes involved by carcinoma:  0/11  6. Neck, Anterior, right neck level 3: - Number of lymph nodes involved by carcinoma:  0/9  7. Nasophaynx, inferior pharyngeal mucosa : - Benign squamous mucosa. - No evidence of malignancy.     8. Cartilage, superior cartilage margin : - No evidence of malignancy.    9. Lymph Node, pretracheal lymph  node :  - Number of lymph nodes involved by carcinoma:  0/3  [id number]       CLINICAL HISTORY:       Patient: Josafat Boudreaux is a 57 y.o. male kindly refer her for laryngeal carcinoma.  Patient  was referred to ENT for further evaluation of hoarseness.  He did not have any difficulty swallowing or dry mouth at the time.  Hoarseness has been present for at least 4 months prior to evaluation. 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     Patient was scheduled to trach but on 06/10/2022 he was brought to the emergency room via air met with is short of breath.  He was found to have and oxygenation on the 40s when EMS was called.  He was placed on BiPAP but pCO2 increased so he has an emergent tracheostomy performed.  He has a PEG tube placed same hospitalization.  He underwent bronchoscopy with biopsy of the  right upper lobe endobronchial lesion on 6/14/2022 cytology was negative for malignant cells.      On 7/11/2022 he underwent LARYNGECTOMY, WITH RADICAL NECK DISSECTION - Bilateral LARYNGOSCOPY, DIRECT, DIAGNOSTIC, WITH BRONCHOSCOPY AND ESOPHAGOSCOPY pathology report as above.  1/40 lymph nodes were involved with metastatic disease.  Bronchoscopy was repeated and biopsy of the left lower lobe and right lower lobe were both positive for squamous cell carcinoma.    He is here today with his wife.  He has recovered from surgery relatively well.  Tracheostomy in place.  He is unable to talk so the history was taken by electronic medical record and wife.    Patient has a brother with history of throat cancer. Patient used to smoke 1 and half pack per day since he was 60 years old.  He was smoking less recently.    Chief Complaint: Ready to start chemotherapy      Interval History:  Patient presents today for follow-up accompanied by his wife. He completed 6th cycle of weekly Carbo/Taxol and radiation on 9/21/22. Wound from abscess on the right side of his neck is completely healed. Lymphedema of the neck noted. He will begin massage therapy next week for swelling to neck. He is scared about resuming chemotherapy, but willing to do it. Overall, he is doing well. Weight stable. No fever, chills or sweats. No bleeding.     Past Medical History:   Diagnosis Date    Cancer     COPD (chronic obstructive pulmonary disease)     Dysphagia     Lung cancer     Vocal cord mass       Past Surgical History:   Procedure Laterality Date    DIRECT DIAGNOSTIC LARYNGOSCOPY WITH BRONCHOSCOPY AND ESOPHAGOSCOPY N/A 7/11/2022    Procedure: LARYNGOSCOPY, DIRECT, DIAGNOSTIC, WITH BRONCHOSCOPY AND ESOPHAGOSCOPY;  Surgeon: Emory Alonso MD;  Location: [facility];  Service: ENT;  Laterality: N/A;    HIP SURGERY      HUMERUS FRACTURE SURGERY      INCISION AND DRAINAGE, NECK Bilateral 10/21/2022    Procedure: INCISION AND DRAINAGE,NECK;  Surgeon:  Madison Worley MD;  Location: [facility] OR;  Service: ENT;  Laterality: Bilateral;    INSERT ARTERIAL LINE  6/26/2022         TRACHEOSTOMY N/A 6/10/2022    Procedure: CREATION, TRACHEOSTOMY;  Surgeon: Junior Alonso MD;  Location: [facility];  Service: ENT;  Laterality: N/A;     Family History   Problem Relation Age of Onset    Lung cancer Father     Throat cancer Brother      Social Connections: Moderately Isolated    Frequency of Communication with Friends and Family: More than three times a week    Frequency of Social Gatherings with Friends and Family: More than three times a week    Attends Tenriism Services: Never    Active Member of Clubs or Organizations: No    Attends Club or Organization Meetings: Never    Marital Status:        Review of patient's allergies indicates:  No Known Allergies   Current Outpatient Medications on File Prior to Visit   Medication Sig Dispense Refill    albuterol-ipratropium (DUO-NEB) 2.5 mg-0.5 mg/3 mL nebulizer solution Take 3 mLs by nebulization every 4 (four) hours as needed for Shortness of Breath or Wheezing. Rescue 90 mL 1    budesonide (PULMICORT) 0.5 mg/2 mL nebulizer solution Take 2 mLs (0.5 mg total) by nebulization every 12 (twelve) hours. Controller 90 mL 1    diphenhydrAMINE (BENADRYL) 25 mg capsule 1 capsule (25 mg total) by Per G Tube route every 6 (six) hours as needed for Itching. 90 capsule 1    famotidine (PEPCID) 20 MG tablet Take 1 tablet (20 mg total) by mouth every evening. 30 tablet 11    glutamine 10 gram PwPk Take 10 g by mouth 2 (two) times a day.      HYDROcodone-acetaminophen (NORCO) 5-325 mg per tablet Take 1 tablet by mouth every 4 (four) hours as needed for Pain.      hydrocortisone 1 % cream Apply topically 2 (two) times daily as needed (rash). Apply to rash 30 g 1    ibuprofen (ADVIL,MOTRIN) 100 mg/5 mL suspension Take 30 mLs (600 mg total) by mouth every 6 (six) hours as needed for Pain (mild to moderate). 354 mL 0    ondansetron  (ZOFRAN-ODT) 8 MG TbDL Take 8 mg by mouth every 8 (eight) hours as needed for Nausea. Tab 1 ODT in AM for 2 days after chemotherapy      oxyCODONE (ROXICODONE) 5 MG immediate release tablet Take 1 tablet (5 mg total) by mouth every 4 (four) hours as needed for Pain. 18 tablet 0    pantoprazole (PROTONIX) 20 MG tablet Take 1 tablet (20 mg total) by mouth once daily. 30 tablet 11    silver nitrate applicators 75-25 % applicator Apply topically 3 (three) times a week. 1 applicator 0    UNABLE TO FIND medication name: ATOMIC TONIC (LIDO/DELTA/MAALOX)  gargle and swallow 1 to 2 teaspoonsful by mouth 5 minutes before meals and every 3 hours as needed for pain with swallowing      [DISCONTINUED] aspirin (ECOTRIN) 81 MG EC tablet Take 81 mg by mouth once daily.       No current facility-administered medications on file prior to visit.      Review of Systems   Constitutional:  Negative for activity change, appetite change, chills, diaphoresis, fatigue, fever and unexpected weight change.   HENT:  Positive for facial swelling and trouble swallowing. Negative for nasal congestion, nosebleeds, postnasal drip, sinus pressure/congestion and sore throat.    Eyes:  Negative for visual disturbance.   Respiratory:  Negative for cough and shortness of breath.    Cardiovascular:  Negative for chest pain and palpitations.   Gastrointestinal:  Negative for abdominal distention, abdominal pain, blood in stool, change in bowel habit, constipation, diarrhea, nausea, vomiting and change in bowel habit.   Endocrine: Negative.    Genitourinary:  Negative for bladder incontinence, decreased urine volume, difficulty urinating, dysuria, frequency, hematuria, scrotal swelling, testicular pain and urgency.   Musculoskeletal:  Negative for arthralgias, back pain, gait problem, joint swelling, leg pain, myalgias and neck pain.   Integumentary:  Negative for rash.   Neurological:  Negative for dizziness, tremors, seizures, syncope, speech difficulty,  "weakness, light-headedness, numbness, headaches and memory loss.   Hematological:  Negative for adenopathy. Does not bruise/bleed easily.   Psychiatric/Behavioral:  Negative for agitation, confusion, hallucinations, sleep disturbance and suicidal ideas. The patient is not nervous/anxious.             Vitals:    11/16/22 1449   BP: 122/79   BP Location: Left arm   Patient Position: Sitting   Pulse: 104   Temp: 98.2 °F (36.8 °C)   TempSrc: Oral   SpO2: 98%   Weight: 68.9 kg (152 lb)   Height: 5' 9" (1.753 m)          Physical Exam  Vitals and nursing note reviewed.   Constitutional:       General: He is not in acute distress.     Comments: thin   HENT:      Head: Normocephalic and atraumatic.      Mouth/Throat:      Mouth: Mucous membranes are moist.   Eyes:      General: No scleral icterus.     Extraocular Movements: Extraocular movements intact.      Conjunctiva/sclera: Conjunctivae normal.   Neck:      Vascular: No JVD.      Trachea: Tracheostomy present.      Comments: Tracheotomy in place.  Right side - Skin changes c/w RT  Swelling submandibular and neck area  Cardiovascular:      Rate and Rhythm: Normal rate and regular rhythm.      Heart sounds: No murmur heard.  Pulmonary:      Effort: Pulmonary effort is normal.      Breath sounds: Normal breath sounds. No wheezing or rhonchi.   Chest:      Chest wall: No tenderness.   Abdominal:      General: The ostomy site is clean. Bowel sounds are normal. There is no distension.      Palpations: Abdomen is soft.      Tenderness: There is no abdominal tenderness.   Musculoskeletal:         General: No swelling or deformity.      Cervical back: Neck supple. Edema present.   Lymphadenopathy:      Upper Body:      Right upper body: No supraclavicular or axillary adenopathy.      Left upper body: No supraclavicular or axillary adenopathy.      Lower Body: No right inguinal adenopathy. No left inguinal adenopathy.   Skin:     General: Skin is warm.      Coloration: Skin is " not jaundiced.      Findings: No rash.   Neurological:      General: No focal deficit present.      Mental Status: He is alert and oriented to person, place, and time.   Psychiatric:         Attention and Perception: Attention normal.         Mood and Affect: Mood and affect normal. Mood is not anxious.         Behavior: Behavior is cooperative.         Cognition and Memory: Cognition normal.         Judgment: Judgment normal.     ECOG SCORE    1 - Restricted in strenuous activity-ambulatory and able to carry out work of a light nature         Laboratory:  CBC with Differential:  Lab Results   Component Value Date    WBC 7.2 11/16/2022    RBC 4.64 (L) 11/16/2022    HGB 15.2 11/16/2022    HCT 48.1 11/16/2022    .7 (H) 11/16/2022    MCH 32.8 (H) 11/16/2022    MCHC 31.6 (L) 11/16/2022    RDW 14.7 11/16/2022     11/16/2022    MPV 10.3 11/16/2022        CMP:  Sodium Level   Date Value Ref Range Status   11/02/2022 138 136 - 145 mmol/L Final     Potassium Level   Date Value Ref Range Status   11/02/2022 4.4 3.5 - 5.1 mmol/L Final     Carbon Dioxide   Date Value Ref Range Status   11/02/2022 25 22 - 29 mmol/L Final     Blood Urea Nitrogen   Date Value Ref Range Status   11/02/2022 7.6 (L) 8.4 - 25.7 mg/dL Final     Creatinine   Date Value Ref Range Status   11/02/2022 0.68 (L) 0.73 - 1.18 mg/dL Final     Calcium Level Total   Date Value Ref Range Status   11/02/2022 10.0 8.4 - 10.2 mg/dL Final     Albumin Level   Date Value Ref Range Status   11/02/2022 3.6 3.5 - 5.0 gm/dL Final     Bilirubin Total   Date Value Ref Range Status   11/02/2022 0.5 <=1.5 mg/dL Final     Alkaline Phosphatase   Date Value Ref Range Status   11/02/2022 86 40 - 150 unit/L Final     Aspartate Aminotransferase   Date Value Ref Range Status   11/02/2022 23 5 - 34 unit/L Final     Alanine Aminotransferase   Date Value Ref Range Status   11/02/2022 10 0 - 55 unit/L Final     Estimated GFR-Non    Date Value Ref Range Status    08/02/2022 >60 mls/min/1.73/m2 Final           Assessment:       Laryngeal Squamous cell carcinoma -s/p total laryngectomy, 1/40 LN with metastatic disease.   Lung masses x 2, bilateral--> Squamous cell carcinoma    I discussed with the patient and his wife, diagnosis, prognosis and treatment recommendations.  Discussed briefly chemotherapy, discussed risk versus benefits as well as toxicities associated with it.   Patient with long history of smoking.  I discussed with them that head and neck cancer lung cancer are very common in smokers.  I explained to them that lung cancer could or could not be associated with his head and neck cancer and lung cancer could be a 2nd primary.  Either way he has it in both lungs therefore a stage IV.  As per patient report, I do not have the official report, he has 1 mass in each of the lung bases and nothing other placed.   He will see Dr. Das Monday.  I would like to discuss case with him after review his scans to see what will be best treatment strategy.      He most likely need RT to his neck and he will benefit of concommittant chemotherapy.  If he only have one lesion in each lung and not too big, maybe RT to each lesions may be an alternative as well +/- chemotherapy during and after RT.       Plan.:       Completed weekly carbo/Taxol and radiation on 9/21/22 for his laryngeal carcinoma. Lung disease progressing.   Right neck cellulitis s/p I&D is completely healed    Will plan to start carbo/Taxol q3  +/- immunotherapy in 2 weeks.   RTC in 2 weeks with NP for TD with labs  Labs: CBC, CMP  Weekly labs  Okay to proceed with massage therapy for neck lymphedema  Encourage to call or message us for any questions or problems  The patient was given ample opportunity to ask questions, and to the best of my abilities, all questions answered to satisfaction; patient demonstrated understanding of what we discussed and agreeable to the plan.     LENKA NELSON,  MD      Professional Services   I, Bessie Horowitz LPN, acted solely as a scribe for and in the presence of Dr. Yara Atkinson, who performed these services.

## 2022-11-21 PROBLEM — Z92.3 H/O HEAD AND NECK RADIATION: Status: ACTIVE | Noted: 2022-01-01

## 2022-11-22 NOTE — PROGRESS NOTES
I reviewed the history and physical exam with the resident.   I agree with findings and plan.    Ryan Barrett M.D.

## 2022-11-29 NOTE — PROGRESS NOTES
HEMATOLOGY/ONCOLOGY OFFICE CLINIC VISIT    Visit Information:    Initial Evaluation: 7/21/2022  Referring Provider:   Other providers:  Code status:    Diagnosis:  [id number] JENNYFER Laryngeal Squamous cell carcinoma     Present treatment:  Carbo/Taxol/Keytruda planned 11/30/2022    Treatment/Oncology history:  --7/11/2022: total laryngectomy  --Carbo/Taxol + RT (8/17/22-9/21/22)  --8/17/2022-9/28/2022:  6000 cGy, 200 cGy/fraction, 30/30 fx    Plan of care: Carbo/Taxol/Keytruda x 4-6 --> maintenance Keytruda    Imaging:  Ct neck 6/6/2022: A metastatic right level III cervical lymph node is present with short axis measurement of 1.6 cm.  This lymph node contains internal cystic change, typical of metastatic squamous cell carcinoma.  A left level III cervical lymph node shows short axis measurements of 1 cm, and is suspicious.    A left supraglottic mass measures approximately 2.4 cm in diameter (axial post-contrast image 39), presumably corresponding to the primary neoplasm.  Metastatic lymph nodes are also present in the inferior right paratracheal position, measuring 1.3 cm in short axis.  Metastatic lymph nodes are present in the superior right hilum, measuring 1.8 cm in short axis.  Ct H&N 6/27/2022: 1. There is mild stenosis at the origin of left proximal internal carotid artery secondary to dense calcified atheromatous plaque. 2. There is consolidation is right upper lobe. Additional ground-glass opacities are also seen on the remainder of the visualized lungs. These findings are consistent with an infectious process. Small right pleural effusion is seen. There is an ill-defined soft tissue mass in the right perihilar region which measures approximately 3.1 x 2.9 x 4.6 cm, of concern for neoplasm. Correlate clinically as regards further evaluation and followup with CT chest. There is an ill-defined enhancing soft tissue mass in the glottis infiltrating into the paraglottic spaces and the subglottic region with  obliteration of the airway, of concern for a neoplasm. 3. Unremarkable CT angiogram of the head. Details and other findings as described above.  NM PET CT 8/1/2022: Postoperative changes of recent total laryngectomy and lou dissection. bilateral hypermetabolic cervical lymphadenopathy.  A right cervical lymph node 1.7 x 1.8 cm, compared to 1.6 x 1.5 cm previously, SUV of 7.7. left cervical lymph node 8 x 10 mm and has a max SUV of 3.4.  A fluid collection posterior to the left internal jugular vein measures 2.4 x 2.4 cm. There is right hilar and mediastinal lymphadenopathy.  A precarinal lymph node 1.6 x 2.3 cm, compared to 1.3 x 1.7 cm previously, SUV of 13.8.  Right hilar lymphadenopathy measures 3 x 2.9 cm, compared to 2.1 x 2.3 cm previously, and has a max SUV of 9.8.  A suspected right hilar mass measures 1.8 x 3.6 cm SUV of 14.8   PET CT 10/13/2022: Ill-defined hypermetabolic tissue is again appreciated at the left neck, just superior and lateral to the tracheostomy insertion site. The regional maximum SUV is 6.8, with discrete CT-correlate lesion poorly delineated secondary to non-contrast protocol; prior SUV max 8.3. An adjacent, better delineated right cervical chain lymph node is visualized posteriorly, measuring 1.1 cm short axis with SUV max of 6; this previously measured 1.5 cm in least dimension with maximum SUV of 7.7.  No convincing new or enlarging cervical adenopathy or newly hypermetabolic lymph nodes are visualized.  The prior left neck fluid collection is no longer evident.The somewhat lobulated right upper lobe hilar mass is now approximately 3.4 cm x 1.4 cm and maximum SUV 23; previously 3.6 cm x 1.8 cm and SUV max 14.8.  No new or enlarging hypermetabolic lung lesion, and no development of organized airspace consolidation or pleural effusion.    Pathology:  6/14/2022:  EBUS ENDOBRONCHIAL MASS RUL, CYTOLOGY: NEARLY ACELLULAR SPECIMEN CONSISTING OF FRESH BLOOD CLOT. NO ATYPICAL OR MALIGNANT  CELLS IDENTIFIED.   7/11/2022: Bronchial Wash, RLL, right bronchial mass washings:  - Squamous cell carcinoma.    Bronchial Wash, LLL, left bronchial mass washings: - Squamous cell carcinoma.     7/11/2022 Laryngectomy:  1. Larynx, laryngeal biopsy frozen section : - Squamous cell carcinoma with necrosis.    2. Neck, Anterior, left neck level 3: - Number of lymph nodes involved by carcinoma:  1/7       - Size of metastatic deposit:  2.5 mm - Extranodal extension:  Not identified      3. Larynx, resection without nodes, total layrngectomy :  - Squamous cell carcinoma.  4. Neck, Anterior, left neck level 4: - Number of lymph nodes involved by carcinoma:  0/10  5. Neck, Anterior, right neck level 4:  - Number of lymph nodes involved by carcinoma:  0/11  6. Neck, Anterior, right neck level 3: - Number of lymph nodes involved by carcinoma:  0/9  7. Nasophaynx, inferior pharyngeal mucosa : - Benign squamous mucosa. - No evidence of malignancy.     8. Cartilage, superior cartilage margin : - No evidence of malignancy.    9. Lymph Node, pretracheal lymph  node :  - Number of lymph nodes involved by carcinoma:  0/3  [id number]       CLINICAL HISTORY:       Patient: Josafat Boudreaux is a 57 y.o. male kindly refer her for laryngeal carcinoma.  Patient  was referred to ENT for further evaluation of hoarseness.  He did not have any difficulty swallowing or dry mouth at the time.  Hoarseness has been present for at least 4 months prior to evaluation. 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     Patient was scheduled to trach but on 06/10/2022 he was brought to the emergency room via air met with is short of breath.  He was found to have and oxygenation on the 40s when EMS was called.  He was placed on BiPAP but pCO2 increased so he has an emergent tracheostomy performed.  He has a PEG tube placed same hospitalization.  He underwent bronchoscopy with biopsy of the  right upper lobe endobronchial lesion on 6/14/2022 cytology was negative for malignant cells.      On 7/11/2022 he underwent LARYNGECTOMY, WITH RADICAL NECK DISSECTION - Bilateral LARYNGOSCOPY, DIRECT, DIAGNOSTIC, WITH BRONCHOSCOPY AND ESOPHAGOSCOPY pathology report as above.  1/40 lymph nodes were involved with metastatic disease.  Bronchoscopy was repeated and biopsy of the left lower lobe and right lower lobe were both positive for squamous cell carcinoma.    He is here today with his wife.  He has recovered from surgery relatively well.  Tracheostomy in place.  He is unable to talk so the history was taken by electronic medical record and wife.    Patient has a brother with history of throat cancer. Patient used to smoke 1 and half pack per day since he was 60 years old.  He was smoking less recently.    Chief Complaint: Ready to start chemotherapy      Interval History:  Patient presents today for follow-up accompanied by his wife. He completed 6th cycle of weekly Carbo/Taxol and radiation on 9/21/22. Wound from abscess on the right side of his neck covered with bandage. Lymphedema of the neck noted. He began massage therapy next week for swelling to neck. Overall, he is doing well. Weight stable. No fever, chills or sweats. No bleeding.     Past Medical History:   Diagnosis Date    Cancer     COPD (chronic obstructive pulmonary disease)     Dysphagia     Lung cancer     Vocal cord mass       Past Surgical History:   Procedure Laterality Date    DIRECT DIAGNOSTIC LARYNGOSCOPY WITH BRONCHOSCOPY AND ESOPHAGOSCOPY N/A 7/11/2022    Procedure: LARYNGOSCOPY, DIRECT, DIAGNOSTIC, WITH BRONCHOSCOPY AND ESOPHAGOSCOPY;  Surgeon: Emory Alonso MD;  Location: [facility];  Service: ENT;  Laterality: N/A;    HIP SURGERY      HUMERUS FRACTURE SURGERY      INCISION AND DRAINAGE, NECK Bilateral 10/21/2022    Procedure: INCISION AND DRAINAGE,NECK;  Surgeon: Madison Worley MD;  Location: [facility];  Service: ENT;   Laterality: Bilateral;    INSERT ARTERIAL LINE  6/26/2022         TRACHEOSTOMY N/A 6/10/2022    Procedure: CREATION, TRACHEOSTOMY;  Surgeon: Junior Alonso MD;  Location: [facility];  Service: ENT;  Laterality: N/A;     Family History   Problem Relation Age of Onset    Lung cancer Father     Throat cancer Brother      Social Connections: Moderately Isolated    Frequency of Communication with Friends and Family: More than three times a week    Frequency of Social Gatherings with Friends and Family: More than three times a week    Attends Mosque Services: Never    Active Member of Clubs or Organizations: No    Attends Club or Organization Meetings: Never    Marital Status:        Review of patient's allergies indicates:  No Known Allergies   Current Outpatient Medications on File Prior to Visit   Medication Sig Dispense Refill    albuterol-ipratropium (DUO-NEB) 2.5 mg-0.5 mg/3 mL nebulizer solution Take 3 mLs by nebulization every 4 (four) hours as needed for Shortness of Breath or Wheezing. Rescue 90 mL 1    budesonide (PULMICORT) 0.5 mg/2 mL nebulizer solution Take 2 mLs (0.5 mg total) by nebulization every 12 (twelve) hours. Controller 90 mL 1    diphenhydrAMINE (BENADRYL) 25 mg capsule 1 capsule (25 mg total) by Per G Tube route every 6 (six) hours as needed for Itching. 90 capsule 1    famotidine (PEPCID) 20 MG tablet Take 1 tablet (20 mg total) by mouth every evening. 30 tablet 11    glutamine 10 gram PwPk Take 10 g by mouth 2 (two) times a day.      HYDROcodone-acetaminophen (NORCO) 5-325 mg per tablet Take 1 tablet by mouth every 4 (four) hours as needed for Pain.      hydrocortisone 1 % cream Apply topically 2 (two) times daily as needed (rash). Apply to rash 30 g 1    ibuprofen (ADVIL,MOTRIN) 100 mg/5 mL suspension Take 30 mLs (600 mg total) by mouth every 6 (six) hours as needed for Pain (mild to moderate). 354 mL 0    ondansetron (ZOFRAN-ODT) 8 MG TbDL Take 8 mg by mouth every 8 (eight) hours as  needed for Nausea. Tab 1 ODT in AM for 2 days after chemotherapy      oxyCODONE (ROXICODONE) 5 MG immediate release tablet Take 1 tablet (5 mg total) by mouth every 4 (four) hours as needed for Pain. 18 tablet 0    pantoprazole (PROTONIX) 20 MG tablet Take 1 tablet (20 mg total) by mouth once daily. 30 tablet 11    silver nitrate applicators 75-25 % applicator Apply topically 3 (three) times a week. 1 applicator 0    UNABLE TO FIND medication name: ATOMIC TONIC (LIDO/DELTA/MAALOX)  gargle and swallow 1 to 2 teaspoonsful by mouth 5 minutes before meals and every 3 hours as needed for pain with swallowing      [DISCONTINUED] aspirin (ECOTRIN) 81 MG EC tablet Take 81 mg by mouth once daily.       No current facility-administered medications on file prior to visit.      Review of Systems   Constitutional:  Negative for activity change, appetite change, chills, diaphoresis, fatigue, fever and unexpected weight change.   HENT:  Positive for facial swelling and trouble swallowing. Negative for nasal congestion, nosebleeds, postnasal drip, sinus pressure/congestion and sore throat.         Left neck and facial swelling   Eyes:  Negative for visual disturbance.   Respiratory:  Negative for cough and shortness of breath.    Cardiovascular:  Negative for chest pain and palpitations.   Gastrointestinal:  Negative for abdominal distention, abdominal pain, blood in stool, change in bowel habit, constipation, diarrhea, nausea, vomiting and change in bowel habit.   Endocrine: Negative.    Genitourinary:  Negative for bladder incontinence, decreased urine volume, difficulty urinating, dysuria, frequency, hematuria, scrotal swelling, testicular pain and urgency.   Musculoskeletal:  Negative for arthralgias, back pain, gait problem, joint swelling, leg pain, myalgias and neck pain.   Integumentary:  Negative for rash.   Neurological:  Negative for dizziness, tremors, seizures, syncope, speech difficulty, weakness, light-headedness,  "numbness, headaches and memory loss.   Hematological:  Negative for adenopathy. Does not bruise/bleed easily.   Psychiatric/Behavioral:  Negative for agitation, confusion, hallucinations, sleep disturbance and suicidal ideas. The patient is not nervous/anxious.             Vitals:    11/29/22 1429   BP: 116/71   BP Location: Left arm   Patient Position: Sitting   Pulse: (!) 114   Temp: 98.8 °F (37.1 °C)   TempSrc: Oral   SpO2: 95%   Weight: 70.3 kg (155 lb)   Height: 5' 9" (1.753 m)          Physical Exam  Vitals and nursing note reviewed.   Constitutional:       General: He is not in acute distress.     Comments: thin   HENT:      Head: Normocephalic and atraumatic.      Mouth/Throat:      Mouth: Mucous membranes are moist.   Eyes:      General: No scleral icterus.     Extraocular Movements: Extraocular movements intact.      Conjunctiva/sclera: Conjunctivae normal.   Neck:      Vascular: No JVD.      Trachea: Tracheostomy present.      Comments: Tracheotomy in place.  Right side - Skin changes c/w RT  Swelling submandibular and neck area  Cardiovascular:      Rate and Rhythm: Normal rate and regular rhythm.      Heart sounds: No murmur heard.  Pulmonary:      Effort: Pulmonary effort is normal.      Breath sounds: Normal breath sounds. No wheezing or rhonchi.   Chest:      Chest wall: No tenderness.   Abdominal:      General: The ostomy site is clean. Bowel sounds are normal. There is no distension.      Palpations: Abdomen is soft.      Tenderness: There is no abdominal tenderness.   Musculoskeletal:         General: No swelling or deformity.      Cervical back: Neck supple. Edema present.   Lymphadenopathy:      Upper Body:      Right upper body: No supraclavicular or axillary adenopathy.      Left upper body: No supraclavicular or axillary adenopathy.      Lower Body: No right inguinal adenopathy. No left inguinal adenopathy.   Skin:     General: Skin is warm.      Coloration: Skin is not jaundiced.      " Findings: No rash.   Neurological:      General: No focal deficit present.      Mental Status: He is alert and oriented to person, place, and time.   Psychiatric:         Attention and Perception: Attention normal.         Mood and Affect: Mood and affect normal. Mood is not anxious.         Behavior: Behavior is cooperative.         Cognition and Memory: Cognition normal.         Judgment: Judgment normal.     ECOG SCORE    1 - Restricted in strenuous activity-ambulatory and able to carry out work of a light nature         Laboratory:  CBC with Differential:  Lab Results   Component Value Date    WBC 10.7 11/29/2022    RBC 4.40 (L) 11/29/2022    HGB 14.4 11/29/2022    HCT 44.9 11/29/2022    .0 (H) 11/29/2022    MCH 32.7 (H) 11/29/2022    MCHC 32.1 (L) 11/29/2022    RDW 13.3 11/29/2022     11/29/2022    MPV 8.9 11/29/2022        CMP:  Sodium Level   Date Value Ref Range Status   11/16/2022 137 136 - 145 mmol/L Final     Potassium Level   Date Value Ref Range Status   11/16/2022 5.1 3.5 - 5.1 mmol/L Final     Carbon Dioxide   Date Value Ref Range Status   11/16/2022 25 22 - 29 mmol/L Final     Blood Urea Nitrogen   Date Value Ref Range Status   11/16/2022 6.0 (L) 8.4 - 25.7 mg/dL Final     Creatinine   Date Value Ref Range Status   11/16/2022 0.78 0.73 - 1.18 mg/dL Final     Calcium Level Total   Date Value Ref Range Status   11/16/2022 9.6 8.4 - 10.2 mg/dL Final     Albumin Level   Date Value Ref Range Status   11/16/2022 3.6 3.5 - 5.0 gm/dL Final     Bilirubin Total   Date Value Ref Range Status   11/16/2022 0.4 <=1.5 mg/dL Final     Alkaline Phosphatase   Date Value Ref Range Status   11/16/2022 104 40 - 150 unit/L Final     Aspartate Aminotransferase   Date Value Ref Range Status   11/16/2022 37 (H) 5 - 34 unit/L Final     Alanine Aminotransferase   Date Value Ref Range Status   11/16/2022 12 0 - 55 unit/L Final     Estimated GFR-Non    Date Value Ref Range Status   08/02/2022 >60  mls/min/1.73/m2 Final           Assessment:       Laryngeal Squamous cell carcinoma -s/p total laryngectomy, 1/40 LN with metastatic disease.   Lung masses x 2, bilateral--> Squamous cell carcinoma        Plan.:       Completed weekly carbo/Taxol and radiation on 9/21/22 for his laryngeal carcinoma. Lung disease progressing.   Right neck cellulitis s/p I&D is completely healed    Will start carbo/Taxol/Keytruda q3    RTC in 3 weeks with TD with labs  Weekly labs: CBC, CMP    Okay to proceed with massage therapy for neck lymphedema  Encourage to call or message us for any questions or problems  The patient was given ample opportunity to ask questions, and to the best of my abilities, all questions answered to satisfaction; patient demonstrated understanding of what we discussed and agreeable to the plan.     OZZY Rod

## 2022-12-06 NOTE — PROGRESS NOTES
[facility]  Otolaryngology Clinic Note    Josafat Boudreaux  Encounter Date: 12/6/2022  YOB: 1965  Physician: Lisseth Krueger MD    Chief Complaint: Laryngeal mass    HPI: Josafat Boudreaux is a 57 y.o. male referred to clinic by Dr. Emory Alonos for laryngeal mass. Patients wife provides history. She states that patient first noticed hoarseness in November 2021. Got progressively worse. Presented to Dr. Alonso in early June 2022 for these complaints. Was noted to have a laryngeal mass on flexible laryngoscopy. Several days after the flexible laryngoscopy patient developed worsening swelling of the airway and presented to the ER in respiratory distress. He underwent emergent tracheostomy with Dr. Junior Alonso. Patient was subsequently discharged home but readmitted shortly after with seizures. He spent 3 days in the ICU on the ventilator. During hospitalization, patient underwent PEG tube placement. He reports that he was not having any issues with PO intake prior to the tracheostomy placement. Now he is PEG dependent. Only taking some ice chips by mouth. He presents today to discuss laryngectomy.     11/2/22:  Since last visit, patient total laryngectomy with bilateral neck dissections level 3 and 4, primary closure, he did not undergo TEP placement.  He was also found to have synchronous primary or lung metastasis in the lung that was also squamous cell carcinoma.  He underwent radiation completed this in the middle of September 2022.  He received carboplatin and Taxol which he still has a few more treatments of.  He presented to the emergency department on 10/08/2022 with a right neck infection.  This was treated with IV antibiotics and incision and drainage with UCHE drain placement.  He was kept NPO and did feeds through his PEG tube.  He met criteria for discharge on 10/25/22 with UCHE drain.  He had outpatient swallow study which did not demonstrate any  evidence of a leak.  He returns to clinic today reporting improvement in his symptoms including right neck pain and drainage.  He has not had to empty his UCHE drain on a daily basis at this point.  He had a PET performed on 10/13/2022 to assess response to chemotherapy.  On that PET scan, right hilar and mediastinal lymph nodes were decreased in size but had increased metabolic activity.  He denies any other issues today    11/7/22: Reports worsening swelling under the right jawline the past few days.  Denies any erythema, infection, fever or chills.    12/6/22:  Feeling a little run down, increased cough with blood tinged secretions, running temperature to 101 at home.  Received azithromycin abx last night from Dr. Alonso's office.    ROS:   General: Negative except per HPI  Skin: Denies rash, ulcer, or lesion.  Eyes: Denies vision changes or diplopia.  Ears: Negative except per HPI  Nose: Negative except per HPI  Throat/mouth: Negative except per HPI  Cardiovascular: Negative except per HPI  Respiratory: Negative except per HPI  Neck: Negative except per HPI  Endocrine: Negative except per HPI  Neurologic: Negative except per HPI    Other 10-point review of systems negative except per HPI      Review of patient's allergies indicates:  No Known Allergies    Past Medical History:   Diagnosis Date    Cancer     COPD (chronic obstructive pulmonary disease)     Dysphagia     Lung cancer     Vocal cord mass        Past Surgical History:   Procedure Laterality Date    DIRECT DIAGNOSTIC LARYNGOSCOPY WITH BRONCHOSCOPY AND ESOPHAGOSCOPY N/A 07/11/2022    Procedure: LARYNGOSCOPY, DIRECT, DIAGNOSTIC, WITH BRONCHOSCOPY AND ESOPHAGOSCOPY;  Surgeon: Emory Alonso MD;  Location: [facility];  Service: ENT;  Laterality: N/A;    HIP SURGERY      HUMERUS FRACTURE SURGERY      INCISION AND DRAINAGE, NECK Bilateral 10/21/2022    Procedure: INCISION AND DRAINAGE,NECK;  Surgeon: Madison Worley MD;  Location: [facility];  Service:  ENT;  Laterality: Bilateral;    INSERT ARTERIAL LINE  2022         JOINT REPLACEMENT  2019    R hip    TRACHEOSTOMY N/A 06/10/2022    Procedure: CREATION, TRACHEOSTOMY;  Surgeon: Junior Alonso MD;  Location: [facility];  Service: ENT;  Laterality: N/A;       Social History     Socioeconomic History    Marital status:    Tobacco Use    Smoking status: Former     Packs/day: 0.50     Years: 15.00     Pack years: 7.50     Types: Cigarettes     Start date: 1980     Quit date: 2022     Years since quittin.4    Smokeless tobacco: Never   Substance and Sexual Activity    Alcohol use: Not Currently    Drug use: Never    Sexual activity: Not Currently     Partners: Female     Social Determinants of Health     Financial Resource Strain: Low Risk     Difficulty of Paying Living Expenses: Not hard at all   Food Insecurity: No Food Insecurity    Worried About Running Out of Food in the Last Year: Never true    Ran Out of Food in the Last Year: Never true   Transportation Needs: No Transportation Needs    Lack of Transportation (Medical): No    Lack of Transportation (Non-Medical): No   Physical Activity: Inactive    Days of Exercise per Week: 0 days    Minutes of Exercise per Session: 0 min   Stress: No Stress Concern Present    Feeling of Stress : Not at all   Social Connections: Moderately Isolated    Frequency of Communication with Friends and Family: More than three times a week    Frequency of Social Gatherings with Friends and Family: More than three times a week    Attends Oriental orthodox Services: Never    Active Member of Clubs or Organizations: No    Attends Club or Organization Meetings: Never    Marital Status:    Housing Stability: Low Risk     Unable to Pay for Housing in the Last Year: No    Number of Places Lived in the Last Year: 1    Unstable Housing in the Last Year: No       Family History   Problem Relation Age of Onset    Lung cancer Father     Cancer Father         Lung cancer     Throat cancer Brother     Cancer Brother         Throat       Outpatient Encounter Medications as of 12/6/2022   Medication Sig Dispense Refill    albuterol-ipratropium (DUO-NEB) 2.5 mg-0.5 mg/3 mL nebulizer solution Take 3 mLs by nebulization every 4 (four) hours as needed for Shortness of Breath or Wheezing. Rescue 90 mL 1    diphenhydrAMINE (BENADRYL) 25 mg capsule 1 capsule (25 mg total) by Per G Tube route every 6 (six) hours as needed for Itching. 90 capsule 1    famotidine (PEPCID) 20 MG tablet Take 1 tablet (20 mg total) by mouth every evening. 30 tablet 11    oxyCODONE (ROXICODONE) 5 MG immediate release tablet Take 1 tablet (5 mg total) by mouth every 4 (four) hours as needed for Pain. 18 tablet 0    pantoprazole (PROTONIX) 20 MG tablet Take 1 tablet (20 mg total) by mouth once daily. 30 tablet 11    budesonide (PULMICORT) 0.5 mg/2 mL nebulizer solution Take 2 mLs (0.5 mg total) by nebulization every 12 (twelve) hours. Controller (Patient not taking: Reported on 12/6/2022) 90 mL 1    glutamine 10 gram PwPk Take 10 g by mouth 2 (two) times a day.      HYDROcodone-acetaminophen (NORCO) 5-325 mg per tablet Take 1 tablet by mouth every 4 (four) hours as needed for Pain.      hydrocortisone 1 % cream Apply topically 2 (two) times daily as needed (rash). Apply to rash 30 g 1    ibuprofen (ADVIL,MOTRIN) 100 mg/5 mL suspension Take 30 mLs (600 mg total) by mouth every 6 (six) hours as needed for Pain (mild to moderate). (Patient not taking: Reported on 12/6/2022) 354 mL 0    ondansetron (ZOFRAN-ODT) 8 MG TbDL Take 8 mg by mouth every 8 (eight) hours as needed for Nausea. Tab 1 ODT in AM for 2 days after chemotherapy      ondansetron (ZOFRAN-ODT) 8 MG TbDL Take 1 tablet (8 mg total) by mouth every 8 (eight) hours as needed (nausea/vomiting). (Patient not taking: Reported on 12/6/2022) 60 tablet 5    silver nitrate applicators 75-25 % applicator Apply topically 3 (three) times a week. (Patient not taking: Reported  on 12/6/2022) 1 applicator 0    UNABLE TO FIND medication name: ATOMIC TONIC (LIDO/DELTA/MAALOX)  gargle and swallow 1 to 2 teaspoonsful by mouth 5 minutes before meals and every 3 hours as needed for pain with swallowing      [DISCONTINUED] aspirin (ECOTRIN) 81 MG EC tablet Take 81 mg by mouth once daily.       Facility-Administered Encounter Medications as of 12/6/2022   Medication Dose Route Frequency Provider Last Rate Last Admin    LIDOcaine (PF) 40 mg/mL (4 %) injection 2 mL  2 mL Tracheal Tube 1 time in Clinic/HOD Kevin Palomino MD        phenylephrine HCL 1% nasal spray 1 spray  1 spray Each Nostril 1 time in Clinic/HOD Kevin Palomino MD           Physical Exam:  Vitals:    12/06/22 1014 12/06/22 1016   BP: 101/68 97/64   Pulse: (!) 128 (!) 125   Temp: 99.1 °F (37.3 °C)    Weight: 68.2 kg (150 lb 6.4 oz)        Constitutional  General Appearance: well nourished, well-developed, AAO x3, NAD  HEENT  Eyes: PEERLA, EOMI, normal conjunctivae  Ears: Hearing well at conversation level  Nose: septum midline, no inferior turbinate hypertrophy, no polyps, moist mucosa without erythema or blue hue  OC/OP: dentition moderate, no oral lesions, tongue/FOM/BOT- soft, no leukoplakia/ulcerations/ tenderness   Nasopharynx, Hypopharynx, and Larynx:               Indirect: attempted, limited view due to patient intolerance  Neck: right submandibular region with soft edema  Right neck nontender, UCHE hole closing, other infection eruption site slowly healing, pinpoint hole  Laryngectomy tube in place, no purulent drainage noted, trach collar and ties on; lateral tracheal walls with ulceration, two spots on right, one on left  Breathing well without issues  Neuro: CN II - XII intact bilaterally  Cardiovascular: peripheral pulses palpable  Respiratory: non-labored respirations  Psychiatric: oriented to time, place and person, no depression, anxiety or agitation  12/6/22        Procedures:Flexible Fiberoptic  Laryngoscopy/Nasopharyngoscopy via right nare    Procedure in Detail: Informed consent was obtained from the patient after explanation of procedure, indications, risks and benefits. Flexible endoscopy was performed through the nasal passages. The nasal cavity, nasopharynx, oropharynx, hypopharynx and larynx were adequately visualized. The true vocal cords and arytenoids were examined during phonation and repose.    Anesthesia: None  Adverse Events: None  Resident Surgeon: Lisseth Krueger MD  Blood loss: none  Condition: good    Findings:  NP/OP: no masses/lesions of NC, eustachian tube, fossa of Rosenmuller, no adenoid hypertrophy  BOT/vallecula: no lingual hypertrophy, no masses/lesions  Neopharynx without evidence of discrete lesion but with mild bulkiness of the right hypopharyngeal area      Pertinent Data:  NM PET CT ROUTINE     CLINICAL HISTORY  Non-small cell lung cancer (NSCLC), metastatic, assess treatment response; Malignant neoplasm of unspecified part of right bronchus or lung; status post 2 weeks of chemo/radiotherapy (surveillance/restaging)     TECHNIQUE  Intravenous injection of 9.8 mCi 18F-FDG was performed, with subsequent PET imaging from skull base through the upper thighs.  Corresponding non-contrast helical-acquisition CT images were obtained for anatomic correlation and attenuation correction.  Fused-modality multiplanar, PET rotational planar, and PET coronal MIP reconstructions were accomplished by a technologist at a separate workstation and submitted to PACS for physician review.     COMPARISON  1 August 2022     FINDINGS  Exam quality: adequate for evaluation        HEAD / NECK:     There is symmetric radiotracer activity through the visualized brain.     Ill-defined hypermetabolic tissue is again appreciated at the left neck, just superior and lateral to the tracheostomy insertion site (fused axial series, images 40-50).  The regional maximum SUV is 6.8, with discrete CT-correlate lesion  poorly delineated secondary to non-contrast protocol; prior SUV max 8.3. An adjacent, better delineated right cervical chain lymph node is visualized posteriorly, measuring 1.1 cm short axis with SUV max of 6 (image 45); this previously measured 1.5 cm in least dimension with maximum SUV of 7.7.  No convincing new or enlarging cervical adenopathy or newly hypermetabolic lymph nodes are visualized.  The prior left neck fluid collection is no longer evident.        CHEST:     The somewhat lobulated right upper lobe hilar mass is now approximately 3.4 cm x 1.4 cm and maximum SUV 23 (series 3, image 86); previously 3.6 cm x 1.8 cm and SUV max 14.8.  No new or enlarging hypermetabolic lung lesion, and no development of organized airspace consolidation or pleural effusion.     No suspicious abnormality of the mediastinal vasculature is identified.  There is interval placement of a left chest wall subcutaneous port with catheter terminating at the mid SVC region.  The heart chambers are of normal-appearing volume. There is no significant pericardial fluid.     Mediastinal and right hilar FDG-avid adenopathy is again appreciated.  The index right precarinal lymph node measures 1.2 cm short axis with SUV max of 16 (series 3, image 88); previously 1.6 cm and maximum SUV of 13.8.  The reference right hilar lymph node is poorly delineated from adjacent vascular structures secondary to non-contrast protocol, but is estimated to measure 1.8 cm short axis and has maximum SUV of 21 (series 3, image 92); this node previously measured 2.9 cm in least dimension with maximum SUV of 9.8.        ABDOMEN / PELVIS:     Normal physiologic distribution of activity is appreciated through the abdomen and pelvis.     No findings to suggest new or worsened focal FDG-avid process or CT-evident lesion involving the upper abdominal solid organs, and no acute findings appreciated.  The urinary bladder and reproductive structures are unremarkable for  PET-CT evaluation.     Abdominopelvic vascular structures are unchanged. No evidence of new/worsening hypermetabolic lou disease.        MUSCULOSKELETAL / SUBCUTANEOUS:     No development of suspicious FDG uptake, destructive lesion, or acute process.     IMPRESSION  1. Findings suggestive of mixed disease response.  Right lower cervical chain nodes are less metabolically active.  Right hilar mass and mediastinal/right hilar lymph nodes are decreased in size, but demonstrate increased metabolic activity.  2. No findings to suggest new or worsening FDG-avid metastatic disease within the left thoracic cavity, abdomen, or pelvis.  3. Previously visualized left neck fluid collection is no longer clearly identified.     RADIATION DOSE  Automated tube current modulation, weight-based exposure dosing, and/or iterative reconstruction technique utilized to reach lowest reasonably achievable exposure rate.     DLP: 661 mGy*cm        Electronically signed by: Haja Paulson  Date:                                            10/13/2022  Time:                                           16:25    FL ESOPHAGRAM COMPLETE     CLIICAL HISTORY:  Cellulitis of neck     TECHNIQUE:  The patient was given oral contrast and multiple fluoroscopic images of the esophagus obtained in various positions. Total fluoroscopy time is 0.7 minutes with absorbed dose of 7.245 mGy.     COMPARISON:  Esophagram performed 07/15/2022     FINDINGS:  Exam performed with Gastrografin and thin barium.  Patient swallowed contrast without difficulty.  No cervical esophageal contrast extravasation identified.     Impression:     No cervical esophageal leak identified.        Electronically signed by: Lance Boudreaux  Date:                                            10/31/2022  Time:                                           11:10      Assessment/Plan:  Josafat Boudreaux is a 57 y.o. 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 Emory Alonso  Medical Oncologist: Yara Atkinson MD (Last seen: 10/18/22)  Radiation Oncologist: Romie Das MD (Last seen: Unknown)    Primary tumor: Endolaryngeal [id number] SCCA    Date of Diagnosis: 7/11/22  Surgery Date: 7/11/22  Induction Chemotherapy: No  Adjuvant Therapy: CRT  Completion Date: Sept 2022 for RT, still undergoing chemo    Pertinent Treatment History:  CRT    Place date if completed   3 6 12 18 24 36 48 60   CT Neck 10/19/22 X X X X X X X   PET/CT 10/13/22          CXR  X X X X X X X   TFT X  X  X X X X     Current Surveillance Interval: <1 year post-treatment, q6 wks     - Keep close eye on ulcerations of tracheal wall, two on lateral aspect of right wall, one on left lateral aspect, likely due to irritaion from coughing and size 10 Roque tube.  Offered to decrease size of roque tube however patient not intersted.  - Continue Azithro for presumed pneumoniae per Dr. Alonso  -     RTC 12/28 to ensure improvement of these spots, then can go back to normal cancer surveillance    Kevin Palomino MD  [facility] Otolaryngology PGY IV

## 2022-12-06 NOTE — PROGRESS NOTES
The scope used for the exam was:  Flexible scope ENF-P4  Serial Number:  1)    [id number]    []   2)    [id number]    []   3)    [id number]    [x]   4)    [id number]    []   5)    [id number]    []   6)    [id number]    []       The scope used for the exam was:  Rigid scope   Serial Number:  1)   [id number]    []   2)   6282    []   3)   7330    []   4)    3384   []   5)    0824   []   6)    5554   []     7)   7425    []   8)   2240    []   9)   1109    []

## 2022-12-09 PROBLEM — E87.1 HYPONATREMIA: Status: ACTIVE | Noted: 2022-01-01

## 2022-12-09 PROBLEM — J18.9 PNEUMONIA: Status: ACTIVE | Noted: 2022-01-01

## 2022-12-09 PROBLEM — A41.9 SEPSIS: Status: ACTIVE | Noted: 2022-01-01

## 2022-12-09 NOTE — PROCEDURES
"Josafat Boudreaux is a 57 y.o. male patient.    Temp: 98.8 °F (37.1 °C) (12/09/22 1420)  Pulse: 106 (12/09/22 1650)  Resp: 11 (12/09/22 1650)  BP: 110/70 (12/09/22 1650)  SpO2: 100 % (12/09/22 1650)  Weight: 68 kg (150 lb) (12/09/22 1035)  Height: 5' 9" (175.3 cm) (12/09/22 1035)    PICC  Date/Time: 12/9/2022 5:33 PM  Performed by: Rasheed Rome RN  Consent Done: Yes  Time out: Immediately prior to procedure a time out was called to verify the correct patient, procedure, equipment, support staff and site/side marked as required  Indications: med administration and vascular access  Anesthesia: local infiltration  Local anesthetic: lidocaine 1% without epinephrine  Anesthetic Total (mL): 5  Preparation: skin prepped with ChloraPrep  Skin prep agent dried: skin prep agent completely dried prior to procedure  Sterile barriers: all five maximum sterile barriers used - cap, mask, sterile gown, sterile gloves, and large sterile sheet  Hand hygiene: hand hygiene performed prior to central venous catheter insertion  Location details: right basilic  Catheter type: triple lumen  Catheter size: 5 Fr  Catheter Length: 37cm    Ultrasound guidance: yes  Vessel Caliber: large and patent, compressibility normal  Vascular Doppler: not done  Needle advanced into vessel with real time Ultrasound guidance.  Guidewire confirmed in vessel.  Sterile sheath used.  no esophageal manometryNumber of attempts: 1  Post-procedure: chlorhexidine patch, blood return through all ports and sterile dressing applied    Assessment: placement verified by x-ray  Complications: none        Rasheed Rome RN  12/9/2022    "

## 2022-12-09 NOTE — ED PROVIDER NOTES
Encounter Date: 12/9/2022       History     Chief Complaint   Patient presents with    Shortness of Breath    Tachycardia     Pt in for SOB and tachycardia. Hx of stage 4 lung CA and laryngectomy. Tracheostomy in place. Pt started azithromycin on Monday for PNA. MD bedside. 88% on RA. Pt febrile.     Presents with cough, shortness of breath and thick sputum production. Hx of Lung CA with tracheostomy and gastrostomy. States symptoms started this week, was able to suction at home yesterday with fluid and assistance from home health but today is worse. Denies sick contacts. Denies Pain    The history is provided by the patient, the spouse and the EMS personnel.   Review of patient's allergies indicates:  No Known Allergies  Past Medical History:   Diagnosis Date    Cancer     COPD (chronic obstructive pulmonary disease)     Dysphagia     Lung cancer     Vocal cord mass      Past Surgical History:   Procedure Laterality Date    DIRECT DIAGNOSTIC LARYNGOSCOPY WITH BRONCHOSCOPY AND ESOPHAGOSCOPY N/A 07/11/2022    Procedure: LARYNGOSCOPY, DIRECT, DIAGNOSTIC, WITH BRONCHOSCOPY AND ESOPHAGOSCOPY;  Surgeon: Emory Alonso MD;  Location: [facility];  Service: ENT;  Laterality: N/A;    HIP SURGERY      HUMERUS FRACTURE SURGERY      INCISION AND DRAINAGE, NECK Bilateral 10/21/2022    Procedure: INCISION AND DRAINAGE,NECK;  Surgeon: Madison Worley MD;  Location: [facility] OR;  Service: ENT;  Laterality: Bilateral;    INSERT ARTERIAL LINE  06/26/2022         JOINT REPLACEMENT  2019    R hip    TRACHEOSTOMY N/A 06/10/2022    Procedure: CREATION, TRACHEOSTOMY;  Surgeon: Junior Alonso MD;  Location: [facility];  Service: ENT;  Laterality: N/A;     Family History   Problem Relation Age of Onset    Lung cancer Father     Cancer Father         Lung cancer    Throat cancer Brother     Cancer Brother         Throat     Social History     Tobacco Use    Smoking status: Former     Packs/day: 0.50     Years: 15.00     Pack years: 7.50      Types: Cigarettes     Start date: 1980     Quit date: 2022     Years since quittin.5    Smokeless tobacco: Never   Substance Use Topics    Alcohol use: Not Currently    Drug use: Never     Review of Systems   Constitutional:  Positive for chills. Negative for fever.   HENT:  Positive for congestion. Negative for sore throat.    Respiratory:  Positive for cough, choking and shortness of breath.    Cardiovascular:  Negative for chest pain.   Gastrointestinal:  Negative for nausea.   Genitourinary:  Negative for dysuria.   Musculoskeletal:  Negative for back pain.   Skin:  Negative for rash.   Neurological:  Negative for weakness.   Hematological:  Does not bruise/bleed easily.   All other systems reviewed and are negative.    Physical Exam     Initial Vitals [22 1035]   BP Pulse Resp Temp SpO2   (!) 157/76 (!) 161 20 (!) 103.4 °F (39.7 °C) (!) 88 %      MAP       --         Physical Exam    Nursing note and vitals reviewed.  Constitutional: He appears well-developed and well-nourished. He appears distressed (Mild to moderate, tachypneic, coughing, central cyanosis).   HENT:   Head: Normocephalic and atraumatic.   Mouth/Throat: Oropharynx is clear and moist.   Eyes: Pupils are equal, round, and reactive to light.   Neck: Neck supple. No JVD present.   Tracheostomy in place   Normal range of motion.  Cardiovascular:  Regular rhythm, normal heart sounds and intact distal pulses.           tachycardic   Pulmonary/Chest: He is in respiratory distress (Tachypneic, central cyanosis, poor air movement). He has no wheezes.   Abdominal: Abdomen is soft. Bowel sounds are normal. He exhibits no distension. There is no abdominal tenderness.   PEG tube in place   Musculoskeletal:         General: Normal range of motion.      Cervical back: Normal range of motion and neck supple.     Neurological: He is oriented to person, place, and time. GCS score is 15. GCS eye subscore is 4. GCS verbal subscore is 5. GCS  motor subscore is 6.   Skin: Skin is warm and dry.   Psychiatric: His behavior is normal.       ED Course   Critical Care    Date/Time: 12/9/2022 12:34 PM  Performed by: Ulices Peters MD  Authorized by: Ulices Peters MD   Direct patient critical care time: 12 minutes  Additional history critical care time: 5 minutes  Ordering / reviewing critical care time: 15 minutes  Documentation critical care time: 8 minutes  Consulting other physicians critical care time: 5 minutes  Total critical care time (exclusive of procedural time) : 45 minutes  Critical care was necessary to treat or prevent imminent or life-threatening deterioration of the following conditions: respiratory failure and sepsis.  Critical care was time spent personally by me on the following activities: discussions with consultants, interpretation of cardiac output measurements, evaluation of patient's response to treatment, examination of patient, obtaining history from patient or surrogate, ordering and performing treatments and interventions, ordering and review of laboratory studies, ordering and review of radiographic studies, pulse oximetry, re-evaluation of patient's condition and review of old charts.      ED US Echo    Date/Time: 12/9/2022 2:05 PM  Performed by: Ulices Peters MD  Authorized by: Ulices Peters MD     Indication:  Hypotension  Identified Structures:     The pericardial sac, myocardium, and 4 chambers were identified with the following echocardiographic windows:  Apical 4-chamber  Findings:     Pericardial Effusion:  Absent    Left Ventricle Ejection Fraction:  Normal (>55%)  IVC:     Gross [place] (RV:LV):  Normal    Impression:  Normal limited cardiac ultrasound    Charge?:  No (educational)  Labs Reviewed   COMPREHENSIVE METABOLIC PANEL - Abnormal; Notable for the following components:       Result Value    Sodium Level 124 (*)     Potassium Level 3.3 (*)     Chloride 88  (*)     Albumin Level 2.2 (*)     Globulin 4.9 (*)     Albumin/Globulin Ratio 0.4 (*)     All other components within normal limits   CBC WITH DIFFERENTIAL - Abnormal; Notable for the following components:    WBC 0.3 (*)     RBC 3.62 (*)     Hgb 11.7 (*)     Hct 33.6 (*)     MCH 32.3 (*)     Platelet 95 (*)     All other components within normal limits   COVID/FLU A&B PCR - Normal    Narrative:     The XpDSO Interactive Xpress SARS-CoV-2/FLU/RSV plus is a rapid, multiplexed real-time PCR test intended for the simultaneous qualitative detection and differentiation of SARS-CoV-2, Influenza A, Influenza B, and respiratory syncytial virus (RSV) viral RNA in either nasopharyngeal swab or nasal swab specimens.         LACTIC ACID, PLASMA - Normal   BLOOD CULTURE OLG   BLOOD CULTURE OLG   CBC W/ AUTO DIFFERENTIAL    Narrative:     The following orders were created for panel order CBC auto differential.  Procedure                               Abnormality         Status                     ---------                               -----------         ------                     CBC with Differential[[id number]]        Abnormal            Preliminary result         Manual Differential[[id number]]                              In process                   Please view results for these tests on the individual orders.   EXTRA TUBES    Narrative:     The following orders were created for panel order EXTRA TUBES.  Procedure                               Abnormality         Status                     ---------                               -----------         ------                     Light Blue Top Hold[[id number]]                              In process                 Red Top Hold[[id number]]                                     In process                 Gold Top Hold[[id number]]                                    In process                 Pink Top Hold[[id number]]                                    In process                   Please view results  for these tests on the individual orders.   LIGHT BLUE TOP HOLD   RED TOP HOLD   GOLD TOP HOLD   PINK TOP HOLD   MANUAL DIFFERENTIAL   LACTIC ACID, PLASMA     EKG Readings: (Independently Interpreted)   Initial Reading: No STEMI. Rhythm: Sinus Tachycardia. Heart Rate: 156. Ectopy: No Ectopy. Conduction: Normal. ST Segments: Normal ST Segments. T Waves: Normal. Axis: Normal. Other Findings: Prolonged QT Interval. Clinical Impression: Sinus Tachycardia Other Impression: QTc 483 ms   ECG Results              EKG 12-lead (Final result)  Result time 12/09/22 11:31:49      Final result by Interface, Lab In [facility] (12/09/22 11:31:49)                   Narrative:    Test Reason : R06.02,    Vent. Rate : 156 BPM     Atrial Rate : 156 BPM     P-R Int : 140 ms          QRS Dur : 062 ms      QT Int : 300 ms       P-R-T Axes : 079 081 076 degrees     QTc Int : 483 ms    Sinus tachycardia  Otherwise normal ECG  When compared with ECG of 26-JUN-2022 17:37,  Criteria for Septal infarct are no longer Present  Confirmed by Becky Healy MD ([id number]) on 12/9/2022 11:31:43 AM    Referred By: [email or web address]   SELF           Confirmed By:Becky Healy MD                                  Imaging Results              X-Ray Chest 1 View (In process)                      Medications   0.9%  NaCl infusion ( Intravenous New Bag 12/9/22 1133)   ceFEPIme (MAXIPIME) 2 g in sodium chloride 0.9 % 50 mL IVPB (MB+) (has no administration in time range)   sodium chloride 0.9% bolus 1,000 mL (1,000 mLs Intravenous New Bag 12/9/22 1328)   NORepinephrine 4 mg in dextrose 5 % 250 mL infusion (has no administration in time range)   sodium chloride 0.9% bolus 1,000 mL (has no administration in time range)   acetaminophen suspension 1,001.6 mg (1,001.6 mg Oral Given 12/9/22 1132)   cefTRIAXone (ROCEPHIN) 1 g in sodium chloride 0.9 % 50 mL IVPB (MB+) (0 g Intravenous Stopped 12/9/22 1224)   azithromycin (ZITHROMAX) 500 mg in sodium chloride 0.9% 250 mL IVPB (0 mg  Intravenous Stopped 12/9/22 1253)   albuterol-ipratropium 2.5 mg-0.5 mg/3 mL nebulizer solution 3 mL (3 mLs Nebulization Given 12/9/22 1217)   albuterol-ipratropium 2.5 mg-0.5 mg/3 mL nebulizer solution 3 mL (3 mLs Nebulization Given 12/9/22 1217)   ibuprofen 100 mg/5 mL suspension 600 mg (600 mg Oral Given 12/9/22 1243)                       1:29 PM    At this time pt hypotensive; BP 80/58 mmHg in both arms;  bpm; Pt AAOx3. Due to hypotension NS 30 ml/kg (2040 ml) ordered and norepinephrine drip is started as an early pressor. Pt will be admitted to ICU. New lactic acid order placed also.  Admitting team notified due to admission and the post fluid resuscitation perfusion reassessment     1:54 PM    At this time with levophed at 0.5 mcg/kg/min /84 mmHg;  bpm; IV fluids infusing, admitting team bedside.    Clinical Impression:   Final diagnoses:  [R06.02] Shortness of breath  [D70.9, R50.81] Neutropenic fever (Primary)  [C34.91] Malignant neoplasm of right lung, unspecified part of lung  [J18.9] Community acquired pneumonia of right middle lobe of lung        ED Disposition Condition    Admit Fair                Ulices Peters MD  12/09/22 1242       Ulices Peters MD  12/09/22 1332       Ulices Peters MD  12/09/22 1354       Ulices Peters MD  12/09/22 1404

## 2022-12-09 NOTE — PROGRESS NOTES
Pharmacokinetic Initial Assessment: IV Vancomycin    Assessment/Plan:    Initiate intravenous vancomycin with loading dose of 1750 mg once followed by a maintenance dose of vancomycin 750mg IV every 12 hours  Desired empiric serum trough concentration is 15 to 20 mcg/mL  Draw vancomycin trough level 60 min prior to fourth dose on 12/11 at approximately 0400  Pharmacy will continue to follow and monitor vancomycin.      Please contact pharmacy at extension [phone] with any questions regarding this assessment.     Thank you for the consult,   Dasha Marte       Patient brief summary:  Josafat Boudreaux is a 57 y.o. male initiated on antimicrobial therapy with IV Vancomycin for treatment of suspected lower respiratory infection    Drug Allergies:   Review of patient's allergies indicates:  No Known Allergies    Actual Body Weight:   68 kg    Renal Function:   Estimated Creatinine Clearance: 71.9 mL/min (based on SCr of 1.09 mg/dL).,     Dialysis Method (if applicable):  N/A    CBC (last 72 hours):  Recent Labs   Lab Result Units 12/09/22  1127   WBC x10(3)/mcL 0.3*   Hgb gm/dL 11.7*   Hct % 33.6*   Platelet x10(3)/mcL 95*   Monocyte Man % 48*   Basophil Man % 4*       Metabolic Panel (last 72 hours):  Recent Labs   Lab Result Units 12/09/22  1127   Sodium Level mmol/L 124*   Potassium Level mmol/L 3.3*   Chloride mmol/L 88*   Carbon Dioxide mmol/L 24   Glucose Level mg/dL 98   Blood Urea Nitrogen mg/dL 13.9   Creatinine mg/dL 1.09   Albumin Level gm/dL 2.2*   Bilirubin Total mg/dL 0.5   Alkaline Phosphatase unit/L 116   Aspartate Aminotransferase unit/L 21   Alanine Aminotransferase unit/L 24       Drug levels (last 3 results):  No results for input(s): VANCOMYCINRA, VANCORANDOM, VANCOMYCINPE, VANCOPEAK, VANCOMYCINTR, VANCOTROUGH in the last 72 hours.    Microbiologic Results:  Microbiology Results (last 7 days)       Procedure Component Value Units Date/Time    Respiratory Culture [[id number]]     Order Status: Sent  Specimen: Respiratory     Blood Culture #2 **CANNOT BE ORDERED STAT** [[id number]] Collected: 12/09/22 1143    Order Status: Sent Specimen: Blood from Hand, Left Updated: 12/09/22 1157    Blood Culture #1 **CANNOT BE ORDERED STAT** [[id number]] Collected: 12/09/22 1127    Order Status: Sent Specimen: Blood from [facility] Updated: 12/09/22 1157

## 2022-12-09 NOTE — H&P
[facility] - Emergency Dept  Pulmonary Critical Care Note    Patient Name: Josafat Boudreaux  MRN: [id number]  Admission Date: 12/9/2022  Hospital Length of Stay: 0 days  Code Status: Full Code  Attending Provider: Tony Mejia MD  Primary Care Provider: Tony Bertrand MD     Subjective:     HPI: Josafat Boudreaux is a 57 y.o. male with PMH of stage IV squamous cell laryngeal cancer s/p completion of chemotherapy with carboplatin + taxol and radiation therapy, s/p laryngectomy now with Larytube, presents to [facility] on 12/9/2022 with chief complaint of shortness of breath. Patient is nonverbal (but can mouth words and appropriately nod yes/no to questions) so patient's wife assists with the history. She states she found the patient this morning with increased work of breathing, and placed a home O2 pulse oximeter on which revealed SPO2 of low 80s%, and heart rate of 140s-150s so they brought him to the ED. Also report that the patient has been having fevers at home. Patient did recently see ENT clinic on 12/6 when he had a fever of 101 at home and was given azithromycin to take via his PEG tube. Since then patient had not deteriorated until this morning. Patient denies any chest pain or palpitations. Denies nausea, vomiting, diarrhea, dysuria, or hematuria. Patient reportedly has history of aspiration prior to larytube and PEG placement. No known sick contacts. Patient did restart chemotherapy on 11/29 with carboplatin, taxol, and keytruda.     In the ED, patient was found to be hypoxic and started on supplemental oxygenation via NRB over his Larytube at 8 L/min. He was febrile to 103F. Labwork revealed a WBC of 0.3, ANC of 0.042, platelets of 95, sodium 124, potassium 3.3, lactic acid 1.0. CXR revealed consolidative opacities in right id and lower lung fields as well as right hilar fullness. Patient was given doses of ceftriaxone, azithromycin, as well as cefepime. His blood pressure was  originally slightly elevated but while in the ED did deteriorate to 70s/40s. He was given 30 mL/kg NS bolus and also started on levophed. Internal medicine consulted for admission.    Hospital Course/Significant events:      24 Hour Interval History:      Past Medical History:   Diagnosis Date    Cancer     COPD (chronic obstructive pulmonary disease)     Dysphagia     Lung cancer     Vocal cord mass        Past Surgical History:   Procedure Laterality Date    DIRECT DIAGNOSTIC LARYNGOSCOPY WITH BRONCHOSCOPY AND ESOPHAGOSCOPY N/A 2022    Procedure: LARYNGOSCOPY, DIRECT, DIAGNOSTIC, WITH BRONCHOSCOPY AND ESOPHAGOSCOPY;  Surgeon: Emory Alonso MD;  Location: [facility];  Service: ENT;  Laterality: N/A;    HIP SURGERY      HUMERUS FRACTURE SURGERY      INCISION AND DRAINAGE, NECK Bilateral 10/21/2022    Procedure: INCISION AND DRAINAGE,NECK;  Surgeon: Madison Worley MD;  Location: [facility];  Service: ENT;  Laterality: Bilateral;    INSERT ARTERIAL LINE  2022         JOINT REPLACEMENT  2019    R hip    TRACHEOSTOMY N/A 06/10/2022    Procedure: CREATION, TRACHEOSTOMY;  Surgeon: Junior Alonso MD;  Location: [facility];  Service: ENT;  Laterality: N/A;       Social History     Socioeconomic History    Marital status:    Tobacco Use    Smoking status: Former     Packs/day: 0.50     Years: 15.00     Pack years: 7.50     Types: Cigarettes     Start date: 1980     Quit date: 2022     Years since quittin.5    Smokeless tobacco: Never   Substance and Sexual Activity    Alcohol use: Not Currently    Drug use: Never    Sexual activity: Not Currently     Partners: Female     Social Determinants of Health     Financial Resource Strain: Low Risk     Difficulty of Paying Living Expenses: Not hard at all   Food Insecurity: No Food Insecurity    Worried About Running Out of Food in the Last Year: Never true    Ran Out of Food in the Last Year: Never true   Transportation Needs: No Transportation  Needs    Lack of Transportation (Medical): No    Lack of Transportation (Non-Medical): No   Physical Activity: Inactive    Days of Exercise per Week: 0 days    Minutes of Exercise per Session: 0 min   Stress: No Stress Concern Present    Feeling of Stress : Not at all   Social Connections: Moderately Isolated    Frequency of Communication with Friends and Family: More than three times a week    Frequency of Social Gatherings with Friends and Family: More than three times a week    Attends Rastafari Services: Never    Active Member of Clubs or Organizations: No    Attends Club or Organization Meetings: Never    Marital Status:    Housing Stability: Low Risk     Unable to Pay for Housing in the Last Year: No    Number of Places Lived in the Last Year: 1    Unstable Housing in the Last Year: No           Objective:     Current Outpatient Medications   Medication Instructions    albuterol-ipratropium (DUO-NEB) 2.5 mg-0.5 mg/3 mL nebulizer solution 3 mLs, Nebulization, Every 4 hours PRN, Rescue    budesonide (PULMICORT) 0.5 mg, Nebulization, Every 12 hours, Controller    diphenhydrAMINE (BENADRYL) 25 mg, Per G Tube, Every 6 hours PRN    famotidine (PEPCID) 20 mg, Oral, Nightly    glutamine 10 g, Oral, 2 times daily    HYDROcodone-acetaminophen (NORCO) 5-325 mg per tablet 1 tablet, Oral, Every 4 hours PRN    hydrocortisone 1 % cream Topical (Top), 2 times daily PRN, Apply to rash    ibuprofen (ADVIL,MOTRIN) 600 mg, Oral, Every 6 hours PRN    ondansetron (ZOFRAN-ODT) 8 mg, Oral, Every 8 hours PRN, Tab 1 ODT in AM for 2 days after chemotherapy    ondansetron (ZOFRAN-ODT) 8 mg, Oral, Every 8 hours PRN    oxyCODONE (ROXICODONE) 5 mg, Oral, Every 4 hours PRN    pantoprazole (PROTONIX) 20 mg, Oral, Daily    silver nitrate applicators 75-25 % applicator Topical (Top), Three times weekly    UNABLE TO FIND medication name: ATOMIC TONIC (LIDO/DELTA/MAALOX)<BR>gargle and swallow 1 to 2 teaspoonsful by mouth 5 minutes before  meals and every 3 hours as needed for pain with swallowing       Current Inpatient Medications   doxycycline (VIBRAMYCIN) IVPB  100 mg Intravenous Q12H    enoxaparin  40 mg Subcutaneous Daily    LIDOcaine (PF) 10 mg/ml (1%)  10 mL Other Once    LIDOcaine (PF)  2 mL Tracheal Tube 1 time in Clinic/HOD    phenylephrine HCL 1%  1 spray Each Nostril 1 time in Clinic/HOD    piperacillin-tazobactam (ZOSYN) IVPB  4.5 g Intravenous Q8H    potassium chloride  40 mEq Oral Once    sodium chloride 0.9%  1,000 mL Intravenous ED 1 Time    vancomycin (VANCOCIN) IVPB  1,750 mg Intravenous Once    [START ON 12/10/2022] vancomycin (VANCOCIN) IVPB  750 mg Intravenous Q12H         No intake or output data in the 24 hours ending 12/09/22 6497    Review of Systems   Constitutional:  Positive for chills, fever and malaise/fatigue.   HENT:  Negative for sore throat.    Respiratory:  Positive for cough and shortness of breath.    Cardiovascular:  Negative for chest pain and palpitations.   Gastrointestinal:  Negative for abdominal pain, blood in stool, diarrhea, nausea and vomiting.   Genitourinary:  Negative for dysuria and frequency.   Musculoskeletal:  Negative for joint pain.   Skin:  Negative for rash.   Neurological:  Negative for dizziness.      Vital Signs (Most Recent):  Temp: 98.8 °F (37.1 °C) (12/09/22 1420)  Pulse: (!) 120 (12/09/22 1508)  Resp: (!) 21 (12/09/22 1508)  BP: 92/70 (12/09/22 1503)  SpO2: (!) 89 % (12/09/22 1508)    Body mass index is 22.15 kg/m².  Weight: 68 kg (150 lb) Vital Signs (24h Range):  Temp:  [97.6 °F (36.4 °C)-103.4 °F (39.7 °C)] 98.8 °F (37.1 °C)  Pulse:  [] 120  Resp:  [10-24] 21  SpO2:  [86 %-98 %] 89 %  BP: ()/(46-97) 92/70     Physical Exam  Constitutional:       Appearance: He is ill-appearing.   HENT:      Head: Normocephalic and atraumatic.      Right Ear: External ear normal.      Left Ear: External ear normal.      Nose: Nose normal.      Mouth/Throat:      Mouth: Mucous membranes  are moist.   Eyes:      Extraocular Movements: Extraocular movements intact.      Conjunctiva/sclera: Conjunctivae normal.   Neck:      Comments: Well healing wound to right side of neck s/p abscess I&D, no active drainage  Cardiovascular:      Rate and Rhythm: Regular rhythm. Tachycardia present.      Heart sounds: No murmur heard.    No friction rub. No gallop.   Pulmonary:      Effort: No respiratory distress.      Breath sounds: Decreased air movement (RUL) present. Rhonchi (RLL) present. No wheezing or rales.   Chest:      Comments: Mediport in place to Left Upper Chest  Abdominal:      General: There is no distension.      Palpations: Abdomen is soft.      Tenderness: There is no abdominal tenderness. There is no guarding.      Comments: PEG tube in place, surrounding skin very slightly erythematous   Musculoskeletal:      Cervical back: Normal range of motion.      Right lower leg: No edema.      Left lower leg: No edema.   Skin:     General: Skin is warm and dry.      Capillary Refill: Capillary refill takes less than 2 seconds.   Neurological:      General: No focal deficit present.      Mental Status: He is alert. Mental status is at baseline.      Comments: Unable to speak s/p laryngectomy but is able to follow commands, mouth words, answer questions appropriately via nodding/shaking head         Mechanical ventilation support:  Oxygen Concentration (%): 40 (12/09/22 1217)    Lines/Drains/Airways       Central Venous Catheter Line  Duration                  PowerPort A Cath Single Lumen left subclavian -- days              Drain  Duration                  Gastrostomy/Enterostomy 06/14/22 1415 Percutaneous endoscopic gastrostomy (PEG) feeding 178 days         Closed/Suction Drain 10/21/22 0900 Anterior;Right Neck Bulb 49 days              Airway  Duration                  Airway - Non-Surgical Other (Comment) -- days              Peripheral Intravenous Line  Duration                  Peripheral IV - Single  Lumen 12/09/22 1345 Anterior;Left Wrist <1 day                    Significant Labs:    Lab Results   Component Value Date    WBC 0.3 (LL) 12/09/2022    HGB 11.7 (L) 12/09/2022    HCT 33.6 (L) 12/09/2022    MCV 92.8 12/09/2022    PLT 95 (L) 12/09/2022         BMP  Lab Results   Component Value Date     (L) 12/09/2022    K 3.3 (L) 12/09/2022    CO2 24 12/09/2022    BUN 13.9 12/09/2022    CREATININE 1.09 12/09/2022    CALCIUM 9.1 12/09/2022    EGFRNONAA >60 08/02/2022       ABG  Recent Labs   Lab 12/09/22  1058   PH 7.51*   PO2 46*   PCO2 33*   HCO3 26.3*           Significant Imaging:  I have reviewed all pertinent imaging within the past 24 hours.        Assessment/Plan:     Assessment  Septic Shock 2/2 community acquired pneumonia/postobstructive pneumonia  Neutropenic Fever  Pancytopenia  Stage IV Laryngeal squamous cell carcinoma s/p laryngectomy, possible metastasis to lung vs primary lung squamous cell carcinoma  Hyponatremia    Plan  -Admit to ICU  -Patient requiring vasopressors, titrate levophed to maintain MAP > 65  -PICC team consulted for PICC line  -Blood cultures x2 obtained. Ordered respiratory culture, UA, respiratory panel, urine legionella  -Will start broad spectrum antibiotics with vancomycin, zosyn, and doxycycline. Did receive dose of ceftriaxone, azithromycin, and cefepime in ED  -As patient has also had fever for at least 4 days, will start empiric antifungals with micafungin 100 mg q24 hours  -30 mL/kg NS bolus in ED. Will continue 125 cc/hr after  -Lactic acid WNL  -CT PE negative for PE, though shows postobstructive pneumonia with cavitation in RUL as well as developing RLL consolidation  -With cavitary lesion, will order MTB PCR, MRSA PCR, galactomanan, fungal culture, as well as urine histoplasma and blastomyces antigens  -IDSA guidelines recommend against use of G-CSF in patients with neutropenic fevers  -Peripheral smear obtained, spoke with pathologist who states smear is  consistent with inflammatory process although manual differential originally was documented with 12% blasts  -Serum/urine osmols and urine sodium ordered  -Repeat BMP this evening. Goal Sodium in first 24 hours is about 132-134  -ENT consulted for assistance  -Monitor electrolytes daily and replenish as needed    DVT Prophylaxis: Lovenox  GI Prophylaxis: Brennancid     Aj Tapia MD  Pulmonary Critical Care Medicine  [facility] - Emergency Dept

## 2022-12-09 NOTE — CONSULTS
[facility]  Otolaryngology-Head & Neck Surgery    Consultation Note    Josafat Boudreaux  [id number]  1965    CC: Feverish, coughing    HPI: Josafat Boudreaux is a 57 y.o. male laryngectomy with lung mets on Keytruda presented to ED with couple days of increased coughing, fevers/chills, and increasing fatigue. CXR concerning for pneumonia. Also tachycardic with decreased O2 sats. Neutropenia with WBC of 3000. ICU is admitting for neutropenia possible sepsis management. Wife reports he was feeling fine when seen in clinic on 12/6, but has decompensated over the last couple days. Increased coughing. Feels this is an infectious process. No other concerning changes with swallowing or his laryngectomy stoma that they have noticed.     ROS: A 10-point review of systems was performed and is negative except as stated above.     Review of patient's allergies indicates:  No Known Allergies    Past Medical History:   Diagnosis Date    Cancer     COPD (chronic obstructive pulmonary disease)     Dysphagia     Lung cancer     Vocal cord mass        Past Surgical History:   Procedure Laterality Date    DIRECT DIAGNOSTIC LARYNGOSCOPY WITH BRONCHOSCOPY AND ESOPHAGOSCOPY N/A 07/11/2022    Procedure: LARYNGOSCOPY, DIRECT, DIAGNOSTIC, WITH BRONCHOSCOPY AND ESOPHAGOSCOPY;  Surgeon: Emory Alonso MD;  Location: [facility] OR;  Service: ENT;  Laterality: N/A;    HIP SURGERY      HUMERUS FRACTURE SURGERY      INCISION AND DRAINAGE, NECK Bilateral 10/21/2022    Procedure: INCISION AND DRAINAGE,NECK;  Surgeon: Madison Worley MD;  Location: [facility] OR;  Service: ENT;  Laterality: Bilateral;    INSERT ARTERIAL LINE  06/26/2022         JOINT REPLACEMENT  2019    R hip    TRACHEOSTOMY N/A 06/10/2022    Procedure: CREATION, TRACHEOSTOMY;  Surgeon: Junior Alonso MD;  Location: [facility] OR;  Service: ENT;  Laterality: N/A;       Social History     Socioeconomic History    Marital status:    Tobacco Use     Smoking status: Former     Packs/day: 0.50     Years: 15.00     Pack years: 7.50     Types: Cigarettes     Start date: 1980     Quit date: 2022     Years since quittin.5    Smokeless tobacco: Never   Substance and Sexual Activity    Alcohol use: Not Currently    Drug use: Never    Sexual activity: Not Currently     Partners: Female     Social Determinants of Health     Financial Resource Strain: Low Risk     Difficulty of Paying Living Expenses: Not hard at all   Food Insecurity: No Food Insecurity    Worried About Running Out of Food in the Last Year: Never true    Ran Out of Food in the Last Year: Never true   Transportation Needs: No Transportation Needs    Lack of Transportation (Medical): No    Lack of Transportation (Non-Medical): No   Physical Activity: Inactive    Days of Exercise per Week: 0 days    Minutes of Exercise per Session: 0 min   Stress: No Stress Concern Present    Feeling of Stress : Not at all   Social Connections: Moderately Isolated    Frequency of Communication with Friends and Family: More than three times a week    Frequency of Social Gatherings with Friends and Family: More than three times a week    Attends Anabaptism Services: Never    Active Member of Clubs or Organizations: No    Attends Club or Organization Meetings: Never    Marital Status:    Housing Stability: Low Risk     Unable to Pay for Housing in the Last Year: No    Number of Places Lived in the Last Year: 1    Unstable Housing in the Last Year: No       Family History   Problem Relation Age of Onset    Lung cancer Father     Cancer Father         Lung cancer    Throat cancer Brother     Cancer Brother         Throat       Current Outpatient Medications   Medication Instructions    albuterol-ipratropium (DUO-NEB) 2.5 mg-0.5 mg/3 mL nebulizer solution 3 mLs, Nebulization, Every 4 hours PRN, Rescue    budesonide (PULMICORT) 0.5 mg, Nebulization, Every 12 hours, Controller    diphenhydrAMINE (BENADRYL) 25  mg, Per G Tube, Every 6 hours PRN    famotidine (PEPCID) 20 mg, Oral, Nightly    glutamine 10 g, Oral, 2 times daily    HYDROcodone-acetaminophen (NORCO) 5-325 mg per tablet 1 tablet, Oral, Every 4 hours PRN    hydrocortisone 1 % cream Topical (Top), 2 times daily PRN, Apply to rash    ibuprofen (ADVIL,MOTRIN) 600 mg, Oral, Every 6 hours PRN    ondansetron (ZOFRAN-ODT) 8 mg, Oral, Every 8 hours PRN, Tab 1 ODT in AM for 2 days after chemotherapy    ondansetron (ZOFRAN-ODT) 8 mg, Oral, Every 8 hours PRN    oxyCODONE (ROXICODONE) 5 mg, Oral, Every 4 hours PRN    pantoprazole (PROTONIX) 20 mg, Oral, Daily    silver nitrate applicators 75-25 % applicator Topical (Top), Three times weekly    UNABLE TO FIND medication name: ATOMIC TONIC (LIDO/DELTA/MAALOX)<BR>gargle and swallow 1 to 2 teaspoonsful by mouth 5 minutes before meals and every 3 hours as needed for pain with swallowing       Current Facility-Administered Medications:     0.9%  NaCl infusion, , Intravenous, Continuous, Ulices Peters MD, Last Rate: 200 mL/hr at 12/09/22 1133, New Bag at 12/09/22 1133    0.9%  NaCl infusion, , Intravenous, Continuous, Aj Tapia MD    acetaminophen tablet 650 mg, 650 mg, Per G Tube, Q8H PRN, Aj Tapia MD    albuterol-ipratropium 2.5 mg-0.5 mg/3 mL nebulizer solution 3 mL, 3 mL, Nebulization, Q4H PRN, Aj Tapia MD    doxycycline (VIBRAMYCIN) 100 mg in sodium chloride 0.9% 250 mL IVPB, 100 mg, Intravenous, Q12H, Aj Tapia MD, Stopped at 12/09/22 1640    enoxaparin injection 40 mg, 40 mg, Subcutaneous, Daily, Aj Tapia MD    famotidine tablet 20 mg, 20 mg, Per G Tube, QHS, Aj Tapia MD    HYDROcodone-acetaminophen 5-325 mg per tablet 1 tablet, 1 tablet, Per G Tube, Q6H PRN, Aj Tapia MD    LIDOcaine (PF) 10 mg/ml (1%) injection 100 mg, 10 mL, Other, Once, Aj Tapia MD    LIDOcaine (PF) 40 mg/mL (4 %) injection 2 mL, 2 mL, Tracheal Tube, 1 time in Clinic/HOD, Kevin Palomino MD    NORepinephrine 4  mg in dextrose 5 % 250 mL infusion, 0-3 mcg/kg/min (Dosing Weight), Intravenous, Continuous, Aj Tapia MD, Last Rate: 12.8 mL/hr at 12/09/22 1539, 0.05 mcg/kg/min at 12/09/22 1539    phenylephrine HCL 1% nasal spray 1 spray, 1 spray, Each Nostril, 1 time in Clinic/HOD, Kevin Palomino MD    piperacillin-tazobactam (ZOSYN) 4.5 g in sodium chloride 0.9 % 100 mL IVPB (MB+), 4.5 g, Intravenous, Q8H, Aj Tapia MD    potassium bicarbonate disintegrating tablet 50 mEq, 50 mEq, Per G Tube, Once, Aj Tapia MD    sodium chloride 0.9% flush 10 mL, 10 mL, Intravenous, PRN, Aj Tapia MD    vancomycin (VANCOCIN) 1,750 mg in sodium chloride 0.9% 500 mL IVPB, 1,750 mg, Intravenous, Once, Junito Galan,     Pharmacy to dose Vancomycin consult, , , Once **AND** vancomycin - pharmacy to dose, , Intravenous, pharmacy to manage frequency, Aj Tapia MD    [START ON 12/10/2022] vancomycin 750 mg in sodium chloride 0.9% 250 mL IVPB, 750 mg, Intravenous, Q12H, Aj Tapia MD    Current Outpatient Medications:     albuterol-ipratropium (DUO-NEB) 2.5 mg-0.5 mg/3 mL nebulizer solution, Take 3 mLs by nebulization every 4 (four) hours as needed for Shortness of Breath or Wheezing. Rescue, Disp: 90 mL, Rfl: 1    budesonide (PULMICORT) 0.5 mg/2 mL nebulizer solution, Take 2 mLs (0.5 mg total) by nebulization every 12 (twelve) hours. Controller (Patient not taking: Reported on 12/6/2022), Disp: 90 mL, Rfl: 1    diphenhydrAMINE (BENADRYL) 25 mg capsule, 1 capsule (25 mg total) by Per G Tube route every 6 (six) hours as needed for Itching., Disp: 90 capsule, Rfl: 1    famotidine (PEPCID) 20 MG tablet, Take 1 tablet (20 mg total) by mouth every evening., Disp: 30 tablet, Rfl: 11    glutamine 10 gram PwPk, Take 10 g by mouth 2 (two) times a day., Disp: , Rfl:     HYDROcodone-acetaminophen (NORCO) 5-325 mg per tablet, Take 1 tablet by mouth every 4 (four) hours as needed for Pain., Disp: , Rfl:     hydrocortisone 1 % cream, Apply  topically 2 (two) times daily as needed (rash). Apply to rash, Disp: 30 g, Rfl: 1    ibuprofen (ADVIL,MOTRIN) 100 mg/5 mL suspension, Take 30 mLs (600 mg total) by mouth every 6 (six) hours as needed for Pain (mild to moderate)., Disp: 354 mL, Rfl: 0    ondansetron (ZOFRAN-ODT) 8 MG TbDL, Take 8 mg by mouth every 8 (eight) hours as needed for Nausea. Tab 1 ODT in AM for 2 days after chemotherapy, Disp: , Rfl:     ondansetron (ZOFRAN-ODT) 8 MG TbDL, Take 1 tablet (8 mg total) by mouth every 8 (eight) hours as needed (nausea/vomiting). (Patient not taking: Reported on 12/6/2022), Disp: 60 tablet, Rfl: 5    oxyCODONE (ROXICODONE) 5 MG immediate release tablet, Take 1 tablet (5 mg total) by mouth every 4 (four) hours as needed for Pain., Disp: 18 tablet, Rfl: 0    pantoprazole (PROTONIX) 20 MG tablet, Take 1 tablet (20 mg total) by mouth once daily., Disp: 30 tablet, Rfl: 11    silver nitrate applicators 75-25 % applicator, Apply topically 3 (three) times a week. (Patient not taking: Reported on 12/6/2022), Disp: 1 applicator, Rfl: 0    UNABLE TO FIND, medication name: ATOMIC TONIC (LIDO/DELTA/MAALOX) gargle and swallow 1 to 2 teaspoonsful by mouth 5 minutes before meals and every 3 hours as needed for pain with swallowing, Disp: , Rfl:     PHYSICAL EXAM:  Temp:  [97.6 °F (36.4 °C)-103.4 °F (39.7 °C)] 98.8 °F (37.1 °C)  Pulse:  [] 107  Resp:  [10-24] 17  SpO2:  [86 %-99 %] 99 %  BP: ()/(46-97) 109/70    General Appearance: well nourished, well-developed, alert, oriented, in no acute distress  Head/Face: NC, AT, carrington erythema  Eyes: PEERLA, EOMI, normal conjunctiva  Ears: Hears well at normal conversation volume  Nose: septum midline, no inferior turbinate hypertrophy, no polyps  OC/OP: dentition moderate, no oral lesions, FOM and BOT soft  Nasopharynx, Hypopharynx, and Larynx: indirect visualization attempted, limited view due to patient intolerance  Neck: stoma intact, clean, left superior tracheal wall  (9-o-clock position) with 2 areas of ulceration each approx 8 mm, white central portion with erythematous base, non painful -- stable from 3 days ago  Neuro: CN II - XII intact  Psychiatric: oriented to time, place and person, no depression, anxiety or agitation    Labs/Imaging:  WBC/Hgb/Hct/Plts:  0.3/11.7/33.6/95 (12/09 1127)  Na/K/Cl/CO2:  124/3.3/88/24 (12/09 1127)  BUN/Cr/glu/ALT/AST/amyl/lip:  13.9/1.09/--/24/21/--/-- (12/09 1127)    CTA Chest 12/9/22:  1. No central or segmental pulmonary thromboembolism.  2. Diffusely worsened mediastinal adenopathy and right hilar soft tissue.  3. Worsening postobstructive consolidation with right upper lobe cavitary component, as well as widespread bilateral less pronounced areas of airspace abnormality.  Findings may represent sequela of infectious or inflammatory process, although there is elevated concern for metastatic involvement.  4. Additional chronic secondary details are similar to the 24 October 2022 CT appearance    Assessment:  Josafat Boudreaux is a 57 y.o. 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    Plan:  -- Tracheal ulcerations stable. Continue conservative management  -- If patient requires vent support, OK for respiratory to place cuffed trach tube via stoma (recommend 7 CN Thomas)  -- Routine roque tube care, clean at least BID    Will follow peripherally while admitted. Discussed with Dr. Alonso.     Rimma Horn MD  [employer] Otolaryngology PGY5  4:09 PM 12/09/2022

## 2022-12-10 PROBLEM — R50.81 NEUTROPENIC FEVER: Status: ACTIVE | Noted: 2022-01-01

## 2022-12-10 PROBLEM — D70.9 NEUTROPENIC FEVER: Status: ACTIVE | Noted: 2022-01-01

## 2022-12-10 NOTE — PT/OT/SLP PROGRESS
Physical Therapy      Patient Name:  Josafat Boudreaux   MRN:  [id number]    Patient not seen today secondary to Other (Comment) (Pt awaiting Covid test, will check pt status tomorrow.). Will follow-up 12/11/22.

## 2022-12-10 NOTE — PLAN OF CARE
Problem: Violence Risk or Actual  Goal: Anger and Impulse Control  Outcome: Ongoing, Progressing     Problem: Adult Inpatient Plan of Care  Goal: Plan of Care Review  Outcome: Ongoing, Progressing  Goal: Patient-Specific Goal (Individualized)  Outcome: Ongoing, Progressing  Goal: Absence of Hospital-Acquired Illness or Injury  Outcome: Ongoing, Progressing  Goal: Optimal Comfort and Wellbeing  Outcome: Ongoing, Progressing  Goal: Readiness for Transition of Care  Outcome: Ongoing, Progressing     Problem: Adjustment to Illness (Sepsis/Septic Shock)  Goal: Optimal Coping  Outcome: Ongoing, Progressing     Problem: Bleeding (Sepsis/Septic Shock)  Goal: Absence of Bleeding  Outcome: Ongoing, Progressing     Problem: Glycemic Control Impaired (Sepsis/Septic Shock)  Goal: Blood Glucose Level Within Desired Range  Outcome: Ongoing, Progressing     Problem: Infection Progression (Sepsis/Septic Shock)  Goal: Absence of Infection Signs and Symptoms  Outcome: Ongoing, Progressing     Problem: Nutrition Impaired (Sepsis/Septic Shock)  Goal: Optimal Nutrition Intake  Outcome: Ongoing, Progressing     Problem: Fluid Imbalance (Pneumonia)  Goal: Fluid Balance  Outcome: Ongoing, Progressing     Problem: Infection (Pneumonia)  Goal: Resolution of Infection Signs and Symptoms  Outcome: Ongoing, Progressing     Problem: Respiratory Compromise (Pneumonia)  Goal: Effective Oxygenation and Ventilation  Outcome: Ongoing, Progressing     Problem: Infection  Goal: Absence of Infection Signs and Symptoms  Outcome: Ongoing, Progressing     Problem: Impaired Wound Healing  Goal: Optimal Wound Healing  Outcome: Ongoing, Progressing     Problem: Neutropenia  Goal: Absence of Infection  Outcome: Ongoing, Progressing

## 2022-12-10 NOTE — PROGRESS NOTES
[facility] - Emergency Dept  Pulmonary Critical Care Note    Patient Name: Josafat Boudreaux  MRN: [id number]  Admission Date: 12/9/2022  Hospital Length of Stay: 1 days  Code Status: Full Code  Attending Provider: Tony Mejia MD  Primary Care Provider: Tony Bertrand MD     Subjective:     HPI: Josafat oBudreaux is a 57 y.o. male with PMH of stage IV squamous cell laryngeal cancer s/p completion of chemotherapy with carboplatin + taxol and radiation therapy, s/p laryngectomy now with Larytube, presents to [facility] on 12/9/2022 with chief complaint of shortness of breath. Patient is nonverbal (but can mouth words and appropriately nod yes/no to questions) so patient's wife assists with the history. She states she found the patient this morning with increased work of breathing, and placed a home O2 pulse oximeter on which revealed SPO2 of low 80s%, and heart rate of 140s-150s so they brought him to the ED. Also report that the patient has been having fevers at home. Patient did recently see ENT clinic on 12/6 when he had a fever of 101 at home and was given azithromycin to take via his PEG tube. Since then patient had not deteriorated until this morning. Patient denies any chest pain or palpitations. Denies nausea, vomiting, diarrhea, dysuria, or hematuria. Patient reportedly has history of aspiration prior to larytube and PEG placement. No known sick contacts. Patient did restart chemotherapy on 11/29 with carboplatin, taxol, and keytruda.     In the ED, patient was found to be hypoxic and started on supplemental oxygenation via NRB over his Larytube at 8 L/min. He was febrile to 103F. Labwork revealed a WBC of 0.3, ANC of 0.042, platelets of 95, sodium 124, potassium 3.3, lactic acid 1.0. CXR revealed consolidative opacities in right id and lower lung fields as well as right hilar fullness. Patient was given doses of ceftriaxone, azithromycin, as well as cefepime. His blood pressure was  originally slightly elevated but while in the ED did deteriorate to 70s/40s. He was given 30 mL/kg NS bolus and also started on levophed. Internal medicine consulted for admission.    Hospital Course/Significant events:      24 Hour Interval History:    Overnight patient did have a slight fever of 101.6F. No additional fevers. Patient still on 8L mask over larytube. Levophed weaned off around 5:30 AM this morning. WBC recovered slightly this morning, 0.6. Sodium 135. First MTB PCR negative, rest of ID workup still pending. Patient reports he is feeling better this morning, that his shortness of breath is improving. Denies any pain. Patient however has been having multiple episodes of diarrhea. Wife reports it is becoming more formed but nursing reports it is very watery. Nonbloody. No additional concerns or complaints at this time.       Past Medical History:   Diagnosis Date    Cancer     COPD (chronic obstructive pulmonary disease)     Dysphagia     Lung cancer     Vocal cord mass        Past Surgical History:   Procedure Laterality Date    DIRECT DIAGNOSTIC LARYNGOSCOPY WITH BRONCHOSCOPY AND ESOPHAGOSCOPY N/A 07/11/2022    Procedure: LARYNGOSCOPY, DIRECT, DIAGNOSTIC, WITH BRONCHOSCOPY AND ESOPHAGOSCOPY;  Surgeon: Emory Alonso MD;  Location: [facility];  Service: ENT;  Laterality: N/A;    HIP SURGERY      HUMERUS FRACTURE SURGERY      INCISION AND DRAINAGE, NECK Bilateral 10/21/2022    Procedure: INCISION AND DRAINAGE,NECK;  Surgeon: Madison Worley MD;  Location: [facility] OR;  Service: ENT;  Laterality: Bilateral;    INSERT ARTERIAL LINE  06/26/2022         JOINT REPLACEMENT  2019    R hip    TRACHEOSTOMY N/A 06/10/2022    Procedure: CREATION, TRACHEOSTOMY;  Surgeon: Junior Alonso MD;  Location: [facility] OR;  Service: ENT;  Laterality: N/A;       Social History     Socioeconomic History    Marital status:    Tobacco Use    Smoking status: Former     Packs/day: 0.50     Years: 15.00     Pack years:  7.50     Types: Cigarettes     Start date: 1980     Quit date: 2022     Years since quittin.5    Smokeless tobacco: Never   Substance and Sexual Activity    Alcohol use: Not Currently    Drug use: Never    Sexual activity: Not Currently     Partners: Female     Social Determinants of Health     Financial Resource Strain: Low Risk     Difficulty of Paying Living Expenses: Not hard at all   Food Insecurity: No Food Insecurity    Worried About Running Out of Food in the Last Year: Never true    Ran Out of Food in the Last Year: Never true   Transportation Needs: No Transportation Needs    Lack of Transportation (Medical): No    Lack of Transportation (Non-Medical): No   Physical Activity: Inactive    Days of Exercise per Week: 0 days    Minutes of Exercise per Session: 0 min   Stress: No Stress Concern Present    Feeling of Stress : Not at all   Social Connections: Moderately Isolated    Frequency of Communication with Friends and Family: More than three times a week    Frequency of Social Gatherings with Friends and Family: More than three times a week    Attends Muslim Services: Never    Active Member of Clubs or Organizations: No    Attends Club or Organization Meetings: Never    Marital Status:    Housing Stability: Low Risk     Unable to Pay for Housing in the Last Year: No    Number of Places Lived in the Last Year: 1    Unstable Housing in the Last Year: No           Objective:     Current Outpatient Medications   Medication Instructions    albuterol-ipratropium (DUO-NEB) 2.5 mg-0.5 mg/3 mL nebulizer solution 3 mLs, Nebulization, Every 4 hours PRN, Rescue    budesonide (PULMICORT) 0.5 mg, Nebulization, Every 12 hours, Controller    diphenhydrAMINE (BENADRYL) 25 mg, Per G Tube, Every 6 hours PRN    famotidine (PEPCID) 20 mg, Oral, Nightly    glutamine 10 g, Oral, 2 times daily    HYDROcodone-acetaminophen (NORCO) 5-325 mg per tablet 1 tablet, Oral, Every 4 hours PRN    hydrocortisone 1 %  cream Topical (Top), 2 times daily PRN, Apply to rash    ibuprofen (ADVIL,MOTRIN) 600 mg, Oral, Every 6 hours PRN    ondansetron (ZOFRAN-ODT) 8 mg, Oral, Every 8 hours PRN, Tab 1 ODT in AM for 2 days after chemotherapy    ondansetron (ZOFRAN-ODT) 8 mg, Oral, Every 8 hours PRN    oxyCODONE (ROXICODONE) 5 mg, Oral, Every 4 hours PRN    pantoprazole (PROTONIX) 20 mg, Oral, Daily    silver nitrate applicators 75-25 % applicator Topical (Top), Three times weekly    UNABLE TO FIND medication name: ATOMIC TONIC (LIDO/DELTA/MAALOX)<BR>gargle and swallow 1 to 2 teaspoonsful by mouth 5 minutes before meals and every 3 hours as needed for pain with swallowing       Current Inpatient Medications   doxycycline (VIBRAMYCIN) IVPB  100 mg Intravenous Q12H    enoxaparin  40 mg Subcutaneous Daily    famotidine  20 mg Per G Tube QHS    LIDOcaine (PF) 10 mg/ml (1%)  10 mL Other Once    micafungin (MYCAMINE) IVPB  100 mg Intravenous Q24H    mupirocin   Nasal BID    piperacillin-tazobactam (ZOSYN) IVPB  4.5 g Intravenous Q8H    sodium chloride 0.9%  10 mL Intravenous Q6H    vancomycin (VANCOCIN) IVPB  750 mg Intravenous Q12H           Intake/Output Summary (Last 24 hours) at 12/10/2022 0857  Last data filed at 12/10/2022 0500  Gross per 24 hour   Intake 3167.01 ml   Output 751 ml   Net 2416.01 ml       Vital Signs (Most Recent):  Temp: 97.9 °F (36.6 °C) (12/10/22 0415)  Pulse: (!) 111 (12/10/22 0630)  Resp: 17 (12/10/22 0630)  BP: (!) 101/58 (12/10/22 0630)  SpO2: 95 % (12/10/22 0630)    Body mass index is 22.14 kg/m².  Weight: 68 kg (150 lb) Vital Signs (24h Range):  Temp:  [97.6 °F (36.4 °C)-103.4 °F (39.7 °C)] 97.9 °F (36.6 °C)  Pulse:  [] 111  Resp:  [9-35] 17  SpO2:  [86 %-100 %] 95 %  BP: ()/(46-97) 101/58     Physical Exam  Constitutional:       General: He is not in acute distress.  HENT:      Head: Normocephalic and atraumatic.      Right Ear: External ear normal.      Left Ear: External ear normal.      Nose: Nose  normal.      Mouth/Throat:      Mouth: Mucous membranes are moist.   Eyes:      Extraocular Movements: Extraocular movements intact.      Conjunctiva/sclera: Conjunctivae normal.   Neck:      Comments: Well healing wound to right side of neck s/p abscess I&D, no active drainage  Cardiovascular:      Rate and Rhythm: Regular rhythm. Tachycardia present.      Heart sounds: No murmur heard.    No friction rub. No gallop.   Pulmonary:      Effort: No respiratory distress.      Breath sounds: Decreased air movement (RUL) present. Rhonchi (RLL/RUL) present. No wheezing or rales.   Chest:      Comments: Mediport in place to Left Upper Chest  Abdominal:      General: There is no distension.      Palpations: Abdomen is soft.      Tenderness: There is no abdominal tenderness. There is no guarding.      Comments: PEG tube in place, surrounding skin very slightly erythematous   Musculoskeletal:      Cervical back: Normal range of motion.      Right lower leg: No edema.      Left lower leg: No edema.   Skin:     General: Skin is warm and dry.      Capillary Refill: Capillary refill takes less than 2 seconds.   Neurological:      General: No focal deficit present.      Mental Status: He is alert. Mental status is at baseline.      Comments: Unable to speak s/p laryngectomy but is able to follow commands, mouth words, answer questions appropriately via nodding/shaking head         Mechanical ventilation support:  Oxygen Concentration (%): 40 (12/10/22 0540)    Lines/Drains/Airways       Peripherally Inserted Central Catheter Line  Duration             PICC Triple Lumen 12/09/22 1732 right basilic <1 day              Central Venous Catheter Line  Duration                  PowerPort A Cath Single Lumen left subclavian -- days              Drain  Duration                  Gastrostomy/Enterostomy 06/14/22 1415 Percutaneous endoscopic gastrostomy (PEG) feeding 178 days         Closed/Suction Drain 10/21/22 0900 Anterior;Right Neck Bulb  50 days              Airway  Duration                  Airway - Non-Surgical Other (Comment) -- days         Laryngectomy (Do Not Remove) Laryngectomy tube -- days              Peripheral Intravenous Line  Duration                  Peripheral IV - Single Lumen 12/09/22 1345 Anterior;Left Wrist <1 day                    Significant Labs:    Lab Results   Component Value Date    WBC 0.6 (LL) 12/10/2022    HGB 11.2 (L) 12/10/2022    HCT 32.9 (L) 12/10/2022    MCV 96.8 (H) 12/10/2022    PLT 71 (L) 12/10/2022         BMP  Lab Results   Component Value Date     (L) 12/10/2022    K 3.6 12/10/2022    CO2 20 (L) 12/10/2022    BUN 9.6 12/10/2022    CREATININE 0.85 12/10/2022    CALCIUM 8.9 12/10/2022    EGFRNONAA >60 08/02/2022       ABG  Recent Labs   Lab 12/09/22  1058   PH 7.51*   PO2 46*   PCO2 33*   HCO3 26.3*             Significant Imaging:  I have reviewed all pertinent imaging within the past 24 hours.        Assessment/Plan:     Assessment  Septic Shock 2/2 community acquired pneumonia/postobstructive pneumonia  Neutropenic Fever  Pancytopenia  Stage IV Laryngeal squamous cell carcinoma s/p laryngectomy, possible metastasis to lung vs primary lung squamous cell carcinoma  Hyponatremia (improved)    Plan  -Patient weaned off levophed  -Blood cultures, respiratory culture, respiratory fungal culture, respiratory panel, urine legionella, all pending  -CT PE negative for PE, though shows postobstructive pneumonia with cavitation in RUL as well as developing RLL consolidation  -With cavitary lesion, upon admission ordered MTB PCR x2, MRSA PCR, galactomanan, fungitell, fungal culture, as well as urine histoplasma and blastomyces antigens. All still pending except 1 MTB PCR which is negative  -Continue broad antimicrobial coverage with vancomycin, zosyn, doxycycline, and micafungin. Deescalate per cultures  -Patient now reporting watery diarrhea, will order C. Diff toxin, stool culture/profile, fecal  leukocytes  -Lactic acid WNL  -IDSA guidelines recommend against use of G-CSF in patients with neutropenic fevers  -Hold fluids today, received 30 cc/kg NS bolus in ED + additional maintenance. Patient's sodium this morning 135, do not want to increase it more in the acute recovery hyponatremic setting  -Restart PEG tube feeds  -Monitor electrolytes daily and replenish as needed  -Stable for transfer out of ICU    DVT Prophylaxis: Lovenox  GI Prophylaxis: Pepcheyenne Tapia MD  Pulmonary Critical Care Medicine  [facility] - Emergency Dept

## 2022-12-11 NOTE — CONSULTS
Inpatient Nutrition Assessment    Admit Date: 12/9/2022   Total duration of encounter: 2 days     Nutrition Recommendation/Prescription     Continue home tube feeds, Diabetisource bolus 6 cartons/day; water flushes 60 mL before and after each flush; to provide 1800 kcals, 90g pro, 1944mL fluid; Consider increasing to 7 cartons/day to better meet nutritional needs  SLP eval to determine appropriate oral diet texture/consistency  Replenish electrolytes as needed  Monitor TF tolerance, wt, labs    Communication of Recommendations: reviewed with patient/caregiver and reviewed with nurse    Nutrition Assessment     Malnutrition Assessment/Nutrition-Focused Physical Exam    Malnutrition in the context of chronic illness  Degree of Malnutrition: unable to complete  Energy Intake: does not meet criteria  Interpretation of Weight Loss: does not meet criteria  Body Fat: unable to obtain  Area of Body Fat Loss: unable to obtain  Muscle Mass Loss: unable to obtain  Area of Muscle Mass Loss: unable to obtain  Fluid Accumulation: does not meet criteria  Edema: does not meet criteria  Reduced  Strength: unable to obtain  A minimum of two characteristics is recommended for diagnosis of either severe or non-severe malnutrition.    Chart Review    Reason Seen: continuous nutrition monitoring, malnutrition screening tool, and physician consult    Diagnosis:Sepis 2/2 community acquired pneumonia/postobstructive pneumonia  Neutropenic Fever  Stage IV Laryngeal squamous cell carcinoma s/p laryngectomy, possible metastasis to lung vs primary lung squamous cell carcinoma  Hyponatremia  Thrombocytopenia  Hypokalemia  Hypophosphatemia    Relevant Medical History: stage IV squamous cell laryngeal cancer s/p completion of chemotherapy with carboplatin + taxol and radiation therapy, s/p laryngectomy now with Larytube, PEG dependent    Nutrition-Related Medications: famotidine, K phosphate, vanco  Calorie Containing IV Medications: no  "significant kcals from medications at this time    Nutrition-Related Labs: -K 2.9, Gluc 102, Sumeet 7.8, Pro Tot 4.8, Alb 1.7, GFR >60, Phos 1.6      Diet/PN Order: Diet NPO  Oral Supplement Order: none  Tube Feeding Order:  Diabetisource 6 cartons/day (see below for calculation)  Appetite/Oral Intake: NPO/NPO  Factors Affecting Nutritional Intake: diarrhea, difficulty/impaired swallowing, NPO, and PEG dependent  Food/Episcopal/Cultural Preferences:  Pt consumes Diabetisource 6 cartons/day at home  Food Allergies: no known food allergies       Wound(s): None      Comments  : RD consulted for tube feeds. Unable to visit pt at this time. Spoke with nsg over the phone, reports pt tolerating feeds well; receiving 6 cartons of Diabetisource/day (continuing home diet). Noted pt nonverbal. Spoke with pts wife over the phone, reports pt tolerating feeds pta. Wife reports pt's UBW ~155#; now 150#; wt loss not significant. Noted pt with some diarrhea, no other GI complaints. Unable to perform NFPE at this time; will attempt to perform on f/up.      Anthropometrics    Height: 5' 9.02" (175.3 cm) Height Method: Stated  Last Weight: 68 kg (150 lb) (22 2325) Weight Method: Bed Scale  BMI (Calculated): 22.1  BMI Classification: normal (BMI 18.5-24.9)        Ideal Body Weight (IBW), Male: 160.12 lb     % Ideal Body Weight, Male (lb): 93.68 %                 Usual Body Weight (UBW), k.45 kg (UBW ~155# per pt wife)  % Usual Body Weight: 96.78     Usual Weight Provided By: family/caregiver    Wt Readings from Last 5 Encounters:   22 68 kg (150 lb)   22 68.2 kg (150 lb 6.4 oz)   22 70.4 kg (155 lb 1.6 oz)   22 70.3 kg (155 lb)   22 68.9 kg (152 lb)     Weight Change(s) Since Admission:  Admit Weight: 68 kg (150 lb) (22 1035)  : UBW ~155# per pt wife; no recent significant wt loss noted    Estimated Needs    Weight Used For Calorie Calculations: 68 kg (149 lb 14.6 oz)  Energy " Calorie Requirements (kcal): 8038-2024 kcals (30-32 kcal/kg)  Energy Need Method: Kcal/kg  Weight Used For Protein Calculations: 68 kg (149 lb 14.6 oz)  Protein Requirements: 89-95g (1.3-1.4 g/kg)  Fluid Requirements (mL): 5340-6585 mL (1 mL/kcal)  Temp: 97.6 °F (36.4 °C)       Enteral Nutrition    Formula: Diabetisource AC  Rate/Volume: 250 mL, 6x day  Water Flushes: 60 mL before and after feeds  Additives/Modulars: none at this time  Route: PEG tube  Method: bolus  Total Nutrition Provided by Tube Feeding, Additives, and Flushes:  Calories Provided  1800 kcal/d, 88% needs   Protein Provided  90 g/d, 100% needs   Fluid Provided  1944 ml/d, 95% needs   Continuous feeding calculations based on estimated 20 hr/d run time unless otherwise stated.    Parenteral Nutrition    Patient not receiving parenteral nutrition support at this time.    Evaluation of Received Nutrient Intake    Calories: meeting estimated needs  Protein: meeting estimated needs    Patient Education    Not applicable.    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 (new)    Interventions/Goals     Intervention(s): modified rate of enteral nutrition and collaboration with other providers  Goal: Meet greater than 75% of nutritional needs by follow-up. (new)    Monitoring & Evaluation     Dietitian will monitor enteral nutrition intake, weight, electrolyte/renal panel, and glucose/endocrine profile.  Nutrition Risk/Follow-Up: low (follow-up in 5-7 days)   Please consult if re-assessment needed sooner.

## 2022-12-11 NOTE — PT/OT/SLP EVAL
Physical Therapy Evaluation  And Dicharge        Patient Name:  Josafat Boudreaux   MRN:  [id number]    Recommendations:     Discharge Recommendations:  home with home health   Discharge Equipment Recommendations: none   Barriers to discharge: None    Assessment:     Josafat Boudreaux is a 57 y.o. male admitted with a medical diagnosis of Neutropenic fever.  He presents with the following impairments/functional limitations:   (N/A).  Pt functioning at prior level of function.     Rehab Prognosis: Good; patient does not require skilled PT services 2/2 functioning at prior level of function..    Recent Surgery: * No surgery found *      Plan:      (Once, Evaluation only) Pt received PT evaluation and does not require further PT services.      Plan of Care Expires:  12/11/22    Subjective     Chief Complaint: None  Patient/Family Comments/goals: Pt wants to go home.  Pain/Comfort:  Pain Rating 1: 0/10     Pre session vitals Post session vitals   BP mmHG 100/68 110/65   HR bpm 101 113   Ox sats % 100 98       Patients cultural, spiritual, Buddhist conflicts given the current situation: no    Living Environment:  Pt lives w/ wife in a single story home, has 3 steps into home w/ left handrail.  Prior to admission, patients level of function was modified independent.  Equipment used at home: none.  DME owned (not currently used): BSC, SW, RW & SC.  Upon discharge, patient will have assistance from wife.    Objective:     Communicated with nurse prior to session.  Patient found HOB elevated with peripheral IV, PICC line, PEG Tube, oxygen, telemetry, pulse ox (continuous) (larynectomy tube)  upon PT entry to room.    General Precautions: Standard, neutropenic   Orthopedic Precautions:N/A   Braces: N/A  Respiratory Status:  trach collare    Exams:  Cognitive Exam:  Patient is oriented to Person, Place, Time, and Situation  Sensation:    -       Intact  RLE ROM: WFL  RLE Strength: WFL  LLE ROM: WFL  LLE Strength:  WFL    Functional Mobility:  Bed Mobility:     Rolling Left:  independence  Rolling Right: independence  Supine to Sit: independence  Sit to Supine: independence  Transfers:     Sit to Stand:  independence with no AD  Bed to Chair: independence with  no AD  using  Stand Pivot  Gait: 15' at independent; limited 2/2 reverse precautions.       Balance:   Static Sit: NORMAL: No deviations seen in posture held statically  Dynamic Sit: GOOD+: Maintains balance through MAXIMAL excursions of active trunk motion  Static Stand: GOOD+: Takes MAXIMAL challenges from all directions  Dynamic stand: GOOD+: Independent gait (with or without assistive device)         Patient left HOB elevated with all lines intact, call button in reach, nurse notified, and wife present.    GOALS:        Multidisciplinary Problems (Resolved)          Problem: Physical Therapy    Goal Priority Disciplines Outcome Goal Variances Interventions   Physical Therapy Goal   (Resolved)     PT, PT/OT Met     Description: PT goals:  1. Pt will demonstrate understanding of POC - MET                       History:     Past Medical History:   Diagnosis Date    Cancer     COPD (chronic obstructive pulmonary disease)     Dysphagia     Lung cancer     Vocal cord mass        Past Surgical History:   Procedure Laterality Date    DIRECT DIAGNOSTIC LARYNGOSCOPY WITH BRONCHOSCOPY AND ESOPHAGOSCOPY N/A 07/11/2022    Procedure: LARYNGOSCOPY, DIRECT, DIAGNOSTIC, WITH BRONCHOSCOPY AND ESOPHAGOSCOPY;  Surgeon: Emory Alonso MD;  Location: [facility];  Service: ENT;  Laterality: N/A;    HIP SURGERY      HUMERUS FRACTURE SURGERY      INCISION AND DRAINAGE, NECK Bilateral 10/21/2022    Procedure: INCISION AND DRAINAGE,NECK;  Surgeon: Madison Worley MD;  Location: [facility] OR;  Service: ENT;  Laterality: Bilateral;    INSERT ARTERIAL LINE  06/26/2022         JOINT REPLACEMENT  2019    R hip    TRACHEOSTOMY N/A 06/10/2022    Procedure: CREATION, TRACHEOSTOMY;  Surgeon: Junior ALMANZAR  MD Gavin;  Location: [facility];  Service: ENT;  Laterality: N/A;       Time Tracking:     PT Received On: 12/11/22  PT Start Time: 1030     PT Stop Time: 1115  PT Total Time (min): 45 min     Billable Minutes: Evaluation 45      12/11/2022

## 2022-12-11 NOTE — PROGRESS NOTES
[facility] Medicine Wards Progress Note     Provider Team: Internal Medicine List 3  Attending: Luis A Bocanegra MD  Resident: Teri Tapia    Date of Admit: 12/9/2022    Subjective:      Brief HPI:  Josafat Boudreaux is a 57 y.o. male with  PMH of stage IV squamous cell laryngeal cancer s/p completion of chemotherapy with carboplatin + taxol and radiation therapy, s/p laryngectomy now with Larytube, presents to [facility] on 12/9/2022 with chief complaint of shortness of breath. Patient is nonverbal (but can mouth words and appropriately nod yes/no to questions) so patient's wife assists with the history. She states she found the patient this morning with increased work of breathing, and placed a home O2 pulse oximeter on which revealed SPO2 of low 80s%, and heart rate of 140s-150s so they brought him to the ED. Also report that the patient has been having fevers at home. Patient did recently see ENT clinic on 12/6 when he had a fever of 101 at home and was given azithromycin to take via his PEG tube. Since then patient had not deteriorated until this morning. Patient denies any chest pain or palpitations. Denies nausea, vomiting, diarrhea, dysuria, or hematuria. Patient reportedly has history of aspiration prior to larytube and PEG placement. No known sick contacts. Patient did restart chemotherapy on 11/29 with carboplatin, taxol, and keytruda.      In the ED, patient was found to be hypoxic and started on supplemental oxygenation via NRB over his Larytube at 8 L/min. He was febrile to 103F. Labwork revealed a WBC of 0.3, ANC of 0.042, platelets of 95, sodium 124, potassium 3.3, lactic acid 1.0. CXR revealed consolidative opacities in right id and lower lung fields as well as right hilar fullness. Patient was given doses of ceftriaxone, azithromycin, as well as cefepime. His blood pressure was originally slightly elevated but while in the ED did deteriorate to 70s/40s. He was given 30 mL/kg NS bolus and also started on  "levophed. Internal medicine consulted for admission.    Interval History: No acute events overnight. Remained afebrile and hemodynamically stable. Patient states that he is feeling much better today, he is less short of breath. He denies chest pain or palpitations. He is still having some diarrhea though wife reports its physical consistency is thickening. Denies any additional concerns or complaints.     Objective:     Last 24 Hour Vital Signs:  Blood pressure 108/69, pulse 101, temperature 98.2 °F (36.8 °C), temperature source Oral, resp. rate 19, height 5' 9.02" (1.753 m), weight 68 kg (150 lb), SpO2 100 %.    Physical Exam  Constitutional:       General: He is not in acute distress.     Appearance: He is not ill-appearing.   HENT:      Head: Normocephalic and atraumatic.      Right Ear: External ear normal.      Left Ear: External ear normal.      Nose: Nose normal.      Mouth/Throat:      Mouth: Mucous membranes are moist.   Eyes:      Extraocular Movements: Extraocular movements intact.      Conjunctiva/sclera: Conjunctivae normal.   Neck:      Comments: Larytube in place  Cardiovascular:      Rate and Rhythm: Regular rhythm. Tachycardia present.      Heart sounds: No murmur heard.    No friction rub. No gallop.   Pulmonary:      Effort: No respiratory distress.      Breath sounds: Decreased air movement (RUL) present. Wheezing (trace expiratory) and rhonchi (RUL, RLL) present.   Abdominal:      General: There is no distension.      Palpations: Abdomen is soft.      Tenderness: There is no abdominal tenderness.      Comments: PEG tube in place. Very mild erythema around site. No fluctuance, no increased warmth   Musculoskeletal:      Right lower leg: No edema.      Left lower leg: No edema.   Skin:     General: Skin is warm and dry.   Neurological:      General: No focal deficit present.      Mental Status: He is alert. Mental status is at baseline.       Laboratory:  Most Recent Data:    Lab Results "   Component Value Date    WBC 1.7 (LL) 12/11/2022    HGB 9.6 (L) 12/11/2022    HCT 28.7 (L) 12/11/2022    MCV 98.0 (H) 12/11/2022    PLT 65 (L) 12/11/2022         BMP  Lab Results   Component Value Date     12/11/2022    K 2.9 (L) 12/11/2022    CO2 25 12/11/2022    BUN 11.6 12/11/2022    CREATININE 0.79 12/11/2022    CALCIUM 7.8 (L) 12/11/2022    EGFRNONAA >60 08/02/2022       Radiology:  Imaging Results              X-Ray Chest 1 View (Final result)  Result time 12/09/22 17:41:14      Final result by Jackie Loaiza MD (12/09/22 17:41:14)                   Impression:      Interval placement of right-sided PICC line with the SVC    Otherwise unchanged      Electronically signed by: Jackie Loaiza  Date:    12/09/2022  Time:    17:41               Narrative:    EXAMINATION:  XR CHEST 1 VIEW    CLINICAL HISTORY:  Encounter for adjustment and management of vascular access device    TECHNIQUE:  Single frontal view of the chest was performed.    COMPARISON:  12/09/2022    FINDINGS:  There is a right upper lobe interstitial infiltrate.  There is also underlying chronic lung changes bilaterally.  The heart is normal appearance.  Pulmonary vascularity is unremarkable.  There is a port in the left anterior chest wall.  There is a right sided PICC line with the tip in the SVC.                                        CTA Chest Non-Coronary (PE Studies) (Final result)  Result time 12/09/22 15:49:23      Final result by Haja Paulson MD (12/09/22 15:49:23)                   Narrative:    EXAMINATION  CTA CHEST NON CORONARY (PE STUDIES)    CLINICAL HISTORY  Pulmonary embolism (PE) suspected, unknown D-dimer; Chest pain, unspecified    TECHNIQUE  Post-contrast helical-acquisition CT images were obtained, with bolus timing to pulmonary arterial system for purposes of PE protocol CTA.  Multiplanar reconstructions, including MIP images, were accomplished by a CT technologist at a separate workstation, pushed to  PACS for physician review.    CONTRAST  IV: Omnipaque 300, 100 mL    COMPARISON  None available at the time of initial interpretation.    FINDINGS  Images were reviewed in soft tissue, lung, and bone windows.    Exam quality: adequate for evaluation    Lines/tubes: Left chest wall subcutaneous port and associated catheter are similar in the interval.  Tracheostomy remains in place.    Pulmonary Vessels: No central or large segmental filling defect.    Cardiovascular: No suggestion of right heart strain; the RV:LV ratio is <1. No significant heart chamber enlargement. There is no pericardial effusion. No focal contour irregularity or intraluminal abnormality is appreciated involving the visualized aorta and branch vessels.    Lungs/Pleura: Central airways are patent. There is increased soft tissue density encasing the right hilar vasculature, with associated progression of postobstructive right upper and middle lobe partial consolidation.  Development of a cavitary component at the anterior right upper lobe is also noted (series 14, image 40).  Additional scattered ill-defined areas of consolidative and ground-glass attenuation are also present through the remaining bilateral lung fields.  No pleural thickening, significant effusion, or evidence of pneumothorax.    Other Findings: Worsened mediastinal adenopathy is present.  For example, right precarinal node now measures approximately 19 mm short axis (series 6, image 43), while a subcarinal node measures approximately 25 mm in shortest dimension.  The visualized thyroid is unremarkable. No acute or suspicious focal abnormality through the visualized upper abdomen. No abnormality of the thoracic wall soft tissues. No acute osseous displacement or worsening destructive skeletal lesion is visualized.  Chronic degenerative alterations of the spinal column and midthoracic vertebral body compression deformity are similar to the prior study.    IMPRESSION  1. No central  or segmental pulmonary thromboembolism.  2. Diffusely worsened mediastinal adenopathy and right hilar soft tissue.  3. Worsening postobstructive consolidation with right upper lobe cavitary component, as well as widespread bilateral less pronounced areas of airspace abnormality.  Findings may represent sequela of infectious or inflammatory process, although there is elevated concern for metastatic involvement.  4. Additional chronic secondary details are similar to the 24 October 2022 CT appearance.  ==========    This report was flagged in Epic as abnormal.    RADIATION DOSE  Automated tube current modulation, weight-based exposure dosing, and/or iterative reconstruction technique utilized to reach lowest reasonably achievable exposure rate.    DLP: 194 mGy*cm      Electronically signed by: Haja Paulson  Date:    12/09/2022  Time:    15:49                                     X-Ray Chest 1 View (Final result)  Result time 12/09/22 14:40:08      Final result by Jos Taylor MD (12/09/22 14:40:08)                   Impression:      1. Right hilar fullness compatible with known neoplasm.  2. Interval patchy consolidative opacities within the right mid and lower lung and bilateral fairly diffuse interstitial opacities suspicious for pneumonia.  Progression of malignancy would be within the differential.      Electronically signed by: Jos Taylor MD  Date:    12/09/2022  Time:    14:40               Narrative:    EXAMINATION:  XR CHEST 1 VIEW    CLINICAL HISTORY:  Shortness of breath, tachycardia, lung cancer.    COMPARISON:  10/18/2022    FINDINGS:  There is right hilar fullness demonstrated corresponds to known neoplasm.  There are patchy consolidative opacities identified within the right mid and lower lung in addition to bilateral interstitial opacities.  Findings would favor pneumonia.  Progression of malignancy would be within the differential.  There is no pleural effusion.  There is no pneumothorax.   Vascular calcifications are present.  Jorge catheter is noted.  The cardiac silhouette is within normal limits for size.   No acute displaced fracture is seen.                                        Assessment & Plan:     Sepis 2/2 community acquired pneumonia/postobstructive pneumonia  Neutropenic Fever  -Weaned off pressors 12/10 and downgraded to telemetry  -No fevers since night of 12/9. WBC improving, 1.7 this morning  -CT PE negative for PE, though shows postobstructive pneumonia with cavitation in RUL as well as developing RLL consolidation  -Respiratory cultures growing gram negative cocci  -Blood cultures no growth at 24 hours, fungal cultures pending  -Urine legionella pending  -MTB PCR negative x2, can discontinue droplet precautions  -MRSA PCR negative  -Serum galactomanan, fungitell, urine histoplasma/blastomyces antigens pending  -Stool culture pending. C.diff toxin antigen negative. Fecal leukocytes positive  -Continue broad coverage with vancomycin, zosyn, doxycycline, micafungin. Likely can d/c vanco tomorrow if blood cultures negative at 48 hours, micafungin if fungal culture is negative  -IDSA guidelines recommend against use of G-CSF in patients with neutropenic fevers, and WBC is recovering    Stage IV Laryngeal squamous cell carcinoma s/p laryngectomy, possible metastasis to lung vs primary lung squamous cell carcinoma  - s/p laryngectomy, previously had tracheostomy but had aspirated and PEG was placed  - Continue tube feeds via PEG per home regimen  - Patient will need to discuss with his oncologist planned chemotherapy treatment given that he became neutropenic after receiving keytruda    Hyponatremia  - Improved, Na 136 this morning    Thrombocytopenia  - Discontinued lovenox and switched to prophylactic fondaparinux    Hypokalemia  Hypophosphatemia  - Ordered 50 mEq Effer-K and 30 mmol Kphos  - Continue to monitor with daily labs    Antibiotics: Vanc, zosyn, doxycycline. Also on  micafungin  Diet: PEG feeds  DVT Prophylaxis: Fondaparinux  GI Prophylaxis: Pepcid    Disposition: Continue inpatient management. Clinically improving. Still pending most of ID workup. Resp culture with gram (-) rods. Can possibly deescalate antimicrobials tomorrow      Aj Tapia MD  [employer] [facility], PGY2

## 2022-12-12 NOTE — PROGRESS NOTES
[facility] Medicine Wards Progress Note     Provider Team: Internal Medicine List 3  Attending: Luis A Bocanegra MD  Resident: Teri Tapia    Date of Admit: 12/9/2022    Subjective:      Brief HPI:  Josafat Boudreaux is a 57 y.o. male with  PMH of stage IV squamous cell laryngeal cancer s/p completion of chemotherapy with carboplatin + taxol and radiation therapy, s/p laryngectomy now with Larytube, presents to [facility] on 12/9/2022 with chief complaint of shortness of breath. Patient is nonverbal (but can mouth words and appropriately nod yes/no to questions) so patient's wife assists with the history. She states she found the patient this morning with increased work of breathing, and placed a home O2 pulse oximeter on which revealed SPO2 of low 80s%, and heart rate of 140s-150s so they brought him to the ED. Also report that the patient has been having fevers at home. Patient did recently see ENT clinic on 12/6 when he had a fever of 101 at home and was given azithromycin to take via his PEG tube. Since then patient had not deteriorated until this morning. Patient denies any chest pain or palpitations. Denies nausea, vomiting, diarrhea, dysuria, or hematuria. Patient reportedly has history of aspiration prior to larytube and PEG placement. No known sick contacts. Patient did restart chemotherapy on 11/29 with carboplatin, taxol, and keytruda.      In the ED, patient was found to be hypoxic and started on supplemental oxygenation via NRB over his Larytube at 8 L/min. He was febrile to 103F. Labwork revealed a WBC of 0.3, ANC of 0.042, platelets of 95, sodium 124, potassium 3.3, lactic acid 1.0. CXR revealed consolidative opacities in right id and lower lung fields as well as right hilar fullness. Patient was given doses of ceftriaxone, azithromycin, as well as cefepime. His blood pressure was originally slightly elevated but while in the ED did deteriorate to 70s/40s. He was given 30 mL/kg NS bolus and also started on  "levophed. Internal medicine consulted for admission.    Interval History: No acute events overnight. Remained afebrile and hemodynamically stable. Patient states that he is feeling much better today, he is less short of breath. He denies chest pain or palpitations. No other issues today.      Objective:     Last 24 Hour Vital Signs:  Blood pressure 105/61, pulse 96, temperature 99.1 °F (37.3 °C), resp. rate 18, height 5' 9.02" (1.753 m), weight 68 kg (150 lb), SpO2 99 %.    Physical Exam  Constitutional:       General: He is not in acute distress.     Appearance: He is not ill-appearing.   HENT:      Head: Normocephalic and atraumatic.      Right Ear: External ear normal.      Left Ear: External ear normal.      Nose: Nose normal.      Mouth/Throat:      Mouth: Mucous membranes are moist.   Eyes:      Extraocular Movements: Extraocular movements intact.      Conjunctiva/sclera: Conjunctivae normal.   Neck:      Comments: Larytube in place  Cardiovascular:      Rate and Rhythm: Normal rate and regular rhythm.      Heart sounds: No murmur heard.    No friction rub. No gallop.   Pulmonary:      Effort: No respiratory distress.      Breath sounds: Decreased air movement (RUL) present. Wheezing (trace expiratory) and rhonchi (RUL, RLL) present.   Abdominal:      General: There is no distension.      Palpations: Abdomen is soft.      Tenderness: There is no abdominal tenderness.      Comments: PEG tube in place. Very mild erythema around site. No fluctuance, no increased warmth   Musculoskeletal:      Right lower leg: No edema.      Left lower leg: No edema.   Skin:     General: Skin is warm and dry.   Neurological:      General: No focal deficit present.      Mental Status: He is alert. Mental status is at baseline.       Laboratory:  Most Recent Data:    Lab Results   Component Value Date    WBC 3.3 (L) 12/12/2022    HGB 10.2 (L) 12/12/2022    HCT 30.3 (L) 12/12/2022    MCV 97.7 (H) 12/12/2022    PLT 66 (L) 12/12/2022 "         BMP  Lab Results   Component Value Date     12/12/2022    K 2.9 (L) 12/12/2022    CO2 28 12/12/2022    BUN 8.5 12/12/2022    CREATININE 0.72 (L) 12/12/2022    CALCIUM 9.1 12/12/2022    EGFRNONAA >60 08/02/2022       Radiology:  Imaging Results              X-Ray Chest 1 View (Final result)  Result time 12/09/22 17:41:14      Final result by Jackie Loaiza MD (12/09/22 17:41:14)                   Impression:      Interval placement of right-sided PICC line with the SVC    Otherwise unchanged      Electronically signed by: Jackie Loaiza  Date:    12/09/2022  Time:    17:41               Narrative:    EXAMINATION:  XR CHEST 1 VIEW    CLINICAL HISTORY:  Encounter for adjustment and management of vascular access device    TECHNIQUE:  Single frontal view of the chest was performed.    COMPARISON:  12/09/2022    FINDINGS:  There is a right upper lobe interstitial infiltrate.  There is also underlying chronic lung changes bilaterally.  The heart is normal appearance.  Pulmonary vascularity is unremarkable.  There is a port in the left anterior chest wall.  There is a right sided PICC line with the tip in the SVC.                                        CTA Chest Non-Coronary (PE Studies) (Final result)  Result time 12/09/22 15:49:23      Final result by Haja Paulson MD (12/09/22 15:49:23)                   Narrative:    EXAMINATION  CTA CHEST NON CORONARY (PE STUDIES)    CLINICAL HISTORY  Pulmonary embolism (PE) suspected, unknown D-dimer; Chest pain, unspecified    TECHNIQUE  Post-contrast helical-acquisition CT images were obtained, with bolus timing to pulmonary arterial system for purposes of PE protocol CTA.  Multiplanar reconstructions, including MIP images, were accomplished by a CT technologist at a separate workstation, pushed to PACS for physician review.    CONTRAST  IV: Omnipaque 300, 100 mL    COMPARISON  None available at the time of initial interpretation.    FINDINGS  Images were  reviewed in soft tissue, lung, and bone windows.    Exam quality: adequate for evaluation    Lines/tubes: Left chest wall subcutaneous port and associated catheter are similar in the interval.  Tracheostomy remains in place.    Pulmonary Vessels: No central or large segmental filling defect.    Cardiovascular: No suggestion of right heart strain; the RV:LV ratio is <1. No significant heart chamber enlargement. There is no pericardial effusion. No focal contour irregularity or intraluminal abnormality is appreciated involving the visualized aorta and branch vessels.    Lungs/Pleura: Central airways are patent. There is increased soft tissue density encasing the right hilar vasculature, with associated progression of postobstructive right upper and middle lobe partial consolidation.  Development of a cavitary component at the anterior right upper lobe is also noted (series 14, image 40).  Additional scattered ill-defined areas of consolidative and ground-glass attenuation are also present through the remaining bilateral lung fields.  No pleural thickening, significant effusion, or evidence of pneumothorax.    Other Findings: Worsened mediastinal adenopathy is present.  For example, right precarinal node now measures approximately 19 mm short axis (series 6, image 43), while a subcarinal node measures approximately 25 mm in shortest dimension.  The visualized thyroid is unremarkable. No acute or suspicious focal abnormality through the visualized upper abdomen. No abnormality of the thoracic wall soft tissues. No acute osseous displacement or worsening destructive skeletal lesion is visualized.  Chronic degenerative alterations of the spinal column and midthoracic vertebral body compression deformity are similar to the prior study.    IMPRESSION  1. No central or segmental pulmonary thromboembolism.  2. Diffusely worsened mediastinal adenopathy and right hilar soft tissue.  3. Worsening postobstructive consolidation  with right upper lobe cavitary component, as well as widespread bilateral less pronounced areas of airspace abnormality.  Findings may represent sequela of infectious or inflammatory process, although there is elevated concern for metastatic involvement.  4. Additional chronic secondary details are similar to the 24 October 2022 CT appearance.  ==========    This report was flagged in Epic as abnormal.    RADIATION DOSE  Automated tube current modulation, weight-based exposure dosing, and/or iterative reconstruction technique utilized to reach lowest reasonably achievable exposure rate.    DLP: 194 mGy*cm      Electronically signed by: Haja Paulson  Date:    12/09/2022  Time:    15:49                                     X-Ray Chest 1 View (Final result)  Result time 12/09/22 14:40:08      Final result by Jos Taylor MD (12/09/22 14:40:08)                   Impression:      1. Right hilar fullness compatible with known neoplasm.  2. Interval patchy consolidative opacities within the right mid and lower lung and bilateral fairly diffuse interstitial opacities suspicious for pneumonia.  Progression of malignancy would be within the differential.      Electronically signed by: Jos Taylor MD  Date:    12/09/2022  Time:    14:40               Narrative:    EXAMINATION:  XR CHEST 1 VIEW    CLINICAL HISTORY:  Shortness of breath, tachycardia, lung cancer.    COMPARISON:  10/18/2022    FINDINGS:  There is right hilar fullness demonstrated corresponds to known neoplasm.  There are patchy consolidative opacities identified within the right mid and lower lung in addition to bilateral interstitial opacities.  Findings would favor pneumonia.  Progression of malignancy would be within the differential.  There is no pleural effusion.  There is no pneumothorax.  Vascular calcifications are present.  Jorge catheter is noted.  The cardiac silhouette is within normal limits for size.   No acute displaced fracture is seen.                                         Assessment & Plan:     Sepis 2/2 community acquired pneumonia/postobstructive pneumonia  Neutropenic Fever with bandemia ( bands of 26 )  -Weaned off pressors 12/10 and downgraded to telemetry  -No fevers since night of 12/9. WBC improving, 3.3 this morning with bands of 22  -CT PE negative for PE, though shows postobstructive pneumonia with cavitation in RUL as well as developing RLL consolidation  -Respiratory cultures growing gram negative cocci  -Blood cultures no growth at 48 hours hours; vancomycin discontinued today   -fungal cultures pending  -Urine legionella pending  -MTB PCR negative x2, discontinued droplet precautions  -MRSA PCR negative; GI panel neg   -Serum galactomanan, fungitell, urine histoplasma/blastomyces antigens pending  -Stool culture pending. C.diff toxin antigen negative. Fecal leukocytes positive  -Deescalated abx coverage to  zosyn, doxycycline, micafungin.   -IDSA guidelines recommend against use of G-CSF in patients with neutropenic fevers, and WBC is recovering  -Consulted speech at this time     Stage IV Laryngeal squamous cell carcinoma s/p laryngectomy, possible metastasis to lung vs primary lung squamous cell carcinoma  - s/p laryngectomy, previously had tracheostomy but had aspirated and PEG was placed  - Continue tube feeds via PEG per home regimen  - Patient will need to discuss with his oncologist planned chemotherapy treatment given that he became neutropenic after receiving keytruda    Hyponatremia  - Improved, Na 138 this morning    Thrombocytopenia  - Continue prophylactic fondaparinux    Hypokalemia  Hypophosphatemia  - k of 2.9 this morning; Repletion with oral and IV potassium   - phosph of 2.2; repleting with k phosph   - Continue to monitor with daily labs    Antibiotics: zosyn, doxycycline. Also on micafungin  Diet: PEG feeds  DVT Prophylaxis: Fondaparinux  GI Prophylaxis: Pepcid    Disposition: Continue inpatient management.  Clinically improving. Weaning oxygen down. Pending ID work ups    Elizabeth Magdaleno DO  [facility], PGY1

## 2022-12-12 NOTE — PLAN OF CARE
Problem: Occupational Therapy  Goal: Occupational Therapy Goal  Description: Patient will perform grooming with SPV assist while standing at sink, maintaining O2 sats above 95% throughout O2 weaning.    Patient will perform toilet transfer with SPV assist while maintaining O2 sats above 95%    Patient will perform dressing, grooming, and toileting routine while ambulating in room and during ambulating in irene to retrieve coffee while maintaining O2 sats above 95% during O2 weaning as tolerated.  Outcome: Ongoing, Progressing

## 2022-12-12 NOTE — PT/OT/SLP EVAL
Occupational Therapy   Evaluation    Name: Josafat Boudreaux  MRN: [id number]  Admitting Diagnosis: Neutropenic fever  Recent Surgery: * No surgery found *      Recommendations:     Discharge Recommendations: home with home health  Discharge Equipment Recommendations:  none  Barriers to discharge:       Assessment:     Josafat Boudreaux is a 57 y.o. male with a medical diagnosis of Neutropenic fever.  He presents with O2 saturation dropping with activity on room air. Performance deficits affecting function: weakness, impaired endurance, impaired cardiopulmonary response to activity.      Rehab Prognosis: Good; patient would benefit from acute skilled OT services to address these deficits and reach maximum level of function.       Plan:     Patient to be seen 2 x/week, 5 x/week (M-F) to address the above listed problems via self-care/home management, therapeutic activities, therapeutic exercises  Plan of Care Expires:    Plan of Care Reviewed with: patient, spouse    Subjective     Chief Complaint: pt indicating he does not want to be on home O2 and wants to be rid of lines and return home  Patient/Family Comments/goals: return home at with his wife at [facility]    Occupational Profile:  Living Environment: [facility] with 3 steps to enter, handrail at L side  Previous level of function: mod I with ADLs and mobility, wife assisted with sponge bath  Roles and Routines: did not state  Equipment Used at Home: bedside commode, shower chair, walker, rolling, other (see comments) (pt has but does not use currently)  Assistance upon Discharge: SBA from wife for mobility, min A bathing from wife, recommend home health    Pain/Comfort:  Pain Rating 1: 0/10    Patients cultural, spiritual, Taoism conflicts given the current situation: no    Objective:     Communicated with: nurse prior to session.  Patient found HOB elevated with peripheral IV, PICC line, PEG Tube, oxygen, telemetry upon OT entry to room.    General Precautions:  Standard, neutropenic  Orthopedic Precautions:    Braces:    Respiratory Status:  trach collar 10 L over larynectomy tube, O2 sat 99% seated EOB initially, but dropped to 85% and HR increased to 125 after ambulating to toilet, then sink for grooming.  Pt returned to O2 trach collar when seated EOB and sat quickly increased to 98%,     Occupational Performance:    Bed Mobility:    Mod I    Functional Mobility/Transfers:  Patient completed Sit <> Stand Transfer with stand by assistance  with  no assistive device   Patient completed Toilet Transfer Step Transfer technique with stand by assistance with  help to manage IV pole/lines  Functional Mobility: SBA in room with O2 monitored throughout    Activities of Daily Living:  Feeding:  independence .  Grooming: modified independence standing at sink  Upper Body Dressing: minimum assistance d/t hx of R humeral fx  Lower Body Dressing: stand by assistance .  Toileting: stand by assistance .    Cognitive/Visual Perceptual:  Cognitive/Psychosocial Skills:     -       Oriented to: Person, Place, and Situation   -       Follows Commands/attention:Follows two-step commands  -       Safety awareness/insight to disability: impaired and pt required cues for pacing d/t impulsivity and lacking care/respect for O2 requirements     Physical Exam:  LUE AROM WNL, strength 4/5  RUE AROM WNL except at shoulder, limited to 120 degrees flexion by hx of humeral fx, strength grossly 4/5, 3-/5 at shoulder    AMPAC 6 Click ADL:  AMPAC Total Score:      Treatment & Education:  Use of call bell for assist with all transfers OOB d/t IV pole and O2 sats dropping on room air with activity, will need an O2 tank to ambulate for now, OT goals and POC, Dc planning/DME recommendations    Patient left sitting edge of bed with all lines intact, call button in reach, and wife present    GOALS:   Multidisciplinary Problems       Occupational Therapy Goals          Problem: Occupational Therapy    Goal  Priority Disciplines Outcome Interventions   Occupational Therapy Goal     OT, PT/OT Ongoing, Progressing    Description: Patient will perform grooming with SPV assist while standing at sink, maintaining O2 sats above 95% throughout O2 weaning.    Patient will perform toilet transfer with SPV assist while maintaining O2 sats above 95%    Patient will perform dressing, grooming, and toileting routine while ambulating in room and during ambulating in irene to retrieve coffee while maintaining O2 sats above 95% during O2 weaning as tolerated.                       History:     Past Medical History:   Diagnosis Date    Cancer     COPD (chronic obstructive pulmonary disease)     Dysphagia     Lung cancer     Vocal cord mass          Past Surgical History:   Procedure Laterality Date    DIRECT DIAGNOSTIC LARYNGOSCOPY WITH BRONCHOSCOPY AND ESOPHAGOSCOPY N/A 07/11/2022    Procedure: LARYNGOSCOPY, DIRECT, DIAGNOSTIC, WITH BRONCHOSCOPY AND ESOPHAGOSCOPY;  Surgeon: Emory Alonso MD;  Location: [facility];  Service: ENT;  Laterality: N/A;    HIP SURGERY      HUMERUS FRACTURE SURGERY      INCISION AND DRAINAGE, NECK Bilateral 10/21/2022    Procedure: INCISION AND DRAINAGE,NECK;  Surgeon: Madison Worley MD;  Location: [facility];  Service: ENT;  Laterality: Bilateral;    INSERT ARTERIAL LINE  06/26/2022         JOINT REPLACEMENT  2019    R hip    TRACHEOSTOMY N/A 06/10/2022    Procedure: CREATION, TRACHEOSTOMY;  Surgeon: Junior Alonso MD;  Location: [facility];  Service: ENT;  Laterality: N/A;       Time Tracking:     OT Date of Treatment: 12/12/22  OT Start Time: 0940  OT Stop Time: 1015  OT Total Time (min): 35 min    Billable Minutes:Evaluation 35    12/12/2022

## 2022-12-12 NOTE — PLAN OF CARE
12/12/22 1136   Discharge Assessment   Assessment Type Discharge Planning Assessment   Confirmed/corrected address, phone number and insurance Yes   Confirmed Demographics Correct on Facesheet   Source of Information patient;family   Communicated MOISES with patient/caregiver Date not available/Unable to determine   Reason For Admission SOB/Laryngeal cancer/Hyponutremia   People in Home spouse   Facility Arrived From: home   Do you expect to return to your current living situation? Yes   Do you have help at home or someone to help you manage your care at home? Yes   Who are your caregiver(s) and their phone number(s)? Spouse/Candi Boudreaux ([phone])   Prior to hospitilization cognitive status: Alert/Oriented   Current cognitive status: Alert/Oriented   Home Accessibility not wheelchair accessible   Home Layout Able to live on 1st floor   Equipment Currently Used at Home bedside commode;cane, quad;nebulizer;shower chair;rollator;other (see comments)  (Wife states pt used to have home 02 through [facility] but they have recently returned it as pt no longer needing it.)   Readmission within 30 days? No   Do you currently have service(s) that help you manage your care at home? No   Do you take prescription medications? Yes   Do you have prescription coverage? Yes   Coverage Medicare Part A&B   Do you have any problems affording any of your prescribed medications? No   Is the patient taking medications as prescribed? yes   Who is going to help you get home at discharge? Yessy Boudreaux   How do you get to doctors appointments? family or friend will provide   Are you on dialysis? No   Discharge Plan A Home Health  ([facility])   DME Needed Upon Discharge  none   Discharge Plan discussed with: Spouse/sig other;Patient   Discharge Barriers Identified None   Financial Resource Strain   How hard is it for you to pay for the very basics like food, housing, medical care, and heating? Not hard   Housing  Stability   In the last 12 months, was there a time when you were not able to pay the mortgage or rent on time? N   In the last 12 months, was there a time when you did not have a steady place to sleep or slept in a shelter (including now)? N   Transportation Needs   In the past 12 months, has lack of transportation kept you from medical appointments or from getting medications? no   In the past 12 months, has lack of transportation kept you from meetings, work, or from getting things needed for daily living? No   Food Insecurity   Within the past 12 months, you worried that your food would run out before you got the money to buy more. Never true   Within the past 12 months, the food you bought just didn't last and you didn't have money to get more. Never true     Pt screened through wife Yessy Boudreaux [phone]. She provides care for the pt at home. Pt is current with [facility], and the wife would like to resume care with them at [place]. Pt is only receiving nursing services through  agency at the time. Wife states that she thinks this is all he will need at [facility]. CM will send referral to [facility] via [facility]. Wife states that pt used to have home 02 but she recently sent it back. CM to watch incase pt needs 02 at [facility] again. CM to follow clinical course.

## 2022-12-12 NOTE — PROGRESS NOTES
SLP receive consult for evaluation and treat per Dr. Magdaleno. Chart review completed. Pt is known to SLP as he is a total laryngectomy. He is currently without a voice prosthesis which was confirmed per pt and wife this date. SLP evaluation is not warranted at this time as SLP unable to address alaryngeal communication due no voice prosthesis currently in place. Additionally, pt is not at risk for aspiration as a result of surgical reconstruction from total laryngectomy. SLP contacted Team 3 and spoke with Dr. Magdaleno to discuss above. SLP order to be discontinued.     Courtney Padilla MS, CCC-SLP  12/12/2022

## 2022-12-13 NOTE — PLAN OF CARE
Problem: Impaired Wound Healing  Goal: Optimal Wound Healing  Outcome: Ongoing, Progressing     Problem: Neutropenia  Goal: Absence of Infection  Outcome: Ongoing, Progressing

## 2022-12-13 NOTE — PROGRESS NOTES
CM sent [facility] referral and orders to [facility] for resumption of care. CM will follow up to confirm that they will accept.

## 2022-12-13 NOTE — PROGRESS NOTES
[facility] Medicine Wards Progress Note     Provider Team: Internal Medicine List 3  Attending: Luis A Bocanegra MD  Resident: Teri Tapia    Date of Admit: 12/9/2022    Subjective:      Brief HPI:  Josafat Boudreaux is a 57 y.o. 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 on 12/9/2022 with chief complaint of shortness of breath. Patient is nonverbal (but can mouth words and appropriately nod yes/no to questions) so patient's wife assists with the history. She states she found the patient this morning with increased work of breathing, and placed a home O2 pulse oximeter on which revealed SPO2 of low 80s%, and heart rate of 140s-150s so they brought him to the ED. Also report that the patient has been having fevers at home. Patient did recently see ENT clinic on 12/6 when he had a fever of 101 at home and was given azithromycin to take via his PEG tube. Since then patient had not deteriorated until this morning. Patient denies any chest pain or palpitations. Denies nausea, vomiting, diarrhea, dysuria, or hematuria. Patient reportedly has history of aspiration prior to larytube and PEG placement. No known sick contacts. Patient did restart chemotherapy on 11/29 with carboplatin, taxol, and keytruda.      In the ED, patient was found to be hypoxic and started on supplemental oxygenation via NRB over his Larytube at 8 L/min. He was febrile to 103F. Labwork revealed a WBC of 0.3, ANC of 0.042, platelets of 95, sodium 124, potassium 3.3, lactic acid 1.0. CXR revealed consolidative opacities in right id and lower lung fields as well as right hilar fullness. Patient was given doses of ceftriaxone, azithromycin, as well as cefepime. His blood pressure was originally slightly elevated but while in the ED did deteriorate to 70s/40s. He was given 30 mL/kg NS bolus and also started on  "levophed. Internal medicine consulted for admission.    Interval History: No acute events overnight. Remained afebrile and hemodynamically stable. Patient states that he is feeling much better today, he is less short of breath. He denies chest pain or palpitations. Complained of being tired today but patient has not been able to sleep at night as much since being in the hospital.  Will continue to wean oxygen today.       Objective:     Last 24 Hour Vital Signs:  Blood pressure 128/74, pulse 110, temperature 98.2 °F (36.8 °C), temperature source Oral, resp. rate 18, height 5' 9.02" (1.753 m), weight 68 kg (150 lb), SpO2 96 %.    Physical Exam  Constitutional:       General: He is not in acute distress.     Appearance: He is not ill-appearing.   HENT:      Head: Normocephalic and atraumatic.      Right Ear: External ear normal.      Left Ear: External ear normal.      Nose: Nose normal.      Mouth/Throat:      Mouth: Mucous membranes are moist.   Eyes:      Extraocular Movements: Extraocular movements intact.      Conjunctiva/sclera: Conjunctivae normal.   Neck:      Comments: Larytube in place  Cardiovascular:      Rate and Rhythm: Normal rate and regular rhythm.      Heart sounds: No murmur heard.    No friction rub. No gallop.   Pulmonary:      Effort: No respiratory distress.      Breath sounds: Decreased air movement (RUL) present. Rhonchi (RUL, RLL) present. No wheezing (trace expiratory).   Abdominal:      General: There is no distension.      Palpations: Abdomen is soft.      Tenderness: There is no abdominal tenderness.      Comments: PEG tube in place. Very mild erythema around site. No fluctuance, no increased warmth   Musculoskeletal:      Right lower leg: No edema.      Left lower leg: No edema.   Skin:     General: Skin is warm and dry.   Neurological:      General: No focal deficit present.      Mental Status: He is alert. Mental status is at baseline.       Laboratory:  Most Recent Data:    Lab " Results   Component Value Date    WBC 4.5 12/13/2022    HGB 10.1 (L) 12/13/2022    HCT 29.8 (L) 12/13/2022    MCV 97.4 (H) 12/13/2022    PLT 81 (L) 12/13/2022         BMP  Lab Results   Component Value Date     12/13/2022    K 3.5 12/13/2022    CO2 28 12/13/2022    BUN 7.9 (L) 12/13/2022    CREATININE 0.66 (L) 12/13/2022    CALCIUM 9.4 12/13/2022    EGFRNONAA >60 08/02/2022       Radiology:  Imaging Results              X-Ray Chest 1 View (Final result)  Result time 12/09/22 17:41:14      Final result by Jackie Loaiza MD (12/09/22 17:41:14)                   Impression:      Interval placement of right-sided PICC line with the SVC    Otherwise unchanged      Electronically signed by: Jackie Loaiza  Date:    12/09/2022  Time:    17:41               Narrative:    EXAMINATION:  XR CHEST 1 VIEW    CLINICAL HISTORY:  Encounter for adjustment and management of vascular access device    TECHNIQUE:  Single frontal view of the chest was performed.    COMPARISON:  12/09/2022    FINDINGS:  There is a right upper lobe interstitial infiltrate.  There is also underlying chronic lung changes bilaterally.  The heart is normal appearance.  Pulmonary vascularity is unremarkable.  There is a port in the left anterior chest wall.  There is a right sided PICC line with the tip in the SVC.                                        CTA Chest Non-Coronary (PE Studies) (Final result)  Result time 12/09/22 15:49:23      Final result by Haja Paulson MD (12/09/22 15:49:23)                   Narrative:    EXAMINATION  CTA CHEST NON CORONARY (PE STUDIES)    CLINICAL HISTORY  Pulmonary embolism (PE) suspected, unknown D-dimer; Chest pain, unspecified    TECHNIQUE  Post-contrast helical-acquisition CT images were obtained, with bolus timing to pulmonary arterial system for purposes of PE protocol CTA.  Multiplanar reconstructions, including MIP images, were accomplished by a CT technologist at a separate workstation, pushed to  PACS for physician review.    CONTRAST  IV: Omnipaque 300, 100 mL    COMPARISON  None available at the time of initial interpretation.    FINDINGS  Images were reviewed in soft tissue, lung, and bone windows.    Exam quality: adequate for evaluation    Lines/tubes: Left chest wall subcutaneous port and associated catheter are similar in the interval.  Tracheostomy remains in place.    Pulmonary Vessels: No central or large segmental filling defect.    Cardiovascular: No suggestion of right heart strain; the RV:LV ratio is <1. No significant heart chamber enlargement. There is no pericardial effusion. No focal contour irregularity or intraluminal abnormality is appreciated involving the visualized aorta and branch vessels.    Lungs/Pleura: Central airways are patent. There is increased soft tissue density encasing the right hilar vasculature, with associated progression of postobstructive right upper and middle lobe partial consolidation.  Development of a cavitary component at the anterior right upper lobe is also noted (series 14, image 40).  Additional scattered ill-defined areas of consolidative and ground-glass attenuation are also present through the remaining bilateral lung fields.  No pleural thickening, significant effusion, or evidence of pneumothorax.    Other Findings: Worsened mediastinal adenopathy is present.  For example, right precarinal node now measures approximately 19 mm short axis (series 6, image 43), while a subcarinal node measures approximately 25 mm in shortest dimension.  The visualized thyroid is unremarkable. No acute or suspicious focal abnormality through the visualized upper abdomen. No abnormality of the thoracic wall soft tissues. No acute osseous displacement or worsening destructive skeletal lesion is visualized.  Chronic degenerative alterations of the spinal column and midthoracic vertebral body compression deformity are similar to the prior study.    IMPRESSION  1. No central  or segmental pulmonary thromboembolism.  2. Diffusely worsened mediastinal adenopathy and right hilar soft tissue.  3. Worsening postobstructive consolidation with right upper lobe cavitary component, as well as widespread bilateral less pronounced areas of airspace abnormality.  Findings may represent sequela of infectious or inflammatory process, although there is elevated concern for metastatic involvement.  4. Additional chronic secondary details are similar to the 24 October 2022 CT appearance.  ==========    This report was flagged in Epic as abnormal.    RADIATION DOSE  Automated tube current modulation, weight-based exposure dosing, and/or iterative reconstruction technique utilized to reach lowest reasonably achievable exposure rate.    DLP: 194 mGy*cm      Electronically signed by: Haja Paulson  Date:    12/09/2022  Time:    15:49                                     X-Ray Chest 1 View (Final result)  Result time 12/09/22 14:40:08      Final result by Jos Taylor MD (12/09/22 14:40:08)                   Impression:      1. Right hilar fullness compatible with known neoplasm.  2. Interval patchy consolidative opacities within the right mid and lower lung and bilateral fairly diffuse interstitial opacities suspicious for pneumonia.  Progression of malignancy would be within the differential.      Electronically signed by: Jos Taylor MD  Date:    12/09/2022  Time:    14:40               Narrative:    EXAMINATION:  XR CHEST 1 VIEW    CLINICAL HISTORY:  Shortness of breath, tachycardia, lung cancer.    COMPARISON:  10/18/2022    FINDINGS:  There is right hilar fullness demonstrated corresponds to known neoplasm.  There are patchy consolidative opacities identified within the right mid and lower lung in addition to bilateral interstitial opacities.  Findings would favor pneumonia.  Progression of malignancy would be within the differential.  There is no pleural effusion.  There is no pneumothorax.   Vascular calcifications are present.  Jorge catheter is noted.  The cardiac silhouette is within normal limits for size.   No acute displaced fracture is seen.                                        Assessment & Plan:     Sepis 2/2 community acquired pneumonia/postobstructive pneumonia  Neutropenic Fever   -Weaned off pressors 12/10 and downgraded to telemetry  -No fevers since night of 12/9. WBC improving,4.5 this morning with bands level pending this morning   -CT PE negative for PE, though shows postobstructive pneumonia with cavitation in RUL as well as developing RLL consolidation  -Respiratory cultures growing pseudomonas aeruginosa; continuing zosyn while inpatient; Will likely need Levaquin upon discharge   -Blood cultures no growth at 72 hours ; vancomycin discontinued on 12/12/22  -fungal cultures pending  -Urine legionella pending  -MTB PCR negative x2, discontinued droplet precautions  -MRSA PCR negative; GI panel neg   -Serum galactomanan, fungitell, urine histoplasma/blastomyces antigens pending  -Stool culture pending. C.diff toxin antigen negative. Fecal leukocytes positive  -Continue  zosyn, doxycycline, micafungin for empiric coverage   -IDSA guidelines recommend against use of G-CSF in patients with neutropenic fevers, and WBC is recovering  - Weaning oxygen down at this time   - repeat Xray on 12/13 showing worsening infiltrates in right upper lobe likely that his neutrophil count is increasing and repeat EKG pending     Stage IV Laryngeal squamous cell carcinoma s/p laryngectomy, possible metastasis to lung vs primary lung squamous cell carcinoma  - s/p laryngectomy, previously had tracheostomy but had aspirated and PEG was placed  - Continue tube feeds via PEG per home regimen  - Patient will need to discuss with his oncologist planned chemotherapy treatment given that he became neutropenic after receiving keytruda    Thrombocytopenia  - Continue prophylactic fondaparinux    Hypokalemia -  Improving   Hypophosphatemia - Improving   Hypomagnesemia   - k of 3.5 this morning; Repletion with oral and IV potassium   - phosph of 2.6  - Repleting mag adequately    - Continue to monitor with daily labs    Antibiotics: zosyn, doxycycline. Also on micafungin  Diet: PEG feeds  DVT Prophylaxis: Fondaparinux  GI Prophylaxis: Pepcid    Disposition: Continue inpatient management. Clinically improving. Weaning oxygen down. Pending ID work ups    Elizabeth Magdaleno DO  [employer] [facility], PGY1

## 2022-12-13 NOTE — PROGRESS NOTES
[facility] responded via [facility] that they have accepted the pt for services whenever he is ready for dc.

## 2022-12-13 NOTE — PT/OT/SLP PROGRESS
"Occupational Therapy  Treatment    Josafat Boudreaux   MRN: [id number]   Admitting Diagnosis: Neutropenic fever    OT Date of Treatment: 12/13/22   OT Start Time: 1005  OT Stop Time: 1020  OT Total Time (min): 15 min    Billable Minutes:  Therapeutic Activity 15    OT/MOR: OT          General Precautions: Standard, neutropenic  Orthopedic Precautions:    Braces:    Respiratory Status:  trach collar over larynectomy tube 10L; tf to 28% on 3 L to O2 tank to ambulate, O2 sats decreased to 90% during ambulation without  ft, HR increased to 130 bpm         Subjective:  Communicated with nurse Stoddard prior to session.  She assisted with setting up O2 tank support for ambulation.  Pain/Comfort  Pain Rating 1: 0/10    Objective:  Patient found with: peripheral IV, PICC line, oxygen, telemetry     Functional Mobility:  Bed Mobility: independent       Transfers:  mod I        Functional Ambulation: SPV to ambulate in irene over 300 ft without DME, OT SPV and managing O2 tank    AM-PAC 6 CLICK ADL   How much help from another person does this patient currently need?   1 = Unable, Total/Dependent Assistance  2 = A lot, Maximum/Moderate Assistance  3 = A little, Minimum/Contact Guard/Supervision  4 = None, Modified [place]/Independent          AM-PAC Raw Score CMS "G-Code Modifier Level of Impairment Assistance   6 % Total / Unable   7 - 8 CM 80 - 100% Maximal Assist   9-13 CL 60 - 80% Moderate Assist   14 - 19 CK 40 - 60% Moderate Assist   20 - 22 CJ 20 - 40% Minimal Assist   23 CI 1-20% SBA / CGA   24 CH 0% Independent/ Mod I       Patient left sitting edge of bed with all lines intact, call button in reach, and wife, nurse, and radiology tech present    ASSESSMENT:  Josafat Boudreaux is a 57 y.o. male with a medical diagnosis of Neutropenic fever and presents with endurance deficits requiring O2 support at all times, especially with activity.    Rehab identified problem list/impairments:  weakness, " impaired endurance, impaired cardiopulmonary response to activity    Rehab potential is excellent.    Activity tolerance: Good    Discharge recommendations: home with home health   Barriers to discharge:      Equipment recommendations: none    GOALS:   Multidisciplinary Problems       Occupational Therapy Goals          Problem: Occupational Therapy    Goal Priority Disciplines Outcome Interventions   Occupational Therapy Goal     OT, PT/OT Ongoing, Progressing    Description: Patient will perform grooming with SPV assist while standing at sink, maintaining O2 sats above 95% throughout O2 weaning.    Patient will perform toilet transfer with SPV assist while maintaining O2 sats above 95%    Patient will perform dressing, grooming, and toileting routine while ambulating in room and during ambulating in irene to retrieve coffee while maintaining O2 sats above 95% during O2 weaning as tolerated.                       Plan:  Patient to be seen 2 x/week, 5 x/week (M-F) to address the above listed problems via self-care/home management, therapeutic activities, therapeutic exercises  Plan of Care expires:    Plan of Care reviewed with: patient, spouse         12/13/2022

## 2022-12-14 NOTE — PROGRESS NOTES
[facility] Medicine Wards Progress Note     Provider Team: Internal Medicine List 3  Attending: Luis A Bocanegra MD  Resident: Teri Tapia    Date of Admit: 12/9/2022    Subjective:      Brief HPI:  Josafat Boudreaux is a 57 y.o. male with  PMH of stage IV squamous cell laryngeal cancer s/p completion of chemotherapy with carboplatin + taxol and radiation therapy, s/p laryngectomy now with Larytube, presents to [facility] on 12/9/2022 with chief complaint of shortness of breath. Patient is nonverbal (but can mouth words and appropriately nod yes/no to questions) so patient's wife assists with the history. She states she found the patient this morning with increased work of breathing, and placed a home O2 pulse oximeter on which revealed SPO2 of low 80s%, and heart rate of 140s-150s so they brought him to the ED. Also report that the patient has been having fevers at home. Patient did recently see ENT clinic on 12/6 when he had a fever of 101 at home and was given azithromycin to take via his PEG tube. Since then patient had not deteriorated until this morning. Patient denies any chest pain or palpitations. Denies nausea, vomiting, diarrhea, dysuria, or hematuria. Patient reportedly has history of aspiration prior to larytube and PEG placement. No known sick contacts. Patient did restart chemotherapy on 11/29 with carboplatin, taxol, and keytruda.      In the ED, patient was found to be hypoxic and started on supplemental oxygenation via NRB over his Larytube at 8 L/min. He was febrile to 103F. Labwork revealed a WBC of 0.3, ANC of 0.042, platelets of 95, sodium 124, potassium 3.3, lactic acid 1.0. CXR revealed consolidative opacities in right id and lower lung fields as well as right hilar fullness. Patient was given doses of ceftriaxone, azithromycin, as well as cefepime. His blood pressure was originally slightly elevated but while in the ED did deteriorate to 70s/40s. He was given 30 mL/kg NS bolus and also started on  "levophed. Internal medicine consulted for admission.    Interval History: No acute events overnight. Remained afebrile and hemodynamically stable. Patient states that he is more lethargic int he mornings. Also has been having blood tinged sputum production. He denies chest pain or palpitations.  Will continue to wean oxygen as tolerated.       Objective:     Last 24 Hour Vital Signs:  Blood pressure (!) 110/56, pulse (!) 123, temperature 99.9 °F (37.7 °C), temperature source Oral, resp. rate 20, height 5' 9.02" (1.753 m), weight 68 kg (150 lb), SpO2 (!) 92 %.    Physical Exam  Constitutional:       General: He is not in acute distress.     Appearance: He is not ill-appearing.   HENT:      Head: Normocephalic and atraumatic.      Right Ear: External ear normal.      Left Ear: External ear normal.      Nose: Nose normal.      Mouth/Throat:      Mouth: Mucous membranes are moist.   Eyes:      Extraocular Movements: Extraocular movements intact.      Conjunctiva/sclera: Conjunctivae normal.   Neck:      Comments: Larytube in place  Cardiovascular:      Rate and Rhythm: Normal rate and regular rhythm.      Heart sounds: No murmur heard.    No friction rub. No gallop.   Pulmonary:      Effort: No respiratory distress.      Breath sounds: Decreased air movement (RUL) present. Rhonchi (RUL, RLL) present. No wheezing (trace expiratory).   Abdominal:      General: There is no distension.      Palpations: Abdomen is soft.      Tenderness: There is no abdominal tenderness.      Comments: PEG tube in place. Very mild erythema around site. No fluctuance, no increased warmth   Musculoskeletal:      Right lower leg: No edema.      Left lower leg: No edema.   Skin:     General: Skin is warm and dry.   Neurological:      General: No focal deficit present.      Mental Status: He is alert. Mental status is at baseline.       Laboratory:  Most Recent Data:    Lab Results   Component Value Date    WBC 4.5 12/14/2022    HGB 10.9 (L) " 12/14/2022    HCT 32.7 (L) 12/14/2022    MCV 97.6 (H) 12/14/2022    PLT 91 (L) 12/14/2022         BMP  Lab Results   Component Value Date     12/14/2022    K 3.8 12/14/2022    CO2 31 (H) 12/14/2022    BUN 11.0 12/14/2022    CREATININE 0.77 12/14/2022    CALCIUM 9.7 12/14/2022    EGFRNONAA >60 08/02/2022       Radiology:  Imaging Results              X-Ray Chest 1 View (Final result)  Result time 12/09/22 17:41:14      Final result by Jackie Loaiza MD (12/09/22 17:41:14)                   Impression:      Interval placement of right-sided PICC line with the SVC    Otherwise unchanged      Electronically signed by: Jackie Loaiza  Date:    12/09/2022  Time:    17:41               Narrative:    EXAMINATION:  XR CHEST 1 VIEW    CLINICAL HISTORY:  Encounter for adjustment and management of vascular access device    TECHNIQUE:  Single frontal view of the chest was performed.    COMPARISON:  12/09/2022    FINDINGS:  There is a right upper lobe interstitial infiltrate.  There is also underlying chronic lung changes bilaterally.  The heart is normal appearance.  Pulmonary vascularity is unremarkable.  There is a port in the left anterior chest wall.  There is a right sided PICC line with the tip in the SVC.                                        CTA Chest Non-Coronary (PE Studies) (Final result)  Result time 12/09/22 15:49:23      Final result by Haja Paulson MD (12/09/22 15:49:23)                   Narrative:    EXAMINATION  CTA CHEST NON CORONARY (PE STUDIES)    CLINICAL HISTORY  Pulmonary embolism (PE) suspected, unknown D-dimer; Chest pain, unspecified    TECHNIQUE  Post-contrast helical-acquisition CT images were obtained, with bolus timing to pulmonary arterial system for purposes of PE protocol CTA.  Multiplanar reconstructions, including MIP images, were accomplished by a CT technologist at a separate workstation, pushed to PACS for physician review.    CONTRAST  IV: Omnipaque 300, 100  mL    COMPARISON  None available at the time of initial interpretation.    FINDINGS  Images were reviewed in soft tissue, lung, and bone windows.    Exam quality: adequate for evaluation    Lines/tubes: Left chest wall subcutaneous port and associated catheter are similar in the interval.  Tracheostomy remains in place.    Pulmonary Vessels: No central or large segmental filling defect.    Cardiovascular: No suggestion of right heart strain; the RV:LV ratio is <1. No significant heart chamber enlargement. There is no pericardial effusion. No focal contour irregularity or intraluminal abnormality is appreciated involving the visualized aorta and branch vessels.    Lungs/Pleura: Central airways are patent. There is increased soft tissue density encasing the right hilar vasculature, with associated progression of postobstructive right upper and middle lobe partial consolidation.  Development of a cavitary component at the anterior right upper lobe is also noted (series 14, image 40).  Additional scattered ill-defined areas of consolidative and ground-glass attenuation are also present through the remaining bilateral lung fields.  No pleural thickening, significant effusion, or evidence of pneumothorax.    Other Findings: Worsened mediastinal adenopathy is present.  For example, right precarinal node now measures approximately 19 mm short axis (series 6, image 43), while a subcarinal node measures approximately 25 mm in shortest dimension.  The visualized thyroid is unremarkable. No acute or suspicious focal abnormality through the visualized upper abdomen. No abnormality of the thoracic wall soft tissues. No acute osseous displacement or worsening destructive skeletal lesion is visualized.  Chronic degenerative alterations of the spinal column and midthoracic vertebral body compression deformity are similar to the prior study.    IMPRESSION  1. No central or segmental pulmonary thromboembolism.  2. Diffusely worsened  mediastinal adenopathy and right hilar soft tissue.  3. Worsening postobstructive consolidation with right upper lobe cavitary component, as well as widespread bilateral less pronounced areas of airspace abnormality.  Findings may represent sequela of infectious or inflammatory process, although there is elevated concern for metastatic involvement.  4. Additional chronic secondary details are similar to the 24 October 2022 CT appearance.  ==========    This report was flagged in Epic as abnormal.    RADIATION DOSE  Automated tube current modulation, weight-based exposure dosing, and/or iterative reconstruction technique utilized to reach lowest reasonably achievable exposure rate.    DLP: 194 mGy*cm      Electronically signed by: Haja Paulson  Date:    12/09/2022  Time:    15:49                                     X-Ray Chest 1 View (Final result)  Result time 12/09/22 14:40:08      Final result by Jos Taylor MD (12/09/22 14:40:08)                   Impression:      1. Right hilar fullness compatible with known neoplasm.  2. Interval patchy consolidative opacities within the right mid and lower lung and bilateral fairly diffuse interstitial opacities suspicious for pneumonia.  Progression of malignancy would be within the differential.      Electronically signed by: Jos Taylor MD  Date:    12/09/2022  Time:    14:40               Narrative:    EXAMINATION:  XR CHEST 1 VIEW    CLINICAL HISTORY:  Shortness of breath, tachycardia, lung cancer.    COMPARISON:  10/18/2022    FINDINGS:  There is right hilar fullness demonstrated corresponds to known neoplasm.  There are patchy consolidative opacities identified within the right mid and lower lung in addition to bilateral interstitial opacities.  Findings would favor pneumonia.  Progression of malignancy would be within the differential.  There is no pleural effusion.  There is no pneumothorax.  Vascular calcifications are present.  Jorge catheter is noted.  The  cardiac silhouette is within normal limits for size.   No acute displaced fracture is seen.                                        Assessment & Plan:     Sepis 2/2 community acquired pneumonia/postobstructive pneumonia  Neutropenic Fever   -Weaned off pressors 12/10 and downgraded to telemetry  -No fevers since night of 12/9. WBC improving,4.5 this morning  -CT PE negative for PE, though shows postobstructive pneumonia with cavitation in RUL as well as developing RLL consolidation  -Respiratory cultures growing pseudomonas aeruginosa; continuing zosyn while inpatient; Discussed the case pulmonary attending who recommended either IV abx or Levaquin PO for 2-4 weeks and re-imaging with CT by 2 weeks to see if there is improvement. If worsening pt needs prolonged abx usage   -Blood cultures no growth at 96 hours ; vancomycin discontinued on 12/12/22  -fungal cultures pending; stopped micofungin today given pt not having fever with WBC normalized   -Urine legionella negative; aspergillus antigen negative   -MTB PCR negative x2, discontinued droplet precautions  -MRSA PCR negative; GI panel neg   -Serum galactomanan, fungitell, urine histoplasma/blastomyces antigens pending  -Stool culture  showing many yeasts. C.diff toxin antigen negative. Fecal leukocytes positive  -Continue  zosyn and  doxycycline while in patient   -IDSA guidelines recommend against use of G-CSF in patients with neutropenic fevers, and WBC is recovering  - Weaning oxygen down at this time   - repeat Xray on 12/13 showing worsening infiltrates in right upper lobe likely that his neutrophil count is increasing     Stage IV Laryngeal squamous cell carcinoma s/p laryngectomy, possible metastasis to lung vs primary lung squamous cell carcinoma  - s/p laryngectomy, previously had tracheostomy but had aspirated and PEG was placed  - Continue tube feeds via PEG per home regimen  - Patient will need to discuss with his oncologist planned chemotherapy  treatment given that he became neutropenic after receiving keytruda  - Will discuss with patient's oncologist regarding pt getting chemotherapy while using  using  2-4 weeks of abx usage up on discharge     Thrombocytopenia  - Continue prophylactic fondaparinux    Hypokalemia - Improving   Hypophosphatemia - Improving   Hypomagnesemia   - k of 3.8 this morning  - phosph of 2.6  - Mag of 2.9; repleted with 2 g today   - Continue to monitor with daily labs    Antibiotics: zosyn, doxycycline.   Diet: PEG feeds  DVT Prophylaxis: Fondaparinux  GI Prophylaxis: Pepcid    Disposition: Continue inpatient management. Clinically improving. Weaning oxygen down. Pending ID work ups    Elizabeth Magdaleno DO  [employer] [facility], PGY1

## 2022-12-14 NOTE — PT/OT/SLP PROGRESS
Occupational Therapy  Treatment    Josafat Boudreaux   MRN: [id number]   Admitting Diagnosis: Neutropenic fever  Patient Active Problem List   Diagnosis    Finding of above normal blood pressure    Closed basicervical fracture of femur    Shortness of breath    Vocal cord mass    Dysphagia    Tracheostomy dependent    New onset seizure    Laryngeal mass    Laryngeal cancer    Regional lymph node metastasis present    Squamous cell carcinoma of both lungs    COPD (chronic obstructive pulmonary disease)    Cellulitis and abscess of neck    Leukocytosis    Contact dermatitis    H/O head and neck radiation    Hyponatremia    Sepsis    Pneumonia    Neutropenic fever      OT Date of Treatment: 12/14/22   OT Start Time: 1001  OT Stop Time: 1047  OT Total Time (min): 46 min    Billable Minutes:  Self Care/Home Management 15 and Therapeutic Activity 31    OT/MOR: OT          General Precautions: Standard, neutropenic  Orthopedic Precautions: N/A  Braces: N/A  Respiratory Status:  trach collar 6 L on 28%      Subjective:  Communicated with nurse Gutierrez prior to session.    Pain/Comfort  Pain Rating 1: 0/10  Pain Rating Post-Intervention 1: 0/10    Objective:  Patient found with: oxygen, peripheral IV, PEG Tube, PICC line, telemetry     Functional Mobility:  Bed Mobility:   Modified independent supine to sit EOB    Transfers:     Sit to stand from EOB supervision in prep for basic self care tasks, UE strengthening, functional mobility tasks     Functional Ambulation: supervision ambulate in hallway ~ 130 feet while pushing IV pole and with venticollar in place for O2 ; see above for O2 sats    Activities of Daily Living:     Supervision standing for light grooming tasks and utilizing urinal     Therapeutic Activities and Exercises:  Endurance training during BUE strengthening , self care and functional mobility tasks; Pt completed 2 x 10 reps shoulder and chest press and marching in place.   See above for functional mobility  and self care performance; Pt required frequent seated and lengthy rest breaks between tasks .     O2 sats as follows with trach collar and /or venti collar     Start of session 95%  After grooming in standing ~ 2 min   92%  After standing ~ 2 min during B UE AROM ex  94%  After ambulating ~ 130 feet in hallway 91%    Patient left sitting edge of bed with all lines intact, call button in reach, nurse  notified, and respiratory  present    ASSESSMENT:  Josafat Boudreaux is a 57 y.o. male with a medical diagnosis of Neutropenic fever and presents with decreased endurance and wants to wean off oxygen .    Rehab identified problem list/impairments:  weakness, impaired endurance, impaired cardiopulmonary response to activity    Rehab potential is good.    Activity tolerance: Good with rests; motivated and cooperative    Discharge recommendations: home with home health   Barriers to discharge:      Equipment recommendations: none    GOALS:   Multidisciplinary Problems       Occupational Therapy Goals          Problem: Occupational Therapy    Goal Priority Disciplines Outcome Interventions   Occupational Therapy Goal     OT, PT/OT Ongoing, Progressing    Description: Patient will perform grooming with SPV assist while standing at sink, maintaining O2 sats above 95% throughout O2 weaning.    Patient will perform toilet transfer with SPV assist while maintaining O2 sats above 95%    Patient will perform dressing, grooming, and toileting routine while ambulating in room and during ambulating in irene to retrieve coffee while maintaining O2 sats above 95% during O2 weaning as tolerated.                       Plan:  Patient to be seen  (Mon-Fri 2-5 times per week) to address the above listed problems via self-care/home management, therapeutic activities, therapeutic exercises  Plan of Care expires:    Plan of Care reviewed with: spouse, patient         12/14/2022

## 2022-12-15 NOTE — CONSULTS
Inpatient Nutrition Assessment    Admit Date: 12/9/2022   Total duration of encounter: 6 days     Nutrition Recommendation/Prescription     Continue home tube feeds, Diabetisource bolus 6 cartons/day; water flushes 60 mL before and after each flush; to provide 1800 kcals, 90g pro, 1944mL fluid; Consider increasing to 7 cartons/day to better meet nutritional needs  SLP eval to determine appropriate oral diet texture/consistency  Replenish electrolytes as needed  Monitor TF tolerance, wt, labs    Communication of Recommendations: reviewed with patient/caregiver and reviewed with nurse    Nutrition Assessment     Malnutrition Assessment/Nutrition-Focused Physical Exam    Malnutrition in the context of chronic illness  Degree of Malnutrition: does not meet criteria  Energy Intake: does not meet criteria  Interpretation of Weight Loss: does not meet criteria  Body Fat: does not meet criteria  Area of Body Fat Loss: does not meet criteria  Muscle Mass Loss: does not meet criteria  Area of Muscle Mass Loss: does not meet criteria  Fluid Accumulation: does not meet criteria  Edema: does not meet criteria  Reduced  Strength: unable to obtain  A minimum of two characteristics is recommended for diagnosis of either severe or non-severe malnutrition.    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, possible metastasis to lung vs primary lung squamous cell carcinoma  Hyponatremia  Thrombocytopenia  Hypokalemia  Hypophosphatemia    Relevant Medical History: stage IV squamous cell laryngeal cancer s/p completion of chemotherapy with carboplatin + taxol and radiation therapy, s/p laryngectomy now with Larytube, PEG dependent    Nutrition-Related Medications: famotidine, doxycycline, zosyn  "  Calorie Containing IV Medications: no significant kcals from medications at this time    Nutrition-Related Labs: -K 2.9, Gluc 102, Sumeet 7.8, Pro Tot 4.8, Alb 1.7, GFR >60, Phos 1.6  (12-15) H/H 11.8/35.6(L) Gluc 107 Bun 14.2 Cr 0.8 K 4.2 Na 135(L)       Diet/PN Order: Diet NPO  Oral Supplement Order: none  Tube Feeding Order:  Diabetisource 6 cartons/day (see below for calculation)  Appetite/Oral Intake: NPO/NPO  Factors Affecting Nutritional Intake: diarrhea, difficulty/impaired swallowing, NPO, and PEG dependent  Food/Worship/Cultural Preferences:  Pt consumes Diabetisource 6 cartons/day at home  Food Allergies: no known food allergies    Skin Integrity: wound  Wound(s): None      Comments  12/15: Pt laying in bed; wife at bedside; pt tolerating TF --6 cans per day; bedwt today 155#--back to UBW. Discussed increase to 7 cans per day if wt loss occurs; wife reported --pt often feels bloated if tries to consume > 6 cans per day. Pt appears to have adequate muscle/fat stores. Labs acknowledged.     : RD consulted for tube feeds. Unable to visit pt at this time. Spoke with nsg over the phone, reports pt tolerating feeds well; receiving 6 cartons of Diabetisource/day (continuing home diet). Noted pt nonverbal. Spoke with pts wife over the phone, reports pt tolerating feeds pta. Wife reports pt's UBW ~155#; now 150#; wt loss not significant. Noted pt with some diarrhea, no other GI complaints. Unable to perform NFPE at this time; will attempt to perform on f/up.      Anthropometrics    Height: 5' 9.02" (175.3 cm) Height Method: Stated  Last Weight: 70.4 kg (155 lb 3.3 oz) (12/15: bedwt--155#) (12/15/22 1039) Weight Method: Bed Scale  BMI (Calculated): 22.9  BMI Classification: normal (BMI 18.5-24.9)        Ideal Body Weight (IBW), Male: 160.12 lb     % Ideal Body Weight, Male (lb): 93.68 %                 Usual Body Weight (UBW), k.45 kg (UBW ~155# per pt wife)  % Usual Body Weight: 96.78     Usual " Weight Provided By: family/caregiver    Wt Readings from Last 5 Encounters:   12/15/22 70.4 kg (155 lb 3.3 oz)   12/06/22 68.2 kg (150 lb 6.4 oz)   11/30/22 70.4 kg (155 lb 1.6 oz)   11/29/22 70.3 kg (155 lb)   11/16/22 68.9 kg (152 lb)     Weight Change(s) Since Admission:  Admit Weight: 68 kg (150 lb) (12/09/22 1035)  12/11: UBW ~155# per pt wife; no recent significant wt loss noted  12/15: bedwt today 155#   Estimated Needs    Weight Used For Calorie Calculations: 68 kg (149 lb 14.6 oz)  Energy Calorie Requirements (kcal): 5269-6063 kcals (30-32 kcal/kg)  Energy Need Method: Kcal/kg  Weight Used For Protein Calculations: 68 kg (149 lb 14.6 oz)  Protein Requirements: 89-95g (1.3-1.4 g/kg)  Fluid Requirements (mL): 7773-2039 mL (1 mL/kcal)  Temp: 99.7 °F (37.6 °C)       Enteral Nutrition    Formula: Diabetisource AC  Rate/Volume: 250 mL, 6x day  Water Flushes: 60 mL before and after feeds  Additives/Modulars: none at this time  Route: PEG tube  Method: bolus  Total Nutrition Provided by Tube Feeding, Additives, and Flushes:  Calories Provided  1800 kcal/d, 88% needs   Protein Provided  90 g/d, 100% needs   Fluid Provided  1944 ml/d, 95% needs   Continuous feeding calculations based on estimated 20 hr/d run time unless otherwise stated.    Parenteral Nutrition    Patient not receiving parenteral nutrition support at this time.    Evaluation of Received Nutrient Intake    Calories: meeting estimated needs  Protein: meeting estimated needs    Patient Education    Not applicable.    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 (continues)    Interventions/Goals     Intervention(s): modified rate of enteral nutrition and collaboration with other providers  Goal: Meet greater than 75% of nutritional needs by follow-up. (goal progressing)    Monitoring & Evaluation     Dietitian will monitor enteral  nutrition intake, weight, electrolyte/renal panel, and glucose/endocrine profile.  Nutrition Risk/Follow-Up: low (follow-up in 5-7 days)   Please consult if re-assessment needed sooner.

## 2022-12-15 NOTE — PLAN OF CARE
Problem: Violence Risk or Actual  Goal: Anger and Impulse Control  Outcome: Ongoing, Progressing     Problem: Adult Inpatient Plan of Care  Goal: Plan of Care Review  Outcome: Ongoing, Progressing  Goal: Patient-Specific Goal (Individualized)  Outcome: Ongoing, Progressing  Goal: Absence of Hospital-Acquired Illness or Injury  Outcome: Ongoing, Progressing  Goal: Optimal Comfort and Wellbeing  Outcome: Ongoing, Progressing  Goal: Readiness for Transition of Care  Outcome: Ongoing, Progressing     Problem: Adjustment to Illness (Sepsis/Septic Shock)  Goal: Optimal Coping  Outcome: Ongoing, Progressing     Problem: Bleeding (Sepsis/Septic Shock)  Goal: Absence of Bleeding  Outcome: Ongoing, Progressing     Problem: Glycemic Control Impaired (Sepsis/Septic Shock)  Goal: Blood Glucose Level Within Desired Range  Outcome: Ongoing, Progressing     Problem: Infection Progression (Sepsis/Septic Shock)  Goal: Absence of Infection Signs and Symptoms  Outcome: Ongoing, Progressing     Problem: Nutrition Impaired (Sepsis/Septic Shock)  Goal: Optimal Nutrition Intake  Outcome: Ongoing, Progressing     Problem: Fluid Imbalance (Pneumonia)  Goal: Fluid Balance  Outcome: Ongoing, Progressing     Problem: Infection (Pneumonia)  Goal: Resolution of Infection Signs and Symptoms  Outcome: Ongoing, Progressing     Problem: Respiratory Compromise (Pneumonia)  Goal: Effective Oxygenation and Ventilation  Outcome: Ongoing, Progressing     Problem: Infection  Goal: Absence of Infection Signs and Symptoms  Outcome: Ongoing, Progressing     Problem: Impaired Wound Healing  Goal: Optimal Wound Healing  Outcome: Ongoing, Progressing     Problem: Neutropenia  Goal: Absence of Infection  Outcome: Ongoing, Progressing     Problem: Skin Injury Risk Increased  Goal: Skin Health and Integrity  Outcome: Ongoing, Progressing

## 2022-12-15 NOTE — PROGRESS NOTES
CM consult was entered for oupt iv abx set up and home 02 set up.   CM spoke with pts wife Kymberly ([phone]) who confirms that she is comfortable administering iv abx in the home setting. CM explained that I will be sending a referral to [facility] who will provide the medication that pt needs. Pt is current with [facility] for nursing purposes & referral has already been sent to them for resumption of care. CM will send them the updated iv abx order also.   CM sent referral and iv abx order to [facility] via [employer]. CM tofollow referral.     Wife states that they have gotten their home 02 through [facility] [address] in the past, and this is who she wishes to use again. When 02 sats are entered CM will send referral to [facility] for home 02 set up.     CM to follow referrals and clinical course for dc planning.

## 2022-12-15 NOTE — PROGRESS NOTES
[facility] Medicine Wards Progress Note     Provider Team: Internal Medicine List 3  Attending: Luis A Bocanegra MD  Resident: Teri Tapia    Date of Admit: 12/9/2022    Subjective:      Brief HPI:  Josafat Boudreaux is a 57 y.o. 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 on 12/9/2022 with chief complaint of shortness of breath. Patient is nonverbal (but can mouth words and appropriately nod yes/no to questions) so patient's wife assists with the history. She states she found the patient this morning with increased work of breathing, and placed a home O2 pulse oximeter on which revealed SPO2 of low 80s%, and heart rate of 140s-150s so they brought him to the ED. Also report that the patient has been having fevers at home. Patient did recently see ENT clinic on 12/6 when he had a fever of 101 at home and was given azithromycin to take via his PEG tube. Since then patient had not deteriorated until this morning. Patient denies any chest pain or palpitations. Denies nausea, vomiting, diarrhea, dysuria, or hematuria. Patient reportedly has history of aspiration prior to larytube and PEG placement. No known sick contacts. Patient did restart chemotherapy on 11/29 with carboplatin, taxol, and keytruda.      In the ED, patient was found to be hypoxic and started on supplemental oxygenation via NRB over his Larytube at 8 L/min. He was febrile to 103F. Labwork revealed a WBC of 0.3, ANC of 0.042, platelets of 95, sodium 124, potassium 3.3, lactic acid 1.0. CXR revealed consolidative opacities in right id and lower lung fields as well as right hilar fullness. Patient was given doses of ceftriaxone, azithromycin, as well as cefepime. His blood pressure was originally slightly elevated but while in the ED did deteriorate to 70s/40s. He was given 30 mL/kg NS bolus and also started on  "levophed. Internal medicine consulted for admission.    Interval History: No acute events overnight. Remained afebrile and hemodynamically stable. Pt stated he was able to sleep more comfortably overnight. Patient is more awake this morning.  He denies chest pain or palpitations. Remains on 6 L sating at 92%. Will continue to wean oxygen as tolerated.       Objective:     Last 24 Hour Vital Signs:  Blood pressure 108/70, pulse 104, temperature 99.3 °F (37.4 °C), temperature source Oral, resp. rate 18, height 5' 9.02" (1.753 m), weight 68 kg (150 lb), SpO2 (!) 94 %.    Physical Exam  Constitutional:       General: He is not in acute distress.     Appearance: He is not ill-appearing.   HENT:      Head: Normocephalic and atraumatic.      Right Ear: External ear normal.      Left Ear: External ear normal.      Nose: Nose normal.      Mouth/Throat:      Mouth: Mucous membranes are moist.   Eyes:      Extraocular Movements: Extraocular movements intact.      Conjunctiva/sclera: Conjunctivae normal.   Neck:      Comments: Larytube in place  Cardiovascular:      Rate and Rhythm: Normal rate and regular rhythm.      Heart sounds: No murmur heard.    No friction rub. No gallop.   Pulmonary:      Effort: No respiratory distress.      Breath sounds: Decreased air movement (RUL) present. Rhonchi (RUL, RLL) present. No wheezing (trace expiratory).   Abdominal:      General: There is no distension.      Palpations: Abdomen is soft.      Tenderness: There is no abdominal tenderness.      Comments: PEG tube in place. Very mild erythema around site. No fluctuance, no increased warmth   Musculoskeletal:      Right lower leg: No edema.      Left lower leg: No edema.   Skin:     General: Skin is warm and dry.   Neurological:      General: No focal deficit present.      Mental Status: He is alert. Mental status is at baseline.       Laboratory:  Most Recent Data:    Lab Results   Component Value Date    WBC 5.2 12/15/2022    HGB 11.8 (L) " 12/15/2022    HCT 35.6 (L) 12/15/2022    MCV 97.3 (H) 12/15/2022     (L) 12/15/2022         BMP  Lab Results   Component Value Date     (L) 12/15/2022    K 4.2 12/15/2022    CO2 28 12/15/2022    BUN 14.2 12/15/2022    CREATININE 0.81 12/15/2022    CALCIUM 10.1 12/15/2022    EGFRNONAA >60 08/02/2022       Radiology:  Imaging Results              X-Ray Chest 1 View (Final result)  Result time 12/09/22 17:41:14      Final result by Jackie Loaiza MD (12/09/22 17:41:14)                   Impression:      Interval placement of right-sided PICC line with the SVC    Otherwise unchanged      Electronically signed by: Jackie Loaiza  Date:    12/09/2022  Time:    17:41               Narrative:    EXAMINATION:  XR CHEST 1 VIEW    CLINICAL HISTORY:  Encounter for adjustment and management of vascular access device    TECHNIQUE:  Single frontal view of the chest was performed.    COMPARISON:  12/09/2022    FINDINGS:  There is a right upper lobe interstitial infiltrate.  There is also underlying chronic lung changes bilaterally.  The heart is normal appearance.  Pulmonary vascularity is unremarkable.  There is a port in the left anterior chest wall.  There is a right sided PICC line with the tip in the SVC.                                        CTA Chest Non-Coronary (PE Studies) (Final result)  Result time 12/09/22 15:49:23      Final result by Haja Paulson MD (12/09/22 15:49:23)                   Narrative:    EXAMINATION  CTA CHEST NON CORONARY (PE STUDIES)    CLINICAL HISTORY  Pulmonary embolism (PE) suspected, unknown D-dimer; Chest pain, unspecified    TECHNIQUE  Post-contrast helical-acquisition CT images were obtained, with bolus timing to pulmonary arterial system for purposes of PE protocol CTA.  Multiplanar reconstructions, including MIP images, were accomplished by a CT technologist at a separate workstation, pushed to PACS for physician review.    CONTRAST  IV: Omnipaque 300, 100  mL    COMPARISON  None available at the time of initial interpretation.    FINDINGS  Images were reviewed in soft tissue, lung, and bone windows.    Exam quality: adequate for evaluation    Lines/tubes: Left chest wall subcutaneous port and associated catheter are similar in the interval.  Tracheostomy remains in place.    Pulmonary Vessels: No central or large segmental filling defect.    Cardiovascular: No suggestion of right heart strain; the RV:LV ratio is <1. No significant heart chamber enlargement. There is no pericardial effusion. No focal contour irregularity or intraluminal abnormality is appreciated involving the visualized aorta and branch vessels.    Lungs/Pleura: Central airways are patent. There is increased soft tissue density encasing the right hilar vasculature, with associated progression of postobstructive right upper and middle lobe partial consolidation.  Development of a cavitary component at the anterior right upper lobe is also noted (series 14, image 40).  Additional scattered ill-defined areas of consolidative and ground-glass attenuation are also present through the remaining bilateral lung fields.  No pleural thickening, significant effusion, or evidence of pneumothorax.    Other Findings: Worsened mediastinal adenopathy is present.  For example, right precarinal node now measures approximately 19 mm short axis (series 6, image 43), while a subcarinal node measures approximately 25 mm in shortest dimension.  The visualized thyroid is unremarkable. No acute or suspicious focal abnormality through the visualized upper abdomen. No abnormality of the thoracic wall soft tissues. No acute osseous displacement or worsening destructive skeletal lesion is visualized.  Chronic degenerative alterations of the spinal column and midthoracic vertebral body compression deformity are similar to the prior study.    IMPRESSION  1. No central or segmental pulmonary thromboembolism.  2. Diffusely worsened  mediastinal adenopathy and right hilar soft tissue.  3. Worsening postobstructive consolidation with right upper lobe cavitary component, as well as widespread bilateral less pronounced areas of airspace abnormality.  Findings may represent sequela of infectious or inflammatory process, although there is elevated concern for metastatic involvement.  4. Additional chronic secondary details are similar to the 24 October 2022 CT appearance.  ==========    This report was flagged in Epic as abnormal.    RADIATION DOSE  Automated tube current modulation, weight-based exposure dosing, and/or iterative reconstruction technique utilized to reach lowest reasonably achievable exposure rate.    DLP: 194 mGy*cm      Electronically signed by: Haja Paulson  Date:    12/09/2022  Time:    15:49                                     X-Ray Chest 1 View (Final result)  Result time 12/09/22 14:40:08      Final result by Jos Taylor MD (12/09/22 14:40:08)                   Impression:      1. Right hilar fullness compatible with known neoplasm.  2. Interval patchy consolidative opacities within the right mid and lower lung and bilateral fairly diffuse interstitial opacities suspicious for pneumonia.  Progression of malignancy would be within the differential.      Electronically signed by: Jos Taylor MD  Date:    12/09/2022  Time:    14:40               Narrative:    EXAMINATION:  XR CHEST 1 VIEW    CLINICAL HISTORY:  Shortness of breath, tachycardia, lung cancer.    COMPARISON:  10/18/2022    FINDINGS:  There is right hilar fullness demonstrated corresponds to known neoplasm.  There are patchy consolidative opacities identified within the right mid and lower lung in addition to bilateral interstitial opacities.  Findings would favor pneumonia.  Progression of malignancy would be within the differential.  There is no pleural effusion.  There is no pneumothorax.  Vascular calcifications are present.  Jorge catheter is noted.  The  cardiac silhouette is within normal limits for size.   No acute displaced fracture is seen.                                        Assessment & Plan:     Sepis 2/2 community acquired pneumonia/postobstructive pneumonia  Neutropenic Fever   -Weaned off pressors 12/10 and downgraded to telemetry  - Low grade temp of 99.3 this morning. WBC improving,5.3 this morning  -CT PE negative for PE, though shows postobstructive pneumonia with cavitation in RUL as well as developing RLL consolidation  -Respiratory cultures growing pseudomonas aeruginosa; continuing zosyn and doxycycline while inpatient  -Blood cultures  shows no growth at 5 days; vancomycin discontinued on 12/12/22  -fungal cultures pending; stopped micofungin today given pt not having fever with WBC normalized   -Stool culture  showing many yeasts. C.diff toxin antigen negative. Fecal leukocytes positive  Labs negative :   -Urine legionella, aspergillus antigen, urine histoplasma ; Fungitell uninterruptable MTB PCR x2,  MRSA PCR ; GI panel   Labs pending :  -Serum galactomanan, urine blastomyces antigens pending  -IDSA guidelines recommend against use of G-CSF in patients with neutropenic fevers, and WBC is recovering  - Weaning oxygen down at this time; Currently on 6L through trach stroma sating at 91%  - repeat Xray on 12/13 showing worsening infiltrates in right upper lobe likely that his neutrophil count is increasing   -Discussed the case pulmonary attending who recommended either IV abx or Levaquin PO for 2-4 weeks and re-imaging with CT by 2 weeks to see if there is improvement. If worsening pt needs prolonged abx usage   - Reached out to Patient's oncologist Dr. 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, possible metastasis to lung vs primary lung squamous cell carcinoma  - s/p laryngectomy, previously had tracheostomy but had aspirated and PEG was placed  -  Continue tube feeds via PEG per home regimen  - Patient will need to discuss with his oncologist planned chemotherapy treatment given that he became neutropenic after receiving keytruda  - Reached out to patient's oncologist regarding pt getting chemotherapy while using  using  2-4 weeks of abx usage up on discharge; Awaiting a call back      Thrombocytopenia  - Continue prophylactic fondaparinux    Hypokalemia - Improving   Hypophosphatemia - Improving   Hypomagnesemia   - k of 4.2 this morning  - phosph of 4.1  - Mag of 1.8; repleted with 2 g today   - Continue to monitor with daily labs    Antibiotics: zosyn, doxycycline.   Diet: PEG feeds  DVT Prophylaxis: Fondaparinux  GI Prophylaxis: Pepcid    Disposition: Continue inpatient management. Clinically improving. Weaning oxygen down. Pending ID work ups    Elizabeth Magdaleno DO  [facility], PGY1

## 2022-12-16 NOTE — NURSING
Ambulated patient around entire 4 [place] nurses station while on 2 liters oxygen per trach collar, o2sat consistently stayed at 93%- 94% the entire time while patient was  ambulating .

## 2022-12-16 NOTE — PROGRESS NOTES
Per following team pt will no longer dc today.   As of now the plan is for pt to dc home with [facility] when stable. Referral is already sent in [facility].   CM to watch for possible 02 needs near dc. Per team the plan for now is to try to wean 02 before the pt dcs.   If pt needs 02 the dc summary will have to reflect that PNA dx has been resolved in order for Medicare to cover 02. If not the wife states that they cannot pay out of pocket for 02.    Wife is also requesting a suction device.   CM has already sent order for suction to [facility]. They have confirmed that Medicare will cover suction regardless of PNA dx. CM relayed this information to pts wife.     CM to watch for 02 status.     Wife has also already signed IMM and choice form and I have placed them on pts chart.

## 2022-12-16 NOTE — DISCHARGE SUMMARY
[facility] Discharge Summary    Admitting Physician: Tony Mejia MD  Attending Physician: Luis A Bocanegra MD  Date of Admit: 12/9/2022  Date of Discharge: 12/16/2022    Condition: Stable  Outcome: Condition has improved and patient is now ready for discharge.  DISPOSITION: Home-Health Care [facility] [place]        Discharge Diagnoses:     Patient Active Problem List   Diagnosis    Finding of above normal blood pressure    Closed basicervical fracture of femur    Shortness of breath    Vocal cord mass    Dysphagia    Tracheostomy dependent    New onset seizure    Laryngeal mass    Laryngeal cancer    Regional lymph node metastasis present    Squamous cell carcinoma of both lungs    COPD (chronic obstructive pulmonary disease)    Cellulitis and abscess of neck    Leukocytosis    Contact dermatitis    H/O head and neck radiation    Hyponatremia    Sepsis    Pneumonia    Neutropenic fever       Principal Problem:  Neutropenic fever    Consultants and Procedures:     Consultants:  IP CONSULT TO HOSPITAL MEDICINE  PHARMACY TO DOSE VANCOMYCIN CONSULT  IP CONSULT TO PICC TEAM (NIAS)  IP CONSULT TO ENT  IP CONSULT TO REGISTERED DIETITIAN/NUTRITIONIST  IP CONSULT TO SOCIAL WORK/CASE MANAGEMENT  IP CONSULT TO SOCIAL WORK/CASE MANAGEMENT  IP CONSULT TO SOCIAL WORK/CASE MANAGEMENT  IP CONSULT TO SOCIAL WORK/CASE MANAGEMENT    Procedures:   * No surgery found *      Brief Admission History:      Josafat Boudreaux is a 57 y.o. male with  PMH of stage IV squamous cell laryngeal cancer s/p completion of chemotherapy with carboplatin + taxol and radiation therapy, s/p laryngectomy now with Larytube, presents to [facility] on 12/9/2022 with chief complaint of shortness of breath. Patient is nonverbal (but can mouth words and appropriately nod yes/no to questions) so patient's wife assists with the history. She states she found the patient this morning with increased work of breathing, and placed a home O2 pulse oximeter on which revealed  SPO2 of low 80s%, and heart rate of 140s-150s so they brought him to the ED. Also report that the patient has been having fevers at home. Patient did recently see ENT clinic on 12/6 when he had a fever of 101 at home and was given azithromycin to take via his PEG tube. Since then patient had not deteriorated until this morning. Patient denies any chest pain or palpitations. Denies nausea, vomiting, diarrhea, dysuria, or hematuria. Patient reportedly has history of aspiration prior to larytube and PEG placement. No known sick contacts. Patient did restart chemotherapy on 11/29 with carboplatin, taxol, and keytruda.      In the ED, patient was found to be hypoxic and started on supplemental oxygenation via NRB over his Larytube at 8 L/min. He was febrile to 103F. Labwork revealed a WBC of 0.3, ANC of 0.042, platelets of 95, sodium 124, potassium 3.3, lactic acid 1.0. CXR revealed consolidative opacities in right id and lower lung fields as well as right hilar fullness. Patient was given doses of ceftriaxone, azithromycin, as well as cefepime. His blood pressure was originally slightly elevated but while in the ED did deteriorate to 70s/40s. He was given 30 mL/kg NS bolus and also started on levophed. Internal medicine consulted for admission.    Hospital Course with Pertinent Findings:      Patient was admitted to the ICU with septic shock caused by community-acquired/postobstructive pneumonia in the setting of severe neutropenia.  Patient was started on Levophed, which he remained on for less than a day.  A CT PE scan was obtained to rule out PE, which was negative for PE, but it did reveal a right upper lobe postobstructive pneumonia with cavitation but no air-fluid level, as well as right lower lobe consolidation.  He was started empirically on several antimicrobials, including vancomycin, Zosyn, doxycycline, and micafungin.  An extensive microbiology/ID workup to include respiratory culture, blood cultures, fungal  "respiratory cultures, MTB PCR, mycobacterial culture, urine Histoplasma/Blastomyces antigens, serum galactomannan, Fungitell, were ordered.  The only positive results or the respiratory culture, which grew Pseudomonas that was pansensitive.  Unclear whether or not this was colonization from his Larytube.  Patient was downgraded from the ICU on hospital day 2.  Pulmonology was consulted who recommended continuing antimicrobial therapy for 2-4 weeks.  Also recommended repeat imaging after 2 weeks to assess  response to treatment.  Micafungin discontinued after 5 days as no fungal studies were positive.  On hospital day 7, patient was able to ambulate on room air without desaturating significantly, but on day 8 he did desaturate to 87% upon room air ambulation so home oxygen was obtained for the patient.  He was stable for discharge to complete 4 week course of Levaquin on 12/16/22, with 21 additional days of Levaquin 750 mg every 24 hours via PEG tube.  Will repeat chest CT scan with contrast in 2 weeks to assess response to treatment.  Referral to pulmonology made.  Otherwise to follow-up with his PCP, ENT, and oncologist.  Patient's white blood cell count did progressively increase throughout the duration of the hospitalization, and on date of discharge was 5.5.  We did speak with his oncologist, who state they will halt chemotherapy treatments until the resolution of the patient's acute pneumonia.    Discharge physical exam:  Vitals  BP: 111/61  Temp: 98.4 °F (36.9 °C)  Temp src: Oral  Pulse: (!) 121  Resp: 20  SpO2: (!) 91 %  Height: 5' 9.02" (175.3 cm)  Weight: 70.4 kg (155 lb 3.3 oz) (12/15: bedwt--155#)  Constitutional:       General: He is not in acute distress.     Appearance: He is not ill-appearing.   HENT:      Head: Normocephalic and atraumatic.      Right Ear: External ear normal.      Left Ear: External ear normal.      Nose: Nose normal.      Mouth/Throat:      Mouth: Mucous membranes are moist. "   Eyes:      Extraocular Movements: Extraocular movements intact.      Conjunctiva/sclera: Conjunctivae normal.   Neck:      Comments: Larytube in place  Cardiovascular:      Rate and Rhythm: Normal rate and regular rhythm.      Heart sounds: No murmur heard.    No friction rub. No gallop.   Pulmonary:      Effort: No respiratory distress.      Breath sounds: Decreased air movement (RUL) present. Rhonchi (RUL, RLL) present. No wheezing.  Abdominal:      General: There is no distension.      Palpations: Abdomen is soft.      Tenderness: There is no abdominal tenderness.      Comments: PEG tube in place. Very mild erythema around site. No fluctuance, no increased warmth   Musculoskeletal:      Right lower leg: No edema.      Left lower leg: No edema.   Skin:     General: Skin is warm and dry.   Neurological:      General: No focal deficit present.      Mental Status: He is alert. Mental status is at baseline.       TIME SPENT ON DISCHARGE: 35 minutes    Discharge Medications:         Medication List        START taking these medications      levoFLOXacin 750 MG tablet  Commonly known as: LEVAQUIN  Take 1 tablet (750 mg total) by mouth once daily. for 21 days            CHANGE how you take these medications      ondansetron 8 MG Tbdl  Commonly known as: ZOFRAN-ODT  What changed: Another medication with the same name was removed. Continue taking this medication, and follow the directions you see here.            CONTINUE taking these medications      albuterol-ipratropium 2.5 mg-0.5 mg/3 mL nebulizer solution  Commonly known as: DUO-NEB  Take 3 mLs by nebulization every 4 (four) hours as needed for Shortness of Breath or Wheezing. Rescue     budesonide 0.5 mg/2 mL nebulizer solution  Commonly known as: PULMICORT  Take 2 mLs (0.5 mg total) by nebulization every 12 (twelve) hours. Controller     diphenhydrAMINE 25 mg capsule  Commonly known as: BENADRYL  1 capsule (25 mg total) by Per G Tube route every 6 (six) hours as  needed for Itching.     famotidine 20 MG tablet  Commonly known as: PEPCID  Take 1 tablet (20 mg total) by mouth every evening.     glutamine 10 gram Pwpk     HYDROcodone-acetaminophen 5-325 mg per tablet  Commonly known as: NORCO     hydrocortisone 1 % cream  Apply topically 2 (two) times daily as needed (rash). Apply to rash     ibuprofen 100 mg/5 mL suspension  Commonly known as: ADVIL,MOTRIN  Take 30 mLs (600 mg total) by mouth every 6 (six) hours as needed for Pain (mild to moderate).     oxyCODONE 5 MG immediate release tablet  Commonly known as: ROXICODONE  Take 1 tablet (5 mg total) by mouth every 4 (four) hours as needed for Pain.     pantoprazole 20 MG tablet  Commonly known as: PROTONIX  Take 1 tablet (20 mg total) by mouth once daily.            STOP taking these medications      silver nitrate applicators 75-25 % applicator     UNABLE TO FIND               Where to Get Your Medications        These medications were sent to [address] [address]      Phone: [phone]   levoFLOXacin 750 MG tablet         Discharge Instructions:         Josafat Boudreaux is being discharged Home-Health Care [facility] [place].    Discharge Procedure Orders   CT Chest With Contrast   Standing Status: Future Standing Exp. Date: 12/16/23     Order Specific Question Answer Comments   Is the patient allergic to iodine contrast? No    Is the patient taking metformin or a metformin combination medication?  If, the patient should hold the medication for 2 days following contrast. No    Does this patient have impaired renal function? No    May the Radiologist modify the order per protocol to meet the clinical needs of the patient? Yes      Ambulatory referral/consult to Pulmonology   Standing Status: Future   Referral Priority: Routine Referral Type: Consultation   Referral Reason: Specialty Services Required   Requested Specialty: Pulmonary  Disease   Number of Visits Requested: 1      Follow up with PCP, ENT, and oncologist as scheduled.    To address at follow-up:  -Will need repeat chest imaging in 2 weeks to assess response to treatment, pulmonology follow-up after course of abx    At this time, Josafat Boudreaux is determined to have maximized benefits of IP hospitalization. he is discharged in stable condition with OP f/u recommendations and instructions. All questions answered, and patient and family verbalized agreement with the POC. They were given return precautions prior to d/c including symptoms that should prompt return to ED or to call PCP. Total time spent of DC of 35 minutes.       Aj Tapia MD  [facility] Internal Medicine, PGY2

## 2022-12-16 NOTE — NURSING
ROOM AIR AT REST 94%  AMBULATED ON ROOM AIR AT 90%  O2 THERAPY APPLIED AT 3LPM AND SATS IMPROVED TO 93%

## 2022-12-16 NOTE — PROGRESS NOTES
[facility] Medicine Wards Progress Note     Provider Team: Internal Medicine List 3  Attending: Luis A Bocanegra MD  Resident: Teri Tapia    Date of Admit: 12/9/2022    Subjective:      Brief HPI:  Josafat Boudreaux is a 57 y.o. male with  PMH of stage IV squamous cell laryngeal cancer s/p completion of chemotherapy with carboplatin + taxol and radiation therapy, s/p laryngectomy now with Larytube, presents to [facility] on 12/9/2022 with chief complaint of shortness of breath. Patient is nonverbal (but can mouth words and appropriately nod yes/no to questions) so patient's wife assists with the history. She states she found the patient this morning with increased work of breathing, and placed a home O2 pulse oximeter on which revealed SPO2 of low 80s%, and heart rate of 140s-150s so they brought him to the ED. Also report that the patient has been having fevers at home. Patient did recently see ENT clinic on 12/6 when he had a fever of 101 at home and was given azithromycin to take via his PEG tube. Since then patient had not deteriorated until this morning. Patient denies any chest pain or palpitations. Denies nausea, vomiting, diarrhea, dysuria, or hematuria. Patient reportedly has history of aspiration prior to larytube and PEG placement. No known sick contacts. Patient did restart chemotherapy on 11/29 with carboplatin, taxol, and keytruda.      In the ED, patient was found to be hypoxic and started on supplemental oxygenation via NRB over his Larytube at 8 L/min. He was febrile to 103F. Labwork revealed a WBC of 0.3, ANC of 0.042, platelets of 95, sodium 124, potassium 3.3, lactic acid 1.0. CXR revealed consolidative opacities in right id and lower lung fields as well as right hilar fullness. Patient was given doses of ceftriaxone, azithromycin, as well as cefepime. His blood pressure was originally slightly elevated but while in the ED did deteriorate to 70s/40s. He was given 30 mL/kg NS bolus and also started on  "levophed. Internal medicine consulted for admission.    Interval History: No acute events overnight. Remained afebrile and hemodynamically stable. Pt stated he was able to sleep more comfortably overnight. Denies chest pain or palpitations. Started on IV Levaquin and will discharge him with liquid levaquin. Will continue to wean oxygen as tolerated.       Objective:     Last 24 Hour Vital Signs:  Blood pressure 111/61, pulse (!) 125, temperature 98.4 °F (36.9 °C), temperature source Oral, resp. rate 20, height 5' 9.02" (1.753 m), weight 70.4 kg (155 lb 3.3 oz), SpO2 (!) 93 %.    Physical Exam  Constitutional:       General: He is not in acute distress.     Appearance: He is not ill-appearing.   HENT:      Head: Normocephalic and atraumatic.      Right Ear: External ear normal.      Left Ear: External ear normal.      Nose: Nose normal.      Mouth/Throat:      Mouth: Mucous membranes are moist.   Eyes:      Extraocular Movements: Extraocular movements intact.      Conjunctiva/sclera: Conjunctivae normal.   Neck:      Comments: Larytube in place  Cardiovascular:      Rate and Rhythm: Normal rate and regular rhythm.      Heart sounds: No murmur heard.    No friction rub. No gallop.   Pulmonary:      Effort: No respiratory distress.      Breath sounds: Decreased air movement (RUL) present. Rhonchi (RUL, RLL) present. No wheezing (trace expiratory).   Abdominal:      General: There is no distension.      Palpations: Abdomen is soft.      Tenderness: There is no abdominal tenderness.      Comments: PEG tube in place. Very mild erythema around site. No fluctuance, no increased warmth   Musculoskeletal:      Right lower leg: No edema.      Left lower leg: No edema.   Skin:     General: Skin is warm and dry.   Neurological:      General: No focal deficit present.      Mental Status: He is alert. Mental status is at baseline.       Laboratory:  Most Recent Data:    Lab Results   Component Value Date    WBC 5.5 12/16/2022    " HGB 11.7 (L) 12/16/2022    HCT 35.8 (L) 12/16/2022    MCV 97.5 (H) 12/16/2022     12/16/2022         BMP  Lab Results   Component Value Date     (L) 12/16/2022    K 4.6 12/16/2022    CO2 27 12/16/2022    BUN 13.9 12/16/2022    CREATININE 0.82 12/16/2022    CALCIUM 10.0 12/16/2022    EGFRNONAA >60 08/02/2022       Radiology:  Imaging Results              X-Ray Chest 1 View (Final result)  Result time 12/09/22 17:41:14      Final result by Jackie Loaiza MD (12/09/22 17:41:14)                   Impression:      Interval placement of right-sided PICC line with the SVC    Otherwise unchanged      Electronically signed by: Jackie Loaiza  Date:    12/09/2022  Time:    17:41               Narrative:    EXAMINATION:  XR CHEST 1 VIEW    CLINICAL HISTORY:  Encounter for adjustment and management of vascular access device    TECHNIQUE:  Single frontal view of the chest was performed.    COMPARISON:  12/09/2022    FINDINGS:  There is a right upper lobe interstitial infiltrate.  There is also underlying chronic lung changes bilaterally.  The heart is normal appearance.  Pulmonary vascularity is unremarkable.  There is a port in the left anterior chest wall.  There is a right sided PICC line with the tip in the SVC.                                        CTA Chest Non-Coronary (PE Studies) (Final result)  Result time 12/09/22 15:49:23      Final result by Haja Paulosn MD (12/09/22 15:49:23)                   Narrative:    EXAMINATION  CTA CHEST NON CORONARY (PE STUDIES)    CLINICAL HISTORY  Pulmonary embolism (PE) suspected, unknown D-dimer; Chest pain, unspecified    TECHNIQUE  Post-contrast helical-acquisition CT images were obtained, with bolus timing to pulmonary arterial system for purposes of PE protocol CTA.  Multiplanar reconstructions, including MIP images, were accomplished by a CT technologist at a separate workstation, pushed to PACS for physician review.    CONTRAST  IV: Omnipaque 300,  100 mL    COMPARISON  None available at the time of initial interpretation.    FINDINGS  Images were reviewed in soft tissue, lung, and bone windows.    Exam quality: adequate for evaluation    Lines/tubes: Left chest wall subcutaneous port and associated catheter are similar in the interval.  Tracheostomy remains in place.    Pulmonary Vessels: No central or large segmental filling defect.    Cardiovascular: No suggestion of right heart strain; the RV:LV ratio is <1. No significant heart chamber enlargement. There is no pericardial effusion. No focal contour irregularity or intraluminal abnormality is appreciated involving the visualized aorta and branch vessels.    Lungs/Pleura: Central airways are patent. There is increased soft tissue density encasing the right hilar vasculature, with associated progression of postobstructive right upper and middle lobe partial consolidation.  Development of a cavitary component at the anterior right upper lobe is also noted (series 14, image 40).  Additional scattered ill-defined areas of consolidative and ground-glass attenuation are also present through the remaining bilateral lung fields.  No pleural thickening, significant effusion, or evidence of pneumothorax.    Other Findings: Worsened mediastinal adenopathy is present.  For example, right precarinal node now measures approximately 19 mm short axis (series 6, image 43), while a subcarinal node measures approximately 25 mm in shortest dimension.  The visualized thyroid is unremarkable. No acute or suspicious focal abnormality through the visualized upper abdomen. No abnormality of the thoracic wall soft tissues. No acute osseous displacement or worsening destructive skeletal lesion is visualized.  Chronic degenerative alterations of the spinal column and midthoracic vertebral body compression deformity are similar to the prior study.    IMPRESSION  1. No central or segmental pulmonary thromboembolism.  2. Diffusely  worsened mediastinal adenopathy and right hilar soft tissue.  3. Worsening postobstructive consolidation with right upper lobe cavitary component, as well as widespread bilateral less pronounced areas of airspace abnormality.  Findings may represent sequela of infectious or inflammatory process, although there is elevated concern for metastatic involvement.  4. Additional chronic secondary details are similar to the 24 October 2022 CT appearance.  ==========    This report was flagged in Epic as abnormal.    RADIATION DOSE  Automated tube current modulation, weight-based exposure dosing, and/or iterative reconstruction technique utilized to reach lowest reasonably achievable exposure rate.    DLP: 194 mGy*cm      Electronically signed by: Haja Paulson  Date:    12/09/2022  Time:    15:49                                     X-Ray Chest 1 View (Final result)  Result time 12/09/22 14:40:08      Final result by Jos Taylor MD (12/09/22 14:40:08)                   Impression:      1. Right hilar fullness compatible with known neoplasm.  2. Interval patchy consolidative opacities within the right mid and lower lung and bilateral fairly diffuse interstitial opacities suspicious for pneumonia.  Progression of malignancy would be within the differential.      Electronically signed by: Jos Taylor MD  Date:    12/09/2022  Time:    14:40               Narrative:    EXAMINATION:  XR CHEST 1 VIEW    CLINICAL HISTORY:  Shortness of breath, tachycardia, lung cancer.    COMPARISON:  10/18/2022    FINDINGS:  There is right hilar fullness demonstrated corresponds to known neoplasm.  There are patchy consolidative opacities identified within the right mid and lower lung in addition to bilateral interstitial opacities.  Findings would favor pneumonia.  Progression of malignancy would be within the differential.  There is no pleural effusion.  There is no pneumothorax.  Vascular calcifications are present.  Jorge catheter is  noted.  The cardiac silhouette is within normal limits for size.   No acute displaced fracture is seen.                                        Assessment & Plan:     Sepis 2/2 community acquired pneumonia/postobstructive pneumonia  Neutropenic Fever   -Weaned off pressors 12/10 and downgraded to telemetry  - Low grade temp of 99.3 this morning. WBC improving,5.5 this morning  -CT PE negative for PE, though shows postobstructive pneumonia with cavitation in RUL as well as developing RLL consolidation  -Respiratory cultures growing pseudomonas aeruginosa; started on levequin today; will discharge with levaquin (Liquid form) to take through PEG tube   -Blood cultures  shows no growth at 5 days; vancomycin discontinued on 12/12/22  -fungal cultures pending; stopped micofungin today given pt not having fever with WBC normalized   -Stool culture  showing many yeasts. C.diff toxin antigen negative. Fecal leukocytes positive  Labs negative :   -Urine legionella, aspergillus antigen, urine histoplasma ; Fungitell uninterruptable MTB PCR x2,  MRSA PCR ; GI panel   Labs pending :  -Serum galactomanan, urine blastomyces antigens pending  -IDSA guidelines recommend against use of G-CSF in patients with neutropenic fevers, and WBC is recovering  - Weaning oxygen down at this time; Currently on 3L through trach stroma sating at 95%  - repeat Xray on 12/13 showing worsening infiltrates in right upper lobe likely that his neutrophil count is increasing   -Discussed the case pulmonary attending who recommended either IV abx or Levaquin PO for 2-4 weeks and re-imaging with CT by 2 weeks to see if there is improvement. If worsening pt needs prolonged abx usage     Stage IV Laryngeal squamous cell carcinoma s/p laryngectomy, possible metastasis to lung vs primary lung squamous cell carcinoma  - s/p laryngectomy, previously had tracheostomy but had aspirated and PEG was placed  - Continue tube feeds via PEG per home regimen  - Patient  will need to discuss with his oncologist planned chemotherapy treatment given that he became neutropenic after receiving keytruda    Thrombocytopenia  - Continue prophylactic fondaparinux    Hypokalemia - Improved  Hypophosphatemia - Improved  Hypomagnesemia- Improved   - Repleting adequately  - Continue to monitor with daily labs    Antibiotics: IV levaquin   Diet: PEG feeds  DVT Prophylaxis: Fondaparinux  GI Prophylaxis: Pepcid    Disposition: Continue inpatient management. Clinically improving. Weaning oxygen down. Pending ID work ups    Elizabeth Magdaleno DO  [facility] Internal Medicine, PGY1

## 2022-12-16 NOTE — NURSING
Patient was tried without O2 therapy this morning, desaturated to 86% on humidified air, O2 therapy restarted on humidified 5L O2 via larytube, SpO2 improved to 89%

## 2022-12-17 NOTE — PROGRESS NOTES
[facility] Medicine Wards Progress Note     Provider Team: Internal Medicine List 3  Attending: Luis A Bocanegra MD  Resident: Teri Tapia    Date of Admit: 12/9/2022    Subjective:      Brief HPI:  Josafat Boudreaux is a 57 y.o. male with  PMH of stage IV squamous cell laryngeal cancer s/p completion of chemotherapy with carboplatin + taxol and radiation therapy, s/p laryngectomy now with Larytube, presents to [facility] on 12/9/2022 with chief complaint of shortness of breath. Patient is nonverbal (but can mouth words and appropriately nod yes/no to questions) so patient's wife assists with the history. She states she found the patient this morning with increased work of breathing, and placed a home O2 pulse oximeter on which revealed SPO2 of low 80s%, and heart rate of 140s-150s so they brought him to the ED. Also report that the patient has been having fevers at home. Patient did recently see ENT clinic on 12/6 when he had a fever of 101 at home and was given azithromycin to take via his PEG tube. Since then patient had not deteriorated until this morning. Patient denies any chest pain or palpitations. Denies nausea, vomiting, diarrhea, dysuria, or hematuria. Patient reportedly has history of aspiration prior to larytube and PEG placement. No known sick contacts. Patient did restart chemotherapy on 11/29 with carboplatin, taxol, and keytruda.      In the ED, patient was found to be hypoxic and started on supplemental oxygenation via NRB over his Larytube at 8 L/min. He was febrile to 103F. Labwork revealed a WBC of 0.3, ANC of 0.042, platelets of 95, sodium 124, potassium 3.3, lactic acid 1.0. CXR revealed consolidative opacities in right id and lower lung fields as well as right hilar fullness. Patient was given doses of ceftriaxone, azithromycin, as well as cefepime. His blood pressure was originally slightly elevated but while in the ED did deteriorate to 70s/40s. He was given 30 mL/kg NS bolus and also started on  "levophed. Internal medicine consulted for admission.    Interval History: No acute events overnight.  Patient unable to be discharged yesterday as they were unable to  the liquid Levaquin which needs to be compounded.  They state they will be able to pick it up on Monday.  Also home oxygen was unable to be set up as the patient has a diagnosis of active pneumonia so it will not be covered by insurance.  This morning, the patient is on room air but saturating in the mid 80s, restarted on oxygen.  Still tachycardic to the 110s/120s.  Has been afebrile.  Does not have any acute complaints this morning.  Wife states that they do not want to go to LTAC.  Report they are willing to pay for a portable oxygen concentrator out of pocket if needed as well.      Objective:     Last 24 Hour Vital Signs:  Blood pressure (!) 91/57, pulse (!) 127, temperature 99 °F (37.2 °C), temperature source Oral, resp. rate 18, height 5' 9.02" (1.753 m), weight 70.4 kg (155 lb 3.3 oz), SpO2 (!) 85 %.    Physical Exam  Constitutional:       General: He is not in acute distress.     Appearance: He is not ill-appearing.   HENT:      Head: Normocephalic and atraumatic.      Right Ear: External ear normal.      Left Ear: External ear normal.      Nose: Nose normal.      Mouth/Throat:      Mouth: Mucous membranes are moist.   Eyes:      Extraocular Movements: Extraocular movements intact.      Conjunctiva/sclera: Conjunctivae normal.   Neck:      Comments: Larytube in place  Cardiovascular:      Rate and Rhythm: Normal rate and regular rhythm.      Heart sounds: No murmur heard.    No friction rub. No gallop.   Pulmonary:      Effort: No respiratory distress.      Breath sounds: Rhonchi (mild, RUL, RLL) present. No wheezing (trace expiratory).   Abdominal:      General: There is no distension.      Palpations: Abdomen is soft.      Tenderness: There is no abdominal tenderness.      Comments: PEG tube in place   Musculoskeletal:      Right " lower leg: No edema.      Left lower leg: No edema.   Skin:     General: Skin is warm and dry.   Neurological:      General: No focal deficit present.      Mental Status: He is alert. Mental status is at baseline.       Laboratory:  Most Recent Data:    Lab Results   Component Value Date    WBC 7.0 12/17/2022    HGB 12.4 (L) 12/17/2022    HCT 37.4 (L) 12/17/2022    MCV 96.9 (H) 12/17/2022     12/17/2022         BMP  Lab Results   Component Value Date     (L) 12/17/2022    K 4.5 12/17/2022    CO2 26 12/17/2022    BUN 17.8 12/17/2022    CREATININE 0.98 12/17/2022    CALCIUM 10.6 (H) 12/17/2022    EGFRNONAA >60 08/02/2022       Radiology:  Imaging Results              X-Ray Chest 1 View (Final result)  Result time 12/09/22 17:41:14      Final result by Jackie Loaiza MD (12/09/22 17:41:14)                   Impression:      Interval placement of right-sided PICC line with the SVC    Otherwise unchanged      Electronically signed by: Jackie Loaiza  Date:    12/09/2022  Time:    17:41               Narrative:    EXAMINATION:  XR CHEST 1 VIEW    CLINICAL HISTORY:  Encounter for adjustment and management of vascular access device    TECHNIQUE:  Single frontal view of the chest was performed.    COMPARISON:  12/09/2022    FINDINGS:  There is a right upper lobe interstitial infiltrate.  There is also underlying chronic lung changes bilaterally.  The heart is normal appearance.  Pulmonary vascularity is unremarkable.  There is a port in the left anterior chest wall.  There is a right sided PICC line with the tip in the SVC.                                        CTA Chest Non-Coronary (PE Studies) (Final result)  Result time 12/09/22 15:49:23      Final result by Haja Paulson MD (12/09/22 15:49:23)                   Narrative:    EXAMINATION  CTA CHEST NON CORONARY (PE STUDIES)    CLINICAL HISTORY  Pulmonary embolism (PE) suspected, unknown D-dimer; Chest pain,  unspecified    TECHNIQUE  Post-contrast helical-acquisition CT images were obtained, with bolus timing to pulmonary arterial system for purposes of PE protocol CTA.  Multiplanar reconstructions, including MIP images, were accomplished by a CT technologist at a separate workstation, pushed to PACS for physician review.    CONTRAST  IV: Omnipaque 300, 100 mL    COMPARISON  None available at the time of initial interpretation.    FINDINGS  Images were reviewed in soft tissue, lung, and bone windows.    Exam quality: adequate for evaluation    Lines/tubes: Left chest wall subcutaneous port and associated catheter are similar in the interval.  Tracheostomy remains in place.    Pulmonary Vessels: No central or large segmental filling defect.    Cardiovascular: No suggestion of right heart strain; the RV:LV ratio is <1. No significant heart chamber enlargement. There is no pericardial effusion. No focal contour irregularity or intraluminal abnormality is appreciated involving the visualized aorta and branch vessels.    Lungs/Pleura: Central airways are patent. There is increased soft tissue density encasing the right hilar vasculature, with associated progression of postobstructive right upper and middle lobe partial consolidation.  Development of a cavitary component at the anterior right upper lobe is also noted (series 14, image 40).  Additional scattered ill-defined areas of consolidative and ground-glass attenuation are also present through the remaining bilateral lung fields.  No pleural thickening, significant effusion, or evidence of pneumothorax.    Other Findings: Worsened mediastinal adenopathy is present.  For example, right precarinal node now measures approximately 19 mm short axis (series 6, image 43), while a subcarinal node measures approximately 25 mm in shortest dimension.  The visualized thyroid is unremarkable. No acute or suspicious focal abnormality through the visualized upper abdomen. No  abnormality of the thoracic wall soft tissues. No acute osseous displacement or worsening destructive skeletal lesion is visualized.  Chronic degenerative alterations of the spinal column and midthoracic vertebral body compression deformity are similar to the prior study.    IMPRESSION  1. No central or segmental pulmonary thromboembolism.  2. Diffusely worsened mediastinal adenopathy and right hilar soft tissue.  3. Worsening postobstructive consolidation with right upper lobe cavitary component, as well as widespread bilateral less pronounced areas of airspace abnormality.  Findings may represent sequela of infectious or inflammatory process, although there is elevated concern for metastatic involvement.  4. Additional chronic secondary details are similar to the 24 October 2022 CT appearance.  ==========    This report was flagged in Epic as abnormal.    RADIATION DOSE  Automated tube current modulation, weight-based exposure dosing, and/or iterative reconstruction technique utilized to reach lowest reasonably achievable exposure rate.    DLP: 194 mGy*cm      Electronically signed by: Haja Paulson  Date:    12/09/2022  Time:    15:49                                     X-Ray Chest 1 View (Final result)  Result time 12/09/22 14:40:08      Final result by Jos Taylor MD (12/09/22 14:40:08)                   Impression:      1. Right hilar fullness compatible with known neoplasm.  2. Interval patchy consolidative opacities within the right mid and lower lung and bilateral fairly diffuse interstitial opacities suspicious for pneumonia.  Progression of malignancy would be within the differential.      Electronically signed by: Jos Taylor MD  Date:    12/09/2022  Time:    14:40               Narrative:    EXAMINATION:  XR CHEST 1 VIEW    CLINICAL HISTORY:  Shortness of breath, tachycardia, lung cancer.    COMPARISON:  10/18/2022    FINDINGS:  There is right hilar fullness demonstrated corresponds to known  neoplasm.  There are patchy consolidative opacities identified within the right mid and lower lung in addition to bilateral interstitial opacities.  Findings would favor pneumonia.  Progression of malignancy would be within the differential.  There is no pleural effusion.  There is no pneumothorax.  Vascular calcifications are present.  Jorge catheter is noted.  The cardiac silhouette is within normal limits for size.   No acute displaced fracture is seen.                                        Assessment & Plan:     Sepis 2/2 community acquired pneumonia / postobstructive pneumonia  Neutropenic Fever   -Weaned off pressors 12/10 and downgraded to telemetry  - WBC improving, 7.0 this morning  -CT PE negative for PE, though shows postobstructive pneumonia with cavitation in RUL as well as developing RLL consolidation  -Respiratory cultures growing pseudomonas aeruginosa; started on levequin today; will plan for discharge (likely tomorrow afternoon) with levaquin (Liquid form) to take through PEG tube   -Blood cultures  shows no growth at 5 days; vancomycin discontinued on 12/12/22  -fungal cultures pending; stopped micofungin today given pt not having fever with WBC normalized   -Stool culture  showing many yeasts. C.diff toxin antigen negative. Fecal leukocytes positive  Labs negative :   -Urine legionella, aspergillus antigen, urine histoplasma ; Fungitell uninterruptable MTB PCR x2,  MRSA PCR ; GI panel   Labs pending :  -Serum galactomanan, urine blastomyces antigens pending  - continue to wean oxygen as tolerated  - repeat Xray on 12/13 showing worsening infiltrates in right upper lobe likely that his neutrophil count is increasing   -Discussed the case with pulmonary attending who recommended either IV abx or Levaquin PO for 2-4 weeks and re-imaging with CT by 2 weeks to see if there is improvement. If worsening pt needs prolonged abx usage     Stage IV Laryngeal squamous cell carcinoma s/p laryngectomy,  possible metastasis to lung vs primary lung squamous cell carcinoma  - s/p laryngectomy, previously had tracheostomy but had aspirated and PEG was placed  - Continue tube feeds via PEG per home regimen  - Patient will need to discuss with his oncologist planned chemotherapy treatment given that he became neutropenic after receiving keytruda    Thrombocytopenia  - Continue prophylactic fondaparinux    Hypokalemia - Improved  Hypophosphatemia - Improved  Hypomagnesemia- Improved   - Repleting adequately  - Continue to monitor with daily labs    Antibiotics: IV levaquin   Diet: PEG feeds  DVT Prophylaxis: Fondaparinux  GI Prophylaxis: Pepcid    Disposition: Continue inpatient management. Clinically improving.  Probable discharge tomorrow after he receives his dose of IV Levaquin noon.  Patient's wife states that they do not want to go to LTAC and that they are willing to pay for a portable oxygen concentrator out-of-pocket, they already have an O2 concentrator at home.    Aj Tapia MD  [facility] Internal Medicine, PGY2

## 2022-12-17 NOTE — CARE UPDATE
Progress Note indicates patient will be ready for DC tomorrow. Spoke with wife, Kymberly [phone] who states she is willing to pay out of pocket cost for portable O2 tank, She would like to use [facility]. Called and spoke with Symone at [employer], [phone]. She states out of pocket cost for portable tank is $50 on delivery of equipment, then $35/ month rental fee. She states they already have the O2 orders all that is needed is call to indicate patient is ready for DC. Provided her with wife's contact number.  Wife states she is in agreement with this plan. Will notifiy [employer].

## 2022-12-18 NOTE — CARE UPDATE
Got call from nurse stating patient did not want to wear his cardiac monitor.  Went to talk to patient regarding this issue, and had his significant other present in the room.  They stated that the cardiac monitor kept falling off.  Reasons importance having the cardiac was during his inpatient stay even though he is being discharged tomorrow.  Patient agreeable to this.        Pacheco Forde DO  [facility] PGY-1

## 2022-12-19 PROBLEM — C34.91 MALIGNANT NEOPLASM OF RIGHT LUNG: Status: ACTIVE | Noted: 2022-01-01

## 2022-12-19 PROBLEM — J18.9 COMMUNITY ACQUIRED PNEUMONIA OF RIGHT MIDDLE LOBE OF LUNG: Status: ACTIVE | Noted: 2022-01-01

## 2022-12-19 NOTE — PROGRESS NOTES
[facility] Medicine Wards Progress Note     Resident Team: [facility] Medicine List 3  Attending Physician: Luis A Bocanegra MD    Subjective:      Brief HPI:  Josafat Boudreaux is a 57 y.o. male who  has a past medical history of Cancer, COPD (chronic obstructive pulmonary disease), Dysphagia, Lung cancer, and Vocal cord mass.  He who presented to [facility] on 12/9/2022  with a primary complaint of Shortness of Breath and Tachycardia (Pt in for SOB and tachycardia. Hx of stage 4 lung CA and laryngectomy. Tracheostomy in place. Pt started azithromycin on Monday for PNA. MD bedside. 88% on RA. Pt febrile.)    pT4apN1-Stage JENNYFER Laryngeal Squamous cell carcinoma   Carbo/Taxol/Keytruda planned 11/30/2022 x4-6 followed by maintenance keytruda  --7/11/2022: total laryngectomy, 1/40 positive lymph nodes.   -10/08/2022 neck infection with I and D.   --Carbo/Taxol + RT (8/17/22-9/21/22)  --8/17/2022-9/28/2022:  6000 cGy, 200 cGy/fraction, 30/30 fx    RLL LLL bronchial wash 7/11 squamous cell carcinoma    Interval History:  Patient has no complaints overnight.  Patient is oxygenating well.  Patient says they were about to get discharged yesterday however with the blastomycosis antigen they are awaiting workup.  Patient denies any pain anywhere.       Review of Systems:  14 point ROS completed and negative except as indicated above.     Objective:     Vital Signs:  Vital Signs (Most Recent):  Temp: 98.8 °F (37.1 °C) (12/19/22 0347)  Pulse: (!) 136 (12/19/22 0347)  Resp: (!) 22 (12/19/22 0327)  BP: 92/61 (12/19/22 0347)  SpO2: (!) 92 % (12/19/22 0347)   Vital Signs (24h Range):  Temp:  [97.4 °F (36.3 °C)-98.8 °F (37.1 °C)] 98.8 °F (37.1 °C)  Pulse:  [105-136] 136  Resp:  [18-22] 22  SpO2:  [90 %-100 %] 92 %  BP: ()/(56-68) 92/61   Body mass index is 22.91 kg/m².     Intake/Output Summary (Last 24 hours) at 12/19/2022 0637  Last data filed at 12/18/2022 2000  Gross per 24 hour   Intake 3430 ml   Output 1600 ml   Net 1830 ml          Physical  Exam  Constitutional:       General: He is not in acute distress.     Appearance: He is not ill-appearing.   HENT:      Head: Normocephalic and atraumatic.      Right Ear: External ear normal.      Left Ear: External ear normal.      Nose: Nose normal.      Mouth/Throat:      Mouth: Mucous membranes are moist.   Eyes:      Extraocular Movements: Extraocular movements intact.      Conjunctiva/sclera: Conjunctivae normal.   Neck:      Comments: Larytube in place  Cardiovascular:      Rate and Rhythm: Normal rate and regular rhythm.      Heart sounds: No murmur heard.    No friction rub. No gallop.   Pulmonary:      Effort: No respiratory distress.      Breath sounds: Rhonchi (mild, RUL, RLL) present. No wheezing (trace expiratory).   Abdominal:      General: There is no distension.      Palpations: Abdomen is soft.      Tenderness: There is no abdominal tenderness.      Comments: PEG tube in place   Musculoskeletal:      Right lower leg: No edema.      Left lower leg: No edema.   Skin:     General: Skin is warm and dry.   Neurological:      General: No focal deficit present.      Mental Status: He is alert. Mental status is at baseline.     Laboratory:    Recent Labs   Lab 12/17/22  0534 12/18/22  0543 12/19/22  0446   WBC 7.0 7.1 10.5   RBC 3.86* 4.13* 3.87*   HGB 12.4* 13.5* 12.6*   HCT 37.4* 39.8* 36.9*   MCV 96.9* 96.4* 95.3*   MCH 32.1 32.7 32.6   MCHC 33.2 33.9 34.1   RDW 13.2 13.1 13.1    272 286   MPV 10.2 10.2 9.8      Recent Labs   Lab 12/17/22  0534 12/18/22  0543 12/19/22  0446   * 132* 130*   K 4.5 4.5 4.2   CHLORIDE 94* 94* 94*   CO2 26 27 25   BUN 17.8 18.5 17.5   CREATININE 0.98 0.95 0.99   CALCIUM 10.6* 10.8* 9.8   MG 1.70 2.10 1.70   ALBUMIN 2.1* 2.3* 2.2*   ALKPHOS 67 73 70   ALT 7 7 6   AST 15 14 15   BILITOT 0.2 0.3 0.3        Microbiology Data:  Microbiology Results (last 7 days)       Procedure Component Value Units Date/Time    Blood Culture #1 **CANNOT BE ORDERED STAT**  [[id number]]  (Normal) Collected: 12/09/22 1127    Order Status: Completed Specimen: Blood from Mediport Updated: 12/14/22 1900     CULTURE, BLOOD ([facility]) No Growth at 5 days    Blood Culture #2 **CANNOT BE ORDERED STAT** [[id number]]  (Normal) Collected: 12/09/22 1143    Order Status: Completed Specimen: Blood from Hand, Left Updated: 12/14/22 1900     CULTURE, BLOOD ([facility]) No Growth at 5 days    Stool Culture [[id number]]  (Abnormal) Collected: 12/10/22 2259    Order Status: Completed Specimen: Stool Updated: 12/13/22 1025     Stool Culture Negative for Salmonella, Shigella, Campylobacter, Vibrio, Aeromonas, Pleisiomonas,Yersinia, or Shiga Toxin 1 and 2.      Many Yeast    Respiratory Culture [[id number]]  (Abnormal)  (Susceptibility) Collected: 12/09/22 1630    Order Status: Completed Specimen: Respiratory Updated: 12/12/22 1228     Respiratory Culture Moderate Pseudomonas aeruginosa     Comment: with rare normal respiratory julieth        GRAM STAIN Few WBC observed      Rare Gram positive cocci      Rare Gram Negative Rods             Other Results:    Radiology:  Imaging Results              X-Ray Chest 1 View (Final result)  Result time 12/09/22 17:41:14      Final result by Jackie Loaiza MD (12/09/22 17:41:14)                   Impression:      Interval placement of right-sided PICC line with the SVC    Otherwise unchanged      Electronically signed by: Jackie Loaiza  Date:    12/09/2022  Time:    17:41               Narrative:    EXAMINATION:  XR CHEST 1 VIEW    CLINICAL HISTORY:  Encounter for adjustment and management of vascular access device    TECHNIQUE:  Single frontal view of the chest was performed.    COMPARISON:  12/09/2022    FINDINGS:  There is a right upper lobe interstitial infiltrate.  There is also underlying chronic lung changes bilaterally.  The heart is normal appearance.  Pulmonary vascularity is unremarkable.  There is a port in the left anterior chest wall.  There is a right sided  PICC line with the tip in the SVC.                                        CTA Chest Non-Coronary (PE Studies) (Final result)  Result time 12/09/22 15:49:23      Final result by Haja Paulson MD (12/09/22 15:49:23)                   Narrative:    EXAMINATION  CTA CHEST NON CORONARY (PE STUDIES)    CLINICAL HISTORY  Pulmonary embolism (PE) suspected, unknown D-dimer; Chest pain, unspecified    TECHNIQUE  Post-contrast helical-acquisition CT images were obtained, with bolus timing to pulmonary arterial system for purposes of PE protocol CTA.  Multiplanar reconstructions, including MIP images, were accomplished by a CT technologist at a separate workstation, pushed to PACS for physician review.    CONTRAST  IV: Omnipaque 300, 100 mL    COMPARISON  None available at the time of initial interpretation.    FINDINGS  Images were reviewed in soft tissue, lung, and bone windows.    Exam quality: adequate for evaluation    Lines/tubes: Left chest wall subcutaneous port and associated catheter are similar in the interval.  Tracheostomy remains in place.    Pulmonary Vessels: No central or large segmental filling defect.    Cardiovascular: No suggestion of right heart strain; the RV:LV ratio is <1. No significant heart chamber enlargement. There is no pericardial effusion. No focal contour irregularity or intraluminal abnormality is appreciated involving the visualized aorta and branch vessels.    Lungs/Pleura: Central airways are patent. There is increased soft tissue density encasing the right hilar vasculature, with associated progression of postobstructive right upper and middle lobe partial consolidation.  Development of a cavitary component at the anterior right upper lobe is also noted (series 14, image 40).  Additional scattered ill-defined areas of consolidative and ground-glass attenuation are also present through the remaining bilateral lung fields.  No pleural thickening, significant effusion, or evidence of  pneumothorax.    Other Findings: Worsened mediastinal adenopathy is present.  For example, right precarinal node now measures approximately 19 mm short axis (series 6, image 43), while a subcarinal node measures approximately 25 mm in shortest dimension.  The visualized thyroid is unremarkable. No acute or suspicious focal abnormality through the visualized upper abdomen. No abnormality of the thoracic wall soft tissues. No acute osseous displacement or worsening destructive skeletal lesion is visualized.  Chronic degenerative alterations of the spinal column and midthoracic vertebral body compression deformity are similar to the prior study.    IMPRESSION  1. No central or segmental pulmonary thromboembolism.  2. Diffusely worsened mediastinal adenopathy and right hilar soft tissue.  3. Worsening postobstructive consolidation with right upper lobe cavitary component, as well as widespread bilateral less pronounced areas of airspace abnormality.  Findings may represent sequela of infectious or inflammatory process, although there is elevated concern for metastatic involvement.  4. Additional chronic secondary details are similar to the 24 October 2022 CT appearance.  ==========    This report was flagged in Epic as abnormal.    RADIATION DOSE  Automated tube current modulation, weight-based exposure dosing, and/or iterative reconstruction technique utilized to reach lowest reasonably achievable exposure rate.    DLP: 194 mGy*cm      Electronically signed by: Haja Paulson  Date:    12/09/2022  Time:    15:49                                     X-Ray Chest 1 View (Final result)  Result time 12/09/22 14:40:08      Final result by Jos Taylor MD (12/09/22 14:40:08)                   Impression:      1. Right hilar fullness compatible with known neoplasm.  2. Interval patchy consolidative opacities within the right mid and lower lung and bilateral fairly diffuse interstitial opacities suspicious for pneumonia.   Progression of malignancy would be within the differential.      Electronically signed by: Jos Taylor MD  Date:    12/09/2022  Time:    14:40               Narrative:    EXAMINATION:  XR CHEST 1 VIEW    CLINICAL HISTORY:  Shortness of breath, tachycardia, lung cancer.    COMPARISON:  10/18/2022    FINDINGS:  There is right hilar fullness demonstrated corresponds to known neoplasm.  There are patchy consolidative opacities identified within the right mid and lower lung in addition to bilateral interstitial opacities.  Findings would favor pneumonia.  Progression of malignancy would be within the differential.  There is no pleural effusion.  There is no pneumothorax.  Vascular calcifications are present.  Jorge catheter is noted.  The cardiac silhouette is within normal limits for size.   No acute displaced fracture is seen.                                      Current Medications:     Infusions:        Scheduled:   acetylcysteine 100 mg/ml (10%)  4 mL Nebulization TID WAKE    famotidine  20 mg Per G Tube QHS    fondaparinux  2.5 mg Subcutaneous Daily    itraconazole  200 mg Per G Tube TID    levoFLOXacin  750 mg Intravenous Q24H    LIDOcaine (PF) 10 mg/ml (1%)  10 mL Other Once    pantoprozole (PROTONIX) IV  40 mg Intravenous Daily    sodium chloride 0.9%  10 mL Intravenous Q6H         PRN:   acetaminophen    albuterol-ipratropium    diphenhydrAMINE    heparin, porcine (PF)    HYDROcodone-acetaminophen    sodium chloride 0.9%    sodium chloride 0.9%        Antibiotics and Day Number of Therapy:  Antibiotics (From admission, onward)      Start     Stop Route Frequency Ordered    12/16/22 1130  levoFLOXacin 750 mg/150 mL IVPB 750 mg         -- IV Every 24 hours (non-standard times) 12/16/22 1028             Dietary Orders (From admission, onward)       Start     Ordered    12/11/22 1355  Tube Feedings/Formulas 250; Diabetisource AC 1.2; Peg; 60; Other (see comments)  Continuous        Comments: Bolus feeds; send 6  cartons/day; Water flushes 60mL before and after each feeding   Question Answer Comment   Feeding Volume (mL): 250    Select Formula: Diabetisource AC 1.2    Route: Peg    Free water flush volume (mL): 60    Free water flush frequency: Other (see comments)        12/11/22 1358    12/09/22 1426  Diet NPO  (Diet/Nutrition [facility])  Diet effective now         12/09/22 1432                   S/p zosyn 7 days  Micafungin 4  Doxycycline 7  Imaging was reviewed with Pulmonary attending Dr. Jordan CT is significant for evolving changes with regards to patient's right upper lung cavitating lesion.    Assessment & Plan:   Sepis 2/2 community acquired pneumonia / postobstructive pneumonia  Neutropenic Fever   - Discussed the case with Dr. Elise, the oncologist who stated they will hold off on  palliative chemotherapy till pt finishes abx  -respiratory culture is positive for Pseudomonas aeruginosa.  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 Elise, the oncologist who stated they will hold off on  palliative chemotherapy till pt finishes abx     Thrombocytopenia  - Continue prophylactic fondaparinux     Blastomycosis antigen positive  Moderate to severe blastomycosis (immunocompromised)  -Fungitell is uninterpretable.  Blastomycosis antigen is positive.  Blasto antigen is less than 1.3.  -Discussed case with pulmonology and will take patient to a bronchoscopy tomorrow for biopsy/washings.    -will start patient on amphotericin 5 mgs per kg per day for a week and transition patient to itraconazole for 12 months.   -send serologies for histoplasmosis, aspergillus, and blastomycosis.   -with amphotericin some  significant potential side effects for electrolytes will get daily CMP/magnesium/phosphorus and replete when necessary     Antibiotics: IV levaquin   Diet: PEG feeds will hold at midnight  DVT Prophylaxis: Fondaparinux  GI Prophylaxis: Pepcid    Disposition: Continue inpatient management.  NPO, hold tube feedings at midnight for bronchoscopy tomorrow.  Start amphotericin for a week and transition to itraconazole for 1 year.  Patient is to be scheduled at pulmonology and ID clinic once 1 week is completed      Beth Paul MD  Internal Medicine Resident PGY-II

## 2022-12-19 NOTE — NURSING
1527 WIFE CALLED OUT REQUESTING NURSE, URGENTLY. NURSE ENTERS ROOM TO FIND PT TACHYCARDIC IN 150s, RR 26, T 100.5F AND UNABLE TO OBTAIN BP D/T PT VIGOROUSLY SHAKING. PT ENDORSES BEING COLD.    1529 DR. CORTEZ NOTIFIED OF SUSPECTED RIGORS AND ALLERGIC REACTION TO [facility]. STATES SHE WILL BE AT BEDSIDE SHORTLY.    1535 DR. CORTEZ CONTACTED BY ZAKIYA SHANKS RN, REQUESTING URGENCY TO BEDSIDE.    1537 DR. CORTEZ AND WALDEMAR AT BEDSIDE. ORDERS FOR MIPERIDINE AND BENADRYL AT THIS TIME. SEE MAR FOR ADMINISTRATION. RESP THERAPY CALLED TO BEDSIDE AT THIS TIME TO ASSIST WITH APPLYING O2 ON PT TIANA TUBE. SEE FLOWSHEET.    1600 NURSE REMAINS AT BEDSIDE UNTIL THIS TIME. PT IN STABLE CONDITION. WIFE AT BEDSIDE. WILL CONTINUE TO MONITOR.

## 2022-12-19 NOTE — PT/OT/SLP PROGRESS
"Occupational Therapy  Treatment    Josafat Boudreaux   MRN: [id number]   Admitting Diagnosis: Neutropenic fever    OT Date of Treatment: 12/19/22   OT Start Time: 1118  OT Stop Time: 1128  OT Total Time (min): 10 min    Billable Minutes:  Therapeutic Activity 10    OT/MOR: OT          General Precautions: Standard, neutropenic  Orthopedic Precautions: N/A  Braces: N/A  Respiratory Status:  room air with humidifier on trach collar, O2 sat 95% and  seated EOB  After ambulating 130 ft on room air, O2 sat 96% and        Subjective:  Communicated with nurse Gutierrez prior to session.  Nurse stated okay to ambulate pt on room air, nurse disconnected IV temporarily for ease of mobility.  Pt limited session to 1 lap around nurses' station (130 ft) with SPV and without DME.  Pain/Comfort  Pain Rating 1: 0/10    Objective:  Patient found with: peripheral IV, PICC line (pt's trach collar for humidifier setting only; nurse stated pt is on room air with O2 PRN only)     Functional Mobility:  Bed Mobility:  independent       Transfers:  independent        Functional Ambulation: SPV, no DME needed    Therapeutic Activities and Exercises:  Endurance training with ambulation around nurses' station, safety education reviewed, DC planning for tomorrow home with his wife and home health, will have O2 for PRN needs.    AM-PAC 6 CLICK ADL   How much help from another person does this patient currently need?   1 = Unable, Total/Dependent Assistance  2 = A lot, Maximum/Moderate Assistance  3 = A little, Minimum/Contact Guard/Supervision  4 = None, Modified [place]/Independent          AM-PAC Raw Score CMS "G-Code Modifier Level of Impairment Assistance   6 % Total / Unable   7 - 8 CM 80 - 100% Maximal Assist   9-13 CL 60 - 80% Moderate Assist   14 - 19 CK 40 - 60% Moderate Assist   20 - 22 CJ 20 - 40% Minimal Assist   23 CI 1-20% SBA / CGA   24 CH 0% Independent/ Mod I       Patient left sitting edge of bed with call " button in reach and pt's wife present, nurse notified in order to return pt to IV.    ASSESSMENT:  Josafat Boudreaux is a 57 y.o. male with a medical diagnosis of Neutropenic fever and presents with improving endurance.    Rehab identified problem list/impairments:  weakness, impaired endurance, impaired cardiopulmonary response to activity    Rehab potential is good.    Activity tolerance: Fair    Discharge recommendations: home with home health   Barriers to discharge:      Equipment recommendations: none    GOALS:   Multidisciplinary Problems       Occupational Therapy Goals          Problem: Occupational Therapy    Goal Priority Disciplines Outcome Interventions   Occupational Therapy Goal     OT, PT/OT Ongoing, Progressing    Description: Patient will perform grooming with SPV assist while standing at sink, maintaining O2 sats above 95% throughout O2 weaning.    Patient will perform toilet transfer with SPV assist while maintaining O2 sats above 95%    Patient will perform dressing, grooming, and toileting routine while ambulating in room and during ambulating in irene to retrieve coffee while maintaining O2 sats above 95% during O2 weaning as tolerated.                       Plan:  Patient to be seen 2 x/week, 5 x/week (M-F) to address the above listed problems via therapeutic activities, therapeutic exercises  Plan of Care expires:    Plan of Care reviewed with: patient         12/19/2022

## 2022-12-19 NOTE — PLAN OF CARE
Problem: Violence Risk or Actual  Goal: Anger and Impulse Control  Outcome: Ongoing, Progressing     Problem: Adult Inpatient Plan of Care  Goal: Plan of Care Review  Outcome: Ongoing, Progressing  Goal: Patient-Specific Goal (Individualized)  Outcome: Ongoing, Progressing  Goal: Absence of Hospital-Acquired Illness or Injury  Outcome: Ongoing, Progressing  Goal: Optimal Comfort and Wellbeing  Outcome: Ongoing, Progressing  Goal: Readiness for Transition of Care  Outcome: Ongoing, Progressing     Problem: Adjustment to Illness (Sepsis/Septic Shock)  Goal: Optimal Coping  Outcome: Ongoing, Progressing     Problem: Bleeding (Sepsis/Septic Shock)  Goal: Absence of Bleeding  Outcome: Ongoing, Progressing     Problem: Infection Progression (Sepsis/Septic Shock)  Goal: Absence of Infection Signs and Symptoms  Outcome: Ongoing, Progressing     Problem: Nutrition Impaired (Sepsis/Septic Shock)  Goal: Optimal Nutrition Intake  Outcome: Ongoing, Progressing     Problem: Fluid Imbalance (Pneumonia)  Goal: Fluid Balance  Outcome: Ongoing, Progressing     Problem: Infection (Pneumonia)  Goal: Resolution of Infection Signs and Symptoms  Outcome: Ongoing, Progressing     Problem: Respiratory Compromise (Pneumonia)  Goal: Effective Oxygenation and Ventilation  Outcome: Ongoing, Progressing     Problem: Infection  Goal: Absence of Infection Signs and Symptoms  Outcome: Ongoing, Progressing     Problem: Impaired Wound Healing  Goal: Optimal Wound Healing  Outcome: Ongoing, Progressing     Problem: Neutropenia  Goal: Absence of Infection  Outcome: Ongoing, Progressing     Problem: Skin Injury Risk Increased  Goal: Skin Health and Integrity  Outcome: Ongoing, Progressing

## 2022-12-19 NOTE — NURSING
0845 NOTIFIED DR. CORTEZ OF PT BP READINGS IN L ARM AND R LEG. STATES SHE WILL NOTIFY DR. MEDEIROS FOR ORDERS.    0850 DR. MEDEIROS AT BEDSIDE. SEE ORDERS

## 2022-12-20 PROBLEM — B40.9: Status: ACTIVE | Noted: 2022-01-01

## 2022-12-20 NOTE — PROGRESS NOTES
[facility] Medicine Wards Progress Note     Resident Team: [facility] Medicine List 3  Attending Physician: Bridger Jordan MD    Subjective:      Brief HPI:  Josafat Boudreaux is a 57 y.o. male who  has a past medical history of Cancer, COPD (chronic obstructive pulmonary disease), Dysphagia, Lung cancer, and Vocal cord mass.  He who presented to [facility] on 12/9/2022  with a primary complaint of Shortness of Breath and Tachycardia (Pt in for SOB and tachycardia. Hx of stage 4 lung CA and laryngectomy. Tracheostomy in place. Pt started azithromycin on Monday for PNA. MD bedside. 88% on RA. Pt febrile.)    pT4apN1-Stage JENNYFER Laryngeal Squamous cell carcinoma   Carbo/Taxol/Keytruda planned 11/30/2022 x4-6 followed by maintenance keytruda  --7/11/2022: total laryngectomy, 1/40 positive lymph nodes.   -10/08/2022 neck infection with I and D.   --Carbo/Taxol + RT (8/17/22-9/21/22)  --8/17/2022-9/28/2022:  6000 cGy, 200 cGy/fraction, 30/30 fx    RLL LLL bronchial wash 7/11 squamous cell carcinoma    Interval History:  Patient did not tolerate his amphotericin yesterday, patient was started on his medication in at approximately 4:00 p.m. began to have rigors and hypoxia.  Patient was given Tylenol, Benadryl, and meperidine with clinical effect.  I have talked to patient that this is a side effect and though liposomal versions can decrease and decreasing the amount of the rate patient does not want to have any more of the amphotericin at this time.  He is scared of dying.  On my examination this morning he is alert and oriented.  Overnight patient had an episode hypotension, was given 2 boluses of normal saline.  And maps improved from 58-63.  It was at this time that patient was started on Levophed.      Review of Systems:  14 point ROS completed and negative except as indicated above.     Objective:     Vital Signs:  Vital Signs (Most Recent):  Temp: 98.7 °F (37.1 °C) (12/20/22 0515)  Pulse: 95 (12/20/22 0530)  Resp: 20 (12/20/22  0515)  BP: 127/75 (12/20/22 0530)  SpO2: 99 % (12/20/22 0530)   Vital Signs (24h Range):  Temp:  [98 °F (36.7 °C)-100.5 °F (38.1 °C)] 98.7 °F (37.1 °C)  Pulse:  [] 95  Resp:  [18-24] 20  SpO2:  [90 %-99 %] 99 %  BP: ()/(48-75) 127/75   Body mass index is 22.91 kg/m².     Intake/Output Summary (Last 24 hours) at 12/20/2022 0631  Last data filed at 12/20/2022 0010  Gross per 24 hour   Intake 500 ml   Output 200 ml   Net 300 ml          Physical Exam  Constitutional:       General: He is not in acute distress.     Appearance: He is not ill-appearing.   HENT:      Head: Normocephalic and atraumatic.      Right Ear: External ear normal.      Left Ear: External ear normal.      Nose: Nose normal.      Mouth/Throat:      Mouth: Mucous membranes are moist.   Eyes:      Extraocular Movements: Extraocular movements intact.      Conjunctiva/sclera: Conjunctivae normal.   Neck:      Comments: Larytube in place  Cardiovascular:      Rate and Rhythm: Normal rate and regular rhythm.      Heart sounds: No murmur heard.    No friction rub. No gallop.   Pulmonary:      Effort: No respiratory distress.  No wheezes.  Abdominal:      General: There is no distension.      Palpations: Abdomen is soft.      Tenderness: There is no abdominal tenderness.      Comments: PEG tube in place   Musculoskeletal:      Right lower leg: No edema.      Left lower leg: No edema.   Skin:     General: Skin is warm and dry.   Neurological:      General: No focal deficit present.      Mental Status: He is alert. Mental status is at baseline.     Laboratory:    Recent Labs   Lab 12/18/22  0543 12/19/22  0446 12/20/22  0410   WBC 7.1 10.5 10.3   RBC 4.13* 3.87* 3.22*   HGB 13.5* 12.6* 10.3*   HCT 39.8* 36.9* 31.1*   MCV 96.4* 95.3* 96.6*   MCH 32.7 32.6 32.0   MCHC 33.9 34.1 33.1   RDW 13.1 13.1 13.0    286 240   MPV 10.2 9.8 10.0      Recent Labs   Lab 12/18/22  0543 12/19/22  0446 12/19/22  1746 12/20/22  0410   * 130* 129* 132*   K  4.5 4.2 3.9 3.3*   CHLORIDE 94* 94* 97* 98   CO2 27 25 23 26   BUN 18.5 17.5 16.5 17.6   CREATININE 0.95 0.99 1.00 1.11   CALCIUM 10.8* 9.8 8.3* 8.0*   MG 2.10 1.70 1.80 2.60   ALBUMIN 2.3* 2.2*  --  1.9*   ALKPHOS 73 70  --  61   ALT 7 6  --  7   AST 14 15  --  15   BILITOT 0.3 0.3  --  0.2        Microbiology Data:  Microbiology Results (last 7 days)       Procedure Component Value Units Date/Time    Blood Culture #1 **CANNOT BE ORDERED STAT** [[id number]]  (Normal) Collected: 12/09/22 1127    Order Status: Completed Specimen: Blood from Mediport Updated: 12/14/22 1900     CULTURE, BLOOD ([facility]) No Growth at 5 days    Blood Culture #2 **CANNOT BE ORDERED STAT** [[id number]]  (Normal) Collected: 12/09/22 1143    Order Status: Completed Specimen: Blood from Hand, Left Updated: 12/14/22 1900     CULTURE, BLOOD ([facility]) No Growth at 5 days    Stool Culture [[id number]]  (Abnormal) Collected: 12/10/22 2259    Order Status: Completed Specimen: Stool Updated: 12/13/22 1025     Stool Culture Negative for Salmonella, Shigella, Campylobacter, Vibrio, Aeromonas, Pleisiomonas,Yersinia, or Shiga Toxin 1 and 2.      Many Yeast             Other Results:    Radiology:  Imaging Results              X-Ray Chest 1 View (Final result)  Result time 12/09/22 17:41:14      Final result by Jackie Loaiza MD (12/09/22 17:41:14)                   Impression:      Interval placement of right-sided PICC line with the SVC    Otherwise unchanged      Electronically signed by: Jackie Loaiza  Date:    12/09/2022  Time:    17:41               Narrative:    EXAMINATION:  XR CHEST 1 VIEW    CLINICAL HISTORY:  Encounter for adjustment and management of vascular access device    TECHNIQUE:  Single frontal view of the chest was performed.    COMPARISON:  12/09/2022    FINDINGS:  There is a right upper lobe interstitial infiltrate.  There is also underlying chronic lung changes bilaterally.  The heart is normal appearance.  Pulmonary vascularity  is unremarkable.  There is a port in the left anterior chest wall.  There is a right sided PICC line with the tip in the SVC.                                        CTA Chest Non-Coronary (PE Studies) (Final result)  Result time 12/09/22 15:49:23      Final result by Haja Paulson MD (12/09/22 15:49:23)                   Narrative:    EXAMINATION  CTA CHEST NON CORONARY (PE STUDIES)    CLINICAL HISTORY  Pulmonary embolism (PE) suspected, unknown D-dimer; Chest pain, unspecified    TECHNIQUE  Post-contrast helical-acquisition CT images were obtained, with bolus timing to pulmonary arterial system for purposes of PE protocol CTA.  Multiplanar reconstructions, including MIP images, were accomplished by a CT technologist at a separate workstation, pushed to PACS for physician review.    CONTRAST  IV: Omnipaque 300, 100 mL    COMPARISON  None available at the time of initial interpretation.    FINDINGS  Images were reviewed in soft tissue, lung, and bone windows.    Exam quality: adequate for evaluation    Lines/tubes: Left chest wall subcutaneous port and associated catheter are similar in the interval.  Tracheostomy remains in place.    Pulmonary Vessels: No central or large segmental filling defect.    Cardiovascular: No suggestion of right heart strain; the RV:LV ratio is <1. No significant heart chamber enlargement. There is no pericardial effusion. No focal contour irregularity or intraluminal abnormality is appreciated involving the visualized aorta and branch vessels.    Lungs/Pleura: Central airways are patent. There is increased soft tissue density encasing the right hilar vasculature, with associated progression of postobstructive right upper and middle lobe partial consolidation.  Development of a cavitary component at the anterior right upper lobe is also noted (series 14, image 40).  Additional scattered ill-defined areas of consolidative and ground-glass attenuation are also present through the remaining  bilateral lung fields.  No pleural thickening, significant effusion, or evidence of pneumothorax.    Other Findings: Worsened mediastinal adenopathy is present.  For example, right precarinal node now measures approximately 19 mm short axis (series 6, image 43), while a subcarinal node measures approximately 25 mm in shortest dimension.  The visualized thyroid is unremarkable. No acute or suspicious focal abnormality through the visualized upper abdomen. No abnormality of the thoracic wall soft tissues. No acute osseous displacement or worsening destructive skeletal lesion is visualized.  Chronic degenerative alterations of the spinal column and midthoracic vertebral body compression deformity are similar to the prior study.    IMPRESSION  1. No central or segmental pulmonary thromboembolism.  2. Diffusely worsened mediastinal adenopathy and right hilar soft tissue.  3. Worsening postobstructive consolidation with right upper lobe cavitary component, as well as widespread bilateral less pronounced areas of airspace abnormality.  Findings may represent sequela of infectious or inflammatory process, although there is elevated concern for metastatic involvement.  4. Additional chronic secondary details are similar to the 24 October 2022 CT appearance.  ==========    This report was flagged in Epic as abnormal.    RADIATION DOSE  Automated tube current modulation, weight-based exposure dosing, and/or iterative reconstruction technique utilized to reach lowest reasonably achievable exposure rate.    DLP: 194 mGy*cm      Electronically signed by: Haja Paulson  Date:    12/09/2022  Time:    15:49                                     X-Ray Chest 1 View (Final result)  Result time 12/09/22 14:40:08      Final result by Jos Taylor MD (12/09/22 14:40:08)                   Impression:      1. Right hilar fullness compatible with known neoplasm.  2. Interval patchy consolidative opacities within the right mid and lower lung  and bilateral fairly diffuse interstitial opacities suspicious for pneumonia.  Progression of malignancy would be within the differential.      Electronically signed by: Jos Taylor MD  Date:    12/09/2022  Time:    14:40               Narrative:    EXAMINATION:  XR CHEST 1 VIEW    CLINICAL HISTORY:  Shortness of breath, tachycardia, lung cancer.    COMPARISON:  10/18/2022    FINDINGS:  There is right hilar fullness demonstrated corresponds to known neoplasm.  There are patchy consolidative opacities identified within the right mid and lower lung in addition to bilateral interstitial opacities.  Findings would favor pneumonia.  Progression of malignancy would be within the differential.  There is no pleural effusion.  There is no pneumothorax.  Vascular calcifications are present.  Jorge catheter is noted.  The cardiac silhouette is within normal limits for size.   No acute displaced fracture is seen.                                      Current Medications:     Infusions:   NORepinephrine bitartrate-D5W 0.1 mcg/kg/min (12/20/22 0510)         Scheduled:   sodium chloride 0.9%   Intravenous Q24H    sodium chloride 0.9%   Intravenous Q24H    acetylcysteine 100 mg/ml (10%)  4 mL Nebulization TID WAKE    amphotericin (abelcet) b lipid IVPB  5 mg/kg (Dosing Weight) Intravenous Q24H    dextrose 5% flush bag IVPB   Intravenous Daily    dextrose 5% flush bag IVPB   Intravenous Q24H    famotidine  20 mg Per G Tube QHS    fondaparinux  2.5 mg Subcutaneous Daily    levoFLOXacin  750 mg Intravenous Q24H    LIDOcaine (PF) 10 mg/ml (1%)  10 mL Other Once    meperidine  25 mg Intravenous Once    pantoprozole (PROTONIX) IV  40 mg Intravenous Daily    sodium chloride 0.9%  10 mL Intravenous Q6H         PRN:   acetaminophen    acetaminophen    albuterol-ipratropium    diphenhydrAMINE    diphenhydrAMINE    heparin, porcine (PF)    HYDROcodone-acetaminophen    sodium chloride 0.9%    sodium chloride 0.9%        Antibiotics and Day  Number of Therapy:  Antibiotics (From admission, onward)      Start     Stop Route Frequency Ordered    12/16/22 1130  levoFLOXacin 750 mg/150 mL IVPB 750 mg         -- IV Every 24 hours (non-standard times) 12/16/22 1028             Dietary Orders (From admission, onward)       Start     Ordered    12/11/22 1355  Tube Feedings/Formulas 250; Diabetisource AC 1.2; Peg; 60; Other (see comments)  Continuous        Comments: Bolus feeds; send 6 cartons/day; Water flushes 60mL before and after each feeding   Question Answer Comment   Feeding Volume (mL): 250    Select Formula: Diabetisource AC 1.2    Route: Peg    Free water flush volume (mL): 60    Free water flush frequency: Other (see comments)        12/11/22 1358    12/09/22 1426  Diet NPO  (Diet/Nutrition [facility])  Diet effective now         12/09/22 1432                   S/p zosyn 7 days  Micafungin 4  Doxycycline 7  Imaging was reviewed with Pulmonary attending Dr. Jordan CT is significant for evolving changes with regards to patient's right upper lung cavitating lesion.    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  Per talking to patient's significant other patient had episodes of snoring and low blood pressure   -will discontinue patient's Levophed and see how patient's blood pressure response.  Patient may be started on low-dose midodrine.    -despite talking to patient that liposomal amphotericin B for one is the gold standard for moderate-to-severe blastomycosis infection, patient does not want to have anymore.  Will resume patient on itraconazole t.i.d. for 3 days and then transition to 200 b.i.d. to t.i.d..  Will ensure that patient has close follow-up with heme Onc, pulmonology, and Infectious Disease.  -will follow up  serologies that were sent off yesterday, there is cross reactivity with histoplasmosis, and blastomycosis.  -if patient wants to restart the amphotericin B, can premedicate with Tylenol, Benadryl, and use meperidine if need be.  And can also decrease the rate.  -Pseudomonas aeruginosa collected on respiratory culture on 12/09/2022.  Patient was started on Levaquin and has been on it since.  Will tentatively continue that for 2-4 weeks once patient has been discharged.    Antibiotics: IV levaquin   Diet:  PEG feeds will continue after bronchoscopy today  DVT Prophylaxis: Fondaparinux  GI Prophylaxis: Pepcid    Disposition:  Patient to have bronchoscopy today patient was advised on risks and benefits and he was consented for procedure.  If patient does not want anymore amphotericin B, can be discharged as was originally planned on Levaquin and home O2, along with itraconazole.  Patient will need follow up with Hematology, Infectious Disease, and pulmonology    Beth Paul MD  Internal Medicine Resident PGY-II

## 2022-12-20 NOTE — PHYSICIAN QUERY
PT Name: Josafat Boudreaux  MR #: [id number]     DOCUMENTATION CLARIFICATION     CDS/: Anastacia Bonner, RN, BSN               Contact information: [email or web address]  This form is a permanent document in the medical record.     Query Date: December 20, 2022    By submitting this query, we are merely seeking further clarification of documentation.  Please utilize your independent clinical judgment when addressing the question(s) below.  The Medical Record contains the following   Indicators   Supporting Clinical Findings Location in Medical Record   x SOB, VICTORIA, Wheezing, Productive Cough, Use of Accessory Muscles, etc. Pt in for SOB and tachycardia. Presents with cough, shortness of breath and thick sputum production.    He appears distressed (Mild to moderate, tachypneic, coughing, central cyanosis).    Found the patient with increased work of breathing, and placed a home O2 pulse oximeter on which revealed SPO2 of low 80s%.   ED, Dr. Emily Lockhart, 12/9            H&P, Dr. Tapia/Dr. Mejia, 12/9   x RR         ABGs         O2 sat  12/09/22 10:58   Specimen source Arterial sample   POC PH 7.51 !   POC PCO2 33 !   POC PO2 46 !!   POC HCO3 26.3 !   POC SATURATED O2 86.3 !       12/09 @ 1420: RR 21, SpO2 88%    12/10 @ 0415: RR 17, SpO2 95%   ABG                                  Vital signs   x Hypoxia/Hypercapnia In the ED, patient was found to be hypoxic    Began to have rigors and hypoxia.     H&P, Dr. Tapia/Dr. Mejia, 12/9    PN, Dr. Paul/Dr. Jordan, 12/20      BiPAP/Intubation/Mechanical Ventilation     x Supplemental O2 Started on supplemental oxygenation via NRB over his Larytube at 8 L/min.    6 L via trach stoma    Requiring 28% FiO2 H&P, Dr. Tapia/Dr. Mejia, 12/9      PN, Dr. Magdaleno/Dr. Bocanegra, 12/14    PN, Dr. Magdaleno/Dr. Bocanegra, 12/15      Home O2, Oxygen Dependence     x Respiratory distress or failure Respiratory distress (Tachypneic, central cyanosis, poor air movement).    Respiratory  failure and sepsis. ED, Dr. Emily Lockhart, 12/9   x Radiology findings CT PE negative for PE, though shows postobstructive pneumonia with cavitation in RUL as well as developing RLL consolidation.    CXR revealed consolidative opacities in right id and lower lung fields as well as right hilar fullness.   H&P, Dr. Tapia/Dr. Mejia, 12/9   x Acute/Chronic Illness Septic Shock 2/2 community acquired pneumonia/postobstructive pneumonia  Neutropenic Fever  Pancytopenia  Stage IV Laryngeal squamous cell carcinoma s/p laryngectomy, possible metastasis to lung vs primary lung squamous cell carcinoma H&P, Dr. Tapia/Dr. Mejia, 12/9   x Treatment Albuterol-ipratropium 2.5 mg-0.5 mg/3 mL nebulizer solution 3 mL      MAR    Other         The noted clinical guidelines are only system guidelines and do not replace the providers clinical judgment.    Provider, please specify the diagnosis or diagnoses associated with above clinical findings.   [    ] Acute Respiratory Failure with Hypoxia - ABG pO2 < 60 mmHg or O2 sat of <91% on room air and respiratory symptoms documented     [  X  ] Acute Respiratory Distress - Generally describes less severe respiratory symptoms (tachypnea, in respiratory distress, increased work of breathing, unable to speak in complete sentences, labored breathing, use of accessory muscles, RR> 24, cyanosis, dyspnea, wheezing, stridor, lethargy) without sufficient measurements (pO2, SpO2, pH, and pCO2) to meet criteria for respiratory failure     [    ] Other Respiratory Diagnosis (please specify): _________________     [   ] Clinically Undetermined         Please document in your progress notes daily for the duration of treatment until resolved and include in your discharge summary.     Reference:    MIKE Vick MD. (2020, March 13). Acute respiratory distress syndrome: Clinical features, diagnosis, and complications in adults ([id number] YOHAN Peacock MD & [id number] NATACHA  Raul BARNEY, Eds.). Retrieved November 13, 2020, from [email or web address]?search=ards&source=search_result&selectedTitle=1~150&usage_type=default&display_rank=1  Form No. [id number]

## 2022-12-20 NOTE — CARE UPDATE
12/20/22    Notified by nurse that pt had low blood pressures given Sepsis dx 2/2 cavitary pneumonia. Given 2 liters bolus and pt latest blood pressure of 93/51 mm hg , MAP 65. Will plan to upgrade to ICU and start Levophed for pressure support. Patient remains asymptomatic. He has antibiotic Levaquin and antifungal Amphotericin B, Itraconazole for known source of treatment. Pt is planned for bronchoscopy in the morning.

## 2022-12-20 NOTE — PHYSICIAN QUERY
PT Name: Josafat Boudreaux  MR #: [id number]     DOCUMENTATION CLARIFICATION      CDS/: Anastacia Bonner, RN, BSN              Contact information: [email or web address]  This form is a permanent document in the medical record.    Query Date: December 20, 2022    By submitting this query, we are merely seeking further clarification of documentation to reflect the severity of illness of your patient. Please utilize your independent clinical judgment when addressing the question(s) below.     The Medical Record contains the following:   Indicators   Supporting Clinical Findings Location in Medical Record   x Documentation of Sepsis, Septic Shock Sepsis 2/2 community acquired pneumonia / postobstructive pneumonia PN, Dr. Paul/Dr. Erazo, 12/19      Blood Culture     x Respiratory Culture Respiratory Culture: Pseudomonas aeruginosa 12/9 Micro      Urine Culture      Other Culture     x Acute/Chronic Illness Neutropenic Fever  Stage IV Laryngeal squamous cell carcinoma s/p laryngectomy, possible metastasis to lung vs primary lung squamous cell carcinoma  Blastomycosis antigen positive  Moderate to severe blastomycosis (immunocompromised) PN, Dr. Paul/Dr. Erazo, 12/19   x Medication/Treatment LevoFLOXacin 750 mg/150 mL IVPB 750 mg    Meperidine (PF) injection 25 mg            MAR    Other        Provider, please further specify the Sepsis Diagnosis:   [  x ] Due to Pseudomonas   [   ] Due to (please specify): __________________________________   [   ] Other specification (please specify): ____________________   [   ] Clinically Undetermined         Please document in your progress notes daily for the duration of treatment until resolved, and include in your discharge summary.  Form No. [id number]

## 2022-12-20 NOTE — CONSULTS
Inpatient Nutrition Assessment    Admit Date: 12/9/2022   Total duration of encounter: 11 days     Nutrition Recommendation/Prescription     Continue home tube feeds, Diabetisource bolus 6 cartons/day; water flushes 60 mL before and after each flush; to provide 1800 kcals, 90g pro, 1944mL fluid; Consider increasing to 7 cartons/day to better meet nutritional needs  SLP eval to determine appropriate oral diet texture/consistency  Replenish electrolytes as needed  Monitor TF tolerance, wt, labs    Communication of Recommendations: reviewed with patient/caregiver and reviewed with nurse    Nutrition Assessment     Malnutrition Assessment/Nutrition-Focused Physical Exam    Malnutrition in the context of chronic illness  Degree of Malnutrition: does not meet criteria  Energy Intake: does not meet criteria  Interpretation of Weight Loss: does not meet criteria  Body Fat: does not meet criteria  Area of Body Fat Loss: does not meet criteria  Muscle Mass Loss: does not meet criteria  Area of Muscle Mass Loss: does not meet criteria  Fluid Accumulation: does not meet criteria  Edema: does not meet criteria  Reduced  Strength: unable to obtain  A minimum of two characteristics is recommended for diagnosis of either severe or non-severe malnutrition.    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, possible metastasis to lung vs primary lung squamous cell carcinoma  Hyponatremia  Thrombocytopenia  Hypokalemia  Hypophosphatemia    Relevant Medical History: stage IV squamous cell laryngeal cancer s/p completion of chemotherapy with carboplatin + taxol and radiation therapy, s/p laryngectomy now with Larytube, PEG dependent    Nutrition-Related Medications: IVF 0.9% NaCl @ 1250ml/hr;  "famotidine,levofloxacin, pantoprazole   Calorie Containing IV Medications: no significant kcals from medications at this time    Nutrition-Related Labs: (12-15) H/H 11.8/35.6(L) Gluc 107 Bun 14.2 Cr 0.8 K 4.2 Na 135(L)   (12-20) H/H 10.3/31.1(L) Gluc 99 K 3.3(L) Alb 1.9(L) Bun 17.6 Cr 1.1       Diet/PN Order: Diet NPO  Oral Supplement Order: none  Tube Feeding Order:  Diabetisource 6 cartons/day (see below for calculation)  Appetite/Oral Intake: NPO/NPO  Factors Affecting Nutritional Intake: diarrhea, difficulty/impaired swallowing, NPO, and PEG dependent  Food/Quaker/Cultural Preferences:  Pt consumes Diabetisource 6 cartons/day at home  Food Allergies: no known food allergies    Skin Integrity: intact  Wound(s): None      Comments  12/20: PT in bed; wife at bedside; reported tolerating bolus TF well; remains on 6 cans per day; bedwt today 150#; wt appears to fluctuate between 150-155#; discussed possible increase to 7 cans per day if wt drops below 150#. Pt to have bronch tomorrow. Labs ackowledged.     12/15: Pt laying in bed; wife at bedside; pt tolerating TF --6 cans per day; bedwt today 155#--back to UBW. Discussed increase to 7 cans per day if wt loss occurs; wife reported --pt often feels bloated if tries to consume > 6 cans per day. Pt appears to have adequate muscle/fat stores. Labs acknowledged.     12/11: RD consulted for tube feeds. Unable to visit pt at this time. Spoke with nsg over the phone, reports pt tolerating feeds well; receiving 6 cartons of Diabetisource/day (continuing home diet). Noted pt nonverbal. Spoke with pts wife over the phone, reports pt tolerating feeds pta. Wife reports pt's UBW ~155#; now 150#; wt loss not significant. Noted pt with some diarrhea, no other GI complaints. Unable to perform NFPE at this time; will attempt to perform on f/up.      Anthropometrics    Height: 5' 9.02" (175.3 cm) Height Method: Stated  Last Weight: 68.3 kg (150 lb 9.2 oz) (bedwt 12/20) (12/20/22 " 1057) Weight Method: Bed Scale  BMI (Calculated): 22.2  BMI Classification: normal (BMI 18.5-24.9)        Ideal Body Weight (IBW), Male: 160.12 lb     % Ideal Body Weight, Male (lb): 93.68 %                 Usual Body Weight (UBW), k.45 kg (UBW ~155# per pt wife)  % Usual Body Weight: 96.78     Usual Weight Provided By: family/caregiver    Wt Readings from Last 5 Encounters:   22 68.3 kg (150 lb 9.2 oz)   22 68.2 kg (150 lb 6.4 oz)   22 70.4 kg (155 lb 1.6 oz)   22 70.3 kg (155 lb)   22 68.9 kg (152 lb)     Weight Change(s) Since Admission:  Admit Weight: 68 kg (150 lb) (22 1035)  : UBW ~155# per pt wife; no recent significant wt loss noted  12/15: bedwt today 155#   : bedwt today 150#   Estimated Needs    Weight Used For Calorie Calculations: 68 kg (149 lb 14.6 oz)  Energy Calorie Requirements (kcal): 7356-0989 kcals (30-32 kcal/kg)  Energy Need Method: Kcal/kg  Weight Used For Protein Calculations: 68 kg (149 lb 14.6 oz)  Protein Requirements: 89-95g (1.3-1.4 g/kg)  Fluid Requirements (mL): 0558-7954 mL (1 mL/kcal)  Temp: 98.7 °F (37.1 °C)       Enteral Nutrition    Formula: Diabetisource AC  Rate/Volume: 250 mL, 6x day  Water Flushes: 60 mL before and after feeds  Additives/Modulars: none at this time  Route: PEG tube  Method: bolus  Total Nutrition Provided by Tube Feeding, Additives, and Flushes:  Calories Provided  1800 kcal/d, 88% needs   Protein Provided  90 g/d, 100% needs   Fluid Provided  1944 ml/d, 95% needs   Continuous feeding calculations based on estimated 20 hr/d run time unless otherwise stated.    Parenteral Nutrition    Patient not receiving parenteral nutrition support at this time.    Evaluation of Received Nutrient Intake    Calories: meeting estimated needs  Protein: meeting estimated needs    Patient Education    Not applicable.    Nutrition Diagnosis     PES: Swallowing difficulty related to Stage IV Laryngeal squamous cell carcinoma s/p  laryngectomy as evidenced by PEG dependence; NPO status. (continues)    Interventions/Goals     Intervention(s): modified rate of enteral nutrition and collaboration with other providers  Goal: Meet greater than 75% of nutritional needs by follow-up. (goal progressing)    Monitoring & Evaluation     Dietitian will monitor enteral nutrition intake, weight, electrolyte/renal panel, and glucose/endocrine profile.  Nutrition Risk/Follow-Up: low (follow-up in 5-7 days)   Please consult if re-assessment needed sooner.

## 2022-12-20 NOTE — NURSING
"Patient's blood pressure has been trending low. Recent BP at 0000; 84/48. Rechecked BP; currently 83/54 MAP 64 HR 96. Pt appears asymptomatic; denies headache, dizziness, weakness. Notified Dr. Olivares, stated "will contact upper team". Dr. Cui instructed to give a bolus; recheck BP.  "

## 2022-12-21 NOTE — PROGRESS NOTES
Pt being dc home today with [facility]. CM sent dc summary and clinical updates to HH company via "[employer] to let them know that the pt is dc home.     Pts PNA not resolved at time of dc so wife is agreeable to pay out of pocket at [facility] for 02 tanks. CM sent new 02 order and sats to [facility] via fax and called to confirm that the wife will be able to  suction and 02 tank at their [employer] location today. [facility] confirms that they have everything that they need.     When CM went to the bedside to get pt to sign IMM he was already gone.

## 2022-12-21 NOTE — OP NOTE
[facility]  Bronchoscopy   Procedure Note    SUMMARY     Date of Procedure: 12/21/2022    Procedure: Bronchoalveolar lavage, BAL    Performed by: Bridger Jordan MD    Pre-Procedure Diagnosis: Possible blasto, abnormal CT    Post-Procedure Diagnosis: same    Anesthesia: Moderate Sedation        Description of the Findings of the Procedure:     Patient was consented for the procedure with all risk and benefit of the procedure explained in detail.  Patient was given the opportunity to ask questions and all concerns were answered.  The bronchocope was inserted into the main airway via the laryngectomy site. An anatomical survey was done of the main airways and the subsegmental bronchus.  Mild purulent material noted at RUL and throughout R lung. No endobronchial lesions noted. BAL performed at RUL. Adequate return obtained.       Complications: None; patient tolerated the procedure well.    Estimated Blood Loss (EBL): Minimal           Specimens: Sent        Condition: Fair        Disposition: PACU - hemodynamically stable.    Bridger Jordan MD  [facility]

## 2022-12-21 NOTE — PROGRESS NOTES
[facility] Medicine Wards Progress Note     Resident Team: [facility] Medicine List 3  Attending Physician: Bridger Jordan MD    Subjective:      Brief HPI:  Josafat Boudreaux is a 57 y.o. male who  has a past medical history of Cancer, COPD (chronic obstructive pulmonary disease), Dysphagia, Lung cancer, and Vocal cord mass.  He who presented to [facility] on 12/9/2022  with a primary complaint of Shortness of Breath and Tachycardia (Pt in for SOB and tachycardia. Hx of stage 4 lung CA and laryngectomy. Tracheostomy in place. Pt started azithromycin on Monday for PNA. MD bedside. 88% on RA. Pt febrile.)    pT4apN1-Stage JENNYFER Laryngeal Squamous cell carcinoma   Carbo/Taxol/Keytruda planned 11/30/2022 x4-6 followed by maintenance keytruda  --7/11/2022: total laryngectomy, 1/40 positive lymph nodes.   -10/08/2022 neck infection with I and D.   --Carbo/Taxol + RT (8/17/22-9/21/22)  --8/17/2022-9/28/2022:  6000 cGy, 200 cGy/fraction, 30/30 fx    RLL LLL bronchial wash 7/11 squamous cell carcinoma    Interval History:  Patient had episode of blood pressures in the MAP of 62.  Patient responded clinically when awakened from sleep and maps have been greater than 65.  Patient is to have a bronchoscopy today in the ICU at the bedside.  Patient has no complaints at this time      Review of Systems:  14 point ROS completed and negative except as indicated above.     Objective:     Vital Signs:  Vital Signs (Most Recent):  Temp: 99 °F (37.2 °C) (12/21/22 0315)  Pulse: 108 (12/21/22 0600)  Resp: 17 (12/21/22 0600)  BP: (!) 90/57 (12/21/22 0600)  SpO2: (!) 91 % (12/21/22 0600)   Vital Signs (24h Range):  Temp:  [97.8 °F (36.6 °C)-99.2 °F (37.3 °C)] 99 °F (37.2 °C)  Pulse:  [] 108  Resp:  [12-34] 17  SpO2:  [88 %-100 %] 91 %  BP: ()/(47-64) 90/57   Body mass index is 22.23 kg/m².     Intake/Output Summary (Last 24 hours) at 12/21/2022 0639  Last data filed at 12/21/2022 0606  Gross per 24 hour   Intake 2430 ml   Output 2650 ml    Net -220 ml          Physical Exam  Constitutional:       General: He is not in acute distress.     Appearance: He is not ill-appearing.   HENT:      Head: Normocephalic and atraumatic.      Right Ear: External ear normal.      Left Ear: External ear normal.      Nose: Nose normal.      Mouth/Throat:      Mouth: Mucous membranes are moist.   Eyes:      Extraocular Movements: Extraocular movements intact.      Conjunctiva/sclera: Conjunctivae normal.   Neck:      Comments: Larytube in place  Cardiovascular:      Rate and Rhythm: Normal rate and regular rhythm.      Heart sounds: No murmur heard.    No friction rub. No gallop.   Pulmonary:      Effort: No respiratory distress.  No wheezes.  Abdominal:      General: There is no distension.      Palpations: Abdomen is soft.      Tenderness: There is no abdominal tenderness.      Comments: PEG tube in place   Musculoskeletal:      Right lower leg: No edema.      Left lower leg: No edema.   Skin:     General: Skin is warm and dry.   Neurological:      General: No focal deficit present.      Mental Status: He is alert. Mental status is at baseline.     Laboratory:    Recent Labs   Lab 12/19/22  0446 12/20/22  0410 12/21/22  0335   WBC 10.5 10.3 6.2   RBC 3.87* 3.22* 3.05*   HGB 12.6* 10.3* 10.0*   HCT 36.9* 31.1* 29.5*   MCV 95.3* 96.6* 96.7*   MCH 32.6 32.0 32.8   MCHC 34.1 33.1 33.9   RDW 13.1 13.0 13.2    240 264   MPV 9.8 10.0 10.0      Recent Labs   Lab 12/19/22  0446 12/19/22  1746 12/20/22  0410 12/21/22  0335   * 129* 132* 133*   K 4.2 3.9 3.3* 4.0   CHLORIDE 94* 97* 98 100   CO2 25 23 26 25   BUN 17.5 16.5 17.6 14.0   CREATININE 0.99 1.00 1.11 0.85   CALCIUM 9.8 8.3* 8.0* 8.1*   MG 1.70 1.80 2.60 2.40   ALBUMIN 2.2*  --  1.9* 2.0*   ALKPHOS 70  --  61 63   ALT 6  --  7 7   AST 15  --  15 15   BILITOT 0.3  --  0.2 0.1        Microbiology Data:  Microbiology Results (last 7 days)       Procedure Component Value Units Date/Time    Blood Culture #1  **CANNOT BE ORDERED STAT** [[id number]]  (Normal) Collected: 12/09/22 1127    Order Status: Completed Specimen: Blood from Mediport Updated: 12/14/22 1900     CULTURE, BLOOD ([facility]) No Growth at 5 days    Blood Culture #2 **CANNOT BE ORDERED STAT** [[id number]]  (Normal) Collected: 12/09/22 1143    Order Status: Completed Specimen: Blood from Hand, Left Updated: 12/14/22 1900     CULTURE, BLOOD ([facility]) No Growth at 5 days             Other Results:    Radiology:  Imaging Results              X-Ray Chest 1 View (Final result)  Result time 12/09/22 17:41:14      Final result by Jackie Loaiza MD (12/09/22 17:41:14)                   Impression:      Interval placement of right-sided PICC line with the SVC    Otherwise unchanged      Electronically signed by: Jackie Loaiza  Date:    12/09/2022  Time:    17:41               Narrative:    EXAMINATION:  XR CHEST 1 VIEW    CLINICAL HISTORY:  Encounter for adjustment and management of vascular access device    TECHNIQUE:  Single frontal view of the chest was performed.    COMPARISON:  12/09/2022    FINDINGS:  There is a right upper lobe interstitial infiltrate.  There is also underlying chronic lung changes bilaterally.  The heart is normal appearance.  Pulmonary vascularity is unremarkable.  There is a port in the left anterior chest wall.  There is a right sided PICC line with the tip in the SVC.                                        CTA Chest Non-Coronary (PE Studies) (Final result)  Result time 12/09/22 15:49:23      Final result by Haja Paulson MD (12/09/22 15:49:23)                   Narrative:    EXAMINATION  CTA CHEST NON CORONARY (PE STUDIES)    CLINICAL HISTORY  Pulmonary embolism (PE) suspected, unknown D-dimer; Chest pain, unspecified    TECHNIQUE  Post-contrast helical-acquisition CT images were obtained, with bolus timing to pulmonary arterial system for purposes of PE protocol CTA.  Multiplanar reconstructions, including MIP images, were  accomplished by a CT technologist at a separate workstation, pushed to PACS for physician review.    CONTRAST  IV: Omnipaque 300, 100 mL    COMPARISON  None available at the time of initial interpretation.    FINDINGS  Images were reviewed in soft tissue, lung, and bone windows.    Exam quality: adequate for evaluation    Lines/tubes: Left chest wall subcutaneous port and associated catheter are similar in the interval.  Tracheostomy remains in place.    Pulmonary Vessels: No central or large segmental filling defect.    Cardiovascular: No suggestion of right heart strain; the RV:LV ratio is <1. No significant heart chamber enlargement. There is no pericardial effusion. No focal contour irregularity or intraluminal abnormality is appreciated involving the visualized aorta and branch vessels.    Lungs/Pleura: Central airways are patent. There is increased soft tissue density encasing the right hilar vasculature, with associated progression of postobstructive right upper and middle lobe partial consolidation.  Development of a cavitary component at the anterior right upper lobe is also noted (series 14, image 40).  Additional scattered ill-defined areas of consolidative and ground-glass attenuation are also present through the remaining bilateral lung fields.  No pleural thickening, significant effusion, or evidence of pneumothorax.    Other Findings: Worsened mediastinal adenopathy is present.  For example, right precarinal node now measures approximately 19 mm short axis (series 6, image 43), while a subcarinal node measures approximately 25 mm in shortest dimension.  The visualized thyroid is unremarkable. No acute or suspicious focal abnormality through the visualized upper abdomen. No abnormality of the thoracic wall soft tissues. No acute osseous displacement or worsening destructive skeletal lesion is visualized.  Chronic degenerative alterations of the spinal column and midthoracic vertebral body compression  deformity are similar to the prior study.    IMPRESSION  1. No central or segmental pulmonary thromboembolism.  2. Diffusely worsened mediastinal adenopathy and right hilar soft tissue.  3. Worsening postobstructive consolidation with right upper lobe cavitary component, as well as widespread bilateral less pronounced areas of airspace abnormality.  Findings may represent sequela of infectious or inflammatory process, although there is elevated concern for metastatic involvement.  4. Additional chronic secondary details are similar to the 24 October 2022 CT appearance.  ==========    This report was flagged in Epic as abnormal.    RADIATION DOSE  Automated tube current modulation, weight-based exposure dosing, and/or iterative reconstruction technique utilized to reach lowest reasonably achievable exposure rate.    DLP: 194 mGy*cm      Electronically signed by: Haja Paulson  Date:    12/09/2022  Time:    15:49                                     X-Ray Chest 1 View (Final result)  Result time 12/09/22 14:40:08      Final result by Jos Taylor MD (12/09/22 14:40:08)                   Impression:      1. Right hilar fullness compatible with known neoplasm.  2. Interval patchy consolidative opacities within the right mid and lower lung and bilateral fairly diffuse interstitial opacities suspicious for pneumonia.  Progression of malignancy would be within the differential.      Electronically signed by: Jos Taylor MD  Date:    12/09/2022  Time:    14:40               Narrative:    EXAMINATION:  XR CHEST 1 VIEW    CLINICAL HISTORY:  Shortness of breath, tachycardia, lung cancer.    COMPARISON:  10/18/2022    FINDINGS:  There is right hilar fullness demonstrated corresponds to known neoplasm.  There are patchy consolidative opacities identified within the right mid and lower lung in addition to bilateral interstitial opacities.  Findings would favor pneumonia.  Progression of malignancy would be within the  differential.  There is no pleural effusion.  There is no pneumothorax.  Vascular calcifications are present.  Jorge catheter is noted.  The cardiac silhouette is within normal limits for size.   No acute displaced fracture is seen.                                      Current Medications:     Infusions:          Scheduled:   acetylcysteine 100 mg/ml (10%)  4 mL Nebulization TID WAKE    enoxaparin  40 mg Subcutaneous Daily    famotidine  20 mg Per G Tube QHS    levoFLOXacin  750 mg Intravenous Q24H    LIDOcaine (PF) 10 mg/ml (1%)  10 mL Other Once    midodrine  5 mg Oral BID WM    pantoprozole (PROTONIX) IV  40 mg Intravenous Daily    sodium chloride 0.9%  10 mL Intravenous Q6H    sodium phosphate IVPB  15 mmol Intravenous Once         PRN:   acetaminophen    acetaminophen    albuterol-ipratropium    diphenhydrAMINE    diphenhydrAMINE    heparin, porcine (PF)    HYDROcodone-acetaminophen    sodium chloride 0.9%    sodium chloride 0.9%        Antibiotics and Day Number of Therapy:  Antibiotics (From admission, onward)      Start     Stop Route Frequency Ordered    12/16/22 1130  levoFLOXacin 750 mg/150 mL IVPB 750 mg         -- IV Every 24 hours (non-standard times) 12/16/22 1028             Dietary Orders (From admission, onward)       Start     Ordered    12/11/22 1355  Tube Feedings/Formulas 250; Diabetisource AC 1.2; Peg; 60; Other (see comments)  Continuous        Comments: Bolus feeds; send 6 cartons/day; Water flushes 60mL before and after each feeding   Question Answer Comment   Feeding Volume (mL): 250    Select Formula: Diabetisource AC 1.2    Route: Peg    Free water flush volume (mL): 60    Free water flush frequency: Other (see comments)        12/11/22 1358    12/09/22 1426  Diet NPO  (Diet/Nutrition [facility])  Diet effective now         12/09/22 1432                   S/p zosyn 7 days  Micafungin 4  Doxycycline 7  Imaging was reviewed with Pulmonary attending Dr. Jordan CT is significant for evolving  changes with regards to patient's right upper lung cavitating lesion.    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  -continue midodrine 5 b.i.d., patient's blood pressures remain soft at night however there respond and he is asymptomatic  -patient will get a bronchoscopy this morning and likely be able to be discharged and will follow up with Infectious Disease, pulmonology, and Hematology  -Pseudomonas aeruginosa collected on respiratory culture on 12/09/2022.  Patient was started on Levaquin and has been on it since.  Will tentatively continue that for 2-4 weeks once patient has been discharged.    Antibiotics: IV levaquin   Diet:  PEG feeds will continue after bronchoscopy today  DVT Prophylaxis:  Lovenox  GI Prophylaxis: Pepcid    Disposition:  Serologies for Aspergillus, blastomycosis, and Fungitell are negative.  Will get a bronchoscopy to get cultures.  Patient will likely be able to be discharged    Beth Paul MD  Internal Medicine Resident PGY-II

## 2022-12-21 NOTE — DISCHARGE SUMMARY
[facility] Internal Medicine Discharge Summary    Admitting Physician: Tony Mejia MD  Attending Physician: Bridger Jordan MD  Date of Admit: 12/9/2022  Date of Discharge: 12/21/2022    Condition: Stable  Outcome: Patient tolerated treatment/procedure well without complication and is now ready for discharge.  DISPOSITION: Home or Self Care          Discharge Diagnoses     Patient Active Problem List   Diagnosis    Finding of above normal blood pressure    Closed basicervical fracture of femur    Shortness of breath    Vocal cord mass    Dysphagia    Tracheostomy dependent    New onset seizure    Laryngeal mass    Laryngeal cancer    Regional lymph node metastasis present    Squamous cell carcinoma of both lungs    COPD (chronic obstructive pulmonary disease)    Cellulitis and abscess of neck    Leukocytosis    Contact dermatitis    H/O head and neck radiation    Hyponatremia    Sepsis    Pneumonia    Neutropenic fever    Malignant neoplasm of right lung    Community acquired pneumonia of right middle lobe of lung    Blastomycotic infection       Principal Problem:  Neutropenic fever    Consultants and Procedures     Consultants:  IP CONSULT TO HOSPITAL MEDICINE  PHARMACY TO DOSE VANCOMYCIN CONSULT  IP CONSULT TO PICC TEAM ([facility])  IP CONSULT TO ENT  IP CONSULT TO REGISTERED DIETITIAN/NUTRITIONIST  IP CONSULT TO SOCIAL WORK/CASE MANAGEMENT  IP CONSULT TO SOCIAL WORK/CASE MANAGEMENT  IP CONSULT TO SOCIAL WORK/CASE MANAGEMENT  IP CONSULT TO SOCIAL WORK/CASE MANAGEMENT  IP CONSULT TO INFECTIOUS DISEASES    Procedures:   Procedure(s) (LRB):  ENDOBRONCHIAL ULTRASOUND (EBUS) (N/A)     Brief Admission History      Josafat Boudreaux is a 57 y.o. male with  PMH of stage IV squamous cell laryngeal cancer s/p completion of chemotherapy with carboplatin + taxol and radiation therapy, s/p laryngectomy now with Larytube, presents to [facility] on 12/9/2022 with chief complaint of shortness of breath. Patient is nonverbal (but can  mouth words and appropriately nod yes/no to questions) so patient's wife assists with the history. She states she found the patient this morning with increased work of breathing, and placed a home O2 pulse oximeter on which revealed SPO2 of low 80s%, and heart rate of 140s-150s so they brought him to the ED. Also report that the patient has been having fevers at home. Patient did recently see ENT clinic on 12/6 when he had a fever of 101 at home and was given azithromycin to take via his PEG tube. Since then patient had not deteriorated until this morning. Patient denies any chest pain or palpitations. Denies nausea, vomiting, diarrhea, dysuria, or hematuria. Patient reportedly has history of aspiration prior to larytube and PEG placement. No known sick contacts. Patient did restart chemotherapy on 11/29 with carboplatin, taxol, and keytruda.      In the ED, patient was found to be hypoxic and started on supplemental oxygenation via NRB over his Larytube at 8 L/min. He was febrile to 103F. Labwork revealed a WBC of 0.3, ANC of 0.042, platelets of 95, sodium 124, potassium 3.3, lactic acid 1.0. CXR revealed consolidative opacities in right id and lower lung fields as well as right hilar fullness. Patient was given doses of ceftriaxone, azithromycin, as well as cefepime. His blood pressure was originally slightly elevated but while in the ED did deteriorate to 70s/40s. He was given 30 mL/kg NS bolus and also started on levophed. Internal medicine consulted for admission.    Hospital Course with Pertinent Findings     Patient was started on broad spectrum antibiotics patient received zosyn from 12/09 to 12/16. Levaquin 12/16 to 12/21. Patient received 12 days of total antibiotics, sensitivities were sensitive to  zosyn and levaquin for pseudomonas. Patient's pancytopenia resolved and patient's defervesce. Patient was ready for discharge however studies that were sent off on 12/09 resulted in blastomycosis antigen  "positive.  Patient was empirically started on amphotericin B however he had a bad reaction with rigors and was stopped.  Bronchoscopy was obtained and sample sent off.  Serology was obtained which showed negative Aspergillus antigen, negative blastomycosis antibody, Fungitell was negative.  Patient's blood pressures have been a bit soft, however patient is asymptomatic and is mostly when he is sleeping.  May have some aspect of low blood pressure when he is sleeping.  Patient was started on midodrine 5 b.i.d. and has responded clinically.  Patient was on oxygen at home chronically since his hospitalization in June and July at [facility].  After patient's bronchoscopy patient is saturating well on his home oxygen at 28%.  He is not tachypneic and he is not having any symptoms.  Patient will continue his Levaquin treatment as previously prescribed for cavitation pneumonia, patient will follow up with Dr. Jordan who will call patient with results and coordinate possible antifungal therapy if needed.  Patient will also be prescribed home oxygen which she already has blood for portable tank.    Discharge physical exam:  Vitals  BP: (!) 97/45  Temp: 98.5 °F (36.9 °C)  Temp src: Oral  Pulse: (!) 113  Resp: 11  SpO2: 97 %  Height: 5' 9.02" (175.3 cm)  Weight: 68.3 kg (150 lb 9.2 oz) (bedwt 12/20)    Physical Exam  Constitutional:       General: He is not in acute distress.     Appearance: He is not ill-appearing.   HENT:      Head: Normocephalic and atraumatic.      Right Ear: External ear normal.      Left Ear: External ear normal.      Nose: Nose normal.      Mouth/Throat:      Mouth: Mucous membranes are moist.   Eyes:      Extraocular Movements: Extraocular movements intact.      Conjunctiva/sclera: Conjunctivae normal.   Neck:      Comments: Larytube in place  Cardiovascular:      Rate and Rhythm: Normal rate and regular rhythm.      Heart sounds: No murmur heard.    No friction rub. No gallop.   Pulmonary:      Effort: " No respiratory distress.      Breath sounds: Rhonchi (mild, RUL, RLL) present. No wheezing (trace expiratory).   Abdominal:      General: There is no distension.      Palpations: Abdomen is soft.      Tenderness: There is no abdominal tenderness.      Comments: PEG tube in place   Musculoskeletal:      Right lower leg: No edema.      Left lower leg: No edema.   Skin:     General: Skin is warm and dry.   Neurological:      General: No focal deficit present.      Mental Status: He is alert. Mental status is at baseline.           TIME SPENT ON DISCHARGE: 60 minutes    Discharge Medications        Medication List        START taking these medications      levoFLOXacin 25 mg/mL Soln  Commonly known as: LEVAQUIN  Take 30 mLs (750 mg total) by mouth once daily. for 21 days            CHANGE how you take these medications      ondansetron 8 MG Tbdl  Commonly known as: ZOFRAN-ODT  What changed: Another medication with the same name was removed. Continue taking this medication, and follow the directions you see here.            CONTINUE taking these medications      albuterol-ipratropium 2.5 mg-0.5 mg/3 mL nebulizer solution  Commonly known as: DUO-NEB  Take 3 mLs by nebulization every 4 (four) hours as needed for Shortness of Breath or Wheezing. Rescue     budesonide 0.5 mg/2 mL nebulizer solution  Commonly known as: PULMICORT  Take 2 mLs (0.5 mg total) by nebulization every 12 (twelve) hours. Controller     diphenhydrAMINE 25 mg capsule  Commonly known as: BENADRYL  1 capsule (25 mg total) by Per G Tube route every 6 (six) hours as needed for Itching.     famotidine 20 MG tablet  Commonly known as: PEPCID  Take 1 tablet (20 mg total) by mouth every evening.     glutamine 10 gram Pwpk     HYDROcodone-acetaminophen 5-325 mg per tablet  Commonly known as: NORCO     hydrocortisone 1 % cream  Apply topically 2 (two) times daily as needed (rash). Apply to rash     ibuprofen 100 mg/5 mL suspension  Commonly known as:  ADVIL,MOTRIN  Take 30 mLs (600 mg total) by mouth every 6 (six) hours as needed for Pain (mild to moderate).     oxyCODONE 5 MG immediate release tablet  Commonly known as: ROXICODONE  Take 1 tablet (5 mg total) by mouth every 4 (four) hours as needed for Pain.     pantoprazole 20 MG tablet  Commonly known as: PROTONIX  Take 1 tablet (20 mg total) by mouth once daily.            STOP taking these medications      silver nitrate applicators 75-25 % applicator     UNABLE TO FIND               Where to Get Your Medications        You can get these medications from any pharmacy    Bring a paper prescription for each of these medications  levoFLOXacin 25 mg/mL Soln         Discharge Information:   Josafat Boudreaux is being discharged [facility]-Health Care [facility] [place].    Discharge Procedure Orders   CT Chest With Contrast   Standing Status: Future Standing Exp. Date: 12/16/23     Order Specific Question Answer Comments   Is the patient allergic to iodine contrast? No    Is the patient taking metformin or a metformin combination medication?  If, the patient should hold the medication for 2 days following contrast. No    Does this patient have impaired renal function? No    May the Radiologist modify the order per protocol to meet the clinical needs of the patient? Yes      Ambulatory referral/consult to Pulmonology   Standing Status: Future   Referral Priority: Routine Referral Type: Consultation   Referral Reason: Specialty Services Required   Requested Specialty: Pulmonary Disease   Number of Visits Requested: 1        Follow-Up Appointments:   Follow-up Information       [facility], Entrance 6 Follow up.    Specialty: Otolaryngology  Why: Keep scheduled appointments  Contact information:  [address] [place] [id number] [phone]  Additional information:  [facility],  Entrance #6   Please sign with the  when you arrive.             [facility] Follow up.     Specialty: Pulmonology  Why: Referred to pulmonology  Contact information:  [address] [facility] [address] [phone]  Additional information:  Entrance 1             Tony Bertrand MD Follow up.    Specialty: Internal Medicine  Why: Make appointment with PCP after course of antibiotic treatment  Contact information:  [address] [facility] [address]  [phone]                               Beth Paul MD  Internal Medicine Resident PGY-II      Agree with the above. Discussed with resident

## 2022-12-21 NOTE — NURSING
Dr. Jordan and respiratory therapists, Brittany &   at bedside for bronchoscopy. Cardiac monitor, pulse ox and oxygen in place. A total 100mg of fentanyl and versed total of 6mg given for procedure. Pt remained partially awake during bronch but tolerated well. Specimen collected and sent to lab as ordered. Vital signs remain stable post procedure.

## 2022-12-22 NOTE — PT/OT/SLP DISCHARGE
Occupational Therapy Discharge Summary    Josafat Boudreaux  MRN: [id number]   Principal Problem: Neutropenic fever      Patient Discharged from acute Occupational Therapy on 12/21.  Please refer to prior OT note dated 12/19 for functional status.    Assessment:      Patient has met all goals and is not appropriate for therapy.    Objective:     GOALS:   Multidisciplinary Problems       Occupational Therapy Goals       Not on file              Multidisciplinary Problems (Resolved)          Problem: Occupational Therapy    Goal Priority Disciplines Outcome Interventions   Occupational Therapy Goal   (Resolved)     OT, PT/OT Met    Description: Patient will perform grooming with SPV assist while standing at sink, maintaining O2 sats above 95% throughout O2 weaning.    Patient will perform toilet transfer with SPV assist while maintaining O2 sats above 95%    Patient will perform dressing, grooming, and toileting routine while ambulating in room and during ambulating in irene to retrieve coffee while maintaining O2 sats above 95% during O2 weaning as tolerated.                       Reasons for Discontinuation of Therapy Services        Plan:     Patient Discharged to: Home no OT services needed    12/22/2022

## 2022-12-28 NOTE — PROGRESS NOTES
[facility]  Otolaryngology Clinic Note    Josafat Boudreaux  Encounter Date: 12/28/2022  YOB: 1965  Physician: Lisseth Krueger MD    Chief Complaint: Laryngeal mass    HPI: Josafat Boudreaux is a 57 y.o. male referred to clinic by Dr. Emory Alonso for laryngeal mass. Patients wife provides history. She states that patient first noticed hoarseness in November 2021. Got progressively worse. Presented to Dr. Alonso in early June 2022 for these complaints. Was noted to have a laryngeal mass on flexible laryngoscopy. Several days after the flexible laryngoscopy patient developed worsening swelling of the airway and presented to the ER in respiratory distress. He underwent emergent tracheostomy with Dr. Junior Alonso. Patient was subsequently discharged home but readmitted shortly after with seizures. He spent 3 days in the ICU on the ventilator. During hospitalization, patient underwent PEG tube placement. He reports that he was not having any issues with PO intake prior to the tracheostomy placement. Now he is PEG dependent. Only taking some ice chips by mouth. He presents today to discuss laryngectomy.     11/2/22:  Since last visit, patient total laryngectomy with bilateral neck dissections level 3 and 4, primary closure, he did not undergo TEP placement.  He was also found to have synchronous primary or lung metastasis in the lung that was also squamous cell carcinoma.  He underwent radiation completed this in the middle of September 2022.  He received carboplatin and Taxol which he still has a few more treatments of.  He presented to the emergency department on 10/08/2022 with a right neck infection.  This was treated with IV antibiotics and incision and drainage with UCHE drain placement.  He was kept NPO and did feeds through his PEG tube.  He met criteria for discharge on 10/25/22 with UCHE drain.  He had outpatient swallow study which did not demonstrate any  evidence of a leak.  He returns to clinic today reporting improvement in his symptoms including right neck pain and drainage.  He has not had to empty his UCHE drain on a daily basis at this point.  He had a PET performed on 10/13/2022 to assess response to chemotherapy.  On that PET scan, right hilar and mediastinal lymph nodes were decreased in size but had increased metabolic activity.  He denies any other issues today    11/7/22: Reports worsening swelling under the right jawline the past few days.  Denies any erythema, infection, fever or chills.    12/6/22:  Feeling a little run down, increased cough with blood tinged secretions, running temperature to 101 at home.  Received azithromycin abx last night from Dr. Alonso's office.    12/28/22:  Was hospitalized for Pseudomonal pneumonia.  He also had Blastomycetes positive antigen on arrival, but was unable to tolerate amphotericin B due to significant hypotension.  Prior to discharge, his repeat Blastomycetes antigen was negative.  Discharged with levaquin and portable home oxygen.  His pneumonia symptoms have all improved since discharge.  Ulceration spots have persisted with one spot having a small amount of growth adjacent.  No bleeding or pain.    ROS:   General: Negative except per HPI  Skin: Denies rash, ulcer, or lesion.  Eyes: Denies vision changes or diplopia.  Ears: Negative except per HPI  Nose: Negative except per HPI  Throat/mouth: Negative except per HPI  Cardiovascular: Negative except per HPI  Respiratory: Negative except per HPI  Neck: Negative except per HPI  Endocrine: Negative except per HPI  Neurologic: Negative except per HPI    Other 10-point review of systems negative except per HPI      Review of patient's allergies indicates:  No Known Allergies    Past Medical History:   Diagnosis Date    Cancer     COPD (chronic obstructive pulmonary disease)     Dysphagia     Lung cancer     Vocal cord mass        Past Surgical History:   Procedure  Laterality Date    DIRECT DIAGNOSTIC LARYNGOSCOPY WITH BRONCHOSCOPY AND ESOPHAGOSCOPY N/A 2022    Procedure: LARYNGOSCOPY, DIRECT, DIAGNOSTIC, WITH BRONCHOSCOPY AND ESOPHAGOSCOPY;  Surgeon: Emory Alonso MD;  Location: [facility] OR;  Service: ENT;  Laterality: N/A;    HIP SURGERY      HUMERUS FRACTURE SURGERY      INCISION AND DRAINAGE, NECK Bilateral 10/21/2022    Procedure: INCISION AND DRAINAGE,NECK;  Surgeon: Madison Worley MD;  Location: [facility] OR;  Service: ENT;  Laterality: Bilateral;    INSERT ARTERIAL LINE  2022         JOINT REPLACEMENT  2019    R hip    TRACHEOSTOMY N/A 06/10/2022    Procedure: CREATION, TRACHEOSTOMY;  Surgeon: Junior Alonso MD;  Location: [facility] OR;  Service: ENT;  Laterality: N/A;       Social History     Socioeconomic History    Marital status:    Tobacco Use    Smoking status: Former     Packs/day: 0.50     Years: 15.00     Pack years: 7.50     Types: Cigarettes     Start date: 1980     Quit date: 2022     Years since quittin.5    Smokeless tobacco: Never   Substance and Sexual Activity    Alcohol use: Not Currently    Drug use: Never    Sexual activity: Not Currently     Partners: Female     Social Determinants of Health     Financial Resource Strain: Low Risk     Difficulty of Paying Living Expenses: Not hard at all   Food Insecurity: No Food Insecurity    Worried About Running Out of Food in the Last Year: Never true    Ran Out of Food in the Last Year: Never true   Transportation Needs: No Transportation Needs    Lack of Transportation (Medical): No    Lack of Transportation (Non-Medical): No   Physical Activity: Inactive    Days of Exercise per Week: 0 days    Minutes of Exercise per Session: 0 min   Stress: No Stress Concern Present    Feeling of Stress : Not at all   Social Connections: Moderately Isolated    Frequency of Communication with Friends and Family: More than three times a week    Frequency of Social Gatherings with Friends and  Family: More than three times a week    Attends [employer] Services: Never    Active Member of Clubs or Organizations: No    Attends Club or Organization Meetings: Never    Marital Status:    Housing Stability: Low Risk     Unable to Pay for Housing in the Last Year: No    Number of Places Lived in the Last Year: 1    Unstable Housing in the Last Year: No       Family History   Problem Relation Age of Onset    Lung cancer Father     Cancer Father         Lung cancer    Throat cancer Brother     Cancer Brother         Throat       Outpatient Encounter Medications as of 12/28/2022   Medication Sig Dispense Refill    albuterol-ipratropium (DUO-NEB) 2.5 mg-0.5 mg/3 mL nebulizer solution Take 3 mLs by nebulization every 4 (four) hours as needed for Shortness of Breath or Wheezing. Rescue 90 mL 1    budesonide (PULMICORT) 0.5 mg/2 mL nebulizer solution Take 2 mLs (0.5 mg total) by nebulization every 12 (twelve) hours. Controller 90 mL 1    diphenhydrAMINE (BENADRYL) 25 mg capsule 1 capsule (25 mg total) by Per G Tube route every 6 (six) hours as needed for Itching. 90 capsule 1    famotidine (PEPCID) 20 MG tablet Take 1 tablet (20 mg total) by mouth every evening. 30 tablet 11    glutamine 10 gram PwPk Take 10 g by mouth 2 (two) times a day.      HYDROcodone-acetaminophen (NORCO) 5-325 mg per tablet Take 1 tablet by mouth every 4 (four) hours as needed for Pain.      hydrocortisone 1 % cream Apply topically 2 (two) times daily as needed (rash). Apply to rash 30 g 1    ibuprofen (ADVIL,MOTRIN) 100 mg/5 mL suspension Take 30 mLs (600 mg total) by mouth every 6 (six) hours as needed for Pain (mild to moderate). 354 mL 0    levoFLOXacin (LEVAQUIN) 25 mg/mL Soln Take 30 mLs (750 mg total) by mouth once daily. for 21 days 630 mL 0    ondansetron (ZOFRAN-ODT) 8 MG TbDL Take 8 mg by mouth every 8 (eight) hours as needed for Nausea. Tab 1 ODT in AM for 2 days after chemotherapy      oxyCODONE (ROXICODONE) 5 MG immediate  release tablet Take 1 tablet (5 mg total) by mouth every 4 (four) hours as needed for Pain. 18 tablet 0    pantoprazole (PROTONIX) 20 MG tablet Take 1 tablet (20 mg total) by mouth once daily. 30 tablet 11    [DISCONTINUED] aspirin (ECOTRIN) 81 MG EC tablet Take 81 mg by mouth once daily.       Facility-Administered Encounter Medications as of 12/28/2022   Medication Dose Route Frequency Provider Last Rate Last Admin    LIDOcaine (PF) 40 mg/mL (4 %) injection 2 mL  2 mL Tracheal Tube 1 time in Clinic/HOD Kevin Palomino MD        phenylephrine HCL 1% nasal spray 1 spray  1 spray Each Nostril 1 time in Clinic/HOD Kevin Palomino MD           Physical Exam:  Vitals:    12/28/22 0825   BP: 123/75   Pulse: 106   Weight: 65.9 kg (145 lb 3.2 oz)       Constitutional  General Appearance: well nourished, well-developed, AAO x3, NAD  HEENT  Eyes: PEERLA, EOMI, normal conjunctivae  Ears: Hearing well at conversation level  Nose: septum midline, no inferior turbinate hypertrophy, no polyps, moist mucosa without erythema or blue hue  OC/OP: dentition moderate, no oral lesions, tongue/FOM/BOT- soft, no leukoplakia/ulcerations/ tenderness   Nasopharynx, Hypopharynx, and Larynx:               Indirect: attempted, limited view due to patient intolerance  Neck: right submandibular region with soft edema  Right neck nontender, UCHE hole closing, other infection eruption site slowly healing, pinpoint hole  Laryngectomy tube in place, no purulent drainage noted, trach collar and ties on; lateral tracheal walls with ulceration, two spots on right, one on left; granulation tissue type growth superior to the right superior ulceration spot  Breathing well without issues  Neuro: CN II - XII intact bilaterally  Cardiovascular: peripheral pulses palpable  Respiratory: non-labored respirations  Psychiatric: oriented to time, place and person, no depression, anxiety or agitation    12/6/22        Procedures:Flexible Fiberoptic  Laryngoscopy/Nasopharyngoscopy via right nare    Procedure in Detail: Informed consent was obtained from the patient after explanation of procedure, indications, risks and benefits. Flexible endoscopy was performed through the nasal passages. The nasal cavity, nasopharynx, oropharynx, hypopharynx and larynx were adequately visualized. The true vocal cords and arytenoids were examined during phonation and repose.    Anesthesia: None  Adverse Events: None  Blood loss: none  Condition: good    Findings:  NP/OP: no masses/lesions of NC, eustachian tube, fossa of Rosenmuller, no adenoid hypertrophy  BOT/vallecula: no lingual hypertrophy, no masses/lesions  Neopharynx without evidence of discrete lesion but with mild bulkiness of the right hypopharyngeal area      Pertinent Data:  NM PET CT ROUTINE     CLINICAL HISTORY  Non-small cell lung cancer (NSCLC), metastatic, assess treatment response; Malignant neoplasm of unspecified part of right bronchus or lung; status post 2 weeks of chemo/radiotherapy (surveillance/restaging)     TECHNIQUE  Intravenous injection of 9.8 mCi 18F-FDG was performed, with subsequent PET imaging from skull base through the upper thighs.  Corresponding non-contrast helical-acquisition CT images were obtained for anatomic correlation and attenuation correction.  Fused-modality multiplanar, PET rotational planar, and PET coronal MIP reconstructions were accomplished by a technologist at a separate workstation and submitted to PACS for physician review.     COMPARISON  1 August 2022     FINDINGS  Exam quality: adequate for evaluation        HEAD / NECK:     There is symmetric radiotracer activity through the visualized brain.     Ill-defined hypermetabolic tissue is again appreciated at the left neck, just superior and lateral to the tracheostomy insertion site (fused axial series, images 40-50).  The regional maximum SUV is 6.8, with discrete CT-correlate lesion poorly delineated secondary to  non-contrast protocol; prior SUV max 8.3. An adjacent, better delineated right cervical chain lymph node is visualized posteriorly, measuring 1.1 cm short axis with SUV max of 6 (image 45); this previously measured 1.5 cm in least dimension with maximum SUV of 7.7.  No convincing new or enlarging cervical adenopathy or newly hypermetabolic lymph nodes are visualized.  The prior left neck fluid collection is no longer evident.        CHEST:     The somewhat lobulated right upper lobe hilar mass is now approximately 3.4 cm x 1.4 cm and maximum SUV 23 (series 3, image 86); previously 3.6 cm x 1.8 cm and SUV max 14.8.  No new or enlarging hypermetabolic lung lesion, and no development of organized airspace consolidation or pleural effusion.     No suspicious abnormality of the mediastinal vasculature is identified.  There is interval placement of a left chest wall subcutaneous port with catheter terminating at the mid SVC region.  The heart chambers are of normal-appearing volume. There is no significant pericardial fluid.     Mediastinal and right hilar FDG-avid adenopathy is again appreciated.  The index right precarinal lymph node measures 1.2 cm short axis with SUV max of 16 (series 3, image 88); previously 1.6 cm and maximum SUV of 13.8.  The reference right hilar lymph node is poorly delineated from adjacent vascular structures secondary to non-contrast protocol, but is estimated to measure 1.8 cm short axis and has maximum SUV of 21 (series 3, image 92); this node previously measured 2.9 cm in least dimension with maximum SUV of 9.8.        ABDOMEN / PELVIS:     Normal physiologic distribution of activity is appreciated through the abdomen and pelvis.     No findings to suggest new or worsened focal FDG-avid process or CT-evident lesion involving the upper abdominal solid organs, and no acute findings appreciated.  The urinary bladder and reproductive structures are unremarkable for PET-CT evaluation.      Abdominopelvic vascular structures are unchanged. No evidence of new/worsening hypermetabolic lou disease.        MUSCULOSKELETAL / SUBCUTANEOUS:     No development of suspicious FDG uptake, destructive lesion, or acute process.     IMPRESSION  1. Findings suggestive of mixed disease response.  Right lower cervical chain nodes are less metabolically active.  Right hilar mass and mediastinal/right hilar lymph nodes are decreased in size, but demonstrate increased metabolic activity.  2. No findings to suggest new or worsening FDG-avid metastatic disease within the left thoracic cavity, abdomen, or pelvis.  3. Previously visualized left neck fluid collection is no longer clearly identified.     RADIATION DOSE  Automated tube current modulation, weight-based exposure dosing, and/or iterative reconstruction technique utilized to reach lowest reasonably achievable exposure rate.     DLP: 661 mGy*cm        Electronically signed by: Haja Paulson  Date:                                            10/13/2022  Time:                                           16:25    FL ESOPHAGRAM COMPLETE     CLIICAL HISTORY:  Cellulitis of neck     TECHNIQUE:  The patient was given oral contrast and multiple fluoroscopic images of the esophagus obtained in various positions. Total fluoroscopy time is 0.7 minutes with absorbed dose of 7.245 mGy.     COMPARISON:  Esophagram performed 07/15/2022     FINDINGS:  Exam performed with Gastrografin and thin barium.  Patient swallowed contrast without difficulty.  No cervical esophageal contrast extravasation identified.     Impression:     No cervical esophageal leak identified.        Electronically signed by: Lance Boudreaux  Date:                                            10/31/2022  Time:                                           11:10      Assessment/Plan:  Josafat Boudreaux is a 57 y.o. 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 Emory Alonso  Medical Oncologist: Yara Atkinson MD (Last seen: 10/18/22)  Radiation Oncologist: Romie Das MD (Last seen: Unknown)    Primary tumor: Endolaryngeal [id number] SCCA    Date of Diagnosis: 7/11/22  Surgery Date: 7/11/22  Induction Chemotherapy: No  Adjuvant Therapy: CRT  Completion Date: Sept 2022 for RT, still undergoing chemo    Pertinent Treatment History:  CRT    Place date if completed   3 6 12 18 24 36 48 60   CT Neck 10/19/22 X X X X X X X   PET/CT 10/13/22          CXR  X X X X X X X   TFT X  X  X X X X     Current Surveillance Interval: <1 year post-treatment, q6 wks     - Keep close eye on ulcerations of tracheal wall, two on lateral aspect of right wall, one on left lateral aspect, likely due to irritaion from coughing and size 10 Roque tube.  Offered to decrease size of roque tube however patient not intersted.  New growth adjacent to right superior ulceration spot seems like associated granulation.  Offered biopsy of this tissue to patient however patient not interested at this time.  He may consider if still present at next visit.  - FFL at next visit    Kevin Palomino MD  [facility] Otolaryngology PGY IV

## 2022-12-28 NOTE — PROGRESS NOTES
Patient called Dr. Alonso's office with wishes to have biopsy next week.  Will arrange for next Thursday.    Kevin Palomino MD  [facility] Otolaryngology PGY IV

## 2022-12-29 NOTE — PROGRESS NOTES
Subjective:       Patient ID: Josafat Boudreaux is a 57 y.o. male.    Chief Complaint: Hospital Follow Up (In hospital from 12/9- 12/21 /Went in with resp distress, had pneumonia and was septic )      The patient is a 57-year-old man who was recently hospitalized for respiratory distress.  He was found have a Pseudomonas pneumonia and possible abscess right upper lobe.  He was eventually sent home on oral Levaquin.  His actually given through a PEG tube as he is essentially NPO and does receive nutrition through the PEG.  There was also some concern of a fungal lung infection and cultures are pending.  He is not started any treatment for that.  He is being treated for lung cancer with chemo and Keytruda.  Those treatments are going to resume next month.    Overall he is feeling fairly well and has improved.  He has no particular complaints at present.  Understand he may need some biopsy of a laryngeal nodule/granuloma by his ENT doctor in the near future.    Review of Systems     Current Outpatient Medications on File Prior to Visit   Medication Sig Dispense Refill    albuterol-ipratropium (DUO-NEB) 2.5 mg-0.5 mg/3 mL nebulizer solution Take 3 mLs by nebulization every 4 (four) hours as needed for Shortness of Breath or Wheezing. Rescue 90 mL 1    diphenhydrAMINE (BENADRYL) 25 mg capsule 1 capsule (25 mg total) by Per G Tube route every 6 (six) hours as needed for Itching. 90 capsule 1    famotidine (PEPCID) 20 MG tablet Take 1 tablet (20 mg total) by mouth every evening. 30 tablet 11    levoFLOXacin (LEVAQUIN) 25 mg/mL Soln Take 30 mLs (750 mg total) by mouth once daily. for 21 days 630 mL 0    pantoprazole (PROTONIX) 20 MG tablet Take 1 tablet (20 mg total) by mouth once daily. 30 tablet 11    [DISCONTINUED] pantoprazole (PROTONIX) 20 MG tablet 20220717  Pantoprazole 20mg tablet Administer 1 tab(s) by gastrostomy tube Once daily      glutamine 10 gram PwPk Take 10 g by mouth 2 (two) times a day.       "HYDROcodone-acetaminophen (NORCO) 5-325 mg per tablet Take 1 tablet by mouth every 4 (four) hours as needed for Pain.      ondansetron (ZOFRAN-ODT) 8 MG TbDL Take 8 mg by mouth every 8 (eight) hours as needed for Nausea. Tab 1 ODT in AM for 2 days after chemotherapy      [DISCONTINUED] aspirin (ECOTRIN) 81 MG EC tablet Take 81 mg by mouth once daily.      [DISCONTINUED] budesonide (PULMICORT) 0.5 mg/2 mL nebulizer solution Take 2 mLs (0.5 mg total) by nebulization every 12 (twelve) hours. Controller 90 mL 1    [DISCONTINUED] hydrocortisone 1 % cream Apply topically 2 (two) times daily as needed (rash). Apply to rash (Patient not taking: Reported on 12/29/2022) 30 g 1    [DISCONTINUED] ibuprofen (ADVIL,MOTRIN) 100 mg/5 mL suspension Take 30 mLs (600 mg total) by mouth every 6 (six) hours as needed for Pain (mild to moderate). 354 mL 0    [DISCONTINUED] oxyCODONE (ROXICODONE) 5 MG immediate release tablet Take 1 tablet (5 mg total) by mouth every 4 (four) hours as needed for Pain. (Patient not taking: Reported on 12/29/2022) 18 tablet 0     Current Facility-Administered Medications on File Prior to Visit   Medication Dose Route Frequency Provider Last Rate Last Admin    LIDOcaine (PF) 40 mg/mL (4 %) injection 2 mL  2 mL Tracheal Tube 1 time in Clinic/HOD Kevin Palomino MD        phenylephrine HCL 1% nasal spray 1 spray  1 spray Each Nostril 1 time in Clinic/HOD Kevin Palomino MD         Objective:      /64 (BP Location: Left arm, Patient Position: Sitting, BP Method: Small (Manual))   Pulse 108   Resp 20   Ht 5' 9.02" (1.753 m)   Wt 66.2 kg (146 lb)   SpO2 99%   BMI 21.55 kg/m²     Physical Exam  Vitals reviewed.   Constitutional:       Appearance: Normal appearance.   HENT:      Head: Normocephalic and atraumatic.      Mouth/Throat:      Pharynx: Oropharynx is clear.   Eyes:      Pupils: Pupils are equal, round, and reactive to light.   Neck:      Comments: Trach in place.  No evidence of " infection  Cardiovascular:      Rate and Rhythm: Normal rate and regular rhythm.      Pulses: Normal pulses.      Heart sounds: Normal heart sounds.   Pulmonary:      Breath sounds: Normal breath sounds.      Comments: Mild bilateral rhonchi  Abdominal:      General: Abdomen is flat.      Palpations: Abdomen is soft.   Musculoskeletal:      Cervical back: Neck supple.   Skin:     General: Skin is warm and dry.   Neurological:      Mental Status: He is alert.     Lab work reviewed  Assessment:       1. Laryngeal cancer    2. Squamous cell carcinoma of both lungs    3. New onset seizure    4. Chronic obstructive pulmonary disease, unspecified COPD type    5. Pneumonia due to Pseudomonas species, unspecified laterality, unspecified part of lung    6. Blastomycotic infection          Plan:     1. Follow-up for recent hospital stay for respiratory distress related to Pseudomonas pneumonia and possible abscess.  There are also equivocally positive test related to fungal infection     2. Lung cancer.  Squamous cell    3. Laryngeal cancer    4. COPD     5. History of seizure disorder    6. Mild hyperkalemia.  Suspect artifact.  I am told he has another blood test next week.    7. Yeast groin rash and balanitis.    Continue same meds.  Add nystatin for groin yeast infection.  Follow-up with me as scheduled next month.

## 2023-01-01 ENCOUNTER — CLINICAL SUPPORT (OUTPATIENT)
Dept: REHABILITATION | Facility: HOSPITAL | Age: 58
End: 2023-01-01
Attending: NURSE PRACTITIONER
Payer: MEDICARE

## 2023-01-01 ENCOUNTER — HOSPITAL ENCOUNTER (OUTPATIENT)
Dept: CARDIOLOGY | Facility: HOSPITAL | Age: 58
Discharge: HOME | End: 2023-03-22
Attending: INTERNAL MEDICINE
Payer: MEDICARE

## 2023-01-01 ENCOUNTER — HOSPITAL ENCOUNTER (OUTPATIENT)
Dept: RADIOLOGY | Facility: CLINIC | Age: 58
Discharge: HOME | End: 2023-03-24
Attending: ORTHOPAEDIC SURGERY
Payer: MEDICARE

## 2023-01-01 ENCOUNTER — OFFICE VISIT (OUTPATIENT)
Dept: HEMATOLOGY/ONCOLOGY | Facility: CLINIC | Age: 58
End: 2023-01-01
Payer: MEDICARE

## 2023-01-01 ENCOUNTER — PATIENT MESSAGE (OUTPATIENT)
Dept: HEMATOLOGY/ONCOLOGY | Facility: CLINIC | Age: 58
End: 2023-01-01
Payer: MEDICARE

## 2023-01-01 ENCOUNTER — OFFICE VISIT (OUTPATIENT)
Dept: CARDIAC SURGERY | Facility: CLINIC | Age: 58
End: 2023-01-01
Payer: MEDICARE

## 2023-01-01 ENCOUNTER — OFFICE VISIT (OUTPATIENT)
Dept: INTERNAL MEDICINE | Facility: CLINIC | Age: 58
End: 2023-01-01
Payer: MEDICARE

## 2023-01-01 ENCOUNTER — TELEPHONE (OUTPATIENT)
Dept: HEMATOLOGY/ONCOLOGY | Facility: CLINIC | Age: 58
End: 2023-01-01

## 2023-01-01 ENCOUNTER — OFFICE VISIT (OUTPATIENT)
Dept: OTOLARYNGOLOGY | Facility: CLINIC | Age: 58
End: 2023-01-01
Payer: MEDICARE

## 2023-01-01 ENCOUNTER — TELEPHONE (OUTPATIENT)
Dept: HEMATOLOGY/ONCOLOGY | Facility: CLINIC | Age: 58
End: 2023-01-01
Payer: MEDICARE

## 2023-01-01 ENCOUNTER — PATIENT MESSAGE (OUTPATIENT)
Dept: RESEARCH | Facility: HOSPITAL | Age: 58
End: 2023-01-01
Payer: MEDICARE

## 2023-01-01 ENCOUNTER — INFUSION (OUTPATIENT)
Dept: INFUSION THERAPY | Facility: HOSPITAL | Age: 58
End: 2023-01-01
Attending: INTERNAL MEDICINE
Payer: MEDICARE

## 2023-01-01 ENCOUNTER — LAB VISIT (OUTPATIENT)
Dept: LAB | Facility: HOSPITAL | Age: 58
End: 2023-01-01
Attending: INTERNAL MEDICINE
Payer: MEDICARE

## 2023-01-01 ENCOUNTER — TELEPHONE (OUTPATIENT)
Dept: INTERNAL MEDICINE | Facility: CLINIC | Age: 58
End: 2023-01-01
Payer: MEDICARE

## 2023-01-01 ENCOUNTER — HOSPITAL ENCOUNTER (OUTPATIENT)
Dept: RADIOLOGY | Facility: HOSPITAL | Age: 58
Discharge: HOME | End: 2023-03-16
Attending: INTERNAL MEDICINE
Payer: MEDICARE

## 2023-01-01 ENCOUNTER — CLINICAL SUPPORT (OUTPATIENT)
Dept: OTOLARYNGOLOGY | Facility: CLINIC | Age: 58
End: 2023-01-01
Payer: MEDICARE

## 2023-01-01 ENCOUNTER — CLINICAL SUPPORT (OUTPATIENT)
Dept: RADIATION THERAPY | Facility: HOSPITAL | Age: 58
End: 2023-01-01
Attending: RADIOLOGY
Payer: MEDICARE

## 2023-01-01 ENCOUNTER — HOSPITAL ENCOUNTER (OUTPATIENT)
Dept: RADIOLOGY | Facility: CLINIC | Age: 58
Discharge: HOME | End: 2023-08-31
Attending: ORTHOPAEDIC SURGERY
Payer: MEDICARE

## 2023-01-01 ENCOUNTER — PATIENT MESSAGE (OUTPATIENT)
Dept: ADMINISTRATIVE | Facility: OTHER | Age: 58
End: 2023-01-01
Payer: MEDICARE

## 2023-01-01 ENCOUNTER — OFFICE VISIT (OUTPATIENT)
Dept: ORTHOPEDICS | Facility: CLINIC | Age: 58
End: 2023-01-01
Payer: MEDICARE

## 2023-01-01 ENCOUNTER — PATIENT MESSAGE (OUTPATIENT)
Dept: ORTHOPEDICS | Facility: CLINIC | Age: 58
End: 2023-01-01
Payer: MEDICARE

## 2023-01-01 ENCOUNTER — CLINICAL SUPPORT (OUTPATIENT)
Dept: REHABILITATION | Facility: HOSPITAL | Age: 58
End: 2023-01-01
Attending: OTOLARYNGOLOGY
Payer: MEDICARE

## 2023-01-01 ENCOUNTER — OFFICE VISIT (OUTPATIENT)
Dept: PULMONOLOGY | Facility: CLINIC | Age: 58
End: 2023-01-01
Payer: MEDICARE

## 2023-01-01 ENCOUNTER — ANESTHESIA EVENT (OUTPATIENT)
Dept: SURGERY | Facility: HOSPITAL | Age: 58
End: 2023-01-01
Payer: MEDICARE

## 2023-01-01 ENCOUNTER — HOSPITAL ENCOUNTER (INPATIENT)
Facility: HOSPITAL | Age: 58
LOS: 3 days | Discharge: HOSPICE | DRG: 640 | End: 2023-10-04
Attending: STUDENT IN AN ORGANIZED HEALTH CARE EDUCATION/TRAINING PROGRAM | Admitting: STUDENT IN AN ORGANIZED HEALTH CARE EDUCATION/TRAINING PROGRAM
Payer: MEDICARE

## 2023-01-01 ENCOUNTER — PATIENT OUTREACH (OUTPATIENT)
Dept: ADMINISTRATIVE | Facility: CLINIC | Age: 58
End: 2023-01-01
Payer: MEDICARE

## 2023-01-01 ENCOUNTER — HOSPITAL ENCOUNTER (INPATIENT)
Facility: HOSPITAL | Age: 58
LOS: 3 days | Discharge: HOME | DRG: 871 | End: 2023-08-08
Attending: INTERNAL MEDICINE | Admitting: INTERNAL MEDICINE
Payer: MEDICARE

## 2023-01-01 ENCOUNTER — ANESTHESIA (OUTPATIENT)
Dept: SURGERY | Facility: HOSPITAL | Age: 58
End: 2023-01-01
Payer: MEDICARE

## 2023-01-01 ENCOUNTER — HOSPITAL ENCOUNTER (OUTPATIENT)
Dept: RADIOLOGY | Facility: HOSPITAL | Age: 58
Discharge: HOME | End: 2023-03-29
Attending: NURSE PRACTITIONER
Payer: MEDICARE

## 2023-01-01 ENCOUNTER — ANESTHESIA EVENT (OUTPATIENT)
Dept: ENDOSCOPY | Facility: HOSPITAL | Age: 58
End: 2023-01-01
Payer: MEDICARE

## 2023-01-01 ENCOUNTER — HOSPITAL ENCOUNTER (OUTPATIENT)
Facility: HOSPITAL | Age: 58
Discharge: HOME | End: 2023-04-18
Attending: INTERNAL MEDICINE | Admitting: INTERNAL MEDICINE
Payer: MEDICARE

## 2023-01-01 ENCOUNTER — PATIENT MESSAGE (OUTPATIENT)
Dept: OTOLARYNGOLOGY | Facility: CLINIC | Age: 58
End: 2023-01-01

## 2023-01-01 ENCOUNTER — PATIENT MESSAGE (OUTPATIENT)
Dept: ADMINISTRATIVE | Facility: HOSPITAL | Age: 58
End: 2023-01-01
Payer: MEDICARE

## 2023-01-01 ENCOUNTER — PATIENT OUTREACH (OUTPATIENT)
Dept: ADMINISTRATIVE | Facility: OTHER | Age: 58
End: 2023-01-01
Payer: MEDICARE

## 2023-01-01 ENCOUNTER — HOSPITAL ENCOUNTER (OUTPATIENT)
Dept: RADIOLOGY | Facility: HOSPITAL | Age: 58
Discharge: HOME | End: 2023-06-09
Attending: INTERNAL MEDICINE
Payer: MEDICARE

## 2023-01-01 ENCOUNTER — HOSPITAL ENCOUNTER (INPATIENT)
Facility: HOSPITAL | Age: 58
LOS: 5 days | Discharge: HOME HEALTH | DRG: 641 | End: 2023-09-18
Attending: STUDENT IN AN ORGANIZED HEALTH CARE EDUCATION/TRAINING PROGRAM | Admitting: INTERNAL MEDICINE
Payer: MEDICARE

## 2023-01-01 ENCOUNTER — HOSPITAL ENCOUNTER (OUTPATIENT)
Facility: HOSPITAL | Age: 58
Discharge: HOME | End: 2023-05-18
Attending: SURGERY | Admitting: SURGERY
Payer: MEDICARE

## 2023-01-01 ENCOUNTER — PROCEDURE VISIT (OUTPATIENT)
Dept: OTOLARYNGOLOGY | Facility: CLINIC | Age: 58
End: 2023-01-01
Payer: MEDICARE

## 2023-01-01 ENCOUNTER — HOSPITAL ENCOUNTER (INPATIENT)
Facility: HOSPITAL | Age: 58
LOS: 6 days | Discharge: HOME | DRG: 641 | End: 2023-09-28
Attending: EMERGENCY MEDICINE | Admitting: INTERNAL MEDICINE
Payer: MEDICARE

## 2023-01-01 ENCOUNTER — ANESTHESIA (OUTPATIENT)
Dept: ENDOSCOPY | Facility: HOSPITAL | Age: 58
End: 2023-01-01
Payer: MEDICARE

## 2023-01-01 VITALS
DIASTOLIC BLOOD PRESSURE: 70 MMHG | WEIGHT: 136 LBS | RESPIRATION RATE: 15 BRPM | HEART RATE: 116 BPM | SYSTOLIC BLOOD PRESSURE: 104 MMHG | BODY MASS INDEX: 21.86 KG/M2 | OXYGEN SATURATION: 95 % | HEIGHT: 66 IN | TEMPERATURE: 99 F

## 2023-01-01 VITALS
BODY MASS INDEX: 20.23 KG/M2 | DIASTOLIC BLOOD PRESSURE: 73 MMHG | OXYGEN SATURATION: 97 % | WEIGHT: 137 LBS | SYSTOLIC BLOOD PRESSURE: 116 MMHG | HEART RATE: 138 BPM | TEMPERATURE: 99 F

## 2023-01-01 VITALS
HEIGHT: 69 IN | BODY MASS INDEX: 21.48 KG/M2 | TEMPERATURE: 98 F | HEART RATE: 108 BPM | OXYGEN SATURATION: 96 % | DIASTOLIC BLOOD PRESSURE: 80 MMHG | WEIGHT: 145 LBS | SYSTOLIC BLOOD PRESSURE: 128 MMHG

## 2023-01-01 VITALS
HEART RATE: 105 BPM | BODY MASS INDEX: 21.48 KG/M2 | HEIGHT: 69 IN | DIASTOLIC BLOOD PRESSURE: 65 MMHG | OXYGEN SATURATION: 97 % | WEIGHT: 145 LBS | TEMPERATURE: 98 F | SYSTOLIC BLOOD PRESSURE: 127 MMHG

## 2023-01-01 VITALS
DIASTOLIC BLOOD PRESSURE: 73 MMHG | SYSTOLIC BLOOD PRESSURE: 107 MMHG | HEART RATE: 114 BPM | HEIGHT: 66 IN | RESPIRATION RATE: 18 BRPM | RESPIRATION RATE: 18 BRPM | SYSTOLIC BLOOD PRESSURE: 110 MMHG | BODY MASS INDEX: 22.29 KG/M2 | TEMPERATURE: 98 F | HEART RATE: 118 BPM | TEMPERATURE: 97 F | OXYGEN SATURATION: 95 % | DIASTOLIC BLOOD PRESSURE: 74 MMHG

## 2023-01-01 VITALS
HEIGHT: 66 IN | HEART RATE: 119 BPM | WEIGHT: 137 LBS | RESPIRATION RATE: 18 BRPM | DIASTOLIC BLOOD PRESSURE: 65 MMHG | SYSTOLIC BLOOD PRESSURE: 113 MMHG | WEIGHT: 135 LBS | HEART RATE: 79 BPM | SYSTOLIC BLOOD PRESSURE: 105 MMHG | BODY MASS INDEX: 21.69 KG/M2 | BODY MASS INDEX: 22.02 KG/M2 | DIASTOLIC BLOOD PRESSURE: 72 MMHG | OXYGEN SATURATION: 93 % | HEIGHT: 66 IN

## 2023-01-01 VITALS
DIASTOLIC BLOOD PRESSURE: 72 MMHG | OXYGEN SATURATION: 95 % | SYSTOLIC BLOOD PRESSURE: 116 MMHG | WEIGHT: 142.19 LBS | HEIGHT: 69 IN | BODY MASS INDEX: 21.06 KG/M2 | RESPIRATION RATE: 16 BRPM | HEART RATE: 114 BPM

## 2023-01-01 VITALS
SYSTOLIC BLOOD PRESSURE: 116 MMHG | TEMPERATURE: 99 F | DIASTOLIC BLOOD PRESSURE: 74 MMHG | OXYGEN SATURATION: 96 % | DIASTOLIC BLOOD PRESSURE: 73 MMHG | OXYGEN SATURATION: 93 % | WEIGHT: 142.88 LBS | RESPIRATION RATE: 18 BRPM | HEART RATE: 74 BPM | BODY MASS INDEX: 21.03 KG/M2 | HEIGHT: 69 IN | WEIGHT: 142 LBS | SYSTOLIC BLOOD PRESSURE: 121 MMHG | TEMPERATURE: 98 F | HEART RATE: 105 BPM | BODY MASS INDEX: 21.1 KG/M2

## 2023-01-01 VITALS
BODY MASS INDEX: 21.48 KG/M2 | SYSTOLIC BLOOD PRESSURE: 111 MMHG | HEART RATE: 110 BPM | HEIGHT: 69 IN | WEIGHT: 145 LBS | DIASTOLIC BLOOD PRESSURE: 66 MMHG | TEMPERATURE: 99 F | OXYGEN SATURATION: 98 %

## 2023-01-01 VITALS
WEIGHT: 143 LBS | HEART RATE: 98 BPM | HEIGHT: 69 IN | TEMPERATURE: 98 F | DIASTOLIC BLOOD PRESSURE: 70 MMHG | OXYGEN SATURATION: 96 % | SYSTOLIC BLOOD PRESSURE: 116 MMHG | BODY MASS INDEX: 21.18 KG/M2 | RESPIRATION RATE: 20 BRPM

## 2023-01-01 VITALS
SYSTOLIC BLOOD PRESSURE: 114 MMHG | DIASTOLIC BLOOD PRESSURE: 79 MMHG | HEART RATE: 110 BPM | WEIGHT: 139 LBS | BODY MASS INDEX: 20.53 KG/M2

## 2023-01-01 VITALS
SYSTOLIC BLOOD PRESSURE: 147 MMHG | HEIGHT: 69 IN | OXYGEN SATURATION: 96 % | WEIGHT: 145 LBS | TEMPERATURE: 98 F | DIASTOLIC BLOOD PRESSURE: 90 MMHG | BODY MASS INDEX: 21.48 KG/M2 | RESPIRATION RATE: 18 BRPM | HEART RATE: 92 BPM

## 2023-01-01 VITALS
TEMPERATURE: 98 F | HEIGHT: 69 IN | OXYGEN SATURATION: 98 % | SYSTOLIC BLOOD PRESSURE: 125 MMHG | BODY MASS INDEX: 18.01 KG/M2 | OXYGEN SATURATION: 96 % | DIASTOLIC BLOOD PRESSURE: 84 MMHG | RESPIRATION RATE: 18 BRPM | SYSTOLIC BLOOD PRESSURE: 120 MMHG | WEIGHT: 135.38 LBS | BODY MASS INDEX: 21.76 KG/M2 | DIASTOLIC BLOOD PRESSURE: 74 MMHG | WEIGHT: 121.63 LBS | TEMPERATURE: 98 F | HEART RATE: 105 BPM | HEART RATE: 122 BPM | RESPIRATION RATE: 18 BRPM | HEIGHT: 66 IN

## 2023-01-01 VITALS
DIASTOLIC BLOOD PRESSURE: 88 MMHG | WEIGHT: 138.88 LBS | OXYGEN SATURATION: 96 % | RESPIRATION RATE: 18 BRPM | BODY MASS INDEX: 20.57 KG/M2 | TEMPERATURE: 98 F | SYSTOLIC BLOOD PRESSURE: 165 MMHG | HEART RATE: 96 BPM | HEIGHT: 69 IN

## 2023-01-01 VITALS
DIASTOLIC BLOOD PRESSURE: 74 MMHG | BODY MASS INDEX: 21.62 KG/M2 | WEIGHT: 146 LBS | SYSTOLIC BLOOD PRESSURE: 116 MMHG | HEIGHT: 69 IN | HEART RATE: 102 BPM

## 2023-01-01 VITALS
HEART RATE: 113 BPM | RESPIRATION RATE: 20 BRPM | WEIGHT: 138.69 LBS | SYSTOLIC BLOOD PRESSURE: 103 MMHG | SYSTOLIC BLOOD PRESSURE: 112 MMHG | HEART RATE: 130 BPM | HEIGHT: 66 IN | BODY MASS INDEX: 20.54 KG/M2 | HEIGHT: 69 IN | DIASTOLIC BLOOD PRESSURE: 69 MMHG | WEIGHT: 136.25 LBS | OXYGEN SATURATION: 97 % | OXYGEN SATURATION: 96 % | TEMPERATURE: 98 F | BODY MASS INDEX: 21.9 KG/M2 | DIASTOLIC BLOOD PRESSURE: 69 MMHG | TEMPERATURE: 98 F

## 2023-01-01 VITALS
HEART RATE: 111 BPM | DIASTOLIC BLOOD PRESSURE: 67 MMHG | SYSTOLIC BLOOD PRESSURE: 113 MMHG | HEIGHT: 69 IN | HEIGHT: 69 IN | OXYGEN SATURATION: 96 % | SYSTOLIC BLOOD PRESSURE: 108 MMHG | BODY MASS INDEX: 20.91 KG/M2 | BODY MASS INDEX: 20.73 KG/M2 | DIASTOLIC BLOOD PRESSURE: 82 MMHG | OXYGEN SATURATION: 95 % | WEIGHT: 140 LBS | HEART RATE: 115 BPM | TEMPERATURE: 98 F | WEIGHT: 141.19 LBS | TEMPERATURE: 99 F

## 2023-01-01 VITALS
WEIGHT: 141.19 LBS | TEMPERATURE: 99 F | HEART RATE: 103 BPM | BODY MASS INDEX: 20.91 KG/M2 | HEIGHT: 69 IN | SYSTOLIC BLOOD PRESSURE: 111 MMHG | BODY MASS INDEX: 20.96 KG/M2 | RESPIRATION RATE: 14 BRPM | DIASTOLIC BLOOD PRESSURE: 72 MMHG | OXYGEN SATURATION: 92 % | HEIGHT: 69 IN | SYSTOLIC BLOOD PRESSURE: 107 MMHG | WEIGHT: 141.5 LBS | DIASTOLIC BLOOD PRESSURE: 68 MMHG | TEMPERATURE: 98 F | HEART RATE: 95 BPM | OXYGEN SATURATION: 95 %

## 2023-01-01 VITALS
TEMPERATURE: 98 F | DIASTOLIC BLOOD PRESSURE: 64 MMHG | HEIGHT: 69 IN | HEART RATE: 125 BPM | OXYGEN SATURATION: 95 % | SYSTOLIC BLOOD PRESSURE: 106 MMHG | WEIGHT: 128.31 LBS | BODY MASS INDEX: 19 KG/M2 | RESPIRATION RATE: 16 BRPM

## 2023-01-01 VITALS
DIASTOLIC BLOOD PRESSURE: 76 MMHG | SYSTOLIC BLOOD PRESSURE: 117 MMHG | WEIGHT: 136 LBS | OXYGEN SATURATION: 93 % | BODY MASS INDEX: 21.86 KG/M2 | HEART RATE: 116 BPM | DIASTOLIC BLOOD PRESSURE: 76 MMHG | HEIGHT: 66 IN | RESPIRATION RATE: 16 BRPM | TEMPERATURE: 98 F | TEMPERATURE: 98 F | OXYGEN SATURATION: 98 % | RESPIRATION RATE: 20 BRPM | HEART RATE: 120 BPM | SYSTOLIC BLOOD PRESSURE: 115 MMHG

## 2023-01-01 VITALS
BODY MASS INDEX: 20.96 KG/M2 | HEART RATE: 116 BPM | SYSTOLIC BLOOD PRESSURE: 98 MMHG | DIASTOLIC BLOOD PRESSURE: 61 MMHG | WEIGHT: 142 LBS

## 2023-01-01 VITALS
SYSTOLIC BLOOD PRESSURE: 112 MMHG | OXYGEN SATURATION: 99 % | HEART RATE: 115 BPM | RESPIRATION RATE: 18 BRPM | TEMPERATURE: 99 F | DIASTOLIC BLOOD PRESSURE: 76 MMHG

## 2023-01-01 VITALS
WEIGHT: 140 LBS | HEIGHT: 69 IN | HEART RATE: 99 BPM | DIASTOLIC BLOOD PRESSURE: 81 MMHG | TEMPERATURE: 99 F | BODY MASS INDEX: 20.73 KG/M2 | RESPIRATION RATE: 16 BRPM | OXYGEN SATURATION: 97 % | SYSTOLIC BLOOD PRESSURE: 133 MMHG

## 2023-01-01 VITALS
WEIGHT: 139 LBS | BODY MASS INDEX: 20.53 KG/M2 | DIASTOLIC BLOOD PRESSURE: 72 MMHG | HEART RATE: 114 BPM | SYSTOLIC BLOOD PRESSURE: 111 MMHG

## 2023-01-01 VITALS
SYSTOLIC BLOOD PRESSURE: 110 MMHG | SYSTOLIC BLOOD PRESSURE: 121 MMHG | DIASTOLIC BLOOD PRESSURE: 73 MMHG | WEIGHT: 138.13 LBS | HEIGHT: 66 IN | HEIGHT: 66 IN | WEIGHT: 135.88 LBS | DIASTOLIC BLOOD PRESSURE: 78 MMHG | BODY MASS INDEX: 21.84 KG/M2 | OXYGEN SATURATION: 94 % | RESPIRATION RATE: 18 BRPM | HEART RATE: 120 BPM | RESPIRATION RATE: 18 BRPM | TEMPERATURE: 98 F | HEART RATE: 123 BPM | BODY MASS INDEX: 22.2 KG/M2 | OXYGEN SATURATION: 97 % | TEMPERATURE: 99 F

## 2023-01-01 VITALS
DIASTOLIC BLOOD PRESSURE: 79 MMHG | WEIGHT: 144.31 LBS | SYSTOLIC BLOOD PRESSURE: 130 MMHG | BODY MASS INDEX: 21.31 KG/M2 | HEART RATE: 101 BPM

## 2023-01-01 VITALS
WEIGHT: 140.19 LBS | BODY MASS INDEX: 20.23 KG/M2 | RESPIRATION RATE: 20 BRPM | TEMPERATURE: 99 F | SYSTOLIC BLOOD PRESSURE: 96 MMHG | WEIGHT: 137 LBS | SYSTOLIC BLOOD PRESSURE: 110 MMHG | BODY MASS INDEX: 20.76 KG/M2 | DIASTOLIC BLOOD PRESSURE: 62 MMHG | TEMPERATURE: 99 F | HEIGHT: 69 IN | HEART RATE: 122 BPM | DIASTOLIC BLOOD PRESSURE: 57 MMHG | OXYGEN SATURATION: 97 % | HEART RATE: 95 BPM | OXYGEN SATURATION: 97 %

## 2023-01-01 VITALS
HEART RATE: 112 BPM | BODY MASS INDEX: 21.55 KG/M2 | WEIGHT: 146 LBS | SYSTOLIC BLOOD PRESSURE: 120 MMHG | DIASTOLIC BLOOD PRESSURE: 78 MMHG

## 2023-01-01 VITALS
OXYGEN SATURATION: 94 % | HEIGHT: 69 IN | BODY MASS INDEX: 20.99 KG/M2 | OXYGEN SATURATION: 96 % | DIASTOLIC BLOOD PRESSURE: 81 MMHG | TEMPERATURE: 99 F | HEART RATE: 117 BPM | DIASTOLIC BLOOD PRESSURE: 74 MMHG | TEMPERATURE: 99 F | WEIGHT: 141.69 LBS | RESPIRATION RATE: 16 BRPM | SYSTOLIC BLOOD PRESSURE: 125 MMHG | HEART RATE: 98 BPM | SYSTOLIC BLOOD PRESSURE: 123 MMHG

## 2023-01-01 VITALS
WEIGHT: 146 LBS | RESPIRATION RATE: 18 BRPM | DIASTOLIC BLOOD PRESSURE: 71 MMHG | HEART RATE: 98 BPM | BODY MASS INDEX: 21.8 KG/M2 | HEIGHT: 69 IN | HEIGHT: 69 IN | BODY MASS INDEX: 21.03 KG/M2 | SYSTOLIC BLOOD PRESSURE: 135 MMHG | HEART RATE: 123 BPM | WEIGHT: 142 LBS | BODY MASS INDEX: 21.56 KG/M2 | RESPIRATION RATE: 16 BRPM | DIASTOLIC BLOOD PRESSURE: 76 MMHG | HEART RATE: 102 BPM | TEMPERATURE: 98 F | SYSTOLIC BLOOD PRESSURE: 129 MMHG | TEMPERATURE: 98 F | SYSTOLIC BLOOD PRESSURE: 136 MMHG | DIASTOLIC BLOOD PRESSURE: 80 MMHG | WEIGHT: 147.19 LBS

## 2023-01-01 VITALS
HEIGHT: 69 IN | HEART RATE: 125 BPM | OXYGEN SATURATION: 94 % | SYSTOLIC BLOOD PRESSURE: 111 MMHG | RESPIRATION RATE: 20 BRPM | WEIGHT: 128.75 LBS | BODY MASS INDEX: 19.07 KG/M2 | DIASTOLIC BLOOD PRESSURE: 62 MMHG | TEMPERATURE: 99 F

## 2023-01-01 VITALS
BODY MASS INDEX: 21.26 KG/M2 | DIASTOLIC BLOOD PRESSURE: 58 MMHG | SYSTOLIC BLOOD PRESSURE: 95 MMHG | WEIGHT: 144 LBS | HEART RATE: 120 BPM

## 2023-01-01 VITALS
OXYGEN SATURATION: 100 % | RESPIRATION RATE: 16 BRPM | DIASTOLIC BLOOD PRESSURE: 76 MMHG | SYSTOLIC BLOOD PRESSURE: 121 MMHG | TEMPERATURE: 98 F | HEART RATE: 111 BPM

## 2023-01-01 VITALS
HEART RATE: 114 BPM | BODY MASS INDEX: 21.12 KG/M2 | DIASTOLIC BLOOD PRESSURE: 65 MMHG | WEIGHT: 143 LBS | SYSTOLIC BLOOD PRESSURE: 102 MMHG | TEMPERATURE: 98 F

## 2023-01-01 VITALS
RESPIRATION RATE: 18 BRPM | SYSTOLIC BLOOD PRESSURE: 116 MMHG | HEIGHT: 69 IN | OXYGEN SATURATION: 100 % | DIASTOLIC BLOOD PRESSURE: 66 MMHG | BODY MASS INDEX: 20.88 KG/M2 | HEART RATE: 63 BPM | WEIGHT: 141 LBS | TEMPERATURE: 99 F

## 2023-01-01 DIAGNOSIS — C77.9 REGIONAL LYMPH NODE METASTASIS PRESENT: ICD-10-CM

## 2023-01-01 DIAGNOSIS — C34.92 SQUAMOUS CELL CARCINOMA OF BOTH LUNGS: ICD-10-CM

## 2023-01-01 DIAGNOSIS — M79.601 RIGHT ARM PAIN: Primary | ICD-10-CM

## 2023-01-01 DIAGNOSIS — C34.91 SQUAMOUS CELL CARCINOMA OF BOTH LUNGS: ICD-10-CM

## 2023-01-01 DIAGNOSIS — Z92.3 H/O HEAD AND NECK RADIATION: ICD-10-CM

## 2023-01-01 DIAGNOSIS — C32.9 LARYNGEAL CANCER: Primary | ICD-10-CM

## 2023-01-01 DIAGNOSIS — C32.9 LARYNGEAL CANCER: ICD-10-CM

## 2023-01-01 DIAGNOSIS — C34.91 SQUAMOUS CELL CARCINOMA OF BOTH LUNGS: Primary | ICD-10-CM

## 2023-01-01 DIAGNOSIS — Z51.12 MAINTENANCE ANTINEOPLASTIC IMMUNOTHERAPY: ICD-10-CM

## 2023-01-01 DIAGNOSIS — C34.90 LUNG CANCER: ICD-10-CM

## 2023-01-01 DIAGNOSIS — M54.16 LUMBAR RADICULOPATHY, CHRONIC: ICD-10-CM

## 2023-01-01 DIAGNOSIS — R13.10 DYSPHAGIA, UNSPECIFIED TYPE: ICD-10-CM

## 2023-01-01 DIAGNOSIS — C32.9 MALIGNANT NEOPLASM OF LARYNX, UNSPECIFIED: ICD-10-CM

## 2023-01-01 DIAGNOSIS — C34.91 MALIGNANT NEOPLASM OF RIGHT LUNG, UNSPECIFIED PART OF LUNG: ICD-10-CM

## 2023-01-01 DIAGNOSIS — E83.52 HYPERCALCEMIA: Primary | ICD-10-CM

## 2023-01-01 DIAGNOSIS — C34.00 MALIGNANT NEOPLASM OF HILUS OF LUNG, UNSPECIFIED LATERALITY: ICD-10-CM

## 2023-01-01 DIAGNOSIS — Z93.0 TRACHEOSTOMY DEPENDENT: ICD-10-CM

## 2023-01-01 DIAGNOSIS — J18.9 PNEUMONIA OF RIGHT LOWER LOBE DUE TO INFECTIOUS ORGANISM: ICD-10-CM

## 2023-01-01 DIAGNOSIS — C34.91 MALIGNANT NEOPLASM OF RIGHT LUNG, UNSPECIFIED PART OF LUNG: Primary | ICD-10-CM

## 2023-01-01 DIAGNOSIS — Z90.02 HX OF LARYNGECTOMY: ICD-10-CM

## 2023-01-01 DIAGNOSIS — R49.0 DYSPHONIA: ICD-10-CM

## 2023-01-01 DIAGNOSIS — E03.2 DRUG-INDUCED HYPOTHYROIDISM: ICD-10-CM

## 2023-01-01 DIAGNOSIS — Z51.12 MAINTENANCE ANTINEOPLASTIC IMMUNOTHERAPY: Primary | ICD-10-CM

## 2023-01-01 DIAGNOSIS — S42.351D CLOSED DISPLACED COMMINUTED FRACTURE OF SHAFT OF RIGHT HUMERUS WITH ROUTINE HEALING, SUBSEQUENT ENCOUNTER: ICD-10-CM

## 2023-01-01 DIAGNOSIS — L03.113 RIGHT ARM CELLULITIS: ICD-10-CM

## 2023-01-01 DIAGNOSIS — Z96.641 AFTERCARE FOLLOWING RIGHT HIP JOINT REPLACEMENT SURGERY: ICD-10-CM

## 2023-01-01 DIAGNOSIS — Z79.899 ON ANTINEOPLASTIC CHEMOTHERAPY: ICD-10-CM

## 2023-01-01 DIAGNOSIS — J39.8: ICD-10-CM

## 2023-01-01 DIAGNOSIS — C32.9 MALIGNANT NEOPLASM OF LARYNX, UNSPECIFIED: Primary | ICD-10-CM

## 2023-01-01 DIAGNOSIS — Z47.1 AFTERCARE FOLLOWING RIGHT HIP JOINT REPLACEMENT SURGERY: Primary | ICD-10-CM

## 2023-01-01 DIAGNOSIS — R50.81 NEUTROPENIC FEVER: ICD-10-CM

## 2023-01-01 DIAGNOSIS — Z47.1 AFTERCARE FOLLOWING RIGHT HIP JOINT REPLACEMENT SURGERY: ICD-10-CM

## 2023-01-01 DIAGNOSIS — R13.10 DYSPHAGIA: ICD-10-CM

## 2023-01-01 DIAGNOSIS — C34.92 SQUAMOUS CELL CARCINOMA OF BOTH LUNGS: Primary | ICD-10-CM

## 2023-01-01 DIAGNOSIS — R07.9 CHEST PAIN: ICD-10-CM

## 2023-01-01 DIAGNOSIS — K31.6 GASTRIC FISTULA: Primary | ICD-10-CM

## 2023-01-01 DIAGNOSIS — Z96.641 AFTERCARE FOLLOWING RIGHT HIP JOINT REPLACEMENT SURGERY: Primary | ICD-10-CM

## 2023-01-01 DIAGNOSIS — Z90.02 HX OF LARYNGECTOMY: Primary | ICD-10-CM

## 2023-01-01 DIAGNOSIS — C34.11 MALIGNANT NEOPLASM OF UPPER LOBE OF RIGHT LUNG: ICD-10-CM

## 2023-01-01 DIAGNOSIS — Z90.02 H/O LARYNGECTOMY: Primary | ICD-10-CM

## 2023-01-01 DIAGNOSIS — R00.0 TACHYCARDIA: ICD-10-CM

## 2023-01-01 DIAGNOSIS — E83.42 HYPOMAGNESEMIA: ICD-10-CM

## 2023-01-01 DIAGNOSIS — B37.2 YEAST DERMATITIS: ICD-10-CM

## 2023-01-01 DIAGNOSIS — R06.02 SHORTNESS OF BREATH: ICD-10-CM

## 2023-01-01 DIAGNOSIS — I82.A11 ACUTE DEEP VEIN THROMBOSIS (DVT) OF AXILLARY VEIN OF RIGHT UPPER EXTREMITY: Primary | ICD-10-CM

## 2023-01-01 DIAGNOSIS — R65.20 SEVERE SEPSIS: Primary | ICD-10-CM

## 2023-01-01 DIAGNOSIS — C34.31 MALIGNANT NEOPLASM OF LOWER LOBE OF RIGHT LUNG: ICD-10-CM

## 2023-01-01 DIAGNOSIS — J44.9 CHRONIC OBSTRUCTIVE PULMONARY DISEASE, UNSPECIFIED COPD TYPE: Primary | ICD-10-CM

## 2023-01-01 DIAGNOSIS — R53.83 FATIGUE, UNSPECIFIED TYPE: ICD-10-CM

## 2023-01-01 DIAGNOSIS — C32.9 LARYNGEAL SQUAMOUS CELL CARCINOMA: ICD-10-CM

## 2023-01-01 DIAGNOSIS — D70.9 NEUTROPENIC FEVER: ICD-10-CM

## 2023-01-01 DIAGNOSIS — R53.83 FATIGUE, UNSPECIFIED TYPE: Primary | ICD-10-CM

## 2023-01-01 DIAGNOSIS — E83.52 HYPERCALCEMIA OF MALIGNANCY: ICD-10-CM

## 2023-01-01 DIAGNOSIS — C34.31 MALIGNANT NEOPLASM OF LOWER LOBE OF RIGHT LUNG: Primary | ICD-10-CM

## 2023-01-01 DIAGNOSIS — S42.351D CLOSED DISPLACED COMMINUTED FRACTURE OF SHAFT OF RIGHT HUMERUS WITH ROUTINE HEALING, SUBSEQUENT ENCOUNTER: Primary | ICD-10-CM

## 2023-01-01 DIAGNOSIS — J44.9 CHRONIC OBSTRUCTIVE PULMONARY DISEASE, UNSPECIFIED COPD TYPE: ICD-10-CM

## 2023-01-01 DIAGNOSIS — C34.11 MALIGNANT NEOPLASM OF UPPER LOBE OF RIGHT LUNG: Primary | ICD-10-CM

## 2023-01-01 DIAGNOSIS — K21.9 GASTROESOPHAGEAL REFLUX DISEASE, UNSPECIFIED WHETHER ESOPHAGITIS PRESENT: ICD-10-CM

## 2023-01-01 DIAGNOSIS — R53.1 WEAKNESS: ICD-10-CM

## 2023-01-01 DIAGNOSIS — M79.601 RIGHT ARM PAIN: ICD-10-CM

## 2023-01-01 DIAGNOSIS — Z92.3 H/O HEAD AND NECK RADIATION: Primary | ICD-10-CM

## 2023-01-01 DIAGNOSIS — I82.621 ARM DVT (DEEP VENOUS THROMBOEMBOLISM), ACUTE, RIGHT: ICD-10-CM

## 2023-01-01 DIAGNOSIS — R13.10 DYSPHAGIA: Primary | ICD-10-CM

## 2023-01-01 DIAGNOSIS — E83.52 HUMORAL HYPERCALCEMIA OF MALIGNANCY: ICD-10-CM

## 2023-01-01 DIAGNOSIS — E83.39 HYPOPHOSPHATEMIA: ICD-10-CM

## 2023-01-01 DIAGNOSIS — S72.046S: ICD-10-CM

## 2023-01-01 DIAGNOSIS — A41.9 SEVERE SEPSIS: Primary | ICD-10-CM

## 2023-01-01 DIAGNOSIS — R56.9 NEW ONSET SEIZURE: ICD-10-CM

## 2023-01-01 DIAGNOSIS — E87.1 HYPONATREMIA: ICD-10-CM

## 2023-01-01 LAB
1,25(OH)2D SERPL-MCNC: 63 PG/ML (ref 18–64)
ABO + RH BLD: NORMAL
ABS NEUT CALC (OHS): 1.63 X10(3)/MCL (ref 2.1–9.2)
ABS NEUT CALC (OHS): 9.86 X10(3)/MCL (ref 2.1–9.2)
ADDITIONAL FINDINGS (OHS): ABNORMAL
ADDITIONAL FINDINGS (OHS): ABNORMAL
ALBUMIN % SPEP (OHS): 43.63
ALBUMIN 24H UR ELPH-MRATE: NORMAL G/(24.H)
ALBUMIN SERPL-MCNC: 2 G/DL (ref 3.5–5)
ALBUMIN SERPL-MCNC: 2 G/DL (ref 3.5–5)
ALBUMIN SERPL-MCNC: 2.1 G/DL (ref 3.5–5)
ALBUMIN SERPL-MCNC: 2.2 G/DL (ref 3.5–5)
ALBUMIN SERPL-MCNC: 2.3 G/DL (ref 3.5–5)
ALBUMIN SERPL-MCNC: 2.4 G/DL (ref 3.5–5)
ALBUMIN SERPL-MCNC: 2.6 G/DL (ref 3.5–5)
ALBUMIN SERPL-MCNC: 2.7 G/DL (ref 3.5–5)
ALBUMIN SERPL-MCNC: 2.9 G/DL (ref 3.5–5)
ALBUMIN SERPL-MCNC: 3 G/DL (ref 3.5–5)
ALBUMIN SERPL-MCNC: 3.1 G/DL (ref 3.5–5)
ALBUMIN SERPL-MCNC: 3.2 G/DL (ref 3.5–5)
ALBUMIN SERPL-MCNC: 3.4 G/DL (ref 3.5–5)
ALBUMIN SERPL-MCNC: 3.4 G/DL (ref 3.5–5)
ALBUMIN SERPL-MCNC: 3.5 G/DL (ref 3.5–5)
ALBUMIN SERPL-MCNC: 3.6 G/DL (ref 3.5–5)
ALBUMIN/GLOB 24H UR ELPH: NORMAL {RATIO}
ALBUMIN/GLOB SERPL: 0.6 RATIO (ref 1.1–2)
ALBUMIN/GLOB SERPL: 0.7 RATIO (ref 1.1–2)
ALBUMIN/GLOB SERPL: 0.8 RATIO (ref 1.1–2)
ALBUMIN/GLOB SERPL: 0.9 RATIO (ref 1.1–2)
ALBUMIN/GLOB SERPL: 1 RATIO (ref 1.1–2)
ALBUMIN/GLOB SERPL: 1.2 RATIO (ref 1.1–2)
ALP SERPL-CCNC: 102 UNIT/L (ref 40–150)
ALP SERPL-CCNC: 104 UNIT/L (ref 40–150)
ALP SERPL-CCNC: 106 UNIT/L (ref 40–150)
ALP SERPL-CCNC: 112 UNIT/L (ref 40–150)
ALP SERPL-CCNC: 119 UNIT/L (ref 40–150)
ALP SERPL-CCNC: 123 UNIT/L (ref 40–150)
ALP SERPL-CCNC: 128 UNIT/L (ref 40–150)
ALP SERPL-CCNC: 133 UNIT/L (ref 40–150)
ALP SERPL-CCNC: 134 UNIT/L (ref 40–150)
ALP SERPL-CCNC: 140 UNIT/L (ref 40–150)
ALP SERPL-CCNC: 143 UNIT/L (ref 40–150)
ALP SERPL-CCNC: 145 UNIT/L (ref 40–150)
ALP SERPL-CCNC: 166 UNIT/L (ref 40–150)
ALP SERPL-CCNC: 166 UNIT/L (ref 40–150)
ALP SERPL-CCNC: 187 UNIT/L (ref 40–150)
ALP SERPL-CCNC: 188 UNIT/L (ref 40–150)
ALP SERPL-CCNC: 55 UNIT/L (ref 40–150)
ALP SERPL-CCNC: 55 UNIT/L (ref 40–150)
ALP SERPL-CCNC: 57 UNIT/L (ref 40–150)
ALP SERPL-CCNC: 64 UNIT/L (ref 40–150)
ALP SERPL-CCNC: 66 UNIT/L (ref 40–150)
ALP SERPL-CCNC: 82 UNIT/L (ref 40–150)
ALP SERPL-CCNC: 85 UNIT/L (ref 40–150)
ALP SERPL-CCNC: 86 UNIT/L (ref 40–150)
ALP SERPL-CCNC: 90 UNIT/L (ref 40–150)
ALP SERPL-CCNC: 90 UNIT/L (ref 40–150)
ALP SERPL-CCNC: 93 UNIT/L (ref 40–150)
ALP SERPL-CCNC: 94 UNIT/L (ref 40–150)
ALP SERPL-CCNC: 97 UNIT/L (ref 40–150)
ALPHA 1 GLOB (OHS): 0.47 GM/DL
ALPHA 1 GLOB% (OHS): 6.75
ALPHA 2 GLOB % (OHS): 16.04
ALPHA 2 GLOB (OHS): 1.11 GM/DL
ALPHA1 GLOB 24H UR ELPH-MRATE: NORMAL MG/(24.H)
ALPHA2 GLOB 24H UR ELPH-MRATE: NORMAL MG/(24.H)
ALT SERPL-CCNC: 10 UNIT/L (ref 0–55)
ALT SERPL-CCNC: 11 UNIT/L (ref 0–55)
ALT SERPL-CCNC: 11 UNIT/L (ref 0–55)
ALT SERPL-CCNC: 12 UNIT/L (ref 0–55)
ALT SERPL-CCNC: 13 UNIT/L (ref 0–55)
ALT SERPL-CCNC: 14 UNIT/L (ref 0–55)
ALT SERPL-CCNC: 15 UNIT/L (ref 0–55)
ALT SERPL-CCNC: 15 UNIT/L (ref 0–55)
ALT SERPL-CCNC: 16 UNIT/L (ref 0–55)
ALT SERPL-CCNC: 16 UNIT/L (ref 0–55)
ALT SERPL-CCNC: 18 UNIT/L (ref 0–55)
ALT SERPL-CCNC: 19 UNIT/L (ref 0–55)
ALT SERPL-CCNC: 21 UNIT/L (ref 0–55)
ALT SERPL-CCNC: 5 UNIT/L (ref 0–55)
ALT SERPL-CCNC: 6 UNIT/L (ref 0–55)
ALT SERPL-CCNC: 7 UNIT/L (ref 0–55)
ALT SERPL-CCNC: 9 UNIT/L (ref 0–55)
ALT SERPL-CCNC: <5 UNIT/L (ref 0–55)
ANION GAP SERPL CALC-SCNC: 10 MEQ/L
ANION GAP SERPL CALC-SCNC: 11 MEQ/L
ANION GAP SERPL CALC-SCNC: 12 MEQ/L
ANION GAP SERPL CALC-SCNC: 12 MEQ/L
ANION GAP SERPL CALC-SCNC: 13 MEQ/L
ANION GAP SERPL CALC-SCNC: 14 MEQ/L
ANION GAP SERPL CALC-SCNC: 6 MEQ/L
ANION GAP SERPL CALC-SCNC: 7 MEQ/L
ANION GAP SERPL CALC-SCNC: 9 MEQ/L
ANISOCYTOSIS BLD QL SMEAR: ABNORMAL
APPEARANCE UR: CLEAR
AST SERPL-CCNC: 10 UNIT/L (ref 5–34)
AST SERPL-CCNC: 10 UNIT/L (ref 5–34)
AST SERPL-CCNC: 11 UNIT/L (ref 5–34)
AST SERPL-CCNC: 12 UNIT/L (ref 5–34)
AST SERPL-CCNC: 13 UNIT/L (ref 5–34)
AST SERPL-CCNC: 13 UNIT/L (ref 5–34)
AST SERPL-CCNC: 14 UNIT/L (ref 5–34)
AST SERPL-CCNC: 14 UNIT/L (ref 5–34)
AST SERPL-CCNC: 15 UNIT/L (ref 5–34)
AST SERPL-CCNC: 15 UNIT/L (ref 5–34)
AST SERPL-CCNC: 16 UNIT/L (ref 5–34)
AST SERPL-CCNC: 17 UNIT/L (ref 5–34)
AST SERPL-CCNC: 18 UNIT/L (ref 5–34)
AST SERPL-CCNC: 20 UNIT/L (ref 5–34)
AST SERPL-CCNC: 24 UNIT/L (ref 5–34)
AST SERPL-CCNC: 24 UNIT/L (ref 5–34)
AST SERPL-CCNC: 26 UNIT/L (ref 5–34)
AST SERPL-CCNC: 27 UNIT/L (ref 5–34)
AST SERPL-CCNC: 29 UNIT/L (ref 5–34)
AST SERPL-CCNC: 31 UNIT/L (ref 5–34)
AST SERPL-CCNC: 31 UNIT/L (ref 5–34)
AST SERPL-CCNC: 36 UNIT/L (ref 5–34)
AST SERPL-CCNC: 37 UNIT/L (ref 5–34)
AST SERPL-CCNC: 38 UNIT/L (ref 5–34)
AST SERPL-CCNC: 38 UNIT/L (ref 5–34)
AST SERPL-CCNC: 39 UNIT/L (ref 5–34)
AST SERPL-CCNC: 39 UNIT/L (ref 5–34)
AST SERPL-CCNC: 42 UNIT/L (ref 5–34)
AST SERPL-CCNC: 42 UNIT/L (ref 5–34)
B PERT.PT PRMT NPH QL NAA+NON-PROBE: NOT DETECTED
B-GLOBULIN 24H UR ELPH-MRATE: NORMAL MG/(24.H)
BACTERIA #/AREA URNS AUTO: ABNORMAL /HPF
BACTERIA BLD CULT: NORMAL
BACTERIA SPEC CULT: NO GROWTH
BASOPHILS # BLD AUTO: 0.01 X10(3)/MCL
BASOPHILS # BLD AUTO: 0.02 X10(3)/MCL
BASOPHILS # BLD AUTO: 0.02 X10(3)/MCL (ref 0–0.2)
BASOPHILS # BLD AUTO: 0.02 X10(3)/MCL (ref 0–0.2)
BASOPHILS # BLD AUTO: 0.03 X10(3)/MCL
BASOPHILS # BLD AUTO: 0.03 X10(3)/MCL
BASOPHILS # BLD AUTO: 0.03 X10(3)/MCL (ref 0–0.2)
BASOPHILS # BLD AUTO: 0.04 X10(3)/MCL (ref 0–0.2)
BASOPHILS # BLD AUTO: 0.05 X10(3)/MCL (ref 0–0.2)
BASOPHILS NFR BLD AUTO: 0.1 %
BASOPHILS NFR BLD AUTO: 0.1 %
BASOPHILS NFR BLD AUTO: 0.2 %
BASOPHILS NFR BLD AUTO: 0.3 %
BASOPHILS NFR BLD AUTO: 0.4 %
BASOPHILS NFR BLD AUTO: 0.6 %
BASOPHILS NFR BLD AUTO: 0.7 %
BETA GLOB (OHS): 1.24 GM/DL
BETA GLOB% (OHS): 17.92
BILIRUB SERPL-MCNC: 0.4 MG/DL
BILIRUB SERPL-MCNC: 0.5 MG/DL
BILIRUB SERPL-MCNC: 0.6 MG/DL
BILIRUB SERPL-MCNC: 0.7 MG/DL
BILIRUB SERPL-MCNC: 0.7 MG/DL
BILIRUB SERPL-MCNC: 0.8 MG/DL
BILIRUB SERPL-MCNC: 0.8 MG/DL
BILIRUB UR QL STRIP.AUTO: NEGATIVE
BILIRUBIN DIRECT+TOT PNL SERPL-MCNC: 0.2 MG/DL
BILIRUBIN DIRECT+TOT PNL SERPL-MCNC: 0.2 MG/DL
BILIRUBIN DIRECT+TOT PNL SERPL-MCNC: 0.3 MG/DL
BILIRUBIN DIRECT+TOT PNL SERPL-MCNC: 0.4 MG/DL
BILIRUBIN DIRECT+TOT PNL SERPL-MCNC: 0.4 MG/DL
BILIRUBIN DIRECT+TOT PNL SERPL-MCNC: 0.4 MG/DL (ref 0–?)
BILIRUBIN DIRECT+TOT PNL SERPL-MCNC: 0.5 MG/DL
BILIRUBIN DIRECT+TOT PNL SERPL-MCNC: 0.5 MG/DL
BILIRUBIN DIRECT+TOT PNL SERPL-MCNC: 0.6 MG/DL
BILIRUBIN DIRECT+TOT PNL SERPL-MCNC: 0.6 MG/DL
BLD PROD TYP BPU: NORMAL
BLOOD UNIT EXPIRATION DATE: NORMAL
BLOOD UNIT TYPE CODE: 7300
BNP BLD-MCNC: 11.5 PG/ML
BUN SERPL-MCNC: 10.3 MG/DL (ref 8.4–25.7)
BUN SERPL-MCNC: 11 MG/DL (ref 8.4–25.7)
BUN SERPL-MCNC: 11.2 MG/DL (ref 8.4–25.7)
BUN SERPL-MCNC: 11.7 MG/DL (ref 8.4–25.7)
BUN SERPL-MCNC: 14 MG/DL (ref 8.4–25.7)
BUN SERPL-MCNC: 3.4 MG/DL (ref 8.4–25.7)
BUN SERPL-MCNC: 4.1 MG/DL (ref 8.4–25.7)
BUN SERPL-MCNC: 4.4 MG/DL (ref 8.4–25.7)
BUN SERPL-MCNC: 4.6 MG/DL (ref 8.4–25.7)
BUN SERPL-MCNC: 4.7 MG/DL (ref 8.4–25.7)
BUN SERPL-MCNC: 4.9 MG/DL (ref 8.4–25.7)
BUN SERPL-MCNC: 5.2 MG/DL (ref 8.4–25.7)
BUN SERPL-MCNC: 5.3 MG/DL (ref 8.4–25.7)
BUN SERPL-MCNC: 5.5 MG/DL (ref 8.4–25.7)
BUN SERPL-MCNC: 5.6 MG/DL (ref 8.4–25.7)
BUN SERPL-MCNC: 5.8 MG/DL (ref 8.4–25.7)
BUN SERPL-MCNC: 5.9 MG/DL (ref 8.4–25.7)
BUN SERPL-MCNC: 6 MG/DL (ref 8.4–25.7)
BUN SERPL-MCNC: 6 MG/DL (ref 8.4–25.7)
BUN SERPL-MCNC: 6.1 MG/DL (ref 8.4–25.7)
BUN SERPL-MCNC: 6.6 MG/DL (ref 8.4–25.7)
BUN SERPL-MCNC: 6.6 MG/DL (ref 8.4–25.7)
BUN SERPL-MCNC: 6.7 MG/DL (ref 8.4–25.7)
BUN SERPL-MCNC: 6.9 MG/DL (ref 8.4–25.7)
BUN SERPL-MCNC: 6.9 MG/DL (ref 8.4–25.7)
BUN SERPL-MCNC: 7 MG/DL (ref 8.4–25.7)
BUN SERPL-MCNC: 7 MG/DL (ref 8.4–25.7)
BUN SERPL-MCNC: 7.2 MG/DL (ref 8.4–25.7)
BUN SERPL-MCNC: 7.3 MG/DL (ref 8.4–25.7)
BUN SERPL-MCNC: 7.5 MG/DL (ref 8.4–25.7)
BUN SERPL-MCNC: 8.1 MG/DL (ref 8.4–25.7)
BUN SERPL-MCNC: 8.2 MG/DL (ref 8.4–25.7)
BUN SERPL-MCNC: 8.5 MG/DL (ref 8.4–25.7)
BUN SERPL-MCNC: 8.7 MG/DL (ref 8.4–25.7)
BUN SERPL-MCNC: 8.9 MG/DL (ref 8.4–25.7)
BUN SERPL-MCNC: 9 MG/DL (ref 8.4–25.7)
BUN SERPL-MCNC: 9 MG/DL (ref 8.4–25.7)
BUN SERPL-MCNC: 9.3 MG/DL (ref 8.4–25.7)
BUN SERPL-MCNC: 9.9 MG/DL (ref 8.4–25.7)
BUN SERPL-MCNC: <3 MG/DL (ref 8.4–25.7)
C PNEUM DNA NPH QL NAA+NON-PROBE: NOT DETECTED
CALCIUM 24H UR-MCNC: 546 MG/DAY (ref 100–300)
CALCIUM SERPL-MCNC: 10.1 MG/DL (ref 8.4–10.2)
CALCIUM SERPL-MCNC: 10.3 MG/DL (ref 8.4–10.2)
CALCIUM SERPL-MCNC: 10.5 MG/DL (ref 8.4–10.2)
CALCIUM SERPL-MCNC: 10.8 MG/DL (ref 8.4–10.2)
CALCIUM SERPL-MCNC: 10.8 MG/DL (ref 8.4–10.2)
CALCIUM SERPL-MCNC: 10.9 MG/DL (ref 8.4–10.2)
CALCIUM SERPL-MCNC: 10.9 MG/DL (ref 8.4–10.2)
CALCIUM SERPL-MCNC: 11 MG/DL (ref 8.4–10.2)
CALCIUM SERPL-MCNC: 11.1 MG/DL (ref 8.4–10.2)
CALCIUM SERPL-MCNC: 11.1 MG/DL (ref 8.4–10.2)
CALCIUM SERPL-MCNC: 11.5 MG/DL (ref 8.4–10.2)
CALCIUM SERPL-MCNC: 11.7 MG/DL (ref 8.4–10.2)
CALCIUM SERPL-MCNC: 11.7 MG/DL (ref 8.4–10.2)
CALCIUM SERPL-MCNC: 11.8 MG/DL (ref 8.4–10.2)
CALCIUM SERPL-MCNC: 11.9 MG/DL (ref 8.4–10.2)
CALCIUM SERPL-MCNC: 11.9 MG/DL (ref 8.4–10.2)
CALCIUM SERPL-MCNC: 12 MG/DL (ref 8.4–10.2)
CALCIUM SERPL-MCNC: 12.2 MG/DL (ref 8.4–10.2)
CALCIUM SERPL-MCNC: 12.4 MG/DL (ref 8.4–10.2)
CALCIUM SERPL-MCNC: 12.5 MG/DL (ref 8.4–10.2)
CALCIUM SERPL-MCNC: 12.7 MG/DL (ref 8.4–10.2)
CALCIUM SERPL-MCNC: 13.6 MG/DL (ref 8.4–10.2)
CALCIUM SERPL-MCNC: 14.1 MG/DL (ref 8.4–10.2)
CALCIUM SERPL-MCNC: 14.8 MG/DL (ref 8.4–10.2)
CALCIUM SERPL-MCNC: 15.5 MG/DL (ref 8.4–10.2)
CALCIUM SERPL-MCNC: 8.7 MG/DL (ref 8.4–10.2)
CALCIUM SERPL-MCNC: 8.7 MG/DL (ref 8.4–10.2)
CALCIUM SERPL-MCNC: 8.9 MG/DL (ref 8.4–10.2)
CALCIUM SERPL-MCNC: 9 MG/DL (ref 8.4–10.2)
CALCIUM SERPL-MCNC: 9.1 MG/DL (ref 8.4–10.2)
CALCIUM SERPL-MCNC: 9.1 MG/DL (ref 8.4–10.2)
CALCIUM SERPL-MCNC: 9.2 MG/DL (ref 8.4–10.2)
CALCIUM SERPL-MCNC: 9.2 MG/DL (ref 8.4–10.2)
CALCIUM SERPL-MCNC: 9.3 MG/DL (ref 8.4–10.2)
CALCIUM SERPL-MCNC: 9.5 MG/DL (ref 8.4–10.2)
CALCIUM UR-MCNC: 15.6 MG/DL
CHLORIDE SERPL-SCNC: 100 MMOL/L (ref 98–107)
CHLORIDE SERPL-SCNC: 100 MMOL/L (ref 98–107)
CHLORIDE SERPL-SCNC: 101 MMOL/L (ref 98–107)
CHLORIDE SERPL-SCNC: 102 MMOL/L (ref 98–107)
CHLORIDE SERPL-SCNC: 103 MMOL/L (ref 98–107)
CHLORIDE SERPL-SCNC: 103 MMOL/L (ref 98–107)
CHLORIDE SERPL-SCNC: 104 MMOL/L (ref 98–107)
CHLORIDE SERPL-SCNC: 104 MMOL/L (ref 98–107)
CHLORIDE SERPL-SCNC: 105 MMOL/L (ref 98–107)
CHLORIDE SERPL-SCNC: 106 MMOL/L (ref 98–107)
CHLORIDE SERPL-SCNC: 107 MMOL/L (ref 98–107)
CHLORIDE SERPL-SCNC: 108 MMOL/L (ref 98–107)
CHLORIDE SERPL-SCNC: 108 MMOL/L (ref 98–107)
CHLORIDE SERPL-SCNC: 110 MMOL/L (ref 98–107)
CHLORIDE SERPL-SCNC: 94 MMOL/L (ref 98–107)
CHLORIDE SERPL-SCNC: 95 MMOL/L (ref 98–107)
CHLORIDE SERPL-SCNC: 95 MMOL/L (ref 98–107)
CHLORIDE SERPL-SCNC: 96 MMOL/L (ref 98–107)
CHLORIDE SERPL-SCNC: 97 MMOL/L (ref 98–107)
CHLORIDE SERPL-SCNC: 98 MMOL/L (ref 98–107)
CHLORIDE SERPL-SCNC: 99 MMOL/L (ref 98–107)
CHLORIDE UR-SCNC: 47 MMOL/L
CO2 SERPL-SCNC: 15 MMOL/L (ref 22–29)
CO2 SERPL-SCNC: 15 MMOL/L (ref 22–29)
CO2 SERPL-SCNC: 16 MMOL/L (ref 22–29)
CO2 SERPL-SCNC: 16 MMOL/L (ref 22–29)
CO2 SERPL-SCNC: 17 MMOL/L (ref 22–29)
CO2 SERPL-SCNC: 18 MMOL/L (ref 22–29)
CO2 SERPL-SCNC: 19 MMOL/L (ref 22–29)
CO2 SERPL-SCNC: 20 MMOL/L (ref 22–29)
CO2 SERPL-SCNC: 22 MMOL/L (ref 22–29)
CO2 SERPL-SCNC: 22 MMOL/L (ref 22–29)
CO2 SERPL-SCNC: 23 MMOL/L (ref 22–29)
CO2 SERPL-SCNC: 24 MMOL/L (ref 22–29)
CO2 SERPL-SCNC: 25 MMOL/L (ref 22–29)
CO2 SERPL-SCNC: 26 MMOL/L (ref 22–29)
CO2 SERPL-SCNC: 28 MMOL/L (ref 22–29)
CO2 SERPL-SCNC: 28 MMOL/L (ref 22–29)
CO2 SERPL-SCNC: 29 MMOL/L (ref 22–29)
CO2 SERPL-SCNC: 31 MMOL/L (ref 22–29)
CO2 SERPL-SCNC: 32 MMOL/L (ref 22–29)
COLLECT DURATION TIME UR: NORMAL H
COLOR UR AUTO: ABNORMAL
COLOR UR: ABNORMAL
COLOR UR: ABNORMAL
COLOR UR: COLORLESS
CORTIS SERPL-SCNC: 18.8 UG/DL
CREAT 24H UR-MCNC: 707 MG/DAY (ref 710–1650)
CREAT SERPL-MCNC: 0.52 MG/DL (ref 0.73–1.18)
CREAT SERPL-MCNC: 0.54 MG/DL (ref 0.73–1.18)
CREAT SERPL-MCNC: 0.55 MG/DL (ref 0.73–1.18)
CREAT SERPL-MCNC: 0.55 MG/DL (ref 0.73–1.18)
CREAT SERPL-MCNC: 0.56 MG/DL (ref 0.73–1.18)
CREAT SERPL-MCNC: 0.57 MG/DL (ref 0.73–1.18)
CREAT SERPL-MCNC: 0.57 MG/DL (ref 0.73–1.18)
CREAT SERPL-MCNC: 0.58 MG/DL (ref 0.73–1.18)
CREAT SERPL-MCNC: 0.58 MG/DL (ref 0.73–1.18)
CREAT SERPL-MCNC: 0.59 MG/DL (ref 0.73–1.18)
CREAT SERPL-MCNC: 0.6 MG/DL (ref 0.73–1.18)
CREAT SERPL-MCNC: 0.6 MG/DL (ref 0.73–1.18)
CREAT SERPL-MCNC: 0.63 MG/DL (ref 0.73–1.18)
CREAT SERPL-MCNC: 0.64 MG/DL (ref 0.73–1.18)
CREAT SERPL-MCNC: 0.65 MG/DL (ref 0.73–1.18)
CREAT SERPL-MCNC: 0.67 MG/DL (ref 0.73–1.18)
CREAT SERPL-MCNC: 0.68 MG/DL (ref 0.73–1.18)
CREAT SERPL-MCNC: 0.68 MG/DL (ref 0.73–1.18)
CREAT SERPL-MCNC: 0.69 MG/DL (ref 0.73–1.18)
CREAT SERPL-MCNC: 0.71 MG/DL (ref 0.73–1.18)
CREAT SERPL-MCNC: 0.71 MG/DL (ref 0.73–1.18)
CREAT SERPL-MCNC: 0.72 MG/DL (ref 0.73–1.18)
CREAT SERPL-MCNC: 0.74 MG/DL (ref 0.73–1.18)
CREAT SERPL-MCNC: 0.75 MG/DL (ref 0.73–1.18)
CREAT SERPL-MCNC: 0.76 MG/DL (ref 0.73–1.18)
CREAT SERPL-MCNC: 0.76 MG/DL (ref 0.73–1.18)
CREAT SERPL-MCNC: 0.77 MG/DL (ref 0.73–1.18)
CREAT SERPL-MCNC: 0.78 MG/DL (ref 0.73–1.18)
CREAT SERPL-MCNC: 0.8 MG/DL (ref 0.73–1.18)
CREAT SERPL-MCNC: 0.8 MG/DL (ref 0.73–1.18)
CREAT SERPL-MCNC: 0.81 MG/DL (ref 0.73–1.18)
CREAT SERPL-MCNC: 0.83 MG/DL (ref 0.73–1.18)
CREAT SERPL-MCNC: 0.85 MG/DL (ref 0.73–1.18)
CREAT SERPL-MCNC: 0.86 MG/DL (ref 0.73–1.18)
CREAT SERPL-MCNC: 0.87 MG/DL (ref 0.73–1.18)
CREAT SERPL-MCNC: 0.93 MG/DL (ref 0.73–1.18)
CREAT UR-MCNC: 20.2 MG/DL (ref 63–166)
CREAT/UREA NIT SERPL: 10
CREAT/UREA NIT SERPL: 10
CREAT/UREA NIT SERPL: 11
CREAT/UREA NIT SERPL: 12
CREAT/UREA NIT SERPL: 12
CREAT/UREA NIT SERPL: 13
CREAT/UREA NIT SERPL: 17
CREAT/UREA NIT SERPL: 18
CREAT/UREA NIT SERPL: 19
CREAT/UREA NIT SERPL: 20
CREAT/UREA NIT SERPL: 9
CROSSMATCH INTERPRETATION: NORMAL
D DIMER PPP IA.FEU-MCNC: 10.97 UG/ML FEU (ref 0–0.5)
DEPRECATED CALCIDIOL+CALCIFEROL SERPL-MC: 15 NG/ML (ref 30–80)
DEPRECATED CALCIDIOL+CALCIFEROL SERPL-MC: 21.4 NG/ML (ref 30–80)
DHEA SERPL-MCNC: NORMAL
DISPENSE STATUS: NORMAL
EOSINOPHIL # BLD AUTO: 0 X10(3)/MCL (ref 0–0.9)
EOSINOPHIL # BLD AUTO: 0.01 X10(3)/MCL (ref 0–0.9)
EOSINOPHIL # BLD AUTO: 0.02 X10(3)/MCL (ref 0–0.9)
EOSINOPHIL # BLD AUTO: 0.03 X10(3)/MCL (ref 0–0.9)
EOSINOPHIL # BLD AUTO: 0.04 X10(3)/MCL (ref 0–0.9)
EOSINOPHIL # BLD AUTO: 0.05 X10(3)/MCL (ref 0–0.9)
EOSINOPHIL # BLD AUTO: 0.06 X10(3)/MCL (ref 0–0.9)
EOSINOPHIL # BLD AUTO: 0.07 X10(3)/MCL (ref 0–0.9)
EOSINOPHIL # BLD AUTO: 0.08 X10(3)/MCL (ref 0–0.9)
EOSINOPHIL # BLD AUTO: 0.15 X10(3)/MCL (ref 0–0.9)
EOSINOPHIL # BLD AUTO: 0.22 X10(3)/MCL (ref 0–0.9)
EOSINOPHIL # BLD AUTO: 0.4 X10(3)/MCL (ref 0–0.9)
EOSINOPHIL NFR BLD AUTO: 0 %
EOSINOPHIL NFR BLD AUTO: 0.1 %
EOSINOPHIL NFR BLD AUTO: 0.3 %
EOSINOPHIL NFR BLD AUTO: 0.3 %
EOSINOPHIL NFR BLD AUTO: 0.4 %
EOSINOPHIL NFR BLD AUTO: 0.6 %
EOSINOPHIL NFR BLD AUTO: 0.7 %
EOSINOPHIL NFR BLD AUTO: 0.8 %
EOSINOPHIL NFR BLD AUTO: 0.8 %
EOSINOPHIL NFR BLD AUTO: 0.9 %
EOSINOPHIL NFR BLD AUTO: 1.1 %
EOSINOPHIL NFR BLD AUTO: 1.2 %
EOSINOPHIL NFR BLD AUTO: 1.3 %
EOSINOPHIL NFR BLD AUTO: 1.4 %
EOSINOPHIL NFR BLD AUTO: 1.5 %
EOSINOPHIL NFR BLD AUTO: 3.6 %
EOSINOPHIL NFR BLD MANUAL: 0.12 X10(3)/MCL (ref 0–0.9)
EOSINOPHIL NFR BLD MANUAL: 1 % (ref 0–8)
ERYTHROCYTE [DISTWIDTH] IN BLOOD BY AUTOMATED COUNT: 13 % (ref 11.6–14.4)
ERYTHROCYTE [DISTWIDTH] IN BLOOD BY AUTOMATED COUNT: 13.1 % (ref 11.5–17)
ERYTHROCYTE [DISTWIDTH] IN BLOOD BY AUTOMATED COUNT: 13.2 % (ref 11.5–17)
ERYTHROCYTE [DISTWIDTH] IN BLOOD BY AUTOMATED COUNT: 13.2 % (ref 11.5–17)
ERYTHROCYTE [DISTWIDTH] IN BLOOD BY AUTOMATED COUNT: 13.4 % (ref 11.5–17)
ERYTHROCYTE [DISTWIDTH] IN BLOOD BY AUTOMATED COUNT: 13.5 % (ref 11.5–17)
ERYTHROCYTE [DISTWIDTH] IN BLOOD BY AUTOMATED COUNT: 14 % (ref 11.5–17)
ERYTHROCYTE [DISTWIDTH] IN BLOOD BY AUTOMATED COUNT: 14.1 % (ref 11.5–17)
ERYTHROCYTE [DISTWIDTH] IN BLOOD BY AUTOMATED COUNT: 14.2 % (ref 11.5–17)
ERYTHROCYTE [DISTWIDTH] IN BLOOD BY AUTOMATED COUNT: 14.3 % (ref 11.5–17)
ERYTHROCYTE [DISTWIDTH] IN BLOOD BY AUTOMATED COUNT: 14.4 % (ref 11.5–17)
ERYTHROCYTE [DISTWIDTH] IN BLOOD BY AUTOMATED COUNT: 14.8 % (ref 11.5–17)
ERYTHROCYTE [DISTWIDTH] IN BLOOD BY AUTOMATED COUNT: 14.9 % (ref 11.5–17)
ERYTHROCYTE [DISTWIDTH] IN BLOOD BY AUTOMATED COUNT: 15.2 % (ref 11.5–17)
ERYTHROCYTE [DISTWIDTH] IN BLOOD BY AUTOMATED COUNT: 15.2 % (ref 11.5–17)
ERYTHROCYTE [DISTWIDTH] IN BLOOD BY AUTOMATED COUNT: 15.5 % (ref 11.5–17)
ERYTHROCYTE [DISTWIDTH] IN BLOOD BY AUTOMATED COUNT: 16.4 % (ref 11.5–17)
ERYTHROCYTE [DISTWIDTH] IN BLOOD BY AUTOMATED COUNT: 16.7 % (ref 11.5–17)
ERYTHROCYTE [DISTWIDTH] IN BLOOD BY AUTOMATED COUNT: 17.4 % (ref 11.5–17)
ERYTHROCYTE [DISTWIDTH] IN BLOOD BY AUTOMATED COUNT: 17.4 % (ref 11.5–17)
ERYTHROCYTE [DISTWIDTH] IN BLOOD BY AUTOMATED COUNT: 18.9 % (ref 11.5–17)
ERYTHROCYTE [DISTWIDTH] IN BLOOD BY AUTOMATED COUNT: 19.8 % (ref 11.5–17)
ERYTHROCYTE [DISTWIDTH] IN BLOOD BY AUTOMATED COUNT: 20.2 % (ref 11.5–17)
ERYTHROCYTE [DISTWIDTH] IN BLOOD BY AUTOMATED COUNT: 20.4 % (ref 11.5–17)
ERYTHROCYTE [DISTWIDTH] IN BLOOD BY AUTOMATED COUNT: 20.7 % (ref 11.5–17)
ERYTHROCYTE [DISTWIDTH] IN BLOOD BY AUTOMATED COUNT: 21.5 % (ref 11.5–17)
ERYTHROCYTE [DISTWIDTH] IN BLOOD BY AUTOMATED COUNT: 21.6 % (ref 11.5–17)
ERYTHROCYTE [DISTWIDTH] IN BLOOD BY AUTOMATED COUNT: 21.6 % (ref 11.5–17)
ESTROGEN SERPL-MCNC: NORMAL PG/ML
FACT VIII ACT/NOR PPP: >300 % (ref 59–191)
FIBRINOGEN PPP-MCNC: 572 MG/DL (ref 210–463)
FLUAV AG UPPER RESP QL IA.RAPID: NOT DETECTED
FLUBV AG UPPER RESP QL IA.RAPID: NOT DETECTED
FOLATE SERPL-MCNC: 14.2 NG/ML (ref 7–31.4)
FOLATE SERPL-MCNC: 9.4 NG/ML (ref 7–31.4)
FUNGUS SPEC CULT: NORMAL
FUNGUS SPEC CULT: NORMAL
GAMMA GLOB 24H UR ELPH-MRATE: NORMAL MG/(24.H)
GAMMA GLOBULIN % (OHS): 15.65
GAMMA GLOBULIN (OHS): 1.08 GM/DL
GFR SERPLBLD CREATININE-BSD FMLA CKD-EPI: >60 MLS/MIN/1.73/M2
GFR SERPLBLD CREATININE-BSD FMLA CKD-EPI: >90 MLS/MIN/1.73/M2
GLOBULIN SER-MCNC: 2.9 GM/DL (ref 2.4–3.5)
GLOBULIN SER-MCNC: 3 GM/DL (ref 2.4–3.5)
GLOBULIN SER-MCNC: 3.1 GM/DL (ref 2.4–3.5)
GLOBULIN SER-MCNC: 3.3 GM/DL (ref 2.4–3.5)
GLOBULIN SER-MCNC: 3.3 GM/DL (ref 2.4–3.5)
GLOBULIN SER-MCNC: 3.4 GM/DL (ref 2.4–3.5)
GLOBULIN SER-MCNC: 3.4 GM/DL (ref 2.4–3.5)
GLOBULIN SER-MCNC: 3.5 GM/DL (ref 2.4–3.5)
GLOBULIN SER-MCNC: 3.6 GM/DL (ref 2.4–3.5)
GLOBULIN SER-MCNC: 3.7 GM/DL (ref 2.4–3.5)
GLOBULIN SER-MCNC: 3.8 GM/DL (ref 2.4–3.5)
GLOBULIN SER-MCNC: 3.9 GM/DL (ref 2.4–3.5)
GLOBULIN SER-MCNC: 4 GM/DL (ref 2.4–3.5)
GLOBULIN SER-MCNC: 4 GM/DL (ref 2.4–3.5)
GLOBULIN SER-MCNC: 4.1 GM/DL (ref 2.4–3.5)
GLOBULIN SER-MCNC: 4.2 GM/DL (ref 2.4–3.5)
GLUCOSE SERPL-MCNC: 100 MG/DL (ref 74–100)
GLUCOSE SERPL-MCNC: 100 MG/DL (ref 74–100)
GLUCOSE SERPL-MCNC: 102 MG/DL (ref 74–100)
GLUCOSE SERPL-MCNC: 104 MG/DL (ref 74–100)
GLUCOSE SERPL-MCNC: 106 MG/DL (ref 74–100)
GLUCOSE SERPL-MCNC: 107 MG/DL (ref 74–100)
GLUCOSE SERPL-MCNC: 108 MG/DL (ref 74–100)
GLUCOSE SERPL-MCNC: 115 MG/DL (ref 74–100)
GLUCOSE SERPL-MCNC: 117 MG/DL (ref 74–100)
GLUCOSE SERPL-MCNC: 121 MG/DL (ref 74–100)
GLUCOSE SERPL-MCNC: 131 MG/DL (ref 74–100)
GLUCOSE SERPL-MCNC: 136 MG/DL (ref 74–100)
GLUCOSE SERPL-MCNC: 140 MG/DL (ref 74–100)
GLUCOSE SERPL-MCNC: 148 MG/DL (ref 74–100)
GLUCOSE SERPL-MCNC: 73 MG/DL (ref 74–100)
GLUCOSE SERPL-MCNC: 76 MG/DL (ref 74–100)
GLUCOSE SERPL-MCNC: 78 MG/DL (ref 74–100)
GLUCOSE SERPL-MCNC: 80 MG/DL (ref 74–100)
GLUCOSE SERPL-MCNC: 81 MG/DL (ref 74–100)
GLUCOSE SERPL-MCNC: 81 MG/DL (ref 74–100)
GLUCOSE SERPL-MCNC: 83 MG/DL (ref 74–100)
GLUCOSE SERPL-MCNC: 84 MG/DL (ref 74–100)
GLUCOSE SERPL-MCNC: 85 MG/DL (ref 74–100)
GLUCOSE SERPL-MCNC: 86 MG/DL (ref 74–100)
GLUCOSE SERPL-MCNC: 87 MG/DL (ref 74–100)
GLUCOSE SERPL-MCNC: 88 MG/DL (ref 74–100)
GLUCOSE SERPL-MCNC: 88 MG/DL (ref 74–100)
GLUCOSE SERPL-MCNC: 89 MG/DL (ref 74–100)
GLUCOSE SERPL-MCNC: 90 MG/DL (ref 74–100)
GLUCOSE SERPL-MCNC: 90 MG/DL (ref 74–100)
GLUCOSE SERPL-MCNC: 91 MG/DL (ref 74–100)
GLUCOSE SERPL-MCNC: 91 MG/DL (ref 74–100)
GLUCOSE SERPL-MCNC: 92 MG/DL (ref 74–100)
GLUCOSE SERPL-MCNC: 92 MG/DL (ref 74–100)
GLUCOSE SERPL-MCNC: 93 MG/DL (ref 74–100)
GLUCOSE SERPL-MCNC: 94 MG/DL (ref 74–100)
GLUCOSE SERPL-MCNC: 94 MG/DL (ref 74–100)
GLUCOSE SERPL-MCNC: 95 MG/DL (ref 74–100)
GLUCOSE SERPL-MCNC: 96 MG/DL (ref 74–100)
GLUCOSE SERPL-MCNC: 96 MG/DL (ref 74–100)
GLUCOSE SERPL-MCNC: 99 MG/DL (ref 74–100)
GLUCOSE SERPL-MCNC: 99 MG/DL (ref 74–100)
GLUCOSE UR QL STRIP.AUTO: NORMAL
GRAM STN SPEC: NORMAL
GRAM STN SPEC: NORMAL
GROUP & RH: NORMAL
HADV DNA NPH QL NAA+NON-PROBE: NOT DETECTED
HAPTOGLOB SERPL-MCNC: 353 MG/DL (ref 14–258)
HCOV 229E RNA NPH QL NAA+NON-PROBE: NOT DETECTED
HCOV HKU1 RNA NPH QL NAA+NON-PROBE: NOT DETECTED
HCOV NL63 RNA NPH QL NAA+NON-PROBE: NOT DETECTED
HCOV OC43 RNA NPH QL NAA+NON-PROBE: NOT DETECTED
HCT VFR BLD AUTO: 23.9 % (ref 42–52)
HCT VFR BLD AUTO: 25.3 % (ref 42–52)
HCT VFR BLD AUTO: 25.6 % (ref 42–52)
HCT VFR BLD AUTO: 25.9 % (ref 42–52)
HCT VFR BLD AUTO: 25.9 % (ref 42–52)
HCT VFR BLD AUTO: 26.9 % (ref 42–52)
HCT VFR BLD AUTO: 28.1 % (ref 42–52)
HCT VFR BLD AUTO: 28.4 % (ref 42–52)
HCT VFR BLD AUTO: 30.2 % (ref 42–52)
HCT VFR BLD AUTO: 30.2 % (ref 42–52)
HCT VFR BLD AUTO: 30.8 % (ref 42–52)
HCT VFR BLD AUTO: 31.2 % (ref 42–52)
HCT VFR BLD AUTO: 31.5 % (ref 42–52)
HCT VFR BLD AUTO: 31.8 % (ref 42–52)
HCT VFR BLD AUTO: 31.8 % (ref 42–52)
HCT VFR BLD AUTO: 32 % (ref 42–52)
HCT VFR BLD AUTO: 32.6 % (ref 42–52)
HCT VFR BLD AUTO: 33.8 % (ref 42–52)
HCT VFR BLD AUTO: 34.2 % (ref 42–52)
HCT VFR BLD AUTO: 34.5 % (ref 42–52)
HCT VFR BLD AUTO: 35 % (ref 42–52)
HCT VFR BLD AUTO: 35.5 % (ref 42–52)
HCT VFR BLD AUTO: 36.5 % (ref 42–52)
HCT VFR BLD AUTO: 36.6 % (ref 42–52)
HCT VFR BLD AUTO: 36.7 % (ref 42–52)
HCT VFR BLD AUTO: 38.3 % (ref 42–52)
HCT VFR BLD AUTO: 39.2 % (ref 42–52)
HCT VFR BLD AUTO: 39.7 % (ref 42–52)
HGB BLD-MCNC: 10.2 G/DL (ref 14–18)
HGB BLD-MCNC: 10.3 G/DL (ref 14–18)
HGB BLD-MCNC: 10.4 G/DL (ref 14–18)
HGB BLD-MCNC: 10.5 G/DL (ref 14–18)
HGB BLD-MCNC: 10.5 G/DL (ref 14–18)
HGB BLD-MCNC: 10.7 G/DL (ref 14–18)
HGB BLD-MCNC: 10.8 G/DL (ref 14–18)
HGB BLD-MCNC: 11 G/DL (ref 14–18)
HGB BLD-MCNC: 11.1 G/DL (ref 14–18)
HGB BLD-MCNC: 11.5 G/DL (ref 14–18)
HGB BLD-MCNC: 11.8 G/DL (ref 14–18)
HGB BLD-MCNC: 11.9 GM/DL (ref 14–18)
HGB BLD-MCNC: 12 GM/DL (ref 14–18)
HGB BLD-MCNC: 12.1 G/DL (ref 14–18)
HGB BLD-MCNC: 12.4 GM/DL (ref 14–18)
HGB BLD-MCNC: 12.6 GM/DL (ref 14–18)
HGB BLD-MCNC: 12.6 GM/DL (ref 14–18)
HGB BLD-MCNC: 12.8 GM/DL (ref 14–18)
HGB BLD-MCNC: 8 G/DL (ref 14–18)
HGB BLD-MCNC: 8.6 G/DL (ref 14–18)
HGB BLD-MCNC: 8.8 G/DL (ref 14–18)
HGB BLD-MCNC: 9 G/DL (ref 14–18)
HGB BLD-MCNC: 9.2 G/DL (ref 14–18)
HGB BLD-MCNC: 9.3 G/DL (ref 14–18)
HGB BLD-MCNC: 9.6 G/DL (ref 14–18)
HGB BLD-MCNC: 9.7 G/DL (ref 14–18)
HMPV RNA NPH QL NAA+NON-PROBE: NOT DETECTED
HPIV1 RNA NPH QL NAA+NON-PROBE: NOT DETECTED
HPIV2 RNA NPH QL NAA+NON-PROBE: NOT DETECTED
HPIV3 RNA NPH QL NAA+NON-PROBE: NOT DETECTED
HPIV4 RNA NPH QL NAA+NON-PROBE: NOT DETECTED
HYALINE CASTS #/AREA URNS LPF: ABNORMAL /LPF
IGA SERPL-MCNC: 352 MG/DL (ref 63–484)
IGG SERPL-MCNC: 890 MG/DL (ref 540–1822)
IGM SERPL-MCNC: <25 MG/DL (ref 22–240)
IMM GRANULOCYTES # BLD AUTO: 0.01 X10(3)/MCL (ref 0–0.04)
IMM GRANULOCYTES # BLD AUTO: 0.01 X10(3)/MCL (ref 0–0.04)
IMM GRANULOCYTES # BLD AUTO: 0.02 X10(3)/MCL (ref 0–0.04)
IMM GRANULOCYTES # BLD AUTO: 0.03 X10(3)/MCL (ref 0–0.04)
IMM GRANULOCYTES # BLD AUTO: 0.04 X10(3)/MCL (ref 0–0.04)
IMM GRANULOCYTES # BLD AUTO: 0.05 X10(3)/MCL (ref 0–0.04)
IMM GRANULOCYTES # BLD AUTO: 0.06 X10(3)/MCL (ref 0–0.04)
IMM GRANULOCYTES # BLD AUTO: 0.1 X10(3)/MCL (ref 0–0.04)
IMM GRANULOCYTES # BLD AUTO: 0.15 X10(3)/MCL (ref 0–0.04)
IMM GRANULOCYTES # BLD AUTO: 0.25 X10(3)/MCL (ref 0–0.04)
IMM GRANULOCYTES # BLD AUTO: 0.42 X10(3)/MCL (ref 0–0.04)
IMM GRANULOCYTES # BLD AUTO: 0.82 X10(3)/MCL (ref 0–0.04)
IMM GRANULOCYTES NFR BLD AUTO: 0.2 %
IMM GRANULOCYTES NFR BLD AUTO: 0.2 %
IMM GRANULOCYTES NFR BLD AUTO: 0.3 %
IMM GRANULOCYTES NFR BLD AUTO: 0.4 %
IMM GRANULOCYTES NFR BLD AUTO: 0.5 %
IMM GRANULOCYTES NFR BLD AUTO: 0.6 %
IMM GRANULOCYTES NFR BLD AUTO: 0.7 %
IMM GRANULOCYTES NFR BLD AUTO: 0.7 %
IMM GRANULOCYTES NFR BLD AUTO: 0.8 %
IMM GRANULOCYTES NFR BLD AUTO: 0.9 %
IMM GRANULOCYTES NFR BLD AUTO: 1 %
IMM GRANULOCYTES NFR BLD AUTO: 1.1 %
IMM GRANULOCYTES NFR BLD AUTO: 1.2 %
IMM GRANULOCYTES NFR BLD AUTO: 1.5 %
IMM GRANULOCYTES NFR BLD AUTO: 2.1 %
IMM GRANULOCYTES NFR BLD AUTO: 2.2 %
IMM GRANULOCYTES NFR BLD AUTO: 3.4 %
INDIRECT COOMBS GEL: NORMAL
INR PPP: 2
INSULIN SERPL-ACNC: NORMAL U[IU]/ML
INTERPRETATION 24H UR IFE-IMP: NORMAL
IRON SATN MFR SERPL: 25 % (ref 20–50)
IRON SERPL-MCNC: 32 UG/DL (ref 65–175)
KAPPA LC FREE SER-MCNC: 3.5 MG/DL (ref 0.33–1.94)
KAPPA LC FREE/LAMBDA FREE SER: 0.71 {RATIO} (ref 0.26–1.65)
KETONES UR QL STRIP.AUTO: NEGATIVE
LAB AP CLINICAL INFORMATION: NORMAL
LAB AP GROSS DESCRIPTION: NORMAL
LAB AP REPORT FOOTNOTES: NORMAL
LACTATE SERPL-SCNC: 0.8 MMOL/L (ref 0.5–2.2)
LACTATE SERPL-SCNC: 1.4 MMOL/L (ref 0.5–2.2)
LACTATE SERPL-SCNC: 2.7 MMOL/L (ref 0.5–2.2)
LACTATE SERPL-SCNC: 3.4 MMOL/L (ref 0.5–2.2)
LAMBDA LC FREE SERPL-MCNC: 4.94 MG/DL (ref 0.57–2.63)
LDH SERPL-CCNC: 514 U/L (ref 125–220)
LEUKOCYTE ESTERASE UR QL STRIP.AUTO: NEGATIVE
LIPASE SERPL-CCNC: 8 U/L
LYMPHOCYTES # BLD AUTO: 0.19 X10(3)/MCL (ref 0.6–4.6)
LYMPHOCYTES # BLD AUTO: 0.2 X10(3)/MCL (ref 0.6–4.6)
LYMPHOCYTES # BLD AUTO: 0.22 X10(3)/MCL (ref 0.6–4.6)
LYMPHOCYTES # BLD AUTO: 0.27 X10(3)/MCL (ref 0.6–4.6)
LYMPHOCYTES # BLD AUTO: 0.31 X10(3)/MCL (ref 0.6–4.6)
LYMPHOCYTES # BLD AUTO: 0.32 X10(3)/MCL (ref 0.6–4.6)
LYMPHOCYTES # BLD AUTO: 0.33 X10(3)/MCL (ref 0.6–4.6)
LYMPHOCYTES # BLD AUTO: 0.34 X10(3)/MCL (ref 0.6–4.6)
LYMPHOCYTES # BLD AUTO: 0.34 X10(3)/MCL (ref 0.6–4.6)
LYMPHOCYTES # BLD AUTO: 0.41 X10(3)/MCL (ref 0.6–4.6)
LYMPHOCYTES # BLD AUTO: 0.43 X10(3)/MCL (ref 0.6–4.6)
LYMPHOCYTES # BLD AUTO: 0.43 X10(3)/MCL (ref 0.6–4.6)
LYMPHOCYTES # BLD AUTO: 0.46 X10(3)/MCL (ref 0.6–4.6)
LYMPHOCYTES # BLD AUTO: 0.47 X10(3)/MCL (ref 0.6–4.6)
LYMPHOCYTES # BLD AUTO: 0.49 X10(3)/MCL (ref 0.6–4.6)
LYMPHOCYTES # BLD AUTO: 0.51 X10(3)/MCL (ref 0.6–4.6)
LYMPHOCYTES # BLD AUTO: 0.58 X10(3)/MCL (ref 0.6–4.6)
LYMPHOCYTES # BLD AUTO: 0.68 X10(3)/MCL (ref 0.6–4.6)
LYMPHOCYTES # BLD AUTO: 0.98 X10(3)/MCL (ref 0.6–4.6)
LYMPHOCYTES # BLD AUTO: 0.99 X10(3)/MCL (ref 0.6–4.6)
LYMPHOCYTES # BLD AUTO: 1.24 X10(3)/MCL (ref 0.6–4.6)
LYMPHOCYTES # BLD AUTO: 1.26 X10(3)/MCL (ref 0.6–4.6)
LYMPHOCYTES # BLD AUTO: 1.27 X10(3)/MCL (ref 0.6–4.6)
LYMPHOCYTES # BLD AUTO: 1.34 X10(3)/MCL (ref 0.6–4.6)
LYMPHOCYTES # BLD AUTO: 1.36 X10(3)/MCL (ref 0.6–4.6)
LYMPHOCYTES # BLD AUTO: 1.48 X10(3)/MCL (ref 0.6–4.6)
LYMPHOCYTES NFR BLD AUTO: 10.8 %
LYMPHOCYTES NFR BLD AUTO: 11.3 %
LYMPHOCYTES NFR BLD AUTO: 12.5 %
LYMPHOCYTES NFR BLD AUTO: 13.4 %
LYMPHOCYTES NFR BLD AUTO: 13.7 %
LYMPHOCYTES NFR BLD AUTO: 13.7 %
LYMPHOCYTES NFR BLD AUTO: 20.1 %
LYMPHOCYTES NFR BLD AUTO: 21.8 %
LYMPHOCYTES NFR BLD AUTO: 4 %
LYMPHOCYTES NFR BLD AUTO: 4.6 %
LYMPHOCYTES NFR BLD AUTO: 4.7 %
LYMPHOCYTES NFR BLD AUTO: 5.6 %
LYMPHOCYTES NFR BLD AUTO: 5.7 %
LYMPHOCYTES NFR BLD AUTO: 6.1 %
LYMPHOCYTES NFR BLD AUTO: 6.3 %
LYMPHOCYTES NFR BLD AUTO: 6.9 %
LYMPHOCYTES NFR BLD AUTO: 7.5 %
LYMPHOCYTES NFR BLD AUTO: 7.6 %
LYMPHOCYTES NFR BLD AUTO: 7.7 %
LYMPHOCYTES NFR BLD AUTO: 7.7 %
LYMPHOCYTES NFR BLD AUTO: 8.3 %
LYMPHOCYTES NFR BLD AUTO: 8.5 %
LYMPHOCYTES NFR BLD AUTO: 9 %
LYMPHOCYTES NFR BLD AUTO: 9.1 %
LYMPHOCYTES NFR BLD AUTO: 9.3 %
LYMPHOCYTES NFR BLD AUTO: 9.6 %
LYMPHOCYTES NFR BLD MANUAL: 0.08 X10(3)/MCL
LYMPHOCYTES NFR BLD MANUAL: 1.51 X10(3)/MCL
LYMPHOCYTES NFR BLD MANUAL: 13 % (ref 13–40)
LYMPHOCYTES NFR BLD MANUAL: 4 % (ref 13–40)
M IMMUNOFIXATION FLAG, 24 HR, U: NORMAL
M PNEUMO DNA NPH QL NAA+NON-PROBE: NOT DETECTED
M PROTEIN 24H UR ELPH-MRATE: NORMAL MG/(24.H)
M PROTEIN 24H UR ELPH-MRATE: NORMAL MG/(24.H)
M SPIKE % (OHS): ABNORMAL
M SPIKE (OHS): ABNORMAL
MACROCYTES BLD QL SMEAR: ABNORMAL
MAGNESIUM SERPL-MCNC: 0.8 MG/DL (ref 1.6–2.6)
MAGNESIUM SERPL-MCNC: 1.1 MG/DL (ref 1.6–2.6)
MAGNESIUM SERPL-MCNC: 1.2 MG/DL (ref 1.6–2.6)
MAGNESIUM SERPL-MCNC: 1.2 MG/DL (ref 1.6–2.6)
MAGNESIUM SERPL-MCNC: 1.3 MG/DL (ref 1.6–2.6)
MAGNESIUM SERPL-MCNC: 1.4 MG/DL (ref 1.6–2.6)
MAGNESIUM SERPL-MCNC: 1.5 MG/DL (ref 1.6–2.6)
MAGNESIUM SERPL-MCNC: 1.6 MG/DL (ref 1.6–2.6)
MAGNESIUM SERPL-MCNC: 1.6 MG/DL (ref 1.6–2.6)
MAGNESIUM SERPL-MCNC: 1.7 MG/DL (ref 1.6–2.6)
MAGNESIUM SERPL-MCNC: 1.9 MG/DL (ref 1.6–2.6)
MAGNESIUM SERPL-MCNC: 2 MG/DL (ref 1.6–2.6)
MCH RBC QN AUTO: 31.1 PG
MCH RBC QN AUTO: 31.1 PG
MCH RBC QN AUTO: 31.2 PG
MCH RBC QN AUTO: 31.2 PG (ref 27–31)
MCH RBC QN AUTO: 31.6 PG (ref 27–31)
MCH RBC QN AUTO: 31.9 PG
MCH RBC QN AUTO: 32 PG (ref 27–31)
MCH RBC QN AUTO: 32 PG (ref 27–31)
MCH RBC QN AUTO: 32.1 PG (ref 27–31)
MCH RBC QN AUTO: 32.2 PG (ref 27–31)
MCH RBC QN AUTO: 32.4 PG
MCH RBC QN AUTO: 32.6 PG
MCH RBC QN AUTO: 32.9 PG
MCH RBC QN AUTO: 34.2 PG
MCH RBC QN AUTO: 34.5 PG (ref 27–31)
MCH RBC QN AUTO: 34.8 PG (ref 27–31)
MCH RBC QN AUTO: 34.9 PG (ref 27–31)
MCH RBC QN AUTO: 35.2 PG (ref 27–31)
MCH RBC QN AUTO: 35.3 PG (ref 27–31)
MCH RBC QN AUTO: 35.5 PG (ref 27–31)
MCH RBC QN AUTO: 35.6 PG (ref 27–31)
MCH RBC QN AUTO: 36 PG (ref 27–31)
MCH RBC QN AUTO: 36.1 PG (ref 27–31)
MCH RBC QN AUTO: 36.3 PG (ref 27–31)
MCH RBC QN AUTO: 36.4 PG (ref 27–31)
MCH RBC QN AUTO: 37.1 PG (ref 27–31)
MCHC RBC AUTO-ENTMCNC: 31.7 G/DL (ref 33–36)
MCHC RBC AUTO-ENTMCNC: 32.1 MG/DL (ref 33–36)
MCHC RBC AUTO-ENTMCNC: 32.2 MG/DL (ref 33–36)
MCHC RBC AUTO-ENTMCNC: 32.5 G/DL (ref 33–36)
MCHC RBC AUTO-ENTMCNC: 32.5 MG/DL (ref 33–36)
MCHC RBC AUTO-ENTMCNC: 32.9 MG/DL (ref 33–36)
MCHC RBC AUTO-ENTMCNC: 32.9 MG/DL (ref 33–36)
MCHC RBC AUTO-ENTMCNC: 33 G/DL (ref 33–36)
MCHC RBC AUTO-ENTMCNC: 33.1 G/DL (ref 33–36)
MCHC RBC AUTO-ENTMCNC: 33.3 G/DL (ref 33–36)
MCHC RBC AUTO-ENTMCNC: 33.4 G/DL (ref 33–36)
MCHC RBC AUTO-ENTMCNC: 33.5 G/DL (ref 33–36)
MCHC RBC AUTO-ENTMCNC: 33.7 G/DL (ref 33–36)
MCHC RBC AUTO-ENTMCNC: 33.8 MG/DL (ref 33–36)
MCHC RBC AUTO-ENTMCNC: 34 G/DL (ref 33–36)
MCHC RBC AUTO-ENTMCNC: 34.1 G/DL (ref 33–36)
MCHC RBC AUTO-ENTMCNC: 34.1 G/DL (ref 33–36)
MCHC RBC AUTO-ENTMCNC: 34.2 G/DL (ref 33–36)
MCHC RBC AUTO-ENTMCNC: 34.2 G/DL (ref 33–36)
MCHC RBC AUTO-ENTMCNC: 34.6 G/DL (ref 33–36)
MCHC RBC AUTO-ENTMCNC: 34.6 G/DL (ref 33–36)
MCHC RBC AUTO-ENTMCNC: 34.8 G/DL (ref 33–36)
MCHC RBC AUTO-ENTMCNC: 34.9 G/DL (ref 33–36)
MCHC RBC AUTO-ENTMCNC: 35.2 G/DL (ref 33–36)
MCHC RBC AUTO-ENTMCNC: 35.5 G/DL (ref 33–36)
MCV RBC AUTO: 100.6 FL (ref 80–94)
MCV RBC AUTO: 100.8 FL (ref 80–94)
MCV RBC AUTO: 101.1 FL (ref 80–94)
MCV RBC AUTO: 102.1 FL (ref 80–94)
MCV RBC AUTO: 102.8 FL (ref 80–94)
MCV RBC AUTO: 103.4 FL (ref 80–94)
MCV RBC AUTO: 103.6 FL (ref 80–94)
MCV RBC AUTO: 103.7 FL (ref 80–94)
MCV RBC AUTO: 103.9 FL (ref 80–94)
MCV RBC AUTO: 104.1 FL (ref 80–94)
MCV RBC AUTO: 104.4 FL (ref 80–94)
MCV RBC AUTO: 105 FL (ref 80–94)
MCV RBC AUTO: 105.1 FL (ref 80–94)
MCV RBC AUTO: 105.6 FL (ref 80–94)
MCV RBC AUTO: 107.2 FL (ref 80–94)
MCV RBC AUTO: 107.5 FL (ref 80–94)
MCV RBC AUTO: 107.7 FL (ref 80–94)
MCV RBC AUTO: 91.4 FL (ref 80–94)
MCV RBC AUTO: 93.9 FL (ref 80–94)
MCV RBC AUTO: 94.2 FL (ref 80–94)
MCV RBC AUTO: 94.2 FL (ref 80–94)
MCV RBC AUTO: 94.4 FL (ref 80–94)
MCV RBC AUTO: 94.6 FL (ref 80–94)
MCV RBC AUTO: 96.1 FL (ref 80–94)
MCV RBC AUTO: 96.1 FL (ref 80–94)
MCV RBC AUTO: 96.6 FL (ref 80–94)
MCV RBC AUTO: 97 FL (ref 80–94)
MCV RBC AUTO: 97.3 FL (ref 80–94)
MICROCYTES BLD QL SMEAR: SLIGHT
MONOCYTES # BLD AUTO: 0.27 X10(3)/MCL (ref 0.1–1.3)
MONOCYTES # BLD AUTO: 0.28 X10(3)/MCL (ref 0.1–1.3)
MONOCYTES # BLD AUTO: 0.43 X10(3)/MCL (ref 0.1–1.3)
MONOCYTES # BLD AUTO: 0.5 X10(3)/MCL (ref 0.1–1.3)
MONOCYTES # BLD AUTO: 0.5 X10(3)/MCL (ref 0.1–1.3)
MONOCYTES # BLD AUTO: 0.51 X10(3)/MCL (ref 0.1–1.3)
MONOCYTES # BLD AUTO: 0.54 X10(3)/MCL (ref 0.1–1.3)
MONOCYTES # BLD AUTO: 0.57 X10(3)/MCL (ref 0.1–1.3)
MONOCYTES # BLD AUTO: 0.6 X10(3)/MCL (ref 0.1–1.3)
MONOCYTES # BLD AUTO: 0.62 X10(3)/MCL (ref 0.1–1.3)
MONOCYTES # BLD AUTO: 0.63 X10(3)/MCL (ref 0.1–1.3)
MONOCYTES # BLD AUTO: 0.64 X10(3)/MCL (ref 0.1–1.3)
MONOCYTES # BLD AUTO: 0.66 X10(3)/MCL (ref 0.1–1.3)
MONOCYTES # BLD AUTO: 0.67 X10(3)/MCL (ref 0.1–1.3)
MONOCYTES # BLD AUTO: 0.7 X10(3)/MCL (ref 0.1–1.3)
MONOCYTES # BLD AUTO: 0.7 X10(3)/MCL (ref 0.1–1.3)
MONOCYTES # BLD AUTO: 0.71 X10(3)/MCL (ref 0.1–1.3)
MONOCYTES # BLD AUTO: 0.76 X10(3)/MCL (ref 0.1–1.3)
MONOCYTES # BLD AUTO: 0.76 X10(3)/MCL (ref 0.1–1.3)
MONOCYTES # BLD AUTO: 0.91 X10(3)/MCL (ref 0.1–1.3)
MONOCYTES # BLD AUTO: 0.98 X10(3)/MCL (ref 0.1–1.3)
MONOCYTES # BLD AUTO: 1.17 X10(3)/MCL (ref 0.1–1.3)
MONOCYTES # BLD AUTO: 1.24 X10(3)/MCL (ref 0.1–1.3)
MONOCYTES # BLD AUTO: 1.64 X10(3)/MCL (ref 0.1–1.3)
MONOCYTES NFR BLD AUTO: 1.2 %
MONOCYTES NFR BLD AUTO: 1.4 %
MONOCYTES NFR BLD AUTO: 10 %
MONOCYTES NFR BLD AUTO: 10.2 %
MONOCYTES NFR BLD AUTO: 10.4 %
MONOCYTES NFR BLD AUTO: 10.5 %
MONOCYTES NFR BLD AUTO: 10.6 %
MONOCYTES NFR BLD AUTO: 11.7 %
MONOCYTES NFR BLD AUTO: 11.9 %
MONOCYTES NFR BLD AUTO: 12.1 %
MONOCYTES NFR BLD AUTO: 12.2 %
MONOCYTES NFR BLD AUTO: 12.7 %
MONOCYTES NFR BLD AUTO: 13.1 %
MONOCYTES NFR BLD AUTO: 13.4 %
MONOCYTES NFR BLD AUTO: 13.4 %
MONOCYTES NFR BLD AUTO: 13.6 %
MONOCYTES NFR BLD AUTO: 13.9 %
MONOCYTES NFR BLD AUTO: 14.1 %
MONOCYTES NFR BLD AUTO: 16.6 %
MONOCYTES NFR BLD AUTO: 17.2 %
MONOCYTES NFR BLD AUTO: 19.8 %
MONOCYTES NFR BLD AUTO: 22.4 %
MONOCYTES NFR BLD AUTO: 8.8 %
MONOCYTES NFR BLD AUTO: 9.2 %
MONOCYTES NFR BLD AUTO: 9.3 %
MONOCYTES NFR BLD AUTO: 9.8 %
MONOCYTES NFR BLD MANUAL: 0.12 X10(3)/MCL (ref 0.1–1.3)
MONOCYTES NFR BLD MANUAL: 0.19 X10(3)/MCL (ref 0.1–1.3)
MONOCYTES NFR BLD MANUAL: 1 % (ref 2–11)
MONOCYTES NFR BLD MANUAL: 10 % (ref 2–11)
MRSA PCR SCRN (OHS): NOT DETECTED
MUCOUS THREADS URNS QL MICRO: ABNORMAL /LPF
MYCOBACTERIUM SPEC QL CULT: NORMAL
NEUTROPHILS # BLD AUTO: 1.54 X10(3)/MCL (ref 2.1–9.2)
NEUTROPHILS # BLD AUTO: 1.9 X10(3)/MCL (ref 2.1–9.2)
NEUTROPHILS # BLD AUTO: 17.6 X10(3)/MCL (ref 2.1–9.2)
NEUTROPHILS # BLD AUTO: 2.17 X10(3)/MCL (ref 2.1–9.2)
NEUTROPHILS # BLD AUTO: 2.43 X10(3)/MCL (ref 2.1–9.2)
NEUTROPHILS # BLD AUTO: 21.91 X10(3)/MCL (ref 2.1–9.2)
NEUTROPHILS # BLD AUTO: 3.01 X10(3)/MCL (ref 2.1–9.2)
NEUTROPHILS # BLD AUTO: 3.23 X10(3)/MCL (ref 2.1–9.2)
NEUTROPHILS # BLD AUTO: 3.93 X10(3)/MCL (ref 2.1–9.2)
NEUTROPHILS # BLD AUTO: 3.98 X10(3)/MCL (ref 2.1–9.2)
NEUTROPHILS # BLD AUTO: 4.05 X10(3)/MCL (ref 2.1–9.2)
NEUTROPHILS # BLD AUTO: 4.12 X10(3)/MCL (ref 2.1–9.2)
NEUTROPHILS # BLD AUTO: 4.23 X10(3)/MCL (ref 2.1–9.2)
NEUTROPHILS # BLD AUTO: 4.27 X10(3)/MCL (ref 2.1–9.2)
NEUTROPHILS # BLD AUTO: 4.3 X10(3)/MCL (ref 2.1–9.2)
NEUTROPHILS # BLD AUTO: 4.46 X10(3)/MCL (ref 2.1–9.2)
NEUTROPHILS # BLD AUTO: 4.58 X10(3)/MCL (ref 2.1–9.2)
NEUTROPHILS # BLD AUTO: 4.62 X10(3)/MCL (ref 2.1–9.2)
NEUTROPHILS # BLD AUTO: 4.83 X10(3)/MCL (ref 2.1–9.2)
NEUTROPHILS # BLD AUTO: 4.84 X10(3)/MCL (ref 2.1–9.2)
NEUTROPHILS # BLD AUTO: 4.89 X10(3)/MCL (ref 2.1–9.2)
NEUTROPHILS # BLD AUTO: 6.09 X10(3)/MCL (ref 2.1–9.2)
NEUTROPHILS # BLD AUTO: 7.23 X10(3)/MCL (ref 2.1–9.2)
NEUTROPHILS # BLD AUTO: 7.34 X10(3)/MCL (ref 2.1–9.2)
NEUTROPHILS # BLD AUTO: 8.27 X10(3)/MCL (ref 2.1–9.2)
NEUTROPHILS # BLD AUTO: 8.51 X10(3)/MCL (ref 2.1–9.2)
NEUTROPHILS NFR BLD AUTO: 62.7 %
NEUTROPHILS NFR BLD AUTO: 65.5 %
NEUTROPHILS NFR BLD AUTO: 67.6 %
NEUTROPHILS NFR BLD AUTO: 68.6 %
NEUTROPHILS NFR BLD AUTO: 71 %
NEUTROPHILS NFR BLD AUTO: 72.1 %
NEUTROPHILS NFR BLD AUTO: 72.3 %
NEUTROPHILS NFR BLD AUTO: 73.1 %
NEUTROPHILS NFR BLD AUTO: 73.3 %
NEUTROPHILS NFR BLD AUTO: 73.9 %
NEUTROPHILS NFR BLD AUTO: 74.1 %
NEUTROPHILS NFR BLD AUTO: 74.9 %
NEUTROPHILS NFR BLD AUTO: 75.8 %
NEUTROPHILS NFR BLD AUTO: 76.5 %
NEUTROPHILS NFR BLD AUTO: 76.6 %
NEUTROPHILS NFR BLD AUTO: 77.1 %
NEUTROPHILS NFR BLD AUTO: 79.4 %
NEUTROPHILS NFR BLD AUTO: 79.8 %
NEUTROPHILS NFR BLD AUTO: 79.9 %
NEUTROPHILS NFR BLD AUTO: 82.2 %
NEUTROPHILS NFR BLD AUTO: 82.2 %
NEUTROPHILS NFR BLD AUTO: 83.8 %
NEUTROPHILS NFR BLD AUTO: 85 %
NEUTROPHILS NFR BLD AUTO: 85.1 %
NEUTROPHILS NFR BLD AUTO: 90 %
NEUTROPHILS NFR BLD AUTO: 90.4 %
NEUTROPHILS NFR BLD MANUAL: 85 % (ref 47–80)
NEUTROPHILS NFR BLD MANUAL: 86 % (ref 47–80)
NITRITE UR QL STRIP.AUTO: NEGATIVE
NRBC BLD AUTO-RTO: 0 %
OSMOLALITY SERPL: 275 MOSM/KG (ref 280–300)
OSMOLALITY UR: 396 MOSM/KG (ref 300–1300)
OVALOCYTES (OLG): SLIGHT
PATH REV: NORMAL
PH UR STRIP.AUTO: 6 [PH]
PH UR STRIP.AUTO: 7 [PH]
PHOSPHATE SERPL-MCNC: 0.8 MG/DL (ref 2.3–4.7)
PHOSPHATE SERPL-MCNC: 1 MG/DL (ref 2.3–4.7)
PHOSPHATE SERPL-MCNC: 1.2 MG/DL (ref 2.3–4.7)
PHOSPHATE SERPL-MCNC: 1.3 MG/DL (ref 2.3–4.7)
PHOSPHATE SERPL-MCNC: 1.3 MG/DL (ref 2.3–4.7)
PHOSPHATE SERPL-MCNC: 1.5 MG/DL (ref 2.3–4.7)
PHOSPHATE SERPL-MCNC: 1.6 MG/DL (ref 2.3–4.7)
PHOSPHATE SERPL-MCNC: 1.8 MG/DL (ref 2.3–4.7)
PHOSPHATE SERPL-MCNC: 1.9 MG/DL (ref 2.3–4.7)
PHOSPHATE SERPL-MCNC: 2.1 MG/DL (ref 2.3–4.7)
PHOSPHATE SERPL-MCNC: 2.2 MG/DL (ref 2.3–4.7)
PHOSPHATE SERPL-MCNC: 2.3 MG/DL (ref 2.3–4.7)
PHOSPHATE SERPL-MCNC: 2.3 MG/DL (ref 2.3–4.7)
PHOSPHATE SERPL-MCNC: 2.6 MG/DL (ref 2.3–4.7)
PHOSPHATE SERPL-MCNC: 2.9 MG/DL (ref 2.3–4.7)
PHOSPHATE SERPL-MCNC: 3 MG/DL (ref 2.3–4.7)
PHOSPHATE SERPL-MCNC: 3.9 MG/DL (ref 2.3–4.7)
PLATELET # BLD AUTO: 104 X10(3)/MCL (ref 130–400)
PLATELET # BLD AUTO: 114 X10(3)/MCL (ref 130–400)
PLATELET # BLD AUTO: 119 X10(3)/MCL (ref 130–400)
PLATELET # BLD AUTO: 123 X10(3)/MCL (ref 140–371)
PLATELET # BLD AUTO: 181 X10(3)/MCL (ref 130–400)
PLATELET # BLD AUTO: 208 X10(3)/MCL (ref 130–400)
PLATELET # BLD AUTO: 21 X10(3)/MCL (ref 130–400)
PLATELET # BLD AUTO: 22 X10(3)/MCL (ref 130–400)
PLATELET # BLD AUTO: 26 X10(3)/MCL (ref 130–400)
PLATELET # BLD AUTO: 31 X10(3)/MCL (ref 130–400)
PLATELET # BLD AUTO: 37 X10(3)/MCL (ref 130–400)
PLATELET # BLD AUTO: 40 X10(3)/MCL (ref 130–400)
PLATELET # BLD AUTO: 42 X10(3)/MCL (ref 130–400)
PLATELET # BLD AUTO: 56 X10(3)/MCL (ref 130–400)
PLATELET # BLD AUTO: 59 X10(3)/MCL (ref 130–400)
PLATELET # BLD AUTO: 71 X10(3)/MCL (ref 130–400)
PLATELET # BLD AUTO: 73 X10(3)/MCL (ref 130–400)
PLATELET # BLD AUTO: 73 X10(3)/MCL (ref 130–400)
PLATELET # BLD AUTO: 78 X10(3)/MCL (ref 130–400)
PLATELET # BLD AUTO: 81 X10(3)/MCL (ref 130–400)
PLATELET # BLD AUTO: 82 X10(3)/MCL (ref 130–400)
PLATELET # BLD AUTO: 82 X10(3)/MCL (ref 130–400)
PLATELET # BLD AUTO: 84 X10(3)/MCL (ref 130–400)
PLATELET # BLD AUTO: 87 X10(3)/MCL (ref 130–400)
PLATELET # BLD AUTO: 87 X10(3)/MCL (ref 130–400)
PLATELET # BLD AUTO: 91 X10(3)/MCL (ref 130–400)
PLATELET # BLD AUTO: 95 X10(3)/MCL (ref 130–400)
PLATELET # BLD AUTO: 96 X10(3)/MCL (ref 130–400)
PLATELET # BLD EST: ABNORMAL 10*3/UL
PLATELET # BLD EST: ADEQUATE 10*3/UL
PLATELETS.RETICULATED NFR BLD AUTO: 10.8 % (ref 0.9–11.2)
PLATELETS.RETICULATED NFR BLD AUTO: 13 % (ref 0.9–11.2)
PLATELETS.RETICULATED NFR BLD AUTO: 13.2 % (ref 0.9–11.2)
PLATELETS.RETICULATED NFR BLD AUTO: 14.7 % (ref 0.9–11.2)
PLATELETS.RETICULATED NFR BLD AUTO: 15.4 % (ref 0.9–11.2)
PLATELETS.RETICULATED NFR BLD AUTO: 4.4 % (ref 0.9–11.2)
PLATELETS.RETICULATED NFR BLD AUTO: 4.9 % (ref 0.9–11.2)
PLATELETS.RETICULATED NFR BLD AUTO: 5 % (ref 0.9–11.2)
PLATELETS.RETICULATED NFR BLD AUTO: 5.7 % (ref 0.9–11.2)
PLATELETS.RETICULATED NFR BLD AUTO: 6 % (ref 0.9–11.2)
PLATELETS.RETICULATED NFR BLD AUTO: 6.2 % (ref 0.9–11.2)
PLATELETS.RETICULATED NFR BLD AUTO: 6.2 % (ref 0.9–11.2)
PLATELETS.RETICULATED NFR BLD AUTO: 8.1 % (ref 0.9–11.2)
PLATELETS.RETICULATED NFR BLD AUTO: 8.6 % (ref 0.9–11.2)
PLATELETS.RETICULATED NFR BLD AUTO: 8.9 % (ref 0.9–11.2)
PLATELETS.RETICULATED NFR BLD AUTO: 9.9 % (ref 0.9–11.2)
PMV BLD AUTO: 10.1 FL (ref 7.4–10.4)
PMV BLD AUTO: 10.2 FL (ref 7.4–10.4)
PMV BLD AUTO: 10.2 FL (ref 7.4–10.4)
PMV BLD AUTO: 10.5 FL (ref 7.4–10.4)
PMV BLD AUTO: 10.6 FL (ref 7.4–10.4)
PMV BLD AUTO: 10.7 FL (ref 7.4–10.4)
PMV BLD AUTO: 10.9 FL (ref 7.4–10.4)
PMV BLD AUTO: 11.1 FL (ref 7.4–10.4)
PMV BLD AUTO: 11.2 FL (ref 7.4–10.4)
PMV BLD AUTO: 11.7 FL (ref 7.4–10.4)
PMV BLD AUTO: 11.7 FL (ref 7.4–10.4)
PMV BLD AUTO: 11.9 FL (ref 7.4–10.4)
PMV BLD AUTO: 12.4 FL (ref 7.4–10.4)
PMV BLD AUTO: 12.7 FL (ref 7.4–10.4)
PMV BLD AUTO: 13.2 FL (ref 7.4–10.4)
PMV BLD AUTO: 8.7 FL (ref 7.4–10.4)
PMV BLD AUTO: 9 FL (ref 7.4–10.4)
PMV BLD AUTO: 9.1 FL (ref 7.4–10.4)
PMV BLD AUTO: 9.3 FL (ref 9.4–12.4)
PMV BLD AUTO: 9.5 FL (ref 7.4–10.4)
PMV BLD AUTO: 9.6 FL (ref 7.4–10.4)
PMV BLD AUTO: 9.7 FL (ref 7.4–10.4)
PMV BLD AUTO: ABNORMAL FL
POLYCHROMASIA BLD QL SMEAR: ABNORMAL
POLYCHROMASIA BLD QL SMEAR: SLIGHT
POTASSIUM SERPL-SCNC: 3 MMOL/L (ref 3.5–5.1)
POTASSIUM SERPL-SCNC: 3.2 MMOL/L (ref 3.5–5.1)
POTASSIUM SERPL-SCNC: 3.3 MMOL/L (ref 3.5–5.1)
POTASSIUM SERPL-SCNC: 3.4 MMOL/L (ref 3.5–5.1)
POTASSIUM SERPL-SCNC: 3.4 MMOL/L (ref 3.5–5.1)
POTASSIUM SERPL-SCNC: 3.5 MMOL/L (ref 3.5–5.1)
POTASSIUM SERPL-SCNC: 3.6 MMOL/L (ref 3.5–5.1)
POTASSIUM SERPL-SCNC: 3.7 MMOL/L (ref 3.5–5.1)
POTASSIUM SERPL-SCNC: 3.8 MMOL/L (ref 3.5–5.1)
POTASSIUM SERPL-SCNC: 3.9 MMOL/L (ref 3.5–5.1)
POTASSIUM SERPL-SCNC: 4 MMOL/L (ref 3.5–5.1)
POTASSIUM SERPL-SCNC: 4 MMOL/L (ref 3.5–5.1)
POTASSIUM SERPL-SCNC: 4.1 MMOL/L (ref 3.5–5.1)
POTASSIUM SERPL-SCNC: 4.2 MMOL/L (ref 3.5–5.1)
POTASSIUM SERPL-SCNC: 4.5 MMOL/L (ref 3.5–5.1)
POTASSIUM SERPL-SCNC: 4.5 MMOL/L (ref 3.5–5.1)
POTASSIUM SERPL-SCNC: 4.6 MMOL/L (ref 3.5–5.1)
POTASSIUM SERPL-SCNC: 4.7 MMOL/L (ref 3.5–5.1)
POTASSIUM SERPL-SCNC: 4.7 MMOL/L (ref 3.5–5.1)
POTASSIUM SERPL-SCNC: 5.1 MMOL/L (ref 3.5–5.1)
POTASSIUM SERPL-SCNC: 5.4 MMOL/L (ref 3.5–5.1)
POTASSIUM SERPL-SCNC: 5.6 MMOL/L (ref 3.5–5.1)
PROT 24H UR-MRATE: NORMAL G/(24.H)
PROT PATTERN 24H UR ELPH-IMP: NORMAL
PROT SERPL-MCNC: 5.5 GM/DL (ref 6.4–8.3)
PROT SERPL-MCNC: 5.7 GM/DL (ref 6.4–8.3)
PROT SERPL-MCNC: 5.8 GM/DL (ref 6.4–8.3)
PROT SERPL-MCNC: 5.9 GM/DL (ref 6.4–8.3)
PROT SERPL-MCNC: 6 GM/DL (ref 6.4–8.3)
PROT SERPL-MCNC: 6.1 GM/DL (ref 6.4–8.3)
PROT SERPL-MCNC: 6.2 GM/DL (ref 6.4–8.3)
PROT SERPL-MCNC: 6.2 GM/DL (ref 6.4–8.3)
PROT SERPL-MCNC: 6.3 GM/DL (ref 6.4–8.3)
PROT SERPL-MCNC: 6.4 GM/DL (ref 6.4–8.3)
PROT SERPL-MCNC: 6.4 GM/DL (ref 6.4–8.3)
PROT SERPL-MCNC: 6.5 GM/DL (ref 6.4–8.3)
PROT SERPL-MCNC: 6.6 GM/DL (ref 6.4–8.3)
PROT SERPL-MCNC: 6.7 GM/DL (ref 6.4–8.3)
PROT SERPL-MCNC: 6.8 GM/DL (ref 6.4–8.3)
PROT SERPL-MCNC: 6.9 GM/DL (ref 6.4–8.3)
PROT SERPL-MCNC: 6.9 GM/DL (ref 6.4–8.3)
PROT UR QL STRIP.AUTO: NEGATIVE
PROTHROMBIN TIME: 22.8 SECONDS (ref 11.4–14)
PTH RELATED PROT SERPL-SCNC: 27 PMOL/L
PTH-INTACT SERPL-MCNC: 6 PG/ML (ref 8.7–77)
RBC # BLD AUTO: 2.3 X10(6)/MCL (ref 4.7–6.1)
RBC # BLD AUTO: 2.44 X10(6)/MCL (ref 4.7–6.1)
RBC # BLD AUTO: 2.47 X10(6)/MCL (ref 4.7–6.1)
RBC # BLD AUTO: 2.48 X10(6)/MCL (ref 4.7–6.1)
RBC # BLD AUTO: 2.52 X10(6)/MCL (ref 4.7–6.1)
RBC # BLD AUTO: 2.56 X10(6)/MCL (ref 4.7–6.1)
RBC # BLD AUTO: 2.7 X10(6)/MCL (ref 4.7–6.1)
RBC # BLD AUTO: 2.81 X10(6)/MCL (ref 4.7–6.1)
RBC # BLD AUTO: 2.91 X10(6)/MCL (ref 4.7–6.1)
RBC # BLD AUTO: 2.92 X10(6)/MCL (ref 4.7–6.1)
RBC # BLD AUTO: 2.93 X10(6)/MCL (ref 4.7–6.1)
RBC # BLD AUTO: 3.03 X10(6)/MCL (ref 4.7–6.1)
RBC # BLD AUTO: 3.03 X10(6)/MCL (ref 4.7–6.1)
RBC # BLD AUTO: 3.2 X10(6)/MCL (ref 4.7–6.1)
RBC # BLD AUTO: 3.25 X10(6)/MCL (ref 4.7–6.1)
RBC # BLD AUTO: 3.27 X10(6)/MCL (ref 4.7–6.1)
RBC # BLD AUTO: 3.31 X10(6)/MCL (ref 4.7–6.1)
RBC # BLD AUTO: 3.4 X10(6)/MCL (ref 4.7–6.1)
RBC # BLD AUTO: 3.46 X10(6)/MCL (ref 4.7–6.1)
RBC # BLD AUTO: 3.48 X10(6)/MCL (ref 4.7–6.1)
RBC # BLD AUTO: 3.59 X10(6)/MCL (ref 4.7–6.1)
RBC # BLD AUTO: 3.77 X10(6)/MCL (ref 4.7–6.1)
RBC # BLD AUTO: 3.78 X10(6)/MCL (ref 4.7–6.1)
RBC # BLD AUTO: 3.81 X10(6)/MCL (ref 4.7–6.1)
RBC # BLD AUTO: 3.86 X10(6)/MCL (ref 4.7–6.1)
RBC # BLD AUTO: 3.89 X10(6)/MCL (ref 4.7–6.1)
RBC # BLD AUTO: 3.95 X10(6)/MCL (ref 4.7–6.1)
RBC # BLD AUTO: 4.11 X10(6)/MCL (ref 4.7–6.1)
RBC #/AREA URNS AUTO: ABNORMAL /HPF
RBC MORPH BLD: ABNORMAL
RBC UR QL AUTO: NEGATIVE
RET# (OHS): 0.05 X10E6/UL (ref 0.03–0.1)
RETICULOCYTE COUNT AUTOMATED (OLG): 1.79 % (ref 1.1–2.1)
RSV RNA NPH QL NAA+NON-PROBE: NOT DETECTED
RV+EV RNA NPH QL NAA+NON-PROBE: NOT DETECTED
SARS-COV-2 RNA RESP QL NAA+PROBE: NOT DETECTED
SCHISTOCYTE (OLG): SLIGHT
SCHISTOCYTE (OLG): SLIGHT
SODIUM SERPL-SCNC: 121 MMOL/L (ref 136–145)
SODIUM SERPL-SCNC: 124 MMOL/L (ref 136–145)
SODIUM SERPL-SCNC: 125 MMOL/L (ref 136–145)
SODIUM SERPL-SCNC: 127 MMOL/L (ref 136–145)
SODIUM SERPL-SCNC: 128 MMOL/L (ref 136–145)
SODIUM SERPL-SCNC: 129 MMOL/L (ref 136–145)
SODIUM SERPL-SCNC: 130 MMOL/L (ref 136–145)
SODIUM SERPL-SCNC: 131 MMOL/L (ref 136–145)
SODIUM SERPL-SCNC: 131 MMOL/L (ref 136–145)
SODIUM SERPL-SCNC: 132 MMOL/L (ref 136–145)
SODIUM SERPL-SCNC: 133 MMOL/L (ref 136–145)
SODIUM SERPL-SCNC: 134 MMOL/L (ref 136–145)
SODIUM SERPL-SCNC: 135 MMOL/L (ref 136–145)
SODIUM SERPL-SCNC: 136 MMOL/L (ref 136–145)
SODIUM SERPL-SCNC: 137 MMOL/L (ref 136–145)
SODIUM SERPL-SCNC: 138 MMOL/L (ref 136–145)
SODIUM SERPL-SCNC: 138 MMOL/L (ref 136–145)
SODIUM SERPL-SCNC: 139 MMOL/L (ref 136–145)
SODIUM SERPL-SCNC: 139 MMOL/L (ref 136–145)
SODIUM SERPL-SCNC: 140 MMOL/L (ref 136–145)
SODIUM SERPL-SCNC: 140 MMOL/L (ref 136–145)
SODIUM UR-SCNC: 46 MMOL/L
SP GR UR STRIP.AUTO: 1
SP GR UR STRIP.AUTO: 1.01 (ref 1–1.03)
SPECIMEN OUTDATE: NORMAL
SPECIMEN VOL 24H UR: NORMAL L
SPHEROCYTES BLD QL SMEAR: SLIGHT
SQUAMOUS #/AREA URNS LPF: ABNORMAL /HPF
T3RU NFR SERPL: NORMAL %
TIBC SERPL-MCNC: 128 UG/DL (ref 250–450)
TIBC SERPL-MCNC: 96 UG/DL (ref 69–240)
TOTAL VOLUME  (OHS): 3500 ML
TOTAL VOLUME  (OHS): 3500 ML
TRANSFERRIN SERPL-MCNC: 99 MG/DL (ref 174–364)
TROPONIN I SERPL-MCNC: 0.01 NG/ML (ref 0–0.04)
TROPONIN I SERPL-MCNC: <0.01 NG/ML (ref 0–0.04)
TSH SERPL-ACNC: 3.49 UIU/ML (ref 0.35–4.94)
TSH SERPL-ACNC: 4.72 UIU/ML (ref 0.35–4.94)
UNIT NUMBER: NORMAL
UROBILINOGEN UR STRIP-ACNC: NORMAL
VIT B12 SERPL-MCNC: >2000 PG/ML (ref 213–816)
VIT B12 SERPL-MCNC: >2000 PG/ML (ref 213–816)
WBC # SPEC AUTO: 1.9 X10(3)/MCL (ref 4.5–11.5)
WBC # SPEC AUTO: 10.2 X10(3)/MCL (ref 4.5–11.5)
WBC # SPEC AUTO: 11.2 X10(3)/MCL (ref 4.5–11.5)
WBC # SPEC AUTO: 11.6 X10(3)/MCL (ref 4.5–11.5)
WBC # SPEC AUTO: 11.7 X10(3)/MCL (ref 4.5–11.5)
WBC # SPEC AUTO: 19.6 X10(3)/MCL (ref 4.5–11.5)
WBC # SPEC AUTO: 2.28 X10(3)/MCL (ref 4.5–11.5)
WBC # SPEC AUTO: 2.59 X10(3)/MCL (ref 4.5–11.5)
WBC # SPEC AUTO: 2.9 X10(3)/MCL (ref 4.5–11.5)
WBC # SPEC AUTO: 24.2 X10(3)/MCL (ref 4.5–11.5)
WBC # SPEC AUTO: 3.54 X10(3)/MCL (ref 4.5–11.5)
WBC # SPEC AUTO: 4.24 X10(3)/MCL (ref 4.5–11.5)
WBC # SPEC AUTO: 4.93 X10(3)/MCL (ref 4.5–11.5)
WBC # SPEC AUTO: 5.2 X10(3)/MCL (ref 4.5–11.5)
WBC # SPEC AUTO: 5.23 X10(3)/MCL (ref 4.5–11.5)
WBC # SPEC AUTO: 5.29 X10(3)/MCL (ref 4.5–11.5)
WBC # SPEC AUTO: 5.34 X10(3)/MCL (ref 4.5–11.5)
WBC # SPEC AUTO: 5.45 X10(3)/MCL (ref 4.5–11.5)
WBC # SPEC AUTO: 5.52 X10(3)/MCL (ref 4.5–11.5)
WBC # SPEC AUTO: 5.58 X10(3)/MCL (ref 4.5–11.5)
WBC # SPEC AUTO: 5.58 X10(3)/MCL (ref 4.5–11.5)
WBC # SPEC AUTO: 5.69 X10(3)/MCL (ref 4.5–11.5)
WBC # SPEC AUTO: 5.74 X10(3)/MCL (ref 4.5–11.5)
WBC # SPEC AUTO: 5.95 X10(3)/MCL (ref 4.5–11.5)
WBC # SPEC AUTO: 6.08 X10(3)/MCL (ref 4.5–11.5)
WBC # SPEC AUTO: 6.8 X10(3)/MCL (ref 4.5–11.5)
WBC # SPEC AUTO: 7.17 X10(3)/MCL (ref 4.5–11.5)
WBC # SPEC AUTO: 9.8 X10(3)/MCL (ref 4.5–11.5)
WBC #/AREA URNS AUTO: ABNORMAL /HPF

## 2023-01-01 PROCEDURE — 80053 COMPREHEN METABOLIC PANEL: CPT | Performed by: STUDENT IN AN ORGANIZED HEALTH CARE EDUCATION/TRAINING PROGRAM

## 2023-01-01 PROCEDURE — 36415 COLL VENOUS BLD VENIPUNCTURE: CPT

## 2023-01-01 PROCEDURE — 99213 OFFICE O/P EST LOW 20 MIN: CPT | Mod: PBBFAC

## 2023-01-01 PROCEDURE — 3008F BODY MASS INDEX DOCD: CPT | Mod: CPTII,S$GLB,, | Performed by: INTERNAL MEDICINE

## 2023-01-01 PROCEDURE — 80053 COMPREHEN METABOLIC PANEL: CPT

## 2023-01-01 PROCEDURE — 27201423 OPTIME MED/SURG SUP & DEVICES STERILE SUPPLY: Performed by: INTERNAL MEDICINE

## 2023-01-01 PROCEDURE — 25500020 PHARM REV CODE 255: Performed by: INTERNAL MEDICINE

## 2023-01-01 PROCEDURE — 25000003 PHARM REV CODE 250: Performed by: INTERNAL MEDICINE

## 2023-01-01 PROCEDURE — 25000003 PHARM REV CODE 250: Performed by: STUDENT IN AN ORGANIZED HEALTH CARE EDUCATION/TRAINING PROGRAM

## 2023-01-01 PROCEDURE — 94761 N-INVAS EAR/PLS OXIMETRY MLT: CPT

## 2023-01-01 PROCEDURE — 87040 BLOOD CULTURE FOR BACTERIA: CPT | Performed by: INTERNAL MEDICINE

## 2023-01-01 PROCEDURE — 77386 HC IMRT, COMPLEX: CPT | Performed by: RADIOLOGY

## 2023-01-01 PROCEDURE — D9220A PRA ANESTHESIA: Mod: ANES,,, | Performed by: ANESTHESIOLOGY

## 2023-01-01 PROCEDURE — 96413 CHEMO IV INFUSION 1 HR: CPT

## 2023-01-01 PROCEDURE — S0179 MEGESTROL 20 MG: HCPCS

## 2023-01-01 PROCEDURE — 63600175 PHARM REV CODE 636 W HCPCS: Performed by: STUDENT IN AN ORGANIZED HEALTH CARE EDUCATION/TRAINING PROGRAM

## 2023-01-01 PROCEDURE — 1159F PR MEDICATION LIST DOCUMENTED IN MEDICAL RECORD: ICD-10-PCS | Mod: CPTII,S$GLB,, | Performed by: INTERNAL MEDICINE

## 2023-01-01 PROCEDURE — 99213 OFFICE O/P EST LOW 20 MIN: CPT | Mod: PBBFAC,25 | Performed by: STUDENT IN AN ORGANIZED HEALTH CARE EDUCATION/TRAINING PROGRAM

## 2023-01-01 PROCEDURE — 3078F DIAST BP <80 MM HG: CPT | Mod: CPTII,S$GLB,, | Performed by: INTERNAL MEDICINE

## 2023-01-01 PROCEDURE — 73502 X-RAY EXAM HIP UNI 2-3 VIEWS: CPT | Mod: RT,,, | Performed by: ORTHOPAEDIC SURGERY

## 2023-01-01 PROCEDURE — 63600175 PHARM REV CODE 636 W HCPCS: Performed by: EMERGENCY MEDICINE

## 2023-01-01 PROCEDURE — 99215 OFFICE O/P EST HI 40 MIN: CPT | Mod: S$GLB,,, | Performed by: INTERNAL MEDICINE

## 2023-01-01 PROCEDURE — 94640 AIRWAY INHALATION TREATMENT: CPT

## 2023-01-01 PROCEDURE — 63600175 PHARM REV CODE 636 W HCPCS

## 2023-01-01 PROCEDURE — 99215 PR OFFICE/OUTPT VISIT, EST, LEVL V, 40-54 MIN: ICD-10-PCS | Mod: S$GLB,,, | Performed by: INTERNAL MEDICINE

## 2023-01-01 PROCEDURE — 85610 PROTHROMBIN TIME: CPT | Performed by: STUDENT IN AN ORGANIZED HEALTH CARE EDUCATION/TRAINING PROGRAM

## 2023-01-01 PROCEDURE — 1160F PR REVIEW ALL MEDS BY PRESCRIBER/CLIN PHARMACIST DOCUMENTED: ICD-10-PCS | Mod: CPTII,S$GLB,, | Performed by: INTERNAL MEDICINE

## 2023-01-01 PROCEDURE — S5010 5% DEXTROSE AND 0.45% SALINE: HCPCS | Performed by: STUDENT IN AN ORGANIZED HEALTH CARE EDUCATION/TRAINING PROGRAM

## 2023-01-01 PROCEDURE — D9220A PRA ANESTHESIA: ICD-10-PCS | Mod: CRNA,,, | Performed by: NURSE ANESTHETIST, CERTIFIED REGISTERED

## 2023-01-01 PROCEDURE — 1111F DSCHRG MED/CURRENT MED MERGE: CPT | Mod: CPTII,,, | Performed by: INTERNAL MEDICINE

## 2023-01-01 PROCEDURE — 99999 PR PBB SHADOW E&M-EST. PATIENT-LVL IV: CPT | Mod: PBBFAC,,, | Performed by: INTERNAL MEDICINE

## 2023-01-01 PROCEDURE — 36410 VNPNXR 3YR/> PHY/QHP DX/THER: CPT

## 2023-01-01 PROCEDURE — 82306 VITAMIN D 25 HYDROXY: CPT

## 2023-01-01 PROCEDURE — 85025 COMPLETE CBC W/AUTO DIFF WBC: CPT

## 2023-01-01 PROCEDURE — 83735 ASSAY OF MAGNESIUM: CPT

## 2023-01-01 PROCEDURE — 99999 PR PBB SHADOW E&M-EST. PATIENT-LVL IV: ICD-10-PCS | Mod: PBBFAC,,, | Performed by: INTERNAL MEDICINE

## 2023-01-01 PROCEDURE — A4216 STERILE WATER/SALINE, 10 ML: HCPCS | Performed by: INTERNAL MEDICINE

## 2023-01-01 PROCEDURE — 1159F MED LIST DOCD IN RCRD: CPT | Mod: CPTII,,, | Performed by: SURGERY

## 2023-01-01 PROCEDURE — 99999 PR PBB SHADOW E&M-EST. PATIENT-LVL IV: CPT | Mod: PBBFAC,,, | Performed by: NURSE PRACTITIONER

## 2023-01-01 PROCEDURE — 25000003 PHARM REV CODE 250

## 2023-01-01 PROCEDURE — 63600175 PHARM REV CODE 636 W HCPCS: Performed by: SURGERY

## 2023-01-01 PROCEDURE — 99214 OFFICE O/P EST MOD 30 MIN: CPT | Mod: S$PBB,GC,, | Performed by: HOSPITALIST

## 2023-01-01 PROCEDURE — 25000242 PHARM REV CODE 250 ALT 637 W/ HCPCS: Performed by: STUDENT IN AN ORGANIZED HEALTH CARE EDUCATION/TRAINING PROGRAM

## 2023-01-01 PROCEDURE — 3008F BODY MASS INDEX DOCD: CPT | Mod: CPTII,,, | Performed by: HOSPITALIST

## 2023-01-01 PROCEDURE — 99999 PR PBB SHADOW E&M-EST. PATIENT-LVL V: CPT | Mod: PBBFAC,,, | Performed by: INTERNAL MEDICINE

## 2023-01-01 PROCEDURE — 1159F MED LIST DOCD IN RCRD: CPT | Mod: CPTII,S$GLB,, | Performed by: INTERNAL MEDICINE

## 2023-01-01 PROCEDURE — 11000001 HC ACUTE MED/SURG PRIVATE ROOM

## 2023-01-01 PROCEDURE — 96360 HYDRATION IV INFUSION INIT: CPT

## 2023-01-01 PROCEDURE — 87633 RESP VIRUS 12-25 TARGETS: CPT

## 2023-01-01 PROCEDURE — 96375 TX/PRO/DX INJ NEW DRUG ADDON: CPT

## 2023-01-01 PROCEDURE — 21400001 HC TELEMETRY ROOM

## 2023-01-01 PROCEDURE — 93005 ELECTROCARDIOGRAM TRACING: CPT

## 2023-01-01 PROCEDURE — S5010 5% DEXTROSE AND 0.45% SALINE: HCPCS

## 2023-01-01 PROCEDURE — C1751 CATH, INF, PER/CENT/MIDLINE: HCPCS

## 2023-01-01 PROCEDURE — 3078F PR MOST RECENT DIASTOLIC BLOOD PRESSURE < 80 MM HG: ICD-10-PCS | Mod: CPTII,S$GLB,, | Performed by: INTERNAL MEDICINE

## 2023-01-01 PROCEDURE — 3078F PR MOST RECENT DIASTOLIC BLOOD PRESSURE < 80 MM HG: ICD-10-PCS | Mod: CPTII,,, | Performed by: INTERNAL MEDICINE

## 2023-01-01 PROCEDURE — 1111F DSCHRG MED/CURRENT MED MERGE: CPT | Mod: CPTII,S$GLB,, | Performed by: INTERNAL MEDICINE

## 2023-01-01 PROCEDURE — 71000039 HC RECOVERY, EACH ADD'L HOUR: Performed by: SURGERY

## 2023-01-01 PROCEDURE — 99214 OFFICE O/P EST MOD 30 MIN: CPT | Mod: S$GLB,,, | Performed by: INTERNAL MEDICINE

## 2023-01-01 PROCEDURE — 82746 ASSAY OF FOLIC ACID SERUM: CPT

## 2023-01-01 PROCEDURE — 96417 CHEMO IV INFUS EACH ADDL SEQ: CPT

## 2023-01-01 PROCEDURE — 3079F DIAST BP 80-89 MM HG: CPT | Mod: CPTII,S$GLB,, | Performed by: INTERNAL MEDICINE

## 2023-01-01 PROCEDURE — 25000003 PHARM REV CODE 250: Performed by: EMERGENCY MEDICINE

## 2023-01-01 PROCEDURE — 96367 TX/PROPH/DG ADDL SEQ IV INF: CPT

## 2023-01-01 PROCEDURE — 63600175 PHARM REV CODE 636 W HCPCS: Performed by: ANESTHESIOLOGY

## 2023-01-01 PROCEDURE — 25000003 PHARM REV CODE 250: Performed by: NURSE PRACTITIONER

## 2023-01-01 PROCEDURE — 31575 DIAGNOSTIC LARYNGOSCOPY: CPT | Mod: PBBFAC | Performed by: STUDENT IN AN ORGANIZED HEALTH CARE EDUCATION/TRAINING PROGRAM

## 2023-01-01 PROCEDURE — 3074F PR MOST RECENT SYSTOLIC BLOOD PRESSURE < 130 MM HG: ICD-10-PCS | Mod: CPTII,S$GLB,, | Performed by: INTERNAL MEDICINE

## 2023-01-01 PROCEDURE — 1160F RVW MEDS BY RX/DR IN RCRD: CPT | Mod: CPTII,,, | Performed by: ORTHOPAEDIC SURGERY

## 2023-01-01 PROCEDURE — 86334 IMMUNOFIX E-PHORESIS SERUM: CPT

## 2023-01-01 PROCEDURE — 84165 PROTEIN E-PHORESIS SERUM: CPT

## 2023-01-01 PROCEDURE — 71000033 HC RECOVERY, INTIAL HOUR: Performed by: SURGERY

## 2023-01-01 PROCEDURE — 3008F PR BODY MASS INDEX (BMI) DOCUMENTED: ICD-10-PCS | Mod: CPTII,S$GLB,, | Performed by: INTERNAL MEDICINE

## 2023-01-01 PROCEDURE — 1160F RVW MEDS BY RX/DR IN RCRD: CPT | Mod: CPTII,S$GLB,, | Performed by: INTERNAL MEDICINE

## 2023-01-01 PROCEDURE — 84100 ASSAY OF PHOSPHORUS: CPT | Performed by: STUDENT IN AN ORGANIZED HEALTH CARE EDUCATION/TRAINING PROGRAM

## 2023-01-01 PROCEDURE — 97166 OT EVAL MOD COMPLEX 45 MIN: CPT

## 2023-01-01 PROCEDURE — 25000242 PHARM REV CODE 250 ALT 637 W/ HCPCS

## 2023-01-01 PROCEDURE — 80048 BASIC METABOLIC PNL TOTAL CA: CPT

## 2023-01-01 PROCEDURE — 85025 COMPLETE CBC W/AUTO DIFF WBC: CPT | Performed by: STUDENT IN AN ORGANIZED HEALTH CARE EDUCATION/TRAINING PROGRAM

## 2023-01-01 PROCEDURE — 3074F SYST BP LT 130 MM HG: CPT | Mod: CPTII,S$GLB,, | Performed by: INTERNAL MEDICINE

## 2023-01-01 PROCEDURE — 63600175 PHARM REV CODE 636 W HCPCS: Mod: JG | Performed by: STUDENT IN AN ORGANIZED HEALTH CARE EDUCATION/TRAINING PROGRAM

## 2023-01-01 PROCEDURE — 97110 THERAPEUTIC EXERCISES: CPT

## 2023-01-01 PROCEDURE — 85025 COMPLETE CBC W/AUTO DIFF WBC: CPT | Performed by: EMERGENCY MEDICINE

## 2023-01-01 PROCEDURE — 87641 MR-STAPH DNA AMP PROBE: CPT | Performed by: STUDENT IN AN ORGANIZED HEALTH CARE EDUCATION/TRAINING PROGRAM

## 2023-01-01 PROCEDURE — 84100 ASSAY OF PHOSPHORUS: CPT

## 2023-01-01 PROCEDURE — D9220A PRA ANESTHESIA: Mod: CRNA,,, | Performed by: NURSE ANESTHETIST, CERTIFIED REGISTERED

## 2023-01-01 PROCEDURE — 82040 ASSAY OF SERUM ALBUMIN: CPT | Performed by: INTERNAL MEDICINE

## 2023-01-01 PROCEDURE — 83735 ASSAY OF MAGNESIUM: CPT | Performed by: STUDENT IN AN ORGANIZED HEALTH CARE EDUCATION/TRAINING PROGRAM

## 2023-01-01 PROCEDURE — 82570 ASSAY OF URINE CREATININE: CPT

## 2023-01-01 PROCEDURE — 1159F PR MEDICATION LIST DOCUMENTED IN MEDICAL RECORD: ICD-10-PCS | Mod: CPTII,S$GLB,, | Performed by: NURSE PRACTITIONER

## 2023-01-01 PROCEDURE — 43659 R LAPROSCOPIC PARTIAL GASTRECTOMY (DISTAL, PROXIMAL OR SLEEVE): ICD-10-PCS | Mod: AS,,,

## 2023-01-01 PROCEDURE — 1159F MED LIST DOCD IN RCRD: CPT | Mod: CPTII,S$GLB,, | Performed by: NURSE PRACTITIONER

## 2023-01-01 PROCEDURE — 87040 BLOOD CULTURE FOR BACTERIA: CPT | Performed by: STUDENT IN AN ORGANIZED HEALTH CARE EDUCATION/TRAINING PROGRAM

## 2023-01-01 PROCEDURE — 96361 HYDRATE IV INFUSION ADD-ON: CPT

## 2023-01-01 PROCEDURE — D9220A PRA ANESTHESIA: ICD-10-PCS | Mod: ANES,,, | Performed by: ANESTHESIOLOGY

## 2023-01-01 PROCEDURE — 99900035 HC TECH TIME PER 15 MIN (STAT)

## 2023-01-01 PROCEDURE — 83605 ASSAY OF LACTIC ACID: CPT | Performed by: STUDENT IN AN ORGANIZED HEALTH CARE EDUCATION/TRAINING PROGRAM

## 2023-01-01 PROCEDURE — 1111F DSCHRG MED/CURRENT MED MERGE: CPT | Mod: CPTII,,, | Performed by: ORTHOPAEDIC SURGERY

## 2023-01-01 PROCEDURE — 99204 OFFICE O/P NEW MOD 45 MIN: CPT | Mod: ,,, | Performed by: SURGERY

## 2023-01-01 PROCEDURE — 99213 PR OFFICE/OUTPT VISIT, EST, LEVL III, 20-29 MIN: ICD-10-PCS | Mod: ,,, | Performed by: ORTHOPAEDIC SURGERY

## 2023-01-01 PROCEDURE — 92511 NASOPHARYNGOSCOPY: CPT | Mod: PBBFAC | Performed by: STUDENT IN AN ORGANIZED HEALTH CARE EDUCATION/TRAINING PROGRAM

## 2023-01-01 PROCEDURE — 81001 URINALYSIS AUTO W/SCOPE: CPT | Performed by: EMERGENCY MEDICINE

## 2023-01-01 PROCEDURE — 3074F SYST BP LT 130 MM HG: CPT | Mod: CPTII,,, | Performed by: ORTHOPAEDIC SURGERY

## 2023-01-01 PROCEDURE — 3078F DIAST BP <80 MM HG: CPT | Mod: CPTII,,, | Performed by: ORTHOPAEDIC SURGERY

## 2023-01-01 PROCEDURE — 77338 DESIGN MLC DEVICE FOR IMRT: CPT | Performed by: RADIOLOGY

## 2023-01-01 PROCEDURE — 96415 CHEMO IV INFUSION ADDL HR: CPT

## 2023-01-01 PROCEDURE — A9552 F18 FDG: HCPCS

## 2023-01-01 PROCEDURE — 94760 N-INVAS EAR/PLS OXIMETRY 1: CPT

## 2023-01-01 PROCEDURE — 3008F PR BODY MASS INDEX (BMI) DOCUMENTED: ICD-10-PCS | Mod: CPTII,,, | Performed by: SURGERY

## 2023-01-01 PROCEDURE — 63600175 PHARM REV CODE 636 W HCPCS: Mod: JG

## 2023-01-01 PROCEDURE — 3074F SYST BP LT 130 MM HG: CPT | Mod: CPTII,,, | Performed by: SURGERY

## 2023-01-01 PROCEDURE — 31720 CLEARANCE OF AIRWAYS: CPT

## 2023-01-01 PROCEDURE — 97530 THERAPEUTIC ACTIVITIES: CPT

## 2023-01-01 PROCEDURE — 87798 DETECT AGENT NOS DNA AMP: CPT

## 2023-01-01 PROCEDURE — 81001 URINALYSIS AUTO W/SCOPE: CPT | Performed by: STUDENT IN AN ORGANIZED HEALTH CARE EDUCATION/TRAINING PROGRAM

## 2023-01-01 PROCEDURE — 99213 OFFICE O/P EST LOW 20 MIN: CPT | Mod: PBBFAC,25

## 2023-01-01 PROCEDURE — 97535 SELF CARE MNGMENT TRAINING: CPT

## 2023-01-01 PROCEDURE — 99214 PR OFFICE/OUTPT VISIT, EST, LEVL IV, 30-39 MIN: ICD-10-PCS | Mod: ,,, | Performed by: INTERNAL MEDICINE

## 2023-01-01 PROCEDURE — 99214 OFFICE O/P EST MOD 30 MIN: CPT | Mod: ,,, | Performed by: INTERNAL MEDICINE

## 2023-01-01 PROCEDURE — 63600175 PHARM REV CODE 636 W HCPCS: Performed by: NURSE PRACTITIONER

## 2023-01-01 PROCEDURE — A4216 STERILE WATER/SALINE, 10 ML: HCPCS

## 2023-01-01 PROCEDURE — 93971 EXTREMITY STUDY: CPT | Mod: RT

## 2023-01-01 PROCEDURE — 84443 ASSAY THYROID STIM HORMONE: CPT

## 2023-01-01 PROCEDURE — 83605 ASSAY OF LACTIC ACID: CPT | Performed by: INTERNAL MEDICINE

## 2023-01-01 PROCEDURE — 27100171 HC OXYGEN HIGH FLOW UP TO 24 HOURS

## 2023-01-01 PROCEDURE — 77280 THER RAD SIMULAJ FIELD SMPL: CPT | Performed by: RADIOLOGY

## 2023-01-01 PROCEDURE — 36000708 HC OR TIME LEV III 1ST 15 MIN: Performed by: SURGERY

## 2023-01-01 PROCEDURE — 83970 ASSAY OF PARATHORMONE: CPT | Performed by: STUDENT IN AN ORGANIZED HEALTH CARE EDUCATION/TRAINING PROGRAM

## 2023-01-01 PROCEDURE — 99213 OFFICE O/P EST LOW 20 MIN: CPT | Mod: ,,, | Performed by: ORTHOPAEDIC SURGERY

## 2023-01-01 PROCEDURE — 77300 RADIATION THERAPY DOSE PLAN: CPT | Performed by: RADIOLOGY

## 2023-01-01 PROCEDURE — 3078F PR MOST RECENT DIASTOLIC BLOOD PRESSURE < 80 MM HG: ICD-10-PCS | Mod: CPTII,,, | Performed by: ORTHOPAEDIC SURGERY

## 2023-01-01 PROCEDURE — 3074F PR MOST RECENT SYSTOLIC BLOOD PRESSURE < 130 MM HG: ICD-10-PCS | Mod: CPTII,,, | Performed by: HOSPITALIST

## 2023-01-01 PROCEDURE — 83735 ASSAY OF MAGNESIUM: CPT | Performed by: EMERGENCY MEDICINE

## 2023-01-01 PROCEDURE — 96365 THER/PROPH/DIAG IV INF INIT: CPT

## 2023-01-01 PROCEDURE — 83735 ASSAY OF MAGNESIUM: CPT | Performed by: INTERNAL MEDICINE

## 2023-01-01 PROCEDURE — 3008F BODY MASS INDEX DOCD: CPT | Mod: CPTII,S$GLB,, | Performed by: NURSE PRACTITIONER

## 2023-01-01 PROCEDURE — 76937 US GUIDE VASCULAR ACCESS: CPT

## 2023-01-01 PROCEDURE — 73060 XR HUMERUS 2 VIEW RIGHT: ICD-10-PCS | Mod: RT,,, | Performed by: ORTHOPAEDIC SURGERY

## 2023-01-01 PROCEDURE — 3078F PR MOST RECENT DIASTOLIC BLOOD PRESSURE < 80 MM HG: ICD-10-PCS | Mod: CPTII,S$GLB,, | Performed by: NURSE PRACTITIONER

## 2023-01-01 PROCEDURE — 63600175 PHARM REV CODE 636 W HCPCS: Performed by: INTERNAL MEDICINE

## 2023-01-01 PROCEDURE — 99999 PR PBB SHADOW E&M-EST. PATIENT-LVL III: ICD-10-PCS | Mod: PBBFAC,,, | Performed by: NURSE PRACTITIONER

## 2023-01-01 PROCEDURE — 84300 ASSAY OF URINE SODIUM: CPT

## 2023-01-01 PROCEDURE — 25000003 PHARM REV CODE 250: Performed by: SURGERY

## 2023-01-01 PROCEDURE — 85384 FIBRINOGEN ACTIVITY: CPT

## 2023-01-01 PROCEDURE — 1111F PR DISCHARGE MEDS RECONCILED W/ CURRENT OUTPATIENT MED LIST: ICD-10-PCS | Mod: CPTII,S$GLB,, | Performed by: INTERNAL MEDICINE

## 2023-01-01 PROCEDURE — 82607 VITAMIN B-12: CPT

## 2023-01-01 PROCEDURE — 3008F BODY MASS INDEX DOCD: CPT | Mod: CPTII,,, | Performed by: ORTHOPAEDIC SURGERY

## 2023-01-01 PROCEDURE — 3008F PR BODY MASS INDEX (BMI) DOCUMENTED: ICD-10-PCS | Mod: CPTII,,, | Performed by: ORTHOPAEDIC SURGERY

## 2023-01-01 PROCEDURE — 77336 RADIATION PHYSICS CONSULT: CPT | Performed by: RADIOLOGY

## 2023-01-01 PROCEDURE — 88305 TISSUE EXAM BY PATHOLOGIST: CPT | Performed by: STUDENT IN AN ORGANIZED HEALTH CARE EDUCATION/TRAINING PROGRAM

## 2023-01-01 PROCEDURE — 74230 X-RAY XM SWLNG FUNCJ C+: CPT | Mod: TC

## 2023-01-01 PROCEDURE — 1160F PR REVIEW ALL MEDS BY PRESCRIBER/CLIN PHARMACIST DOCUMENTED: ICD-10-PCS | Mod: CPTII,,, | Performed by: ORTHOPAEDIC SURGERY

## 2023-01-01 PROCEDURE — 97162 PT EVAL MOD COMPLEX 30 MIN: CPT

## 2023-01-01 PROCEDURE — 82397 CHEMILUMINESCENT ASSAY: CPT

## 2023-01-01 PROCEDURE — 63600175 PHARM REV CODE 636 W HCPCS: Mod: JZ,JG | Performed by: INTERNAL MEDICINE

## 2023-01-01 PROCEDURE — 94668 MNPJ CHEST WALL SBSQ: CPT

## 2023-01-01 PROCEDURE — 99214 PR OFFICE/OUTPT VISIT, EST, LEVL IV, 30-39 MIN: ICD-10-PCS | Mod: S$GLB,,, | Performed by: INTERNAL MEDICINE

## 2023-01-01 PROCEDURE — 3008F PR BODY MASS INDEX (BMI) DOCUMENTED: ICD-10-PCS | Mod: CPTII,,, | Performed by: HOSPITALIST

## 2023-01-01 PROCEDURE — 96376 TX/PRO/DX INJ SAME DRUG ADON: CPT

## 2023-01-01 PROCEDURE — 71000016 HC POSTOP RECOV ADDL HR: Performed by: SURGERY

## 2023-01-01 PROCEDURE — 97116 GAIT TRAINING THERAPY: CPT

## 2023-01-01 PROCEDURE — 96372 THER/PROPH/DIAG INJ SC/IM: CPT | Performed by: STUDENT IN AN ORGANIZED HEALTH CARE EDUCATION/TRAINING PROGRAM

## 2023-01-01 PROCEDURE — P9037 PLATE PHERES LEUKOREDU IRRAD: HCPCS

## 2023-01-01 PROCEDURE — G0378 HOSPITAL OBSERVATION PER HR: HCPCS

## 2023-01-01 PROCEDURE — 27201423 OPTIME MED/SURG SUP & DEVICES STERILE SUPPLY: Performed by: SURGERY

## 2023-01-01 PROCEDURE — 85379 FIBRIN DEGRADATION QUANT: CPT

## 2023-01-01 PROCEDURE — 1111F PR DISCHARGE MEDS RECONCILED W/ CURRENT OUTPATIENT MED LIST: ICD-10-PCS | Mod: CPTII,,, | Performed by: INTERNAL MEDICINE

## 2023-01-01 PROCEDURE — 3074F PR MOST RECENT SYSTOLIC BLOOD PRESSURE < 130 MM HG: ICD-10-PCS | Mod: CPTII,,, | Performed by: ORTHOPAEDIC SURGERY

## 2023-01-01 PROCEDURE — A4216 STERILE WATER/SALINE, 10 ML: HCPCS | Performed by: NURSE PRACTITIONER

## 2023-01-01 PROCEDURE — 3078F PR MOST RECENT DIASTOLIC BLOOD PRESSURE < 80 MM HG: ICD-10-PCS | Mod: CPTII,,, | Performed by: HOSPITALIST

## 2023-01-01 PROCEDURE — 63600175 PHARM REV CODE 636 W HCPCS: Mod: JG | Performed by: INTERNAL MEDICINE

## 2023-01-01 PROCEDURE — 99214 OFFICE O/P EST MOD 30 MIN: CPT | Mod: PBBFAC | Performed by: STUDENT IN AN ORGANIZED HEALTH CARE EDUCATION/TRAINING PROGRAM

## 2023-01-01 PROCEDURE — 3074F SYST BP LT 130 MM HG: CPT | Mod: CPTII,,, | Performed by: INTERNAL MEDICINE

## 2023-01-01 PROCEDURE — 3074F SYST BP LT 130 MM HG: CPT | Mod: CPTII,S$GLB,, | Performed by: NURSE PRACTITIONER

## 2023-01-01 PROCEDURE — 82652 VIT D 1 25-DIHYDROXY: CPT

## 2023-01-01 PROCEDURE — 37000008 HC ANESTHESIA 1ST 15 MINUTES: Performed by: SURGERY

## 2023-01-01 PROCEDURE — 99900026 HC AIRWAY MAINTENANCE (STAT)

## 2023-01-01 PROCEDURE — 84484 ASSAY OF TROPONIN QUANT: CPT | Performed by: STUDENT IN AN ORGANIZED HEALTH CARE EDUCATION/TRAINING PROGRAM

## 2023-01-01 PROCEDURE — 43248 EGD GUIDE WIRE INSERTION: CPT | Performed by: INTERNAL MEDICINE

## 2023-01-01 PROCEDURE — 3078F DIAST BP <80 MM HG: CPT | Mod: CPTII,S$GLB,, | Performed by: NURSE PRACTITIONER

## 2023-01-01 PROCEDURE — 1111F PR DISCHARGE MEDS RECONCILED W/ CURRENT OUTPATIENT MED LIST: ICD-10-PCS | Mod: CPTII,,, | Performed by: ORTHOPAEDIC SURGERY

## 2023-01-01 PROCEDURE — 25000242 PHARM REV CODE 250 ALT 637 W/ HCPCS: Performed by: INTERNAL MEDICINE

## 2023-01-01 PROCEDURE — 80053 COMPREHEN METABOLIC PANEL: CPT | Performed by: INTERNAL MEDICINE

## 2023-01-01 PROCEDURE — 1159F PR MEDICATION LIST DOCUMENTED IN MEDICAL RECORD: ICD-10-PCS | Mod: CPTII,,, | Performed by: SURGERY

## 2023-01-01 PROCEDURE — 73060 X-RAY EXAM OF HUMERUS: CPT | Mod: RT,,, | Performed by: ORTHOPAEDIC SURGERY

## 2023-01-01 PROCEDURE — 3074F PR MOST RECENT SYSTOLIC BLOOD PRESSURE < 130 MM HG: ICD-10-PCS | Mod: CPTII,S$GLB,, | Performed by: NURSE PRACTITIONER

## 2023-01-01 PROCEDURE — 96366 THER/PROPH/DIAG IV INF ADDON: CPT

## 2023-01-01 PROCEDURE — 3079F PR MOST RECENT DIASTOLIC BLOOD PRESSURE 80-89 MM HG: ICD-10-PCS | Mod: CPTII,,, | Performed by: SURGERY

## 2023-01-01 PROCEDURE — 3079F PR MOST RECENT DIASTOLIC BLOOD PRESSURE 80-89 MM HG: ICD-10-PCS | Mod: CPTII,S$GLB,, | Performed by: INTERNAL MEDICINE

## 2023-01-01 PROCEDURE — 37000009 HC ANESTHESIA EA ADD 15 MINS: Performed by: INTERNAL MEDICINE

## 2023-01-01 PROCEDURE — 1160F RVW MEDS BY RX/DR IN RCRD: CPT | Mod: CPTII,,, | Performed by: SURGERY

## 2023-01-01 PROCEDURE — 97165 OT EVAL LOW COMPLEX 30 MIN: CPT

## 2023-01-01 PROCEDURE — 71000015 HC POSTOP RECOV 1ST HR: Performed by: SURGERY

## 2023-01-01 PROCEDURE — 83615 LACTATE (LD) (LDH) ENZYME: CPT

## 2023-01-01 PROCEDURE — 82533 TOTAL CORTISOL: CPT | Performed by: INTERNAL MEDICINE

## 2023-01-01 PROCEDURE — 87070 CULTURE OTHR SPECIMN AEROBIC: CPT

## 2023-01-01 PROCEDURE — 96372 THER/PROPH/DIAG INJ SC/IM: CPT | Performed by: EMERGENCY MEDICINE

## 2023-01-01 PROCEDURE — 83690 ASSAY OF LIPASE: CPT | Performed by: STUDENT IN AN ORGANIZED HEALTH CARE EDUCATION/TRAINING PROGRAM

## 2023-01-01 PROCEDURE — 92524 BEHAVRAL QUALIT ANALYS VOICE: CPT

## 2023-01-01 PROCEDURE — 83521 IG LIGHT CHAINS FREE EACH: CPT

## 2023-01-01 PROCEDURE — 78815 PET IMAGE W/CT SKULL-THIGH: CPT | Mod: TC

## 2023-01-01 PROCEDURE — 82436 ASSAY OF URINE CHLORIDE: CPT

## 2023-01-01 PROCEDURE — 3008F PR BODY MASS INDEX (BMI) DOCUMENTED: ICD-10-PCS | Mod: CPTII,S$GLB,, | Performed by: NURSE PRACTITIONER

## 2023-01-01 PROCEDURE — 84484 ASSAY OF TROPONIN QUANT: CPT | Performed by: INTERNAL MEDICINE

## 2023-01-01 PROCEDURE — 99215 PR OFFICE/OUTPT VISIT, EST, LEVL V, 40-54 MIN: ICD-10-PCS | Mod: S$GLB,,, | Performed by: NURSE PRACTITIONER

## 2023-01-01 PROCEDURE — 99215 OFFICE O/P EST HI 40 MIN: CPT | Mod: PBBFAC

## 2023-01-01 PROCEDURE — 96377 APPLICATON ON-BODY INJECTOR: CPT

## 2023-01-01 PROCEDURE — 25000003 PHARM REV CODE 250: Performed by: NURSE ANESTHETIST, CERTIFIED REGISTERED

## 2023-01-01 PROCEDURE — 3008F BODY MASS INDEX DOCD: CPT | Mod: CPTII,,, | Performed by: SURGERY

## 2023-01-01 PROCEDURE — 3078F DIAST BP <80 MM HG: CPT | Mod: CPTII,,, | Performed by: INTERNAL MEDICINE

## 2023-01-01 PROCEDURE — 3074F PR MOST RECENT SYSTOLIC BLOOD PRESSURE < 130 MM HG: ICD-10-PCS | Mod: CPTII,,, | Performed by: SURGERY

## 2023-01-01 PROCEDURE — 1159F MED LIST DOCD IN RCRD: CPT | Mod: CPTII,,, | Performed by: ORTHOPAEDIC SURGERY

## 2023-01-01 PROCEDURE — 96377 APPLICATON ON-BODY INJECTOR: CPT | Mod: 59

## 2023-01-01 PROCEDURE — 83880 ASSAY OF NATRIURETIC PEPTIDE: CPT | Performed by: INTERNAL MEDICINE

## 2023-01-01 PROCEDURE — 1159F PR MEDICATION LIST DOCUMENTED IN MEDICAL RECORD: ICD-10-PCS | Mod: CPTII,,, | Performed by: ORTHOPAEDIC SURGERY

## 2023-01-01 PROCEDURE — 36430 TRANSFUSION BLD/BLD COMPNT: CPT

## 2023-01-01 PROCEDURE — 81001 URINALYSIS AUTO W/SCOPE: CPT | Performed by: INTERNAL MEDICINE

## 2023-01-01 PROCEDURE — 43659 UNLISTED LAPS PX STOMACH: CPT | Mod: AS,,,

## 2023-01-01 PROCEDURE — 85025 COMPLETE CBC W/AUTO DIFF WBC: CPT | Performed by: INTERNAL MEDICINE

## 2023-01-01 PROCEDURE — 85027 COMPLETE CBC AUTOMATED: CPT | Performed by: STUDENT IN AN ORGANIZED HEALTH CARE EDUCATION/TRAINING PROGRAM

## 2023-01-01 PROCEDURE — 86335 IMMUNFIX E-PHORSIS/URINE/CSF: CPT | Performed by: INTERNAL MEDICINE

## 2023-01-01 PROCEDURE — 83550 IRON BINDING TEST: CPT

## 2023-01-01 PROCEDURE — 3074F SYST BP LT 130 MM HG: CPT | Mod: CPTII,,, | Performed by: HOSPITALIST

## 2023-01-01 PROCEDURE — 31576 LARYNGOSCOPY WITH BIOPSY: CPT | Mod: PBBFAC | Performed by: STUDENT IN AN ORGANIZED HEALTH CARE EDUCATION/TRAINING PROGRAM

## 2023-01-01 PROCEDURE — 99999 PR PBB SHADOW E&M-EST. PATIENT-LVL IV: ICD-10-PCS | Mod: PBBFAC,,, | Performed by: NURSE PRACTITIONER

## 2023-01-01 PROCEDURE — 3008F PR BODY MASS INDEX (BMI) DOCUMENTED: ICD-10-PCS | Mod: CPTII,,, | Performed by: INTERNAL MEDICINE

## 2023-01-01 PROCEDURE — 99215 OFFICE O/P EST HI 40 MIN: CPT | Mod: S$GLB,,, | Performed by: NURSE PRACTITIONER

## 2023-01-01 PROCEDURE — 3008F BODY MASS INDEX DOCD: CPT | Mod: CPTII,,, | Performed by: INTERNAL MEDICINE

## 2023-01-01 PROCEDURE — 63600175 PHARM REV CODE 636 W HCPCS: Performed by: NURSE ANESTHETIST, CERTIFIED REGISTERED

## 2023-01-01 PROCEDURE — 36000709 HC OR TIME LEV III EA ADD 15 MIN: Performed by: SURGERY

## 2023-01-01 PROCEDURE — 3074F PR MOST RECENT SYSTOLIC BLOOD PRESSURE < 130 MM HG: ICD-10-PCS | Mod: CPTII,,, | Performed by: INTERNAL MEDICINE

## 2023-01-01 PROCEDURE — 77470 SPECIAL RADIATION TREATMENT: CPT | Performed by: RADIOLOGY

## 2023-01-01 PROCEDURE — 83010 ASSAY OF HAPTOGLOBIN QUANT: CPT

## 2023-01-01 PROCEDURE — 99213 OFFICE O/P EST LOW 20 MIN: CPT | Mod: PBBFAC | Performed by: STUDENT IN AN ORGANIZED HEALTH CARE EDUCATION/TRAINING PROGRAM

## 2023-01-01 PROCEDURE — 83935 ASSAY OF URINE OSMOLALITY: CPT

## 2023-01-01 PROCEDURE — 1160F PR REVIEW ALL MEDS BY PRESCRIBER/CLIN PHARMACIST DOCUMENTED: ICD-10-PCS | Mod: CPTII,,, | Performed by: SURGERY

## 2023-01-01 PROCEDURE — 99204 PR OFFICE/OUTPT VISIT, NEW, LEVL IV, 45-59 MIN: ICD-10-PCS | Mod: ,,, | Performed by: SURGERY

## 2023-01-01 PROCEDURE — 84443 ASSAY THYROID STIM HORMONE: CPT | Performed by: STUDENT IN AN ORGANIZED HEALTH CARE EDUCATION/TRAINING PROGRAM

## 2023-01-01 PROCEDURE — 99285 EMERGENCY DEPT VISIT HI MDM: CPT | Mod: 25

## 2023-01-01 PROCEDURE — 97161 PT EVAL LOW COMPLEX 20 MIN: CPT

## 2023-01-01 PROCEDURE — 99999 PR PBB SHADOW E&M-EST. PATIENT-LVL III: CPT | Mod: PBBFAC,,, | Performed by: NURSE PRACTITIONER

## 2023-01-01 PROCEDURE — 3078F DIAST BP <80 MM HG: CPT | Mod: CPTII,,, | Performed by: HOSPITALIST

## 2023-01-01 PROCEDURE — 77301 RADIOTHERAPY DOSE PLAN IMRT: CPT | Performed by: RADIOLOGY

## 2023-01-01 PROCEDURE — 25000242 PHARM REV CODE 250 ALT 637 W/ HCPCS: Performed by: EMERGENCY MEDICINE

## 2023-01-01 PROCEDURE — 99999 PR PBB SHADOW E&M-EST. PATIENT-LVL V: ICD-10-PCS | Mod: PBBFAC,,, | Performed by: INTERNAL MEDICINE

## 2023-01-01 PROCEDURE — 37000009 HC ANESTHESIA EA ADD 15 MINS: Performed by: SURGERY

## 2023-01-01 PROCEDURE — 82784 ASSAY IGA/IGD/IGG/IGM EACH: CPT

## 2023-01-01 PROCEDURE — 86900 BLOOD TYPING SEROLOGIC ABO: CPT | Performed by: STUDENT IN AN ORGANIZED HEALTH CARE EDUCATION/TRAINING PROGRAM

## 2023-01-01 PROCEDURE — 3079F DIAST BP 80-89 MM HG: CPT | Mod: CPTII,,, | Performed by: SURGERY

## 2023-01-01 PROCEDURE — 83930 ASSAY OF BLOOD OSMOLALITY: CPT

## 2023-01-01 PROCEDURE — 85045 AUTOMATED RETICULOCYTE COUNT: CPT

## 2023-01-01 PROCEDURE — C1769 GUIDE WIRE: HCPCS | Performed by: INTERNAL MEDICINE

## 2023-01-01 PROCEDURE — 96368 THER/DIAG CONCURRENT INF: CPT

## 2023-01-01 PROCEDURE — 37000008 HC ANESTHESIA 1ST 15 MINUTES: Performed by: INTERNAL MEDICINE

## 2023-01-01 PROCEDURE — 82340 ASSAY OF CALCIUM IN URINE: CPT

## 2023-01-01 PROCEDURE — 99214 PR OFFICE/OUTPT VISIT, EST, LEVL IV, 30-39 MIN: ICD-10-PCS | Mod: S$PBB,GC,, | Performed by: HOSPITALIST

## 2023-01-01 PROCEDURE — 82248 BILIRUBIN DIRECT: CPT

## 2023-01-01 PROCEDURE — 92611 MOTION FLUOROSCOPY/SWALLOW: CPT

## 2023-01-01 PROCEDURE — 84100 ASSAY OF PHOSPHORUS: CPT | Performed by: INTERNAL MEDICINE

## 2023-01-01 PROCEDURE — 77334 RADIATION TREATMENT AID(S): CPT | Performed by: RADIOLOGY

## 2023-01-01 PROCEDURE — 73502 XR HIP WITH PELVIS WHEN PERFORMED, 2 OR 3  VIEWS RIGHT: ICD-10-PCS | Mod: RT,,, | Performed by: ORTHOPAEDIC SURGERY

## 2023-01-01 PROCEDURE — 85240 CLOT FACTOR VIII AHG 1 STAGE: CPT

## 2023-01-01 PROCEDURE — 85027 COMPLETE CBC AUTOMATED: CPT

## 2023-01-01 PROCEDURE — 0240U COVID/FLU A&B PCR: CPT | Performed by: STUDENT IN AN ORGANIZED HEALTH CARE EDUCATION/TRAINING PROGRAM

## 2023-01-01 RX ORDER — EPINEPHRINE 0.3 MG/.3ML
0.3 INJECTION SUBCUTANEOUS ONCE AS NEEDED
Status: CANCELLED | OUTPATIENT
Start: 2023-01-01

## 2023-01-01 RX ORDER — LIDOCAINE HYDROCHLORIDE 20 MG/ML
INJECTION, SOLUTION EPIDURAL; INFILTRATION; INTRACAUDAL; PERINEURAL
Status: DISCONTINUED | OUTPATIENT
Start: 2023-01-01 | End: 2023-01-01

## 2023-01-01 RX ORDER — SODIUM CHLORIDE 0.9 % (FLUSH) 0.9 %
10 SYRINGE (ML) INJECTION
Status: CANCELLED | OUTPATIENT
Start: 2023-01-01

## 2023-01-01 RX ORDER — DIPHENHYDRAMINE HYDROCHLORIDE 50 MG/ML
50 INJECTION INTRAMUSCULAR; INTRAVENOUS ONCE AS NEEDED
Status: DISCONTINUED | OUTPATIENT
Start: 2023-01-01 | End: 2023-01-01 | Stop reason: HOSPADM

## 2023-01-01 RX ORDER — ACETAMINOPHEN 10 MG/ML
1000 INJECTION, SOLUTION INTRAVENOUS ONCE
Status: CANCELLED | OUTPATIENT
Start: 2023-01-01 | End: 2023-01-01

## 2023-01-01 RX ORDER — MIDAZOLAM HYDROCHLORIDE 1 MG/ML
2 INJECTION INTRAMUSCULAR; INTRAVENOUS ONCE AS NEEDED
Status: COMPLETED | OUTPATIENT
Start: 2023-01-01 | End: 2023-01-01

## 2023-01-01 RX ORDER — LANOLIN ALCOHOL/MO/W.PET/CERES
400 CREAM (GRAM) TOPICAL ONCE
Status: COMPLETED | OUTPATIENT
Start: 2023-01-01 | End: 2023-01-01

## 2023-01-01 RX ORDER — FAMOTIDINE 10 MG/ML
20 INJECTION INTRAVENOUS
Status: COMPLETED | OUTPATIENT
Start: 2023-01-01 | End: 2023-01-01

## 2023-01-01 RX ORDER — LORAZEPAM 2 MG/ML
1 INJECTION INTRAMUSCULAR ONCE
Status: COMPLETED | OUTPATIENT
Start: 2023-01-01 | End: 2023-01-01

## 2023-01-01 RX ORDER — HEPARIN 100 UNIT/ML
500 SYRINGE INTRAVENOUS
Status: CANCELLED | OUTPATIENT
Start: 2023-10-12

## 2023-01-01 RX ORDER — DIPHENHYDRAMINE HCL 12.5MG/5ML
25 ELIXIR ORAL NIGHTLY
Status: DISCONTINUED | OUTPATIENT
Start: 2023-01-01 | End: 2023-01-01 | Stop reason: HOSPADM

## 2023-01-01 RX ORDER — MIDAZOLAM HYDROCHLORIDE 1 MG/ML
INJECTION INTRAMUSCULAR; INTRAVENOUS
Status: COMPLETED
Start: 2023-01-01 | End: 2023-01-01

## 2023-01-01 RX ORDER — SODIUM CHLORIDE 0.9 % (FLUSH) 0.9 %
10 SYRINGE (ML) INJECTION EVERY 6 HOURS
Status: DISCONTINUED | OUTPATIENT
Start: 2023-01-01 | End: 2023-01-01 | Stop reason: HOSPADM

## 2023-01-01 RX ORDER — MEPERIDINE HYDROCHLORIDE 25 MG/ML
12.5 INJECTION INTRAMUSCULAR; INTRAVENOUS; SUBCUTANEOUS EVERY 10 MIN PRN
Status: COMPLETED | OUTPATIENT
Start: 2023-01-01 | End: 2023-01-01

## 2023-01-01 RX ORDER — DIPHENHYDRAMINE HYDROCHLORIDE 50 MG/ML
50 INJECTION INTRAMUSCULAR; INTRAVENOUS
Status: COMPLETED | OUTPATIENT
Start: 2023-01-01 | End: 2023-01-01

## 2023-01-01 RX ORDER — HEPARIN 100 UNIT/ML
500 SYRINGE INTRAVENOUS
Status: CANCELLED | OUTPATIENT
Start: 2023-01-01

## 2023-01-01 RX ORDER — BUPIVACAINE HYDROCHLORIDE AND EPINEPHRINE 5; 5 MG/ML; UG/ML
INJECTION, SOLUTION EPIDURAL; INTRACAUDAL; PERINEURAL
Status: DISCONTINUED
Start: 2023-01-01 | End: 2023-01-01 | Stop reason: HOSPADM

## 2023-01-01 RX ORDER — HEPARIN 100 UNIT/ML
500 SYRINGE INTRAVENOUS
Status: DISCONTINUED | OUTPATIENT
Start: 2023-01-01 | End: 2023-01-01 | Stop reason: HOSPADM

## 2023-01-01 RX ORDER — SODIUM CHLORIDE FOR INHALATION 3 %
4 VIAL, NEBULIZER (ML) INHALATION EVERY 6 HOURS
Status: DISCONTINUED | OUTPATIENT
Start: 2023-01-01 | End: 2023-01-01 | Stop reason: HOSPADM

## 2023-01-01 RX ORDER — DIPHENHYDRAMINE HYDROCHLORIDE 50 MG/ML
12.5 INJECTION INTRAMUSCULAR; INTRAVENOUS EVERY 6 HOURS PRN
Status: DISCONTINUED | OUTPATIENT
Start: 2023-01-01 | End: 2023-01-01 | Stop reason: HOSPADM

## 2023-01-01 RX ORDER — MAGNESIUM SULFATE 1 G/100ML
1 INJECTION INTRAVENOUS ONCE
Status: COMPLETED | OUTPATIENT
Start: 2023-01-01 | End: 2023-01-01

## 2023-01-01 RX ORDER — SODIUM CHLORIDE 0.9 % (FLUSH) 0.9 %
10 SYRINGE (ML) INJECTION
Status: DISCONTINUED | OUTPATIENT
Start: 2023-01-01 | End: 2023-01-01 | Stop reason: HOSPADM

## 2023-01-01 RX ORDER — POTASSIUM CHLORIDE 7.45 MG/ML
10 INJECTION INTRAVENOUS
Status: DISCONTINUED | OUTPATIENT
Start: 2023-01-01 | End: 2023-01-01

## 2023-01-01 RX ORDER — SODIUM CHLORIDE 9 MG/ML
INJECTION, SOLUTION INTRAVENOUS CONTINUOUS
Status: DISCONTINUED | OUTPATIENT
Start: 2023-01-01 | End: 2023-01-01

## 2023-01-01 RX ORDER — IPRATROPIUM BROMIDE AND ALBUTEROL SULFATE 2.5; .5 MG/3ML; MG/3ML
3 SOLUTION RESPIRATORY (INHALATION)
Status: COMPLETED | OUTPATIENT
Start: 2023-01-01 | End: 2023-01-01

## 2023-01-01 RX ORDER — LEVOFLOXACIN 25 MG/ML
250 SOLUTION ORAL DAILY
COMMUNITY
End: 2023-01-01 | Stop reason: SDUPTHER

## 2023-01-01 RX ORDER — CEFAZOLIN SODIUM 1 G/3ML
2 INJECTION, POWDER, FOR SOLUTION INTRAMUSCULAR; INTRAVENOUS
Status: DISCONTINUED | OUTPATIENT
Start: 2023-01-01 | End: 2023-01-01

## 2023-01-01 RX ORDER — EPINEPHRINE 0.3 MG/.3ML
0.3 INJECTION SUBCUTANEOUS ONCE AS NEEDED
Status: DISCONTINUED | OUTPATIENT
Start: 2023-01-01 | End: 2023-01-01 | Stop reason: HOSPADM

## 2023-01-01 RX ORDER — FAMOTIDINE 10 MG/ML
20 INJECTION INTRAVENOUS
Status: CANCELLED | OUTPATIENT
Start: 2023-01-01

## 2023-01-01 RX ORDER — DIPHENHYDRAMINE HYDROCHLORIDE 50 MG/ML
50 INJECTION INTRAMUSCULAR; INTRAVENOUS
Status: CANCELLED
Start: 2023-01-01

## 2023-01-01 RX ORDER — DIPHENHYDRAMINE HYDROCHLORIDE 50 MG/ML
50 INJECTION INTRAMUSCULAR; INTRAVENOUS ONCE AS NEEDED
Status: CANCELLED | OUTPATIENT
Start: 2023-01-01

## 2023-01-01 RX ORDER — MAGNESIUM SULFATE HEPTAHYDRATE 40 MG/ML
2 INJECTION, SOLUTION INTRAVENOUS ONCE
Status: COMPLETED | OUTPATIENT
Start: 2023-01-01 | End: 2023-01-01

## 2023-01-01 RX ORDER — BUPIVACAINE HYDROCHLORIDE AND EPINEPHRINE 5; 5 MG/ML; UG/ML
INJECTION, SOLUTION EPIDURAL; INTRACAUDAL; PERINEURAL
Status: DISCONTINUED | OUTPATIENT
Start: 2023-01-01 | End: 2023-01-01 | Stop reason: HOSPADM

## 2023-01-01 RX ORDER — FAMOTIDINE 20 MG/1
20 TABLET, FILM COATED ORAL NIGHTLY
Qty: 90 TABLET | Refills: 3 | Status: SHIPPED | OUTPATIENT
Start: 2023-01-01 | End: 2024-08-09

## 2023-01-01 RX ORDER — SODIUM CHLORIDE, SODIUM LACTATE, POTASSIUM CHLORIDE, CALCIUM CHLORIDE 600; 310; 30; 20 MG/100ML; MG/100ML; MG/100ML; MG/100ML
INJECTION, SOLUTION INTRAVENOUS CONTINUOUS
Status: DISCONTINUED | OUTPATIENT
Start: 2023-01-01 | End: 2023-01-01

## 2023-01-01 RX ORDER — NALOXONE HCL 0.4 MG/ML
0.02 VIAL (ML) INJECTION
Status: DISCONTINUED | OUTPATIENT
Start: 2023-01-01 | End: 2023-01-01 | Stop reason: HOSPADM

## 2023-01-01 RX ORDER — PHENYLEPHRINE HYDROCHLORIDE 10 MG/ML
INJECTION INTRAVENOUS
Status: DISCONTINUED | OUTPATIENT
Start: 2023-01-01 | End: 2023-01-01

## 2023-01-01 RX ORDER — IPRATROPIUM BROMIDE AND ALBUTEROL SULFATE 2.5; .5 MG/3ML; MG/3ML
SOLUTION RESPIRATORY (INHALATION)
Status: DISPENSED
Start: 2023-01-01 | End: 2023-01-01

## 2023-01-01 RX ORDER — IPRATROPIUM BROMIDE AND ALBUTEROL SULFATE 2.5; .5 MG/3ML; MG/3ML
3 SOLUTION RESPIRATORY (INHALATION) EVERY 4 HOURS PRN
Status: DISCONTINUED | OUTPATIENT
Start: 2023-01-01 | End: 2023-01-01 | Stop reason: HOSPADM

## 2023-01-01 RX ORDER — LEVOTHYROXINE SODIUM 50 UG/1
50 TABLET ORAL DAILY
Status: DISCONTINUED | OUTPATIENT
Start: 2023-01-01 | End: 2023-01-01 | Stop reason: HOSPADM

## 2023-01-01 RX ORDER — MAGNESIUM SULFATE HEPTAHYDRATE 40 MG/ML
4 INJECTION, SOLUTION INTRAVENOUS ONCE
Status: COMPLETED | OUTPATIENT
Start: 2023-01-01 | End: 2023-01-01

## 2023-01-01 RX ORDER — LEVOFLOXACIN 25 MG/ML
750 SOLUTION ORAL DAILY
Qty: 480 ML | Refills: 0 | Status: CANCELLED | OUTPATIENT
Start: 2023-01-01

## 2023-01-01 RX ORDER — PROCHLORPERAZINE EDISYLATE 5 MG/ML
5 INJECTION INTRAMUSCULAR; INTRAVENOUS EVERY 30 MIN PRN
Status: DISCONTINUED | OUTPATIENT
Start: 2023-01-01 | End: 2023-01-01 | Stop reason: HOSPADM

## 2023-01-01 RX ORDER — METHOCARBAMOL 100 MG/ML
INJECTION, SOLUTION INTRAMUSCULAR; INTRAVENOUS
Status: DISCONTINUED
Start: 2023-01-01 | End: 2023-01-01 | Stop reason: HOSPADM

## 2023-01-01 RX ORDER — MAGNESIUM SULFATE HEPTAHYDRATE 40 MG/ML
4 INJECTION, SOLUTION INTRAVENOUS
Status: COMPLETED | OUTPATIENT
Start: 2023-01-01 | End: 2023-01-01

## 2023-01-01 RX ORDER — CELECOXIB 200 MG/1
200 CAPSULE ORAL ONCE
Status: DISCONTINUED | OUTPATIENT
Start: 2023-01-01 | End: 2023-01-01

## 2023-01-01 RX ORDER — MAGNESIUM SULFATE HEPTAHYDRATE 40 MG/ML
2 INJECTION, SOLUTION INTRAVENOUS ONCE
Status: DISCONTINUED | OUTPATIENT
Start: 2023-01-01 | End: 2023-01-01

## 2023-01-01 RX ORDER — TALC
6 POWDER (GRAM) TOPICAL NIGHTLY PRN
Status: DISCONTINUED | OUTPATIENT
Start: 2023-01-01 | End: 2023-01-01 | Stop reason: HOSPADM

## 2023-01-01 RX ORDER — MEGESTROL ACETATE 40 MG/ML
400 SUSPENSION ORAL DAILY
Status: DISCONTINUED | OUTPATIENT
Start: 2023-01-01 | End: 2023-01-01 | Stop reason: HOSPADM

## 2023-01-01 RX ORDER — FLUCONAZOLE 100 MG/1
100 TABLET ORAL DAILY
Qty: 5 TABLET | Refills: 0 | Status: SHIPPED | OUTPATIENT
Start: 2023-01-01 | End: 2023-01-01

## 2023-01-01 RX ORDER — SERTRALINE HYDROCHLORIDE 25 MG/1
25 TABLET, FILM COATED ORAL DAILY
Status: DISCONTINUED | OUTPATIENT
Start: 2023-01-01 | End: 2023-01-01 | Stop reason: HOSPADM

## 2023-01-01 RX ORDER — HYDROMORPHONE HYDROCHLORIDE 2 MG/ML
0.4 INJECTION, SOLUTION INTRAMUSCULAR; INTRAVENOUS; SUBCUTANEOUS EVERY 5 MIN PRN
Status: DISCONTINUED | OUTPATIENT
Start: 2023-01-01 | End: 2023-01-01 | Stop reason: HOSPADM

## 2023-01-01 RX ORDER — MUPIROCIN 20 MG/G
OINTMENT TOPICAL 2 TIMES DAILY
Status: DISCONTINUED | OUTPATIENT
Start: 2023-01-01 | End: 2023-01-01 | Stop reason: HOSPADM

## 2023-01-01 RX ORDER — HYDROCODONE BITARTRATE AND ACETAMINOPHEN 7.5; 325 MG/15ML; MG/15ML
10 SOLUTION ORAL EVERY 6 HOURS PRN
Qty: 473 ML | Refills: 0 | Status: SHIPPED | OUTPATIENT
Start: 2023-01-01 | End: 2023-10-18

## 2023-01-01 RX ORDER — DIPHENHYDRAMINE HCL 12.5MG/5ML
25 ELIXIR ORAL NIGHTLY
Status: DISCONTINUED | OUTPATIENT
Start: 2023-01-01 | End: 2023-01-01

## 2023-01-01 RX ORDER — ONDANSETRON 2 MG/ML
4 INJECTION INTRAMUSCULAR; INTRAVENOUS EVERY 8 HOURS PRN
Status: DISCONTINUED | OUTPATIENT
Start: 2023-01-01 | End: 2023-01-01 | Stop reason: HOSPADM

## 2023-01-01 RX ORDER — FAMOTIDINE 20 MG/1
20 TABLET, FILM COATED ORAL NIGHTLY
Status: DISCONTINUED | OUTPATIENT
Start: 2023-01-01 | End: 2023-01-01 | Stop reason: HOSPADM

## 2023-01-01 RX ORDER — GLUCAGON 1 MG
1 KIT INJECTION
Status: DISCONTINUED | OUTPATIENT
Start: 2023-01-01 | End: 2023-01-01 | Stop reason: HOSPADM

## 2023-01-01 RX ORDER — LEVOFLOXACIN 750 MG/1
750 TABLET ORAL DAILY
Qty: 7 TABLET | Refills: 0 | Status: ON HOLD | OUTPATIENT
Start: 2023-01-01 | End: 2023-01-01 | Stop reason: HOSPADM

## 2023-01-01 RX ORDER — HYDROCODONE BITARTRATE AND ACETAMINOPHEN 7.5; 325 MG/15ML; MG/15ML
10 SOLUTION ORAL EVERY 6 HOURS PRN
Status: DISCONTINUED | OUTPATIENT
Start: 2023-01-01 | End: 2023-01-01 | Stop reason: HOSPADM

## 2023-01-01 RX ORDER — DIPHENHYDRAMINE HYDROCHLORIDE 50 MG/ML
INJECTION INTRAMUSCULAR; INTRAVENOUS
Status: DISCONTINUED
Start: 2023-01-01 | End: 2023-01-01 | Stop reason: HOSPADM

## 2023-01-01 RX ORDER — MAGNESIUM SULFATE HEPTAHYDRATE 40 MG/ML
2 INJECTION, SOLUTION INTRAVENOUS
Status: COMPLETED | OUTPATIENT
Start: 2023-01-01 | End: 2023-01-01

## 2023-01-01 RX ORDER — DIPHENHYDRAMINE HCL 12.5MG/5ML
25 ELIXIR ORAL NIGHTLY PRN
Status: DISCONTINUED | OUTPATIENT
Start: 2023-01-01 | End: 2023-01-01 | Stop reason: HOSPADM

## 2023-01-01 RX ORDER — DEXAMETHASONE SODIUM PHOSPHATE 4 MG/ML
INJECTION, SOLUTION INTRA-ARTICULAR; INTRALESIONAL; INTRAMUSCULAR; INTRAVENOUS; SOFT TISSUE
Status: DISCONTINUED | OUTPATIENT
Start: 2023-01-01 | End: 2023-01-01

## 2023-01-01 RX ORDER — IPRATROPIUM BROMIDE AND ALBUTEROL SULFATE 2.5; .5 MG/3ML; MG/3ML
3 SOLUTION RESPIRATORY (INHALATION)
Status: DISCONTINUED | OUTPATIENT
Start: 2023-01-01 | End: 2023-01-01 | Stop reason: HOSPADM

## 2023-01-01 RX ORDER — IBUPROFEN 200 MG
24 TABLET ORAL
Status: DISCONTINUED | OUTPATIENT
Start: 2023-01-01 | End: 2023-01-01 | Stop reason: HOSPADM

## 2023-01-01 RX ORDER — SODIUM CHLORIDE FOR INHALATION 3 %
4 VIAL, NEBULIZER (ML) INHALATION
Qty: 100 ML | Refills: 0 | Status: SHIPPED | OUTPATIENT
Start: 2023-01-01

## 2023-01-01 RX ORDER — FONDAPARINUX SODIUM 7.5 MG/.6ML
7.5 INJECTION SUBCUTANEOUS DAILY
Status: DISCONTINUED | OUTPATIENT
Start: 2023-01-01 | End: 2023-01-01 | Stop reason: HOSPADM

## 2023-01-01 RX ORDER — POTASSIUM CHLORIDE 20 MEQ/1
40 TABLET, EXTENDED RELEASE ORAL ONCE
Status: COMPLETED | OUTPATIENT
Start: 2023-01-01 | End: 2023-01-01

## 2023-01-01 RX ORDER — HYDROCODONE BITARTRATE AND ACETAMINOPHEN 5; 325 MG/1; MG/1
1 TABLET ORAL EVERY 6 HOURS PRN
Status: DISCONTINUED | OUTPATIENT
Start: 2023-01-01 | End: 2023-01-01 | Stop reason: HOSPADM

## 2023-01-01 RX ORDER — HYDROCODONE BITARTRATE AND ACETAMINOPHEN 7.5; 325 MG/15ML; MG/15ML
10 SOLUTION ORAL ONCE AS NEEDED
Status: COMPLETED | OUTPATIENT
Start: 2023-01-01 | End: 2023-01-01

## 2023-01-01 RX ORDER — MEGESTROL ACETATE 40 MG/ML
SUSPENSION ORAL
COMMUNITY
Start: 2023-01-01

## 2023-01-01 RX ORDER — LEVOFLOXACIN 25 MG/ML
750 SOLUTION ORAL DAILY
Qty: 480 ML | Refills: 0 | Status: SHIPPED | OUTPATIENT
Start: 2023-01-01 | End: 2023-01-01

## 2023-01-01 RX ORDER — SODIUM CHLORIDE, SODIUM GLUCONATE, SODIUM ACETATE, POTASSIUM CHLORIDE AND MAGNESIUM CHLORIDE 30; 37; 368; 526; 502 MG/100ML; MG/100ML; MG/100ML; MG/100ML; MG/100ML
INJECTION, SOLUTION INTRAVENOUS ONCE
Status: COMPLETED | OUTPATIENT
Start: 2023-01-01 | End: 2023-01-01

## 2023-01-01 RX ORDER — HYDROCODONE BITARTRATE AND ACETAMINOPHEN 5; 325 MG/1; MG/1
1 TABLET ORAL EVERY 6 HOURS PRN
Qty: 20 TABLET | Refills: 0 | Status: SHIPPED | OUTPATIENT
Start: 2023-01-01 | End: 2023-01-01 | Stop reason: HOSPADM

## 2023-01-01 RX ORDER — SODIUM CHLORIDE 1 G/1
3000 TABLET ORAL 3 TIMES DAILY
Status: DISCONTINUED | OUTPATIENT
Start: 2023-01-01 | End: 2023-01-01 | Stop reason: HOSPADM

## 2023-01-01 RX ORDER — DIPHENHYDRAMINE HYDROCHLORIDE 50 MG/ML
12.5 INJECTION INTRAMUSCULAR; INTRAVENOUS EVERY 6 HOURS PRN
Status: DISCONTINUED | OUTPATIENT
Start: 2023-01-01 | End: 2023-01-01

## 2023-01-01 RX ORDER — LORAZEPAM 2 MG/ML
INJECTION INTRAMUSCULAR
Status: DISPENSED
Start: 2023-01-01 | End: 2023-01-01

## 2023-01-01 RX ORDER — PROPOFOL 10 MG/ML
VIAL (ML) INTRAVENOUS
Status: DISCONTINUED | OUTPATIENT
Start: 2023-01-01 | End: 2023-01-01

## 2023-01-01 RX ORDER — BENZONATATE 100 MG/1
100 CAPSULE ORAL 3 TIMES DAILY PRN
Status: DISCONTINUED | OUTPATIENT
Start: 2023-01-01 | End: 2023-01-01 | Stop reason: HOSPADM

## 2023-01-01 RX ORDER — LEVOTHYROXINE SODIUM 50 UG/1
50 TABLET ORAL DAILY
Qty: 30 TABLET | Refills: 11 | Status: SHIPPED | OUTPATIENT
Start: 2023-01-01 | End: 2024-05-14

## 2023-01-01 RX ORDER — LANOLIN ALCOHOL/MO/W.PET/CERES
400 CREAM (GRAM) TOPICAL DAILY
Qty: 5 TABLET | Refills: 0 | Status: SHIPPED | OUTPATIENT
Start: 2023-01-01 | End: 2023-01-01

## 2023-01-01 RX ORDER — SODIUM CHLORIDE 1 G/1
2000 TABLET ORAL 3 TIMES DAILY
Status: DISCONTINUED | OUTPATIENT
Start: 2023-01-01 | End: 2023-01-01

## 2023-01-01 RX ORDER — SODIUM CHLORIDE FOR INHALATION 0.9 %
3 VIAL, NEBULIZER (ML) INHALATION
Qty: 75 ML | Refills: 0 | Status: ON HOLD | OUTPATIENT
Start: 2023-01-01 | End: 2023-01-01 | Stop reason: HOSPADM

## 2023-01-01 RX ORDER — ONDANSETRON HYDROCHLORIDE 2 MG/ML
INJECTION, SOLUTION INTRAMUSCULAR; INTRAVENOUS
Status: DISCONTINUED | OUTPATIENT
Start: 2023-01-01 | End: 2023-01-01

## 2023-01-01 RX ORDER — ONDANSETRON 4 MG/1
8 TABLET, ORALLY DISINTEGRATING ORAL EVERY 6 HOURS PRN
Status: DISCONTINUED | OUTPATIENT
Start: 2023-01-01 | End: 2023-01-01

## 2023-01-01 RX ORDER — POLYETHYLENE GLYCOL 3350 17 G/17G
17 POWDER, FOR SOLUTION ORAL 2 TIMES DAILY PRN
Status: DISCONTINUED | OUTPATIENT
Start: 2023-01-01 | End: 2023-01-01 | Stop reason: HOSPADM

## 2023-01-01 RX ORDER — MIDAZOLAM HYDROCHLORIDE 1 MG/ML
INJECTION INTRAMUSCULAR; INTRAVENOUS
Status: DISCONTINUED
Start: 2023-01-01 | End: 2023-01-01 | Stop reason: HOSPADM

## 2023-01-01 RX ORDER — LANOLIN ALCOHOL/MO/W.PET/CERES
400 CREAM (GRAM) TOPICAL 2 TIMES DAILY
Status: DISCONTINUED | OUTPATIENT
Start: 2023-01-01 | End: 2023-01-01 | Stop reason: HOSPADM

## 2023-01-01 RX ORDER — POLYETHYLENE GLYCOL 3350 17 G/17G
17 POWDER, FOR SOLUTION ORAL DAILY PRN
Status: DISCONTINUED | OUTPATIENT
Start: 2023-01-01 | End: 2023-01-01 | Stop reason: HOSPADM

## 2023-01-01 RX ORDER — HYDROCODONE BITARTRATE AND ACETAMINOPHEN 7.5; 325 MG/15ML; MG/15ML
10 SOLUTION ORAL 4 TIMES DAILY PRN
Qty: 280 ML | Refills: 0 | Status: ON HOLD | OUTPATIENT
Start: 2023-01-01 | End: 2023-01-01 | Stop reason: HOSPADM

## 2023-01-01 RX ORDER — SODIUM BICARBONATE 650 MG/1
650 TABLET ORAL 2 TIMES DAILY
Status: COMPLETED | OUTPATIENT
Start: 2023-01-01 | End: 2023-01-01

## 2023-01-01 RX ORDER — PANTOPRAZOLE SODIUM 20 MG/1
20 TABLET, DELAYED RELEASE ORAL DAILY
Status: DISCONTINUED | OUTPATIENT
Start: 2023-01-01 | End: 2023-01-01 | Stop reason: HOSPADM

## 2023-01-01 RX ORDER — PANTOPRAZOLE SODIUM 20 MG/1
20 TABLET, DELAYED RELEASE ORAL DAILY
Qty: 90 TABLET | Refills: 3 | Status: SHIPPED | OUTPATIENT
Start: 2023-01-01 | End: 2024-08-09

## 2023-01-01 RX ORDER — LIDOCAINE HYDROCHLORIDE 10 MG/ML
1 INJECTION, SOLUTION EPIDURAL; INFILTRATION; INTRACAUDAL; PERINEURAL ONCE
Status: CANCELLED | OUTPATIENT
Start: 2023-01-01 | End: 2023-01-01

## 2023-01-01 RX ORDER — POLYETHYLENE GLYCOL 3350 17 G/17G
17 POWDER, FOR SOLUTION ORAL DAILY PRN
Qty: 30 EACH | Refills: 0 | Status: SHIPPED | OUTPATIENT
Start: 2023-01-01

## 2023-01-01 RX ORDER — CALCITONIN SALMON 200 [USP'U]/ML
4 INJECTION, SOLUTION INTRAMUSCULAR; SUBCUTANEOUS
Status: COMPLETED | OUTPATIENT
Start: 2023-01-01 | End: 2023-01-01

## 2023-01-01 RX ORDER — FAMOTIDINE 20 MG/1
20 TABLET, FILM COATED ORAL DAILY
Status: DISCONTINUED | OUTPATIENT
Start: 2023-01-01 | End: 2023-01-01 | Stop reason: HOSPADM

## 2023-01-01 RX ORDER — SODIUM CHLORIDE 0.9 % (FLUSH) 0.9 %
10 SYRINGE (ML) INJECTION
Status: CANCELLED | OUTPATIENT
Start: 2023-10-12

## 2023-01-01 RX ORDER — SODIUM CHLORIDE 9 MG/ML
INJECTION, SOLUTION INTRAVENOUS
Status: DISCONTINUED | OUTPATIENT
Start: 2023-01-01 | End: 2023-01-01 | Stop reason: HOSPADM

## 2023-01-01 RX ORDER — TALC
6 POWDER (GRAM) TOPICAL NIGHTLY
Status: DISCONTINUED | OUTPATIENT
Start: 2023-01-01 | End: 2023-01-01 | Stop reason: HOSPADM

## 2023-01-01 RX ORDER — LIDOCAINE HYDROCHLORIDE 10 MG/ML
INJECTION, SOLUTION EPIDURAL; INFILTRATION; INTRACAUDAL; PERINEURAL
Status: DISCONTINUED | OUTPATIENT
Start: 2023-01-01 | End: 2023-01-01

## 2023-01-01 RX ORDER — CALCITONIN SALMON 200 [USP'U]/ML
4 INJECTION, SOLUTION INTRAMUSCULAR; SUBCUTANEOUS EVERY 12 HOURS
Status: DISCONTINUED | OUTPATIENT
Start: 2023-01-01 | End: 2023-01-01

## 2023-01-01 RX ORDER — SODIUM CHLORIDE 9 MG/ML
INJECTION, SOLUTION INTRAVENOUS CONTINUOUS
Status: ACTIVE | OUTPATIENT
Start: 2023-01-01 | End: 2023-01-01

## 2023-01-01 RX ORDER — TRAMADOL HYDROCHLORIDE 50 MG/1
50 TABLET ORAL EVERY 4 HOURS PRN
Status: DISCONTINUED | OUTPATIENT
Start: 2023-01-01 | End: 2023-01-01 | Stop reason: HOSPADM

## 2023-01-01 RX ORDER — MIDAZOLAM HYDROCHLORIDE 1 MG/ML
1 INJECTION INTRAMUSCULAR; INTRAVENOUS ONCE
Status: DISCONTINUED | OUTPATIENT
Start: 2023-01-01 | End: 2023-01-01

## 2023-01-01 RX ORDER — IPRATROPIUM BROMIDE AND ALBUTEROL SULFATE 2.5; .5 MG/3ML; MG/3ML
3 SOLUTION RESPIRATORY (INHALATION) EVERY 6 HOURS
Status: DISCONTINUED | OUTPATIENT
Start: 2023-01-01 | End: 2023-01-01

## 2023-01-01 RX ORDER — SODIUM CHLORIDE, SODIUM LACTATE, POTASSIUM CHLORIDE, CALCIUM CHLORIDE 600; 310; 30; 20 MG/100ML; MG/100ML; MG/100ML; MG/100ML
INJECTION, SOLUTION INTRAVENOUS CONTINUOUS
Status: ACTIVE | OUTPATIENT
Start: 2023-01-01 | End: 2023-01-01

## 2023-01-01 RX ORDER — DIPHENHYDRAMINE HYDROCHLORIDE 50 MG/ML
50 INJECTION INTRAMUSCULAR; INTRAVENOUS ONCE AS NEEDED
Status: COMPLETED | OUTPATIENT
Start: 2023-01-01 | End: 2023-01-01

## 2023-01-01 RX ORDER — LEVOFLOXACIN 250 MG/1
TABLET ORAL
Qty: 10 TABLET | Refills: 0 | Status: ON HOLD | OUTPATIENT
Start: 2023-01-01 | End: 2023-01-01 | Stop reason: HOSPADM

## 2023-01-01 RX ORDER — CALCITONIN SALMON 200 [USP'U]/ML
4 INJECTION, SOLUTION INTRAMUSCULAR; SUBCUTANEOUS EVERY 12 HOURS
Status: COMPLETED | OUTPATIENT
Start: 2023-01-01 | End: 2023-01-01

## 2023-01-01 RX ORDER — SEVOFLURANE 250 ML/250ML
LIQUID RESPIRATORY (INHALATION)
Status: DISCONTINUED
Start: 2023-01-01 | End: 2023-01-01 | Stop reason: HOSPADM

## 2023-01-01 RX ORDER — MIDAZOLAM HYDROCHLORIDE 1 MG/ML
INJECTION INTRAMUSCULAR; INTRAVENOUS
Status: DISCONTINUED | OUTPATIENT
Start: 2023-01-01 | End: 2023-01-01

## 2023-01-01 RX ORDER — SODIUM CHLORIDE 9 MG/ML
INJECTION, SOLUTION INTRAVENOUS CONTINUOUS
Status: CANCELLED | OUTPATIENT
Start: 2023-01-01

## 2023-01-01 RX ORDER — CELECOXIB 200 MG/1
200 CAPSULE ORAL ONCE
Status: CANCELLED | OUTPATIENT
Start: 2023-01-01 | End: 2023-01-01

## 2023-01-01 RX ORDER — TRAMADOL HYDROCHLORIDE 50 MG/1
50 TABLET ORAL EVERY 6 HOURS PRN
Status: DISPENSED | OUTPATIENT
Start: 2023-01-01 | End: 2023-01-01

## 2023-01-01 RX ORDER — TOBRAMYCIN INHALATION SOLUTION 300 MG/5ML
300 INHALANT RESPIRATORY (INHALATION) EVERY 12 HOURS
Qty: 70 ML | Refills: 0 | Status: SHIPPED | OUTPATIENT
Start: 2023-01-01 | End: 2023-01-01

## 2023-01-01 RX ORDER — CHOLECALCIFEROL (VITAMIN D3) 25 MCG
1000 TABLET ORAL DAILY
Status: DISCONTINUED | OUTPATIENT
Start: 2023-01-01 | End: 2023-01-01 | Stop reason: HOSPADM

## 2023-01-01 RX ORDER — ONDANSETRON 2 MG/ML
4 INJECTION INTRAMUSCULAR; INTRAVENOUS DAILY PRN
Status: DISCONTINUED | OUTPATIENT
Start: 2023-01-01 | End: 2023-01-01

## 2023-01-01 RX ORDER — IPRATROPIUM BROMIDE AND ALBUTEROL SULFATE 2.5; .5 MG/3ML; MG/3ML
3 SOLUTION RESPIRATORY (INHALATION) EVERY 6 HOURS PRN
Status: DISCONTINUED | OUTPATIENT
Start: 2023-01-01 | End: 2023-01-01

## 2023-01-01 RX ORDER — METRONIDAZOLE 7.5 MG/G
GEL VAGINAL
Status: ON HOLD | COMMUNITY
Start: 2023-01-01 | End: 2023-01-01 | Stop reason: HOSPADM

## 2023-01-01 RX ORDER — SODIUM CHLORIDE FOR INHALATION 3 %
4 VIAL, NEBULIZER (ML) INHALATION
Status: DISCONTINUED | OUTPATIENT
Start: 2023-01-01 | End: 2023-01-01 | Stop reason: HOSPADM

## 2023-01-01 RX ORDER — PROPOFOL 10 MG/ML
VIAL (ML) INTRAVENOUS
Status: COMPLETED
Start: 2023-01-01 | End: 2023-01-01

## 2023-01-01 RX ORDER — LIDOCAINE HYDROCHLORIDE 10 MG/ML
1 INJECTION, SOLUTION EPIDURAL; INFILTRATION; INTRACAUDAL; PERINEURAL ONCE
Status: DISCONTINUED | OUTPATIENT
Start: 2023-01-01 | End: 2023-01-01

## 2023-01-01 RX ORDER — ROCURONIUM BROMIDE 10 MG/ML
INJECTION, SOLUTION INTRAVENOUS
Status: DISCONTINUED | OUTPATIENT
Start: 2023-01-01 | End: 2023-01-01

## 2023-01-01 RX ORDER — IPRATROPIUM BROMIDE AND ALBUTEROL SULFATE 2.5; .5 MG/3ML; MG/3ML
3 SOLUTION RESPIRATORY (INHALATION) EVERY 4 HOURS PRN
Qty: 90 ML | Refills: 11 | Status: SHIPPED | OUTPATIENT
Start: 2023-01-01 | End: 2024-02-07

## 2023-01-01 RX ORDER — FAMOTIDINE 20 MG/1
20 TABLET, FILM COATED ORAL NIGHTLY
Status: DISCONTINUED | OUTPATIENT
Start: 2023-01-01 | End: 2023-01-01

## 2023-01-01 RX ORDER — LIDOCAINE HYDROCHLORIDE 20 MG/ML
INJECTION, SOLUTION EPIDURAL; INFILTRATION; INTRACAUDAL; PERINEURAL
Status: COMPLETED
Start: 2023-01-01 | End: 2023-01-01

## 2023-01-01 RX ORDER — ONDANSETRON 4 MG/1
8 TABLET, ORALLY DISINTEGRATING ORAL EVERY 8 HOURS PRN
Status: DISCONTINUED | OUTPATIENT
Start: 2023-01-01 | End: 2023-01-01 | Stop reason: HOSPADM

## 2023-01-01 RX ORDER — HYDROMORPHONE HYDROCHLORIDE 1 MG/ML
0.5 INJECTION, SOLUTION INTRAMUSCULAR; INTRAVENOUS; SUBCUTANEOUS EVERY 6 HOURS PRN
Status: DISCONTINUED | OUTPATIENT
Start: 2023-01-01 | End: 2023-01-01 | Stop reason: HOSPADM

## 2023-01-01 RX ORDER — HYDROMORPHONE HYDROCHLORIDE 2 MG/ML
0.2 INJECTION, SOLUTION INTRAMUSCULAR; INTRAVENOUS; SUBCUTANEOUS EVERY 5 MIN PRN
Status: DISCONTINUED | OUTPATIENT
Start: 2023-01-01 | End: 2023-01-01

## 2023-01-01 RX ORDER — MAGNESIUM SULFATE HEPTAHYDRATE 40 MG/ML
INJECTION, SOLUTION INTRAVENOUS
Status: DISCONTINUED
Start: 2023-01-01 | End: 2023-01-01 | Stop reason: WASHOUT

## 2023-01-01 RX ORDER — SODIUM CHLORIDE, SODIUM GLUCONATE, SODIUM ACETATE, POTASSIUM CHLORIDE AND MAGNESIUM CHLORIDE 30; 37; 368; 526; 502 MG/100ML; MG/100ML; MG/100ML; MG/100ML; MG/100ML
INJECTION, SOLUTION INTRAVENOUS CONTINUOUS
Status: DISCONTINUED | OUTPATIENT
Start: 2023-01-01 | End: 2023-01-01

## 2023-01-01 RX ORDER — LORAZEPAM 2 MG/ML
0.25 INJECTION INTRAMUSCULAR ONCE AS NEEDED
Status: DISCONTINUED | OUTPATIENT
Start: 2023-01-01 | End: 2023-01-01 | Stop reason: HOSPADM

## 2023-01-01 RX ORDER — MUPIROCIN 20 MG/G
OINTMENT TOPICAL 2 TIMES DAILY
Status: DISPENSED | OUTPATIENT
Start: 2023-01-01 | End: 2023-01-01

## 2023-01-01 RX ORDER — LIDOCAINE HYDROCHLORIDE 40 MG/ML
4 SOLUTION TOPICAL
Status: SHIPPED | OUTPATIENT
Start: 2023-01-01

## 2023-01-01 RX ORDER — DIPHENHYDRAMINE HCL 25 MG
25 CAPSULE ORAL NIGHTLY PRN
Status: DISCONTINUED | OUTPATIENT
Start: 2023-01-01 | End: 2023-01-01

## 2023-01-01 RX ORDER — SERTRALINE HYDROCHLORIDE 25 MG/1
25 TABLET, FILM COATED ORAL DAILY
Qty: 30 TABLET | Refills: 11 | Status: SHIPPED | OUTPATIENT
Start: 2023-01-01 | End: 2024-09-28

## 2023-01-01 RX ORDER — FENTANYL CITRATE 50 UG/ML
INJECTION, SOLUTION INTRAMUSCULAR; INTRAVENOUS
Status: DISCONTINUED | OUTPATIENT
Start: 2023-01-01 | End: 2023-01-01

## 2023-01-01 RX ORDER — LANOLIN ALCOHOL/MO/W.PET/CERES
400 CREAM (GRAM) TOPICAL 2 TIMES DAILY
Qty: 90 TABLET | Refills: 3 | Status: SHIPPED | OUTPATIENT
Start: 2023-01-01

## 2023-01-01 RX ORDER — LEVOFLOXACIN 5 MG/ML
750 INJECTION, SOLUTION INTRAVENOUS
Status: DISCONTINUED | OUTPATIENT
Start: 2023-01-01 | End: 2023-01-01 | Stop reason: HOSPADM

## 2023-01-01 RX ORDER — SODIUM,POTASSIUM PHOSPHATES 280-250MG
1 POWDER IN PACKET (EA) ORAL ONCE
Status: COMPLETED | OUTPATIENT
Start: 2023-01-01 | End: 2023-01-01

## 2023-01-01 RX ORDER — HYDROCODONE BITARTRATE AND ACETAMINOPHEN 500; 5 MG/1; MG/1
TABLET ORAL
Status: DISCONTINUED | OUTPATIENT
Start: 2023-01-01 | End: 2023-01-01 | Stop reason: HOSPADM

## 2023-01-01 RX ORDER — IBUPROFEN 200 MG
16 TABLET ORAL
Status: DISCONTINUED | OUTPATIENT
Start: 2023-01-01 | End: 2023-01-01 | Stop reason: HOSPADM

## 2023-01-01 RX ORDER — PANTOPRAZOLE SODIUM 20 MG/1
20 TABLET, DELAYED RELEASE ORAL DAILY
Status: DISCONTINUED | OUTPATIENT
Start: 2023-01-01 | End: 2023-01-01

## 2023-01-01 RX ORDER — HYDROCODONE BITARTRATE AND ACETAMINOPHEN 5; 325 MG/1; MG/1
1 TABLET ORAL EVERY 8 HOURS PRN
Status: DISCONTINUED | OUTPATIENT
Start: 2023-01-01 | End: 2023-01-01 | Stop reason: HOSPADM

## 2023-01-01 RX ORDER — ACETAMINOPHEN 325 MG/1
650 TABLET ORAL EVERY 8 HOURS PRN
Status: DISCONTINUED | OUTPATIENT
Start: 2023-01-01 | End: 2023-01-01 | Stop reason: HOSPADM

## 2023-01-01 RX ORDER — PANTOPRAZOLE SODIUM 20 MG/1
20 TABLET, DELAYED RELEASE ORAL NIGHTLY
Status: DISCONTINUED | OUTPATIENT
Start: 2023-01-01 | End: 2023-01-01 | Stop reason: HOSPADM

## 2023-01-01 RX ORDER — HYDROCODONE BITARTRATE AND ACETAMINOPHEN 7.5; 325 MG/1; MG/1
1 TABLET ORAL EVERY 6 HOURS PRN
Status: DISCONTINUED | OUTPATIENT
Start: 2023-01-01 | End: 2023-01-01 | Stop reason: HOSPADM

## 2023-01-01 RX ORDER — METHOCARBAMOL 100 MG/ML
1000 INJECTION, SOLUTION INTRAMUSCULAR; INTRAVENOUS ONCE
Status: COMPLETED | OUTPATIENT
Start: 2023-01-01 | End: 2023-01-01

## 2023-01-01 RX ORDER — ENOXAPARIN SODIUM 100 MG/ML
40 INJECTION SUBCUTANEOUS EVERY 24 HOURS
Status: DISCONTINUED | OUTPATIENT
Start: 2023-01-01 | End: 2023-01-01

## 2023-01-01 RX ORDER — ONDANSETRON 2 MG/ML
4 INJECTION INTRAMUSCULAR; INTRAVENOUS ONCE
Status: CANCELLED | OUTPATIENT
Start: 2023-01-01 | End: 2023-01-01

## 2023-01-01 RX ORDER — CALCITONIN SALMON 200 [USP'U]/ML
400 INJECTION, SOLUTION INTRAMUSCULAR; SUBCUTANEOUS 4 TIMES DAILY
Status: DISCONTINUED | OUTPATIENT
Start: 2023-01-01 | End: 2023-01-01

## 2023-01-01 RX ORDER — ACETAMINOPHEN 10 MG/ML
1000 INJECTION, SOLUTION INTRAVENOUS ONCE
Status: COMPLETED | OUTPATIENT
Start: 2023-01-01 | End: 2023-01-01

## 2023-01-01 RX ORDER — IPRATROPIUM BROMIDE AND ALBUTEROL SULFATE 2.5; .5 MG/3ML; MG/3ML
3 SOLUTION RESPIRATORY (INHALATION) EVERY 6 HOURS PRN
Status: DISCONTINUED | OUTPATIENT
Start: 2023-01-01 | End: 2023-01-01 | Stop reason: HOSPADM

## 2023-01-01 RX ORDER — DIPHENHYDRAMINE HCL 12.5MG/5ML
25 ELIXIR ORAL NIGHTLY
Qty: 118 ML | Refills: 1 | Status: CANCELLED | OUTPATIENT
Start: 2023-01-01

## 2023-01-01 RX ORDER — OXYMETAZOLINE HCL 0.05 %
2 SPRAY, NON-AEROSOL (ML) NASAL
Status: DISCONTINUED | OUTPATIENT
Start: 2023-01-01 | End: 2023-01-01 | Stop reason: HOSPADM

## 2023-01-01 RX ORDER — POTASSIUM CHLORIDE 7.45 MG/ML
10 INJECTION INTRAVENOUS
Status: COMPLETED | OUTPATIENT
Start: 2023-01-01 | End: 2023-01-01

## 2023-01-01 RX ORDER — HYDROCODONE BITARTRATE AND ACETAMINOPHEN 7.5; 325 MG/15ML; MG/15ML
15 SOLUTION ORAL EVERY 6 HOURS PRN
Qty: 300 ML | Refills: 0 | Status: ON HOLD | OUTPATIENT
Start: 2023-01-01 | End: 2023-01-01 | Stop reason: HOSPADM

## 2023-01-01 RX ORDER — GUAIFENESIN 600 MG/1
600 TABLET, EXTENDED RELEASE ORAL 2 TIMES DAILY
Status: DISCONTINUED | OUTPATIENT
Start: 2023-01-01 | End: 2023-01-01 | Stop reason: HOSPADM

## 2023-01-01 RX ORDER — LEVOFLOXACIN 750 MG/1
750 TABLET ORAL DAILY
Qty: 6 TABLET | Refills: 0 | Status: SHIPPED | OUTPATIENT
Start: 2023-01-01 | End: 2023-01-01

## 2023-01-01 RX ORDER — SODIUM CHLORIDE 0.9 % (FLUSH) 0.9 %
10 SYRINGE (ML) INJECTION EVERY 12 HOURS PRN
Status: DISCONTINUED | OUTPATIENT
Start: 2023-01-01 | End: 2023-01-01 | Stop reason: HOSPADM

## 2023-01-01 RX ORDER — ONDANSETRON 4 MG/1
4 TABLET, ORALLY DISINTEGRATING ORAL EVERY 8 HOURS PRN
Status: DISCONTINUED | OUTPATIENT
Start: 2023-01-01 | End: 2023-01-01 | Stop reason: HOSPADM

## 2023-01-01 RX ORDER — MEPERIDINE HYDROCHLORIDE 25 MG/ML
50 INJECTION INTRAMUSCULAR; INTRAVENOUS; SUBCUTANEOUS
Status: DISCONTINUED | OUTPATIENT
Start: 2023-01-01 | End: 2023-01-01 | Stop reason: HOSPADM

## 2023-01-01 RX ORDER — DIPHENHYDRAMINE HCL 12.5MG/5ML
25 ELIXIR ORAL NIGHTLY
Qty: 118 ML | Refills: 0 | Status: SHIPPED | OUTPATIENT
Start: 2023-01-01

## 2023-01-01 RX ORDER — ONDANSETRON 2 MG/ML
4 INJECTION INTRAMUSCULAR; INTRAVENOUS EVERY 4 HOURS PRN
Status: DISCONTINUED | OUTPATIENT
Start: 2023-01-01 | End: 2023-01-01 | Stop reason: HOSPADM

## 2023-01-01 RX ADMIN — MUPIROCIN: 20 OINTMENT TOPICAL at 11:09

## 2023-01-01 RX ADMIN — VANCOMYCIN HYDROCHLORIDE 1250 MG: 1.25 INJECTION, POWDER, LYOPHILIZED, FOR SOLUTION INTRAVENOUS at 10:08

## 2023-01-01 RX ADMIN — POTASSIUM PHOSPHATE, MONOBASIC POTASSIUM PHOSPHATE, DIBASIC 30 MMOL: 224; 236 INJECTION, SOLUTION, CONCENTRATE INTRAVENOUS at 10:10

## 2023-01-01 RX ADMIN — SERTRALINE 25 MG: 25 TABLET, FILM COATED ORAL at 09:09

## 2023-01-01 RX ADMIN — MEPERIDINE HYDROCHLORIDE 12.5 MG: 25 INJECTION INTRAMUSCULAR; INTRAVENOUS; SUBCUTANEOUS at 03:05

## 2023-01-01 RX ADMIN — HYDROCODONE BITARTRATE AND ACETAMINOPHEN 10 ML: 7.5; 325 SOLUTION ORAL at 05:09

## 2023-01-01 RX ADMIN — PEGFILGRASTIM 6 MG: KIT SUBCUTANEOUS at 03:03

## 2023-01-01 RX ADMIN — Medication 500 UNITS: at 03:07

## 2023-01-01 RX ADMIN — SODIUM PHOSPHATE, MONOBASIC, MONOHYDRATE AND SODIUM PHOSPHATE, DIBASIC, ANHYDROUS 30 MMOL: 142; 276 INJECTION, SOLUTION INTRAVENOUS at 08:09

## 2023-01-01 RX ADMIN — BENZONATATE 100 MG: 100 CAPSULE ORAL at 09:08

## 2023-01-01 RX ADMIN — HYDROCODONE BITARTRATE AND ACETAMINOPHEN 10 ML: 7.5; 325 SOLUTION ORAL at 06:09

## 2023-01-01 RX ADMIN — HYDROMORPHONE HYDROCHLORIDE 0.4 MG: 2 INJECTION, SOLUTION INTRAMUSCULAR; INTRAVENOUS; SUBCUTANEOUS at 02:05

## 2023-01-01 RX ADMIN — SODIUM CHLORIDE, PRESERVATIVE FREE 10 ML: 5 INJECTION INTRAVENOUS at 12:09

## 2023-01-01 RX ADMIN — DIPHENHYDRAMINE HYDROCHLORIDE 25 MG: 12.5 SOLUTION ORAL at 09:10

## 2023-01-01 RX ADMIN — APIXABAN 5 MG: 2.5 TABLET, FILM COATED ORAL at 08:09

## 2023-01-01 RX ADMIN — SODIUM CHLORIDE, POTASSIUM CHLORIDE, SODIUM LACTATE AND CALCIUM CHLORIDE 1854 ML: 600; 310; 30; 20 INJECTION, SOLUTION INTRAVENOUS at 12:08

## 2023-01-01 RX ADMIN — POTASSIUM CHLORIDE 10 MEQ: 7.46 INJECTION, SOLUTION INTRAVENOUS at 12:08

## 2023-01-01 RX ADMIN — CARBOPLATIN 635 MG: 10 INJECTION INTRAVENOUS at 03:03

## 2023-01-01 RX ADMIN — Medication 10 ML: at 12:09

## 2023-01-01 RX ADMIN — APIXABAN 5 MG: 2.5 TABLET, FILM COATED ORAL at 09:09

## 2023-01-01 RX ADMIN — FONDAPARINUX SODIUM 7.5 MG: 7.5 INJECTION SUBCUTANEOUS at 10:10

## 2023-01-01 RX ADMIN — HYDROMORPHONE HYDROCHLORIDE 0.5 MG: 1 INJECTION, SOLUTION INTRAMUSCULAR; INTRAVENOUS; SUBCUTANEOUS at 08:09

## 2023-01-01 RX ADMIN — SODIUM CHLORIDE 30 MG/ML INHALATION SOLUTION 4 ML: 30 SOLUTION INHALANT at 03:09

## 2023-01-01 RX ADMIN — HYDROCODONE BITARTRATE AND ACETAMINOPHEN 10 ML: 7.5; 325 SOLUTION ORAL at 03:09

## 2023-01-01 RX ADMIN — DIPHENHYDRAMINE HYDROCHLORIDE 25 MG: 12.5 SOLUTION ORAL at 08:09

## 2023-01-01 RX ADMIN — MUPIROCIN: 20 OINTMENT TOPICAL at 08:09

## 2023-01-01 RX ADMIN — FAMOTIDINE 20 MG: 20 TABLET, FILM COATED ORAL at 08:09

## 2023-01-01 RX ADMIN — ACETAMINOPHEN 1000 MG: 10 INJECTION, SOLUTION INTRAVENOUS at 11:05

## 2023-01-01 RX ADMIN — PALONOSETRON 0.25 MG: 0.25 INJECTION, SOLUTION INTRAVENOUS at 10:03

## 2023-01-01 RX ADMIN — SODIUM CHLORIDE 400 MG: 9 INJECTION, SOLUTION INTRAVENOUS at 11:03

## 2023-01-01 RX ADMIN — SODIUM CHLORIDE: 9 INJECTION, SOLUTION INTRAVENOUS at 11:09

## 2023-01-01 RX ADMIN — SODIUM CHLORIDE: 9 INJECTION, SOLUTION INTRAVENOUS at 11:03

## 2023-01-01 RX ADMIN — PACLITAXEL 78 MG: 6 INJECTION, SOLUTION INTRAVENOUS at 01:07

## 2023-01-01 RX ADMIN — POTASSIUM CHLORIDE 10 MEQ: 7.46 INJECTION, SOLUTION INTRAVENOUS at 10:08

## 2023-01-01 RX ADMIN — SERTRALINE 25 MG: 25 TABLET, FILM COATED ORAL at 08:10

## 2023-01-01 RX ADMIN — POTASSIUM & SODIUM PHOSPHATES POWDER PACK 280-160-250 MG 1 PACKET: 280-160-250 PACK at 10:08

## 2023-01-01 RX ADMIN — SODIUM CHLORIDE: 9 INJECTION, SOLUTION INTRAVENOUS at 03:09

## 2023-01-01 RX ADMIN — MAGNESIUM SULFATE HEPTAHYDRATE 2 G: 40 INJECTION, SOLUTION INTRAVENOUS at 06:09

## 2023-01-01 RX ADMIN — PROPOFOL 20 MG: 10 INJECTION, EMULSION INTRAVENOUS at 01:04

## 2023-01-01 RX ADMIN — IPRATROPIUM BROMIDE AND ALBUTEROL SULFATE 3 ML: 2.5; .5 SOLUTION RESPIRATORY (INHALATION) at 09:09

## 2023-01-01 RX ADMIN — IPRATROPIUM BROMIDE AND ALBUTEROL SULFATE 3 ML: 2.5; .5 SOLUTION RESPIRATORY (INHALATION) at 07:09

## 2023-01-01 RX ADMIN — FONDAPARINUX SODIUM 7.5 MG: 7.5 INJECTION SUBCUTANEOUS at 08:10

## 2023-01-01 RX ADMIN — IPRATROPIUM BROMIDE AND ALBUTEROL SULFATE 3 ML: 2.5; .5 SOLUTION RESPIRATORY (INHALATION) at 03:10

## 2023-01-01 RX ADMIN — POTASSIUM BICARBONATE 25 MEQ: 977.5 TABLET, EFFERVESCENT ORAL at 08:09

## 2023-01-01 RX ADMIN — MUPIROCIN: 20 OINTMENT TOPICAL at 09:08

## 2023-01-01 RX ADMIN — LEVOTHYROXINE SODIUM 50 MCG: 50 TABLET ORAL at 09:09

## 2023-01-01 RX ADMIN — HYDROMORPHONE HYDROCHLORIDE 0.5 MG: 1 INJECTION, SOLUTION INTRAMUSCULAR; INTRAVENOUS; SUBCUTANEOUS at 11:09

## 2023-01-01 RX ADMIN — FAMOTIDINE 20 MG: 10 INJECTION, SOLUTION INTRAVENOUS at 01:07

## 2023-01-01 RX ADMIN — LEVOFLOXACIN 750 MG: 750 INJECTION, SOLUTION INTRAVENOUS at 09:08

## 2023-01-01 RX ADMIN — HYDROMORPHONE HYDROCHLORIDE 0.5 MG: 1 INJECTION, SOLUTION INTRAMUSCULAR; INTRAVENOUS; SUBCUTANEOUS at 07:09

## 2023-01-01 RX ADMIN — SODIUM CHLORIDE 1000 ML: 9 INJECTION, SOLUTION INTRAVENOUS at 10:08

## 2023-01-01 RX ADMIN — LEVOTHYROXINE SODIUM 50 MCG: 0.05 TABLET ORAL at 09:09

## 2023-01-01 RX ADMIN — BENZONATATE 100 MG: 100 CAPSULE ORAL at 10:08

## 2023-01-01 RX ADMIN — Medication 400 MG: at 03:09

## 2023-01-01 RX ADMIN — SODIUM CHLORIDE 30 MG/ML INHALATION SOLUTION 4 ML: 30 SOLUTION INHALANT at 08:08

## 2023-01-01 RX ADMIN — MEGESTROL ACETATE 400 MG: 400 SUSPENSION ORAL at 09:09

## 2023-01-01 RX ADMIN — SODIUM CHLORIDE, PRESERVATIVE FREE 10 ML: 5 INJECTION INTRAVENOUS at 05:09

## 2023-01-01 RX ADMIN — IPRATROPIUM BROMIDE AND ALBUTEROL SULFATE 3 ML: .5; 3 SOLUTION RESPIRATORY (INHALATION) at 01:08

## 2023-01-01 RX ADMIN — PACLITAXEL 366 MG: 6 INJECTION, SOLUTION INTRAVENOUS at 10:01

## 2023-01-01 RX ADMIN — DIPHENHYDRAMINE HYDROCHLORIDE 25 MG: 12.5 SOLUTION ORAL at 09:09

## 2023-01-01 RX ADMIN — SODIUM BICARBONATE 650 MG: 650 TABLET ORAL at 08:09

## 2023-01-01 RX ADMIN — IPRATROPIUM BROMIDE AND ALBUTEROL SULFATE 3 ML: 2.5; .5 SOLUTION RESPIRATORY (INHALATION) at 11:09

## 2023-01-01 RX ADMIN — SODIUM CHLORIDE: 9 INJECTION, SOLUTION INTRAVENOUS at 10:08

## 2023-01-01 RX ADMIN — MAGNESIUM SULFATE HEPTAHYDRATE 2 G: 40 INJECTION, SOLUTION INTRAVENOUS at 05:09

## 2023-01-01 RX ADMIN — HEPARIN 500 UNITS: 100 SYRINGE at 12:03

## 2023-01-01 RX ADMIN — SODIUM CHLORIDE: 9 INJECTION, SOLUTION INTRAVENOUS at 08:09

## 2023-01-01 RX ADMIN — SODIUM CHLORIDE: 9 INJECTION, SOLUTION INTRAVENOUS at 11:07

## 2023-01-01 RX ADMIN — SODIUM PHOSPHATE, MONOBASIC, MONOHYDRATE AND SODIUM PHOSPHATE, DIBASIC, ANHYDROUS 30 MMOL: 142; 276 INJECTION, SOLUTION INTRAVENOUS at 04:08

## 2023-01-01 RX ADMIN — HYDROCODONE BITARTRATE AND ACETAMINOPHEN 10 ML: 7.5; 325 SOLUTION ORAL at 08:09

## 2023-01-01 RX ADMIN — HYDROCODONE BITARTRATE AND ACETAMINOPHEN 10 ML: 7.5; 325 SOLUTION ORAL at 02:09

## 2023-01-01 RX ADMIN — PANTOPRAZOLE SODIUM 20 MG: 20 TABLET, DELAYED RELEASE ORAL at 08:10

## 2023-01-01 RX ADMIN — SODIUM BICARBONATE: 84 INJECTION, SOLUTION INTRAVENOUS at 11:09

## 2023-01-01 RX ADMIN — HEPARIN 500 UNITS: 100 SYRINGE at 12:09

## 2023-01-01 RX ADMIN — IPRATROPIUM BROMIDE AND ALBUTEROL SULFATE 3 ML: 2.5; .5 SOLUTION RESPIRATORY (INHALATION) at 08:08

## 2023-01-01 RX ADMIN — PANTOPRAZOLE SODIUM 20 MG: 20 TABLET, DELAYED RELEASE ORAL at 10:08

## 2023-01-01 RX ADMIN — LEVOTHYROXINE SODIUM 50 MCG: 0.05 TABLET ORAL at 08:10

## 2023-01-01 RX ADMIN — Medication 500 UNITS: at 12:08

## 2023-01-01 RX ADMIN — SODIUM PHOSPHATE, MONOBASIC, MONOHYDRATE AND SODIUM PHOSPHATE, DIBASIC, ANHYDROUS 39.99 MMOL: 276; 142 INJECTION, SOLUTION INTRAVENOUS at 08:10

## 2023-01-01 RX ADMIN — HYDROMORPHONE HYDROCHLORIDE 0.5 MG: 1 INJECTION, SOLUTION INTRAMUSCULAR; INTRAVENOUS; SUBCUTANEOUS at 02:09

## 2023-01-01 RX ADMIN — APIXABAN 5 MG: 2.5 TABLET, FILM COATED ORAL at 09:08

## 2023-01-01 RX ADMIN — FAMOTIDINE 20 MG: 10 INJECTION INTRAVENOUS at 11:07

## 2023-01-01 RX ADMIN — HEPARIN 500 UNITS: 100 SYRINGE at 02:07

## 2023-01-01 RX ADMIN — SODIUM CHLORIDE: 9 INJECTION, SOLUTION INTRAVENOUS at 01:07

## 2023-01-01 RX ADMIN — SODIUM CHLORIDE, SODIUM GLUCONATE, SODIUM ACETATE, POTASSIUM CHLORIDE AND MAGNESIUM CHLORIDE: 526; 502; 368; 37; 30 INJECTION, SOLUTION INTRAVENOUS at 12:04

## 2023-01-01 RX ADMIN — DIPHENHYDRAMINE HYDROCHLORIDE 50 MG: 50 INJECTION INTRAMUSCULAR; INTRAVENOUS at 11:07

## 2023-01-01 RX ADMIN — SODIUM CHLORIDE 1000 ML: 9 INJECTION, SOLUTION INTRAVENOUS at 12:10

## 2023-01-01 RX ADMIN — SODIUM BICARBONATE: 84 INJECTION, SOLUTION INTRAVENOUS at 01:09

## 2023-01-01 RX ADMIN — IPRATROPIUM BROMIDE AND ALBUTEROL SULFATE 3 ML: 2.5; .5 SOLUTION RESPIRATORY (INHALATION) at 03:09

## 2023-01-01 RX ADMIN — Medication 500 UNITS: at 02:07

## 2023-01-01 RX ADMIN — SODIUM CHLORIDE 200 MG: 9 INJECTION, SOLUTION INTRAVENOUS at 09:01

## 2023-01-01 RX ADMIN — MEGESTROL ACETATE 400 MG: 400 SUSPENSION ORAL at 08:10

## 2023-01-01 RX ADMIN — PIPERACILLIN AND TAZOBACTAM 4.5 G: 4; .5 INJECTION, POWDER, LYOPHILIZED, FOR SOLUTION INTRAVENOUS; PARENTERAL at 12:08

## 2023-01-01 RX ADMIN — MIDAZOLAM HYDROCHLORIDE 2 MG: 1 INJECTION INTRAMUSCULAR; INTRAVENOUS at 12:05

## 2023-01-01 RX ADMIN — SODIUM BICARBONATE: 84 INJECTION, SOLUTION INTRAVENOUS at 03:09

## 2023-01-01 RX ADMIN — IPRATROPIUM BROMIDE AND ALBUTEROL SULFATE 3 ML: 2.5; .5 SOLUTION RESPIRATORY (INHALATION) at 10:09

## 2023-01-01 RX ADMIN — MAGNESIUM SULFATE HEPTAHYDRATE 4 G: 40 INJECTION, SOLUTION INTRAVENOUS at 04:09

## 2023-01-01 RX ADMIN — MUPIROCIN: 20 OINTMENT TOPICAL at 10:08

## 2023-01-01 RX ADMIN — DIPHENHYDRAMINE HYDROCHLORIDE 12.5 MG: 50 INJECTION INTRAMUSCULAR; INTRAVENOUS at 02:05

## 2023-01-01 RX ADMIN — SODIUM CHLORIDE 30 MG/ML INHALATION SOLUTION 4 ML: 30 SOLUTION INHALANT at 07:09

## 2023-01-01 RX ADMIN — HYDROMORPHONE HYDROCHLORIDE 0.5 MG: 1 INJECTION, SOLUTION INTRAMUSCULAR; INTRAVENOUS; SUBCUTANEOUS at 06:09

## 2023-01-01 RX ADMIN — MUPIROCIN: 20 OINTMENT TOPICAL at 09:09

## 2023-01-01 RX ADMIN — LEVOTHYROXINE SODIUM 50 MCG: 0.05 TABLET ORAL at 10:09

## 2023-01-01 RX ADMIN — IPRATROPIUM BROMIDE AND ALBUTEROL SULFATE 3 ML: 2.5; .5 SOLUTION RESPIRATORY (INHALATION) at 07:10

## 2023-01-01 RX ADMIN — PALONOSETRON 0.25 MG: 0.25 INJECTION, SOLUTION INTRAVENOUS at 11:07

## 2023-01-01 RX ADMIN — ZOLEDRONIC ACID 4 MG: 4 INJECTION, SOLUTION, CONCENTRATE INTRAVENOUS at 05:09

## 2023-01-01 RX ADMIN — Medication 500 UNITS: at 01:08

## 2023-01-01 RX ADMIN — Medication 10 ML: at 02:07

## 2023-01-01 RX ADMIN — GUAIFENESIN 600 MG: 600 TABLET, EXTENDED RELEASE ORAL at 09:08

## 2023-01-01 RX ADMIN — CEFTRIAXONE SODIUM 1 G: 1 INJECTION, POWDER, FOR SOLUTION INTRAMUSCULAR; INTRAVENOUS at 01:08

## 2023-01-01 RX ADMIN — Medication 500 UNITS: at 02:06

## 2023-01-01 RX ADMIN — Medication 400 MG: at 09:09

## 2023-01-01 RX ADMIN — MAGNESIUM SULFATE HEPTAHYDRATE 2 G: 40 INJECTION, SOLUTION INTRAVENOUS at 03:09

## 2023-01-01 RX ADMIN — IPRATROPIUM BROMIDE AND ALBUTEROL SULFATE 3 ML: 2.5; .5 SOLUTION RESPIRATORY (INHALATION) at 01:08

## 2023-01-01 RX ADMIN — DIPHENHYDRAMINE HYDROCHLORIDE 50 MG: 50 INJECTION INTRAMUSCULAR; INTRAVENOUS at 11:08

## 2023-01-01 RX ADMIN — SODIUM CHLORIDE, POTASSIUM CHLORIDE, SODIUM LACTATE AND CALCIUM CHLORIDE: 600; 310; 30; 20 INJECTION, SOLUTION INTRAVENOUS at 12:08

## 2023-01-01 RX ADMIN — SODIUM CHLORIDE 1000 ML: 9 INJECTION, SOLUTION INTRAVENOUS at 11:07

## 2023-01-01 RX ADMIN — IPRATROPIUM BROMIDE AND ALBUTEROL SULFATE 3 ML: 2.5; .5 SOLUTION RESPIRATORY (INHALATION) at 01:09

## 2023-01-01 RX ADMIN — PROPOFOL 60 MG: 10 INJECTION, EMULSION INTRAVENOUS at 12:04

## 2023-01-01 RX ADMIN — MEGESTROL ACETATE 400 MG: 400 SUSPENSION ORAL at 07:09

## 2023-01-01 RX ADMIN — CALCITONIN SALMON 242 UNITS: 200 INJECTION, SOLUTION INTRAMUSCULAR; SUBCUTANEOUS at 09:09

## 2023-01-01 RX ADMIN — LIDOCAINE HYDROCHLORIDE 50 MG: 10 INJECTION, SOLUTION EPIDURAL; INFILTRATION; INTRACAUDAL; PERINEURAL at 01:05

## 2023-01-01 RX ADMIN — LEVOTHYROXINE SODIUM 50 MCG: 0.05 TABLET ORAL at 10:08

## 2023-01-01 RX ADMIN — SODIUM CHLORIDE: 9 INJECTION, SOLUTION INTRAVENOUS at 09:09

## 2023-01-01 RX ADMIN — PACLITAXEL 78 MG: 6 INJECTION, SOLUTION INTRAVENOUS at 12:07

## 2023-01-01 RX ADMIN — FAMOTIDINE 20 MG: 10 INJECTION, SOLUTION INTRAVENOUS at 11:07

## 2023-01-01 RX ADMIN — IPRATROPIUM BROMIDE AND ALBUTEROL SULFATE 3 ML: 2.5; .5 SOLUTION RESPIRATORY (INHALATION) at 06:09

## 2023-01-01 RX ADMIN — MAGNESIUM SULFATE HEPTAHYDRATE 2 G: 40 INJECTION, SOLUTION INTRAVENOUS at 04:10

## 2023-01-01 RX ADMIN — POTASSIUM BICARBONATE 30 MEQ: 391 TABLET, EFFERVESCENT ORAL at 08:09

## 2023-01-01 RX ADMIN — PROPOFOL 150 MG: 10 INJECTION, EMULSION INTRAVENOUS at 01:05

## 2023-01-01 RX ADMIN — FAMOTIDINE 20 MG: 10 INJECTION INTRAVENOUS at 10:08

## 2023-01-01 RX ADMIN — SODIUM CHLORIDE 1000 ML: 9 INJECTION, SOLUTION INTRAVENOUS at 10:03

## 2023-01-01 RX ADMIN — LEVOTHYROXINE SODIUM 50 MCG: 0.05 TABLET ORAL at 09:08

## 2023-01-01 RX ADMIN — SODIUM PHOSPHATE, MONOBASIC, MONOHYDRATE AND SODIUM PHOSPHATE, DIBASIC, ANHYDROUS 30 MMOL: 142; 276 INJECTION, SOLUTION INTRAVENOUS at 09:09

## 2023-01-01 RX ADMIN — DIPHENHYDRAMINE HYDROCHLORIDE 50 MG: 50 INJECTION INTRAMUSCULAR; INTRAVENOUS at 12:07

## 2023-01-01 RX ADMIN — IOHEXOL 100 ML: 350 INJECTION, SOLUTION INTRAVENOUS at 12:08

## 2023-01-01 RX ADMIN — CALCITONIN SALMON 230 UNITS: 200 INJECTION, SOLUTION INTRAMUSCULAR; SUBCUTANEOUS at 11:09

## 2023-01-01 RX ADMIN — MAGNESIUM SULFATE HEPTAHYDRATE 2 G: 40 INJECTION, SOLUTION INTRAVENOUS at 11:09

## 2023-01-01 RX ADMIN — CARBOPLATIN 205 MG: 10 INJECTION, SOLUTION INTRAVENOUS at 01:07

## 2023-01-01 RX ADMIN — PANTOPRAZOLE SODIUM 20 MG: 20 TABLET, DELAYED RELEASE ORAL at 08:08

## 2023-01-01 RX ADMIN — SODIUM CHLORIDE 1000 ML: 9 INJECTION, SOLUTION INTRAVENOUS at 05:09

## 2023-01-01 RX ADMIN — MAGNESIUM SULFATE HEPTAHYDRATE 2 G: 40 INJECTION, SOLUTION INTRAVENOUS at 08:09

## 2023-01-01 RX ADMIN — Medication 10 ML: at 03:07

## 2023-01-01 RX ADMIN — IPRATROPIUM BROMIDE AND ALBUTEROL SULFATE 3 ML: .5; 3 SOLUTION RESPIRATORY (INHALATION) at 11:08

## 2023-01-01 RX ADMIN — SODIUM CHLORIDE 1000 ML: 9 INJECTION, SOLUTION INTRAVENOUS at 05:10

## 2023-01-01 RX ADMIN — IPRATROPIUM BROMIDE AND ALBUTEROL SULFATE 3 ML: 2.5; .5 SOLUTION RESPIRATORY (INHALATION) at 12:08

## 2023-01-01 RX ADMIN — MAGNESIUM SULFATE HEPTAHYDRATE 2 G: 40 INJECTION, SOLUTION INTRAVENOUS at 07:09

## 2023-01-01 RX ADMIN — CHOLECALCIFEROL TAB 25 MCG (1000 UNIT) 1000 UNITS: 25 TAB at 09:09

## 2023-01-01 RX ADMIN — PROPOFOL 30 MG: 10 INJECTION, EMULSION INTRAVENOUS at 01:04

## 2023-01-01 RX ADMIN — FAMOTIDINE 20 MG: 20 TABLET, FILM COATED ORAL at 09:09

## 2023-01-01 RX ADMIN — HYDROCODONE BITARTRATE AND ACETAMINOPHEN 10 ML: 7.5; 325 SOLUTION ORAL at 07:09

## 2023-01-01 RX ADMIN — POTASSIUM PHOSPHATE, MONOBASIC AND POTASSIUM PHOSPHATE, DIBASIC 30 MMOL: 224; 236 INJECTION, SOLUTION, CONCENTRATE INTRAVENOUS at 08:09

## 2023-01-01 RX ADMIN — SODIUM PHOSPHATE, MONOBASIC, MONOHYDRATE AND SODIUM PHOSPHATE, DIBASIC, ANHYDROUS 30 MMOL: 142; 276 INJECTION, SOLUTION INTRAVENOUS at 12:09

## 2023-01-01 RX ADMIN — SODIUM BICARBONATE: 84 INJECTION, SOLUTION INTRAVENOUS at 04:09

## 2023-01-01 RX ADMIN — LEVOTHYROXINE SODIUM 50 MCG: 50 TABLET ORAL at 08:09

## 2023-01-01 RX ADMIN — PACLITAXEL 78 MG: 6 INJECTION, SOLUTION INTRAVENOUS at 12:08

## 2023-01-01 RX ADMIN — PALONOSETRON 0.25 MG: 0.25 INJECTION, SOLUTION INTRAVENOUS at 01:07

## 2023-01-01 RX ADMIN — MEGESTROL ACETATE 400 MG: 400 SUSPENSION ORAL at 08:09

## 2023-01-01 RX ADMIN — APREPITANT 130 MG: 130 INJECTION, EMULSION INTRAVENOUS at 11:02

## 2023-01-01 RX ADMIN — SERTRALINE 25 MG: 25 TABLET, FILM COATED ORAL at 10:10

## 2023-01-01 RX ADMIN — Medication 10 ML: at 01:08

## 2023-01-01 RX ADMIN — SODIUM CHLORIDE: 9 INJECTION, SOLUTION INTRAVENOUS at 06:10

## 2023-01-01 RX ADMIN — Medication 10 ML: at 02:06

## 2023-01-01 RX ADMIN — MAGNESIUM SULFATE HEPTAHYDRATE 4 G: 40 INJECTION, SOLUTION INTRAVENOUS at 06:09

## 2023-01-01 RX ADMIN — MUPIROCIN 1 G: 20 OINTMENT TOPICAL at 09:09

## 2023-01-01 RX ADMIN — LORAZEPAM 1 MG: 2 INJECTION INTRAMUSCULAR; INTRAVENOUS at 05:08

## 2023-01-01 RX ADMIN — IPRATROPIUM BROMIDE AND ALBUTEROL SULFATE 3 ML: 2.5; .5 SOLUTION RESPIRATORY (INHALATION) at 11:10

## 2023-01-01 RX ADMIN — CARBOPLATIN 205 MG: 10 INJECTION, SOLUTION INTRAVENOUS at 01:08

## 2023-01-01 RX ADMIN — CARBOPLATIN 615 MG: 10 INJECTION INTRAVENOUS at 01:01

## 2023-01-01 RX ADMIN — PANTOPRAZOLE SODIUM 20 MG: 20 TABLET, DELAYED RELEASE ORAL at 10:10

## 2023-01-01 RX ADMIN — PIPERACILLIN AND TAZOBACTAM 4.5 G: 4; .5 INJECTION, POWDER, LYOPHILIZED, FOR SOLUTION INTRAVENOUS; PARENTERAL at 09:08

## 2023-01-01 RX ADMIN — HYDROCODONE BITARTRATE AND ACETAMINOPHEN 10 ML: 7.5; 325 SOLUTION ORAL at 12:09

## 2023-01-01 RX ADMIN — SODIUM CHLORIDE: 9 INJECTION, SOLUTION INTRAVENOUS at 01:09

## 2023-01-01 RX ADMIN — IPRATROPIUM BROMIDE AND ALBUTEROL SULFATE 3 ML: 2.5; .5 SOLUTION RESPIRATORY (INHALATION) at 05:09

## 2023-01-01 RX ADMIN — SODIUM CHLORIDE 1000 ML: 9 INJECTION, SOLUTION INTRAVENOUS at 08:01

## 2023-01-01 RX ADMIN — SODIUM CHLORIDE, PRESERVATIVE FREE 10 ML: 5 INJECTION INTRAVENOUS at 06:09

## 2023-01-01 RX ADMIN — SODIUM CHLORIDE 1000 ML: 9 INJECTION, SOLUTION INTRAVENOUS at 11:09

## 2023-01-01 RX ADMIN — MIDAZOLAM HYDROCHLORIDE 2 MG: 1 INJECTION, SOLUTION INTRAMUSCULAR; INTRAVENOUS at 12:05

## 2023-01-01 RX ADMIN — SODIUM CHLORIDE 1000 ML: 9 INJECTION, SOLUTION INTRAVENOUS at 02:09

## 2023-01-01 RX ADMIN — ZOLEDRONIC ACID 4 MG: 4 INJECTION, SOLUTION, CONCENTRATE INTRAVENOUS at 07:09

## 2023-01-01 RX ADMIN — DIPHENHYDRAMINE HYDROCHLORIDE 25 MG: 12.5 SOLUTION ORAL at 10:09

## 2023-01-01 RX ADMIN — POTASSIUM CHLORIDE 10 MEQ: 7.46 INJECTION, SOLUTION INTRAVENOUS at 11:08

## 2023-01-01 RX ADMIN — SODIUM BICARBONATE: 84 INJECTION, SOLUTION INTRAVENOUS at 08:09

## 2023-01-01 RX ADMIN — PANTOPRAZOLE SODIUM 20 MG: 20 TABLET, DELAYED RELEASE ORAL at 08:09

## 2023-01-01 RX ADMIN — CALCITONIN SALMON 234 UNITS: 200 INJECTION, SOLUTION INTRAMUSCULAR; SUBCUTANEOUS at 09:10

## 2023-01-01 RX ADMIN — HYDROCODONE BITARTRATE AND ACETAMINOPHEN 10 ML: 7.5; 325 SOLUTION ORAL at 11:10

## 2023-01-01 RX ADMIN — SODIUM CHLORIDE, PRESERVATIVE FREE 10 ML: 5 INJECTION INTRAVENOUS at 11:09

## 2023-01-01 RX ADMIN — LEVOTHYROXINE SODIUM 50 MCG: 0.05 TABLET ORAL at 08:09

## 2023-01-01 RX ADMIN — PEGFILGRASTIM 6 MG: KIT SUBCUTANEOUS at 03:02

## 2023-01-01 RX ADMIN — Medication 10 ML: at 12:08

## 2023-01-01 RX ADMIN — PACLITAXEL 366 MG: 6 INJECTION, SOLUTION INTRAVENOUS at 12:02

## 2023-01-01 RX ADMIN — SODIUM CHLORIDE: 9 INJECTION, SOLUTION INTRAVENOUS at 12:09

## 2023-01-01 RX ADMIN — POTASSIUM CHLORIDE 40 MEQ: 1500 TABLET, EXTENDED RELEASE ORAL at 05:10

## 2023-01-01 RX ADMIN — SODIUM CHLORIDE 1000 ML: 9 INJECTION, SOLUTION INTRAVENOUS at 10:07

## 2023-01-01 RX ADMIN — CALCITONIN SALMON 400 UNITS: 200 INJECTION, SOLUTION INTRAMUSCULAR; SUBCUTANEOUS at 09:09

## 2023-01-01 RX ADMIN — IPRATROPIUM BROMIDE AND ALBUTEROL SULFATE 3 ML: 2.5; .5 SOLUTION RESPIRATORY (INHALATION) at 02:09

## 2023-01-01 RX ADMIN — FAMOTIDINE 20 MG: 20 TABLET, FILM COATED ORAL at 09:08

## 2023-01-01 RX ADMIN — Medication 6 MG: at 08:10

## 2023-01-01 RX ADMIN — SODIUM BICARBONATE: 84 INJECTION, SOLUTION INTRAVENOUS at 06:10

## 2023-01-01 RX ADMIN — POTASSIUM BICARBONATE 50 MEQ: 978 TABLET, EFFERVESCENT ORAL at 06:09

## 2023-01-01 RX ADMIN — CALCITONIN SALMON 230 UNITS: 200 INJECTION, SOLUTION INTRAMUSCULAR; SUBCUTANEOUS at 06:09

## 2023-01-01 RX ADMIN — POTASSIUM BICARBONATE 50 MEQ: 977.5 TABLET, EFFERVESCENT ORAL at 09:09

## 2023-01-01 RX ADMIN — DIPHENHYDRAMINE HYDROCHLORIDE 50 MG: 50 INJECTION INTRAMUSCULAR; INTRAVENOUS at 10:03

## 2023-01-01 RX ADMIN — SODIUM CHLORIDE 200 MG: 9 INJECTION, SOLUTION INTRAVENOUS at 11:02

## 2023-01-01 RX ADMIN — Medication 10 ML: at 03:09

## 2023-01-01 RX ADMIN — IPRATROPIUM BROMIDE AND ALBUTEROL SULFATE 3 ML: 2.5; .5 SOLUTION RESPIRATORY (INHALATION) at 07:08

## 2023-01-01 RX ADMIN — IOHEXOL 100 ML: 350 INJECTION, SOLUTION INTRAVENOUS at 04:08

## 2023-01-01 RX ADMIN — APIXABAN 5 MG: 2.5 TABLET, FILM COATED ORAL at 08:08

## 2023-01-01 RX ADMIN — PROPOFOL 10 MG: 10 INJECTION, EMULSION INTRAVENOUS at 01:04

## 2023-01-01 RX ADMIN — PANTOPRAZOLE SODIUM 20 MG: 20 TABLET, DELAYED RELEASE ORAL at 11:09

## 2023-01-01 RX ADMIN — SODIUM CHLORIDE 200 MG: 9 INJECTION, SOLUTION INTRAVENOUS at 11:03

## 2023-01-01 RX ADMIN — SODIUM CHLORIDE, SODIUM GLUCONATE, SODIUM ACETATE, POTASSIUM CHLORIDE AND MAGNESIUM CHLORIDE: 526; 502; 368; 37; 30 INJECTION, SOLUTION INTRAVENOUS at 11:04

## 2023-01-01 RX ADMIN — MAGNESIUM SULFATE HEPTAHYDRATE 2 G: 40 INJECTION, SOLUTION INTRAVENOUS at 09:10

## 2023-01-01 RX ADMIN — CARBOPLATIN 615 MG: 10 INJECTION INTRAVENOUS at 03:02

## 2023-01-01 RX ADMIN — FAMOTIDINE 20 MG: 10 INJECTION, SOLUTION INTRAVENOUS at 09:01

## 2023-01-01 RX ADMIN — POTASSIUM CHLORIDE 10 MEQ: 7.46 INJECTION, SOLUTION INTRAVENOUS at 08:09

## 2023-01-01 RX ADMIN — MAGNESIUM SULFATE HEPTAHYDRATE 2 G: 40 INJECTION, SOLUTION INTRAVENOUS at 06:08

## 2023-01-01 RX ADMIN — HEPARIN 500 UNITS: 100 SYRINGE at 03:09

## 2023-01-01 RX ADMIN — PIPERACILLIN AND TAZOBACTAM 4.5 G: 4; .5 INJECTION, POWDER, LYOPHILIZED, FOR SOLUTION INTRAVENOUS; PARENTERAL at 11:08

## 2023-01-01 RX ADMIN — POTASSIUM BICARBONATE 25 MEQ: 977.5 TABLET, EFFERVESCENT ORAL at 09:09

## 2023-01-01 RX ADMIN — SODIUM CHLORIDE: 9 INJECTION, SOLUTION INTRAVENOUS at 05:09

## 2023-01-01 RX ADMIN — MAGNESIUM SULFATE HEPTAHYDRATE 2 G: 40 INJECTION, SOLUTION INTRAVENOUS at 09:09

## 2023-01-01 RX ADMIN — SODIUM CHLORIDE: 9 INJECTION, SOLUTION INTRAVENOUS at 07:09

## 2023-01-01 RX ADMIN — IPRATROPIUM BROMIDE AND ALBUTEROL SULFATE 3 ML: .5; 3 SOLUTION RESPIRATORY (INHALATION) at 05:08

## 2023-01-01 RX ADMIN — SODIUM CHLORIDE: 9 INJECTION, SOLUTION INTRAVENOUS at 09:08

## 2023-01-01 RX ADMIN — HYDROCODONE BITARTRATE AND ACETAMINOPHEN 10 ML: 7.5; 325 SOLUTION ORAL at 09:09

## 2023-01-01 RX ADMIN — DENOSUMAB 120 MG: 120 INJECTION SUBCUTANEOUS at 05:09

## 2023-01-01 RX ADMIN — MAGNESIUM SULFATE HEPTAHYDRATE 1 G: 10 INJECTION, SOLUTION INTRAVENOUS at 07:09

## 2023-01-01 RX ADMIN — SODIUM CHLORIDE 1000 ML: 9 INJECTION, SOLUTION INTRAVENOUS at 02:10

## 2023-01-01 RX ADMIN — ROCURONIUM BROMIDE 50 MG: 50 INJECTION INTRAVENOUS at 01:05

## 2023-01-01 RX ADMIN — PANTOPRAZOLE SODIUM 20 MG: 20 TABLET, DELAYED RELEASE ORAL at 09:08

## 2023-01-01 RX ADMIN — IPRATROPIUM BROMIDE AND ALBUTEROL SULFATE 3 ML: 2.5; .5 SOLUTION RESPIRATORY (INHALATION) at 01:10

## 2023-01-01 RX ADMIN — LORAZEPAM 1 MG: 2 INJECTION INTRAMUSCULAR; INTRAVENOUS at 03:08

## 2023-01-01 RX ADMIN — SODIUM CHLORIDE: 9 INJECTION, SOLUTION INTRAVENOUS at 01:08

## 2023-01-01 RX ADMIN — HEPARIN 500 UNITS: 100 SYRINGE at 10:08

## 2023-01-01 RX ADMIN — IPRATROPIUM BROMIDE AND ALBUTEROL SULFATE 3 ML: 2.5; .5 SOLUTION RESPIRATORY (INHALATION) at 02:10

## 2023-01-01 RX ADMIN — CARBOPLATIN 225 MG: 10 INJECTION, SOLUTION INTRAVENOUS at 01:07

## 2023-01-01 RX ADMIN — PROPOFOL 10 MG: 10 INJECTION, EMULSION INTRAVENOUS at 12:04

## 2023-01-01 RX ADMIN — LEVOTHYROXINE SODIUM 50 MCG: 0.05 TABLET ORAL at 10:10

## 2023-01-01 RX ADMIN — PALONOSETRON 0.25 MG: 0.25 INJECTION, SOLUTION INTRAVENOUS at 09:01

## 2023-01-01 RX ADMIN — SODIUM CHLORIDE 1000 ML: 9 INJECTION, SOLUTION INTRAVENOUS at 10:02

## 2023-01-01 RX ADMIN — HYDROCODONE BITARTRATE AND ACETAMINOPHEN 10 ML: 7.5; 325 SOLUTION ORAL at 01:09

## 2023-01-01 RX ADMIN — IPRATROPIUM BROMIDE AND ALBUTEROL SULFATE 3 ML: .5; 3 SOLUTION RESPIRATORY (INHALATION) at 07:08

## 2023-01-01 RX ADMIN — FENTANYL CITRATE 100 MCG: 50 INJECTION, SOLUTION INTRAMUSCULAR; INTRAVENOUS at 01:05

## 2023-01-01 RX ADMIN — Medication 400 MG: at 08:09

## 2023-01-01 RX ADMIN — SERTRALINE 25 MG: 25 TABLET, FILM COATED ORAL at 08:09

## 2023-01-01 RX ADMIN — PACLITAXEL 366 MG: 6 INJECTION, SOLUTION INTRAVENOUS at 11:03

## 2023-01-01 RX ADMIN — POTASSIUM CHLORIDE 10 MEQ: 7.46 INJECTION, SOLUTION INTRAVENOUS at 10:09

## 2023-01-01 RX ADMIN — SODIUM CHLORIDE: 9 INJECTION, SOLUTION INTRAVENOUS at 12:07

## 2023-01-01 RX ADMIN — PIPERACILLIN AND TAZOBACTAM 4.5 G: 4; .5 INJECTION, POWDER, LYOPHILIZED, FOR SOLUTION INTRAVENOUS; PARENTERAL at 01:08

## 2023-01-01 RX ADMIN — LEVOFLOXACIN 750 MG: 750 INJECTION, SOLUTION INTRAVENOUS at 10:08

## 2023-01-01 RX ADMIN — PEGFILGRASTIM 6 MG: KIT SUBCUTANEOUS at 01:01

## 2023-01-01 RX ADMIN — CALCITONIN SALMON 400 UNITS: 200 INJECTION, SOLUTION INTRAMUSCULAR; SUBCUTANEOUS at 03:09

## 2023-01-01 RX ADMIN — APREPITANT 130 MG: 130 INJECTION, EMULSION INTRAVENOUS at 11:03

## 2023-01-01 RX ADMIN — SODIUM CHLORIDE 400 MG: 9 INJECTION, SOLUTION INTRAVENOUS at 02:06

## 2023-01-01 RX ADMIN — PALONOSETRON 0.25 MG: 0.25 INJECTION, SOLUTION INTRAVENOUS at 11:02

## 2023-01-01 RX ADMIN — SODIUM CHLORIDE 2000 MG: 1 TABLET ORAL at 08:10

## 2023-01-01 RX ADMIN — CALCITONIN SALMON 230 UNITS: 200 INJECTION, SOLUTION INTRAMUSCULAR; SUBCUTANEOUS at 10:09

## 2023-01-01 RX ADMIN — DEXAMETHASONE SODIUM PHOSPHATE 8 MG: 4 INJECTION, SOLUTION INTRA-ARTICULAR; INTRALESIONAL; INTRAMUSCULAR; INTRAVENOUS; SOFT TISSUE at 01:05

## 2023-01-01 RX ADMIN — SERTRALINE 25 MG: 25 TABLET, FILM COATED ORAL at 10:09

## 2023-01-01 RX ADMIN — SODIUM CHLORIDE: 9 INJECTION, SOLUTION INTRAVENOUS at 03:08

## 2023-01-01 RX ADMIN — PHENYLEPHRINE HYDROCHLORIDE 200 MCG: 10 INJECTION INTRAVENOUS at 01:05

## 2023-01-01 RX ADMIN — HEPARIN 500 UNITS: 100 SYRINGE at 01:01

## 2023-01-01 RX ADMIN — CALCITONIN SALMON 230 UNITS: 200 INJECTION, SOLUTION INTRAMUSCULAR; SUBCUTANEOUS at 08:09

## 2023-01-01 RX ADMIN — SODIUM PHOSPHATE, MONOBASIC, MONOHYDRATE AND SODIUM PHOSPHATE, DIBASIC, ANHYDROUS 20.01 MMOL: 276; 142 INJECTION, SOLUTION INTRAVENOUS at 04:10

## 2023-01-01 RX ADMIN — CALCITONIN SALMON 400 UNITS: 200 INJECTION, SOLUTION INTRAMUSCULAR; SUBCUTANEOUS at 06:09

## 2023-01-01 RX ADMIN — DIPHENHYDRAMINE HYDROCHLORIDE 50 MG: 50 INJECTION, SOLUTION INTRAMUSCULAR; INTRAVENOUS at 09:01

## 2023-01-01 RX ADMIN — IPRATROPIUM BROMIDE AND ALBUTEROL SULFATE 3 ML: 2.5; .5 SOLUTION RESPIRATORY (INHALATION) at 06:10

## 2023-01-01 RX ADMIN — HYDROCODONE BITARTRATE AND ACETAMINOPHEN 1 TABLET: 5; 325 TABLET ORAL at 09:10

## 2023-01-01 RX ADMIN — AZITHROMYCIN MONOHYDRATE 500 MG: 500 INJECTION, POWDER, LYOPHILIZED, FOR SOLUTION INTRAVENOUS at 01:08

## 2023-01-01 RX ADMIN — SODIUM BICARBONATE 650 MG: 650 TABLET ORAL at 10:09

## 2023-01-01 RX ADMIN — MIDAZOLAM 2 MG: 1 INJECTION INTRAMUSCULAR; INTRAVENOUS at 12:04

## 2023-01-01 RX ADMIN — LEVOTHYROXINE SODIUM 50 MCG: 0.05 TABLET ORAL at 08:08

## 2023-01-01 RX ADMIN — LEVOFLOXACIN 750 MG: 750 INJECTION, SOLUTION INTRAVENOUS at 08:08

## 2023-01-01 RX ADMIN — MAGNESIUM SULFATE IN DEXTROSE 1 G: 10 INJECTION, SOLUTION INTRAVENOUS at 01:09

## 2023-01-01 RX ADMIN — METHOCARBAMOL 1000 MG: 100 INJECTION INTRAMUSCULAR; INTRAVENOUS at 02:05

## 2023-01-01 RX ADMIN — CALCITONIN SALMON 242 UNITS: 200 INJECTION, SOLUTION INTRAMUSCULAR; SUBCUTANEOUS at 06:09

## 2023-01-01 RX ADMIN — DIPHENHYDRAMINE HYDROCHLORIDE 50 MG: 50 INJECTION, SOLUTION INTRAMUSCULAR; INTRAVENOUS at 11:02

## 2023-01-01 RX ADMIN — MEGESTROL ACETATE 400 MG: 400 SUSPENSION ORAL at 06:09

## 2023-01-01 RX ADMIN — TRAMADOL HYDROCHLORIDE 50 MG: 50 TABLET, COATED ORAL at 11:09

## 2023-01-01 RX ADMIN — POTASSIUM PHOSPHATE, MONOBASIC POTASSIUM PHOSPHATE, DIBASIC 30 MMOL: 224; 236 INJECTION, SOLUTION, CONCENTRATE INTRAVENOUS at 07:10

## 2023-01-01 RX ADMIN — LIDOCAINE HYDROCHLORIDE 20 ML: 20 INJECTION, SOLUTION INTRAVENOUS at 12:04

## 2023-01-01 RX ADMIN — PALONOSETRON 0.25 MG: 0.25 INJECTION, SOLUTION INTRAVENOUS at 10:08

## 2023-01-01 RX ADMIN — FAMOTIDINE 20 MG: 10 INJECTION INTRAVENOUS at 11:02

## 2023-01-01 RX ADMIN — MAGNESIUM SULFATE HEPTAHYDRATE 2 G: 40 INJECTION, SOLUTION INTRAVENOUS at 06:10

## 2023-01-01 RX ADMIN — SODIUM CHLORIDE 400 MG: 9 INJECTION, SOLUTION INTRAVENOUS at 02:05

## 2023-01-01 RX ADMIN — SODIUM CHLORIDE 2000 MG: 1 TABLET ORAL at 02:10

## 2023-01-01 RX ADMIN — PANTOPRAZOLE SODIUM 20 MG: 20 TABLET, DELAYED RELEASE ORAL at 09:09

## 2023-01-01 RX ADMIN — LORAZEPAM 1 MG: 2 INJECTION INTRAMUSCULAR; INTRAVENOUS at 11:08

## 2023-01-01 RX ADMIN — IPRATROPIUM BROMIDE AND ALBUTEROL SULFATE 3 ML: .5; 3 SOLUTION RESPIRATORY (INHALATION) at 12:08

## 2023-01-01 RX ADMIN — POTASSIUM CHLORIDE 10 MEQ: 7.46 INJECTION, SOLUTION INTRAVENOUS at 09:08

## 2023-01-01 RX ADMIN — VANCOMYCIN HYDROCHLORIDE 1250 MG: 1.25 INJECTION, POWDER, LYOPHILIZED, FOR SOLUTION INTRAVENOUS at 01:08

## 2023-01-01 RX ADMIN — SODIUM PHOSPHATE, MONOBASIC, MONOHYDRATE AND SODIUM PHOSPHATE, DIBASIC, ANHYDROUS 39.99 MMOL: 276; 142 INJECTION, SOLUTION INTRAVENOUS at 12:09

## 2023-01-01 RX ADMIN — CARBOPLATIN 235 MG: 10 INJECTION, SOLUTION INTRAVENOUS at 02:07

## 2023-01-01 RX ADMIN — FAMOTIDINE 20 MG: 10 INJECTION, SOLUTION INTRAVENOUS at 10:03

## 2023-01-01 RX ADMIN — SODIUM BICARBONATE: 84 INJECTION, SOLUTION INTRAVENOUS at 12:09

## 2023-01-01 RX ADMIN — SODIUM CHLORIDE: 9 INJECTION, SOLUTION INTRAVENOUS at 10:09

## 2023-01-01 RX ADMIN — HYDROMORPHONE HYDROCHLORIDE 0.5 MG: 1 INJECTION, SOLUTION INTRAMUSCULAR; INTRAVENOUS; SUBCUTANEOUS at 09:09

## 2023-01-01 RX ADMIN — PROPOFOL 50 MG: 10 INJECTION, EMULSION INTRAVENOUS at 01:05

## 2023-01-01 RX ADMIN — APIXABAN 5 MG: 2.5 TABLET, FILM COATED ORAL at 10:09

## 2023-01-01 RX ADMIN — SODIUM CHLORIDE 30 MG/ML INHALATION SOLUTION 4 ML: 30 SOLUTION INHALANT at 01:08

## 2023-01-01 RX ADMIN — SUGAMMADEX 200 MG: 100 INJECTION, SOLUTION INTRAVENOUS at 01:05

## 2023-01-01 RX ADMIN — PHENYLEPHRINE HYDROCHLORIDE 100 MCG: 10 INJECTION INTRAVENOUS at 01:05

## 2023-01-01 RX ADMIN — CHOLECALCIFEROL TAB 25 MCG (1000 UNIT) 1000 UNITS: 25 TAB at 08:09

## 2023-01-01 RX ADMIN — DIPHENHYDRAMINE HYDROCHLORIDE 50 MG: 50 INJECTION INTRAMUSCULAR; INTRAVENOUS at 01:07

## 2023-01-01 RX ADMIN — MAGNESIUM SULFATE HEPTAHYDRATE 2 G: 40 INJECTION, SOLUTION INTRAVENOUS at 10:08

## 2023-01-01 RX ADMIN — MAGNESIUM SULFATE HEPTAHYDRATE 2 G: 40 INJECTION, SOLUTION INTRAVENOUS at 02:08

## 2023-01-01 RX ADMIN — SODIUM CHLORIDE: 9 INJECTION, SOLUTION INTRAVENOUS at 12:08

## 2023-01-01 RX ADMIN — SODIUM CHLORIDE 1000 ML: 9 INJECTION, SOLUTION INTRAVENOUS at 01:07

## 2023-01-01 RX ADMIN — DIPHENHYDRAMINE HYDROCHLORIDE 25 MG: 25 CAPSULE ORAL at 09:09

## 2023-01-01 RX ADMIN — LEVOTHYROXINE SODIUM 50 MCG: 50 TABLET ORAL at 10:09

## 2023-01-01 RX ADMIN — SODIUM CHLORIDE, POTASSIUM CHLORIDE, SODIUM LACTATE AND CALCIUM CHLORIDE: 600; 310; 30; 20 INJECTION, SOLUTION INTRAVENOUS at 01:05

## 2023-01-01 RX ADMIN — SODIUM CHLORIDE: 9 INJECTION, SOLUTION INTRAVENOUS at 04:09

## 2023-01-01 RX ADMIN — PIPERACILLIN AND TAZOBACTAM 4.5 G: 4; .5 INJECTION, POWDER, LYOPHILIZED, FOR SOLUTION INTRAVENOUS; PARENTERAL at 04:08

## 2023-01-01 RX ADMIN — APIXABAN 5 MG: 2.5 TABLET, FILM COATED ORAL at 10:08

## 2023-01-01 RX ADMIN — HYDROCODONE BITARTRATE AND ACETAMINOPHEN 10 ML: 7.5; 325 SOLUTION ORAL at 11:09

## 2023-01-01 RX ADMIN — IPRATROPIUM BROMIDE AND ALBUTEROL SULFATE 3 ML: 2.5; .5 SOLUTION RESPIRATORY (INHALATION) at 08:10

## 2023-01-01 RX ADMIN — SODIUM CHLORIDE 1000 ML: 9 INJECTION, SOLUTION INTRAVENOUS at 09:08

## 2023-01-01 RX ADMIN — POTASSIUM CHLORIDE 10 MEQ: 7.46 INJECTION, SOLUTION INTRAVENOUS at 09:09

## 2023-01-01 RX ADMIN — TRAMADOL HYDROCHLORIDE 50 MG: 50 TABLET, COATED ORAL at 07:09

## 2023-01-01 RX ADMIN — SODIUM PHOSPHATE, MONOBASIC, MONOHYDRATE AND SODIUM PHOSPHATE, DIBASIC, ANHYDROUS 30 MMOL: 142; 276 INJECTION, SOLUTION INTRAVENOUS at 03:09

## 2023-01-01 RX ADMIN — APREPITANT 130 MG: 130 INJECTION, EMULSION INTRAVENOUS at 10:01

## 2023-01-01 RX ADMIN — CALCITONIN SALMON 230 UNITS: 200 INJECTION, SOLUTION INTRAMUSCULAR; SUBCUTANEOUS at 05:09

## 2023-01-01 RX ADMIN — SODIUM CHLORIDE, POTASSIUM CHLORIDE, SODIUM LACTATE AND CALCIUM CHLORIDE 1000 ML: 600; 310; 30; 20 INJECTION, SOLUTION INTRAVENOUS at 12:08

## 2023-01-01 RX ADMIN — ONDANSETRON 4 MG: 2 INJECTION INTRAMUSCULAR; INTRAVENOUS at 01:05

## 2023-01-03 NOTE — PROCEDURES
[facility]  Otolaryngology Clinic Note    Josafat Boudreaux  Encounter Date: 1/3/2023  YOB: 1965  Physician: Lisseth Krueger MD    Chief Complaint: Laryngeal mass    HPI: Josafat Boudreaux is a 57 y.o. male referred to clinic by Dr. Emory Alonso for laryngeal mass. Patients wife provides history. She states that patient first noticed hoarseness in November 2021. Got progressively worse. Presented to Dr. Alonso in early June 2022 for these complaints. Was noted to have a laryngeal mass on flexible laryngoscopy. Several days after the flexible laryngoscopy patient developed worsening swelling of the airway and presented to the ER in respiratory distress. He underwent emergent tracheostomy with Dr. Junior Alonso. Patient was subsequently discharged home but readmitted shortly after with seizures. He spent 3 days in the ICU on the ventilator. During hospitalization, patient underwent PEG tube placement. He reports that he was not having any issues with PO intake prior to the tracheostomy placement. Now he is PEG dependent. Only taking some ice chips by mouth. He presents today to discuss laryngectomy.     11/2/22:  Since last visit, patient total laryngectomy with bilateral neck dissections level 3 and 4, primary closure, he did not undergo TEP placement.  He was also found to have synchronous primary or lung metastasis in the lung that was also squamous cell carcinoma.  He underwent radiation completed this in the middle of September 2022.  He received carboplatin and Taxol which he still has a few more treatments of.  He presented to the emergency department on 10/08/2022 with a right neck infection.  This was treated with IV antibiotics and incision and drainage with UCHE drain placement.  He was kept NPO and did feeds through his PEG tube.  He met criteria for discharge on 10/25/22 with UCHE drain.  He had outpatient swallow study which did not demonstrate any  evidence of a leak.  He returns to clinic today reporting improvement in his symptoms including right neck pain and drainage.  He has not had to empty his UCHE drain on a daily basis at this point.  He had a PET performed on 10/13/2022 to assess response to chemotherapy.  On that PET scan, right hilar and mediastinal lymph nodes were decreased in size but had increased metabolic activity.  He denies any other issues today    11/7/22: Reports worsening swelling under the right jawline the past few days.  Denies any erythema, infection, fever or chills.    12/6/22:  Feeling a little run down, increased cough with blood tinged secretions, running temperature to 101 at home.  Received azithromycin abx last night from Dr. Alonso's office.    12/28/22:  Was hospitalized for Pseudomonal pneumonia.  He also had Blastomycetes positive antigen on arrival, but was unable to tolerate amphotericin B due to significant hypotension.  Prior to discharge, his repeat Blastomycetes antigen was negative.  Discharged with levaquin and portable home oxygen.  His pneumonia symptoms have all improved since discharge.  Ulceration spots have persisted with one spot having a small amount of growth adjacent.  No bleeding or pain.    1/3/22:  Returns for follow up for monitoring of tracheal ulceration.  Reports interval improvement.  Denies pain or bleeding from area.  Denies swelling or tenderness to neck.    ROS:   General: Negative except per HPI  Skin: Denies rash, ulcer, or lesion.  Eyes: Denies vision changes or diplopia.  Ears: Negative except per HPI  Nose: Negative except per HPI  Throat/mouth: Negative except per HPI  Cardiovascular: Negative except per HPI  Respiratory: Negative except per HPI  Neck: Negative except per HPI  Endocrine: Negative except per HPI  Neurologic: Negative except per HPI    Other 10-point review of systems negative except per HPI      Review of patient's allergies indicates:  No Known Allergies    Past  Medical History:   Diagnosis Date    Cancer     COPD (chronic obstructive pulmonary disease)     Dysphagia     Lung cancer     Vocal cord mass        Past Surgical History:   Procedure Laterality Date    DIRECT DIAGNOSTIC LARYNGOSCOPY WITH BRONCHOSCOPY AND ESOPHAGOSCOPY N/A 2022    Procedure: LARYNGOSCOPY, DIRECT, DIAGNOSTIC, WITH BRONCHOSCOPY AND ESOPHAGOSCOPY;  Surgeon: Emory Alonso MD;  Location: [facility];  Service: ENT;  Laterality: N/A;    HIP SURGERY      HUMERUS FRACTURE SURGERY      INCISION AND DRAINAGE, NECK Bilateral 10/21/2022    Procedure: INCISION AND DRAINAGE,NECK;  Surgeon: Madison Worley MD;  Location: [facility];  Service: ENT;  Laterality: Bilateral;    INSERT ARTERIAL LINE  2022         JOINT REPLACEMENT  2019    R hip    TRACHEOSTOMY N/A 06/10/2022    Procedure: CREATION, TRACHEOSTOMY;  Surgeon: Junior Alonso MD;  Location: [facility];  Service: ENT;  Laterality: N/A;       Social History     Socioeconomic History    Marital status:    Tobacco Use    Smoking status: Former     Packs/day: 0.50     Years: 15.00     Pack years: 7.50     Types: Cigarettes     Start date: 1980     Quit date: 2022     Years since quittin.5    Smokeless tobacco: Never   Substance and Sexual Activity    Alcohol use: Not Currently    Drug use: Never    Sexual activity: Not Currently     Partners: Female     Social Determinants of Health     Financial Resource Strain: Low Risk     Difficulty of Paying Living Expenses: Not hard at all   Food Insecurity: No Food Insecurity    Worried About Running Out of Food in the Last Year: Never true    Ran Out of Food in the Last Year: Never true   Transportation Needs: No Transportation Needs    Lack of Transportation (Medical): No    Lack of Transportation (Non-Medical): No   Physical Activity: Inactive    Days of Exercise per Week: 0 days    Minutes of Exercise per Session: 0 min   Stress: No Stress Concern Present    Feeling of Stress : Not  at all   Social Connections: Moderately Isolated    Frequency of Communication with Friends and Family: More than three times a week    Frequency of Social Gatherings with Friends and Family: More than three times a week    Attends [employer] Services: Never    Active Member of Clubs or Organizations: No    Attends Club or Organization Meetings: Never    Marital Status:    Housing Stability: Low Risk     Unable to Pay for Housing in the Last Year: No    Number of Places Lived in the Last Year: 1    Unstable Housing in the Last Year: No       Family History   Problem Relation Age of Onset    Lung cancer Father     Cancer Father         Lung cancer    Throat cancer Brother     Cancer Brother         Throat       Outpatient Encounter Medications as of 1/3/2023   Medication Sig Dispense Refill    albuterol-ipratropium (DUO-NEB) 2.5 mg-0.5 mg/3 mL nebulizer solution Take 3 mLs by nebulization every 4 (four) hours as needed for Shortness of Breath or Wheezing. Rescue 90 mL 1    diphenhydrAMINE (BENADRYL) 25 mg capsule 1 capsule (25 mg total) by Per G Tube route every 6 (six) hours as needed for Itching. 90 capsule 1    famotidine (PEPCID) 20 MG tablet Take 1 tablet (20 mg total) by mouth every evening. 30 tablet 11    glutamine 10 gram PwPk Take 10 g by mouth 2 (two) times a day.      HYDROcodone-acetaminophen (NORCO) 5-325 mg per tablet Take 1 tablet by mouth every 4 (four) hours as needed for Pain.      levoFLOXacin (LEVAQUIN) 25 mg/mL Soln Take 30 mLs (750 mg total) by mouth once daily. for 21 days 630 mL 0    nystatin (MYCOSTATIN) cream Apply topically 2 (two) times daily. 15 g 5    ondansetron (ZOFRAN-ODT) 8 MG TbDL Take 8 mg by mouth every 8 (eight) hours as needed for Nausea. Tab 1 ODT in AM for 2 days after chemotherapy      pantoprazole (PROTONIX) 20 MG tablet Take 1 tablet (20 mg total) by mouth once daily. 30 tablet 11    [DISCONTINUED] aspirin (ECOTRIN) 81 MG EC tablet Take 81 mg by mouth once daily.        Facility-Administered Encounter Medications as of 1/3/2023   Medication Dose Route Frequency Provider Last Rate Last Admin    LIDOcaine (PF) 40 mg/mL (4 %) injection 2 mL  2 mL Tracheal Tube 1 time in Clinic/HOD Kevin Palomino MD        phenylephrine HCL 1% nasal spray 1 spray  1 spray Each Nostril 1 time in Clinic/HOD Kevin Palomino MD           Physical Exam:  There were no vitals filed for this visit.      Constitutional  General Appearance: well nourished, well-developed, AAO x3, NAD  HEENT  Eyes: PEERLA, EOMI, normal conjunctivae  Ears: Hearing well at conversation level  Nose: septum midline, no inferior turbinate hypertrophy, no polyps, moist mucosa without erythema or blue hue  OC/OP: dentition moderate, no oral lesions, tongue/FOM/BOT- soft, no leukoplakia/ulcerations/ tenderness   Nasopharynx, Hypopharynx, and Larynx:               Indirect: attempted, limited view due to patient intolerance  Neck: right submandibular region with soft edema  Right neck nontender, UCHE hole closing, other infection eruption site slowly healing, pinpoint hole  Laryngectomy tube in place, no purulent drainage noted, trach collar and ties on; lateral tracheal walls with ulceration, two spots on right, one on left; granulation tissue type growth superior to the right superior ulceration spot  Breathing well without issues  Neuro: CN II - XII intact bilaterally  Cardiovascular: peripheral pulses palpable  Respiratory: non-labored respirations  Psychiatric: oriented to time, place and person, no depression, anxiety or agitation    12/6/22        Procedures:Flexible Fiberoptic Laryngoscopy/Nasopharyngoscopy via right nare    Procedure in Detail: Informed consent was obtained from the patient after explanation of procedure, indications, risks and benefits. Flexible endoscopy was performed through the nasal passages. The nasal cavity, nasopharynx, oropharynx, hypopharynx and larynx were adequately visualized. The true vocal  cords and arytenoids were examined during phonation and repose.    Anesthesia: None  Adverse Events: None  Blood loss: none  Condition: good    Findings:  NP/OP: no masses/lesions of NC, eustachian tube, fossa of Rosenmuller, no adenoid hypertrophy  BOT/vallecula: no lingual hypertrophy, no masses/lesions  Neopharynx without evidence of discrete lesion but with mild bulkiness of the right hypopharyngeal area      Pertinent Data:  NM PET CT ROUTINE     CLINICAL HISTORY  Non-small cell lung cancer (NSCLC), metastatic, assess treatment response; Malignant neoplasm of unspecified part of right bronchus or lung; status post 2 weeks of chemo/radiotherapy (surveillance/restaging)     TECHNIQUE  Intravenous injection of 9.8 mCi 18F-FDG was performed, with subsequent PET imaging from skull base through the upper thighs.  Corresponding non-contrast helical-acquisition CT images were obtained for anatomic correlation and attenuation correction.  Fused-modality multiplanar, PET rotational planar, and PET coronal MIP reconstructions were accomplished by a technologist at a separate workstation and submitted to PACS for physician review.     COMPARISON  1 August 2022     FINDINGS  Exam quality: adequate for evaluation        HEAD / NECK:     There is symmetric radiotracer activity through the visualized brain.     Ill-defined hypermetabolic tissue is again appreciated at the left neck, just superior and lateral to the tracheostomy insertion site (fused axial series, images 40-50).  The regional maximum SUV is 6.8, with discrete CT-correlate lesion poorly delineated secondary to non-contrast protocol; prior SUV max 8.3. An adjacent, better delineated right cervical chain lymph node is visualized posteriorly, measuring 1.1 cm short axis with SUV max of 6 (image 45); this previously measured 1.5 cm in least dimension with maximum SUV of 7.7.  No convincing new or enlarging cervical adenopathy or newly hypermetabolic lymph nodes are  visualized.  The prior left neck fluid collection is no longer evident.        CHEST:     The somewhat lobulated right upper lobe hilar mass is now approximately 3.4 cm x 1.4 cm and maximum SUV 23 (series 3, image 86); previously 3.6 cm x 1.8 cm and SUV max 14.8.  No new or enlarging hypermetabolic lung lesion, and no development of organized airspace consolidation or pleural effusion.     No suspicious abnormality of the mediastinal vasculature is identified.  There is interval placement of a left chest wall subcutaneous port with catheter terminating at the mid SVC region.  The heart chambers are of normal-appearing volume. There is no significant pericardial fluid.     Mediastinal and right hilar FDG-avid adenopathy is again appreciated.  The index right precarinal lymph node measures 1.2 cm short axis with SUV max of 16 (series 3, image 88); previously 1.6 cm and maximum SUV of 13.8.  The reference right hilar lymph node is poorly delineated from adjacent vascular structures secondary to non-contrast protocol, but is estimated to measure 1.8 cm short axis and has maximum SUV of 21 (series 3, image 92); this node previously measured 2.9 cm in least dimension with maximum SUV of 9.8.        ABDOMEN / PELVIS:     Normal physiologic distribution of activity is appreciated through the abdomen and pelvis.     No findings to suggest new or worsened focal FDG-avid process or CT-evident lesion involving the upper abdominal solid organs, and no acute findings appreciated.  The urinary bladder and reproductive structures are unremarkable for PET-CT evaluation.     Abdominopelvic vascular structures are unchanged. No evidence of new/worsening hypermetabolic lou disease.        MUSCULOSKELETAL / SUBCUTANEOUS:     No development of suspicious FDG uptake, destructive lesion, or acute process.     IMPRESSION  1. Findings suggestive of mixed disease response.  Right lower cervical chain nodes are less metabolically  active.  Right hilar mass and mediastinal/right hilar lymph nodes are decreased in size, but demonstrate increased metabolic activity.  2. No findings to suggest new or worsening FDG-avid metastatic disease within the left thoracic cavity, abdomen, or pelvis.  3. Previously visualized left neck fluid collection is no longer clearly identified.     RADIATION DOSE  Automated tube current modulation, weight-based exposure dosing, and/or iterative reconstruction technique utilized to reach lowest reasonably achievable exposure rate.     DLP: 661 mGy*cm        Electronically signed by: Haja Paulson  Date:                                            10/13/2022  Time:                                           16:25    FL ESOPHAGRAM COMPLETE     CLIICAL HISTORY:  Cellulitis of neck     TECHNIQUE:  The patient was given oral contrast and multiple fluoroscopic images of the esophagus obtained in various positions. Total fluoroscopy time is 0.7 minutes with absorbed dose of 7.245 mGy.     COMPARISON:  Esophagram performed 07/15/2022     FINDINGS:  Exam performed with Gastrografin and thin barium.  Patient swallowed contrast without difficulty.  No cervical esophageal contrast extravasation identified.     Impression:     No cervical esophageal leak identified.        Electronically signed by: Lance Janusz  Date:                                            10/31/2022  Time:                                           11:10      Assessment/Plan:  Josafat Boudreaux is a 57 y.o. 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 Emory Alonso  Medical Oncologist: Yara Atkinson MD (Last seen: 10/18/22)  Radiation Oncologist: Romie Das MD (Last seen: Unknown)    Primary tumor: Endolaryngeal [id number] SCCA    Date of Diagnosis: 7/11/22  Surgery  Date: 7/11/22  Induction Chemotherapy: No  Adjuvant Therapy: CRT  Completion Date: Sept 2022 for RT, still undergoing chemo    Pertinent Treatment History:  CRT    Place date if completed   3 6 12 18 24 36 48 60   CT Neck 10/19/22 X X X X X X X   PET/CT 10/13/22          CXR  X X X X X X X   TFT X  X  X X X X     Current Surveillance Interval: <1 year post-treatment, q6 wks     - Keep close eye on ulcerations of tracheal wall, three on lateral aspect of right wall, and one on left lateral aspect, likely due to irritaion from coughing and size 10 Pam tube.  Appears to be improving compared to last visit.  Apply Aquaphor to area TID.  - FFL at next visit on 1/11/23    Marty Jeffries MD  [facility] Otolaryngology PGY III

## 2023-01-11 NOTE — TELEPHONE ENCOUNTER
----- Message from Lata Gonzales LPN sent at 1/10/2023 10:55 AM CST -----  Regarding: keo Leon 1/19 @1:40  No labs needed.

## 2023-01-11 NOTE — PROGRESS NOTES
[facility]  Otolaryngology Clinic Note    Josafat Boudreaux  Encounter Date: 1/11/2023  YOB: 1965  Physician: Lisseth Krueger MD    Chief Complaint: Laryngeal mass    HPI: Josafat Boudreaux is a 57 y.o. male referred to clinic by Dr. Emory Alonso for laryngeal mass. Patients wife provides history. She states that patient first noticed hoarseness in November 2021. Got progressively worse. Presented to Dr. Alonso in early June 2022 for these complaints. Was noted to have a laryngeal mass on flexible laryngoscopy. Several days after the flexible laryngoscopy patient developed worsening swelling of the airway and presented to the ER in respiratory distress. He underwent emergent tracheostomy with Dr. Junior Alonso. Patient was subsequently discharged home but readmitted shortly after with seizures. He spent 3 days in the ICU on the ventilator. During hospitalization, patient underwent PEG tube placement. He reports that he was not having any issues with PO intake prior to the tracheostomy placement. Now he is PEG dependent. Only taking some ice chips by mouth. He presents today to discuss laryngectomy.     11/2/22:  Since last visit, patient total laryngectomy with bilateral neck dissections level 3 and 4, primary closure, he did not undergo TEP placement.  He was also found to have synchronous primary or lung metastasis in the lung that was also squamous cell carcinoma.  He underwent radiation completed this in the middle of September 2022.  He received carboplatin and Taxol which he still has a few more treatments of.  He presented to the emergency department on 10/08/2022 with a right neck infection.  This was treated with IV antibiotics and incision and drainage with UCHE drain placement.  He was kept NPO and did feeds through his PEG tube.  He met criteria for discharge on 10/25/22 with UCHE drain.  He had outpatient swallow study which did not demonstrate any  evidence of a leak.  He returns to clinic today reporting improvement in his symptoms including right neck pain and drainage.  He has not had to empty his UCHE drain on a daily basis at this point.  He had a PET performed on 10/13/2022 to assess response to chemotherapy.  On that PET scan, right hilar and mediastinal lymph nodes were decreased in size but had increased metabolic activity.  He denies any other issues today    11/7/22: Reports worsening swelling under the right jawline the past few days.  Denies any erythema, infection, fever or chills.    12/6/22:  Feeling a little run down, increased cough with blood tinged secretions, running temperature to 101 at home.  Received azithromycin abx last night from Dr. Alonso's office.    12/28/22:  Was hospitalized for Pseudomonal pneumonia.  He also had Blastomycetes positive antigen on arrival, but was unable to tolerate amphotericin B due to significant hypotension.  Prior to discharge, his repeat Blastomycetes antigen was negative.  Discharged with levaquin and portable home oxygen.  His pneumonia symptoms have all improved since discharge.  Ulceration spots have persisted with one spot having a small amount of growth adjacent.  No bleeding or pain.    1/3/22:  Returns for follow up for monitoring of tracheal ulceration.  Reports interval improvement.  Denies pain or bleeding from area.  Denies swelling or tenderness to neck.    1/11/22:  Returns for follow up.  No changes to tracheal ulceration.  Denies pain or bleeding.  Otherwise doing well.    ROS:   General: Negative except per HPI  Skin: Denies rash, ulcer, or lesion.  Eyes: Denies vision changes or diplopia.  Ears: Negative except per HPI  Nose: Negative except per HPI  Throat/mouth: Negative except per HPI  Cardiovascular: Negative except per HPI  Respiratory: Negative except per HPI  Neck: Negative except per HPI  Endocrine: Negative except per HPI  Neurologic: Negative except per HPI    Other 10-point  review of systems negative except per HPI      Review of patient's allergies indicates:  No Known Allergies    Past Medical History:   Diagnosis Date    Cancer     COPD (chronic obstructive pulmonary disease)     Dysphagia     Lung cancer     Vocal cord mass        Past Surgical History:   Procedure Laterality Date    DIRECT DIAGNOSTIC LARYNGOSCOPY WITH BRONCHOSCOPY AND ESOPHAGOSCOPY N/A 2022    Procedure: LARYNGOSCOPY, DIRECT, DIAGNOSTIC, WITH BRONCHOSCOPY AND ESOPHAGOSCOPY;  Surgeon: Emory Alonso MD;  Location: [facility];  Service: ENT;  Laterality: N/A;    HIP SURGERY      HUMERUS FRACTURE SURGERY      INCISION AND DRAINAGE, NECK Bilateral 10/21/2022    Procedure: INCISION AND DRAINAGE,NECK;  Surgeon: Madison Worley MD;  Location: [facility];  Service: ENT;  Laterality: Bilateral;    INSERT ARTERIAL LINE  2022         JOINT REPLACEMENT  2019    R hip    TRACHEOSTOMY N/A 06/10/2022    Procedure: CREATION, TRACHEOSTOMY;  Surgeon: Junior Alonso MD;  Location: [facility];  Service: ENT;  Laterality: N/A;       Social History     Socioeconomic History    Marital status:    Tobacco Use    Smoking status: Former     Packs/day: 0.50     Years: 15.00     Pack years: 7.50     Types: Cigarettes     Start date: 1980     Quit date: 2022     Years since quittin.5    Smokeless tobacco: Never   Substance and Sexual Activity    Alcohol use: Not Currently    Drug use: Never    Sexual activity: Not Currently     Partners: Female     Social Determinants of Health     Financial Resource Strain: Low Risk     Difficulty of Paying Living Expenses: Not hard at all   Food Insecurity: No Food Insecurity    Worried About Running Out of Food in the Last Year: Never true    Ran Out of Food in the Last Year: Never true   Transportation Needs: No Transportation Needs    Lack of Transportation (Medical): No    Lack of Transportation (Non-Medical): No   Physical Activity: Inactive    Days of Exercise per  Week: 0 days    Minutes of Exercise per Session: 0 min   Stress: No Stress Concern Present    Feeling of Stress : Not at all   Social Connections: Moderately Isolated    Frequency of Communication with Friends and Family: More than three times a week    Frequency of Social Gatherings with Friends and Family: More than three times a week    Attends Restorationism Services: Never    Active Member of Clubs or Organizations: No    Attends Club or Organization Meetings: Never    Marital Status:    Housing Stability: Low Risk     Unable to Pay for Housing in the Last Year: No    Number of Places Lived in the Last Year: 1    Unstable Housing in the Last Year: No       Family History   Problem Relation Age of Onset    Lung cancer Father     Cancer Father         Lung cancer    Throat cancer Brother     Cancer Brother         Throat       Outpatient Encounter Medications as of 1/11/2023   Medication Sig Dispense Refill    albuterol-ipratropium (DUO-NEB) 2.5 mg-0.5 mg/3 mL nebulizer solution Take 3 mLs by nebulization every 4 (four) hours as needed for Shortness of Breath or Wheezing. Rescue 90 mL 1    diphenhydrAMINE (BENADRYL) 25 mg capsule 1 capsule (25 mg total) by Per G Tube route every 6 (six) hours as needed for Itching. 90 capsule 1    famotidine (PEPCID) 20 MG tablet Take 1 tablet (20 mg total) by mouth every evening. 30 tablet 11    glutamine 10 gram PwPk Take 10 g by mouth 2 (two) times a day.      HYDROcodone-acetaminophen (NORCO) 5-325 mg per tablet Take 1 tablet by mouth every 4 (four) hours as needed for Pain.      nystatin (MYCOSTATIN) cream Apply topically 2 (two) times daily. 15 g 5    ondansetron (ZOFRAN-ODT) 8 MG TbDL Take 8 mg by mouth every 8 (eight) hours as needed for Nausea. Tab 1 ODT in AM for 2 days after chemotherapy      pantoprazole (PROTONIX) 20 MG tablet Take 1 tablet (20 mg total) by mouth once daily. 30 tablet 11    [DISCONTINUED] aspirin (ECOTRIN) 81 MG EC tablet Take 81 mg by mouth once  daily.       Facility-Administered Encounter Medications as of 1/11/2023   Medication Dose Route Frequency Provider Last Rate Last Admin    LIDOcaine (PF) 40 mg/mL (4 %) injection 2 mL  2 mL Tracheal Tube 1 time in Clinic/HOD Kevin Palomino MD        phenylephrine HCL 1% nasal spray 1 spray  1 spray Each Nostril 1 time in Clinic/HOD Kevin Palomino MD           Physical Exam:  Vitals:    01/11/23 0801   BP: (!) 95/58   Pulse: (!) 120   Weight: 65.3 kg (144 lb)         Constitutional  General Appearance: well nourished, well-developed, AAO x3, NAD  HEENT  Eyes: PEERLA, EOMI, normal conjunctivae  Ears: Hearing well at conversation level  Nose: septum midline, no inferior turbinate hypertrophy, no polyps, moist mucosa without erythema or blue hue  OC/OP: dentition moderate, no oral lesions, tongue/FOM/BOT- soft, no leukoplakia/ulcerations/ tenderness   Nasopharynx, Hypopharynx, and Larynx:               Indirect: attempted, limited view due to patient intolerance  Neck: right submandibular region with soft edema  Right neck nontender, UCHE hole closing, other infection eruption site slowly healing, pinpoint hole  Laryngectomy tube in place, no purulent drainage noted, trach collar and ties on; lateral tracheal walls with ulceration, two spots on right, one on left; granulation tissue type growth superior to the right superior ulceration spot  Breathing well without issues  Neuro: CN II - XII intact bilaterally  Cardiovascular: peripheral pulses palpable  Respiratory: non-labored respirations  Psychiatric: oriented to time, place and person, no depression, anxiety or agitation    12/6/22        Procedures:Flexible Fiberoptic Laryngoscopy/Nasopharyngoscopy via right nare  Performed on 1/11/2023  Marty Jeffries MD    Procedure in Detail: Informed consent was obtained from the patient after explanation of procedure, indications, risks and benefits. Flexible endoscopy was performed through the nasal passages. The nasal  cavity, nasopharynx, oropharynx, hypopharynx and larynx were adequately visualized. The true vocal cords and arytenoids were examined during phonation and repose.    Anesthesia: None  Adverse Events: None  Blood loss: none  Condition: good    Findings:  NP/OP: no masses/lesions of NC, eustachian tube, fossa of Rosenmuller, no adenoid hypertrophy  BOT/vallecula: no lingual hypertrophy, no masses/lesions  Neopharynx without evidence of discrete lesion but with mild bulkiness of the right hypopharyngeal area      Pertinent Data:  NM PET CT ROUTINE     CLINICAL HISTORY  Non-small cell lung cancer (NSCLC), metastatic, assess treatment response; Malignant neoplasm of unspecified part of right bronchus or lung; status post 2 weeks of chemo/radiotherapy (surveillance/restaging)     TECHNIQUE  Intravenous injection of 9.8 mCi 18F-FDG was performed, with subsequent PET imaging from skull base through the upper thighs.  Corresponding non-contrast helical-acquisition CT images were obtained for anatomic correlation and attenuation correction.  Fused-modality multiplanar, PET rotational planar, and PET coronal MIP reconstructions were accomplished by a technologist at a separate workstation and submitted to PACS for physician review.     COMPARISON  1 August 2022     FINDINGS  Exam quality: adequate for evaluation        HEAD / NECK:     There is symmetric radiotracer activity through the visualized brain.     Ill-defined hypermetabolic tissue is again appreciated at the left neck, just superior and lateral to the tracheostomy insertion site (fused axial series, images 40-50).  The regional maximum SUV is 6.8, with discrete CT-correlate lesion poorly delineated secondary to non-contrast protocol; prior SUV max 8.3. An adjacent, better delineated right cervical chain lymph node is visualized posteriorly, measuring 1.1 cm short axis with SUV max of 6 (image 45); this previously measured 1.5 cm in least dimension with maximum SUV  of 7.7.  No convincing new or enlarging cervical adenopathy or newly hypermetabolic lymph nodes are visualized.  The prior left neck fluid collection is no longer evident.        CHEST:     The somewhat lobulated right upper lobe hilar mass is now approximately 3.4 cm x 1.4 cm and maximum SUV 23 (series 3, image 86); previously 3.6 cm x 1.8 cm and SUV max 14.8.  No new or enlarging hypermetabolic lung lesion, and no development of organized airspace consolidation or pleural effusion.     No suspicious abnormality of the mediastinal vasculature is identified.  There is interval placement of a left chest wall subcutaneous port with catheter terminating at the mid SVC region.  The heart chambers are of normal-appearing volume. There is no significant pericardial fluid.     Mediastinal and right hilar FDG-avid adenopathy is again appreciated.  The index right precarinal lymph node measures 1.2 cm short axis with SUV max of 16 (series 3, image 88); previously 1.6 cm and maximum SUV of 13.8.  The reference right hilar lymph node is poorly delineated from adjacent vascular structures secondary to non-contrast protocol, but is estimated to measure 1.8 cm short axis and has maximum SUV of 21 (series 3, image 92); this node previously measured 2.9 cm in least dimension with maximum SUV of 9.8.        ABDOMEN / PELVIS:     Normal physiologic distribution of activity is appreciated through the abdomen and pelvis.     No findings to suggest new or worsened focal FDG-avid process or CT-evident lesion involving the upper abdominal solid organs, and no acute findings appreciated.  The urinary bladder and reproductive structures are unremarkable for PET-CT evaluation.     Abdominopelvic vascular structures are unchanged. No evidence of new/worsening hypermetabolic lou disease.        MUSCULOSKELETAL / SUBCUTANEOUS:     No development of suspicious FDG uptake, destructive lesion, or acute process.     IMPRESSION  1. Findings  suggestive of mixed disease response.  Right lower cervical chain nodes are less metabolically active.  Right hilar mass and mediastinal/right hilar lymph nodes are decreased in size, but demonstrate increased metabolic activity.  2. No findings to suggest new or worsening FDG-avid metastatic disease within the left thoracic cavity, abdomen, or pelvis.  3. Previously visualized left neck fluid collection is no longer clearly identified.     RADIATION DOSE  Automated tube current modulation, weight-based exposure dosing, and/or iterative reconstruction technique utilized to reach lowest reasonably achievable exposure rate.     DLP: 661 mGy*cm        Electronically signed by: Haja Paulson  Date:                                            10/13/2022  Time:                                           16:25    FL ESOPHAGRAM COMPLETE     CLIICAL HISTORY:  Cellulitis of neck     TECHNIQUE:  The patient was given oral contrast and multiple fluoroscopic images of the esophagus obtained in various positions. Total fluoroscopy time is 0.7 minutes with absorbed dose of 7.245 mGy.     COMPARISON:  Esophagram performed 07/15/2022     FINDINGS:  Exam performed with Gastrografin and thin barium.  Patient swallowed contrast without difficulty.  No cervical esophageal contrast extravasation identified.     Impression:     No cervical esophageal leak identified.        Electronically signed by: Lance Boudreaux  Date:                                            10/31/2022  Time:                                           11:10      Assessment/Plan:  Josafat Boudreaux is a 57 y.o. 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 Emory Alonso  Medical Oncologist: Yara Atkinson MD (Last seen: 10/18/22)  Radiation Oncologist: Romie Das MD (Last seen:  Unknown)    Primary tumor: Endolaryngeal [id number] SCCA    Date of Diagnosis: 7/11/22  Surgery Date: 7/11/22  Induction Chemotherapy: No  Adjuvant Therapy: CRT  Completion Date: Sept 2022 for RT, still undergoing chemo    Pertinent Treatment History:  CRT    Place date if completed   3 6 12 18 24 36 48 60   CT Neck 10/19/22 X X X X X X X   PET/CT 10/13/22          CXR  12/21/22 X X X X X X   TFT X 12/6/22 X  X X X X     Current Surveillance Interval: <1 year post-treatment, q6 wks     - Biopsied right stomal lesion.  - Will follow up pathology.  - Apply Aquaphor to area TID.    Video visit in 1 week to discuss pathology results.    Marty Jeffries MD  [facility] Otolaryngology PGY III

## 2023-01-11 NOTE — PROGRESS NOTES
The scope used for the exam was:  Flexible scope ENF-P4  Serial Number:  1)    [id number]    []   2)    [id number]    []   3)    [id number]    []   4)    [id number]    []   5)    [id number]    []   6)    [id number]    [x]       The scope used for the exam was:  Rigid scope   Serial Number:  1)   [id number]    []   2)   [id number]    []   3)   7330    []   4)    3384   []   5)    0824   []   6)    5554   []     7)   7425    []   8)   2240    []   9)   1109    []

## 2023-01-18 PROBLEM — Z79.899 ON ANTINEOPLASTIC CHEMOTHERAPY: Status: ACTIVE | Noted: 2023-01-01

## 2023-01-18 NOTE — PROGRESS NOTES
HEMATOLOGY/ONCOLOGY OFFICE CLINIC VISIT    Visit Information:     Initial Evaluation: 7/21/2022  Referring Provider:   Other providers:  Code status:     Diagnosis:  [id number] JENNYFER Laryngeal Squamous cell carcinoma      Present treatment:  Carbo/Taxol/Keytruda planned 11/30/2022     Treatment/Oncology history:  --7/11/2022: total laryngectomy  --Carbo/Taxol + RT (8/17/22-9/21/22)  --8/17/2022-9/28/2022:  6000 cGy, 200 cGy/fraction, 30/30 fx     Plan of care: Carbo/Taxol/Keytruda x 4-6 --> maintenance Keytruda    Imaging:  Ct neck 6/6/2022: A metastatic right level III cervical lymph node is present with short axis measurement of 1.6 cm.  This lymph node contains internal cystic change, typical of metastatic squamous cell carcinoma.  A left level III cervical lymph node shows short axis measurements of 1 cm, and is suspicious.    A left supraglottic mass measures approximately 2.4 cm in diameter (axial post-contrast image 39), presumably corresponding to the primary neoplasm.  Metastatic lymph nodes are also present in the inferior right paratracheal position, measuring 1.3 cm in short axis.  Metastatic lymph nodes are present in the superior right hilum, measuring 1.8 cm in short axis.  Ct H&N 6/27/2022: 1. There is mild stenosis at the origin of left proximal internal carotid artery secondary to dense calcified atheromatous plaque. 2. There is consolidation is right upper lobe. Additional ground-glass opacities are also seen on the remainder of the visualized lungs. These findings are consistent with an infectious process. Small right pleural effusion is seen. There is an ill-defined soft tissue mass in the right perihilar region which measures approximately 3.1 x 2.9 x 4.6 cm, of concern for neoplasm. Correlate clinically as regards further evaluation and followup with CT chest. There is an ill-defined enhancing soft tissue mass in the glottis infiltrating into the paraglottic spaces and the subglottic region  with obliteration of the airway, of concern for a neoplasm. 3. Unremarkable CT angiogram of the head. Details and other findings as described above.  NM PET CT 8/1/2022: Postoperative changes of recent total laryngectomy and lou dissection. bilateral hypermetabolic cervical lymphadenopathy.  A right cervical lymph node 1.7 x 1.8 cm, compared to 1.6 x 1.5 cm previously, SUV of 7.7. left cervical lymph node 8 x 10 mm and has a max SUV of 3.4.  A fluid collection posterior to the left internal jugular vein measures 2.4 x 2.4 cm. There is right hilar and mediastinal lymphadenopathy.  A precarinal lymph node 1.6 x 2.3 cm, compared to 1.3 x 1.7 cm previously, SUV of 13.8.  Right hilar lymphadenopathy measures 3 x 2.9 cm, compared to 2.1 x 2.3 cm previously, and has a max SUV of 9.8.  A suspected right hilar mass measures 1.8 x 3.6 cm SUV of 14.8   PET CT 10/13/2022: Ill-defined hypermetabolic tissue is again appreciated at the left neck, just superior and lateral to the tracheostomy insertion site. The regional maximum SUV is 6.8, with discrete CT-correlate lesion poorly delineated secondary to non-contrast protocol; prior SUV max 8.3. An adjacent, better delineated right cervical chain lymph node is visualized posteriorly, measuring 1.1 cm short axis with SUV max of 6; this previously measured 1.5 cm in least dimension with maximum SUV of 7.7.  No convincing new or enlarging cervical adenopathy or newly hypermetabolic lymph nodes are visualized.  The prior left neck fluid collection is no longer evident.The somewhat lobulated right upper lobe hilar mass is now approximately 3.4 cm x 1.4 cm and maximum SUV 23; previously 3.6 cm x 1.8 cm and SUV max 14.8.  No new or enlarging hypermetabolic lung lesion, and no development of organized airspace consolidation or pleural effusion.     Pathology:  6/14/2022:  EBUS ENDOBRONCHIAL MASS RUL, CYTOLOGY: NEARLY ACELLULAR SPECIMEN CONSISTING OF FRESH BLOOD CLOT. NO ATYPICAL OR  MALIGNANT CELLS IDENTIFIED.   7/11/2022: Bronchial Wash, RLL, right bronchial mass washings:  - Squamous cell carcinoma.    Bronchial Wash, LLL, left bronchial mass washings: - Squamous cell carcinoma.     7/11/2022 Laryngectomy:  1. Larynx, laryngeal biopsy frozen section : - Squamous cell carcinoma with necrosis.    2. Neck, Anterior, left neck level 3: - Number of lymph nodes involved by carcinoma:  1/7       - Size of metastatic deposit:  2.5 mm - Extranodal extension:  Not identified      3. Larynx, resection without nodes, total layrngectomy :  - Squamous cell carcinoma.  4. Neck, Anterior, left neck level 4: - Number of lymph nodes involved by carcinoma:  0/10  5. Neck, Anterior, right neck level 4:  - Number of lymph nodes involved by carcinoma:  0/11  6. Neck, Anterior, right neck level 3: - Number of lymph nodes involved by carcinoma:  0/9  7. Nasophaynx, inferior pharyngeal mucosa : - Benign squamous mucosa. - No evidence of malignancy.     8. Cartilage, superior cartilage margin : - No evidence of malignancy.    9. Lymph Node, pretracheal lymph  node :  - Number of lymph nodes involved by carcinoma:  0/3  [id number]      CLINICAL HISTORY:       Patient: Josafat Boudreaux is a 57 y.o. male.kindly refer her for laryngeal carcinoma.  Patient  was referred to ENT for further evaluation of hoarseness.  He did not have any difficulty swallowing or dry mouth at the time.  Hoarseness has been present for at least 4 months prior to evaluation. 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      Patient was scheduled to trach but on 06/10/2022 he was brought to the emergency room via air met with is short of breath.  He was found to have and oxygenation on the 40s when EMS was called.  He was placed on BiPAP but pCO2 increased so he has an emergent tracheostomy performed.  He has a PEG tube placed same hospitalization.  He underwent bronchoscopy with  biopsy of the right upper lobe endobronchial lesion on 6/14/2022 cytology was negative for malignant cells.       On 7/11/2022 he underwent LARYNGECTOMY, WITH RADICAL NECK DISSECTION - Bilateral LARYNGOSCOPY, DIRECT, DIAGNOSTIC, WITH BRONCHOSCOPY AND ESOPHAGOSCOPY pathology report as above.  1/40 lymph nodes were involved with metastatic disease.  Bronchoscopy was repeated and biopsy of the left lower lobe and right lower lobe were both positive for squamous cell carcinoma.     He is here today with his wife.  He has recovered from surgery relatively well.  Tracheostomy in place.  He is unable to talk so the history was taken by electronic medical record and wife.     Patient has a brother with history of throat cancer. Patient used to smoke 1 and half pack per day since he was 60 years old.  He was smoking less recently.      Chief Complaint: No Concerns today      Interval History:  Patient presents today for follow-up accompanied by his wife. He completed 6th cycle of weekly Carbo/Taxol and radiation on 9/21/22. He is due for cycle #2 of Carbo/Taxol/Keytruda tomorrow. After cycle #1, he developed a cough with greenish-yellow sputum from trach,  ENT prescribed oral antibiotics, but he was unable to take by mouth due to surgery and medication could not be crushed.   They are upset that no one reached out  to them to send to the hospital with abnormal labs on 12/6/22, specifically his WBC count of 1.4 with . Explained that we send to the hospital for ANC  < 500, fever and no responsive to prophylactic antibiotics. He was admitted to ICU @ [facility], treated with antibiotics.   He is much better today.      Past Medical History:   Diagnosis Date    Cancer     COPD (chronic obstructive pulmonary disease)     Dysphagia     Lung cancer     Vocal cord mass       Past Surgical History:   Procedure Laterality Date    DIRECT DIAGNOSTIC LARYNGOSCOPY WITH BRONCHOSCOPY AND ESOPHAGOSCOPY N/A 07/11/2022    Procedure:  LARYNGOSCOPY, DIRECT, DIAGNOSTIC, WITH BRONCHOSCOPY AND ESOPHAGOSCOPY;  Surgeon: Emory Alonso MD;  Location: [facility] OR;  Service: ENT;  Laterality: N/A;    HIP SURGERY      HUMERUS FRACTURE SURGERY      INCISION AND DRAINAGE, NECK Bilateral 10/21/2022    Procedure: INCISION AND DRAINAGE,NECK;  Surgeon: Madison Worley MD;  Location: [facility] OR;  Service: ENT;  Laterality: Bilateral;    INSERT ARTERIAL LINE  06/26/2022         JOINT REPLACEMENT  2019    R hip    TRACHEOSTOMY N/A 06/10/2022    Procedure: CREATION, TRACHEOSTOMY;  Surgeon: Junior Alonso MD;  Location: [facility] OR;  Service: ENT;  Laterality: N/A;     Family History   Problem Relation Age of Onset    Lung cancer Father     Cancer Father         Lung cancer    Throat cancer Brother     Cancer Brother         Throat     Social Connections: Moderately Isolated    Frequency of Communication with Friends and Family: More than three times a week    Frequency of Social Gatherings with Friends and Family: More than three times a week    Attends Mu-ism Services: Never    Active Member of Clubs or Organizations: No    Attends Club or Organization Meetings: Never    Marital Status:        Review of patient's allergies indicates:  No Known Allergies   Current Outpatient Medications on File Prior to Visit   Medication Sig Dispense Refill    albuterol-ipratropium (DUO-NEB) 2.5 mg-0.5 mg/3 mL nebulizer solution Take 3 mLs by nebulization every 4 (four) hours as needed for Shortness of Breath or Wheezing. Rescue 90 mL 1    diphenhydrAMINE (BENADRYL) 25 mg capsule 1 capsule (25 mg total) by Per G Tube route every 6 (six) hours as needed for Itching. 90 capsule 1    famotidine (PEPCID) 20 MG tablet Take 1 tablet (20 mg total) by mouth every evening. 30 tablet 11    glutamine 10 gram PwPk Take 10 g by mouth 2 (two) times a day.      HYDROcodone-acetaminophen (NORCO) 5-325 mg per tablet Take 1 tablet by mouth every 4 (four) hours as needed for Pain.       nystatin (MYCOSTATIN) cream Apply topically 2 (two) times daily. 15 g 5    ondansetron (ZOFRAN-ODT) 8 MG TbDL Take 8 mg by mouth every 8 (eight) hours as needed for Nausea. Tab 1 ODT in AM for 2 days after chemotherapy      pantoprazole (PROTONIX) 20 MG tablet Take 1 tablet (20 mg total) by mouth once daily. 30 tablet 11    [DISCONTINUED] aspirin (ECOTRIN) 81 MG EC tablet Take 81 mg by mouth once daily.       Current Facility-Administered Medications on File Prior to Visit   Medication Dose Route Frequency Provider Last Rate Last Admin    LIDOcaine (PF) 40 mg/mL (4 %) injection 2 mL  2 mL Tracheal Tube 1 time in Clinic/HOD Kevin Palomino MD        phenylephrine HCL 1% nasal spray 1 spray  1 spray Each Nostril 1 time in Clinic/HOD Kevin Palomino MD          Review of Systems   Constitutional:  Negative for activity change, appetite change, chills, diaphoresis, fatigue, fever and unexpected weight change.   HENT:  Negative for nasal congestion, mouth sores, nosebleeds, sinus pressure/congestion, sore throat and trouble swallowing.    Eyes: Negative.    Respiratory:  Negative for cough and shortness of breath.    Cardiovascular:  Negative for chest pain and palpitations.   Gastrointestinal:  Negative for abdominal distention, abdominal pain, blood in stool, change in bowel habit, constipation, diarrhea, nausea, vomiting and change in bowel habit.        Dysphagia   Endocrine: Negative.    Genitourinary:  Negative for bladder incontinence, decreased urine volume, difficulty urinating, dysuria, frequency, hematuria and urgency.   Musculoskeletal:  Negative for arthralgias, back pain, gait problem, joint swelling, leg pain and myalgias.   Integumentary:  Negative for rash.   Allergic/Immunologic: Negative.    Neurological:  Negative for dizziness, tremors, syncope, weakness, light-headedness, numbness, headaches and memory loss.   Hematological:  Negative for adenopathy. Does not bruise/bleed easily.  "  Psychiatric/Behavioral:  Negative for agitation, confusion, hallucinations, sleep disturbance and suicidal ideas. The patient is not nervous/anxious.             Vitals:    01/18/23 1234   BP: 116/74   BP Location: Left arm   Patient Position: Sitting   Pulse: 74   Temp: 98.6 °F (37 °C)   TempSrc: Oral   SpO2: 96%   Weight: 64.4 kg (142 lb)   Height: 5' 9" (1.753 m)      Physical Exam  Vitals and nursing note reviewed.   Constitutional:       General: He is not in acute distress.     Appearance: Normal appearance.   HENT:      Head: Normocephalic and atraumatic.      Mouth/Throat:      Mouth: Mucous membranes are moist.   Eyes:      General: No scleral icterus.     Extraocular Movements: Extraocular movements intact.      Conjunctiva/sclera: Conjunctivae normal.   Neck:      Vascular: No JVD.      Trachea: Tracheostomy present.      Comments: Tracheotomy in place.  Right side - Skin changes c/w RT     Cardiovascular:      Rate and Rhythm: Normal rate and regular rhythm.      Heart sounds: No murmur heard.  Pulmonary:      Effort: Pulmonary effort is normal.      Breath sounds: Normal breath sounds. No wheezing or rhonchi.   Abdominal:      General: The ostomy site is clean. Bowel sounds are normal. There is no distension.      Palpations: Abdomen is soft.      Tenderness: There is no abdominal tenderness.   Musculoskeletal:         General: No swelling or deformity.      Cervical back: Neck supple.   Lymphadenopathy:      Head:      Right side of head: No submandibular adenopathy.      Left side of head: No submandibular adenopathy.      Cervical: No cervical adenopathy.      Upper Body:      Right upper body: No supraclavicular or axillary adenopathy.      Left upper body: No supraclavicular or axillary adenopathy.      Lower Body: No right inguinal adenopathy. No left inguinal adenopathy.   Skin:     General: Skin is warm.      Coloration: Skin is not jaundiced.      Findings: No rash.   Neurological:      " General: No focal deficit present.      Mental Status: He is alert and oriented to person, place, and time.      Cranial Nerves: Cranial nerves 2-12 are intact.   Psychiatric:         Attention and Perception: Attention normal.         Behavior: Behavior is cooperative.     ECOG SCORE    1 - Restricted in strenuous activity-ambulatory and able to carry out work of a light nature         Laboratory:  CBC with Differential:  Lab Results   Component Value Date    WBC 11.2 01/18/2023    RBC 3.81 (L) 01/18/2023    HGB 11.9 (L) 01/18/2023    HCT 36.6 (L) 01/18/2023    MCV 96.1 (H) 01/18/2023    MCH 31.2 01/18/2023    MCHC 32.5 (L) 01/18/2023    RDW 13.2 01/18/2023     01/18/2023    MPV 8.7 01/18/2023        CMP:  Sodium Level   Date Value Ref Range Status   01/18/2023 136 136 - 145 mmol/L Final     Potassium Level   Date Value Ref Range Status   01/18/2023 4.5 3.5 - 5.1 mmol/L Final     Carbon Dioxide   Date Value Ref Range Status   01/18/2023 29 22 - 29 mmol/L Final     Blood Urea Nitrogen   Date Value Ref Range Status   01/18/2023 6.1 (L) 8.4 - 25.7 mg/dL Final     Creatinine   Date Value Ref Range Status   01/18/2023 0.81 0.73 - 1.18 mg/dL Final     Calcium Level Total   Date Value Ref Range Status   01/18/2023 9.1 8.4 - 10.2 mg/dL Final     Albumin Level   Date Value Ref Range Status   01/18/2023 2.7 (L) 3.5 - 5.0 g/dL Final     Bilirubin Total   Date Value Ref Range Status   01/18/2023 0.3 <=1.5 mg/dL Final     Alkaline Phosphatase   Date Value Ref Range Status   01/18/2023 85 40 - 150 unit/L Final     Aspartate Aminotransferase   Date Value Ref Range Status   01/18/2023 11 5 - 34 unit/L Final     Alanine Aminotransferase   Date Value Ref Range Status   01/18/2023 5 0 - 55 unit/L Final     Estimated GFR-Non    Date Value Ref Range Status   08/02/2022 >60 mls/min/1.73/m2 Final             Assessment:       1. Laryngeal cancer    2. Squamous cell carcinoma of both lungs    3. Malignant neoplasm  of lower lobe of right lung    4. Regional lymph node metastasis present    5. On antineoplastic chemotherapy    6. Tracheostomy dependent      Laryngeal Squamous cell carcinoma -s/p total laryngectomy, 1/40 LN with metastatic disease.   Lung masses x 2, bilateral--> Squamous cell         Plan:         Completed weekly carbo/Taxol and radiation on 9/21/22 for his laryngeal carcinoma. Lung disease progressing.   Right neck cellulitis s/p I&D is completely healed  Discussed rationale for weekly labs and also discuss neutropenic fever, precautions. I this that he will benefit of starting prophylactic antibiotics when his ANC is < 1,000 instead of 500 as he has tracheotomy and PEG tube that could be possible source of infection when becomes neutropenic.     Okay to proceed with cycle #2 Carbo/Taxol/Keytruda q3  tomorrow, 1/19/23  Carbo dose will be adjusted to AUC 5.0  Will add Neulasta to his chemotherapy.   RTC in 3 weeks with TD with labs  Weekly labs: CBC, CMP  Encourage to call or message us for any questions or problems  The patient was given ample opportunity to ask questions, and to the best of my abilities, all questions answered to satisfaction; patient demonstrated understanding of what we discussed and agreeable to the plan.     LENKA NELSON MD

## 2023-01-18 NOTE — PROGRESS NOTES
HEMATOLOGY/ONCOLOGY OFFICE CLINIC VISIT    Visit Information:    Initial Evaluation: 7/21/2022  Referring Provider:   Other providers:  Code status:    Diagnosis:  [id number] JENNYFER Laryngeal Squamous cell carcinoma     Present treatment:  Carbo/Taxol/Keytruda planned 11/30/2022    Treatment/Oncology history:  --7/11/2022: total laryngectomy  --Carbo/Taxol + RT (8/17/22-9/21/22)  --8/17/2022-9/28/2022:  6000 cGy, 200 cGy/fraction, 30/30 fx    Plan of care: Carbo/Taxol/Keytruda x 4-6 --> maintenance Keytruda    Imaging:  Ct neck 6/6/2022: A metastatic right level III cervical lymph node is present with short axis measurement of 1.6 cm.  This lymph node contains internal cystic change, typical of metastatic squamous cell carcinoma.  A left level III cervical lymph node shows short axis measurements of 1 cm, and is suspicious.    A left supraglottic mass measures approximately 2.4 cm in diameter (axial post-contrast image 39), presumably corresponding to the primary neoplasm.  Metastatic lymph nodes are also present in the inferior right paratracheal position, measuring 1.3 cm in short axis.  Metastatic lymph nodes are present in the superior right hilum, measuring 1.8 cm in short axis.  Ct H&N 6/27/2022: 1. There is mild stenosis at the origin of left proximal internal carotid artery secondary to dense calcified atheromatous plaque. 2. There is consolidation is right upper lobe. Additional ground-glass opacities are also seen on the remainder of the visualized lungs. These findings are consistent with an infectious process. Small right pleural effusion is seen. There is an ill-defined soft tissue mass in the right perihilar region which measures approximately 3.1 x 2.9 x 4.6 cm, of concern for neoplasm. Correlate clinically as regards further evaluation and followup with CT chest. There is an ill-defined enhancing soft tissue mass in the glottis infiltrating into the paraglottic spaces and the subglottic region with  obliteration of the airway, of concern for a neoplasm. 3. Unremarkable CT angiogram of the head. Details and other findings as described above.  NM PET CT 8/1/2022: Postoperative changes of recent total laryngectomy and lou dissection. bilateral hypermetabolic cervical lymphadenopathy.  A right cervical lymph node 1.7 x 1.8 cm, compared to 1.6 x 1.5 cm previously, SUV of 7.7. left cervical lymph node 8 x 10 mm and has a max SUV of 3.4.  A fluid collection posterior to the left internal jugular vein measures 2.4 x 2.4 cm. There is right hilar and mediastinal lymphadenopathy.  A precarinal lymph node 1.6 x 2.3 cm, compared to 1.3 x 1.7 cm previously, SUV of 13.8.  Right hilar lymphadenopathy measures 3 x 2.9 cm, compared to 2.1 x 2.3 cm previously, and has a max SUV of 9.8.  A suspected right hilar mass measures 1.8 x 3.6 cm SUV of 14.8   PET CT 10/13/2022: Ill-defined hypermetabolic tissue is again appreciated at the left neck, just superior and lateral to the tracheostomy insertion site. The regional maximum SUV is 6.8, with discrete CT-correlate lesion poorly delineated secondary to non-contrast protocol; prior SUV max 8.3. An adjacent, better delineated right cervical chain lymph node is visualized posteriorly, measuring 1.1 cm short axis with SUV max of 6; this previously measured 1.5 cm in least dimension with maximum SUV of 7.7.  No convincing new or enlarging cervical adenopathy or newly hypermetabolic lymph nodes are visualized.  The prior left neck fluid collection is no longer evident.The somewhat lobulated right upper lobe hilar mass is now approximately 3.4 cm x 1.4 cm and maximum SUV 23; previously 3.6 cm x 1.8 cm and SUV max 14.8.  No new or enlarging hypermetabolic lung lesion, and no development of organized airspace consolidation or pleural effusion.    Pathology:  6/14/2022:  EBUS ENDOBRONCHIAL MASS RUL, CYTOLOGY: NEARLY ACELLULAR SPECIMEN CONSISTING OF FRESH BLOOD CLOT. NO ATYPICAL OR MALIGNANT  CELLS IDENTIFIED.   7/11/2022: Bronchial Wash, RLL, right bronchial mass washings:  - Squamous cell carcinoma.    Bronchial Wash, LLL, left bronchial mass washings: - Squamous cell carcinoma.     7/11/2022 Laryngectomy:  1. Larynx, laryngeal biopsy frozen section : - Squamous cell carcinoma with necrosis.    2. Neck, Anterior, left neck level 3: - Number of lymph nodes involved by carcinoma:  1/7       - Size of metastatic deposit:  2.5 mm - Extranodal extension:  Not identified      3. Larynx, resection without nodes, total layrngectomy :  - Squamous cell carcinoma.  4. Neck, Anterior, left neck level 4: - Number of lymph nodes involved by carcinoma:  0/10  5. Neck, Anterior, right neck level 4:  - Number of lymph nodes involved by carcinoma:  0/11  6. Neck, Anterior, right neck level 3: - Number of lymph nodes involved by carcinoma:  0/9  7. Nasophaynx, inferior pharyngeal mucosa : - Benign squamous mucosa. - No evidence of malignancy.     8. Cartilage, superior cartilage margin : - No evidence of malignancy.    9. Lymph Node, pretracheal lymph  node :  - Number of lymph nodes involved by carcinoma:  0/3  [id number]       CLINICAL HISTORY:       Patient: Josafat Boudreaux is a 57 y.o. male kindly refer her for laryngeal carcinoma.  Patient  was referred to ENT for further evaluation of hoarseness.  He did not have any difficulty swallowing or dry mouth at the time.  Hoarseness has been present for at least 4 months prior to evaluation. 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     Patient was scheduled to trach but on 06/10/2022 he was brought to the emergency room via air met with is short of breath.  He was found to have and oxygenation on the 40s when EMS was called.  He was placed on BiPAP but pCO2 increased so he has an emergent tracheostomy performed.  He has a PEG tube placed same hospitalization.  He underwent bronchoscopy with biopsy of the  right upper lobe endobronchial lesion on 6/14/2022 cytology was negative for malignant cells.      On 7/11/2022 he underwent LARYNGECTOMY, WITH RADICAL NECK DISSECTION - Bilateral LARYNGOSCOPY, DIRECT, DIAGNOSTIC, WITH BRONCHOSCOPY AND ESOPHAGOSCOPY pathology report as above.  1/40 lymph nodes were involved with metastatic disease.  Bronchoscopy was repeated and biopsy of the left lower lobe and right lower lobe were both positive for squamous cell carcinoma.    He is here today with his wife.  He has recovered from surgery relatively well.  Tracheostomy in place.  He is unable to talk so the history was taken by electronic medical record and wife.    Patient has a brother with history of throat cancer. Patient used to smoke 1 and half pack per day since he was 60 years old.  He was smoking less recently.    Chief Complaint: No Concerns today          Interval History:  Patient presents today for follow-up accompanied by his wife. He completed 6th cycle of weekly Carbo/Taxol and radiation on 9/21/22. He is due for cycle #2 of Carbo/Taxol/Keytruda tomorrow. After cycle #1, he developed a cough with greenish-yellow sputum from trach,  ENT prescribed oral antibiotics, but he was unable to take by mouth due to surgery and medication could not be crushed.   They are upset that no one reached out  to them to send to the hospital with abnormal labs on 12/6/22, specifically his WBC count of 1.4 with . Explained that we send to the hospital for ANC  < 500, fever and no responsive to prophylactic antibiotics. He was admitted to ICU @ [facility], treated with antibiotics.   He is much better today.      Past Medical History:   Diagnosis Date    Cancer     COPD (chronic obstructive pulmonary disease)     Dysphagia     Lung cancer     Vocal cord mass       Past Surgical History:   Procedure Laterality Date    DIRECT DIAGNOSTIC LARYNGOSCOPY WITH BRONCHOSCOPY AND ESOPHAGOSCOPY N/A 07/11/2022    Procedure: LARYNGOSCOPY, DIRECT,  DIAGNOSTIC, WITH BRONCHOSCOPY AND ESOPHAGOSCOPY;  Surgeon: Emory Alonso MD;  Location: [facility] OR;  Service: ENT;  Laterality: N/A;    HIP SURGERY      HUMERUS FRACTURE SURGERY      INCISION AND DRAINAGE, NECK Bilateral 10/21/2022    Procedure: INCISION AND DRAINAGE,NECK;  Surgeon: Madison Worley MD;  Location: [facility] OR;  Service: ENT;  Laterality: Bilateral;    INSERT ARTERIAL LINE  06/26/2022         JOINT REPLACEMENT  2019    R hip    TRACHEOSTOMY N/A 06/10/2022    Procedure: CREATION, TRACHEOSTOMY;  Surgeon: Junior Alonso MD;  Location: [facility] OR;  Service: ENT;  Laterality: N/A;     Family History   Problem Relation Age of Onset    Lung cancer Father     Cancer Father         Lung cancer    Throat cancer Brother     Cancer Brother         Throat     Social Connections: Moderately Isolated    Frequency of Communication with Friends and Family: More than three times a week    Frequency of Social Gatherings with Friends and Family: More than three times a week    Attends L[employer] Services: Never    Active Member of Clubs or Organizations: No    Attends Club or Organization Meetings: Never    Marital Status:        Review of patient's allergies indicates:  No Known Allergies   Current Outpatient Medications on File Prior to Visit   Medication Sig Dispense Refill    albuterol-ipratropium (DUO-NEB) 2.5 mg-0.5 mg/3 mL nebulizer solution Take 3 mLs by nebulization every 4 (four) hours as needed for Shortness of Breath or Wheezing. Rescue 90 mL 1    diphenhydrAMINE (BENADRYL) 25 mg capsule 1 capsule (25 mg total) by Per G Tube route every 6 (six) hours as needed for Itching. 90 capsule 1    famotidine (PEPCID) 20 MG tablet Take 1 tablet (20 mg total) by mouth every evening. 30 tablet 11    glutamine 10 gram PwPk Take 10 g by mouth 2 (two) times a day.      HYDROcodone-acetaminophen (NORCO) 5-325 mg per tablet Take 1 tablet by mouth every 4 (four) hours as needed for Pain.      nystatin (MYCOSTATIN)  cream Apply topically 2 (two) times daily. 15 g 5    ondansetron (ZOFRAN-ODT) 8 MG TbDL Take 8 mg by mouth every 8 (eight) hours as needed for Nausea. Tab 1 ODT in AM for 2 days after chemotherapy      pantoprazole (PROTONIX) 20 MG tablet Take 1 tablet (20 mg total) by mouth once daily. 30 tablet 11    [DISCONTINUED] aspirin (ECOTRIN) 81 MG EC tablet Take 81 mg by mouth once daily.       Current Facility-Administered Medications on File Prior to Visit   Medication Dose Route Frequency Provider Last Rate Last Admin    LIDOcaine (PF) 40 mg/mL (4 %) injection 2 mL  2 mL Tracheal Tube 1 time in Clinic/HOD Kevin Palomino MD        phenylephrine HCL 1% nasal spray 1 spray  1 spray Each Nostril 1 time in Clinic/HOD Kevin Palomino MD          Review of Systems   Constitutional:  Negative for activity change, appetite change, chills, diaphoresis, fatigue, fever and unexpected weight change.   HENT:  Positive for trouble swallowing. Negative for nasal congestion, facial swelling, mouth sores, nosebleeds, postnasal drip, sinus pressure/congestion and sore throat.         Left neck and facial swelling   Eyes: Negative.  Negative for visual disturbance.   Respiratory:  Negative for cough and shortness of breath.    Cardiovascular:  Negative for chest pain and palpitations.   Gastrointestinal:  Negative for abdominal distention, abdominal pain, blood in stool, change in bowel habit, constipation, diarrhea, nausea, vomiting and change in bowel habit.   Endocrine: Negative.    Genitourinary:  Negative for bladder incontinence, decreased urine volume, difficulty urinating, dysuria, frequency, hematuria, scrotal swelling, testicular pain and urgency.   Musculoskeletal:  Negative for arthralgias, back pain, gait problem, joint swelling, leg pain, myalgias and neck pain.   Integumentary:  Negative for rash.   Allergic/Immunologic: Negative.    Neurological:  Negative for dizziness, tremors, seizures, syncope, speech difficulty,  "weakness, light-headedness, numbness, headaches and memory loss.   Hematological:  Negative for adenopathy. Does not bruise/bleed easily.   Psychiatric/Behavioral:  Negative for agitation, confusion, hallucinations, sleep disturbance and suicidal ideas. The patient is not nervous/anxious.             Vitals:    01/18/23 1234   BP: 116/74   BP Location: Left arm   Patient Position: Sitting   Pulse: 74   Temp: 98.6 °F (37 °C)   TempSrc: Oral   SpO2: 96%   Weight: 64.4 kg (142 lb)   Height: 5' 9" (1.753 m)          Physical Exam  Vitals and nursing note reviewed.   Constitutional:       General: He is not in acute distress.     Comments: thin   HENT:      Head: Normocephalic and atraumatic.      Mouth/Throat:      Mouth: Mucous membranes are moist.   Eyes:      General: No scleral icterus.     Extraocular Movements: Extraocular movements intact.      Conjunctiva/sclera: Conjunctivae normal.   Neck:      Vascular: No JVD.      Trachea: Tracheostomy present.      Comments: Tracheotomy in place.  Right side - Skin changes c/w RT    Cardiovascular:      Rate and Rhythm: Normal rate and regular rhythm.      Heart sounds: No murmur heard.  Pulmonary:      Effort: Pulmonary effort is normal.      Breath sounds: Normal breath sounds. No wheezing or rhonchi.   Chest:      Chest wall: No tenderness.   Abdominal:      General: The ostomy site is clean. Bowel sounds are normal. There is no distension.      Palpations: Abdomen is soft.      Tenderness: There is no abdominal tenderness.   Musculoskeletal:         General: No swelling or deformity.      Cervical back: Neck supple. Edema present.   Lymphadenopathy:      Upper Body:      Right upper body: No supraclavicular or axillary adenopathy.      Left upper body: No supraclavicular or axillary adenopathy.      Lower Body: No right inguinal adenopathy. No left inguinal adenopathy.   Skin:     General: Skin is warm.      Coloration: Skin is not jaundiced.      Findings: No rash. "   Neurological:      General: No focal deficit present.      Mental Status: He is alert and oriented to person, place, and time.   Psychiatric:         Attention and Perception: Attention normal.         Mood and Affect: Mood and affect normal. Mood is not anxious.         Behavior: Behavior is cooperative.         Cognition and Memory: Cognition normal.         Judgment: Judgment normal.     ECOG SCORE    1 - Restricted in strenuous activity-ambulatory and able to carry out work of a light nature         Laboratory:  CBC with Differential:  Lab Results   Component Value Date    WBC 11.2 01/18/2023    RBC 3.81 (L) 01/18/2023    HGB 11.9 (L) 01/18/2023    HCT 36.6 (L) 01/18/2023    MCV 96.1 (H) 01/18/2023    MCH 31.2 01/18/2023    MCHC 32.5 (L) 01/18/2023    RDW 13.2 01/18/2023     01/18/2023    MPV 8.7 01/18/2023        CMP:  Sodium Level   Date Value Ref Range Status   01/18/2023 136 136 - 145 mmol/L Final     Potassium Level   Date Value Ref Range Status   01/18/2023 4.5 3.5 - 5.1 mmol/L Final     Carbon Dioxide   Date Value Ref Range Status   01/18/2023 29 22 - 29 mmol/L Final     Blood Urea Nitrogen   Date Value Ref Range Status   01/18/2023 6.1 (L) 8.4 - 25.7 mg/dL Final     Creatinine   Date Value Ref Range Status   01/18/2023 0.81 0.73 - 1.18 mg/dL Final     Calcium Level Total   Date Value Ref Range Status   01/18/2023 9.1 8.4 - 10.2 mg/dL Final     Albumin Level   Date Value Ref Range Status   01/18/2023 2.7 (L) 3.5 - 5.0 g/dL Final     Bilirubin Total   Date Value Ref Range Status   01/18/2023 0.3 <=1.5 mg/dL Final     Alkaline Phosphatase   Date Value Ref Range Status   01/18/2023 85 40 - 150 unit/L Final     Aspartate Aminotransferase   Date Value Ref Range Status   01/18/2023 11 5 - 34 unit/L Final     Alanine Aminotransferase   Date Value Ref Range Status   01/18/2023 5 0 - 55 unit/L Final     Estimated GFR-Non    Date Value Ref Range Status   08/02/2022 >60 mls/min/1.73/m2 Final            Assessment:       Laryngeal Squamous cell carcinoma -s/p total laryngectomy, 1/40 LN with metastatic disease.   Lung masses x 2, bilateral--> Squamous cell carcinoma        Plan.:       Completed weekly carbo/Taxol and radiation on 9/21/22 for his laryngeal carcinoma. Lung disease progressing.   Right neck cellulitis s/p I&D is completely healed  Discussed rationale for weekly labs and also discuss neutropenic fever, precautions. I this that he will benefit of starting prophylactic antibiotics when his ANC is < 1,000 instead of 500 as he has tracheotomy and PEG tube that could be possible source of infection when becomes neutropenic.    Okay to proceed with cycle #2 Carbo/Taxol/Keytruda q3  tomorrow, 1/19/23  Will add Neulasta to his chemotherapy.   RTC in 3 weeks with TD with labs  Weekly labs: CBC, CMP  Encourage to call or message us for any questions or problems  The patient was given ample opportunity to ask questions, and to the best of my abilities, all questions answered to satisfaction; patient demonstrated understanding of what we discussed and agreeable to the plan.     LENKA NELSON MD      Professional Services   I, Bessie Horowitz LPN, acted solely as a scribe for and in the presence of Dr. Lenka Nelson, who performed these services.

## 2023-01-18 NOTE — PROGRESS NOTES
HEMATOLOGY/ONCOLOGY OFFICE CLINIC VISIT    Visit Information:    Initial Evaluation: 7/21/2022  Referring Provider:   Other providers:  Code status:    Diagnosis:  [id number] JENNYFER Laryngeal Squamous cell carcinoma     Present treatment:  Carbo/Taxol/Keytruda planned 11/30/2022    Treatment/Oncology history:  --7/11/2022: total laryngectomy  --Carbo/Taxol + RT (8/17/22-9/21/22)  --8/17/2022-9/28/2022:  6000 cGy, 200 cGy/fraction, 30/30 fx    Plan of care: Carbo/Taxol/Keytruda x 4-6 --> maintenance Keytruda    Imaging:  Ct neck 6/6/2022: A metastatic right level III cervical lymph node is present with short axis measurement of 1.6 cm.  This lymph node contains internal cystic change, typical of metastatic squamous cell carcinoma.  A left level III cervical lymph node shows short axis measurements of 1 cm, and is suspicious.    A left supraglottic mass measures approximately 2.4 cm in diameter (axial post-contrast image 39), presumably corresponding to the primary neoplasm.  Metastatic lymph nodes are also present in the inferior right paratracheal position, measuring 1.3 cm in short axis.  Metastatic lymph nodes are present in the superior right hilum, measuring 1.8 cm in short axis.  Ct H&N 6/27/2022: 1. There is mild stenosis at the origin of left proximal internal carotid artery secondary to dense calcified atheromatous plaque. 2. There is consolidation is right upper lobe. Additional ground-glass opacities are also seen on the remainder of the visualized lungs. These findings are consistent with an infectious process. Small right pleural effusion is seen. There is an ill-defined soft tissue mass in the right perihilar region which measures approximately 3.1 x 2.9 x 4.6 cm, of concern for neoplasm. Correlate clinically as regards further evaluation and followup with CT chest. There is an ill-defined enhancing soft tissue mass in the glottis infiltrating into the paraglottic spaces and the subglottic region with  obliteration of the airway, of concern for a neoplasm. 3. Unremarkable CT angiogram of the head. Details and other findings as described above.  NM PET CT 8/1/2022: Postoperative changes of recent total laryngectomy and lou dissection. bilateral hypermetabolic cervical lymphadenopathy.  A right cervical lymph node 1.7 x 1.8 cm, compared to 1.6 x 1.5 cm previously, SUV of 7.7. left cervical lymph node 8 x 10 mm and has a max SUV of 3.4.  A fluid collection posterior to the left internal jugular vein measures 2.4 x 2.4 cm. There is right hilar and mediastinal lymphadenopathy.  A precarinal lymph node 1.6 x 2.3 cm, compared to 1.3 x 1.7 cm previously, SUV of 13.8.  Right hilar lymphadenopathy measures 3 x 2.9 cm, compared to 2.1 x 2.3 cm previously, and has a max SUV of 9.8.  A suspected right hilar mass measures 1.8 x 3.6 cm SUV of 14.8   PET CT 10/13/2022: Ill-defined hypermetabolic tissue is again appreciated at the left neck, just superior and lateral to the tracheostomy insertion site. The regional maximum SUV is 6.8, with discrete CT-correlate lesion poorly delineated secondary to non-contrast protocol; prior SUV max 8.3. An adjacent, better delineated right cervical chain lymph node is visualized posteriorly, measuring 1.1 cm short axis with SUV max of 6; this previously measured 1.5 cm in least dimension with maximum SUV of 7.7.  No convincing new or enlarging cervical adenopathy or newly hypermetabolic lymph nodes are visualized.  The prior left neck fluid collection is no longer evident.The somewhat lobulated right upper lobe hilar mass is now approximately 3.4 cm x 1.4 cm and maximum SUV 23; previously 3.6 cm x 1.8 cm and SUV max 14.8.  No new or enlarging hypermetabolic lung lesion, and no development of organized airspace consolidation or pleural effusion.    Pathology:  6/14/2022:  EBUS ENDOBRONCHIAL MASS RUL, CYTOLOGY: NEARLY ACELLULAR SPECIMEN CONSISTING OF FRESH BLOOD CLOT. NO ATYPICAL OR MALIGNANT  CELLS IDENTIFIED.   7/11/2022: Bronchial Wash, RLL, right bronchial mass washings:  - Squamous cell carcinoma.    Bronchial Wash, LLL, left bronchial mass washings: - Squamous cell carcinoma.     7/11/2022 Laryngectomy:  1. Larynx, laryngeal biopsy frozen section : - Squamous cell carcinoma with necrosis.    2. Neck, Anterior, left neck level 3: - Number of lymph nodes involved by carcinoma:  1/7       - Size of metastatic deposit:  2.5 mm - Extranodal extension:  Not identified      3. Larynx, resection without nodes, total layrngectomy :  - Squamous cell carcinoma.  4. Neck, Anterior, left neck level 4: - Number of lymph nodes involved by carcinoma:  0/10  5. Neck, Anterior, right neck level 4:  - Number of lymph nodes involved by carcinoma:  0/11  6. Neck, Anterior, right neck level 3: - Number of lymph nodes involved by carcinoma:  0/9  7. Nasophaynx, inferior pharyngeal mucosa : - Benign squamous mucosa. - No evidence of malignancy.     8. Cartilage, superior cartilage margin : - No evidence of malignancy.    9. Lymph Node, pretracheal lymph  node :  - Number of lymph nodes involved by carcinoma:  0/3  [id number]       CLINICAL HISTORY:       Patient: Josafat Boudreaux is a 57 y.o. male kindly refer her for laryngeal carcinoma.  Patient  was referred to ENT for further evaluation of hoarseness.  He did not have any difficulty swallowing or dry mouth at the time.  Hoarseness has been present for at least 4 months prior to evaluation. 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     Patient was scheduled to trach but on 06/10/2022 he was brought to the emergency room via air met with is short of breath.  He was found to have and oxygenation on the 40s when EMS was called.  He was placed on BiPAP but pCO2 increased so he has an emergent tracheostomy performed.  He has a PEG tube placed same hospitalization.  He underwent bronchoscopy with biopsy of the  right upper lobe endobronchial lesion on 6/14/2022 cytology was negative for malignant cells.      On 7/11/2022 he underwent LARYNGECTOMY, WITH RADICAL NECK DISSECTION - Bilateral LARYNGOSCOPY, DIRECT, DIAGNOSTIC, WITH BRONCHOSCOPY AND ESOPHAGOSCOPY pathology report as above.  1/40 lymph nodes were involved with metastatic disease.  Bronchoscopy was repeated and biopsy of the left lower lobe and right lower lobe were both positive for squamous cell carcinoma.    He is here today with his wife.  He has recovered from surgery relatively well.  Tracheostomy in place.  He is unable to talk so the history was taken by electronic medical record and wife.    Patient has a brother with history of throat cancer. Patient used to smoke 1 and half pack per day since he was 60 years old.  He was smoking less recently.    Chief Complaint: Ready to start chemotherapy      Interval History:  Patient presents today for follow-up accompanied by his wife. He completed 6th cycle of weekly Carbo/Taxol and radiation on 9/21/22. Wound from abscess on the right side of his neck covered with bandage. Lymphedema of the neck noted. He began massage therapy next week for swelling to neck. Overall, he is doing well. Weight stable. No fever, chills or sweats. No bleeding.     Past Medical History:   Diagnosis Date    Cancer     COPD (chronic obstructive pulmonary disease)     Dysphagia     Lung cancer     Vocal cord mass       Past Surgical History:   Procedure Laterality Date    DIRECT DIAGNOSTIC LARYNGOSCOPY WITH BRONCHOSCOPY AND ESOPHAGOSCOPY N/A 07/11/2022    Procedure: LARYNGOSCOPY, DIRECT, DIAGNOSTIC, WITH BRONCHOSCOPY AND ESOPHAGOSCOPY;  Surgeon: Emory Alonso MD;  Location: [facility];  Service: ENT;  Laterality: N/A;    HIP SURGERY      HUMERUS FRACTURE SURGERY      INCISION AND DRAINAGE, NECK Bilateral 10/21/2022    Procedure: INCISION AND DRAINAGE,NECK;  Surgeon: Madison Worley MD;  Location: [facility] OR;  Service: ENT;   Laterality: Bilateral;    INSERT ARTERIAL LINE  06/26/2022         JOINT REPLACEMENT  2019    R hip    TRACHEOSTOMY N/A 06/10/2022    Procedure: CREATION, TRACHEOSTOMY;  Surgeon: Junior Alonso MD;  Location: [facility];  Service: ENT;  Laterality: N/A;     Family History   Problem Relation Age of Onset    Lung cancer Father     Cancer Father         Lung cancer    Throat cancer Brother     Cancer Brother         Throat     Social Connections: Moderately Isolated    Frequency of Communication with Friends and Family: More than three times a week    Frequency of Social Gatherings with Friends and Family: More than three times a week    Attends [employer] Services: Never    Active Member of Clubs or Organizations: No    Attends Club or Organization Meetings: Never    Marital Status:        Review of patient's allergies indicates:  No Known Allergies   Current Outpatient Medications on File Prior to Visit   Medication Sig Dispense Refill    albuterol-ipratropium (DUO-NEB) 2.5 mg-0.5 mg/3 mL nebulizer solution Take 3 mLs by nebulization every 4 (four) hours as needed for Shortness of Breath or Wheezing. Rescue 90 mL 1    diphenhydrAMINE (BENADRYL) 25 mg capsule 1 capsule (25 mg total) by Per G Tube route every 6 (six) hours as needed for Itching. 90 capsule 1    famotidine (PEPCID) 20 MG tablet Take 1 tablet (20 mg total) by mouth every evening. 30 tablet 11    glutamine 10 gram PwPk Take 10 g by mouth 2 (two) times a day.      HYDROcodone-acetaminophen (NORCO) 5-325 mg per tablet Take 1 tablet by mouth every 4 (four) hours as needed for Pain.      nystatin (MYCOSTATIN) cream Apply topically 2 (two) times daily. 15 g 5    ondansetron (ZOFRAN-ODT) 8 MG TbDL Take 8 mg by mouth every 8 (eight) hours as needed for Nausea. Tab 1 ODT in AM for 2 days after chemotherapy      pantoprazole (PROTONIX) 20 MG tablet Take 1 tablet (20 mg total) by mouth once daily. 30 tablet 11    [DISCONTINUED] aspirin (ECOTRIN) 81 MG EC  tablet Take 81 mg by mouth once daily.       Current Facility-Administered Medications on File Prior to Visit   Medication Dose Route Frequency Provider Last Rate Last Admin    LIDOcaine (PF) 40 mg/mL (4 %) injection 2 mL  2 mL Tracheal Tube 1 time in Clinic/HOD Kevin Palomino MD        phenylephrine HCL 1% nasal spray 1 spray  1 spray Each Nostril 1 time in Clinic/HOD Kevin Palomino MD          Review of Systems   Constitutional:  Negative for activity change, appetite change, chills, diaphoresis, fatigue, fever and unexpected weight change.   HENT:  Positive for facial swelling and trouble swallowing. Negative for nasal congestion, nosebleeds, postnasal drip, sinus pressure/congestion and sore throat.         Left neck and facial swelling   Eyes:  Negative for visual disturbance.   Respiratory:  Negative for cough and shortness of breath.    Cardiovascular:  Negative for chest pain and palpitations.   Gastrointestinal:  Negative for abdominal distention, abdominal pain, blood in stool, change in bowel habit, constipation, diarrhea, nausea, vomiting and change in bowel habit.   Endocrine: Negative.    Genitourinary:  Negative for bladder incontinence, decreased urine volume, difficulty urinating, dysuria, frequency, hematuria, scrotal swelling, testicular pain and urgency.   Musculoskeletal:  Negative for arthralgias, back pain, gait problem, joint swelling, leg pain, myalgias and neck pain.   Integumentary:  Negative for rash.   Neurological:  Negative for dizziness, tremors, seizures, syncope, speech difficulty, weakness, light-headedness, numbness, headaches and memory loss.   Hematological:  Negative for adenopathy. Does not bruise/bleed easily.   Psychiatric/Behavioral:  Negative for agitation, confusion, hallucinations, sleep disturbance and suicidal ideas. The patient is not nervous/anxious.             Vitals:    01/18/23 1234   BP: 116/74   BP Location: Left arm   Patient Position: Sitting   Pulse: 74  "  Temp: 98.6 °F (37 °C)   TempSrc: Oral   SpO2: 96%   Weight: 64.4 kg (142 lb)   Height: 5' 9" (1.753 m)          Physical Exam  Vitals and nursing note reviewed.   Constitutional:       General: He is not in acute distress.     Comments: thin   HENT:      Head: Normocephalic and atraumatic.      Mouth/Throat:      Mouth: Mucous membranes are moist.   Eyes:      General: No scleral icterus.     Extraocular Movements: Extraocular movements intact.      Conjunctiva/sclera: Conjunctivae normal.   Neck:      Vascular: No JVD.      Trachea: Tracheostomy present.      Comments: Tracheotomy in place.  Right side - Skin changes c/w RT  Swelling submandibular and neck area  Cardiovascular:      Rate and Rhythm: Normal rate and regular rhythm.      Heart sounds: No murmur heard.  Pulmonary:      Effort: Pulmonary effort is normal.      Breath sounds: Normal breath sounds. No wheezing or rhonchi.   Chest:      Chest wall: No tenderness.   Abdominal:      General: The ostomy site is clean. Bowel sounds are normal. There is no distension.      Palpations: Abdomen is soft.      Tenderness: There is no abdominal tenderness.   Musculoskeletal:         General: No swelling or deformity.      Cervical back: Neck supple. Edema present.   Lymphadenopathy:      Upper Body:      Right upper body: No supraclavicular or axillary adenopathy.      Left upper body: No supraclavicular or axillary adenopathy.      Lower Body: No right inguinal adenopathy. No left inguinal adenopathy.   Skin:     General: Skin is warm.      Coloration: Skin is not jaundiced.      Findings: No rash.   Neurological:      General: No focal deficit present.      Mental Status: He is alert and oriented to person, place, and time.   Psychiatric:         Attention and Perception: Attention normal.         Mood and Affect: Mood and affect normal. Mood is not anxious.         Behavior: Behavior is cooperative.         Cognition and Memory: Cognition normal.         " Judgment: Judgment normal.     ECOG SCORE             Laboratory:  CBC with Differential:  Lab Results   Component Value Date    WBC 11.7 (H) 01/10/2023    RBC 3.77 (L) 01/10/2023    HGB 12.4 (L) 01/10/2023    HCT 36.7 (L) 01/10/2023    MCV 97.3 (H) 01/10/2023    MCH 32.9 01/10/2023    MCHC 33.8 01/10/2023    RDW 13.2 01/10/2023     01/10/2023    MPV 9.6 01/10/2023        CMP:  Sodium Level   Date Value Ref Range Status   01/10/2023 134 (L) 136 - 145 mmol/L Final     Potassium Level   Date Value Ref Range Status   01/10/2023 4.7 3.5 - 5.1 mmol/L Final     Carbon Dioxide   Date Value Ref Range Status   01/10/2023 28 22 - 29 mmol/L Final     Blood Urea Nitrogen   Date Value Ref Range Status   01/10/2023 4.6 (L) 8.4 - 25.7 mg/dL Final     Creatinine   Date Value Ref Range Status   01/10/2023 0.78 0.73 - 1.18 mg/dL Final     Calcium Level Total   Date Value Ref Range Status   01/10/2023 9.2 8.4 - 10.2 mg/dL Final     Albumin Level   Date Value Ref Range Status   01/10/2023 2.9 (L) 3.5 - 5.0 g/dL Final     Bilirubin Total   Date Value Ref Range Status   01/10/2023 0.5 <=1.5 mg/dL Final     Alkaline Phosphatase   Date Value Ref Range Status   01/10/2023 90 40 - 150 unit/L Final     Aspartate Aminotransferase   Date Value Ref Range Status   01/10/2023 10 5 - 34 unit/L Final     Alanine Aminotransferase   Date Value Ref Range Status   01/10/2023 <5 0 - 55 unit/L Final     Estimated GFR-Non    Date Value Ref Range Status   08/02/2022 >60 mls/min/1.73/m2 Final           Assessment:       Laryngeal Squamous cell carcinoma -s/p total laryngectomy, 1/40 LN with metastatic disease.   Lung masses x 2, bilateral--> Squamous cell carcinoma        Plan.:       Completed weekly carbo/Taxol and radiation on 9/21/22 for his laryngeal carcinoma. Lung disease progressing.   Right neck cellulitis s/p I&D is completely healed    Will start carbo/Taxol/Keytruda q3    RTC in 3 weeks with TD with labs  Weekly labs: CBC,  CMP    Okay to proceed with massage therapy for neck lymphedema  Encourage to call or message us for any questions or problems  The patient was given ample opportunity to ask questions, and to the best of my abilities, all questions answered to satisfaction; patient demonstrated understanding of what we discussed and agreeable to the plan.     MARIO RodP

## 2023-01-18 NOTE — PROGRESS NOTES
Established Patient - Audio Only Telehealth Visit     The patient location is: Patient is in [place]  The chief complaint leading to consultation is: tracheal wound  Visit type: Virtual visit with audio only (telephone)  Total time spent with patient: 7 minutes       The reason for the audio only service rather than synchronous audio and video virtual visit was related to technical difficulties or patient preference/necessity.     Each patient to whom I provide medical services by telemedicine is:  (1) informed of the relationship between the physician and patient and the respective role of any other health care provider with respect to management of the patient; and (2) notified that they may decline to receive medical services by telemedicine and may withdraw from such care at any time. Patient verbally consented to receive this service via voice-only telephone call.       HPI: Josafat Boudreaux is a 57 y.o. male referred to clinic by Dr. Emory Alonso for laryngeal mass. Patients wife provides history. She states that patient first noticed hoarseness in November 2021. Got progressively worse. Presented to Dr. Alonso in early June 2022 for these complaints. Was noted to have a laryngeal mass on flexible laryngoscopy. Several days after the flexible laryngoscopy patient developed worsening swelling of the airway and presented to the ER in respiratory distress. He underwent emergent tracheostomy with Dr. Junior Alonso. Patient was subsequently discharged home but readmitted shortly after with seizures. He spent 3 days in the ICU on the ventilator. During hospitalization, patient underwent PEG tube placement. He reports that he was not having any issues with PO intake prior to the tracheostomy placement. Now he is PEG dependent. Only taking some ice chips by mouth. He presents today to discuss laryngectomy.      11/2/22:  Since last visit, patient total laryngectomy with bilateral neck dissections level 3  and 4, primary closure, he did not undergo TEP placement.  He was also found to have synchronous primary or lung metastasis in the lung that was also squamous cell carcinoma.  He underwent radiation completed this in the middle of September 2022.  He received carboplatin and Taxol which he still has a few more treatments of.  He presented to the emergency department on 10/08/2022 with a right neck infection.  This was treated with IV antibiotics and incision and drainage with UCHE drain placement.  He was kept NPO and did feeds through his PEG tube.  He met criteria for discharge on 10/25/22 with UCHE drain.  He had outpatient swallow study which did not demonstrate any evidence of a leak.  He returns to clinic today reporting improvement in his symptoms including right neck pain and drainage.  He has not had to empty his UCHE drain on a daily basis at this point.  He had a PET performed on 10/13/2022 to assess response to chemotherapy.  On that PET scan, right hilar and mediastinal lymph nodes were decreased in size but had increased metabolic activity.  He denies any other issues today     11/7/22: Reports worsening swelling under the right jawline the past few days.  Denies any erythema, infection, fever or chills.     12/6/22:  Feeling a little run down, increased cough with blood tinged secretions, running temperature to 101 at home.  Received azithromycin abx last night from Dr. Alonso's office.     12/28/22:  Was hospitalized for Pseudomonal pneumonia.  He also had Blastomycetes positive antigen on arrival, but was unable to tolerate amphotericin B due to significant hypotension.  Prior to discharge, his repeat Blastomycetes antigen was negative.  Discharged with levaquin and portable home oxygen.  His pneumonia symptoms have all improved since discharge.  Ulceration spots have persisted with one spot having a small amount of growth adjacent.  No bleeding or pain.     1/3/22:  Returns for follow up for monitoring  of tracheal ulceration.  Reports interval improvement.  Denies pain or bleeding from area.  Denies swelling or tenderness to neck.     1/11/22:  Returns for follow up.  No changes to tracheal ulceration.  Denies pain or bleeding.  Otherwise doing well.    01/18/2023: Telemedicine visit today for follow-up.  Discussion had with patient and significant other over the phone as he has difficulty with talking through the phone himself.  He stated that he has been doing well.  There has been no worsening of the tracheal wound, but it is still present.  They have been using ointment 3 times a day to keep this area moisturized.  No bleeding.  No leakage of secretions or purulence from this region.  No significant pain.  They stated his tracheal secretions in general that he coughs up have been improving.     Path 1/11/23  Final Diagnosis      Right tracheostomal biopsy:  - Squamous mucosa with epithelial erosion and acute inflammation.  - Lamina propria with reactive fibroblasts and fibrosis, consistent with radiation changes, see comment.  - No evidence of malignancy.   Electronically signed by Ewa Lee MD on 1/16/2023 at 1107       Assessment and plan:    Josafat Boudreaux is a 57 y.o. 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 Emory Alonso  Medical Oncologist: Yara Atkinson MD (Last seen: 10/18/22)  Radiation Oncologist: Romie Das MD (Last seen: Unknown)     Primary tumor: Endolaryngeal [id number] SCCA     Date of Diagnosis: 7/11/22  Surgery Date: 7/11/22  Induction Chemotherapy: No  Adjuvant Therapy: CRT  Completion Date: Sept 2022 for RT, still undergoing chemo     Pertinent Treatment History:  CRT     Place date if completed    3 6 12 18 24 36 48 60   CT Neck 10/19/22 X X X X X X X   PET/CT 10/13/22                  CXR   12/21/22 X X X X X X   TFT X 12/6/22 X   X X X X      Current Surveillance Interval: <1 year post-treatment, q6 wks      - Biopsied right stomal lesion was benign. Will continue to monitor this area clinically. Continue ointment  - continue current follow up schedule, seeing us 2/8/23                        This service was not originating from a related E/M service provided within the previous 7 days nor will  to an E/M service or procedure within the next 24 hours or my soonest available appointment.  Prevailing standard of care was able to be met in this audio-only visit.

## 2023-01-19 NOTE — NURSING
Faulkner needle removed from mediport. Site without signs and symptoms of complications. Dressing applied.   Pt tolerated treatment with no complaints.

## 2023-02-07 NOTE — PROGRESS NOTES
INTERNAL MEDICINE RESIDENT CLINIC  CLINIC NOTE    Patient Name: Josafat Boudreaux  YOB: 1965    PRESENTING HISTORY       History of Present Illness:  Mr. Josafat Boudreaux is a 57 y.o. male w/ an active medical problem list including stage IV squamous cell laryngeal cancer s/p completion of chemotherapy with carboplatin + taxol and radiation therapy and s/p laryngectomy now with Larytube who presents to the pulmonology clinic for evaluation after recently being admitted for treatment of CAP on 12/9/2022. Patient reports feeling well today. He has no complaints. He denies fever, chills, nausea, vomiting, diarrhea, chest pain, abdominal pain, numbness, weakness, tingling, and any other symptoms. Bronchoscopy was done after patient had a positive Blastomyces urine antigen, however, bronchoscopy was unremarkable. Fungitell was negative as well.     CURRENT MEDICATIONS      Current Outpatient Medications on File Prior to Visit   Medication Sig    albuterol-ipratropium (DUO-NEB) 2.5 mg-0.5 mg/3 mL nebulizer solution Take 3 mLs by nebulization every 4 (four) hours as needed for Shortness of Breath or Wheezing. Rescue    diphenhydrAMINE (BENADRYL) 25 mg capsule 1 capsule (25 mg total) by Per G Tube route every 6 (six) hours as needed for Itching.    famotidine (PEPCID) 20 MG tablet Take 1 tablet (20 mg total) by mouth every evening.    metroNIDAZOLE (METROGEL) 0.75 % (37.5mg/5 gram) vaginal gel apply to affected area BID    ondansetron (ZOFRAN-ODT) 8 MG TbDL Take 8 mg by mouth every 8 (eight) hours as needed for Nausea. Tab 1 ODT in AM for 2 days after chemotherapy    pantoprazole (PROTONIX) 20 MG tablet Take 1 tablet (20 mg total) by mouth once daily.    glutamine 10 gram PwPk Take 10 g by mouth 2 (two) times a day.    HYDROcodone-acetaminophen (NORCO) 5-325 mg per tablet Take 1 tablet by mouth every 4 (four) hours as needed for Pain.    nystatin (MYCOSTATIN) cream Apply topically 2 (two) times daily.  (Patient not taking: Reported on 2/7/2023)    [DISCONTINUED] aspirin (ECOTRIN) 81 MG EC tablet Take 81 mg by mouth once daily.     Current Facility-Administered Medications on File Prior to Visit   Medication    LIDOcaine (PF) 40 mg/mL (4 %) injection 2 mL    phenylephrine HCL 1% nasal spray 1 spray         Review of Systems   Constitutional:  Negative for chills and fever.   HENT:  Negative for congestion.    Respiratory:  Negative for cough, hemoptysis, sputum production and shortness of breath.    Cardiovascular:  Negative for chest pain, palpitations and orthopnea.   Gastrointestinal:  Negative for abdominal pain, diarrhea, nausea and vomiting.   Genitourinary:  Negative for dysuria.   Musculoskeletal:  Negative for myalgias.   Skin:  Negative for rash.   Neurological:  Negative for dizziness.     PAST HISTORY:     Past Medical History:   Diagnosis Date    Cancer     COPD (chronic obstructive pulmonary disease)     Dysphagia     Lung cancer     Vocal cord mass        Past Surgical History:   Procedure Laterality Date    DIRECT DIAGNOSTIC LARYNGOSCOPY WITH BRONCHOSCOPY AND ESOPHAGOSCOPY N/A 07/11/2022    Procedure: LARYNGOSCOPY, DIRECT, DIAGNOSTIC, WITH BRONCHOSCOPY AND ESOPHAGOSCOPY;  Surgeon: Emory Alonso MD;  Location: [facility];  Service: ENT;  Laterality: N/A;    HIP SURGERY      HUMERUS FRACTURE SURGERY      INCISION AND DRAINAGE, NECK Bilateral 10/21/2022    Procedure: INCISION AND DRAINAGE,NECK;  Surgeon: Madison Worley MD;  Location: [facility] OR;  Service: ENT;  Laterality: Bilateral;    INSERT ARTERIAL LINE  06/26/2022         JOINT REPLACEMENT  2019    R hip    TRACHEOSTOMY N/A 06/10/2022    Procedure: CREATION, TRACHEOSTOMY;  Surgeon: Junior Alonso MD;  Location: [facility] OR;  Service: ENT;  Laterality: N/A;       Family History   Problem Relation Age of Onset    Lung cancer Father     Cancer Father         Lung cancer    Throat cancer Brother     Cancer Brother         Throat       Social  "History     Socioeconomic History    Marital status:    Tobacco Use    Smoking status: Former     Packs/day: 0.50     Years: 15.00     Pack years: 7.50     Types: Cigarettes     Start date: 1980     Quit date: 2022     Years since quittin.6    Smokeless tobacco: Never   Substance and Sexual Activity    Alcohol use: Not Currently    Drug use: Never    Sexual activity: Not Currently     Partners: Female     Social Determinants of Health     Financial Resource Strain: Low Risk     Difficulty of Paying Living Expenses: Not hard at all   Food Insecurity: No Food Insecurity    Worried About Running Out of Food in the Last Year: Never true    Ran Out of Food in the Last Year: Never true   Transportation Needs: No Transportation Needs    Lack of Transportation (Medical): No    Lack of Transportation (Non-Medical): No   Physical Activity: Inactive    Days of Exercise per Week: 0 days    Minutes of Exercise per Session: 0 min   Stress: No Stress Concern Present    Feeling of Stress : Not at all   Social Connections: Moderately Isolated    Frequency of Communication with Friends and Family: More than three times a week    Frequency of Social Gatherings with Friends and Family: More than three times a week    Attends Synagogue Services: Never    Active Member of Clubs or Organizations: No    Attends Club or Organization Meetings: Never    Marital Status:    Housing Stability: Low Risk     Unable to Pay for Housing in the Last Year: No    Number of Places Lived in the Last Year: 1    Unstable Housing in the Last Year: No       Review of patient's allergies indicates:  No Known Allergies    OBJECTIVE:   Vital Signs:  Vitals:    23 1456   BP: 116/70   Pulse: 98   Resp: 20   Temp: 98.4 °F (36.9 °C)   SpO2: 96%   Weight: 64.9 kg (143 lb)   Height: 5' 9" (1.753 m)       No results found for this or any previous visit (from the past 24 hour(s)).      Physical Exam  Constitutional:       General: He is " not in acute distress.     Appearance: He is ill-appearing. He is not toxic-appearing or diaphoretic.   HENT:      Head: Normocephalic and atraumatic.      Nose: Nose normal.      Mouth/Throat:      Mouth: Mucous membranes are moist.      Pharynx: Oropharynx is clear.      Comments: (+) roque tube in place  Eyes:      Extraocular Movements: Extraocular movements intact.      Pupils: Pupils are equal, round, and reactive to light.   Cardiovascular:      Rate and Rhythm: Regular rhythm. Tachycardia present.      Pulses: Normal pulses.      Heart sounds: Normal heart sounds. No murmur heard.    No friction rub.   Pulmonary:      Effort: Pulmonary effort is normal. No respiratory distress.      Breath sounds: Normal breath sounds. No stridor. No wheezing, rhonchi or rales.   Musculoskeletal:      Cervical back: Normal range of motion and neck supple.   Neurological:      Mental Status: He is alert.       Laboratory  CMP:   Recent Labs   Lab 01/18/23  1229 01/24/23  0946 01/31/23  1000   Sodium Level 136 136 137   Potassium Level 4.5 4.5 5.4 H   Chloride 96 L 94 L 99   Carbon Dioxide 29 31 H 29   Blood Urea Nitrogen 6.1 L 10.3 4.1 L   Creatinine 0.81 0.71 L 0.77   Glucose Level 104 H 88 100   Calcium Level Total 9.1 9.5 9.3   Albumin Level 2.7 L 3.2 L 3.4 L   Bilirubin Total 0.3 0.5 0.4   Aspartate Aminotransferase 11 13 17   Alanine Aminotransferase 5 7 12   Alkaline Phosphatase 85 140 123     CBC:   Recent Labs   Lab 01/18/23  1229 01/24/23  0946 01/31/23  1000   WBC 11.2 24.2 H 6.8   Neut # 8.27 21.91 H 4.27   RBC 3.81 L 4.11 L 3.86 L   Hgb 11.9 L 12.8 L 12.0 L   Hct 36.6 L 39.7 L 36.5 L   Platelet 208 114 L 73 L   MCV 96.1 H 96.6 H 94.6 H   RDW 13.2 13.4 13.1     FLP:     DM:   Recent Labs   Lab 01/18/23  1229 01/24/23  0946 01/31/23  1000   Creatinine 0.81 0.71 L 0.77     Thyroid:   Recent Labs   Lab 07/11/22  1444 12/06/22  1118   Thyroid Stimulating Hormone 2.1169 2.3663   Thyroxine Free 0.97  --      Anemia:    Recent Labs   Lab 06/17/22  0438 06/18/22  0404 12/09/22  1127 12/10/22  0530 01/31/23  1000   Hgb 15.2   < > 11.7 L   < > 12.0 L   Hct 50.1   < > 33.6 L   < > 36.5 L   Iron Level  --   --  21 L  --   --    Ferritin Level  --   --  1,336.18 H  --   --    Transferrin  --   --  125 L  --   --    Iron Binding Capacity Total  --   --  144 L  --   --    Vitamin B12 Level 563  --   --   --   --    Folate Level 15.5  --   --   --   --     < > = values in this interval not displayed.         ASSESSMENT & PLAN:   Bronchoscopy results s/p CAP treatment  -Bronchoscopy negative  -Blasto, Histo, and Fungitell negative  -Duo-Nebs refilled  -Nothing to do from a pulmonology standpoint  -Continue follow-up with Heme/Onc for cancer treatment    Stage IV Squamous Cell Carcinoma of the larynx   -Followed by Heme/Onc closely  -Continue to follow with Heme Onc    Disposition: Follow with oncology for continued treatment. No need to follow up in pulmonology clinic any further.    Michael Monson MD  [facility] Internal Medicine, PGY-1

## 2023-02-08 NOTE — PROGRESS NOTES
The scope used for the exam was:  Flexible scope ENF-P4  Serial Number:  1)    [id number]    []   2)    [id number]    [x]   3)    [id number]    []   4)    [id number]    []   5)    [id number]    []   6)    [id number]    []       The scope used for the exam was:  Rigid scope   Serial Number:  1)   [id number]    []   2)   [id number]    []   3)   7330    []   4)    3384   []   5)    0824   []   6)    5554   []     7)   7425    []   8)   2240    []   9)   1109    []

## 2023-03-01 NOTE — PROGRESS NOTES
HEMATOLOGY/ONCOLOGY OFFICE CLINIC VISIT    Visit Information:     Initial Evaluation: 7/21/2022  Referring Provider:   Other providers:  Code status:     Diagnosis:  [id number]-Stage JENNYFER Laryngeal Squamous cell carcinoma      Present treatment:  Carbo/Taxol/Keytruda planned 11/30/2022     Treatment/Oncology history:  --7/11/2022: total laryngectomy  --Carbo/Taxol + RT (8/17/22-9/21/22)  --8/17/2022-9/28/2022:  6000 cGy, 200 cGy/fraction, 30/30 fx     Plan of care: Carbo/Taxol/Keytruda x 4-6 --> maintenance Keytruda    Imaging:  Ct neck 6/6/2022: A metastatic right level III cervical lymph node is present with short axis measurement of 1.6 cm.  This lymph node contains internal cystic change, typical of metastatic squamous cell carcinoma.  A left level III cervical lymph node shows short axis measurements of 1 cm, and is suspicious.    A left supraglottic mass measures approximately 2.4 cm in diameter (axial post-contrast image 39), presumably corresponding to the primary neoplasm.  Metastatic lymph nodes are also present in the inferior right paratracheal position, measuring 1.3 cm in short axis.  Metastatic lymph nodes are present in the superior right hilum, measuring 1.8 cm in short axis.  Ct H&N 6/27/2022: 1. There is mild stenosis at the origin of left proximal internal carotid artery secondary to dense calcified atheromatous plaque. 2. There is consolidation is right upper lobe. Additional ground-glass opacities are also seen on the remainder of the visualized lungs. These findings are consistent with an infectious process. Small right pleural effusion is seen. There is an ill-defined soft tissue mass in the right perihilar region which measures approximately 3.1 x 2.9 x 4.6 cm, of concern for neoplasm. Correlate clinically as regards further evaluation and followup with CT chest. There is an ill-defined enhancing soft tissue mass in the glottis infiltrating into the paraglottic spaces and the subglottic region  with obliteration of the airway, of concern for a neoplasm. 3. Unremarkable CT angiogram of the head. Details and other findings as described above.  NM PET CT 8/1/2022: Postoperative changes of recent total laryngectomy and lou dissection. bilateral hypermetabolic cervical lymphadenopathy.  A right cervical lymph node 1.7 x 1.8 cm, compared to 1.6 x 1.5 cm previously, SUV of 7.7. left cervical lymph node 8 x 10 mm and has a max SUV of 3.4.  A fluid collection posterior to the left internal jugular vein measures 2.4 x 2.4 cm. There is right hilar and mediastinal lymphadenopathy.  A precarinal lymph node 1.6 x 2.3 cm, compared to 1.3 x 1.7 cm previously, SUV of 13.8.  Right hilar lymphadenopathy measures 3 x 2.9 cm, compared to 2.1 x 2.3 cm previously, and has a max SUV of 9.8.  A suspected right hilar mass measures 1.8 x 3.6 cm SUV of 14.8   PET CT 10/13/2022: Ill-defined hypermetabolic tissue is again appreciated at the left neck, just superior and lateral to the tracheostomy insertion site. The regional maximum SUV is 6.8, with discrete CT-correlate lesion poorly delineated secondary to non-contrast protocol; prior SUV max 8.3. An adjacent, better delineated right cervical chain lymph node is visualized posteriorly, measuring 1.1 cm short axis with SUV max of 6; this previously measured 1.5 cm in least dimension with maximum SUV of 7.7.  No convincing new or enlarging cervical adenopathy or newly hypermetabolic lymph nodes are visualized.  The prior left neck fluid collection is no longer evident.The somewhat lobulated right upper lobe hilar mass is now approximately 3.4 cm x 1.4 cm and maximum SUV 23; previously 3.6 cm x 1.8 cm and SUV max 14.8.  No new or enlarging hypermetabolic lung lesion, and no development of organized airspace consolidation or pleural effusion.     Pathology:  6/14/2022:  EBUS ENDOBRONCHIAL MASS RUL, CYTOLOGY: NEARLY ACELLULAR SPECIMEN CONSISTING OF FRESH BLOOD CLOT. NO ATYPICAL OR  MALIGNANT CELLS IDENTIFIED.   7/11/2022: Bronchial Wash, RLL, right bronchial mass washings:  - Squamous cell carcinoma.    Bronchial Wash, LLL, left bronchial mass washings: - Squamous cell carcinoma.     7/11/2022 Laryngectomy:  1. Larynx, laryngeal biopsy frozen section : - Squamous cell carcinoma with necrosis.    2. Neck, Anterior, left neck level 3: - Number of lymph nodes involved by carcinoma:  1/7       - Size of metastatic deposit:  2.5 mm - Extranodal extension:  Not identified      3. Larynx, resection without nodes, total layrngectomy :  - Squamous cell carcinoma.  4. Neck, Anterior, left neck level 4: - Number of lymph nodes involved by carcinoma:  0/10  5. Neck, Anterior, right neck level 4:  - Number of lymph nodes involved by carcinoma:  0/11  6. Neck, Anterior, right neck level 3: - Number of lymph nodes involved by carcinoma:  0/9  7. Nasophaynx, inferior pharyngeal mucosa : - Benign squamous mucosa. - No evidence of malignancy.     8. Cartilage, superior cartilage margin : - No evidence of malignancy.    9. Lymph Node, pretracheal lymph  node :  - Number of lymph nodes involved by carcinoma:  0/3  [id number]      CLINICAL HISTORY:       Patient: Josafat Boudreaux is a 57 y.o. male.kindly refer her for laryngeal carcinoma.  Patient  was referred to ENT for further evaluation of hoarseness.  He did not have any difficulty swallowing or dry mouth at the time.  Hoarseness has been present for at least 4 months prior to evaluation. 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      Patient was scheduled to trach but on 06/10/2022 he was brought to the emergency room via air met with is short of breath.  He was found to have and oxygenation on the 40s when EMS was called.  He was placed on BiPAP but pCO2 increased so he has an emergent tracheostomy performed.  He has a PEG tube placed same hospitalization.  He underwent bronchoscopy with  biopsy of the right upper lobe endobronchial lesion on 6/14/2022 cytology was negative for malignant cells.       On 7/11/2022 he underwent LARYNGECTOMY, WITH RADICAL NECK DISSECTION - Bilateral LARYNGOSCOPY, DIRECT, DIAGNOSTIC, WITH BRONCHOSCOPY AND ESOPHAGOSCOPY pathology report as above.  1/40 lymph nodes were involved with metastatic disease.  Bronchoscopy was repeated and biopsy of the left lower lobe and right lower lobe were both positive for squamous cell carcinoma.     He is here today with his wife.  He has recovered from surgery relatively well.  Tracheostomy in place.  He is unable to talk so the history was taken by electronic medical record and wife.     Patient has a brother with history of throat cancer. Patient used to smoke 1 and half pack per day since he was 60 years old.  He was smoking less recently.      Chief Complaint: No Concerns today      Interval History:  Patient presents today for follow up and continuation of Carbo/Taxol/Keytruda, due for cycle #4 tomorrow. He is  accompanied by his wife. He completed 6th cycle of weekly Carbo/Taxol and radiation on 9/21/22. He is doing well and has no complaints today. He did better with this last cycle of chemotherapy + Neulasta. No hospitalization. He is requesting to continue IVF after chemo as this helped him tolerate better.      Past Medical History:   Diagnosis Date    Cancer     COPD (chronic obstructive pulmonary disease)     Dysphagia     Lung cancer     Vocal cord mass       Past Surgical History:   Procedure Laterality Date    DIRECT DIAGNOSTIC LARYNGOSCOPY WITH BRONCHOSCOPY AND ESOPHAGOSCOPY N/A 07/11/2022    Procedure: LARYNGOSCOPY, DIRECT, DIAGNOSTIC, WITH BRONCHOSCOPY AND ESOPHAGOSCOPY;  Surgeon: Emory Alonso MD;  Location: [facility];  Service: ENT;  Laterality: N/A;    HIP SURGERY      HUMERUS FRACTURE SURGERY      INCISION AND DRAINAGE, NECK Bilateral 10/21/2022    Procedure: INCISION AND DRAINAGE,NECK;  Surgeon: Madison GAN  MD Brunilda;  Location: [facility] OR;  Service: ENT;  Laterality: Bilateral;    INSERT ARTERIAL LINE  06/26/2022         JOINT REPLACEMENT  2019    R hip    TRACHEOSTOMY N/A 06/10/2022    Procedure: CREATION, TRACHEOSTOMY;  Surgeon: Junior Alonso MD;  Location: [facility] OR;  Service: ENT;  Laterality: N/A;     Family History   Problem Relation Age of Onset    Lung cancer Father     Cancer Father         Lung cancer    Throat cancer Brother     Cancer Brother         Throat     Social Connections: Moderately Isolated    Frequency of Communication with Friends and Family: More than three times a week    Frequency of Social Gatherings with Friends and Family: More than three times a week    Attends [employer] Services: Never    Active Member of Clubs or Organizations: No    Attends Club or Organization Meetings: Never    Marital Status:        Review of patient's allergies indicates:  No Known Allergies   Current Outpatient Medications on File Prior to Visit   Medication Sig Dispense Refill    albuterol-ipratropium (DUO-NEB) 2.5 mg-0.5 mg/3 mL nebulizer solution Take 3 mLs by nebulization every 4 (four) hours as needed for Shortness of Breath or Wheezing. Rescue 90 mL 11    diphenhydrAMINE (BENADRYL) 25 mg capsule 1 capsule (25 mg total) by Per G Tube route every 6 (six) hours as needed for Itching. 90 capsule 1    famotidine (PEPCID) 20 MG tablet Take 1 tablet (20 mg total) by mouth every evening. 30 tablet 11    glutamine 10 gram PwPk Take 10 g by mouth 2 (two) times a day.      HYDROcodone-acetaminophen (NORCO) 5-325 mg per tablet Take 1 tablet by mouth every 4 (four) hours as needed for Pain.      metroNIDAZOLE (METROGEL) 0.75 % (37.5mg/5 gram) vaginal gel apply to affected area BID      nystatin (MYCOSTATIN) cream Apply topically 2 (two) times daily. 15 g 5    ondansetron (ZOFRAN-ODT) 8 MG TbDL Take 8 mg by mouth every 8 (eight) hours as needed for Nausea. Tab 1 ODT in AM for 2 days after chemotherapy       pantoprazole (PROTONIX) 20 MG tablet Take 1 tablet (20 mg total) by mouth once daily. 30 tablet 11    [DISCONTINUED] aspirin (ECOTRIN) 81 MG EC tablet Take 81 mg by mouth once daily.       Current Facility-Administered Medications on File Prior to Visit   Medication Dose Route Frequency Provider Last Rate Last Admin    LIDOcaine (PF) 40 mg/mL (4 %) injection 2 mL  2 mL Tracheal Tube 1 time in Clinic/HOD Kevin Palomino MD        LIDOcaine HCL 4% external solution 4 mL  4 mL Topical (Top) 1 time in Clinic/HOD Marty Jeffries MD        oxymetazoline 0.05 % nasal spray 2 spray  2 spray Each Nostril 1 time in Clinic/HOD Marty Jeffries MD        phenylephrine HCL 1% nasal spray 1 spray  1 spray Each Nostril 1 time in Clinic/HOD Kevin Palomino MD          Review of Systems   Constitutional:  Positive for fatigue. Negative for activity change, appetite change, chills, diaphoresis, fever and unexpected weight change.   HENT:  Negative for nasal congestion, mouth sores, nosebleeds, sinus pressure/congestion, sore throat and trouble swallowing.    Eyes: Negative.    Respiratory:  Negative for cough and shortness of breath.    Cardiovascular:  Negative for chest pain and palpitations.   Gastrointestinal:  Negative for abdominal distention, abdominal pain, blood in stool, change in bowel habit, constipation, diarrhea, nausea, vomiting and change in bowel habit.        Dysphagia   Endocrine: Negative.    Genitourinary:  Negative for bladder incontinence, decreased urine volume, difficulty urinating, dysuria, frequency, hematuria and urgency.   Musculoskeletal:  Negative for arthralgias, back pain, gait problem, joint swelling, leg pain and myalgias.   Integumentary:  Negative for rash.   Allergic/Immunologic: Negative.    Neurological:  Negative for dizziness, tremors, syncope, weakness, light-headedness, numbness, headaches and memory loss.   Hematological:  Negative for adenopathy. Does not bruise/bleed easily.  "  Psychiatric/Behavioral:  Negative for agitation, confusion, hallucinations, sleep disturbance and suicidal ideas. The patient is not nervous/anxious.             Vitals:    03/01/23 1119   BP: 128/80   BP Location: Right arm   Patient Position: Sitting   Pulse: 108   Temp: 98.1 °F (36.7 °C)   TempSrc: Oral   SpO2: 96%   Weight: 65.8 kg (145 lb)   Height: 5' 9" (1.753 m)        Physical Exam  Vitals and nursing note reviewed.   Constitutional:       General: He is not in acute distress.     Appearance: Normal appearance.   HENT:      Head: Normocephalic and atraumatic.      Mouth/Throat:      Mouth: Mucous membranes are moist.   Eyes:      General: No scleral icterus.     Extraocular Movements: Extraocular movements intact.      Conjunctiva/sclera: Conjunctivae normal.   Neck:      Vascular: No JVD.      Trachea: Tracheostomy present.      Comments: Tracheotomy in place.  Right side - Skin changes c/w RT     Cardiovascular:      Rate and Rhythm: Normal rate and regular rhythm.      Heart sounds: No murmur heard.  Pulmonary:      Effort: Pulmonary effort is normal.      Breath sounds: Normal breath sounds. No wheezing or rhonchi.   Abdominal:      General: The ostomy site is clean. Bowel sounds are normal. There is no distension.      Palpations: Abdomen is soft.      Tenderness: There is no abdominal tenderness.   Musculoskeletal:         General: No swelling or deformity.      Cervical back: Neck supple.   Lymphadenopathy:      Head:      Right side of head: No submandibular adenopathy.      Left side of head: No submandibular adenopathy.      Cervical: No cervical adenopathy.      Upper Body:      Right upper body: No supraclavicular or axillary adenopathy.      Left upper body: No supraclavicular or axillary adenopathy.      Lower Body: No right inguinal adenopathy. No left inguinal adenopathy.   Skin:     General: Skin is warm.      Coloration: Skin is not jaundiced.      Findings: No rash.   Neurological:      " General: No focal deficit present.      Mental Status: He is alert and oriented to person, place, and time.      Cranial Nerves: Cranial nerves 2-12 are intact.   Psychiatric:         Attention and Perception: Attention normal.         Behavior: Behavior is cooperative.     ECOG SCORE             Laboratory:  CBC with Differential:  Lab Results   Component Value Date    WBC 10.2 02/22/2023    RBC 3.40 (L) 02/22/2023    HGB 11.1 (L) 02/22/2023    HCT 34.2 (L) 02/22/2023    .6 (H) 02/22/2023    MCH 32.6 02/22/2023    MCHC 32.5 (L) 02/22/2023    RDW 19.8 (H) 02/22/2023     (L) 02/22/2023    MPV 9.5 02/22/2023        CMP:  Sodium Level   Date Value Ref Range Status   02/22/2023 139 136 - 145 mmol/L Final     Potassium Level   Date Value Ref Range Status   02/22/2023 5.6 (H) 3.5 - 5.1 mmol/L Final     Carbon Dioxide   Date Value Ref Range Status   02/22/2023 29 22 - 29 mmol/L Final     Blood Urea Nitrogen   Date Value Ref Range Status   02/22/2023 3.4 (L) 8.4 - 25.7 mg/dL Final     Creatinine   Date Value Ref Range Status   02/22/2023 0.86 0.73 - 1.18 mg/dL Final     Calcium Level Total   Date Value Ref Range Status   02/22/2023 9.2 8.4 - 10.2 mg/dL Final     Albumin Level   Date Value Ref Range Status   02/22/2023 3.4 (L) 3.5 - 5.0 g/dL Final     Bilirubin Total   Date Value Ref Range Status   02/22/2023 0.3 <=1.5 mg/dL Final     Alkaline Phosphatase   Date Value Ref Range Status   02/22/2023 119 40 - 150 unit/L Final     Aspartate Aminotransferase   Date Value Ref Range Status   02/22/2023 20 5 - 34 unit/L Final     Alanine Aminotransferase   Date Value Ref Range Status   02/22/2023 15 0 - 55 unit/L Final     Estimated GFR-Non    Date Value Ref Range Status   08/02/2022 >60 mls/min/1.73/m2 Final         Assessment:     No diagnosis found.      Laryngeal Squamous cell carcinoma -s/p total laryngectomy, 1/40 LN with metastatic disease.   Lung masses x 2, bilateral--> Squamous cell          Plan:       Completed weekly carbo/Taxol and radiation on 9/21/22 for his laryngeal carcinoma. Lung disease progressing.   Right neck cellulitis s/p I&D is completely healed  Discussed rationale for weekly labs and also discuss neutropenic fever, precautions. I this that he will benefit of starting prophylactic antibiotics when his ANC is < 1,000 instead of 500 as he has tracheotomy and PEG tube that could be possible source of infection when becomes neutropenic.     Continue Carbo/Taxol/Keytruda q 3 w (Neulasta added)  Okay to proceed with cycle #4 tomorrow, 3/2/2023  Carbo dose will be adjusted to AUC 5.0  Will add IVF tomorrow per pt request  RTC in 3 weeks for TD with labs, treat next day  Weekly labs: CBC, CMP--standing order placed  PET CT to evaluate treatment response, if insurance does not cover, will change to CT C/A/P  Encourage to call or message us for any questions or problems  The patient was given ample opportunity to ask questions, and to the best of my abilities, all questions answered to satisfaction; patient demonstrated understanding of what we discussed and agreeable to the plan.     LENKA NELSON MD      Professional Services   I, Bessie Horowitz LPN, acted solely as a scribe for and in the presence of Dr. Lenka Nelson, who performed these services.

## 2023-03-08 NOTE — PROGRESS NOTES
The scope used for the exam was:  Flexible scope ENF-P4  Serial Number:  1)    [id number]    []   2)    [id number]    []   3)    [id number]    []   4)    [id number]    []   5)    [id number]    [x]   6)    [id number]    []       The scope used for the exam was:  Rigid scope   Serial Number:  1)   [id number]    []   2)   [id number]    []   3)   7330    []   4)    3384   []   5)    0824   []   6)    5554   []     7)   7425    []   8)   2240    []   9)   1109    []

## 2023-03-08 NOTE — PROGRESS NOTES
[facility]  Otolaryngology Clinic Note    Josafat Boudreaux  Encounter Date: 3/8/2023  YOB: 1965  Physician: Lisseth Krueger MD    Chief Complaint: Laryngeal mass    HPI: Josafat Boudreaux is a 57 y.o. male referred to clinic by Dr. Emory Alonso for laryngeal mass. Patients wife provides history. She states that patient first noticed hoarseness in November 2021. Got progressively worse. Presented to Dr. Alonso in early June 2022 for these complaints. Was noted to have a laryngeal mass on flexible laryngoscopy. Several days after the flexible laryngoscopy patient developed worsening swelling of the airway and presented to the ER in respiratory distress. He underwent emergent tracheostomy with Dr. Junior Alonso. Patient was subsequently discharged home but readmitted shortly after with seizures. He spent 3 days in the ICU on the ventilator. During hospitalization, patient underwent PEG tube placement. He reports that he was not having any issues with PO intake prior to the tracheostomy placement. Now he is PEG dependent. Only taking some ice chips by mouth. He presents today to discuss laryngectomy.     11/2/22:  Since last visit, patient total laryngectomy with bilateral neck dissections level 3 and 4, primary closure, he did not undergo TEP placement.  He was also found to have synchronous primary or lung metastasis in the lung that was also squamous cell carcinoma.  He underwent radiation completed this in the middle of September 2022.  He received carboplatin and Taxol which he still has a few more treatments of.  He presented to the emergency department on 10/08/2022 with a right neck infection.  This was treated with IV antibiotics and incision and drainage with UCHE drain placement.  He was kept NPO and did feeds through his PEG tube.  He met criteria for discharge on 10/25/22 with UCHE drain.  He had outpatient swallow study which did not demonstrate any  evidence of a leak.  He returns to clinic today reporting improvement in his symptoms including right neck pain and drainage.  He has not had to empty his UCHE drain on a daily basis at this point.  He had a PET performed on 10/13/2022 to assess response to chemotherapy.  On that PET scan, right hilar and mediastinal lymph nodes were decreased in size but had increased metabolic activity.  He denies any other issues today    11/7/22: Reports worsening swelling under the right jawline the past few days.  Denies any erythema, infection, fever or chills.    12/6/22:  Feeling a little run down, increased cough with blood tinged secretions, running temperature to 101 at home.  Received azithromycin abx last night from Dr. Alonso's office.    12/28/22:  Was hospitalized for Pseudomonal pneumonia.  He also had Blastomycetes positive antigen on arrival, but was unable to tolerate amphotericin B due to significant hypotension.  Prior to discharge, his repeat Blastomycetes antigen was negative.  Discharged with levaquin and portable home oxygen.  His pneumonia symptoms have all improved since discharge.  Ulceration spots have persisted with one spot having a small amount of growth adjacent.  No bleeding or pain.    1/3/22:  Returns for follow up for monitoring of tracheal ulceration.  Reports interval improvement.  Denies pain or bleeding from area.  Denies swelling or tenderness to neck.    1/11/22:  Returns for follow up.  No changes to tracheal ulceration.  Denies pain or bleeding.  Otherwise doing well.    2/8/23:  Patient is here for regular scheduled cancer surveillance.  He denies any changes since his last appointment.  He has lost about 5 lb since last December.  He contributes his weight loss to being hospitalized for pneumonia.  His swallow stable and his appetite is improving.  Denies odynophagia, otalgia, new head or neck masses, oral lesions, oral bleeding, hematemesis or hemoptysis.  He is still undergoing  chemotherapy treatments with Dr. Atkinson.  States that he has 2 more infusion scheduled at this time.  He is tolerating treatments well.'    3/8/23:  Doing well.  Completed his 4th chemo treatment few weeks ago.  Has been gaining weight not using his PEG.  Scheduled for his posttreatment the 22nd and will see GI after fall as well to remove PEG.  Family notes that the tracheal ulceration site is continuing to heal has not had any other issues with the stoma breathing    ROS:   General: Negative except per HPI  Skin: Denies rash, ulcer, or lesion.  Eyes: Denies vision changes or diplopia.  Ears: Negative except per HPI  Nose: Negative except per HPI  Throat/mouth: Negative except per HPI  Cardiovascular: Negative except per HPI  Respiratory: Negative except per HPI  Neck: Negative except per HPI  Endocrine: Negative except per HPI  Neurologic: Negative except per HPI    Other 10-point review of systems negative except per HPI      Review of patient's allergies indicates:  No Known Allergies    Past Medical History:   Diagnosis Date    Cancer     COPD (chronic obstructive pulmonary disease)     Dysphagia     Lung cancer     Vocal cord mass        Past Surgical History:   Procedure Laterality Date    DIRECT DIAGNOSTIC LARYNGOSCOPY WITH BRONCHOSCOPY AND ESOPHAGOSCOPY N/A 07/11/2022    Procedure: LARYNGOSCOPY, DIRECT, DIAGNOSTIC, WITH BRONCHOSCOPY AND ESOPHAGOSCOPY;  Surgeon: Emory Alonso MD;  Location: [facility];  Service: ENT;  Laterality: N/A;    HIP SURGERY      HUMERUS FRACTURE SURGERY      INCISION AND DRAINAGE, NECK Bilateral 10/21/2022    Procedure: INCISION AND DRAINAGE,NECK;  Surgeon: Madison Worley MD;  Location: [facility] OR;  Service: ENT;  Laterality: Bilateral;    INSERT ARTERIAL LINE  06/26/2022         JOINT REPLACEMENT  2019    R hip    TRACHEOSTOMY N/A 06/10/2022    Procedure: CREATION, TRACHEOSTOMY;  Surgeon: Junior Alonso MD;  Location: [facility] OR;  Service: ENT;  Laterality: N/A;       Social  History     Socioeconomic History    Marital status:    Tobacco Use    Smoking status: Former     Packs/day: 0.50     Years: 15.00     Pack years: 7.50     Types: Cigarettes     Start date: 1980     Quit date: 2022     Years since quittin.7    Smokeless tobacco: Never   Substance and Sexual Activity    Alcohol use: Not Currently    Drug use: Never    Sexual activity: Not Currently     Partners: Female     Social Determinants of Health     Financial Resource Strain: Low Risk     Difficulty of Paying Living Expenses: Not hard at all   Food Insecurity: No Food Insecurity    Worried About Running Out of Food in the Last Year: Never true    Ran Out of Food in the Last Year: Never true   Transportation Needs: No Transportation Needs    Lack of Transportation (Medical): No    Lack of Transportation (Non-Medical): No   Physical Activity: Inactive    Days of Exercise per Week: 0 days    Minutes of Exercise per Session: 0 min   Stress: No Stress Concern Present    Feeling of Stress : Not at all   Social Connections: Moderately Isolated    Frequency of Communication with Friends and Family: More than three times a week    Frequency of Social Gatherings with Friends and Family: More than three times a week    Attends Orthodox Services: Never    Active Member of Clubs or Organizations: No    Attends Club or Organization Meetings: Never    Marital Status:    Housing Stability: Low Risk     Unable to Pay for Housing in the Last Year: No    Number of Places Lived in the Last Year: 1    Unstable Housing in the Last Year: No       Family History   Problem Relation Age of Onset    Lung cancer Father     Cancer Father         Lung cancer    Throat cancer Brother     Cancer Brother         Throat       Outpatient Encounter Medications as of 3/8/2023   Medication Sig Dispense Refill    albuterol-ipratropium (DUO-NEB) 2.5 mg-0.5 mg/3 mL nebulizer solution Take 3 mLs by nebulization every 4 (four) hours as needed  for Shortness of Breath or Wheezing. Rescue 90 mL 11    diphenhydrAMINE (BENADRYL) 25 mg capsule 1 capsule (25 mg total) by Per G Tube route every 6 (six) hours as needed for Itching. 90 capsule 1    famotidine (PEPCID) 20 MG tablet Take 1 tablet (20 mg total) by mouth every evening. 30 tablet 11    glutamine 10 gram PwPk Take 10 g by mouth 2 (two) times a day.      HYDROcodone-acetaminophen (NORCO) 5-325 mg per tablet Take 1 tablet by mouth every 4 (four) hours as needed for Pain.      metroNIDAZOLE (METROGEL) 0.75 % (37.5mg/5 gram) vaginal gel apply to affected area BID      nystatin (MYCOSTATIN) cream Apply topically 2 (two) times daily. 15 g 5    ondansetron (ZOFRAN-ODT) 8 MG TbDL Take 8 mg by mouth every 8 (eight) hours as needed for Nausea. Tab 1 ODT in AM for 2 days after chemotherapy      pantoprazole (PROTONIX) 20 MG tablet Take 1 tablet (20 mg total) by mouth once daily. 30 tablet 11    [DISCONTINUED] aspirin (ECOTRIN) 81 MG EC tablet Take 81 mg by mouth once daily.       Facility-Administered Encounter Medications as of 3/8/2023   Medication Dose Route Frequency Provider Last Rate Last Admin    LIDOcaine (PF) 40 mg/mL (4 %) injection 2 mL  2 mL Tracheal Tube 1 time in Clinic/HOD Kevin Palomino MD        LIDOcaine HCL 4% external solution 4 mL  4 mL Topical (Top) 1 time in Clinic/HOD Marty Jeffries MD        oxymetazoline 0.05 % nasal spray 2 spray  2 spray Each Nostril 1 time in Clinic/HOD Marty Jeffries MD        phenylephrine HCL 1% nasal spray 1 spray  1 spray Each Nostril 1 time in Clinic/HOD Kevin Palomino MD           Physical Exam:  Vitals:    03/08/23 0840   BP: 120/78   Pulse: (!) 112   Weight: 66.2 kg (146 lb)         Constitutional  General Appearance: well nourished, well-developed, AAO x3, NAD  HEENT  Eyes: PEERLA, EOMI, normal conjunctivae  Ears: Hearing well at conversation level, EAC patent, TM intact, no effusion bilateral  Nose: septum midline, no inferior turbinate hypertrophy, no  polyps, moist mucosa without erythema or blue hue  OC/OP: dentition moderate, no oral lesions, tongue/FOM/BOT- soft, no leukoplakia/ulcerations/ tenderness   Nasopharynx, Hypopharynx, and Larynx:               Indirect: attempted, limited view due to patient intolerance  Neck: post-radiation changes, no lymphadenopathy, no tenderness  Laryngectomy tube in place, trach collar and ties on; Right lateral tracheal walls with small area ulceration, no surrounding erythema or purulent drainage.  Breathing well without issues  Neuro: CN II - XII intact bilaterally  Cardiovascular: peripheral pulses palpable  Respiratory: non-labored respirations  Psychiatric: oriented to time, place and person, no depression, anxiety or agitation    12/6/22      Procedures:Flexible Fiberoptic Laryngoscopy/Nasopharyngoscopy via right nare  Performed on 2/8/2023  Elisa Carty MD    Procedure in Detail: Informed consent was obtained from the patient after explanation of procedure, indications, risks and benefits. Flexible endoscopy was performed through the nasal passages. The nasal cavity, nasopharynx, oropharynx, hypopharynx and larynx were adequately visualized. The true vocal cords and arytenoids were examined during phonation and repose.    Anesthesia: None  Adverse Events: None  Blood loss: none  Condition: good    Findings:  NP/OP: no masses/lesions of NC, eustachian tube, fossa of Rosenmuller, no adenoid hypertrophy  BOT/vallecula: no lingual hypertrophy, no masses/lesions  Neopharynx without evidence of discrete lesion but with mild bulkiness of the right hypopharyngeal area      Pertinent Data:  NM PET CT ROUTINE     CLINICAL HISTORY  Non-small cell lung cancer (NSCLC), metastatic, assess treatment response; Malignant neoplasm of unspecified part of right bronchus or lung; status post 2 weeks of chemo/radiotherapy (surveillance/restaging)     TECHNIQUE  Intravenous injection of 9.8 mCi 18F-FDG was performed, with subsequent PET  imaging from skull base through the upper thighs.  Corresponding non-contrast helical-acquisition CT images were obtained for anatomic correlation and attenuation correction.  Fused-modality multiplanar, PET rotational planar, and PET coronal MIP reconstructions were accomplished by a technologist at a separate workstation and submitted to PACS for physician review.     COMPARISON  1 August 2022     FINDINGS  Exam quality: adequate for evaluation        HEAD / NECK:     There is symmetric radiotracer activity through the visualized brain.     Ill-defined hypermetabolic tissue is again appreciated at the left neck, just superior and lateral to the tracheostomy insertion site (fused axial series, images 40-50).  The regional maximum SUV is 6.8, with discrete CT-correlate lesion poorly delineated secondary to non-contrast protocol; prior SUV max 8.3. An adjacent, better delineated right cervical chain lymph node is visualized posteriorly, measuring 1.1 cm short axis with SUV max of 6 (image 45); this previously measured 1.5 cm in least dimension with maximum SUV of 7.7.  No convincing new or enlarging cervical adenopathy or newly hypermetabolic lymph nodes are visualized.  The prior left neck fluid collection is no longer evident.        CHEST:     The somewhat lobulated right upper lobe hilar mass is now approximately 3.4 cm x 1.4 cm and maximum SUV 23 (series 3, image 86); previously 3.6 cm x 1.8 cm and SUV max 14.8.  No new or enlarging hypermetabolic lung lesion, and no development of organized airspace consolidation or pleural effusion.     No suspicious abnormality of the mediastinal vasculature is identified.  There is interval placement of a left chest wall subcutaneous port with catheter terminating at the mid SVC region.  The heart chambers are of normal-appearing volume. There is no significant pericardial fluid.     Mediastinal and right hilar FDG-avid adenopathy is again appreciated.  The index right  precarinal lymph node measures 1.2 cm short axis with SUV max of 16 (series 3, image 88); previously 1.6 cm and maximum SUV of 13.8.  The reference right hilar lymph node is poorly delineated from adjacent vascular structures secondary to non-contrast protocol, but is estimated to measure 1.8 cm short axis and has maximum SUV of 21 (series 3, image 92); this node previously measured 2.9 cm in least dimension with maximum SUV of 9.8.        ABDOMEN / PELVIS:     Normal physiologic distribution of activity is appreciated through the abdomen and pelvis.     No findings to suggest new or worsened focal FDG-avid process or CT-evident lesion involving the upper abdominal solid organs, and no acute findings appreciated.  The urinary bladder and reproductive structures are unremarkable for PET-CT evaluation.     Abdominopelvic vascular structures are unchanged. No evidence of new/worsening hypermetabolic lou disease.        MUSCULOSKELETAL / SUBCUTANEOUS:     No development of suspicious FDG uptake, destructive lesion, or acute process.     IMPRESSION  1. Findings suggestive of mixed disease response.  Right lower cervical chain nodes are less metabolically active.  Right hilar mass and mediastinal/right hilar lymph nodes are decreased in size, but demonstrate increased metabolic activity.  2. No findings to suggest new or worsening FDG-avid metastatic disease within the left thoracic cavity, abdomen, or pelvis.  3. Previously visualized left neck fluid collection is no longer clearly identified.     RADIATION DOSE  Automated tube current modulation, weight-based exposure dosing, and/or iterative reconstruction technique utilized to reach lowest reasonably achievable exposure rate.     DLP: 661 mGy*cm        Electronically signed by: Haja Paulson  Date:                                            10/13/2022  Time:                                           16:25    FL ESOPHAGRAM COMPLETE     CLIICAL HISTORY:  Cellulitis of  neck     TECHNIQUE:  The patient was given oral contrast and multiple fluoroscopic images of the esophagus obtained in various positions. Total fluoroscopy time is 0.7 minutes with absorbed dose of 7.245 mGy.     COMPARISON:  Esophagram performed 07/15/2022     FINDINGS:  Exam performed with Gastrografin and thin barium.  Patient swallowed contrast without difficulty.  No cervical esophageal contrast extravasation identified.     Impression:     No cervical esophageal leak identified.        Electronically signed by: Lance Boudreaux  Date:                                            10/31/2022  Time:                                           11:10      Right tracheostomal biopsy 1/11/23:  - Squamous mucosa with epithelial erosion and acute inflammation.  - Lamina propria with reactive fibroblasts and fibrosis, consistent with radiation changes, see comment.  - No evidence of malignancy.    Assessment/Plan:  Josafat Boudreaux is a 57 y.o. 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 Emory Alonso  Medical Oncologist: Yara Atkinson MD (Last seen: 2/2023)  Radiation Oncologist: Romie Das MD (Last seen: Unknown)    Primary tumor: Endolaryngeal [id number] SCCA    Date of Diagnosis: 7/11/22  Surgery Date: 7/11/22  Induction Chemotherapy: No  Adjuvant Therapy: CRT  Completion Date: Sept 2022 for RT, still undergoing chemo    Pertinent Treatment History:  CRT    Place date if completed   3 6 12 18 24 36 48 60   CT Neck 10/19/22 X X X X X X X   PET/CT 10/13/22          CXR  12/21/22 X X X X X X   TFT X 12/6/22 X  X X X X     Current Surveillance Interval: <1 year post-treatment, q6 wks     - ZEUS on exam today.  - Finished CRT  - Continue stomal care  -PET 3/2022    RTC in 1 month for routine surveillance     Elisa Carty  MD  [facility] Otol[facility]ology PGY IV

## 2023-03-13 PROBLEM — J18.9 PNEUMONIA: Status: RESOLVED | Noted: 2022-01-01 | Resolved: 2023-01-01

## 2023-03-13 PROBLEM — A41.9 SEPSIS: Status: RESOLVED | Noted: 2022-01-01 | Resolved: 2023-01-01

## 2023-03-20 PROBLEM — J18.9 COMMUNITY ACQUIRED PNEUMONIA OF RIGHT MIDDLE LOBE OF LUNG: Status: RESOLVED | Noted: 2022-01-01 | Resolved: 2023-01-01

## 2023-03-20 NOTE — PROGRESS NOTES
CHW - Outreach Attempt    Community Health Worker left a voicemail message for 1st attempt to contact patient regarding: SDOH  Community Health Worker to attempt to contact patient on: 03/20/2023        CHW - Case Closure    This Community Health Worker spoke to patient via telephone today.   Pt/Caregiver reported: patient do not need services  Pt/Caregiver denied any additional needs at this time and agrees with episode closure at this time.  Provided patient with Community Health Worker's contact information and encouraged him/her to contact this Community Health Worker if additional needs arise.

## 2023-03-22 PROBLEM — L03.113 RIGHT ARM CELLULITIS: Status: ACTIVE | Noted: 2023-01-01

## 2023-03-22 NOTE — PROGRESS NOTES
HEMATOLOGY/ONCOLOGY OFFICE CLINIC VISIT    Visit Information:     Initial Evaluation: 7/21/2022  Referring Provider:   Other providers:  Code status:     Diagnosis:  [id number] JENNYFER Laryngeal Squamous cell carcinoma      Present treatment:  Carbo/Taxol/Keytruda planned 11/30/2022     Treatment/Oncology history:  --7/11/2022: total laryngectomy  --Carbo/Taxol + RT (8/17/22-9/21/22)  --8/17/2022-9/28/2022:  6000 cGy, 200 cGy/fraction, 30/30 fx     Plan of care: Carbo/Taxol/Keytruda x 4-6 --> maintenance Keytruda    Imaging:  Ct neck 6/6/2022: A metastatic right level III cervical lymph node is present with short axis measurement of 1.6 cm.  This lymph node contains internal cystic change, typical of metastatic squamous cell carcinoma.  A left level III cervical lymph node shows short axis measurements of 1 cm, and is suspicious.    A left supraglottic mass measures approximately 2.4 cm in diameter (axial post-contrast image 39), presumably corresponding to the primary neoplasm.  Metastatic lymph nodes are also present in the inferior right paratracheal position, measuring 1.3 cm in short axis.  Metastatic lymph nodes are present in the superior right hilum, measuring 1.8 cm in short axis.  Ct H&N 6/27/2022: 1. There is mild stenosis at the origin of left proximal internal carotid artery secondary to dense calcified atheromatous plaque. 2. There is consolidation is right upper lobe. Additional ground-glass opacities are also seen on the remainder of the visualized lungs. These findings are consistent with an infectious process. Small right pleural effusion is seen. There is an ill-defined soft tissue mass in the right perihilar region which measures approximately 3.1 x 2.9 x 4.6 cm, of concern for neoplasm. Correlate clinically as regards further evaluation and followup with CT chest. There is an ill-defined enhancing soft tissue mass in the glottis infiltrating into the paraglottic spaces and the subglottic region  with obliteration of the airway, of concern for a neoplasm. 3. Unremarkable CT angiogram of the head. Details and other findings as described above.  NM PET CT 8/1/2022: Postoperative changes of recent total laryngectomy and lou dissection. bilateral hypermetabolic cervical lymphadenopathy.  A right cervical lymph node 1.7 x 1.8 cm, compared to 1.6 x 1.5 cm previously, SUV of 7.7. left cervical lymph node 8 x 10 mm and has a max SUV of 3.4.  A fluid collection posterior to the left internal jugular vein measures 2.4 x 2.4 cm. There is right hilar and mediastinal lymphadenopathy.  A precarinal lymph node 1.6 x 2.3 cm, compared to 1.3 x 1.7 cm previously, SUV of 13.8.  Right hilar lymphadenopathy measures 3 x 2.9 cm, compared to 2.1 x 2.3 cm previously, and has a max SUV of 9.8.  A suspected right hilar mass measures 1.8 x 3.6 cm SUV of 14.8   PET CT 10/13/2022: Ill-defined hypermetabolic tissue is again appreciated at the left neck, just superior and lateral to the tracheostomy insertion site. The regional maximum SUV is 6.8, with discrete CT-correlate lesion poorly delineated secondary to non-contrast protocol; prior SUV max 8.3. An adjacent, better delineated right cervical chain lymph node is visualized posteriorly, measuring 1.1 cm short axis with SUV max of 6; this previously measured 1.5 cm in least dimension with maximum SUV of 7.7.  No convincing new or enlarging cervical adenopathy or newly hypermetabolic lymph nodes are visualized.  The prior left neck fluid collection is no longer evident.The somewhat lobulated right upper lobe hilar mass is now approximately 3.4 cm x 1.4 cm and maximum SUV 23; previously 3.6 cm x 1.8 cm and SUV max 14.8.  No new or enlarging hypermetabolic lung lesion, and no development of organized airspace consolidation or pleural effusion.  PET CT 3/16/2023:  1. Largely improved disease though there is single paratracheal lymph node which has increased in size and is now FDG avid.    2. Some inguinal and external iliac lymph nodes demonstrate increased FDG activity today, this is favored reactive.         Pathology:  6/14/2022:  EBUS ENDOBRONCHIAL MASS RUL, CYTOLOGY: NEARLY ACELLULAR SPECIMEN CONSISTING OF FRESH BLOOD CLOT. NO ATYPICAL OR MALIGNANT CELLS IDENTIFIED.   7/11/2022: Bronchial Wash, RLL, right bronchial mass washings:  - Squamous cell carcinoma.    Bronchial Wash, LLL, left bronchial mass washings: - Squamous cell carcinoma.     7/11/2022 Laryngectomy:  1. Larynx, laryngeal biopsy frozen section : - Squamous cell carcinoma with necrosis.    2. Neck, Anterior, left neck level 3: - Number of lymph nodes involved by carcinoma:  1/7       - Size of metastatic deposit:  2.5 mm - Extranodal extension:  Not identified      3. Larynx, resection without nodes, total layrngectomy :  - Squamous cell carcinoma.  4. Neck, Anterior, left neck level 4: - Number of lymph nodes involved by carcinoma:  0/10  5. Neck, Anterior, right neck level 4:  - Number of lymph nodes involved by carcinoma:  0/11  6. Neck, Anterior, right neck level 3: - Number of lymph nodes involved by carcinoma:  0/9  7. Nasophaynx, inferior pharyngeal mucosa : - Benign squamous mucosa. - No evidence of malignancy.     8. Cartilage, superior cartilage margin : - No evidence of malignancy.    9. Lymph Node, pretracheal lymph  node :  - Number of lymph nodes involved by carcinoma:  0/3  [id number]      CLINICAL HISTORY:       Patient: Josafat Boudreaux is a 57 y.o. male.kindly refer her for laryngeal carcinoma.  Patient  was referred to ENT for further evaluation of hoarseness.  He did not have any difficulty swallowing or dry mouth at the time.  Hoarseness has been present for at least 4 months prior to evaluation. 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      Patient was scheduled to trach but on 06/10/2022 he was brought to the emergency room via air met with is  short of breath.  He was found to have and oxygenation on the 40s when EMS was called.  He was placed on BiPAP but pCO2 increased so he has an emergent tracheostomy performed.  He has a PEG tube placed same hospitalization.  He underwent bronchoscopy with biopsy of the right upper lobe endobronchial lesion on 6/14/2022 cytology was negative for malignant cells.       On 7/11/2022 he underwent LARYNGECTOMY, WITH RADICAL NECK DISSECTION - Bilateral LARYNGOSCOPY, DIRECT, DIAGNOSTIC, WITH BRONCHOSCOPY AND ESOPHAGOSCOPY pathology report as above.  1/40 lymph nodes were involved with metastatic disease.  Bronchoscopy was repeated and biopsy of the left lower lobe and right lower lobe were both positive for squamous cell carcinoma.     He is here today with his wife.  He has recovered from surgery relatively well.  Tracheostomy in place.  He is unable to talk so the history was taken by electronic medical record and wife.     Patient has a brother with history of throat cancer. Patient used to smoke 1 and half pack per day since he was 60 years old.  He was smoking less recently.      Chief Complaint: No chief complaint on file.      Interval History:  Patient presents today for follow up to discuss PET CT results, he is with his wife.  He completed 6th cycle of weekly Carbo/Taxol and radiation on 9/21/22. He completed 4th cycle of every 3 weeks of Carbo/Taxol/Keytruda on 3/2/2023. He is due to begin Keytruda every 6 weeks tomorrow. He is scheduled to have PEG tube removed tomorrow at Dr. Singh's office.  He reports pain, swelling and warmth to upper right arm.  He continues with cough, this is chronic, no worsening. Otherwise, he states he is feeling good. No fever, chills or sweats. No bleeding. Denies chest pain or shortness of breath.  Discuss PET-CT in details with the patient.  Overall shows significant improve of his disease except for 1 lymph node that is a little bit more hypermetabolic in mildly enlarged.  We  will continue same management and monitor that lymphoid in next scans.  Right upper extremity is swollen red warm and tender with signs suggestive cellulitis but because of previous surgery and hardware in that arm I will like to do a NIVA study to rule out DVT as well.      Past Medical History:   Diagnosis Date    Cancer     COPD (chronic obstructive pulmonary disease)     Dysphagia     Lung cancer     Vocal cord mass       Past Surgical History:   Procedure Laterality Date    DIRECT DIAGNOSTIC LARYNGOSCOPY WITH BRONCHOSCOPY AND ESOPHAGOSCOPY N/A 07/11/2022    Procedure: LARYNGOSCOPY, DIRECT, DIAGNOSTIC, WITH BRONCHOSCOPY AND ESOPHAGOSCOPY;  Surgeon: Emory Alonso MD;  Location: [facility];  Service: ENT;  Laterality: N/A;    HIP SURGERY      HUMERUS FRACTURE SURGERY      INCISION AND DRAINAGE, NECK Bilateral 10/21/2022    Procedure: INCISION AND DRAINAGE,NECK;  Surgeon: Madison Worley MD;  Location: [facility];  Service: ENT;  Laterality: Bilateral;    INSERT ARTERIAL LINE  06/26/2022         JOINT REPLACEMENT  2019    R hip    TRACHEOSTOMY N/A 06/10/2022    Procedure: CREATION, TRACHEOSTOMY;  Surgeon: Junior Alonso MD;  Location: [facility];  Service: ENT;  Laterality: N/A;     Family History   Problem Relation Age of Onset    Lung cancer Father     Cancer Father         Lung cancer    Throat cancer Brother     Cancer Brother         Throat     Social Connections: Moderately Isolated    Frequency of Communication with Friends and Family: More than three times a week    Frequency of Social Gatherings with Friends and Family: More than three times a week    Attends Rastafarian Services: Never    Active Member of Clubs or Organizations: No    Attends Club or Organization Meetings: Never    Marital Status:        Review of patient's allergies indicates:  No Known Allergies   Current Outpatient Medications on File Prior to Visit   Medication Sig Dispense Refill     albuterol-ipratropium (DUO-NEB) 2.5 mg-0.5 mg/3 mL nebulizer solution Take 3 mLs by nebulization every 4 (four) hours as needed for Shortness of Breath or Wheezing. Rescue 90 mL 11    diphenhydrAMINE (BENADRYL) 25 mg capsule 1 capsule (25 mg total) by Per G Tube route every 6 (six) hours as needed for Itching. 90 capsule 1    famotidine (PEPCID) 20 MG tablet Take 1 tablet (20 mg total) by mouth every evening. 30 tablet 11    glutamine 10 gram PwPk Take 10 g by mouth 2 (two) times a day.      HYDROcodone-acetaminophen (NORCO) 5-325 mg per tablet Take 1 tablet by mouth every 4 (four) hours as needed for Pain.      metroNIDAZOLE (METROGEL) 0.75 % (37.5mg/5 gram) vaginal gel apply to affected area BID      nystatin (MYCOSTATIN) cream Apply topically 2 (two) times daily. 15 g 5    ondansetron (ZOFRAN-ODT) 8 MG TbDL Take 8 mg by mouth every 8 (eight) hours as needed for Nausea. Tab 1 ODT in AM for 2 days after chemotherapy      pantoprazole (PROTONIX) 20 MG tablet Take 1 tablet (20 mg total) by mouth once daily. 30 tablet 11    [DISCONTINUED] aspirin (ECOTRIN) 81 MG EC tablet Take 81 mg by mouth once daily.       Current Facility-Administered Medications on File Prior to Visit   Medication Dose Route Frequency Provider Last Rate Last Admin    LIDOcaine (PF) 40 mg/mL (4 %) injection 2 mL  2 mL Tracheal Tube 1 time in Clinic/HOD Kevin Palomino MD        LIDOcaine HCL 4% external solution 4 mL  4 mL Topical (Top) 1 time in Clinic/HOD Marty Jeffries MD        oxymetazoline 0.05 % nasal spray 2 spray  2 spray Each Nostril 1 time in Clinic/HOD Marty Jeffries MD        phenylephrine HCL 1% nasal spray 1 spray  1 spray Each Nostril 1 time in Clinic/HOD Kevin Palomino MD          Review of Systems   Constitutional:  Positive for fatigue. Negative for activity change, appetite change, chills, diaphoresis, fever and unexpected weight change.   HENT:  Negative for nasal congestion, mouth sores, nosebleeds, sinus  "pressure/congestion, sore throat and trouble swallowing.    Eyes: Negative.    Respiratory:  Positive for cough. Negative for shortness of breath.    Cardiovascular:  Negative for chest pain and palpitations.   Gastrointestinal:  Negative for abdominal distention, abdominal pain, blood in stool, change in bowel habit, constipation, diarrhea, nausea, vomiting and change in bowel habit.        Dysphagia   Endocrine: Negative.    Genitourinary:  Negative for bladder incontinence, decreased urine volume, difficulty urinating, dysuria, frequency, hematuria and urgency.   Musculoskeletal:  Negative for arthralgias, back pain, gait problem, joint swelling, leg pain and myalgias.   Integumentary:  Negative for rash.   Allergic/Immunologic: Negative.    Neurological:  Negative for dizziness, tremors, syncope, weakness, light-headedness, numbness, headaches and memory loss.   Hematological:  Negative for adenopathy. Does not bruise/bleed easily.   Psychiatric/Behavioral:  Negative for agitation, confusion, hallucinations, sleep disturbance and suicidal ideas. The patient is not nervous/anxious.             Vitals:    03/22/23 1426   Height: 5' 9" (1.753 m)        Physical Exam  Vitals and nursing note reviewed.   Constitutional:       General: He is not in acute distress.     Appearance: Normal appearance.   HENT:      Head: Normocephalic and atraumatic.      Mouth/Throat:      Mouth: Mucous membranes are moist.   Eyes:      General: No scleral icterus.     Extraocular Movements: Extraocular movements intact.      Conjunctiva/sclera: Conjunctivae normal.   Neck:      Vascular: No JVD.      Trachea: Tracheostomy present.      Comments: Tracheotomy in place.  Right side - Skin changes c/w RT     Cardiovascular:      Rate and Rhythm: Normal rate and regular rhythm.      Heart sounds: No murmur heard.  Pulmonary:      Effort: Pulmonary effort is normal.      Breath sounds: Normal breath sounds. No wheezing or rhonchi. "   Abdominal:      General: The ostomy site is clean. Bowel sounds are normal. There is no distension.      Palpations: Abdomen is soft.      Tenderness: There is no abdominal tenderness.   Musculoskeletal:         General: No swelling or deformity.      Right upper arm: Swelling and tenderness present.      Cervical back: Neck supple.      Comments: Erythema of right upper extremity to touch and tender   Lymphadenopathy:      Head:      Right side of head: No submandibular adenopathy.      Left side of head: No submandibular adenopathy.      Cervical: No cervical adenopathy.      Upper Body:      Right upper body: No supraclavicular or axillary adenopathy.      Left upper body: No supraclavicular or axillary adenopathy.      Lower Body: No right inguinal adenopathy. No left inguinal adenopathy.   Skin:     General: Skin is warm.      Coloration: Skin is not jaundiced.      Findings: No rash.   Neurological:      General: No focal deficit present.      Mental Status: He is alert and oriented to person, place, and time.      Cranial Nerves: Cranial nerves 2-12 are intact.   Psychiatric:         Attention and Perception: Attention normal.         Behavior: Behavior is cooperative.     ECOG SCORE             Laboratory:  CBC with Differential:  Lab Results   Component Value Date    WBC 10.4 03/16/2023    RBC 3.06 (L) 03/16/2023    HGB 10.6 (L) 03/16/2023    HCT 32.8 (L) 03/16/2023    .2 (H) 03/16/2023    MCH 34.6 03/16/2023    MCHC 32.3 (L) 03/16/2023    RDW 21.8 (H) 03/16/2023    PLT 72 (L) 03/16/2023    MPV 11.6 (H) 03/16/2023        CMP:  Sodium Level   Date Value Ref Range Status   03/16/2023 140 136 - 145 mmol/L Final     Potassium Level   Date Value Ref Range Status   03/16/2023 4.9 3.5 - 5.1 mmol/L Final     Carbon Dioxide   Date Value Ref Range Status   03/16/2023 28 22 - 29 mmol/L Final     Blood Urea Nitrogen   Date Value Ref Range Status   03/16/2023 6.3 (L) 8.4 - 25.7 mg/dL Final     Creatinine   Date  Value Ref Range Status   03/16/2023 0.74 0.73 - 1.18 mg/dL Final     Calcium Level Total   Date Value Ref Range Status   03/16/2023 8.7 8.4 - 10.2 mg/dL Final     Albumin Level   Date Value Ref Range Status   03/16/2023 3.5 3.5 - 5.0 g/dL Final     Bilirubin Total   Date Value Ref Range Status   03/16/2023 0.6 <=1.5 mg/dL Final     Alkaline Phosphatase   Date Value Ref Range Status   03/16/2023 106 40 - 150 unit/L Final     Aspartate Aminotransferase   Date Value Ref Range Status   03/16/2023 15 5 - 34 unit/L Final     Alanine Aminotransferase   Date Value Ref Range Status   03/16/2023 8 0 - 55 unit/L Final     Estimated GFR-Non    Date Value Ref Range Status   08/02/2022 >60 mls/min/1.73/m2 Final         Assessment:     Laryngeal Squamous cell carcinoma -s/p total laryngectomy, 1/40 LN with metastatic disease.   Lung masses x 2, bilateral--> Squamous cell         Plan:       Completed weekly carbo/Taxol and radiation on 9/21/22 for his laryngeal carcinoma. Lung disease progressing.   Right neck cellulitis s/p I&D is completely healed  Discussed rationale for weekly labs and also discuss neutropenic fever, precautions. I this that he will benefit of starting prophylactic antibiotics when his ANC is < 1,000 instead of 500 as he has tracheotomy and PEG tube that could be possible source of infection when becomes neutropenic.  Completed 4 cycles of Carbo/Taxol/Keytruda on 3/2/23. Will begin maintenance Keytruda every 6 weeks.       Okay to proceed with maintenance Keytruda tomorrow, 3/223/23  Will order NIVA study of right upper arm to rule out DVT - will call with results, if positive for DVT, will prescribe anticoagulation Eliquis 10 mg BID x 7 days, then 5 mg BID  Will send prescription for liquid Levaquin 25 mg/ml (750 mg) daily x 10 days prophylactic for possible cellulitis  Okay to alternate warm and cold compresses to right arm  Okay to proceed with PEG tube removal tomorrow  RTC in 6 weeks for  TD and labs, Keytruda next day  Labs: CBC, CMP, TSH  PET CT to evaluate treatment response in 3 months, due 6/2023-will order next visit-- if insurance does not cover, will change to CT C/A/P  Encourage to call or message us for any questions or problems  The patient was given ample opportunity to ask questions, and to the best of my abilities, all questions answered to satisfaction; patient demonstrated understanding of what we discussed and agreeable to the plan.     LENKA NELSON MD      Professional Services   I, Bessie Horowitz LPN, acted solely as a scribe for and in the presence of Dr. Lenka Nelson, who performed these services.

## 2023-03-23 NOTE — TELEPHONE ENCOUNTER
JALEN knott ... [employer] insurance will not cover Liquid Levoquin ... Can you order the oral i can cut in half if we r gonna do the 750mg if possible order the 250mg x3 a day i think they are smaller and he should b able to swallow ...      Thank You,  Kymberly        Oral Levaquin pended.

## 2023-03-24 NOTE — PROGRESS NOTES
Chief Complaint:   Chief Complaint   Patient presents with    Right Upper Extremity - Follow-up     F/U for right humerus pain and swelling. Arm was doing good then found a blood clot 3/22/23. Arm is getting really swollen again and having pain. Swelling is slowly going down. Humerus is hard to the touch.       History of present illness: Josafat Boudreaux is a 57 y.o. male, presents to clinic today in regards to right upper extremity. He is 1 year status post ORIF humerus fracture. Was seen yesterday and found to have a blood clot in the right upper extremity with pain in swelling. Since his surgery 1 year ago, patient was diagnosed with cancer of the larynx. This was wrapped about and patient then underwent a radical neck dissection. He has been treated with chemo, Keytruda and Eliquis after this dissection.  Continues to have minor swelling and erythema to the inner aspect of the right upper extremity.  Continues to have pain, swelling, and erythema to inner aspect of right upper extremity.    Past Medical History:   Diagnosis Date    Cancer     COPD (chronic obstructive pulmonary disease)     Dysphagia     Lung cancer     Vocal cord mass        Past Surgical History:   Procedure Laterality Date    DIRECT DIAGNOSTIC LARYNGOSCOPY WITH BRONCHOSCOPY AND ESOPHAGOSCOPY N/A 07/11/2022    Procedure: LARYNGOSCOPY, DIRECT, DIAGNOSTIC, WITH BRONCHOSCOPY AND ESOPHAGOSCOPY;  Surgeon: Emory Alonso MD;  Location: [facility] OR;  Service: ENT;  Laterality: N/A;    HIP SURGERY      HUMERUS FRACTURE SURGERY      INCISION AND DRAINAGE, NECK Bilateral 10/21/2022    Procedure: INCISION AND DRAINAGE,NECK;  Surgeon: Madison Worley MD;  Location: [facility] OR;  Service: ENT;  Laterality: Bilateral;    INSERT ARTERIAL LINE  06/26/2022         JOINT REPLACEMENT  2019    R hip    TRACHEOSTOMY N/A 06/10/2022    Procedure: CREATION, TRACHEOSTOMY;  Surgeon: Junior Alonso MD;  Location: [facility] OR;  Service: ENT;  Laterality: N/A;        Current Outpatient Medications   Medication Sig    albuterol-ipratropium (DUO-NEB) 2.5 mg-0.5 mg/3 mL nebulizer solution Take 3 mLs by nebulization every 4 (four) hours as needed for Shortness of Breath or Wheezing. Rescue    apixaban (ELIQUIS) 5 mg Tab Take 1 tablet (5 mg total) by mouth As instructed.    diphenhydrAMINE (BENADRYL) 25 mg capsule 1 capsule (25 mg total) by Per G Tube route every 6 (six) hours as needed for Itching.    famotidine (PEPCID) 20 MG tablet Take 1 tablet (20 mg total) by mouth every evening.    glutamine 10 gram PwPk Take 10 g by mouth 2 (two) times a day.    HYDROcodone-acetaminophen (NORCO) 5-325 mg per tablet Take 1 tablet by mouth every 4 (four) hours as needed for Pain.    levoFLOXacin (LEVAQUIN) 250 MG tablet 750 mg po once daily x 10 days.    metroNIDAZOLE (METROGEL) 0.75 % (37.5mg/5 gram) vaginal gel apply to affected area BID    nystatin (MYCOSTATIN) cream Apply topically 2 (two) times daily.    ondansetron (ZOFRAN-ODT) 8 MG TbDL Take 8 mg by mouth every 8 (eight) hours as needed for Nausea. Tab 1 ODT in AM for 2 days after chemotherapy    pantoprazole (PROTONIX) 20 MG tablet Take 1 tablet (20 mg total) by mouth once daily.     Current Facility-Administered Medications   Medication    LIDOcaine (PF) 40 mg/mL (4 %) injection 2 mL    LIDOcaine HCL 4% external solution 4 mL    oxymetazoline 0.05 % nasal spray 2 spray    phenylephrine HCL 1% nasal spray 1 spray       Review of patient's allergies indicates:  No Known Allergies    Family History   Problem Relation Age of Onset    Lung cancer Father     Cancer Father         Lung cancer    Throat cancer Brother     Cancer Brother         Throat       Social History     Socioeconomic History    Marital status:    Tobacco Use    Smoking status: Former     Packs/day: 0.50     Years: 15.00     Pack years: 7.50     Types: Cigarettes     Start date: 1980     Quit date: 2022     Years since quittin.7    Smokeless  tobacco: Never   Substance and Sexual Activity    Alcohol use: Not Currently    Drug use: Never    Sexual activity: Not Currently     Partners: Female     Social Determinants of Health     Financial Resource Strain: Low Risk     Difficulty of Paying Living Expenses: Not hard at all   Food Insecurity: No Food Insecurity    Worried About Running Out of Food in the Last Year: Never true    Ran Out of Food in the Last Year: Never true   Transportation Needs: No Transportation Needs    Lack of Transportation (Medical): No    Lack of Transportation (Non-Medical): No   Physical Activity: Inactive    Days of Exercise per Week: 0 days    Minutes of Exercise per Session: 0 min   Stress: No Stress Concern Present    Feeling of Stress : Not at all   Social Connections: Moderately Isolated    Frequency of Communication with Friends and Family: More than three times a week    Frequency of Social Gatherings with Friends and Family: More than three times a week    Attends Jehovah's witness Services: Never    Active Member of Clubs or Organizations: No    Attends Club or Organization Meetings: Never    Marital Status:    Housing Stability: Low Risk     Unable to Pay for Housing in the Last Year: No    Number of Places Lived in the Last Year: 1    Unstable Housing in the Last Year: No           Review of Systems:    Denies fevers, chills, chest pain, shortness of breath. Comprehensive review of systems performed and otherwise negative except as noted in HPI     Physical Examination:    General: awake and alert, no acute distress, healthy appearing  Head and Neck: Head atraumatic/normocephalic. Moist MM  CV: brisk cap refill  Lungs: non-labored breathing, w/o cough or SOB  Skin: no rashes present, warm to touch  Neuro: sensation grossly intact distally       Vital Signs:    Vitals:    03/24/23 0925   BP: 116/74   Pulse: 102       Body mass index is 21.56 kg/m².    Right upper extremity:  Incision site well intact, clean and  dry  Swelling and erythema noted to the ulnar aspect of the inner arm  ROM intact with no pain or discomfort    Xrays: 2 views of the right humerus reviewed. Patient's fracture alignment is anatomic. Hardware in good position. Fracture has gone to union.     US: VENOUS RUE (outside)  Positive acute deep vein thrombosis identified in the subclavian, axillary, and brachial veins of the right upper extremity.  Superficial vein thrombosis identified in the basilic vein of the right upper extremity.  All other veins were compressible     Assessment::s/p ORIF right humerus mid shaft; DVT RUE    Plan:  Patient presents to the clinic today about 1 year status post ORIF of right humerus shaft fracture.  Since his surgery he was diagnosed with cancer of the larynx.  He underwent a radical neck dissection.  Was treated with chemo Keytruda and Eliquis.  Recently was found to have a DVT in the right upper extremity proximity to his fracture site.  Upon x-rays today here in clinic fracture site is well healed implants are stable with no loosening or subsidence.  This seems to be possibly from chemo or radiation treatments.  We will refer him to Dr. Montelongo for further evaluation and treatment.  Patient states understanding and agrees with plan of care.    This note was created using Linkfluence voice recognition software that occasionally misinterpreted phrases or words.    Consult note is delivered via Epic messaging service.

## 2023-03-29 NOTE — PROGRESS NOTES
[facility]  MODIFIED BARIUM SWALLOW STUDY  Outpatient    Date: 3/29/2023     PATIENT IS A NECK-BREATHER - DO NOT ORALLY INTUBATE    Name: Josafat Boudreaux   MRN: [id number]    Therapy Diagnosis: WFL oral and pharyngeal phases of the swallow    Physician: Meaghan Rivera FNP  Physician Orders: SLP Video Swallow  Medical Diagnosis from Referral: S/P total laryngectomy    Date of Evaluation:  3/29/2023    Time In:  0815  Time Out:  0845  Total Billable Time: 30     Procedure Min.   Fl Modified Barium Swallow Speech  30     Precautions: Standard    Subjective   Date of Onset: No complaints of dysphagia with solids or liquids. Pill dysphagia reports. Pt and spouse reported consuming Regular consistency (IDDSI level 7) with Thin liquids IDDSI level 0). Spouse reported splitting large pills in half or small to accommodate.     Current Medical History:  Josafat Boudreaux is a 57 y.o. 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, BND III and IV, primary closure on 7/11/22; bilateral lung SCCA    Patient Active Problem List   Diagnosis    Finding of above normal blood pressure    Closed basicervical fracture of femur    Shortness of breath    Vocal cord mass    Dysphagia    Tracheostomy dependent    New onset seizure    Laryngeal mass    Laryngeal cancer    Regional lymph node metastasis present    Squamous cell carcinoma of both lungs    COPD (chronic obstructive pulmonary disease)    Cellulitis and abscess of neck    Leukocytosis    Contact dermatitis    H/O head and neck radiation    Hyponatremia    Neutropenic fever    Malignant neoplasm of right lung    Blastomycotic infection    On antineoplastic chemotherapy    Right arm cellulitis   Josafat Boudreaux is a 57 y.o. 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  Josafat Boudreaux  has a past surgical history that includes Humerus fracture surgery; Hip surgery; Tracheostomy (N/A, 06/10/2022); Insert arterial line (06/26/2022); Direct diagnostic laryngoscopy with bronchoscopy and esophagoscopy (N/A, 07/11/2022); incision and drainage, neck (Bilateral, 10/21/2022); and Joint replacement (2019).    The patient is a 57 y.o. male who is requesting to have PEG tube removed. Pt and spouse deny dysphagia.     The Pt and spouse gave the following history:   -Current diet at home: Regular consistency (IDDSI level 7) with Thin liquids IDDSI level 0)  -Recommended diet from previous study: no previous study noted s/p total laryngectomy  -Therapy received: NA  -Neurological: Pt denied any neurological diagnoses.  -Gastroenterologist (GI) :  pt on daily medications s/p total laryngectomy    -Pulmonary: Pt denied any pulmonary diagnoses.   -Surgery: s/p total laryngectomy 07/11/2022  -Cancer: laryngeal    The following observations were made:   -Mental status: Alert and Cooperative  -Factors affecting performance: no difficulties participating in the study  -Feeding Method: independent in self-feeding    Respiratory Status:   -Respiratory Status: room air  -Stoma size: adequate  -Voicing: no aphonic, mouthing of words. Has electrolarynx at home. Possibly interested in secondary puncture with TEP placement.   -TEP accessories: 10/36 roque tube with push to talk HMEs.     Medical Hx and Allergies:  Review of patient's allergies indicates:  No Known Allergies    Pain Scale:  0/10 on VAS currently.   Pain Location: NA    Objective     Modified Barium Swallow Study  A modified barium swallow study was ordered to objectively evaluate the safety and efficiency of the patients current swallow function.       The patient was seen in radiology seated in [facility]  position in a video imaging chair for lateral views of the larynx. The study was conducted using Varibar thin liquid (IDDSI 0), Varibar pudding (IDDSI 4), solid coated in Varibar pudding (IDDSI 7), and barium tablet . He tolerated the procedure well.     Other information:  Mucosal Quality: Xerostomia  Secretion Management: Secretion Mgmt: adequate  Dentition: Good condition for speech and mastication     **Patient's anatomy consistent with total laryngectomy**    CONSISTENCIES ADMINISTERED:  Thin Liquids (IDDSI 0):  Mode: self-regulated cup sip  Oral Residue: none    Pharyngo-esophogeal Residue: none to trace   Strategies attempted:  none  Barium Tablet:  Not attempted as pt and spouse reported medication at home not sized of barium tablet    Mildly Thick Liquids (IDDSI 2):  Trial not attempted on this assessment.    Puree (IDDSI 4):  Mode: self-regulated via spoon  Oral Residue: none    Pharyngo-esophogeal Residue: trace to mild   Strategies attempted:  liquid wash noted to clear pharyngeal residue    Mixed Consistency Bolus (IDDSI 6 with IDDSI 2):  Trial not attempted on this assessment.    Regular (IDDSI 7):  Mode: self-regulated  Oral Residue: none    Pharyngo-esophogeal Residue: trace to mild   Strategies attempted:  liquid wash noted to clear pharyngeal residue      Treatment   Treatment Time In: n/a  Treatment Time Out: n/a  Total Treatment Time: n/a  Patient educated regarding results and recommendations of the evaluation. See the recommendations section below.    Education: SLP role in care, Plan of care, Results of the study, Recommendations, Scheduling, upcoming laryngectomy support group on 04/03/2023, use of electrolarynx with oral straw, lymphedema techniques, scheduling with current voice prosthesis patient for eduction were discussed with the patient. Patient and spouse expressed understanding.     SLP educated patient and spouse on MBSS results and diet recommendations. SLP allowed both to view  MBSS via MONO unit within depth explanation of anatomy and function of swallow. SLP providing visual evidence of safe swallow as anatomically patient is not able to penetrate or aspirate liquids or solids.  SLP providing OP MBSS recommendations and aspiration precaution handout. All questions and comments addressed and comprehension demonstrated.     Assessment     Josafat Boudreaux is a 57 y.o. male referred for Modified Barium Swallow Study with a medical diagnosis of s/p total laryngectomy.     Oral Phase: Lip closure was adequate with no labial escape.  Bolus preparation and mastication was timely and efficient. Lingual motion was brisk for adequate bolus transport. There was no significant oral residue.    Pharyngoesophageal Phase:  The soft palate elevated for adequate closure of the velopharyngeal port. A pseudo vallecula was noted inferior to the tongue base. Trace to mild residue noted in pseudo valleculae which was cleared with liquid wash.     Impressions: Josafat Boudreaux exhibited Mild pharyngoesophageal dysphagia noted likely chronic related to anatomical changes s/p total laryngectomy. Both swallow safety and swallow efficiency are preserved,. Patient is at no risk for penetration or aspiration  as he does not currently have a voice prothesis. Patient is not candidate for behavioral swallow rehabilitation due to anatomical changes related to total laryngectomy and/or radiation intervention.     *MALIK Felix. (2005, March). Pneumonia: Factors Beyond Aspiration. Perspectives in Swallowing and Swallowing Disorders (Dysphagia), 14, 10-16.    Goals:     Long and short term goals not warranted at this time.     Recommendations:     Consistency Recommendations:  Thin liquids (IDDSI 0) and Regular consistencies (IDDSI 7).   Risk Management: use good oral hygiene , sit upright for all PO intake, and increase physical mobility as tolerated  Specialist Referrals: GI, PEG tube removal  Ancillary Tests:  Consider N/a   Therapy: Dysphagia therapy is not recommended at this time.  Follow-up exam: Follow up swallow study is not indicated at this time.    Please contact [facility] at [phone] if there are questions re: the above or if we can be of additional service to this patient.    Therapist's Name:   Courtney Padilla CCC-SLP   Speech-Language Pathologist    Date: 3/29/2023

## 2023-03-31 NOTE — PROGRESS NOTES
[facility]       Josafat Boudreaux  03/31/2023    Pre-op Exam ( RE: DVT in RT Arm)      HPI:  Mr. Boudreaux presents as a referral from Dr. Burrell for evaluation of right upper extremity DVT.  He is a pleasant 57-year-old male who was diagnosed last year with laryngeal squamous cell carcinoma.  He is status post resection with radical neck dissection.  He has also undergone radiation therapy.  He is currently on Keytruda.  He was seen recently and had noted that he had right upper extremity swelling.  A duplex ultrasound was performed which showed acute DVT in the right subclavian, axillary, and brachial veins.  Also had superficial thrombosis of the right basilic vein.  At that time he was started on Eliquis.  This study was on March 22nd.  He saw Dr. Burrell 2 days later and was still having significant swelling.  Dr. Burrell had treated the patient for a humeral fracture last March.  The patient was referred to me for further evaluation.  Since that time he reports significant improvement in the discomfort and swelling in the right upper extremity.  He has been compliant with his anticoagulation.  Denies any associated shortness of breath.  He does state that he has a family history of a DVT in his mother, but does not know of any known hypercoagulable disorders.  He has a permanent tracheostomy as well as a PEG tube.  He denies any episodes of bleeding from either of the sites.  Past Medical History:   Diagnosis Date    Cancer     COPD (chronic obstructive pulmonary disease)     Dysphagia     Lung cancer     Vocal cord mass      Past Surgical History:   Procedure Laterality Date    DIRECT DIAGNOSTIC LARYNGOSCOPY WITH BRONCHOSCOPY AND ESOPHAGOSCOPY N/A 07/11/2022    Procedure: LARYNGOSCOPY, DIRECT, DIAGNOSTIC, WITH BRONCHOSCOPY AND ESOPHAGOSCOPY;  Surgeon: Emory Alonso MD;  Location: [facility];  Service: ENT;  Laterality: N/A;    HIP SURGERY      HUMERUS FRACTURE SURGERY       INCISION AND DRAINAGE, NECK Bilateral 10/21/2022    Procedure: INCISION AND DRAINAGE,NECK;  Surgeon: Madison Worley MD;  Location: [facility] OR;  Service: ENT;  Laterality: Bilateral;    INSERT ARTERIAL LINE  2022         JOINT REPLACEMENT  2019    R hip    TRACHEOSTOMY N/A 06/10/2022    Procedure: CREATION, TRACHEOSTOMY;  Surgeon: Junior Alonso MD;  [facility];  Service: ENT;  Laterality: N/A;     Family History   Problem Relation Age of Onset    Lung cancer Father     Cancer Father         Lung cancer    Throat cancer Brother     Cancer Brother         Throat     Social History     Socioeconomic History    Marital status:    Tobacco Use    Smoking status: Former     Packs/day: 0.50     Years: 15.00     Pack years: 7.50     Types: Cigarettes     Start date: 1980     Quit date: 2022     Years since quittin.8    Smokeless tobacco: Never   Substance and Sexual Activity    Alcohol use: Not Currently    Drug use: Never    Sexual activity: Not Currently     Partners: Female     Social Determinants of Health     Financial Resource Strain: Low Risk     Difficulty of Paying Living Expenses: Not hard at all   Food Insecurity: No Food Insecurity    Worried About Running Out of Food in the Last Year: Never true    Ran Out of Food in the Last Year: Never true   Transportation Needs: No Transportation Needs    Lack of Transportation (Medical): No    Lack of Transportation (Non-Medical): No   Physical Activity: Inactive    Days of Exercise per Week: 0 days    Minutes of Exercise per Session: 0 min   Stress: No Stress Concern Present    Feeling of Stress : Not at all   Social Connections: Moderately Isolated    Frequency of Communication with Friends and Family: More than three times a week    Frequency of Social Gatherings with Friends and Family: More than three times a week    Attends Quaker Services: Never    Active Member of Clubs or Organizations: No    Attends Club or Organization  "Meetings: Never    Marital Status:    Housing Stability: Low Risk     Unable to Pay for Housing in the Last Year: No    Number of Places Lived in the Last Year: 1    Unstable Housing in the Last Year: No     Current Outpatient Medications   Medication Instructions    albuterol-ipratropium (DUO-NEB) 2.5 mg-0.5 mg/3 mL nebulizer solution 3 mLs, Nebulization, Every 4 hours PRN, Rescue    apixaban (ELIQUIS) 5 mg, Oral, See admin instructions    diphenhydrAMINE (BENADRYL) 25 mg, Per G Tube, Every 6 hours PRN    famotidine (PEPCID) 20 mg, Oral, Nightly    glutamine 10 g, Oral, 2 times daily    HYDROcodone-acetaminophen (NORCO) 5-325 mg per tablet 1 tablet, Oral, Every 4 hours PRN    levoFLOXacin (LEVAQUIN) 250 MG tablet 750 mg po once daily x 10 days.    metroNIDAZOLE (METROGEL) 0.75 % (37.5mg/5 gram) vaginal gel apply to affected area BID    nystatin (MYCOSTATIN) cream Topical (Top), 2 times daily    ondansetron (ZOFRAN-ODT) 8 mg, Oral, Every 8 hours PRN, Tab 1 ODT in AM for 2 days after chemotherapy    pantoprazole (PROTONIX) 20 mg, Oral, Daily     Review of patient's allergies indicates:  No Known Allergies    Patient Care Team:  Tony Bertrand MD as PCP - General (Internal Medicine)  Romie Das MD as Consulting Physician (Radiation Oncology)  [facility] as Adult ENT (Otolaryngology)    REVIEW OF SYSTEMS:  Review of Systems   Eyes:  Negative for blurred vision and double vision.   Respiratory:  Negative for cough, hemoptysis and shortness of breath.    Cardiovascular:  Negative for chest pain, palpitations and leg swelling.   Gastrointestinal:  Negative for abdominal pain, nausea and vomiting.   Neurological:  Negative for dizziness, weakness and headaches.     PHYSICAL EXAM:   Visit Vitals  /80   Pulse (!) 123   Ht 5' 9" (1.753 m)   Wt 64.4 kg (142 lb)   BMI 20.97 kg/m²     Physical Exam  Constitutional:       Appearance: Normal appearance.   HENT:      Head: Normocephalic and " atraumatic.   Neck:      Trachea: Tracheostomy present.      Comments: Postradiation changes noted in the neck.  There is no obvious JVD, however exam for this is limited due to previous radiation.  Cardiovascular:      Rate and Rhythm: Regular rhythm. Tachycardia present.      Pulses:           Radial pulses are 2+ on the right side and 2+ on the left side.      Comments: No significant edema noted in the right or left upper extremities.  There is a well-healed scar along the right humerus from previous fracture repair.  Musculoskeletal:      Cervical back: Edema present.   Neurological:      Mental Status: He is alert.       IMAGING:  I reviewed the report of a duplex of the upper extremity performed on March 22nd.  This does show occlusive thrombus of the axillary, subclavian, and brachial vein.  There is also superficial thrombosis of the basilic vein.    IMP/PLAN:  1. Acute deep vein thrombosis (DVT) of axillary vein of right upper extremity  The patient is currently on Eliquis.  This has been prescribed by his oncologist.  I discussed with him at this point he has had a significant improvement in symptoms to the point that he is almost asymptomatic.  I would not recommend any intervention to be performed at this time.  He does appear to be tolerating his anticoagulation well.  He has multiple risk factors for VTE including his laryngeal squamous cell carcinoma, his previous neck radiation, a family history of VTE, and the personal history of this upper extremity DVT.  At this point I would recommend continuing his anticoagulation for at least 6 months.  I would also recommend consideration of long-term anticoagulation due to his multiple risk factors.  I do not think he warrants any current follow up with me for this.  He is actively followed by his hematologist.  I discussed that should anything change is more than welcome to call for an appointment, otherwise he can follow up with me as needed.    2.  Laryngeal squamous cell carcinoma  This is being managed actively by Dr. Atkinson, however it does remain as a risk factor for VTE development in the future.    3. Closed displaced comminuted fracture of shaft of right humerus with routine healing, subsequent encounter  Per Dr. Burrell's note this is well healed and is not currently causing the patient any issues.  - Ambulatory referral/consult to Vascular Surgery      No follow-ups on file.

## 2023-04-06 NOTE — PROGRESS NOTES
[facility]  Speech Therapy Evaluation  Electrolarynx Evaluation    PATIENT IS A NECK-BREATHER - DO NOT ORALLY INTUBATE    Date: 4/6/2023     Name: Josafat Boudreaux   MRN: [id number]    Therapy Diagnosis: s/p total laryngectomy   Physician: Ryan Barrett MD  Medical Diagnosis:   Encounter Diagnoses   Name Primary?    Laryngeal cancer     Hx of laryngectomy Yes     Patient Active Problem List   Diagnosis    Finding of above normal blood pressure    Closed basicervical fracture of femur    Shortness of breath    Vocal cord mass    Dysphagia    Tracheostomy dependent    New onset seizure    Laryngeal mass    Laryngeal cancer    Regional lymph node metastasis present    Squamous cell carcinoma of both lungs    COPD (chronic obstructive pulmonary disease)    Cellulitis and abscess of neck    Leukocytosis    Contact dermatitis    H/O head and neck radiation    Hyponatremia    Neutropenic fever    Malignant neoplasm of right lung    Blastomycotic infection    On antineoplastic chemotherapy    Right arm cellulitis     Date of Evaluation:  4/6/2023     Speech Start Time:  0800  Speech Stop Time:   0900   Speech Total Time (min):  60  Procedure Min.   Qualitative Analysis of Voice and Resonance  60     Precautions: Standard  Subjective   History of Current Condition: Josafat Boudreaux is a 57 y.o. 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 He was referred for OP SLP services for teaching and education on an electrolarynx. He is currently using written language and mouthing of words for functional communication.     Past Medical History: Josafat Boudreaux  has a past medical history of Cancer, COPD (chronic obstructive pulmonary disease), Dysphagia, Lung cancer, and Vocal cord mass.  Josafat Boudreaux  has a past surgical history that includes Humerus fracture surgery; Hip surgery;  Tracheostomy (N/A, 06/10/2022); Insert arterial line (06/26/2022); Direct diagnostic laryngoscopy with bronchoscopy and esophagoscopy (N/A, 07/11/2022); incision and drainage, neck (Bilateral, 10/21/2022); and Joint replacement (2019).    Medical Hx and Allergies: Josafat has a current medication list which includes the following prescription(s): albuterol-ipratropium, apixaban, diphenhydramine, famotidine, glutamine, hydrocodone-acetaminophen, levofloxacin, metronidazole, nystatin, ondansetron, pantoprazole, and [DISCONTINUED] aspirin, and the following Facility-Administered Medications: lidocaine (pf), lidocaine hcl 4%, oxymetazoline, and phenylephrine hcl 1%. Review of patient's allergies indicates:  No Known Allergies    Prior Therapy:  no SLP intervention for communication noted   Social History:  Independent,  lives with their spouse  Prior Level of Function: Independent   Current Level of Function: Independent  Pain: 0/10  Pain Location / Description: NA  Nutrition:  Oral diet, Regular with thin liquids. PEG tube in place  Patient's Therapy Goals:  effectively communication with family members  Objective   Mr. Boudreaux and spouse, Kymberly, entered with electrolarynx and requested education with electrolarynx    Associated Deficits:  Functional communication  due to aphonia    Respiration: WNL    Receptive Language:  Auditory comprehension: 100 %    Expressive Language:   Current method of communication: mouthing of words and written language    Cognitive Status:  Behavioral Observations: alert, appropriate, and cooperative  Memory: No Deficits noted  Orientation: Person, Place, Time   Attention: No obvious deficits observed during session.    Augmentative Alternative Communication: no    Visual-Spatial: normal    Treatment   Total Treatment Time Separate from Evaluation: not applicable   no treatment performed secondary to time to complete evaluation.    Education     Education: Plan of Care, role of SLP in  care, and demonstration of electrolarynx usage, placement and function   were discussed with patient and spouse, Kymberly. Patient and family members expressed understanding.     Education provided with electrolarynx device usage including volume and pitch dials and adjustments. SLP provided education on appropriate placement of device flat on neck or submentally in softest area to allow for adequate vibration into oral cavity for most intelligible speech. Additional option for placement (intra-oral) included use of straw attachment if unable to attain adequate sound. Lack of sound production with neck or submental placement is possible secondary to lymphedema or fibrosis.      Barriers to Learning:  none    Teaching Method: Verbal, Demonstration, Audio/Visual    Home Program: functional communication and practice with electrolarynx  Assessment   Mr. Boudreaux presents to [facility] status post medical diagnosis of aphonia s/p total laryngectomy.  Due to his aphonia, he is unable to verbally communicate basic and medical wants and needs and participate socially in all environments.  Mr. Boudreaux demonstrates impairments including limitations as described in the problem list. Positive prognostic factors include motivation and family support. Negative prognostic factors include lymphedema and post radiation fibrosis. No barriers to therapy identified. Patient will benefit from skilled speech therapy intervention PRN to improve alaryngeal communication to effectively communicate basic and medical wants and needs.     Rehab Potential: excellent  Patient's spiritual, cultural, and educational needs considered and patient agreeable to plan of care and goals.YES     Long Term Goals    Alfonso will increase overall speech intelligibility to functionally communicate all basic and medical wants and needs via usage of electrolarynx with 100% effectiveness. Initial       Short Term Goals     Alfonso will  demonstrate appropriate placement of electrolarynx for intelligible speech at sentence/conversational level.  Initial   2. Alfonso will demonstrate % speech intelligibility at conversational level with use of electrolarynx.  Initial       Plan   Recommended Treatment Plan:  Patient will participate in OP SLP intervention to address functional communication as warranted. Intervention will be 1 to 3 times a month or PRN to address his Communication deficits, to educate patient and their family, and to participate in a home exercise program.    Other Recommendations: none     Therapist's Name:   Courtney Padilla MS, CCC-SLP     4/6/2023

## 2023-04-12 NOTE — PROGRESS NOTES
The scope used for the exam was:  Flexible scope ENF-P4  Serial Number:  1)    [id number]    []   2)    [id number]    []   3)    [id number]    []   4)    [id number]    []   5)    [id number]    [x]   6)    [id number]    []       The scope used for the exam was:  Rigid scope   Serial Number:  1)   [id number]    []   2)   [id number]    []   3)   7330    []   4)    3384   []   5)    0824   []   6)    5554   []     7)   7425    []   8)   2240    []   9)   1109    []

## 2023-04-12 NOTE — PROGRESS NOTES
[facility]  Otolaryngology Clinic Note    Josafat Boudreaux  Encounter Date: 4/12/2023  YOB: 1965  Physician: Lisseth Krueger MD    Chief Complaint: Laryngeal mass    HPI: Josafat Boudreaux is a 57 y.o. male referred to clinic by Dr. Emory Alonso for laryngeal mass. Patients wife provides history. She states that patient first noticed hoarseness in November 2021. Got progressively worse. Presented to Dr. Alonso in early June 2022 for these complaints. Was noted to have a laryngeal mass on flexible laryngoscopy. Several days after the flexible laryngoscopy patient developed worsening swelling of the airway and presented to the ER in respiratory distress. He underwent emergent tracheostomy with Dr. Junior Alonso. Patient was subsequently discharged home but readmitted shortly after with seizures. He spent 3 days in the ICU on the ventilator. During hospitalization, patient underwent PEG tube placement. He reports that he was not having any issues with PO intake prior to the tracheostomy placement. Now he is PEG dependent. Only taking some ice chips by mouth. He presents today to discuss laryngectomy.     11/2/22:  Since last visit, patient total laryngectomy with bilateral neck dissections level 3 and 4, primary closure, he did not undergo TEP placement.  He was also found to have synchronous primary or lung metastasis in the lung that was also squamous cell carcinoma.  He underwent radiation completed this in the middle of September 2022.  He received carboplatin and Taxol which he still has a few more treatments of.  He presented to the emergency department on 10/08/2022 with a right neck infection.  This was treated with IV antibiotics and incision and drainage with UCHE drain placement.  He was kept NPO and did feeds through his PEG tube.  He met criteria for discharge on 10/25/22 with UCHE drain.  He had outpatient swallow study which did not demonstrate any  evidence of a leak.  He returns to clinic today reporting improvement in his symptoms including right neck pain and drainage.  He has not had to empty his UCHE drain on a daily basis at this point.  He had a PET performed on 10/13/2022 to assess response to chemotherapy.  On that PET scan, right hilar and mediastinal lymph nodes were decreased in size but had increased metabolic activity.  He denies any other issues today    11/7/22: Reports worsening swelling under the right jawline the past few days.  Denies any erythema, infection, fever or chills.    12/6/22:  Feeling a little run down, increased cough with blood tinged secretions, running temperature to 101 at home.  Received azithromycin abx last night from Dr. Alonso's office.    12/28/22:  Was hospitalized for Pseudomonal pneumonia.  He also had Blastomycetes positive antigen on arrival, but was unable to tolerate amphotericin B due to significant hypotension.  Prior to discharge, his repeat Blastomycetes antigen was negative.  Discharged with levaquin and portable home oxygen.  His pneumonia symptoms have all improved since discharge.  Ulceration spots have persisted with one spot having a small amount of growth adjacent.  No bleeding or pain.    1/3/22:  Returns for follow up for monitoring of tracheal ulceration.  Reports interval improvement.  Denies pain or bleeding from area.  Denies swelling or tenderness to neck.    1/11/22:  Returns for follow up.  No changes to tracheal ulceration.  Denies pain or bleeding.  Otherwise doing well.    2/8/23:  Patient is here for regular scheduled cancer surveillance.  He denies any changes since his last appointment.  He has lost about 5 lb since last December.  He contributes his weight loss to being hospitalized for pneumonia.  His swallow stable and his appetite is improving.  Denies odynophagia, otalgia, new head or neck masses, oral lesions, oral bleeding, hematemesis or hemoptysis.  He is still undergoing  chemotherapy treatments with Dr. Atkinson.  States that he has 2 more infusion scheduled at this time.  He is tolerating treatments well.'    3/8/23:  Doing well.  Completed his 4th chemo treatment few weeks ago.  Has been gaining weight not using his PEG.  Scheduled for his posttreatment the 22nd and will see GI after fall as well to remove PEG.  Family notes that the tracheal ulceration site is continuing to heal has not had any other issues with the stoma breathing.     4/12/23:  Presents today for cancer surveillance.  He reports that he has been doing very well.  He was started on Keytruda recently for his lung cancer.  He recently had his PEG removed as he was taking everything by mouth with no issues.  He denies any otalgia, odynophagia, new lumps or bumps in the neck, weight loss.  They are not in need of any laryngectomy supplies at this time. On eliquis for an UE DVT diagnosed recently.     ROS:   General: Negative except per HPI  Skin: Denies rash, ulcer, or lesion.  Eyes: Denies vision changes or diplopia.  Ears: Negative except per HPI  Nose: Negative except per HPI  Throat/mouth: Negative except per HPI  Cardiovascular: Negative except per HPI  Respiratory: Negative except per HPI  Neck: Negative except per HPI  Endocrine: Negative except per HPI  Neurologic: Negative except per HPI    Other 10-point review of systems negative except per HPI      Review of patient's allergies indicates:  No Known Allergies    Past Medical History:   Diagnosis Date    Cancer     COPD (chronic obstructive pulmonary disease)     Dysphagia     Lung cancer     Vocal cord mass        Past Surgical History:   Procedure Laterality Date    DIRECT DIAGNOSTIC LARYNGOSCOPY WITH BRONCHOSCOPY AND ESOPHAGOSCOPY N/A 07/11/2022    Procedure: LARYNGOSCOPY, DIRECT, DIAGNOSTIC, WITH BRONCHOSCOPY AND ESOPHAGOSCOPY;  Surgeon: Emory Alonso MD;  Location: [facility];  Service: ENT;  Laterality: N/A;    HIP SURGERY      HUMERUS FRACTURE  SURGERY      INCISION AND DRAINAGE, NECK Bilateral 10/21/2022    Procedure: INCISION AND DRAINAGE,NECK;  Surgeon: Madison Worley MD;  Location: [facility] OR;  Service: ENT;  Laterality: Bilateral;    INSERT ARTERIAL LINE  2022         JOINT REPLACEMENT  2019    R hip    TRACHEOSTOMY N/A 06/10/2022    Procedure: CREATION, TRACHEOSTOMY;  Surgeon: Junior Alonso MD;  Location: [facility] OR;  Service: ENT;  Laterality: N/A;       Social History     Socioeconomic History    Marital status:    Tobacco Use    Smoking status: Former     Packs/day: 0.50     Years: 15.00     Pack years: 7.50     Types: Cigarettes     Start date: 1980     Quit date: 2022     Years since quittin.8    Smokeless tobacco: Never   Substance and Sexual Activity    Alcohol use: Not Currently    Drug use: Never    Sexual activity: Not Currently     Partners: Female     Social Determinants of Health     Financial Resource Strain: Low Risk     Difficulty of Paying Living Expenses: Not hard at all   Food Insecurity: No Food Insecurity    Worried About Running Out of Food in the Last Year: Never true    Ran Out of Food in the Last Year: Never true   Transportation Needs: No Transportation Needs    Lack of Transportation (Medical): No    Lack of Transportation (Non-Medical): No   Physical Activity: Inactive    Days of Exercise per Week: 0 days    Minutes of Exercise per Session: 0 min   Stress: No Stress Concern Present    Feeling of Stress : Not at all   Social Connections: Moderately Isolated    Frequency of Communication with Friends and Family: More than three times a week    Frequency of Social Gatherings with Friends and Family: More than three times a week    Attends [employer] Services: Never    Active Member of Clubs or Organizations: No    Attends Club or Organization Meetings: Never    Marital Status:    Housing Stability: Low Risk     Unable to Pay for Housing in the Last Year: No    Number of Places Lived in the  Last Year: 1    Unstable Housing in the Last Year: No       Family History   Problem Relation Age of Onset    Lung cancer Father     Cancer Father         Lung cancer    Throat cancer Brother     Cancer Brother         Throat       Outpatient Encounter Medications as of 4/12/2023   Medication Sig Dispense Refill    albuterol-ipratropium (DUO-NEB) 2.5 mg-0.5 mg/3 mL nebulizer solution Take 3 mLs by nebulization every 4 (four) hours as needed for Shortness of Breath or Wheezing. Rescue 90 mL 11    apixaban (ELIQUIS) 5 mg Tab Take 1 tablet (5 mg total) by mouth As instructed. 60 tablet 6    diphenhydrAMINE (BENADRYL) 25 mg capsule 1 capsule (25 mg total) by Per G Tube route every 6 (six) hours as needed for Itching. 90 capsule 1    famotidine (PEPCID) 20 MG tablet Take 1 tablet (20 mg total) by mouth every evening. 30 tablet 11    glutamine 10 gram PwPk Take 10 g by mouth 2 (two) times a day.      HYDROcodone-acetaminophen (NORCO) 5-325 mg per tablet Take 1 tablet by mouth every 4 (four) hours as needed for Pain.      levoFLOXacin (LEVAQUIN) 250 MG tablet 750 mg po once daily x 10 days. 10 tablet 0    metroNIDAZOLE (METROGEL) 0.75 % (37.5mg/5 gram) vaginal gel apply to affected area BID      nystatin (MYCOSTATIN) cream Apply topically 2 (two) times daily. 15 g 5    ondansetron (ZOFRAN-ODT) 8 MG TbDL Take 8 mg by mouth every 8 (eight) hours as needed for Nausea. Tab 1 ODT in AM for 2 days after chemotherapy      pantoprazole (PROTONIX) 20 MG tablet Take 1 tablet (20 mg total) by mouth once daily. 30 tablet 11    [DISCONTINUED] aspirin (ECOTRIN) 81 MG EC tablet Take 81 mg by mouth once daily.      [DISCONTINUED] levoFLOXacin (LEVAQUIN) 250 mg/10 mL Soln Take 30 mLs (750 mg total) by mouth once daily. for 10 days 480 mL 0     Facility-Administered Encounter Medications as of 4/12/2023   Medication Dose Route Frequency Provider Last Rate Last Admin    LIDOcaine (PF) 40 mg/mL (4 %) injection 2 mL  2 mL Tracheal Tube 1 time  in Clinic/HOD Kevin Palomino MD        LIDOcaine HCL 4% external solution 4 mL  4 mL Topical (Top) 1 time in Clinic/HOD Marty Jeffries MD        oxymetazoline 0.05 % nasal spray 2 spray  2 spray Each Nostril 1 time in Clinic/HOD Marty Jeffries MD        phenylephrine HCL 1% nasal spray 1 spray  1 spray Each Nostril 1 time in Clinic/HOD Kevin Palomino MD           Physical Exam:  Vitals:    04/12/23 0754   BP: 102/65   Pulse: (!) 114   Temp: 98.4 °F (36.9 °C)   TempSrc: Oral   Weight: 64.9 kg (143 lb)         Constitutional  General Appearance: well nourished, well-developed, AAO x3, NAD  HEENT  Eyes: PEERLA, EOMI, normal conjunctivae  Ears: Hearing well at conversation level, EAC patent, TM intact, no effusion bilateral  Nose: septum midline, no inferior turbinate hypertrophy, no polyps, moist mucosa, pale appearing  OC/OP: dentition moderate, no oral lesions, tongue/FOM/BOT- soft, no leukoplakia/ulcerations/ tenderness   Neck: post-radiation changes and firmness, mild submandibular and submental lymphedema, stoma widely patent, mild irritation of trachea just inside stoma on the right side  Neuro: CN II - XII intact bilaterally  Cardiovascular: peripheral pulses palpable  Respiratory: non-labored respirations  Psychiatric: oriented to time, place and person, no depression, anxiety or agitation      Procedures:Flexible Fiberoptic Nasopharyngoscopy via right nare  Performed on 2/8/2023  Renya Flores MD    Procedure in Detail: Informed consent was obtained from the patient after explanation of procedure, indications, risks and benefits. Flexible endoscopy was performed through the R nasal passage.     Anesthesia: tetracaine, phenylephrine  Adverse Events: None  Blood loss: none  Condition: good    Findings:  R nasal cavity with no inferior turbinate hypertrophy, pale appearing mucosa, septum midline, nasopharynx clear, oropharynx clear, base of tongue wnl, pharynx with no lesions, some pooling of  secretions      Pertinent Data:  EXAMINATION:  NM PET CT ROUTINE     CLINICAL HISTORY:  Malignant neoplasm of larynx, unspecifiedEVALUATE TREATMENT RESPONSE;     TECHNIQUE:  The patient was given intravenous injection of F-18 FDG.  Images were obtained from the skull base to the upper thighs with additional 3D reformatted and multiplanar images submitted for review. In addition, a noncontrasted low-dose CT was obtained from the skull base through the upper thighs for purposes of attenuation correction and localization. The absence of intravenous contrasts limits the accuracy of the CT for evaluation of the soft tissues and vasculature. The DLP is 530.  Automatic exposure control, adjustment of mA/kV or iterative reconstruction technique was used to reduce radiation.     Radiopharmaceutical. 11.0 mCi F18-FDG, IV.     COMPARISON:  30 December 2022, 13 October 2022.     FINDINGS:  Activity within the imaged brain is symmetric.  There is decreased FDG activity along the right neck.  No focal suspicious increased cervical FDG activity today.     Largely improved hypermetabolic lesions of the right hilum and mediastinum compared to the October 2022 PET.  For example the precarinal lymph node on image 87 series 3 now measures 9 mm short axis, previously 15 mm.  The max SUV is now 4.9, previously 16.0.  However, a mediastinal lymph node on image eighty series 3 now measures up to 10 mm, previously 6 mm.  The max SUV is 8.1, previously no significant FDG activity in this lymph node.  Right hilar lymph node fused image 90 is difficult to measure but certainly smaller compared to prior.  The max SUV is 16.0, previously 21.  Of the adjacent hypermetabolic right suprahilar elongated nodular opacity is much improved.  Small focus of residual FDG activity fused image 84 has max SUV 7.8, previously 23.0.     Right upper lobe consolidation is significantly improved compared to December 2022.  Previously visualized irregular  pulmonary nodules elsewhere in both lungs near completely resolved.     Some external iliac and inguinal lymph nodes are similar in size, however, some demonstrate increased FDG activity today.  For example right inguinal lymph node fused image 236 has max SUV 6.7, previously 2.9.     Otherwise no new suspicious FDG activity in the abdomen or pelvis.  Gastrostomy tube is in place.  No ascites.     No suspicious musculoskeletal FDG activity today.  Left cervical paraspinal activity is likely physiologic.     Impression:     1. Largely improved disease though there is single paratracheal lymph node which has increased in size and is now FDG avid.  2. Some inguinal and external iliac lymph nodes demonstrate increased FDG activity today, this is favored reactive.      Right tracheostomal biopsy 1/11/23:  - Squamous mucosa with epithelial erosion and acute inflammation.  - Lamina propria with reactive fibroblasts and fibrosis, consistent with radiation changes, see comment.  - No evidence of malignancy.    Assessment/Plan:  Josafat Boudreaux is a 57 y.o. 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 Alonso; completed adjuvant CRT in 9/2022. Continued chemo until March for bilateral lung SCCA. Currently on keytruda for this.   - ZEUS on exam today.  - Recent PET with a hypermetabolic paratracheal lymph node. Heme/onc plans to repeat a PET in 6/2023.   - Continue routine roque care.   - Recent TSH 3/2023 wnl.     RTC in 1 month for routine surveillance       HEAD & NECK CANCER SURVEILLANCE   Primary Surgical Treatment     Head & Neck Staff: Dr. Emory Alonso  Medical Oncologist: Yara Atkinson MD (Last seen: 2/2023)  Radiation Oncologist: Romie Das MD (Last seen: Unknown)    Primary tumor: Endolaryngeal [id number] SCCA    Date of Diagnosis: 7/11/22  Surgery Date: 7/11/22  Induction Chemotherapy: No  Adjuvant Therapy: CRT  Completion  Date: Sept 2022 for RT, finished chemo 3/2023    Pertinent Treatment History:  Surgery + CRT    Place date if completed   3 6 12 18 24 36 48 60   CT Neck 10/19/22 X X X X X X X   PET/CT 10/13/22          CXR  12/21/22 X X X X X X   TFT X 12/6/22 X  X X X X     Current Surveillance Interval: <1 year post-treatment, q6 wks         Renay Flores MD  [facility] Otolaryngology PGY IV

## 2023-04-18 NOTE — ANESTHESIA PREPROCEDURE EVALUATION
04/18/2023  Josafat Boudreaux is a 57 y.o., male presents for EGD.    PMHx:  Shortness of breath  Vocal cord mass  Tracheostomy dependent  New onset seizure  Laryngeal mass  Laryngeal cancer  Squamous cell carcinoma of both lungs  COPD (chronic obstructive pulmonary disease)  Malignant neoplasm of right lung       COPD (chronic obstructive pulmonary disease)    Dysphagia    Lung cancer      Surgical History    HUMERUS FRACTURE SURGERY HIP SURGERY   TRACHEOSTOMY INSERT ARTERIAL LINE   DIRECT DIAGNOSTIC LARYNGOSCOPY WITH BRONCHOSCOPY AND ESOPHAGOSCOPY INCISION AND DRAINAGE, NECK   JOINT REPLACEMENT            Pre-op Assessment    I have reviewed the Patient Summary Reports.     I have reviewed the Nursing Notes. I have reviewed the NPO Status.   I have reviewed the Medications.     Review of Systems  Anesthesia Hx:  No problems with previous Anesthesia    Social:  Former Smoker    Hematology/Oncology:        Current/Recent Cancer. Other (see Oncology comments) Oncology Comments: S/p Vocal Cord tumor/   Pulmonary:   COPD Shortness of breath Squamous cell CA   Neurological:   Seizures        Physical Exam  General: Well nourished, Cooperative, Alert and Oriented    Airway:  Mallampati: III   Mouth Opening: Normal  TM Distance: Normal  Neck ROM: Normal ROM  Pre-Existing Airway: Tracheostomy tube    Dental:  Intact    Chest/Lungs:  Clear to auscultation, Normal Respiratory Rate    Heart:  Rate: Normal  Rhythm: Regular Rhythm  Sounds: Normal    Abdomen:  Normal, Soft, Nontender        Anesthesia Plan  Type of Anesthesia, risks & benefits discussed:    Anesthesia Type: MAC  Intra-op Monitoring Plan: Standard ASA Monitors  Post Op Pain Control Plan: multimodal analgesia  Induction:  IV  Airway Plan: Direct  Informed Consent: Informed consent signed with the Patient and all parties understand the risks and agree  with anesthesia plan.  All questions answered.   ASA Score: 4  Day of Surgery Review of History & Physical: H&P Update referred to the surgeon/provider.    Ready For Surgery From Anesthesia Perspective.     .

## 2023-04-18 NOTE — TRANSFER OF CARE
"Anesthesia Transfer of Care Note    Patient: Josafat Boudreaux    Procedure(s) Performed: Procedure(s) (LRB):  EGD (N/A)    Patient location: GI    Anesthesia Type: MAC    Transport from OR: Transported from OR on room air with adequate spontaneous ventilation    Post pain: adequate analgesia    Post assessment: no apparent anesthetic complications    Post vital signs: stable    Level of consciousness: awake    Nausea/Vomiting: no nausea/vomiting    Complications: none    Transfer of care protocol was followed      Last vitals:   Visit Vitals  BP (!) 150/83   Pulse (!) 111   Temp 36.6 °C (97.9 °F)   Resp 18   Ht 5' 9.02" (1.753 m)   Wt 65.8 kg (145 lb)   SpO2 97%   BMI 21.40 kg/m²     "

## 2023-04-18 NOTE — [employer] PATIENT INSTRUCTIONS
Discharge Summary/Instructions after an Endoscopic Procedure  Patient Name: Josafat Boudreaux  Patient MRN: [id number]  Patient YOB: 1965 Tuesday, April 18, 2023  Aaliyah Conrad III, MD  Dear patient,  As a result of recent federal legislation (The Federal Cures Act), you may   receive lab or pathology results from your procedure in your [facility]   account before your physician is able to contact you. Your physician or   their representative will relay the results to you with their   recommendations at their soonest availability.  Thank you,  RESTRICTIONS:  During your procedure today, you received medications for sedation.  These   medications may affect your judgment, balance and coordination.  Therefore,   for 24 hours, you have the following restrictions:   - DO NOT drive a car, operate machinery, make legal/financial decisions,   sign important papers or drink alcohol.    ACTIVITY:  Today: no heavy lifting, straining or running due to procedural   sedation/anesthesia.  The following day: return to full activity including work.  DIET:  Eat and drink normally unless instructed otherwise.     TREATMENT FOR COMMON SIDE EFFECTS:  - Mild abdominal pain, nausea, belching, bloating or excessive gas:  rest,   eat lightly and use a heating pad.  - Sore Throat: treat with throat lozenges and/or gargle with warm salt   water.  - Because air was used during the procedure, expelling large amounts of air   from your rectum or belching is normal.  - If a bowel prep was taken, you may not have a bowel movement for 1-3 days.    This is normal.  SYMPTOMS TO WATCH FOR AND REPORT TO YOUR PHYSICIAN:  1. Abdominal pain or bloating, other than gas cramps.  2. Chest pain.  3. Back pain.  4. Signs of infection such as: chills or fever occurring within 24 hours   after the procedure.  5. Rectal bleeding, which would show as bright red, maroon, or black stools.   (A tablespoon of blood from the rectum is not serious, especially  if   hemorrhoids are present.)  6. Vomiting.  7. Weakness or dizziness.  GO DIRECTLY TO THE NEAREST EMERGENCY ROOM IF YOU HAVE ANY OF THE FOLLOWING:      Difficulty breathing              Chills and/or fever over 101 F   Persistent vomiting and/or vomiting blood   Severe abdominal pain   Severe chest pain   Black, tarry stools   Bleeding- more than one tablespoon   Any other symptom or condition that you feel may need urgent attention  Your doctor recommends these additional instructions:  If any biopsies were taken, your doctors clinic will contact you in 1 to 2   weeks with any results.  - Discharge patient to home (ambulatory).   - NPO today.   - Clear liquid diet for 1 day, then advance as tolerated to full liquid diet     x 3 days then soft diet.  For questions, problems or results please call your physician - Aaliyah Conrad III, MD at Work:  [phone].  [facility] [place], EMERGENCY ROOM PHONE NUMBER: [phone]  IF A COMPLICATION OR EMERGENCY SITUATION ARISES AND YOU ARE UNABLE TO REACH   YOUR PHYSICIAN - GO DIRECTLY TO THE EMERGENCY ROOM.  Aaliyah Conrad III, MD  4/18/2023 1:28:26 PM  This report has been verified and signed electronically.  Dear patient,  As a result of recent federal legislation (The Federal Cures Act), you may   receive lab or pathology results from your procedure in your [facility]   account before your physician is able to contact you. Your physician or   their representative will relay the results to you with their   recommendations at their soonest availability.  Thank you,  [employer]ATION

## 2023-04-18 NOTE — ANESTHESIA POSTPROCEDURE EVALUATION
Anesthesia Post Evaluation    Patient: Josafat Boudreaux    Procedure(s) Performed: Procedure(s) (LRB):  EGD (N/A)    Final Anesthesia Type: MAC      Patient location during evaluation: PACU  Patient participation: Yes- Able to Participate  Level of consciousness: awake and alert  Post-procedure vital signs: reviewed and stable  Pain management: adequate  Airway patency: patent  STACEY mitigation strategies: Extubation and recovery carried out in lateral, semiupright, or other nonsupine position  PONV status at discharge: No PONV  Anesthetic complications: no      Cardiovascular status: blood pressure returned to baseline  Respiratory status: room air and unassisted  Hydration status: euvolemic  Follow-up not needed.  Comments: PT. Appears to have adequately recovered for Anesthetic. VSS, airway patient. No apparent complications noted.          Vitals Value Taken Time   /87 04/18/23 1338   Temp 36.6 °C (97.9 °F) 04/18/23 1328   Pulse 98 04/18/23 1338   Resp 13 04/18/23 1338   SpO2 95 % 04/18/23 1338         No case tracking events are documented in the log.      Pain/Geetha Score: Geetha Score: 10 (4/18/2023  1:38 PM)

## 2023-05-03 PROBLEM — Z51.12 MAINTENANCE ANTINEOPLASTIC IMMUNOTHERAPY: Status: ACTIVE | Noted: 2023-01-01

## 2023-05-03 NOTE — PROGRESS NOTES
HEMATOLOGY/ONCOLOGY OFFICE CLINIC VISIT    Visit Information:     Initial Evaluation: 7/21/2022  Referring Provider:   Other providers:  Code status:     Diagnosis:  [id number] JENNYFER Laryngeal Squamous cell carcinoma      Present treatment:  Started maintenance Keytruda every 6 weeks on 3/23/2023      Treatment/Oncology history:  --7/11/2022: total laryngectomy  --Carbo/Taxol + RT (8/17/22-9/21/22)  --8/17/2022-9/28/2022:  6000 cGy, 200 cGy/fraction, 30/30 fx  --Carbo/Taxol/Keytruda planned 11/30/2022-3/2/2023    Plan of care: Carbo/Taxol/Keytruda x 4-6 --> maintenance Keytruda    Imaging:  Ct neck 6/6/2022: A metastatic right level III cervical lymph node is present with short axis measurement of 1.6 cm.  This lymph node contains internal cystic change, typical of metastatic squamous cell carcinoma.  A left level III cervical lymph node shows short axis measurements of 1 cm, and is suspicious.    A left supraglottic mass measures approximately 2.4 cm in diameter (axial post-contrast image 39), presumably corresponding to the primary neoplasm.  Metastatic lymph nodes are also present in the inferior right paratracheal position, measuring 1.3 cm in short axis.  Metastatic lymph nodes are present in the superior right hilum, measuring 1.8 cm in short axis.  Ct H&N 6/27/2022: 1. There is mild stenosis at the origin of left proximal internal carotid artery secondary to dense calcified atheromatous plaque. 2. There is consolidation is right upper lobe. Additional ground-glass opacities are also seen on the remainder of the visualized lungs. These findings are consistent with an infectious process. Small right pleural effusion is seen. There is an ill-defined soft tissue mass in the right perihilar region which measures approximately 3.1 x 2.9 x 4.6 cm, of concern for neoplasm. Correlate clinically as regards further evaluation and followup with CT chest. There is an ill-defined enhancing soft tissue mass in the glottis  infiltrating into the paraglottic spaces and the subglottic region with obliteration of the airway, of concern for a neoplasm. 3. Unremarkable CT angiogram of the head. Details and other findings as described above.  NM PET CT 8/1/2022: Postoperative changes of recent total laryngectomy and lou dissection. bilateral hypermetabolic cervical lymphadenopathy.  A right cervical lymph node 1.7 x 1.8 cm, compared to 1.6 x 1.5 cm previously, SUV of 7.7. left cervical lymph node 8 x 10 mm and has a max SUV of 3.4.  A fluid collection posterior to the left internal jugular vein measures 2.4 x 2.4 cm. There is right hilar and mediastinal lymphadenopathy.  A precarinal lymph node 1.6 x 2.3 cm, compared to 1.3 x 1.7 cm previously, SUV of 13.8.  Right hilar lymphadenopathy measures 3 x 2.9 cm, compared to 2.1 x 2.3 cm previously, and has a max SUV of 9.8.  A suspected right hilar mass measures 1.8 x 3.6 cm SUV of 14.8   PET CT 10/13/2022: Ill-defined hypermetabolic tissue is again appreciated at the left neck, just superior and lateral to the tracheostomy insertion site. The regional maximum SUV is 6.8, with discrete CT-correlate lesion poorly delineated secondary to non-contrast protocol; prior SUV max 8.3. An adjacent, better delineated right cervical chain lymph node is visualized posteriorly, measuring 1.1 cm short axis with SUV max of 6; this previously measured 1.5 cm in least dimension with maximum SUV of 7.7.  No convincing new or enlarging cervical adenopathy or newly hypermetabolic lymph nodes are visualized.  The prior left neck fluid collection is no longer evident.The somewhat lobulated right upper lobe hilar mass is now approximately 3.4 cm x 1.4 cm and maximum SUV 23; previously 3.6 cm x 1.8 cm and SUV max 14.8.  No new or enlarging hypermetabolic lung lesion, and no development of organized airspace consolidation or pleural effusion.  PET CT 3/16/2023:  1. Largely improved disease though there is single  paratracheal lymph node which has increased in size and is now FDG avid.   2. Some inguinal and external iliac lymph nodes demonstrate increased FDG activity today, this is favored reactive.  CV Ultrasound doppler venous arm right 3/22/2023: Positive acute deep vein thrombosis identified in the subclavian, axillary, and brachial veins of the right upper extremity. Superficial vein thrombosis identified in the basilic vein of the right upper extremity. All other veins were compressible.    Pathology:  6/14/2022:  EBUS ENDOBRONCHIAL MASS RUL, CYTOLOGY: NEARLY ACELLULAR SPECIMEN CONSISTING OF FRESH BLOOD CLOT. NO ATYPICAL OR MALIGNANT CELLS IDENTIFIED.   7/11/2022: Bronchial Wash, RLL, right bronchial mass washings:  - Squamous cell carcinoma.    Bronchial Wash, LLL, left bronchial mass washings: - Squamous cell carcinoma.     7/11/2022 Laryngectomy:  1. Larynx, laryngeal biopsy frozen section : - Squamous cell carcinoma with necrosis.    2. Neck, Anterior, left neck level 3: - Number of lymph nodes involved by carcinoma:  1/7       - Size of metastatic deposit:  2.5 mm - Extranodal extension:  Not identified      3. Larynx, resection without nodes, total layrngectomy :  - Squamous cell carcinoma.  4. Neck, Anterior, left neck level 4: - Number of lymph nodes involved by carcinoma:  0/10  5. Neck, Anterior, right neck level 4:  - Number of lymph nodes involved by carcinoma:  0/11  6. Neck, Anterior, right neck level 3: - Number of lymph nodes involved by carcinoma:  0/9  7. Nasophaynx, inferior pharyngeal mucosa : - Benign squamous mucosa. - No evidence of malignancy.     8. Cartilage, superior cartilage margin : - No evidence of malignancy.    9. Lymph Node, pretracheal lymph  node :  - Number of lymph nodes involved by carcinoma:  0/3  [id number]      CLINICAL HISTORY:       Patient: Josafat Boudreaux is a 57 y.o. male.kindly refer her for laryngeal carcinoma.  Patient  was referred to ENT for further evaluation of  hoarseness.  He did not have any difficulty swallowing or dry mouth at the time.  Hoarseness has been present for at least 4 months prior to evaluation. 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      Patient was scheduled to trach but on 06/10/2022 he was brought to the emergency room via air met with is short of breath.  He was found to have and oxygenation on the 40s when EMS was called.  He was placed on BiPAP but pCO2 increased so he has an emergent tracheostomy performed.  He has a PEG tube placed same hospitalization.  He underwent bronchoscopy with biopsy of the right upper lobe endobronchial lesion on 6/14/2022 cytology was negative for malignant cells.       On 7/11/2022 he underwent LARYNGECTOMY, WITH RADICAL NECK DISSECTION - Bilateral LARYNGOSCOPY, DIRECT, DIAGNOSTIC, WITH BRONCHOSCOPY AND ESOPHAGOSCOPY pathology report as above.  1/40 lymph nodes were involved with metastatic disease.  Bronchoscopy was repeated and biopsy of the left lower lobe and right lower lobe were both positive for squamous cell carcinoma.     He is here today with his wife.  He has recovered from surgery relatively well.  Tracheostomy in place.  He is unable to talk so the history was taken by electronic medical record and wife.     Patient has a brother with history of throat cancer. Patient used to smoke 1 and half pack per day since he was 60 years old.  He was smoking less recently.      Chief Complaint: OTHER (No concerns today.)      Interval History:  Patient presents today for 6 week follow up and continuation of maintenance Keytruda due tomorrow, 5/4/23.  He is with his wife.  He completed 6th cycle of weekly Carbo/Taxol and radiation on 9/21/22. He completed 4th cycle of every 3 weeks of Carbo/Taxol/Keytruda on 3/2/2023. He complains of numbness to fingertips, denies pain. He continues with cough, this is chronic, no worsening. Otherwise, he is doing well.   No fever, chills or sweats. No bleeding. Denies chest pain or shortness of breath.    On 3/22/23, NIVA study was positive for DVT to right upper extremity, he was started on Eliquis 5 mg BID. Tolerating well with no side effects. PEG tube was removed on 3/23/23, site is still leaking. He may need to undergo a procedure in th future to correct, per wife.        Past Medical History:   Diagnosis Date    Cancer     COPD (chronic obstructive pulmonary disease)     Dysphagia     Lung cancer     Vocal cord mass       Past Surgical History:   Procedure Laterality Date    DIRECT DIAGNOSTIC LARYNGOSCOPY WITH BRONCHOSCOPY AND ESOPHAGOSCOPY N/A 07/11/2022    Procedure: LARYNGOSCOPY, DIRECT, DIAGNOSTIC, WITH BRONCHOSCOPY AND ESOPHAGOSCOPY;  Surgeon: Emory Alonso MD;  Location: [facility];  Service: ENT;  Laterality: N/A;    ESOPHAGOGASTRODUODENOSCOPY (EGD)- DEVICE ASSISTED N/A 4/18/2023    Procedure: EGD;  Surgeon: Aaliyah Conrad III, MD;  Location: [facility] ENDOSCOPY;  Service: Gastroenterology;  Laterality: N/A;  Esophageal Dilation; Savory over Guide wire: 36Fr and 39 Fr  Clips to secure peg site    HIP SURGERY      HUMERUS FRACTURE SURGERY      INCISION AND DRAINAGE, NECK Bilateral 10/21/2022    Procedure: INCISION AND DRAINAGE,NECK;  Surgeon: Madison Worley MD;  Location: [facility];  Service: ENT;  Laterality: Bilateral;    INSERT ARTERIAL LINE  06/26/2022         JOINT REPLACEMENT  2019    R hip    TRACHEOSTOMY N/A 06/10/2022    Procedure: CREATION, TRACHEOSTOMY;  Surgeon: Junior Alonso MD;  Location: [facility];  Service: ENT;  Laterality: N/A;     Family History   Problem Relation Age of Onset    Lung cancer Father     Cancer Father         Lung cancer    Throat cancer Brother     Cancer Brother         Throat     Social Connections: Moderately Isolated    Frequency of Communication with Friends and Family: More than three times a week    Frequency of Social Gatherings with Friends and Family: More than three  times a week    Attends [employer] Services: Never    Active Member of Clubs or Organizations: No    Attends Club or Organization Meetings: Never    Marital Status:        Review of patient's allergies indicates:  No Known Allergies   Current Outpatient Medications on File Prior to Visit   Medication Sig Dispense Refill    albuterol-ipratropium (DUO-NEB) 2.5 mg-0.5 mg/3 mL nebulizer solution Take 3 mLs by nebulization every 4 (four) hours as needed for Shortness of Breath or Wheezing. Rescue 90 mL 11    apixaban (ELIQUIS) 5 mg Tab Take 1 tablet (5 mg total) by mouth As instructed. 60 tablet 6    diphenhydrAMINE (BENADRYL) 25 mg capsule 1 capsule (25 mg total) by Per G Tube route every 6 (six) hours as needed for Itching. 90 capsule 1    famotidine (PEPCID) 20 MG tablet Take 1 tablet (20 mg total) by mouth every evening. 30 tablet 11    glutamine 10 gram PwPk Take 10 g by mouth 2 (two) times a day.      HYDROcodone-acetaminophen (NORCO) 5-325 mg per tablet Take 1 tablet by mouth every 4 (four) hours as needed for Pain.      levoFLOXacin (LEVAQUIN) 250 MG tablet 750 mg po once daily x 10 days. 10 tablet 0    metroNIDAZOLE (METROGEL) 0.75 % (37.5mg/5 gram) vaginal gel apply to affected area BID      nystatin (MYCOSTATIN) cream Apply topically 2 (two) times daily. 15 g 5    ondansetron (ZOFRAN-ODT) 8 MG TbDL Take 8 mg by mouth every 8 (eight) hours as needed for Nausea. Tab 1 ODT in AM for 2 days after chemotherapy      pantoprazole (PROTONIX) 20 MG tablet Take 1 tablet (20 mg total) by mouth once daily. 30 tablet 11    [DISCONTINUED] aspirin (ECOTRIN) 81 MG EC tablet Take 81 mg by mouth once daily.       Current Facility-Administered Medications on File Prior to Visit   Medication Dose Route Frequency Provider Last Rate Last Admin    LIDOcaine (PF) 40 mg/mL (4 %) injection 2 mL  2 mL Tracheal Tube 1 time in Clinic/HOD Kevin Palomino MD        LIDOcaine HCL 4% external solution 4 mL  4 mL Topical (Top) 1 time in  "Clinic/HOD Marty Jeffries MD        oxymetazoline 0.05 % nasal spray 2 spray  2 spray Each Nostril 1 time in Clinic/HOD Marty Jeffries MD        phenylephrine HCL 1% nasal spray 1 spray  1 spray Each Nostril 1 time in Clinic/HOD Kevin Palomino MD          Review of Systems   Constitutional:  Positive for fatigue. Negative for activity change, appetite change, chills, diaphoresis, fever and unexpected weight change.   HENT:  Negative for nasal congestion, mouth sores, nosebleeds, sinus pressure/congestion, sore throat and trouble swallowing.    Eyes: Negative.    Respiratory:  Positive for cough. Negative for shortness of breath.    Cardiovascular:  Negative for chest pain and palpitations.   Gastrointestinal:  Negative for abdominal distention, abdominal pain, blood in stool, change in bowel habit, constipation, diarrhea, nausea, vomiting and change in bowel habit.        Dysphagia   Endocrine: Negative.    Genitourinary:  Negative for bladder incontinence, decreased urine volume, difficulty urinating, dysuria, frequency, hematuria and urgency.   Musculoskeletal:  Negative for arthralgias, back pain, gait problem, joint swelling, leg pain and myalgias.   Integumentary:  Negative for rash.   Allergic/Immunologic: Negative.    Neurological:  Negative for dizziness, tremors, syncope, weakness, light-headedness, numbness, headaches and memory loss.   Hematological:  Negative for adenopathy. Does not bruise/bleed easily.   Psychiatric/Behavioral:  Negative for agitation, confusion, hallucinations, sleep disturbance and suicidal ideas. The patient is not nervous/anxious.             Vitals:    05/03/23 1432   BP: 123/81   BP Location: Left arm   Patient Position: Sitting   Pulse: 98   Temp: 98.6 °F (37 °C)   TempSrc: Oral   SpO2: 96%   Weight: 64.3 kg (141 lb 11.2 oz)   Height: 5' 9" (1.753 m)          Physical Exam  Vitals and nursing note reviewed.   Constitutional:       General: He is not in acute distress.     " Appearance: Normal appearance.   HENT:      Head: Normocephalic and atraumatic.      Mouth/Throat:      Mouth: Mucous membranes are moist.   Eyes:      General: No scleral icterus.     Extraocular Movements: Extraocular movements intact.      Conjunctiva/sclera: Conjunctivae normal.   Neck:      Vascular: No JVD.      Trachea: Tracheostomy present.      Comments: Tracheotomy in place.  Right side - Skin changes c/w RT     Cardiovascular:      Rate and Rhythm: Normal rate and regular rhythm.      Heart sounds: No murmur heard.  Pulmonary:      Effort: Pulmonary effort is normal.      Breath sounds: Normal breath sounds. No wheezing or rhonchi.   Abdominal:      General: The ostomy site is clean. Bowel sounds are normal. There is no distension.      Palpations: Abdomen is soft.      Tenderness: There is no abdominal tenderness.   Musculoskeletal:         General: No swelling or deformity.      Right upper arm: Swelling and tenderness present.      Cervical back: Neck supple.      Comments: Erythema of right upper extremity to touch and tender   Lymphadenopathy:      Head:      Right side of head: No submandibular adenopathy.      Left side of head: No submandibular adenopathy.      Cervical: No cervical adenopathy.      Upper Body:      Right upper body: No supraclavicular or axillary adenopathy.      Left upper body: No supraclavicular or axillary adenopathy.      Lower Body: No right inguinal adenopathy. No left inguinal adenopathy.   Skin:     General: Skin is warm.      Coloration: Skin is not jaundiced.      Findings: No rash.   Neurological:      General: No focal deficit present.      Mental Status: He is alert and oriented to person, place, and time.      Cranial Nerves: Cranial nerves 2-12 are intact.   Psychiatric:         Attention and Perception: Attention normal.         Behavior: Behavior is cooperative.     ECOG SCORE    1 - Restricted in strenuous activity-ambulatory and able to carry out work of a  light nature         Laboratory:  CBC with Differential:  Lab Results   Component Value Date    WBC 7.88 05/03/2023    RBC 4.26 (L) 05/03/2023    HGB 14.9 05/03/2023    HCT 45.4 05/03/2023    .6 (H) 05/03/2023    MCH 35.0 (H) 05/03/2023    MCHC 32.8 (L) 05/03/2023    RDW 13.5 05/03/2023     05/03/2023    MPV 9.7 05/03/2023        CMP:  Sodium Level   Date Value Ref Range Status   03/22/2023 134 (L) 136 - 145 mmol/L Final     Potassium Level   Date Value Ref Range Status   03/22/2023 4.4 3.5 - 5.1 mmol/L Final     Carbon Dioxide   Date Value Ref Range Status   03/22/2023 28 22 - 29 mmol/L Final     Blood Urea Nitrogen   Date Value Ref Range Status   03/22/2023 7.0 (L) 8.4 - 25.7 mg/dL Final     Creatinine   Date Value Ref Range Status   03/22/2023 0.76 0.73 - 1.18 mg/dL Final     Calcium Level Total   Date Value Ref Range Status   03/22/2023 8.8 8.4 - 10.2 mg/dL Final     Albumin Level   Date Value Ref Range Status   03/22/2023 3.1 (L) 3.5 - 5.0 g/dL Final     Bilirubin Total   Date Value Ref Range Status   03/22/2023 0.5 <=1.5 mg/dL Final     Alkaline Phosphatase   Date Value Ref Range Status   03/22/2023 95 40 - 150 unit/L Final     Aspartate Aminotransferase   Date Value Ref Range Status   03/22/2023 14 5 - 34 unit/L Final     Alanine Aminotransferase   Date Value Ref Range Status   03/22/2023 7 0 - 55 unit/L Final     Estimated GFR-Non    Date Value Ref Range Status   08/02/2022 >60 mls/min/1.73/m2 Final         Assessment:     1. Squamous cell carcinoma of both lungs    2. Laryngeal cancer    3. Regional lymph node metastasis present    4. Fatigue, unspecified type    5. Malignant neoplasm of upper lobe of right lung    6. Tracheostomy dependent    7. H/O head and neck radiation    8. Maintenance antineoplastic immunotherapy      Laryngeal Squamous cell carcinoma -s/p total laryngectomy, 1/40 LN with metastatic disease.   Lung masses x 2, bilateral--> Squamous cell         Plan:        Completed weekly carbo/Taxol and radiation on 9/21/22 for his laryngeal carcinoma. Lung disease progressed.   Completed 4 cycles of Carbo/Taxol/Keytruda on 3/2/23. Started maintenance Keytruda every 6 weeks on 3/23/2023.  Tolerating well.       Okay to proceed with maintenance Keytruda tomorrow, 5/4/2023  Continue Eliquis 5 mg BID  RTC in 6 weeks for TD and labs, Keytruda next day  Labs: CBC, CMP, TSH  PET CT every 3 months, due 6/2023 - ordered--if insurance does not cover, will change to CT C/A/P  Recommend Vitamin B Complex to help with numbness in fingertips, instructed to notify me if pain begins, at that time I will prescribe Gabapentin    Encourage to call or message us for any questions or problems  The patient was given ample opportunity to ask questions, and to the best of my abilities, all questions answered to satisfaction; patient demonstrated understanding of what we discussed and agreeable to the plan.     LENKA NELSON MD      Professional Services   I, Bessie Horowitz LPN, acted solely as a scribe for and in the presence of Dr. Lenka Nelson, who performed these services.

## 2023-05-04 NOTE — PLAN OF CARE
Patient absent of falls.   Complex Repair And A-T Advancement Flap Text: The defect edges were debeveled with a #15 scalpel blade.  The primary defect was closed partially with a complex linear closure.  Given the location of the remaining defect, shape of the defect and the proximity to free margins an A-T advancement flap was deemed most appropriate for complete closure of the defect.  Using a sterile surgical marker, an appropriate advancement flap was drawn incorporating the defect and placing the expected incisions within the relaxed skin tension lines where possible.    The area thus outlined was incised deep to adipose tissue with a #15 scalpel blade.  The skin margins were undermined to an appropriate distance in all directions utilizing iris scissors.

## 2023-05-10 NOTE — PROGRESS NOTES
[facility]  Otolaryngology Clinic Note    Josafat Boudreaux  Encounter Date: 5/10/2023  YOB: 1965  Physician: Lisseth Krueger MD    Chief Complaint: Laryngeal mass    HPI: Josafat Boudreaux is a 57 y.o. male referred to clinic by Dr. Emory Alonso for laryngeal mass. Patients wife provides history. She states that patient first noticed hoarseness in November 2021. Got progressively worse. Presented to Dr. Alonso in early June 2022 for these complaints. Was noted to have a laryngeal mass on flexible laryngoscopy. Several days after the flexible laryngoscopy patient developed worsening swelling of the airway and presented to the ER in respiratory distress. He underwent emergent tracheostomy with Dr. Junior Alonso. Patient was subsequently discharged home but readmitted shortly after with seizures. He spent 3 days in the ICU on the ventilator. During hospitalization, patient underwent PEG tube placement. He reports that he was not having any issues with PO intake prior to the tracheostomy placement. Now he is PEG dependent. Only taking some ice chips by mouth. He presents today to discuss laryngectomy.     11/2/22:  Since last visit, patient total laryngectomy with bilateral neck dissections level 3 and 4, primary closure, he did not undergo TEP placement.  He was also found to have synchronous primary or lung metastasis in the lung that was also squamous cell carcinoma.  He underwent radiation completed this in the middle of September 2022.  He received carboplatin and Taxol which he still has a few more treatments of.  He presented to the emergency department on 10/08/2022 with a right neck infection.  This was treated with IV antibiotics and incision and drainage with UCHE drain placement.  He was kept NPO and did feeds through his PEG tube.  He met criteria for discharge on 10/25/22 with UCHE drain.  He had outpatient swallow study which did not demonstrate any  evidence of a leak.  He returns to clinic today reporting improvement in his symptoms including right neck pain and drainage.  He has not had to empty his UCHE drain on a daily basis at this point.  He had a PET performed on 10/13/2022 to assess response to chemotherapy.  On that PET scan, right hilar and mediastinal lymph nodes were decreased in size but had increased metabolic activity.  He denies any other issues today    11/7/22: Reports worsening swelling under the right jawline the past few days.  Denies any erythema, infection, fever or chills.    12/6/22:  Feeling a little run down, increased cough with blood tinged secretions, running temperature to 101 at home.  Received azithromycin abx last night from Dr. Alonso's office.    12/28/22:  Was hospitalized for Pseudomonal pneumonia.  He also had Blastomycetes positive antigen on arrival, but was unable to tolerate amphotericin B due to significant hypotension.  Prior to discharge, his repeat Blastomycetes antigen was negative.  Discharged with levaquin and portable home oxygen.  His pneumonia symptoms have all improved since discharge.  Ulceration spots have persisted with one spot having a small amount of growth adjacent.  No bleeding or pain.    1/3/22:  Returns for follow up for monitoring of tracheal ulceration.  Reports interval improvement.  Denies pain or bleeding from area.  Denies swelling or tenderness to neck.    1/11/22:  Returns for follow up.  No changes to tracheal ulceration.  Denies pain or bleeding.  Otherwise doing well.    2/8/23:  Patient is here for regular scheduled cancer surveillance.  He denies any changes since his last appointment.  He has lost about 5 lb since last December.  He contributes his weight loss to being hospitalized for pneumonia.  His swallow stable and his appetite is improving.  Denies odynophagia, otalgia, new head or neck masses, oral lesions, oral bleeding, hematemesis or hemoptysis.  He is still undergoing  chemotherapy treatments with Dr. Atkinson.  States that he has 2 more infusion scheduled at this time.  He is tolerating treatments well.'    3/8/23:  Doing well.  Completed his 4th chemo treatment few weeks ago.  Has been gaining weight not using his PEG.  Scheduled for his posttreatment the 22nd and will see GI after fall as well to remove PEG.  Family notes that the tracheal ulceration site is continuing to heal has not had any other issues with the stoma breathing.     4/12/23:  Presents today for cancer surveillance.  He reports that he has been doing very well.  He was started on Keytruda recently for his lung cancer.  He recently had his PEG removed as he was taking everything by mouth with no issues.  He denies any otalgia, odynophagia, new lumps or bumps in the neck, weight loss.  They are not in need of any laryngectomy supplies at this time. On eliquis for an UE DVT diagnosed recently.     5/10/23: Here today for cancer surveillance. Reports that he's doing well lately. Tolerating a diet. Still having issues with his PEG tube site leaking and will be seeing Dr. De Leon soon to talk about a procedure to close it. He had his maintenance keytruda dose at the beginning of May and has the next one scheduled in 6 weeks. He'll be getting a repeat PET in June. Denies any new otalgia, odynophagia, new lumps or bumps in his neck.     ROS:   General: Negative except per HPI  Skin: Denies rash, ulcer, or lesion.  Eyes: Denies vision changes or diplopia.  Ears: Negative except per HPI  Nose: Negative except per HPI  Throat/mouth: Negative except per HPI  Cardiovascular: Negative except per HPI  Respiratory: Negative except per HPI  Neck: Negative except per HPI  Endocrine: Negative except per HPI  Neurologic: Negative except per HPI    Other 10-point review of systems negative except per HPI      Review of patient's allergies indicates:  No Known Allergies    Past Medical History:   Diagnosis Date    Cancer     COPD  (chronic obstructive pulmonary disease)     Dysphagia     Lung cancer     Vocal cord mass        Past Surgical History:   Procedure Laterality Date    DIRECT DIAGNOSTIC LARYNGOSCOPY WITH BRONCHOSCOPY AND ESOPHAGOSCOPY N/A 2022    Procedure: LARYNGOSCOPY, DIRECT, DIAGNOSTIC, WITH BRONCHOSCOPY AND ESOPHAGOSCOPY;  Surgeon: Emory Alonso MD;  Location: [facility];  Service: ENT;  Laterality: N/A;    ESOPHAGOGASTRODUODENOSCOPY (EGD)- DEVICE ASSISTED N/A 2023    Procedure: EGD;  Surgeon: Aaliyah Conrad III, MD;  Location: [facility] ENDOSCOPY;  Service: Gastroenterology;  Laterality: N/A;  Esophageal Dilation; Savory over Guide wire: 36Fr and 39 Fr  Clips to secure peg site    HIP SURGERY      HUMERUS FRACTURE SURGERY      INCISION AND DRAINAGE, NECK Bilateral 10/21/2022    Procedure: INCISION AND DRAINAGE,NECK;  Surgeon: Madison Worley MD;  Location: [facility];  Service: ENT;  Laterality: Bilateral;    INSERT ARTERIAL LINE  2022         JOINT REPLACEMENT  2019    R hip    TRACHEOSTOMY N/A 06/10/2022    Procedure: CREATION, TRACHEOSTOMY;  Surgeon: Junior Alonso MD;  Location: [facility];  Service: ENT;  Laterality: N/A;       Social History     Socioeconomic History    Marital status:    Tobacco Use    Smoking status: Former     Packs/day: 0.50     Years: 15.00     Pack years: 7.50     Types: Cigarettes     Start date: 1980     Quit date: 2022     Years since quittin.9    Smokeless tobacco: Never   Substance and Sexual Activity    Alcohol use: Yes     Comment: occasional    Drug use: Never    Sexual activity: Not Currently     Partners: Female     Social Determinants of Health     Financial Resource Strain: Low Risk     Difficulty of Paying Living Expenses: Not hard at all   Food Insecurity: No Food Insecurity    Worried About Running Out of Food in the Last Year: Never true    Ran Out of Food in the Last Year: Never true   Transportation Needs: No Transportation Needs    Lack of  Transportation (Medical): No    Lack of Transportation (Non-Medical): No   Physical Activity: Inactive    Days of Exercise per Week: 0 days    Minutes of Exercise per Session: 0 min   Stress: No Stress Concern Present    Feeling of Stress : Not at all   Social Connections: Moderately Isolated    Frequency of Communication with Friends and Family: More than three times a week    Frequency of Social Gatherings with Friends and Family: More than three times a week    Attends Baptism Services: Never    Active Member of Clubs or Organizations: No    Attends Club or Organization Meetings: Never    Marital Status:    Housing Stability: Low Risk     Unable to Pay for Housing in the Last Year: No    Number of Places Lived in the Last Year: 1    Unstable Housing in the Last Year: No       Family History   Problem Relation Age of Onset    Lung cancer Father     Cancer Father         Lung cancer    Throat cancer Brother     Cancer Brother         Throat       Outpatient Encounter Medications as of 5/10/2023   Medication Sig Dispense Refill    albuterol-ipratropium (DUO-NEB) 2.5 mg-0.5 mg/3 mL nebulizer solution Take 3 mLs by nebulization every 4 (four) hours as needed for Shortness of Breath or Wheezing. Rescue 90 mL 11    apixaban (ELIQUIS) 5 mg Tab Take 1 tablet (5 mg total) by mouth As instructed. 60 tablet 6    diphenhydrAMINE (BENADRYL) 25 mg capsule 1 capsule (25 mg total) by Per G Tube route every 6 (six) hours as needed for Itching. 90 capsule 1    famotidine (PEPCID) 20 MG tablet Take 1 tablet (20 mg total) by mouth every evening. 30 tablet 11    glutamine 10 gram PwPk Take 10 g by mouth 2 (two) times a day.      HYDROcodone-acetaminophen (NORCO) 5-325 mg per tablet Take 1 tablet by mouth every 4 (four) hours as needed for Pain.      levoFLOXacin (LEVAQUIN) 250 MG tablet 750 mg po once daily x 10 days. 10 tablet 0    metroNIDAZOLE (METROGEL) 0.75 % (37.5mg/5 gram) vaginal gel apply to affected area BID       nystatin (MYCOSTATIN) cream Apply topically 2 (two) times daily. 15 g 5    ondansetron (ZOFRAN-ODT) 8 MG TbDL Take 8 mg by mouth every 8 (eight) hours as needed for Nausea. Tab 1 ODT in AM for 2 days after chemotherapy      pantoprazole (PROTONIX) 20 MG tablet Take 1 tablet (20 mg total) by mouth once daily. 30 tablet 11    [DISCONTINUED] aspirin (ECOTRIN) 81 MG EC tablet Take 81 mg by mouth once daily.       Facility-Administered Encounter Medications as of 5/10/2023   Medication Dose Route Frequency Provider Last Rate Last Admin    LIDOcaine (PF) 40 mg/mL (4 %) injection 2 mL  2 mL Tracheal Tube 1 time in Clinic/HOD Kevin Palomino MD        LIDOcaine HCL 4% external solution 4 mL  4 mL Topical (Top) 1 time in Clinic/HOD Marty Jeffries MD        oxymetazoline 0.05 % nasal spray 2 spray  2 spray Each Nostril 1 time in Clinic/HOD Marty Jeffries MD        phenylephrine HCL 1% nasal spray 1 spray  1 spray Each Nostril 1 time in Clinic/HOD Kevin Palomino MD           Physical Exam:  Vitals:    05/10/23 0812   BP: 111/72   Pulse: (!) 114   Weight: 63 kg (139 lb)         Constitutional  General Appearance: well nourished, well-developed, AAO x3, NAD  HEENT  Eyes: PEERLA, EOMI, normal conjunctivae  Ears: Hearing well at conversation level, EAC patent, TM intact, no effusion bilateral  Nose: septum midline, no inferior turbinate hypertrophy, no polyps, moist mucosa, pale appearing  OC/OP: dentition moderate, no oral lesions, tongue/FOM/BOT- soft, no leukoplakia/ulcerations/ tenderness   Neck: post-radiation changes and firmness, mild submandibular and submental lymphedema more on the left than right, stoma widely patent  Neuro: CN II - XII intact bilaterally  Cardiovascular: peripheral pulses palpable  Respiratory: non-labored respirations  Psychiatric: oriented to time, place and person, no depression, anxiety or agitation      Procedures:Flexible Fiberoptic Nasopharyngoscopy and tracheoscopy via right nare  Performed  on 2/8/2023  Renay Flores MD    Procedure in Detail: Informed consent was obtained from the patient after explanation of procedure, indications, risks and benefits. Flexible endoscopy was performed through the R nasal passage.     Anesthesia: tetracaine, phenylephrine  Adverse Events: None  Blood loss: none  Condition: good    Findings:  R nasal cavity with no inferior turbinate hypertrophy, pale appearing mucosa, septum midline, nasopharynx clear, oropharynx clear, base of tongue wnl, neopharynx with no lesions    Scope passed through laryngectomy stoma, the tracheal mucosa is clear down to the malissa.     Pertinent Data:  EXAMINATION:  NM PET CT ROUTINE     CLINICAL HISTORY:  Malignant neoplasm of larynx, unspecifiedEVALUATE TREATMENT RESPONSE;     TECHNIQUE:  The patient was given intravenous injection of F-18 FDG.  Images were obtained from the skull base to the upper thighs with additional 3D reformatted and multiplanar images submitted for review. In addition, a noncontrasted low-dose CT was obtained from the skull base through the upper thighs for purposes of attenuation correction and localization. The absence of intravenous contrasts limits the accuracy of the CT for evaluation of the soft tissues and vasculature. The DLP is 530.  Automatic exposure control, adjustment of mA/kV or iterative reconstruction technique was used to reduce radiation.     Radiopharmaceutical. 11.0 mCi F18-FDG, IV.     COMPARISON:  30 December 2022, 13 October 2022.     FINDINGS:  Activity within the imaged brain is symmetric.  There is decreased FDG activity along the right neck.  No focal suspicious increased cervical FDG activity today.     Largely improved hypermetabolic lesions of the right hilum and mediastinum compared to the October 2022 PET.  For example the precarinal lymph node on image 87 series 3 now measures 9 mm short axis, previously 15 mm.  The max SUV is now 4.9, previously 16.0.  However, a mediastinal lymph  node on image eighty series 3 now measures up to 10 mm, previously 6 mm.  The max SUV is 8.1, previously no significant FDG activity in this lymph node.  Right hilar lymph node fused image 90 is difficult to measure but certainly smaller compared to prior.  The max SUV is 16.0, previously 21.  Of the adjacent hypermetabolic right suprahilar elongated nodular opacity is much improved.  Small focus of residual FDG activity fused image 84 has max SUV 7.8, previously 23.0.     Right upper lobe consolidation is significantly improved compared to December 2022.  Previously visualized irregular pulmonary nodules elsewhere in both lungs near completely resolved.     Some external iliac and inguinal lymph nodes are similar in size, however, some demonstrate increased FDG activity today.  For example right inguinal lymph node fused image 236 has max SUV 6.7, previously 2.9.     Otherwise no new suspicious FDG activity in the abdomen or pelvis.  Gastrostomy tube is in place.  No ascites.     No suspicious musculoskeletal FDG activity today.  Left cervical paraspinal activity is likely physiologic.     Impression:     1. Largely improved disease though there is single paratracheal lymph node which has increased in size and is now FDG avid.  2. Some inguinal and external iliac lymph nodes demonstrate increased FDG activity today, this is favored reactive.      Right tracheostomal biopsy 1/11/23:  - Squamous mucosa with epithelial erosion and acute inflammation.  - Lamina propria with reactive fibroblasts and fibrosis, consistent with radiation changes, see comment.  - No evidence of malignancy.    Assessment/Plan:  Josafat Boudreaux is a 57 y.o. 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 Alonso; completed adjuvant CRT in 9/2022. Continued chemo until March for metastatic lung SCCA. Currently on keytruda for this.   - ZEUS on exam today.  -  Recent PET with a hypermetabolic paratracheal lymph node. Heme/onc plans to repeat a PET in 6/2023. Will f/u results at next appt.   - Continue routine roque care.   - Recent TSH 3/2023 wnl.     RTC in 1 month for routine surveillance       HEAD & NECK CANCER SURVEILLANCE   Primary Surgical Treatment     Head & Neck Staff: Dr. Emory Alonso  Medical Oncologist: Yara Atkinson MD (Last seen: 2/2023)  Radiation Oncologist: Romie Das MD (Last seen: Unknown)    Primary tumor: Endolaryngeal [id number] SCCA    Date of Diagnosis: 7/11/22  Surgery Date: 7/11/22  Induction Chemotherapy: No  Adjuvant Therapy: CRT  Completion Date: Sept 2022 for RT, finished chemo 3/2023    Pertinent Treatment History:  Surgery + CRT    Place date if completed   3 6 12 18 24 36 48 60   CT Neck 10/19/22 X X X X X X X   PET/CT 10/13/22          CXR  12/21/22 X X X X X X   TFT X 12/6/22 X  X X X X     Current Surveillance Interval: <1 year post-treatment, q6 wks         Renay Flores MD  [facility] Otolaryngology PGY IV

## 2023-05-16 NOTE — DISCHARGE INSTRUCTIONS
Patient Education       Surgical Wound Discharge Instructions   About this topic   A surgical wound is a cut in the skin done by your doctor. It is also called an incision. A surgical cut is put back together carefully after surgery. The stitches or sutures can be above the skin or right under the skin. Some stitches need to be taken out. Others melt away or dissolve as the wound heals. In some cases, staples are used. These are only used to fix cuts on the outside of the skin. Sometimes, a special glue is used to hold the skin together, especially if the wound in on the face.     What will the results be?   Taking good care of your surgical wound may:  Help with healing  Lower the risk of infection  Lower pain  Prevent a large scar  What care is needed at home?   Any cut made on the skin gives germs easy access to cause an infection. A wound infection can start from 2 days to 3 weeks after surgery. The infection may spread deeper in the body if it is not treated. You may start to feel unwell and serious health problems could happen. An infection may also cause the cut to open up again. These things may help you prevent an infection:  Wash your hands before and after touching your cut site. Use soap and water for at least 20 seconds. Alcohol-based hand sanitizers also work to kill germs.  Keep your wound clean and dry for the first 48 hours. You can take a shower after 48 hours or when your doctor tells you to. You may use soap and water to wash your wound. Make sure not to soak it. Gently towel-dry the wound afterwards.  No soaking in the tub, swimming, or hot tubs.  No heavy lifting over 20 pounds for 4 weeks.  Take the medications your doctor may prescribe you as ordered.  What follow-up care is needed?   If you have stitches or staples, you will need to have them taken out. Your doctor will often want to do this in 1 to 2 weeks. Your doctor may also use skin glue or special tape to close your wound. Do not take  the glue or tape off until your doctor says you can.  What lifestyle changes are needed?   Stop or lessen smoking.  Get lots of rest. Sleep when you are feeling tired. Avoid doing tiring activities.  Follow a healthy diet.  Eat many different foods rich in nutrients from all food groups.   Stay away from sugars and fats. Limit sweets and fatty foods such as desserts, fried foods, and chips. Use good fats found in fish, nuts, avocados, and oils, like olive oil and canola oil. Cut back on solid fats (butter, lard, margarine).  Try to eat more low fat or lean meats. Eat less red meat and eat more fish, chicken, turkey, and beans instead.  Drink 6 to 8 cups (1440 to 1920 mL) of water each day unless your doctor has told you to limit your fluids.  Avoid swimming and hot tubs until incision is healed.  Will physical activity be limited?   Movement may be limited if your wound is in an area that you use a lot. These areas are more likely to break open. Take extra care if you have a wound on a place like your hand, elbow, or knee.  Avoid stretching and pulling activities. These may cause your scar to pull apart. Call your doctor if this happens.  Ask your doctor when it is safe for you to do things like run, work out, or play sports.   What problems could happen?   Infection of the whole body. This is sepsis.  Poorly healed wound may leave big scars.  The wound may spontaneously open or break away from the stitches or staples. This is wound dehiscence.   When do I need to call the doctor?   Signs of a very bad reaction. These include wheezing; chest tightness; fever; itching; bad cough; blue skin color; seizures; or swelling of face, lips, tongue, or throat. Go to the ER right away.  Signs of infection. These include a fever of 100.4°F (38°C) or higher, chills, wound that will not heal, pain.  Signs of wound infection. These include swelling, redness, warmth around the wound; too much pain when touched; yellowish, greenish,  or bloody discharge; foul smell coming from the cut site; cut site opens up.  Helpful tips   Wear loose clothing. Good blood supply is important for healing. You may also be more comfortable.  Do not remove your bandages unless your doctor says so.  If your wound suddenly bleeds, apply pressure to help stop the bleeding. See you doctor right away.  If you have high blood sugar, work with your doctor to keep it in a normal range.

## 2023-05-17 NOTE — H&P
Patient seen and examined  No Changes noted  Risks and Benefits reviewed  Plan for surgery as noted

## 2023-05-18 PROBLEM — K31.6 GASTRIC FISTULA: Status: ACTIVE | Noted: 2023-01-01

## 2023-05-18 NOTE — TRANSFER OF CARE
"Anesthesia Transfer of Care Note    Patient: Josafat Boudreaux    Procedure(s) Performed: Procedure(s) (LRB):  LAPAROSCOPY, DIAGNOSTIC (N/A)    Patient location: PACU    Anesthesia Type: general    Transport from OR: Transported from OR on room air with adequate spontaneous ventilation    Post pain: adequate analgesia    Post assessment: no apparent anesthetic complications    Post vital signs: stable    Level of consciousness: sedated    Nausea/Vomiting: no nausea/vomiting    Complications: none    Transfer of care protocol was followed      Last vitals:   Visit Vitals  /86   Pulse (!) 111   Temp 36.8 °C (98.2 °F) (Oral)   Resp 18   Ht 5' 9" (1.753 m)   Wt 63 kg (138 lb 14.2 oz)   SpO2 95%   BMI 20.51 kg/m²     "

## 2023-05-18 NOTE — PLAN OF CARE
Pt is awake and alert-vss-russell score 9/10-itching and pain improved-he meets criteria for phase2 care per dr campbell-to rm 7 via bed

## 2023-05-18 NOTE — ANESTHESIA PREPROCEDURE EVALUATION
05/18/2023  Josafat Boudreaux is a 57 y.o., male with history of laryngeal status post laryngectomy  and lung cancer status post chemoradiation and ongoing immunotherapy presents as an outpatient for diagnostic laparoscopy (nonhealing old PEG site).  Patient's wife at bedside for history and physical and she notes that his heart rate is always high    Transthoracic echo June 2022   EF 60% with grade 1 diastolic dysfunction   No significant valvular disease noted    Last 3 sets of Vitals    Vitals - 1 value per visit 5/14/2023 5/18/2023 5/18/2023   SYSTOLIC - 129 -   DIASTOLIC - 86 -   Pulse - 111 -   Temp - 98.2 -   Resp - - 18   SPO2 - 95 -   Weight (lb) 140 - 138.89   Weight (kg) 63.504 - 63   Height 69 - -   BMI (Calculated) 20.7 - 20.5   VISIT REPORT - - -   Pain Score  - - -   Some recent data might be hidden         Lab Results   Component Value Date    WBC 7.88 05/03/2023    HGB 14.9 05/03/2023    HCT 45.4 05/03/2023    .6 (H) 05/03/2023     05/03/2023          BMP  Lab Results   Component Value Date     (L) 05/03/2023    K 4.1 05/03/2023    CO2 23 05/03/2023    BUN 6.4 (L) 05/03/2023    CREATININE 0.94 05/03/2023    CALCIUM 9.3 05/03/2023    EGFRNONAA >60 08/02/2022      Pre-op Assessment    I have reviewed the Patient Summary Reports.    I have reviewed the NPO Status.   I have reviewed the Medications.     Review of Systems  Anesthesia Hx:   Denies Personal Hx of Anesthesia complications.   Social:  Non-Smoker    Hematology/Oncology:         Lung and Laryngeal Current/Recent Cancer.   Cardiovascular:  Functional Capacity 2 METS    Pulmonary:   COPD, mild Shortness of breath    Hepatic/GI:   GERD    Neurological:   Seizures Single seizure with negative workup and no medications       Physical Exam  General: Well nourished, Cooperative, Alert and Oriented    Airway:  Mouth  Opening: Normal  TM Distance: Normal  Tongue: Normal  Neck ROM: Normal ROM  Pre-Existing Airway: Tracheal Stoma    Dental:  Intact    Chest/Lungs:  Clear to auscultation, Normal Respiratory Rate    Heart:  Rate: Tachycardia  Rhythm: Regular Rhythm        Anesthesia Plan  Type of Anesthesia, risks & benefits discussed:    Anesthesia Type: Gen ETT  Intra-op Monitoring Plan: Standard ASA Monitors  Post Op Pain Control Plan: multimodal analgesia and IV/PO Opioids PRN  Induction:  IV  Airway Plan: Direct  Informed Consent: Informed consent signed with the Patient and all parties understand the risks and agree with anesthesia plan.  All questions answered.   ASA Score: 4  Day of Surgery Review of History & Physical: H&P Update referred to the surgeon/provider.    Ready For Surgery From Anesthesia Perspective.     .

## 2023-05-18 NOTE — DISCHARGE SUMMARY
[facility] - Periop Services  Discharge Note  Short Stay    Procedure(s) (LRB):  LAPAROSCOPY, DIAGNOSTIC (N/A)      OUTCOME: Patient tolerated treatment/procedure well without complication and is now ready for discharge.    DISPOSITION: Home or Self Care    FINAL DIAGNOSIS:  Gastric fistula    FOLLOWUP: In clinic    DISCHARGE INSTRUCTIONS:  No discharge procedures on file.     TIME SPENT ON DISCHARGE:    minutes

## 2023-05-18 NOTE — ANESTHESIA PROCEDURE NOTES
Intubation    Date/Time: 5/18/2023 1:07 PM  Performed by: Barbara Taylor CRNA  Authorized by: Mansoor Slaughter Jr., MD     Intubation:     Induction:  Intravenous    Intubated:  Postinduction    Mask Ventilation:  Not attempted    Attempts:  1    Attempted By:  Staff anesthesiologist    Method of Intubation:  Surgical tracheostomy    Difficult Airway Encountered?: No      Complications:  None    Airway Device:  Oral endotracheal tube    Airway Device Size:  8.0    Style/Cuff Inflation:  Cuffed (inflated to minimal occlusive pressure)    Tube secured:  13    Placement Verified By:  Capnometry    Complicating Factors:  None    Findings Post-Intubation:  BS equal bilateral and atraumatic/condition of teeth unchanged

## 2023-05-19 NOTE — ANESTHESIA POSTPROCEDURE EVALUATION
Anesthesia Post Evaluation    Patient: Josafat Boudreaux    Procedure(s) Performed: Procedure(s) (LRB):  LAPAROSCOPY, DIAGNOSTIC (N/A)    Final Anesthesia Type: general      Patient location during evaluation: floor  Patient participation: Yes- Able to Participate  Level of consciousness: awake and alert  Post-procedure vital signs: reviewed and stable  Pain management: adequate  Airway patency: patent    PONV status at discharge: No PONV  Anesthetic complications: no      Cardiovascular status: blood pressure returned to baseline  Respiratory status: spontaneous ventilation and room air  Hydration status: euvolemic  Follow-up not needed.          Vitals Value Taken Time   /88 05/18/23 1655   Temp 36.5 °C (97.7 °F) 05/18/23 1530   Pulse 96 05/18/23 1655   Resp 18 05/18/23 1530   SpO2 96 % 05/18/23 1655         Event Time   Out of Recovery 15:25:00         Pain/Geetha Score: Pain Rating Prior to Med Admin: 6 (5/18/2023  3:10 PM)  Pain Rating Post Med Admin: 4 (5/18/2023  3:20 PM)  Geetha Score: 10 (5/18/2023  7:29 PM)  Modified Geetha Score: 20 (5/18/2023  7:29 PM)

## 2023-06-14 PROBLEM — J38.7 LARYNGEAL MASS: Status: RESOLVED | Noted: 2022-07-16 | Resolved: 2023-01-01

## 2023-06-14 PROBLEM — Z93.0 TRACHEOSTOMY DEPENDENT: Status: RESOLVED | Noted: 2022-06-21 | Resolved: 2023-01-01

## 2023-06-14 PROBLEM — J38.3 VOCAL CORD MASS: Status: RESOLVED | Noted: 2022-06-10 | Resolved: 2023-01-01

## 2023-06-14 NOTE — PROGRESS NOTES
[facility]  Otolaryngology Clinic Note    Josafat Boudreaux  Encounter Date: 6/14/2023  YOB: 1965  Physician: Javier Bear MD    Chief Complaint: Laryngeal mass    HPI: Josafat Boudreaux is a 57 y.o. male referred to clinic by Dr. Emory Alonso for laryngeal mass. Patients wife provides history. She states that patient first noticed hoarseness in November 2021. Got progressively worse. Presented to Dr. Alonso in early June 2022 for these complaints. Was noted to have a laryngeal mass on flexible laryngoscopy. Several days after the flexible laryngoscopy patient developed worsening swelling of the airway and presented to the ER in respiratory distress. He underwent emergent tracheostomy with Dr. Junior Alonso. Patient was subsequently discharged home but readmitted shortly after with seizures. He spent 3 days in the ICU on the ventilator. During hospitalization, patient underwent PEG tube placement. He reports that he was not having any issues with PO intake prior to the tracheostomy placement. Now he is PEG dependent. Only taking some ice chips by mouth. He presents today to discuss laryngectomy.     11/2/22:  Since last visit, patient total laryngectomy with bilateral neck dissections level 3 and 4, primary closure, he did not undergo TEP placement.  He was also found to have synchronous primary or lung metastasis in the lung that was also squamous cell carcinoma.  He underwent radiation completed this in the middle of September 2022.  He received carboplatin and Taxol which he still has a few more treatments of.  He presented to the emergency department on 10/08/2022 with a right neck infection.  This was treated with IV antibiotics and incision and drainage with UCHE drain placement.  He was kept NPO and did feeds through his PEG tube.  He met criteria for discharge on 10/25/22 with UCHE drain.  He had outpatient swallow study which did not demonstrate any  evidence of a leak.  He returns to clinic today reporting improvement in his symptoms including right neck pain and drainage.  He has not had to empty his UCHE drain on a daily basis at this point.  He had a PET performed on 10/13/2022 to assess response to chemotherapy.  On that PET scan, right hilar and mediastinal lymph nodes were decreased in size but had increased metabolic activity.  He denies any other issues today    11/7/22: Reports worsening swelling under the right jawline the past few days.  Denies any erythema, infection, fever or chills.    12/6/22:  Feeling a little run down, increased cough with blood tinged secretions, running temperature to 101 at home.  Received azithromycin abx last night from Dr. Alonso's office.    12/28/22:  Was hospitalized for Pseudomonal pneumonia.  He also had Blastomycetes positive antigen on arrival, but was unable to tolerate amphotericin B due to significant hypotension.  Prior to discharge, his repeat Blastomycetes antigen was negative.  Discharged with levaquin and portable home oxygen.  His pneumonia symptoms have all improved since discharge.  Ulceration spots have persisted with one spot having a small amount of growth adjacent.  No bleeding or pain.    1/3/22:  Returns for follow up for monitoring of tracheal ulceration.  Reports interval improvement.  Denies pain or bleeding from area.  Denies swelling or tenderness to neck.    1/11/22:  Returns for follow up.  No changes to tracheal ulceration.  Denies pain or bleeding.  Otherwise doing well.    2/8/23:  Patient is here for regular scheduled cancer surveillance.  He denies any changes since his last appointment.  He has lost about 5 lb since last December.  He contributes his weight loss to being hospitalized for pneumonia.  His swallow stable and his appetite is improving.  Denies odynophagia, otalgia, new head or neck masses, oral lesions, oral bleeding, hematemesis or hemoptysis.  He is still undergoing  chemotherapy treatments with Dr. Atkinson.  States that he has 2 more infusion scheduled at this time.  He is tolerating treatments well.'    3/8/23:  Doing well.  Completed his 4th chemo treatment few weeks ago.  Has been gaining weight not using his PEG.  Scheduled for his posttreatment the 22nd and will see GI after fall as well to remove PEG.  Family notes that the tracheal ulceration site is continuing to heal has not had any other issues with the stoma breathing.     4/12/23:  Presents today for cancer surveillance.  He reports that he has been doing very well.  He was started on Keytruda recently for his lung cancer.  He recently had his PEG removed as he was taking everything by mouth with no issues.  He denies any otalgia, odynophagia, new lumps or bumps in the neck, weight loss.  They are not in need of any laryngectomy supplies at this time. On eliquis for an UE DVT diagnosed recently.     5/10/23: Here today for cancer surveillance. Reports that he's doing well lately. Tolerating a diet. Still having issues with his PEG tube site leaking and will be seeing Dr. De Leon soon to talk about a procedure to close it. He had his maintenance keytruda dose at the beginning of May and has the next one scheduled in 6 weeks. He'll be getting a repeat PET in June. Denies any new otalgia, odynophagia, new lumps or bumps in his neck.     6/14/23: Patient is here for follow up for cancer surveillance. Unfortunately, his most recent PET on 6/9/23 did show progression of his right sided mediastinal lymphadenopathy. He and his wife remain hopeful and are going to see Dr. Atkinson later today. He has been doing well otherwise. No otalgia, dysphagia, odynophagia, new lumps/bumps. His TSH was high at the last blood draw so he was started on Synthroid 50mcg; he gets his TSH red-drawn today. No other issues.       Review of patient's allergies indicates:  No Known Allergies    Past Medical History:   Diagnosis Date    COPD  (chronic obstructive pulmonary disease)     Dysphagia     GERD (gastroesophageal reflux disease)     laryngeal Cancer     Lung cancer     Vocal cord mass        Past Surgical History:   Procedure Laterality Date    DIAGNOSTIC LAPAROSCOPY N/A 2023    Procedure: LAPAROSCOPY, DIAGNOSTIC;  Surgeon: Dipak De Leon Jr., MD;  Location: [facility];  Service: General;  Laterality: N/A;    DIRECT DIAGNOSTIC LARYNGOSCOPY WITH BRONCHOSCOPY AND ESOPHAGOSCOPY N/A 2022    Procedure: LARYNGOSCOPY, DIRECT, DIAGNOSTIC, WITH BRONCHOSCOPY AND ESOPHAGOSCOPY;  Surgeon: Emory Alonso MD;  Location: [facility];  Service: ENT;  Laterality: N/A;    ESOPHAGOGASTRODUODENOSCOPY (EGD)- DEVICE ASSISTED N/A 2023    Procedure: EGD;  Surgeon: Aaliyah Conrad III, MD;  Location: [facility] ENDOSCOPY;  Service: Gastroenterology;  Laterality: N/A;  Esophageal Dilation; Savory over Guide wire: 36Fr and 39 Fr  Clips to secure peg site    HIP SURGERY      HUMERUS FRACTURE SURGERY      INCISION AND DRAINAGE, NECK Bilateral 10/21/2022    Procedure: INCISION AND DRAINAGE,NECK;  Surgeon: Madison Worley MD;  Location: [facility];  Service: ENT;  Laterality: Bilateral;    INSERT ARTERIAL LINE  2022         JOINT REPLACEMENT  2019    R hip    PEG TUBE REMOVAL      PLACEMENT, MEDIPORT      TRACHEOSTOMY N/A 06/10/2022    Procedure: CREATION, TRACHEOSTOMY;  Surgeon: Junior Alonso MD;  Location: [facility];  Service: ENT;  Laterality: N/A;       Social History     Socioeconomic History    Marital status:    Tobacco Use    Smoking status: Former     Packs/day: 0.50     Years: 15.00     Pack years: 7.50     Types: Cigarettes     Start date: 1980     Quit date: 2022     Years since quittin.0    Smokeless tobacco: Never   Substance and Sexual Activity    Alcohol use: Yes     Alcohol/week: 1.0 standard drink     Types: 1 Cans of beer per week     Comment: occasional    Drug use: Never    Sexual activity: Not Currently      Partners: Female     Social Determinants of Health     Financial Resource Strain: Low Risk     Difficulty of Paying Living Expenses: Not hard at all   Food Insecurity: No Food Insecurity    Worried About Running Out of Food in the Last Year: Never true    Ran Out of Food in the Last Year: Never true   Transportation Needs: No Transportation Needs    Lack of Transportation (Medical): No    Lack of Transportation (Non-Medical): No   Physical Activity: Inactive    Days of Exercise per Week: 0 days    Minutes of Exercise per Session: 0 min   Stress: No Stress Concern Present    Feeling of Stress : Not at all   Social Connections: Moderately Isolated    Frequency of Communication with Friends and Family: More than three times a week    Frequency of Social Gatherings with Friends and Family: More than three times a week    Attends [employer] Services: Never    Active Member of Clubs or Organizations: No    Attends Club or Organization Meetings: Never    Marital Status:    Housing Stability: Low Risk     Unable to Pay for Housing in the Last Year: No    Number of Places Lived in the Last Year: 1    Unstable Housing in the Last Year: No       Family History   Problem Relation Age of Onset    Lung cancer Father     Cancer Father         Lung cancer    Throat cancer Brother     Cancer Brother         Throat       Outpatient Encounter Medications as of 6/14/2023   Medication Sig Dispense Refill    albuterol-ipratropium (DUO-NEB) 2.5 mg-0.5 mg/3 mL nebulizer solution Take 3 mLs by nebulization every 4 (four) hours as needed for Shortness of Breath or Wheezing. Rescue 90 mL 11    apixaban (ELIQUIS) 5 mg Tab Take 1 tablet (5 mg total) by mouth As instructed. 60 tablet 6    diphenhydrAMINE (BENADRYL) 25 mg capsule 1 capsule (25 mg total) by Per G Tube route every 6 (six) hours as needed for Itching. 90 capsule 1    famotidine (PEPCID) 20 MG tablet Take 1 tablet (20 mg total) by mouth every evening. 30 tablet 11    glutamine  10 gram PwPk Take 10 g by mouth 2 (two) times a day.      hydrocodone-acetaminophen (HYCET) solution 7.5-325 mg/15mL Take 15 mLs by mouth every 6 (six) hours as needed for Pain. 300 mL 0    levothyroxine (SYNTHROID) 50 MCG tablet Take 1 tablet (50 mcg total) by mouth once daily. 30 tablet 11    metroNIDAZOLE (METROGEL) 0.75 % (37.5mg/5 gram) vaginal gel apply to affected area BID      ondansetron (ZOFRAN-ODT) 8 MG TbDL Take 8 mg by mouth every 8 (eight) hours as needed for Nausea. Tab 1 ODT in AM for 2 days after chemotherapy      pantoprazole (PROTONIX) 20 MG tablet Take 1 tablet (20 mg total) by mouth once daily. 30 tablet 11    [DISCONTINUED] aspirin (ECOTRIN) 81 MG EC tablet Take 81 mg by mouth once daily.      [DISCONTINUED] HYDROcodone-acetaminophen (NORCO) 5-325 mg per tablet Take 1 tablet by mouth every 4 (four) hours as needed for Pain.      [DISCONTINUED] levoFLOXacin (LEVAQUIN) 250 MG tablet 750 mg po once daily x 10 days. 10 tablet 0    [DISCONTINUED] nystatin (MYCOSTATIN) cream Apply topically 2 (two) times daily. 15 g 5     Facility-Administered Encounter Medications as of 6/14/2023   Medication Dose Route Frequency Provider Last Rate Last Admin    LIDOcaine (PF) 40 mg/mL (4 %) injection 2 mL  2 mL Tracheal Tube 1 time in Clinic/HOD Kevin Palomino MD        LIDOcaine HCL 4% external solution 4 mL  4 mL Topical (Top) 1 time in Clinic/HOD Marty Jeffries MD        phenylephrine HCL 1% nasal spray 1 spray  1 spray Each Nostril 1 time in Clinic/HOD Kevin Palomino MD        [DISCONTINUED] oxymetazoline 0.05 % nasal spray 2 spray  2 spray Each Nostril 1 time in Clinic/HOD Marty Jeffries MD           Physical Exam:  Vitals:    06/14/23 0817   BP: 114/79   Pulse: 110   Weight: 63 kg (139 lb)         Constitutional  General Appearance: well nourished, well-developed, AAO x3, NAD  HEENT  Eyes: PEERLA, EOMI, normal conjunctivae  Ears: Hearing well at conversation level, EAC patent, TM intact, no effusion  bilateral  Nose: septum midline, no inferior turbinate hypertrophy, no polyps, moist mucosa, pale appearing  OC/OP: dentition moderate, no oral lesions, tongue/FOM/BOT- soft, no leukoplakia/ulcerations/ tenderness   Neck: post-radiation changes and firmness, mild submandibular and submental lymphedema more on the left than right, stoma widely patent  Neuro: CN II - XII intact bilaterally  Cardiovascular: peripheral pulses palpable  Respiratory: non-labored respirations  Psychiatric: oriented to time, place and person, no depression, anxiety or agitation    PROCEDURE: Flexible fiberoptic neopharyngoscopy & Tracheoscopy    Surgeon: Javier Bear   Anesthesia: 4% lidocaine + phenyelphrine  Indication: Dysphonia, history of head and neck cancer  Complications: None  Date: 06/14/2023    Procedure in Detail:    Informed consent was obtained from the patient after explanation of procedure, indications, risks and benefits. Flexible laryngoscopy was performed on Josafat Boudreaux through the right nasal passage(s). The nasal cavity, nasopharynx, oropharynx, neopharynx, and trachea were examined.     Operative Findings:    Nasal Cavity: Right nasal airway patent without lesions or ulcerations  Nasopharynx: No adenoid hypertrophy, no masses or ulcerations noted in the fossae of Rosenmüller, patent appearing lashonda tubarii  Tongue Base: No fullness or lingual tonsillar hypertrophy. No masses.  Pharyngeal Walls: No lesions or ulcerations noted  Neopharynx: No masses or lesions  Tracheoscopy: Trachea clear to malissa. Mild erythema at proximal aspect of mainstem bronchi but no purulence or evidence of infection.     The patient tolerated the procedure well without any complications.    Impression: No evidence of disease.        Pertinent Data:  PET 6/9/23: I have personally reviewed this image. Disease progression with worsening mediastinal and right hilar FDG avid lymphadenopathy.        Assessment/Plan:  Josafat Boudreaux is  a 57 y.o. 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 Alonso; completed adjuvant CRT in 9/2022. Continued chemo until March for metastatic lung SCCA. Currently on keytruda for this. Unfortunately, his most recent PET on 6/9/23 showed progression of his right mediastinal lymphadenopathy.     - Follow up with Heme/Onc later today to further discuss PET results. Will follow up their recommendations  - Continue routine roque care.   - Recent TSH was elevated; he goes today to get it rechecked after having increased Synthroid    RTC in 1 month for routine surveillance       HEAD & NECK CANCER SURVEILLANCE   Primary Surgical Treatment     Head & Neck Staff: Dr. Emory Alonso  Medical Oncologist: Yara Atkinson MD (Last seen: 2/2023)  Radiation Oncologist: Romie Das MD (Last seen: Unknown)    Primary tumor: Endolaryngeal [id number] SCCA    Date of Diagnosis: 7/11/22  Surgery Date: 7/11/22  Induction Chemotherapy: No  Adjuvant Therapy: CRT  Completion Date: Sept 2022 for RT, finished chemo 3/2023    Pertinent Treatment History:  Surgery + CRT    Place date if completed   3 6 12 18 24 36 48 60   CT Neck 10/19/22 X X X X X X X   PET/CT 10/13/22  6/9/23        CXR  12/21/22 X X X X X X   TFT X 12/6/22 5/3/23  X X X X     Current Surveillance Interval: <1 year post-treatment, q6 wks       Javier Bear MD  [employer] Otolaryngology PGY-3  06/14/2023 8:36 AM

## 2023-06-14 NOTE — TELEPHONE ENCOUNTER
Let's send tissue for molecular testing . If tissue not available then liquid biopsy for molecular testing for lung ca    PD-L1, EGFR, ALK, KRAS, BRAF, MET, HER2, etc

## 2023-06-14 NOTE — PROGRESS NOTES
HEMATOLOGY/ONCOLOGY OFFICE CLINIC VISIT    Visit Information:     Initial Evaluation: 7/21/2022  Referring Provider:   Other providers:  Code status:     Diagnosis/Problem list:  1) pT4apN1-Stage JENNYFER Laryngeal Squamous cell carcinoma   2) Lung masses x 2, bilateral--> Squamous cell -- Metastatic SqCC lung cancer vs metastatic SqCC to the lungs from H&N  3) Mediastinal and Hilar LAD     Present treatment:  TBD    Treatment/Oncology history:  --7/11/2022: total laryngectomy  --Carbo/Taxol + RT (8/17/22-9/21/22)  --8/17/2022-9/28/2022:  6000 cGy, 200 cGy/fraction, 30/30 fx  --Carbo/Taxol/Keytruda planned 11/30/2022-3/2/2023  --Started maintenance Keytruda every 6 weeks on 3/23/2023    Plan of care: TBD    Imaging:  Ct neck 6/6/2022: A metastatic right level III cervical lymph node is present with short axis measurement of 1.6 cm.  This lymph node contains internal cystic change, typical of metastatic squamous cell carcinoma.  A left level III cervical lymph node shows short axis measurements of 1 cm, and is suspicious.    A left supraglottic mass measures approximately 2.4 cm in diameter (axial post-contrast image 39), presumably corresponding to the primary neoplasm.  Metastatic lymph nodes are also present in the inferior right paratracheal position, measuring 1.3 cm in short axis.  Metastatic lymph nodes are present in the superior right hilum, measuring 1.8 cm in short axis.  Ct H&N 6/27/2022: 1. There is mild stenosis at the origin of left proximal internal carotid artery secondary to dense calcified atheromatous plaque. 2. There is consolidation is right upper lobe. Additional ground-glass opacities are also seen on the remainder of the visualized lungs. These findings are consistent with an infectious process. Small right pleural effusion is seen. There is an ill-defined soft tissue mass in the right perihilar region which measures approximately 3.1 x 2.9 x 4.6 cm, of concern for neoplasm. Correlate clinically  as regards further evaluation and followup with CT chest. There is an ill-defined enhancing soft tissue mass in the glottis infiltrating into the paraglottic spaces and the subglottic region with obliteration of the airway, of concern for a neoplasm. 3. Unremarkable CT angiogram of the head. Details and other findings as described above.  NM PET CT 8/1/2022: Postoperative changes of recent total laryngectomy and lou dissection. bilateral hypermetabolic cervical lymphadenopathy.  A right cervical lymph node 1.7 x 1.8 cm, compared to 1.6 x 1.5 cm previously, SUV of 7.7. left cervical lymph node 8 x 10 mm and has a max SUV of 3.4.  A fluid collection posterior to the left internal jugular vein measures 2.4 x 2.4 cm. There is right hilar and mediastinal lymphadenopathy.  A precarinal lymph node 1.6 x 2.3 cm, compared to 1.3 x 1.7 cm previously, SUV of 13.8.  Right hilar lymphadenopathy measures 3 x 2.9 cm, compared to 2.1 x 2.3 cm previously, and has a max SUV of 9.8.  A suspected right hilar mass measures 1.8 x 3.6 cm SUV of 14.8   PET CT 10/13/2022: Ill-defined hypermetabolic tissue is again appreciated at the left neck, just superior and lateral to the tracheostomy insertion site. The regional maximum SUV is 6.8, with discrete CT-correlate lesion poorly delineated secondary to non-contrast protocol; prior SUV max 8.3. An adjacent, better delineated right cervical chain lymph node is visualized posteriorly, measuring 1.1 cm short axis with SUV max of 6; this previously measured 1.5 cm in least dimension with maximum SUV of 7.7.  No convincing new or enlarging cervical adenopathy or newly hypermetabolic lymph nodes are visualized.  The prior left neck fluid collection is no longer evident.The somewhat lobulated right upper lobe hilar mass is now approximately 3.4 cm x 1.4 cm and maximum SUV 23; previously 3.6 cm x 1.8 cm and SUV max 14.8.  No new or enlarging hypermetabolic lung lesion, and no development of  organized airspace consolidation or pleural effusion.  PET CT 3/16/2023:  1. Largely improved disease though there is single paratracheal lymph node which has increased in size and is now FDG avid.   2. Some inguinal and external iliac lymph nodes demonstrate increased FDG activity today, this is favored reactive.  CV Ultrasound doppler venous arm right 3/22/2023: Positive acute deep vein thrombosis identified in the subclavian, axillary, and brachial veins of the right upper extremity. Superficial vein thrombosis identified in the basilic vein of the right upper extremity. All other veins were compressible.  PET CT 6/9/2023:  Disease progression with worsening mediastinal and right hilar FDG avid lymphadenopathy.       Pathology:  6/14/2022:  EBUS ENDOBRONCHIAL MASS RUL, CYTOLOGY: NEARLY ACELLULAR SPECIMEN CONSISTING OF FRESH BLOOD CLOT. NO ATYPICAL OR MALIGNANT CELLS IDENTIFIED.   7/11/2022: Bronchial Wash, RLL, right bronchial mass washings:  - Squamous cell carcinoma.                      Bronchial Wash, LLL, left bronchial mass washings:      - Squamous cell carcinoma.     7/11/2022 Laryngectomy:  1. Larynx, laryngeal biopsy frozen section : - Squamous cell carcinoma with necrosis.    2. Neck, Anterior, left neck level 3: - Number of lymph nodes involved by carcinoma:  1/7       - Size of metastatic deposit:  2.5 mm - Extranodal extension:  Not identified      3. Larynx, resection without nodes, total layrngectomy :  - Squamous cell carcinoma.  4. Neck, Anterior, left neck level 4: - Number of lymph nodes involved by carcinoma:  0/10  5. Neck, Anterior, right neck level 4:  - Number of lymph nodes involved by carcinoma:  0/11  6. Neck, Anterior, right neck level 3: - Number of lymph nodes involved by carcinoma:  0/9  7. Nasophaynx, inferior pharyngeal mucosa : - Benign squamous mucosa. - No evidence of malignancy.     8. Cartilage, superior cartilage margin : - No evidence of malignancy.    9. Lymph Node,  pretracheal lymph  node :  - Number of lymph nodes involved by carcinoma:  0/3  [id number]    1/11/2023:   Right tracheostomal biopsy:  - Squamous mucosa with epithelial erosion and acute inflammation.  - Lamina propria with reactive fibroblasts and fibrosis, consistent with radiation changes, see comment.  - No evidence of malignancy.    CLINICAL HISTORY:       Patient: Josafat Boudreaux is a 57 y.o. male.kindly refer her for laryngeal carcinoma.  Patient  was referred to ENT for further evaluation of hoarseness.  He did not have any difficulty swallowing or dry mouth at the time.  Hoarseness has been present for at least 4 months prior to evaluation. 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      Patient was scheduled to trach but on 06/10/2022 he was brought to the emergency room via air met with is short of breath.  He was found to have and oxygenation on the 40s when EMS was called.  He was placed on BiPAP but pCO2 increased so he has an emergent tracheostomy performed.  He has a PEG tube placed same hospitalization.  He underwent bronchoscopy with biopsy of the right upper lobe endobronchial lesion on 6/14/2022 cytology was negative for malignant cells.       On 7/11/2022 he underwent LARYNGECTOMY, WITH RADICAL NECK DISSECTION - Bilateral LARYNGOSCOPY, DIRECT, DIAGNOSTIC, WITH BRONCHOSCOPY AND ESOPHAGOSCOPY pathology report as above.  1/40 lymph nodes were involved with metastatic disease.  Bronchoscopy was repeated and biopsy of the left lower lobe and right lower lobe were both positive for squamous cell carcinoma.     He is here today with his wife.  He has recovered from surgery relatively well.  Tracheostomy in place.  He is unable to talk so the history was taken by electronic medical record and wife.     Patient has a brother with history of throat cancer. Patient used to smoke 1 and half pack per day since he was 60 years old.  He was  smoking less recently.      Chief Complaint: No Concerns today      Interval History:  Patient presents today for 6 week follow up to discuss PET CT results and continuation of maintenance Keytruda due tomorrow, 6/15/23.  He is with his wife.  He completed 6th cycle of weekly Carbo/Taxol and radiation on 9/21/22. He completed 4th cycle of every 3 weeks of Carbo/Taxol/Keytruda on 3/2/2023. He complains of numbness to fingertips, denies pain. He continues with cough, this is chronic, no worsening. Otherwise, he is doing well.  No fever, chills or sweats. No bleeding. Denies chest pain or shortness of breath.    On 3/22/23, NIVA study was positive for DVT to right upper extremity, he was started on Eliquis 5 mg BID. Tolerating well with no side effects. PEG tube was removed on 3/23/23, site is still leaking. He underwent procedure on 5/18/23 with Dr. De Leon to correct the leakage. Site is all healed. On 5/3/23 TSH was 7.451. He was called and instructed to begin taking Synthroid 50 mcg daily. TSH today is pending.  Has follow up appointment with Dr. Das next week. He reports it has made a year since he quit smoking.      Past Medical History:   Diagnosis Date    COPD (chronic obstructive pulmonary disease)     Dysphagia     GERD (gastroesophageal reflux disease)     laryngeal Cancer     Lung cancer     Vocal cord mass       Past Surgical History:   Procedure Laterality Date    DIAGNOSTIC LAPAROSCOPY N/A 5/18/2023    Procedure: LAPAROSCOPY, DIAGNOSTIC;  Surgeon: Dipak De Leon Jr., MD;  Location: [facility] OR;  Service: General;  Laterality: N/A;    DIRECT DIAGNOSTIC LARYNGOSCOPY WITH BRONCHOSCOPY AND ESOPHAGOSCOPY N/A 07/11/2022    Procedure: LARYNGOSCOPY, DIRECT, DIAGNOSTIC, WITH BRONCHOSCOPY AND ESOPHAGOSCOPY;  Surgeon: Emory Alonso MD;  Location: [facility] OR;  Service: ENT;  Laterality: N/A;    ESOPHAGOGASTRODUODENOSCOPY (EGD)- DEVICE ASSISTED N/A 04/18/2023    Procedure: EGD;  Surgeon: Aaliyah Conrad III, MD;   Location: [facility] ENDOSCOPY;  Service: Gastroenterology;  Laterality: N/A;  Esophageal Dilation; Savory over Guide wire: 36Fr and 39 Fr  Clips to secure peg site    HIP SURGERY      HUMERUS FRACTURE SURGERY      INCISION AND DRAINAGE, NECK Bilateral 10/21/2022    Procedure: INCISION AND DRAINAGE,NECK;  Surgeon: Madison Worley MD;  Location: [facility] OR;  Service: ENT;  Laterality: Bilateral;    INSERT ARTERIAL LINE  06/26/2022         JOINT REPLACEMENT  2019    R hip    PEG TUBE REMOVAL      PLACEMENT, MEDIPORT      TRACHEOSTOMY N/A 06/10/2022    Procedure: CREATION, TRACHEOSTOMY;  Surgeon: Junior Alonso MD;  Location: [facility] OR;  Service: ENT;  Laterality: N/A;     Family History   Problem Relation Age of Onset    Lung cancer Father     Cancer Father         Lung cancer    Throat cancer Brother     Cancer Brother         Throat     Social Connections: Moderately Isolated    Frequency of Communication with Friends and Family: More than three times a week    Frequency of Social Gatherings with Friends and Family: More than three times a week    Attends [employer] Services: Never    Active Member of Clubs or Organizations: No    Attends Club or Organization Meetings: Never    Marital Status:        Review of patient's allergies indicates:  No Known Allergies   Current Outpatient Medications on File Prior to Visit   Medication Sig Dispense Refill    albuterol-ipratropium (DUO-NEB) 2.5 mg-0.5 mg/3 mL nebulizer solution Take 3 mLs by nebulization every 4 (four) hours as needed for Shortness of Breath or Wheezing. Rescue 90 mL 11    apixaban (ELIQUIS) 5 mg Tab Take 1 tablet (5 mg total) by mouth As instructed. 60 tablet 6    diphenhydrAMINE (BENADRYL) 25 mg capsule 1 capsule (25 mg total) by Per G Tube route every 6 (six) hours as needed for Itching. 90 capsule 1    famotidine (PEPCID) 20 MG tablet Take 1 tablet (20 mg total) by mouth every evening. 30 tablet 11    glutamine 10 gram PwPk Take 10 g by mouth 2  (two) times a day.      hydrocodone-acetaminophen (HYCET) solution 7.5-325 mg/15mL Take 15 mLs by mouth every 6 (six) hours as needed for Pain. 300 mL 0    levothyroxine (SYNTHROID) 50 MCG tablet Take 1 tablet (50 mcg total) by mouth once daily. 30 tablet 11    metroNIDAZOLE (METROGEL) 0.75 % (37.5mg/5 gram) vaginal gel apply to affected area BID      ondansetron (ZOFRAN-ODT) 8 MG TbDL Take 8 mg by mouth every 8 (eight) hours as needed for Nausea. Tab 1 ODT in AM for 2 days after chemotherapy      pantoprazole (PROTONIX) 20 MG tablet Take 1 tablet (20 mg total) by mouth once daily. 30 tablet 11    [DISCONTINUED] aspirin (ECOTRIN) 81 MG EC tablet Take 81 mg by mouth once daily.       Current Facility-Administered Medications on File Prior to Visit   Medication Dose Route Frequency Provider Last Rate Last Admin    LIDOcaine (PF) 40 mg/mL (4 %) injection 2 mL  2 mL Tracheal Tube 1 time in Clinic/HOD Kevin Palomino MD        LIDOcaine HCL 4% external solution 4 mL  4 mL Topical (Top) 1 time in Clinic/HOD Marty Jeffries MD        phenylephrine HCL 1% nasal spray 1 spray  1 spray Each Nostril 1 time in Clinic/HOD Kevin Palomino MD          Review of Systems   Constitutional:  Positive for fatigue. Negative for activity change, appetite change, chills, diaphoresis, fever and unexpected weight change.   HENT:  Negative for nasal congestion, mouth sores, nosebleeds, sinus pressure/congestion, sore throat and trouble swallowing.    Eyes: Negative.    Respiratory:  Positive for cough. Negative for shortness of breath.    Cardiovascular:  Negative for chest pain and palpitations.   Gastrointestinal:  Negative for abdominal distention, abdominal pain, blood in stool, change in bowel habit, constipation, diarrhea, nausea, vomiting and change in bowel habit.        Dysphagia   Endocrine: Negative.    Genitourinary:  Negative for bladder incontinence, decreased urine volume, difficulty urinating, dysuria, frequency, hematuria  "and urgency.   Musculoskeletal:  Negative for arthralgias, back pain, gait problem, joint swelling, leg pain and myalgias.   Integumentary:  Negative for rash.   Allergic/Immunologic: Negative.    Neurological:  Negative for dizziness, tremors, syncope, weakness, light-headedness, numbness, headaches and memory loss.   Hematological:  Negative for adenopathy. Does not bruise/bleed easily.   Psychiatric/Behavioral:  Negative for agitation, confusion, hallucinations, sleep disturbance and suicidal ideas. The patient is not nervous/anxious.             Vitals:    06/14/23 1454   BP: 108/67   BP Location: Left arm   Patient Position: Sitting   Pulse: (!) 111   Temp: 98.1 °F (36.7 °C)   TempSrc: Oral   SpO2: 96%   Weight: 63.5 kg (140 lb)   Height: 5' 9" (1.753 m)            Physical Exam  Vitals and nursing note reviewed.   Constitutional:       General: He is not in acute distress.     Appearance: Normal appearance.   HENT:      Head: Normocephalic and atraumatic.      Mouth/Throat:      Mouth: Mucous membranes are moist.   Eyes:      General: No scleral icterus.     Extraocular Movements: Extraocular movements intact.      Conjunctiva/sclera: Conjunctivae normal.   Neck:      Vascular: No JVD.      Trachea: Tracheostomy present.      Comments: Tracheotomy in place.  Right side - Skin changes c/w RT     Cardiovascular:      Rate and Rhythm: Normal rate and regular rhythm.      Heart sounds: No murmur heard.  Pulmonary:      Effort: Pulmonary effort is normal.      Breath sounds: Normal breath sounds. No wheezing or rhonchi.   Abdominal:      General: The ostomy site is clean. Bowel sounds are normal. There is no distension.      Palpations: Abdomen is soft.      Tenderness: There is no abdominal tenderness.   Musculoskeletal:         General: No swelling or deformity.      Right upper arm: Swelling and tenderness present.      Cervical back: Neck supple.      Comments: Erythema of right upper extremity to touch and " tender   Lymphadenopathy:      Head:      Right side of head: No submandibular adenopathy.      Left side of head: No submandibular adenopathy.      Cervical: No cervical adenopathy.      Upper Body:      Right upper body: No supraclavicular or axillary adenopathy.      Left upper body: No supraclavicular or axillary adenopathy.      Lower Body: No right inguinal adenopathy. No left inguinal adenopathy.   Skin:     General: Skin is warm.      Coloration: Skin is not jaundiced.      Findings: No rash.   Neurological:      General: No focal deficit present.      Mental Status: He is alert and oriented to person, place, and time.      Cranial Nerves: Cranial nerves 2-12 are intact.   Psychiatric:         Attention and Perception: Attention normal.         Behavior: Behavior is cooperative.     ECOG SCORE             Laboratory:  CBC with Differential:  Lab Results   Component Value Date    WBC 7.85 06/14/2023    RBC 4.92 06/14/2023    HGB 16.2 06/14/2023    HCT 50.2 06/14/2023    .0 (H) 06/14/2023    MCH 32.9 (H) 06/14/2023    MCHC 32.3 (L) 06/14/2023    RDW 12.7 06/14/2023     06/14/2023    MPV 9.8 06/14/2023        CMP:  Sodium Level   Date Value Ref Range Status   06/14/2023 134 (L) 136 - 145 mmol/L Final     Potassium Level   Date Value Ref Range Status   06/14/2023 4.4 3.5 - 5.1 mmol/L Final     Carbon Dioxide   Date Value Ref Range Status   06/14/2023 31 (H) 22 - 29 mmol/L Final     Blood Urea Nitrogen   Date Value Ref Range Status   06/14/2023 5.0 (L) 8.4 - 25.7 mg/dL Final     Creatinine   Date Value Ref Range Status   06/14/2023 0.84 0.73 - 1.18 mg/dL Final     Calcium Level Total   Date Value Ref Range Status   06/14/2023 9.1 8.4 - 10.2 mg/dL Final     Albumin Level   Date Value Ref Range Status   06/14/2023 3.3 (L) 3.5 - 5.0 g/dL Final     Bilirubin Total   Date Value Ref Range Status   06/14/2023 0.4 <=1.5 mg/dL Final     Alkaline Phosphatase   Date Value Ref Range Status   06/14/2023 112 40 -  150 unit/L Final     Aspartate Aminotransferase   Date Value Ref Range Status   06/14/2023 19 5 - 34 unit/L Final     Alanine Aminotransferase   Date Value Ref Range Status   06/14/2023 9 0 - 55 unit/L Final     Estimated GFR-Non    Date Value Ref Range Status   08/02/2022 >60 mls/min/1.73/m2 Final         Assessment:     1. Squamous cell carcinoma of both lungs    2. Laryngeal cancer    3. H/O head and neck radiation    4. Maintenance antineoplastic immunotherapy      1) pT4apN1-Stage JENNYFER Laryngeal Squamous cell carcinoma   --Laryngeal Squamous cell carcinoma -s/p total laryngectomy, 1/40 LN with metastatic disease.   2) Lung masses x 2, bilateral--> Squamous cell -- Metastatic SqCC lung cancer vs metastatic SqCC to the lungs from H&N  3) Mediastinal and Hilar LAD        Plan:       Completed weekly carbo/Taxol and radiation on 9/21/22 for his laryngeal carcinoma. Lung disease progressed.   Completed 4 cycles of Carbo/Taxol/Keytruda on 3/2/23. Started maintenance Keytruda every 6 weeks on 3/23/2023.  Tolerating well.  Most recent PET-CT showed significant progression of mediastinal and hilar lymphadenopathy.  Lungs with no evidence of nodules or masses and no distant metastases seen.  I will discuss with ortho Duval to see if he will benefit of radiation therapy to that area.  He had radiation to the neck in the past.  I discussed with him that because of the shortage of Carboplatinum and cisplatin we may or may not be able to use platinum as a radiosensitizer if this isn't option for him.  Next step would be Gemzar.      Okay to proceed with maintenance Keytruda tomorrow, 6/15/23  Continue Eliquis 5 mg BID  Continue Synthroid 50 mcg daily  Keep scheduled appointment wit Dr. Das next week  RTC in 2 weeks with MD only to discuss definitive treatment options after appointment with Dr. Das -  no labs  I will discuss findings on PET CT personally with Dr. Das for recommendations of XRT to lymph nodes  in chest  Will send tissue for molecular testing vs liquid biopsy for molecular testing  PET CT every 3 months, due 9/2023 - will order when closer--if insurance does not cover, will change to CT C/A/P  Recommend Vitamin B Complex to help with numbness in fingertips, instructed to notify me if pain begins, at that time I will prescribe Gabapentin    Encourage to call or message us for any questions or problems  The patient was given ample opportunity to ask questions, and to the best of my abilities, all questions answered to satisfaction; patient demonstrated understanding of what we discussed and agreeable to the plan.     LENKA NELSON MD      Professional Services   I, Bessie Horowitz LPN, acted solely as a scribe for and in the presence of Dr. Lenka Nelson, who performed these services.

## 2023-06-27 NOTE — OP NOTE
Patient:  Josafat Boudreaux        :  1965            DATE OF SURGERY:     2023          SURGEON:  Dipak De Leon MD         ASSISTANT:  Gemini Rivas NP           PREOPERATIVE DIAGNOSIS:  Chronic Gastric Fistula          POSTOPERATIVE DIAGNOSIS:  Same.           OPERATIONS:  Laparoscopic exploration / partial gastrectomy - takedown of fistula            Anesthesia:  General endotracheal anesthesia.      Estimated blood loss:  Less than 50 cc.      Blood administered:  None.      Lap and instrument counts correct x 2 at the end of the case.             INDICATIONS/SIGNIFICANT HISTORY:  The patient is a 57 y.o. year old male who previously had a PEG placed and developed a chronic draining gastric fistula.  Once the patient was cleared medically and attended pre-operative classes, the risks and benefits of surgery were discussed with the patient.  The patient voiced understanding of risks and benefits and elected to proceed with surgery.           PROCEDURE IN DETAIL:  Once informed consents were obtained, the patient was taken to the operating room and placed supine on the operating table.  After general endotracheal anesthesia was induced, the abdomen was prepped and draped in a standard surgical fashion.  A left paramedian incision was made with a scalpel, upon which the 11 mm Visiport trocar was used to enter the abdominal cavity.  A pneumoperitoneum was then created with insufflation.  After adequate insufflation was obtained, two additional trocar ports were placed in the right upper quadrant (12mm and 5mm).  Attention was then redirected to the gastric tissue.  The gastric fistula was easily visualized and surrounding adhesions were taken down with sharp dissection.  The fistula was removed with a partial gastrectomy with a blue load endo-marielle stapler.  The staple line appeared secure and intact.  The epithelialized remaining tissue tract in the abdominal wall was removed with the  cautery.   The pneumoperitoneum was then evacuated and all ports were removed with no active bleeding noted.  The skin was then closed with a 4-0 Vicryl suture in interrupted fashion.  The skin was cleansed and dried, and a dry sterile dressing was placed on the wound.  Lap and instrument counts were correct x 2 at the end of the case.  The patient tolerated the procedure well and was transported to the recovery room in good condition.

## 2023-06-28 NOTE — PROGRESS NOTES
HEMATOLOGY/ONCOLOGY OFFICE CLINIC VISIT    Visit Information:     Initial Evaluation: 7/21/2022  Referring Provider:   Other providers: Dr Gerard, Dr Emory Alonso  Code status:     Diagnosis/Problem list:  1) pT4apN1-Stage JENNYFER Laryngeal Squamous cell carcinoma   2) Lung masses x 2, bilateral--> Squamous cell -- Metastatic SqCC lung cancer vs metastatic SqCC to the lungs from H&N  3) Mediastinal and Hilar LAD     Present treatment:  RT +  chemotherapy    Treatment/Oncology history:  --7/11/2022: total laryngectomy  --Carbo/Taxol + RT (8/17/22-9/21/22)  --8/17/2022-9/28/2022:  6000 cGy, 200 cGy/fraction, 30/30 fx  --Carbo/Taxol/Keytruda planned 11/30/2022-3/2/2023  --Started maintenance Keytruda every 6 weeks on 3/23/2023    Plan of care:     Imaging:  Ct neck 6/6/2022: A metastatic right level III cervical lymph node is present with short axis measurement of 1.6 cm.  This lymph node contains internal cystic change, typical of metastatic squamous cell carcinoma.  A left level III cervical lymph node shows short axis measurements of 1 cm, and is suspicious.    A left supraglottic mass measures approximately 2.4 cm in diameter (axial post-contrast image 39), presumably corresponding to the primary neoplasm.  Metastatic lymph nodes are also present in the inferior right paratracheal position, measuring 1.3 cm in short axis.  Metastatic lymph nodes are present in the superior right hilum, measuring 1.8 cm in short axis.  Ct H&N 6/27/2022: 1. There is mild stenosis at the origin of left proximal internal carotid artery secondary to dense calcified atheromatous plaque. 2. There is consolidation is right upper lobe. Additional ground-glass opacities are also seen on the remainder of the visualized lungs. These findings are consistent with an infectious process. Small right pleural effusion is seen. There is an ill-defined soft tissue mass in the right perihilar region which measures approximately 3.1 x 2.9 x 4.6 cm, of  concern for neoplasm. Correlate clinically as regards further evaluation and followup with CT chest. There is an ill-defined enhancing soft tissue mass in the glottis infiltrating into the paraglottic spaces and the subglottic region with obliteration of the airway, of concern for a neoplasm. 3. Unremarkable CT angiogram of the head. Details and other findings as described above.  NM PET CT 8/1/2022: postoperative changes of recent total laryngectomy and lou dissection. bilateral hypermetabolic cervical lymphadenopathy.  A right cervical lymph node 1.7 x 1.8 cm, 1.6 x 1.5 cm previously, SUV of 7.7.  A left cervical lymph node 8 x 10 mm SUV of 3.4. A fluid collection posterior to the left internal jugular vein measures 2.4 x 2.4 cm.  There is right hilar and mediastinal lymphadenopathy.  A precarinal lymph node measures 1.6 x 2.3 cm, compared to 1.3 x 1.7 cm previously, and has a max SUV of 13.8.  Right hilar lymphadenopathy measures approximately 3 x 2.9 cm, compared to 2.1 x 2.3 cm previously, and has a max SUV of 9.8.  A suspected right hilar mass measures 1.8 x 3.6 cm and has a max SUV of 14.8. no hypermetabolic lymphadenopathy in the abdomen or pelvis.  There is physiologic uptake in the solid organs, urinary system and bowel.  PET CT 10/13/2022: Ill-defined hypermetabolic tissue is again appreciated at the left neck, just superior and lateral to the tracheostomy insertion site. The regional maximum SUV is 6.8, with discrete CT-correlate lesion poorly delineated secondary to non-contrast protocol; prior SUV max 8.3. An adjacent, better delineated right cervical chain lymph node is visualized posteriorly, measuring 1.1 cm short axis with SUV max of 6; this previously measured 1.5 cm in least dimension with maximum SUV of 7.7.  No convincing new or enlarging cervical adenopathy or newly hypermetabolic lymph nodes are visualized.  The prior left neck fluid collection is no longer evident.The somewhat lobulated  right upper lobe hilar mass is now approximately 3.4 cm x 1.4 cm and maximum SUV 23; previously 3.6 cm x 1.8 cm and SUV max 14.8.  No new or enlarging hypermetabolic lung lesion, and no development of organized airspace consolidation or pleural effusion.  PET CT 3/16/2023:  1. Largely improved disease though there is single paratracheal lymph node which has increased in size and is now FDG avid.   2. Some inguinal and external iliac lymph nodes demonstrate increased FDG activity today, this is favored reactive.  CV Ultrasound doppler venous arm right 3/22/2023: Positive acute deep vein thrombosis identified in the subclavian, axillary, and brachial veins of the right upper extremity. Superficial vein thrombosis identified in the basilic vein of the right upper extremity. All other veins were compressible.  PET CT 6/9/2023:  No FDG avid pulmonary lesions are present. Mediastinal and hilar lymphadenopathy has markedly increased compared to the prior study.  Reference right hilar conglomerate measures approximately 2.6 x 4.1 cm on image 94 with a maximum SUV of 19.0.  This previously measured 1.0 cm and had a maximum SUV of 16.0. Physiologic uptake is in the liver, spleen, urinary system and bowel. No enlarged or FDG avid lymph nodes are in the abdomen or pelvis. Adrenal glands are normal. No FDG avid osseous lesions are present. Impression: Disease progression with worsening mediastinal and right hilar FDG avid lymphadenopathy.       Pathology:  6/14/2022:  EBUS ENDOBRONCHIAL MASS RUL, CYTOLOGY: NEARLY ACELLULAR SPECIMEN CONSISTING OF FRESH BLOOD CLOT. NO ATYPICAL OR MALIGNANT CELLS IDENTIFIED.   7/11/2022: Bronchial Wash, RLL, right bronchial mass washings:  - Squamous cell carcinoma.                      Bronchial Wash, LLL, left bronchial mass washings:     - Squamous cell carcinoma.     7/11/2022 Laryngectomy:  1. Larynx, laryngeal biopsy frozen section : - Squamous cell carcinoma with necrosis.    2. Neck,  Anterior, left neck level 3: - Number of lymph nodes involved by carcinoma:  1/7       - Size of metastatic deposit:  2.5 mm - Extranodal extension:  Not identified      3. Larynx, resection without nodes, total layrngectomy :  - Squamous cell carcinoma.  4. Neck, Anterior, left neck level 4: - Number of lymph nodes involved by carcinoma:  0/10  5. Neck, Anterior, right neck level 4:  - Number of lymph nodes involved by carcinoma:  0/11  6. Neck, Anterior, right neck level 3: - Number of lymph nodes involved by carcinoma:  0/9  7. Nasophaynx, inferior pharyngeal mucosa : - Benign squamous mucosa. - No evidence of malignancy.     8. Cartilage, superior cartilage margin : - No evidence of malignancy.    9. Lymph Node, pretracheal lymph  node :  - Number of lymph nodes involved by carcinoma:  0/3  [id number]    1/11/2023:   Right tracheostomal biopsy:  - Squamous mucosa with epithelial erosion and acute inflammation.  - Lamina propria with reactive fibroblasts and fibrosis, consistent with radiation changes, see comment.  - No evidence of malignancy.    CLINICAL HISTORY:       Patient: Josafat Boudreaux is a 57 y.o. male.kindly refer her for laryngeal carcinoma.  Patient  was referred to ENT for further evaluation of hoarseness.  He did not have any difficulty swallowing or dry mouth at the time.  Hoarseness has been present for at least 4 months prior to evaluation. 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      Patient was scheduled to trach but on 06/10/2022 he was brought to the emergency room via air met with is short of breath.  He was found to have and oxygenation on the 40s when EMS was called.  He was placed on BiPAP but pCO2 increased so he has an emergent tracheostomy performed.  He has a PEG tube placed same hospitalization.  He underwent bronchoscopy with biopsy of the right upper lobe endobronchial lesion on 6/14/2022 cytology was negative  for malignant cells.       On 7/11/2022 he underwent LARYNGECTOMY, WITH RADICAL NECK DISSECTION - Bilateral LARYNGOSCOPY, DIRECT, DIAGNOSTIC, WITH BRONCHOSCOPY AND ESOPHAGOSCOPY pathology report as above.  1/40 lymph nodes were involved with metastatic disease.  Bronchoscopy was repeated and biopsy of the left lower lobe and right lower lobe were both positive for squamous cell carcinoma.     He is here today with his wife.  He has recovered from surgery relatively well.  Tracheostomy in place.  He is unable to talk so the history was taken by electronic medical record and wife.     Patient has a brother with history of throat cancer. Patient used to smoke 1 and half pack per day since he was 60 years old.  He was smoking less recently.      Chief Complaint: OTHER (No concerns today.)      Interval History:  Previous visit: discuss PET CT results. He completed 6th cycle of weekly Carbo/Taxol and radiation on 9/21/22 for his Laryngeal Squamous cell carcinoma . He completed 4th cycle of every 3 weeks of Carbo/Taxol/Keytruda on 3/2/2023.   On 3/22/23, NIVA study was positive for DVT to right upper extremity, he was started on Eliquis 5 mg BID. Tolerating well with no side effects. PEG tube was removed on 3/23/23. He underwent procedure on 5/18/23 with Dr. De Leon to correct the leakage. Site is all healed.   On 5/3/23 TSH was 7.451. He was called and instructed to begin taking Synthroid 50 mcg daily. TSH today 2.276.    Patient responded great to treatment and was started on maintenance Keytruda but Most recent PET-CT showed resolution of pulmonary masses no evidence of disease in the head/neck or distant metastasis but progression Of mediastinal/ hilar lymphadenopathy. This is the only site of residual disease. In view of this findings he was referred to radiation oncologist for possible concomitant chemoradiation.  Was seen by Dr. Das  and he has the marking is ready to start radiation.  He is here today with his  wife and voices no concerns.  He feels good.  He feels that his lungs are stronger.  He is eating well.  No fever, chills, sweats.  No chest pain or shortness of breath.    Past Medical History:   Diagnosis Date    COPD (chronic obstructive pulmonary disease)     Dysphagia     GERD (gastroesophageal reflux disease)     laryngeal Cancer     Lung cancer     Vocal cord mass       Past Surgical History:   Procedure Laterality Date    DIAGNOSTIC LAPAROSCOPY N/A 5/18/2023    Procedure: LAPAROSCOPY, DIAGNOSTIC;  Surgeon: Dipak De Leon Jr., MD;  Location: [facility];  Service: General;  Laterality: N/A;    DIRECT DIAGNOSTIC LARYNGOSCOPY WITH BRONCHOSCOPY AND ESOPHAGOSCOPY N/A 07/11/2022    Procedure: LARYNGOSCOPY, DIRECT, DIAGNOSTIC, WITH BRONCHOSCOPY AND ESOPHAGOSCOPY;  Surgeon: Emory Alonso MD;  Location: [facility];  Service: ENT;  Laterality: N/A;    ESOPHAGOGASTRODUODENOSCOPY (EGD)- DEVICE ASSISTED N/A 04/18/2023    Procedure: EGD;  Surgeon: Aaliyah Conrad III, MD;  Location: [facility] ENDOSCOPY;  Service: Gastroenterology;  Laterality: N/A;  Esophageal Dilation; Savory over Guide wire: 36Fr and 39 Fr  Clips to secure peg site    HIP SURGERY      HUMERUS FRACTURE SURGERY      INCISION AND DRAINAGE, NECK Bilateral 10/21/2022    Procedure: INCISION AND DRAINAGE,NECK;  Surgeon: Madison Worley MD;  Location: [facility];  Service: ENT;  Laterality: Bilateral;    INSERT ARTERIAL LINE  06/26/2022         JOINT REPLACEMENT  2019    R hip    PEG TUBE REMOVAL      PLACEMENT, MEDIPORT      TRACHEOSTOMY N/A 06/10/2022    Procedure: CREATION, TRACHEOSTOMY;  Surgeon: Junior Alonso MD;  Location: [facility];  Service: ENT;  Laterality: N/A;     Family History   Problem Relation Age of Onset    Lung cancer Father     Cancer Father         Lung cancer    Throat cancer Brother     Cancer Brother         Throat     Social Connections: Moderately Isolated    Frequency of Communication with Friends and Family: More than three times  a week    Frequency of Social Gatherings with Friends and Family: More than three times a week    Attends Confucianist Services: Never    Active Member of Clubs or Organizations: No    Attends Club or Organization Meetings: Never    Marital Status:        Review of patient's allergies indicates:  No Known Allergies   Current Outpatient Medications on File Prior to Visit   Medication Sig Dispense Refill    albuterol-ipratropium (DUO-NEB) 2.5 mg-0.5 mg/3 mL nebulizer solution Take 3 mLs by nebulization every 4 (four) hours as needed for Shortness of Breath or Wheezing. Rescue 90 mL 11    apixaban (ELIQUIS) 5 mg Tab Take 1 tablet (5 mg total) by mouth As instructed. 60 tablet 6    diphenhydrAMINE (BENADRYL) 25 mg capsule 1 capsule (25 mg total) by Per G Tube route every 6 (six) hours as needed for Itching. 90 capsule 1    famotidine (PEPCID) 20 MG tablet Take 1 tablet (20 mg total) by mouth every evening. 30 tablet 11    glutamine 10 gram PwPk Take 10 g by mouth 2 (two) times a day.      hydrocodone-acetaminophen (HYCET) solution 7.5-325 mg/15mL Take 15 mLs by mouth every 6 (six) hours as needed for Pain. 300 mL 0    levothyroxine (SYNTHROID) 50 MCG tablet Take 1 tablet (50 mcg total) by mouth once daily. 30 tablet 11    metroNIDAZOLE (METROGEL) 0.75 % (37.5mg/5 gram) vaginal gel apply to affected area BID      ondansetron (ZOFRAN-ODT) 8 MG TbDL Take 8 mg by mouth every 8 (eight) hours as needed for Nausea. Tab 1 ODT in AM for 2 days after chemotherapy      pantoprazole (PROTONIX) 20 MG tablet Take 1 tablet (20 mg total) by mouth once daily. 30 tablet 11    [DISCONTINUED] aspirin (ECOTRIN) 81 MG EC tablet Take 81 mg by mouth once daily.       Current Facility-Administered Medications on File Prior to Visit   Medication Dose Route Frequency Provider Last Rate Last Admin    LIDOcaine (PF) 40 mg/mL (4 %) injection 2 mL  2 mL Tracheal Tube 1 time in Clinic/HOD Kevin Palomino MD        LIDOcaine HCL 4% external  "solution 4 mL  4 mL Topical (Top) 1 time in Clinic/HOD Marty Jeffries MD        phenylephrine HCL 1% nasal spray 1 spray  1 spray Each Nostril 1 time in Clinic/HOD Kevin Palomino MD          Review of Systems   Constitutional:  Positive for fatigue. Negative for activity change, appetite change, chills, diaphoresis, fever and unexpected weight change.   HENT:  Negative for nasal congestion, mouth sores, nosebleeds, sinus pressure/congestion, sore throat and trouble swallowing.    Eyes: Negative.    Respiratory:  Positive for cough. Negative for shortness of breath.    Cardiovascular:  Negative for chest pain and palpitations.   Gastrointestinal:  Negative for abdominal distention, abdominal pain, blood in stool, change in bowel habit, constipation, diarrhea, nausea, vomiting and change in bowel habit.        Dysphagia   Endocrine: Negative.    Genitourinary:  Negative for bladder incontinence, decreased urine volume, difficulty urinating, dysuria, frequency, hematuria and urgency.   Musculoskeletal:  Negative for arthralgias, back pain, gait problem, joint swelling, leg pain and myalgias.   Integumentary:  Negative for rash.   Allergic/Immunologic: Negative.    Neurological:  Negative for dizziness, tremors, syncope, weakness, light-headedness, numbness, headaches and memory loss.   Hematological:  Negative for adenopathy. Does not bruise/bleed easily.   Psychiatric/Behavioral:  Negative for agitation, confusion, hallucinations, sleep disturbance and suicidal ideas. The patient is not nervous/anxious.             Vitals:    06/28/23 1526   BP: 113/82   BP Location: Right arm   Patient Position: Sitting   Pulse: (!) 115   Temp: 98.5 °F (36.9 °C)   TempSrc: Oral   SpO2: 95%   Weight: 64 kg (141 lb 3.2 oz)   Height: 5' 9" (1.753 m)              Physical Exam  Vitals and nursing note reviewed.   Constitutional:       General: He is not in acute distress.     Appearance: Normal appearance.   HENT:      Head: " Normocephalic and atraumatic.      Mouth/Throat:      Mouth: Mucous membranes are moist.   Eyes:      General: No scleral icterus.     Extraocular Movements: Extraocular movements intact.      Conjunctiva/sclera: Conjunctivae normal.   Neck:      Vascular: No JVD.      Trachea: Tracheostomy present.      Comments: Tracheotomy in place.  Right side - Skin changes c/w RT     Cardiovascular:      Rate and Rhythm: Normal rate and regular rhythm.      Heart sounds: No murmur heard.  Pulmonary:      Effort: Pulmonary effort is normal.      Breath sounds: Normal breath sounds. No wheezing or rhonchi.   Abdominal:      General: The ostomy site is clean. Bowel sounds are normal. There is no distension.      Palpations: Abdomen is soft.      Tenderness: There is no abdominal tenderness.   Musculoskeletal:         General: No swelling or deformity.      Right upper arm: Swelling and tenderness present.      Cervical back: Neck supple.      Comments: Erythema of right upper extremity to touch and tender   Lymphadenopathy:      Head:      Right side of head: No submandibular adenopathy.      Left side of head: No submandibular adenopathy.      Cervical: No cervical adenopathy.      Upper Body:      Right upper body: No supraclavicular or axillary adenopathy.      Left upper body: No supraclavicular or axillary adenopathy.      Lower Body: No right inguinal adenopathy. No left inguinal adenopathy.   Skin:     General: Skin is warm.      Coloration: Skin is not jaundiced.      Findings: No rash.   Neurological:      General: No focal deficit present.      Mental Status: He is alert and oriented to person, place, and time.      Cranial Nerves: Cranial nerves 2-12 are intact.   Psychiatric:         Attention and Perception: Attention normal.         Behavior: Behavior is cooperative.     ECOG SCORE             Laboratory:  CBC with Differential:  Lab Results   Component Value Date    WBC 7.85 06/14/2023    RBC 4.92 06/14/2023    HGB  16.2 06/14/2023    HCT 50.2 06/14/2023    .0 (H) 06/14/2023    MCH 32.9 (H) 06/14/2023    MCHC 32.3 (L) 06/14/2023    RDW 12.7 06/14/2023     06/14/2023    MPV 9.8 06/14/2023        CMP:  Sodium Level   Date Value Ref Range Status   06/14/2023 134 (L) 136 - 145 mmol/L Final     Potassium Level   Date Value Ref Range Status   06/14/2023 4.4 3.5 - 5.1 mmol/L Final     Carbon Dioxide   Date Value Ref Range Status   06/14/2023 31 (H) 22 - 29 mmol/L Final     Blood Urea Nitrogen   Date Value Ref Range Status   06/14/2023 5.0 (L) 8.4 - 25.7 mg/dL Final     Creatinine   Date Value Ref Range Status   06/14/2023 0.84 0.73 - 1.18 mg/dL Final     Calcium Level Total   Date Value Ref Range Status   06/14/2023 9.1 8.4 - 10.2 mg/dL Final     Albumin Level   Date Value Ref Range Status   06/14/2023 3.3 (L) 3.5 - 5.0 g/dL Final     Bilirubin Total   Date Value Ref Range Status   06/14/2023 0.4 <=1.5 mg/dL Final     Alkaline Phosphatase   Date Value Ref Range Status   06/14/2023 112 40 - 150 unit/L Final     Aspartate Aminotransferase   Date Value Ref Range Status   06/14/2023 19 5 - 34 unit/L Final     Alanine Aminotransferase   Date Value Ref Range Status   06/14/2023 9 0 - 55 unit/L Final     Estimated GFR-Non    Date Value Ref Range Status   08/02/2022 >60 mls/min/1.73/m2 Final         Assessment:     No diagnosis found.    1) pT4apN1-Stage JENNYFER Laryngeal Squamous cell carcinoma   --Laryngeal Squamous cell carcinoma -s/p total laryngectomy, 1/40 LN with metastatic disease.   2) Lung masses x 2, bilateral--> Squamous cell -- Metastatic SqCC lung cancer vs metastatic SqCC to the lungs from H&N  3) Mediastinal and Hilar LAD        Plan:       Completed weekly carbo/Taxol and radiation on 9/21/22 for his laryngeal carcinoma. Completed 4 cycles of Carbo/Taxol/Keytruda on 3/2/23. Started maintenance Keytruda every 6 weeks on 3/23/2023.  Patient responded great to treatment and was started on maintenance  Keytruda but Most recent PET-CT showed resolution of pulmonary masses no evidence of disease in the head/neck or distant metastasis but progression Of mediastinal/ hilar lymphadenopathy. This is the only site with evidence of disease.  In view of this findings he was referred to radiation oncologist for possible concomitant chemoradiation hoping that this can take care of residual disease.  Was seen by Dr. Das  and he has the marking and is ready to start radiation.    I discussed with him that because of the shortage of Carboplatinum and cisplatin we may or may not be able to use platinum as a radiosensitizer if this isn't option for him.  I think that because of minimal residual disease and resolution of lung masses be will benefit of Carbo/taxol + RT. Next step would be infusional 5FU vs Gemzar.      Okay to proceed with RT  Will start Chemo next week  Will add IVF's as this helped him while he was on chemo  in the past  Continue Eliquis 5 mg BID  Continue Synthroid 50 mcg daily    RTC in 1 weeks with MD with labs and TD  Will send tissue for molecular testing vs liquid biopsy for molecular testing  PET CT every 3 months, due 9/2023 - will order when closer--if insurance does not cover, will change to CT C/A/P  Recommend Vitamin B Complex to help with numbness in fingertips, instructed to notify me if pain begins, at that time I will prescribe Gabapentin    Encourage to call or message us for any questions or problems  The patient was given ample opportunity to ask questions, and to the best of my abilities, all questions answered to satisfaction; patient demonstrated understanding of what we discussed and agreeable to the plan.     LENKA NELSON MD

## 2023-07-05 NOTE — PROGRESS NOTES
HEMATOLOGY/ONCOLOGY OFFICE CLINIC VISIT    Visit Information:     Initial Evaluation: 7/21/2022  Referring Provider:   Other providers: Dr Gerard, Dr Emory Alonso  Code status:     Diagnosis/Problem list:  1) pT4apN1-Stage JENNYFER Laryngeal Squamous cell carcinoma   2) Lung masses x 2, bilateral--> Squamous cell -- Metastatic SqCC lung cancer vs metastatic SqCC to the lungs from H&N  3) Mediastinal and Hilar LAD     Present treatment:  RT +  chemotherapy    Treatment/Oncology history:  --7/11/2022: total laryngectomy  --Carbo/Taxol + RT (8/17/22-9/21/22)  --8/17/2022-9/28/2022:  6000 cGy, 200 cGy/fraction, 30/30 fx  --Carbo/Taxol/Keytruda planned 11/30/2022-3/2/2023  --Started maintenance Keytruda every 6 weeks on 3/23/2023    Plan of care:     Imaging:  Ct neck 6/6/2022: A metastatic right level III cervical lymph node is present with short axis measurement of 1.6 cm.  This lymph node contains internal cystic change, typical of metastatic squamous cell carcinoma.  A left level III cervical lymph node shows short axis measurements of 1 cm, and is suspicious.    A left supraglottic mass measures approximately 2.4 cm in diameter (axial post-contrast image 39), presumably corresponding to the primary neoplasm.  Metastatic lymph nodes are also present in the inferior right paratracheal position, measuring 1.3 cm in short axis.  Metastatic lymph nodes are present in the superior right hilum, measuring 1.8 cm in short axis.  Ct H&N 6/27/2022: 1. There is mild stenosis at the origin of left proximal internal carotid artery secondary to dense calcified atheromatous plaque. 2. There is consolidation is right upper lobe. Additional ground-glass opacities are also seen on the remainder of the visualized lungs. These findings are consistent with an infectious process. Small right pleural effusion is seen. There is an ill-defined soft tissue mass in the right perihilar region which measures approximately 3.1 x 2.9 x 4.6 cm, of  concern for neoplasm. Correlate clinically as regards further evaluation and followup with CT chest. There is an ill-defined enhancing soft tissue mass in the glottis infiltrating into the paraglottic spaces and the subglottic region with obliteration of the airway, of concern for a neoplasm. 3. Unremarkable CT angiogram of the head. Details and other findings as described above.  NM PET CT 8/1/2022: postoperative changes of recent total laryngectomy and lou dissection. bilateral hypermetabolic cervical lymphadenopathy.  A right cervical lymph node 1.7 x 1.8 cm, 1.6 x 1.5 cm previously, SUV of 7.7.  A left cervical lymph node 8 x 10 mm SUV of 3.4. A fluid collection posterior to the left internal jugular vein measures 2.4 x 2.4 cm.  There is right hilar and mediastinal lymphadenopathy.  A precarinal lymph node measures 1.6 x 2.3 cm, compared to 1.3 x 1.7 cm previously, and has a max SUV of 13.8.  Right hilar lymphadenopathy measures approximately 3 x 2.9 cm, compared to 2.1 x 2.3 cm previously, and has a max SUV of 9.8.  A suspected right hilar mass measures 1.8 x 3.6 cm and has a max SUV of 14.8. no hypermetabolic lymphadenopathy in the abdomen or pelvis.  There is physiologic uptake in the solid organs, urinary system and bowel.  PET CT 10/13/2022: Ill-defined hypermetabolic tissue is again appreciated at the left neck, just superior and lateral to the tracheostomy insertion site. The regional maximum SUV is 6.8, with discrete CT-correlate lesion poorly delineated secondary to non-contrast protocol; prior SUV max 8.3. An adjacent, better delineated right cervical chain lymph node is visualized posteriorly, measuring 1.1 cm short axis with SUV max of 6; this previously measured 1.5 cm in least dimension with maximum SUV of 7.7.  No convincing new or enlarging cervical adenopathy or newly hypermetabolic lymph nodes are visualized.  The prior left neck fluid collection is no longer evident.The somewhat lobulated  right upper lobe hilar mass is now approximately 3.4 cm x 1.4 cm and maximum SUV 23; previously 3.6 cm x 1.8 cm and SUV max 14.8.  No new or enlarging hypermetabolic lung lesion, and no development of organized airspace consolidation or pleural effusion.  PET CT 3/16/2023:  1. Largely improved disease though there is single paratracheal lymph node which has increased in size and is now FDG avid.   2. Some inguinal and external iliac lymph nodes demonstrate increased FDG activity today, this is favored reactive.  CV Ultrasound doppler venous arm right 3/22/2023: Positive acute deep vein thrombosis identified in the subclavian, axillary, and brachial veins of the right upper extremity. Superficial vein thrombosis identified in the basilic vein of the right upper extremity. All other veins were compressible.  PET CT 6/9/2023:  No FDG avid pulmonary lesions are present. Mediastinal and hilar lymphadenopathy has markedly increased compared to the prior study.  Reference right hilar conglomerate measures approximately 2.6 x 4.1 cm on image 94 with a maximum SUV of 19.0.  This previously measured 1.0 cm and had a maximum SUV of 16.0. Physiologic uptake is in the liver, spleen, urinary system and bowel. No enlarged or FDG avid lymph nodes are in the abdomen or pelvis. Adrenal glands are normal. No FDG avid osseous lesions are present. Impression: Disease progression with worsening mediastinal and right hilar FDG avid lymphadenopathy.       Pathology:  6/14/2022:  EBUS ENDOBRONCHIAL MASS RUL, CYTOLOGY: NEARLY ACELLULAR SPECIMEN CONSISTING OF FRESH BLOOD CLOT. NO ATYPICAL OR MALIGNANT CELLS IDENTIFIED.   7/11/2022: Bronchial Wash, RLL, right bronchial mass washings:  - Squamous cell carcinoma.                      Bronchial Wash, LLL, left bronchial mass washings:     - Squamous cell carcinoma.     7/11/2022 Laryngectomy:  1. Larynx, laryngeal biopsy frozen section : - Squamous cell carcinoma with necrosis.    2. Neck,  Anterior, left neck level 3: - Number of lymph nodes involved by carcinoma:  1/7       - Size of metastatic deposit:  2.5 mm - Extranodal extension:  Not identified      3. Larynx, resection without nodes, total layrngectomy :  - Squamous cell carcinoma.  4. Neck, Anterior, left neck level 4: - Number of lymph nodes involved by carcinoma:  0/10  5. Neck, Anterior, right neck level 4:  - Number of lymph nodes involved by carcinoma:  0/11  6. Neck, Anterior, right neck level 3: - Number of lymph nodes involved by carcinoma:  0/9  7. Nasophaynx, inferior pharyngeal mucosa : - Benign squamous mucosa. - No evidence of malignancy.     8. Cartilage, superior cartilage margin : - No evidence of malignancy.    9. Lymph Node, pretracheal lymph  node :  - Number of lymph nodes involved by carcinoma:  0/3  [id number]    1/11/2023:   Right tracheostomal biopsy:  - Squamous mucosa with epithelial erosion and acute inflammation.  - Lamina propria with reactive fibroblasts and fibrosis, consistent with radiation changes, see comment.  - No evidence of malignancy.    CLINICAL HISTORY:       Patient: Josafat Boudreaux is a 57 y.o. male.kindly refer her for laryngeal carcinoma.  Patient  was referred to ENT for further evaluation of hoarseness.  He did not have any difficulty swallowing or dry mouth at the time.  Hoarseness has been present for at least 4 months prior to evaluation. 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      Patient was scheduled to trach but on 06/10/2022 he was brought to the emergency room via air met with is short of breath.  He was found to have and oxygenation on the 40s when EMS was called.  He was placed on BiPAP but pCO2 increased so he has an emergent tracheostomy performed.  He has a PEG tube placed same hospitalization.  He underwent bronchoscopy with biopsy of the right upper lobe endobronchial lesion on 6/14/2022 cytology was negative  for malignant cells.       On 7/11/2022 he underwent LARYNGECTOMY, WITH RADICAL NECK DISSECTION - Bilateral LARYNGOSCOPY, DIRECT, DIAGNOSTIC, WITH BRONCHOSCOPY AND ESOPHAGOSCOPY pathology report as above.  1/40 lymph nodes were involved with metastatic disease.  Bronchoscopy was repeated and biopsy of the left lower lobe and right lower lobe were both positive for squamous cell carcinoma.     He is here today with his wife.  He has recovered from surgery relatively well.  Tracheostomy in place.  He is unable to talk so the history was taken by electronic medical record and wife.     Patient has a brother with history of throat cancer. Patient used to smoke 1 and half pack per day since he was 60 years old.  He was smoking less recently.      Chief Complaint: Other Misc (Pt reports no new concerns today.)      Interval History:  Previous visit: discuss PET CT results. He completed 6th cycle of weekly Carbo/Taxol and radiation on 9/21/22 for his Laryngeal Squamous cell carcinoma . He completed 4th cycle of every 3 weeks of Carbo/Taxol/Keytruda on 3/2/2023.   On 3/22/23, NIVA study was positive for DVT to right upper extremity, he was started on Eliquis 5 mg BID. Tolerating well with no side effects. PEG tube was removed on 3/23/23. He underwent procedure on 5/18/23 with Dr. De Leon to correct the leakage. Site is all healed.   On 5/3/23 TSH was 7.451. He was called and instructed to begin taking Synthroid 50 mcg daily. TSH today 2.276.    Patient presents today for follow up prior to starting Carbo/Taxol + XRT, he is scheduled to begin both tomorrow. He is with his wife and voices no concerns.  He feels good and ready to start treatment.  He is eating well.  No fever, chills, sweats.  No chest pain or shortness of breath.      Past Medical History:   Diagnosis Date    COPD (chronic obstructive pulmonary disease)     Dysphagia     GERD (gastroesophageal reflux disease)     laryngeal Cancer     Lung cancer     Vocal  cord mass       Past Surgical History:   Procedure Laterality Date    DIAGNOSTIC LAPAROSCOPY N/A 5/18/2023    Procedure: LAPAROSCOPY, DIAGNOSTIC;  Surgeon: Dipak De Leon Jr., MD;  Location: [facility] OR;  Service: General;  Laterality: N/A;    DIRECT DIAGNOSTIC LARYNGOSCOPY WITH BRONCHOSCOPY AND ESOPHAGOSCOPY N/A 07/11/2022    Procedure: LARYNGOSCOPY, DIRECT, DIAGNOSTIC, WITH BRONCHOSCOPY AND ESOPHAGOSCOPY;  Surgeon: Emory Alonso MD;  Location: [facility] OR;  Service: ENT;  Laterality: N/A;    ESOPHAGOGASTRODUODENOSCOPY (EGD)- DEVICE ASSISTED N/A 04/18/2023    Procedure: EGD;  Surgeon: Aaliyah Conrad III, MD;  Location: [facility] ENDOSCOPY;  Service: Gastroenterology;  Laterality: N/A;  Esophageal Dilation; Savory over Guide wire: 36Fr and 39 Fr  Clips to secure peg site    HIP SURGERY      HUMERUS FRACTURE SURGERY      INCISION AND DRAINAGE, NECK Bilateral 10/21/2022    Procedure: INCISION AND DRAINAGE,NECK;  Surgeon: Madison Worley MD;  Location: [facility] OR;  Service: ENT;  Laterality: Bilateral;    INSERT ARTERIAL LINE  06/26/2022         JOINT REPLACEMENT  2019    R hip    PEG TUBE REMOVAL      PLACEMENT, MEDIPORT      TRACHEOSTOMY N/A 06/10/2022    Procedure: CREATION, TRACHEOSTOMY;  Surgeon: Junior Alonso MD;  Location: [facility];  Service: ENT;  Laterality: N/A;     Family History   Problem Relation Age of Onset    Lung cancer Father     Cancer Father         Lung cancer    Throat cancer Brother     Cancer Brother         Throat     Social Connections: Moderately Isolated    Frequency of Communication with Friends and Family: More than three times a week    Frequency of Social Gatherings with Friends and Family: More than three times a week    Attends [employer] Services: Never    Active Member of Clubs or Organizations: No    Attends Club or Organization Meetings: Never    Marital Status:        Review of patient's allergies indicates:  No Known Allergies   Current Outpatient Medications on File  Prior to Visit   Medication Sig Dispense Refill    albuterol-ipratropium (DUO-NEB) 2.5 mg-0.5 mg/3 mL nebulizer solution Take 3 mLs by nebulization every 4 (four) hours as needed for Shortness of Breath or Wheezing. Rescue 90 mL 11    apixaban (ELIQUIS) 5 mg Tab Take 1 tablet (5 mg total) by mouth As instructed. 60 tablet 6    diphenhydrAMINE (BENADRYL) 25 mg capsule 1 capsule (25 mg total) by Per G Tube route every 6 (six) hours as needed for Itching. 90 capsule 1    famotidine (PEPCID) 20 MG tablet Take 1 tablet (20 mg total) by mouth every evening. 30 tablet 11    levothyroxine (SYNTHROID) 50 MCG tablet Take 1 tablet (50 mcg total) by mouth once daily. 30 tablet 11    pantoprazole (PROTONIX) 20 MG tablet Take 1 tablet (20 mg total) by mouth once daily. 30 tablet 11    glutamine 10 gram PwPk Take 10 g by mouth 2 (two) times a day.      hydrocodone-acetaminophen (HYCET) solution 7.5-325 mg/15mL Take 15 mLs by mouth every 6 (six) hours as needed for Pain. (Patient not taking: Reported on 7/5/2023) 300 mL 0    metroNIDAZOLE (METROGEL) 0.75 % (37.5mg/5 gram) vaginal gel apply to affected area BID      ondansetron (ZOFRAN-ODT) 8 MG TbDL Take 8 mg by mouth every 8 (eight) hours as needed for Nausea. Tab 1 ODT in AM for 2 days after chemotherapy      [DISCONTINUED] aspirin (ECOTRIN) 81 MG EC tablet Take 81 mg by mouth once daily.       Current Facility-Administered Medications on File Prior to Visit   Medication Dose Route Frequency Provider Last Rate Last Admin    LIDOcaine (PF) 40 mg/mL (4 %) injection 2 mL  2 mL Tracheal Tube 1 time in Clinic/HOD Kevin Palomino MD        LIDOcaine HCL 4% external solution 4 mL  4 mL Topical (Top) 1 time in Clinic/HOD Marty Jeffries MD        phenylephrine HCL 1% nasal spray 1 spray  1 spray Each Nostril 1 time in Clinic/HOD Kevin Palomino MD          Review of Systems   Constitutional:  Positive for fatigue. Negative for activity change, appetite change, chills, diaphoresis, fever  "and unexpected weight change.   HENT:  Negative for nasal congestion, mouth sores, nosebleeds, sinus pressure/congestion, sore throat and trouble swallowing.    Eyes: Negative.    Respiratory:  Positive for cough. Negative for shortness of breath.    Cardiovascular:  Negative for chest pain and palpitations.   Gastrointestinal:  Negative for abdominal distention, abdominal pain, blood in stool, change in bowel habit, constipation, diarrhea, nausea, vomiting and change in bowel habit.        Dysphagia   Endocrine: Negative.    Genitourinary:  Negative for bladder incontinence, decreased urine volume, difficulty urinating, dysuria, frequency, hematuria and urgency.   Musculoskeletal:  Negative for arthralgias, back pain, gait problem, joint swelling, leg pain and myalgias.   Integumentary:  Negative for rash.   Allergic/Immunologic: Negative.    Neurological:  Negative for dizziness, tremors, syncope, weakness, light-headedness, numbness, headaches and memory loss.   Hematological:  Negative for adenopathy. Does not bruise/bleed easily.   Psychiatric/Behavioral:  Negative for agitation, confusion, hallucinations, sleep disturbance and suicidal ideas. The patient is not nervous/anxious.             Vitals:    07/05/23 1007   BP: 111/68   BP Location: Right arm   Patient Position: Sitting   Pulse: 95   Resp: 14   Temp: 98.3 °F (36.8 °C)   TempSrc: Oral   SpO2: (!) 92%   Weight: 64.2 kg (141 lb 8 oz)   Height: 5' 9" (1.753 m)     Wt Readings from Last 6 Encounters:   07/05/23 64.2 kg (141 lb 8 oz)   06/28/23 64 kg (141 lb 3.2 oz)   06/15/23 63.5 kg (140 lb)   06/14/23 63.5 kg (140 lb)   06/14/23 63 kg (139 lb)   05/18/23 63 kg (138 lb 14.2 oz)     Body mass index is 20.9 kg/m².  Body surface area is 1.77 meters squared.       Physical Exam  Vitals and nursing note reviewed.   Constitutional:       General: He is not in acute distress.     Appearance: Normal appearance.   HENT:      Head: Normocephalic and atraumatic.     "  Mouth/Throat:      Mouth: Mucous membranes are moist.   Eyes:      General: No scleral icterus.     Extraocular Movements: Extraocular movements intact.      Conjunctiva/sclera: Conjunctivae normal.   Neck:      Vascular: No JVD.      Trachea: Tracheostomy present.      Comments: Tracheotomy in place.  Right side - Skin changes c/w RT     Cardiovascular:      Rate and Rhythm: Normal rate and regular rhythm.      Heart sounds: No murmur heard.  Pulmonary:      Effort: Pulmonary effort is normal.      Breath sounds: Normal breath sounds. No wheezing or rhonchi.   Abdominal:      General: The ostomy site is clean. Bowel sounds are normal. There is no distension.      Palpations: Abdomen is soft.      Tenderness: There is no abdominal tenderness.   Musculoskeletal:         General: No swelling or deformity.      Right upper arm: Swelling and tenderness present.      Cervical back: Neck supple.      Comments: Erythema of right upper extremity to touch and tender   Lymphadenopathy:      Head:      Right side of head: No submandibular adenopathy.      Left side of head: No submandibular adenopathy.      Cervical: No cervical adenopathy.      Upper Body:      Right upper body: No supraclavicular or axillary adenopathy.      Left upper body: No supraclavicular or axillary adenopathy.      Lower Body: No right inguinal adenopathy. No left inguinal adenopathy.   Skin:     General: Skin is warm.      Coloration: Skin is not jaundiced.      Findings: No rash.   Neurological:      General: No focal deficit present.      Mental Status: He is alert and oriented to person, place, and time.      Cranial Nerves: Cranial nerves 2-12 are intact.   Psychiatric:         Attention and Perception: Attention normal.         Behavior: Behavior is cooperative.     ECOG SCORE    0 - Fully active-able to carry on all pre-disease performance without restriction         Laboratory:  CBC with Differential:  Lab Results   Component Value Date     WBC 7.25 07/05/2023    RBC 5.01 07/05/2023    HGB 16.4 07/05/2023    HCT 50.2 07/05/2023    .2 (H) 07/05/2023    MCH 32.7 (H) 07/05/2023    MCHC 32.7 (L) 07/05/2023    RDW 13.2 07/05/2023     07/05/2023    MPV 8.8 07/05/2023        CMP:  Sodium Level   Date Value Ref Range Status   07/05/2023 134 (L) 136 - 145 mmol/L Final     Potassium Level   Date Value Ref Range Status   07/05/2023 5.3 (H) 3.5 - 5.1 mmol/L Final     Carbon Dioxide   Date Value Ref Range Status   07/05/2023 29 22 - 29 mmol/L Final     Blood Urea Nitrogen   Date Value Ref Range Status   07/05/2023 4.6 (L) 8.4 - 25.7 mg/dL Final     Creatinine   Date Value Ref Range Status   07/05/2023 0.85 0.73 - 1.18 mg/dL Final     Calcium Level Total   Date Value Ref Range Status   07/05/2023 9.6 8.4 - 10.2 mg/dL Final     Albumin Level   Date Value Ref Range Status   07/05/2023 3.3 (L) 3.5 - 5.0 g/dL Final     Bilirubin Total   Date Value Ref Range Status   07/05/2023 0.5 <=1.5 mg/dL Final     Alkaline Phosphatase   Date Value Ref Range Status   07/05/2023 110 40 - 150 unit/L Final     Aspartate Aminotransferase   Date Value Ref Range Status   07/05/2023 17 5 - 34 unit/L Final     Alanine Aminotransferase   Date Value Ref Range Status   07/05/2023 10 0 - 55 unit/L Final     Estimated GFR-Non    Date Value Ref Range Status   08/02/2022 >60 mls/min/1.73/m2 Final         Assessment:     1) pT4apN1-Stage JENNYFER Laryngeal Squamous cell carcinoma   --Laryngeal Squamous cell carcinoma -s/p total laryngectomy, 1/40 LN with metastatic disease.   2) Lung masses x 2, bilateral--> Squamous cell -- Metastatic SqCC lung cancer vs metastatic SqCC to the lungs from H&N  3) Mediastinal and Hilar LAD        Plan:       Completed weekly carbo/Taxol and radiation on 9/21/22 for his laryngeal carcinoma. Completed 4 cycles of Carbo/Taxol/Keytruda on 3/2/23. Started maintenance Keytruda every 6 weeks on 3/23/2023.  Patient responded great to treatment and  was started on maintenance Keytruda but Most recent PET-CT showed resolution of pulmonary masses no evidence of disease in the head/neck or distant metastasis but progression Of mediastinal/ hilar lymphadenopathy. This is the only site with evidence of disease.  In view of this findings he was referred to radiation oncologist for possible concomitant chemoradiation hoping that this can take care of residual disease.  Was seen by Dr. Das  and he has the marking and is ready to start radiation.    I discussed with him that because of the shortage of Carboplatinum and cisplatin we may or may not be able to use platinum as a radiosensitizer if this isn't option for him.  I think that because of minimal residual disease and resolution of lung masses be will benefit of Carbo/taxol + RT. Next step would be infusional 5FU vs Gemzar.    Educated the patient on the risks versus benefits as well as toxicities associated with treatment.  Verbally consented the patient to the treatment plan and the patient was educated on the planned duration of the treatment and schedule of the treatment administration.  All questions were answered.        Okay to proceed with XRT tomorrow  Okay to proceed with weekly C1 Carbo/Taxol tomorrow  Will add IVF's as this helped him while he was on chemo  in the past  Continue Eliquis 5 mg BID  Continue Synthroid 50 mcg daily  RTC in 1 week for lab and chemo (Thursday)  RTC in 2 weeks with MD for TD/lab, chemo next day  Weekly labs - standing order placed  PET CT every 3 months, due 9/2023 - will order when closer--if insurance does not cover, will change to CT C/A/P  Recommend Vitamin B Complex to help with numbness in fingertips, instructed to notify me if pain begins, at that time I will prescribe Gabapentin    Encourage to call or message us for any questions or problems  The patient was given ample opportunity to ask questions, and to the best of my abilities, all questions answered to  satisfaction; patient demonstrated understanding of what we discussed and agreeable to the plan.     LENKA NELSON MD      Professional Services   I, Bessie Horowitz LPN, acted solely as a scribe for and in the presence of Dr. Lenka Nelson, who performed these services.

## 2023-07-12 NOTE — PROGRESS NOTES
.[facility]  Otolaryngology Clinic Note    Josafat Boudreaux  Encounter Date: 7/12/2023  YOB: 1965  Physician: Simone Basilio MD    Chief Complaint: Laryngeal mass    HPI: Josafat Boudreaux is a 57 y.o. male referred to clinic by Dr. Emory Alonso for laryngeal mass. Patients wife provides history. She states that patient first noticed hoarseness in November 2021. Got progressively worse. Presented to Dr. Alonso in early June 2022 for these complaints. Was noted to have a laryngeal mass on flexible laryngoscopy. Several days after the flexible laryngoscopy patient developed worsening swelling of the airway and presented to the ER in respiratory distress. He underwent emergent tracheostomy with Dr. Junior Alonso. Patient was subsequently discharged home but readmitted shortly after with seizures. He spent 3 days in the ICU on the ventilator. During hospitalization, patient underwent PEG tube placement. He reports that he was not having any issues with PO intake prior to the tracheostomy placement. Now he is PEG dependent. Only taking some ice chips by mouth. He presents today to discuss laryngectomy.     11/2/22:  Since last visit, patient total laryngectomy with bilateral neck dissections level 3 and 4, primary closure, he did not undergo TEP placement.  He was also found to have synchronous primary or lung metastasis in the lung that was also squamous cell carcinoma.  He underwent radiation completed this in the middle of September 2022.  He received carboplatin and Taxol which he still has a few more treatments of.  He presented to the emergency department on 10/08/2022 with a right neck infection.  This was treated with IV antibiotics and incision and drainage with UCHE drain placement.  He was kept NPO and did feeds through his PEG tube.  He met criteria for discharge on 10/25/22 with UCHE drain.  He had outpatient swallow study which did not demonstrate any  evidence of a leak.  He returns to clinic today reporting improvement in his symptoms including right neck pain and drainage.  He has not had to empty his UCHE drain on a daily basis at this point.  He had a PET performed on 10/13/2022 to assess response to chemotherapy.  On that PET scan, right hilar and mediastinal lymph nodes were decreased in size but had increased metabolic activity.  He denies any other issues today    11/7/22: Reports worsening swelling under the right jawline the past few days.  Denies any erythema, infection, fever or chills.    12/6/22:  Feeling a little run down, increased cough with blood tinged secretions, running temperature to 101 at home.  Received azithromycin abx last night from Dr. Alonso's office.    12/28/22:  Was hospitalized for Pseudomonal pneumonia.  He also had Blastomycetes positive antigen on arrival, but was unable to tolerate amphotericin B due to significant hypotension.  Prior to discharge, his repeat Blastomycetes antigen was negative.  Discharged with levaquin and portable home oxygen.  His pneumonia symptoms have all improved since discharge.  Ulceration spots have persisted with one spot having a small amount of growth adjacent.  No bleeding or pain.    1/3/22:  Returns for follow up for monitoring of tracheal ulceration.  Reports interval improvement.  Denies pain or bleeding from area.  Denies swelling or tenderness to neck.    1/11/22:  Returns for follow up.  No changes to tracheal ulceration.  Denies pain or bleeding.  Otherwise doing well.    2/8/23:  Patient is here for regular scheduled cancer surveillance.  He denies any changes since his last appointment.  He has lost about 5 lb since last December.  He contributes his weight loss to being hospitalized for pneumonia.  His swallow stable and his appetite is improving.  Denies odynophagia, otalgia, new head or neck masses, oral lesions, oral bleeding, hematemesis or hemoptysis.  He is still undergoing  chemotherapy treatments with Dr. Atkinson.  States that he has 2 more infusion scheduled at this time.  He is tolerating treatments well.'    3/8/23:  Doing well.  Completed his 4th chemo treatment few weeks ago.  Has been gaining weight not using his PEG.  Scheduled for his posttreatment the 22nd and will see GI after fall as well to remove PEG.  Family notes that the tracheal ulceration site is continuing to heal has not had any other issues with the stoma breathing.     4/12/23:  Presents today for cancer surveillance.  He reports that he has been doing very well.  He was started on Keytruda recently for his lung cancer.  He recently had his PEG removed as he was taking everything by mouth with no issues.  He denies any otalgia, odynophagia, new lumps or bumps in the neck, weight loss.  They are not in need of any laryngectomy supplies at this time. On eliquis for an UE DVT diagnosed recently.     5/10/23: Here today for cancer surveillance. Reports that he's doing well lately. Tolerating a diet. Still having issues with his PEG tube site leaking and will be seeing Dr. De Leon soon to talk about a procedure to close it. He had his maintenance keytruda dose at the beginning of May and has the next one scheduled in 6 weeks. He'll be getting a repeat PET in June. Denies any new otalgia, odynophagia, new lumps or bumps in his neck.     6/14/23: Patient is here for follow up for cancer surveillance. Unfortunately, his most recent PET on 6/9/23 did show progression of his right sided mediastinal lymphadenopathy. He and his wife remain hopeful and are going to see Dr. Atkinson later today. He has been doing well otherwise. No otalgia, dysphagia, odynophagia, new lumps/bumps. His TSH was high at the last blood draw so he was started on Synthroid 50mcg; he gets his TSH red-drawn today. No other issues.     07/12/2023: Patient returns for follow-up and cancer surveillance.  He continues to undergo XRT and carbo/taxol therapy  in the setting of progression of right-sided mediastinal disease.  No otalgia, dysphagia, odynophagia, new lumps/bumps.  His TSH following last visit was normal.  He reports some increased secretions following initiation of XRT.  No other issues.      Review of patient's allergies indicates:  No Known Allergies    Past Medical History:   Diagnosis Date    COPD (chronic obstructive pulmonary disease)     Dysphagia     GERD (gastroesophageal reflux disease)     laryngeal Cancer     Lung cancer     Vocal cord mass        Past Surgical History:   Procedure Laterality Date    DIAGNOSTIC LAPAROSCOPY N/A 5/18/2023    Procedure: LAPAROSCOPY, DIAGNOSTIC;  Surgeon: Dipak De Leon Jr., MD;  Location: [facility];  Service: General;  Laterality: N/A;    DIRECT DIAGNOSTIC LARYNGOSCOPY WITH BRONCHOSCOPY AND ESOPHAGOSCOPY N/A 07/11/2022    Procedure: LARYNGOSCOPY, DIRECT, DIAGNOSTIC, WITH BRONCHOSCOPY AND ESOPHAGOSCOPY;  Surgeon: Emory Alonso MD;  Location: [facility];  Service: ENT;  Laterality: N/A;    ESOPHAGOGASTRODUODENOSCOPY (EGD)- DEVICE ASSISTED N/A 04/18/2023    Procedure: EGD;  Surgeon: Aaliyah Conrad III, MD;  Location: [facility] ENDOSCOPY;  Service: Gastroenterology;  Laterality: N/A;  Esophageal Dilation; Savory over Guide wire: 36Fr and 39 Fr  Clips to secure peg site    HIP SURGERY      HUMERUS FRACTURE SURGERY      INCISION AND DRAINAGE, NECK Bilateral 10/21/2022    Procedure: INCISION AND DRAINAGE,NECK;  Surgeon: Madison Worley MD;  Location: [facility];  Service: ENT;  Laterality: Bilateral;    INSERT ARTERIAL LINE  06/26/2022         JOINT REPLACEMENT  2019    R hip    PEG TUBE REMOVAL      PLACEMENT, MEDIPORT      TRACHEOSTOMY N/A 06/10/2022    Procedure: CREATION, TRACHEOSTOMY;  Surgeon: Junior Alonso MD;  Location: [facility];  Service: ENT;  Laterality: N/A;       Social History     Socioeconomic History    Marital status:    Tobacco Use    Smoking status: Former     Packs/day: 0.50     Years:  15.00     Pack years: 7.50     Types: Cigarettes     Start date: 1980     Quit date: 2022     Years since quittin.0    Smokeless tobacco: Never   Substance and Sexual Activity    Alcohol use: Yes     Alcohol/week: 1.0 standard drink     Types: 1 Cans of beer per week     Comment: occasional    Drug use: Never    Sexual activity: Not Currently     Partners: Female     Social Determinants of Health     Financial Resource Strain: Low Risk     Difficulty of Paying Living Expenses: Not hard at all   Food Insecurity: No Food Insecurity    Worried About Running Out of Food in the Last Year: Never true    Ran Out of Food in the Last Year: Never true   Transportation Needs: No Transportation Needs    Lack of Transportation (Medical): No    Lack of Transportation (Non-Medical): No   Physical Activity: Inactive    Days of Exercise per Week: 0 days    Minutes of Exercise per Session: 0 min   Stress: No Stress Concern Present    Feeling of Stress : Not at all   Social Connections: Moderately Isolated    Frequency of Communication with Friends and Family: More than three times a week    Frequency of Social Gatherings with Friends and Family: More than three times a week    Attends Protestant Services: Never    Active Member of Clubs or Organizations: No    Attends Club or Organization Meetings: Never    Marital Status:    Housing Stability: Low Risk     Unable to Pay for Housing in the Last Year: No    Number of Places Lived in the Last Year: 1    Unstable Housing in the Last Year: No       Family History   Problem Relation Age of Onset    Lung cancer Father     Cancer Father         Lung cancer    Throat cancer Brother     Cancer Brother         Throat       Outpatient Encounter Medications as of 2023   Medication Sig Dispense Refill    albuterol-ipratropium (DUO-NEB) 2.5 mg-0.5 mg/3 mL nebulizer solution Take 3 mLs by nebulization every 4 (four) hours as needed for Shortness of Breath or Wheezing. Rescue  90 mL 11    apixaban (ELIQUIS) 5 mg Tab Take 1 tablet (5 mg total) by mouth As instructed. 60 tablet 6    diphenhydrAMINE (BENADRYL) 25 mg capsule 1 capsule (25 mg total) by Per G Tube route every 6 (six) hours as needed for Itching. 90 capsule 1    famotidine (PEPCID) 20 MG tablet Take 1 tablet (20 mg total) by mouth every evening. 30 tablet 11    glutamine 10 gram PwPk Take 10 g by mouth 2 (two) times a day.      hydrocodone-acetaminophen (HYCET) solution 7.5-325 mg/15mL Take 15 mLs by mouth every 6 (six) hours as needed for Pain. (Patient not taking: Reported on 7/5/2023) 300 mL 0    levothyroxine (SYNTHROID) 50 MCG tablet Take 1 tablet (50 mcg total) by mouth once daily. 30 tablet 11    metroNIDAZOLE (METROGEL) 0.75 % (37.5mg/5 gram) vaginal gel apply to affected area BID      ondansetron (ZOFRAN-ODT) 8 MG TbDL Take 8 mg by mouth every 8 (eight) hours as needed for Nausea. Tab 1 ODT in AM for 2 days after chemotherapy      pantoprazole (PROTONIX) 20 MG tablet Take 1 tablet (20 mg total) by mouth once daily. 30 tablet 11    [DISCONTINUED] aspirin (ECOTRIN) 81 MG EC tablet Take 81 mg by mouth once daily.       Facility-Administered Encounter Medications as of 7/12/2023   Medication Dose Route Frequency Provider Last Rate Last Admin    LIDOcaine (PF) 40 mg/mL (4 %) injection 2 mL  2 mL Tracheal Tube 1 time in Clinic/HOD Kevin Palomino MD        LIDOcaine HCL 4% external solution 4 mL  4 mL Topical (Top) 1 time in Clinic/HOD Marty Jeffries MD        phenylephrine HCL 1% nasal spray 1 spray  1 spray Each Nostril 1 time in Clinic/HOD Kevin Palomino MD           Physical Exam:  Vitals:    07/12/23 0846   BP: 98/61   Pulse: (!) 116   Weight: 64.4 kg (142 lb)         Constitutional  General Appearance: well nourished, well-developed, AAO x3, NAD  HEENT  Eyes: PEERLA, EOMI, normal conjunctivae  Ears: Hearing well at conversation level, EAC patent, TM intact, small bilateral effusions  Nose: septum midline, no inferior  turbinate hypertrophy, no polyps, moist mucosa, pale appearing  OC/OP: dentition moderate, no oral lesions, tongue/FOM/BOT- soft, no leukoplakia/ulcerations/ tenderness   Neck: post-radiation changes and firmness, mild submandibular and submental lymphedema more on the left than right, stoma widely patent  Neuro: CN II - XII intact bilaterally  Cardiovascular: peripheral pulses palpable  Respiratory: non-labored respirations  Psychiatric: oriented to time, place and person, no depression, anxiety or agitation    PROCEDURE: Flexible fiberoptic neopharyngoscopy & Tracheoscopy    Surgeon: Simone Basilio   Anesthesia: 4% lidocaine + phenyelphrine  Indication: History of head and neck cancer  Complications: None  Date: 07/12/2023    Procedure in Detail:    Informed consent was obtained from the patient after explanation of procedure, indications, risks and benefits. Flexible laryngoscopy was performed on Josafat Boudreaux through the right nasal passage(s). The nasal cavity, nasopharynx, oropharynx, neopharynx, and trachea were examined.     Operative Findings:    Nasal Cavity: Right nasal airway patent without lesions or ulcerations  Nasopharynx: No adenoid hypertrophy, no masses or ulcerations noted in the fossae of Rosenmüller, patent appearing lashonda tubarii  Tongue Base: No fullness or lingual tonsillar hypertrophy. No masses.  Pharyngeal Walls: No lesions or ulcerations noted  Neopharynx: No masses or lesions  Tracheoscopy: Trachea clear to malissa. Mild mild thick secretions to the right mainstem bronchus; no purulence or other signs of active infection    The patient tolerated the procedure well without any complications.    Impression: No evidence of disease.            Assessment/Plan:  Josafat Boudreaux is a 57 y.o. 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 Alonso; completed adjuvant CRT in 9/2022. Continued chemo until 3/23  for metastatic lung SCCA.  Unfortunately, his most recent PET on 6/9/23 showed progression of his right mediastinal lymphadenopathy.  Currently undergoing CRT.     - Recommended increased saline nebs for likely mucosal irritation and increased secretions in the setting of initiation of radiation; patient and wife stated that they have the appropriate supplies; patient wears his HME consistently   - Continue routine roque care.   - Recent TSH within normal limits; patient consistently taking synthroid     RTC in 1 month for routine surveillance       HEAD & NECK CANCER SURVEILLANCE   Primary Surgical Treatment     Head & Neck Staff: Dr. Emory Alonso  Medical Oncologist: Yara Atkinson MD (Last seen: 2/2023)  Radiation Oncologist: Romie Das MD (Last seen: Unknown)    Primary tumor: Endolaryngeal [id number] SCCA    Date of Diagnosis: 7/11/22  Surgery Date: 7/11/22  Induction Chemotherapy: No  Adjuvant Therapy: CRT  Completion Date: Sept 2022 for RT, finished chemo 3/2023    Pertinent Treatment History:  Surgery + CRT    Place date if completed   3 6 12 18 24 36 48 60   CT Neck 10/19/22 X X X X X X X   PET/CT 10/13/22  6/9/23        CXR  12/21/22 X X X X X X   TFT X 12/6/22 5/3/23  X X X X     Current Surveillance Interval: <1 year post-treatment, q6 wks       Rufino Basilio MD  [employer] Otolaryngology PGY-3  07/12/2023 8:36 AM

## 2023-07-12 NOTE — PROGRESS NOTES
The scope used for the exam was:  Flexible scope ENF-P4  Serial Number:  1)    [id number]    []   2)    [id number]    []   3)    [id number]    []   4)    [id number]    []   5)    [id number]    []   6)    [id number]    [x]       The scope used for the exam was:  Rigid scope   Serial Number:  1)   [id number]    []   2)   [id number]    []   3)   [id number]    []   4)    3384   []   5)    0824   []   6)    5554   []     7)   7425    []   8)   2240    []   9)   1109    []

## 2023-07-19 NOTE — PROGRESS NOTES
HEMATOLOGY/ONCOLOGY OFFICE CLINIC VISIT    Visit Information:     Initial Evaluation: 7/21/2022  Referring Provider:   Other providers: Dr Gerard, Dr Emory Alonso  Code status:     Diagnosis/Problem list:  1) pT4apN1-Stage JENNYFER Laryngeal Squamous cell carcinoma   2) Lung masses x 2, bilateral--> Squamous cell -- Metastatic SqCC lung cancer vs metastatic SqCC to the lungs from H&N  3) Mediastinal and Hilar LAD     Present treatment:  RT +  chemotherapy    Treatment/Oncology history:  --7/11/2022: total laryngectomy  --Carbo/Taxol + RT (8/17/22-9/21/22)  --8/17/2022-9/28/2022:  6000 cGy, 200 cGy/fraction, 30/30 fx  --Carbo/Taxol/Keytruda planned 11/30/2022-3/2/2023  --Started maintenance Keytruda every 6 weeks on 3/23/2023    Plan of care:     Imaging:  CT neck 6/6/2022: A metastatic right level III cervical lymph node is present with short axis measurement of 1.6 cm.  This lymph node contains internal cystic change, typical of metastatic squamous cell carcinoma.  A left level III cervical lymph node shows short axis measurements of 1 cm, and is suspicious.    A left supraglottic mass measures approximately 2.4 cm in diameter (axial post-contrast image 39), presumably corresponding to the primary neoplasm.  Metastatic lymph nodes are also present in the inferior right paratracheal position, measuring 1.3 cm in short axis.  Metastatic lymph nodes are present in the superior right hilum, measuring 1.8 cm in short axis.  CT H&N 6/27/2022: 1. There is mild stenosis at the origin of left proximal internal carotid artery secondary to dense calcified atheromatous plaque. 2. There is consolidation is right upper lobe. Additional ground-glass opacities are also seen on the remainder of the visualized lungs. These findings are consistent with an infectious process. Small right pleural effusion is seen. There is an ill-defined soft tissue mass in the right perihilar region which measures approximately 3.1 x 2.9 x 4.6 cm, of  concern for neoplasm. Correlate clinically as regards further evaluation and followup with CT chest. There is an ill-defined enhancing soft tissue mass in the glottis infiltrating into the paraglottic spaces and the subglottic region with obliteration of the airway, of concern for a neoplasm. 3. Unremarkable CT angiogram of the head. Details and other findings as described above.  NM PET CT 8/1/2022: postoperative changes of recent total laryngectomy and lou dissection. bilateral hypermetabolic cervical lymphadenopathy.  A right cervical lymph node 1.7 x 1.8 cm, 1.6 x 1.5 cm previously, SUV of 7.7.  A left cervical lymph node 8 x 10 mm SUV of 3.4. A fluid collection posterior to the left internal jugular vein measures 2.4 x 2.4 cm.  There is right hilar and mediastinal lymphadenopathy.  A precarinal lymph node measures 1.6 x 2.3 cm, compared to 1.3 x 1.7 cm previously, and has a max SUV of 13.8.  Right hilar lymphadenopathy measures approximately 3 x 2.9 cm, compared to 2.1 x 2.3 cm previously, and has a max SUV of 9.8.  A suspected right hilar mass measures 1.8 x 3.6 cm and has a max SUV of 14.8. no hypermetabolic lymphadenopathy in the abdomen or pelvis.  There is physiologic uptake in the solid organs, urinary system and bowel.  PET CT 10/13/2022: Ill-defined hypermetabolic tissue is again appreciated at the left neck, just superior and lateral to the tracheostomy insertion site. The regional maximum SUV is 6.8, with discrete CT-correlate lesion poorly delineated secondary to non-contrast protocol; prior SUV max 8.3. An adjacent, better delineated right cervical chain lymph node is visualized posteriorly, measuring 1.1 cm short axis with SUV max of 6; this previously measured 1.5 cm in least dimension with maximum SUV of 7.7.  No convincing new or enlarging cervical adenopathy or newly hypermetabolic lymph nodes are visualized.  The prior left neck fluid collection is no longer evident.The somewhat lobulated  right upper lobe hilar mass is now approximately 3.4 cm x 1.4 cm and maximum SUV 23; previously 3.6 cm x 1.8 cm and SUV max 14.8.  No new or enlarging hypermetabolic lung lesion, and no development of organized airspace consolidation or pleural effusion.  PET CT 3/16/2023:  1. Largely improved disease though there is single paratracheal lymph node which has increased in size and is now FDG avid.   2. Some inguinal and external iliac lymph nodes demonstrate increased FDG activity today, this is favored reactive.  CV Ultrasound doppler venous arm right 3/22/2023: Positive acute deep vein thrombosis identified in the subclavian, axillary, and brachial veins of the right upper extremity. Superficial vein thrombosis identified in the basilic vein of the right upper extremity. All other veins were compressible.  PET CT 6/9/2023:  No FDG avid pulmonary lesions are present. Mediastinal and hilar lymphadenopathy has markedly increased compared to the prior study.  Reference right hilar conglomerate measures approximately 2.6 x 4.1 cm on image 94 with a maximum SUV of 19.0.  This previously measured 1.0 cm and had a maximum SUV of 16.0. Physiologic uptake is in the liver, spleen, urinary system and bowel. No enlarged or FDG avid lymph nodes are in the abdomen or pelvis. Adrenal glands are normal. No FDG avid osseous lesions are present. Impression: Disease progression with worsening mediastinal and right hilar FDG avid lymphadenopathy.       Pathology:  6/14/2022:  EBUS ENDOBRONCHIAL MASS RUL, CYTOLOGY: NEARLY ACELLULAR SPECIMEN CONSISTING OF FRESH BLOOD CLOT. NO ATYPICAL OR MALIGNANT CELLS IDENTIFIED.   7/11/2022: Bronchial Wash, RLL, right bronchial mass washings:  - Squamous cell carcinoma.                      Bronchial Wash, LLL, left bronchial mass washings:     - Squamous cell carcinoma.     7/11/2022 Laryngectomy:  1. Larynx, laryngeal biopsy frozen section : - Squamous cell carcinoma with necrosis.    2. Neck,  Anterior, left neck level 3: - Number of lymph nodes involved by carcinoma:  1/7       - Size of metastatic deposit:  2.5 mm - Extranodal extension:  Not identified      3. Larynx, resection without nodes, total layrngectomy :  - Squamous cell carcinoma.  4. Neck, Anterior, left neck level 4: - Number of lymph nodes involved by carcinoma:  0/10  5. Neck, Anterior, right neck level 4:  - Number of lymph nodes involved by carcinoma:  0/11  6. Neck, Anterior, right neck level 3: - Number of lymph nodes involved by carcinoma:  0/9  7. Nasophaynx, inferior pharyngeal mucosa : - Benign squamous mucosa. - No evidence of malignancy.     8. Cartilage, superior cartilage margin : - No evidence of malignancy.    9. Lymph Node, pretracheal lymph  node :  - Number of lymph nodes involved by carcinoma:  0/3  [id number]    1/11/2023:   Right tracheostomal biopsy:  - Squamous mucosa with epithelial erosion and acute inflammation.  - Lamina propria with reactive fibroblasts and fibrosis, consistent with radiation changes, see comment.  - No evidence of malignancy.    CLINICAL HISTORY:       Patient: Josafat Boudreaux is a 57 y.o. male.kindly refer her for laryngeal carcinoma.  Patient  was referred to ENT for further evaluation of hoarseness.  He did not have any difficulty swallowing or dry mouth at the time.  Hoarseness has been present for at least 4 months prior to evaluation. 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      Patient was scheduled to trach but on 06/10/2022 he was brought to the emergency room via air met with is short of breath.  He was found to have and oxygenation on the 40s when EMS was called.  He was placed on BiPAP but pCO2 increased so he has an emergent tracheostomy performed.  He has a PEG tube placed same hospitalization.  He underwent bronchoscopy with biopsy of the right upper lobe endobronchial lesion on 6/14/2022 cytology was negative  for malignant cells.       On 7/11/2022 he underwent LARYNGECTOMY, WITH RADICAL NECK DISSECTION - Bilateral LARYNGOSCOPY, DIRECT, DIAGNOSTIC, WITH BRONCHOSCOPY AND ESOPHAGOSCOPY pathology report as above.  1/40 lymph nodes were involved with metastatic disease.  Bronchoscopy was repeated and biopsy of the left lower lobe and right lower lobe were both positive for squamous cell carcinoma.     He is here today with his wife.  He has recovered from surgery relatively well.  Tracheostomy in place.  He is unable to talk so the history was taken by electronic medical record and wife.     Patient has a brother with history of throat cancer. Patient used to smoke 1 and half pack per day since he was 60 years old.  He was smoking less recently.      Chief Complaint: OTHER (No concerns today.)      Interval History:  Previous visits:   On 3/22/23, NIVA study was positive for DVT to right upper extremity, he was started on Eliquis 5 mg BID. Tolerating well with no side effects. PEG tube was removed on 3/23/23. He underwent procedure on 5/18/23 with Dr. De Leon to correct the leakage. Site is all healed.   On 5/3/23 TSH was 7.451. He was called and instructed to begin taking Synthroid 50 mcg daily. Most recent TSH  2.276.  Completed weekly carbo/Taxol and radiation on 9/21/22 for his laryngeal carcinoma. Completed 4 cycles of Carbo/Taxol/Keytruda on 3/2/23. Started maintenance Keytruda every 6 weeks on 3/23/2023.  Patient responded great to treatment and was started on maintenance Keytruda but Most recent PET-CT, 6/9/23, showed resolution of pulmonary masses no evidence of disease in the head/neck or distant metastasis but progression Of mediastinal/ hilar lymphadenopathy. This is the only site with evidence of disease.  In view of this findings he was referred to radiation oncologist for possible concomitant chemoradiation hoping that this can take care of residual disease.    Was seen by Dr. Das and started radiation on  7/6/23.    Today:  Patient presents for TD/lab prior to C3 of Carbo/Taxol + XRT, due tomorrow.  He is with his wife and reports having to suction more often now since starting XRT. Plan is for 25 treatments. Started 7/6/23.  Overall, he feels good.  He is eating well.  No fever, chills, sweats.  No chest pain or shortness of breath.      PMH/PSH/FH/SOCIAL/ALLERGIES/MEDICATIONS:  ALL REVIEWED AND UPDATED AS APPROPRIATE.       Review of Systems   Constitutional:  Positive for fatigue. Negative for activity change, appetite change, chills, diaphoresis, fever and unexpected weight change.   HENT:  Negative for nasal congestion, mouth sores, nosebleeds, sinus pressure/congestion, sore throat and trouble swallowing.    Eyes: Negative.    Respiratory:  Negative for shortness of breath.         Increase secretions   Cardiovascular:  Negative for chest pain and palpitations.   Gastrointestinal:  Negative for abdominal distention, abdominal pain, blood in stool, change in bowel habit, constipation, diarrhea, nausea, vomiting and change in bowel habit.        Dysphagia   Endocrine: Negative.    Genitourinary:  Negative for bladder incontinence, decreased urine volume, difficulty urinating, dysuria, frequency, hematuria and urgency.   Musculoskeletal:  Negative for arthralgias, back pain, gait problem, joint swelling, leg pain and myalgias.   Integumentary:  Negative for rash.   Allergic/Immunologic: Negative.    Neurological:  Negative for dizziness, tremors, syncope, weakness, light-headedness, numbness, headaches and memory loss.   Hematological:  Negative for adenopathy. Does not bruise/bleed easily.   Psychiatric/Behavioral:  Negative for agitation, confusion, hallucinations, sleep disturbance and suicidal ideas. The patient is not nervous/anxious.             Vitals:    07/19/23 1010   BP: 107/72   BP Location: Left arm   Patient Position: Sitting   Pulse: 103   Temp: 98.5 °F (36.9 °C)   TempSrc: Oral   SpO2: 95%  "  Weight: 64 kg (141 lb 3.2 oz)   Height: 5' 9" (1.753 m)       Wt Readings from Last 6 Encounters:   07/19/23 64 kg (141 lb 3.2 oz)   07/12/23 64.5 kg (142 lb 3.2 oz)   07/12/23 64.4 kg (142 lb)   07/06/23 64 kg (141 lb)   07/05/23 64.2 kg (141 lb 8 oz)   06/28/23 64 kg (141 lb 3.2 oz)     Body mass index is 20.85 kg/m².  Body surface area is 1.77 meters squared.       Physical Exam  Vitals and nursing note reviewed.   Constitutional:       General: He is not in acute distress.     Appearance: Normal appearance.   HENT:      Head: Normocephalic and atraumatic.      Mouth/Throat:      Mouth: Mucous membranes are moist.   Eyes:      General: No scleral icterus.     Extraocular Movements: Extraocular movements intact.      Conjunctiva/sclera: Conjunctivae normal.   Neck:      Vascular: No JVD.      Trachea: Tracheostomy present.      Comments: Tracheotomy in place.  Right side - Skin changes c/w RT     Cardiovascular:      Rate and Rhythm: Normal rate and regular rhythm.      Heart sounds: No murmur heard.  Pulmonary:      Effort: Pulmonary effort is normal.      Breath sounds: Normal breath sounds. No wheezing or rhonchi.   Abdominal:      General: The ostomy site is clean. Bowel sounds are normal. There is no distension.      Palpations: Abdomen is soft.      Tenderness: There is no abdominal tenderness.   Musculoskeletal:         General: No swelling or deformity.      Right upper arm: Swelling and tenderness present.      Cervical back: Neck supple.      Comments: Erythema of right upper extremity to touch and tender   Lymphadenopathy:      Head:      Right side of head: No submandibular adenopathy.      Left side of head: No submandibular adenopathy.      Cervical: No cervical adenopathy.      Upper Body:      Right upper body: No supraclavicular or axillary adenopathy.      Left upper body: No supraclavicular or axillary adenopathy.      Lower Body: No right inguinal adenopathy. No left inguinal adenopathy. "   Skin:     General: Skin is warm.      Coloration: Skin is not jaundiced.      Findings: No rash.   Neurological:      General: No focal deficit present.      Mental Status: He is alert and oriented to person, place, and time.      Cranial Nerves: Cranial nerves 2-12 are intact.   Psychiatric:         Attention and Perception: Attention normal.         Behavior: Behavior is cooperative.     ECOG SCORE    0 - Fully active-able to carry on all pre-disease performance without restriction         Laboratory:  CBC with Differential:  Lab Results   Component Value Date    WBC 4.21 (L) 07/19/2023    RBC 4.54 (L) 07/19/2023    HGB 14.5 07/19/2023    HCT 44.5 07/19/2023    MCV 98.0 (H) 07/19/2023    MCH 31.9 (H) 07/19/2023    MCHC 32.6 (L) 07/19/2023    RDW 13.0 07/19/2023     07/19/2023    MPV 9.1 07/19/2023        CMP:  Sodium Level   Date Value Ref Range Status   07/19/2023 132 (L) 136 - 145 mmol/L Final     Potassium Level   Date Value Ref Range Status   07/19/2023 4.2 3.5 - 5.1 mmol/L Final     Carbon Dioxide   Date Value Ref Range Status   07/19/2023 28 22 - 29 mmol/L Final     Blood Urea Nitrogen   Date Value Ref Range Status   07/19/2023 5.6 (L) 8.4 - 25.7 mg/dL Final     Creatinine   Date Value Ref Range Status   07/19/2023 0.84 0.73 - 1.18 mg/dL Final     Calcium Level Total   Date Value Ref Range Status   07/19/2023 8.8 8.4 - 10.2 mg/dL Final     Albumin Level   Date Value Ref Range Status   07/19/2023 3.1 (L) 3.5 - 5.0 g/dL Final     Bilirubin Total   Date Value Ref Range Status   07/19/2023 0.6 <=1.5 mg/dL Final     Alkaline Phosphatase   Date Value Ref Range Status   07/19/2023 85 40 - 150 unit/L Final     Aspartate Aminotransferase   Date Value Ref Range Status   07/19/2023 14 5 - 34 unit/L Final     Alanine Aminotransferase   Date Value Ref Range Status   07/19/2023 8 0 - 55 unit/L Final     Estimated GFR-Non    Date Value Ref Range Status   08/02/2022 >60 mls/min/1.73/m2 Final          Assessment:     1. Laryngeal cancer    2. Squamous cell carcinoma of both lungs    3. Regional lymph node metastasis present    4. On antineoplastic chemotherapy    5. Fatigue, unspecified type        1) pT4apN1-Stage JENNYFER Laryngeal Squamous cell carcinoma   --Laryngeal Squamous cell carcinoma -s/p total laryngectomy, 1/40 LN with metastatic disease.   2) Lung masses x 2, bilateral--> Squamous cell -- Metastatic SqCC lung cancer vs metastatic SqCC to the lungs from H&N  3) Mediastinal and Hilar LAD    Completed weekly carbo/Taxol and radiation on 9/21/22 for his laryngeal carcinoma. Completed 4 cycles of Carbo/Taxol/Keytruda on 3/2/23. Started maintenance Keytruda every 6 weeks on 3/23/2023.  Patient responded great to treatment and was started on maintenance Keytruda but Most recent PET-CT showed resolution of pulmonary masses no evidence of disease in the head/neck or distant metastasis but progression Of mediastinal/ hilar lymphadenopathy. This is the only site with evidence of disease.  In view of this findings he was referred to radiation oncologist for possible concomitant chemoradiation hoping that this can take care of residual disease.  Was seen by Dr. Das  and he has the marking and is ready to start radiation.    I discussed with him that because of the shortage of Carboplatinum and cisplatin we may or may not be able to use platinum as a radiosensitizer if this isn't option for him.  I think that because of minimal residual disease and resolution of lung masses be will benefit of Carbo/taxol + RT. Next step would be infusional 5FU vs Gemzar.    Educated the patient on the risks versus benefits as well as toxicities associated with treatment.  Verbally consented the patient to the treatment plan and the patient was educated on the planned duration of the treatment and schedule of the treatment administration.  All questions were answered.        Plan:         Continue XRT  Okay to proceed with weekly  Carbo/Taxol  C3 tomorrow  Will add IVF's as this helped him while he was on chemo  in the past  Continue Eliquis 5 mg BID  Continue Synthroid 50 mcg daily  Will check TSH next visit  RTC in 1 week for lab and chemo (Thursday)  RTC in 2 weeks with NP for TD/lab, chemo next day  Weekly labs - standing order placed  PET CT every 3 months, due 9/2023 - will order when closer--if insurance does not cover, will change to CT C/A/P  Recommend Vitamin B Complex to help with numbness in fingertips, instructed to notify me if pain begins, at that time I will prescribe Gabapentin    Encourage to call or message us for any questions or problems  The patient was given ample opportunity to ask questions, and to the best of my abilities, all questions answered to satisfaction; patient demonstrated understanding of what we discussed and agreeable to the plan.     LENKA NELSON MD      Professional Services   I, Bessie Horowitz LPN, acted solely as a scribe for and in the presence of Dr. Lenka Nelson, who performed these services.

## 2023-08-02 NOTE — PROGRESS NOTES
HEMATOLOGY/ONCOLOGY OFFICE CLINIC VISIT    Visit Information:     Initial Evaluation: 7/21/2022  Referring Provider:   Other providers: Dr Gerard, Dr Emory Alonso  Code status:     Diagnosis/Problem list:  1) pT4apN1-Stage JENNYFER Laryngeal Squamous cell carcinoma   2) Lung masses x 2, bilateral--> Squamous cell -- Metastatic SqCC lung cancer vs metastatic SqCC to the lungs from H&N  3) Mediastinal and Hilar LAD     Present treatment:  RT +  chemotherapy    Treatment/Oncology history:  --7/11/2022: total laryngectomy  --Carbo/Taxol + RT (8/17/22-9/21/22)  --8/17/2022-9/28/2022:  6000 cGy, 200 cGy/fraction, 30/30 fx  --Carbo/Taxol/Keytruda planned 11/30/2022-3/2/2023  --Started maintenance Keytruda every 6 weeks on 3/23/2023    Plan of care:     Imaging:  CT neck 6/6/2022: A metastatic right level III cervical lymph node is present with short axis measurement of 1.6 cm.  This lymph node contains internal cystic change, typical of metastatic squamous cell carcinoma.  A left level III cervical lymph node shows short axis measurements of 1 cm, and is suspicious.    A left supraglottic mass measures approximately 2.4 cm in diameter (axial post-contrast image 39), presumably corresponding to the primary neoplasm.  Metastatic lymph nodes are also present in the inferior right paratracheal position, measuring 1.3 cm in short axis.  Metastatic lymph nodes are present in the superior right hilum, measuring 1.8 cm in short axis.  CT H&N 6/27/2022: 1. There is mild stenosis at the origin of left proximal internal carotid artery secondary to dense calcified atheromatous plaque. 2. There is consolidation is right upper lobe. Additional ground-glass opacities are also seen on the remainder of the visualized lungs. These findings are consistent with an infectious process. Small right pleural effusion is seen. There is an ill-defined soft tissue mass in the right perihilar region which measures approximately 3.1 x 2.9 x 4.6 cm, of  concern for neoplasm. Correlate clinically as regards further evaluation and followup with CT chest. There is an ill-defined enhancing soft tissue mass in the glottis infiltrating into the paraglottic spaces and the subglottic region with obliteration of the airway, of concern for a neoplasm. 3. Unremarkable CT angiogram of the head. Details and other findings as described above.  NM PET CT 8/1/2022: postoperative changes of recent total laryngectomy and lou dissection. bilateral hypermetabolic cervical lymphadenopathy.  A right cervical lymph node 1.7 x 1.8 cm, 1.6 x 1.5 cm previously, SUV of 7.7.  A left cervical lymph node 8 x 10 mm SUV of 3.4. A fluid collection posterior to the left internal jugular vein measures 2.4 x 2.4 cm.  There is right hilar and mediastinal lymphadenopathy.  A precarinal lymph node measures 1.6 x 2.3 cm, compared to 1.3 x 1.7 cm previously, and has a max SUV of 13.8.  Right hilar lymphadenopathy measures approximately 3 x 2.9 cm, compared to 2.1 x 2.3 cm previously, and has a max SUV of 9.8.  A suspected right hilar mass measures 1.8 x 3.6 cm and has a max SUV of 14.8. no hypermetabolic lymphadenopathy in the abdomen or pelvis.  There is physiologic uptake in the solid organs, urinary system and bowel.  PET CT 10/13/2022: Ill-defined hypermetabolic tissue is again appreciated at the left neck, just superior and lateral to the tracheostomy insertion site. The regional maximum SUV is 6.8, with discrete CT-correlate lesion poorly delineated secondary to non-contrast protocol; prior SUV max 8.3. An adjacent, better delineated right cervical chain lymph node is visualized posteriorly, measuring 1.1 cm short axis with SUV max of 6; this previously measured 1.5 cm in least dimension with maximum SUV of 7.7.  No convincing new or enlarging cervical adenopathy or newly hypermetabolic lymph nodes are visualized.  The prior left neck fluid collection is no longer evident.The somewhat lobulated  right upper lobe hilar mass is now approximately 3.4 cm x 1.4 cm and maximum SUV 23; previously 3.6 cm x 1.8 cm and SUV max 14.8.  No new or enlarging hypermetabolic lung lesion, and no development of organized airspace consolidation or pleural effusion.  PET CT 3/16/2023:  1. Largely improved disease though there is single paratracheal lymph node which has increased in size and is now FDG avid.   2. Some inguinal and external iliac lymph nodes demonstrate increased FDG activity today, this is favored reactive.  CV Ultrasound doppler venous arm right 3/22/2023: Positive acute deep vein thrombosis identified in the subclavian, axillary, and brachial veins of the right upper extremity. Superficial vein thrombosis identified in the basilic vein of the right upper extremity. All other veins were compressible.  PET CT 6/9/2023:  No FDG avid pulmonary lesions are present. Mediastinal and hilar lymphadenopathy has markedly increased compared to the prior study.  Reference right hilar conglomerate measures approximately 2.6 x 4.1 cm on image 94 with a maximum SUV of 19.0.  This previously measured 1.0 cm and had a maximum SUV of 16.0. Physiologic uptake is in the liver, spleen, urinary system and bowel. No enlarged or FDG avid lymph nodes are in the abdomen or pelvis. Adrenal glands are normal. No FDG avid osseous lesions are present. Impression: Disease progression with worsening mediastinal and right hilar FDG avid lymphadenopathy.       Pathology:  6/14/2022:  EBUS ENDOBRONCHIAL MASS RUL, CYTOLOGY: NEARLY ACELLULAR SPECIMEN CONSISTING OF FRESH BLOOD CLOT. NO ATYPICAL OR MALIGNANT CELLS IDENTIFIED.   7/11/2022: Bronchial Wash, RLL, right bronchial mass washings:  - Squamous cell carcinoma.                      Bronchial Wash, LLL, left bronchial mass washings:     - Squamous cell carcinoma.     7/11/2022 Laryngectomy:  1. Larynx, laryngeal biopsy frozen section : - Squamous cell carcinoma with necrosis.    2. Neck,  Anterior, left neck level 3: - Number of lymph nodes involved by carcinoma:  1/7       - Size of metastatic deposit:  2.5 mm - Extranodal extension:  Not identified      3. Larynx, resection without nodes, total layrngectomy :  - Squamous cell carcinoma.  4. Neck, Anterior, left neck level 4: - Number of lymph nodes involved by carcinoma:  0/10  5. Neck, Anterior, right neck level 4:  - Number of lymph nodes involved by carcinoma:  0/11  6. Neck, Anterior, right neck level 3: - Number of lymph nodes involved by carcinoma:  0/9  7. Nasophaynx, inferior pharyngeal mucosa : - Benign squamous mucosa. - No evidence of malignancy.     8. Cartilage, superior cartilage margin : - No evidence of malignancy.    9. Lymph Node, pretracheal lymph  node :  - Number of lymph nodes involved by carcinoma:  0/3  [id number]    1/11/2023:   Right tracheostomal biopsy:  - Squamous mucosa with epithelial erosion and acute inflammation.  - Lamina propria with reactive fibroblasts and fibrosis, consistent with radiation changes, see comment.  - No evidence of malignancy.    CLINICAL HISTORY:       Patient: Josafat Boudreaux is a 57 y.o. male.kindly refer her for laryngeal carcinoma.  Patient  was referred to ENT for further evaluation of hoarseness.  He did not have any difficulty swallowing or dry mouth at the time.  Hoarseness has been present for at least 4 months prior to evaluation. 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      Patient was scheduled to trach but on 06/10/2022 he was brought to the emergency room via air met with is short of breath.  He was found to have and oxygenation on the 40s when EMS was called.  He was placed on BiPAP but pCO2 increased so he has an emergent tracheostomy performed.  He has a PEG tube placed same hospitalization.  He underwent bronchoscopy with biopsy of the right upper lobe endobronchial lesion on 6/14/2022 cytology was negative  for malignant cells.       On 7/11/2022 he underwent LARYNGECTOMY, WITH RADICAL NECK DISSECTION - Bilateral LARYNGOSCOPY, DIRECT, DIAGNOSTIC, WITH BRONCHOSCOPY AND ESOPHAGOSCOPY pathology report as above.  1/40 lymph nodes were involved with metastatic disease.  Bronchoscopy was repeated and biopsy of the left lower lobe and right lower lobe were both positive for squamous cell carcinoma.     He is here today with his wife.  He has recovered from surgery relatively well.  Tracheostomy in place.  He is unable to talk so the history was taken by electronic medical record and wife.     Patient has a brother with history of throat cancer. Patient used to smoke 1 and half pack per day since he was 60 years old.  He was smoking less recently.      Chief Complaint: OTHER (No concerns today.)      Interval History:  Previous visits:   On 3/22/23, NIVA study was positive for DVT to right upper extremity, he was started on Eliquis 5 mg BID. Tolerating well with no side effects. PEG tube was removed on 3/23/23. He underwent procedure on 5/18/23 with Dr. De Leon to correct the leakage. Site is all healed.   On 5/3/23 TSH was 7.451. He was called and instructed to begin taking Synthroid 50 mcg daily. Most recent TSH  2.276.  Completed weekly carbo/Taxol and radiation on 9/21/22 for his laryngeal carcinoma. Completed 4 cycles of Carbo/Taxol/Keytruda on 3/2/23. Started maintenance Keytruda every 6 weeks on 3/23/2023.  Patient responded great to treatment and was started on maintenance Keytruda but Most recent PET-CT, 6/9/23, showed resolution of pulmonary masses no evidence of disease in the head/neck or distant metastasis but progression Of mediastinal/ hilar lymphadenopathy. This is the only site with evidence of disease.  In view of this findings he was referred to radiation oncologist for possible concomitant chemoradiation hoping that this can take care of residual disease.    Was seen by Dr. Das and started radiation on  7/6/23.    Today:  Patient presents for TD/lab prior to C5 of Carbo/Taxol + XRT, due tomorrow.  He is with his wife and reports having to suction more often now since starting XRT- she is seeing blood tinged secretions.  Plan is for 25 treatments. Started 7/6/23.  His platelet count is 89k today. He is eating well.  No fever, chills, sweats.  No chest pain or shortness of breath.      PMH/PSH/FH/SOCIAL/ALLERGIES/MEDICATIONS:  ALL REVIEWED AND UPDATED AS APPROPRIATE.       Review of Systems   Constitutional:  Positive for fatigue. Negative for activity change, appetite change, chills, diaphoresis, fever and unexpected weight change.   HENT:  Negative for nasal congestion, mouth sores, nosebleeds, sinus pressure/congestion, sore throat and trouble swallowing.    Eyes: Negative.    Respiratory:  Negative for shortness of breath.         Increase secretions   Cardiovascular:  Negative for chest pain and palpitations.   Gastrointestinal:  Negative for abdominal distention, abdominal pain, blood in stool, change in bowel habit, constipation, diarrhea, nausea, vomiting and change in bowel habit.        Dysphagia   Endocrine: Negative.    Genitourinary:  Negative for bladder incontinence, decreased urine volume, difficulty urinating, dysuria, frequency, hematuria and urgency.   Musculoskeletal:  Negative for arthralgias, back pain, gait problem, joint swelling, leg pain and myalgias.   Integumentary:  Negative for rash.   Allergic/Immunologic: Negative.    Neurological:  Negative for dizziness, tremors, syncope, weakness, light-headedness, numbness, headaches and memory loss.   Hematological:  Negative for adenopathy. Does not bruise/bleed easily.   Psychiatric/Behavioral:  Negative for agitation, confusion, hallucinations, sleep disturbance and suicidal ideas. The patient is not nervous/anxious.               Vitals:    08/02/23 1253   Weight: 62.9 kg (138 lb 11.2 oz)       Wt Readings from Last 6 Encounters:   08/02/23  62.9 kg (138 lb 11.2 oz)   07/27/23 62.1 kg (137 lb)   07/20/23 63.6 kg (140 lb 3.2 oz)   07/19/23 64 kg (141 lb 3.2 oz)   07/12/23 64.5 kg (142 lb 3.2 oz)   07/12/23 64.4 kg (142 lb)     Body mass index is 20.48 kg/m².  Body surface area is 1.75 meters squared.       Physical Exam  Vitals and nursing note reviewed.   Constitutional:       General: He is not in acute distress.     Appearance: Normal appearance.   HENT:      Head: Normocephalic and atraumatic.      Mouth/Throat:      Mouth: Mucous membranes are moist.   Eyes:      General: No scleral icterus.     Extraocular Movements: Extraocular movements intact.      Conjunctiva/sclera: Conjunctivae normal.   Neck:      Vascular: No JVD.      Trachea: Tracheostomy present.      Comments: Tracheotomy in place.  Right side - Skin changes c/w RT     Cardiovascular:      Rate and Rhythm: Normal rate and regular rhythm.      Heart sounds: No murmur heard.  Pulmonary:      Effort: Pulmonary effort is normal.      Breath sounds: Normal breath sounds. No wheezing or rhonchi.   Abdominal:      General: The ostomy site is clean. Bowel sounds are normal. There is no distension.      Palpations: Abdomen is soft.      Tenderness: There is no abdominal tenderness.   Musculoskeletal:         General: No swelling or deformity.      Right upper arm: Swelling and tenderness present.      Cervical back: Neck supple.      Comments: Erythema of right upper extremity to touch and tender   Lymphadenopathy:      Head:      Right side of head: No submandibular adenopathy.      Left side of head: No submandibular adenopathy.      Cervical: No cervical adenopathy.      Upper Body:      Right upper body: No supraclavicular or axillary adenopathy.      Left upper body: No supraclavicular or axillary adenopathy.      Lower Body: No right inguinal adenopathy. No left inguinal adenopathy.   Skin:     General: Skin is warm.      Coloration: Skin is not jaundiced.      Findings: No rash.    Neurological:      General: No focal deficit present.      Mental Status: He is alert and oriented to person, place, and time.      Cranial Nerves: Cranial nerves 2-12 are intact.   Psychiatric:         Attention and Perception: Attention normal.         Behavior: Behavior is cooperative.       ECOG SCORE             Laboratory:  CBC with Differential:  Lab Results   Component Value Date    WBC 3.49 (L) 08/02/2023    RBC 3.84 (L) 08/02/2023    HGB 12.1 (L) 08/02/2023    HCT 37.0 (L) 08/02/2023    MCV 96.4 (H) 08/02/2023    MCH 31.5 (H) 08/02/2023    MCHC 32.7 (L) 08/02/2023    RDW 13.5 08/02/2023    PLT 89 (L) 08/02/2023    MPV 9.9 08/02/2023        CMP:  Sodium Level   Date Value Ref Range Status   07/27/2023 129 (L) 136 - 145 mmol/L Final     Potassium Level   Date Value Ref Range Status   07/27/2023 5.2 (H) 3.5 - 5.1 mmol/L Final     Carbon Dioxide   Date Value Ref Range Status   07/27/2023 30 (H) 22 - 29 mmol/L Final     Blood Urea Nitrogen   Date Value Ref Range Status   07/27/2023 4.7 (L) 8.4 - 25.7 mg/dL Final     Creatinine   Date Value Ref Range Status   07/27/2023 0.75 0.73 - 1.18 mg/dL Final     Calcium Level Total   Date Value Ref Range Status   07/27/2023 8.8 8.4 - 10.2 mg/dL Final     Albumin Level   Date Value Ref Range Status   07/27/2023 3.1 (L) 3.5 - 5.0 g/dL Final     Bilirubin Total   Date Value Ref Range Status   07/27/2023 0.8 <=1.5 mg/dL Final     Alkaline Phosphatase   Date Value Ref Range Status   07/27/2023 87 40 - 150 unit/L Final     Aspartate Aminotransferase   Date Value Ref Range Status   07/27/2023 15 5 - 34 unit/L Final     Alanine Aminotransferase   Date Value Ref Range Status   07/27/2023 8 0 - 55 unit/L Final     Estimated GFR-Non    Date Value Ref Range Status   08/02/2022 >60 mls/min/1.73/m2 Final         Assessment:     No diagnosis found.      1) pT4apN1-Stage JENNYFER Laryngeal Squamous cell carcinoma   --Laryngeal Squamous cell carcinoma -s/p total laryngectomy,  1/40 LN with metastatic disease.   2) Lung masses x 2, bilateral--> Squamous cell -- Metastatic SqCC lung cancer vs metastatic SqCC to the lungs from H&N  3) Mediastinal and Hilar LAD    Completed weekly carbo/Taxol and radiation on 9/21/22 for his laryngeal carcinoma. Completed 4 cycles of Carbo/Taxol/Keytruda on 3/2/23. Started maintenance Keytruda every 6 weeks on 3/23/2023.  Patient responded great to treatment and was started on maintenance Keytruda but Most recent PET-CT showed resolution of pulmonary masses no evidence of disease in the head/neck or distant metastasis but progression Of mediastinal/ hilar lymphadenopathy. This is the only site with evidence of disease.  In view of this findings he was referred to radiation oncologist for possible concomitant chemoradiation hoping that this can take care of residual disease.  Was seen by Dr. Das  and he has the marking and is ready to start radiation.    I discussed with him that because of the shortage of Carboplatinum and cisplatin we may or may not be able to use platinum as a radiosensitizer if this isn't option for him.  I think that because of minimal residual disease and resolution of lung masses be will benefit of Carbo/taxol + RT. Next step would be infusional 5FU vs Gemzar.    Educated the patient on the risks versus benefits as well as toxicities associated with treatment.  Verbally consented the patient to the treatment plan and the patient was educated on the planned duration of the treatment and schedule of the treatment administration.  All questions were answered.        Plan:         Continue XRT  Platelet count is 89k today. Hold weekly Carbo/Taxol  C4 tomorrow. Will have him come back for CBC on Friday and will proceed with chemo that day if counts improve.   Will add IVF's as this helped him while he was on chemo  in the past  Continue Eliquis 5 mg BID  Continue Synthroid 50 mcg daily  If he treats this Friday, he will have his last weekly  chemo next Friday if counts are adequate.   RTC in 2 weeks with MD , lab same day.   Weekly labs - standing order placed  PET CT every 3 months, due 9/2023 - will order when closer--if insurance does not cover, will change to CT C/A/P    Encourage to call or message us for any questions or problems  The patient was given ample opportunity to ask questions, and to the best of my abilities, all questions answered to satisfaction; patient demonstrated understanding of what we discussed and agreeable to the plan.     OZZY Rod        8/4/23- Patient returns today for repeat labs and possible treatment. His platelet count in still decreased at 90k and his wife reports low grade fever and congestion with increased need for suctioning through trach. She see blood tinged sputum. Will hold treatment this week and I esprescribed Levaquin  750 mg po daily x 7 days. Will reschedule lab and infusion for next week. XRT due to complete next week.

## 2023-08-04 NOTE — NURSING
Treatment held today per Aliya Preston.  Pt with plts 90,000.  Tachycardic at 146bpm.  Wife states more chest congestion over last week.  NP will order Levaquin which pt will begin today.  Tx pushed back to next Tuesday.  Hydration today.  Pt tolerated treatment with no complaints. Faulkner needle removed from mediport. Site without signs and symptoms of complications. Dressing applied.

## 2023-08-05 PROBLEM — I10 ESSENTIAL (PRIMARY) HYPERTENSION: Status: ACTIVE | Noted: 2023-01-01

## 2023-08-05 PROBLEM — C32.9 MALIGNANT NEOPLASM OF LARYNX, UNSPECIFIED: Status: ACTIVE | Noted: 2022-07-11

## 2023-08-05 PROBLEM — C34.90 LUNG CANCER: Status: ACTIVE | Noted: 2022-07-11

## 2023-08-05 PROBLEM — L03.221 CELLULITIS AND ABSCESS OF NECK: Status: RESOLVED | Noted: 2022-01-01 | Resolved: 2023-01-01

## 2023-08-05 PROBLEM — K22.2 ESOPHAGEAL STENOSIS: Status: ACTIVE | Noted: 2023-01-01

## 2023-08-05 PROBLEM — L02.11 CELLULITIS AND ABSCESS OF NECK: Status: RESOLVED | Noted: 2022-01-01 | Resolved: 2023-01-01

## 2023-08-05 NOTE — H&P
"[facility] Medicine  History & Physical      Patient Name: Josafat Boudreaux  : 1965  MRN: [id number]  Patient Class: Emergency   Admission Date: 2023   Length of Stay: 0  Admitting Service: Hospital Medicine  Attending Physician: Madeleine Bueno*  PCP: Tony Bertrand MD  Source of history: Patient, patient's family, and EMR.   Code status: Prior    Chief Complaint   Shortness of Breath (Wife reports pt "having difficulty bringing up secretion" from ant neck stoma. )    History of Present Illness   57 y.o. 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 Endorses bloody sputum for one week. Pt with tracheostomy and radiotherapy due to lung CA. Denies fever, vomiting, diarrhea.  Arrived tachycardic and hypotensive to ED, BP responsive to fluids.Received azithromycin and ceftriaxone as well as LR.  Cxray significant for RLL infiltrate, Internal Medicine consulted for sepsis 2/2 pneumonia, will continue IV antibiotics.      The history is provided by the patient and a relative.    ROS   Pertinent positive and negative as mentioned in HPI   Review of Systems   Constitutional:  Negative for chills and fever.   Respiratory:  Positive for cough, shortness of breath and wheezing.    Cardiovascular:  Negative for chest pain, palpitations and leg swelling.   Gastrointestinal:  Negative for abdominal pain, diarrhea and vomiting.   Genitourinary:  Negative for dysuria.     Past Medical History     Past Medical History:   Diagnosis Date    COPD (chronic obstructive pulmonary disease)     Dysphagia     GERD (gastroesophageal reflux disease)     laryngeal Cancer     Lung cancer     Vocal cord mass      Past " Surgical History     Past Surgical History:   Procedure Laterality Date    DIAGNOSTIC LAPAROSCOPY N/A 2023    Procedure: LAPAROSCOPY, DIAGNOSTIC;  Surgeon: Dipak De Leon Jr., MD;  Location: [facility];  Service: General;  Laterality: N/A;    DIRECT DIAGNOSTIC LARYNGOSCOPY WITH BRONCHOSCOPY AND ESOPHAGOSCOPY N/A 2022    Procedure: LARYNGOSCOPY, DIRECT, DIAGNOSTIC, WITH BRONCHOSCOPY AND ESOPHAGOSCOPY;  Surgeon: Emory Alonso MD;  Location: [facility] OR;  Service: ENT;  Laterality: N/A;    ESOPHAGOGASTRODUODENOSCOPY (EGD)- DEVICE ASSISTED N/A 2023    Procedure: EGD;  Surgeon: Aaliyah Conrad III, MD;  Location: [facility] ENDOSCOPY;  Service: Gastroenterology;  Laterality: N/A;  Esophageal Dilation; Savory over Guide wire: 36Fr and 39 Fr  Clips to secure peg site    HIP SURGERY      HUMERUS FRACTURE SURGERY      INCISION AND DRAINAGE, NECK Bilateral 10/21/2022    Procedure: INCISION AND DRAINAGE,NECK;  Surgeon: Madison Worley MD;  Location: [facility];  Service: ENT;  Laterality: Bilateral;    INSERT ARTERIAL LINE  2022         JOINT REPLACEMENT  2019    R hip    PEG TUBE REMOVAL      PLACEMENT, MEDIPORT      TRACHEOSTOMY N/A 06/10/2022    Procedure: CREATION, TRACHEOSTOMY;  Surgeon: Junior Alonso MD;  [facility]: [facility];  Service: ENT;  Laterality: N/A;     Social History     Social History     Tobacco Use    Smoking status: Former     Current packs/day: 0.00     Average packs/day: 0.5 packs/day for 42.4 years (21.2 ttl pk-yrs)     Types: Cigarettes     Start date: 1980     Quit date: 2022     Years since quittin.1    Smokeless tobacco: Never   Substance Use Topics    Alcohol use: Yes     Alcohol/week: 1.0 standard drink of alcohol     Types: 1 Cans of beer per week     Comment: occasional      Family History     Pt reports father  of lung cancer    Allergies   Patient has no known allergies.  Home Medications     Prior to Admission medications    Medication Sig Start Date  End Date Taking? Authorizing Provider   albuterol-ipratropium (DUO-NEB) 2.5 mg-0.5 mg/3 mL nebulizer solution Take 3 mLs by nebulization every 4 (four) hours as needed for Shortness of Breath or Wheezing. Rescue 2/7/23 2/7/24  Michael Monson MD   apixaban (ELIQUIS) 5 mg Tab Take 1 tablet (5 mg total) by mouth As instructed. 3/22/23   Yara Atkinson MD   diphenhydrAMINE (BENADRYL) 25 mg capsule 1 capsule (25 mg total) by Per G Tube route every 6 (six) hours as needed for Itching. 6/23/22   Ra Quintanilla MD   famotidine (PEPCID) 20 MG tablet Take 1 tablet (20 mg total) by mouth every evening. 7/16/22 7/16/23  Lisseth Krueger MD   glutamine 10 gram PwPk Take 10 g by mouth 2 (two) times a day.    Provider, Historical   hydrocodone-acetaminophen (HYCET) solution 7.5-325 mg/15mL Take 15 mLs by mouth every 6 (six) hours as needed for Pain.  Patient not taking: Reported on 7/5/2023 5/18/23   Gemini Osborne, OCTAVIO   levoFLOXacin (LEVAQUIN) 750 MG tablet Take 1 tablet (750 mg total) by mouth once daily. 8/4/23   Aliya Preston FNP   levothyroxine (SYNTHROID) 50 MCG tablet Take 1 tablet (50 mcg total) by mouth once daily. 5/15/23 5/14/24  Yara Atkinson MD   megestroL (MEGACE) 400 mg/10 mL (40 mg/mL) Susp  8/1/23   Provider, Historical   metroNIDAZOLE (METROGEL) 0.75 % (37.5mg/5 gram) vaginal gel apply to affected area BID 1/16/23   Provider, Historical   ondansetron (ZOFRAN-ODT) 8 MG TbDL Take 8 mg by mouth every 8 (eight) hours as needed for Nausea. Tab 1 ODT in AM for 2 days after chemotherapy    Provider, Historical   pantoprazole (PROTONIX) 20 MG tablet Take 1 tablet (20 mg total) by mouth once daily. 7/16/22 7/16/23  Lisseth Krueger MD   aspirin (ECOTRIN) 81 MG EC tablet Take 81 mg by mouth once daily.  7/7/22  Provider, Historical      Inpatient Medications   Scheduled Meds   LIDOcaine (PF)  2 mL Tracheal Tube 1 time in Clinic/HOD    LIDOcaine HCL 4%  4 mL Topical (Top) 1 time in Clinic/HOD     "magnesium sulfate IVPB  2 g Intravenous ED 1 Time    phenylephrine HCL 1%  1 spray Each Nostril 1 time in Clinic/HOD     Continuous Infusions    PRN Meds      Physical Exam   Vital Signs  Temp:  [97.3 °F (36.3 °C)]   Pulse:  []   Resp:  [15-27]   BP: ()/(57-77)   SpO2:  [93 %-100 %]       General: Appears uncomfortable  HEENT: NC/AT  Neck:  No JVD, tracheostomy and radiotherapy due to lung CA  CVS: Tachycardic. Normal S1/S2.  Abdomen: nondistended, normoactive BS, soft and non-tender.  MSK: No obvious deformity or joint swelling  Skin: Warm and dry  Neuro: AAOx3, no focal neurological deficit. CN II-XII grossly intact   Psych: Cooperative    Labs   I have reviewed the following results below:  CBC  Recent Labs     08/04/23  0852 08/05/23  0026   WBC 3.51* 2.90*   RBC 4.05* 3.48*   HGB 13.0* 11.0*   HCT 38.8* 31.8*   MCV 95.8* 91.4   MCH 32.1* 31.6*   MCHC 33.5 34.6   RDW 13.1 13.5   PLT 90* 96*     Anemia  No results for input(s): "HAPTOGLOBIN", "FERRITIN", "IRON", "TIBC" in the last 72 hours.  Coags  No results for input(s): "INR", "APTT", "D-DIMER" in the last 72 hours.  Cardiac  Recent Labs     08/05/23  0026   BNP 11.5   TROPONINI 0.010     ABG/Lactate  Recent Labs     08/05/23  0038   LACTIC 1.4      Urinalysis  No results for input(s): "COLORUA", "APPEARANCEUA", "SGUA", "PHUA", "PROTEINUA", "GLUCOSEUA", "KETONESUA", "BLOODUA", "UROBILINOGEN", "NITRITESUA", "LEUKOCYTESUR" in the last 72 hours.   BMP  Recent Labs     08/02/23  1229 08/05/23  0026   * 132*   K 4.1 3.8   CHLORIDE 90* 99   CO2 27 22   BUN 8.1* 5.5*   CREATININE 0.71* 0.69*   GLUCOSE 108* 91   CALCIUM 8.4 8.7   MG  --  1.10*   PHOS  --  1.9*     LFTs  Recent Labs     08/02/23  1229 08/05/23  0026   ALBUMIN 2.8* 2.4*   GLOBULIN 3.6* 4.2*   ALKPHOS 76 64   ALT 9 9   AST 15 13   BILITOT 0.6 0.2     Inflammatory Markers  No results for input(s): "CRP", "LDH", "ESR" in the last 72 hours.  Lipid  No results for input(s): "CHOL", "TRIG", " ""LDL", "VLDL", "HDL" in the last 72 hours.  Diabetes  Recent Labs     08/02/23  1229 08/05/23  0026   GLUCOSE 108* 91     Thyroid  Recent Labs     08/02/23  1229   TSH 2.985          Microbiology Results (last 7 days)       Procedure Component Value Units Date/Time    Blood culture x two cultures. Draw prior to antibiotics. [[id number]] Collected: 08/05/23 0038    Order Status: Sent Specimen: Blood from Hand, Right Updated: 08/05/23 0053    Blood culture x two cultures. Draw prior to antibiotics. [[id number]] Collected: 08/05/23 0043    Order Status: Sent Specimen: Blood from Wrist, Right Updated: 08/05/23 0053           Imaging     X-Ray Chest AP Portable    (Results Pending)   CTA Chest Non-Coronary (PE Studies)    (Results Pending)     Assessment & Plan     58 yo male admitted for sepsis w/ pneumonia    Sepsis  Pneumonia  - qSOFA score of 2, 3/4 SIRS.   - 30 cc/kg LR given , BP became normotensive in response to fluids   - Chest xray showed RLL infiltrate  - O2 saturation 93 to 100 on RA  - Blood cultures x 2, lactic acid wnl  - Continue IV abx, azithromycin and ceftriaxone    Hypomagnesia  -2g magnesium sulfate in water 50 ml IVPB    Hypophosphatemia  -sodium phosphate 30 mmol in dextrose 5 % 250 mL IVPB    Continue home medication synthroid 50mcg, Norco 5 PRN     Access: Peripheral IV  Antibiotics: Azithromycin and Ceftriaxone  Diet: heart healthy  DVT Prophylaxis: Apixaban 5mg daily (home medication)   GI Prophylaxis: Protonix   Fluids: MARY ELLEN Rai MD  Family Medicine, [facility]     "

## 2023-08-05 NOTE — ED PROVIDER NOTES
"Encounter Date: 8/4/2023       History     Chief Complaint   Patient presents with    Shortness of Breath     Wife reports pt "having difficulty bringing up secretion" from ant neck stoma.      Presents with shortness of breath and difficulty with expectoration for the last few days. Pt with tracheostomy and radiotherapy due to lung CA. Denies fever, vomiting, diarrhea. States bloody sputum.     The history is provided by the patient and a relative.     Review of patient's allergies indicates:  No Known Allergies  Past Medical History:   Diagnosis Date    COPD (chronic obstructive pulmonary disease)     Dysphagia     GERD (gastroesophageal reflux disease)     laryngeal Cancer     Lung cancer     Vocal cord mass      Past Surgical History:   Procedure Laterality Date    DIAGNOSTIC LAPAROSCOPY N/A 5/18/2023    Procedure: LAPAROSCOPY, DIAGNOSTIC;  Surgeon: Dipak De Leon Jr., MD;  Location: [facility];  Service: General;  Laterality: N/A;    DIRECT DIAGNOSTIC LARYNGOSCOPY WITH BRONCHOSCOPY AND ESOPHAGOSCOPY N/A 07/11/2022    Procedure: LARYNGOSCOPY, DIRECT, DIAGNOSTIC, WITH BRONCHOSCOPY AND ESOPHAGOSCOPY;  Surgeon: Emory Alonso MD;  Location: [facility];  Service: ENT;  Laterality: N/A;    ESOPHAGOGASTRODUODENOSCOPY (EGD)- DEVICE ASSISTED N/A 04/18/2023    Procedure: EGD;  Surgeon: Aaliyah Conrad III, MD;  Location: [facility] ENDOSCOPY;  Service: Gastroenterology;  Laterality: N/A;  Esophageal Dilation; Savory over Guide wire: 36Fr and 39 Fr  Clips to secure peg site    HIP SURGERY      HUMERUS FRACTURE SURGERY      INCISION AND DRAINAGE, NECK Bilateral 10/21/2022    Procedure: INCISION AND DRAINAGE,NECK;  Surgeon: Madison Worley MD;  Location: [facility];  Service: ENT;  Laterality: Bilateral;    INSERT ARTERIAL LINE  06/26/2022         JOINT REPLACEMENT  2019    R hip    PEG TUBE REMOVAL      PLACEMENT, MEDIPORT      TRACHEOSTOMY N/A 06/10/2022    Procedure: CREATION, TRACHEOSTOMY;  Surgeon: Junior Alonso, " MD;  Location: [facility];  Service: ENT;  Laterality: N/A;     Family History   Problem Relation Age of Onset    Lung cancer Father     Cancer Father         Lung cancer    Throat cancer Brother     Cancer Brother         Throat     Social History     Tobacco Use    Smoking status: Former     Current packs/day: 0.00     Average packs/day: 0.5 packs/day for 42.4 years (21.2 ttl pk-yrs)     Types: Cigarettes     Start date: 1980     Quit date: 2022     Years since quittin.1    Smokeless tobacco: Never   Substance Use Topics    Alcohol use: Yes     Alcohol/week: 1.0 standard drink of alcohol     Types: 1 Cans of beer per week     Comment: occasional    Drug use: Never     Review of Systems   Constitutional:  Negative for fever.   HENT:  Negative for sore throat.    Respiratory:  Positive for cough, shortness of breath and wheezing.    Cardiovascular:  Negative for chest pain.   Gastrointestinal:  Negative for nausea.   Genitourinary:  Negative for dysuria.   Musculoskeletal:  Negative for back pain.   Skin:  Negative for rash.   Neurological:  Negative for weakness.   Hematological:  Does not bruise/bleed easily.   All other systems reviewed and are negative.      Physical Exam     Initial Vitals [23 2346]   BP Pulse Resp Temp SpO2   (!) 88/57 (!) 134 (!) 24 97.3 °F (36.3 °C) (!) 94 %      MAP       --         Physical Exam    Nursing note and vitals reviewed.  Constitutional: He appears well-developed and well-nourished. He appears distressed (Mild).   HENT:   Head: Normocephalic and atraumatic.   Nose: Nose normal.   Mouth/Throat: Oropharynx is clear and moist.   Eyes: Conjunctivae and EOM are normal. Pupils are equal, round, and reactive to light.   Neck: Neck supple. No JVD present.   Patent tracheostomy in place   Normal range of motion.  Cardiovascular:  Regular rhythm, normal heart sounds and intact distal pulses.           Tachycardic   Pulmonary/Chest: No stridor. He is in respiratory distress  (Mild). He has wheezes.   Abdominal: Abdomen is soft. Bowel sounds are normal. He exhibits no distension. There is no abdominal tenderness. There is no rebound and no guarding.   Musculoskeletal:         General: No edema. Normal range of motion.      Cervical back: Normal range of motion and neck supple.     Neurological: He is alert and oriented to person, place, and time. He has normal strength. GCS score is 15. GCS eye subscore is 4. GCS verbal subscore is 5. GCS motor subscore is 6.   Skin: Skin is warm and dry. No rash noted.   Psychiatric: His behavior is normal.         ED Course   Critical Care    Date/Time: 8/5/2023 2:13 AM    Performed by: Ulices Bueno MD  Authorized by: Ulices Bueno MD  Direct patient critical care time: 12 minutes  Additional history critical care time: 5 minutes  Ordering / reviewing critical care time: 12 minutes  Documentation critical care time: 5 minutes  Consulting other physicians critical care time: 5 minutes  Total critical care time (exclusive of procedural time) : 39 minutes  Critical care was necessary to treat or prevent imminent or life-threatening deterioration of the following conditions: circulatory failure and respiratory failure.  Critical care was time spent personally by me on the following activities: development of treatment plan with patient or surrogate, discussions with consultants, interpretation of cardiac output measurements, evaluation of patient's response to treatment, examination of patient, obtaining history from patient or surrogate, ordering and performing treatments and interventions, ordering and review of laboratory studies, re-evaluation of patient's condition, pulse oximetry, ordering and review of radiographic studies and review of old charts.        Labs Reviewed   COMPREHENSIVE METABOLIC PANEL - Abnormal; Notable for the following components:       Result Value    Sodium Level 132 (*)     Blood Urea Nitrogen  5.5 (*)     Creatinine 0.69 (*)     Albumin Level 2.4 (*)     Globulin 4.2 (*)     Albumin/Globulin Ratio 0.6 (*)     All other components within normal limits   PHOSPHORUS - Abnormal; Notable for the following components:    Phosphorus Level 1.9 (*)     All other components within normal limits   MAGNESIUM - Abnormal; Notable for the following components:    Magnesium Level 1.10 (*)     All other components within normal limits   CBC WITH DIFFERENTIAL - Abnormal; Notable for the following components:    WBC 2.90 (*)     RBC 3.48 (*)     Hgb 11.0 (*)     Hct 31.8 (*)     MCH 31.6 (*)     Platelet 96 (*)     MPV 10.9 (*)     Lymph # 0.20 (*)     All other components within normal limits   LACTIC ACID, PLASMA - Normal   B-TYPE NATRIURETIC PEPTIDE - Normal   TROPONIN I - Normal   BLOOD CULTURE OLG   BLOOD CULTURE OLG   CBC W/ AUTO DIFFERENTIAL    Narrative:     The following orders were created for panel order CBC auto differential.  Procedure                               Abnormality         Status                     ---------                               -----------         ------                     CBC with Differential[[id number]]        Abnormal            Final result                 Please view results for these tests on the individual orders.   URINALYSIS, REFLEX TO URINE CULTURE   EXTRA TUBES    Narrative:     The following orders were created for panel order EXTRA TUBES.  Procedure                               Abnormality         Status                     ---------                               -----------         ------                     Light Blue Top Hold[[id number]]                              In process                 Red Top Hold[[id number]]                                     In process                 Light Green Top Hold[[id number]]                             In process                 Lavender Top Hold[[id number]]                                In process                 Lavender Top  Hold[[id number]]                                In process                 Gold Top Hold[[id number]]                                    In process                   Please view results for these tests on the individual orders.   LIGHT BLUE TOP HOLD   RED TOP HOLD   LIGHT GREEN TOP HOLD   LAVENDER TOP HOLD   LAVENDER TOP HOLD   GOLD TOP HOLD     EKG Readings: (Independently Interpreted)   Initial Reading: No STEMI. Rhythm: Sinus Tachycardia. Heart Rate: 122. Ectopy: No Ectopy. Conduction: Normal. ST Segments: Normal ST Segments. T Waves: Normal. Axis: Normal. Clinical Impression: Sinus Tachycardia       Imaging Results              X-Ray Chest AP Portable (In process)                   X-Rays:   Independently Interpreted Readings:   Chest X-Ray: Normal heart size. There is an infiltrate in the RLL.     Medications   magnesium sulfate 2g in water 50mL IVPB (premix) (has no administration in time range)   cefTRIAXone (ROCEPHIN) 1 g in dextrose 5 % in water (D5W) 100 mL IVPB (MB+) (0 g Intravenous Stopped 8/5/23 0140)   azithromycin (ZITHROMAX) 500 mg in dextrose 5 % (D5W) 250 mL IVPB (Vial-Mate) (500 mg Intravenous New Bag 8/5/23 0108)   lactated ringers bolus 1,854 mL (1,854 mLs Intravenous New Bag 8/5/23 0015)   albuterol-ipratropium 2.5 mg-0.5 mg/3 mL nebulizer solution 3 mL (3 mLs Nebulization Given 8/5/23 0033)     Medical Decision Making:   History:   I obtained history from: someone other than patient.       <> Summary of History: Pts family member provide most of the elements of HPI, Pt with tracheostomy, responds with movements of the ehad  Differential Diagnosis:   Pneumonia, Asthma exacerbation, COPD exacerbation, Pericardial Effusion, Hear Failure, Pulmonary Emboli, Pleural effusion, malignancy, among others    Independently Interpreted Test(s):   I have ordered and independently interpreted X-rays - see prior notes.  I have ordered and independently interpreted EKG Reading(s) - see prior notes  Clinical  Tests:   Lab Tests: Ordered  Radiological Study: Ordered  Medical Tests: Ordered  Other:   I have discussed this case with another health care provider.       <> Summary of the Discussion: Hospital medicine for admission                   1:45 AM    At this time 1:45 AM pt vital signs are     BP: 125/77 mmHg;  bpm; RR 16 rpm; Pulse Ox 94% at RA  . Pt is AAOx3  his cardiorespiratory re-assessment reveals RRR, tachycardic, mild crackles Rt side. Fluid resuscitation with 1200 ml of RL given and the capillary refill is 3-4 sec, the skin color is pink and temperature is cold. Intravascular volume was assess by Bedside Ultrasound of Inferior Vena Cava with a diameter of 0.9 cm and collapse of 50 % reveal adequate/no response. Initial lactic acid of 1.4.    Pt responding adequately to fluids, 600 ml pending and infusing, antibiotic therapy given with RT side pneumonia. Due to high risk for PE; CT Chest PE protocol ordered. An additional 500 ml fluid also ordered. Will F/U for final dispo    2:10 AM    CT notify pt unable to tolerate laying flat for the study.       Clinical Impression:   Final diagnoses:  [R06.02] Shortness of breath  [A41.9, R65.20] Severe sepsis (Primary)  [J18.9] Pneumonia of right lower lobe due to infectious organism        ED Disposition Condition    Admit Stable                Ulices Bueno MD  08/05/23 0213       Ulices Bueno MD  08/05/23 0215

## 2023-08-05 NOTE — PLAN OF CARE
08/05/23 0849   Discharge Assessment   Assessment Type Discharge Planning Assessment   Confirmed/corrected address, phone number and insurance Yes   Confirmed Demographics Correct on Facesheet   Source of Information family;health record   When was your last doctors appointment?   (Tony Bertrand)   Reason For Admission Shortness of breath; Laryngeal cancer; Hyponatremia; Acute chest pain; PICC (peripherally inserted central catheter) in place; Neutropenic fever; Malignant neoplasm of right lung, unspecified part of lung; Community acquired pneumonia of right middle lobe of lung   People in Home spouse   Facility Arrived From: Home   Do you expect to return to your current living situation? Yes   Do you have help at home or someone to help you manage your care at home? Yes   Who are your caregiver(s) and their phone number(s)? Kymberly Boudreaux Spouse   [phone]; ADAN HUANG Daughter   [phone]   Prior to hospitilization cognitive status: Alert/Oriented   Current cognitive status: Alert/Oriented   Walking or Climbing Stairs ambulation difficulty, requires equipment   Mobility Management Rollator, cane   Dressing/Bathing bathing difficulty, requires equipment   Dressing/Bathing Management Shower Chair   Equipment Currently Used at Home bedside commode;cane, straight;nebulizer;shower chair;rollator;oxygen;suction machine   Readmission within 30 days? No   Patient currently being followed by outpatient case management? No   Do you currently have service(s) that help you manage your care at home? No   Do you take prescription medications? Yes  ([address] [facility] [place]   Do you have prescription coverage? Yes   Coverage [employer] M/C   Do you have any problems affording any of your prescribed medications? No   Is the patient taking medications as prescribed? yes   Who is going to help you get home at discharge? Wife   How do you get to doctors appointments? family or friend will provide   Are you on  dialysis? No   Discharge Plan A Home with family   DME Needed Upon Discharge  none   Discharge Plan discussed with: Spouse/sig other   Name(s) and Number(s) Kymberly Boudreaux Spouse   [phone]   Transition of Care Barriers None   OTHER   Name(s) of People in Home Kymberly Boudreaux Spouse   [phone]     Pt  with 1 dghtr; Resides with spouse, Kymberly, who provides 24 hr care; Pt previously received HH services through [employer] whom spouse is employed with; Spouse stated she is looking into whether [employer] accepts pt's new insurance, but stated pt is doing fine without HH services at this time; Should pt need therapy, spouse stated she will obtain order for OP services; Spouse stated they are not interested in discussing palliative care at this time; CM to follow for d/c planning needs.

## 2023-08-05 NOTE — CARE UPDATE
HPI:   57 y.o. 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 Endorses bloody sputum for one week. Pt with tracheostomy and radiotherapy due to lung CA. Denies fever, vomiting, diarrhea.    Pt states he feels better this morning than when he came in. Breathing status has improved. Remains tachycardic (Per previous notes pt's baseline HR around 90s-110s).    Physical Exam:   General: Appears uncomfortable  HEENT: NC/AT  Neck:  No JVD, tracheostomy and radiotherapy due to lung CA  Pulm: RLL with crackles and wheezing present; good air movement bilaterally  CVS: Tachycardic. Normal S1/S2.  Abdomen: nondistended, normoactive BS, soft and non-tender.  MSK: No obvious deformity or joint swelling  Skin: Warm and dry  Neuro: AAOx3, no focal neurological deficit. CN II-XII grossly intact   Psych: Cooperative    A/P:  Sepsis  Pneumonia  Leukopenia  - qSOFA score of 2, 3/4 SIRS   - Current under going chemo radiation   - 30 cc/kg LR given , BP became normotensive in response to fluids. HR remained in 120s overnight (baseline 90s-110s) will give additional 1L bolus  - Chest xray showed RLL infiltrate  - O2 saturation 93 to 100 on RA  - Blood cultures x 2 pending, lactic acid wnl  - Initiate Vanc, Zosyn and Levaquin  - Duonebs ordered prn   - Chest PT ordered  - Discussion held with pt regarding CT PE study; encouraged pt to try again; would like to rule out PE. He understands risks and benefits.   -Increased Eliquis to 5mg bid   - Discussion held with wife and pt regarding palliative care and HH - are unsure about acceptance at this time. Would like to think about it.      Hypomagnesia  -2g magnesium sulfate in water 50 ml IVPB     Hypophosphatemia  -sodium  phosphate 30 mmol in dextrose 5 % 250 mL IVPB     Continue home medication synthroid 50mcg, Norco 5 PRN                  Access: Peripheral IV  Antibiotics: Vanc, Zosyn, Levaquin  Diet: heart healthy  DVT Prophylaxis: Apixaban 5mg bid   GI Prophylaxis: Protonix   Fluids: MARY ELLEN Olivares MD  [facility]

## 2023-08-05 NOTE — PT/OT/SLP PROGRESS
Physical Therapy    Missed Treatment Session    Patient Name:  Josafat Boudreaux   MRN:  [id number]      Patient not seen at this time secondary to Other (Comment) (Pt is currently being ruled out for COVID-19).    Will follow-up as patient is available to participate and as therapists' schedule allows.

## 2023-08-05 NOTE — PLAN OF CARE
Problem: Adult Inpatient Plan of Care  Goal: Plan of Care Review  Outcome: Ongoing, Progressing  Goal: Patient-Specific Goal (Individualized)  Outcome: Ongoing, Progressing  Goal: Absence of Hospital-Acquired Illness or Injury  Outcome: Ongoing, Progressing  Goal: Optimal Comfort and Wellbeing  Outcome: Ongoing, Progressing  Goal: Readiness for Transition of Care  Outcome: Ongoing, Progressing     Problem: Pain Acute  Goal: Acceptable Pain Control and Functional Ability  Outcome: Ongoing, Progressing     Problem: Fall Injury Risk  Goal: Absence of Fall and Fall-Related Injury  Outcome: Ongoing, Progressing     Problem: Breathing Pattern Ineffective  Goal: Effective Breathing Pattern  Outcome: Ongoing, Progressing     Problem: Infection  Goal: Absence of Infection Signs and Symptoms  Outcome: Ongoing, Progressing

## 2023-08-06 NOTE — PROGRESS NOTES
[facility]   Progress Note      Resident Team: [facility] List 2  Attending Physician: Chandler Mahoney MD  Resident: Mohan Olivares   Intern: [facility] Length of Stay: 1 days    Subjective   Brief HPI:  57 y.o. 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 Endorses bloody sputum for one week. Pt with tracheostomy and radiotherapy due to lung CA. Denies fever, vomiting, diarrhea.  Arrived tachycardic and hypotensive to ED, BP responsive to fluids.Received azithromycin and ceftriaxone as well as LR.  Cxray significant for RLL infiltrate, Internal Medicine consulted for sepsis 2/2 pneumonia, will continue IV antibiotics.      Interval History:   NAERON. VSS; HR has come down closer to baseline. Pt attempted CTA yesterday; IV was lost so midline placed. States he is feeling better today; breathing more comfortably. Wife has been able to suction out some mucous plugs and bloody secretions. Team witnessed bloody suction; H/H remained stable. Will continue to monitor at this time.      Review of Systems:  Review of Systems   Constitutional:  Negative for chills, fever and malaise/fatigue.   Respiratory:  Positive for cough, sputum production and shortness of breath. Negative for wheezing.    Cardiovascular:  Negative for chest pain, palpitations and leg swelling.   Gastrointestinal:  Negative for abdominal pain, constipation, diarrhea and vomiting.   Genitourinary:  Negative for dysuria.   Neurological:  Negative for weakness and headaches.      Objective     Vital Signs (Most Recent):  Temp: 97.5 °F (36.4 °C) (08/06/23 0420)  Pulse: 110 (08/06/23 0755)  Resp: 18 (08/06/23 0755)  BP: (!) 133/95 (08/06/23 0420)  SpO2: 95 % (08/06/23  0755) Vital Signs (24h Range):  Temp:  [97.5 °F (36.4 °C)-98.7 °F (37.1 °C)] 97.5 °F (36.4 °C)  Pulse:  [100-122] 110  Resp:  [18-22] 18  SpO2:  [95 %-100 %] 95 %  BP: ()/(65-95) 133/95     Physical Examination:  Physical Exam  Vitals and nursing note reviewed.   Constitutional:       General: He is not in acute distress.     Appearance: He is not toxic-appearing.   HENT:      Head:      Comments: Pt receiving breathing treatment over stoma at this time.   Cardiovascular:      Rate and Rhythm: Regular rhythm. Tachycardia present.      Pulses: Normal pulses.      Heart sounds: No murmur heard.  Pulmonary:      Effort: Pulmonary effort is normal. No respiratory distress.      Comments: Breath sounds have improved bilaterally; wheezing no longer present. RLL with mild persistent crackles.   Chest:      Chest wall: No tenderness.   Abdominal:      General: Bowel sounds are normal.      Tenderness: There is no abdominal tenderness. There is no guarding.   Musculoskeletal:      Right lower leg: No edema.      Left lower leg: No edema.   Skin:     General: Skin is warm.      Capillary Refill: Capillary refill takes less than 2 seconds.       Laboratory:  Most Recent Data:  Recent Lab Results         08/06/23  0449        Albumin/Globulin Ratio 0.6       Neutrophils Abs Calc 1.634       Albumin 2.3       Alkaline Phosphatase 55       ALT 7       Aniso 1+       AST 12       BILIRUBIN TOTAL 0.3       BUN <3.0       Calcium 8.7       Chloride 102       CO2 24       Creatinine 0.64       eGFR >60       Globulin, Total 3.8       Glucose 96       Hematocrit 30.2       Hemoglobin 10.3       Lymph # 0.076       Lymphs % 4       Magnesium 1.60       MCH 32.2       MCHC 34.1       MCV 94.4       Mono # 0.19       Mono % 10       MPV 10.5       Neutrophils Relative 86       nRBC 0.0       Phosphorus 3.0       Platelet Estimate Decreased       Platelets 78       Poly 1+       Potassium 4.1       PROTEIN TOTAL 6.1       RBC 3.20        RBC Morph Abnormal       RDW 14.1       Sodium 136       WBC 1.90                 Microbiology Data Reviewed: yes  Pertinent Findings:  Still pending    Radiology:  Imaging Results              X-Ray Chest AP Portable (Final result)  Result time 08/05/23 12:04:50      Final result by Lance Boudreaux MD (08/05/23 12:04:50)                   Impression:      New area of right lower lung consolidation could be pneumonia.  Depending on symptoms, follow-up radiographs can be considered to ensure that this resolves.    Right upper lobe consolidation has decreased since December.      Electronically signed by: Lance Boudreaux  Date:    08/05/2023  Time:    12:04               Narrative:    EXAMINATION:  XR CHEST AP PORTABLE    CLINICAL HISTORY:  Sepsis;    TECHNIQUE:  Frontal view(s) of the chest.    COMPARISON:  Radiography 12/21/2022    FINDINGS:  Left-sided MediPort with catheter tip overlying the SVC.  Normal cardiac silhouette.  The lungs are well-inflated.  Right upper lobe consolidation has decreased since December with residual ill-defined opacity laterally within the right upper lobe.  New area of consolidation in the medial portion of the right lower lung.  No significant pleural fluid.  No pneumothorax identified.                                    Current Medications:     Infusions:   sodium chloride 0.9% 125 mL/hr at 08/06/23 0619        Scheduled:   albuterol-ipratropium  3 mL Nebulization Q6H    apixaban  5 mg Oral BID    famotidine  20 mg Oral QHS    guaiFENesin  600 mg Oral BID    levoFLOXacin  750 mg Intravenous Q24H    levothyroxine  50 mcg Oral Daily    melatonin  6 mg Oral Nightly    pantoprazole  20 mg Oral Daily    piperacillin-tazobactam (Zosyn) IV (PEDS and ADULTS) (extended infusion is not appropriate)  4.5 g Intravenous Q12H    vancomycin 1.25 g in dextrose 5% 250 mL IVPB (ready to mix)  1,250 mg Intravenous Q12H        PRN:  sodium chloride 0.9%, benzonatate, dextrose 10%,  dextrose 10%, glucagon (human recombinant), glucose, glucose, HYDROcodone-acetaminophen, naloxone, sodium chloride 0.9%, sodium phosphate 30 mmol in dextrose 5 % (D5W) 250 mL IVPB, Pharmacy to dose Vancomycin consult **AND** vancomycin - pharmacy to dose    Antibiotics and Day Number of Therapy:  Vanc, Zosyn and Levaquin - completed D1    Intake/Output Summary (Last 24 hours) at 8/6/2023 0812  Last data filed at 8/6/2023 0620  Gross per 24 hour   Intake 2400.02 ml   Output 1000 ml   Net 1400.02 ml       Lines/Drains/Airways       Central Venous Catheter Line  Duration                  PowerPort A Cath Single Lumen left subclavian -- days              Airway  Duration                  Laryngectomy (Do Not Remove) Laryngectomy tube -- days                  Assessment and Plan    Sepsis  Pneumonia (improving)  Leukopenia  Bloody Pulmonary Secretions (H/O neck and chest radiation)   - qSOFA score of 2, 3/4 SIRS   - Current under going chemo radiation   - Chest xray showed RLL infiltrate  - Blood cultures x 2 pending, lactic acid wnl  - Continue Vanc, Zosyn and Levaquin - if MRSA PCR negative will D/C Vanc today   - HR remained in 100s - 110s overnight (baseline 90s-110s)   - Duonebs ordered q6h prn   - Chest PT ordered  - Will attempt CT PE again today; will give ativan beforehand to help with pts anxiety. Also ordering CTA neck given recent bloody change seen in secretions.   - Respiratory panel, MRSA PCR and sputum culture not collected yet; spoke with nurse and she is willing to collect them today.   - Continue Eliquis to 5mg bid   - Consider ENT consult tomorrow; suppose to have upcoming scope done on 8/9/23  - Discussion held with wife and pt regarding palliative care and HH - are unsure about acceptance at this time. Would like to think about it.      Hypomagnesia (resolved)  -Mg 1.6; will give extra 1g today     Hypophosphatemia (resolved)  -phos 3.0     Continue home medication synthroid 50mcg, Norco 5 PRN                   Access: Midline  Antibiotics: Vanc, Zosyn, Levaquin  Diet: heart healthy  DVT Prophylaxis: Apixaban 5mg bid   GI Prophylaxis: Protonix   Fluids: LR @ 75 cc/hr    Dispo: Pt admitted for RLL PNA; continue antibiotic regimen (Vanc, Zosyn, Levaquin). Once MRSA PCR comes back, if negative will d/c Vanc. Decrease fluids to 75cc/hr and change Duonebs to prn.     Storm Olivares MD  \[facility]\"" Family Medicine -II

## 2023-08-06 NOTE — PROGRESS NOTES
"Inpatient Nutrition Evaluation    Admit Date: 2023   Total duration of encounter: 2 days    Nutrition Recommendation/Prescription     Modify diet to Regular diet  Continue Boost Plus (provides 360 kcal, 14 g protein per serving)   Monitor Weights Weekly     Nutrition Assessment     Chart Review    Reason Seen: physician consult for malnutrition    Malnutrition Screening Tool Results   Have you recently lost weight without trying?: No  Have you been eating poorly because of a decreased appetite?: No   MST Score: 0     Diagnosis:  Sepsis, Pneumonia, Leukopenia, Bloody pulmonary secretions    Relevant Medical History: COPD, Dysphagia, peg, GERD, Stage IV Laryngeal Cancer, Trach    Nutrition-Related Medications: Famotidine, Protonix, Zosyn,Vanc    Nutrition-Related Labs:  23 -- Phos 4, K 4.1, Glu 96, BUN < 3, Cr 0.64    Diet Order: Diet heart healthy  Oral Supplement Order: Boost Plus  Appetite/Oral Intake: good/% of meals  Factors Affecting Nutritional Intake: none identified  Food/Zoroastrian/Cultural Preferences: none reported  Food Allergies: none reported    Skin Integrity: intact  Wound(s):   skin intact    Comments    23 -- Unable to speak with patient, nsing reports pt ate well for breakfast this am eating 100% of [place] Toast; h/o Dysphagia s/p peg, no difficulty swallowing at this time, nsing reports taking meds whole without difficulty; ~ ~9% wt loss x 1 year noted, not significant with most recent wt stable    Anthropometrics    Height: 5' 6" (167.6 cm) Height Method: Stated  Last Weight: 61.8 kg (136 lb 3.9 oz) (23 2346) Weight Method: Standard Scale  BMI (Calculated): 22  BMI Classification: normal (BMI 18.5-24.9)        Ideal Body Weight (IBW), Male: 142 lb     % Ideal Body Weight, Male (lb): 95.95 %                 Usual Body Weight (UBW), k.5 kg  % Usual Body Weight: 87.84     Usual Weight Provided By: EMR weight history    Wt Readings from Last 5 Encounters:   23 61.8 " kg (136 lb 3.9 oz)   08/04/23 62.1 kg (137 lb)   08/02/23 62.9 kg (138 lb 11.2 oz)   07/27/23 62.1 kg (137 lb)   07/20/23 63.6 kg (140 lb 3.2 oz)     Weight Change(s) Since Admission:  Admit Weight: 61.8 kg (136 lb 3.9 oz) (08/04/23 2346)      Patient Education    Not applicable.    Monitoring & Evaluation     Dietitian will monitor food and beverage intake, weight change, electrolyte/renal panel, and gastrointestinal profile.  Nutrition Risk/Follow-Up: low (follow-up in 5-7 days)  Patients assigned 'low nutrition risk' status do not qualify for a full nutritional assessment but will be monitored and re-evaluated in a 5-7 day time period. Please consult if re-evaluation needed sooner.

## 2023-08-07 NOTE — PT/OT/SLP EVAL
Physical Therapy Evaluation & Discharge Note    Patient Name:  Josafat Boudreaux   MRN:  [id number]    Recommendations     Discharge Recommendations:   (home w/ responsible caregiver)   Discharge Equipment Recommendations:  (none vs straight cane)   Barriers to discharge: none    Assessment     Josafat Boudreaux is a 57 y.o. male admitted with a medical diagnosis of     Sepsis  Pneumonia (improving)  Leukopenia  Bloody Pulmonary Secretions (H/O neck and chest radiation)   Hypomagnesia (resolved)  Hypophosphatemia (resolved)  Patient Active Problem List   Diagnosis    Closed basicervical fracture of femur    Shortness of breath    Dysphagia    New onset seizure    Malignant neoplasm of larynx, unspecified    Regional lymph node metastasis present    Squamous cell carcinoma of both lungs    Chronic obstructive pulmonary disease, unspecified    H/O head and neck radiation    Lung cancer    Blastomycotic infection    On antineoplastic chemotherapy    Right arm cellulitis    Maintenance antineoplastic immunotherapy    Gastric fistula    Esophageal stenosis    Essential (primary) hypertension     He presents with the following impairments/functional limitations:   (none from PT SERVICES standpoint).    Patient was seen by therapy for evaluation only  Patient discharged to 'in-house' with independent and walking program  Patient's current level of function is at baseline (PLOF)  Patient does not require further acute PT services    Recent Surgery: * No surgery found *      Plan     Patient discharged from acute PT Services on 08/07/23.    Based on current functional levels observed, patient does not require further acute PT services.    Re-consult PT Services if patient's status changes and therapy services warranted.    Subjective     Communicated with patient's nurse [place] prior to session.    Patient agreeable to participate in evaluation.     Chief Complaint: none  Patient/Family Comments/goals:  home  Pain/Comfort:  Pain Rating 1: 0/10  Pain Addressed 1: Nurse notified  Pain Rating Post-Intervention 1: 0/10    Patients cultural, spiritual, Lutheran conflicts given the current situation: no    Social History  Living Environment: Patient lives with their spouse in a mobile home, with 3 steps steps outside, with left handrail, with tub-shower combo.  Functional Level: Prior to admission patient was driving, ambulated without assistive device, was independent in ADL's, and patient's spouse works from home and performs household chores and cooks. Patient has DME including: rollator, straight cane, shower chair and BSC (none currently utilized)  Assistance Upon Discharge: spouse.    Hand dominance: right    Patient denied lightheadedness/dizziness.  Patient complaints of bilat fingers numbness.  Patient denied current swallowing difficulty.  Patient denied 'new' vision impairment.    Objective     OT Kimberly in room for evaluation    Patient found supine in bed, with HOB elevated, and bed rails up none  with Tracheostomy, telemetry (midline)  upon PT entry to room.    General Precautions: Standard,  (trach)   Orthopedic Precautions:N/A (per patient - Rt UE no limitaions)   Braces: N/A  Respiratory Status: room air    Vitals   At Rest (pre-session)  BP  110/71   HR  128   O2 Sat %  96      With Activity (post-session)  BP  119/78   HR  129   O2 Sat %  97     Exams:  Orientation: Patient is oriented to Person, Place, Time, Situation  Commands: Patient follows multi-step verbal commands  Fine Motor Coordination:     -     Intact: LLE heel shin, RLE heel shin, and Rapid alternating ankle DF/PF  Sensation:    -     Intact: light/touch bilat lower extremity  BILAT UE ROM/strength - defer to OT - see OT note for details  RLE ROM: WFL  RLE Strength: WFL  LLE ROM: WFL  LLE Strength: WFL    Functional Mobility:    Bed Mobility:  Rolling Left: independence  Rolling Right: independence  Scooting: independence  Supine to  Sit: independence  Sit to Supine: independence  with no cues required    Transfers:  Sit to Stand: independence with no assistive device  with no cues required    Gait:  Patient ambulated 130ft (patient declined further gait distance) with no assistive device and modified independence.  Patient demonstrates steady gait, symmetrical step length, upright posture, reciprocal arm swing, no loss of balance, and no mis-steps.    Other Mobility:  Steps: Patient ascended/descended 3 steps with no device with left handrail with modified independence    Balance:  Sit  Patient demonstrated static balance on level surface with independence with no verbal cues.  Patient demonstrated dynamic balance on level surface with independence with no verbal cues during maximal excursions.  Stand  Patient demonstrated static balance on level surface  using no device with independence with no verbal cues.    Patient left supine in bed, with HOB elevated, with Tracheostomy, telemetry (midline), all lines intact, call button in reach, tray table at bedside, pt's nurse Leara notified, and pt's spouse present.    Education     White board updated regarding patient's safest level of mobility with staff assistance.    Goals - No STG's or LTG's established secondary to patient was seen for evaluation and discharge     Multidisciplinary Problems       Physical Therapy Goals       Not on file                    History     Past Medical History:   Diagnosis Date    COPD (chronic obstructive pulmonary disease)     Dysphagia     Essential (primary) hypertension 8/5/2023    GERD (gastroesophageal reflux disease)     laryngeal Cancer     Lung cancer     Vocal cord mass      Past Surgical History:   Procedure Laterality Date    DIAGNOSTIC LAPAROSCOPY N/A 5/18/2023    Procedure: LAPAROSCOPY, DIAGNOSTIC;  Surgeon: Dipak De Leon Jr., MD;  Location: [facility];  Service: General;  Laterality: N/A;    DIRECT DIAGNOSTIC LARYNGOSCOPY WITH BRONCHOSCOPY AND  ESOPHAGOSCOPY N/A 07/11/2022    Procedure: LARYNGOSCOPY, DIRECT, DIAGNOSTIC, WITH BRONCHOSCOPY AND ESOPHAGOSCOPY;  Surgeon: Emory Alonso MD;  Location: [facility] OR;  Service: ENT;  Laterality: N/A;    ESOPHAGOGASTRODUODENOSCOPY (EGD)- DEVICE ASSISTED N/A 04/18/2023    Procedure: EGD;  Surgeon: Aaliyah Conrad III, MD;  Location: [facility] ENDOSCOPY;  Service: Gastroenterology;  Laterality: N/A;  Esophageal Dilation; Savory over Guide wire: 36Fr and 39 Fr  Clips to secure peg site    HIP SURGERY      HUMERUS FRACTURE SURGERY      INCISION AND DRAINAGE, NECK Bilateral 10/21/2022    Procedure: INCISION AND DRAINAGE,NECK;  Surgeon: Madison Worley MD;  Location: [facility] OR;  Service: ENT;  Laterality: Bilateral;    INSERT ARTERIAL LINE  06/26/2022         JOINT REPLACEMENT  2019    R hip    PEG TUBE REMOVAL      PLACEMENT, MEDIPORT      TRACHEOSTOMY N/A 06/10/2022    Procedure: CREATION, TRACHEOSTOMY;  Surgeon: Junior Alonso MD;  Location: [facility];  Service: ENT;  Laterality: N/A;     Time Tracking     PT Received On: 08/07/23  PT Start Time: 1024     PT Stop Time: 1047  PT Total Time (min): 23 min     Billable Minutes: Evaluation 23 08/07/2023

## 2023-08-07 NOTE — CONSULTS
"OTOLARYNGOLOGY CONSULT NOTE    SERVICE: [facility] - Otolaryngology  RESIDENT PHYSICIANS: Candi Mckeon MD PGY4  ATTENDING PHYSICIAN: Dr. Barrett    REASON FOR CONSULT: shortness of breath  CHIEF COMPLAINT:   Chief Complaint   Patient presents with    Shortness of Breath     Wife reports pt "having difficulty bringing up secretion" from ant neck stoma.          HISTORY OF PRESENT ILLNESS  Josafat Boudreaux is a 57 y.o. 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 He completed adjuvant radiation therapy in September of 2022 and chemotherapy until March 2023 for metastatic right lung lesions. Unfortunately, his PET scan in June 2023 showed progression of his mediastinal disease for which he is currently undergoing additional chemo and radiation treatments.    He was admitted on 8/4/23 with shortness of breath and thick secretions. Tachycardic and hypotensive upon arrival with chest x-ray concerning for pneumonia. He has since been started on IV fluids with appropriate response in his BP and IV antibiotics for his lungs.     ENT was consulted for evaluation of bloody secretions. Patient reports he has had blood traced sputum for the last week. Wife reports he was having a hard time coughing up his thick secretions earlier in the week but has improved since arrival. He has been eating a regular diet with a moderately reduced appetite but has only reported a 3 pound weight loss.      REVIEW OF SYSTEMS:  ROS    As above and otherwise negative X 10-point review.    PAST MEDICAL HISTORY  Past Medical History:   Diagnosis Date    COPD (chronic obstructive pulmonary disease)     Dysphagia     Essential (primary) hypertension 8/5/2023    GERD (gastroesophageal reflux disease)     laryngeal Cancer     Lung cancer     Vocal cord mass  "       PAST SURGICAL HISTORY  Past Surgical History:   Procedure Laterality Date    DIAGNOSTIC LAPAROSCOPY N/A 2023    Procedure: LAPAROSCOPY, DIAGNOSTIC;  Surgeon: Dipak De Leon Jr., MD;  Location: [facility] OR;  Service: General;  Laterality: N/A;    DIRECT DIAGNOSTIC LARYNGOSCOPY WITH BRONCHOSCOPY AND ESOPHAGOSCOPY N/A 2022    Procedure: LARYNGOSCOPY, DIRECT, DIAGNOSTIC, WITH BRONCHOSCOPY AND ESOPHAGOSCOPY;  Surgeon: Emory Alonso MD;  Location: [facility] OR;  Service: ENT;  Laterality: N/A;    ESOPHAGOGASTRODUODENOSCOPY (EGD)- DEVICE ASSISTED N/A 2023    Procedure: EGD;  Surgeon: Aaliyah Conrad III, MD;  Location: [facility] ENDOSCOPY;  Service: Gastroenterology;  Laterality: N/A;  Esophageal Dilation; Savory over Guide wire: 36Fr and 39 Fr  Clips to secure peg site    HIP SURGERY      HUMERUS FRACTURE SURGERY      INCISION AND DRAINAGE, NECK Bilateral 10/21/2022    Procedure: INCISION AND DRAINAGE,NECK;  Surgeon: Madison Worley MD;  Location: [facility];  Service: ENT;  Laterality: Bilateral;    INSERT ARTERIAL LINE  2022         JOINT REPLACEMENT  2019    R hip    PEG TUBE REMOVAL      PLACEMENT, MEDIPORT      TRACHEOSTOMY N/A 06/10/2022    Procedure: CREATION, TRACHEOSTOMY;  Surgeon: Junior Alonso MD;  Location[facility];  Service: ENT;  Laterality: N/A;       SOCIAL HISTORY  Social History     Socioeconomic History    Marital status:    Tobacco Use    Smoking status: Former     Current packs/day: 0.00     Average packs/day: 0.5 packs/day for 42.4 years (21.2 ttl pk-yrs)     Types: Cigarettes     Start date: 1980     Quit date: 2022     Years since quittin.1    Smokeless tobacco: Never   Substance and Sexual Activity    Alcohol use: Yes     Alcohol/week: 1.0 standard drink of alcohol     Types: 1 Cans of beer per week     Comment: occasional    Drug use: Never    Sexual activity: Not Currently     Partners: Female     Social Determinants of Health     Financial  Resource Strain: Low Risk  (3/21/2023)    Overall Financial Resource Strain (CARDIA)     Difficulty of Paying Living Expenses: Not hard at all   Food Insecurity: No Food Insecurity (3/21/2023)    Hunger Vital Sign     Worried About Running Out of Food in the Last Year: Never true     Ran Out of Food in the Last Year: Never true   Transportation Needs: No Transportation Needs (3/21/2023)    PRAPARE - Transportation     Lack of Transportation (Medical): No     Lack of Transportation (Non-Medical): No   Physical Activity: Inactive (3/21/2023)    Exercise Vital Sign     Days of Exercise per Week: 0 days     Minutes of Exercise per Session: 0 min   Stress: No Stress Concern Present (3/21/2023)    [employer] [place] [employer] - Occupational Stress Questionnaire     Feeling of Stress : Not at all   Social Connections: Moderately Isolated (3/21/2023)    Social Connection and Isolation Panel [NHANES]     Frequency of Communication with Friends and Family: More than three times a week     Frequency of Social Gatherings with Friends and Family: More than three times a week     Attends Mosque Services: Never     Active Member of Clubs or Organizations: No     Attends Club or Organization Meetings: Never     Marital Status:    Housing Stability: Low Risk  (3/21/2023)    Housing Stability Vital Sign     Unable to Pay for Housing in the Last Year: No     Number of Places Lived in the Last Year: 1     Unstable Housing in the Last Year: No       ALLERGIES  Review of patient's allergies indicates:  No Known Allergies    FAMILY HISTORY  Family History   Problem Relation Age of Onset    Lung cancer Father     Cancer Father         Lung cancer    Throat cancer Brother     Cancer Brother         Throat       Meds  Scheduled Meds:   apixaban  5 mg Oral BID    famotidine  20 mg Oral QHS    guaiFENesin  600 mg Oral BID    levoFLOXacin  750 mg Intravenous Q24H    levothyroxine  50 mcg Oral Daily    magnesium sulfate  IVPB  2 g Intravenous Once    melatonin  6 mg Oral Nightly    mupirocin   Nasal BID    pantoprazole  20 mg Oral Daily    piperacillin-tazobactam (Zosyn) IV (PEDS and ADULTS) (extended infusion is not appropriate)  4.5 g Intravenous Q8H     Continuous Infusions:  PRN Meds: sodium chloride 0.9%, albuterol-ipratropium, benzonatate, dextrose 10%, dextrose 10%, glucagon (human recombinant), glucose, glucose, HYDROcodone-acetaminophen, naloxone, sodium chloride 0.9%, sodium phosphate 30 mmol in dextrose 5 % (D5W) 250 mL IVPB    Physical Exam  GENERAL: NAD, AAO, no dyspnea/inc WOB, aphonic  NEURO: CN II - XII exam: intact bilaterally   EYES: EOMI, PERRLA  EARS: Hearing well at normal conversation volume  NOSE: nares normal, septum midline, MMM, no drainage   ORAL CAVITY: MMM, no lesions or ulcerations, no leukoplakia, no edema; BOT and FOM are soft/NT and without lesions/ ulcerations/ leukoplakia; dentitions is moderate  VOICE: aphonic secondary to laryngectomy  NECK: post radiation changes with firmness, lymphedema, stoma widely patent with roque tube in place with HME  CARDIOVASCULAR: peripheral pulses palpable  RESPIRATORY: non-labored respirations with symmetric chest rise  EXT: moving with coordination  PSYCHIATRIC: orientation to time/place/person, no depression, no anxiety or agitation, mood and affect wnl      Tracheoscopy:   Anesthesia: None  Surgeon: Candi Mckeon MD    Procedure in Detail: Verbal consent was obtain form patient. Flexible scope was passed through patient's stoma. His proximal mucosa was healthy appearing. Distal trachea with thick secretions, irritated mucosa with scant traces of blood. Able to visualize malissa and bilateral main stem bronchi.     LABORATORY RESULTS  Lab Results   Component Value Date/Time    WBC 2.28 (L) 08/07/2023 03:24 AM    HGB 10.2 (L) 08/07/2023 03:24 AM    HCT 30.8 (L) 08/07/2023 03:24 AM    CHLORIDE 102 08/07/2023 03:24 AM    CO2 24 08/07/2023 03:24 AM    BUN 4.4 (L)  08/07/2023 03:24 AM    CREATININE 0.76 08/07/2023 03:24 AM    GLUCOSE 99 08/07/2023 03:24 AM    CALCIUM 8.9 08/07/2023 03:24 AM    ALBUMIN 2.4 (L) 08/07/2023 03:24 AM    AST 15 08/07/2023 03:24 AM    ALT 10 08/07/2023 03:24 AM    ALKPHOS 57 08/07/2023 03:24 AM       RADIOLOGY (I personally reviewed the images)  CTA Chest 8/6/23  FINDINGS:  There are changes seen consistent with COPD and emphysema in the lungs.  There is a mass in the right region of the right hilum that extends into the right suprahilar region.  This is a known finding.  There is a area postobstructive atelectasis or infiltrate in the right upper lobe.  There are areas of consolidation and infiltrate developing in the right lower lobe.  There is a small right-sided pleural effusion.  There is a reticulonodular infiltrate developing in the left lower lobe..  No pulmonary embolism is seen.  There is narrowing of the right upper lobe pulmonary artery secondary to the right upper lobe mass.  The thoracic aorta appears normal.  No mediastinal lymphadenopathy is seen.  The heart appears normal.  Upper abdomen shows no acute abnormality.  Impression:  No pulmonary embolism is seen but there is narrowing of the right upper lobe pulmonary artery secondary to the right hilar and suprahilar mass  Area of consolidation and pneumonia developing in the right lower lobe  Mild reticulonodular infiltrate developing in the left lower lobe      Assessment and Plan:  Josafat Boudreaux is a 57 y.o. 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 Also with pulmonary mets, increased in size on latest PET who is currently undergoing chemoradiation therapy.    He was admitted for shortness of breath likely secondary to active pneumonia. Tracheoscopy today at bedside revealed purulent secretions and irritated tracheal mucosa with traces of  blood. There were no obvious masses or lesions. His bloody secretions are likely multifactorial given active pulmonary metastasis, current radiation treatment, and pneumonia.     Recommend continuing IV antibiotic therapy per primary. Also recommend Q4H saline nebs and SLP consult.   He also reports recently starting an appetite stimulant which would be beneficial to continue while inpatient.    I discussed with the patient and his wife that he can skip this month's clinic appointment and return to his usual follow up schedule next month. They know they can call us to come in sooner if he is having any issues.     Thank you for allowing us to participate in the care of this patient.  Case discussed with Dr. Barrett.     Candi Mckeon MD  [facility] Department of Otolaryngology  [facility]

## 2023-08-07 NOTE — PROGRESS NOTES
[facility]   Progress Note      Resident Team: [facility] List 2  Attending Physician: Chandler Mahoney MD  Resident: Mohan Olivares   Intern: [facility] Length of Stay: 2 days    Subjective   Brief HPI:  57 y.o. 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 Endorses bloody sputum for one week. Pt with tracheostomy and radiotherapy due to lung CA. Denies fever, vomiting, diarrhea.  Arrived tachycardic and hypotensive to ED, BP responsive to fluids.Received azithromycin and ceftriaxone as well as LR.  Cxray significant for RLL infiltrate, Internal Medicine consulted for sepsis 2/2 pneumonia, will continue IV antibiotics.      Interval History:   NAERON. VSS; HR has come down closer to baseline. CTA completed yesterday; no PE seen, narrowing of pulmonary artery due to lung mass (CTA reviewed with pulmonologist Dr King, no acute intervention needed) States he is feeling better today; breathing more comfortably. Wife has been able to suction out some mucous plugs and bloody secretions. Team witnessed bloody suction; H/H remained stable. Will continue to monitor at this time.      Review of Systems:  Review of Systems   Constitutional:  Negative for chills, fever and malaise/fatigue.   Respiratory:  Positive for cough and sputum production. Negative for shortness of breath and wheezing.    Cardiovascular:  Negative for chest pain, palpitations and leg swelling.   Gastrointestinal:  Negative for abdominal pain, constipation, diarrhea and vomiting.   Genitourinary:  Negative for dysuria.   Neurological:  Negative for weakness and headaches.      Objective     Vital Signs (Most Recent):  Temp: 98.2 °F (36.8 °C) (08/07/23  0453)  Pulse: (!) 125 (08/07/23 0735)  Resp: (!) 22 (08/07/23 0735)  BP: 115/65 (08/07/23 0453)  SpO2: (!) 93 % (08/07/23 0735) Vital Signs (24h Range):  Temp:  [97.9 °F (36.6 °C)-98.4 °F (36.9 °C)] 98.2 °F (36.8 °C)  Pulse:  [] 125  Resp:  [18-22] 22  SpO2:  [91 %-100 %] 93 %  BP: (112-119)/(60-67) 115/65     Physical Examination:  Physical Exam  Vitals and nursing note reviewed.   Constitutional:       General: He is not in acute distress.     Appearance: He is not toxic-appearing.   HENT:      Head:      Comments: Pt receiving breathing treatment over stoma at this time.   Cardiovascular:      Rate and Rhythm: Regular rhythm. Tachycardia present.      Pulses: Normal pulses.      Heart sounds: No murmur heard.  Pulmonary:      Effort: Pulmonary effort is normal. No respiratory distress.      Comments: Breath sounds have improved bilaterally; wheezing no longer present. RLL with mild persistent crackles.   Chest:      Chest wall: No tenderness.   Abdominal:      General: Bowel sounds are normal.      Tenderness: There is no abdominal tenderness. There is no guarding.   Musculoskeletal:      Right lower leg: No edema.      Left lower leg: No edema.   Skin:     General: Skin is warm.      Capillary Refill: Capillary refill takes less than 2 seconds.       Laboratory:  Most Recent Data:  Recent Lab Results         08/07/23  0324   08/06/23  1042        Immature Platelet Fraction 4.4         Albumin/Globulin Ratio 0.6         Albumin 2.4         Alkaline Phosphatase 57         ALT 10         AST 15         Baso # 0.01         Basophil % 0.4         BILIRUBIN TOTAL 0.2         BUN 4.4         Calcium 8.9         Chloride 102         CO2 24         Creatinine 0.76         eGFR >60         Eos # 0.01         Eosinophil % 0.4         Globulin, Total 3.9         Glucose 99         Hematocrit 30.8         Hemoglobin 10.2         Immature Grans (Abs) 0.02         Immature Granulocytes 0.9         Lymph # 0.19          LYMPH % 8.3         Magnesium 1.30         MCH 31.2         MCHC 33.1         MCV 94.2         Mono # 0.51         Mono % 22.4         MPV 10.2         MRSA SCREEN BY PCR   Not Detected       Neut # 1.54         Neut % 67.6         nRBC 0.0         Phosphorus 2.2         Platelets 95         Potassium 4.1         PROTEIN TOTAL 6.3         RBC 3.27         RDW 14.0         Sodium 135         WBC 2.28                   Microbiology Data Reviewed: yes  Pertinent Findings:  Still pending    Radiology:  Imaging Results              X-Ray Chest AP Portable (Final result)  Result time 08/05/23 12:04:50      Final result by Lance Boudreaux MD (08/05/23 12:04:50)                   Impression:      New area of right lower lung consolidation could be pneumonia.  Depending on symptoms, follow-up radiographs can be considered to ensure that this resolves.    Right upper lobe consolidation has decreased since December.      Electronically signed by: Lance Boudreaux  Date:    08/05/2023  Time:    12:04               Narrative:    EXAMINATION:  XR CHEST AP PORTABLE    CLINICAL HISTORY:  Sepsis;    TECHNIQUE:  Frontal view(s) of the chest.    COMPARISON:  Radiography 12/21/2022    FINDINGS:  Left-sided MediPort with catheter tip overlying the SVC.  Normal cardiac silhouette.  The lungs are well-inflated.  Right upper lobe consolidation has decreased since December with residual ill-defined opacity laterally within the right upper lobe.  New area of consolidation in the medial portion of the right lower lung.  No significant pleural fluid.  No pneumothorax identified.                                    Current Medications:     Infusions:         Scheduled:   apixaban  5 mg Oral BID    famotidine  20 mg Oral QHS    guaiFENesin  600 mg Oral BID    levoFLOXacin  750 mg Intravenous Q24H    levothyroxine  50 mcg Oral Daily    magnesium sulfate IVPB  2 g Intravenous Once    melatonin  6 mg Oral Nightly    pantoprazole  20 mg Oral Daily     piperacillin-tazobactam (Zosyn) IV (PEDS and ADULTS) (extended infusion is not appropriate)  4.5 g Intravenous Q8H    potassium, sodium phosphates  1 packet Oral Once        PRN:  sodium chloride 0.9%, albuterol-ipratropium, benzonatate, dextrose 10%, dextrose 10%, glucagon (human recombinant), glucose, glucose, HYDROcodone-acetaminophen, naloxone, sodium chloride 0.9%, sodium phosphate 30 mmol in dextrose 5 % (D5W) 250 mL IVPB    Antibiotics and Day Number of Therapy:  Zosyn and Levaquin - completed D2    Intake/Output Summary (Last 24 hours) at 8/7/2023 0951  Last data filed at 8/7/2023 0452  Gross per 24 hour   Intake 2576.6 ml   Output --   Net 2576.6 ml     Lines/Drains/Airways       Central Venous Catheter Line  Duration                  PowerPort A Cath Single Lumen left subclavian -- days              Airway  Duration                  Laryngectomy (Do Not Remove) Laryngectomy tube -- days              Peripheral Intravenous Line  Duration                  Midline Catheter Insertion/Assessment  - Single Lumen 08/04/23 Left basilic vein (medial side of arm) 3 days                  Assessment and Plan    Sepsis  Pneumonia (improving)  Leukopenia  Bloody Pulmonary Secretions (H/O neck and chest radiation)   - qSOFA score of 2, 3/4 SIRS   - Current under going chemo radiation   - Chest xray showed RLL infiltrate  - Blood cultures x 2 negative @ 24 hours, lactic acid wnl  - Continue Zosyn and Levaquin   - HR remained in 100s - 110s overnight (baseline 90s-110s)   - Duonebs ordered q6h prn   - Chest PT ordered  - CT PE showed no pulmonary embolism is seen but there is narrowing of the right upper lobe pulmonary artery secondary to the right hilar and suprahilar mass; Area of consolidation and pneumonia developing in the right lower lobe. (Reviewed with Pulmonologist Dr King; no acute interventions needed at this time; pt already on chemo and radiation)   -CTA neck ordered given recent bloody change seen in  secretions.   - Respiratory panel negative, MRSA PCR negative and sputum culture pending   - Continue Eliquis to 5mg bid   - Will discuss case with ENT; suppose to have upcoming scope done on 8/9/23  - Discussion held with wife and pt regarding palliative care and HH - are unsure about acceptance at this time. Would like to think about it.      Hypomagnesia (resolved)  -Mg 1.3; will give 2g today      Hypophosphatemia (resolved)  -phos 2.2; will replete     Continue home medication synthroid 50mcg, Norco 5 PRN                  Access: Midline  Antibiotics: Zosyn, Levaquin  Diet: heart healthy  DVT Prophylaxis: Apixaban 5mg bid   GI Prophylaxis: Protonix   Fluids: LR @ 75 cc/hr    Dispo: Pt admitted for RLL PNA; continue IV antibiotic regimen (Zosyn, Levaquin) for 1 more day given immune compromised status. Will discuss case with ENT today and see if they want to scope pt while admitted.     Storm Olivares MD  [facility]II

## 2023-08-07 NOTE — PT/OT/SLP EVAL
Occupational Therapy   Evaluation and Discharge Note    Name: Josafat Boudreaux  MRN: [id number]  Admitting Diagnosis: Final diagnoses:  [R06.02] Shortness of breath  [A41.9, R65.20] Severe sepsis (Primary)  [J18.9] Pneumonia of right lower lobe due to infectious organism   Patient Active Problem List   Diagnosis    Closed basicervical fracture of femur    Shortness of breath    Dysphagia    New onset seizure    Malignant neoplasm of larynx, unspecified    Regional lymph node metastasis present    Squamous cell carcinoma of both lungs    Chronic obstructive pulmonary disease, unspecified    H/O head and neck radiation    Lung cancer    Blastomycotic infection    On antineoplastic chemotherapy    Right arm cellulitis    Maintenance antineoplastic immunotherapy    Gastric fistula    Esophageal stenosis    Essential (primary) hypertension      Recent Surgery: * No surgery found *      Recommendations:     Discharge Recommendations: home, other (see comments) (with responsible caregiver assist)  Discharge Equipment Recommendations: none  Barriers to discharge:  None    Assessment:     Josafat Boudreaux is a 57 y.o. male with a medical diagnosis of see above. At this time, patient is functioning at their prior level of function and does not require further acute OT services.     Plan:     During this hospitalization, patient does not require further acute OT services.  Please re-consult if situation changes.    Plan of Care Reviewed with: patient, spouse    Subjective     Chief Complaint: none stated  Patient/Family Comments/goals: go home    Occupational Profile:  Living Environment: Pt lives with spouse in mobile home with 3 steps and left handrail and has tub shower combo and takes a bath with no DME required   Previous level of function: independent basic self care tasks and ambulated without AD   Roles and Routines: Pt drives; pts wife works from home and cooks and performs household chores   Equipment Used at  home: none, other (see comments) (pt has BSC, rollator, straight care, shower chair, but not using at this time)  Assistance upon Discharge: wife     Pain/Comfort:  Pain Rating 1: 0/10  Pain Addressed 1: Nurse notified  Pain Rating Post-Intervention 1: 0/10    Patients cultural, spiritual, Worship conflicts given the current situation: no    Objective:     Communicated with: Nurse East prior to session.  Patient found HOB elevated with Other (comments) (midline access  and tracheostomy  upon OT entry to room.    General Precautions: Standard,    Orthopedic Precautions: N/A  Braces: N/A    VITALS  Presession   Post session  /71   119/78     129  O2 96%   97%    Occupational Performance:    Bed Mobility:    Patient completed Supine to Sit with independence  Patient completed Sit to Supine with independence    Functional Mobility/Transfers:  Patient completed Sit <> Stand Transfer with independence  with  no assistive device   Patient completed Toilet Transfer Step Transfer technique with independence with  no AD  Functional Mobility: independent ambulate without AD  short distances in room toilet and sink ; no LOB     Activities of Daily Living:  Grooming: independence standing at sink to brush teeth, wash face and hands  Upper Body Dressing: independence    Lower Body Dressing: independence socks and pants   Toileting: independence clothing mgt and hygiene    Cognitive/Visual Perceptual:  Cognitive/Psychosocial Skills:     -       Oriented to: Person, Place, Time, and Situation   -       Follows Commands/attention:Follows multistep  commands  -       Safety awareness/insight to disability: intact     Physical Exam:  Balance: -       sitting balance EOB independent; standing balance during self care tasks independent   Sensation: -       Intact  light/touch B UE and hands; c/o of numbess and tingling  all fingertips    Dominant hand: -       right dominant  Upper Extremity Range of Motion:     -        Right Upper Extremity: WFL  -       Left Upper Extremity: WFL  Upper Extremity Strength:    -       Right Upper Extremity: WFL  -       Left Upper Extremity: WFL   Strength:    -       Right Upper Extremity: WFL  -       Left Upper Extremity: WFL  Fine Motor Coordination:    -       Intact  Left hand thumb/finger opposition skills and Right hand thumb/finger opposition skills      Treatment & Education:  Educated pt to call bell to call for assist as needed. Pt with good understanding.     Patient left HOB elevated with all lines intact, call button in reach, RN notified, and spouse present    GOALS:   Multidisciplinary Problems       Occupational Therapy Goals       Not on file                    History:     Past Medical History:   Diagnosis Date    COPD (chronic obstructive pulmonary disease)     Dysphagia     Essential (primary) hypertension 8/5/2023    GERD (gastroesophageal reflux disease)     laryngeal Cancer     Lung cancer     Vocal cord mass          Past Surgical History:   Procedure Laterality Date    DIAGNOSTIC LAPAROSCOPY N/A 5/18/2023    Procedure: LAPAROSCOPY, DIAGNOSTIC;  Surgeon: Dipak De Leon Jr., MD;  Location: [facility];  Service: General;  Laterality: N/A;    DIRECT DIAGNOSTIC LARYNGOSCOPY WITH BRONCHOSCOPY AND ESOPHAGOSCOPY N/A 07/11/2022    Procedure: LARYNGOSCOPY, DIRECT, DIAGNOSTIC, WITH BRONCHOSCOPY AND ESOPHAGOSCOPY;  Surgeon: Emory Alonso MD;  Location: [facility];  Service: ENT;  Laterality: N/A;    ESOPHAGOGASTRODUODENOSCOPY (EGD)- DEVICE ASSISTED N/A 04/18/2023    Procedure: EGD;  Surgeon: Aaliyah Conrad III, MD;  Location: [facility] ENDOSCOPY;  Service: Gastroenterology;  Laterality: N/A;  Esophageal Dilation; Savory over Guide wire: 36Fr and 39 Fr  Clips to secure peg site    HIP SURGERY      HUMERUS FRACTURE SURGERY      INCISION AND DRAINAGE, NECK Bilateral 10/21/2022    Procedure: INCISION AND DRAINAGE,NECK;  Surgeon: Madison Worley MD;  Location: [facility];   Service: ENT;  Laterality: Bilateral;    INSERT ARTERIAL LINE  06/26/2022         JOINT REPLACEMENT  2019    R hip    PEG TUBE REMOVAL      PLACEMENT, MEDIPORT      TRACHEOSTOMY N/A 06/10/2022    Procedure: CREATION, TRACHEOSTOMY;  Surgeon: Junior Alonso MD;  Location: [facility];  Service: ENT;  Laterality: N/A;       Time Tracking:     OT Date of Treatment: 08/07/23  OT Start Time: 1023  OT Stop Time: 1047  OT Total Time (min): 24 min    Billable Minutes:Evaluation 24 min    8/7/2023

## 2023-08-08 NOTE — DISCHARGE SUMMARY
[facility] Internal Medicine Discharge Summary     Admitting Physician: Chandler Mahoney MD  Attending Physician: Chandler Mahoney MD  Date of Admit: 8/4/2023  Date of Discharge: 8/8/2023    Condition: Stable  Outcome: Condition has improved and patient is now ready for discharge.  DISPOSITION: Home or Self Care    Discharge Diagnoses     Patient Active Problem List   Diagnosis    Closed basicervical fracture of femur    Shortness of breath    Dysphagia    New onset seizure    Malignant neoplasm of larynx, unspecified    Regional lymph node metastasis present    Squamous cell carcinoma of both lungs    Chronic obstructive pulmonary disease, unspecified    H/O head and neck radiation    Pneumonia    Lung cancer    Blastomycotic infection    On antineoplastic chemotherapy    Right arm cellulitis    Maintenance antineoplastic immunotherapy    Gastric fistula    Esophageal stenosis    Essential (primary) hypertension     Principal Problem:  Pneumonia    Consultants and Procedures     Consultants:  IP CONSULT TO SOCIAL WORK/CASE MANAGEMENT  IP CONSULT TO SOCIAL WORK/CASE MANAGEMENT  IP CONSULT TO REGISTERED DIETITIAN/NUTRITIONIST  IP CONSULT TO MIDLINE TEAM    Procedures:   * No surgery found *     Brief Admission History      57 y.o. 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 Endorses bloody sputum for one week. Pt with tracheostomy and radiotherapy due to lung CA. Denies fever, vomiting, diarrhea.  Arrived tachycardic and hypotensive to ED, BP responsive to fluids.Received azithromycin and ceftriaxone as well as LR.  Cxray significant for RLL infiltrate, Internal Medicine consulted for sepsis 2/2 pneumonia, will continue IV antibiotics.      Hospital  Course with Pertinent Findings     Pt was admitted for RLL PNA; was started on Vanc, Zosyn and Levaquin. CT PE was done and showed no pulmonary embolism is seen but there is narrowing of the right upper lobe pulmonary artery secondary to the right hilar and suprahilar mass; Area of consolidation and pneumonia developing in the right lower lobe. (Reviewed with Pulmonologist Dr King; no acute interventions needed at this time; pt already on chemo and radiation). CTA neck showed atherosclerotic changes at the carotid bulbs with 20-30% stenosis on the right by NASCET criteria. Pt began having bloody pulmonary secretions on day 2 of admission; ENT was consulted. Antibiotics were de-escalated; pt remained afebrile. Shortness of breath improved through out stay; received Duo-nebs and chest PT.   ENT saw pt during his admission; tracheoscope was performed at the bedside and showed purulent secretions and irritated tracheal mucosa with traces of blood. Likely multifactorial. Encouraged pt to keep his follow up next month with clinic.   On day of discharge pt was not requiring any supplemental O2, breathing comfortably on RA. VS remained stable; was tachycardic however pts baseline HR seems to run around 100s-110s. Will be sent home with 6 additional days of Levaquin (medication will be delivered via Meds to bed prior to d/c). Pt is medically stable for discharge today.     Discharge physical exam:  Physical Exam  Vitals and nursing note reviewed.   Constitutional:       General: He is not in acute distress.     Appearance: He is not toxic-appearing.      Comments: Pt appears much more energetic today    HENT:      Head:      Comments: Trach in place  Cardiovascular:      Rate and Rhythm: Regular rhythm. Tachycardia present.      Pulses: Normal pulses.      Heart sounds: No murmur heard.  Pulmonary:      Effort: Pulmonary effort is normal. No respiratory distress.      Breath sounds: No wheezing.      Comments: Breath sound  improved bilaterally. Minimal crackles present on RLL  Chest:      Chest wall: No tenderness.   Abdominal:      General: Bowel sounds are normal.      Tenderness: There is no abdominal tenderness. There is no guarding.   Musculoskeletal:      Right lower leg: No edema.      Left lower leg: No edema.   Skin:     Capillary Refill: Capillary refill takes less than 2 seconds.       Discharge Medications        Medication List        CONTINUE taking these medications      albuterol-ipratropium 2.5 mg-0.5 mg/3 mL nebulizer solution  Commonly known as: DUO-NEB  Take 3 mLs by nebulization every 4 (four) hours as needed for Shortness of Breath or Wheezing. Rescue     apixaban 5 mg Tab  Commonly known as: ELIQUIS  Take 1 tablet (5 mg total) by mouth As instructed.     diphenhydrAMINE 25 mg capsule  Commonly known as: BENADRYL  1 capsule (25 mg total) by Per G Tube route every 6 (six) hours as needed for Itching.     famotidine 20 MG tablet  Commonly known as: PEPCID  Take 1 tablet (20 mg total) by mouth every evening.     glutamine 10 gram Pwpk     levoFLOXacin 750 MG tablet  Commonly known as: LEVAQUIN  Take 1 tablet (750 mg total) by mouth once daily. for 6 days     levothyroxine 50 MCG tablet  Commonly known as: SYNTHROID  Take 1 tablet (50 mcg total) by mouth once daily.     megestroL 400 mg/10 mL (40 mg/mL) Susp  Commonly known as: MEGACE     metroNIDAZOLE 0.75 % (37.5mg/5 gram) vaginal gel  Commonly known as: METROGEL     ondansetron 8 MG Tbdl  Commonly known as: ZOFRAN-ODT     pantoprazole 20 MG tablet  Commonly known as: PROTONIX  Take 1 tablet (20 mg total) by mouth once daily.            ASK your doctor about these medications      hydrocodone-apap 7.5-325 MG/15 ML oral solution  Commonly known as: HYCET  Take 15 mLs by mouth every 6 (six) hours as needed for Pain.               Where to Get Your Medications        These medications were sent to [facility] [address]  [address]  [address] [address]      Phone: [phone]   levoFLOXacin 750 MG tablet       Discharge Information:     Pt is cleared to resume scheduled home medications. Levaquin was delivered bedside to pt prior to d/c.   Strict ED precautions given; pt expressed understanding.   Follow up with PCP in 2-3 weeks. Will have follow up apt with ENT on 9/13/23 at 8am. Next chemo infusion apt on 8/13/23 at 8am. Next oncology apt 8/17 at 9:40AM.     TIME SPENT ON DISCHARGE: >30 minutes    Storm Olivares MD  [facility]

## 2023-08-08 NOTE — PLAN OF CARE
Problem: Adult Inpatient Plan of Care  Goal: Plan of Care Review  Outcome: Ongoing, Progressing  Goal: Patient-Specific Goal (Individualized)  Outcome: Ongoing, Progressing  Goal: Absence of Hospital-Acquired Illness or Injury  Outcome: Ongoing, Progressing  Goal: Optimal Comfort and Wellbeing  Outcome: Ongoing, Progressing  Goal: Readiness for Transition of Care  Outcome: Ongoing, Progressing     Problem: Pain Acute  Goal: Acceptable Pain Control and Functional Ability  Outcome: Ongoing, Progressing     Problem: Fall Injury Risk  Goal: Absence of Fall and Fall-Related Injury  Outcome: Ongoing, Progressing     Problem: Breathing Pattern Ineffective  Goal: Effective Breathing Pattern  Outcome: Ongoing, Progressing     Problem: Infection  Goal: Absence of Infection Signs and Symptoms  Outcome: Ongoing, Progressing     Problem: Fluid Imbalance (Pneumonia)  Goal: Fluid Balance  Outcome: Ongoing, Progressing     Problem: Infection (Pneumonia)  Goal: Resolution of Infection Signs and Symptoms  Outcome: Ongoing, Progressing     Problem: Respiratory Compromise (Pneumonia)  Goal: Effective Oxygenation and Ventilation  Outcome: Ongoing, Progressing

## 2023-08-08 NOTE — PLAN OF CARE
08/08/23 1130   Medicare Message   Important Message from Medicare regarding Discharge Appeal Rights Given to patient/caregiver;Explained to patient/caregiver;Signed/date by patient/caregiver   Date IMM was signed 08/08/23   Time IMM was signed 1138

## 2023-08-10 NOTE — PHYSICIAN QUERY
PT Name: Josafat Boudreaux  MR #: [id number]     DOCUMENTATION CLARIFICATION     CDS/: Beckie New RN CDI          Contact information: [email or web address]   This form is a permanent document in the medical record.     Query Date: August 10, 2023    By submitting this query, we are merely seeking further clarification of documentation.  Please utilize your independent clinical judgment when addressing the question(s) below.  The Medical Record contains the following:  Indicators Supporting Clinical Findings Location in Medical Record   X HR         RR          BP        Temp 8/4     T 97.3               RR 24-27     BP  88/57     sat 93-97  8/5     T 98.7     -120    RR 18-20     BP  97/65    sat   8/6     T 98.2     -155   RR 18-20     /52    sat 91-95  8/7     T 98.4     -119   RR 18-20     /71    sat 93-97   Vitals   X Lactic Acid          Procalcitonin Lactic 8/5  1.4   0.8   Lab    X WBC           Bands          CRP  WBC  8/2 3.49  8/4 3.51  8/6 1.90  8/8 2.59   Lab    X Culture(s) 8/5 blood cultures no growth  8/6 resp c/s no growth   Lab     AMS, Confusion, LOC, etc.      Organ Dysfunction/Failure     X Bacteremia or Sepsis / Septic Internal Medicine consulted for sepsis 2/2 pneumonia, will continue IV antibiotics  56 yo male admitted for sepsis w/ pneumonia  Sepsis  Pneumonia   qSOFA score of 2, 3/4 SIRS.       Principal Problem:  Pneumonia   H&P 8/5 G Cecelia BARNEY/ TYREL Mahoney MD              Discharge suemmary 8/8  L Marshall BARNEY/ RAUL Ross MD   X Known or Suspected Source of Infection documented Pneumonia  H&P 8/5 G Cecelia BARNEY/ TYREL Mahoney MD    (Failed) Outpatient Treatment     X Medication  azithromycin and ceftriaxone      LR 1000 ML in ER  LR 1854 MLIn ER    Albuterol nebs in ED and PRN 8/5-8/8   H&P 8/5 G Cecelia BARNEY/ TYREL Mahoney MD    Med sheets   X Treatment - 30 cc/kg LR given , BP became normotensive in response to fluids   - Chest xray showed RLL  infiltrate  - O2 saturation 93 to 100 on RA  - Blood cultures x 2, lactic acid wnl  - Continue IV abx, azithromycin and ceftriaxone H&P 8/5 G Cecelia BARNEY/ TYREL Mahoney MD    Other          Provider, please clarify the diagnosis associated with above clinical findings.  [  x ] Sepsis due to Pneumonia, unknown organism     [   ] Pneumonia without Sepsis       [   ] Other Infectious Disease (please specify): __________     [  ] Clinically Undetermined         Please document in your progress notes daily for the duration of treatment until resolved and include in your discharge summary.

## 2023-08-10 NOTE — PROGRESS NOTES
Subjective:       Patient ID: Josafat Boudreaux is a 57 y.o. male.    Chief Complaint: Follow-up (Hospital follow-up)      Is a 57-year-old man seen in hospital follow-up.  He was discharged 2 days ago after being treated for right lower lobe pneumonia.  He did feel very sick prior to admission but did have a tachycardia a low-grade fever and was diagnosed with right lower lobe pneumonia.  Apparently he was somewhat hypotensive early on but that corrected quickly.  He was treated with antibiotics.  Cultures and respiratory panel were negative.  He has 5 more days of Levaquin schedule.  His wife is requesting Diflucan for  yeast groin infection.  Overall he is doing well.  He has not requiring oxygen.    Follow-up      Review of Systems     Current Outpatient Medications on File Prior to Visit   Medication Sig Dispense Refill    albuterol-ipratropium (DUO-NEB) 2.5 mg-0.5 mg/3 mL nebulizer solution Take 3 mLs by nebulization every 4 (four) hours as needed for Shortness of Breath or Wheezing. Rescue 90 mL 11    apixaban (ELIQUIS) 5 mg Tab Take 1 tablet (5 mg total) by mouth As instructed. 60 tablet 6    diphenhydrAMINE (BENADRYL) 25 mg capsule 1 capsule (25 mg total) by Per G Tube route every 6 (six) hours as needed for Itching. 90 capsule 1    levoFLOXacin (LEVAQUIN) 750 MG tablet Take 1 tablet (750 mg total) by mouth once daily. for 6 days 6 tablet 0    levothyroxine (SYNTHROID) 50 MCG tablet Take 1 tablet (50 mcg total) by mouth once daily. 30 tablet 11    magnesium oxide (MAG-OX) 400 mg (241.3 mg magnesium) tablet Take 1 tablet (400 mg total) by mouth once daily. for 5 days 5 tablet 0    megestroL (MEGACE) 400 mg/10 mL (40 mg/mL) Susp       sodium chloride 3% 3 % nebulizer solution Take 4 mLs by nebulization as needed for Other or Cough (wheezing). 100 mL 0    sodium chloride for inhalation (SODIUM CHLORIDE 0.9%) 0.9 % nebulizer solution Take 3 mLs by nebulization as needed for Wheezing. 75 mL 0    glutamine 10  "gram PwPk Take 10 g by mouth 2 (two) times a day.      hydrocodone-acetaminophen (HYCET) solution 7.5-325 mg/15mL Take 15 mLs by mouth every 6 (six) hours as needed for Pain. (Patient not taking: Reported on 7/5/2023) 300 mL 0    metroNIDAZOLE (METROGEL) 0.75 % (37.5mg/5 gram) vaginal gel apply to affected area BID      ondansetron (ZOFRAN-ODT) 8 MG TbDL Take 8 mg by mouth every 8 (eight) hours as needed for Nausea. Tab 1 ODT in AM for 2 days after chemotherapy      [DISCONTINUED] aspirin (ECOTRIN) 81 MG EC tablet Take 81 mg by mouth once daily.      [DISCONTINUED] famotidine (PEPCID) 20 MG tablet Take 1 tablet (20 mg total) by mouth every evening. 30 tablet 11    [DISCONTINUED] pantoprazole (PROTONIX) 20 MG tablet Take 1 tablet (20 mg total) by mouth once daily. 30 tablet 11     Current Facility-Administered Medications on File Prior to Visit   Medication Dose Route Frequency Provider Last Rate Last Admin    LIDOcaine (PF) 40 mg/mL (4 %) injection 2 mL  2 mL Tracheal Tube 1 time in Clinic/HOD Kevin Palomino MD        LIDOcaine HCL 4% external solution 4 mL  4 mL Topical (Top) 1 time in Clinic/HOD Marty Jeffries MD        phenylephrine HCL 1% nasal spray 1 spray  1 spray Each Nostril 1 time in Clinic/HOD Kevin Palomino MD         Objective:      /65 (BP Location: Right arm, Patient Position: Sitting, BP Method: Large (Manual))   Pulse 79   Resp 18   Ht 5' 6" (1.676 m)   Wt 62.1 kg (137 lb)   SpO2 (!) 93%   BMI 22.11 kg/m²     Physical Exam  Vitals reviewed.   Constitutional:       Appearance: Normal appearance.   HENT:      Head: Normocephalic and atraumatic.      Mouth/Throat:      Pharynx: Oropharynx is clear.   Eyes:      Pupils: Pupils are equal, round, and reactive to light.   Cardiovascular:      Rate and Rhythm: Normal rate and regular rhythm.      Pulses: Normal pulses.      Heart sounds: Normal heart sounds.      Comments: Rhythm is regular but rate is a bit fast at about 110.  Pulmonary: "      Breath sounds: Normal breath sounds.      Comments: Scant rales at the bases only.  Abdominal:      General: Abdomen is flat.      Palpations: Abdomen is soft.   Musculoskeletal:      Cervical back: Neck supple.   Skin:     General: Skin is warm and dry.   Neurological:      Mental Status: He is alert.       Laboratory studies reviewed.  X-rays including CT PE protocol reviewed including images.  Assessment:       1. Right lower lobe pneumonia.  Clinically improving.    2. Lung cancer.  Being treated by Oncology    3. COPD.  Stable     4. History of laryngeal cancer status post laryngectomy.      5. Yeast dermatitis    Plan:     Continue current meds.  Would not consider chemotherapy until he has completed his antibiotics.  He has follow-up with his oncologist next week.    Add Diflucan 100 daily x5.  He has no history of QT prolongation or is at seen on the recent EKGs psi think it is safe for him to take the Diflucan with the Levaquin.

## 2023-08-16 NOTE — PROGRESS NOTES
HEMATOLOGY/ONCOLOGY OFFICE CLINIC VISIT    Visit Information:     Initial Evaluation: 7/21/2022  Referring Provider:   Other providers: Dr Gerard, Dr Emory Alonso  Code status:     Diagnosis/Problem list:  1) pT4apN1-Stage JENNYFER Laryngeal Squamous cell carcinoma   2) Lung masses x 2, bilateral--> Squamous cell -- Metastatic SqCC lung cancer vs metastatic SqCC to the lungs from H&N  3) Mediastinal and Hilar LAD     Present treatment:  RT +  chemotherapy    Treatment/Oncology history:  --7/11/2022: total laryngectomy  --Carbo/Taxol + RT (8/17/22-9/21/22)  --8/17/2022-9/28/2022:  6000 cGy, 200 cGy/fraction, 30/30 fx  --Carbo/Taxol/Keytruda planned 11/30/2022-3/2/2023  --Started maintenance Keytruda every 6 weeks on 3/23/2023    Plan of care:     Imaging:  CT neck 6/6/2022: A metastatic right level III cervical lymph node is present with short axis measurement of 1.6 cm.  This lymph node contains internal cystic change, typical of metastatic squamous cell carcinoma.  A left level III cervical lymph node shows short axis measurements of 1 cm, and is suspicious.    A left supraglottic mass measures approximately 2.4 cm in diameter (axial post-contrast image 39), presumably corresponding to the primary neoplasm.  Metastatic lymph nodes are also present in the inferior right paratracheal position, measuring 1.3 cm in short axis.  Metastatic lymph nodes are present in the superior right hilum, measuring 1.8 cm in short axis.  CT H&N 6/27/2022: 1. There is mild stenosis at the origin of left proximal internal carotid artery secondary to dense calcified atheromatous plaque. 2. There is consolidation is right upper lobe. Additional ground-glass opacities are also seen on the remainder of the visualized lungs. These findings are consistent with an infectious process. Small right pleural effusion is seen. There is an ill-defined soft tissue mass in the right perihilar region which measures approximately 3.1 x 2.9 x 4.6 cm, of  concern for neoplasm. Correlate clinically as regards further evaluation and followup with CT chest. There is an ill-defined enhancing soft tissue mass in the glottis infiltrating into the paraglottic spaces and the subglottic region with obliteration of the airway, of concern for a neoplasm. 3. Unremarkable CT angiogram of the head. Details and other findings as described above.  NM PET CT 8/1/2022: postoperative changes of recent total laryngectomy and lou dissection. bilateral hypermetabolic cervical lymphadenopathy.  A right cervical lymph node 1.7 x 1.8 cm, 1.6 x 1.5 cm previously, SUV of 7.7.  A left cervical lymph node 8 x 10 mm SUV of 3.4. A fluid collection posterior to the left internal jugular vein measures 2.4 x 2.4 cm.  There is right hilar and mediastinal lymphadenopathy.  A precarinal lymph node measures 1.6 x 2.3 cm, compared to 1.3 x 1.7 cm previously, and has a max SUV of 13.8.  Right hilar lymphadenopathy measures approximately 3 x 2.9 cm, compared to 2.1 x 2.3 cm previously, and has a max SUV of 9.8.  A suspected right hilar mass measures 1.8 x 3.6 cm and has a max SUV of 14.8. no hypermetabolic lymphadenopathy in the abdomen or pelvis.  There is physiologic uptake in the solid organs, urinary system and bowel.  PET CT 10/13/2022: Ill-defined hypermetabolic tissue is again appreciated at the left neck, just superior and lateral to the tracheostomy insertion site. The regional maximum SUV is 6.8, with discrete CT-correlate lesion poorly delineated secondary to non-contrast protocol; prior SUV max 8.3. An adjacent, better delineated right cervical chain lymph node is visualized posteriorly, measuring 1.1 cm short axis with SUV max of 6; this previously measured 1.5 cm in least dimension with maximum SUV of 7.7.  No convincing new or enlarging cervical adenopathy or newly hypermetabolic lymph nodes are visualized.  The prior left neck fluid collection is no longer evident.The somewhat lobulated  right upper lobe hilar mass is now approximately 3.4 cm x 1.4 cm and maximum SUV 23; previously 3.6 cm x 1.8 cm and SUV max 14.8.  No new or enlarging hypermetabolic lung lesion, and no development of organized airspace consolidation or pleural effusion.  PET CT 3/16/2023:  1. Largely improved disease though there is single paratracheal lymph node which has increased in size and is now FDG avid.   2. Some inguinal and external iliac lymph nodes demonstrate increased FDG activity today, this is favored reactive.  CV Ultrasound doppler venous arm right 3/22/2023: Positive acute deep vein thrombosis identified in the subclavian, axillary, and brachial veins of the right upper extremity. Superficial vein thrombosis identified in the basilic vein of the right upper extremity. All other veins were compressible.  PET CT 6/9/2023:  No FDG avid pulmonary lesions are present. Mediastinal and hilar lymphadenopathy has markedly increased compared to the prior study.  Reference right hilar conglomerate measures approximately 2.6 x 4.1 cm on image 94 with a maximum SUV of 19.0.  This previously measured 1.0 cm and had a maximum SUV of 16.0. Physiologic uptake is in the liver, spleen, urinary system and bowel. No enlarged or FDG avid lymph nodes are in the abdomen or pelvis. Adrenal glands are normal. No FDG avid osseous lesions are present. Impression: Disease progression with worsening mediastinal and right hilar FDG avid lymphadenopathy.       Pathology:  6/14/2022:  EBUS ENDOBRONCHIAL MASS RUL, CYTOLOGY: NEARLY ACELLULAR SPECIMEN CONSISTING OF FRESH BLOOD CLOT. NO ATYPICAL OR MALIGNANT CELLS IDENTIFIED.   7/11/2022: Bronchial Wash, RLL, right bronchial mass washings:  - Squamous cell carcinoma.                      Bronchial Wash, LLL, left bronchial mass washings:     - Squamous cell carcinoma.     7/11/2022 Laryngectomy:  1. Larynx, laryngeal biopsy frozen section : - Squamous cell carcinoma with necrosis.    2. Neck,  Anterior, left neck level 3: - Number of lymph nodes involved by carcinoma:  1/7       - Size of metastatic deposit:  2.5 mm - Extranodal extension:  Not identified      3. Larynx, resection without nodes, total layrngectomy :  - Squamous cell carcinoma.  4. Neck, Anterior, left neck level 4: - Number of lymph nodes involved by carcinoma:  0/10  5. Neck, Anterior, right neck level 4:  - Number of lymph nodes involved by carcinoma:  0/11  6. Neck, Anterior, right neck level 3: - Number of lymph nodes involved by carcinoma:  0/9  7. Nasophaynx, inferior pharyngeal mucosa : - Benign squamous mucosa. - No evidence of malignancy.     8. Cartilage, superior cartilage margin : - No evidence of malignancy.    9. Lymph Node, pretracheal lymph  node :  - Number of lymph nodes involved by carcinoma:  0/3  [id number]    1/11/2023:   Right tracheostomal biopsy:  - Squamous mucosa with epithelial erosion and acute inflammation.  - Lamina propria with reactive fibroblasts and fibrosis, consistent with radiation changes, see comment.  - No evidence of malignancy.    CLINICAL HISTORY:       Patient: Josafat Boudreaux is a 57 y.o. male.kindly refer her for laryngeal carcinoma.  Patient  was referred to ENT for further evaluation of hoarseness.  He did not have any difficulty swallowing or dry mouth at the time.  Hoarseness has been present for at least 4 months prior to evaluation. 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      Patient was scheduled to trach but on 06/10/2022 he was brought to the emergency room via air met with is short of breath.  He was found to have and oxygenation on the 40s when EMS was called.  He was placed on BiPAP but pCO2 increased so he has an emergent tracheostomy performed.  He has a PEG tube placed same hospitalization.  He underwent bronchoscopy with biopsy of the right upper lobe endobronchial lesion on 6/14/2022 cytology was negative  for malignant cells.       On 7/11/2022 he underwent LARYNGECTOMY, WITH RADICAL NECK DISSECTION - Bilateral LARYNGOSCOPY, DIRECT, DIAGNOSTIC, WITH BRONCHOSCOPY AND ESOPHAGOSCOPY pathology report as above.  1/40 lymph nodes were involved with metastatic disease.  Bronchoscopy was repeated and biopsy of the left lower lobe and right lower lobe were both positive for squamous cell carcinoma.     He is here today with his wife.  He has recovered from surgery relatively well.  Tracheostomy in place.  He is unable to talk so the history was taken by electronic medical record and wife.     Patient has a brother with history of throat cancer. Patient used to smoke 1 and half pack per day since he was 60 years old.  He was smoking less recently.      Chief Complaint: OTHER (No concerns today.)      Interval History:  Previous visits:   On 3/22/23, NIVA study was positive for DVT to right upper extremity, he was started on Eliquis 5 mg BID. Tolerating well with no side effects. PEG tube was removed on 3/23/23. He underwent procedure on 5/18/23 with Dr. De Leon to correct the leakage. Site is all healed.   On 5/3/23 TSH was 7.451. He was called and instructed to begin taking Synthroid 50 mcg daily. Most recent TSH  2.276.  Completed weekly carbo/Taxol and radiation on 9/21/22 for his laryngeal carcinoma. Completed 4 cycles of Carbo/Taxol/Keytruda on 3/2/23. Started maintenance Keytruda every 6 weeks on 3/23/2023.  Patient responded great to treatment and was started on maintenance Keytruda but Most recent PET-CT, 6/9/23, showed resolution of pulmonary masses no evidence of disease in the head/neck or distant metastasis but progression Of mediastinal/ hilar lymphadenopathy. This is the only site with evidence of disease.  In view of this findings he was referred to radiation oncologist for possible concomitant chemoradiation hoping that this can take care of residual disease.    Was seen by Dr. Das and started radiation on  7/6/23.    8/16/23  Patient presents for TD/lab prior to C5 of Carbo/Taxol, due tomorrow. XRT was placed on hold due to recent hospitalization. C4 was delayed  1 day due to platelet count of 89k. He is with his wife and reports having to suction more often now since starting XRT- she is seeing blood tinged secretions.  He has received  25 treatments, started 7/6/23. Plan to have 5 more. He was admitted to [facility] with pneumonia on 8/4/23 and discharged on 8/8/23. He completed 10 days of Levaquin. He says he feels much better today. No fever, chills, sweats.  No chest pain or shortness of breath.  Platelets 82k today, will hold C5 tomorrow. He's scheduled to see Dr. Das tomorrow in office.   Wife requesting IVF tomorrow.    PMH/PSH/FH/SOCIAL/ALLERGIES/MEDICATIONS:  ALL REVIEWED AND UPDATED AS APPROPRIATE.       Review of Systems   Constitutional:  Positive for fatigue. Negative for activity change, appetite change, chills, diaphoresis, fever and unexpected weight change.   HENT:  Negative for nasal congestion, mouth sores, nosebleeds, sinus pressure/congestion, sore throat and trouble swallowing.    Eyes: Negative.    Respiratory:  Negative for shortness of breath.         Increase secretions   Cardiovascular:  Negative for chest pain and palpitations.   Gastrointestinal:  Negative for abdominal distention, abdominal pain, blood in stool, change in bowel habit, constipation, diarrhea, nausea, vomiting and change in bowel habit.        Dysphagia   Endocrine: Negative.    Genitourinary:  Negative for bladder incontinence, decreased urine volume, difficulty urinating, dysuria, frequency, hematuria and urgency.   Musculoskeletal:  Negative for arthralgias, back pain, gait problem, joint swelling, leg pain and myalgias.   Integumentary:  Negative for rash.   Allergic/Immunologic: Negative.    Neurological:  Negative for dizziness, tremors, syncope, weakness, light-headedness, numbness, headaches and memory loss.  "  Hematological:  Negative for adenopathy. Does not bruise/bleed easily.   Psychiatric/Behavioral:  Negative for agitation, confusion, hallucinations, sleep disturbance and suicidal ideas. The patient is not nervous/anxious.               Vitals:    08/16/23 1112   BP: 121/78   BP Location: Left arm   Patient Position: Sitting   Pulse: (!) 123   Resp: 18   Temp: 98.2 °F (36.8 °C)   TempSrc: Oral   SpO2: (!) 94%  Comment: Has a trachea   Weight: 61.6 kg (135 lb 14.4 oz)   Height: 5' 6" (1.676 m)         Wt Readings from Last 6 Encounters:   08/16/23 61.6 kg (135 lb 14.4 oz)   08/10/23 62.1 kg (137 lb)   08/04/23 61.8 kg (136 lb 3.9 oz)   08/04/23 62.1 kg (137 lb)   08/02/23 62.9 kg (138 lb 11.2 oz)   07/27/23 62.1 kg (137 lb)     Body mass index is 21.93 kg/m².  Body surface area is 1.69 meters squared.       Physical Exam  Vitals and nursing note reviewed.   Constitutional:       General: He is not in acute distress.     Appearance: Normal appearance.   HENT:      Head: Normocephalic and atraumatic.      Mouth/Throat:      Mouth: Mucous membranes are moist.   Eyes:      General: No scleral icterus.     Extraocular Movements: Extraocular movements intact.      Conjunctiva/sclera: Conjunctivae normal.   Neck:      Vascular: No JVD.      Trachea: Tracheostomy present.      Comments: Tracheotomy in place.  Right side - Skin changes c/w RT     Cardiovascular:      Rate and Rhythm: Normal rate and regular rhythm.      Heart sounds: No murmur heard.  Pulmonary:      Effort: Pulmonary effort is normal.      Breath sounds: Normal breath sounds. No wheezing or rhonchi.   Abdominal:      General: The ostomy site is clean. Bowel sounds are normal. There is no distension.      Palpations: Abdomen is soft.      Tenderness: There is no abdominal tenderness.   Musculoskeletal:         General: No swelling or deformity.      Right upper arm: Swelling and tenderness present.      Cervical back: Neck supple.      Comments: Erythema of " right upper extremity to touch and tender   Lymphadenopathy:      Head:      Right side of head: No submandibular adenopathy.      Left side of head: No submandibular adenopathy.      Cervical: No cervical adenopathy.      Upper Body:      Right upper body: No supraclavicular or axillary adenopathy.      Left upper body: No supraclavicular or axillary adenopathy.      Lower Body: No right inguinal adenopathy. No left inguinal adenopathy.   Skin:     General: Skin is warm.      Coloration: Skin is not jaundiced.      Findings: No rash.   Neurological:      General: No focal deficit present.      Mental Status: He is alert and oriented to person, place, and time.      Cranial Nerves: Cranial nerves 2-12 are intact.   Psychiatric:         Attention and Perception: Attention normal.         Behavior: Behavior is cooperative.       ECOG SCORE    0 - Fully active-able to carry on all pre-disease performance without restriction         Laboratory:  CBC with Differential:  Lab Results   Component Value Date    WBC 4.24 (L) 08/16/2023    RBC 3.78 (L) 08/16/2023    HGB 12.1 (L) 08/16/2023    HCT 35.5 (L) 08/16/2023    MCV 93.9 08/16/2023    MCH 32.0 (H) 08/16/2023    MCHC 34.1 08/16/2023    RDW 14.4 08/16/2023    PLT 82 (L) 08/16/2023    MPV 9.0 08/16/2023        CMP:  Sodium Level   Date Value Ref Range Status   08/16/2023 132 (L) 136 - 145 mmol/L Final     Potassium Level   Date Value Ref Range Status   08/16/2023 4.1 3.5 - 5.1 mmol/L Final     Carbon Dioxide   Date Value Ref Range Status   08/16/2023 24 22 - 29 mmol/L Final     Blood Urea Nitrogen   Date Value Ref Range Status   08/16/2023 7.2 (L) 8.4 - 25.7 mg/dL Final     Creatinine   Date Value Ref Range Status   08/16/2023 0.93 0.73 - 1.18 mg/dL Final     Calcium Level Total   Date Value Ref Range Status   08/16/2023 9.5 8.4 - 10.2 mg/dL Final     Albumin Level   Date Value Ref Range Status   08/16/2023 3.1 (L) 3.5 - 5.0 g/dL Final     Bilirubin Total   Date Value Ref  Range Status   08/16/2023 0.5 <=1.5 mg/dL Final     Alkaline Phosphatase   Date Value Ref Range Status   08/16/2023 82 40 - 150 unit/L Final     Aspartate Aminotransferase   Date Value Ref Range Status   08/16/2023 16 5 - 34 unit/L Final     Alanine Aminotransferase   Date Value Ref Range Status   08/16/2023 10 0 - 55 unit/L Final     Estimated GFR-Non    Date Value Ref Range Status   08/02/2022 >60 mls/min/1.73/m2 Final         Assessment:       1) pT4apN1-Stage JENNYFER Laryngeal Squamous cell carcinoma   --Laryngeal Squamous cell carcinoma -s/p total laryngectomy, 1/40 LN with metastatic disease.   2) Lung masses x 2, bilateral--> Squamous cell -- Metastatic SqCC lung cancer vs metastatic SqCC to the lungs from H&N  3) Mediastinal and Hilar LAD    Completed weekly carbo/Taxol and radiation on 9/21/22 for his laryngeal carcinoma. Completed 4 cycles of Carbo/Taxol/Keytruda on 3/2/23. Started maintenance Keytruda every 6 weeks on 3/23/2023.  Patient responded great to treatment and was started on maintenance Keytruda but Most recent PET-CT showed resolution of pulmonary masses no evidence of disease in the head/neck or distant metastasis but progression Of mediastinal/ hilar lymphadenopathy. This is the only site with evidence of disease.  In view of this findings he was referred to radiation oncologist for possible concomitant chemoradiation hoping that this can take care of residual disease.  Was seen by Dr. Das  and he has the marking and is ready to start radiation.    I discussed with him that because of the shortage of Carboplatinum and cisplatin we may or may not be able to use platinum as a radiosensitizer if this isn't option for him.  I think that because of minimal residual disease and resolution of lung masses be will benefit of Carbo/taxol + RT. Next step would be infusional 5FU vs Gemzar.    Educated the patient on the risks versus benefits as well as toxicities associated with treatment.   Verbally consented the patient to the treatment plan and the patient was educated on the planned duration of the treatment and schedule of the treatment administration.  All questions were answered.        Plan:         Continue XRT - was on hold due to recent hospitalization for pneumonia. Received 25 treatments, plan is to complete 5 more. Will see Dr. Das tomorrow.  Platelet count is 82k today. Will HOLD weekly Carbo/Taxol  C5 tomorrow.  Will add IVF's as this helped him while he was on chemo  in the past  Continue Eliquis 5 mg BID  Continue Synthroid 50 mcg daily  If he treats this Friday, he will have his last weekly chemo next Friday if counts are adequate.   RTC on Monday, 8/21/23 with MD , labs same day - possible chemo next day, if platelets are >90k  Weekly labs - standing order placed  PET CT every 3 months, due 9/2023 - will order when closer--if insurance does not cover, will change to CT C/A/P    Encourage to call or message us for any questions or problems  The patient was given ample opportunity to ask questions, and to the best of my abilities, all questions answered to satisfaction; patient demonstrated understanding of what we discussed and agreeable to the plan.     Yara Atkinson MD  Hematology/Oncology      Professional Services   I, Bessie Horowitz LPN, acted solely as a scribe for and in the presence of Dr. Yara Atkinson, who performed these services.

## 2023-08-17 NOTE — NURSING
Pt here accompanied by SO for hydration. Chemo held d/t low platelets. Fluids given. Pt danielle well. Will rtc mon for labs and T/d on Monday with hopes to treat on Tuesday if counts allow

## 2023-08-21 NOTE — PROGRESS NOTES
HEMATOLOGY/ONCOLOGY OFFICE CLINIC VISIT    Visit Information:     Initial Evaluation: 7/21/2022  Referring Provider:   Other providers: Dr Gerard, Dr Emory Alonso  Code status:     Diagnosis/Problem list:  1) pT4apN1-Stage JENNYFER Laryngeal Squamous cell carcinoma   2) Lung masses x 2, bilateral--> Squamous cell -- Metastatic SqCC lung cancer vs metastatic SqCC to the lungs from H&N  3) Mediastinal and Hilar LAD     Present treatment:  RT +  chemotherapy    Treatment/Oncology history:  --7/11/2022: total laryngectomy  --Carbo/Taxol + RT (8/17/22-9/21/22)  --8/17/2022-9/28/2022:  6000 cGy, 200 cGy/fraction, 30/30 fx  --Carbo/Taxol/Keytruda 11/30/2022-3/2/2023  --Started maintenance Keytruda every 6 weeks on 3/23/2023  --Carbo/Taxol + RT    Plan of care:     Imaging:  CT neck 6/6/2022: A metastatic right level III cervical lymph node is present with short axis measurement of 1.6 cm.  This lymph node contains internal cystic change, typical of metastatic squamous cell carcinoma.  A left level III cervical lymph node shows short axis measurements of 1 cm, and is suspicious.    A left supraglottic mass measures approximately 2.4 cm in diameter (axial post-contrast image 39), presumably corresponding to the primary neoplasm.  Metastatic lymph nodes are also present in the inferior right paratracheal position, measuring 1.3 cm in short axis.  Metastatic lymph nodes are present in the superior right hilum, measuring 1.8 cm in short axis.  CT H&N 6/27/2022: 1. There is mild stenosis at the origin of left proximal internal carotid artery secondary to dense calcified atheromatous plaque. 2. There is consolidation is right upper lobe. Additional ground-glass opacities are also seen on the remainder of the visualized lungs. These findings are consistent with an infectious process. Small right pleural effusion is seen. There is an ill-defined soft tissue mass in the right perihilar region which measures approximately 3.1 x 2.9 x  4.6 cm, of concern for neoplasm. Correlate clinically as regards further evaluation and followup with CT chest. There is an ill-defined enhancing soft tissue mass in the glottis infiltrating into the paraglottic spaces and the subglottic region with obliteration of the airway, of concern for a neoplasm. 3. Unremarkable CT angiogram of the head. Details and other findings as described above.  NM PET CT 8/1/2022: postoperative changes of recent total laryngectomy and lou dissection. bilateral hypermetabolic cervical lymphadenopathy.  A right cervical lymph node 1.7 x 1.8 cm, 1.6 x 1.5 cm previously, SUV of 7.7.  A left cervical lymph node 8 x 10 mm SUV of 3.4. A fluid collection posterior to the left internal jugular vein measures 2.4 x 2.4 cm.  There is right hilar and mediastinal lymphadenopathy.  A precarinal lymph node measures 1.6 x 2.3 cm, compared to 1.3 x 1.7 cm previously, and has a max SUV of 13.8.  Right hilar lymphadenopathy measures approximately 3 x 2.9 cm, compared to 2.1 x 2.3 cm previously, and has a max SUV of 9.8.  A suspected right hilar mass measures 1.8 x 3.6 cm and has a max SUV of 14.8. no hypermetabolic lymphadenopathy in the abdomen or pelvis.  There is physiologic uptake in the solid organs, urinary system and bowel.  PET CT 10/13/2022: Ill-defined hypermetabolic tissue is again appreciated at the left neck, just superior and lateral to the tracheostomy insertion site. The regional maximum SUV is 6.8, with discrete CT-correlate lesion poorly delineated secondary to non-contrast protocol; prior SUV max 8.3. An adjacent, better delineated right cervical chain lymph node is visualized posteriorly, measuring 1.1 cm short axis with SUV max of 6; this previously measured 1.5 cm in least dimension with maximum SUV of 7.7.  No convincing new or enlarging cervical adenopathy or newly hypermetabolic lymph nodes are visualized.  The prior left neck fluid collection is no longer evident.The somewhat  lobulated right upper lobe hilar mass is now approximately 3.4 cm x 1.4 cm and maximum SUV 23; previously 3.6 cm x 1.8 cm and SUV max 14.8.  No new or enlarging hypermetabolic lung lesion, and no development of organized airspace consolidation or pleural effusion.  PET CT 3/16/2023:  1. Largely improved disease though there is single paratracheal lymph node which has increased in size and is now FDG avid.   2. Some inguinal and external iliac lymph nodes demonstrate increased FDG activity today, this is favored reactive.  CV Ultrasound doppler venous arm right 3/22/2023: Positive acute deep vein thrombosis identified in the subclavian, axillary, and brachial veins of the right upper extremity. Superficial vein thrombosis identified in the basilic vein of the right upper extremity. All other veins were compressible.  PET CT 6/9/2023:  No FDG avid pulmonary lesions are present. Mediastinal and hilar lymphadenopathy has markedly increased compared to the prior study.  Reference right hilar conglomerate measures approximately 2.6 x 4.1 cm on image 94 with a maximum SUV of 19.0.  This previously measured 1.0 cm and had a maximum SUV of 16.0. Physiologic uptake is in the liver, spleen, urinary system and bowel. No enlarged or FDG avid lymph nodes are in the abdomen or pelvis. Adrenal glands are normal. No FDG avid osseous lesions are present. Impression: Disease progression with worsening mediastinal and right hilar FDG avid lymphadenopathy.       Pathology:  6/14/2022:  EBUS ENDOBRONCHIAL MASS RUL, CYTOLOGY: NEARLY ACELLULAR SPECIMEN CONSISTING OF FRESH BLOOD CLOT. NO ATYPICAL OR MALIGNANT CELLS IDENTIFIED.   7/11/2022: Bronchial Wash, RLL, right bronchial mass washings:  - Squamous cell carcinoma.                      Bronchial Wash, LLL, left bronchial mass washings:     - Squamous cell carcinoma.     7/11/2022 Laryngectomy:  1. Larynx, laryngeal biopsy frozen section : - Squamous cell carcinoma with necrosis.    2.  Neck, Anterior, left neck level 3: - Number of lymph nodes involved by carcinoma:  1/7       - Size of metastatic deposit:  2.5 mm - Extranodal extension:  Not identified      3. Larynx, resection without nodes, total layrngectomy :  - Squamous cell carcinoma.  4. Neck, Anterior, left neck level 4: - Number of lymph nodes involved by carcinoma:  0/10  5. Neck, Anterior, right neck level 4:  - Number of lymph nodes involved by carcinoma:  0/11  6. Neck, Anterior, right neck level 3: - Number of lymph nodes involved by carcinoma:  0/9  7. Nasophaynx, inferior pharyngeal mucosa : - Benign squamous mucosa. - No evidence of malignancy.     8. Cartilage, superior cartilage margin : - No evidence of malignancy.    9. Lymph Node, pretracheal lymph  node :  - Number of lymph nodes involved by carcinoma:  0/3  [id number]    1/11/2023:   Right tracheostomal biopsy:  - Squamous mucosa with epithelial erosion and acute inflammation.  - Lamina propria with reactive fibroblasts and fibrosis, consistent with radiation changes, see comment.  - No evidence of malignancy.    CLINICAL HISTORY:       Patient: Josafat Boudreaux is a 57 y.o. male.kindly refer her for laryngeal carcinoma.  Patient  was referred to ENT for further evaluation of hoarseness.  He did not have any difficulty swallowing or dry mouth at the time.  Hoarseness has been present for at least 4 months prior to evaluation. 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      Patient was scheduled to trach but on 06/10/2022 he was brought to the emergency room via air met with is short of breath.  He was found to have and oxygenation on the 40s when EMS was called.  He was placed on BiPAP but pCO2 increased so he has an emergent tracheostomy performed.  He has a PEG tube placed same hospitalization.  He underwent bronchoscopy with biopsy of the right upper lobe endobronchial lesion on 6/14/2022 cytology was  negative for malignant cells.       On 7/11/2022 he underwent LARYNGECTOMY, WITH RADICAL NECK DISSECTION - Bilateral LARYNGOSCOPY, DIRECT, DIAGNOSTIC, WITH BRONCHOSCOPY AND ESOPHAGOSCOPY pathology report as above.  1/40 lymph nodes were involved with metastatic disease.  Bronchoscopy was repeated and biopsy of the left lower lobe and right lower lobe were both positive for squamous cell carcinoma.     He is here today with his wife.  He has recovered from surgery relatively well.  Tracheostomy in place.  He is unable to talk so the history was taken by electronic medical record and wife.     Patient has a brother with history of throat cancer. Patient used to smoke 1 and half pack per day since he was 60 years old.  He was smoking less recently.      Chief Complaint: No chief complaint on file.      Interval History:  Previous visits:   On 3/22/23, NIVA study was positive for DVT to right upper extremity, he was started on Eliquis 5 mg BID. Tolerating well with no side effects. PEG tube was removed on 3/23/23. He underwent procedure on 5/18/23 with Dr. De Leon to correct the leakage. Site is all healed.   On 5/3/23 TSH was 7.451. He was called and instructed to begin taking Synthroid 50 mcg daily. Most recent TSH  2.276.  Completed weekly carbo/Taxol and radiation on 9/21/22 for his laryngeal carcinoma. Completed 4 cycles of Carbo/Taxol/Keytruda on 3/2/23. Started maintenance Keytruda every 6 weeks on 3/23/2023.  Patient responded great to treatment and was started on maintenance Keytruda but Most recent PET-CT, 6/9/23, showed resolution of pulmonary masses no evidence of disease in the head/neck or distant metastasis but progression Of mediastinal/ hilar lymphadenopathy. This is the only site with evidence of disease.  In view of this findings he was referred to radiation oncologist for possible concomitant chemoradiation hoping that this can take care of residual disease.    Was seen by Dr. Das and started  radiation on 7/6/23.    8/21/23  Patient presents for TD/lab prior to C5 of Carbo/Taxol, due tomorrow. XRT was placed on hold due to recent hospitalization. C4 was delayed  1 day due to platelet count of 89k. He has received  25 treatments, started 7/6/23. Plan to have 5 more. He was admitted to [facility] with pneumonia on 8/4/23 and discharged on 8/8/23. He completed 10 days of Levaquin. He says he feels much better today.  Counts improved.  No fever, chills, sweats.  No chest pain or shortness of breath.  Okay for last cycle of chemotherapy tomorrow. Wife requesting IVF tomorrow.        PMH/PSH/FH/SOCIAL/ALLERGIES/MEDICATIONS:  ALL REVIEWED AND UPDATED AS APPROPRIATE.       Review of Systems   Constitutional:  Positive for fatigue. Negative for activity change, appetite change, chills, diaphoresis, fever and unexpected weight change.   HENT:  Negative for nasal congestion, mouth sores, nosebleeds, sinus pressure/congestion, sore throat and trouble swallowing.    Eyes: Negative.    Respiratory:  Negative for shortness of breath.         Increase secretions   Cardiovascular:  Negative for chest pain and palpitations.   Gastrointestinal:  Negative for abdominal distention, abdominal pain, blood in stool, change in bowel habit, constipation, diarrhea, nausea, vomiting and change in bowel habit.        Dysphagia   Endocrine: Negative.    Genitourinary:  Negative for bladder incontinence, decreased urine volume, difficulty urinating, dysuria, frequency, hematuria and urgency.   Musculoskeletal:  Negative for arthralgias, back pain, gait problem, joint swelling, leg pain and myalgias.   Integumentary:  Negative for rash.   Allergic/Immunologic: Negative.    Neurological:  Negative for dizziness, tremors, syncope, weakness, light-headedness, numbness, headaches and memory loss.   Hematological:  Negative for adenopathy. Does not bruise/bleed easily.   Psychiatric/Behavioral:  Negative for agitation, confusion, hallucinations,  sleep disturbance and suicidal ideas. The patient is not nervous/anxious.               There were no vitals filed for this visit.        Wt Readings from Last 6 Encounters:   08/16/23 61.6 kg (135 lb 14.4 oz)   08/10/23 62.1 kg (137 lb)   08/04/23 61.8 kg (136 lb 3.9 oz)   08/04/23 62.1 kg (137 lb)   08/02/23 62.9 kg (138 lb 11.2 oz)   07/27/23 62.1 kg (137 lb)     There is no height or weight on file to calculate BMI.  There is no height or weight on file to calculate BSA.       Physical Exam  Vitals and nursing note reviewed.   Constitutional:       General: He is not in acute distress.     Appearance: Normal appearance.   HENT:      Head: Normocephalic and atraumatic.      Mouth/Throat:      Mouth: Mucous membranes are moist.   Eyes:      General: No scleral icterus.     Extraocular Movements: Extraocular movements intact.      Conjunctiva/sclera: Conjunctivae normal.   Neck:      Vascular: No JVD.      Trachea: Tracheostomy present.      Comments: Tracheotomy in place.  Right side - Skin changes c/w RT     Cardiovascular:      Rate and Rhythm: Normal rate and regular rhythm.      Heart sounds: No murmur heard.  Pulmonary:      Effort: Pulmonary effort is normal.      Breath sounds: Normal breath sounds. No wheezing or rhonchi.   Abdominal:      General: The ostomy site is clean. Bowel sounds are normal. There is no distension.      Palpations: Abdomen is soft.      Tenderness: There is no abdominal tenderness.   Musculoskeletal:         General: No swelling or deformity.      Right upper arm: Swelling and tenderness present.      Cervical back: Neck supple.      Comments: Erythema of right upper extremity to touch and tender   Lymphadenopathy:      Head:      Right side of head: No submandibular adenopathy.      Left side of head: No submandibular adenopathy.      Cervical: No cervical adenopathy.      Upper Body:      Right upper body: No supraclavicular or axillary adenopathy.      Left upper body: No  supraclavicular or axillary adenopathy.      Lower Body: No right inguinal adenopathy. No left inguinal adenopathy.   Skin:     General: Skin is warm.      Coloration: Skin is not jaundiced.      Findings: No rash.   Neurological:      General: No focal deficit present.      Mental Status: He is alert and oriented to person, place, and time.      Cranial Nerves: Cranial nerves 2-12 are intact.   Psychiatric:         Attention and Perception: Attention normal.         Behavior: Behavior is cooperative.       ECOG SCORE             Laboratory:  CBC with Differential:  Lab Results   Component Value Date    WBC 4.24 (L) 08/16/2023    RBC 3.78 (L) 08/16/2023    HGB 12.1 (L) 08/16/2023    HCT 35.5 (L) 08/16/2023    MCV 93.9 08/16/2023    MCH 32.0 (H) 08/16/2023    MCHC 34.1 08/16/2023    RDW 14.4 08/16/2023    PLT 82 (L) 08/16/2023    MPV 9.0 08/16/2023        CMP:  Sodium Level   Date Value Ref Range Status   08/16/2023 132 (L) 136 - 145 mmol/L Final     Potassium Level   Date Value Ref Range Status   08/16/2023 4.1 3.5 - 5.1 mmol/L Final     Carbon Dioxide   Date Value Ref Range Status   08/16/2023 24 22 - 29 mmol/L Final     Blood Urea Nitrogen   Date Value Ref Range Status   08/16/2023 7.2 (L) 8.4 - 25.7 mg/dL Final     Creatinine   Date Value Ref Range Status   08/16/2023 0.93 0.73 - 1.18 mg/dL Final     Calcium Level Total   Date Value Ref Range Status   08/16/2023 9.5 8.4 - 10.2 mg/dL Final     Albumin Level   Date Value Ref Range Status   08/16/2023 3.1 (L) 3.5 - 5.0 g/dL Final     Bilirubin Total   Date Value Ref Range Status   08/16/2023 0.5 <=1.5 mg/dL Final     Alkaline Phosphatase   Date Value Ref Range Status   08/16/2023 82 40 - 150 unit/L Final     Aspartate Aminotransferase   Date Value Ref Range Status   08/16/2023 16 5 - 34 unit/L Final     Alanine Aminotransferase   Date Value Ref Range Status   08/16/2023 10 0 - 55 unit/L Final     Estimated GFR-Non    Date Value Ref Range Status    08/02/2022 >60 mls/min/1.73/m2 Final         Assessment:       1) pT4apN1-Stage JENNYFER Laryngeal Squamous cell carcinoma   --Laryngeal Squamous cell carcinoma -s/p total laryngectomy, 1/40 LN with metastatic disease.   2) Lung masses x 2, bilateral--> Squamous cell -- Metastatic SqCC lung cancer vs metastatic SqCC to the lungs from H&N  3) Mediastinal and Hilar LAD    Completed weekly carbo/Taxol and radiation on 9/21/22 for his laryngeal carcinoma. Completed 4 cycles of Carbo/Taxol/Keytruda on 3/2/23. Started maintenance Keytruda every 6 weeks on 3/23/2023.  Patient responded great to treatment and was started on maintenance Keytruda but Most recent PET-CT showed resolution of pulmonary masses no evidence of disease in the head/neck or distant metastasis but progression Of mediastinal/ hilar lymphadenopathy. This is the only site with evidence of disease.  In view of this findings he was referred to radiation oncologist for possible concomitant chemoradiation hoping that this can take care of residual disease.  Was seen by Dr. Das  and he has the marking and is ready to start radiation.    I discussed with him that because of the shortage of Carboplatinum and cisplatin we may or may not be able to use platinum as a radiosensitizer if this isn't option for him.  I think that because of minimal residual disease and resolution of lung masses be will benefit of Carbo/taxol + RT. Next step would be infusional 5FU vs Gemzar.    Educated the patient on the risks versus benefits as well as toxicities associated with treatment.  Verbally consented the patient to the treatment plan and the patient was educated on the planned duration of the treatment and schedule of the treatment administration.  All questions were answered.        Plan:         Continue XRT - was on hold due to recent hospitalization for pneumonia. Received 25 treatments, plan is to complete 5 more.  He will start maintenance Keytruda after completion of  Chemo RT    Platelet count is 110k today. OK for weekly Carbo/Taxol  tomorrow.  Will add IVF's as this helped him while he was on chemo  in the past  Continue Eliquis 5 mg BID  Continue Synthroid 50 mcg daily    RTC on Monday, 8/28/23 with MD , labs same day -   Weekly labs - standing order placed  PET CT in 6 weeks post completion of RT due 10/23 - order placed --if insurance does not cover, will change to CT C/A/P    Encourage to call or message us for any questions or problems  The patient was given ample opportunity to ask questions, and to the best of my abilities, all questions answered to satisfaction; patient demonstrated understanding of what we discussed and agreeable to the plan.     Yara Atkinson MD  Hematology/Oncology

## 2023-08-22 NOTE — PLAN OF CARE
C5 D1 Taxol/Carbo and 1L NS given. Tolerated well. Next appts confirmed with wife. Dc'd home in stable condition.

## 2023-08-30 NOTE — PROGRESS NOTES
HEMATOLOGY/ONCOLOGY OFFICE CLINIC VISIT    Visit Information:     Initial Evaluation: 7/21/2022  Referring Provider:   Other providers: Dr Gerard, Dr Emory Alonso  Code status:     Diagnosis/Problem list:  1) pT4apN1-Stage JENNYFER Laryngeal Squamous cell carcinoma   2) Lung masses x 2, bilateral--> Squamous cell -- Metastatic SqCC lung cancer vs metastatic SqCC to the lungs from H&N  3) Mediastinal and Hilar LAD     Present treatment:  Keytruda q 3 weeks maintenance-planned 9/12/2023    Treatment/Oncology history:  --7/11/2022: total laryngectomy  --Carbo/Taxol + RT (8/17/22-9/21/22)  --8/17/2022-9/28/2022:  6000 cGy, 200 cGy/fraction, 30/30 fx  --Carbo/Taxol/Keytruda 11/30/2022-3/2/2023  --Started maintenance Keytruda every 6 weeks on 3/23/2023  --Carbo/Taxol + RT completed 8/25/2023    Plan of care:     Imaging:  CT neck 6/6/2022: A metastatic right level III cervical lymph node is present with short axis measurement of 1.6 cm.  This lymph node contains internal cystic change, typical of metastatic squamous cell carcinoma.  A left level III cervical lymph node shows short axis measurements of 1 cm, and is suspicious.    A left supraglottic mass measures approximately 2.4 cm in diameter (axial post-contrast image 39), presumably corresponding to the primary neoplasm.  Metastatic lymph nodes are also present in the inferior right paratracheal position, measuring 1.3 cm in short axis.  Metastatic lymph nodes are present in the superior right hilum, measuring 1.8 cm in short axis.  CT H&N 6/27/2022: 1. There is mild stenosis at the origin of left proximal internal carotid artery secondary to dense calcified atheromatous plaque. 2. There is consolidation is right upper lobe. Additional ground-glass opacities are also seen on the remainder of the visualized lungs. These findings are consistent with an infectious process. Small right pleural effusion is seen. There is an ill-defined soft tissue mass in the right perihilar  region which measures approximately 3.1 x 2.9 x 4.6 cm, of concern for neoplasm. Correlate clinically as regards further evaluation and followup with CT chest. There is an ill-defined enhancing soft tissue mass in the glottis infiltrating into the paraglottic spaces and the subglottic region with obliteration of the airway, of concern for a neoplasm. 3. Unremarkable CT angiogram of the head. Details and other findings as described above.  NM PET CT 8/1/2022: postoperative changes of recent total laryngectomy and lou dissection. bilateral hypermetabolic cervical lymphadenopathy.  A right cervical lymph node 1.7 x 1.8 cm, 1.6 x 1.5 cm previously, SUV of 7.7.  A left cervical lymph node 8 x 10 mm SUV of 3.4. A fluid collection posterior to the left internal jugular vein measures 2.4 x 2.4 cm.  There is right hilar and mediastinal lymphadenopathy.  A precarinal lymph node measures 1.6 x 2.3 cm, compared to 1.3 x 1.7 cm previously, and has a max SUV of 13.8.  Right hilar lymphadenopathy measures approximately 3 x 2.9 cm, compared to 2.1 x 2.3 cm previously, and has a max SUV of 9.8.  A suspected right hilar mass measures 1.8 x 3.6 cm and has a max SUV of 14.8. no hypermetabolic lymphadenopathy in the abdomen or pelvis.  There is physiologic uptake in the solid organs, urinary system and bowel.  PET CT 10/13/2022: Ill-defined hypermetabolic tissue is again appreciated at the left neck, just superior and lateral to the tracheostomy insertion site. The regional maximum SUV is 6.8, with discrete CT-correlate lesion poorly delineated secondary to non-contrast protocol; prior SUV max 8.3. An adjacent, better delineated right cervical chain lymph node is visualized posteriorly, measuring 1.1 cm short axis with SUV max of 6; this previously measured 1.5 cm in least dimension with maximum SUV of 7.7.  No convincing new or enlarging cervical adenopathy or newly hypermetabolic lymph nodes are visualized.  The prior left neck  fluid collection is no longer evident.The somewhat lobulated right upper lobe hilar mass is now approximately 3.4 cm x 1.4 cm and maximum SUV 23; previously 3.6 cm x 1.8 cm and SUV max 14.8.  No new or enlarging hypermetabolic lung lesion, and no development of organized airspace consolidation or pleural effusion.  PET CT 3/16/2023:  1. Largely improved disease though there is single paratracheal lymph node which has increased in size and is now FDG avid.   2. Some inguinal and external iliac lymph nodes demonstrate increased FDG activity today, this is favored reactive.  CV Ultrasound doppler venous arm right 3/22/2023: Positive acute deep vein thrombosis identified in the subclavian, axillary, and brachial veins of the right upper extremity. Superficial vein thrombosis identified in the basilic vein of the right upper extremity. All other veins were compressible.  PET CT 6/9/2023:  No FDG avid pulmonary lesions are present. Mediastinal and hilar lymphadenopathy has markedly increased compared to the prior study.  Reference right hilar conglomerate measures approximately 2.6 x 4.1 cm on image 94 with a maximum SUV of 19.0.  This previously measured 1.0 cm and had a maximum SUV of 16.0. Physiologic uptake is in the liver, spleen, urinary system and bowel. No enlarged or FDG avid lymph nodes are in the abdomen or pelvis. Adrenal glands are normal. No FDG avid osseous lesions are present. Impression: Disease progression with worsening mediastinal and right hilar FDG avid lymphadenopathy.       Pathology:  6/14/2022:  EBUS ENDOBRONCHIAL MASS RUL, CYTOLOGY: NEARLY ACELLULAR SPECIMEN CONSISTING OF FRESH BLOOD CLOT. NO ATYPICAL OR MALIGNANT CELLS IDENTIFIED.   7/11/2022: Bronchial Wash, RLL, right bronchial mass washings:  - Squamous cell carcinoma.                      Bronchial Wash, LLL, left bronchial mass washings:     - Squamous cell carcinoma.     7/11/2022 Laryngectomy:  1. Larynx, laryngeal biopsy frozen section  : - Squamous cell carcinoma with necrosis.    2. Neck, Anterior, left neck level 3: - Number of lymph nodes involved by carcinoma:  1/7       - Size of metastatic deposit:  2.5 mm - Extranodal extension:  Not identified      3. Larynx, resection without nodes, total layrngectomy :  - Squamous cell carcinoma.  4. Neck, Anterior, left neck level 4: - Number of lymph nodes involved by carcinoma:  0/10  5. Neck, Anterior, right neck level 4:  - Number of lymph nodes involved by carcinoma:  0/11  6. Neck, Anterior, right neck level 3: - Number of lymph nodes involved by carcinoma:  0/9  7. Nasophaynx, inferior pharyngeal mucosa : - Benign squamous mucosa. - No evidence of malignancy.     8. Cartilage, superior cartilage margin : - No evidence of malignancy.    9. Lymph Node, pretracheal lymph  node :  - Number of lymph nodes involved by carcinoma:  0/3  [id number]    1/11/2023:   Right tracheostomal biopsy:  - Squamous mucosa with epithelial erosion and acute inflammation.  - Lamina propria with reactive fibroblasts and fibrosis, consistent with radiation changes, see comment.  - No evidence of malignancy.    CLINICAL HISTORY:       Patient: Josafat Boudreaux is a 57 y.o. male.kindly refer her for laryngeal carcinoma.  Patient  was referred to ENT for further evaluation of hoarseness.  He did not have any difficulty swallowing or dry mouth at the time.  Hoarseness has been present for at least 4 months prior to evaluation. 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      Patient was scheduled to trach but on 06/10/2022 he was brought to the emergency room via air met with is short of breath.  He was found to have and oxygenation on the 40s when EMS was called.  He was placed on BiPAP but pCO2 increased so he has an emergent tracheostomy performed. He has a PEG tube placed same hospitalization.  He underwent bronchoscopy with biopsy of the right upper lobe  endobronchial lesion on 6/14/2022 cytology was negative for malignant cells.       On 7/11/2022 he underwent LARYNGECTOMY, WITH RADICAL NECK DISSECTION - Bilateral LARYNGOSCOPY, DIRECT, DIAGNOSTIC, WITH BRONCHOSCOPY AND ESOPHAGOSCOPY pathology report as above.  1/40 lymph nodes were involved with metastatic disease.  Bronchoscopy was repeated and biopsy of the left lower lobe and right lower lobe were both positive for squamous cell carcinoma.     He is here today with his wife.  He has recovered from surgery relatively well.  Tracheostomy in place.  He is unable to talk so the history was taken by electronic medical record and wife.     Patient has a brother with history of throat cancer. Patient used to smoke 1 and half pack per day since he was 60 years old.  He was smoking less recently.      Chief Complaint: Back Pain      Interval History:  Previous visits:   On 3/22/23, NIVA study was positive for DVT to right upper extremity, he was started on Eliquis 5 mg BID. Tolerating well with no side effects. PEG tube was removed on 3/23/23. He underwent procedure on 5/18/23 with Dr. De Leon to correct the leakage. Site is all healed.   On 5/3/23 TSH was 7.451. He was called and instructed to begin taking Synthroid 50 mcg daily. Most recent TSH  2.276.  Completed weekly carbo/Taxol and radiation on 9/21/22 for his laryngeal carcinoma. Completed 4 cycles of Carbo/Taxol/Keytruda on 3/2/23. Started maintenance Keytruda every 6 weeks on 3/23/2023.  Patient responded great to treatment and was started on maintenance Keytruda but Most recent PET-CT, 6/9/23, showed resolution of pulmonary masses no evidence of disease in the head/neck or distant metastasis but progression Of mediastinal/ hilar lymphadenopathy. This is the only site with evidence of disease.  In view of this findings he was referred to radiation oncologist for possible concomitant chemoradiation hoping that this can take care of residual disease.    Was seen  by Dr. Das and started radiation on 7/6/23.    8/30/23  Patient presents for follow up to discuss resuming maintenance immunotherapy with Keytruda, he is with his wife. He completed XRT on 8/25/23 and 5 cycles of Carbo/Taxol on 8/22/23. C4 was delayed  1 day due to platelet count of 89k. He is in a wheelchair today, he fell about 2 weeks ago getting out of tub. He has been experiencing worsening pain to lower back and right hip. He is scheduled to see his orthopedic doctor tomorrow. Otherwise, he is doing well.  No fever, chills, sweats.  No chest pain or shortness of breath. Platelets today is 120k.  They are planning a trip to [place] at the beginning of October to attend his brother's wedding.      PMH/PSH/FH/SOCIAL/ALLERGIES/MEDICATIONS:  ALL REVIEWED AND UPDATED AS APPROPRIATE.       Review of Systems   Constitutional:  Positive for fatigue. Negative for activity change, appetite change, chills, diaphoresis, fever and unexpected weight change.   HENT:  Negative for nasal congestion, mouth sores, nosebleeds, sinus pressure/congestion, sore throat and trouble swallowing.    Eyes: Negative.    Respiratory:  Negative for shortness of breath.         Increase secretions   Cardiovascular:  Negative for chest pain and palpitations.   Gastrointestinal:  Negative for abdominal distention, abdominal pain, blood in stool, change in bowel habit, constipation, diarrhea, nausea, vomiting and change in bowel habit.        Dysphagia   Endocrine: Negative.    Genitourinary:  Negative for bladder incontinence, decreased urine volume, difficulty urinating, dysuria, frequency, hematuria and urgency.   Musculoskeletal:  Negative for arthralgias, back pain, gait problem, joint swelling, leg pain and myalgias.   Integumentary:  Negative for rash.   Allergic/Immunologic: Negative.    Neurological:  Negative for dizziness, tremors, syncope, weakness, light-headedness, numbness, headaches and memory loss.   Hematological:  Negative  "for adenopathy. Does not bruise/bleed easily.   Psychiatric/Behavioral:  Negative for agitation, confusion, hallucinations, sleep disturbance and suicidal ideas. The patient is not nervous/anxious.               Vitals:    08/30/23 1255   BP: 104/70   BP Location: Right arm   Patient Position: Sitting   Pulse: (!) 116   Resp: 15   Temp: 98.5 °F (36.9 °C)   TempSrc: Oral   SpO2: 95%   Weight: 61.7 kg (136 lb)   Height: 5' 6" (1.676 m)           Wt Readings from Last 6 Encounters:   08/30/23 61.7 kg (136 lb)   08/21/23 62.6 kg (138 lb 1.6 oz)   08/16/23 61.6 kg (135 lb 14.4 oz)   08/10/23 62.1 kg (137 lb)   08/04/23 61.8 kg (136 lb 3.9 oz)   08/04/23 62.1 kg (137 lb)     Body mass index is 21.95 kg/m².  Body surface area is 1.7 meters squared.       Physical Exam  Vitals and nursing note reviewed.   Constitutional:       General: He is not in acute distress.     Appearance: Normal appearance.   HENT:      Head: Normocephalic and atraumatic.      Mouth/Throat:      Mouth: Mucous membranes are moist.   Eyes:      General: No scleral icterus.     Extraocular Movements: Extraocular movements intact.      Conjunctiva/sclera: Conjunctivae normal.   Neck:      Vascular: No JVD.      Trachea: Tracheostomy present.      Comments: Tracheotomy in place.  Right side - Skin changes c/w RT     Cardiovascular:      Rate and Rhythm: Normal rate and regular rhythm.      Heart sounds: No murmur heard.  Pulmonary:      Effort: Pulmonary effort is normal.      Breath sounds: Normal breath sounds. No wheezing or rhonchi.   Abdominal:      General: The ostomy site is clean. Bowel sounds are normal. There is no distension.      Palpations: Abdomen is soft.      Tenderness: There is no abdominal tenderness.   Musculoskeletal:         General: No swelling or deformity.      Right upper arm: Swelling and tenderness present.      Cervical back: Neck supple.      Comments: Erythema of right upper extremity to touch and tender   Lymphadenopathy: "      Head:      Right side of head: No submandibular adenopathy.      Left side of head: No submandibular adenopathy.      Cervical: No cervical adenopathy.      Upper Body:      Right upper body: No supraclavicular or axillary adenopathy.      Left upper body: No supraclavicular or axillary adenopathy.      Lower Body: No right inguinal adenopathy. No left inguinal adenopathy.   Skin:     General: Skin is warm.      Coloration: Skin is not jaundiced.      Findings: No rash.   Neurological:      General: No focal deficit present.      Mental Status: He is alert and oriented to person, place, and time.      Cranial Nerves: Cranial nerves 2-12 are intact.   Psychiatric:         Attention and Perception: Attention normal.         Behavior: Behavior is cooperative.       ECOG SCORE    0 - Fully active-able to carry on all pre-disease performance without restriction         Laboratory:  CBC with Differential:  Lab Results   Component Value Date    WBC 4.44 (L) 08/30/2023    RBC 3.31 (L) 08/30/2023    HGB 10.8 (L) 08/30/2023    HCT 32.3 (L) 08/30/2023    MCV 97.6 (H) 08/30/2023    MCH 32.6 (H) 08/30/2023    MCHC 33.4 08/30/2023    RDW 17.1 (H) 08/30/2023     (L) 08/30/2023    MPV 10.1 08/30/2023        CMP:  Sodium Level   Date Value Ref Range Status   08/30/2023 133 (L) 136 - 145 mmol/L Final     Potassium Level   Date Value Ref Range Status   08/30/2023 4.7 3.5 - 5.1 mmol/L Final     Carbon Dioxide   Date Value Ref Range Status   08/30/2023 27 22 - 29 mmol/L Final     Blood Urea Nitrogen   Date Value Ref Range Status   08/30/2023 6.1 (L) 8.4 - 25.7 mg/dL Final     Creatinine   Date Value Ref Range Status   08/30/2023 0.74 0.73 - 1.18 mg/dL Final     Calcium Level Total   Date Value Ref Range Status   08/30/2023 10.7 (H) 8.4 - 10.2 mg/dL Final     Albumin Level   Date Value Ref Range Status   08/30/2023 3.2 (L) 3.5 - 5.0 g/dL Final     Bilirubin Total   Date Value Ref Range Status   08/30/2023 0.7 <=1.5 mg/dL  Final     Alkaline Phosphatase   Date Value Ref Range Status   08/30/2023 118 40 - 150 unit/L Final     Aspartate Aminotransferase   Date Value Ref Range Status   08/30/2023 20 5 - 34 unit/L Final     Alanine Aminotransferase   Date Value Ref Range Status   08/30/2023 10 0 - 55 unit/L Final     Estimated GFR-Non    Date Value Ref Range Status   08/02/2022 >60 mls/min/1.73/m2 Final         Assessment:     1. Squamous cell carcinoma of both lungs    2. Malignant neoplasm of larynx, unspecified    3. Drug-induced hypothyroidism        1) pT4apN1-Stage JENNYFER Laryngeal Squamous cell carcinoma   --Laryngeal Squamous cell carcinoma -s/p total laryngectomy, 1/40 LN with metastatic disease.   2) Lung masses x 2, bilateral--> Squamous cell -- Metastatic SqCC lung cancer vs metastatic SqCC to the lungs from H&N  3) Mediastinal and Hilar LAD    Completed weekly carbo/Taxol and radiation on 9/21/22 for his laryngeal carcinoma. Completed 4 cycles of Carbo/Taxol/Keytruda on 3/2/23. Started maintenance Keytruda every 6 weeks on 3/23/2023.  Patient responded great to treatment and was started on maintenance Keytruda but Most recent PET-CT showed resolution of pulmonary masses no evidence of disease in the head/neck or distant metastasis but progression Of mediastinal/ hilar lymphadenopathy. This is the only site with evidence of disease.  In view of this findings he was referred to radiation oncologist for possible concomitant chemoradiation hoping that this can take care of residual disease.  Was seen by Dr. Das  and he has the marking and is ready to start radiation.          Plan:       Continue XRT - was on hold due to recent hospitalization for pneumonia. Received 25 treatments, plan is to complete 5 more - completed 8/25/23. Completed 5 cycles of weekly Carbo/Taxol on 8/22/23. Will plan to resume maintenance Keytruda evry 3 weeks after completion of Chemo RT.    RTC in 2 weeks with MD for TD/lab, Keytruda next  day  Labs: CBC, CMP, TSH  PET CT in 6 weeks post completion of RT due  beginning of 10/23 - ordered - if insurance does not cover, will change to CT C/A/P   Okay to make trip to [place], will schedule PET CT to be done on 10/16/23  Continue Eliquis 5 mg BID  Continue Synthroid 50 mcg daily  Keep scheduled appointment with orthopedic tomorrow for evaluation of lower back and right hip pain after fall    Encourage to call or message us for any questions or problems  The patient was given ample opportunity to ask questions, and to the best of my abilities, all questions answered to satisfaction; patient demonstrated understanding of what we discussed and agreeable to the plan.     Yara Atkinson MD  Hematology/Oncology

## 2023-08-30 NOTE — PROGRESS NOTES
The scope used for the exam was:  Flexible scope ENF-P4  Serial Number:  1)    [id number]    []   2)    [id number]    []   3)    [id number]    []   4)    [id number]    []   5)    [id number]    []   6)    [id number]    [x]       The scope used for the exam was:  Rigid scope   Serial Number:  1)   [id number]    []   2)   [id number]    []   3)   [id number]    []   4)    3384   []   5)    0824   []   6)    5554   []     7)   7425    []   8)   2240    []   9)   1109    []

## 2023-08-30 NOTE — PROGRESS NOTES
.[facility]  Otolaryngology Clinic Note    Josafat Boudreaux  Encounter Date: 8/30/2023  YOB: 1965  Physician: Simone Basilio MD    Chief Complaint: Laryngeal mass    HPI: Josafat Boudreaux is a 57 y.o. male referred to clinic by Dr. Emory Alonso for laryngeal mass. Patients wife provides history. She states that patient first noticed hoarseness in November 2021. Got progressively worse. Presented to Dr. Alonso in early June 2022 for these complaints. Was noted to have a laryngeal mass on flexible laryngoscopy. Several days after the flexible laryngoscopy patient developed worsening swelling of the airway and presented to the ER in respiratory distress. He underwent emergent tracheostomy with Dr. Junior Alonso. Patient was subsequently discharged home but readmitted shortly after with seizures. He spent 3 days in the ICU on the ventilator. During hospitalization, patient underwent PEG tube placement. He reports that he was not having any issues with PO intake prior to the tracheostomy placement. Now he is PEG dependent. Only taking some ice chips by mouth. He presents today to discuss laryngectomy.     11/2/22:  Since last visit, patient total laryngectomy with bilateral neck dissections level 3 and 4, primary closure, he did not undergo TEP placement.  He was also found to have synchronous primary or lung metastasis in the lung that was also squamous cell carcinoma.  He underwent radiation completed this in the middle of September 2022.  He received carboplatin and Taxol which he still has a few more treatments of.  He presented to the emergency department on 10/08/2022 with a right neck infection.  This was treated with IV antibiotics and incision and drainage with UCHE drain placement.  He was kept NPO and did feeds through his PEG tube.  He met criteria for discharge on 10/25/22 with UCHE drain.  He had outpatient swallow study which did not demonstrate any  evidence of a leak.  He returns to clinic today reporting improvement in his symptoms including right neck pain and drainage.  He has not had to empty his UCHE drain on a daily basis at this point.  He had a PET performed on 10/13/2022 to assess response to chemotherapy.  On that PET scan, right hilar and mediastinal lymph nodes were decreased in size but had increased metabolic activity.  He denies any other issues today    11/7/22: Reports worsening swelling under the right jawline the past few days.  Denies any erythema, infection, fever or chills.    12/6/22:  Feeling a little run down, increased cough with blood tinged secretions, running temperature to 101 at home.  Received azithromycin abx last night from Dr. Alonso's office.    12/28/22:  Was hospitalized for Pseudomonal pneumonia.  He also had Blastomycetes positive antigen on arrival, but was unable to tolerate amphotericin B due to significant hypotension.  Prior to discharge, his repeat Blastomycetes antigen was negative.  Discharged with levaquin and portable home oxygen.  His pneumonia symptoms have all improved since discharge.  Ulceration spots have persisted with one spot having a small amount of growth adjacent.  No bleeding or pain.    1/3/22:  Returns for follow up for monitoring of tracheal ulceration.  Reports interval improvement.  Denies pain or bleeding from area.  Denies swelling or tenderness to neck.    1/11/22:  Returns for follow up.  No changes to tracheal ulceration.  Denies pain or bleeding.  Otherwise doing well.    2/8/23:  Patient is here for regular scheduled cancer surveillance.  He denies any changes since his last appointment.  He has lost about 5 lb since last December.  He contributes his weight loss to being hospitalized for pneumonia.  His swallow stable and his appetite is improving.  Denies odynophagia, otalgia, new head or neck masses, oral lesions, oral bleeding, hematemesis or hemoptysis.  He is still undergoing  chemotherapy treatments with Dr. Atkinson.  States that he has 2 more infusion scheduled at this time.  He is tolerating treatments well.'    3/8/23:  Doing well.  Completed his 4th chemo treatment few weeks ago.  Has been gaining weight not using his PEG.  Scheduled for his posttreatment the 22nd and will see GI after fall as well to remove PEG.  Family notes that the tracheal ulceration site is continuing to heal has not had any other issues with the stoma breathing.     4/12/23:  Presents today for cancer surveillance.  He reports that he has been doing very well.  He was started on Keytruda recently for his lung cancer.  He recently had his PEG removed as he was taking everything by mouth with no issues.  He denies any otalgia, odynophagia, new lumps or bumps in the neck, weight loss.  They are not in need of any laryngectomy supplies at this time. On eliquis for an UE DVT diagnosed recently.     5/10/23: Here today for cancer surveillance. Reports that he's doing well lately. Tolerating a diet. Still having issues with his PEG tube site leaking and will be seeing Dr. De Leon soon to talk about a procedure to close it. He had his maintenance keytruda dose at the beginning of May and has the next one scheduled in 6 weeks. He'll be getting a repeat PET in June. Denies any new otalgia, odynophagia, new lumps or bumps in his neck.     6/14/23: Patient is here for follow up for cancer surveillance. Unfortunately, his most recent PET on 6/9/23 did show progression of his right sided mediastinal lymphadenopathy. He and his wife remain hopeful and are going to see Dr. Atkinson later today. He has been doing well otherwise. No otalgia, dysphagia, odynophagia, new lumps/bumps. His TSH was high at the last blood draw so he was started on Synthroid 50mcg; he gets his TSH red-drawn today. No other issues.     07/12/2023: Patient returns for follow-up and cancer surveillance.  He continues to undergo XRT and carbo/taxol therapy  in the setting of progression of right-sided mediastinal disease.  No otalgia, dysphagia, odynophagia, new lumps/bumps.  His TSH following last visit was normal.  He reports some increased secretions following initiation of XRT.  No other issues.    08/30/2023:  Patient returns in follow-up.  He reports coughing up some blood over the past couple of days.  His wife feels that she has suctioned a little too deeply over the past couple of days as well.  He was admitted to the hospital at the beginning of this month for pneumonia.  Still undergoing XRT and chemotherapy.      Review of patient's allergies indicates:  No Known Allergies    Past Medical History:   Diagnosis Date    COPD (chronic obstructive pulmonary disease)     Dysphagia     Essential (primary) hypertension 8/5/2023    GERD (gastroesophageal reflux disease)     laryngeal Cancer     Lung cancer     Vocal cord mass        Past Surgical History:   Procedure Laterality Date    DIAGNOSTIC LAPAROSCOPY N/A 5/18/2023    Procedure: LAPAROSCOPY, DIAGNOSTIC;  Surgeon: Dipak De Leon Jr., MD;  Location: [facility] OR;  Service: General;  Laterality: N/A;    DIRECT DIAGNOSTIC LARYNGOSCOPY WITH BRONCHOSCOPY AND ESOPHAGOSCOPY N/A 07/11/2022    Procedure: LARYNGOSCOPY, DIRECT, DIAGNOSTIC, WITH BRONCHOSCOPY AND ESOPHAGOSCOPY;  Surgeon: Emory Alonso MD;  Location: [facility] OR;  Service: ENT;  Laterality: N/A;    ESOPHAGOGASTRODUODENOSCOPY (EGD)- DEVICE ASSISTED N/A 04/18/2023    Procedure: EGD;  Surgeon: Aaliyah Conrad III, MD;  Location: [facility] ENDOSCOPY;  Service: Gastroenterology;  Laterality: N/A;  Esophageal Dilation; Savory over Guide wire: 36Fr and 39 Fr  Clips to secure peg site    HIP SURGERY      HUMERUS FRACTURE SURGERY      INCISION AND DRAINAGE, NECK Bilateral 10/21/2022    Procedure: INCISION AND DRAINAGE,NECK;  Surgeon: Madison Worley MD;  Location: [facility] OR;  Service: ENT;  Laterality: Bilateral;    INSERT ARTERIAL LINE  06/26/2022         JOINT  REPLACEMENT  2019    R hip    PEG TUBE REMOVAL      PLACEMENT, MEDIPORT      TRACHEOSTOMY N/A 06/10/2022    Procedure: CREATION, TRACHEOSTOMY;  Surgeon: Junior Alonso MD;  Location: [facility];  Service: ENT;  Laterality: N/A;       Social History     Socioeconomic History    Marital status:    Tobacco Use    Smoking status: Former     Current packs/day: 0.00     Average packs/day: 0.5 packs/day for 42.4 years (21.2 ttl pk-yrs)     Types: Cigarettes     Start date: 1980     Quit date: 2022     Years since quittin.2    Smokeless tobacco: Never   Substance and Sexual Activity    Alcohol use: Yes     Alcohol/week: 1.0 standard drink of alcohol     Types: 1 Cans of beer per week     Comment: occasional    Drug use: Never    Sexual activity: Not Currently     Partners: Female     Social Determinants of Health     Financial Resource Strain: Low Risk  (3/21/2023)    Overall Financial Resource Strain (CARDIA)     Difficulty of Paying Living Expenses: Not hard at all   Food Insecurity: No Food Insecurity (3/21/2023)    Hunger Vital Sign     Worried About Running Out of Food in the Last Year: Never true     Ran Out of Food in the Last Year: Never true   Transportation Needs: No Transportation Needs (3/21/2023)    PRAPARE - Transportation     Lack of Transportation (Medical): No     Lack of Transportation (Non-Medical): No   Physical Activity: Inactive (3/21/2023)    Exercise Vital Sign     Days of Exercise per Week: 0 days     Minutes of Exercise per Session: 0 min   Stress: No Stress Concern Present (3/21/2023)    [place] [employer] - Occupational Stress Questionnaire     Feeling of Stress : Not at all   Social Connections: Moderately Isolated (3/21/2023)    Social Connection and Isolation Panel [NHANES]     Frequency of Communication with Friends and Family: More than three times a week     Frequency of Social Gatherings with Friends and Family: More than three times a week      Attends Christianity Services: Never     Active Member of Clubs or Organizations: No     Attends Club or Organization Meetings: Never     Marital Status:    Housing Stability: Low Risk  (3/21/2023)    Housing Stability Vital Sign     Unable to Pay for Housing in the Last Year: No     Number of Places Lived in the Last Year: 1     Unstable Housing in the Last Year: No       Family History   Problem Relation Age of Onset    Lung cancer Father     Cancer Father         Lung cancer    Throat cancer Brother     Cancer Brother         Throat       Outpatient Encounter Medications as of 8/30/2023   Medication Sig Dispense Refill    albuterol-ipratropium (DUO-NEB) 2.5 mg-0.5 mg/3 mL nebulizer solution Take 3 mLs by nebulization every 4 (four) hours as needed for Shortness of Breath or Wheezing. Rescue 90 mL 11    apixaban (ELIQUIS) 5 mg Tab Take 1 tablet (5 mg total) by mouth As instructed. 60 tablet 6    diphenhydrAMINE (BENADRYL) 25 mg capsule 1 capsule (25 mg total) by Per G Tube route every 6 (six) hours as needed for Itching. 90 capsule 1    famotidine (PEPCID) 20 MG tablet Take 1 tablet (20 mg total) by mouth every evening. 90 tablet 3    glutamine 10 gram PwPk Take 10 g by mouth 2 (two) times a day.      hydrocodone-acetaminophen (HYCET) solution 7.5-325 mg/15mL Take 15 mLs by mouth every 6 (six) hours as needed for Pain. 300 mL 0    levothyroxine (SYNTHROID) 50 MCG tablet Take 1 tablet (50 mcg total) by mouth once daily. 30 tablet 11    megestroL (MEGACE) 400 mg/10 mL (40 mg/mL) Susp       metroNIDAZOLE (METROGEL) 0.75 % (37.5mg/5 gram) vaginal gel apply to affected area BID      ondansetron (ZOFRAN-ODT) 8 MG TbDL Take 8 mg by mouth every 8 (eight) hours as needed for Nausea. Tab 1 ODT in AM for 2 days after chemotherapy      pantoprazole (PROTONIX) 20 MG tablet Take 1 tablet (20 mg total) by mouth once daily. 90 tablet 3    sodium chloride 3% 3 % nebulizer solution Take 4 mLs by nebulization as needed for Other  or Cough (wheezing). 100 mL 0    sodium chloride for inhalation (SODIUM CHLORIDE 0.9%) 0.9 % nebulizer solution Take 3 mLs by nebulization as needed for Wheezing. 75 mL 0    tobramycin, PF, (JENNIFER) 300 mg/5 mL nebulizer solution Take 5 mLs (300 mg total) by nebulization every 12 (twelve) hours. for 7 days 70 mL 0    [DISCONTINUED] aspirin (ECOTRIN) 81 MG EC tablet Take 81 mg by mouth once daily.       Facility-Administered Encounter Medications as of 8/30/2023   Medication Dose Route Frequency Provider Last Rate Last Admin    LIDOcaine (PF) 40 mg/mL (4 %) injection 2 mL  2 mL Tracheal Tube 1 time in Clinic/HOD Kevin Palomino MD        LIDOcaine HCL 4% external solution 4 mL  4 mL Topical (Top) 1 time in Clinic/HOD Marty Jeffries MD        phenylephrine HCL 1% nasal spray 1 spray  1 spray Each Nostril 1 time in Clinic/HOD Kevin Palomino MD           Physical Exam:  There were no vitals filed for this visit.        Constitutional  General Appearance: well nourished, well-developed, AAO x3, NAD  HEENT  Eyes: PEERLA, EOMI, normal conjunctivae  Ears: Hearing well at conversation level, EAC patent, TM intact without effusions  Nose: septum midline, no inferior turbinate hypertrophy, no polyps, moist mucosa, pale appearing  OC/OP: dentition moderate, no oral lesions, tongue/FOM/BOT- soft, no leukoplakia/ulcerations/ tenderness   Neck: post-radiation changes and firmness, mild submandibular and submental lymphedema more on the left than right, stoma widely patent  Neuro: CN II - XII intact bilaterally  Cardiovascular: peripheral pulses palpable  Respiratory: non-labored respirations  Psychiatric: oriented to time, place and person, no depression, anxiety or agitation    PROCEDURE: Flexible fiberoptic neopharyngoscopy & Tracheoscopy    Surgeon: Simone Basilio   Anesthesia:  Topical lidocaine/Afrin  Indication: History of head and neck cancer  Complications: None  Date: 08/30/2023    Procedure in Detail:    Informed consent  was obtained from the patient after explanation of procedure, indications, risks and benefits. Flexible laryngoscopy was performed on Josafat Boudreaux through the right nasal passage(s). The nasal cavity, nasopharynx, oropharynx, neopharynx, and trachea were examined.     Operative Findings:    Nasal Cavity: Right nasal airway patent without lesions or ulcerations  Nasopharynx: No adenoid hypertrophy, no masses or ulcerations noted in the fossae of Rosenmüller, patent appearing lashonda tubarii  Tongue Base: No fullness or lingual tonsillar hypertrophy. No masses.  Pharyngeal Walls: No lesions or ulcerations noted  Neopharynx: No masses or lesions  Tracheoscopy: Trachea clear to malissa; there is an area of dried blood to the posterior tracheal wall, consistent with suction injury    The patient tolerated the procedure well without any complications.    Impression: No evidence of disease; area of dried blood to distal trachea consistent with suction injury          Assessment/Plan:  Josafat Boudreaux is a 57 y.o. 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 Alonso; completed adjuvant CRT in 9/2022. Continued chemo until 3/23 for metastatic lung SCCA.  Unfortunately, his most recent PET on 6/9/23 showed progression of his right mediastinal lymphadenopathy.  Currently undergoing CRT.     - Patient uses saline nebs consistently and always wears his HME; will start on TobraDex nebs for 7 days; encouraged patient in his wife to be careful of suctioning to deep  - Continue routine roque care.   - Recent TSH (8/2) within normal limits; patient consistently taking synthroid     RTC in 6 weeks for routine surveillance       HEAD & NECK CANCER SURVEILLANCE   Primary Surgical Treatment     Head & Neck Staff: Dr. Emory Alonso  Medical Oncologist: Yara Atkinson MD (Last seen: 8/21/2023)  Radiation Oncologist: Romie Das MD (Last seen:  8/24/2023)    Primary tumor: Endolaryngeal [id number] SCCA    Date of Diagnosis: 7/11/22  Surgery Date: 7/11/22  Induction Chemotherapy: No  Adjuvant Therapy: CRT  Completion Date: Sept 2022 for RT, finished chemo 3/2023    Pertinent Treatment History:  Surgery + CRT    Place date if completed   3 6 12 18 24 36 48 60   CT Neck 10/19/22 X X X X X X X   PET/CT 10/13/22  6/9/23        CXR  12/21/22 X X X X X X   TFT X 12/6/22 5/3/23  X X X X     Current Surveillance Interval: <1 year post-treatment, q6 wks       Rufino Basilio MD  [employer] Otolaryngology PGY-3

## 2023-08-31 NOTE — TELEPHONE ENCOUNTER
----- Message from Yara Atkinson MD sent at 8/31/2023  9:49 AM CDT -----  Patient will need IVF's. Please call the patient to see if he can come for 1 bag of IVF's

## 2023-08-31 NOTE — TELEPHONE ENCOUNTER
ATTEMPTED TO CONTACT PT WIFE, NO ANSWER, WILL TRY TO CALL AGAIN LATER. COULD NOT LEAVE MESSAGE, NO VM PICKED UP

## 2023-08-31 NOTE — TELEPHONE ENCOUNTER
SPOKE WITH PT WIFE AND ADVISED OF RECOMMENDATIONS PER MD, SHE STATED UNDERSTANDING. I ADVISED HER SCHEDULING WILL CONTACT HER WITH A TIME FOR HIM TO COME IN.

## 2023-09-01 NOTE — PROGRESS NOTES
Chief Complaint:   Chief Complaint   Patient presents with    Right Hip - Pain, Injury    Hip Injury     Pt states he fell in the bathtub 3 weeks ago. Pt states he feel in his bacvk, but has been experiencing constant pain in the back and Rt hip. Pain radiates into his knee, and sometimes unable patient to stand up and walk. Pt has been managing his pain with Liquid hydrocodone from a prior surgery  with great relief.        History of present illness:  70-year-old male presents for evaluation of right hip pain.  Patient with a recent fall landing on his back.  Complains of pain that begins in his buttock radiates down his leg to his knee and slightly distal.  He is ambulatory.  Does have a history of recently of a tracheostomy.    Past Medical History:   Diagnosis Date    COPD (chronic obstructive pulmonary disease)     Dysphagia     Essential (primary) hypertension 8/5/2023    GERD (gastroesophageal reflux disease)     laryngeal Cancer     Lung cancer     Vocal cord mass        Past Surgical History:   Procedure Laterality Date    DIAGNOSTIC LAPAROSCOPY N/A 5/18/2023    Procedure: LAPAROSCOPY, DIAGNOSTIC;  Surgeon: Dipak De Leon Jr., MD;  Location: [facility];  Service: General;  Laterality: N/A;    DIRECT DIAGNOSTIC LARYNGOSCOPY WITH BRONCHOSCOPY AND ESOPHAGOSCOPY N/A 07/11/2022    Procedure: LARYNGOSCOPY, DIRECT, DIAGNOSTIC, WITH BRONCHOSCOPY AND ESOPHAGOSCOPY;  Surgeon: Emory Alonso MD;  Location: [facility];  Service: ENT;  Laterality: N/A;    ESOPHAGOGASTRODUODENOSCOPY (EGD)- DEVICE ASSISTED N/A 04/18/2023    Procedure: EGD;  Surgeon: Aaliyah Conrad III, MD;  Location: [facility] ENDOSCOPY;  Service: Gastroenterology;  Laterality: N/A;  Esophageal Dilation; Savory over Guide wire: 36Fr and 39 Fr  Clips to secure peg site    HIP SURGERY      HUMERUS FRACTURE SURGERY      INCISION AND DRAINAGE, NECK Bilateral 10/21/2022    Procedure: INCISION AND DRAINAGE,NECK;  Surgeon: Madison Worley MD;  Location:  [facility] OR;  Service: ENT;  Laterality: Bilateral;    INSERT ARTERIAL LINE  06/26/2022         JOINT REPLACEMENT  2019    R hip    PEG TUBE REMOVAL      PLACEMENT, MEDIPORT      TRACHEOSTOMY N/A 06/10/2022    Procedure: CREATION, TRACHEOSTOMY;  Surgeon: Junior Alonso MD;  Location: [facility] OR;  Service: ENT;  Laterality: N/A;       Current Outpatient Medications   Medication Sig    albuterol-ipratropium (DUO-NEB) 2.5 mg-0.5 mg/3 mL nebulizer solution Take 3 mLs by nebulization every 4 (four) hours as needed for Shortness of Breath or Wheezing. Rescue    apixaban (ELIQUIS) 5 mg Tab Take 1 tablet (5 mg total) by mouth As instructed.    diphenhydrAMINE (BENADRYL) 25 mg capsule 1 capsule (25 mg total) by Per G Tube route every 6 (six) hours as needed for Itching.    famotidine (PEPCID) 20 MG tablet Take 1 tablet (20 mg total) by mouth every evening. (Patient taking differently: Take 20 mg by mouth every evening.)    glutamine 10 gram PwPk Take 10 g by mouth 2 (two) times a day.    hydrocodone-acetaminophen (HYCET) solution 7.5-325 mg/15mL Take 15 mLs by mouth every 6 (six) hours as needed for Pain.    levothyroxine (SYNTHROID) 50 MCG tablet Take 1 tablet (50 mcg total) by mouth once daily.    megestroL (MEGACE) 400 mg/10 mL (40 mg/mL) Susp     metroNIDAZOLE (METROGEL) 0.75 % (37.5mg/5 gram) vaginal gel apply to affected area BID    ondansetron (ZOFRAN-ODT) 8 MG TbDL Take 8 mg by mouth every 8 (eight) hours as needed for Nausea. Tab 1 ODT in AM for 2 days after chemotherapy    pantoprazole (PROTONIX) 20 MG tablet Take 1 tablet (20 mg total) by mouth once daily.    sodium chloride 3% 3 % nebulizer solution Take 4 mLs by nebulization as needed for Other or Cough (wheezing).    sodium chloride for inhalation (SODIUM CHLORIDE 0.9%) 0.9 % nebulizer solution Take 3 mLs by nebulization as needed for Wheezing.    tobramycin, PF, (JENNIFER) 300 mg/5 mL nebulizer solution Take 5 mLs (300 mg total) by nebulization every 12 (twelve)  hours. for 7 days    hydrocodone-acetaminophen (HYCET) solution 7.5-325 mg/15mL Take 10 mLs by mouth 4 (four) times daily as needed for Pain.     Current Facility-Administered Medications   Medication    LIDOcaine (PF) 40 mg/mL (4 %) injection 2 mL    LIDOcaine HCL 4% external solution 4 mL    phenylephrine HCL 1% nasal spray 1 spray       Review of patient's allergies indicates:  No Known Allergies    Family History   Problem Relation Age of Onset    Lung cancer Father     Cancer Father         Lung cancer    Throat cancer Brother     Cancer Brother         Throat       Social History     Socioeconomic History    Marital status:    Tobacco Use    Smoking status: Former     Current packs/day: 0.00     Average packs/day: 0.5 packs/day for 42.4 years (21.2 ttl pk-yrs)     Types: Cigarettes     Start date: 1980     Quit date: 2022     Years since quittin.2    Smokeless tobacco: Never   Substance and Sexual Activity    Alcohol use: Yes     Alcohol/week: 1.0 standard drink of alcohol     Types: 1 Cans of beer per week     Comment: occasional    Drug use: Never    Sexual activity: Not Currently     Partners: Female     Social Determinants of Health     Financial Resource Strain: Low Risk  (3/21/2023)    Overall Financial Resource Strain (CARDIA)     Difficulty of Paying Living Expenses: Not hard at all   Food Insecurity: No Food Insecurity (3/21/2023)    Hunger Vital Sign     Worried About Running Out of Food in the Last Year: Never true     Ran Out of Food in the Last Year: Never true   Transportation Needs: No Transportation Needs (3/21/2023)    PRAPARE - Transportation     Lack of Transportation (Medical): No     Lack of Transportation (Non-Medical): No   Physical Activity: Inactive (3/21/2023)    Exercise Vital Sign     Days of Exercise per Week: 0 days     Minutes of Exercise per Session: 0 min   Stress: No Stress Concern Present (3/21/2023)    [employer] -  Occupational Stress Questionnaire     Feeling of Stress : Not at all   Social Connections: Moderately Isolated (3/21/2023)    Social Connection and Isolation Panel [NHANES]     Frequency of Communication with Friends and Family: More than three times a week     Frequency of Social Gatherings with Friends and Family: More than three times a week     Attends Faith Services: Never     Active Member of Clubs or Organizations: No     Attends Club or Organization Meetings: Never     Marital Status:    Housing Stability: Low Risk  (3/21/2023)    Housing Stability Vital Sign     Unable to Pay for Housing in the Last Year: No     Number of Places Lived in the Last Year: 1     Unstable Housing in the Last Year: No           Review of Systems:    Constitution: Negative for chills, fever, and sweats.  Negative for unexplained weight loss.    HENT:  Negative for headaches and blurry vision.    Cardiovascular:Negative for chest pain or irregular heart beat. Negative for hypertension.    Respiratory:  Negative for cough and shortness of breath.    Gastrointestinal: Negative for abdominal pain, heartburn, melena, nausea, and vomitting.    Genitourinary:  Negative bladder incontinence and dysuria.    Musculoskeletal:  See HPI    Neurological: Negative for numbness.    Psychiatric/Behavioral: Negative for depression.  The patient is not nervous/anxious.      Endocrine: Negative for polyuria    Hematologic/Lymphatic: Negative for bleeding problem.  Does not bruise/bleed easily.    Skin: Negative for poor would healing and rash      Physical Examination:    Vital Signs:    Vitals:    08/31/23 1430   BP: 105/72   Pulse: (!) 119       Body mass index is 21.79 kg/m².    General: No acute distress, alert and oriented, healthy appearing    HEENT: Head is atraumatic, mucous membranes are moist    Neck: Supples, no JVD    Cardiovascular: Palpable dorsalis pedis and posterior tibial pulses, regular rate and rhythm to those  pulses    Lungs: Breathing non-labored    Skin: no rashes appreciated    Neurologic: Can flex and extend knees, ankles, and toes. Sensation is grossly intact    Right hip:  Range of motion right hip without significant pain or discomfort.  Negative Stinchfield.  Mildly positive straight leg raise    X-rays:  Three views right reviewed.  Patient's implants appear well fixed.  No signs of loosening or subsidence noted     Assessment::  Lumbar radiculopathy    Plan:  Discussed all treatment with the patient.  Patient overall doing well.  It was recent fall likely exacerbated some lumbar radiculopathy.  It was hip is stable.  Give him a little bit of pain medicine.  Plan to get him into physical therapy as well.    This note was created using [employer] voice recognition software that occasionally misinterpreted phrases or words.    Consult note is delivered via Epic messaging service.

## 2023-09-13 PROBLEM — E83.52 HYPERCALCEMIA: Status: ACTIVE | Noted: 2023-01-01

## 2023-09-13 NOTE — PROGRESS NOTES
HEMATOLOGY/ONCOLOGY OFFICE CLINIC VISIT    Visit Information:     Initial Evaluation: 7/21/2022  Referring Provider:   Other providers: Dr Gerard, Dr Emory Alonso  Code status:     Diagnosis/Problem list:  1) pT4apN1-Stage JENNYFER Laryngeal Squamous cell carcinoma   2) Lung masses x 2, bilateral--> Squamous cell -- Metastatic SqCC lung cancer vs metastatic SqCC to the lungs from H&N  3) Mediastinal and Hilar LAD     Present treatment:  Keytruda q 3 weeks maintenance-planned 9/12/2023    Treatment/Oncology history:  --7/11/2022: total laryngectomy  --Carbo/Taxol + RT (8/17/22-9/21/22)  --8/17/2022-9/28/2022:  6000 cGy, 200 cGy/fraction, 30/30 fx  --Carbo/Taxol/Keytruda 11/30/2022-3/2/2023  --Started maintenance Keytruda every 6 weeks on 3/23/2023  --Carbo/Taxol + RT completed 8/25/2023    Plan of care:     Imaging:  CT neck 6/6/2022: A metastatic right level III cervical lymph node is present with short axis measurement of 1.6 cm.  This lymph node contains internal cystic change, typical of metastatic squamous cell carcinoma.  A left level III cervical lymph node shows short axis measurements of 1 cm, and is suspicious.    A left supraglottic mass measures approximately 2.4 cm in diameter (axial post-contrast image 39), presumably corresponding to the primary neoplasm.  Metastatic lymph nodes are also present in the inferior right paratracheal position, measuring 1.3 cm in short axis.  Metastatic lymph nodes are present in the superior right hilum, measuring 1.8 cm in short axis.  CT H&N 6/27/2022: 1. There is mild stenosis at the origin of left proximal internal carotid artery secondary to dense calcified atheromatous plaque. 2. There is consolidation is right upper lobe. Additional ground-glass opacities are also seen on the remainder of the visualized lungs. These findings are consistent with an infectious process. Small right pleural effusion is seen. There is an ill-defined soft tissue mass in the right perihilar  region which measures approximately 3.1 x 2.9 x 4.6 cm, of concern for neoplasm. Correlate clinically as regards further evaluation and followup with CT chest. There is an ill-defined enhancing soft tissue mass in the glottis infiltrating into the paraglottic spaces and the subglottic region with obliteration of the airway, of concern for a neoplasm. 3. Unremarkable CT angiogram of the head. Details and other findings as described above.  NM PET CT 8/1/2022: postoperative changes of recent total laryngectomy and lou dissection. bilateral hypermetabolic cervical lymphadenopathy.  A right cervical lymph node 1.7 x 1.8 cm, 1.6 x 1.5 cm previously, SUV of 7.7.  A left cervical lymph node 8 x 10 mm SUV of 3.4. A fluid collection posterior to the left internal jugular vein measures 2.4 x 2.4 cm.  There is right hilar and mediastinal lymphadenopathy.  A precarinal lymph node measures 1.6 x 2.3 cm, compared to 1.3 x 1.7 cm previously, and has a max SUV of 13.8.  Right hilar lymphadenopathy measures approximately 3 x 2.9 cm, compared to 2.1 x 2.3 cm previously, and has a max SUV of 9.8.  A suspected right hilar mass measures 1.8 x 3.6 cm and has a max SUV of 14.8. no hypermetabolic lymphadenopathy in the abdomen or pelvis.  There is physiologic uptake in the solid organs, urinary system and bowel.  PET CT 10/13/2022: Ill-defined hypermetabolic tissue is again appreciated at the left neck, just superior and lateral to the tracheostomy insertion site. The regional maximum SUV is 6.8, with discrete CT-correlate lesion poorly delineated secondary to non-contrast protocol; prior SUV max 8.3. An adjacent, better delineated right cervical chain lymph node is visualized posteriorly, measuring 1.1 cm short axis with SUV max of 6; this previously measured 1.5 cm in least dimension with maximum SUV of 7.7.  No convincing new or enlarging cervical adenopathy or newly hypermetabolic lymph nodes are visualized.  The prior left neck  fluid collection is no longer evident.The somewhat lobulated right upper lobe hilar mass is now approximately 3.4 cm x 1.4 cm and maximum SUV 23; previously 3.6 cm x 1.8 cm and SUV max 14.8.  No new or enlarging hypermetabolic lung lesion, and no development of organized airspace consolidation or pleural effusion.  PET CT 3/16/2023:  1. Largely improved disease though there is single paratracheal lymph node which has increased in size and is now FDG avid.   2. Some inguinal and external iliac lymph nodes demonstrate increased FDG activity today, this is favored reactive.  CV Ultrasound doppler venous arm right 3/22/2023: Positive acute deep vein thrombosis identified in the subclavian, axillary, and brachial veins of the right upper extremity. Superficial vein thrombosis identified in the basilic vein of the right upper extremity. All other veins were compressible.  PET CT 6/9/2023:  No FDG avid pulmonary lesions are present. Mediastinal and hilar lymphadenopathy has markedly increased compared to the prior study.  Reference right hilar conglomerate measures approximately 2.6 x 4.1 cm on image 94 with a maximum SUV of 19.0.  This previously measured 1.0 cm and had a maximum SUV of 16.0. Physiologic uptake is in the liver, spleen, urinary system and bowel. No enlarged or FDG avid lymph nodes are in the abdomen or pelvis. Adrenal glands are normal. No FDG avid osseous lesions are present. Impression: Disease progression with worsening mediastinal and right hilar FDG avid lymphadenopathy.       Pathology:  6/14/2022:  EBUS ENDOBRONCHIAL MASS RUL, CYTOLOGY: NEARLY ACELLULAR SPECIMEN CONSISTING OF FRESH BLOOD CLOT. NO ATYPICAL OR MALIGNANT CELLS IDENTIFIED.   7/11/2022: Bronchial Wash, RLL, right bronchial mass washings:  - Squamous cell carcinoma.                      Bronchial Wash, LLL, left bronchial mass washings:     - Squamous cell carcinoma.     7/11/2022 Laryngectomy:  1. Larynx, laryngeal biopsy frozen section  : - Squamous cell carcinoma with necrosis.    2. Neck, Anterior, left neck level 3: - Number of lymph nodes involved by carcinoma:  1/7       - Size of metastatic deposit:  2.5 mm - Extranodal extension:  Not identified      3. Larynx, resection without nodes, total layrngectomy :  - Squamous cell carcinoma.  4. Neck, Anterior, left neck level 4: - Number of lymph nodes involved by carcinoma:  0/10  5. Neck, Anterior, right neck level 4:  - Number of lymph nodes involved by carcinoma:  0/11  6. Neck, Anterior, right neck level 3: - Number of lymph nodes involved by carcinoma:  0/9  7. Nasophaynx, inferior pharyngeal mucosa : - Benign squamous mucosa. - No evidence of malignancy.     8. Cartilage, superior cartilage margin : - No evidence of malignancy.    9. Lymph Node, pretracheal lymph  node :  - Number of lymph nodes involved by carcinoma:  0/3  [id number]    1/11/2023:   Right tracheostomal biopsy:  - Squamous mucosa with epithelial erosion and acute inflammation.  - Lamina propria with reactive fibroblasts and fibrosis, consistent with radiation changes, see comment.  - No evidence of malignancy.    CLINICAL HISTORY:       Patient: Josafat Boudreaux is a 57 y.o. male.kindly refer her for laryngeal carcinoma.  Patient  was referred to ENT for further evaluation of hoarseness.  He did not have any difficulty swallowing or dry mouth at the time.  Hoarseness has been present for at least 4 months prior to evaluation. 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      Patient was scheduled to trach but on 06/10/2022 he was brought to the emergency room via air met with is short of breath.  He was found to have and oxygenation on the 40s when EMS was called.  He was placed on BiPAP but pCO2 increased so he has an emergent tracheostomy performed. He has a PEG tube placed same hospitalization.  He underwent bronchoscopy with biopsy of the right upper lobe  endobronchial lesion on 6/14/2022 cytology was negative for malignant cells.       On 7/11/2022 he underwent LARYNGECTOMY, WITH RADICAL NECK DISSECTION - Bilateral LARYNGOSCOPY, DIRECT, DIAGNOSTIC, WITH BRONCHOSCOPY AND ESOPHAGOSCOPY pathology report as above.  1/40 lymph nodes were involved with metastatic disease.  Bronchoscopy was repeated and biopsy of the left lower lobe and right lower lobe were both positive for squamous cell carcinoma.     He is here today with his wife.  He has recovered from surgery relatively well.  Tracheostomy in place.  He is unable to talk so the history was taken by electronic medical record and wife.     Patient has a brother with history of throat cancer. Patient used to smoke 1 and half pack per day since he was 60 years old.  He was smoking less recently.      Chief Complaint: OTHER (No concerns today.)      Interval History:  Previous visits:   On 3/22/23, NIVA study was positive for DVT to right upper extremity, he was started on Eliquis 5 mg BID. Tolerating well with no side effects. PEG tube was removed on 3/23/23. He underwent procedure on 5/18/23 with Dr. De Leon to correct the leakage. Site is all healed.   On 5/3/23 TSH was 7.451. He was called and instructed to begin taking Synthroid 50 mcg daily. Most recent TSH  2.276.  Completed weekly carbo/Taxol and radiation on 9/21/22 for his laryngeal carcinoma. Completed 4 cycles of Carbo/Taxol/Keytruda on 3/2/23. Started maintenance Keytruda every 6 weeks on 3/23/2023.  Patient responded great to treatment and was started on maintenance Keytruda but Most recent PET-CT, 6/9/23, showed resolution of pulmonary masses no evidence of disease in the head/neck or distant metastasis but progression Of mediastinal/ hilar lymphadenopathy. This is the only site with evidence of disease.  In view of this findings he was referred to radiation oncologist for possible concomitant chemoradiation hoping that this can take care of residual  disease.    Was seen by Dr. Das and started radiation on 7/6/23.    9/13/23  Patient presents today for TD/lab prior to resuming maintenance immunotherapy with Keytruda tomorrow, he is with his wife. He completed XRT on 8/25/23 and 5 cycles of Carbo/Taxol on 8/22/23. C4 was delayed  1 day due to platelet count of 89k. He fell about 3 weeks ago getting out of tub. He was seen by orthopedic and told he has a compression fracture. He is undergoing outpatient PT for now to see if it helps improve pain. Has been taking liquid Hydrocodone for pain at night. He is using a walker today. Otherwise, he is doing well.  No fever, chills, sweats.  No chest pain or shortness of breath.   They are planning a trip to [place] at the beginning of October to attend his brother's wedding.      PMH/PSH/FH/SOCIAL/ALLERGIES/MEDICATIONS:  ALL REVIEWED AND UPDATED AS APPROPRIATE.       Review of Systems   Constitutional:  Positive for fatigue. Negative for activity change, appetite change, chills, diaphoresis, fever and unexpected weight change.   HENT:  Negative for nasal congestion, mouth sores, nosebleeds, sinus pressure/congestion, sore throat and trouble swallowing.    Eyes: Negative.    Respiratory:  Negative for shortness of breath.         Increase secretions   Cardiovascular:  Negative for chest pain and palpitations.   Gastrointestinal:  Negative for abdominal distention, abdominal pain, blood in stool, change in bowel habit, constipation, diarrhea, nausea, vomiting and change in bowel habit.        Dysphagia   Endocrine: Negative.    Genitourinary:  Negative for bladder incontinence, decreased urine volume, difficulty urinating, dysuria, frequency, hematuria and urgency.   Musculoskeletal:  Positive for arthralgias and back pain. Negative for gait problem, joint swelling, leg pain and myalgias.   Integumentary:  Negative for rash.   Allergic/Immunologic: Negative.    Neurological:  Negative for dizziness, tremors, syncope,  "weakness, light-headedness, numbness, headaches and memory loss.   Hematological:  Negative for adenopathy. Does not bruise/bleed easily.   Psychiatric/Behavioral:  Negative for agitation, confusion, hallucinations, sleep disturbance and suicidal ideas. The patient is not nervous/anxious.               Vitals:    09/13/23 1133   BP: 125/84   BP Location: Left arm   Patient Position: Sitting   Pulse: (!) 122   Resp: 18   Temp: 97.9 °F (36.6 °C)   TempSrc: Oral   SpO2: 96%   Weight: 61.4 kg (135 lb 6.4 oz)   Height: 5' 6" (1.676 m)             Wt Readings from Last 6 Encounters:   09/13/23 61.4 kg (135 lb 6.4 oz)   09/11/23 61.7 kg (136 lb)   08/31/23 61.2 kg (135 lb)   08/30/23 61.7 kg (136 lb)   08/21/23 62.6 kg (138 lb 1.6 oz)   08/16/23 61.6 kg (135 lb 14.4 oz)     Body mass index is 21.85 kg/m².  Body surface area is 1.69 meters squared.       Physical Exam  Vitals and nursing note reviewed.   Constitutional:       General: He is not in acute distress.     Appearance: Normal appearance.   HENT:      Head: Normocephalic and atraumatic.      Mouth/Throat:      Mouth: Mucous membranes are moist.   Eyes:      General: No scleral icterus.     Extraocular Movements: Extraocular movements intact.      Conjunctiva/sclera: Conjunctivae normal.   Neck:      Vascular: No JVD.      Trachea: Tracheostomy present.      Comments: Tracheotomy in place.  Right side - Skin changes c/w RT     Cardiovascular:      Rate and Rhythm: Normal rate and regular rhythm.      Heart sounds: No murmur heard.  Pulmonary:      Effort: Pulmonary effort is normal.      Breath sounds: Normal breath sounds. No wheezing or rhonchi.   Abdominal:      General: The ostomy site is clean. Bowel sounds are normal. There is no distension.      Palpations: Abdomen is soft.      Tenderness: There is no abdominal tenderness.   Musculoskeletal:         General: No swelling or deformity.      Right upper arm: Swelling and tenderness present.      Cervical back: " Neck supple.      Comments: Erythema of right upper extremity to touch and tender   Lymphadenopathy:      Head:      Right side of head: No submandibular adenopathy.      Left side of head: No submandibular adenopathy.      Cervical: No cervical adenopathy.      Upper Body:      Right upper body: No supraclavicular or axillary adenopathy.      Left upper body: No supraclavicular or axillary adenopathy.      Lower Body: No right inguinal adenopathy. No left inguinal adenopathy.   Skin:     General: Skin is warm.      Coloration: Skin is not jaundiced.      Findings: No rash.   Neurological:      General: No focal deficit present.      Mental Status: He is alert and oriented to person, place, and time.      Cranial Nerves: Cranial nerves 2-12 are intact.   Psychiatric:         Attention and Perception: Attention normal.         Behavior: Behavior is cooperative.       ECOG SCORE    1 - Restricted in strenuous activity-ambulatory and able to carry out work of a light nature         Laboratory:  CBC with Differential:  Lab Results   Component Value Date    WBC 6.14 09/13/2023    RBC 3.58 (L) 09/13/2023    HGB 12.5 (L) 09/13/2023    HCT 37.8 (L) 09/13/2023    .6 (H) 09/13/2023    MCH 34.9 (H) 09/13/2023    MCHC 33.1 09/13/2023    RDW 21.1 (H) 09/13/2023    PLT 93 (L) 09/13/2023    MPV 10.7 (H) 09/13/2023        CMP:  Sodium Level   Date Value Ref Range Status   09/13/2023 136 136 - 145 mmol/L Final     Potassium Level   Date Value Ref Range Status   09/13/2023 4.7 3.5 - 5.1 mmol/L Final     Carbon Dioxide   Date Value Ref Range Status   09/13/2023 25 22 - 29 mmol/L Final     Blood Urea Nitrogen   Date Value Ref Range Status   09/13/2023 10.6 8.4 - 25.7 mg/dL Final     Creatinine   Date Value Ref Range Status   09/13/2023 0.91 0.73 - 1.18 mg/dL Final     Calcium Level Total   Date Value Ref Range Status   09/13/2023 15.5 (HH) 8.4 - 10.2 mg/dL Final     Comment:     Critical Result called and verified by verbal  readback to: Anay Nolasco on 09/13/2023 at 11:58. Reported by [id number].     Albumin Level   Date Value Ref Range Status   09/13/2023 3.3 (L) 3.5 - 5.0 g/dL Final     Bilirubin Total   Date Value Ref Range Status   09/13/2023 0.6 <=1.5 mg/dL Final     Alkaline Phosphatase   Date Value Ref Range Status   09/13/2023 108 40 - 150 unit/L Final     Aspartate Aminotransferase   Date Value Ref Range Status   09/13/2023 26 5 - 34 unit/L Final     Alanine Aminotransferase   Date Value Ref Range Status   09/13/2023 14 0 - 55 unit/L Final     Estimated GFR-Non    Date Value Ref Range Status   08/02/2022 >60 mls/min/1.73/m2 Final         Assessment:     1) pT4apN1-Stage JENNYFER Laryngeal Squamous cell carcinoma   --Laryngeal Squamous cell carcinoma -s/p total laryngectomy, 1/40 LN with metastatic disease.   2) Lung masses x 2, bilateral--> Squamous cell -- Metastatic SqCC lung cancer vs metastatic SqCC to the lungs from H&N  3) Mediastinal and Hilar LAD    Completed weekly carbo/Taxol and radiation on 9/21/22 for his laryngeal carcinoma. Completed 4 cycles of Carbo/Taxol/Keytruda on 3/2/23. Started maintenance Keytruda every 6 weeks on 3/23/2023.  Patient responded great to treatment and was started on maintenance Keytruda but Most recent PET-CT showed resolution of pulmonary masses no evidence of disease in the head/neck or distant metastasis but progression Of mediastinal/ hilar lymphadenopathy. This is the only site with evidence of disease.  In view of this findings he was referred to radiation oncologist for possible concomitant chemoradiation hoping that this can take care of residual disease.  Was seen by Dr. Das  and he has the marking and is ready to start radiation.      Plan:       Continue XRT - was on hold due to recent hospitalization for pneumonia. Received 25 treatments, plan is to complete 5 more - completed 8/25/23. Completed 5 cycles of weekly Carbo/Taxol on 8/22/23. Will plan to resume  maintenance Keytruda evry 3 weeks after completion of Chemo RT.    Okay to proceed with maintenance Keyturda tomorrow  RTC in 3 weeks with MD for TD/lab, Keytruda next day  Labs: CBC, CMP, TSH  PET CT in 6 weeks post completion of RT due  beginning of 10/23 - ordered - if insurance does not cover, will change to CT C/A/P   Okay to make trip to [place], will schedule PET CT to be done on 10/16/23  Continue Eliquis 5 mg BID  Continue Synthroid 50 mcg daily  Keep scheduled appointment with orthopedic tomorrow for evaluation of lower back and right hip pain after fall    Encourage to call or message us for any questions or problems  The patient was given ample opportunity to ask questions, and to the best of my abilities, all questions answered to satisfaction; patient demonstrated understanding of what we discussed and agreeable to the plan.     Yara Atkinson MD  Hematology/Oncology      Professional Services   I, Bessie Horowitz LPN, acted solely as a scribe for and in the presence of Dr. Yara Atkinson, who performed these services.

## 2023-09-13 NOTE — MEDICAL/APP STUDENT
"[facility] [place] Medicine Wards   History & Physical Note     Resident Team: [facility] Medicine List 3  Attending Physician: Ashley Erazo MD  Resident: Bessy Stein  Intern: Dov Mace     Date of Admit: 9/13/2023    Chief Complaint:     Hypercalcemia (Sent from oncology due to hypercalcemia. Wife reports recent lethargy, increased fatigue.)       Subjective:      History of Present Illness:  Josafat Boudreaux is a 57 y.o. 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 Was told to come to the ED for evaluation and treatment. He is with wife today who states patient seemed to be "not as sharp" as he usually is, and that he is just "slowed down." Otherwise, he denies any chest pain, palpitations, headaches, blurry vision. He has no nausea, vomiting, diarrhea, bone pain, body aches. He does have abdominal pain, however, but attributes it to a fall that occurred about 5 weeks ago. States he was in bathtub and was going from seated to standing, but slipped and landed on side of tub onto stomach. Seen by orthopedics during this time, but was told that he has lumbar radiculopathy. But patient reports lingering abdominal pain exacerbated on ambulation. He denies any new onset of weakness or numbness. Urinating with no complications, denies dysuria. Currently uses duonebs.    In ED, patient was afebrile, slightly tachycardic to 105 with /82. CBC with H/H 12.5/37.8 .6. CMP with Ca of 15.5. Mg 0.80. EKG with sinus tachycardia with J waves present. Patient started on IVF and calcitonin. PTH low at 6.       Cancer Hx and course:  Patient was evaluated in clinic in 6/2022 for voice hoarseness and was found to have a right laryngeal mass. Had an emergent tracheostomy performed during that month due to acute hypoxic respiratory " failure 2/2 to obstruction and had bronchoscopy performed with biopsy initially negative. 07/2022, had laryngectomy with radical neck dissection and had repeat bronchoscopy; biopsies from bilateral lower lungs positive for squamous cell carcinoma during this time. Had a peg tube placed. Underwent weekly carbo/taxol and radiation on 9/21/22 for laryngeal carcinoma. Had a PET CT in 10/2022 showing hilar/mediastinal lymphadenopathy. Completed 4 cycles of carbo/taxol/keytruda on 3/2/23; then started keytruda every 6 weeks for maintenance therapy on 3/23/23. PET CT repeated 6/9/23 showing resolution of pulmonary masses, but progression of mediastinal/hilar lymphadenopathy. Started radiation again on 7/6/23 completed on 8/25/23 with 5 cycles of carbo/taxol on 8/22/23. Planned to restart Keytruda maintenance therapy 9/14.       Past Medical History:   has a past medical history of COPD (chronic obstructive pulmonary disease), Dysphagia, Essential (primary) hypertension (08/05/2023), GERD (gastroesophageal reflux disease), laryngeal Cancer, Lung cancer, Thyroid disease, and Vocal cord mass.     Past Surgical History:   has a past surgical history that includes Humerus fracture surgery; Hip surgery; Tracheostomy (N/A, 06/10/2022); Insert arterial line (06/26/2022); Direct diagnostic laryngoscopy with bronchoscopy and esophagoscopy (N/A, 07/11/2022); incision and drainage, neck (Bilateral, 10/21/2022); Joint replacement (2019); esophagogastroduodenoscopy (egd)- device assisted (N/A, 04/18/2023); PEG tube removal; placement, mediport; and Diagnostic laparoscopy (N/A, 5/18/2023).     Family History:  family history includes Cancer in his brother and father; Lung cancer in his father; Throat cancer in his brother.     Social History:   reports that he quit smoking about 15 months ago. His smoking use included cigarettes. He started smoking about 43 years ago. He has a 63.7 pack-year smoking history. He has never used smokeless  tobacco. He reports current alcohol use of about 1.0 standard drink of alcohol per week. He reports that he does not use drugs.     Allergies:  has No Known Allergies.     Home Medications:  Prior to Admission medications    Medication Sig Start Date End Date Taking? Authorizing Provider   albuterol-ipratropium (DUO-NEB) 2.5 mg-0.5 mg/3 mL nebulizer solution Take 3 mLs by nebulization every 4 (four) hours as needed for Shortness of Breath or Wheezing. Rescue 2/7/23 2/7/24  Michael Monson MD   apixaban (ELIQUIS) 5 mg Tab Take 1 tablet (5 mg total) by mouth As instructed. 3/22/23   Yara Atkinson MD   diphenhydrAMINE (BENADRYL) 25 mg capsule 1 capsule (25 mg total) by Per G Tube route every 6 (six) hours as needed for Itching. 6/23/22   Ra Quintanilla MD   famotidine (PEPCID) 20 MG tablet Take 1 tablet (20 mg total) by mouth every evening.  Patient taking differently: Take 20 mg by mouth every evening. 8/10/23 8/9/24  Tony Bertrand MD   glutamine 10 gram PwPk Take 10 g by mouth 2 (two) times a day.    Provider, Historical   hydrocodone-acetaminophen (HYCET) solution 7.5-325 mg/15mL Take 15 mLs by mouth every 6 (six) hours as needed for Pain. 5/18/23   Gemini Osborne FNP   hydrocodone-acetaminophen (HYCET) solution 7.5-325 mg/15mL Take 10 mLs by mouth 4 (four) times daily as needed for Pain. 8/31/23   Garo Burrell MD   levothyroxine (SYNTHROID) 50 MCG tablet Take 1 tablet (50 mcg total) by mouth once daily. 5/15/23 5/14/24  Yara Atkinson MD   megestroL (MEGACE) 400 mg/10 mL (40 mg/mL) Susp  8/1/23   Provider, Historical   metroNIDAZOLE (METROGEL) 0.75 % (37.5mg/5 gram) vaginal gel apply to affected area BID 1/16/23   Provider, Historical   ondansetron (ZOFRAN-ODT) 8 MG TbDL Take 8 mg by mouth every 8 (eight) hours as needed for Nausea. Tab 1 ODT in AM for 2 days after chemotherapy    Provider, Historical   pantoprazole (PROTONIX) 20 MG tablet Take 1 tablet (20 mg total) by mouth once  daily. 8/10/23 8/9/24  Tony Bertrand MD   sodium chloride 3% 3 % nebulizer solution Take 4 mLs by nebulization as needed for Other or Cough (wheezing). 8/8/23   Tawanna Preston MD   sodium chloride for inhalation (SODIUM CHLORIDE 0.9%) 0.9 % nebulizer solution Take 3 mLs by nebulization as needed for Wheezing. 8/8/23   Tawanna Preston MD   aspirin (ECOTRIN) 81 MG EC tablet Take 81 mg by mouth once daily.  7/7/22  Provider, Historical       Review of Systems:  Review of Systems   Constitutional:  Negative for fever and weight loss.   HENT:  Negative for congestion and sore throat.    Eyes:  Negative for blurred vision and double vision.   Respiratory:  Negative for cough and shortness of breath.    Cardiovascular:  Negative for chest pain, palpitations and leg swelling.   Gastrointestinal:  Positive for abdominal pain. Negative for constipation, diarrhea, nausea and vomiting.   Genitourinary:  Negative for dysuria and hematuria.   Musculoskeletal:  Positive for back pain. Negative for myalgias.   Neurological:  Negative for dizziness, focal weakness, weakness and headaches.        Objective:     Vital Signs (Most Recent):  Temp: 98.2 °F (36.8 °C) (09/14/23 0722)  Pulse: (!) 114 (09/14/23 0722)  Resp: 18 (09/14/23 0722)  BP: 126/76 (09/14/23 0722)  SpO2: (!) 93 % (09/14/23 0722) Vital Signs (24h Range):  Temp:  [97.9 °F (36.6 °C)-98.4 °F (36.9 °C)] 98.2 °F (36.8 °C)  Pulse:  [] 114  Resp:  [16-21] 18  SpO2:  [90 %-97 %] 93 %  BP: (103-129)/(61-85) 126/76     Physical Examination:  Physical Exam  Vitals and nursing note reviewed.   Constitutional:       General: He is not in acute distress.     Appearance: Normal appearance. He is not ill-appearing.      Comments: Unable to produce loud speech, but can mouth words.   HENT:      Head: Normocephalic and atraumatic.      Right Ear: External ear normal.      Left Ear: External ear normal.      Nose: Nose normal.      Mouth/Throat:      Mouth: Mucous membranes  are moist.      Pharynx: Oropharynx is clear.   Eyes:      Extraocular Movements: Extraocular movements intact.      Conjunctiva/sclera: Conjunctivae normal.      Pupils: Pupils are equal, round, and reactive to light.   Neck:      Comments: Tracheostomy tube in place.   Cardiovascular:      Rate and Rhythm: Normal rate and regular rhythm.      Heart sounds: No murmur heard.     No friction rub. No gallop.   Pulmonary:      Effort: Pulmonary effort is normal. No respiratory distress.      Breath sounds: Normal breath sounds. No stridor. No wheezing or rales.   Abdominal:      General: Abdomen is flat. Bowel sounds are normal. There is no distension.      Palpations: Abdomen is soft.      Tenderness: There is no abdominal tenderness.   Musculoskeletal:         General: No swelling or tenderness. Normal range of motion.      Cervical back: Normal range of motion and neck supple.      Right lower leg: No edema.      Left lower leg: No edema.   Skin:     General: Skin is warm and dry.      Capillary Refill: Capillary refill takes less than 2 seconds.   Neurological:      General: No focal deficit present.      Mental Status: He is alert and oriented to person, place, and time.   Psychiatric:         Mood and Affect: Mood normal.         Behavior: Behavior normal.          Laboratory:  Most Recent Data:  CBC:   Recent Labs   Lab 09/13/23  1019 09/13/23  1443 09/14/23  0428   WBC 6.14 5.45 4.93   HGB 12.5* 11.8* 10.8*   HCT 37.8* 33.8* 31.8*   PLT 93* 84* 81*     CMP:   Recent Labs   Lab 09/14/23  0428   CALCIUM 13.6*   ALBUMIN 2.9*      K 3.3*   CO2 26   BUN 8.9   CREATININE 0.83   ALKPHOS 93   ALT 11   AST 24   BILITOT 0.6         Microbiology Data:  None    Other Results:  EKG (my interpretation): Sinus tachycardia with No acute ST segment or T wave abnormalities. J waves present.     Radiology:  Imaging Results    None          Lines/Drains/Airways       Central Venous Catheter Line  Duration                   PowerPort A Cath Single Lumen left subclavian -- days              Airway  Duration                  Laryngectomy (Do Not Remove) Laryngectomy tube -- days              Peripheral Intravenous Line  Duration                  Midline Catheter Insertion/Assessment  - Single Lumen 09/14/23 0400 Left basilic vein (medial side of arm)  <1 day                     Assessment & Plan:   Electrolytes  New onset Hypercalcemia  Hypomagnesemia   - Ca 15.9 from clinic labs, repeat 15.5 in ED  - EKG with sinus tachycardia with J waves present which is an associated finding of hypercalcemia  - PTH 6, Mg 0.8  - pending labs: TSH, Folate, B12, Vit D, PTHrP, 24hr Urine Ca/Cr, SPEP, UPEP  - started on NS IV rate of 150cc/hr, calcitonin 4units/kg IV, zoledronic acid 4 mg IV 200cc/hr.  - Trend Mg and Phos, replete PRN      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  - refer to ENT note 8/30/23 for extensive course.   - s/p laryngectomy in 07/2022 but subsequent PET scans concerning for lung mets/mediastinal and hilar lymphadenopathy  - s/p chemotherapy with carbo/taxol/keytruda in 3/2023 with radiation therapy completed 7/2023. Underwent another cycle of carbo/taxol completed on 8/23/23.   - follows closely with Heme/onc and ENT  - plans to restart ketruda maintenance therapy soon.      Macrocytic anemia  - H/H 12.5/37.8 with   - B12 and folate pending  - continue to monitor       Hypothyroidism  - TSH level pending  - restart home levothyroxine 50 mcg    Hx DVT RUE  - RUE DVT found in 03/2023. Was prescribed eliquis 5 mg BID  - restart while inpatient.    COPD  - on duonebs at home   - will continue PRN          CODE STATUS: Full Code  Access: Peripheral  Antibiotics: none  Diet: Regular  DVT Prophylaxis: Eliquis  GI Prophylaxis: proton pump inhibitor per orders  Fluids: normal saline 150 ml/hr.     Disposition: day 1 of admission for treatment, workup, and management of hypercalcemia. Currently on IVF, calcitonin, and  zoledronic acid. Pending repeat BMP later this PM.     Clifton Sarabia, MS4  [facility] [employer] [facility]

## 2023-09-13 NOTE — H&P
"[facility] Medicine Wards   History & Physical Note     Resident Team: [facility] Medicine List 3  Attending Physician: Ashley Erazo MD  Resident: Harvey Austin  Intern: Dov Conley     Date of Admit: 9/13/2023    Chief Complaint:     Hypercalcemia (Sent from oncology due to hypercalcemia. Wife reports recent lethargy, increased fatigue.)       Subjective:      History of Present Illness:  Josafat Boudreaux is a 57 y.o. male who with a history of COPD, hypothyroidism, DVT to RUE, stage 4 larygenal SCC, bilateral lung mass - Squamous cell - metastatic SqCC lung cancer vs metastatic SqCC to the lungs from H&N, mediastinal and Hilar LAD, s/p laryngectomy with radical neck dissection (7/2022) who presented to [facility] ED on 9/13/2023 from hem/onc clinic for concern of elevated calcium levels. He is with wife today who states patient seemed to be "not as sharp" as he usually is, and that he is just "slowed down." Otherwise, he denies any chest pain, palpitations, headaches, blurry vision. He has no nausea, vomiting, diarrhea, bone pain, body aches. He does have abdominal pain, however, but attributes it to a fall that occurred about 5 weeks ago. States he was in bathtub and was going from seated to standing, but slipped and landed on side of tub onto stomach. Seen by orthopedics during this time, but was told that he has lumbar radiculopathy and pain was from exacerbation of the lumbar radiculopathy. But patient reports lingering abdominal pain exacerbated on ambulation. He denies any new onset of weakness or numbness. Urinating with no complications, denies dysuria. Currently uses duonebs.     In ED, patient was afebrile, slightly tachycardic to 105 with /82. CBC with H/H 12.5/37.8 .6. CMP with Ca of 15.5. Mg 0.80. PTH low at 6. EKG with sinus tachycardia with J waves present. Patient started on IVF,calcitonin, and zoledronic acid.     Cancer Hx and course:  Patient was evaluated in clinic in 6/2022 for voice " hoarseness and was found to have a right laryngeal mass. Had an emergent tracheostomy performed during that month due to acute hypoxic respiratory failure 2/2 to obstruction and had bronchoscopy performed with biopsy initially negative. 07/2022, had laryngectomy with radical neck dissection and had repeat bronchoscopy; biopsies from bilateral lower lungs positive for squamous cell carcinoma during this time. Had a peg tube placed during one of his hospitalization however PEG was removed on 3/23/23. Underwent weekly carbo/taxol and radiation on 9/21/22 for laryngeal carcinoma. Had a PET CT in 10/2022 showing hilar/mediastinal lymphadenopathy. Completed 4 cycles of carbo/taxol/keytruda on 3/2/23; then started keytruda every 6 weeks for maintenance therapy on 3/23/23. PET CT repeated 6/9/23 showing resolution of pulmonary masses, but progression of mediastinal/hilar lymphadenopathy. Started radiation again on 7/6/23 completed on 8/25/23 with 5 cycles of carbo/taxol on 8/22/23. Planned to restart Keytruda maintenance therapy 9/14.          Past Medical History:   has a past medical history of COPD (chronic obstructive pulmonary disease), Dysphagia, Essential (primary) hypertension (08/05/2023), GERD (gastroesophageal reflux disease), laryngeal Cancer, Lung cancer, Thyroid disease, and Vocal cord mass.     Past Surgical History:   has a past surgical history that includes Humerus fracture surgery; Hip surgery; Tracheostomy (N/A, 06/10/2022); Insert arterial line (06/26/2022); Direct diagnostic laryngoscopy with bronchoscopy and esophagoscopy (N/A, 07/11/2022); incision and drainage, neck (Bilateral, 10/21/2022); Joint replacement (2019); esophagogastroduodenoscopy (egd)- device assisted (N/A, 04/18/2023); PEG tube removal; placement, mediport; and Diagnostic laparoscopy (N/A, 5/18/2023).     Family History:  family history includes Cancer in his brother and father; Lung cancer in his father; Throat cancer in his brother.      Social History:   reports that he quit smoking about 15 months ago. His smoking use included cigarettes. He started smoking about 43 years ago. He has a 63.7 pack-year smoking history. He has never used smokeless tobacco. He reports current alcohol use of about 1.0 standard drink of alcohol per week. He reports that he does not use drugs.     Allergies:  has No Known Allergies.     Home Medications:  Prior to Admission medications    Medication Sig Start Date End Date Taking? Authorizing Provider   albuterol-ipratropium (DUO-NEB) 2.5 mg-0.5 mg/3 mL nebulizer solution Take 3 mLs by nebulization every 4 (four) hours as needed for Shortness of Breath or Wheezing. Rescue 2/7/23 2/7/24  Michael Monson MD   apixaban (ELIQUIS) 5 mg Tab Take 1 tablet (5 mg total) by mouth As instructed. 3/22/23   Yara Atkinson MD   diphenhydrAMINE (BENADRYL) 25 mg capsule 1 capsule (25 mg total) by Per G Tube route every 6 (six) hours as needed for Itching. 6/23/22   Ra Quintanilla MD   famotidine (PEPCID) 20 MG tablet Take 1 tablet (20 mg total) by mouth every evening.  Patient taking differently: Take 20 mg by mouth every evening. 8/10/23 8/9/24  Tony Bertrand MD   glutamine 10 gram PwPk Take 10 g by mouth 2 (two) times a day.    Provider, Historical   hydrocodone-acetaminophen (HYCET) solution 7.5-325 mg/15mL Take 15 mLs by mouth every 6 (six) hours as needed for Pain. 5/18/23   Gemini Osborne FNP   hydrocodone-acetaminophen (HYCET) solution 7.5-325 mg/15mL Take 10 mLs by mouth 4 (four) times daily as needed for Pain. 8/31/23   Garo Burrell MD   levothyroxine (SYNTHROID) 50 MCG tablet Take 1 tablet (50 mcg total) by mouth once daily. 5/15/23 5/14/24  Yara Atkinson MD   megestroL (MEGACE) 400 mg/10 mL (40 mg/mL) Susp  8/1/23   Provider, Historical   metroNIDAZOLE (METROGEL) 0.75 % (37.5mg/5 gram) vaginal gel apply to affected area BID 1/16/23   Provider, Historical   ondansetron (ZOFRAN-ODT) 8 MG  TbDL Take 8 mg by mouth every 8 (eight) hours as needed for Nausea. Tab 1 ODT in AM for 2 days after chemotherapy    Provider, Historical   pantoprazole (PROTONIX) 20 MG tablet Take 1 tablet (20 mg total) by mouth once daily. 8/10/23 8/9/24  Tony Bertrand MD   sodium chloride 3% 3 % nebulizer solution Take 4 mLs by nebulization as needed for Other or Cough (wheezing). 8/8/23   Tawanna Preston MD   sodium chloride for inhalation (SODIUM CHLORIDE 0.9%) 0.9 % nebulizer solution Take 3 mLs by nebulization as needed for Wheezing. 8/8/23   Tawanna Preston MD   aspirin (ECOTRIN) 81 MG EC tablet Take 81 mg by mouth once daily.  7/7/22  Provider, Historical         Review of Systems:  Review of Systems   Constitutional:  Negative for chills, fever and weight loss.   HENT:  Negative for congestion and ear pain.    Respiratory:  Negative for cough, sputum production, shortness of breath and wheezing.    Cardiovascular:  Negative for chest pain, palpitations, orthopnea, claudication and leg swelling.   Gastrointestinal:  Positive for abdominal pain (with ambulation). Negative for blood in stool, constipation, diarrhea, heartburn, nausea and vomiting.   Genitourinary:  Negative for dysuria, frequency, hematuria and urgency.   Musculoskeletal:  Positive for back pain.   Neurological:  Positive for speech change. Negative for dizziness, sensory change, focal weakness, weakness and headaches.   Psychiatric/Behavioral:  Negative for depression. The patient has insomnia.             Objective:       Vital Signs (Most Recent):  Temp: 98.4 °F (36.9 °C) (09/13/23 1347)  Pulse: 109 (09/13/23 1901)  Resp: 16 (09/13/23 1901)  BP: 114/81 (09/13/23 1901)  SpO2: (!) 94 % (09/13/23 1901) Vital Signs (24h Range):  Temp:  [97.9 °F (36.6 °C)-98.4 °F (36.9 °C)] 98.4 °F (36.9 °C)  Pulse:  [105-126] 109  Resp:  [16-21] 16  SpO2:  [94 %-96 %] 94 %  BP: (114-129)/(71-85) 114/81       Physical Examination:  Physical Exam  Vitals and nursing  note reviewed.   Constitutional:       Appearance: Normal appearance.      Comments: Limited voice due to trach. Mouths word.    HENT:      Head: Normocephalic and atraumatic.      Mouth/Throat:      Mouth: Mucous membranes are moist.   Eyes:      General: No scleral icterus.     Pupils: Pupils are equal, round, and reactive to light.   Neck:      Comments: Trach in place - clean and no signs of drainage/sputum    Cardiovascular:      Rate and Rhythm: Normal rate and regular rhythm.      Heart sounds: No murmur heard.     No friction rub. No gallop.   Pulmonary:      Effort: Pulmonary effort is normal. No respiratory distress.      Breath sounds: Normal breath sounds. No wheezing.   Abdominal:      General: Abdomen is flat. There is no distension.      Palpations: Abdomen is soft.      Tenderness: There is no abdominal tenderness.   Musculoskeletal:         General: No swelling or tenderness. Normal range of motion.   Skin:     General: Skin is warm and dry.      Capillary Refill: Capillary refill takes less than 2 seconds.   Neurological:      General: No focal deficit present.      Mental Status: He is alert and oriented to person, place, and time.   Psychiatric:         Mood and Affect: Mood normal.         Behavior: Behavior normal.         Thought Content: Thought content normal.         Judgment: Judgment normal.           Laboratory:  Most Recent Data:  CBC:   Recent Labs   Lab 09/13/23  1019 09/13/23  1443   WBC 6.14 5.45   HGB 12.5* 11.8*   HCT 37.8* 33.8*   PLT 93* 84*     CMP:   Recent Labs   Lab 09/13/23  1443 09/13/23 2035   CALCIUM 15.5* 14.8*   ALBUMIN 3.0*  --    * 140   K 3.5 3.5   CO2 26 26   BUN 9.9 8.7   CREATININE 0.85 0.80   ALKPHOS 102  --    ALT 12  --    AST 24  --    BILITOT 0.6  --      All pertinent lab results from the last 24 hours have been reviewed.  Recent Lab Results  (Last 5 results in the past 24 hours)        09/13/23 2035 09/13/23  1708   09/13/23  1511    09/13/23  1443   09/13/23  1019        Immature Platelet Fraction       6.2         Albumin/Globulin Ratio       0.8   0.9       Albumin       3.0   3.3       Alkaline Phosphatase       102   108       ALT       12   14       Anion Gap 11.0               Appearance, UA     Clear           AST       24   26       Bacteria, UA     None Seen           Baso #       0.02   0.02       Basophil %       0.4   0.3       BILIRUBIN TOTAL       0.6   0.6       Bilirubin, UA     Negative           BUN 8.7       9.9   10.6       BUN/CREAT RATIO 11               Calcium 14.8  Comment: Critical Result called and verified by verbal readback to: Ewa Brenner ER on 09/13/2023 at 21:32. Reported by [id number].       15.5  Comment: Critical Result called and verified by verbal readback to: Anna Serna er on 09/13/2023 at 15:16. Reported by [id number].   15.5  Comment: Critical Result called and verified by verbal readback to: Anay Nolasco on 09/13/2023 at 11:58. Reported by [id number].       Chloride 103       99   101       CO2 26       26   25       Color, UA     Light-Yellow           Creatinine 0.80       0.85   0.91       eGFR >60       >60   >60       Eos #       0.04   0.05       Eosinophil %       0.7   0.8       Folate   14.2             Globulin, Total       3.9   3.6       Glucose 91       117   95       Glucose, UA     Normal           Hematocrit       33.8   37.8       Hemoglobin       11.8   12.5       Hyaline Casts, UA     0-2           IgA   352.0             IgG   890.00             IgM   <25.0             Immature Grans (Abs)       0.02   0.02       Immature Granulocytes       0.4   0.3       Ketones, UA     Negative           Leukocytes, UA     Negative           Lymph #       0.68   1.12       LYMPH %       12.5   18.2       Magnesium  2.00         0.80  Comment: Critical Result called and verified by verbal readback to: Yohan Serna on 09/13/2023 at 14:43. Reported by [id number].       MCH       35.6   34.9       MCHC        34.9   33.1       MCV       102.1   105.6       Mono #       0.76   1.03       Mono %       13.9   16.8       MPV       11.1   10.7       Neut #       3.93   3.90       Neut %       72.1   63.6       NITRITE UA     Negative           nRBC       0.0         Occult Blood UA     Negative           pH, UA     6.0           Phosphorus         2.3       Platelets       84   93       Potassium 3.5       3.5   4.7       PROTEIN TOTAL       6.9   6.9       Protein, UA     Negative           PTH       6.0         RBC       3.31   3.58       RBC, UA     0-5           RDW       20.7   21.1       Sodium 140       135   136       Specific Gravity,UA     1.006           Squamous Epithelial Cells, UA     None Seen           Thyroid Stimulating Hormone   3.487       3.677       Urobilinogen, UA     Normal           Vit D, 25-Hydroxy   15.0             Vitamin B-12   >2,000             WBC, UA     0-5           WBC       5.45   6.14                                Microbiology Data:  None    Other Results:  EKG (my interpretation): Sinus tachycardia with No acute ST segment or T wave abnormalities. J waves present.   Radiology:  Imaging Results    None            Lines/Drains/Airways       Central Venous Catheter Line  Duration                  PowerPort A Cath Single Lumen left subclavian -- days              Airway  Duration                  Laryngectomy (Do Not Remove) Laryngectomy tube -- days              Peripheral Intravenous Line  Duration                  Peripheral IV - Single Lumen 09/13/23 1440 22 G Posterior;Right Hand <1 day                     Assessment & Plan:     New onset Hypercalcemia  Hypomagnesemia   - Ca 15.9 from clinic labs, repeat 15.5 in ED  - EKG with sinus tachycardia with J waves present which is an associated finding of hypercalcemia  - PTH 6, Mg 0.8  - pending labs: TSH, Folate, B12, Vit D, PTHrP, 24hr Urine Ca/Cr, SPEP, UPEP  - started on IV NS at 150cc/hr, IV calcitonin 4units/kg, IV zoledronic acid  4 mg at 200cc/hr.  - also got IV 2 g mag sulfate. Will replete with another IV 2 g mag sulfate.  - Will repeat BMP and Mg in about 4 hours to reassess electrolytes and replete as necessary        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 Underwent another cycle of carbo/taxol completed on 8/23/23.   - follows closely with Heme/onc and ENT  - plans to restart ketruda maintenance therapy soon.     Macrocytic anemia  - H/H 12.5/37.8 with ; repeat labs consistent with H&H 11.8/33.8 and MCV of 102.1  - B12 and folate pending  - continue to monitor      Hypothyroidism  - TSH level pending  - will start home levothyroxine 50 mcg     Hx DVT RUE  - RUE DVT found in 03/2023. Was prescribed eliquis 5 mg BID  - will start while inpatient.     COPD  - on duonebs at home   - will continue PRN        CODE STATUS: Full Code  Access: Peripheral  Antibiotics: none  Diet: Regular  DVT Prophylaxis: Eliquis  GI Prophylaxis: Protonix  Fluids: normal saline at 150 ml/hr.      Disposition: day 0 of admission for treatment, workup, and management of hypercalcemia and hypomagnesemia. Currently on IVF, calcitonin, and zoledronic acid and also IV magnesium sulfate. Pending repeat BMP and Mg later this PM.    Dov Conley  Internal Medicine - PGY-1

## 2023-09-14 PROBLEM — E44.0 MODERATE MALNUTRITION: Status: ACTIVE | Noted: 2023-01-01

## 2023-09-14 PROBLEM — R00.0 TACHYCARDIA: Status: ACTIVE | Noted: 2023-01-01

## 2023-09-14 NOTE — PROGRESS NOTES
Inpatient Nutrition Assessment    Admit Date: 9/13/2023   Total duration of encounter: 1 day     Nutrition Recommendation/Prescription     Regular diet, dairy restriction  Boost Breeze (provides 250 kcal, 9 g protein per serving) TID  Monitor Weight Weekly   Continue Megace to stimulate appetite  Replete electrolytes prn    Communication of Recommendations: reviewed with patient and reviewed with caregiver    Nutrition Assessment     Malnutrition Assessment/Nutrition-Focused Physical Exam    Malnutrition Context: acute illness or injury  Malnutrition Level: moderate  Energy Intake (Malnutrition): less than 75% for greater than 7 days     Subcutaneous Fat (Malnutrition): mild depletion     Upper Arm Region (Subcutaneous Fat Loss): mild depletion     Muscle Mass (Malnutrition): mild depletion  Anglican Region (Muscle Loss): mild depletion                                A minimum of two characteristics is recommended for diagnosis of either severe or non-severe malnutrition.    Chart Review    Reason Seen: continuous nutrition monitoring and malnutrition screening tool (MST)    Malnutrition Screening Tool Results   Have you recently lost weight without trying?: Yes: 2-13 lbs  Have you been eating poorly because of a decreased appetite?: Yes   MST Score: 2     Diagnosis:  New onset Hypercalcemia, Hypomagnesemia, Stage 4 Laryngeal cancer with lung mets, Macrocytic anemia, COPD, Hypothyroidism    Relevant Medical History: COPD, Hypothyroidism, DVT, Laryngeal Cancer, [place] lung mass, vocal cord mass    Nutrition-Related Medications: NaCL @ 150 ml/hr, Calcitonin, famotidine, Megace Pantoprazole  Calorie Containing IV Medications: no significant kcals from medications at this time    Nutrition-Related Labs:  9/14/23 -- Glu 102, K 3.3 L, BUN 8.9, Cr 0.83, Ca 13.6 H    Diet/PN Order: Diet Adult Regular  Oral Supplement Order: none  Tube Feeding Order: none  Appetite/Oral Intake: fair/50-75% of meals  Factors Affecting Nutritional  "Intake: decreased appetite  Food/Faith/Cultural Preferences: none reported  Food Allergies: none reported       Wound(s):   skin intact    Comments    23 -- Pt with fair appetite over the last 1-2 weeks, Megace started; denies n/v or difficulty swallowing; LBM ; Ca (H) - limit dairy; will try Boost Breeze    Anthropometrics    Height: 5' 9" (175.3 cm)    Last Weight: 61.5 kg (135 lb 8 oz) (23 2311) Weight Method: Bed Scale  BMI (Calculated): 20  BMI Classification: normal (BMI 18.5-24.9)        Ideal Body Weight (IBW), Male: 160 lb     % Ideal Body Weight, Male (lb): 84.69 %                 Usual Body Weight (UBW), k.5 kg  % Usual Body Weight: 87.36     Usual Weight Provided By: patient and EMR weight history    Wt Readings from Last 5 Encounters:   23 61.5 kg (135 lb 8 oz)   23 61.4 kg (135 lb 6.4 oz)   23 61.7 kg (136 lb)   23 61.2 kg (135 lb)   23 61.7 kg (136 lb)   06/15/23 63.5 kg (140 lb)    Weight Change(s) Since Admission:  Admit Weight: 60.6 kg (133 lb 9.6 oz) (23 1347)  Most recent weight stable    Estimated Needs    Weight Used For Calorie Calculations: 61.5 kg (135 lb 9.3 oz)  Energy Calorie Requirements (kcal): 1725 - 1845 kcal (28 - 30 kcal/kg)  Energy Need Method: Kcal/kg  Weight Used For Protein Calculations: 61.5 kg (135 lb 9.3 oz)  Protein Requirements: 74 - 80 gm (1.2-1.3 gm/kg)  Fluid Requirements (mL): 1725 - 1845 ml (1ml/kcal)  Temp (24hrs), Av.2 °F (36.8 °C), Min:97.9 °F (36.6 °C), Max:98.4 °F (36.9 °C)       Enteral Nutrition    Patient not receiving enteral nutrition at this time.    Parenteral Nutrition    Patient not receiving parenteral nutrition support at this time.    Evaluation of Received Nutrient Intake    Calories: not meeting estimated needs  Protein: not meeting estimated needs    Patient Education    Not applicable.    Nutrition Diagnosis     PES: Malnutrition related to suboptimal protein/energy intake as evidenced " by < 75% nutrition intake > 7 days, fat/muscle wasting. (new)    Interventions/Goals     Intervention(s): general/healthful diet, commercial beverage, prescription medication, and collaboration with other providers  Goal: Meet greater than 75% of nutritional needs by follow-up. (new)    Monitoring & Evaluation     Dietitian will monitor food and beverage intake, weight change, and electrolyte/renal panel.  Nutrition Risk/Follow-Up: moderate (follow-up in 3-5 days)   Please consult if re-assessment needed sooner.

## 2023-09-14 NOTE — PLAN OF CARE
09/14/23 1056   Discharge Assessment   Assessment Type Discharge Planning Assessment   Confirmed/corrected address, phone number and insurance Yes   Confirmed Demographics Correct on Facesheet   Source of Information patient;family  (Wife Elsie [phone])   When was your last doctors appointment?   (Tony Bertrand)   Reason For Admission Hypomagnesemia   People in Home spouse  (Wife Elsie [phone])   Facility Arrived From: home   Do you expect to return to your current living situation? Yes   Do you have help at home or someone to help you manage your care at home? Yes   Who are your caregiver(s) and their phone number(s)? Wife Elsie [phone]   Prior to hospitilization cognitive status: Alert/Oriented   Current cognitive status: Alert/Oriented   Equipment Currently Used at Home none   Readmission within 30 days? No   Patient currently being followed by outpatient case management? No   Do you currently have service(s) that help you manage your care at home? No   Do you take prescription medications? Yes   Do you have prescription coverage? Yes   Coverage [employer] MANAGED MEDICARE - [employer] MEDICARE PLAN PPO   Do you have any problems affording any of your prescribed medications? No   Is the patient taking medications as prescribed? yes   Who is going to help you get home at discharge? Wife Elsie [phone]   How do you get to doctors appointments? family or friend will provide   Are you on dialysis? No   Do you take coumadin? No   DME Needed Upon Discharge  none   Discharge Plan discussed with: Spouse/sig other;Patient   Name(s) and Number(s) Elsie  [phone]   Discharge Plan A Home with family   Physical Activity   On average, how many days per week do you engage in moderate to strenuous exercise (like a brisk walk)? 0 days   On average, how many minutes do you engage in exercise at this level? 0 min   Financial Resource Strain   How hard is it for you to pay for the very basics like food,  housing, medical care, and heating? Not hard   Housing Stability   In the last 12 months, was there a time when you were not able to pay the mortgage or rent on time? N   In the last 12 months, was there a time when you did not have a steady place to sleep or slept in a shelter (including now)? N   Transportation Needs   In the past 12 months, has lack of transportation kept you from medical appointments or from getting medications? no   In the past 12 months, has lack of transportation kept you from meetings, work, or from getting things needed for daily living? No   Food Insecurity   Within the past 12 months, you worried that your food would run out before you got the money to buy more. Never true   Within the past 12 months, the food you bought just didn't last and you didn't have money to get more. Never true   Stress   Do you feel stress - tense, restless, nervous, or anxious, or unable to sleep at night because your mind is troubled all the time - these days? Not at all   Social Connections   In a typical week, how many times do you talk on the phone with family, friends, or neighbors? More than 3   How often do you get together with friends or relatives? More than 3   How often do you attend Evangelical or Taoist services? Never   Do you belong to any clubs or organizations such as Evangelical groups, unions, fraternal or athletic groups, or school groups? No   How often do you attend meetings of the clubs or organizations you belong to? Never   Are you , , , , never , or living with a partner?    Alcohol Use   Q1: How often do you have a drink containing alcohol? Monthly or l   Q2: How many drinks containing alcohol do you have on a typical day when you are drinking? 1 or 2   Q3: How often do you have six or more drinks on one occasion? Never

## 2023-09-14 NOTE — ASSESSMENT & PLAN NOTE
"Patient meets ASPEN criteria for moderate malnutrition of acute illness or injury per RD assessment as evidenced by:  Energy Intake (Malnutrition): less than 75% for greater than 7 days     Subcutaneous Fat (Malnutrition): mild depletion  Muscle Mass (Malnutrition): mild depletion           A minimum of two characteristics is recommended for diagnosis of either severe or non-severe malnutrition.    Ht Readings from Last 1 Encounters:   09/13/23 5' 9" (1.753 m)     Wt Readings from Last 1 Encounters:   09/13/23 2311 61.5 kg (135 lb 8 oz)   09/13/23 1347 60.6 kg (133 lb 9.6 oz)     Body mass index is 20.01 kg/m².    Patient has been screened and assessed by RD. See nutrition recommendations documented in inpatient nutrition assessment. RD will continue to follow patient throughout hospitalization.    "

## 2023-09-14 NOTE — PROGRESS NOTES
"[facility] Medicine Wards   Progress Note     Resident Team: [facility] Medicine List 3  Attending Physician: Ashley Erazo MD  Resident: Bessy Stein  Intern: Dov Conley     [facility] Length of Stay: 1 days    Subjective:      Brief HPI:  Josafat Boudreaux is a 57 y.o. male who with a history of COPD, hypothyroidism, DVT to RUE, stage 4 larygenal SCC, bilateral lung mass - Squamous cell - metastatic SqCC lung cancer vs metastatic SqCC to the lungs from H&N, mediastinal and Hilar LAD, s/p laryngectomy with radical neck dissection (7/2022) who presented to [facility] ED on 9/13/2023 from hem/onc clinic for concern of elevated calcium levels. He is with wife today who states patient seemed to be "not as sharp" as he usually is, and that he is just "slowed down." Otherwise, he denies any chest pain, palpitations, headaches, blurry vision. He has no nausea, vomiting, diarrhea, bone pain, body aches. He does have abdominal pain, however, but attributes it to a fall that occurred about 5 weeks ago. States he was in bathtub and was going from seated to standing, but slipped and landed on side of tub onto stomach. Seen by orthopedics during this time, but was told that he has lumbar radiculopathy and pain was from exacerbation of the lumbar radiculopathy. But patient reports lingering abdominal pain exacerbated on ambulation. He denies any new onset of weakness or numbness. Urinating with no complications, denies dysuria. Currently uses duonebs. Patient also has an extensive history and course of cancer.      In ED, patient was afebrile, slightly tachycardic to 105 with /82. CBC with H/H 12.5/37.8 .6. CMP with Ca of 15.5. Mg 0.80. PTH low at 6. EKG with sinus tachycardia with J waves present. Patient started on IVF,calcitonin, and zoledronic acid.        Interval History:   Patient was seen resting comfortably in his room.  He has remained afebrile.  Vital signs remarkable for mild tachycardia otherwise essentially " unremarkable.  Wife present in room during encounter.  He mentions there is no abdominal pain anymore, but he does endorse some back pain.  He denies any fever, chills, chest pain, shortness of breath, nausea, vomiting, dysuria, new onset of any weakness.  After receiving IV fluids, calcitonin, and zoledronic acid, patient's calcium has improved gradually.  His calcium now is at 13.6.  His hypomagnesemia corrected to 2 after 4 g of intravenous magnesium sulfate however this morning it downtrended to 1.30.  Chemistry profile was significant also for mild hypokalemia 3.3.  His EKG this morning was also notable for sinus tachycardia otherwise normal EKG.       Review of Systems:  As noted above in the interval history       Objective:     Vital Signs (Most Recent):  Temp: 98.2 °F (36.8 °C) (09/14/23 0722)  Pulse: (!) 114 (09/14/23 0722)  Resp: 18 (09/14/23 0722)  BP: 126/76 (09/14/23 0722)  SpO2: (!) 93 % (09/14/23 0722) Vital Signs (24h Range):  Temp:  [97.9 °F (36.6 °C)-98.4 °F (36.9 °C)] 98.2 °F (36.8 °C)  Pulse:  [] 114  Resp:  [16-21] 18  SpO2:  [90 %-97 %] 93 %  BP: (103-129)/(61-85) 126/76       Physical Examination:  Physical Exam  Vitals and nursing note reviewed.   Constitutional:       Appearance: Normal appearance.      Comments: Limited voice due to trach. Mouths word.    HENT:      Head: Normocephalic and atraumatic.      Mouth/Throat:      Mouth: Mucous membranes are moist.   Eyes:      General: No scleral icterus.     Pupils: Pupils are equal, round, and reactive to light.   Neck:      Comments: Trach in place - clean and no signs of drainage/sputum    Cardiovascular:      Rate and Rhythm: Normal rate and regular rhythm.      Heart sounds: No murmur heard.     No friction rub. No gallop.   Pulmonary:      Effort: Pulmonary effort is normal. No respiratory distress.      Breath sounds: Normal breath sounds. No wheezing.   Abdominal:      General: Abdomen is flat. There is no distension.       Palpations: Abdomen is soft.      Tenderness: There is no abdominal tenderness.   Musculoskeletal:         General: No swelling or tenderness. Normal range of motion.   Skin:     General: Skin is warm and dry.      Capillary Refill: Capillary refill takes less than 2 seconds.   Neurological:      General: No focal deficit present.      Mental Status: He is alert and oriented to person, place, and time.   Psychiatric:         Mood and Affect: Mood normal.         Behavior: Behavior normal.         Thought Content: Thought content normal.         Judgment: Judgment normal.         Laboratory:  Most Recent Data:  CBC:   Recent Labs   Lab 09/13/23  1019 09/13/23  1443 09/14/23  0428   WBC 6.14 5.45 4.93   HGB 12.5* 11.8* 10.8*   HCT 37.8* 33.8* 31.8*   PLT 93* 84* 81*     CMP:   Recent Labs   Lab 09/14/23 0428   CALCIUM 13.6*   ALBUMIN 2.9*      K 3.3*   CO2 26   BUN 8.9   CREATININE 0.83   ALKPHOS 93   ALT 11   AST 24   BILITOT 0.6     All pertinent lab results from the last 24 hours have been reviewed.  Recent Lab Results  (Last 5 results in the past 24 hours)        09/14/23  0428   09/13/23  2035   09/13/23  1708   09/13/23  1511   09/13/23  1443        Immature Platelet Fraction 5.7         6.2       Albumin/Globulin Ratio 0.8         0.8       Albumin 2.9         3.0       Alkaline Phosphatase 93         102       ALT 11         12       Anion Gap   11.0             Appearance, UA       Clear         AST 24         24       Bacteria, UA       None Seen         Baso # 0.02         0.02       Basophil % 0.4         0.4       BILIRUBIN TOTAL 0.6         0.6       Bilirubin, UA       Negative         BUN 8.9   8.7       9.9       BUN/CREAT RATIO   11             Calcium 13.6  Comment: Critical Result called and verified by verbal readback to: Helen Mortensen on 09/14/2023 at 05:26. Reported by [id number].   14.8  Comment: Critical Result called and verified by verbal readback to: Ewa SON on 09/13/2023 at  21:32. Reported by [id number].       15.5  Comment: Critical Result called and verified by verbal readback to: Anna Serna er on 09/13/2023 at 15:16. Reported by [id number].       Chloride 106   103       99       CO2 26   26       26       Color, UA       Light-Yellow         Creatinine 0.83   0.80       0.85       eGFR >60   >60       >60       Eos # 0.02         0.04       Eosinophil % 0.4         0.7       Folate     14.2           Globulin, Total 3.6         3.9       Glucose 102   91       117       Glucose, UA       Normal         Hematocrit 31.8         33.8       Hemoglobin 10.8         11.8       Hyaline Casts, UA       0-2         IgA     352.0           IgG     890.00           IgM     <25.0           Immature Grans (Abs) 0.01         0.02       Immature Granulocytes 0.2         0.4       Ketones, UA       Negative         Leukocytes, UA       Negative         Lymph # 0.99         0.68       LYMPH % 20.1         12.5       Magnesium  1.30   2.00             MCH 35.6         35.6       MCHC 34.0         34.9       .0         102.1       Mono # 0.66         0.76       Mono % 13.4         13.9       MPV 11.1         11.1       Neut # 3.23         3.93       Neut % 65.5         72.1       NITRITE UA       Negative         nRBC 0.0         0.0       Occult Blood UA       Negative         pH, UA       6.0         Platelets 81         84       Potassium 3.3   3.5       3.5       PROTEIN TOTAL 6.5         6.9       Protein, UA       Negative         PTH         6.0       RBC 3.03         3.31       RBC, UA       0-5         RDW 21.5         20.7       Sodium 140   140       135       Specific Gravity,UA       1.006         Squamous Epithelial Cells, UA       None Seen         Thyroid Stimulating Hormone     3.487           Urobilinogen, UA       Normal         Vit D, 25-Hydroxy     15.0           Vitamin B-12     >2,000           WBC, UA       0-5         WBC 4.93         5.45                                 Microbiology Data Reviewed: no  Pertinent Findings:  none    Other Results:  EKG (my interpretation): sinus tachycardia.    Radiology:  Imaging Results    None         Current Medications:     Infusions:   sodium chloride 0.9% 150 mL/hr at 09/14/23 0442        Scheduled:   apixaban  5 mg Oral BID    calcitonin  4 Units/kg (Dosing Weight) Intramuscular Q12H    famotidine  20 mg Oral Daily    levothyroxine  50 mcg Oral Daily    megestroL  400 mg Oral Daily    pantoprazole  20 mg Oral QHS    vitamin D  1,000 Units Oral Daily        PRN:  albuterol-ipratropium, diphenhydrAMINE, hydrocodone-apap 7.5-325 MG/15 ML, melatonin, ondansetron, sodium chloride 0.9%, sodium chloride 3%    Antibiotics and Day Number of Therapy:  none      Intake/Output Summary (Last 24 hours) at 9/14/2023 0933  Last data filed at 9/14/2023 0442  Gross per 24 hour   Intake 1110.01 ml   Output --   Net 1110.01 ml       Lines/Drains/Airways       Central Venous Catheter Line  Duration                  PowerPort A Cath Single Lumen left subclavian -- days              Airway  Duration                  Laryngectomy (Do Not Remove) Laryngectomy tube -- days              Peripheral Intravenous Line  Duration                  Midline Catheter Insertion/Assessment  - Single Lumen 09/14/23 0400 Left basilic vein (medial side of arm)  <1 day                      Assessment & Plan:     New onset Hypercalcemia  - Ca 15.9 from clinic labs, repeat 15.5 in ED  - Today it has gradually improved to 13.6  - hypercalcemia most likely from his malignancy but other causes workup labs pending  - EKG with sinus tachycardia with J waves present which is an associated finding of hypercalcemia  - PTH 6  - Vit D was low at 15.0 - will replete  - TSH, Folate, B12 - wnl   - pending labs: PTHrP, 24hr Urine Ca/Cr, SPEP, UPEP  - started on IV NS at 150cc/hr, IV calcitonin 4units/kg, IV zoledronic acid 4 mg at 200cc/hr.  - Closely monitor his electrolytes    Hypomagnesemia   -  Mg 0.8 on admit  - Got total of 4 g IV mag sulfate yesterday  - Mg this am at 1.30 - will replete  - Closely monitor his electrolytes    Hypokalemia  - mild decrease to  3.3 noted  - will replete today   - Closely monitor his electrolytes     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 Underwent another cycle of carbo/taxol completed on 8/23/23.   - follows closely with Heme/onc and ENT  - plans to restart ketruda maintenance therapy soon.     Macrocytic anemia  - H/H 12.5/37.8 with  on admit  - H/H at 10.8/31.8 and MCV of 105  - B12 and folate wnl  - continue to monitor      Hypothyroidism  - TSH level wnl  - will continue home levothyroxine 50 mcg     Hx DVT RUE  - RUE DVT found in 03/2023. Was prescribed eliquis 5 mg BID  - will continue Eliquis 5 mg BID    COPD  - on duonebs at home   - will continue PRN        CODE STATUS: Full Code  Access: R Midline  Antibiotics: none  Diet: Regular  DVT Prophylaxis: Eliquis  GI Prophylaxis: Protonix  Fluids: normal saline at 150 ml/hr.       Disposition:    Dov Conley  Internal Medicine - PGY-1

## 2023-09-14 NOTE — PROCEDURES
"Josafat Boudreaux is a 57 y.o. male patient.    Temp: 98.1 °F (36.7 °C) (09/13/23 2346)  Pulse: 108 (09/14/23 0329)  Resp: 18 (09/14/23 0329)  BP: 103/61 (09/13/23 2346)  SpO2: 96 % (09/14/23 0329)  Weight: 61.5 kg (135 lb 8 oz) (09/13/23 2311)  Height: 5' 9" (175.3 cm) (09/13/23 2311)    PICC  Date/Time: 9/14/2023 4:00 AM  Performed by: Clifton Cantor RN  Consent Done: Yes  Time out: Immediately prior to procedure a time out was called to verify the correct patient, procedure, equipment, support staff and site/side marked as required  Indications: vascular access  Anesthesia: local infiltration  Local anesthetic: lidocaine 1% without epinephrine  Anesthetic Total (mL): 3  Description of findings: Midline  Preparation: skin prepped with ChloraPrep  Skin prep agent dried: skin prep agent completely dried prior to procedure  Sterile barriers: all five maximum sterile barriers used - cap, mask, sterile gown, sterile gloves, and large sterile sheet  Hand hygiene: hand hygiene performed prior to central venous catheter insertion  Location details: right brachial  Catheter type: single lumen  Catheter size: 4 Fr  Catheter Length: 13cm    Ultrasound guidance: yes  Vessel Caliber: medium and patent, compressibility normal  Needle advanced into vessel with real time Ultrasound guidance.  Guidewire confirmed in vessel.  Sterile sheath used.  Number of attempts: 1  Post-procedure: blood return through all ports    Assessment: successful placement          Name ANA LUISA Cantor RN [facility]  9/14/2023    "

## 2023-09-15 PROBLEM — E83.42 HYPOMAGNESEMIA: Status: ACTIVE | Noted: 2023-01-01

## 2023-09-15 NOTE — PROGRESS NOTES
"[facility] Medicine Wards   Progress Note     Resident Team: [facility] Medicine List 3  Attending Physician: Ashley Erazo MD  Resident: Bessy Stein  Intern: [facility]     [facility] Length of Stay: 2 days    Subjective:      Brief HPI:  Josafat Boudreaux is a 57 y.o. male who with a history of COPD, hypothyroidism, DVT to RUE, stage 4 larygenal SCC, bilateral lung mass - Squamous cell - metastatic SqCC lung cancer vs metastatic SqCC to the lungs from H&N, mediastinal and Hilar LAD, s/p laryngectomy with radical neck dissection (7/2022) who presented to [facility] ED on 9/13/2023 from hem/onc clinic for concern of elevated calcium levels. He is with wife today who states patient seemed to be "not as sharp" as he usually is, and that he is just "slowed down." Otherwise, he denies any chest pain, palpitations, headaches, blurry vision. He has no nausea, vomiting, diarrhea, bone pain, body aches. He does have abdominal pain, however, but attributes it to a fall that occurred about 5 weeks ago. States he was in bathtub and was going from seated to standing, but slipped and landed on side of tub onto stomach. Seen by orthopedics during this time, but was told that he has lumbar radiculopathy and pain was from exacerbation of the lumbar radiculopathy. But patient reports lingering abdominal pain exacerbated on ambulation. He denies any new onset of weakness or numbness. Urinating with no complications, denies dysuria. Currently uses duonebs. Patient also has an extensive history and course of cancer.      In ED, patient was afebrile, slightly tachycardic to 105 with /82. CBC with H/H 12.5/37.8 .6. CMP with Ca of 15.5. Mg 0.80. PTH low at 6. EKG with sinus tachycardia with J waves present. Patient started on IVF,calcitonin, and zoledronic acid.        Interval History:   Patient was seen resting comfortably in his room.  He has remained afebrile.  Vital signs remarkable for mild tachycardia otherwise essentially " unremarkable.  Wife present in room during encounter.  Wife mentions that patient seems more alert today.  He has been able to ambulate 1 time around the unit and ambulate to the bathroom.  He denies any fever, chills, chest pain, shortness of breath, nausea, vomiting, dysuria, new onset of any weakness.  His last bowel movement was yesterday.  His urine output was around 1200 mL since 7:00 p.m. yesterday.  CMP reveals a correction of calcium down to 12.4 now.  Of note, his elevation of chloride to 110 and decrease in bicarb to 20.  His magnesium had improved slightly yesterday to 1.5.     Review of Systems:  As noted above in the interval history       Objective:     Vital Signs (Most Recent):  Temp: 98.5 °F (36.9 °C) (09/15/23 0745)  Pulse: 105 (09/15/23 0745)  Resp: 18 (09/15/23 0742)  BP: 128/76 (09/15/23 0745)  SpO2: 100 % (09/15/23 0745) Vital Signs (24h Range):  Temp:  [97.6 °F (36.4 °C)-98.9 °F (37.2 °C)] 98.5 °F (36.9 °C)  Pulse:  [] 105  Resp:  [1-20] 18  SpO2:  [92 %-100 %] 100 %  BP: (112-128)/(61-76) 128/76       Physical Examination:  Physical Exam  Vitals and nursing note reviewed.   Constitutional:       Appearance: Normal appearance.      Comments: Limited voice due to trach. Mouths word.    HENT:      Head: Normocephalic and atraumatic.      Mouth/Throat:      Mouth: Mucous membranes are moist.   Eyes:      General: No scleral icterus.     Pupils: Pupils are equal, round, and reactive to light.   Neck:      Comments: Trach in place - clean and no signs of drainage/sputum    Cardiovascular:      Rate and Rhythm: Normal rate and regular rhythm.      Heart sounds: No murmur heard.     No friction rub. No gallop.   Pulmonary:      Effort: Pulmonary effort is normal. No respiratory distress.      Breath sounds: Normal breath sounds. No wheezing.   Abdominal:      General: Abdomen is flat. There is no distension.      Palpations: Abdomen is soft.      Tenderness: There is no abdominal tenderness.    Musculoskeletal:         General: No swelling or tenderness. Normal range of motion.   Skin:     General: Skin is warm and dry.      Capillary Refill: Capillary refill takes less than 2 seconds.   Neurological:      General: No focal deficit present.      Mental Status: He is alert and oriented to person, place, and time.   Psychiatric:         Mood and Affect: Mood normal.         Behavior: Behavior normal.         Thought Content: Thought content normal.         Judgment: Judgment normal.         Laboratory:  Most Recent Data:  CBC:   Recent Labs   Lab 09/13/23  1443 09/14/23  0428 09/15/23  0705   WBC 5.45 4.93 5.58   HGB 11.8* 10.8* 10.4*   HCT 33.8* 31.8* 31.5*   PLT 84* 81* 82*     CMP:   Recent Labs   Lab 09/15/23  0705   CALCIUM 12.4*   ALBUMIN 2.7*      K 3.7   CO2 20*   BUN 5.3*   CREATININE 0.76   ALKPHOS 86   ALT 11   AST 27   BILITOT 0.5     All pertinent lab results from the last 24 hours have been reviewed.  Recent Lab Results         09/15/23  0705   09/15/23  0110   09/14/23  1507        Immature Platelet Fraction 4.9           Albumin/Globulin Ratio 0.8           Albumin 2.7           Alkaline Phosphatase 86           ALT 11           Anion Gap     7.0       AST 27           Baso # 0.02           Basophil % 0.4           BILIRUBIN TOTAL 0.5           BUN 5.3     7.5       BUN/CREAT RATIO     9       Calcium 12.4  Comment: Critical Result called and verified by verbal readback to: Gail Watkins on 09/15/2023 at 07:33. Reported by [id number].     12.7  Comment: Critical Result called and verified by verbal readback to: David Cantu on 09/14/2023 at 15:46. Reported by [id number].       Calcium, Urine   15.6         Chloride 110     107       CO2 20     24       Creatinine 0.76     0.81       Creatinine, Urine   20.2         eGFR >60     >60       Eos # 0.04           Eosinophil % 0.7           Globulin, Total 3.6           Glucose 88     148       Hematocrit 31.5           Hemoglobin 10.4            Immature Grans (Abs) 0.02           Immature Granulocytes 0.4           Lymph # 0.51           LYMPH % 9.1           Magnesium      1.50       MCH 35.5           MCHC 33.0           .5           Mono # 0.76           Mono % 13.6           MPV 11.1           Neut # 4.23           Neut % 75.8           nRBC 0.0           Platelets 82           Potassium 3.7     3.6       PROTEIN TOTAL 6.3           RBC 2.93           RDW 21.6           Sodium 138     138       Urine 24 Hour Calcium   546.0         Urine 24 Hour Creatinine   707.0         Volume   3,500.0            3,500.0         WBC 5.58                     Microbiology Data Reviewed: no  Pertinent Findings:  none    Other Results:  EKG (my interpretation): sinus tachycardia.    Radiology:  Imaging Results    None         Current Medications:     Infusions:   sodium chloride 0.9% 100 mL/hr at 09/15/23 0013        Scheduled:   apixaban  5 mg Oral BID    calcitonin  4 Units/kg (Dosing Weight) Intramuscular Q12H    famotidine  20 mg Oral Daily    levothyroxine  50 mcg Oral Daily    magnesium sulfate IVPB  2 g Intravenous Once    megestroL  400 mg Oral Daily    mupirocin   Nasal BID    pantoprazole  20 mg Oral QHS    vitamin D  1,000 Units Oral Daily        PRN:  albuterol-ipratropium, diphenhydrAMINE, hydrocodone-apap 7.5-325 MG/15 ML, melatonin, ondansetron, sodium chloride 0.9%, sodium chloride 3%    Antibiotics and Day Number of Therapy:  none      Intake/Output Summary (Last 24 hours) at 9/15/2023 0833  Last data filed at 9/14/2023 1831  Gross per 24 hour   Intake 2077.34 ml   Output 1200 ml   Net 877.34 ml       Lines/Drains/Airways       Central Venous Catheter Line  Duration                  PowerPort A Cath Single Lumen left subclavian -- days              Airway  Duration                  Laryngectomy (Do Not Remove) Laryngectomy tube -- days              Peripheral Intravenous Line  Duration                  Midline Catheter Insertion/Assessment  -  Single Lumen 09/14/23 0400 Left basilic vein (medial side of arm)  1 day                      Assessment & Plan:     New onset Hypercalcemia  - Ca 15.9 from clinic labs, repeat 15.5 in ED  - initial EKG with sinus tachycardia with J waves present which is an associated finding of hypercalcemia  - PTH 6  - Today it has gradually improved to 12.4  - hypercalcemia most likely from his malignancy   - SPEP and UPEP unremarkable; urine calcium creatinine ratio more than 0.01 so no component of FHH  - Vit D was low at 15.0 - will replete  - TSH, Folate, B12 - wnl   - pending labs: PTHrP  - started on IV NS at 150cc/hr, IV calcitonin 4units/kg, IV zoledronic acid 4 mg at 200cc/hr.  - Currently on IV  cc/hr  - Closely monitor his electrolytes    Non-anion gap metabolic acidosis  - mild elevation of chloride at 110 and low bicarb 20  - most likely from IV normal saline  - rate reduced to 100 cc/hour now    Hypomagnesemia   - Mg 0.8 on admit  - Got total of 4 g IV mag sulfate yesterday   - Mg noted to be at 1.5 yesterday afternoon  - Closely monitor his electrolytes    Hypokalemia  - mild decrease to  3.3 noted  - is now corrected to 3.7  - Closely monitor his electrolytes     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 Underwent another cycle of carbo/taxol completed on 8/23/23.   - follows closely with Heme/onc and ENT  - plans to restart ketruda maintenance therapy soon.     Macrocytic anemia  - H/H 12.5/37.8 with  on admit  - H/H at 10.8/31.8 and MCV of 105  - B12 and folate wnl  - continue to monitor      Hypothyroidism  - TSH level wnl  - will continue home levothyroxine 50 mcg     Hx DVT RUE  - RUE DVT found in 03/22/2023.   - Past surgical history of ORIF of right humerus shaft fracture  around 3/2022.   - was evaluated with Orthopedics in March of 2023 that showed will have implants with no loosening or substance.  DVT possibly from chemoradiation treatments.  - Was prescribed eliquis 5 mg BID  - Will continue Eliquis 5 mg BID    COPD  - on duonebs at home   - will continue PRN        CODE STATUS: Full Code  Access: R Midline  Antibiotics: none  Diet: Regular  DVT Prophylaxis: Eliquis  GI Prophylaxis: Protonix  Fluids: normal saline at 100 ml/hr.       Disposition: day 3 of admission for treatment and management of hypercalcemia and hypomagnesemia. Currently on IVF.  We will anticipate discharge after correction of his calcium.     Dov Conley  Internal Medicine - PGY-1

## 2023-09-16 NOTE — PLAN OF CARE
Problem: Adult Inpatient Plan of Care  Goal: Plan of Care Review  Outcome: Ongoing, Progressing  Goal: Patient-Specific Goal (Individualized)  Outcome: Ongoing, Progressing  Goal: Absence of Hospital-Acquired Illness or Injury  Outcome: Ongoing, Progressing  Goal: Optimal Comfort and Wellbeing  Outcome: Ongoing, Progressing  Goal: Readiness for Transition of Care  Outcome: Ongoing, Progressing     Problem: Fluid Imbalance (Pneumonia)  Goal: Fluid Balance  Outcome: Ongoing, Progressing     Problem: Infection (Pneumonia)  Goal: Resolution of Infection Signs and Symptoms  Outcome: Ongoing, Progressing     Problem: Respiratory Compromise (Pneumonia)  Goal: Effective Oxygenation and Ventilation  Outcome: Ongoing, Progressing     Problem: Fall Injury Risk  Goal: Absence of Fall and Fall-Related Injury  Outcome: Ongoing, Progressing     Problem: Infection  Goal: Absence of Infection Signs and Symptoms  Outcome: Ongoing, Progressing     Problem: Skin Injury Risk Increased  Goal: Skin Health and Integrity  Outcome: Ongoing, Progressing     Problem: Electrolyte Imbalance  Goal: Electrolyte Balance  Outcome: Ongoing, Progressing

## 2023-09-16 NOTE — PT/OT/SLP EVAL
Physical Therapy Evaluation    Patient Name:  Josafat Boudreaux   MRN:  [id number]    Recommendations:     Discharge Recommendations:  home with home health   Discharge Equipment Recommendations: none   Equipment to be obtained for discharge: rolling walker.  Barriers to discharge: fall risk, severity of deficits, medical diagnosis, and past medical history    Assessment:     Josafat Boudreaux is a 57 y.o. male admitted with a medical diagnosis of Hypercalcemia.  He presents with the following impairments/functional limitations:  weakness, impaired endurance, gait instability, impaired balance, pain.    Rehab Prognosis: Good.    Patient would benefit from continued skilled acute PT services to: address above listed impairments/functional limitations; receive patient/caregiver education; reduce fall risk; and maximize independency/safety with functional mobility.    Recent Surgery: * No surgery found *      Plan:     During this hospitalization, patient to be seen 3 x/week to address the identified impairments/functional limitations via gait training, therapeutic activities, therapeutic exercises, therapeutic groups and progress toward the established goals.    Plan of Care Expires:  10/16/23    Subjective     Communicated with patient's nurse Gail prior to session.    Patient agreeable to participate in evaluation.     Chief Complaint: Patient reports inc low back pain today. Patient states he was receiving therapy prior to hospitalization for low back pain. Patient recently started using a rw at home  Patient/Family Comments/goals: to dec pain  Pain/Comfort:  Pain Rating 1: 7/10  Location - Side 1: Bilateral  Location 1: back  Pain Addressed 1: Cessation of Activity, Nurse notified  Pain Rating Post-Intervention 1: 7/10    Patients cultural, spiritual, Yazidism conflicts given the current situation: no    Social History  Living Environment: Patient lives with their spouse in a single level home, with 4 steps  steps outside, with tub-shower combo.  Functional Level: Prior to admission patient was driving, was independent in ADL's, and ambulated with assistive device.  Equipment Used at Home: walker, rolling  Equipment owned (not currently used): rolling walker.  Assistance Upon Discharge: spouse.    Objective:     Patient found supine in bed with peripheral IV  upon PT entry to room.    General Precautions: Standard, fall   Orthopedic Precautions:    Braces:    Respiratory Status: room air      Exams:  Orientation: Patient is oriented to Person, Place, Time, Situation  Commands: Patient follows commands consistently  Sensation:    -     Impaired: light/touch bilat lower extremity  RLE ROM: WFL  RLE Strength: WFL  LLE ROM: WFL  LLE Strength: WFL    Functional Mobility:    Bed Mobility:  Supine to Sit: minimum assistance  Sit to Supine: minimum assistance    Transfers:  Sit to Stand: contact guard assistance with rolling walker    Gait:  Patient ambulated 2 x 45 ft with a standing rest break due to painft with rolling walker and stand by assistance.  Patient demonstrates unsteady gait, decreased stephanie, and decreased step length.    Other Mobility:  not assessed    Balance:  Sit  Static: FAIR+: Able to take MINIMAL challenges from all directions  Dynamic: FAIR+: Maintains balance through MINIMAL excursions of active trunk motion  Stand  Static: FAIR+: Takes MINIMAL challenges from all directions  Dynamic: FAIR+: Needs CLOSE SUPERVISION during gait and is able to right self with minor LOB    Patient left supine in bed with all lines intact and call button in reach.    Education     Patient was instructed to utilize staff assistance for mobility/transfers.  White board updated regarding patient's safest level of mobility with staff assistance.    Goals     Multidisciplinary Problems       Physical Therapy Goals          Problem: Physical Therapy    Goal Priority Disciplines Outcome Goal Variances Interventions   Physical  Therapy Goal     PT, PT/OT      Description: Goals to be met by: 23     Patient will increase functional independence with mobility by performin. Supine to sit with Modified [place]  2. Sit to supine with Modified [place]  3. Sit to stand transfer with Modified [place]  4. Gait  x 130 feet with Supervision using Rolling Walker.                        History:     Past Medical History:   Diagnosis Date    COPD (chronic obstructive pulmonary disease)     Dysphagia     Essential (primary) hypertension 2023    GERD (gastroesophageal reflux disease)     laryngeal Cancer     Lung cancer     Thyroid disease     Vocal cord mass      Past Surgical History:   Procedure Laterality Date    DIAGNOSTIC LAPAROSCOPY N/A 2023    Procedure: LAPAROSCOPY, DIAGNOSTIC;  Surgeon: Dipak De Leon Jr., MD;  Location: [facility];  Service: General;  Laterality: N/A;    DIRECT DIAGNOSTIC LARYNGOSCOPY WITH BRONCHOSCOPY AND ESOPHAGOSCOPY N/A 2022    Procedure: LARYNGOSCOPY, DIRECT, DIAGNOSTIC, WITH BRONCHOSCOPY AND ESOPHAGOSCOPY;  Surgeon: Emory Alonso MD;  Location: [facility];  Service: ENT;  Laterality: N/A;    ESOPHAGOGASTRODUODENOSCOPY (EGD)- DEVICE ASSISTED N/A 2023    Procedure: EGD;  Surgeon: Aaliyah Conrad III, MD;  Location: [facility] ENDOSCOPY;  Service: Gastroenterology;  Laterality: N/A;  Esophageal Dilation; Savory over Guide wire: 36Fr and 39 Fr  Clips to secure peg site    HIP SURGERY      HUMERUS FRACTURE SURGERY      INCISION AND DRAINAGE, NECK Bilateral 10/21/2022    Procedure: INCISION AND DRAINAGE,NECK;  Surgeon: Madison Worley MD;  Location: [facility];  Service: ENT;  Laterality: Bilateral;    INSERT ARTERIAL LINE  2022         JOINT REPLACEMENT  2019    R hip    PEG TUBE REMOVAL      PLACEMENT, MEDIPORT      TRACHEOSTOMY N/A 06/10/2022    Procedure: CREATION, TRACHEOSTOMY;  Surgeon: Junior Alonso MD;  Location: [facility];  Service: ENT;  Laterality: N/A;      Time Tracking:     PT Received On: 09/16/23  PT Start Time: 0922     PT Stop Time: 0940  PT Total Time (min): 18 min     Billable Minutes: Evaluation 18 min    09/16/2023

## 2023-09-16 NOTE — PROGRESS NOTES
"[facility] INTERNAL MEDICINE  INPATIENT PROGRESS NOTE    Resident Team: [facility] Medicine List 3  Attending Physician: Kevin La MD    SUBJECTIVE:      HPI: Josafat Boudreaux is a 57 y.o. male who with a history of COPD, hypothyroidism, DVT to RUE, stage 4 larygenal SCC, bilateral lung mass - Squamous cell - metastatic SqCC lung cancer vs metastatic SqCC to the lungs from H&N, mediastinal and Hilar LAD, s/p laryngectomy with radical neck dissection (7/2022) who presented to [facility] ED on 9/13/2023 from hem/onc clinic for concern of elevated calcium levels. He is with wife today who states patient seemed to be "not as sharp" as he usually is, and that he is just "slowed down." Otherwise, he denies any chest pain, palpitations, headaches, blurry vision. He has no nausea, vomiting, diarrhea, bone pain, body aches. He does have abdominal pain, however, but attributes it to a fall that occurred about 5 weeks ago. States he was in bathtub and was going from seated to standing, but slipped and landed on side of tub onto stomach. Seen by orthopedics during this time, but was told that he has lumbar radiculopathy and pain was from exacerbation of the lumbar radiculopathy. But patient reports lingering abdominal pain exacerbated on ambulation. He denies any new onset of weakness or numbness. Urinating with no complications, denies dysuria. Currently uses duonebs. Patient also has an extensive history and course of cancer. In ED, patient was afebrile, slightly tachycardic to 105 with /82. CBC with H/H 12.5/37.8 .6. CMP with Ca of 15.5. Mg 0.80. PTH low at 6. EKG with sinus tachycardia with J waves present. Patient started on IVF,calcitonin, and zoledronic acid.     Today: No acute events overnight. Patient states having back pain 6 out of 10 (HX of fall in the past); it is intermittent, dull, achy, and non-radiating; pain is aggravated by movements and partially relieved by pain meds. Labs this AM: H/H stable, PLT " "downtrending to 73k, Na+ 135, metabolic acidosis (expected gap of 6.75), corrected calcium of 12.7. I/O: 3.2 L urine output past 24 hours, net positive 197 mL.    ROS:   (+) Back pain  (-) Chest pain, SOB, palpitations, fever, night sweat, chills, N/V, diarrhea, constipation, dizziness     OBJECTIVE:     Vital signs:   /70   Pulse 101   Temp 98.7 °F (37.1 °C) (Oral)   Resp 19   Ht 5' 9" (1.753 m)   Wt 55.2 kg (121 lb 9.6 oz)   SpO2 95%   BMI 17.96 kg/m²      Physical Examination:  General: Underweight w/o distress  HEENT: NC/AT; PERRL; nasal and oral mucosa moist and clear; tracheostomy site intact and w/o active signs of infection  Pulm: CTA bilaterally, normal work of breathing on room air  CV: S1, S2 w/o murmurs or gallops; no edema noted  GI: Soft with normal bowel sounds in all quadrants, no masses on palpation  MSK: Limited ROM in lower extremities and back d/t pain  Neuro: AAOx4; motor/sensory function intact  Psych: Cooperative; appropriate mood and affect    Laboratory:  Lab results within past 24 hours reviewed     Imaging:  None    Current meds:    apixaban  5 mg Oral BID    famotidine  20 mg Oral Daily    levothyroxine  50 mcg Oral Daily    megestroL  400 mg Oral Daily    mupirocin   Nasal BID    pantoprazole  20 mg Oral QHS    vitamin D  1,000 Units Oral Daily         ASSESSMENT & PLAN:     Hypercalcemia 2/2 extensive HX of cancer  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  -Completed calcitonin 4units/kg Q12H x 2 days, IV zoledronic acid 4 mg at 200cc/hr x1  -Will continue NS @ 100 mL/hr for another 24 hours while monitoring I/O     Electrolyte abnormalities  -Will continue to monitor magnesium and potassium levels; replete as needed    Persisting metabolic acidosis  -Expected gap of 6.75, calculated gap of 8 on 9/16/2023  -Will continue to monitor    Macrocytic anemia  -B12 and folate WNL  -Likely d/t HX of cancer    Hypothyroidism  -Continue home levothyroxine 50 mcg daily     HX " of DVT - RUE  -Diagnosed on 3/22/2023, likely d/t hypercoagulable state from cancer  -Continue Eliquis    COPD  -Continue current breathing therapy     DVT PPx: Eliquis  GI PPx: Protonix  Diet: Regular  ABX: None  Fluids: None  O2: Room air  PCP: Tony Bertrand MD    Disposition (09/16/2023): Continue inpatient monitoring and medical therapy. Patient can be discharged home when medically stable.    Carola Stein DO   [facility], PGY-II

## 2023-09-17 NOTE — PROGRESS NOTES
"[facility] Medicine Wards   Progress Note     Resident Team: [facility] Medicine List 3  Attending Physician: Ashley Erazo MD  Resident: Bessy Stein  Intern[facility]     [facility] Length of Stay: 4 days    Subjective:      Brief HPI:  Josafat Boudreaux is a 57 y.o. male who with a history of COPD, hypothyroidism, DVT to RUE, stage 4 larygenal SCC, bilateral lung mass - Squamous cell - metastatic SqCC lung cancer vs metastatic SqCC to the lungs from H&N, mediastinal and Hilar LAD, s/p laryngectomy with radical neck dissection (7/2022) who presented to [facility] ED on 9/13/2023 from hem/onc clinic for concern of elevated calcium levels. He is with wife today who states patient seemed to be "not as sharp" as he usually is, and that he is just "slowed down." Otherwise, he denies any chest pain, palpitations, headaches, blurry vision. He has no nausea, vomiting, diarrhea, bone pain, body aches. He does have abdominal pain, however, but attributes it to a fall that occurred about 5 weeks ago. States he was in bathtub and was going from seated to standing, but slipped and landed on side of tub onto stomach. Seen by orthopedics during this time, but was told that he has lumbar radiculopathy and pain was from exacerbation of the lumbar radiculopathy. But patient reports lingering abdominal pain exacerbated on ambulation. He denies any new onset of weakness or numbness. Urinating with no complications, denies dysuria. Currently uses duonebs. Patient also has an extensive history and course of cancer.      In ED, patient was afebrile, slightly tachycardic to 105 with /82. CBC with H/H 12.5/37.8 .6. CMP with Ca of 15.5. Mg 0.80. PTH low at 6. EKG with sinus tachycardia with J waves present. Patient started on IVF,calcitonin, and zoledronic acid.        Interval History:   Patient seen resting comfortably in his bed today.  No acute events overnight.  He has remained afebrile and vital signs has been stable.  He denies of " any acute complaints.  His CBC is essentially unremarkable.  His CMP noted for mild decrease in sodium to 133, and hyperchloremic metabolic acidosis noted with chloride of 108 and bicarb of 16.  His calcium has corrected down to 11.5.  Low magnesium at 1.20.     Review of Systems:  As noted above in the interval history       Objective:     Vital Signs (Most Recent):  Temp: 99 °F (37.2 °C) (09/17/23 1111)  Pulse: 109 (09/17/23 1111)  Resp: 16 (09/17/23 0834)  BP: 127/72 (09/17/23 1111)  SpO2: 99 % (09/17/23 1111) Vital Signs (24h Range):  Temp:  [98.2 °F (36.8 °C)-99 °F (37.2 °C)] 99 °F (37.2 °C)  Pulse:  [104-117] 109  Resp:  [16-19] 16  SpO2:  [95 %-99 %] 99 %  BP: (111-127)/(67-74) 127/72       Physical Examination:  Physical Exam  Vitals and nursing note reviewed.   Constitutional:       Appearance: Normal appearance.      Comments: Limited voice due to trach. Mouths word.    HENT:      Head: Normocephalic and atraumatic.      Mouth/Throat:      Mouth: Mucous membranes are moist.   Eyes:      General: No scleral icterus.     Pupils: Pupils are equal, round, and reactive to light.   Neck:      Comments: Trach in place - clean and no signs of drainage/sputum    Cardiovascular:      Rate and Rhythm: Normal rate and regular rhythm.      Heart sounds: No murmur heard.     No friction rub. No gallop.   Pulmonary:      Effort: Pulmonary effort is normal. No respiratory distress.      Breath sounds: Normal breath sounds. No wheezing.   Abdominal:      General: Abdomen is flat. There is no distension.      Palpations: Abdomen is soft.      Tenderness: There is no abdominal tenderness.   Musculoskeletal:         General: No swelling or tenderness. Normal range of motion.   Skin:     General: Skin is warm and dry.      Capillary Refill: Capillary refill takes less than 2 seconds.   Neurological:      General: No focal deficit present.      Mental Status: He is alert and oriented to person, place, and time.   Psychiatric:          Mood and Affect: Mood normal.         Behavior: Behavior normal.         Thought Content: Thought content normal.         Judgment: Judgment normal.         Laboratory:  Most Recent Data:  CBC:   Recent Labs   Lab 09/16/23 0406 09/17/23 0229   WBC 5.20 5.29   HGB 10.7* 10.5*   HCT 32.0* 31.2*   PLT 73* 71*       CMP:   Recent Labs   Lab 09/16/23 0406 09/17/23 0213   CALCIUM 11.7* 11.5*   ALBUMIN 2.7*  --    * 133*   K 3.6 3.5   CO2 19* 16*   BUN 7.0* 7.0*   CREATININE 0.72* 0.63*   ALKPHOS 90  --    ALT 12  --    AST 26  --    BILITOT 0.6  --        All pertinent lab results from the last 24 hours have been reviewed.  Recent Lab Results         09/17/23 0229 09/17/23 0213        Immature Platelet Fraction 6.0         Anion Gap   9.0       Baso # 0.03         Basophil % 0.6         BUN   7.0       BUN/CREAT RATIO   11       Calcium   11.5       Chloride   108       CO2   16       Creatinine   0.63       eGFR   >60       Eos # 0.07         Eosinophil % 1.3         Glucose   94       Hematocrit 31.2         Hemoglobin 10.5         Immature Grans (Abs) 0.01         Immature Granulocytes 0.2         Lymph # 0.49         LYMPH % 9.3         Magnesium    1.20       MCH 36.1         MCHC 33.7         .2         Mono # 0.64         Mono % 12.1         MPV 11.7         Neut # 4.05         Neut % 76.5         nRBC 0.0         Platelets 71         Potassium   3.5       RBC 2.91         RDW 20.2         Sodium   133       WBC 5.29                   Microbiology Data Reviewed: no  Pertinent Findings:  none    Other Results:  EKG (my interpretation): sinus tachycardia.    Radiology:  Imaging Results    None         Current Medications:     Infusions:   sodium bicarbonate 100 mEq in dextrose 5 % (D5W) 1,000 mL infusion          Scheduled:   apixaban  5 mg Oral BID    famotidine  20 mg Oral Daily    levothyroxine  50 mcg Oral Daily    magnesium sulfate IVPB  2 g Intravenous Once    megestroL  400 mg Oral  Daily    mupirocin   Nasal BID    pantoprazole  20 mg Oral QHS    vitamin D  1,000 Units Oral Daily        PRN:  albuterol-ipratropium, diphenhydrAMINE, hydrocodone-apap 7.5-325 MG/15 ML, HYDROmorphone, melatonin, ondansetron, sodium chloride 0.9%, sodium chloride 3%    Antibiotics and Day Number of Therapy:  none      Intake/Output Summary (Last 24 hours) at 9/17/2023 1309  Last data filed at 9/17/2023 0616  Gross per 24 hour   Intake 2428.92 ml   Output 1800 ml   Net 628.92 ml         Lines/Drains/Airways       Central Venous Catheter Line  Duration                  PowerPort A Cath Single Lumen left subclavian -- days              Airway  Duration                  Laryngectomy (Do Not Remove) Laryngectomy tube -- days              Peripheral Intravenous Line  Duration                  Midline Catheter Insertion/Assessment  - Single Lumen 09/14/23 0400 Left basilic vein (medial side of arm)  3 days                      Assessment & Plan:     Hypercalcemia  - most likely from his malignancy  - PTHrp still pending  - Completed calcitonin 4units/kg Q12H x 2 days, IV zoledronic acid 4 mg at 200cc/hr x1  - Will discontinue IV fluids today  - Closely monitor his electrolytes    Non-anion gap metabolic acidosis  - chloride elevated 108 and bicarb low at 16  - most likely from IV normal saline  - Will discontinue IV fluids today    Hypomagnesemia   - Mg 0.8 on admit  - was gradually corrected with repletion but now down trended to 1.20  - will replete as needed  - Closely monitor his electrolytes     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 Underwent another cycle of carbo/taxol completed on 8/23/23.   - follows closely with Heme/onc and ENT  - plans to restart ketruda maintenance therapy soon.      Macrocytic anemia  - H/H 12.5/37.8 with  on admit  - H/H at 10.5/31.2 and MCV of 107.2  - B12 and folate wnl  - most likely from his malignancy     Hypothyroidism  - TSH level wnl  - will continue home levothyroxine 50 mcg     Hx DVT RUE  - RUE DVT found in 03/22/2023.   - Past surgical history of ORIF of right humerus shaft fracture around 3/2022.   - was evaluated with Orthopedics in March of 2023 that showed will have implants with no loosening or substance.  DVT possibly from chemoradiation treatments.  - Continue Eliquis 5 mg BID    COPD  - on duonebs at home   - will continue PRN        CODE STATUS: Full Code  Access: R Midline  Antibiotics: none  Diet: Regular  DVT Prophylaxis: Eliquis  GI Prophylaxis: Protonix  Fluids: none.       Disposition: Continue inpatient monitoring and medical therapy. Patient can be discharged home when medically stable.    Dov Conley  Internal Medicine - PGY-1

## 2023-09-17 NOTE — PROGRESS NOTES
Discussed consult for HH with spouse, Skylar. Spouse employed with [employer] which does not accept pt's insurance. Submitted referral via [facility] to the 3 providers spouse was agreeable to: Maria Ines Carter & Zahra. CM to f/u on Monday.

## 2023-09-18 NOTE — DISCHARGE SUMMARY
[facility] INTERNAL MEDICINE  INPATIENT DISCHARGE SUMMARY    Admitting Physician: Ashley Erazo MD  Attending Physician: Ashley Erazo MD  Date of Admit: 9/13/2023  Date of Discharge: 9/18/2023    Discharge to: Home or Self Care   Condition: Stable    Discharge Diagnoses     Patient Active Problem List   Diagnosis    Closed basicervical fracture of femur    Shortness of breath    Dysphagia    New onset seizure    Laryngeal squamous cell carcinoma    Regional lymph node metastasis present    Squamous cell carcinoma of both lungs    Chronic obstructive pulmonary disease, unspecified    H/O head and neck radiation    Pneumonia    Lung cancer    Blastomycotic infection    On antineoplastic chemotherapy    Right arm cellulitis    Maintenance antineoplastic immunotherapy    Gastric fistula    Esophageal stenosis    Essential (primary) hypertension    Hypercalcemia    Moderate malnutrition    Tachycardia    Hypomagnesemia       Consultants and Procedures     Consultants:  Consults (From admission, onward)          Status Ordering Provider     Inpatient consult to Social Work/Case Management  Once        Provider:  (Not yet assigned)    Completed LAURENCE VALENTIN     Inpatient consult to Midline team  Once        Provider:  (Not yet assigned)    Acknowledged GIANLUCA DOTY consult to case management  Once        Provider:  (Not yet assigned)    Completed ASHLEY ERAZO     Inpatient consult to Internal Medicine  Once        Provider:  Harvey Austin DO    Acknowledged MIRIAM RODRIGUEZ             Procedures:   None    Brief History of Present Illness      Josafat Boudreaux is a 57 y.o. male who with a history of COPD, hypothyroidism, DVT to RUE, stage 4 larygenal SCC, bilateral lung mass - Squamous cell - metastatic SqCC lung cancer vs metastatic SqCC to the lungs from H&N, mediastinal and Hilar LAD, s/p laryngectomy with radical neck dissection (7/2022) who presented to [facility] ED on 9/13/2023 from hem/onc clinic for  "concern of elevated calcium levels. He is with wife today who states patient seemed to be "not as sharp" as he usually is, and that he is just "slowed down." Otherwise, he denies any chest pain, palpitations, headaches, blurry vision. He has no nausea, vomiting, diarrhea, bone pain, body aches. He does have abdominal pain, however, but attributes it to a fall that occurred about 5 weeks ago. States he was in bathtub and was going from seated to standing, but slipped and landed on side of tub onto stomach. Seen by orthopedics during this time, but was told that he has lumbar radiculopathy and pain was from exacerbation of the lumbar radiculopathy. But patient reports lingering abdominal pain exacerbated on ambulation. He denies any new onset of weakness or numbness. Urinating with no complications, denies dysuria. Currently uses duonebs. Patient also has an extensive history and course of cancer. In ED, patient was afebrile, slightly tachycardic to 105 with /82. CBC with H/H 12.5/37.8 .6. CMP with Ca of 15.5. Mg 0.80. PTH low at 6. EKG with sinus tachycardia with J waves present. Patient started on IVF,calcitonin, and zoledronic acid.     Hospital Course with Pertinent Findings     Admitted to inpatient unit for ongoing monitoring and medical therapy on 9/13/2023. Hospital course: he was given calcitonin 4units/kg Q12H x 2 days, IV zoledronic acid 4 mg at 200cc/hr x1, and normal saline IV fluid with gradual improvement in serum calcium levels. He was found to have hypokalemia and hypomagnesemia on 9/18/2023 but remains stable clinically. Potassium and magnesium repletion provided. He is stable to be discharged home with home health at this time.     Discharge physical exam:  Vitals:    09/18/23 1130   BP:    Pulse:    Resp: 18   Temp:      General: Underweight w/o distress  HEENT: NC/AT; PERRL; nasal and oral mucosa moist and clear; tracheostomy site intact and w/o active signs of infection  Pulm: CTA " bilaterally, normal work of breathing on room air  CV: S1, S2 w/o murmurs or gallops; no edema noted  GI: Soft with normal bowel sounds in all quadrants, no masses on palpation  MSK: Limited ROM in lower extremities and back d/t pain  Neuro: AAOx4; motor/sensory function intact  Psych: Cooperative; appropriate mood and affect    TIME SPENT ON DISCHARGE: 60 minutes    Discharge Medications        Medication List        CHANGE how you take these medications      hydrocodone-apap 7.5-325 MG/15 ML oral solution  Commonly known as: HYCET  Take 10 mLs by mouth every 6 (six) hours as needed for Pain.  What changed:   when to take this  Another medication with the same name was removed. Continue taking this medication, and follow the directions you see here.     sodium chloride 3% 3 % nebulizer solution  Take 4 mLs by nebulization as needed for Other or Cough (wheezing).  What changed: Another medication with the same name was removed. Continue taking this medication, and follow the directions you see here.            CONTINUE taking these medications      albuterol-ipratropium 2.5 mg-0.5 mg/3 mL nebulizer solution  Commonly known as: DUO-NEB  Take 3 mLs by nebulization every 4 (four) hours as needed for Shortness of Breath or Wheezing. Rescue     apixaban 5 mg Tab  Commonly known as: ELIQUIS  Take 1 tablet (5 mg total) by mouth As instructed.     diphenhydrAMINE 25 mg capsule  Commonly known as: BENADRYL  1 capsule (25 mg total) by Per G Tube route every 6 (six) hours as needed for Itching.     famotidine 20 MG tablet  Commonly known as: PEPCID  Take 1 tablet (20 mg total) by mouth every evening.     glutamine 10 gram Pwpk     levothyroxine 50 MCG tablet  Commonly known as: SYNTHROID  Take 1 tablet (50 mcg total) by mouth once daily.     megestroL 400 mg/10 mL (40 mg/mL) Susp  Commonly known as: MEGACE     metroNIDAZOLE 0.75 % (37.5mg/5 gram) vaginal gel  Commonly known as: METROGEL     ondansetron 8 MG Tbdl  Commonly  known as: ZOFRAN-ODT     pantoprazole 20 MG tablet  Commonly known as: PROTONIX  Take 1 tablet (20 mg total) by mouth once daily.               Where to Get Your Medications        You can get these medications from any pharmacy    Bring a paper prescription for each of these medications  hydrocodone-apap 7.5-325 MG/15 ML oral solution         Discharge Information:     -Discharged home with home health   -F/U with PCP and oncologist after discharge  -ED precautions provided    Carola Stein DO  [employer] Internal Medicine PGY-II

## 2023-09-18 NOTE — PT/OT/SLP DISCHARGE
POST DISCHARGE DOCUMENTATION - 2023 2:31 PM    Physical Therapy Discharge Note    Documenting Physical Therapist did not evaluate OR treat the patient.    Evaluating/treating Physical Therapist is not available to complete discharge summary.    Refer to prior Physical Therapy note dated 2023 for last known functional status of patient.      Name: Josafat Boudreaux  MRN: [id number]   Principal Problem: Hypercalcemia       Recommendations: per last treatment session     Discharge Recommendations:  home with home health   Discharge Equipment Recommendations: none     Assessment:     Patient was unexpectedly discharged from hospital.  Refer to therapy's initial evaluation or last treatment note for patient's most recent functional status and goal achievement and therapists' recommendations. Patient was seen by therapy for evaluation only prior to hospital discharge.    Objective:     GOALS:  Multidisciplinary Problems       Physical Therapy Goals       Not on file              Multidisciplinary Problems (Resolved)          Problem: Physical Therapy    Goal Priority Disciplines Outcome Goal Variances Interventions   Physical Therapy Goal   (Resolved)     PT, PT/OT Met     Description: Goals to be met by: 23     Patient will increase functional independence with mobility by performin. Supine to sit with Modified [place]  2. Sit to supine with Modified [place]  3. Sit to stand transfer with Modified [place]  4. Gait  x 130 feet with Supervision using Rolling Walker.                          Plan:     Patient Discharged to: home-[facility] per chart.      2023

## 2023-09-18 NOTE — PLAN OF CARE
Spoke with pt's spouse, Kymberly ([phone]), informed her that per her choice, [facility] accepted the pt, she would like STAT  instead. Referral submitted via [facility], acceptance pending. Will follow.

## 2023-09-18 NOTE — PT/OT/SLP EVAL
Occupational Therapy   Evaluation and Discharge Note    Name: Josafat Boudreaux  MRN: [id number]  Admitting Diagnosis: Hypercalcemia  Recent Surgery: * No surgery found *      Recommendations:     Discharge Recommendations: home with home health  Discharge Equipment Recommendations: none  Barriers to discharge:  None    Assessment:     Josafat Boudreaux is a 57 y.o. male with a medical diagnosis of Hypercalcemia. At this time, patient is functioning near their prior level of function and does not require further acute OT services.     Plan:     During this hospitalization, patient does not require further acute OT services.  Please re-consult if situation changes.    Plan of Care Reviewed with: patient, spouse    Subjective     Chief Complaint: low back pain radiating into RLE, BLE weakness  Patient/Family Comments/goals: DC home with home health, likely transition to outpatient therapy when medical needs for home health services have been resolved    Occupational Profile:  Living Environment: pt lives in a trailer with his wife, Kymberly, tub/shower combo with shower chair, 4 steps to enter with L handrail  Previous level of function: I with ADLs and mobility in general, and if needed pt uses RW and wife assists with showers for safety  Roles and Routines: pt drives, cooks, cleans, shops.  Spouse works from home and is available to assist pt 24/7.  Equipment Used at home: walker, rolling, bedside commode, cane, quad, shower chair  Assistance upon Discharge: pt's spouse    Pain/Comfort:  Pain Rating 1: 7/10  Location - Side 1: Bilateral  Location - Orientation 1: lower  Location 1: back (and radiating into RLE)  Pain Addressed 1: Nurse notified  Pain Rating Post-Intervention 1: 7/10    Patients cultural, spiritual, Anglican conflicts given the current situation: no    Objective:     Communicated with: nurse Maxwell prior to session.  Patient found HOB elevated with peripheral IV, Tracheostomy upon OT entry  to room.    General Precautions: Standard, fall  Orthopedic Precautions: N/A  Braces: N/A  Respiratory Status: Room air     Occupational Performance:    Bed Mobility:    Patient completed Supine to Sit with modified independence  Patient completed Sit to Supine with independence    Functional Mobility/Transfers:  Patient completed Sit <> Stand Transfer with stand by assistance  with  no assistive device   Functional Mobility: SBA to sidestep to HOB holding on to bedrail    Activities of Daily Living:  Feeding:  independence .  Grooming: stand by assistance for setup EOB  Upper Body Dressing: modified independence .  Lower Body Dressing: minimum assistance seated EOB d/t BLE weakness/pain/neurpathy exacerbation  Toileting: modified independence using urinal    Cognitive/Visual Perceptual:  Cognitive/Psychosocial Skills:     -       Oriented to: Person, Place, Time, and Situation   -       Follows Commands/attention:Follows multistep  commands  -       Safety awareness/insight to disability: intact   -       Mood/Affect/Coping skills/emotional control: Appropriate to situation, Cooperative, and Pleasant    Physical Exam:  BUE ArOM WFL, strength 4+/5, with c/o pain across lower abdomen and low back during resisted manual muscle testing of BUE while seated EOB    Pt reported numbness at B hands, from wrists distally to fingertips, chronic neuropathy per pt report    Treatment & Education:  Pt. educated on DC planning options (outpatient vs home health), orientation to environment, use of call bell for assist with transfers OOB or for any other needs due to fall risk. Pt verbalized understanding and pt and wife stated preference for home with home health at this time.      Patient left HOB elevated with all lines intact, call button in reach, nurse notified, and pt's wife present    GOALS:   Multidisciplinary Problems       Occupational Therapy Goals       Not on file                    History:     Past Medical History:    Diagnosis Date    COPD (chronic obstructive pulmonary disease)     Dysphagia     Essential (primary) hypertension 08/05/2023    GERD (gastroesophageal reflux disease)     laryngeal Cancer     Lung cancer     Thyroid disease     Vocal cord mass          Past Surgical History:   Procedure Laterality Date    DIAGNOSTIC LAPAROSCOPY N/A 5/18/2023    Procedure: LAPAROSCOPY, DIAGNOSTIC;  Surgeon: Dipak De Leon Jr., MD;  Location: [facility];  Service: General;  Laterality: N/A;    DIRECT DIAGNOSTIC LARYNGOSCOPY WITH BRONCHOSCOPY AND ESOPHAGOSCOPY N/A 07/11/2022    Procedure: LARYNGOSCOPY, DIRECT, DIAGNOSTIC, WITH BRONCHOSCOPY AND ESOPHAGOSCOPY;  Surgeon: Emory Alonso MD;  Location: [facility];  Service: ENT;  Laterality: N/A;    ESOPHAGOGASTRODUODENOSCOPY (EGD)- DEVICE ASSISTED N/A 04/18/2023    Procedure: EGD;  Surgeon: Aaliyah Conrad III, MD;  Location: [facility] ENDOSCOPY;  Service: Gastroenterology;  Laterality: N/A;  Esophageal Dilation; Savory over Guide wire: 36Fr and 39 Fr  Clips to secure peg site    HIP SURGERY      HUMERUS FRACTURE SURGERY      INCISION AND DRAINAGE, NECK Bilateral 10/21/2022    Procedure: INCISION AND DRAINAGE,NECK;  Surgeon: Madison Worley MD;  Location: [facility];  Service: ENT;  Laterality: Bilateral;    INSERT ARTERIAL LINE  06/26/2022         JOINT REPLACEMENT  2019    R hip    PEG TUBE REMOVAL      PLACEMENT, MEDIPORT      TRACHEOSTOMY N/A 06/10/2022    Procedure: CREATION, TRACHEOSTOMY;  Surgeon: Junior Alonso MD;  Location: [facility];  Service: ENT;  Laterality: N/A;       Time Tracking:     OT Date of Treatment: 09/18/23  OT Start Time: 1036  OT Stop Time: 1053  OT Total Time (min): 17 min    Billable Minutes:Evaluation 17    9/18/2023   Pt arrives to ED complaining of b/l arm pain. pt states he is unable to fully flex both arms. Pt states symptoms started on Monday.

## 2023-09-21 NOTE — MEDICAL/APP STUDENT
[facility] Medicine Wards   History & Physical Note     Resident Team: [facility] Medicine List 3  Attending Physician: Ashley Erazo MD  Resident: Bessy Stein  Intern: Dov Conley     Date of Admit: 9/21/2023    Chief Complaint:     Critical Lab Call (Sent from clinic for critical lab.  Critical Calcium.)       Subjective:      History of Present Illness:  Josafat Boudreaux is a 57 y.o. male who with a history of COPD, hypothyroidism, DVT to RUE, stage 4 larygenal SCC, bilateral lung mass - Squamous cell - metastatic SqCC lung cancer vs metastatic SqCC to the lungs from H&N, mediastinal and Hilar LAD, s/p laryngectomy with radical neck dissection (7/2022) who presented to [facility] ED on 9/13/2023 due to hypercalcemia seen in labs drawn from a clinic follow up today. Patient is with wife today, states he was scheduled today to have IVF given and to have labs drawn. Was recently discharged Monday for similar presentation. States patient was fine that Monday, but started to become more lethargic and weak starting a few days ago. Has been sleeping more often and has been more difficult to arouse. Also reports decreased appetite with poor PO intake of solids and liquids. Has not had a bowel movement since late Monday night. Denies any chest pain, shortness of breath, abdominal pain. Otherwise, no new complaints. Also states he has not started chemotherapy since last admission; Scheduled to restart 10/05/23.    In ED, patient afebrile. Tachycardic to 118 with normotensive blood pressures. Labs remarkable for hypercalcemia of 14. Recent lab resulting elevated PTHrP. Stable macrocytic anemia. Patient was given NS bolus and started continuous NS IVF at rate 150cc/hr. Also given calcitonin 230 units weight dosed 4 unit/kg and zoledronic acid 4 mg. Admitted to medicine for management of hypercalcemia.     Cancer Hx and course:  Patient was evaluated in clinic in 6/2022 for voice hoarseness and was found to have a right laryngeal mass.  Had an emergent tracheostomy performed during that month due to acute hypoxic respiratory failure 2/2 to obstruction and had bronchoscopy performed with biopsy initially negative. 07/2022, had laryngectomy with radical neck dissection and had repeat bronchoscopy; biopsies from bilateral lower lungs positive for squamous cell carcinoma during this time. Had a peg tube placed during one of his hospitalization however PEG was removed on 3/23/23. Underwent weekly carbo/taxol and radiation on 9/21/22 for laryngeal carcinoma. Had a PET CT in 10/2022 showing hilar/mediastinal lymphadenopathy. Completed 4 cycles of carbo/taxol/keytruda on 3/2/23; then started keytruda every 6 weeks for maintenance therapy on 3/23/23. PET CT repeated 6/9/23 showing resolution of pulmonary masses, but progression of mediastinal/hilar lymphadenopathy. Started radiation again on 7/6/23 completed on 8/25/23 with 5 cycles of carbo/taxol on 8/22/23. Planned to restart Keytruda maintenance therapy 9/14, but did not start due to recent admission on 09/13/23.      Past Medical History:   has a past medical history of COPD (chronic obstructive pulmonary disease), Dysphagia, Essential (primary) hypertension (08/05/2023), GERD (gastroesophageal reflux disease), laryngeal Cancer, Lung cancer, Thyroid disease, and Vocal cord mass.     Past Surgical History:   has a past surgical history that includes Humerus fracture surgery; Hip surgery; Tracheostomy (N/A, 06/10/2022); Insert arterial line (06/26/2022); Direct diagnostic laryngoscopy with bronchoscopy and esophagoscopy (N/A, 07/11/2022); incision and drainage, neck (Bilateral, 10/21/2022); Joint replacement (2019); esophagogastroduodenoscopy (egd)- device assisted (N/A, 04/18/2023); PEG tube removal; placement, mediport; and Diagnostic laparoscopy (N/A, 5/18/2023).     Family History:  family history includes Cancer in his brother and father; Lung cancer in his father; Throat cancer in his brother.      Social History:   reports that he quit smoking about 15 months ago. His smoking use included cigarettes. He started smoking about 43 years ago. He has a 63.7 pack-year smoking history. He has never used smokeless tobacco. He reports current alcohol use of about 1.0 standard drink of alcohol per week. He reports that he does not use drugs.     Allergies:  has No Known Allergies.     Home Medications:  Prior to Admission medications    Medication Sig Start Date End Date Taking? Authorizing Provider   albuterol-ipratropium (DUO-NEB) 2.5 mg-0.5 mg/3 mL nebulizer solution Take 3 mLs by nebulization every 4 (four) hours as needed for Shortness of Breath or Wheezing. Rescue 2/7/23 2/7/24  Michael Monson MD   apixaban (ELIQUIS) 5 mg Tab Take 1 tablet (5 mg total) by mouth As instructed. 3/22/23   Yara Atkinson MD   diphenhydrAMINE (BENADRYL) 25 mg capsule 1 capsule (25 mg total) by Per G Tube route every 6 (six) hours as needed for Itching. 6/23/22   Ra Quintanilla MD   famotidine (PEPCID) 20 MG tablet Take 1 tablet (20 mg total) by mouth every evening.  Patient taking differently: Take 20 mg by mouth every evening. 8/10/23 8/9/24  Tony Bertrand MD   glutamine 10 gram PwPk Take 10 g by mouth 2 (two) times a day.    Provider, Historical   hydrocodone-acetaminophen (HYCET) solution 7.5-325 mg/15mL Take 10 mLs by mouth every 6 (six) hours as needed for Pain. 9/18/23 10/18/23  Carola Stein DO   levothyroxine (SYNTHROID) 50 MCG tablet Take 1 tablet (50 mcg total) by mouth once daily. 5/15/23 5/14/24  Yara Atkinson MD   megestroL (MEGACE) 400 mg/10 mL (40 mg/mL) Susp  8/1/23   Provider, Historical   metroNIDAZOLE (METROGEL) 0.75 % (37.5mg/5 gram) vaginal gel apply to affected area BID 1/16/23   Provider, Historical   ondansetron (ZOFRAN-ODT) 8 MG TbDL Take 8 mg by mouth every 8 (eight) hours as needed for Nausea. Tab 1 ODT in AM for 2 days after chemotherapy    Provider, Historical   pantoprazole  (PROTONIX) 20 MG tablet Take 1 tablet (20 mg total) by mouth once daily. 8/10/23 8/9/24  Tony Bertrand MD   sodium chloride 3% 3 % nebulizer solution Take 4 mLs by nebulization as needed for Other or Cough (wheezing). 8/8/23   Tawanna Preston MD   aspirin (ECOTRIN) 81 MG EC tablet Take 81 mg by mouth once daily.  7/7/22  Provider, Historical       Review of Systems:  Review of Systems   Constitutional:  Positive for malaise/fatigue. Negative for fever and weight loss.   HENT:  Negative for congestion and sore throat.    Eyes:  Negative for double vision and pain.   Respiratory:  Negative for cough and shortness of breath.    Cardiovascular:  Negative for chest pain, palpitations and leg swelling.   Gastrointestinal:  Positive for constipation. Negative for abdominal pain, diarrhea and vomiting.   Genitourinary:  Negative for dysuria and hematuria.   Musculoskeletal:  Negative for joint pain and myalgias.   Neurological:  Positive for weakness. Negative for dizziness and headaches.   Psychiatric/Behavioral:  Positive for depression.         Objective:     Vital Signs (Most Recent):  Temp: 97.9 °F (36.6 °C) (09/21/23 1558)  Pulse: (!) 116 (09/21/23 1724)  Resp: 16 (09/21/23 1724)  BP: 115/70 (09/21/23 1558)  SpO2: 96 % (09/21/23 1724) Vital Signs (24h Range):  Temp:  [97.9 °F (36.6 °C)-98.3 °F (36.8 °C)] 97.9 °F (36.6 °C)  Pulse:  [116-120] 116  Resp:  [16-18] 16  SpO2:  [96 %-98 %] 96 %  BP: (115-117)/(70-76) 115/70     Physical Examination:  Physical Exam  Vitals and nursing note reviewed.   Constitutional:       General: He is not in acute distress.     Appearance: Normal appearance. He is ill-appearing.      Comments: Tearful during exam.   HENT:      Head: Normocephalic and atraumatic.      Right Ear: External ear normal.      Left Ear: External ear normal.      Nose: Nose normal.      Mouth/Throat:      Mouth: Mucous membranes are moist.      Pharynx: Oropharynx is clear.   Eyes:      Extraocular  Movements: Extraocular movements intact.      Conjunctiva/sclera: Conjunctivae normal.      Pupils: Pupils are equal, round, and reactive to light.   Cardiovascular:      Rate and Rhythm: Tachycardia present.      Pulses: Normal pulses.      Heart sounds: Normal heart sounds. No murmur heard.     No friction rub. No gallop.   Pulmonary:      Effort: Pulmonary effort is normal. No respiratory distress.      Comments: Course breath sounds heard bilaterally.   Abdominal:      General: Abdomen is flat. Bowel sounds are normal.      Palpations: Abdomen is soft. There is no mass.      Tenderness: There is no abdominal tenderness.   Musculoskeletal:         General: No swelling or tenderness. Normal range of motion.      Cervical back: Normal range of motion and neck supple.   Skin:     General: Skin is warm and dry.      Capillary Refill: Capillary refill takes less than 2 seconds.   Neurological:      General: No focal deficit present.      Mental Status: He is alert and oriented to person, place, and time.          Laboratory:  Most Recent Data:  Recent Lab Results         09/21/23  1737   09/21/23  1700   09/21/23  1357        Immature Platelet Fraction   8.9         Albumin/Globulin Ratio     0.8       Albumin     2.9       Alkaline Phosphatase     132       ALT     19       Anion Gap   9.0         Appearance, UA Clear           AST     40       Bacteria, UA None Seen           Baso #   0.02   0.01       Basophil %   0.3   0.1       BILIRUBIN TOTAL     0.7       Bilirubin, UA Negative           BUN   14.0   14.6       BUN/CREAT RATIO   19         Calcium   14.1  Comment: Critical Result called and verified by verbal readback to: DARIUSZ ALVAREZ RN on 09/21/2023 at 17:41. Reported by [id number].   14.0  Comment: Critical Result called and verified by verbal readback to: Floresita Sanchez on 09/21/2023 at 15:22. Reported by [id number].       Chloride   99   97       CO2   22   21       Color, UA Light-Yellow           Creatinine    0.74   0.73       eGFR   >60   >60       Eos #   0.02   0.02       Eosinophil %   0.3   0.3       Globulin, Total     3.7       Glucose   94   100       Glucose, UA Normal           Hematocrit   30.2   33.8       Hemoglobin   10.5   11.5       Hyaline Casts, UA None Seen           Immature Grans (Abs)   0.04   0.01       Immature Granulocytes   0.7   0.1       Ketones, UA Negative           Leukocytes, UA Negative           Lymph #   0.46   0.62       LYMPH %   7.6   8.8       Magnesium    1.10         MCH   36.0   35.8       MCHC   34.8   34.0       MCV   103.4   105.3       Mono #   0.71   0.85       Mono %   11.7   12.0       MPV   11.9   11.9       Mucous, UA Trace           Neut #   4.83   5.57       Neut %   79.4   78.7       NITRITE UA Negative           nRBC   0.0         Occult Blood UA Negative           pH, UA 6.0           Platelets   59   76       Potassium   4.1   4.2       PROTEIN TOTAL     6.6       Protein, UA Negative           RBC   2.92   3.21       RBC, UA 0-5           RDW   17.4   17.6       Sodium   130   127       Specific Gravity,UA 1.010           Squamous Epithelial Cells, UA None Seen           Urobilinogen, UA Normal           WBC, UA 0-5           WBC   6.08   7.08                 Microbiology Data:  None    Other Results:  EKG (my interpretation): Sinus tachycardia with no acute ST segment or T wave abnormalities. Paulson waves present. Unchanged from prior EKG    Radiology:  Imaging Results              X-Ray Chest AP Portable (Final result)  Result time 09/21/23 17:28:04      Final result by Jackie Loaiza MD (09/21/23 17:28:04)                   Impression:      No acute process      Electronically signed by: Jackie Loaiza  Date:    09/21/2023  Time:    17:28               Narrative:    EXAMINATION:  XR CHEST AP PORTABLE    CLINICAL HISTORY:  abnormal lung sounds;    TECHNIQUE:  Single frontal view of the chest was  performed.    COMPARISON:  08/05/2023    FINDINGS:  There are chronic appearing lung changes seen bilaterally. No evidence of acute infiltrate is seen. No mass is seen. No lesion is seen. No pleural effusion is seen. The heart appears normal. The aorta appears normal. The bones and joints show no acute abnormality.  There are postsurgical changes seen in the right humerus.  There is a port in the left anterior chest wall.                                       Lines/Drains/Airways       Central Venous Catheter Line  Duration                  PowerPort A Cath Single Lumen left subclavian -- days              Airway  Duration                  Laryngectomy (Do Not Remove) Laryngectomy tube -- days              Peripheral Intravenous Line  Duration                  Peripheral IV - Single Lumen 09/21/23 1715 Right Antecubital <1 day                     Assessment & Plan:     Hypercalcemia 2/2 Malignancy  - Ca 14 on admit. Recent PTHrP elevated at 27 from last admission  - EKG with bertrand waves consistent with hypercalcemia  - patient with increased fatigue, lethargy, and altered mentation  - given 1L NS in ED. Started on NS  cc/hr.   - given calcitonin  units (4 unit/kg)  - given zoledronic acid 4 mg  - trend CMP daily. Place on telemetry  - continue to monitor    Electrolyte abnormalities   Hypomagnesemia   Hyponatremia   - Na 127, Mg 1.1 on admit  - will replete as needed  - Closely monitor his electrolytes     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 Underwent another cycle of carbo/taxol completed on 8/23/23.   - follows with heme/onc outpatient. Planned to start chemotherapy again for 10/05/23.     Macrocytic anemia  - H/H 11.5/33.8  MCV of 105.3  - B12 and folate wnl  - most likely  from his malignancy  - stable from last admission.     Hypothyroidism  - TSH level wnl  - will continue home levothyroxine 50 mcg     Hx DVT RUE  - RUE DVT found in 03/22/2023.   - Past surgical history of ORIF of right humerus shaft fracture around 3/2022.   - was evaluated with Orthopedics in March of 2023 that showed will have implants with no loosening or substance.  DVT possibly from chemoradiation treatments.  - Continue Eliquis 5 mg BID     COPD  - on duonebs at home   - will continue PRN      CODE STATUS: Full Code  Access: Peripheral and Port a Cath  Antibiotics: None  Diet:  soft and bite sized  DVT Prophylaxis: apixaban  GI Prophylaxis: proton pump inhibitor per orders  Fluids: normal saline 150 ml/hr.     Disposition: day 0 of admission for treatment and management of hypercalcemia 2/2 to malignancy. Pending DC once hypercalcemia resolves.     Clifton Sarabia, MS4  [facility]

## 2023-09-21 NOTE — ED PROVIDER NOTES
ED PROVIDER NOTE  9/21/2023    CHIEF COMPLAINT:   Chief Complaint   Patient presents with    Critical Lab Call     Sent from clinic for critical lab.  Critical Calcium.       HISTORY OF PRESENT ILLNESS:   Josafat Boudreaux is a 57 y.o. male who presents with chief complaint High calcium level. Onset was today when they were notified that his calcium level was very high and he needed to come to the ED for evaluation. Significant other reports that he has been having increasing lethargy and confusion since yesterday and that he has not been eating or drinking much and just sleeps all the time. He reports having some low back pain since his recent fall but denies any new pain or discomfort. Denies chest pain, shortness of breath, abdominal cramping, muscle cramping, nausea, vomiting, constipation, diarrhea, dysuria. History is somewhat limited due to patient not being able to speak through his tracheostomy.    The history is provided by the patient and the spouse. The history is limited by the condition of the patient.         REVIEW OF SYSTEMS: as noted in the HPI.  NURSING NOTES REVIEWED      PAST MEDICAL/SURGICAL HISTORY:   Past Medical History:   Diagnosis Date    COPD (chronic obstructive pulmonary disease)     Dysphagia     Essential (primary) hypertension 08/05/2023    GERD (gastroesophageal reflux disease)     laryngeal Cancer     Lung cancer     Thyroid disease     Vocal cord mass       Past Surgical History:   Procedure Laterality Date    DIAGNOSTIC LAPAROSCOPY N/A 5/18/2023    Procedure: LAPAROSCOPY, DIAGNOSTIC;  Surgeon: Dipak De Leon Jr., MD;  Location: [facility] OR;  Service: General;  Laterality: N/A;    DIRECT DIAGNOSTIC LARYNGOSCOPY WITH BRONCHOSCOPY AND ESOPHAGOSCOPY N/A 07/11/2022    Procedure: LARYNGOSCOPY, DIRECT, DIAGNOSTIC, WITH BRONCHOSCOPY AND ESOPHAGOSCOPY;  Surgeon: Emory Alonso MD;  Location: [facility] OR;  Service: ENT;  Laterality: N/A;    ESOPHAGOGASTRODUODENOSCOPY (EGD)- DEVICE  ASSISTED N/A 2023    Procedure: EGD;  Surgeon: Aaliyah Conrad III, MD;  Location: [facility] ENDOSCOPY;  Service: Gastroenterology;  Laterality: N/A;  Esophageal Dilation; Savory over Guide wire: 36Fr and 39 Fr  Clips to secure peg site    HIP SURGERY      HUMERUS FRACTURE SURGERY      INCISION AND DRAINAGE, NECK Bilateral 10/21/2022    Procedure: INCISION AND DRAINAGE,NECK;  Surgeon: Madison Worley MD;  Location: [facility] OR;  Service: ENT;  Laterality: Bilateral;    INSERT ARTERIAL LINE  2022         JOINT REPLACEMENT  2019    R hip    PEG TUBE REMOVAL      PLACEMENT, MEDIPORT      TRACHEOSTOMY N/A 06/10/2022    Procedure: CREATION, TRACHEOSTOMY;  Surgeon: Junior Alonso MD;  Location: [facility] OR;  Service: ENT;  Laterality: N/A;       FAMILY HISTORY:   Family History   Problem Relation Age of Onset    Lung cancer Father     Cancer Father         Lung cancer    Throat cancer Brother     Cancer Brother         Throat       SOCIAL HISTORY:   Social History     Tobacco Use    Smoking status: Former     Current packs/day: 0.00     Average packs/day: 1.5 packs/day for 42.4 years (63.7 ttl pk-yrs)     Types: Cigarettes     Start date: 1980     Quit date: 2022     Years since quittin.2    Smokeless tobacco: Never   Substance Use Topics    Alcohol use: Yes     Alcohol/week: 1.0 standard drink of alcohol     Types: 1 Cans of beer per week     Comment: occasional    Drug use: Never       ALLERGIES: Review of patient's allergies indicates:  No Known Allergies    PHYSICAL EXAM:  Initial Vitals [23 1558]   BP Pulse Resp Temp SpO2   115/70 (!) 118 18 97.9 °F (36.6 °C) 98 %      MAP       --         Physical Exam    Nursing note and vitals reviewed.  Constitutional: He has a sickly appearance. No distress.   Frail appearance   HENT:   Head: Normocephalic and atraumatic.   Nose: Nose normal.   Mouth/Throat: Mucous membranes are dry.   Eyes: Conjunctivae and EOM are normal. Pupils are equal, round,  and reactive to light.   Neck: Neck supple. No tracheal deviation present.   Tracheostomy present   Cardiovascular:  Regular rhythm, normal heart sounds, intact distal pulses and normal pulses.   Tachycardia present.         Pulmonary/Chest: Effort normal. No respiratory distress. He has rhonchi in the right lower field.   Abdominal: Abdomen is soft. There is no abdominal tenderness. There is no rebound and no guarding.   Musculoskeletal:         General: Normal range of motion.      Cervical back: Neck supple.     Neurological: He is alert.   Skin: Skin is warm, dry and intact.   Psychiatric: He has a normal mood and affect. His behavior is normal.         RESULTS:  Labs Reviewed   BASIC METABOLIC PANEL - Abnormal; Notable for the following components:       Result Value    Sodium Level 130 (*)     Calcium Level Total 14.1 (*)     All other components within normal limits   MAGNESIUM - Abnormal; Notable for the following components:    Magnesium Level 1.10 (*)     All other components within normal limits   URINALYSIS, REFLEX TO URINE CULTURE - Abnormal; Notable for the following components:    Mucous, UA Trace (*)     All other components within normal limits   CBC WITH DIFFERENTIAL - Abnormal; Notable for the following components:    RBC 2.92 (*)     Hgb 10.5 (*)     Hct 30.2 (*)     .4 (*)     MCH 36.0 (*)     RDW 17.4 (*)     Platelet 59 (*)     MPV 11.9 (*)     Lymph # 0.46 (*)     All other components within normal limits   PHOSPHORUS - Abnormal; Notable for the following components:    Phosphorus Level 1.6 (*)     All other components within normal limits   CBC W/ AUTO DIFFERENTIAL    Narrative:     The following orders were created for panel order CBC auto differential.  Procedure                               Abnormality         Status                     ---------                               -----------         ------                     CBC with Differential[[id number]]       Abnormal             Final result                 Please view results for these tests on the individual orders.   EXTRA TUBES    Narrative:     The following orders were created for panel order EXTRA TUBES.  Procedure                               Abnormality         Status                     ---------                               -----------         ------                     Light Blue Top Hold[[id number]]                             In process                 Red Top Hold[[id number]]                                    In process                 Red Top Hold[[id number]]                                    In process                 Light Green Top Hold[[id number]]                            In process                 Pink Top Hold[[id number]]                                   In process                   Please view results for these tests on the individual orders.   LIGHT BLUE TOP HOLD   RED TOP HOLD   RED TOP HOLD   LIGHT GREEN TOP HOLD   PINK TOP HOLD     Imaging Results              X-Ray Chest AP Portable (Final result)  Result time 09/21/23 17:28:04      Final result by Jackie Loaiza MD (09/21/23 17:28:04)                   Impression:      No acute process      Electronically signed by: Jackie Loaiza  Date:    09/21/2023  Time:    17:28               Narrative:    EXAMINATION:  XR CHEST AP PORTABLE    CLINICAL HISTORY:  abnormal lung sounds;    TECHNIQUE:  Single frontal view of the chest was performed.    COMPARISON:  08/05/2023    FINDINGS:  There are chronic appearing lung changes seen bilaterally. No evidence of acute infiltrate is seen. No mass is seen. No lesion is seen. No pleural effusion is seen. The heart appears normal. The aorta appears normal. The bones and joints show no acute abnormality.  There are postsurgical changes seen in the right humerus.  There is a port in the left anterior chest wall.                                      PROCEDURES:  Critical Care    Date/Time: 9/21/2023 11:42  PM    Performed by: Bobby Romero DO  Authorized by: Ashley Erazo MD  Direct patient critical care time: 20 minutes  Additional history critical care time: 5 minutes  Ordering / reviewing critical care time: 2 minutes  Documentation critical care time: 2 minutes  Total critical care time (exclusive of procedural time) : 29 minutes  Critical care time was exclusive of separately billable procedures and treating other patients.  Critical care was necessary to treat or prevent imminent or life-threatening deterioration of the following conditions: metabolic crisis, cardiac failure, endocrine crisis and dehydration.  Critical care was time spent personally by me on the following activities: blood draw for specimens, development of treatment plan with patient or surrogate, evaluation of patient's response to treatment, examination of patient, obtaining history from patient or surrogate, ordering and performing treatments and interventions, ordering and review of laboratory studies, ordering and review of radiographic studies, re-evaluation of patient's condition, review of old charts and vascular access procedures.      ED US Guided Misc Procedure    Date/Time: 9/21/2023 5:30 PM    Performed by: Bobby Romero DO  Authorized by: Ashley Erazo MD    Procedure:  Ultrasound-guided peripheral venous cannulation  Indication:  Failed or difficult IV access   Right   Brachial  Procedure:  Dynamic ultrasound guidance used, Candidate vein examined with linear probe - confirmed collapsibility, lack of pulsatility, and proper anatomic location. and Using aseptic technique, IV catheter inserted with flash of blood noted, flow of venous blood confirmed.  Catheter gauge:  18   Flushes easily and without pain. Patient tolerated procedure well.  Complications:  None  Charge?:  No (educational)      ECG:  EKG Readings: (Independently Interpreted)   Initial Reading: No STEMI. Rhythm: Sinus Tachycardia. Heart Rate: 119. Ectopy:  No Ectopy. Conduction: Normal. Axis: Normal.       ED COURSE AND MEDICAL DECISION MAKING:  Medications   albuterol-ipratropium 2.5 mg-0.5 mg/3 mL nebulizer solution 3 mL (has no administration in time range)   apixaban tablet 5 mg (5 mg Oral Given 9/21/23 2009)   famotidine tablet 20 mg (20 mg Oral Given 9/21/23 2010)   levothyroxine tablet 50 mcg (has no administration in time range)   hydrocodone-apap 7.5-325 MG/15 ML oral solution 10 mL (10 mLs Oral Given 9/21/23 2137)   ondansetron injection 4 mg (has no administration in time range)   sodium chloride 0.9% flush 10 mL (has no administration in time range)   melatonin tablet 6 mg (has no administration in time range)   acetaminophen tablet 650 mg (has no administration in time range)   polyethylene glycol packet 17 g (has no administration in time range)   traMADoL tablet 50 mg (has no administration in time range)   0.9%  NaCl infusion ( Intravenous New Bag 9/21/23 2011)   calcitonin injection 230 Units (has no administration in time range)   sertraline tablet 25 mg (25 mg Oral Given 9/21/23 2058)   diphenhydrAMINE 12.5 mg/5 mL elixir 25 mg (25 mg Oral Given 9/21/23 2237)   sodium chloride 0.9% bolus 1,000 mL 1,000 mL (0 mLs Intravenous Stopped 9/21/23 1817)   calcitonin injection 230 Units (230 Units Subcutaneous Given 9/21/23 1748)   zoledronic acid (ZOMETA) 4 mg in sodium chloride 0.9% 100 mL IVPB (0 mg Intravenous Stopped 9/21/23 1759)   albuterol-ipratropium 2.5 mg-0.5 mg/3 mL nebulizer solution 3 mL (3 mLs Nebulization Given 9/21/23 1724)   magnesium sulfate 2g in water 50mL IVPB (premix) (0 g Intravenous Stopped 9/21/23 1933)     ED Course as of 09/21/23 2347   Thu Sep 21, 2023   1716 WBC: 6.08 [IB]   1716 Hemoglobin(!): 10.5 [IB]   1716 Platelets(!): 59 [IB]   1742 Calcium(!!): 14.1 [IB]   1742 BUN: 14.0 [IB]   1742 Creatinine: 0.74 [IB]   1742 Magnesium (!): 1.10 [IB]   1742 Sodium(!): 130 [IB]   1742 Potassium: 4.1 [IB]      ED Course User Index  [IB]  Bobby Romero, DO        Medical Decision Making  57-year-old male presents for evaluation of severe hypercalcemia found on outpatient labs.  Review of this lab work shows calcium level 14.9 corrected for albumin.  He clinically appears dry.  He was recently admitted for hypercalcemia and seems to have responded well to calcitonin along with zoledronic acid and IV fluids.  He was given IV normal saline along with 4 units/kg calcitonin SQ and 4 mg zoledronic acid IV.  He has some rhonchi in the right lower lobe however his chest x-ray was unremarkable, so was given a breathing treatment in case this was some mucus plugging.  CBC shows no leukocytosis leukopenia, hemoglobin 10.5 which is not significantly changed from previous labs, and platelets 59 slight drop from 76.  BNP shows hyponatremia of 130, normal BUN and creatinine, calcium 14.1.  Magnesium 1.10, so he was given 4 mg IV magnesium sulfate.  ECG shows sinus tachycardia come in his STEMI.  Review of medical records shows that his PTH related peptide was in fact elevated which explains his hypercalcemia which I think is also exacerbated by his dehydration.  We will admit for further medical evaluation and treatment.  I have spoken with the patient and/or caregivers. I have explained the patient's condition, diagnoses and treatment plan based on the information available to me at this time. I have answered the patient's and/or caregiver's questions and addressed any concerns. The patient and/or caregivers have as good an understanding of the patient's diagnosis, condition and treatment plan as can be expected at this point. The patient has been stabilized within the capability of the emergency department. The patient will be transported for further care and management or will be moved to an observation or inpatient service. I have communicated with the staff or medical practitioner taking over this patient's care.    Problems Addressed:  Hypercalcemia:  complicated acute illness or injury that poses a threat to life or bodily functions    Amount and/or Complexity of Data Reviewed  External Data Reviewed: labs, ECG and notes.  Labs: ordered. Decision-making details documented in ED Course.  Radiology: ordered.  ECG/medicine tests: ordered and independent interpretation performed.    Risk  Prescription drug management.  Drug therapy requiring intensive monitoring for toxicity.  Decision regarding hospitalization.    Critical Care  Total time providing critical care: 29 minutes        CLINICAL IMPRESSION:  1. Hypercalcemia        DISPOSITION:   ED Disposition Condition    Observation Stable                    Bobby Romero,   09/21/23 1211

## 2023-09-21 NOTE — NURSING
Pt here for IVFs. 800 ml given. Pt was called by clinic to stop and go to ED due high calcium level. Stopped fluids. Deaccessed mediport. Brought pt to wife who will bring him to ED

## 2023-09-21 NOTE — H&P
[facility] Medicine Wards   History & Physical Note     Resident Team: [facility] Medicine List 3  Attending Physician: Ashley Erazo MD  Resident: Bessy Stein  Intern: Dov Conley     Date of Admit: 9/21/2023    Chief Complaint:     Critical Lab Call (Sent from clinic for critical lab.  Critical Calcium.)       Subjective:      History of Present Illness:  Josafat Boudreaux is a 57 y.o. male who with a history of COPD, hypothyroidism, DVT to RUE, stage 4 larygenal SCC, bilateral lung mass - Squamous cell - metastatic SqCC lung cancer vs metastatic SqCC to the lungs from H&N, mediastinal and Hilar LAD, s/p laryngectomy with radical neck dissection (7/2022) who presented to [facility] ED on 9/21/2023 from hem/onc clinic for concern of elevated calcium levels.  His wife is present for the encounter.  Of note patient was just recently admitted on 09/13/2023 for similar problem of hypercalcemia and was discharged on 09/18/2023.  During that hospital stay patient's hypercalcemia gradually improved and he was also repleted for hypokalemia and hypomagnesemia.  He was noted to have elevated PTHrp of 27 after he was discharged.   His wife mentions that they were at the infusion clinic today strictly for intravenous fluids and a BMP check.  They noticed elevated/critical calcium level and were told to go to the ED. patient does not have any acute complaints; however his wife mentions that he does not complain much.  She has noticed him being more lethargic, harder to arouse, and sleeping too much since yesterday afternoon.  As per her, he is more altered from his baseline.  His last bowel movement was Monday night.  And she has also noticed dark urine than normal this morning.  She also mentions of decreased oral intake of food and fluids.     In the ED, patient was noted to be tachycardic, afebrile, and normotensive.  His CBC was noted for microcytic anemia.  His CMP was noted for mild hyponatremia of 130, critically elevated calcium of  14.1, and low magnesium of 1.10.  Patient was given 1 dose of 230 units of calcitonin and 4 mg of zoledronic acid in the ED. his magnesium was also repleted.  Internal medicine was consulted for management of hypercalcemia and admission.     Cancer Hx and course:  Patient was evaluated in clinic in 6/2022 for voice hoarseness and was found to have a right laryngeal mass. Had an emergent tracheostomy performed during that month due to acute hypoxic respiratory failure 2/2 to obstruction and had bronchoscopy performed with biopsy initially negative. 07/2022, had laryngectomy with radical neck dissection and had repeat bronchoscopy; biopsies from bilateral lower lungs positive for squamous cell carcinoma during this time. Had a peg tube placed during one of his hospitalization however PEG was removed on 3/23/23. Underwent weekly carbo/taxol and radiation on 9/21/22 for laryngeal carcinoma. Had a PET CT in 10/2022 showing hilar/mediastinal lymphadenopathy. Completed 4 cycles of carbo/taxol/keytruda on 3/2/23; then started keytruda every 6 weeks for maintenance therapy on 3/23/23. PET CT repeated 6/9/23 showing resolution of pulmonary masses, but progression of mediastinal/hilar lymphadenopathy. Started radiation again on 7/6/23 completed on 8/25/23 with 5 cycles of carbo/taxol on 8/22/23. Planned to now start Keytruda on 10/2/2023.          Past Medical History:   has a past medical history of COPD (chronic obstructive pulmonary disease), Dysphagia, Essential (primary) hypertension (08/05/2023), GERD (gastroesophageal reflux disease), laryngeal Cancer, Lung cancer, Thyroid disease, and Vocal cord mass.     Past Surgical History:   has a past surgical history that includes Humerus fracture surgery; Hip surgery; Tracheostomy (N/A, 06/10/2022); Insert arterial line (06/26/2022); Direct diagnostic laryngoscopy with bronchoscopy and esophagoscopy (N/A, 07/11/2022); incision and drainage, neck (Bilateral, 10/21/2022); Joint  replacement (2019); esophagogastroduodenoscopy (egd)- device assisted (N/A, 04/18/2023); PEG tube removal; placement, mediport; and Diagnostic laparoscopy (N/A, 5/18/2023).     Family History:  family history includes Cancer in his brother and father; Lung cancer in his father; Throat cancer in his brother.     Social History:   reports that he quit smoking about 15 months ago. His smoking use included cigarettes. He started smoking about 43 years ago. He has a 63.7 pack-year smoking history. He has never used smokeless tobacco. He reports current alcohol use of about 1.0 standard drink of alcohol per week. He reports that he does not use drugs.     Allergies:  has No Known Allergies.     Home Medications:  Prior to Admission medications    Medication Sig Start Date End Date Taking? Authorizing Provider   albuterol-ipratropium (DUO-NEB) 2.5 mg-0.5 mg/3 mL nebulizer solution Take 3 mLs by nebulization every 4 (four) hours as needed for Shortness of Breath or Wheezing. Rescue 2/7/23 2/7/24  Michael Monson MD   apixaban (ELIQUIS) 5 mg Tab Take 1 tablet (5 mg total) by mouth As instructed. 3/22/23   Yara Atkinson MD   diphenhydrAMINE (BENADRYL) 25 mg capsule 1 capsule (25 mg total) by Per G Tube route every 6 (six) hours as needed for Itching. 6/23/22   Ra Quintanilla MD   famotidine (PEPCID) 20 MG tablet Take 1 tablet (20 mg total) by mouth every evening.  Patient taking differently: Take 20 mg by mouth every evening. 8/10/23 8/9/24  Tony Bertrand MD   glutamine 10 gram PwPk Take 10 g by mouth 2 (two) times a day.    Provider, Historical   hydrocodone-acetaminophen (HYCET) solution 7.5-325 mg/15mL Take 10 mLs by mouth every 6 (six) hours as needed for Pain. 9/18/23 10/18/23  Carola Stein,    levothyroxine (SYNTHROID) 50 MCG tablet Take 1 tablet (50 mcg total) by mouth once daily. 5/15/23 5/14/24  Yara Atkinson MD   megestroL (MEGACE) 400 mg/10 mL (40 mg/mL) Susp  8/1/23   Provider, Historical    metroNIDAZOLE (METROGEL) 0.75 % (37.5mg/5 gram) vaginal gel apply to affected area BID 1/16/23   Provider, Historical   ondansetron (ZOFRAN-ODT) 8 MG TbDL Take 8 mg by mouth every 8 (eight) hours as needed for Nausea. Tab 1 ODT in AM for 2 days after chemotherapy    Provider, Historical   pantoprazole (PROTONIX) 20 MG tablet Take 1 tablet (20 mg total) by mouth once daily. 8/10/23 8/9/24  Tony Bertrand MD   sodium chloride 3% 3 % nebulizer solution Take 4 mLs by nebulization as needed for Other or Cough (wheezing). 8/8/23   Tawanna Preston MD   aspirin (ECOTRIN) 81 MG EC tablet Take 81 mg by mouth once daily.  7/7/22  Provider, Historical         Review of Systems:  Review of Systems   Constitutional:  Negative for chills and fever.   HENT:  Negative for congestion, sinus pain and sore throat.    Eyes:  Negative for blurred vision.   Respiratory:  Negative for cough, hemoptysis, sputum production, shortness of breath and wheezing.    Cardiovascular:  Negative for chest pain, palpitations, orthopnea and leg swelling.   Gastrointestinal:  Positive for constipation. Negative for abdominal pain, diarrhea, heartburn, nausea and vomiting.   Genitourinary:  Negative for dysuria, frequency, hematuria and urgency.   Musculoskeletal:  Negative for back pain, joint pain and neck pain.   Neurological:  Positive for weakness. Negative for dizziness, speech change, focal weakness, seizures and headaches.   Psychiatric/Behavioral:  Positive for depression. The patient is not nervous/anxious and does not have insomnia.             Objective:       Vital Signs (Most Recent):  Temp: 97.9 °F (36.6 °C) (09/21/23 1558)  Pulse: (!) 116 (09/21/23 1724)  Resp: 16 (09/21/23 1724)  BP: 115/70 (09/21/23 1558)  SpO2: 96 % (09/21/23 1724) Vital Signs (24h Range):  Temp:  [97.9 °F (36.6 °C)-98.3 °F (36.8 °C)] 97.9 °F (36.6 °C)  Pulse:  [116-120] 116  Resp:  [16-18] 16  SpO2:  [96 %-98 %] 96 %  BP: (115-117)/(70-76) 115/70        Physical Examination:  Physical Exam  Vitals and nursing note reviewed.   Constitutional:       General: He is in acute distress (mild distress with one episode of tearfulnes).      Comments: Limited voice due to trach. Mouths word.     HENT:      Head: Normocephalic and atraumatic.      Mouth/Throat:      Mouth: Mucous membranes are moist.   Eyes:      General: No scleral icterus.     Pupils: Pupils are equal, round, and reactive to light.   Neck:      Comments: Trach in place - clean and no signs of drainage/sputum  Cardiovascular:      Rate and Rhythm: Normal rate and regular rhythm.      Pulses: Normal pulses.      Heart sounds: Normal heart sounds.   Pulmonary:      Effort: Pulmonary effort is normal.      Comments: Coarse breath sounds bilaterally  Abdominal:      General: Bowel sounds are normal. There is no distension.      Palpations: Abdomen is soft.      Tenderness: There is no abdominal tenderness.   Musculoskeletal:         General: No swelling or tenderness. Normal range of motion.      Right lower leg: No edema.      Left lower leg: No edema.   Skin:     General: Skin is warm and dry.      Capillary Refill: Capillary refill takes less than 2 seconds.   Neurological:      General: No focal deficit present.      Mental Status: He is alert and oriented to person, place, and time.   Psychiatric:      Comments: Depressed mood; slightly frustrated           Laboratory:  Most Recent Data:  All pertinent lab results from the last 24 hours have been reviewed.  Recent Lab Results         09/21/23  1737   09/21/23  1700   09/21/23  1357        Immature Platelet Fraction   8.9         Albumin/Globulin Ratio     0.8       Albumin     2.9       Alkaline Phosphatase     132       ALT     19       Anion Gap   9.0         Appearance, UA Clear           AST     40       Bacteria, UA None Seen           Baso #   0.02   0.01       Basophil %   0.3   0.1       BILIRUBIN TOTAL     0.7       Bilirubin, UA Negative            BUN   14.0   14.6       BUN/CREAT RATIO   19         Calcium   14.1  Comment: Critical Result called and verified by verbal readback to: DARIUSZ ALVAREZ RN on 09/21/2023 at 17:41. Reported by [id number].   14.0  Comment: Critical Result called and verified by verbal readback to: Floresita Daniel on 09/21/2023 at 15:22. Reported by [id number].       Chloride   99   97       CO2   22   21       Color, UA Light-Yellow           Creatinine   0.74   0.73       eGFR   >60   >60       Eos #   0.02   0.02       Eosinophil %   0.3   0.3       Globulin, Total     3.7       Glucose   94   100       Glucose, UA Normal           Hematocrit   30.2   33.8       Hemoglobin   10.5   11.5       Hyaline Casts, UA None Seen           Immature Grans (Abs)   0.04   0.01       Immature Granulocytes   0.7   0.1       Ketones, UA Negative           Leukocytes, UA Negative           Lymph #   0.46   0.62       LYMPH %   7.6   8.8       Magnesium    1.10         MCH   36.0   35.8       MCHC   34.8   34.0       MCV   103.4   105.3       Mono #   0.71   0.85       Mono %   11.7   12.0       MPV   11.9   11.9       Mucous, UA Trace           Neut #   4.83   5.57       Neut %   79.4   78.7       NITRITE UA Negative           nRBC   0.0         Occult Blood UA Negative           pH, UA 6.0           Platelets   59   76       Potassium   4.1   4.2       PROTEIN TOTAL     6.6       Protein, UA Negative           RBC   2.92   3.21       RBC, UA 0-5           RDW   17.4   17.6       Sodium   130   127       Specific Gravity,UA 1.010           Squamous Epithelial Cells, UA None Seen           Urobilinogen, UA Normal           WBC, UA 0-5           WBC   6.08   7.08                 Microbiology Data:  None    Other Results:  EKG (my interpretation): normal EKG, normal sinus rhythm, unchanged from previous tracings    Radiology:  Imaging Results              X-Ray Chest AP Portable (Final result)  Result time 09/21/23 17:28:04      Final result by Josse  Jackie BARRIENTOS MD (09/21/23 17:28:04)                   Impression:      No acute process      Electronically signed by: Jackie Loaiza  Date:    09/21/2023  Time:    17:28               Narrative:    EXAMINATION:  XR CHEST AP PORTABLE    CLINICAL HISTORY:  abnormal lung sounds;    TECHNIQUE:  Single frontal view of the chest was performed.    COMPARISON:  08/05/2023    FINDINGS:  There are chronic appearing lung changes seen bilaterally. No evidence of acute infiltrate is seen. No mass is seen. No lesion is seen. No pleural effusion is seen. The heart appears normal. The aorta appears normal. The bones and joints show no acute abnormality.  There are postsurgical changes seen in the right humerus.  There is a port in the left anterior chest wall.                                         Lines/Drains/Airways       Central Venous Catheter Line  Duration                  PowerPort A Cath Single Lumen left subclavian -- days              Airway  Duration                  Laryngectomy (Do Not Remove) Laryngectomy tube -- days              Peripheral Intravenous Line  Duration                  Peripheral IV - Single Lumen 09/21/23 1715 Right Antecubital <1 day                     Assessment & Plan:     Recurrent Hypercalcemia 2/2 malignancy  Hypomagnesemia   - Ca 14 at infusion clinic and noted at 14.1 in the ED  - EKG with sinus tachycardia with J waves present   - Mg at 1.10 - will replete as necessary  - PTHrp at 27 collected on 9/13/23   - started on IV NS at 150cc/hr, IV calcitonin 4units/kg every 12 hours for 4 doses, IV zoledronic acid 4 mg at 200cc/hr.  - We will continue to monitor closely      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 Underwent another cycle of carbo/taxol completed on  8/23/23.   - follows closely with Heme/onc and ENT  - plans to restart ketruda maintenance therapy soon.    Depression/Anxiety  - patient tearful during encounter, wife also mentions change in behavior  - PHQ-9 score of 15  - we will start him on sertraline 25 mg once daily  - we will also order PT/OT     Macrocytic anemia  - H/H 10.5/30.2 with .4  - B12 and folate wnl  - will continue to monitor      Hypothyroidism  - TSH wnl last admission  - will start home levothyroxine 50 mcg     Hx DVT RUE  - RUE DVT found in 03/2023. Was prescribed eliquis 5 mg BID  - will start while inpatient.     COPD  - on duonebs at home   - will continue PRN        CODE STATUS: Full Code  Access: Peripheral  Antibiotics: none  Diet: Soft and Bite sized  DVT Prophylaxis: Eliquis  GI Prophylaxis: Famotidine  Fluids: normal saline at 150 ml/hr.      Disposition: day 0 of admission for management of recurrent hypercalcemia and hypomagnesemia.  Anticipate discharge back to home once medically stable.     Dov Conley  Internal Medicine - PGY-1

## 2023-09-22 NOTE — PLAN OF CARE
09/22/23 1105   Discharge Assessment   Assessment Type Discharge Planning Assessment   Confirmed/corrected address, phone number and insurance Yes   Confirmed Demographics Correct on Facesheet   Source of Information family   When was your last doctors appointment?   (PCP: Tony Bertrand)   Does patient/caregiver understand observation status Yes   Communicated MOISES with patient/caregiver Date not available/Unable to determine   Reason For Admission Hypercalcemia   People in Home spouse   Facility Arrived From: Home   Do you expect to return to your current living situation? Yes   Do you have help at home or someone to help you manage your care at home? Yes   Who are your caregiver(s) and their phone number(s)? Elsie, wife, P: [phone]   Prior to hospitilization cognitive status: Alert/Oriented   Current cognitive status: Unable to Assess   Walking or Climbing Stairs ambulation difficulty, requires equipment   Mobility Management Rollator   Dressing/Bathing bathing difficulty, requires equipment;bathing difficulty, assistance 1 person   Dressing/Bathing Management [place], wife   Equipment Currently Used at Home rollator;shower chair;bedside commode;suction machine;nebulizer   Readmission within 30 days? Yes   Patient currently being followed by outpatient case management? No   Do you currently have service(s) that help you manage your care at home? Yes   Name and Contact number of agency [facility]   Is the pt/caregiver preference to resume services with current agency Yes   Do you take prescription medications? Yes   Do you have prescription coverage? Yes   Coverage [employer] Managed Medicare   Do you have any problems affording any of your prescribed medications? No   Is the patient taking medications as prescribed? yes   Who is going to help you get home at discharge? Elsie, wife, P: [phone]   How do you get to doctors appointments? family or friend will provide   Are you on dialysis? No   Do you take  coumadin? No   DME Needed Upon Discharge  wheelchair   Discharge Plan discussed with: Spouse/sig other   Name(s) and Number(s) Elsie, wife, P: [phone]   Transition of Care Barriers None   Discharge Plan A Home with family;Home Health   OTHER   Name(s) of People in Home Elsie, wife, P: [phone]

## 2023-09-22 NOTE — PROGRESS NOTES
Inpatient Nutrition Assessment    Admit Date: 9/21/2023   Total duration of encounter: 1 day     Nutrition Recommendation/Prescription     Regular diet with dairy restriction  Boost Plus (provides 360 kcal, 14 g protein per serving) x 1 daily  Resume home Megace to stimulate appetite  Suggest daily MVI  Monitor Weight biweekly     Communication of Recommendations: reviewed with patient and reviewed with caregiver    Nutrition Assessment     Malnutrition Assessment/Nutrition-Focused Physical Exam    Malnutrition Context: acute illness or injury  Malnutrition Level: moderate  Energy Intake (Malnutrition): less than 75% for greater than 7 days  Weight Loss (Malnutrition): greater than 2% in 1 week  Subcutaneous Fat (Malnutrition): mild depletion     Upper Arm Region (Subcutaneous Fat Loss): mild depletion     Muscle Mass (Malnutrition): mild depletion  Spiritism Region (Muscle Loss): mild depletion                                A minimum of two characteristics is recommended for diagnosis of either severe or non-severe malnutrition.    Chart Review    Reason Seen: continuous nutrition monitoring    Malnutrition Screening Tool Results                Diagnosis:  Recurrent Hypercalcemia d/t malignancy, Hypomagnesemia, Laryngeal cancer with lung mets, Depression, Anxiety, macrocytic Anemia, Hypothyroid, COPD    Relevant Medical History: COPD, Hypothyroidism, DVT, Laryngeal cancer    Nutrition-Related Medications: NaCl @ 150 ml/hr, Calcitonin, Levothyroxine  Calorie Containing IV Medications: no significant kcals from medications at this time    Nutrition-Related Labs:  9/21/23 -- H/H 9.6/28 L, Glu 87, K 3.5, BUN 11, Cr 0.68, Phos 1.6 L, Calcium 12.2 H    Diet/PN Order: Diet Adult Regular  Oral Supplement Order: none  Tube Feeding Order: none  Appetite/Oral Intake: fair/50-75% of meals  Factors Affecting Nutritional Intake: decreased appetite and depression  Food/Cheondoism/Cultural Preferences: none reported  Food Allergies:  "none reported       Wound(s):   skin intact    Comments    23 -- Pt with fair po intake x "couple weeks" per pt's wife; taking Megace at home -- resume; CA (H) -- normally drinks 1 Ensure mixed with ice cream daily, reports does not drink any other milk & rarely uses cheeses; Suggest Boost Breeze to omit calcium however dislikes; Receiving calcitonin, calcium content of foods provided; Significant wt loss noted in the last week    Anthropometrics    Height: 5' 9.02" (175.3 cm) Height Method: Stated  Last Weight: 57.6 kg (126 lb 15.8 oz) (23 1707) Weight Method: Standard Scale  BMI (Calculated): 18.7  BMI Classification: normal (BMI 18.5-24.9)        Ideal Body Weight (IBW), Male: 160.12 lb     % Ideal Body Weight, Male (lb): 79.31 %                 Usual Body Weight (UBW), k.5 kg  % Usual Body Weight: 81.87     Usual Weight Provided By: EMR weight history    Wt Readings from Last 5 Encounters:   23 57.6 kg (126 lb 15.8 oz)   09/15/23 55.2 kg (121 lb 9.6 oz)   23 61.4 kg (135 lb 6.4 oz)   23 61.7 kg (136 lb)   23 61.2 kg (135 lb)     Weight Change(s) Since Admission:  Admit Weight: 57.6 kg (127 lb) (23 1650)  Significant weight loss x 1 week    Estimated Needs    Weight Used For Calorie Calculations: 57 kg (125 lb 10.6 oz)  Energy Calorie Requirements (kcal): 8180-4703 kcal (32 - 34 kcal/kg)  Energy Need Method: Kcal/kg  Weight Used For Protein Calculations: 57 kg (125 lb 10.6 oz)  Protein Requirements: 74-85 gm (1.3 - 1.5 gm/kg)  Fluid Requirements (mL): 6107-9503 ml (1ml/kcal)  Temp (24hrs), Av.3 °F (36.8 °C), Min:97.9 °F (36.6 °C), Max:98.6 °F (37 °C)       Enteral Nutrition    Patient not receiving enteral nutrition at this time.    Parenteral Nutrition    Patient not receiving parenteral nutrition support at this time.    Evaluation of Received Nutrient Intake    Calories: not meeting estimated needs  Protein: not meeting estimated needs    Patient " Education    Education Provided:  Calcium content of foods  Teaching Method: explanation and printed materials  Comprehension: verbalizes understanding  Barriers to Learning: none evident  Expected Compliance: good  Comments: All questions were answered and dietitian's contact information was provided.     Nutrition Diagnosis     PES: Malnutrition related to catabolic illness as evidenced by < 75% nutrition intake > 1 week, > 2% wt loss x 1 week, fat/muscle wasting. (new)    Interventions/Goals     Intervention(s): modified composition of meals/snacks, commercial beverage, multivitamin/mineral supplement therapy, prescription medication, and collaboration with other providers  Goal: Meet greater than 75% of nutritional needs by follow-up. (new)    Monitoring & Evaluation     Dietitian will monitor food and beverage intake and weight change.  Nutrition Risk/Follow-Up: moderate (follow-up in 3-5 days)   Please consult if re-assessment needed sooner.

## 2023-09-22 NOTE — PT/OT/SLP EVAL
Physical Therapy Evaluation    Patient Name:  Josafat Boudreaux   MRN:  [id number]    Recommendations:     Discharge Recommendations:  home health PT, home health OT   Discharge Equipment Recommendations: none   Equipment to be obtained for discharge: none.  Barriers to discharge: fall risk and level of skilled assistance required    Assessment:     Josafat Boudreaux is a 57 y.o. male admitted with a medical diagnosis of Hypercalcemia.  He presents with the following impairments/functional limitations:  weakness, impaired balance, pain, impaired self care skills, impaired functional mobility, gait instability, decreased lower extremity function.  1. Hypercalcemia        Rehab Prognosis: Good.    Patient would benefit from continued skilled acute PT services to: address above listed impairments/functional limitations; receive patient/caregiver education; reduce fall risk; and maximize independency/safety with functional mobility.    Recent Surgery: * No surgery found *      Plan:     During this hospitalization, patient to be seen 3 x/week to address the identified impairments/functional limitations via gait training, therapeutic activities, therapeutic exercises and progress toward the established goals.    Plan of Care Expires:  10/21/23    Subjective     Communicated with patient's nurse Bing prior to session.    Patient agreeable to participate in evaluation.     Chief Complaint: LB pain after a fall in the bathtub with radiculopathy  Patient/Family Comments/goals: get better and go home  Pain/Comfort:  Pain Rating 1: 7/10  Location 1: lumbar spine  Pain Addressed 1: Nurse notified  Pain Rating Post-Intervention 1: 7/10  Pain Addressed 2: Nurse notified    Patients cultural, spiritual, [employer]ian conflicts given the current situation: no    Social History  Living Environment: Patient  lives with his wife  in a mobile home, with 4 steps steps outside, with tub-shower combo.  Functional Level: Prior to  admission patient ambulated with assistive device and required assistance with ADLs including bathing and showering.  Equipment Used at Home: bedside commode, cane, straight, rollator, shower chair, wheelchair  Equipment owned (not currently used): none.  Assistance Upon Discharge: spouse.    Objective:     Patient found supine in bed and with HOB elevated with telemetry, peripheral IV  upon PT entry to room.    General Precautions: Standard, fall   Orthopedic Precautions:N/A   Braces: N/A  Respiratory Status: room air    Exams:  Orientation: Patient is oriented to Person, Place, Time, Situation  Commands: Patient follows commands consistently  Gross Motor Coordination:  WFL  RLE ROM: WFL  RLE Strength: WFL  LLE ROM: WFL  LLE Strength: WFL    Functional Mobility:    Bed Mobility:  Supine to Sit: stand by assistance    Transfers:  Sit to Stand: contact guard assistance with straight cane    Gait:  Patient ambulated 15 ft with straight cane and moderate assistance.  Patient demonstrates unsteady gait, decreased stephanie, decreased step length, decreased foot/floor clearance, and flexed posture. Pt. Lost his balance several times and was holding on to the door frame. Pt. C/o back pain and wanted to return to the bed. Pt. Is at high risk for falls.    Other Mobility:  not assessed    Balance:  Sit  Static: NORMAL: No deviations seen in posture held statically  Dynamic: GOOD+: Maintains balance through MAXIMAL excursions of active trunk motion  Stand  Static: FAIR+: Takes MINIMAL challenges from all directions  Dynamic: POOR: Needs MOD (moderate) assist during gait    Patient left supine in bed and with HOB elevated with all lines intact, call button in reach, Bing notified, and wife present.    Education     Patient was instructed to utilize staff assistance for mobility/transfers.  White board updated regarding patient's safest level of mobility with staff assistance.    Goals     Multidisciplinary Problems        Physical Therapy Goals          Problem: Physical Therapy    Goal Priority Disciplines Outcome Goal Variances Interventions   Physical Therapy Goal     PT, PT/OT Ongoing, Progressing     Description: Goals to be met by: dc     Patient will increase functional independence with mobility by performin. Sit to stand transfer with Modified [place]  2. Gait  x 130 feet with Modified [place] using Rolling Walker.                        History:     Past Medical History:   Diagnosis Date    COPD (chronic obstructive pulmonary disease)     Dysphagia     Essential (primary) hypertension 2023    GERD (gastroesophageal reflux disease)     laryngeal Cancer     Lung cancer     Thyroid disease     Vocal cord mass      Past Surgical History:   Procedure Laterality Date    DIAGNOSTIC LAPAROSCOPY N/A 2023    Procedure: LAPAROSCOPY, DIAGNOSTIC;  Surgeon: Dipak De Leon Jr., MD;  Location: [facility];  Service: General;  Laterality: N/A;    DIRECT DIAGNOSTIC LARYNGOSCOPY WITH BRONCHOSCOPY AND ESOPHAGOSCOPY N/A 2022    Procedure: LARYNGOSCOPY, DIRECT, DIAGNOSTIC, WITH BRONCHOSCOPY AND ESOPHAGOSCOPY;  Surgeon: Emory Alonso MD;  Location: [facility];  Service: ENT;  Laterality: N/A;    ESOPHAGOGASTRODUODENOSCOPY (EGD)- DEVICE ASSISTED N/A 2023    Procedure: EGD;  Surgeon: Aaliyah Conrad III, MD;  Location: [facility]COPY;  Service: Gastroenterology;  Laterality: N/A;  Esophageal Dilation; Savory over Guide wire: 36Fr and 39 Fr  Clips to secure peg site    HIP SURGERY      HUMERUS FRACTURE SURGERY      INCISION AND DRAINAGE, NECK Bilateral 10/21/2022    Procedure: INCISION AND DRAINAGE,NECK;  Surgeon: Madison Worley MD;  Location: [facility];  Service: ENT;  Laterality: Bilateral;    INSERT ARTERIAL LINE  2022         JOINT REPLACEMENT  2019    R hip    PEG TUBE REMOVAL      PLACEMENT, MEDIPORT      TRACHEOSTOMY N/A 06/10/2022    Procedure: CREATION, TRACHEOSTOMY;  Surgeon: Junior ALMANZAR  MD Gavin;  Location: [facility];  Service: ENT;  Laterality: N/A;     Time Tracking:     PT Received On: 09/22/23  PT Start Time: 0730     PT Stop Time: 0754  PT Total Time (min): 24 min     Billable Minutes: Evaluation 24 09/22/2023

## 2023-09-22 NOTE — ASSESSMENT & PLAN NOTE
"Patient meets ASPEN criteria for moderate malnutrition of acute illness or injury per RD assessment as evidenced by:  Energy Intake (Malnutrition): less than 75% for greater than 7 days  Weight Loss (Malnutrition): greater than 5% in 1 month  Subcutaneous Fat (Malnutrition): mild depletion  Muscle Mass (Malnutrition): mild depletion           A minimum of two characteristics is recommended for diagnosis of either severe or non-severe malnutrition.    Ht Readings from Last 1 Encounters:   09/21/23 5' 9.02" (1.753 m)     Wt Readings from Last 1 Encounters:   09/21/23 1707 57.6 kg (126 lb 15.8 oz)   09/21/23 1650 57.6 kg (127 lb)     Body mass index is 18.74 kg/m².    Patient has been screened and assessed by RD. See nutrition recommendations documented in inpatient nutrition assessment. RD will continue to follow patient throughout hospitalization.  "

## 2023-09-22 NOTE — PROGRESS NOTES
[facility] Medicine Wards   Progress Note     Resident Team: [facility] Medicine List 3  Attending Physician: Ashley Erazo MD  Resident: Bessy Stein  [facility]   [facility] Length of Stay: 1 day    Subjective:      Brief HPI:  Josafat Boudreaux is a 57 y.o. male who with a history of COPD, hypothyroidism, DVT to RUE, stage 4 larygenal SCC, bilateral lung mass - Squamous cell - metastatic SqCC lung cancer vs metastatic SqCC to the lungs from H&N, mediastinal and Hilar LAD, s/p laryngectomy with radical neck dissection (7/2022) who presented to [facility] ED on 9/21/2023 from hem/onc clinic for concern of elevated calcium levels.  His wife is present for the encounter.  Of note patient was just recently admitted on 09/13/2023 for similar problem of hypercalcemia and was discharged on 09/18/2023.  During that hospital stay patient's hypercalcemia gradually improved and he was also repleted for hypokalemia and hypomagnesemia.  He was noted to have elevated PTHrp of 27 after he was discharged.   His wife mentions that they were at the infusion clinic today strictly for intravenous fluids and a BMP check.  They noticed elevated/critical calcium level and were told to go to the ED. patient does not have any acute complaints; however his wife mentions that he does not complain much.  She has noticed him being more lethargic, harder to arouse, and sleeping too much since yesterday afternoon.  As per her, he is more altered from his baseline.  His last bowel movement was Monday night.  And she has also noticed dark urine than normal this morning.  She also mentions of decreased oral intake of food and fluids.      In the ED, patient was noted to be tachycardic, afebrile, and normotensive.  His CBC was noted for microcytic anemia.  His CMP was noted for mild hyponatremia of 130, critically elevated calcium of 14.1, and low magnesium of 1.10.  Patient was given 1 dose of 230 units of calcitonin and 4 mg of zoledronic acid in the  ED. his magnesium was also repleted.  Internal medicine was consulted for management of hypercalcemia and admission.     Interval History:   Patient was seen resting comfortably in his room.  His wife was also present during the encounter.  Patient was noted a little more alert than yesterday in the ED. He has remained afebrile.  Some intermittent episodes of tachycardia noted overnight, otherwise vital signs stable.  Patient did have a bowel movement this morning which was normal in consistency.  CBC remarkable for microcytic anemia.  CMP shows mild improvement of hyponatremia, with mild decrease in bicarb 20, improvement of hypercalcemia to 12.2, critically low phosphorus at 1, improvement of magnesium to 1.70.  He was also evaluated by PT services and deemed to benefit from continued skilled PT services while inpatient.     Review of Systems:  Review of Systems   Constitutional:  Negative for chills and fever.   HENT:  Negative for congestion, sinus pain and sore throat.    Eyes:  Negative for blurred vision.   Respiratory:  Negative for cough, hemoptysis, sputum production, shortness of breath and wheezing.    Cardiovascular:  Negative for chest pain, palpitations, orthopnea and leg swelling.   Gastrointestinal:  Positive for constipation. Negative for abdominal pain, diarrhea, heartburn, nausea and vomiting.   Genitourinary:  Negative for dysuria, frequency, hematuria and urgency.   Musculoskeletal:  Negative for back pain, joint pain and neck pain.   Neurological:  Positive for weakness. Negative for dizziness, speech change, focal weakness, seizures and headaches.   Psychiatric/Behavioral:  Positive for depression. The patient is not nervous/anxious and does not have insomnia.           Objective:     Vital Signs (Most Recent):  Temp: 98.3 °F (36.8 °C) (09/22/23 1352)  Pulse: 108 (09/22/23 1352)  Resp: 19 (09/22/23 1352)  BP: 115/71 (09/22/23 1352)  SpO2: 97 % (09/22/23 1352) Vital Signs (24h Range):  Temp:   [97.9 °F (36.6 °C)-98.6 °F (37 °C)] 98.3 °F (36.8 °C)  Pulse:  [107-118] 108  Resp:  [16-20] 19  SpO2:  [95 %-98 %] 97 %  BP: (109-123)/(65-80) 115/71       Physical Examination:  Physical Exam  Vitals and nursing note reviewed.   Constitutional:       General: He is not in acute distress.     Comments: Limited voice due to trach. Mouths word.     HENT:      Head: Normocephalic and atraumatic.      Mouth/Throat:      Mouth: Mucous membranes are moist.   Eyes:      General: No scleral icterus.     Pupils: Pupils are equal, round, and reactive to light.   Neck:      Comments: Trach in place - clean and no signs of drainage/sputum  Cardiovascular:      Rate and Rhythm: Normal rate and regular rhythm.      Pulses: Normal pulses.      Heart sounds: Normal heart sounds.   Pulmonary:      Effort: Pulmonary effort is normal.      Comments: Coarse breath sounds bilaterally  Abdominal:      General: Bowel sounds are normal. There is no distension.      Palpations: Abdomen is soft.      Tenderness: There is no abdominal tenderness.   Musculoskeletal:         General: No swelling or tenderness. Normal range of motion.      Right lower leg: No edema.      Left lower leg: No edema.   Skin:     General: Skin is warm and dry.      Capillary Refill: Capillary refill takes less than 2 seconds.   Neurological:      General: No focal deficit present.      Mental Status: He is alert and oriented to person, place, and time.   Psychiatric:      Comments: Depressed mood; slightly frustrated         Laboratory:  Most Recent Data:  All pertinent lab results from the last 24 hours have been reviewed.  Recent Lab Results         09/22/23  0350   09/21/23  1737   09/21/23  1700        Immature Platelet Fraction 8.1     8.9       Albumin/Globulin Ratio 0.7           Albumin 2.4           Alkaline Phosphatase 106           ALT 14           Anion Gap     9.0       Appearance, UA   Clear         AST 29           Bacteria, UA   None Seen          Baso # 0.01     0.02       Basophil % 0.2     0.3       BILIRUBIN TOTAL 0.5           Bilirubin, UA   Negative         BUN 11.2     14.0       BUN/CREAT RATIO     19       Calcium 12.2  Comment: Critical Result called and verified by verbal readback to: Laura Martins on 09/22/2023 at 05:14. Reported by [id number].     14.1  Comment: Critical Result called and verified by verbal readback to: DARIUSZ ALVAREZ RN on 09/21/2023 at 17:41. Reported by [id number].       Chloride 106     99       CO2 20     22       Color, UA   Light-Yellow         Creatinine 0.68     0.74       eGFR >60     >60       Eos # 0.06     0.02       Eosinophil % 1.1     0.3       Globulin, Total 3.6           Glucose 87     94       Glucose, UA   Normal         Hematocrit 28.1     30.2       Hemoglobin 9.6     10.5       Hyaline Casts, UA   None Seen         Immature Grans (Abs) 0.03     0.04       Immature Granulocytes 0.6     0.7       Ketones, UA   Negative         Leukocytes, UA   Negative         Lymph # 0.32     0.46       LYMPH % 6.1     7.6       Magnesium  1.70     1.10       MCH 35.6     36.0       MCHC 34.2     34.8       .1     103.4       Mono # 0.51     0.71       Mono % 9.8     11.7       MPV 11.7     11.9       Mucous, UA   Trace         Neut # 4.30     4.83       Neut % 82.2     79.4       NITRITE UA   Negative         nRBC 0.0     0.0       Occult Blood UA   Negative         pH, UA   6.0         Phosphorus 1.0  Comment: Critical Result called and verified by verbal readback to: Onelia Johnson RN on 09/22/2023 at 13:32. Reported by [id number].     1.6       Platelets 56     59       Potassium 3.5     4.1       PROTEIN TOTAL 6.0           Protein, UA   Negative         RBC 2.70     2.92       RBC, UA   0-5         RDW 17.4     17.4       Sodium 134     130       Specific Gravity,UA   1.010         Squamous Epithelial Cells, UA   None Seen         Urobilinogen, UA   Normal         WBC, UA   0-5         WBC 5.23     6.08                  Microbiology Data Reviewed: no  Pertinent Findings:  None    Other Results:  EKG (my interpretation): sinus tachycardia.    Radiology:  Imaging Results              X-Ray Chest AP Portable (Final result)  Result time 09/21/23 17:28:04      Final result by Jackie Loaiza MD (09/21/23 17:28:04)                   Impression:      No acute process      Electronically signed by: Jackie Loaiza  Date:    09/21/2023  Time:    17:28               Narrative:    EXAMINATION:  XR CHEST AP PORTABLE    CLINICAL HISTORY:  abnormal lung sounds;    TECHNIQUE:  Single frontal view of the chest was performed.    COMPARISON:  08/05/2023    FINDINGS:  There are chronic appearing lung changes seen bilaterally. No evidence of acute infiltrate is seen. No mass is seen. No lesion is seen. No pleural effusion is seen. The heart appears normal. The aorta appears normal. The bones and joints show no acute abnormality.  There are postsurgical changes seen in the right humerus.  There is a port in the left anterior chest wall.                                      Current Medications:     Infusions:   sodium chloride 0.9% 150 mL/hr at 09/22/23 1010        Scheduled:   apixaban  5 mg Oral BID    calcitonin  4 Units/kg Subcutaneous Q12H    diphenhydrAMINE  25 mg Oral QHS    famotidine  20 mg Oral QHS    levothyroxine  50 mcg Oral Daily    megestroL  400 mg Oral Daily    mupirocin   Nasal BID    sertraline  25 mg Oral Daily    sodium bicarbonate  650 mg Oral BID    sodium phosphate 30 mmol in dextrose 5 % (D5W) 250 mL IVPB  30 mmol Intravenous Once        PRN:  acetaminophen, albuterol-ipratropium, hydrocodone-apap 7.5-325 MG/15 ML, melatonin, ondansetron, polyethylene glycol, sodium chloride 0.9%, traMADoL    Antibiotics and Day Number of Therapy:  none    No intake or output data in the 24 hours ending 09/22/23 1431    Lines/Drains/Airways       Central Venous Catheter Line  Duration                  PowerPort A Cath  Single Lumen left subclavian -- days              Airway  Duration                  Laryngectomy (Do Not Remove) Laryngectomy tube -- days              Peripheral Intravenous Line  Duration                  Peripheral IV - Single Lumen 09/21/23 1715 Right Antecubital <1 day                      Assessment & Plan:     Recurrent Hypercalcemia 2/2 malignancy  - Ca 14 at infusion clinic and noted at 14.1 in the ED  - improve of calcium today to 12.2  - EKG with sinus tachycardia with J waves present   - PTHrp at 27 collected on 9/13/23   - started on IV NS at 150cc/hr, IV calcitonin 4units/kg every 12 hours for 4 doses, IV zoledronic acid 4 mg at 200cc/hr.  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 Underwent another cycle of carbo/taxol completed on 8/23/23.   - follows closely with Heme/onc and ENT  - plans to restart ketruda maintenance therapy soon.     Depression/Anxiety  - patient tearful during encounter, wife also mentions change in behavior  - PHQ-9 score of 15  - we will start him on sertraline 25 mg once daily  - we will continue skilled acute PT services while patient     Macrocytic anemia  - H/H 9.6/28.1 and MCV of 104.1  - B12 and folate wnl  - will continue to monitor      Hypothyroidism  - TSH wnl last admission  - will start home levothyroxine 50 mcg     Hx DVT RUE  - RUE DVT found in 03/2023. Was prescribed  eliquis 5 mg BID  - will start while inpatient.     COPD  - on duonebs at home   - will continue PRN        CODE STATUS: Full Code  Access: Peripheral  Antibiotics: none  Diet: Adult regular  DVT Prophylaxis: Eliquis  GI Prophylaxis: Famotidine  Fluids: normal saline at 150 ml/hr.      Disposition:  Admitted for management of recurrent hypercalcemia secondary to malignancy.  Anticipate discharge back to home once medically stable.     Dov Conley  Internal Medicine - PGY-1

## 2023-09-22 NOTE — PLAN OF CARE
Problem: Physical Therapy  Goal: Physical Therapy Goal  Description: Goals to be met by: dc     Patient will increase functional independence with mobility by performin. Sit to stand transfer with Modified [place]  2. Gait  x 130 feet with Modified [place] using Rolling Walker.     Outcome: Ongoing, Progressing

## 2023-09-22 NOTE — PLAN OF CARE
Problem: Occupational Therapy  Goal: Occupational Therapy Goal  Description: Pt will complete grooming in standing at sink SBA  Pt will ambulate to bathroom with RW to complete toilet t/f SBA  Pt will increase standing endurance x 8 minutes without seated restbreak.  In order to increase activity tolerance for ADL's, pt will participate in general B UE therex x 10 minutes  Outcome: Ongoing, Progressing

## 2023-09-22 NOTE — PT/OT/SLP EVAL
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 male with a medical diagnosis of Hypercalcemia.  He presents with generalized weakness, decreased activity tolerance, decreased balance. Performance deficits affecting function: weakness, impaired endurance, impaired self care skills, impaired functional mobility, impaired balance, gait instability, pain.      Rehab Prognosis: Fair; patient would benefit from acute skilled OT services to address these deficits and reach maximum level of function.       Plan:     Patient to be seen 3 x/week to address the above listed problems via self-care/home management, therapeutic activities, therapeutic exercises, neuromuscular re-education  Plan of Care Expires:    Plan of Care Reviewed with: patient, spouse    Subjective     Chief Complaint: c/o fatigue, pain in R leg, back and abdomen   Patient/Family Comments/goals: to return home , get stronger    Occupational Profile:  Living Environment: resides at home with spouse, mobile home, 4 steps, tub/shower combo  Previous level of function: recently req'ing assistance with dressing and bathing due to weakness, ambulatory with RW, history of falls, sleeps in recliner  Roles and Routines:   Equipment Used at Home:  (RW, BSC, rollator, shower chair, wc, raised toilet seat)  Assistance upon Discharge: spouse    Pain/Comfort:  Pain Rating 1:  (pt c/o pain in R LE, back and abdominal pain, gestured with pointing , did not rate)    Patients cultural, spiritual, Yazdanism  conflicts given the current situation:      Objective:     Communicated with: nurse prior to session.  Patient found HOB elevated with peripheral IV upon OT entry to room.    General Precautions: Standard,  (special contact neutropenic isolation)  Orthopedic Precautions:    Braces:    Respiratory Status: Room air    Occupational Performance:    Bed Mobility:    Patient completed Scooting/Bridging with stand by assistance  Patient completed Supine to Sit with stand by assistance and with side rail  Patient completed Sit to Supine with stand by assistance and with side rail    Functional Mobility/Transfers:  Patient completed Sit <> Stand Transfer with contact guard assistance  with  rolling walker   Patient completed Toilet Transfer : spouse reports assisted pt to bathroom toilet this am with min A; pt declined attempting with OT due to fatigue; BSC also available in room for use  Functional Mobility: ambulated bed<>sink with RW CGA x chuck 8 ft    Activities of Daily Living:  Grooming: pt brushed teeth in standing at sink CGA for balance, req'd 1 UE support during standing     Lower Body Dressing: total assistance .  Toileting: moderate assistance .    Cognitive/Visual Perceptual:  Cognitive/Psychosocial Skills:     -       Oriented to: Person, Place, Time, and Situation   -       Follows Commands/attention:Follows two-step commands  -       Mood/Affect/Coping skills/emotional control: cooperative although req'd encouragement to participate    Physical Exam:  Balance: -       sitting good mod I; standing balance CGA  Upper Extremity Range of Motion:     -       Right Upper Extremity: min deficits R shld, distal WNL  -       Left Upper Extremity: WFL  Upper Extremity Strength:    -       Right Upper Extremity: shld 3-/5, distal WFL  -       Left Upper Extremity: WFL    AMPAC 6 Click ADL:  AMPAC Total Score:      Treatment & Education:  Education on OT POC, increasing OOB activity    Patient left HOB elevated with all  lines intact, call button in reach, nurse notified, and spouse present    GOALS:   Multidisciplinary Problems       Occupational Therapy Goals          Problem: Occupational Therapy    Goal Priority Disciplines Outcome Interventions   Occupational Therapy Goal     OT, PT/OT Ongoing, Progressing    Description: Pt will complete grooming in standing at sink SBA  Pt will ambulate to bathroom with RW to complete toilet t/f SBA  Pt will increase standing endurance x 8 minutes without seated restbreak.  In order to increase activity tolerance for ADL's, pt will participate in general B UE therex x 10 minutes                       History:     Past Medical History:   Diagnosis Date    COPD (chronic obstructive pulmonary disease)     Dysphagia     Essential (primary) hypertension 08/05/2023    GERD (gastroesophageal reflux disease)     laryngeal Cancer     Lung cancer     Thyroid disease     Vocal cord mass          Past Surgical History:   Procedure Laterality Date    DIAGNOSTIC LAPAROSCOPY N/A 5/18/2023    Procedure: LAPAROSCOPY, DIAGNOSTIC;  Surgeon: Dipak De Leon Jr., MD;  Location: [facility];  Service: General;  Laterality: N/A;    DIRECT DIAGNOSTIC LARYNGOSCOPY WITH BRONCHOSCOPY AND ESOPHAGOSCOPY N/A 07/11/2022    Procedure: LARYNGOSCOPY, DIRECT, DIAGNOSTIC, WITH BRONCHOSCOPY AND ESOPHAGOSCOPY;  Surgeon: Emory Alonso MD;  Location: [facility];  Service: ENT;  Laterality: N/A;    ESOPHAGOGASTRODUODENOSCOPY (EGD)- DEVICE ASSISTED N/A 04/18/2023    Procedure: EGD;  Surgeon: Aaliyah Conrad III, MD;  Location: [facility] ENDOSCOPY;  Service: Gastroenterology;  Laterality: N/A;  Esophageal Dilation; Savory over Guide wire: 36Fr and 39 Fr  Clips to secure peg site    HIP SURGERY      HUMERUS FRACTURE SURGERY      INCISION AND DRAINAGE, NECK Bilateral 10/21/2022    Procedure: INCISION AND DRAINAGE,NECK;  Surgeon: Madison Worley MD;  Location: [facility];  Service: ENT;  Laterality: Bilateral;    INSERT ARTERIAL LINE   06/26/2022         JOINT REPLACEMENT  2019    R hip    PEG TUBE REMOVAL      PLACEMENT, MEDIPORT      TRACHEOSTOMY N/A 06/10/2022    Procedure: CREATION, TRACHEOSTOMY;  Surgeon: Junior Alonso MD;  Location: [facility];  Service: ENT;  Laterality: N/A;       Time Tracking:     OT Date of Treatment:    OT Start Time: 1303  OT Stop Time: 1321  OT Total Time (min): 18 min    Billable Minutes:Evaluation mod    9/22/2023

## 2023-09-22 NOTE — PLAN OF CARE
Consult to resume HH noted. Patient is current with [facility]. Referral packet and order sent via [facility].

## 2023-09-23 NOTE — PT/OT/SLP PROGRESS
Physical Therapy Treatment    Patient Name:  Josafat Boudreaux   MRN:  [id number]    Recommendations     Discharge Recommendations:  home with home health   Discharge Equipment Recommendations: wheelchair   Barriers to discharge: fall risk and decreased endurance    Assessment     Josafat Boudreaux is a 57 y.o. male admitted with a medical diagnosis of Hypercalcemia.    He presents with the following impairments/functional limitations:  weakness, impaired endurance, impaired self care skills, impaired functional mobility, gait instability, decreased lower extremity function, pain.    Rehab Prognosis: Fair.    Patient would benefit from continued skilled acute PT services to: address above listed impairments/functional limitations; receive patient/caregiver education; reduce fall risk; and maximize independency/safety with functional mobility.    Recent Surgery: * No surgery found *      Plan     During this hospitalization, patient to be seen 3 x/week to address the identified impairments/functional limitations via gait training, therapeutic activities, therapeutic exercises and progress toward the established goals.    Plan of Care Expires:  10/21/23    Subjective     Communicated with patient's nurse Domonique prior to session.    Patient agreeable to participate in treatment session.    Chief Complaint: severe back pain  Patient/Family Comments/goals: none stated this session  Pain/Comfort:  Pain Rating 1: 7/10  Location 1: lumbar spine  Pain Rating Post-Intervention 1: 7/10    Objective     Patient found supine in bed and with HOB elevated with peripheral IV  upon PT entry to room.    General Precautions: Standard, fall   Orthopedic Precautions:N/A   Braces: N/A  Respiratory Status: room air    Functional Mobility:    Bed Mobility:  Supine to Sit: stand by assistance    Transfers:  Sit to Stand: contact guard assistance with rolling walker    Gait:  Patient ambulated 70 ft with rolling walker and contact guard  assistance.  Patient demonstrates no loss of balance, decreased stephanie, decreased step length, and flexed posture.    Other Mobility:  not assessed    Patient left supine in bed and with HOB elevated with all lines intact, call button in reach, tray table at bedside, nurse notified, and wife present.    Goals     Multidisciplinary Problems       Physical Therapy Goals          Problem: Physical Therapy    Goal Priority Disciplines Outcome Goal Variances Interventions   Physical Therapy Goal     PT, PT/OT Ongoing, Progressing     Description: Goals to be met by: dc     Patient will increase functional independence with mobility by performin. Sit to stand transfer with Modified Wilkinson  2. Gait  x 130 feet with Modified Wilkinson using Rolling Walker.   Reviewed 2023                         Time Tracking     PT Received On: 23  PT Start Time: 1002     PT Stop Time: 1021  PT Total Time (min): 19 min     Billable Minutes: Gait Training 19    2023

## 2023-09-23 NOTE — PROGRESS NOTES
[facility] INTERNAL MEDICINE  INPATIENT PROGRESS NOTE    Resident Team: [facility] Medicine List 3  Attending Physician: WILLEM CHOWDHURY MD    SUBJECTIVE:      HPI: Josafat Boudreaux is a 57 y.o. male who with a history of COPD, hypothyroidism, DVT to RUE, stage 4 larygenal SCC, bilateral lung mass - Squamous cell - metastatic SqCC lung cancer vs metastatic SqCC to the lungs from H&N, mediastinal and Hilar LAD, s/p laryngectomy with radical neck dissection (7/2022) who presented to [facility] ED on 9/21/2023 from hem/onc clinic for concern of elevated calcium levels.  His wife is present for the encounter.  Of note patient was just recently admitted on 09/13/2023 for similar problem of hypercalcemia and was discharged on 09/18/2023.  During that hospital stay patient's hypercalcemia gradually improved and he was also repleted for hypokalemia and hypomagnesemia.  He was noted to have elevated PTHrp of 27 after he was discharged. His wife mentions that they were at the infusion clinic today strictly for intravenous fluids and a BMP check.  They noticed elevated/critical calcium level and were told to go to the ED. patient does not have any acute complaints; however his wife mentions that he does not complain much.  She has noticed him being more lethargic, harder to arouse, and sleeping too much since yesterday afternoon.  As per her, he is more altered from his baseline.  His last bowel movement was Monday night.  And she has also noticed dark urine than normal this morning.  She also mentions of decreased oral intake of food and fluids. In the ED, patient was noted to be tachycardic, afebrile, and normotensive.  His CBC was noted for microcytic anemia.  His CMP was noted for mild hyponatremia of 130, critically elevated calcium of 14.1, and low magnesium of 1.10.  Patient was given 1 dose of 230 units of calcitonin and 4 mg of zoledronic acid in the ED. his magnesium was also repleted.  Internal medicine was consulted  "for management of hypercalcemia and admission.     Today: No acute events overnight. Patient continues to have joint pain, primarily in his back and lower extremities; pain is 4 out of 10; it is intermittent, dull, and achy; it is aggravated by movements and relieved by rest and pain meds. He states tolerating physical therapy well. Labs this AM: Q48H CBC, K+ 3.2, mag 1.6, phosphorus 1.6, corrected calcium 12.4. Will give Magnesium 2g IV x1 followed by Kphos 30 mmol IV x1 today.     ROS:   (+) Joint pain  (-) Chest pain, SOB, palpitations, fever, night sweat, chills, N/V, diarrhea, constipation, dizziness     OBJECTIVE:     Vital signs:   /60   Pulse 107   Temp 98.4 °F (36.9 °C) (Oral)   Resp 18   Ht 5' 9.02" (1.753 m)   Wt 57.6 kg (126 lb 15.8 oz)   SpO2 (!) 94%   BMI 18.74 kg/m²      Physical Examination:  General: Underweight w/o distress  HEENT: NC/AT; PERRL; nasal and oral mucosa moist and clear; tracheostomy site intact and w/o active signs of infection  Pulm: Rhonchi in upper lobes bilaterally, normal work of breathing on room air  CV: S1, S2 w/o murmurs or gallops; no edema noted  GI: Soft with normal bowel sounds in all quadrants, no masses on palpation  MSK: Limited ROM in lower extremities and back d/t pain  Neuro: AAOx4; motor/sensory function intact  Psych: Cooperative; appropriate mood and affect    Laboratory:  Lab results within past 24 hours reviewed     Imagin2023 - CXR: no acute cardiopulmonary abnormalities    Current meds:    apixaban  5 mg Oral BID    calcitonin  4 Units/kg Subcutaneous Q12H    diphenhydrAMINE  25 mg Oral QHS    famotidine  20 mg Oral QHS    levothyroxine  50 mcg Oral Daily    megestroL  400 mg Oral Daily    mupirocin   Nasal BID    potassium phosphate IVPB  30 mmol Intravenous Once    sertraline  25 mg Oral Daily         ASSESSMENT & PLAN:     Hypercalcemia 2/2 extensive HX of cancer  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  -Completed " calcitonin 4units/kg Q12H x 2 days, IV zoledronic acid 4 mg at 200cc/hr x1  -Will continue NS @ 100 mL/hr for another 24 hours while monitoring I/O      Electrolyte abnormalities  -Will continue to monitor, phosphorus, magnesium and potassium levels; replete as needed     NAGMA  -Likely from normal saline infusion     Macrocytic anemia  -B12 and folate WNL  -Likely d/t HX of cancer     Hypothyroidism  -Continue home levothyroxine 50 mcg daily      HX of DVT - RUE  -Diagnosed on 3/22/2023, likely d/t hypercoagulable state from cancer  -Continue Eliquis     COPD  -Continue current breathing therapy      DVT PPx: Eliquis  GI PPx: Protonix  Diet: Regular  ABX: None  Fluids: None  O2: Room air  PCP: Tony Bertrand MD     Disposition (09/16/2023): Continue inpatient monitoring and medical therapy. Patient can be discharged home when medically stable.    Carola Stein DO   [facility], PGY-II

## 2023-09-24 NOTE — PROGRESS NOTES
[facility]   Progress Note     Resident Team: [facility] 3  Attending Physician: Ashley Erazo MD  Resident: Bessy Stein  Intern: Dov [facility]   [facility] Length of Stay: 1 day    Subjective:      Brief HPI:  Josafat Boudreaux is a 57 y.o. male who with a history of COPD, hypothyroidism, DVT to RUE, stage 4 larygenal SCC, bilateral lung mass - Squamous cell - metastatic SqCC lung cancer vs metastatic SqCC to the lungs from H&N, mediastinal and Hilar LAD, s/p laryngectomy with radical neck dissection (7/2022) who presented to [facility] ED on 9/21/2023 from hem/onc clinic for concern of elevated calcium levels.  His wife is present for the encounter.  Of note patient was just recently admitted on 09/13/2023 for similar problem of hypercalcemia and was discharged on 09/18/2023.  During that hospital stay patient's hypercalcemia gradually improved and he was also repleted for hypokalemia and hypomagnesemia.  He was noted to have elevated PTHrp of 27 after he was discharged.   His wife mentions that they were at the infusion clinic today strictly for intravenous fluids and a BMP check.  They noticed elevated/critical calcium level and were told to go to the ED. patient does not have any acute complaints; however his wife mentions that he does not complain much.  She has noticed him being more lethargic, harder to arouse, and sleeping too much since yesterday afternoon.  As per her, he is more altered from his baseline.  His last bowel movement was Monday night.  And she has also noticed dark urine than normal this morning.  She also mentions of decreased oral intake of food and fluids.      In the ED, patient was noted to be tachycardic, afebrile, and normotensive.  His CBC was noted for microcytic anemia.  His CMP was noted for mild hyponatremia of 130, critically elevated calcium of 14.1, and low magnesium of 1.10.  Patient was given 1 dose of 230 units of calcitonin and 4 mg of zoledronic acid in the  ED. his magnesium was also repleted.  Internal medicine was consulted for management of hypercalcemia and admission.     Interval History:   No acute events overnight.  Patient has no acute complaints.  He mentioned he was able to walk around the unit yesterday with physical therapy and tolerating it well.  He has also been having regular bowel movements.  CBC noted for microcytic anemia.  CMP notable for non-anion gap metabolic acidosis, mild uptrend of calcium from 11 to 11.7.  Also notable are decrease phosphorus of 1.30 and decreased magnesium of 1.20    Review of Systems:  Review of Systems   Constitutional:  Negative for chills and fever.   HENT:  Negative for congestion, sinus pain and sore throat.    Eyes:  Negative for blurred vision.   Respiratory:  Negative for cough, hemoptysis, sputum production, shortness of breath and wheezing.    Cardiovascular:  Negative for chest pain, palpitations, orthopnea and leg swelling.   Gastrointestinal:  Negative for abdominal pain, constipation, diarrhea, heartburn, nausea and vomiting.   Genitourinary:  Negative for dysuria, frequency, hematuria and urgency.   Musculoskeletal:  Positive for back pain (chronic). Negative for joint pain and neck pain.   Neurological:  Positive for weakness. Negative for dizziness, speech change, focal weakness, seizures and headaches.   Psychiatric/Behavioral:  Negative for depression. The patient is not nervous/anxious and does not have insomnia.           Objective:     Vital Signs (Most Recent):  Temp: 99 °F (37.2 °C) (09/24/23 0321)  Pulse: (!) 111 (09/24/23 0321)  Resp: 16 (09/24/23 0152)  BP: (!) 100/50 (09/24/23 0321)  SpO2: (!) 94 % (09/24/23 0321) Vital Signs (24h Range):  Temp:  [97.5 °F (36.4 °C)-99.5 °F (37.5 °C)] 99 °F (37.2 °C)  Pulse:  [106-122] 111  Resp:  [16-20] 16  SpO2:  [92 %-100 %] 94 %  BP: (100-138)/(50-81) 100/50       Physical Examination:  Physical Exam  Vitals and nursing note reviewed.   Constitutional:        General: He is not in acute distress.     Comments: Limited voice due to trach. Mouths word.     HENT:      Head: Normocephalic and atraumatic.      Mouth/Throat:      Mouth: Mucous membranes are moist.   Eyes:      General: No scleral icterus.     Pupils: Pupils are equal, round, and reactive to light.   Neck:      Comments: Trach in place - clean and no signs of drainage/sputum  Cardiovascular:      Rate and Rhythm: Normal rate and regular rhythm.      Pulses: Normal pulses.      Heart sounds: Normal heart sounds.   Pulmonary:      Effort: Pulmonary effort is normal. No respiratory distress.      Comments: Rhonchi in upper lobes bilaterally  Abdominal:      General: Bowel sounds are normal. There is no distension.      Palpations: Abdomen is soft.      Tenderness: There is no abdominal tenderness.   Musculoskeletal:         General: No swelling or tenderness. Normal range of motion.      Right lower leg: No edema.      Left lower leg: No edema.   Skin:     General: Skin is warm and dry.      Capillary Refill: Capillary refill takes less than 2 seconds.   Neurological:      General: No focal deficit present.      Mental Status: He is alert and oriented to person, place, and time.   Psychiatric:         Mood and Affect: Mood normal.         Behavior: Behavior normal.         Laboratory:  Most Recent Data:  All pertinent lab results from the last 24 hours have been reviewed.  Recent Lab Results         09/24/23  0430        Immature Platelet Fraction 8.6       Albumin/Globulin Ratio 0.6       Albumin 2.3       Alkaline Phosphatase 133       ALT 18       Aniso 1+       AST 37       Baso # 0.02       Basophil % 0.4       BILIRUBIN TOTAL 0.5       BUN 5.8       Calcium 11.7       Chloride 105       CO2 19       Creatinine 0.59       eGFR >60       Eos # 0.07       Eosinophil % 1.3       Globulin, Total 3.6       Glucose 92       Hematocrit 25.9       Hemoglobin 9.2       Immature Grans (Abs) 0.03       Immature  Granulocytes 0.5       Lymph # 0.43       LYMPH % 7.7       Macrocytosis 1+       Magnesium  1.20       MCH 37.1       MCHC 35.5       .4       Microcytosis Slight       Mono # 0.57       Mono % 10.2       MPV 12.4       Neut # 4.46       Neut % 79.9       nRBC 0.0       Ovalocytes Slight       Phosphorus 1.3       Platelet Estimate Decreased       Platelets 42       Potassium 3.5       PROTEIN TOTAL 5.9       RBC 2.48       RBC Morph Abnormal       RDW 16.7       Schistocytes Slight       Sodium 131       Vit D, 25-Hydroxy 21.4       WBC 5.58                 Microbiology Data Reviewed: no  Pertinent Findings:  None    Other Results:  EKG (my interpretation): sinus tachycardia.    Radiology:  Imaging Results              X-Ray Chest AP Portable (Final result)  Result time 09/21/23 17:28:04      Final result by Jackie Loaiza MD (09/21/23 17:28:04)                   Impression:      No acute process      Electronically signed by: Jackie Loaiza  Date:    09/21/2023  Time:    17:28               Narrative:    EXAMINATION:  XR CHEST AP PORTABLE    CLINICAL HISTORY:  abnormal lung sounds;    TECHNIQUE:  Single frontal view of the chest was performed.    COMPARISON:  08/05/2023    FINDINGS:  There are chronic appearing lung changes seen bilaterally. No evidence of acute infiltrate is seen. No mass is seen. No lesion is seen. No pleural effusion is seen. The heart appears normal. The aorta appears normal. The bones and joints show no acute abnormality.  There are postsurgical changes seen in the right humerus.  There is a port in the left anterior chest wall.                                      Current Medications:     Infusions:   sodium chloride 0.9% 100 mL/hr at 09/24/23 0155        Scheduled:   apixaban  5 mg Oral BID    diphenhydrAMINE  25 mg Oral QHS    famotidine  20 mg Oral QHS    levothyroxine  50 mcg Oral Daily    megestroL  400 mg Oral Daily    mupirocin   Nasal BID    sertraline  25 mg  Oral Daily        PRN:  acetaminophen, albuterol-ipratropium, hydrocodone-apap 7.5-325 MG/15 ML, melatonin, ondansetron, polyethylene glycol, sodium chloride 0.9%    Antibiotics and Day Number of Therapy:  none      Intake/Output Summary (Last 24 hours) at 9/24/2023 0652  Last data filed at 9/23/2023 1800  Gross per 24 hour   Intake 3550.12 ml   Output --   Net 3550.12 ml       Lines/Drains/Airways       Central Venous Catheter Line  Duration                  PowerPort A Cath Single Lumen left subclavian -- days              Airway  Duration                  Laryngectomy (Do Not Remove) Laryngectomy tube -- days              Peripheral Intravenous Line  Duration                  Peripheral IV - Single Lumen 09/21/23 1715 Right Antecubital 2 days                      Assessment & Plan:     Recurrent Hypercalcemia 2/2 malignancy  - Ca 14 at infusion clinic and noted at 14.1 in the ED  - mild uptrend of calcium from 11 to 11.7  - Corrected calcium for albumin = 13.1  - PTHrp at 27 collected on 9/13/23   - Completed calcitonin 4units/kg Q12H x 2 days, IV zoledronic acid 4 mg at 200cc/hr x1  - Will increase NS @ 150 mL/hr while monitoring I/O     Non-anion gap metabolic acidosis  -Likely from normal saline infusion    Electrolyte abnormalities  -Will continue to monitor, phosphorus, magnesium and potassium levels; replete as needed      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 Underwent another cycle of carbo/taxol completed on 8/23/23.   - follows closely with Heme/onc and ENT  - plans to restart ketruda maintenance therapy soon.     Depression/Anxiety  - PHQ-9 score of 15  - we will start him on sertraline 25 mg once daily    Chronic back pain  Lumbar radiculopathy  - will continue skilled acute PT  services while patient     Macrocytic anemia  -B12 and folate WNL  -Likely d/t HX of cancer     Hypothyroidism  - TSH wnl last admission  - will continue home levothyroxine 50 mcg     Hx DVT RUE  - RUE DVT found in 03/2023  - Continue Eliquis while inpatient     COPD  - on duonebs at home   - will continue PRN        CODE STATUS: Full Code  Access: L Midline  Antibiotics: none  Diet: Adult regular  DVT Prophylaxis: Eliquis  GI Prophylaxis: Famotidine  Fluids: normal saline at 150 ml/hr.      Disposition: Admitted for management of recurrent hypercalcemia secondary to malignancy.  Anticipate discharge back to home once medically stable.     Dov Conley  Internal Medicine - PGY-1

## 2023-09-24 NOTE — PROCEDURES
"Josafat Boudreaux is a 57 y.o. male patient.    Temp: 98.2 °F (36.8 °C) (09/24/23 0715)  Pulse: (!) 112 (09/24/23 0715)  Resp: 18 (09/24/23 0914)  BP: 116/69 (09/24/23 0715)  SpO2: 95 % (09/24/23 0722)  Weight: 57.6 kg (126 lb 15.8 oz) (09/21/23 1707)  Height: 5' 9.02" (175.3 cm) (09/21/23 1707)    PICC  Date/Time: 9/24/2023 9:37 AM  Performed by: Paz Alcantar RN  Consent Done: Yes  Time out: Immediately prior to procedure a time out was called to verify the correct patient, procedure, equipment, support staff and site/side marked as required  Indications: med administration  Anesthesia: local infiltration  Local anesthetic: lidocaine 1% without epinephrine  Anesthetic Total (mL): 2  Preparation: skin prepped with ChloraPrep  Skin prep agent dried: skin prep agent completely dried prior to procedure  Sterile barriers: all five maximum sterile barriers used - cap, mask, sterile gown, sterile gloves, and large sterile sheet  Hand hygiene: hand hygiene performed prior to central venous catheter insertion  Location details: left brachial  Catheter type: single lumen  Catheter size: 4 Fr  Catheter Length: 16cm    Ultrasound guidance: yes  Vessel Caliber: medium and patent, compressibility normal  Needle advanced into vessel with real time Ultrasound guidance.  Guidewire confirmed in vessel.  Sterile sheath used.  Number of attempts: 1  Post-procedure: blood return through all ports, chlorhexidine patch and sterile dressing applied    Assessment: successful placement          Name Paz Alcantar RN  9/24/2023    "

## 2023-09-25 NOTE — PLAN OF CARE
Consult for DME- WC noted. DME referral packet and prescription sent to [facility] via [facility] [facility] [employer].

## 2023-09-25 NOTE — PT/OT/SLP PROGRESS
Physical Therapy Treatment    Patient Name:  Josafat Boudreaux   MRN:  [id number]    Recommendations     Discharge Recommendations:  home with home health   Discharge Equipment Recommendations: wheelchair   Barriers to discharge: fall risk and decreased endurance    Assessment     Josafat Boudreaux is a 57 y.o. male admitted with a medical diagnosis of Hypercalcemia.    He presents with the following impairments/functional limitations:  weakness, impaired balance, impaired endurance, impaired self care skills, impaired functional mobility, gait instability, decreased lower extremity function.    Rehab Prognosis: Good.    Patient would benefit from continued skilled acute PT services to: address above listed impairments/functional limitations; receive patient/caregiver education; reduce fall risk; and maximize independency/safety with functional mobility.    Recent Surgery: * No surgery found *      Plan     During this hospitalization, patient to be seen 3 x/week to address the identified impairments/functional limitations via gait training, therapeutic activities, therapeutic exercises and progress toward the established goals.    Plan of Care Expires:  10/21/23    Subjective     Communicated with patient's nurse Sanchez prior to session.    Patient agreeable to participate in treatment session.    Chief Complaint: being tired from morning session with OT.   Patient/Family Comments/goals: none stated today.  Pain/Comfort:  Pain Rating 1: 0/10  Pain Rating Post-Intervention 1: 0/10    Objective     Patient found supine in bed and with HOB elevated with peripheral IV  upon PT entry to room.    General Precautions: Standard, fall   Orthopedic Precautions:N/A   Braces:    Respiratory Status: room air    Functional Mobility:    Bed Mobility:  Supine to Sit: supervision    Transfers:  Sit to Stand: stand by assistance with rolling walker    Gait:  Patient ambulated 80 ft with rolling walker and contact guard  assistance.  Patient demonstrates no loss of balance, no mis-steps, decreased stephanie, decreased step length, and flexed posture.    Other Mobility:  not assessed    Patient left supine in bed and with HOB elevated with all lines intact, call button in reach, nurse notified, and wife present.    Goals     Multidisciplinary Problems       Physical Therapy Goals          Problem: Physical Therapy    Goal Priority Disciplines Outcome Goal Variances Interventions   Physical Therapy Goal     PT, PT/OT Ongoing, Progressing     Description: Goals to be met by: dc     Patient will increase functional independence with mobility by performin. Sit to stand transfer with Modified [place]  2. Gait  x 130 feet with Modified [place] using Rolling Walker.   Reviewed 2023                         Time Tracking     PT Received On: 23  PT Start Time: 1300     PT Stop Time: 1315  PT Total Time (min): 15 min     Billable Minutes: Gait Training 15    2023

## 2023-09-25 NOTE — PLAN OF CARE
Spoke to Claudette with [facility]. WC has been authorized and will be delivered to patient's room today.

## 2023-09-25 NOTE — CARE UPDATE
Patient has been working with physical therapy while inpatient. Patient has  weakness, impaired endurance, impaired self care skills, impaired functional mobility, gait instability, decreased lower extremity function, pain. Patient however, can self propel and has a caregiver (mostly his wife) to assist. They have requested wheelchair and it will be used in the home setting.    Dov Conley  Internal Medicine - PGY-1

## 2023-09-25 NOTE — PROGRESS NOTES
[facility] Medicine Wards   Progress Note     Resident Team: [facility] Medicine List 3  Attending Physician: Ashley Erazo MD  Resident: Bessy Stein  [facility]   [facility] Length of Stay: 1 day    Subjective:      Brief HPI:  Josafat Boudreaux is a 57 y.o. male who with a history of COPD, hypothyroidism, DVT to RUE, stage 4 larygenal SCC, bilateral lung mass - Squamous cell - metastatic SqCC lung cancer vs metastatic SqCC to the lungs from H&N, mediastinal and Hilar LAD, s/p laryngectomy with radical neck dissection (7/2022) who presented to [facility] ED on 9/21/2023 from hem/onc clinic for concern of elevated calcium levels.  His wife is present for the encounter.  Of note patient was just recently admitted on 09/13/2023 for similar problem of hypercalcemia and was discharged on 09/18/2023.  During that hospital stay patient's hypercalcemia gradually improved and he was also repleted for hypokalemia and hypomagnesemia.  He was noted to have elevated PTHrp of 27 after he was discharged.   His wife mentions that they were at the infusion clinic today strictly for intravenous fluids and a BMP check.  They noticed elevated/critical calcium level and were told to go to the ED. patient does not have any acute complaints; however his wife mentions that he does not complain much.  She has noticed him being more lethargic, harder to arouse, and sleeping too much since yesterday afternoon.  As per her, he is more altered from his baseline.  His last bowel movement was Monday night.  And she has also noticed dark urine than normal this morning.  She also mentions of decreased oral intake of food and fluids.      In the ED, patient was noted to be tachycardic, afebrile, and normotensive.  His CBC was noted for microcytic anemia.  His CMP was noted for mild hyponatremia of 130, critically elevated calcium of 14.1, and low magnesium of 1.10.  Patient was given 1 dose of 230 units of calcitonin and 4 mg of zoledronic acid in the  ED. his magnesium was also repleted.  Internal medicine was consulted for management of hypercalcemia and admission.     Interval History:   No acute events overnight. He has remained afebrile but was noted to have episodes of tachycardia. Patient has no acute complaints. He has chronic back pain but well controlled with his pain medication. His labs were noted for gradual uptrend of calcium to 11.9 today. He also has hypophosphatemia. Magnesium at low normal below 2 though.       Review of Systems:  Review of Systems   Constitutional:  Negative for chills and fever.   HENT:  Negative for congestion, sinus pain and sore throat.    Eyes:  Negative for blurred vision.   Respiratory:  Negative for cough, hemoptysis, sputum production, shortness of breath and wheezing.    Cardiovascular:  Negative for chest pain, palpitations, orthopnea and leg swelling.   Gastrointestinal:  Negative for abdominal pain, constipation, diarrhea, heartburn, nausea and vomiting.   Genitourinary:  Negative for dysuria, frequency, hematuria and urgency.   Musculoskeletal:  Positive for back pain (chronic). Negative for joint pain and neck pain.   Neurological:  Positive for weakness. Negative for dizziness, speech change, focal weakness, seizures and headaches.   Psychiatric/Behavioral:  Negative for depression. The patient is not nervous/anxious and does not have insomnia.           Objective:     Vital Signs (Most Recent):  Temp: 98.4 °F (36.9 °C) (09/25/23 0738)  Pulse: (!) 122 (09/25/23 0738)  Resp: 20 (09/25/23 0713)  BP: 117/75 (09/25/23 0738)  SpO2: (!) 93 % (09/25/23 0738) Vital Signs (24h Range):  Temp:  [98 °F (36.7 °C)-99.2 °F (37.3 °C)] 98.4 °F (36.9 °C)  Pulse:  [108-126] 122  Resp:  [18-20] 20  SpO2:  [93 %-96 %] 93 %  BP: (106-124)/(65-76) 117/75       Physical Examination:  Physical Exam  Vitals and nursing note reviewed.   Constitutional:       General: He is not in acute distress.     Comments: Limited voice due to trach.  Mouths word.     HENT:      Head: Normocephalic and atraumatic.      Mouth/Throat:      Mouth: Mucous membranes are moist.   Eyes:      General: No scleral icterus.     Pupils: Pupils are equal, round, and reactive to light.   Neck:      Comments: Trach in place - clean and no signs of drainage/sputum  Cardiovascular:      Rate and Rhythm: Normal rate and regular rhythm.      Pulses: Normal pulses.      Heart sounds: Normal heart sounds.   Pulmonary:      Effort: Pulmonary effort is normal. No respiratory distress.      Comments: Rhonchi in upper lobes bilaterally  Abdominal:      General: Bowel sounds are normal. There is no distension.      Palpations: Abdomen is soft.      Tenderness: There is no abdominal tenderness.   Musculoskeletal:         General: No swelling or tenderness. Normal range of motion.      Right lower leg: No edema.      Left lower leg: No edema.   Skin:     General: Skin is warm and dry.      Capillary Refill: Capillary refill takes less than 2 seconds.   Neurological:      General: No focal deficit present.      Mental Status: He is alert and oriented to person, place, and time.   Psychiatric:         Mood and Affect: Mood normal.         Behavior: Behavior normal.         Laboratory:  Most Recent Data:  All pertinent lab results from the last 24 hours have been reviewed.  Recent Lab Results         09/25/23  0405   09/24/23  1543        Albumin/Globulin Ratio 0.7         Albumin 2.4         Alkaline Phosphatase 145         ALT 19         AST 39         BILIRUBIN TOTAL 0.6         BUN 5.6         Calcium 11.9         Chloride 105         CO2 18         Creatinine 0.58         eGFR >60         Globulin, Total 3.5         Glucose 84         Magnesium  1.70   1.50       Phosphorus 1.8   2.3       Potassium 3.9         PROTEIN TOTAL 5.9         Sodium 130                   Microbiology Data Reviewed: no  Pertinent Findings:  None    Other Results:  EKG (my interpretation): sinus  tachycardia.    Radiology:  Imaging Results              X-Ray Chest AP Portable (Final result)  Result time 09/21/23 17:28:04      Final result by Jackie Loaiza MD (09/21/23 17:28:04)                   Impression:      No acute process      Electronically signed by: Jackie Loaiza  Date:    09/21/2023  Time:    17:28               Narrative:    EXAMINATION:  XR CHEST AP PORTABLE    CLINICAL HISTORY:  abnormal lung sounds;    TECHNIQUE:  Single frontal view of the chest was performed.    COMPARISON:  08/05/2023    FINDINGS:  There are chronic appearing lung changes seen bilaterally. No evidence of acute infiltrate is seen. No mass is seen. No lesion is seen. No pleural effusion is seen. The heart appears normal. The aorta appears normal. The bones and joints show no acute abnormality.  There are postsurgical changes seen in the right humerus.  There is a port in the left anterior chest wall.                                      Current Medications:     Infusions:   sodium chloride 0.9% 150 mL/hr at 09/25/23 0341        Scheduled:   apixaban  5 mg Oral BID    diphenhydrAMINE  25 mg Oral QHS    famotidine  20 mg Oral QHS    levothyroxine  50 mcg Oral Daily    magnesium sulfate IVPB  1 g Intravenous Once    megestroL  400 mg Oral Daily    mupirocin   Nasal BID    potassium bicarbonate  25 mEq Oral BID    sertraline  25 mg Oral Daily    sodium chloride 0.9%  10 mL Intravenous Q6H    sodium phosphate 30 mmol in dextrose 5 % (D5W) 250 mL IVPB  30 mmol Intravenous Once        PRN:  acetaminophen, albuterol-ipratropium, hydrocodone-apap 7.5-325 MG/15 ML, melatonin, ondansetron, polyethylene glycol, sodium chloride 0.9%, Flushing PICC/Midline Protocol **AND** sodium chloride 0.9% **AND** sodium chloride 0.9%    Antibiotics and Day Number of Therapy:  none      Intake/Output Summary (Last 24 hours) at 9/25/2023 0812  Last data filed at 9/24/2023 1914  Gross per 24 hour   Intake 2846.17 ml   Output --   Net  2846.17 ml         Lines/Drains/Airways       Central Venous Catheter Line  Duration                  PowerPort A Cath Single Lumen left subclavian -- days              Airway  Duration                  Laryngectomy (Do Not Remove) Laryngectomy tube -- days              Peripheral Intravenous Line  Duration                  Peripheral IV - Single Lumen 09/21/23 1715 Right Antecubital 3 days         Midline Catheter Insertion/Assessment  - Single Lumen 09/24/23 0937 Left brachial vein other (see comments) <1 day                      Assessment & Plan:     Recurrent Hypercalcemia 2/2 malignancy  - Ca 14 at infusion clinic and noted at 14.1 in the ED  - mild uptrend to 11.9 today  - Corrected calcium for albumin = 13.2  - PTHrp at 27 collected on 9/13/23   - Completed calcitonin 4units/kg Q12H x 2 days, IV zoledronic acid 4 mg at 200cc/hr x1  - Continue NS @ 150 mL/hr while monitoring I/O   - Consider inpatient Endocrinology consult    Non-anion gap metabolic acidosis  -Likely from normal saline infusion    Electrolyte abnormalities  -Will continue to monitor, phosphorus, magnesium and potassium levels; replete as needed      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 Underwent another cycle of carbo/taxol completed on 8/23/23.   - follows closely with Heme/onc and ENT  - plans to restart ketruda maintenance therapy soon.     Depression/Anxiety  - PHQ-9 score of 15  - Continue sertraline 25 mg once daily    Chronic back pain  Lumbar radiculopathy  - will continue skilled acute PT services while patient     Macrocytic anemia  -B12 and folate WNL  -Likely d/t Hx of cancer     Hypothyroidism  - TSH wnl last admission  - will continue home levothyroxine 50 mcg     Hx DVT RUE  - RUE DVT found in  03/2023  - Continue Eliquis while inpatient     COPD  - on duonebs at home   - will continue PRN        CODE STATUS: Full Code  Access: L Midline  Antibiotics: none  Diet: Adult regular  DVT Prophylaxis: Eliquis  GI Prophylaxis: Famotidine  Fluids: normal saline at 150 ml/hr.      Disposition: Admitted for management humoral hypercalcemia of malignancy.  Anticipate discharge back to home once medically stable.     Dov Conley  Internal Medicine - PGY-1

## 2023-09-25 NOTE — PT/OT/SLP PROGRESS
Occupational Therapy   Treatment    Name: Josafat Boudreaux  MRN: [id number]  Admitting Diagnosis:      recurrent Hypercalcemia 2/2 malignancy, hypomagnesemia , metabolic acidosis  Med hx: stage 4 larygenal SCC with lung mets ; s/p laryngectomy with neck dissection , trach    Recommendations:     Discharge Recommendations: home with home health  Discharge Equipment Recommendations:  none  Barriers to discharge:       Assessment:     Josafat Boudreaux is a 57 y.o. male with a medical diagnosis of Hypercalcemia.  He presents with generalized weakness, decreased activity tolerance, c/o pain. Performance deficits affecting function are weakness, impaired endurance, impaired self care skills, impaired functional mobility, impaired balance, gait instability, pain.     Rehab Prognosis:  Fair; patient would benefit from acute skilled OT services to address these deficits and reach maximum level of function.       Plan:     Patient to be seen 3 x/week to address the above listed problems via self-care/home management, therapeutic activities, therapeutic exercises, neuromuscular re-education  Plan of Care Expires:    Plan of Care Reviewed with: patient, spouse    Subjective     Chief Complaint: pain in R LE and back  Patient/Family Comments/goals: to return home  Pain/Comfort:  Pain Rating 1:  (pt c/o pain in R LE/back; did not rate)    Objective:     Communicated with: nurse prior to session.  Patient found sitting edge of bed with peripheral IV upon OT entry to room.    General Precautions: Standard,  (special contact neutropenic isolation)    Orthopedic Precautions:   Braces:    Respiratory Status: Room air       Occupational Performance:     Bed Mobility:    Patient completed Scooting/Bridging with independence  Patient completed Sit to Supine with independence     Functional Mobility/Transfers:  Patient completed Sit <> Stand Transfer with stand by assistance  with  rolling walker   Patient completed Toilet  Transfer : upon OT arrival pt's spouse assisted pt on/off for BSC t/f    Therex:   Pt completed BUE therex with bed pillow chest press, shld flexion 10 reps x 2 in standing with seated restbreaks between sets, CGA for balance in standing  Horizontal abd/add 10 repsx 2 in sitting, restbreak between sets due to pain      AMPAC 6 Click ADL:      Treatment & Education:  Education on OT POC, increasing OOB / sitting EOB activity, general  BUE therex    Patient left HOB elevated with all lines intact, call button in reach, and spouse present    GOALS:   Multidisciplinary Problems       Occupational Therapy Goals          Problem: Occupational Therapy    Goal Priority Disciplines Outcome Interventions   Occupational Therapy Goal     OT, PT/OT Ongoing, Progressing    Description: Pt will complete grooming in standing at sink SBA  Pt will ambulate to bathroom with RW to complete toilet t/f SBA  Pt will increase standing endurance x 8 minutes without seated restbreak.  In order to increase activity tolerance for ADL's, pt will participate in general B UE therex x 10 minutes                       Time Tracking:     OT Date of Treatment: 09/25/23  OT Start Time: 1121  OT Stop Time: 1148  OT Total Time (min): 27 min    Billable Minutes:Therapeutic Exercise 2    OT/MOR: OT     Number of MOR visits since last OT visit: 0    9/25/2023

## 2023-09-26 NOTE — PHYSICIAN QUERY
"PT Name: Josafat Boudreaux  MR #: [id number]    DOCUMENTATION CLARIFICATION     CDS: Kendy Brumfield RN       Contact information: [email or web address] or (cell) [phone]     This form is a permanent document in the medical record.     Query Date: September 26, 2023    By submitting this query, we are merely seeking further clarification of documentation.. Please utilize your independent clinical judgment when addressing the question(s) below.    The medical record contains the following:   Indicators  Supporting Clinical Findings Location in Medical Record   x Registered Dietician Diagnosis Malnutrition Context: acute illness or injury  Malnutrition Level: moderate   Nutrition PN 9/22   x Energy Intake Energy Intake (Malnutrition): less than 75% for greater than 7 days   Nutrition PN 9/22   x Weight Loss Weight Loss (Malnutrition): greater than 2% in 1 week   Nutrition PN 9/22   x Fat Loss Subcutaneous Fat (Malnutrition): mild depletion  [place] Region (Subcutaneous Fat Loss): mild depletion   Nutrition PN 9/22   x Muscle Loss Muscle Mass (Malnutrition): mild depletion  [place] (Muscle Loss): mild depletion   Nutrition PN 9/22    Edema/Fluid Accumulation      Reduced  Strength (by dynamometer)     x Weight, BMI, Usual Body Weight Height: 5' 9.02" (175.3 cm) Height Method: Stated  Last Weight: 57.6 kg (126 lb 15.8 oz) (09/21/23 1707) Weight Method: Standard Scale  BMI (Calculated): 18.7   Nutrition PN 9/22    Delayed Wound Healing     x Acute or Chronic Illness Recurrent Hypercalcemia d/t malignancy, Hypomagnesemia, Laryngeal cancer with lung mets, Depression, Anxiety, macrocytic Anemia, Hypothyroid, COPD   Nutrition PN 9/22    Social or Environmental Circumstances     x Treatment 1. Regular diet with dairy restriction  2. Boost Plus (provides 360 kcal, 14 g protein per serving) x 1 daily  3. Resume home Megace to stimulate appetite  4. Suggest daily MVI  5. Monitor Weight biweekly " "    Nutrition-Related Medications: NaCl @ 150 ml/hr, Calcitonin, Levothyroxine   Nutrition PN 9/22   x Other Pt with fair po intake x "couple weeks" per pt's wife; taking Megace at home -- resume; CA (H) -- normally drinks 1 Ensure mixed with ice cream daily, reports does not drink any other milk & rarely uses cheeses; Suggest Boost Breeze to omit calcium however dislikes; Receiving calcitonin, calcium content of foods provided; Significant wt loss noted in the last week    PES: Malnutrition related to catabolic illness as evidenced by < 75% nutrition intake > 1 week, > 2% wt loss x 1 week, fat/muscle wasting. (new)    Frail appearance   Nutrition PN 9/22                  ED Note 9/21     Academy of Nutrition and Dietetics (Academy) and the American Society for Parenteral and Enteral Nutrition (A.S.P.E.N.) Clinical Characteristics to support Malnutrition      Criteria for mild malnutrition is defined as 1 characteristic outlined above within the established moderate or severe parameters.  A minimum of 2 out of the 6 characteristics noted above are recommended for a diagnosis of moderate or severe malnutrition.  Chronic illness/injury is a disease/condition lasting 3 months or longer.    The noted clinical guidelines are only system guidelines and do not replace the providers clinical judgment.    Provider, please specify diagnosis or diagnoses associated with above clinical findings.    Select All That Apply.    [  ] Moderate Malnutrition - a minimum of 2 of the 6 moderate malnutrition characteristics noted above      [ x ] Cachexia - involuntary weight loss of greater than 10% of baseline body weight characteried by muscle atrophy and depletion of lean body mass usually associated with a chronic condition     [  ] Other Nutritional Diagnosis (please specify): _______       Please document in your progress notes daily for the duration of treatment until resolved and  include in your discharge " summary.      References:    ZAKIYA Palomino, & LANI King (2022, April). Assessment and management of anorexia and cachexia in palliative care. Retrieved May 23, 2022, from [email or web address]/contents/assessment-and-management-of-anorexia-and-cachexia-in-palliative-care?[email or web address]=see_link     MALIK Cortez, PhD, RD, LIANNA, YOHAN Araiza, PhD, RN, NATACHA Manjarrez MD, PhD, Oziel HOLGUIN A., MS, RD, [facility], MIKE Workman, MS, RD, The Academy Malnutrition Work Group, The A.S.P.E.N. Board of Directors. (2012). Consensus Statement: Academy of Nutrition and Dietetics and American Society for Parenteral and Enteral Nutrition: Characteristics Recommended for the Identification and Documentation of Adult Malnutrition (Undernutrition). Journal of Parenteral and Enteral Nutrition, 36(3), 275-283. doi:10.1177/0393624592361514     Form No. 44206

## 2023-09-26 NOTE — PT/OT/SLP PROGRESS
"Occupational Therapy   Treatment    Name: Josafat Boudreaux  MRN: [id number]  Admitting Diagnosis: recurrent Hypercalcemia   2/2 malignancy hypomagnesemia , metabolic acidosis     Recommendations:     Discharge Recommendations: home with home health  Discharge Equipment Recommendations:  none  Barriers to discharge:       Assessment:     Josafat Boudreaux is a 57 y.o. male with a medical diagnosis of Hypercalcemia.  He presents with continued generalized weakness , decreased activity tolerance and decreased balance. Performance deficits affecting function are weakness, impaired endurance, impaired self care skills, impaired functional mobility, impaired balance, gait instability, pain. Pt req's encouragement to participate due to c/o fatigue. Limited tolerance to treatment. Reports yesterday's OT session was "too much"    Rehab Prognosis:  Fair; patient would benefit from acute skilled OT services to address these deficits and reach maximum level of function.       Plan:     Patient to be seen 3 x/week to address the above listed problems via self-care/home management, therapeutic activities, therapeutic exercises, neuromuscular re-education  Plan of Care Expires:    Plan of Care Reviewed with: patient, spouse    Subjective     Chief Complaint: c/o fatigue  Patient/Family Comments/goals: none stated  Pain/Comfort:       Objective:     Communicated with: nurse prior to session.  Patient found HOB elevated with peripheral IV upon OT entry to room.    General Precautions: Standard,  (special contact neutropenic isolation)    Orthopedic Precautions:   Braces:    Respiratory Status: Room air     Occupational Performance:     Bed Mobility:    Patient completed Rolling/Turning to Right with modified independence  Patient completed Scooting/Bridging with modified independence  Patient completed Supine to Sit with modified independence  Patient completed Sit to Supine with modified independence     Functional " Mobility/Transfers:  Patient completed Sit <> Stand Transfer with contact guard assistance  with  rolling walker   Functional Mobility: pt ambulated bed<>sink chuck 8 ft with RW CGA    Activities of Daily Living:  Grooming: pt tolerated standing at sink x 6 minutes for grooming: brushed teeth ; pt req'd 1 UE support during tasks, for B UE coordination tasks pt req'd pelvic support on sink , CGA for standing balance      AMPAC 6 Click ADL:      Treatment & Education:  Education on completing standing trials at EOB without UE support with spouse    Patient left HOB elevated with all lines intact, call button in reach, and spouse present    GOALS:   Multidisciplinary Problems       Occupational Therapy Goals          Problem: Occupational Therapy    Goal Priority Disciplines Outcome Interventions   Occupational Therapy Goal     OT, PT/OT Ongoing, Progressing    Description: Pt will complete grooming in standing at sink SBA  Pt will ambulate to bathroom with RW to complete toilet t/f SBA  Pt will increase standing endurance x 8 minutes without seated restbreak.  In order to increase activity tolerance for ADL's, pt will participate in general B UE therex x 10 minutes                       Time Tracking:     OT Date of Treatment: 09/26/23  OT Start Time: 0935  OT Stop Time: 0955  OT Total Time (min): 20 min    Billable Minutes:Self Care/Home Management 1    OT/MOR: OT     Number of MOR visits since last OT visit: 0    9/26/2023

## 2023-09-26 NOTE — PT/OT/SLP PROGRESS
Physical Therapy    Missed Treatment Session    Patient Name:  Josafat Boudreaux   MRN:  [id number]      Patient not seen at this time secondary to Patient fatigue. Pt. Was sleeping at 10:24. Will come back later today.    Will follow-up as patient is available to participate and as therapists' schedule allows.

## 2023-09-26 NOTE — CONSULTS
[facility] Endocrinology Consult Note       Reason for Consult:      hypercalcemia    Subjective:     History of Present Illness:  Josafat Boudreaux is a 57 y.o. male who  has a past medical history of COPD, hypothyroidism, DVT to RUE, stage 4 larygenal SCC, bilateral lung mass - Squamous cell - metastatic SqCC lung cancer vs metastatic SqCC to the lungs from H&N, mediastinal and Hilar LAD, s/p laryngectomy with radical neck dissection (7/2022), radiation and multiple rounds of chemotherapy. Endocrinology is being consulted for management of hypercalcemia    He presented to [facility] ED on 9/21/2023 from hem/onc clinic due to hypercalcemia.  Of note patient was recently admitted from 09/13/2023-09/18/2023 also for hypercalcemia.  During that hospital stay patient's hypercalcemia gradually improved after treatment with IVF, calcitonin, and zoledronic acid. Also found to have elevated PTHrp of 27. Prior to presentation, patient was at infusion clinic strictly for intravenous fluids and a BMP check and was sent to ED after he was found to have elevated/critical calcium level. Wife mnoted altered mental status from baseline, increased lethargy and drowsiness prior to presentation.  She also mentions of decreased oral intake of food and fluids. Patient had elevated calcium of 14.1 on presentation, which was treated with calcitonin, zoledronic acid and IVF resuscitation. Calcium was initially downtrending, however, over the past few days has again began to trend upwards, with corrected calcium of 13.4 today; thus Endocrinology being consulted for further assistance           Review of Systems:  ROS negative except for HPI     Past Medical History: See above    Past Surgical History:  Past Surgical History:   Procedure Laterality Date    DIAGNOSTIC LAPAROSCOPY N/A 5/18/2023    Procedure: LAPAROSCOPY, DIAGNOSTIC;  Surgeon: Dipak De Leon Jr., MD;  Location: [facility];  Service: General;  Laterality: N/A;    DIRECT DIAGNOSTIC  LARYNGOSCOPY WITH BRONCHOSCOPY AND ESOPHAGOSCOPY N/A 07/11/2022    Procedure: LARYNGOSCOPY, DIRECT, DIAGNOSTIC, WITH BRONCHOSCOPY AND ESOPHAGOSCOPY;  Surgeon: Emory Alonso MD;  Location: [facility] OR;  Service: ENT;  Laterality: N/A;    ESOPHAGOGASTRODUODENOSCOPY (EGD)- DEVICE ASSISTED N/A 04/18/2023    Procedure: EGD;  Surgeon: Aaliyah Conrad III, MD;  Location: [facility] ENDOSCOPY;  Service: Gastroenterology;  Laterality: N/A;  Esophageal Dilation; Savory over Guide wire: 36Fr and 39 Fr  Clips to secure peg site    HIP SURGERY      HUMERUS FRACTURE SURGERY      INCISION AND DRAINAGE, NECK Bilateral 10/21/2022    Procedure: INCISION AND DRAINAGE,NECK;  Surgeon: Madison Worley MD;  Location: [facility] OR;  Service: ENT;  Laterality: Bilateral;    INSERT ARTERIAL LINE  06/26/2022         JOINT REPLACEMENT  2019    R hip    PEG TUBE REMOVAL      PLACEMENT, MEDIPORT      TRACHEOSTOMY N/A 06/10/2022    Procedure: CREATION, TRACHEOSTOMY;  Surgeon: Junior Alonso MD;  Location: [facility];  Service: ENT;  Laterality: N/A;       Allergies:  Review of patient's allergies indicates:  No Known Allergies    Home Medications:  Prior to Admission medications    Medication Sig Start Date End Date Taking? Authorizing Provider   albuterol-ipratropium (DUO-NEB) 2.5 mg-0.5 mg/3 mL nebulizer solution Take 3 mLs by nebulization every 4 (four) hours as needed for Shortness of Breath or Wheezing. Rescue 2/7/23 2/7/24 Yes Michael Monson MD   apixaban (ELIQUIS) 5 mg Tab Take 1 tablet (5 mg total) by mouth As instructed. 3/22/23  Yes Yara Atkinson MD   diphenhydrAMINE (BENADRYL) 25 mg capsule 1 capsule (25 mg total) by Per G Tube route every 6 (six) hours as needed for Itching.  Patient taking differently: 25 mg by Per G Tube route every 6 (six) hours as needed for Itching. Takes liquid mediation 6/23/22  Yes Ra Quintanilla MD   famotidine (PEPCID) 20 MG tablet Take 1 tablet (20 mg total) by mouth every evening.  Patient taking  differently: Take 20 mg by mouth every evening. 8/10/23 8/9/24 Yes Tony Bertrand MD   glutamine 10 gram PwPk Take 10 g by mouth 2 (two) times a day.   Yes Provider, Historical   hydrocodone-acetaminophen (HYCET) solution 7.5-325 mg/15mL Take 10 mLs by mouth every 6 (six) hours as needed for Pain. 9/18/23 10/18/23 Yes Carola Stein,    levothyroxine (SYNTHROID) 50 MCG tablet Take 1 tablet (50 mcg total) by mouth once daily. 5/15/23 5/14/24 Yes Yara Atkinson MD   megestroL (MEGACE) 400 mg/10 mL (40 mg/mL) Susp  23  Yes Provider, Historical   ondansetron (ZOFRAN-ODT) 8 MG TbDL Take 8 mg by mouth every 8 (eight) hours as needed for Nausea. Tab 1 ODT in AM for 2 days after chemotherapy   Yes Provider, Historical   pantoprazole (PROTONIX) 20 MG tablet Take 1 tablet (20 mg total) by mouth once daily. 8/10/23 8/9/24 Yes Tony Bertrand MD   sodium chloride 3% 3 % nebulizer solution Take 4 mLs by nebulization as needed for Other or Cough (wheezing). 23  Yes Tawanna Preston MD   metroNIDAZOLE (METROGEL) 0.75 % (37.5mg/5 gram) vaginal gel apply to affected area BID 23   Provider, Historical   aspirin (ECOTRIN) 81 MG EC tablet Take 81 mg by mouth once daily.  22  Provider, Historical       Family History:  Family History   Problem Relation Age of Onset    Lung cancer Father     Cancer Father         Lung cancer    Throat cancer Brother     Cancer Brother         Throat       Social History:  Social History     Tobacco Use    Smoking status: Former     Current packs/day: 0.00     Average packs/day: 1.5 packs/day for 42.4 years (63.7 ttl pk-yrs)     Types: Cigarettes     Start date: 1980     Quit date: 2022     Years since quittin.2    Smokeless tobacco: Never   Substance Use Topics    Alcohol use: Yes     Alcohol/week: 1.0 standard drink of alcohol     Types: 1 Cans of beer per week     Comment: occasional    Drug use: Never          Objective:   Vitals  Vitals  BP:  "103/66  Temp: 99 °F (37.2 °C)  Temp Source: Oral  Pulse: (!) 119  Resp: 16  SpO2: 95 %  Height: 5' 9.02" (175.3 cm)  Weight: 57.6 kg (126 lb 15.8 oz)    Physical Examination:    General: Awake, alert, & oriented to person, place & time. Able to form words with mouth  but unable to verbalize (s/p laryngectomy, trach) No acute distress  Psychiatric: Mood and affect normal  HEENT: Normocephalic, atraumatic. Face symmetric. Mucous membranes moist. Trach in place.   Cardiovascular: Regular rate & rhythm. Normal S1 & S2 w/out murmurs, rubs or gallops.  Pulmonary: Bilateral symmetric chest rise. Non-labored, course breath sounds b/l  Abdominal:  Soft, nontender, nondistended. Bowel sounds present  Extremities: No clubbing, cyanosis or edema  Skin:  Warm & dry.  Neuro:   no focal deficit on brief exam     Radiology:  No results found in the last 24 hours.     Assessment & Plan:   PTHrp mediated Hypercalcemia  -Calcium on presentation of 14.1; has been uptrending over past few days with corrected calcium of 13.4 today  -PTHrp of 27 on 9/13/23  -Continue calcitonin but increase to 400 units q 6 hours  -Initiate subq denosumab 120 mg once weekly  -Can consider repeat IV zoledronic acid 9/28/23 if no improvement in calcium   -AM cortisol ordered and pending    Galina Galvan MD  [facility] Internal Medicine, PGY-3   "

## 2023-09-26 NOTE — PROGRESS NOTES
[facility] Medicine Wards   Progress Note     Resident Team: [facility] Medicine List 3  Attending Physician: Ashley Erazo MD  Resident: Bessy Stein  [facility]   [facility] Length of Stay: 1 day    Subjective:      Brief HPI:  Josafat Boudreaux is a 57 y.o. male who with a history of COPD, hypothyroidism, DVT to RUE, stage 4 larygenal SCC, bilateral lung mass - Squamous cell - metastatic SqCC lung cancer vs metastatic SqCC to the lungs from H&N, mediastinal and Hilar LAD, s/p laryngectomy with radical neck dissection (7/2022) who presented to [facility] ED on 9/21/2023 from hem/onc clinic for concern of elevated calcium levels.  His wife is present for the encounter.  Of note patient was just recently admitted on 09/13/2023 for similar problem of hypercalcemia and was discharged on 09/18/2023.  During that hospital stay patient's hypercalcemia gradually improved and he was also repleted for hypokalemia and hypomagnesemia.  He was noted to have elevated PTHrp of 27 after he was discharged.   His wife mentions that they were at the infusion clinic today strictly for intravenous fluids and a BMP check.  They noticed elevated/critical calcium level and were told to go to the ED. patient does not have any acute complaints; however his wife mentions that he does not complain much.  She has noticed him being more lethargic, harder to arouse, and sleeping too much since yesterday afternoon.  As per her, he is more altered from his baseline.  His last bowel movement was Monday night.  And she has also noticed dark urine than normal this morning.  She also mentions of decreased oral intake of food and fluids.      In the ED, patient was noted to be tachycardic, afebrile, and normotensive.  His CBC was noted for microcytic anemia.  His CMP was noted for mild hyponatremia of 130, critically elevated calcium of 14.1, and low magnesium of 1.10.  Patient was given 1 dose of 230 units of calcitonin and 4 mg of zoledronic acid in the  ED. his magnesium was also repleted.  Internal medicine was consulted for management of hypercalcemia and admission.     Interval History:   No acute events overnight. He has remained afebrile.  He continues to have tachycardia otherwise vital signs stable.  Patient has no acute complaints.  His wife mentions that his mentation was slightly confused last night but much improved this morning. He has chronic back pain but well controlled with his pain medication.  CBC continued to be noted for macrocytic anemia.  CMP remarkable for non-anion gap metabolic acidosis and gradual uptrend of calcium now at 12.  Hypophosphatemia and hypomagnesemia also noted.  Endocrinology was consulted.     Review of Systems:  Review of Systems   Constitutional:  Negative for chills and fever.   HENT:  Negative for congestion, sinus pain and sore throat.    Eyes:  Negative for blurred vision.   Respiratory:  Negative for cough, hemoptysis, sputum production, shortness of breath and wheezing.    Cardiovascular:  Negative for chest pain, palpitations, orthopnea and leg swelling.   Gastrointestinal:  Negative for abdominal pain, constipation, diarrhea, heartburn, nausea and vomiting.   Genitourinary:  Negative for dysuria, frequency, hematuria and urgency.   Musculoskeletal:  Positive for back pain (chronic). Negative for joint pain and neck pain.   Neurological:  Positive for weakness. Negative for dizziness, speech change, focal weakness, seizures and headaches.   Psychiatric/Behavioral:  Negative for depression. The patient is not nervous/anxious and does not have insomnia.           Objective:     Vital Signs (Most Recent):  Temp: 99 °F (37.2 °C) (09/26/23 1145)  Pulse: (!) 119 (09/26/23 1145)  Resp: 16 (09/26/23 1431)  BP: 103/66 (09/26/23 1145)  SpO2: 95 % (09/26/23 1145) Vital Signs (24h Range):  Temp:  [97.8 °F (36.6 °C)-99 °F (37.2 °C)] 99 °F (37.2 °C)  Pulse:  [108-124] 119  Resp:  [14-18] 16  SpO2:  [93 %-98 %] 95 %  BP:  (103-135)/(66-80) 103/66       Physical Examination:  Physical Exam  Vitals and nursing note reviewed.   Constitutional:       General: He is not in acute distress.     Comments: Limited voice due to trach. Mouths word.     HENT:      Head: Normocephalic and atraumatic.      Mouth/Throat:      Mouth: Mucous membranes are moist.   Eyes:      General: No scleral icterus.     Pupils: Pupils are equal, round, and reactive to light.   Neck:      Comments: Trach in place - clean and no signs of drainage/sputum  Cardiovascular:      Rate and Rhythm: Normal rate and regular rhythm.      Pulses: Normal pulses.      Heart sounds: Normal heart sounds.   Pulmonary:      Effort: Pulmonary effort is normal. No respiratory distress.      Comments: Rhonchi in upper lobes bilaterally  Abdominal:      General: Bowel sounds are normal. There is no distension.      Palpations: Abdomen is soft.      Tenderness: There is no abdominal tenderness.   Musculoskeletal:         General: No swelling or tenderness. Normal range of motion.      Right lower leg: No edema.      Left lower leg: No edema.   Skin:     General: Skin is warm and dry.      Capillary Refill: Capillary refill takes less than 2 seconds.   Neurological:      General: No focal deficit present.      Mental Status: He is alert and oriented to person, place, and time.   Psychiatric:         Mood and Affect: Mood normal.         Behavior: Behavior normal.       Laboratory:  Most Recent Data:  All pertinent lab results from the last 24 hours have been reviewed.  Recent Lab Results         09/26/23  0514        Immature Platelet Fraction 9.9       Albumin/Globulin Ratio 0.6       Albumin 2.3       Alkaline Phosphatase 134       ALT 16       AST 38       Baso # 0.02       Basophil % 0.3       BILIRUBIN TOTAL 0.6       BUN 4.9       Calcium 12.0       Chloride 104       CO2 19       Creatinine 0.63       eGFR >60       Eos # 0.08       Eosinophil % 1.4       Globulin, Total 3.6        Glucose 100       Hematocrit 26.9       Hemoglobin 9.3       Immature Grans (Abs) 0.03       Immature Granulocytes 0.5       Lymph # 0.43       LYMPH % 7.5       Magnesium  1.30       MCH 36.3       MCHC 34.6       .1       Mono # 0.60       Mono % 10.5       MPV 12.7       Neut # 4.58       Neut % 79.8       nRBC 0.0       Phosphorus 1.2       Platelets 40       Potassium 3.8       PROTEIN TOTAL 5.9       RBC 2.56       RDW 16.4       Sodium 133       WBC 5.74                 Microbiology Data Reviewed: no  Pertinent Findings:  None    Other Results:  EKG (my interpretation): sinus tachycardia.    Radiology:  Imaging Results              X-Ray Chest AP Portable (Final result)  Result time 09/21/23 17:28:04      Final result by Jackie Loaiza MD (09/21/23 17:28:04)                   Impression:      No acute process      Electronically signed by: Jackie Loaiza  Date:    09/21/2023  Time:    17:28               Narrative:    EXAMINATION:  XR CHEST AP PORTABLE    CLINICAL HISTORY:  abnormal lung sounds;    TECHNIQUE:  Single frontal view of the chest was performed.    COMPARISON:  08/05/2023    FINDINGS:  There are chronic appearing lung changes seen bilaterally. No evidence of acute infiltrate is seen. No mass is seen. No lesion is seen. No pleural effusion is seen. The heart appears normal. The aorta appears normal. The bones and joints show no acute abnormality.  There are postsurgical changes seen in the right humerus.  There is a port in the left anterior chest wall.                                      Current Medications:     Infusions:   sodium bicarbonate 100 mEq in dextrose 5 % and 0.45 % NaCl 1,000 mL infusion 125 mL/hr at 09/26/23 1225        Scheduled:   apixaban  5 mg Oral BID    calcitonin  400 Units Subcutaneous QID    denosumab  120 mg Subcutaneous Once    diphenhydrAMINE  25 mg Oral QHS    famotidine  20 mg Oral QHS    levothyroxine  50 mcg Oral Daily    megestroL  400 mg Oral  Daily    mupirocin   Nasal BID    sertraline  25 mg Oral Daily    sodium chloride 0.9%  10 mL Intravenous Q6H    sodium phosphate 30 mmol in dextrose 5 % (D5W) 250 mL IVPB  30 mmol Intravenous Once        PRN:  acetaminophen, albuterol-ipratropium, hydrocodone-apap 7.5-325 MG/15 ML, melatonin, ondansetron, polyethylene glycol, sodium chloride 0.9%, Flushing PICC/Midline Protocol **AND** sodium chloride 0.9% **AND** sodium chloride 0.9%    Antibiotics and Day Number of Therapy:  none      Intake/Output Summary (Last 24 hours) at 9/26/2023 1540  Last data filed at 9/26/2023 0347  Gross per 24 hour   Intake 4512.75 ml   Output --   Net 4512.75 ml         Lines/Drains/Airways       Airway  Duration                  Laryngectomy (Do Not Remove) Laryngectomy tube -- days              Peripheral Intravenous Line  Duration                  Midline Catheter Insertion/Assessment  - Single Lumen 09/24/23 0937 Left brachial vein other (see comments) 2 days                      Assessment & Plan:     Recurrent Hypercalcemia 2/2 malignancy  - Ca 14 at infusion clinic and noted at 14.1 in the ED  - mild uptrend to 12 today  - Corrected calcium for albumin = 13.4  - PTHrp at 27 collected on 9/13/23   - Completed calcitonin 4units/kg Q12H x 2 days, IV zoledronic acid 4 mg at 200cc/hr x1  - he received 1 dose of 230 units of calcitonin today prior to endocrinology consult  - as per endocrinology recommendation, we will increase his calcitonin to 400 units every 6 hours; we will also initiate subcutaneous denosumab 120 mg once weekly.  Can consider repeat IV zoledronic acid 09/28/2023 if no improvement in calcium  - a.m. cortisol ordered and pending.   - will resume normal saline 150 cc/hour once bicarb D5 1/2 NS is done today.     Non-anion gap metabolic acidosis  -Likely from normal saline infusion  - continue to monitor    Electrolyte abnormalities  -Will continue to monitor, phosphorus, magnesium and potassium levels; replete as  needed      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 Underwent another cycle of carbo/taxol completed on 8/23/23.   - follows closely with Heme/onc and ENT  - plans to restart keytruda maintenance therapy soon.     Depression/Anxiety  - PHQ-9 score of 15  - Continue sertraline 25 mg once daily    Chronic back pain  Lumbar radiculopathy  - will continue skilled acute PT services while patient     Macrocytic anemia  -B12 and folate WNL  -Likely d/t Hx of cancer     Hypothyroidism  - TSH wnl last admission  - will continue home levothyroxine 50 mcg     Hx DVT RUE  - RUE DVT found in 03/2023  - Continue Eliquis while inpatient     COPD  - on duonebs at home   - will continue PRN        CODE STATUS: Full Code  Access: L Midline  Antibiotics: none  Diet: Adult regular  DVT Prophylaxis: Eliquis  GI Prophylaxis: Famotidine  Fluids: NS at 150 cc/hour     Disposition: Admitted for management humoral hypercalcemia of malignancy.  Anticipate discharge back to home once medically stable.     Dov Conley  Internal Medicine - PGY-1

## 2023-09-26 NOTE — PT/OT/SLP PROGRESS
Physical Therapy Treatment    Patient Name:  Josafat Boudreaux   MRN:  [id number]    Recommendations     Discharge Recommendations:  home with home health   Discharge Equipment Recommendations: wheelchair   Barriers to discharge: fall risk, level of skilled assistance required, and decreased endurance    Assessment     Josafat Boudreaux is a 57 y.o. male admitted with a medical diagnosis of Hypercalcemia.    He presents with the following impairments/functional limitations:  weakness, impaired endurance, impaired functional mobility, gait instability, decreased lower extremity function, impaired balance, impaired cardiopulmonary response to activity.    Rehab Prognosis: Fair.    Patient would benefit from continued skilled acute PT services to: address above listed impairments/functional limitations; receive patient/caregiver education; reduce fall risk; and maximize independency/safety with functional mobility.    Recent Surgery: * No surgery found *      Plan     During this hospitalization, patient to be seen 3 x/week to address the identified impairments/functional limitations via gait training, therapeutic activities, therapeutic exercises and progress toward the established goals.    Plan of Care Expires:  10/21/23    Subjective     Communicated with patient's nurse Sanchez prior to session.    Patient agreeable to participate in treatment session.    Chief Complaint: none  Patient/Family Comments/goals: get stronger  Pain/Comfort:  Pain Rating 1: 0/10  Pain Rating Post-Intervention 1: 0/10    Objective     Patient found supine in bed and with HOB elevated with peripheral IV  upon PT entry to room.    General Precautions: Standard, fall   Orthopedic Precautions:N/A   Braces:    Respiratory Status: room air    Functional Mobility:    Bed Mobility:  Supine to Sit: stand by assistance    Transfers:  Sit to Stand: contact guard assistance with rolling walker    Gait:  Patient ambulated 130 ft with rolling  walker and contact guard assistance.  Patient demonstrates no loss of balance, no mis-steps, decreased stephanie, decreased step length, and flexed posture.    Other Mobility:  not assessed    Patient left supine in bed and with HOB elevated with all lines intact, call button in reach, Laura notified, and wife and mother in law present.    Goals     Multidisciplinary Problems       Physical Therapy Goals          Problem: Physical Therapy    Goal Priority Disciplines Outcome Goal Variances Interventions   Physical Therapy Goal     PT, PT/OT Ongoing, Progressing     Description: Goals to be met by: dc     Patient will increase functional independence with mobility by performin. Sit to stand transfer with Modified [place]  2. Gait  x 130 feet with Modified [place] using Rolling Walker.   Reviewed 2023                         Time Tracking     PT Received On: 23  PT Start Time: 1343     PT Stop Time: 1359  PT Total Time (min): 16 min     Billable Minutes: Gait Training 16    2023

## 2023-09-27 NOTE — MEDICAL/APP STUDENT
[facility] Endocrinology Consult Note     Reason for Consult:      hypercalcemia    Subjective:     History of Present Illness:  Josafat Boudreaux is a 56 yo male with past medical history of COPD, hypothyroidism, DVT of RUE, stage 4 laryngeal SCC, bilateral squamous cell lung mass (metastatic squamous cell lung cancer vs. metastatic SCC to lungs from head and neck), mediastinal and hilar LAD, s/p laryngectomy with radial neck dissection (7/2022), radiation and multiple rounds of chemotherapy. Endocrinology is being consulted for the management of hypercalcemia.    The patient presented to [facility] ED on 9/21/2023 from hem/onc clinic due to hypercalcemia. Patient was previously admitted from 9/13/2023-9/18/2023 for hypercalcemia. He was found to have elevated PTHrp of 27 on 9/13/2023. During that admission his hypercalcemia improved after treatment with IVF, calcitonin, and zoledronic acid. Prior to presentation on 9/21/2023, patient was at infusion clinic for IVF and BMP check and was sent to the ED after he was found to have elevated calcium level. The patient's wife noted AMS from baseline, increased lethargy and drowsiness, and decreased PO food and fluid intake prior to presentation. Patient was found to have elevated calcium of 14.1 on presentation and was treated with calcitonin, zoledronic acid, and IVF. Calcium initially trended down but began to trend upwards on 9/24/23. Today corrected calcium is 13.1 mg/dL (compared to 13.4 mg/dL on 9/26/23). Endocrinology is being consulted for further assistance.    9/27/2023:  Of note, patient's wife answers for patient as patient is unable to verbalize. Patient's wife reports that patient continues to have difficulty breathing while lying flat. She also reports he was experiencing wheezing which resolved following a breathing treatment administered by the respiratory therapist. She notes the patient has been experiencing constipation and bilateral leg weakness. The patient is  able to walk to the bathroom but per his wife, the patient's legs feel shaky. She reports the patient is able to urinate using the commode without difficulty. She notes that he has been lethargic and oriented to person and place but not to time.    Review of Systems:  Constitutional:  Negative for chills and fever.   HENT:  Negative for congestion, sinus pain and sore throat.    Eyes:  Negative for blurred vision.   Respiratory:  Negative for cough, hemoptysis, sputum production, shortness of breath. Positive for wheezing.  Cardiovascular:  Negative for chest pain, palpitations, and leg swelling. Positive for orthopnea.  Gastrointestinal:  Negative for abdominal pain, diarrhea, heartburn, nausea and vomiting. Positive for constipation.  Genitourinary:  Negative for dysuria, frequency, hematuria and urgency.   Musculoskeletal:  Positive for back pain (chronic). Negative for joint pain and neck pain.   Neurological:  Positive for bilateral leg weakness. Negative for dizziness, headache, numbness and tingling.    Past Medical History: See above    Past Surgical History:  Past Surgical History:   Procedure Laterality Date    DIAGNOSTIC LAPAROSCOPY N/A 5/18/2023    Procedure: LAPAROSCOPY, DIAGNOSTIC;  Surgeon: Dipak De Leon Jr., MD;  Location: [facility] OR;  Service: General;  Laterality: N/A;    DIRECT DIAGNOSTIC LARYNGOSCOPY WITH BRONCHOSCOPY AND ESOPHAGOSCOPY N/A 07/11/2022    Procedure: LARYNGOSCOPY, DIRECT, DIAGNOSTIC, WITH BRONCHOSCOPY AND ESOPHAGOSCOPY;  Surgeon: Emory Alonso MD;  Location: [facility] OR;  Service: ENT;  Laterality: N/A;    ESOPHAGOGASTRODUODENOSCOPY (EGD)- DEVICE ASSISTED N/A 04/18/2023    Procedure: EGD;  Surgeon: Aaliyah Conrad III, MD;  Location: [facility] ENDOSCOPY;  Service: Gastroenterology;  Laterality: N/A;  Esophageal Dilation; Savory over Guide wire: 36Fr and 39 Fr  Clips to secure peg site    HIP SURGERY      HUMERUS FRACTURE SURGERY      INCISION AND DRAINAGE, NECK Bilateral 10/21/2022     Procedure: INCISION AND DRAINAGE,NECK;  Surgeon: Madison Worley MD;  Location: [facility] OR;  Service: ENT;  Laterality: Bilateral;    INSERT ARTERIAL LINE  06/26/2022         JOINT REPLACEMENT  2019    R hip    PEG TUBE REMOVAL      PLACEMENT, MEDIPORT      TRACHEOSTOMY N/A 06/10/2022    Procedure: CREATION, TRACHEOSTOMY;  Surgeon: Junior Alonso MD;  Location: [facility] OR;  Service: ENT;  Laterality: N/A;       Allergies:  Review of patient's allergies indicates:  No Known Allergies    Home Medications:  Prior to Admission medications    Medication Sig Start Date End Date Taking? Authorizing Provider   albuterol-ipratropium (DUO-NEB) 2.5 mg-0.5 mg/3 mL nebulizer solution Take 3 mLs by nebulization every 4 (four) hours as needed for Shortness of Breath or Wheezing. Rescue 2/7/23 2/7/24 Yes Michael Monson MD   apixaban (ELIQUIS) 5 mg Tab Take 1 tablet (5 mg total) by mouth As instructed. 3/22/23  Yes Yara Atkinson MD   diphenhydrAMINE (BENADRYL) 25 mg capsule 1 capsule (25 mg total) by Per G Tube route every 6 (six) hours as needed for Itching.  Patient taking differently: 25 mg by Per G Tube route every 6 (six) hours as needed for Itching. Takes liquid mediation 6/23/22  Yes Ra Quintanilla MD   famotidine (PEPCID) 20 MG tablet Take 1 tablet (20 mg total) by mouth every evening.  Patient taking differently: Take 20 mg by mouth every evening. 8/10/23 8/9/24 Yes Tony Bertrand MD   glutamine 10 gram PwPk Take 10 g by mouth 2 (two) times a day.   Yes Provider, Historical   hydrocodone-acetaminophen (HYCET) solution 7.5-325 mg/15mL Take 10 mLs by mouth every 6 (six) hours as needed for Pain. 9/18/23 10/18/23 Yes Carola Stein DO   levothyroxine (SYNTHROID) 50 MCG tablet Take 1 tablet (50 mcg total) by mouth once daily. 5/15/23 5/14/24 Yes Yara Atkinson MD   megestroL (MEGACE) 400 mg/10 mL (40 mg/mL) Susp  8/1/23  Yes Provider, Historical   ondansetron (ZOFRAN-ODT) 8 MG TbDL Take 8 mg by mouth  "every 8 (eight) hours as needed for Nausea. Tab 1 ODT in AM for 2 days after chemotherapy   Yes Provider, Historical   pantoprazole (PROTONIX) 20 MG tablet Take 1 tablet (20 mg total) by mouth once daily. 8/10/23 8/9/24 Yes Tony Bertrand MD   sodium chloride 3% 3 % nebulizer solution Take 4 mLs by nebulization as needed for Other or Cough (wheezing). 23  Yes Tawanna Preston MD   metroNIDAZOLE (METROGEL) 0.75 % (37.5mg/5 gram) vaginal gel apply to affected area BID 23   Provider, Historical   aspirin (ECOTRIN) 81 MG EC tablet Take 81 mg by mouth once daily.  22  Provider, Historical       Family History:  Family History   Problem Relation Age of Onset    Lung cancer Father     Cancer Father         Lung cancer    Throat cancer Brother     Cancer Brother         Throat       Social History:  Social History     Tobacco Use    Smoking status: Former     Current packs/day: 0.00     Average packs/day: 1.5 packs/day for 42.4 years (63.7 ttl pk-yrs)     Types: Cigarettes     Start date: 1980     Quit date: 2022     Years since quittin.3    Smokeless tobacco: Never   Substance Use Topics    Alcohol use: Yes     Alcohol/week: 1.0 standard drink of alcohol     Types: 1 Cans of beer per week     Comment: occasional    Drug use: Never          Objective:   Vitals  Vitals  BP: 117/71  Temp: 98.6 °F (37 °C)  Temp Source: Oral  Pulse: (!) 125  Resp: 18  SpO2: 95 %  Height: 5' 9.02" (175.3 cm)  Weight: 57.6 kg (126 lb 15.8 oz)    Physical Examination:  General: Awake, alert. NAD. Able to form words with mouth but unable to verbalize (s/p laryngectomy, trach).  Psychiatric: Mood and affect normal  HEENT: Normocephalic, atraumatic. Face symmetric. Mucous membranes moist. Trach in place.   Cardiovascular: Regular rate & rhythm. Normal S1 & S2 appreciated.  Pulmonary: Bilateral symmetric chest rise. Non-labored bilateral wheezes.  Abdominal:  Soft, nontender, nondistended. No rebound or " guarding.  Extremities: No clubbing, cyanosis or edema.  Skin:  Warm & dry.  Neuro:  CN II-XII intact.    Laboratory:  9/27/23:  Sodium 132 mmol/L  Total calcium 11.8 mg/dL  Albumin 2.4 g/dL  Alkaline phosphatase 166 unit/L  Aspartate aminotransferase 42 unit/L  Magnesium 1.30 mg/dL  Phosphorous 1.3 mg/dL    Radiology:  No results found in the last 24 hours.     Assessment & Plan:   PTHrp mediated Hypercalcemia  -Calcium on presentation of 14.1 mg/dL; initially trended down and then started trending upwards on 9/24/23; corrected calcium of 13.1 mg/dL today vs. 13.4 mg/dL yesterday  -PTHrp of 27 on 9/13/23  -Continue calcitonin 400 units subQ q6h  -Continue denosumab 120 mg subQ once weekly  -Consider repeat IV zoledronic acid if calcium is > 13.0 mg/dL on 9/28/23      Jess Castle  [employer], MS3

## 2023-09-27 NOTE — PT/OT/SLP PROGRESS
Physical Therapy    Missed Treatment Session    Patient Name:  Josafat Boudreaux   MRN:  [id number]      Patient not seen at this time secondary to Patient fatigue.    Will follow-up as patient is available to participate and as therapists' schedule allows.

## 2023-09-27 NOTE — PT/OT/SLP PROGRESS
Physical Therapy    Missed Treatment Session    Patient Name:  Josafat Boudreaux   MRN:  [id number]      Patient not seen at this time secondary to Other (Comment).Working with OT.    Will follow-up as patient is available to participate and as therapists' schedule allows.

## 2023-09-27 NOTE — PT/OT/SLP PROGRESS
Physical Therapy Treatment    Patient Name:  Josafat Boudreaux   MRN:  [id number]    Recommendations     Discharge Recommendations:  home with home health   Discharge Equipment Recommendations: wheelchair   Barriers to discharge: fall risk    Assessment     Josafat Boudreaux is a 57 y.o. male admitted with a medical diagnosis of Hypercalcemia of malignancy.    He presents with the following impairments/functional limitations:  weakness, impaired endurance, impaired self care skills, impaired functional mobility, gait instability, decreased lower extremity function.    Rehab Prognosis: Fair.    Patient would benefit from continued skilled acute PT services to: address above listed impairments/functional limitations; receive patient/caregiver education; reduce fall risk; and maximize independency/safety with functional mobility.    Recent Surgery: * No surgery found *      Plan     During this hospitalization, patient to be seen 3 x/week to address the identified impairments/functional limitations via gait training, therapeutic activities, therapeutic exercises and progress toward the established goals.    Plan of Care Expires:  10/21/23    Subjective     Communicated with patient's nurse Gail prior to session.    Patient agreeable to participate in treatment session.    Chief Complaint: initially pain with moving in the bed.  Patient/Family Comments/goals: go home  Pain/Comfort:  5/10 when moving to get out of bed. Pain subsided.     Objective     Patient found supine in bed with peripheral IV  upon PT entry to room.    General Precautions: Standard, fall   Orthopedic Precautions:N/A   Braces:    Respiratory Status: room air    Functional Mobility:    Bed Mobility:  Supine to Sit: supervision. Pt. Stated that he had some chest pain. Told Gail who came to the room. Location was close to [facility] and area around it was sensitive to touch. Increased deep breathing did not increase pain and nurse said that EKG taken  1 h ago was normal. Nurse ok with pt. Getting up and walking. Pt. Was ok too.     Transfers:  Sit to Stand: stand by assistance with rolling walker    Gait:  Patient ambulated 130 ft with rolling walker and contact guard assistance.  Patient demonstrates no loss of balance, decreased stephanie, and decreased step length. No further c/o pain.     Other Mobility:  not assessed    Patient left supine in bed with all lines intact, call button in reach, Gail notified, and wife present.    Goals     Multidisciplinary Problems       Physical Therapy Goals          Problem: Physical Therapy    Goal Priority Disciplines Outcome Goal Variances Interventions   Physical Therapy Goal     PT, PT/OT Ongoing, Progressing     Description: Goals to be met by: dc     Patient will increase functional independence with mobility by performin. Sit to stand transfer with Modified [place]  2. Gait  x 130 feet with Modified [place] using Rolling Walker.   Reviewed 2023                         Time Tracking     PT Received On: 23  PT Start Time: 1331     PT Stop Time: 1401  PT Total Time (min): 30 min     Billable Minutes: Gait Training 15 and Therapeutic Activity 15    2023

## 2023-09-27 NOTE — PROGRESS NOTES
Inpatient Nutrition Assessment    Admit Date: 9/21/2023   Total duration of encounter: 6 days     Nutrition Recommendation/Prescription     Regular diet suggest dairy restriction  Megace to stimulate appetite  Suggest daily MVI  Monitor Weight biweekly     Communication of Recommendations: reviewed with patient and reviewed with caregiver    Nutrition Assessment     Malnutrition Assessment/Nutrition-Focused Physical Exam    Malnutrition Context: acute illness or injury  Malnutrition Level: moderate  Energy Intake (Malnutrition): less than 75% for greater than 7 days  Weight Loss (Malnutrition): greater than 2% in 1 week  Subcutaneous Fat (Malnutrition): mild depletion     Upper Arm Region (Subcutaneous Fat Loss): mild depletion     Muscle Mass (Malnutrition): mild depletion  Congregational Region (Muscle Loss): mild depletion                                A minimum of two characteristics is recommended for diagnosis of either severe or non-severe malnutrition.    Chart Review    Reason Seen: follow-up    Malnutrition Screening Tool Results   Have you recently lost weight without trying?: No  Have you been eating poorly because of a decreased appetite?: No   MST Score: 0     Diagnosis:  Recurrent Hypercalcemia d/t malignancy, Hypomagnesemia, Laryngeal cancer with lung mets, Depression, Anxiety, macrocytic Anemia, Hypothyroid, COPD    Relevant Medical History: COPD, Hypothyroidism, DVT, Laryngeal cancer    Nutrition-Related Medications: Sodium Bicar @ 125 ml/hr, Calcitonin, Levothyroxine, Mag Oxide, Famotidine, Megace Setraline  Calorie Containing IV Medications: no significant kcals from medications at this time    Nutrition-Related Labs:  9/21/23 -- H/H 9.6/28 L, Glu 87, K 3.5, BUN 11, Cr 0.68, Phos 1.6 L, Calcium 12.2 H  9/27/23 -- H/H 9.3/26.9 L, Na 132 L, K 4, BUN 6.6 L, Cr 0.63 L, Phos 1.3 L, Ca 11.8 H    Diet/PN Order: Diet Adult Regular  Oral Supplement Order: none  Tube Feeding Order: none  Appetite/Oral Intake:  "poor/25-50% of meals  Factors Affecting Nutritional Intake: decreased appetite and depression  Food/Anabaptism/Cultural Preferences: none reported  Food Allergies: none reported       Wound(s):   skin intact    Comments    23 -- Pt eating cereal at time of visit; reports of fatigue reported; wife at bed side reports pt with increased sleeping and less eating describing his po intake as poor; Megace resumed for appetite; hypercalcemia now being managed by endo; suggest limit to dairy foods    23 -- Pt with fair po intake x "couple weeks" per pt's wife; taking Megace at home -- resume; CA (H) -- normally drinks 1 Ensure mixed with ice cream daily, reports does not drink any other milk & rarely uses cheeses; Suggest Boost Breeze to omit calcium however dislikes; Receiving calcitonin, calcium content of foods provided; Significant wt loss noted in the last week    Anthropometrics    Height: 5' 9.02" (175.3 cm) Height Method: Stated  Last Weight: 58.4 kg (128 lb 12 oz) (23 1138) Weight Method: Bed Scale  BMI (Calculated): 19  BMI Classification: normal (BMI 18.5-24.9)        Ideal Body Weight (IBW), Male: 160.12 lb     % Ideal Body Weight, Male (lb): 79.31 %                 Usual Body Weight (UBW), k.5 kg  % Usual Body Weight: 81.87     Usual Weight Provided By: EMR weight history    Wt Readings from Last 5 Encounters:   23 58.4 kg (128 lb 12 oz)   09/15/23 55.2 kg (121 lb 9.6 oz)   23 61.4 kg (135 lb 6.4 oz)   23 61.7 kg (136 lb)   23 61.2 kg (135 lb)     Weight Change(s) Since Admission:  Admit Weight: 57.6 kg (127 lb) (23 1650)  Significant weight loss x 1 week  23 -- 57.6 kg, bed    Estimated Needs    Weight Used For Calorie Calculations: 57 kg (125 lb 10.6 oz)  Energy Calorie Requirements (kcal): 2423-6294 kcal (32 - 34 kcal/kg)  Energy Need Method: Kcal/kg  Weight Used For Protein Calculations: 57 kg (125 lb 10.6 oz)  Protein Requirements: 74-85 gm (1.3 - 1.5 " gm/kg)  Fluid Requirements (mL): 6502-1536 ml (1ml/kcal)  Temp (24hrs), Av.6 °F (37 °C), Min:98.1 °F (36.7 °C), Max:99 °F (37.2 °C)       Enteral Nutrition    Patient not receiving enteral nutrition at this time.    Parenteral Nutrition    Patient not receiving parenteral nutrition support at this time.    Evaluation of Received Nutrient Intake    Calories: not meeting estimated needs  Protein: not meeting estimated needs    Patient Education (23)    Education Provided:  Calcium content of foods  Teaching Method: explanation and printed materials  Comprehension: verbalizes understanding  Barriers to Learning: none evident  Expected Compliance: good  Comments: All questions were answered and dietitian's contact information was provided.     Nutrition Diagnosis     PES: Malnutrition related to catabolic illness as evidenced by < 75% nutrition intake > 1 week, > 2% wt loss x 1 week, fat/muscle wasting. (continues)    Interventions/Goals     Intervention(s): modified composition of meals/snacks, commercial beverage, multivitamin/mineral supplement therapy, prescription medication, and collaboration with other providers  Goal: Meet greater than 75% of nutritional needs by follow-up. (goal not met)    Monitoring & Evaluation     Dietitian will monitor food and beverage intake and weight change.  Nutrition Risk/Follow-Up: moderate (follow-up in 3-5 days)   Please consult if re-assessment needed sooner.

## 2023-09-27 NOTE — PT/OT/SLP PROGRESS
Physical Therapy    Missed Treatment Session    Patient Name:  Josafat Boudreaux   MRN:  [id number]      Patient not seen at this time secondary to Other (Comment).    Will follow-up as patient is available to participate and as therapists' schedule allows.   Plastic Surgeon Procedure Text (D): After obtaining clear surgical margins the patient was sent to plastics for surgical repair.  The patient understands they will receive post-surgical care and follow-up from the referring physician's office.

## 2023-09-27 NOTE — PT/OT/SLP PROGRESS
Occupational Therapy   Treatment    Name: Josafat Boudreaux  MRN: [id number]  Admitting Diagnosis:  Hypercalcemia of malignancy       Recommendations:     Discharge Recommendations: home with home health  Discharge Equipment Recommendations:  none  Barriers to discharge:       Assessment:     Josafat Boudreaux is a 57 y.o. male with a medical diagnosis of Hypercalcemia of malignancy.  He presents with continued decreased activity tolerance, improved balance today compared to yesterday. Performance deficits affecting function are weakness, impaired endurance, impaired self care skills, impaired functional mobility, impaired balance, gait instability, pain.     Rehab Prognosis:  Fair; patient would benefit from acute skilled OT services to address these deficits and reach maximum level of function.       Plan:     Patient to be seen 3 x/week to address the above listed problems via self-care/home management, therapeutic activities, therapeutic exercises, neuromuscular re-education  Plan of Care Expires:    Plan of Care Reviewed with: patient, spouse    Subjective     Chief Complaint: no new c/o  Patient/Family Comments/goals: return home, wants to get stable Calcium levels  Pain/Comfort:  Pain Rating 1: 7/10  Location - Orientation 1:  (R LE/back)    Objective:     Communicated with: nurse prior to session.  Patient found HOB elevated with peripheral IV upon OT entry to room.    General Precautions: Standard,  (special contact neutropenic isolation)    Orthopedic Precautions:   Braces:    Respiratory Status: Room air       Occupational Performance:     Bed Mobility:    Patient completed Rolling/Turning to Right with modified independence  Patient completed Scooting/Bridging with modified independence  Patient completed Supine to Sit with modified independence and with side rail  Patient completed Sit to Supine with modified independence and with side rail     Functional Mobility/Transfers:  Patient completed  Sit <> Stand Transfer with stand by assistance  with  rolling walker   Functional Mobility: side steps along EOB SBA with RW      AMPAC 6 Click ADL:      Treatment & Education:  Treatment: completed B UE therex in standing, forward punches 10 repsx2, shld press 10 repsx2, horizontal abd/add 10 repsx1, extended seated restbreak between sets due to c/o pain and fatigue; encouragement for participation  Pt tolerated sitting EOB x 13 minutes x 2 trials (supine restbreak between sitting trials)  Standing balance improved today, pt able to complete BUE therex without UE support with SBA  Education: daily UE therex, increasing OOB activity, sitting EOB trials throughout the day, maintaining skin integrity with Q2 turns in bed    Patient left HOB elevated with all lines intact, call button in reach, and spouse present    GOALS:   Multidisciplinary Problems       Occupational Therapy Goals          Problem: Occupational Therapy    Goal Priority Disciplines Outcome Interventions   Occupational Therapy Goal     OT, PT/OT Ongoing, Progressing    Description: Pt will complete grooming in standing at sink SBA  Pt will ambulate to bathroom with RW to complete toilet t/f SBA  Pt will increase standing endurance x 8 minutes without seated restbreak.  In order to increase activity tolerance for ADL's, pt will participate in general B UE therex x 10 minutes                       Time Tracking:     OT Date of Treatment: 09/27/23  OT Start Time: 1117  OT Stop Time: 1147  OT Total Time (min): 30 min    Billable Minutes:Therapeutic Activity 1  Therapeutic Exercise 1    OT/MOR: OT     Number of MOR visits since last OT visit: 0    9/27/2023

## 2023-09-27 NOTE — PT/OT/SLP PROGRESS
Physical Therapy    Missed Treatment Session    Patient Name:  Josafat Boudreaux   MRN:  [id number]      Patient not seen at this time secondary to Patient fatigue.    Will follow-up as patient is available to participate and as therapists' schedule allows.

## 2023-09-27 NOTE — PROGRESS NOTES
[facility] Wards   Progress Note     Resident Team: [facility] List 3  Attending Physician: Ashley Erazo MD  Resident: Bessy Stein  Intern[facility]   [facility] Length of Stay: 1 day    Subjective:      Brief HPI:  Josafat oBudreaux is a 57 y.o. male who with a history of COPD, hypothyroidism, DVT to RUE, stage 4 larygenal SCC, bilateral lung mass - Squamous cell - metastatic SqCC lung cancer vs metastatic SqCC to the lungs from H&N, mediastinal and Hilar LAD, s/p laryngectomy with radical neck dissection (7/2022) who presented to [facility] ED on 9/21/2023 from hem/onc clinic for concern of elevated calcium levels.  His wife is present for the encounter.  Of note patient was just recently admitted on 09/13/2023 for similar problem of hypercalcemia and was discharged on 09/18/2023.  During that hospital stay patient's hypercalcemia gradually improved and he was also repleted for hypokalemia and hypomagnesemia.  He was noted to have elevated PTHrp of 27 after he was discharged.   His wife mentions that they were at the infusion clinic today strictly for intravenous fluids and a BMP check.  They noticed elevated/critical calcium level and were told to go to the ED. patient does not have any acute complaints; however his wife mentions that he does not complain much.  She has noticed him being more lethargic, harder to arouse, and sleeping too much since yesterday afternoon.  As per her, he is more altered from his baseline.  His last bowel movement was Monday night.  And she has also noticed dark urine than normal this morning.  She also mentions of decreased oral intake of food and fluids.      In the ED, patient was noted to be tachycardic, afebrile, and normotensive.  His CBC was noted for microcytic anemia.  His CMP was noted for mild hyponatremia of 130, critically elevated calcium of 14.1, and low magnesium of 1.10.  Patient was given 1 dose of 230 units of calcitonin and 4 mg of zoledronic acid in the  ED. his magnesium was also repleted.  Internal medicine was consulted for management of hypercalcemia and admission.  His a.m. cortisol was within normal limits.     Interval History:    Patient was seen resting comfortably in his room.  He has remained afebrile but still has episodes of tachycardia.  Patient has been working with physical therapy and doing well. CBC was remarkable again for microcytic anemia.  CMP notable for non-anion gap metabolic acidosis.  His calcium has improved from 12 to 11.8.  Hypophosphatemia and hypomagnesemia was also noted.  Mild elevation of alkaline phosphatase was noted.     Review of Systems:  Review of Systems   Constitutional:  Negative for chills and fever.   HENT:  Negative for congestion, sinus pain and sore throat.    Eyes:  Negative for blurred vision.   Respiratory:  Negative for cough, hemoptysis, sputum production, shortness of breath and wheezing.    Cardiovascular:  Negative for chest pain, palpitations, orthopnea and leg swelling.   Gastrointestinal:  Negative for abdominal pain, constipation, diarrhea, heartburn, nausea and vomiting.   Genitourinary:  Negative for dysuria, frequency, hematuria and urgency.   Musculoskeletal:  Positive for back pain (chronic). Negative for joint pain and neck pain.   Neurological:  Positive for weakness. Negative for dizziness, speech change, focal weakness, seizures and headaches.   Psychiatric/Behavioral:  Negative for depression. The patient is not nervous/anxious and does not have insomnia.           Objective:     Vital Signs (Most Recent):  Temp: 98.6 °F (37 °C) (09/27/23 0736)  Pulse: 109 (09/27/23 1046)  Resp: 18 (09/27/23 1046)  BP: 117/71 (09/27/23 0736)  SpO2: 96 % (09/27/23 1046) Vital Signs (24h Range):  Temp:  [98.1 °F (36.7 °C)-99 °F (37.2 °C)] 98.6 °F (37 °C)  Pulse:  [] 109  Resp:  [14-20] 18  SpO2:  [92 %-96 %] 96 %  BP: (100-117)/(54-71) 117/71       Physical Examination:  Physical Exam  Vitals and nursing note  reviewed.   Constitutional:       General: He is not in acute distress.     Comments: Limited voice due to trach. Mouths word.     HENT:      Head: Normocephalic and atraumatic.      Mouth/Throat:      Mouth: Mucous membranes are moist.   Eyes:      General: No scleral icterus.     Pupils: Pupils are equal, round, and reactive to light.   Neck:      Comments: Trach in place - clean and no signs of drainage/sputum  Cardiovascular:      Rate and Rhythm: Normal rate and regular rhythm.      Pulses: Normal pulses.      Heart sounds: Normal heart sounds.   Pulmonary:      Effort: Pulmonary effort is normal. No respiratory distress.      Comments: Rhonchi in upper lobes bilaterally  Abdominal:      General: Bowel sounds are normal. There is no distension.      Palpations: Abdomen is soft.      Tenderness: There is no abdominal tenderness.   Musculoskeletal:         General: No swelling or tenderness. Normal range of motion.      Right lower leg: No edema.      Left lower leg: No edema.   Skin:     General: Skin is warm and dry.      Capillary Refill: Capillary refill takes less than 2 seconds.   Neurological:      General: No focal deficit present.      Mental Status: He is alert and oriented to person, place, and time.   Psychiatric:         Mood and Affect: Mood normal.         Behavior: Behavior normal.         Laboratory:  Most Recent Data:  All pertinent lab results from the last 24 hours have been reviewed.  Recent Lab Results         09/27/23  0445        Albumin/Globulin Ratio 0.6       Albumin 2.4       Alkaline Phosphatase 166       ALT 19       AST 42       BILIRUBIN TOTAL 0.7       BUN 6.6       Calcium 11.8       Chloride 103       CO2 19       Cortisol 18.8  Comment: Expected Values:  Before 10AM: 3.7-19.4  After 10AM: 2.9-17.3       Creatinine 0.63       eGFR >60       Globulin, Total 3.8       Glucose 84       Magnesium  1.30       Phosphorus 1.3       Potassium 4.0       PROTEIN TOTAL 6.2       Sodium  132                 Microbiology Data Reviewed: no  Pertinent Findings:  None    Other Results:  EKG (my interpretation): sinus tachycardia.    Radiology:  Imaging Results              X-Ray Chest AP Portable (Final result)  Result time 09/21/23 17:28:04      Final result by Jackie Loaiza MD (09/21/23 17:28:04)                   Impression:      No acute process      Electronically signed by: Jackie Loaiza  Date:    09/21/2023  Time:    17:28               Narrative:    EXAMINATION:  XR CHEST AP PORTABLE    CLINICAL HISTORY:  abnormal lung sounds;    TECHNIQUE:  Single frontal view of the chest was performed.    COMPARISON:  08/05/2023    FINDINGS:  There are chronic appearing lung changes seen bilaterally. No evidence of acute infiltrate is seen. No mass is seen. No lesion is seen. No pleural effusion is seen. The heart appears normal. The aorta appears normal. The bones and joints show no acute abnormality.  There are postsurgical changes seen in the right humerus.  There is a port in the left anterior chest wall.                                      Current Medications:     Infusions:   sodium bicarbonate 100 mEq in dextrose 5 % and 0.45 % NaCl 1,000 mL infusion          Scheduled:   apixaban  5 mg Oral BID    calcitonin  400 Units Subcutaneous QID    diphenhydrAMINE  25 mg Oral QHS    famotidine  20 mg Oral QHS    levothyroxine  50 mcg Oral Daily    magnesium oxide  400 mg Oral BID    megestroL  400 mg Oral Daily    mupirocin   Nasal BID    sertraline  25 mg Oral Daily    sodium chloride 0.9%  10 mL Intravenous Q6H    sodium phosphate 30 mmol in dextrose 5 % (D5W) 250 mL IVPB  30 mmol Intravenous Once        PRN:  acetaminophen, albuterol-ipratropium, hydrocodone-apap 7.5-325 MG/15 ML, melatonin, ondansetron, polyethylene glycol, sodium chloride 0.9%, Flushing PICC/Midline Protocol **AND** sodium chloride 0.9% **AND** sodium chloride 0.9%    Antibiotics and Day Number of  Therapy:  none      Intake/Output Summary (Last 24 hours) at 9/27/2023 1131  Last data filed at 9/27/2023 0501  Gross per 24 hour   Intake 2868.01 ml   Output --   Net 2868.01 ml         Lines/Drains/Airways       Airway  Duration                  Laryngectomy (Do Not Remove) Laryngectomy tube -- days              Peripheral Intravenous Line  Duration                  Midline Catheter Insertion/Assessment  - Single Lumen 09/24/23 0937 Left brachial vein other (see comments) 3 days                      Assessment & Plan:     Recurrent Hypercalcemia 2/2 malignancy  - Ca 14 at infusion clinic and noted at 14.1 in the ED  - downtrend of calcium noted now at 11.8  - Corrected calcium for albumin = 13.1  - PTHrp at 27 collected on 9/13/23   - Completed calcitonin 4units/kg Q12H x 2 days, IV zoledronic acid 4 mg at 200cc/hr x1  - he received 1 dose of 230 units of calcitonin today prior to endocrinology consult  - as per endocrinology recommendation, we will increase his calcitonin to 400 units every 6 hours; we will also initiate subcutaneous denosumab 120 mg once weekly.    - we will consider repeat IV zoledronic acid 09/28/2023 if no improvement in calcium  - a.m. 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 Underwent  another cycle of carbo/taxol completed on 8/23/23.   - follows closely with Heme/onc and ENT  - plans to restart keytruda maintenance therapy soon.     Depression/Anxiety  - PHQ-9 score of 15  - Continue sertraline 25 mg once daily    Chronic back pain  Lumbar radiculopathy  - will continue skilled acute PT services while patient     Macrocytic anemia  -B12 and folate WNL  -Likely d/t Hx of cancer     Hypothyroidism  - TSH wnl last admission  - will continue home levothyroxine 50 mcg     Hx DVT RUE  - RUE DVT found in 03/2023  - Continue Eliquis while inpatient     COPD  - on duonebs at home   - will continue PRN        CODE STATUS: Full Code  Access: L Midline  Antibiotics: none  Diet: Adult regular  DVT Prophylaxis: Eliquis  GI Prophylaxis: Famotidine  Fluids:  Sodium bicarb 100 mEq in D5 and 0.45% NS     Disposition: Admitted for management humoral hypercalcemia of malignancy.  Anticipate discharge back to home once medically stable.     Dov Conley  Internal Medicine - PGY-1

## 2023-09-28 NOTE — PLAN OF CARE
DC summary sent to [facility] via [facility]. Spoke to Myranda with [facility], P: [phone], to provide update regarding discharge this afternoon.   EXERCISE SESSION DETAILS

## 2023-09-28 NOTE — PLAN OF CARE
09/28/23 1501   Medicare Message   Important Message from Medicare regarding Discharge Appeal Rights Given to patient/caregiver;Explained to patient/caregiver;Signed/date by patient/caregiver   Date IMM was signed 09/28/23   Time IMM was signed [id number]

## 2023-09-28 NOTE — PT/OT/SLP DISCHARGE
Occupational Therapy Discharge Summary    Josafat Boudreaux  MRN: [id number]   Principal Problem: Hypercalcemia of malignancy      Patient Discharged from acute Occupational Therapy on 9/28/23.  Please refer to prior OT note dated 9/27/23 for functional status.    Assessment:      Patient appropriate for care in another setting.    Objective:     GOALS:   Multidisciplinary Problems       Occupational Therapy Goals          Problem: Occupational Therapy    Goal Priority Disciplines Outcome Interventions   Occupational Therapy Goal     OT, PT/OT Appropriate for transfer to alternate level of care    Description: Pt will complete grooming in standing at sink SBA  Pt will ambulate to bathroom with RW to complete toilet t/f SBA  Pt will increase standing endurance x 8 minutes without seated restbreak.  In order to increase activity tolerance for ADL's, pt will participate in general B UE therex x 10 minutes                       Reasons for Discontinuation of Therapy Services  Transfer to alternate level of care. and Satisfactory goal achievement.      Plan:     Patient Discharged to: Home with Home Health Service    9/28/2023

## 2023-09-28 NOTE — PT/OT/SLP PROGRESS
"Physical Therapy Treatment    Patient Name:  Josafat Boudreaux   MRN:  [id number]    Recommendations     Discharge Recommendations:  home with home health   Discharge Equipment Recommendations: Tolling walker  Barriers to discharge: fall risk, severity of deficits, level of skilled assistance required, decreased endurance, time since onset, medical diagnosis, past medical history, and complicated medical history    Assessment     Josafat Boudreaux is a 57 y.o. male admitted with a medical diagnosis of Hypercalcemia of malignancy.    He presents with the following impairments/functional limitations:  weakness, impaired endurance, impaired self care skills, impaired functional mobility, gait instability, impaired balance, decreased coordination, decreased lower extremity function, pain.    Rehab Prognosis: Fair.    Patient would benefit from continued skilled acute PT services to: address above listed impairments/functional limitations; receive patient/caregiver education; reduce fall risk; and maximize independency/safety with functional mobility.    Recent Surgery: * No surgery found *      Pt tolerated session well, however displayed increased fatigue throughout session and increased HR (130BPM during gait). Pt required increased time during seated rest breaks. Pt's wife in room during session and MD spoke with pt and pt's wife during PT session about POC.     Plan     During this hospitalization, patient to be seen 3 x/week to address the identified impairments/functional limitations via gait training, therapeutic activities, therapeutic exercises and progress toward the established goals.    Plan of Care Expires:  10/21/23    Subjective     Communicated with patient's nurse prior to session.    Patient agreeable to participate in treatment session.    Chief Complaint: "I get pretty tired easily."  Patient/Family Comments/goals: "I am going home soon."  Pain/Comfort:  Pain Rating 1: 7/10  Location - " Orientation 1: lower  Location 1: back  Pain Addressed 1: Nurse notified, Pre-medicate for activity, Reposition  Pain Rating Post-Intervention 1: 7/10    Objective     Patient found supine in bed and with HOB elevated with peripheral IV, telemetry  upon PT entry to room.    General Precautions: Standard, fall   Orthopedic Precautions:N/A   Braces:    Respiratory Status: room air    Functional Mobility:    Bed Mobility:  Supine to Sit: supervision (x2 trials)  Sit to Supine: supervision (x2 trials)    Transfers:  Sit to Stand: stand by assistance with rolling walker (from EOB)    Gait:  Patient ambulated 130ft with rolling walker and stand by assistance. (Increased fatigue noted and limited due to needing seated rest break) (verbal cueing to keep RW close with poor carry over, decreased step length and gait speed and decreased R and L toe clearance) (seated and supine rest break for increased time after gait)   Patient demonstrates occasional unsteady gait, decreased stephanie, decreased step length, narrow base of support, ambulates outside SORIN of RW, decreased weight shift, decreased foot/floor clearance, and flexed posture.    Other Mobility:  Therapeutic Activities performed:        -2 set of 10 reps of R and L LAQ seated at EOB with SBA     Patient left supine in bed and with HOB elevated with all lines intact, call button in reach, tray table at bedside, and RN notified.    Goals     Multidisciplinary Problems       Physical Therapy Goals          Problem: Physical Therapy    Goal Priority Disciplines Outcome Goal Variances Interventions   Physical Therapy Goal     PT, PT/OT Ongoing, Progressing     Description: Goals to be met by: dc     Patient will increase functional independence with mobility by performin. Sit to stand transfer with Modified Clay  2. Gait  x 130 feet with Modified Clay using Rolling Walker.   Reviewed 2023                         Time Tracking     PT Received On:  09/28/23  PT Start Time: 0834     PT Stop Time: 0859  PT Total Time (min): 25 min     Billable Minutes: Gait Training 12 and Therapeutic Activity 13    09/28/2023

## 2023-09-28 NOTE — MEDICAL/APP STUDENT
[facility] Endocrinology Consult Note     Reason for Consult:      hypercalcemia    Subjective:     History of Present Illness:  Josafat Boudreaux is a 58 yo male with past medical history of COPD, hypothyroidism, DVT of RUE, stage 4 laryngeal SCC, bilateral squamous cell lung mass (metastatic squamous cell lung cancer vs. metastatic SCC to lungs from head and neck), mediastinal and hilar LAD, s/p laryngectomy with radial neck dissection (7/2022), radiation and multiple rounds of chemotherapy. Endocrinology is being consulted for the management of hypercalcemia.    The patient presented to [facility] ED on 9/21/2023 from hem/onc clinic due to hypercalcemia. Patient was previously admitted from 9/13/2023-9/18/2023 for hypercalcemia. He was found to have elevated PTHrp of 27 on 9/13/2023. During that admission his hypercalcemia improved after treatment with IVF, calcitonin, and zoledronic acid. Prior to presentation on 9/21/2023, patient was at infusion clinic for IVF and BMP check and was sent to the ED after he was found to have elevated calcium level. The patient's wife noted AMS from baseline, increased lethargy and drowsiness, and decreased PO food and fluid intake prior to presentation. Patient was found to have elevated calcium of 14.1 on presentation and was treated with calcitonin, zoledronic acid, and IVF. Calcium initially trended down but began to trend upwards on 9/24/23. Today corrected calcium is 13.3 mg/dL. Endocrinology is being consulted for further assistance.    9/28/2023:  Of note, patient's wife answers for patient as patient is unable to verbalize. She reports that the patient has not experienced significant change in his symptoms in the interval. She notes the patient continues to experience constipation and bilateral leg weakness. The patient is able to walk to the bathroom but per his wife, the patient's legs feel shaky. She reports the patient is able to urinate using the commode without difficulty.  Per the patient's wife, the patient has not been experiencing any headache, dizziness, nausea, vomiting, SOB, chest pain, palpitations, abdominal pain, dysuria, hematuria, numbness or tingling.    Review of Systems:  Constitutional:  Negative for chills and fever.   HENT:  Negative for congestion, sinus pain and sore throat.    Eyes:  Negative for blurred vision.   Respiratory:  Negative for cough, hemoptysis, sputum production, shortness of breath.  Cardiovascular:  Negative for chest pain, palpitations, and leg swelling.  Gastrointestinal:  Negative for abdominal pain, diarrhea, heartburn, nausea and vomiting. Positive for constipation.  Genitourinary:  Negative for dysuria, frequency, hematuria and urgency.   Musculoskeletal:  Positive for back pain (chronic). Negative for joint pain and neck pain.   Neurological:  Positive for bilateral leg weakness. Negative for dizziness, headache, numbness and tingling.    Past Medical History: See above    Past Surgical History:  Past Surgical History:   Procedure Laterality Date    DIAGNOSTIC LAPAROSCOPY N/A 5/18/2023    Procedure: LAPAROSCOPY, DIAGNOSTIC;  Surgeon: Dipak De Leon Jr., MD;  Location: [facility];  Service: General;  Laterality: N/A;    DIRECT DIAGNOSTIC LARYNGOSCOPY WITH BRONCHOSCOPY AND ESOPHAGOSCOPY N/A 07/11/2022    Procedure: LARYNGOSCOPY, DIRECT, DIAGNOSTIC, WITH BRONCHOSCOPY AND ESOPHAGOSCOPY;  Surgeon: Emory Alonso MD;  Location: [facility];  Service: ENT;  Laterality: N/A;    ESOPHAGOGASTRODUODENOSCOPY (EGD)- DEVICE ASSISTED N/A 04/18/2023    Procedure: EGD;  Surgeon: Aaliyah Conrad III, MD;  Location: [facility] ENDOSCOPY;  Service: Gastroenterology;  Laterality: N/A;  Esophageal Dilation; Savory over Guide wire: 36Fr and 39 Fr  Clips to secure peg site    HIP SURGERY      HUMERUS FRACTURE SURGERY      INCISION AND DRAINAGE, NECK Bilateral 10/21/2022    Procedure: INCISION AND DRAINAGE,NECK;  Surgeon: Madison Worley MD;  Location: [facility];   Service: ENT;  Laterality: Bilateral;    INSERT ARTERIAL LINE  06/26/2022         JOINT REPLACEMENT  2019    R hip    PEG TUBE REMOVAL      PLACEMENT, MEDIPORT      TRACHEOSTOMY N/A 06/10/2022    Procedure: CREATION, TRACHEOSTOMY;  Surgeon: Junior Alonso MD;  Location: [facility];  Service: ENT;  Laterality: N/A;       Allergies:  Review of patient's allergies indicates:  No Known Allergies    Home Medications:  Prior to Admission medications    Medication Sig Start Date End Date Taking? Authorizing Provider   albuterol-ipratropium (DUO-NEB) 2.5 mg-0.5 mg/3 mL nebulizer solution Take 3 mLs by nebulization every 4 (four) hours as needed for Shortness of Breath or Wheezing. Rescue 2/7/23 2/7/24 Yes Michael Monson MD   apixaban (ELIQUIS) 5 mg Tab Take 1 tablet (5 mg total) by mouth As instructed. 3/22/23  Yes Yara Atkinson MD   diphenhydrAMINE (BENADRYL) 25 mg capsule 1 capsule (25 mg total) by Per G Tube route every 6 (six) hours as needed for Itching.  Patient taking differently: 25 mg by Per G Tube route every 6 (six) hours as needed for Itching. Takes liquid mediation 6/23/22  Yes Ra Quintanilla MD   famotidine (PEPCID) 20 MG tablet Take 1 tablet (20 mg total) by mouth every evening.  Patient taking differently: Take 20 mg by mouth every evening. 8/10/23 8/9/24 Yes Tony Bertrand MD   glutamine 10 gram PwPk Take 10 g by mouth 2 (two) times a day.   Yes Provider, Historical   hydrocodone-acetaminophen (HYCET) solution 7.5-325 mg/15mL Take 10 mLs by mouth every 6 (six) hours as needed for Pain. 9/18/23 10/18/23 Yes Carola Stein,    levothyroxine (SYNTHROID) 50 MCG tablet Take 1 tablet (50 mcg total) by mouth once daily. 5/15/23 5/14/24 Yes Yara Atkinson MD   megestroL (MEGACE) 400 mg/10 mL (40 mg/mL) Susp  8/1/23  Yes Provider, Historical   ondansetron (ZOFRAN-ODT) 8 MG TbDL Take 8 mg by mouth every 8 (eight) hours as needed for Nausea. Tab 1 ODT in AM for 2 days after chemotherapy   Yes  "Provider, Historical   pantoprazole (PROTONIX) 20 MG tablet Take 1 tablet (20 mg total) by mouth once daily. 8/10/23 8/9/24 Yes Tony Bertrand MD   sodium chloride 3% 3 % nebulizer solution Take 4 mLs by nebulization as needed for Other or Cough (wheezing). 23  Yes Tawanna Preston MD   metroNIDAZOLE (METROGEL) 0.75 % (37.5mg/5 gram) vaginal gel apply to affected area BID 23   Provider, Historical   aspirin (ECOTRIN) 81 MG EC tablet Take 81 mg by mouth once daily.  22  Provider, Historical       Family History:  Family History   Problem Relation Age of Onset    Lung cancer Father     Cancer Father         Lung cancer    Throat cancer Brother     Cancer Brother         Throat       Social History:  Social History     Tobacco Use    Smoking status: Former     Current packs/day: 0.00     Average packs/day: 1.5 packs/day for 42.4 years (63.7 ttl pk-yrs)     Types: Cigarettes     Start date: 1980     Quit date: 2022     Years since quittin.3    Smokeless tobacco: Never   Substance Use Topics    Alcohol use: Yes     Alcohol/week: 1.0 standard drink of alcohol     Types: 1 Cans of beer per week     Comment: occasional    Drug use: Never          Objective:   Vitals  Vitals  BP: (!) 105/59  Temp: 99.5 °F (37.5 °C)  Temp Source: Oral  Pulse: (!) 117  Resp: 18  SpO2: 95 %  Height: 5' 9.02" (175.3 cm)  Weight: 58.4 kg (128 lb 12 oz)    Physical Examination:  General: Awake, alert. NAD. Able to form words with mouth but unable to verbalize (s/p laryngectomy, trach).  Psychiatric: Mood and affect normal  HEENT: Normocephalic, atraumatic. Face symmetric. Mucous membranes moist. Trach in place.   Cardiovascular: Regular rate & rhythm. Normal S1 & S2 appreciated.  Pulmonary: Bilateral symmetric chest rise. Non-labored bilateral wheezes.  Abdominal:  Soft, nontender, nondistended. No rebound or guarding.  Extremities: No clubbing, cyanosis or edema.  Skin:  Warm & dry.  Neuro: No focal deficit on brief " exam.    Laboratory:  9/28/23:  Sodium 132 mmol/L  Potassium 3.4 mmol/L  Total calcium 11.9 mg/dL  Albumin 2.2 g/dL  Phosphorous 1.5 mg/dL    Radiology:  No results found in the last 24 hours.     Assessment & Plan:   PTHrp mediated Hypercalcemia  -Calcium on presentation of 14.1 mg/dL; initially trended down and then started trending upwards on 9/24/23; corrected calcium of 13.3 mg/dL today  -PTHrp of 27 on 9/13/23  -Continue denosumab 120 mg subQ once weekly  -Discontinue calcitonin      Jess MOSCOSOU, MS3

## 2023-09-28 NOTE — DISCHARGE SUMMARY
[facility] Internal Medicine Discharge Summary    Admitting Physician: Ashley Erazo MD  Attending Physician: Ashley Erazo MD  Date of Admit: 9/21/2023  Date of Discharge: 9/28/2023    Condition: Good  Outcome: Condition has improved and patient is now ready for discharge.  DISPOSITION: Home or Self Care    Discharge Diagnoses     Patient Active Problem List   Diagnosis    Closed basicervical fracture of femur    Shortness of breath    Dysphagia    New onset seizure    Laryngeal squamous cell carcinoma    Regional lymph node metastasis present    Squamous cell carcinoma of both lungs    Chronic obstructive pulmonary disease, unspecified    H/O head and neck radiation    Pneumonia    Lung cancer    Blastomycotic infection    On antineoplastic chemotherapy    Right arm cellulitis    Maintenance antineoplastic immunotherapy    Gastric fistula    Esophageal stenosis    Essential (primary) hypertension    Hypercalcemia of malignancy    Moderate malnutrition    Tachycardia    Hypomagnesemia       Principal Problem:  Hypercalcemia of malignancy    Consultants and Procedures     Consultants:  Consults (From admission, onward)          Status Ordering Provider     Inpatient consult to Endocrinology  Once        Provider:  Dale Ruffin MD    Completed RAJHARSHAL GILMANT     Inpatient consult to Social Work/Case Management  Once        Provider:  (Not yet assigned)    Completed BARBIE ZUI KEAT     Inpatient consult to Midline team  Once        Provider:  (Not yet assigned)    Acknowledged HARSHAL RODRIGUEZT     Inpatient consult to Social Work/Case Management  Once        Provider:  (Not yet assigned)    Completed NG ZUI KEAT     Inpatient consult to Hospitalist  Once        Provider:  Aj Tapia MD    Acknowledged NG ZUI KEAT             Procedures:   * No surgery found *     Brief History of Present Illness      Josafat Boudreaux is a 57 y.o. male who with a history of COPD, hypothyroidism, DVT to RUE, stage 4  larygenal SCC, bilateral lung mass - Squamous cell - metastatic SqCC lung cancer vs metastatic SqCC to the lungs from H&N, mediastinal and Hilar LAD, s/p laryngectomy with radical neck dissection (7/2022) who presented to [facility] ED on 9/21/2023 from hem/onc clinic for concern of elevated calcium levels.  His wife is present for the encounter.  Of note patient was just recently admitted on 09/13/2023 for similar problem of hypercalcemia and was discharged on 09/18/2023.  During that hospital stay patient's hypercalcemia gradually improved and he was also repleted for hypokalemia and hypomagnesemia.  He was noted to have elevated PTHrp of 27 after he was discharged.   His wife mentions that they were at the infusion clinic today strictly for intravenous fluids and a BMP check.  They noticed elevated/critical calcium level and were told to go to the ED. patient does not have any acute complaints; however his wife mentions that he does not complain much.  She has noticed him being more lethargic, harder to arouse, and sleeping too much since yesterday afternoon.  As per her, he is more altered from his baseline.  His last bowel movement was Monday night.  And she has also noticed dark urine than normal this morning.  She also mentions of decreased oral intake of food and fluids.      In the ED, patient was noted to be tachycardic, afebrile, and normotensive.  His CBC was noted for microcytic anemia.  His CMP was noted for mild hyponatremia of 130, critically elevated calcium of 14.1, and low magnesium of 1.10.  Patient was given 1 dose of 230 units of calcitonin and 4 mg of zoledronic acid in the ED. his magnesium was also repleted.  Internal medicine was consulted for management of hypercalcemia and admission.     Hospital Course with Pertinent Findings     Patient had no acute complaints during his hospital course. Wife voiced some mental changes and lethargy in the beginning during admission but that improved as well.   "Patient continue to work with physical therapy and his chronic back pain gradually improved as well.  Wife was also concerned about change in his mood with anxiety and depression.  Patient was started on sertraline 25 mg once daily while inpatient.  He also had other electrolyte abnormalities with hypomagnesemia and hypophosphatemia which were repleted adequately.  Patient was started on magnesium oxide 400 mg twice daily while inpatient.  Of intermittent tachycardia.  EKG was done that only showed sinus tachycardia with no other abnormal findings.  His hypercalcemia initially downtrended with management in the ED however throughout his hospital course his calcium gradually started to uptrend.  Endocrinology was consulted.  They recommended another round of calcitonin at a higher dose of 400 units for a total of 5 doses.  They also started him on denosumab (Xgeva) subcutaneous therapy 120 mg once a week.  There was a mild decrease in his calcium level from 12 to 11.8 after this intervention however his calcium climbed back up to 11.9 the next day.  At this time, endocrinology service thinks that his calcium has plateaued.  Another round of zoledronic acid would not be favorable at this time.  Recommend outpatient denosumab therapy.  His oncologist,  has been informed of his hospital course and has also been informed about the need for ongoing outpatient denosumab therapy. She was also in agreement with the plan.       Discharge physical exam:  Vitals  BP: 104/61  Temp: 98.6 °F (37 °C)  Temp Source: Oral  Pulse: (!) 115  Resp: 18  SpO2: 98 %  Height: 5' 9.02" (175.3 cm)  Weight: 58.4 kg (128 lb 12 oz)    Physical Exam  Vitals and nursing note reviewed.   Constitutional:       General: He is not in acute distress.     Comments: Limited voice due to trach. Mouths word.     HENT:      Head: Normocephalic and atraumatic.      Mouth/Throat:      Mouth: Mucous membranes are moist.   Eyes:      General: No scleral " icterus.     Pupils: Pupils are equal, round, and reactive to light.   Neck:      Comments: Trach in place - clean and no signs of drainage/sputum  Cardiovascular:      Rate and Rhythm: Normal rate and regular rhythm.      Pulses: Normal pulses.      Heart sounds: Normal heart sounds.   Pulmonary:      Effort: Pulmonary effort is normal. No respiratory distress.      Comments: Rhonchi in upper lobes bilaterally  Abdominal:      General: Bowel sounds are normal. There is no distension.      Palpations: Abdomen is soft.      Tenderness: There is no abdominal tenderness.   Musculoskeletal:         General: No swelling or tenderness. Normal range of motion.      Right lower leg: No edema.      Left lower leg: No edema.   Skin:     General: Skin is warm and dry.      Capillary Refill: Capillary refill takes less than 2 seconds.   Neurological:      General: No focal deficit present.      Mental Status: He is alert and oriented to person, place, and time.   Psychiatric:         Mood and Affect: Mood normal.         Behavior: Behavior normal.     TIME SPENT ON DISCHARGE: 60 minutes    Discharge Medications        Medication List        START taking these medications      magnesium oxide 400 mg (241.3 mg magnesium) tablet  Commonly known as: MAG-OX  Take 1 tablet (400 mg total) by mouth 2 (two) times daily.     polyethylene glycol 17 gram Pwpk  Commonly known as: GLYCOLAX  Take 17 g by mouth daily as needed (daily as needed for constipation).     sertraline 25 MG tablet  Commonly known as: ZOLOFT  Take 1 tablet (25 mg total) by mouth once daily.  Start taking on: September 29, 2023            CONTINUE taking these medications      albuterol-ipratropium 2.5 mg-0.5 mg/3 mL nebulizer solution  Commonly known as: DUO-NEB  Take 3 mLs by nebulization every 4 (four) hours as needed for Shortness of Breath or Wheezing. Rescue     apixaban 5 mg Tab  Commonly known as: ELIQUIS  Take 1 tablet (5 mg total) by mouth As instructed.    "  diphenhydrAMINE 12.5 mg/5 mL elixir  Commonly known as: BENADRYL  Take 10 mLs (25 mg total) by mouth every evening.     famotidine 20 MG tablet  Commonly known as: PEPCID  Take 1 tablet (20 mg total) by mouth every evening.     glutamine 10 gram Pwpk     hydrocodone-apap 7.5-325 MG/15 ML oral solution  Commonly known as: HYCET  Take 10 mLs by mouth every 6 (six) hours as needed for Pain.     levothyroxine 50 MCG tablet  Commonly known as: SYNTHROID  Take 1 tablet (50 mcg total) by mouth once daily.     megestroL 400 mg/10 mL (40 mg/mL) Susp  Commonly known as: MEGACE     ondansetron 8 MG Tbdl  Commonly known as: ZOFRAN-ODT     pantoprazole 20 MG tablet  Commonly known as: PROTONIX  Take 1 tablet (20 mg total) by mouth once daily.     sodium chloride 3% 3 % nebulizer solution  Take 4 mLs by nebulization as needed for Other or Cough (wheezing).               Where to Get Your Medications        These medications were sent to [facility] - [address] [address]      Phone: [phone]   diphenhydrAMINE 12.5 mg/5 mL elixir  magnesium oxide 400 mg (241.3 mg magnesium) tablet  polyethylene glycol 17 gram Pwpk  sertraline 25 MG tablet         Discharge Information:   Mr. Josafat Boudreaux is being discharged Home or Self Careto his Home.    He needs to follow up with Dr. Atkinson (oncologist) to continue outpatient denosumab (Xgeva) therapy and continue chemotherapy    Advised to continue taking Sertraline 25 mg once daily and mag oxide 400 mg twice daily.        Discharge Procedure Orders   WHEELCHAIR FOR HOME USE     Order Specific Question Answer Comments   Hours in W/C per day: 8    Type of Wheelchair: Standard    Size(Width): 18"(STD adult)    Leg Support: STD footrests    Lap Belt: Velcro    Accessories: Anti-tippers    Cushion: Basic    Reclining Back No    Height: 5' 9.02" (1.753 m)    Weight: 57.6 kg (126 lb 15.8 oz)    Length of need (1-99 " months): 99    Please check all that apply: Caregiver is capable and willing to operate wheelchair safely.    Please check all that apply: Patient's upper body strength is sufficient for propulsion.         Follow-Up Appointments:   Follow-up Information       Tony Bertrand MD. Schedule an appointment as soon as possible for a visit in 1 week(s).    Specialty: Internal Medicine  Why: for hospital follow up  Contact information:  [address] [place] [id number]  [phone]               Yara Atkinson MD. Go to.    Specialty: Hematology and Oncology  Why: Scheduled appointment on 10/4/23  Consider continuing Denosumab (Xgeva) as outpatient therapy  Contact information:  [address] [facility] [address]  [facility] [address] [phone]               [facility] Emergency Dept Follow up.    Specialty: Emergency Medicine  Why: As needed, If symptoms worsen  Contact information:  [facility] [facility] [address] [phone]                             To address at follow-up:  Follow up with PCP in 1 week with repeat BMP.     The above information was discussed with the patient and his wife in clear terms. She was able to repeat the instructions to me in her own words and he expressed understanding with thumbs up. All questions answered. ED precautions provided.    Dov Conley  Internal Medicine - PGY-1

## 2023-09-29 NOTE — NURSING
Pt discharged. No acute distress noted. Wife present at bedside. Pt transferred to private vehicle by wheelchair accompanied by staff.

## 2023-09-29 NOTE — PROGRESS NOTES
C3 nurse spoke with Josafat Boudreaux's spouse Kymberly for a TCC post hospital discharge follow up call.She states the pt still has a loss of appetite but denies any new symptoms. The patient's spouse states they are seeing the pts oncologist next week and she will call the pts PCP, Tony Bertrand MD to schedule a followup after that is completed. She denies the need for C3 nurse to send a message to his PCP. No messages routed at this time.

## 2023-09-29 NOTE — PT/OT/SLP DISCHARGE
Physical Therapy Discharge Summary    Name: Josafat Boudreaux  MRN: [id number]   Principal Problem: Hypercalcemia of malignancy         Recommendations - per last treatment session     Discharge Recommendations:  home with home health   Discharge Equipment Recommendations: wheelchair     Assessment:     Refer to prior Physical Therapy note dated 23 for patient's most recent functional status, goal achievement and therapists' recommendations.    Hospital discharge anticipated today. Transfer to alternate level of care anticipated today.    Objective     GOALS:  Multidisciplinary Problems       Physical Therapy Goals          Problem: Physical Therapy    Goal Priority Disciplines Outcome Goal Variances Interventions   Physical Therapy Goal     PT, PT/OT Adequate for Care Transition     Description: Goals to be met by: dc     Patient will increase functional independence with mobility by performin. Sit to stand transfer with Modified [place]  2. Gait  x 130 feet with Modified [place] using Rolling Walker.   Reviewed 2023                           Plan     Patient discharged from acute PT Services on 23.    Patient Discharged to: home or self care per chart.    2023

## 2023-10-01 NOTE — ED PROVIDER NOTES
Encounter Date: 10/1/2023       History     Chief Complaint   Patient presents with    Weakness    Eating Disorder     C/o generalized weakness, not eating or drinking since left hospital on Friday.      Patient presents to the emergency department due to weakness and no appetite.  Multiple recent admissions due to hypercalcemia, he was a history of head and neck cancer and lung cancer and has a tracheostomy.  His vital stated initially he was doing fine with going home but over the last few days he has not had an appetite, not wanting to drink or eat anything at all.  On my evaluation, patient was awake and alert.  He denies any pain anywhere worsening shortness of breath.    The history is provided by the patient and the spouse.     Review of patient's allergies indicates:  No Known Allergies  Past Medical History:   Diagnosis Date    COPD (chronic obstructive pulmonary disease)     Dysphagia     Essential (primary) hypertension 08/05/2023    GERD (gastroesophageal reflux disease)     laryngeal Cancer     Lung cancer     Thyroid disease     Vocal cord mass      Past Surgical History:   Procedure Laterality Date    DIAGNOSTIC LAPAROSCOPY N/A 5/18/2023    Procedure: LAPAROSCOPY, DIAGNOSTIC;  Surgeon: Dipak De Leon Jr., MD;  Location: [facility] OR;  Service: General;  Laterality: N/A;    DIRECT DIAGNOSTIC LARYNGOSCOPY WITH BRONCHOSCOPY AND ESOPHAGOSCOPY N/A 07/11/2022    Procedure: LARYNGOSCOPY, DIRECT, DIAGNOSTIC, WITH BRONCHOSCOPY AND ESOPHAGOSCOPY;  Surgeon: Emory Alonso MD;  Location: [facility] OR;  Service: ENT;  Laterality: N/A;    ESOPHAGOGASTRODUODENOSCOPY (EGD)- DEVICE ASSISTED N/A 04/18/2023    Procedure: EGD;  Surgeon: Aaliyah Conrad III, MD;  Location: [facility] ENDOSCOPY;  Service: Gastroenterology;  Laterality: N/A;  Esophageal Dilation; Savory over Guide wire: 36Fr and 39 Fr  Clips to secure peg site    HIP SURGERY      HUMERUS FRACTURE SURGERY      INCISION AND DRAINAGE, NECK Bilateral 10/21/2022     Procedure: INCISION AND DRAINAGE,NECK;  Surgeon: Madison Worley MD;  Location: [facility] OR;  Service: ENT;  Laterality: Bilateral;    INSERT ARTERIAL LINE  2022         JOINT REPLACEMENT  2019    R hip    PEG TUBE REMOVAL      PLACEMENT, MEDIPORT      TRACHEOSTOMY N/A 06/10/2022    Procedure: CREATION, TRACHEOSTOMY;  Surgeon: Junior Alonso MD;  Location: [facility] OR;  Service: ENT;  Laterality: N/A;     Family History   Problem Relation Age of Onset    Lung cancer Father     Cancer Father         Lung cancer    Throat cancer Brother     Cancer Brother         Throat     Social History     Tobacco Use    Smoking status: Former     Current packs/day: 0.00     Average packs/day: 1.5 packs/day for 42.4 years (63.7 ttl pk-yrs)     Types: Cigarettes     Start date: 1980     Quit date: 2022     Years since quittin.3    Smokeless tobacco: Never   Substance Use Topics    Alcohol use: Yes     Alcohol/week: 1.0 standard drink of alcohol     Types: 1 Cans of beer per week     Comment: occasional    Drug use: Never     Review of Systems   Constitutional:  Positive for fatigue. Negative for chills and fever.   HENT:  Negative for congestion and sore throat.    Respiratory:  Negative for cough and shortness of breath.    Cardiovascular:  Negative for chest pain and palpitations.   Gastrointestinal:  Negative for abdominal pain and nausea.   Genitourinary:  Negative for dysuria and hematuria.   Musculoskeletal:  Negative for arthralgias and myalgias.   Neurological:  Negative for dizziness and weakness.       Physical Exam     Initial Vitals [10/01/23 1150]   BP Pulse Resp Temp SpO2   99/60 (!) 130 (!) 24 97.7 °F (36.5 °C) 100 %      MAP       --         Physical Exam    Nursing note and vitals reviewed.  Constitutional:   Chronically ill, in no distress   HENT:   Head: Normocephalic and atraumatic.   Eyes: Conjunctivae are normal. Pupils are equal, round, and reactive to light.   Neck: Neck supple.   Normal  range of motion.  Cardiovascular:  Regular rhythm.           Tachycardic   Pulmonary/Chest: No respiratory distress.   Coarse breath sounds throughout   Abdominal: Abdomen is soft. There is no abdominal tenderness.   Musculoskeletal:         General: No edema. Normal range of motion.      Cervical back: Normal range of motion and neck supple.     Neurological: He is alert and oriented to person, place, and time.   Skin: Skin is warm and dry.         ED Course   Procedures  Labs Reviewed   COMPREHENSIVE METABOLIC PANEL - Abnormal; Notable for the following components:       Result Value    Sodium Level 128 (*)     Carbon Dioxide 19 (*)     Creatinine 0.68 (*)     Calcium Level Total 12.5 (*)     Protein Total 6.3 (*)     Albumin Level 2.3 (*)     Globulin 4.0 (*)     Albumin/Globulin Ratio 0.6 (*)     Alkaline Phosphatase 188 (*)     Aspartate Aminotransferase 42 (*)     All other components within normal limits   LACTIC ACID, PLASMA - Abnormal; Notable for the following components:    Lactic Acid Level 3.4 (*)     All other components within normal limits   URINALYSIS, REFLEX TO URINE CULTURE - Abnormal; Notable for the following components:    Mucous, UA Trace (*)     All other components within normal limits   PROTIME-INR - Abnormal; Notable for the following components:    PT 22.8 (*)     INR 2.0 (*)     All other components within normal limits   CBC WITH DIFFERENTIAL - Abnormal; Notable for the following components:    RBC 2.81 (*)     Hgb 9.7 (*)     Hct 28.4 (*)     .1 (*)     MCH 34.5 (*)     Platelet 31 (*)     IPF 13.2 (*)     Lymph # 0.33 (*)     All other components within normal limits    Narrative:     N/A   LACTIC ACID, PLASMA - Abnormal; Notable for the following components:    Lactic Acid Level 2.7 (*)     All other components within normal limits   BLOOD SMEAR MICROSCOPIC EXAM (OLG) - Abnormal; Notable for the following components:    Anisocytosis 1+ (*)     Platelets Decreased (*)     All  other components within normal limits   MAGNESIUM - Normal   TROPONIN I - Normal   TSH - Normal   COVID/FLU A&B PCR - Normal    Narrative:     The Xpert Xpress SARS-CoV-2/FLU/RSV plus is a rapid, multiplexed real-time PCR test intended for the simultaneous qualitative detection and differentiation of SARS-CoV-2, Influenza A, Influenza B, and respiratory syncytial virus (RSV) viral RNA in either nasopharyngeal swab or nasal swab specimens.         LIPASE - Normal   CBC W/ AUTO DIFFERENTIAL    Narrative:     The following orders were created for panel order CBC auto differential.  Procedure                               Abnormality         Status                     ---------                               -----------         ------                     CBC with Differential[[id number]]       Abnormal            Final result                 Please view results for these tests on the individual orders.   EXTRA TUBES    Narrative:     The following orders were created for panel order EXTRA TUBES.  Procedure                               Abnormality         Status                     ---------                               -----------         ------                     Red Top Hold[[id number]]                                    In process                 Gold Top Hold[[id number]]                                   In process                   Please view results for these tests on the individual orders.   RED TOP HOLD   GOLD TOP HOLD     EKG Readings: (Independently Interpreted)   Initial Reading: No STEMI. Rhythm: Sinus Tachycardia. Heart Rate: 125. Ectopy: No Ectopy. Conduction: Normal. Axis: Normal.     ECG Results              EKG 12-lead (Final result)  Result time 10/01/23 15:46:51      Final result by Interface, Lab In [facility] (10/01/23 15:46:51)                   Narrative:    Test Reason : R53.1,    Vent. Rate : 124 BPM     Atrial Rate : 124 BPM     P-R Int : 156 ms          QRS Dur : 054 ms      QT Int : 286 ms        P-R-T Axes : 079 079 078 degrees     QTc Int : 410 ms    Sinus tachycardia  Otherwise normal ECG  When compared with ECG of 27-SEP-2023 12:34,  No significant change was found  Confirmed by Hiren Chaidez MD ([id number]) on 10/1/2023 3:46:41 PM    Referred By: CRIS   SELF           Confirmed By:Hiren Chaidez MD                                     EKG 12-LEAD (Final result)  Result time 10/09/23 14:23:53      Final result by Unknown User (10/09/23 14:23:53)                                      Imaging Results              X-Ray Chest AP Portable (Final result)  Result time 10/01/23 13:16:54      Final result by Eric Dwyer MD (10/01/23 13:16:54)                   Impression:      No acute cardiopulmonary process.  Findings of chronic lung disease there      Electronically signed by: Eric Dwyer  Date:    10/01/2023  Time:    13:16               Narrative:    EXAMINATION:  XR CHEST AP PORTABLE    CLINICAL HISTORY:  Weakness;    TECHNIQUE:  Single view of the chest    COMPARISON:  09/21/2023    FINDINGS:  Prominent interstitial markings with no focal opacification.    The cardiomediastinal silhouette is within normal limits.    No acute osseous abnormality.                                       Medications   sodium bicarbonate 100 mEq in dextrose 5 % and 0.2 % NaCl 1,000 mL infusion ( Intravenous Stopped 10/2/23 1633)   sodium chloride 0.9% bolus 1,000 mL 1,000 mL (0 mLs Intravenous Stopped 10/1/23 1337)   sodium chloride 0.9% bolus 1,000 mL 1,000 mL (0 mLs Intravenous Stopped 10/1/23 1704)   sodium chloride 0.9% bolus 1,000 mL 1,000 mL (0 mLs Intravenous Stopped 10/1/23 1805)   magnesium sulfate 2g in water 50mL IVPB (premix) (0 g Intravenous Stopped 10/1/23 2330)   hydrocodone-apap 7.5-325 MG/15 ML oral solution 10 mL (10 mLs Oral Given 10/4/23 1114)   potassium phosphate 30 mmol in dextrose 5 % (D5W) 500 mL infusion (0 mmol Intravenous Stopped 10/2/23 1612)   potassium phosphate 30 mmol in dextrose 5 %  (D5W) 500 mL infusion (0 mmol Intravenous Stopped 10/3/23 0147)   sodium phosphate 39.99 mmol in dextrose 5 % (D5W) 250 mL IVPB (0 mmol Intravenous Stopped 10/3/23 1213)   magnesium sulfate 2g in water 50mL IVPB (premix) (0 g Intravenous Stopped 10/3/23 0807)   magnesium sulfate 2g in water 50mL IVPB (premix) (0 g Intravenous Stopped 10/3/23 1825)   potassium chloride SA CR tablet 40 mEq (40 mEq Oral Given 10/3/23 1734)   sodium phosphate 20.01 mmol in dextrose 5 % (D5W) 250 mL IVPB (0 mmol Intravenous Stopped 10/4/23 0854)     Medical Decision Making  Patient was tachycardic otherwise vital signs are stable.  Anemia at 9.7 otherwise unremarkable, CMP shows a hyponatremia and again patient is hypercalcemic to 12.5.  He was started on IV normal saline.  Magnesium within normal limits, lactic was mildly elevated, EKGs without acute concerning ischemic changes, and chest x-ray is generally clear, patient will be admitted to the Internal Medicine team for further evaluation and management.    Amount and/or Complexity of Data Reviewed  Labs: ordered. Decision-making details documented in ED Course.  Radiology: ordered. Decision-making details documented in ED Course.  ECG/medicine tests: ordered and independent interpretation performed. Decision-making details documented in ED Course.  Discussion of management or test interpretation with external provider(s): Discussed with the Internal Medicine providers, patient history, ED course and treatments    Risk  Decision regarding hospitalization.                               Clinical Impression:   Final diagnoses:  [R53.1] Weakness  [E83.52] Hypercalcemia (Primary)        ED Disposition Condition    Admit Stable                Juwan Ray MD  10/11/23 0140

## 2023-10-01 NOTE — H&P
[facility] Medicine Wards History & Physical Note     Resident Team: [facility] Medicine List 1  Attending Physician: Luis A Bocanegra MD  Resident: Clay Guzman  Intern: Natalio Gonzalez    Date of Admit: 10/1/2023    Chief Complaint     Weakness and Eating Disorder (C/o generalized weakness, not eating or drinking since left hospital on Friday. )     Subjective:      History of Present Illness:  Josafat Boudreaux is a 57 y.o. male who with a history of COPD, hypertension, hypothyroidism, stage IV laryngeal SCC, bilateral lung mass found to be SCC, s/p laryngectomy with radical neck dissection (7/2022) who presented to the ED on 10/1/2023  with a primary complaint of increased confusion and weakness.  Patient is accompanied by his wife who assisted with history.  Patient's wife states that over the last day she is noticed patient has become more confused from his baseline.  She states that the she has noticed that he is attempting to grab objects which are not there.  She has also noticed increased difficulty in him ambulating from the bed to the restroom and weakness sitting up during baths.  He denies other associated symptoms such as fever, chills, headaches, vision changes, chest pain, palpitations, shortness of breath, abdominal pain, constipation, urinary symptoms, and myalgias.    Patient was recently admitted on 9/21 for hypercalcemia and hypomagnesemia.  Patient was started on calcitonin and zoledronic acid.  Calcium downtrended initially but after several days began to trend upwards again.  Endocrinology was consulted who recommended another round of calcitonin at a higher dose of 400 units for a total of 5 doses.  He was started on denosumab subcutaneous therapy 120 mg once a week.  There was a mild decrease in calcium with initiation of this therapy.  Additional denosumab therapy was favored over another round of zoledronic acid.  Patient was discharged with plateaued moderate hypercalcemia.    In the ED, was  found to have an H&H of 9.7/28.4.  .  Platelets 31.  PT/INR 22.8/2.0.  Sodium 128.  Corrected calcium 13.9. Initial lactate 3.4, repeat 2.7. EKG displayed sinus tachycardia. CXR displayed no cardiopulmonary process.  Patient was admitted to the Internal Medicine team for further care and management moving forward.    Past Medical History:  Past Medical History:   Diagnosis Date    COPD (chronic obstructive pulmonary disease)     Dysphagia     Essential (primary) hypertension 08/05/2023    GERD (gastroesophageal reflux disease)     laryngeal Cancer     Lung cancer     Thyroid disease     Vocal cord mass        Past Surgical History:  Past Surgical History:   Procedure Laterality Date    DIAGNOSTIC LAPAROSCOPY N/A 5/18/2023    Procedure: LAPAROSCOPY, DIAGNOSTIC;  Surgeon: Dipak De Leon Jr., MD;  Location: [facility];  Service: General;  Laterality: N/A;    DIRECT DIAGNOSTIC LARYNGOSCOPY WITH BRONCHOSCOPY AND ESOPHAGOSCOPY N/A 07/11/2022    Procedure: LARYNGOSCOPY, DIRECT, DIAGNOSTIC, WITH BRONCHOSCOPY AND ESOPHAGOSCOPY;  Surgeon: Emory Alonso MD;  Location: [facility];  Service: ENT;  Laterality: N/A;    ESOPHAGOGASTRODUODENOSCOPY (EGD)- DEVICE ASSISTED N/A 04/18/2023    Procedure: EGD;  Surgeon: Aaliyah Conrad III, MD;  Location: [facility] ENDOSCOPY;  Service: Gastroenterology;  Laterality: N/A;  Esophageal Dilation; Savory over Guide wire: 36Fr and 39 Fr  Clips to secure peg site    HIP SURGERY      HUMERUS FRACTURE SURGERY      INCISION AND DRAINAGE, NECK Bilateral 10/21/2022    Procedure: INCISION AND DRAINAGE,NECK;  Surgeon: Madison Worley MD;  Location: [facility];  Service: ENT;  Laterality: Bilateral;    INSERT ARTERIAL LINE  06/26/2022         JOINT REPLACEMENT  2019    R hip    PEG TUBE REMOVAL      PLACEMENT, MEDIPORT      TRACHEOSTOMY N/A 06/10/2022    Procedure: CREATION, TRACHEOSTOMY;  Surgeon: Junior Alonso MD;  Location: [facility];  Service: ENT;  Laterality: N/A;       Family  History:  Family History   Problem Relation Age of Onset    Lung cancer Father     Cancer Father         Lung cancer    Throat cancer Brother     Cancer Brother         Throat       Social History:  Social History     Tobacco Use    Smoking status: Former     Current packs/day: 0.00     Average packs/day: 1.5 packs/day for 42.4 years (63.7 ttl pk-yrs)     Types: Cigarettes     Start date: 1980     Quit date: 2022     Years since quittin.3    Smokeless tobacco: Never   Substance Use Topics    Alcohol use: Yes     Alcohol/week: 1.0 standard drink of alcohol     Types: 1 Cans of beer per week     Comment: occasional    Drug use: Never       Allergies:  Review of patient's allergies indicates:  No Known Allergies    Home Medications:  Prior to Admission medications    Medication Sig Start Date End Date Taking? Authorizing Provider   albuterol-ipratropium (DUO-NEB) 2.5 mg-0.5 mg/3 mL nebulizer solution Take 3 mLs by nebulization every 4 (four) hours as needed for Shortness of Breath or Wheezing. Rescue 23  Michael Monson MD   apixaban (ELIQUIS) 5 mg Tab Take 1 tablet (5 mg total) by mouth As instructed. 3/22/23   Yara Atkinson MD   diphenhydrAMINE (BENADRYL) 12.5 mg/5 mL elixir Take 10 mLs (25 mg total) by mouth every evening. 23   Dov Conley MD   famotidine (PEPCID) 20 MG tablet Take 1 tablet (20 mg total) by mouth every evening.  Patient taking differently: Take 20 mg by mouth every evening. 8/10/23 8/9/24  Tony Bertrand MD   glutamine 10 gram PwPk Take 10 g by mouth 2 (two) times a day.    Provider, Historical   hydrocodone-acetaminophen (HYCET) solution 7.5-325 mg/15mL Take 10 mLs by mouth every 6 (six) hours as needed for Pain. 9/18/23 10/18/23  Carola Stein DO   levothyroxine (SYNTHROID) 50 MCG tablet Take 1 tablet (50 mcg total) by mouth once daily. 5/15/23 5/14/24  Yara Atkinson MD   magnesium oxide (MAG-OX) 400 mg (241.3 mg magnesium) tablet Take 1  "tablet (400 mg total) by mouth 2 (two) times daily. 23   Dov Conley MD   megestroL (MEGACE) 400 mg/10 mL (40 mg/mL) Susp  23   Provider, Historical   ondansetron (ZOFRAN-ODT) 8 MG TbDL Take 8 mg by mouth every 8 (eight) hours as needed for Nausea. Tab 1 ODT in AM for 2 days after chemotherapy    Provider, Historical   pantoprazole (PROTONIX) 20 MG tablet Take 1 tablet (20 mg total) by mouth once daily. 8/10/23 8/9/24  Tony Bertrand MD   polyethylene glycol (GLYCOLAX) 17 gram PwPk Take 17 g by mouth daily as needed (daily as needed for constipation). 23   Dov Conley MD   sertraline (ZOLOFT) 25 MG tablet Take 1 tablet (25 mg total) by mouth once daily. 23  Dov Conley MD   sodium chloride 3% 3 % nebulizer solution Take 4 mLs by nebulization as needed for Other or Cough (wheezing). 23   Tawanna Preston MD   aspirin (ECOTRIN) 81 MG EC tablet Take 81 mg by mouth once daily.  22  Provider, Historical         Review of Systems:  Review of Systems   Constitutional:  Negative for chills, fever and malaise/fatigue.   Respiratory:  Negative for cough, sputum production, shortness of breath and wheezing.    Cardiovascular:  Negative for chest pain, palpitations and leg swelling.   Gastrointestinal:  Negative for abdominal pain, blood in stool, constipation, diarrhea, melena, nausea and vomiting.   Genitourinary:  Negative for dysuria and urgency.   Neurological:  Positive for weakness. Negative for dizziness, tingling, tremors and headaches.       Objective:   Last 24 Hour Vital Signs:  BP  Min: 91/64  Max: 124/71  Temp  Av.7 °F (36.5 °C)  Min: 97.7 °F (36.5 °C)  Max: 97.7 °F (36.5 °C)  Pulse  Av.2  Min: 117  Max: 130  Resp  Av.7  Min: 15  Max: 30  SpO2  Av.4 %  Min: 94 %  Max: 100 %  Height  Av' 9" (175.3 cm)  Min: 5' 9" (175.3 cm)  Max: 5' 9" (175.3 cm)  Weight  Av.4 kg (128 lb 12 oz)  Min: 58.4 kg (128 lb 12 oz)  Max: 58.4 kg (128 " lb 12 oz)  Body mass index is 19.01 kg/m².  No intake/output data recorded.    Physical Examination:  Physical Exam  Vitals and nursing note reviewed.   Constitutional:       General: He is not in acute distress.     Comments: Patient is cachectic   HENT:      Head: Normocephalic and atraumatic.   Eyes:      Extraocular Movements: Extraocular movements intact.   Cardiovascular:      Rate and Rhythm: Regular rhythm. Tachycardia present.      Pulses: Normal pulses.      Heart sounds: No murmur (3) heard.     No friction rub. No gallop.   Pulmonary:      Breath sounds: Wheezing present. No rhonchi or rales.   Abdominal:      General: There is no distension.      Palpations: Abdomen is soft.      Tenderness: There is no abdominal tenderness.   Musculoskeletal:         General: No swelling or tenderness.      Cervical back: Neck supple.   Skin:     General: Skin is warm and dry.   Neurological:      General: No focal deficit present.      Mental Status: He is alert. Mental status is at baseline.       Laboratory:  Most Recent Data:  CBC:   Lab Results   Component Value Date    WBC 7.17 10/01/2023    HGB 9.7 (L) 10/01/2023    HCT 28.4 (L) 10/01/2023    PLT 31 (LL) 10/01/2023    .1 (H) 10/01/2023    RDW 14.8 10/01/2023     WBC Differential:   Recent Labs   Lab 09/26/23  0514 09/28/23  0449 10/01/23  1217   WBC 5.74 5.34 7.17   HGB 9.3* 9.0* 9.7*   HCT 26.9* 25.6* 28.4*   PLT 40* 37* 31*   .1* 103.6* 101.1*     BMP:   Lab Results   Component Value Date     (L) 10/01/2023    K 3.9 10/01/2023    CO2 19 (L) 10/01/2023    BUN 11.7 10/01/2023    CREATININE 0.68 (L) 10/01/2023    CALCIUM 12.5 (HH) 10/01/2023    MG 1.60 10/01/2023    PHOS 1.5 (L) 09/28/2023     LFTs:   Lab Results   Component Value Date    ALBUMIN 2.3 (L) 10/01/2023    BILITOT 0.8 10/01/2023    AST 42 (H) 10/01/2023    ALKPHOS 188 (H) 10/01/2023    ALT 21 10/01/2023     Coags:   Lab Results   Component Value Date    INR 2.0 (H) 10/01/2023     PROTIME 22.8 (H) 10/01/2023    PTT 30.6 06/26/2022     FLP:   Lab Results   Component Value Date    CHOL 227 (H) 09/16/2019    HDL 59 09/16/2019    TRIG 184 (H) 09/16/2019     DM:   Lab Results   Component Value Date    CREATININE 0.68 (L) 10/01/2023     Thyroid:   Lab Results   Component Value Date    TSH 4.716 10/01/2023     Anemia:   Lab Results   Component Value Date    IRON 21 (L) 12/09/2022    TIBC 144 (L) 12/09/2022    FERRITIN 1,336.18 (H) 12/09/2022    CQTUZUEL40 >2,000 (H) 09/13/2023    FOLATE 14.2 09/13/2023     Cardiac:   Lab Results   Component Value Date    TROPONINI <0.010 10/01/2023    BNP 11.5 08/05/2023     Urinalysis:   Lab Results   Component Value Date    COLORU YELLOW 06/27/2019    PHUA 6.0 10/01/2023    NITRITE Negative 06/27/2019    KETONESU Negative 06/27/2019    UROBILINOGEN Normal 10/01/2023    WBCUA 0-5 10/01/2023       Trended Lab Data:  Recent Labs   Lab 09/26/23  0514 09/27/23  0445 09/28/23  0449 10/01/23  1217   WBC 5.74  --  5.34 7.17   HGB 9.3*  --  9.0* 9.7*   HCT 26.9*  --  25.6* 28.4*   PLT 40*  --  37* 31*   .1*  --  103.6* 101.1*   RDW 16.4  --  15.5 14.8   * 132* 132* 128*   K 3.8 4.0 3.4* 3.9   CO2 19* 19* 20* 19*   BUN 4.9* 6.6* 6.0* 11.7   CREATININE 0.63* 0.63* 0.65* 0.68*   ALBUMIN 2.3* 2.4* 2.2* 2.3*   BILITOT 0.6 0.7 0.6 0.8   AST 38* 42* 38* 42*   ALKPHOS 134 166* 143 188*   ALT 16 19 16 21       Trended Cardiac Data:  Recent Labs   Lab 10/01/23  1217   TROPONINI <0.010       Radiology:  Imaging Results              X-Ray Chest AP Portable (Final result)  Result time 10/01/23 13:16:54      Final result by Eric Dwyer MD (10/01/23 13:16:54)                   Impression:      No acute cardiopulmonary process.  Findings of chronic lung disease there      Electronically signed by: Eric Dwyer  Date:    10/01/2023  Time:    13:16               Narrative:    EXAMINATION:  XR CHEST AP PORTABLE    CLINICAL HISTORY:  Weakness;    TECHNIQUE:  Single view  of the chest    COMPARISON:  09/21/2023    FINDINGS:  Prominent interstitial markings with no focal opacification.    The cardiomediastinal silhouette is within normal limits.    No acute osseous abnormality.                                      Assessment & Plan:     1) Recurrent Hypercalcemia secondary to malignancy  - Ca 13.9 corrected, moderate  - EKG displays sinus tachycardia   - PTHrp was 27 which was collected on 9/13/23   - started on IV NS at 100cc/hr, IV calcitonin 4units/kg for 2 doses  -will monitor closely with BMP q4hr   -Consider Endocrinology consult in AM      2) 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 Underwent another cycle of carbo/taxol completed on 8/23/23.   -Currently follows Dr. Atkinson of Heme/Onc  -Patient will need Hospice/goals of care conversation this admission    3) Macrocytic anemia  - H/H 9.7/28.4,  on admission  - Has been consistently decreasing over the last several months  - B12 and folate pending, reticulocyte count, iron panel ordered and pending    4) Thrombocytopenia:       Low suspicion for DIC based on clinical presentation and      mixed lab results    -Plts 31 on admit, 37 on 9/28  -D-dimer, Factor 8, fibrinogen ordered and pending   -continue to monitor for bleeding   -Held patient's home eliquis. Will consider starting fondaparinux tomorrow AM.     5) Hyponatremia:   -Na 128 on admission  -Serum osm, urine osm, serum sodium, and serum Cl ordered and pending     6) Hypothyroidism  - TSH 4.716  - will continue home levothyroxine 50 mcg     7) History of DVT of RUE:   - Will consider starting fondaparinux tomorrow AM. Patient took dose of Eliquis this AM.     8) COPD  - continue duonebs PRN    9) History of Anxiety/Depression:  -Continue home  Sertraline 25mg      CODE STATUS: Full Code  Access: Peripheral  Antibiotics: none  Diet: Soft and Bite sized  DVT Prophylaxis: SCD  Fluids: normal saline at 100 mL/hr     Disposition: Continue to monitor Ca, Na, and anemia. Monitor response to calcitonin, increase dose if needed. Need to r/o DIC given history of malignancy.     CODE STATUS: I have spoken with the patient at length today about his CODE STATUS.  I have fully informed him of the risks and benefits of CPR and mechanical ventilation.  We have also discussed the differences between FULL CODE, DNR/DNI and DNR.  He is fully aware and was able to nod to understanding of this information.  He, without coercion or bias, made a decision to be FULL CODE status.     Natalio Gonzalez, DO  Internal Medicine HO-1

## 2023-10-02 PROBLEM — E87.1 HYPONATREMIA: Status: ACTIVE | Noted: 2023-01-01

## 2023-10-02 PROBLEM — E83.39 HYPOPHOSPHATEMIA: Status: ACTIVE | Noted: 2023-01-01

## 2023-10-02 PROBLEM — E43 SEVERE MALNUTRITION: Status: ACTIVE | Noted: 2023-01-01

## 2023-10-02 NOTE — PT/OT/SLP PROGRESS
Physical Therapy    Missed Treatment Session    Patient Name:  Josafat Boudreaux   MRN:  [id number]      -patient supine in bed  -patient's wife at bedside  -Respiratory therapist Kiran & PM&R PAVITHRA Donohue in room  -patient not seen at this time secondary to patient unwilling to participate  -per patients wife - no thearpy now...maybe check on him this p.m.  -will follow-up as patient is agreeable/available to participate and as therapists' schedule allows

## 2023-10-02 NOTE — CARE UPDATE
[id number] Nursing contacted night team for trouble swallowing PRN pain medication. VSS and airway patent. 1 x Norco 7.5/325 10ml given.       Esther Figueroa MD   [facility] Internal [facility] HO-1

## 2023-10-02 NOTE — PROGRESS NOTES
Faculty addendum:     I have reviewed the patients history, residents  findings on physical examination, diagnosis and treatment plan. Care provided was reasonable and necessary.     Patient seen and examined this morning.    Approach by Endocrinology faculty early this morning, okay to stop calcitonin, increased rate of IV fluids for the time being as patient still volume depleted.    Please investigate etiology of patient's non-anion gap metabolic acidosis, check urine electrolytes if not already completed, suspect could be an RTA component.    Denusomab given on previous admission, calcium already improving this morning, seems medication taking effect, as mentioned increase rate of IV fluids for the time being, monitor calcium levels every 8-12 hours.  Aim for urine output 100-150 cc/hour.    Please replete phosphorus as mentioned addendum in H and P, as wife apparently was made aware by nursing staff and had inquired about it on walking rounds.      Fully dictated resident progress note forthcoming.  Hopeful for discharge tomorrow.

## 2023-10-02 NOTE — PROGRESS NOTES
[facility] Medicine Wards Progress Note     Resident Team: [facility] Medicine List 1  Attending Physician: Luis A Bocanegra MD  Resident: Clay Guzman  Intern: Natalio Gonzalez    Subjective:      Brief HPI:  Josafat Boudreaux is a 57 y.o. male who with a history of COPD, hypertension, hypothyroidism, stage IV laryngeal SCC, bilateral lung mass found to be SCC, s/p laryngectomy with radical neck dissection (7/2022) who presented to the ED on 10/1/2023  with a primary complaint of increased confusion and weakness.  Patient is accompanied by his wife who assisted with history.  Patient's wife states that over the last day she is noticed patient has become more confused from his baseline.  She states that the she has noticed that he is attempting to grab objects which are not there.  She has also noticed increased difficulty in him ambulating from the bed to the restroom and weakness sitting up during baths.  He denies other associated symptoms such as fever, chills, headaches, vision changes, chest pain, palpitations, shortness of breath, abdominal pain, constipation, urinary symptoms, and myalgias.     Patient was recently admitted on 9/21 for hypercalcemia and hypomagnesemia.  Patient was started on calcitonin and zoledronic acid.  Calcium downtrended initially but after several days began to trend upwards again.  Endocrinology was consulted who recommended another round of calcitonin at a higher dose of 400 units for a total of 5 doses.  He was started on denosumab subcutaneous therapy 120 mg once a week.  There was a mild decrease in calcium with initiation of this therapy.  Repeat denosumab therapy was favored over another round of zoledronic acid.  Patient was discharged with plateaued moderate hypercalcemia.     In the ED, was found to have an H&H of 9.7/28.4.  .  Platelets 31.  PT/INR 22.8/2.0.  Sodium 128.  Corrected calcium 13.9. Initial lactate 3.4, repeat 2.7. EKG displayed sinus tachycardia. CXR displayed no  "cardiopulmonary process.  Patient was admitted to the Internal Medicine team for further care and management moving forward.    Interval History:   Corrected calcium 12.7 this morning.  Phos 0.8, we will replete.  Recheck BMP, Mag, phos this afternoon.  Endocrinology was consulted who recommended titrating IV fluids for urine output of 100-150 mL/hour, monitoring at calcium Q 8 hours, obtaining a calcium trial level, and discontinuing calcitonin. Continue denosumab 120 mg SQ 1xwk. Will follow recommendations and monitor patient response.     Review of Systems:  ROS completed and negative except as indicated above.     Objective:     Last 24 Hour Vital Signs:  BP  Min: 91/64  Max: 124/71  Temp  Av °F (36.7 °C)  Min: 97.7 °F (36.5 °C)  Max: 98.2 °F (36.8 °C)  Pulse  Av  Min: 84  Max: 130  Resp  Av.8  Min: 15  Max: 30  SpO2  Av.4 %  Min: 94 %  Max: 100 %  Height  Av' 9" (175.3 cm)  Min: 5' 9" (175.3 cm)  Max: 5' 9" (175.3 cm)  Weight  Av.4 kg (128 lb 12 oz)  Min: 58.4 kg (128 lb 12 oz)  Max: 58.4 kg (128 lb 12 oz)  I/O last 3 completed shifts:  In:  [IV Piggyback:]  Out: -     Physical Examination:  Physical Exam  Vitals and nursing note reviewed.   Constitutional:       Comments: Patient is cachectic   Eyes:      Extraocular Movements: Extraocular movements intact.   Cardiovascular:      Rate and Rhythm: Regular rhythm. Tachycardia present.      Pulses: Normal pulses.      Heart sounds: Normal heart sounds. No murmur heard.     No friction rub. No gallop.   Pulmonary:      Effort: Pulmonary effort is normal.      Breath sounds: Wheezing present. No rhonchi or rales.   Abdominal:      General: There is no distension.      Palpations: Abdomen is soft.      Tenderness: There is no abdominal tenderness.   Musculoskeletal:         General: No swelling or tenderness.   Skin:     General: Skin is warm and dry.   Neurological:      General: No focal deficit present.      Mental " Status: He is alert. Mental status is at baseline.       Laboratory:  Most Recent Data:  CBC:   Lab Results   Component Value Date    WBC 7.17 10/01/2023    HGB 9.7 (L) 10/01/2023    HCT 28.4 (L) 10/01/2023    PLT 31 (LL) 10/01/2023    .1 (H) 10/01/2023    RDW 14.8 10/01/2023     WBC Differential:   Recent Labs   Lab 09/26/23  0514 09/28/23  0449 10/01/23  1217   WBC 5.74 5.34 7.17   HGB 9.3* 9.0* 9.7*   HCT 26.9* 25.6* 28.4*   PLT 40* 37* 31*   .1* 103.6* 101.1*     BMP:   Lab Results   Component Value Date     (L) 10/02/2023    K 3.8 10/02/2023    CO2 15 (L) 10/02/2023    BUN 9.3 10/02/2023    CREATININE 0.52 (L) 10/02/2023    CALCIUM 11.0 (H) 10/02/2023    MG 1.60 10/01/2023    PHOS 1.5 (L) 09/28/2023     LFTs:   Lab Results   Component Value Date    ALBUMIN 2.3 (L) 10/01/2023    BILITOT 0.8 10/01/2023    AST 42 (H) 10/01/2023    ALKPHOS 188 (H) 10/01/2023    ALT 21 10/01/2023     Coags:   Lab Results   Component Value Date    INR 2.0 (H) 10/01/2023    PROTIME 22.8 (H) 10/01/2023    PTT 30.6 06/26/2022     FLP:   Lab Results   Component Value Date    CHOL 227 (H) 09/16/2019    HDL 59 09/16/2019    TRIG 184 (H) 09/16/2019     DM:   Lab Results   Component Value Date    CREATININE 0.52 (L) 10/02/2023     Thyroid:   Lab Results   Component Value Date    TSH 4.716 10/01/2023     Anemia:   Lab Results   Component Value Date    IRON 32 (L) 10/01/2023    TIBC 128 (L) 10/01/2023    FERRITIN 1,336.18 (H) 12/09/2022    LAOALFFU31 >2,000 (H) 10/01/2023    FOLATE 9.4 10/01/2023     Cardiac:   Lab Results   Component Value Date    TROPONINI <0.010 10/01/2023    BNP 11.5 08/05/2023       Radiology:  Imaging Results              X-Ray Chest AP Portable (Final result)  Result time 10/01/23 13:16:54      Final result by Eric Dwyer MD (10/01/23 13:16:54)                   Impression:      No acute cardiopulmonary process.  Findings of chronic lung disease there      Electronically signed by: Eric  Yuliya  Date:    10/01/2023  Time:    13:16               Narrative:    EXAMINATION:  XR CHEST AP PORTABLE    CLINICAL HISTORY:  Weakness;    TECHNIQUE:  Single view of the chest    COMPARISON:  09/21/2023    FINDINGS:  Prominent interstitial markings with no focal opacification.    The cardiomediastinal silhouette is within normal limits.    No acute osseous abnormality.                                      Current Medications:     Infusions:   sodium chloride 0.9% 100 mL/hr at 10/01/23 1837        Scheduled:   calcitonin  4 Units/kg Intramuscular Q12H    diphenhydrAMINE  25 mg Oral QHS    levothyroxine  50 mcg Oral Daily    pantoprazole  20 mg Oral Daily    sertraline  25 mg Oral Daily        PRN:  albuterol-ipratropium, HYDROcodone-acetaminophen, hydrocodone-apap 7.5-325 MG/15 ML, melatonin, ondansetron, polyethylene glycol, sodium chloride 0.9%      Assessment & Plan:     1) Recurrent Hypercalcemia secondary to malignancy  - Ca 13.9 corrected on admission; 12.7 on most current labs  - EKG displayed sinus tachycardia   - PTHrp was 27 which was collected on 9/13/23   - will discontinue calcitonin for now and restart denosumab 120 mg subcu once weekly.  We will increase IVF until urine output is 100-150 cc/hour.  -calcitriol ordered and pending.  -continue to monitor response to treatment, BMP Q 8      2) 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 Underwent another cycle of carbo/taxol completed on 8/23/23.   -Currently follows Dr. Atkinson of Heme/Onc. Will contact.  -Patient will need Hospice/goals of care conversation this admission     3) Macrocytic anemia  - H/H 9.7/28.4,  on admission  - Has been consistently decreasing over the last several months  - B12 and folate wnl     4)  Thrombocytopenia:       Low suspicion for DIC based on clinical presentation and      mixed lab results    -Plts 31 on admit, 37 on 9/28  -continue to monitor for bleeding      5) Hyponatremia:   -Na 128 on admission, improved to 132 this AM  -Serum osm 275, urine osm 396, FABRICIO 46     6) Hypothyroidism  - TSH 4.716  - will continue home levothyroxine 50 mcg     7) History of DVT of RUE:   - started patient on fondaparinux 7.5 mg daily     8) COPD  - continue duonebs PRN     9) History of Anxiety/Depression:  -Continue home Sertraline 25mg      CODE STATUS: Full Code  Access: Peripheral  Antibiotics: none  Diet: Soft and Bite sized  DVT Prophylaxis: SCD  Fluids: normal saline at 100 mL/hr     Disposition:  Make regimen changes as per endocrinology.  Continue to monitor patient's response to therapy.  Continue to monitor patient's mental status changes.    Natalio Gonzalez, DO  Internal Medicine HO-1

## 2023-10-02 NOTE — PLAN OF CARE
Patient's wife Elsie stated that she spoke to director at [facility] and they will now accept him back. Referral to [facility] closed.

## 2023-10-02 NOTE — PLAN OF CARE
[facility] denied re-admission. Patient and wife Elsie have been updated. Next choice is [facility]. [facility] [employer].

## 2023-10-02 NOTE — MEDICAL/APP STUDENT
"[facility] Endocrinology Consult Note     Reason for Consult:      hypercalcemia    Subjective:     History of Present Illness:  Josafat Boudreaux is a 56 yo male with past medical history of COPD, hypothyroidism, DVT of RUE, stage 4 laryngeal SCC, bilateral squamous cell lung mass (metastatic squamous cell lung cancer vs. metastatic SCC to lungs from head and neck), mediastinal and hilar LAD, s/p laryngectomy with radial neck dissection (7/2022), radiation and multiple rounds of chemotherapy.     Patient was previously admitted from 9/13/23-9/18/23 for hypercalcemia. PTHrp was elevated at 27 on 9/13/23. His hypercalcemia improved after treatment with IVF, calcitonin, and zoledronic acid. He was again admitted from 9/21/2023-9/28/2023 for hypercalcemia. Total calcium was elevated at 14.1 on presentation and was treated with IVF, calcitonin (1 dose of 230 units followed by 5 doses of 400 units each), and zoledronic acid (one dose of 4 mg). The patient was started on denosumab 120 mg subQ once weekly and discharged with moderate hypercalcemia (13.3 mg/dL corrected calcium on 9/28/23). He was admitted from the ED on 10/1/23 due to lethargy and weakness. Corrected calcium was 13.9 mg/dL on presentation. He was started on IV NS and received one dose of calcitonin IM on 10/1. Corrected calcium was 12.5 on 10/2/23. Endocrinology is being consulted for management of hypercalemia.    10/2/23:  Of note, patient's wife answers for patient as patient is unable to verbalize. She reports the patient's confusion has worsened overnight. He is oriented to person and place but not to time. Patient's wife reports that he has been "fidgeting". She reports the patient continues to endorse bilateral leg weakness and starts to fall backwards when walking. She notes he has been eating a little since he was discharged previously but his appetite is decreased. Per patient's wife, he denies headache, dizziness, n/v, fevers/chills, chest pain, " palpitations, SOB, abdominal pain, dysuria, hematuria, leg swelling or pain, or new numbness/tingling (although he has chronic neuropathy in his hands and feet). She reports he has had 2 normal bowel movements since admission yesterday.    Review of Systems:  Constitutional:  Negative for chills and fever. Positive for altered mental status.  HENT:  Negative for congestion, sinus pain and sore throat.    Eyes:  Negative for blurred vision.   Respiratory:  Negative for cough, hemoptysis, sputum production, shortness of breath.  Cardiovascular:  Negative for chest pain, palpitations, and leg swelling.  Gastrointestinal:  Negative for abdominal pain, diarrhea, constipation, heartburn, nausea and vomiting.   Genitourinary:  Negative for dysuria, frequency, hematuria and urgency.   Musculoskeletal:  Negative for joint pain, back pain, and neck pain.   Neurological:  Positive for bilateral leg weakness. Negative for dizziness and headache.    Past Medical History: See above    Past Surgical History:  Past Surgical History:   Procedure Laterality Date    DIAGNOSTIC LAPAROSCOPY N/A 5/18/2023    Procedure: LAPAROSCOPY, DIAGNOSTIC;  Surgeon: Dipak De Leon Jr., MD;  Location: [facility] OR;  Service: General;  Laterality: N/A;    DIRECT DIAGNOSTIC LARYNGOSCOPY WITH BRONCHOSCOPY AND ESOPHAGOSCOPY N/A 07/11/2022    Procedure: LARYNGOSCOPY, DIRECT, DIAGNOSTIC, WITH BRONCHOSCOPY AND ESOPHAGOSCOPY;  Surgeon: Emory Alonso MD;  Location: [facility] OR;  Service: ENT;  Laterality: N/A;    ESOPHAGOGASTRODUODENOSCOPY (EGD)- DEVICE ASSISTED N/A 04/18/2023    Procedure: EGD;  Surgeon: Aaliyah Conrad III, MD;  Location: [facility] ENDOSCOPY;  Service: Gastroenterology;  Laterality: N/A;  Esophageal Dilation; Savory over Guide wire: 36Fr and 39 Fr  Clips to secure peg site    HIP SURGERY      HUMERUS FRACTURE SURGERY      INCISION AND DRAINAGE, NECK Bilateral 10/21/2022    Procedure: INCISION AND DRAINAGE,NECK;  Surgeon: Madison Worley,  MD;  Location: [facility] OR;  Service: ENT;  Laterality: Bilateral;    INSERT ARTERIAL LINE  06/26/2022         JOINT REPLACEMENT  2019    R hip    PEG TUBE REMOVAL      PLACEMENT, MEDIPORT      TRACHEOSTOMY N/A 06/10/2022    Procedure: CREATION, TRACHEOSTOMY;  Surgeon: Junior Alonso MD;  Location: [facility] OR;  Service: ENT;  Laterality: N/A;       Allergies:  Review of patient's allergies indicates:  No Known Allergies    Home Medications:  Prior to Admission medications    Medication Sig Start Date End Date Taking? Authorizing Provider   albuterol-ipratropium (DUO-NEB) 2.5 mg-0.5 mg/3 mL nebulizer solution Take 3 mLs by nebulization every 4 (four) hours as needed for Shortness of Breath or Wheezing. Rescue 2/7/23 2/7/24 Yes Michael Monson MD   apixaban (ELIQUIS) 5 mg Tab Take 1 tablet (5 mg total) by mouth As instructed. 3/22/23  Yes Yara Atkinson MD   diphenhydrAMINE (BENADRYL) 25 mg capsule 1 capsule (25 mg total) by Per G Tube route every 6 (six) hours as needed for Itching.  Patient taking differently: 25 mg by Per G Tube route every 6 (six) hours as needed for Itching. Takes liquid mediation 6/23/22  Yes Ra Quintanilla MD   famotidine (PEPCID) 20 MG tablet Take 1 tablet (20 mg total) by mouth every evening.  Patient taking differently: Take 20 mg by mouth every evening. 8/10/23 8/9/24 Yes Tony Bertrand MD   glutamine 10 gram PwPk Take 10 g by mouth 2 (two) times a day.   Yes Provider, Historical   hydrocodone-acetaminophen (HYCET) solution 7.5-325 mg/15mL Take 10 mLs by mouth every 6 (six) hours as needed for Pain. 9/18/23 10/18/23 Yes Carola Stein DO   levothyroxine (SYNTHROID) 50 MCG tablet Take 1 tablet (50 mcg total) by mouth once daily. 5/15/23 5/14/24 Yes Yara Atkinson MD   megestroL (MEGACE) 400 mg/10 mL (40 mg/mL) Susp  8/1/23  Yes Provider, Historical   ondansetron (ZOFRAN-ODT) 8 MG TbDL Take 8 mg by mouth every 8 (eight) hours as needed for Nausea. Tab 1 ODT in AM for 2 days  "after chemotherapy   Yes Provider, Historical   pantoprazole (PROTONIX) 20 MG tablet Take 1 tablet (20 mg total) by mouth once daily. 8/10/23 8/9/24 Yes Tony Bertrand MD   sodium chloride 3% 3 % nebulizer solution Take 4 mLs by nebulization as needed for Other or Cough (wheezing). 23  Yes Tawanna Preston MD   metroNIDAZOLE (METROGEL) 0.75 % (37.5mg/5 gram) vaginal gel apply to affected area BID 23   Provider, Historical   aspirin (ECOTRIN) 81 MG EC tablet Take 81 mg by mouth once daily.  22  Provider, Historical       Family History:  Family History   Problem Relation Age of Onset    Lung cancer Father     Cancer Father         Lung cancer    Throat cancer Brother     Cancer Brother         Throat       Social History:  Social History     Tobacco Use    Smoking status: Former     Current packs/day: 0.00     Average packs/day: 1.5 packs/day for 42.4 years (63.7 ttl pk-yrs)     Types: Cigarettes     Start date: 1980     Quit date: 2022     Years since quittin.3    Smokeless tobacco: Never   Substance Use Topics    Alcohol use: Yes     Alcohol/week: 1.0 standard drink of alcohol     Types: 1 Cans of beer per week     Comment: occasional    Drug use: Never          Objective:   Vitals  Vitals  BP: 106/63  Temp: 98.2 °F (36.8 °C)  Temp Source: Oral  Pulse: (!) 121  Resp: 18  SpO2: 96 %  Height: 5' 9" (175.3 cm)  Weight: 58 kg (127 lb 13.9 oz)    Physical Examination:  General: Appears lethargic, intermittently closes eyes. NAD. Able to form words with mouth but unable to verbalize (s/p laryngectomy, trach).  Psychiatric: Appears lethargic.  HEENT: Normocephalic, atraumatic. Face symmetric. Mucous membranes moist. Trach in place.   Cardiovascular: Tachycardic. Regular rhythm. Normal S1 & S2 appreciated.  Pulmonary: Bilateral symmetric chest rise. Non-labored bilateral wheezes.  Abdominal:  Soft, nontender, nondistended.   Extremities: No clubbing, cyanosis or edema.  Skin:  Warm & " dry.  Neuro: Deferred because patient intermittently falls asleep.    Laboratory:  10/2/23  H&H 8.6/25.3  Platelet count 26,000  Sodium 133 mmol/L  Total calcium 10.9 mg/dL  Albumin 2.0 g/dL  Corrected calcium 12.5 mg/dL  Phosphorous 0.8 mg/dL    Radiology:  No results found in the last 24 hours.     Assessment & Plan:   PTHrp mediated Hypercalcemia  -PTHrp of 27 on 9/13/23  -Corrected calcium of 13.9 mg/dL on presentation on 10/1; corrected calcium of 12.5 mg/dL today following treatment with IVF, one dose calcitonin 234 units on 10/1  -Titrate IV fluids until urine output is 100-150 cc/hr  -Monitor calcium level q8h  -Obtain calcitriol level  -Discontinue calcitonin; consider resuming calcitonin at 400 units q6h if calcium does not respond to increased IVF  -Continue denosumab 120 mg subQ once weekly    Jess MOSCOSOU, MS3

## 2023-10-02 NOTE — PLAN OF CARE
10/02/23 1104   Discharge Assessment   Assessment Type Discharge Planning Assessment   Confirmed/corrected address, phone number and insurance Yes   Confirmed Demographics Correct on Facesheet   Source of Information family   When was your last doctors appointment?   (PCP: Tony Bertrand)   Does patient/caregiver understand observation status   (Inpatient)   Communicated MOISES with patient/caregiver Date not available/Unable to determine   Reason For Admission Hypercalcemia; Weakness   People in Home spouse   Facility Arrived From: Home   Do you expect to return to your current living situation? Yes   Do you have help at home or someone to help you manage your care at home? Yes   Who are your caregiver(s) and their phone number(s)? Elsie, wife, P: [phone]   Prior to hospitilization cognitive status: Alert/Oriented   Current cognitive status: Alert/Oriented   Walking or Climbing Stairs ambulation difficulty, requires equipment   Mobility Management Rollator; WC   Dressing/Bathing bathing difficulty, assistance 1 person;bathing difficulty, requires equipment   Dressing/Bathing Management Elsie, wife, P: [phone]; [place]   Home Accessibility wheelchair accessible   Home Layout Able to live on 1st floor   Equipment Currently Used at Home rollator;shower chair;bedside commode;suction machine;nebulizer;wheelchair   Readmission within 30 days? Yes   Patient currently being followed by outpatient case management? No   Do you currently have service(s) that help you manage your care at home? Yes   Name and Contact number of agency [facility]   Is the pt/caregiver preference to resume services with current agency Yes   Do you take prescription medications? Yes   Do you have prescription coverage? Yes   Coverage [employer] Managed Medicare   Do you have any problems affording any of your prescribed medications? No   Is the patient taking medications as prescribed? yes   Who is going to help you get home at discharge?  Elsie, wife, P: [phone]   How do you get to doctors appointments? family or friend will provide   Are you on dialysis? No   Do you take coumadin? No   DME Needed Upon Discharge  none   Discharge Plan discussed with: Patient;Spouse/sig other   Name(s) and Number(s) Elsie wife, P: [phone]   Transition of Care Barriers None   Discharge Plan A Home Health;Home with family   OTHER   Name(s) of People in Home Elsie, wife, P: [phone]

## 2023-10-02 NOTE — CONSULTS
Inpatient Nutrition Assessment    Admit Date: 10/1/2023   Total duration of encounter: 1 day     Nutrition Recommendation/Prescription     Regular diet   Resume home Megace to stimulate appetite  Suggest daily MVI  Monitor Weight Weekly     Communication of Recommendations: reviewed with nurse, reviewed with patient, and reviewed with caregiver    Nutrition Assessment     Malnutrition Assessment/Nutrition-Focused Physical Exam    Malnutrition Context: chronic illness (10/02/23 1238)  Malnutrition Level: severe (10/02/23 1238)  Energy Intake (Malnutrition): less than 75% for greater than or equal to 1 month (10/02/23 1238)  Weight Loss (Malnutrition): greater than 7.5% in 3 months (10/02/23 1238)  Subcutaneous Fat (Malnutrition): mild depletion (10/02/23 1238)     Upper Arm Region (Subcutaneous Fat Loss): mild depletion     Muscle Mass (Malnutrition): mild depletion (10/02/23 1238)  [place] Region (Muscle Loss): mild depletion                                A minimum of two characteristics is recommended for diagnosis of either severe or non-severe malnutrition.    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; Macrocytic Anemia, Thrombocytopenia, Hyponatremia, Hypothyroidism    Relevant Medical History: COPD, HTN, Hypothyroidism, Stage IV laryngeal SCC, [place] lung mass s/p laryngectomy with radical neck dissection    Nutrition-Related Medications: Calcitonin, Pantoprazole, K Phosphate  Calorie Containing IV Medications: no significant kcals from medications at this time    Nutrition-Related Labs:  10/2/23 -- Glu 83, Na 132 L, K 3.6, BUN 9, Cr 0.54, Phos 0.8    Diet/PN Order: Diet Adult Regular  Oral Supplement Order: None  Tube Feeding Order: none  Appetite/Oral Intake: poor/25-50% of meals  Factors Affecting Nutritional  "Intake: decreased appetite and generalized weakness  Food/Nondenominational/Cultural Preferences:  PB & strawberry jelly sandwich, rice krispies or corn flakes  Food Allergies: none reported    Wound(s):   skin intact    Last Bowel Movement: 10/01/23    Comments    10/2/23 -- Pt continues with poor appetite, requesting rice krispies at time of visit; wife request PB&J sandwiches with meals "as a back up"; Denies difficulty swallowing, nsing reports no difficulty taking po meds; no n/v, LBM 10/1; Significant wt loss noted in last 3 months, pt declines ONS at this time, encouraged small frequent meals/snacks to meet nutrition needs    Anthropometrics    Height: 5' 9" (175.3 cm)    Last Weight: 58 kg (127 lb 13.9 oz) (10/02/23 0600) Weight Method: Bed Scale  BMI (Calculated): 18.9  BMI Classification: normal (BMI 18.5-24.9)        Ideal Body Weight (IBW), Male: 160 lb     % Ideal Body Weight, Male (lb): 80.47 %                 Usual Body Weight (UBW), k.5 kg  % Usual Body Weight: 82.44     Usual Weight Provided By: patient and EMR weight history    Wt Readings from Last 5 Encounters:   10/02/23 58 kg (127 lb 13.9 oz)   23 58.4 kg (128 lb 12 oz)   09/15/23 55.2 kg (121 lb 9.6 oz)   23 61.4 kg (135 lb 6.4 oz)   23 61.7 kg (136 lb)   23 64.2 kg (141 lb)  23 64.9 kg (143 lb)    Weight Change(s) Since Admission:  Admit Weight: 58.4 kg (128 lb 12 oz) (10/01/23 1150)      Estimated Needs    Weight Used For Calorie Calculations: 58 kg (127 lb 13.9 oz)  Energy Calorie Requirements (kcal): 6631-5334 kcal (32 - 34 kcal/kg)  Energy Need Method: Kcal/kg  Weight Used For Protein Calculations: 58 kg (127 lb 13.9 oz)  Protein Requirements: 70-80 gm (1.2 - 1.4 gm/kg)  Fluid Requirements (mL): 1264-7893 ml (1ml/kcal)  Temp (24hrs), Av.2 °F (36.8 °C), Min:98 °F (36.7 °C), Max:98.2 °F (36.8 °C)       Enteral Nutrition    Patient not receiving enteral nutrition at this time.    Parenteral Nutrition    Patient " not receiving parenteral nutrition support at this time.    Evaluation of Received Nutrient Intake    Calories: not meeting estimated needs  Protein: not meeting estimated needs    Patient Education    Not applicable.    Nutrition Diagnosis     PES: Malnutrition related to chronic illness as evidenced by < 75% nutrition intake > 1 month, > 7.5% wt loss x 3 months, fat/muscle wasting. (new)    Interventions/Goals     Intervention(s): general/healthful diet, multivitamin/mineral supplement therapy, prescription medication, and collaboration with other providers  Goal: Meet greater than 75% of nutritional needs by follow-up. (new)    Monitoring & Evaluation     Dietitian will monitor energy intake, weight change, electrolyte/renal panel, and gastrointestinal profile.  Nutrition Risk/Follow-Up: high (follow-up in 1-4 days)   Please consult if re-assessment needed sooner.

## 2023-10-02 NOTE — PT/OT/SLP PROGRESS
Chart reviewed. Pt's wife requested hold therapy today due to unable to tolerate/not feeling well. Will f/u tomorrow as pt able and schedule permits.

## 2023-10-02 NOTE — ASSESSMENT & PLAN NOTE
"Patient meets ASPEN criteria for severe malnutrition of chronic illness per RD assessment as evidenced by:  Energy Intake (Malnutrition): less than 75% for greater than or equal to 1 month  Weight Loss (Malnutrition): greater than 7.5% in 3 months  Subcutaneous Fat (Malnutrition): mild depletion  Muscle Mass (Malnutrition): mild depletion           A minimum of two characteristics is recommended for diagnosis of either severe or non-severe malnutrition.    Ht Readings from Last 1 Encounters:   10/01/23 5' 9" (1.753 m)     Wt Readings from Last 1 Encounters:   10/02/23 0600 58 kg (127 lb 13.9 oz)   10/01/23 1150 58.4 kg (128 lb 12 oz)     Body mass index is 18.88 kg/m².    Patient has been screened and assessed by RD. See nutrition recommendations documented in inpatient nutrition assessment. RD will continue to follow patient throughout hospitalization.    "

## 2023-10-03 NOTE — PT/OT/SLP EVAL
Occupational Therapy   Evaluation    Name: Josafat Boudreaux  MRN: [id number]  Admitting Diagnosis: recurrent hypercalcemia 2/2 malignancy   Recent Surgery: * No surgery found *      Recommendations:     Discharge Recommendations: home with home health  Discharge Equipment Recommendations:  none  Barriers to discharge:       Assessment:     Josafat Boudreaux is a 57 y.o. male with a medical diagnosis of recurrent hypercalcemia 2/2 malignancy   .  He presents with decreased activity tolerance, decreased balance, generalized weakness. Performance deficits affecting function: weakness, impaired endurance, impaired self care skills, impaired functional mobility, impaired balance, pain.      Rehab Prognosis:  guarded ; patient would benefit from acute skilled OT services to address these deficits and reach maximum level of function.       Plan:     Patient to be seen 3 x/week to address the above listed problems via self-care/home management, therapeutic activities, therapeutic exercises, wheelchair management/training  Plan of Care Expires:    Plan of Care Reviewed with: patient, spouse    Subjective     Chief Complaint: no c/o; pt appeared fatigue  Patient/Family Comments/goals: to return home    Occupational Profile:  Living Environment: resides at home with spouse, mobile home, 4 steps, tub/shower combo  Previous level of function: recently req'ing assistance with dressing and bathing due to weakness, ambulatory with RW, history of falls, sleeps in recliner  Roles and Routines:   Equipment Used at Home:  (RW, BSC, rollator, shower chair, wc, raised toilet seat)  Assistance upon Discharge: spouse    Pain/Comfort:  Pain Rating 1:  (no c/o pain at rest; c/o pain in neck after sitting EOB)    Patients cultural, spiritual, [employer]ian conflicts given the current situation:      Objective:     Communicated with: nurse prior to session.  Patient found HOB elevated with peripheral IV upon OT entry to room.    General  Precautions: Standard,    Orthopedic Precautions:    Braces:    Respiratory Status: Room air  Start of session 103/68, , O2 sat 96%  Increased HR to 131 after standing  End of session 102/64, , O2 sat 96%    Occupational Performance:    Bed Mobility:    Patient completed Rolling/Turning to Right with stand by assistance  Patient completed Scooting/Bridging with stand by assistance  Patient completed Supine to Sit with stand by assistance  Patient completed Sit to Supine with stand by assistance    Functional Mobility/Transfers:  Patient completed Sit <> Stand Transfer with contact guard assistance  with  rolling walker   Functional Mobility: lateral steps along EOB CGA, declined ambulation away from the bed due to fatigue    Activities of Daily Living:  Grooming: stand by assistance supine  Lower Body Dressing: maximal assistance .  Toileting: maximal assistance .    Cognitive/Visual Perceptual:  Cognitive/Psychosocial Skills:     -       Follows Commands/attention:Follows two-step commands  -       Mood/Affect/Coping skills/emotional control: Cooperative, Pleasant, and drowsy    Physical Exam:  Balance: -       sitting balance static SBA, dynamic min A  Standing CGA    Upper Extremity Range of Motion:     -       Right Upper Extremity: min deficits R shld, distal WNL  -       Left Upper Extremity: WFL  Upper Extremity Strength:    -       Right Upper Extremity: shld 3-/5, distal WFL  -       Left Upper Extremity: WFL    AMPAC 6 Click ADL:  AMPAC Total Score:      Treatment & Education:  Education on OT POC, education with spouse on pt completing sit>stands with t/f's to BS    Patient left HOB elevated with all lines intact, call button in reach, and spouse present    GOALS:   Multidisciplinary Problems       Occupational Therapy Goals          Problem: Occupational Therapy    Goal Priority Disciplines Outcome Interventions   Occupational Therapy Goal     OT, PT/OT Ongoing, Progressing    Description:  Pt will complete grooming in standing SBA  Pt will complete bed<>BSC t/f with RW SBA  Pt will increase standing endurance x 3 minutes without seated restbreak.                       History:     Past Medical History:   Diagnosis Date    COPD (chronic obstructive pulmonary disease)     Dysphagia     Essential (primary) hypertension 08/05/2023    GERD (gastroesophageal reflux disease)     laryngeal Cancer     Lung cancer     Thyroid disease     Vocal cord mass          Past Surgical History:   Procedure Laterality Date    DIAGNOSTIC LAPAROSCOPY N/A 5/18/2023    Procedure: LAPAROSCOPY, DIAGNOSTIC;  Surgeon: Dipak De Leon Jr., MD;  Location: [facility];  Service: General;  Laterality: N/A;    DIRECT DIAGNOSTIC LARYNGOSCOPY WITH BRONCHOSCOPY AND ESOPHAGOSCOPY N/A 07/11/2022    Procedure: LARYNGOSCOPY, DIRECT, DIAGNOSTIC, WITH BRONCHOSCOPY AND ESOPHAGOSCOPY;  Surgeon: Emory Alonso MD;  Location: [facility];  Service: ENT;  Laterality: N/A;    ESOPHAGOGASTRODUODENOSCOPY (EGD)- DEVICE ASSISTED N/A 04/18/2023    Procedure: EGD;  Surgeon: Aaliyah Conrad III, MD;  Location: [facility] ENDOSCOPY;  Service: Gastroenterology;  Laterality: N/A;  Esophageal Dilation; Savory over Guide wire: 36Fr and 39 Fr  Clips to secure peg site    HIP SURGERY      HUMERUS FRACTURE SURGERY      INCISION AND DRAINAGE, NECK Bilateral 10/21/2022    Procedure: INCISION AND DRAINAGE,NECK;  Surgeon: Madison Worley MD;  Location: [facility];  Service: ENT;  Laterality: Bilateral;    INSERT ARTERIAL LINE  06/26/2022         JOINT REPLACEMENT  2019    R hip    PEG TUBE REMOVAL      PLACEMENT, MEDIPORT      TRACHEOSTOMY N/A 06/10/2022    Procedure: CREATION, TRACHEOSTOMY;  Surgeon: Junior Alonso MD;  Location: [facility];  Service: ENT;  Laterality: N/A;       Time Tracking:     OT Date of Treatment:    OT Start Time: 1029  OT Stop Time: 1043  OT Total Time (min): 14 min    Billable Minutes:Evaluation mod comp    10/3/2023

## 2023-10-03 NOTE — MEDICAL/APP STUDENT
[facility] Endocrinology Consult Note     Reason for Consult:      hypercalcemia    Subjective:     History of Present Illness:  Josafat Boudreaux is a 56 yo male with past medical history of COPD, hypothyroidism, DVT of RUE, stage 4 laryngeal SCC, bilateral squamous cell lung mass (metastatic squamous cell lung cancer vs. metastatic SCC to lungs from head and neck), mediastinal and hilar LAD, s/p laryngectomy with radial neck dissection (7/2022), radiation and multiple rounds of chemotherapy.     Patient was previously admitted from 9/13/23-9/18/23 for hypercalcemia. PTHrp was elevated at 27 on 9/13/23. His hypercalcemia improved after treatment with IVF, calcitonin, and zoledronic acid. He was again admitted from 9/21/2023-9/28/2023 for hypercalcemia. Total calcium was elevated at 14.1 on presentation and was treated with IVF, calcitonin (1 dose of 230 units followed by 5 doses of 400 units each), and zoledronic acid (one dose of 4 mg). The patient was started on denosumab 120 mg subQ once weekly and discharged with moderate hypercalcemia (13.3 mg/dL corrected calcium on 9/28/23). He was admitted from the ED on 10/1/23 due to lethargy and weakness. Corrected calcium was 13.9 mg/dL on presentation. He was started on IV NS and received one dose of calcitonin IM on 10/1. Corrected calcium was 12.5 on 10/2/23. Endocrinology is being consulted for management of hypercalemia.    10/3/23:  Of note, patient's wife answers for patient as patient is unable to verbalize. She reports the patient's confusion has improved in the interval. She notes that the patient's diphenhydramine was held last night and that he was less agitated last night and slept better. She reports the patient continues to endorse bilateral leg weakness and has not been walking but plans to work with physical therapy today. She notes he ate some cereal yesterday although his appetite is still decreased. Per patient's wife, patient denies headache, dizziness,  n/v, fevers/chills, chest pain, palpitations, SOB, abdominal pain, dysuria, hematuria, changes in bowel habits, leg swelling or pain, or new numbness/tingling (although he has chronic neuropathy in his hands and feet).    Review of Systems:  ROS completed and negative except as indicated above.    Past Medical History: See above    Past Surgical History:  Past Surgical History:   Procedure Laterality Date    DIAGNOSTIC LAPAROSCOPY N/A 5/18/2023    Procedure: LAPAROSCOPY, DIAGNOSTIC;  Surgeon: Dipak De Leon Jr., MD;  Location: [facility];  Service: General;  Laterality: N/A;    DIRECT DIAGNOSTIC LARYNGOSCOPY WITH BRONCHOSCOPY AND ESOPHAGOSCOPY N/A 07/11/2022    Procedure: LARYNGOSCOPY, DIRECT, DIAGNOSTIC, WITH BRONCHOSCOPY AND ESOPHAGOSCOPY;  Surgeon: Emory Alonso MD;  Location: [facility];  Service: ENT;  Laterality: N/A;    ESOPHAGOGASTRODUODENOSCOPY (EGD)- DEVICE ASSISTED N/A 04/18/2023    Procedure: EGD;  Surgeon: Aaliyah Conrad III, MD;  Location: [facility] ENDOSCOPY;  Service: Gastroenterology;  Laterality: N/A;  Esophageal Dilation; Savory over Guide wire: 36Fr and 39 Fr  Clips to secure peg site    HIP SURGERY      HUMERUS FRACTURE SURGERY      INCISION AND DRAINAGE, NECK Bilateral 10/21/2022    Procedure: INCISION AND DRAINAGE,NECK;  Surgeon: Madison Worley MD;  Location: [facility];  Service: ENT;  Laterality: Bilateral;    INSERT ARTERIAL LINE  06/26/2022         JOINT REPLACEMENT  2019    R hip    PEG TUBE REMOVAL      PLACEMENT, MEDIPORT      TRACHEOSTOMY N/A 06/10/2022    Procedure: CREATION, TRACHEOSTOMY;  Surgeon: Junior Alonso MD;  Location: [facility];  Service: ENT;  Laterality: N/A;       Allergies:  Review of patient's allergies indicates:  No Known Allergies    Home Medications:  Prior to Admission medications    Medication Sig Start Date End Date Taking? Authorizing Provider   albuterol-ipratropium (DUO-NEB) 2.5 mg-0.5 mg/3 mL nebulizer solution Take 3 mLs by nebulization every 4 (four)  hours as needed for Shortness of Breath or Wheezing. Rescue 2/7/23 2/7/24 Yes Michael Monson MD   apixaban (ELIQUIS) 5 mg Tab Take 1 tablet (5 mg total) by mouth As instructed. 3/22/23  Yes Yara Atkinson MD   diphenhydrAMINE (BENADRYL) 25 mg capsule 1 capsule (25 mg total) by Per G Tube route every 6 (six) hours as needed for Itching.  Patient taking differently: 25 mg by Per G Tube route every 6 (six) hours as needed for Itching. Takes liquid mediation 6/23/22  Yes Ra Quintanilla MD   famotidine (PEPCID) 20 MG tablet Take 1 tablet (20 mg total) by mouth every evening.  Patient taking differently: Take 20 mg by mouth every evening. 8/10/23 8/9/24 Yes Tony Bertrand MD   glutamine 10 gram PwPk Take 10 g by mouth 2 (two) times a day.   Yes Provider, Historical   hydrocodone-acetaminophen (HYCET) solution 7.5-325 mg/15mL Take 10 mLs by mouth every 6 (six) hours as needed for Pain. 9/18/23 10/18/23 Yes Carola Stein,    levothyroxine (SYNTHROID) 50 MCG tablet Take 1 tablet (50 mcg total) by mouth once daily. 5/15/23 5/14/24 Yes Yara Atkinson MD   megestroL (MEGACE) 400 mg/10 mL (40 mg/mL) Susp  8/1/23  Yes Provider, Historical   ondansetron (ZOFRAN-ODT) 8 MG TbDL Take 8 mg by mouth every 8 (eight) hours as needed for Nausea. Tab 1 ODT in AM for 2 days after chemotherapy   Yes Provider, Historical   pantoprazole (PROTONIX) 20 MG tablet Take 1 tablet (20 mg total) by mouth once daily. 8/10/23 8/9/24 Yes Tony Bertrand MD   sodium chloride 3% 3 % nebulizer solution Take 4 mLs by nebulization as needed for Other or Cough (wheezing). 8/8/23  Yes Tawanna Preston MD   metroNIDAZOLE (METROGEL) 0.75 % (37.5mg/5 gram) vaginal gel apply to affected area BID 1/16/23   Provider, Historical   aspirin (ECOTRIN) 81 MG EC tablet Take 81 mg by mouth once daily.  7/7/22  Provider, Historical       Family History:  Family History   Problem Relation Age of Onset    Lung cancer Father     Cancer Father          "Lung cancer    Throat cancer Brother     Cancer Brother         Throat       Social History:  Social History     Tobacco Use    Smoking status: Former     Current packs/day: 0.00     Average packs/day: 1.5 packs/day for 42.4 years (63.7 ttl pk-yrs)     Types: Cigarettes     Start date: 1980     Quit date: 2022     Years since quittin.3    Smokeless tobacco: Never   Substance Use Topics    Alcohol use: Yes     Alcohol/week: 1.0 standard drink of alcohol     Types: 1 Cans of beer per week     Comment: occasional    Drug use: Never          Objective:   Vitals  Vitals  BP: 114/72  Temp: 98.7 °F (37.1 °C)  Temp Source: Oral  Pulse: (!) 131  Resp: (!) 22  SpO2: (!) 94 %  Height: 5' 9" (175.3 cm)  Weight: 58.1 kg (128 lb 1.4 oz)    Physical Examination:  General: Appears lethargic, intermittently closes eyes. NAD. Able to form words with mouth but unable to verbalize (s/p laryngectomy, trach).  Psychiatric: Appears lethargic.  HEENT: Normocephalic, atraumatic. Face symmetric. Mucous membranes moist. Trach in place.   Cardiovascular: Tachycardic. Regular rhythm. Normal S1 & S2 appreciated.  Pulmonary: Bilateral symmetric chest rise. Non-labored bilateral wheezes.  Abdominal:  Soft, nontender, nondistended.   Extremities: No clubbing, cyanosis or edema.  Skin:  Warm & dry.  Neuro: Deferred because patient intermittently falls asleep.    Laboratory:  Collected 10/3/23 @04:19:  H&H 8.8/25.9  Platelet count 22,000  Sodium 124 mmol/L  Chloride 97 mmol/L  Total calcium 10.9 mg/dL  Albumin 2.1 g/dL  Corrected calcium 12.4 mg/dL  Alkaline phosphatase 187  AST 36  Magnesium 1.40 mg/dL  Phosphorous 1.5 mg/dL    Radiology:  No results found in the last 24 hours.     Assessment & Plan:   PTHrp mediated Hypercalcemia  -PTHrp of 27 on 23  -Corrected calcium of 13.9 mg/dL on presentation on 10/1; corrected calcium of 12.4 mg/dL today following treatment with IVF, one dose calcitonin 234 units on 10/1  -Monitor urine " output  -Titrate IV fluids until urine output is 100-150 cc/hr  -Monitor calcium level q8h  -Continue denosumab 120 mg subQ once weekly  -Start 2 g of salt tablets TID    Jess Castle  [employer], MS3

## 2023-10-03 NOTE — PROGRESS NOTES
Faculty addendum:     I have reviewed the patients history, residents  findings on physical examination, diagnosis and treatment plan. Care provided was reasonable and necessary.     Patient seen and examined this morning.    Appreciate assistance of Endocrinology, management of difficult to control hypercalcemia malignancy, now downtrending, received did use an chuck last week, high-dose calcitonin earlier on that admission, has been on isotonic crystalloids with the meantime, however component of SIADH also present now, also likely secondary to terminal malignancy.    Supplemental salt tabs being added on today as well, 2 g t.i.d..    Also continuing aggressive phosphorus repletion as well.  Appreciate their assistance.    On half-normal saline with 2 amps of bicarb for the time being, on sodium count already with improvement.    Platelet count continues to trend downward, likely secondary component of patient's terminal malignancy as well, anticoagulation, and overall illness state.    Had discussed with primary team on previous admissions, there have been ongoing discussions with the patient's wife about end of life care, to which she has been adamantly resistant, even when I asked the patient about his code status, he immediately deferred decision-making to his wife, I emphasized to him this is ultimately his decision as he has full capacity.  I am actually familiar with this patient, difficult social dynamic as well a year ago, treated him for a cavitary pneumonia thought to be secondary to blastomycosis, even at that time we had recommended LTAC placement long-term IV antimicrobials/antifungals, family was dictating care about undergoing p.o. regimen and refusing L TAC placement at that time.    Unrealistic expectations in opinion at this point, continue to have daily goals of care discussions.    Failure to thrive, poor performance status.    Fully dictated resident progress note forthcoming.

## 2023-10-03 NOTE — PT/OT/SLP EVAL
Physical Therapy Evaluation    Patient Name:  Josafat Boudreaux   MRN:  [id number]    Recommendations:     Discharge Recommendations:  home with home health, home health PT   Discharge Equipment Recommendations: bedside commode, wheelchair, walker, rolling, shower chair   Equipment to be obtained for discharge: none.  Barriers to discharge: fall risk and decreased endurance    Assessment:     Josafat Boudreaux is a 57 y.o. 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  Macrocytic anemia  Thrombocytopenia:  Hyponatremia:   Hypothyroidism  History of DVT of RUE:   COPD  History of Anxiety/Depression:  Patient Active Problem List   Diagnosis    Closed basicervical fracture of femur    Shortness of breath    Dysphagia    New onset seizure    Laryngeal squamous cell carcinoma    Regional lymph node metastasis present    Squamous cell carcinoma of both lungs    Chronic obstructive pulmonary disease, unspecified    H/O head and neck radiation    Pneumonia    Lung cancer    Blastomycotic infection    On antineoplastic chemotherapy    Right arm cellulitis    Maintenance antineoplastic immunotherapy    Gastric fistula    Esophageal stenosis    Essential (primary) hypertension    Humoral hypercalcemia of malignancy    Moderate malnutrition    Tachycardia    Hypomagnesemia    Severe malnutrition    Hyponatremia    Hypophosphatemia     He presents with the following impairments/functional limitations:  weakness, gait instability, impaired balance, impaired endurance, impaired self care skills, impaired functional mobility, impaired cardiopulmonary response to activity.    Rehab Prognosis: Good.    Patient would benefit from continued skilled acute PT services to: address above listed impairments/functional limitations; receive patient/caregiver education; reduce fall risk; and maximize independency/safety with functional  mobility.    -continued: cardiac chair positioning in bed, sitting edge of bed, standing edge of bed, side steps at edge of bed, as tolerated/appropriate, with assistance and supervision    Recent Surgery: * No surgery found *      Plan:     During this hospitalization, patient to be seen 3 x/week to address the identified impairments/functional limitations via gait training, therapeutic activities, therapeutic exercises and progress toward the established goals.    Plan of Care Expires:  10/31/23    Subjective     Communicated with patient's nurse Joe prior to session.    Patient agreeable to participate in evaluation.     Chief Complaint: none, patient tired and fatigued appearing  Patient/Family Comments/goals: home  Pain/Comfort:  Pain Rating 1: 0/10  Pain Addressed 1: Nurse notified  Pain Rating Post-Intervention 1: 0/10 (patient complaints of neck discomfort after sitting edge of bed)    Patients cultural, spiritual, Denominational conflicts given the current situation: no    Social History  Living Environment: Patient lives with their spouse in a mobile home, with 4 steps steps outside, with tub-shower combo.  Functional Level: Prior to admission patient ambulated with assistive device, required assistance with ADLs including bathing and showering, and required assistance with IADL's including transportation, shopping, and household chores.  h/o falls, sleeps in recliner chair  Equipment Used at Home: wheelchair, walker, rolling, bedside commode, shower chair, rollator walker, raised toilet seat  Equipment owned (not currently used): none.  Assistance Upon Discharge: spouse.    Objective:     OT Dannielle and PM&R PAVITHRA Donohue in room for evaluation    Patient found supine in bed, with HOB elevated, and bed rails up bilateral HOB, left FOB, and right FOB with peripheral IV, telemetry, Tracheostomy, w/ pt's spouse in room, upon PT entry to room.    General Precautions: Standard, fall (trach)   Orthopedic  Precautions:N/A   Braces: N/A  Respiratory Status: room air    Vitals   At Rest (pre-session)  BP  103/68   HR  122   O2 Sat %  96      With Activity (post-session)  BP  102/64   HR  126   O2 Sat %  96     Exams:  Orientation: Patient is oriented to Person  Commands: Patient follows 2-step verbal commands  Fine Motor Coordination:     -     Impaired: LLE heel shin slowed/limited, RLE heel shin slowed/limited, and Rapid alternating ankle DF/PF slowed/limited  Sensation:    -     Intact: light/touch bilat lower extremity  BILAT UE ROM/strength - defer to OT - see OT note for details  RLE ROM: WFL  RLE Strength:  grossly 2-/5-3+/5  LLE ROM: WFL  LLE Strength: Deficits: grossly 2-/5-3+/5    Functional Mobility:    Bed Mobility:  Rolling Left: stand by assistance  Rolling Right: stand by assistance  Scooting: stand by assistance  Supine to Sit: stand by assistance  Sit to Supine: stand by assistance  with cues for proper technique and UE management    Transfers:  Sit to Stand: contact guard assistance with hand-held assist and rolling walker    Gait:  Patient ambulated (side steps to HOB - pt's Rt) x1 1/2ft with hand-held assist and rolling walker and contact guard assistance.  Patient declined fwd (away from edge of bed) ambulation  Patient demonstrates decreased step length, wide base of support, decreased weight shift, shortened/quickened step on bilateral, and flexed posture.    Other Mobility:  not assessed    Balance:  Sit  Patient demonstrated static balance on level surface with stand by assistance with no verbal cues.  Patient demonstrated dynamic balance on level surface with minimum assistance with minimal verbal cues during minimal excursions.  Stand  Patient demonstrated static balance on level surface  using hand-held assist and rolling walker with contact guard assistance with minimal verbal cues.    Patient left supine in bed, with HOB elevated, and bed rails up bilateral HOB, left FOB, and right FOB with  peripheral IV, telemetry, Tracheostomy, all lines intact, call button in reach, tray table at bedside, pt's nurse Galo'Lorri notified, and pt's spouse present.    Education     Patient was instructed to utilize staff assistance for mobility/transfers.  White board updated regarding patient's safest level of mobility with staff assistance.    Goals     Multidisciplinary Problems       Physical Therapy Goals       Problem: Physical Therapy    Goal Priority Disciplines Outcome Goal Variances Interventions   Physical Therapy Goal     PT, PT/OT      Description: Goals to be met by: DISCHARGE     Patient will increase functional independence with mobility by performing:    -. Supine to sit with Modified [place]  -. Sit to supine with Modified [place]  -. Rolling to Left and Right with Modified [place]  -. Sit to stand transfer with Supervision  -. Gait  x 25 feet with Contact Guard Assistance using Rolling Walker  ESTABLISHED 10/03/2023           History:     Past Medical History:   Diagnosis Date    COPD (chronic obstructive pulmonary disease)     Dysphagia     Essential (primary) hypertension 08/05/2023    GERD (gastroesophageal reflux disease)     laryngeal Cancer     Lung cancer     Thyroid disease     Vocal cord mass      Past Surgical History:   Procedure Laterality Date    DIAGNOSTIC LAPAROSCOPY N/A 5/18/2023    Procedure: LAPAROSCOPY, DIAGNOSTIC;  Surgeon: Dipak De Leon Jr., MD;  Location: [facility] OR;  Service: General;  Laterality: N/A;    DIRECT DIAGNOSTIC LARYNGOSCOPY WITH BRONCHOSCOPY AND ESOPHAGOSCOPY N/A 07/11/2022    Procedure: LARYNGOSCOPY, DIRECT, DIAGNOSTIC, WITH BRONCHOSCOPY AND ESOPHAGOSCOPY;  Surgeon: Emory Alonso MD;  Location: [facility] OR;  Service: ENT;  Laterality: N/A;    ESOPHAGOGASTRODUODENOSCOPY (EGD)- DEVICE ASSISTED N/A 04/18/2023    Procedure: EGD;  Surgeon: Aaliyah Conrad III, MD;  Location: [facility] ENDOSCOPY;  Service: Gastroenterology;  Laterality: N/A;  Esophageal  Dilation; Savory over Guide wire: 36Fr and 39 Fr  Clips to secure peg site    HIP SURGERY      HUMERUS FRACTURE SURGERY      INCISION AND DRAINAGE, NECK Bilateral 10/21/2022    Procedure: INCISION AND DRAINAGE,NECK;  Surgeon: Madison Worley MD;  Location[facility] OR;  Service: ENT;  Laterality: Bilateral;    INSERT ARTERIAL LINE  06/26/2022         JOINT REPLACEMENT  2019    R hip    PEG TUBE REMOVAL      PLACEMENT, MEDIPORT      TRACHEOSTOMY N/A 06/10/2022    Procedure: CREATION, TRACHEOSTOMY;  Surgeon: Junior Alonso MD;  Location: [facility];  Service: ENT;  Laterality: N/A;     Time Tracking:     PT Received On: 10/03/23  PT Start Time: 1028     PT Stop Time: 1046  PT Total Time (min): 18 min     Billable Minutes: Evaluation 18    10/03/2023

## 2023-10-03 NOTE — PLAN OF CARE
Problem: Occupational Therapy  Goal: Occupational Therapy Goal  Description: Pt will complete grooming in standing SBA  Pt will complete bed<>BSC t/f with RW SBA  Pt will increase standing endurance x 3 minutes without seated restbreak.  Outcome: Ongoing, Progressing

## 2023-10-03 NOTE — PROGRESS NOTES
[facility] Medicine Wards Progress Note     Resident Team: [facility] Medicine List 1  Attending Physician: Luis A Bocanegra MD  Resident: Clay Guzman  Intern: Natalio Gonzalez    Subjective:      Brief HPI:  Josafat Boudreaux is a 57 y.o. male who with a history of COPD, hypertension, hypothyroidism, stage IV laryngeal SCC, bilateral lung mass found to be SCC, s/p laryngectomy with radical neck dissection (7/2022) who presented to the ED on 10/1/2023  with a primary complaint of increased confusion and weakness.  Patient is accompanied by his wife who assisted with history.  Patient's wife states that over the last day she is noticed patient has become more confused from his baseline.  She states that the she has noticed that he is attempting to grab objects which are not there.  She has also noticed increased difficulty in him ambulating from the bed to the restroom and weakness sitting up during baths.  He denies other associated symptoms such as fever, chills, headaches, vision changes, chest pain, palpitations, shortness of breath, abdominal pain, constipation, urinary symptoms, and myalgias.     Patient was recently admitted on 9/21 for hypercalcemia and hypomagnesemia.  Patient was started on calcitonin and zoledronic acid.  Calcium downtrended initially but after several days began to trend upwards again.  Endocrinology was consulted who recommended another round of calcitonin at a higher dose of 400 units for a total of 5 doses.  He was started on denosumab subcutaneous therapy 120 mg once a week.  There was a mild decrease in calcium with initiation of this therapy.  Repeat denosumab therapy was favored over another round of zoledronic acid.  Patient was discharged with plateaued moderate hypercalcemia.     In the ED, was found to have an H&H of 9.7/28.4.  .  Platelets 31.  PT/INR 22.8/2.0.  Sodium 128.  Corrected calcium 13.9. Initial lactate 3.4, repeat 2.7. EKG displayed sinus tachycardia. CXR displayed no  cardiopulmonary process.  Patient was admitted to the Internal Medicine team for further care and management moving forward.    Interval History:   Calcium corrected to 11.8 on most recent labs.  Patient continues to be hypophosphatemic and hypomagnesemic despite frequent repletion.  Hyponatremia correcting with IVF.  At the recommendation of endocrinology, supplemental salt tabs added to medication regimen.  Platelet count downtrending.  I suspect causes multifactorial:  Malignancy, anticoagulation, and dilutional.  We will have goals of care conversation with patient and his wife tomorrow morning given patient's gradual deterioration and overall poor prognosis.    Review of Systems:  ROS completed and negative except as indicated above.     Objective:     Last 24 Hour Vital Signs:  BP  Min: 109/67  Max: 123/57  Temp  Av.1 °F (37.3 °C)  Min: 98.7 °F (37.1 °C)  Max: 99.5 °F (37.5 °C)  Pulse  Av.7  Min: 120  Max: 131  Resp  Av.7  Min: 20  Max: 22  SpO2  Av.3 %  Min: 93 %  Max: 99 %  Weight  Av.1 kg (128 lb 1.4 oz)  Min: 58.1 kg (128 lb 1.4 oz)  Max: 58.1 kg (128 lb 1.4 oz)  I/O last 3 completed shifts:  In: 1435.3 [I.V.:938; IV Piggyback:497.3]  Out: -     Physical Examination:  Physical Exam  Vitals and nursing note reviewed.   Constitutional:       Comments: Patient is cachectic   Eyes:      Extraocular Movements: Extraocular movements intact.   Cardiovascular:      Rate and Rhythm: Regular rhythm. Tachycardia present.      Pulses: Normal pulses.      Heart sounds: Normal heart sounds. No murmur heard.     No friction rub. No gallop.   Pulmonary:      Effort: Pulmonary effort is normal.      Breath sounds: Wheezing present. No rhonchi or rales.   Abdominal:      General: There is no distension.      Palpations: Abdomen is soft.      Tenderness: There is no abdominal tenderness.   Musculoskeletal:         General: No swelling or tenderness.   Skin:     General: Skin is warm and dry.    Neurological:      General: No focal deficit present.      Mental Status: He is alert. Mental status is at baseline.      Comments: AO x3       Laboratory:  Most Recent Data:  CBC:   Lab Results   Component Value Date    WBC 5.95 10/03/2023    HGB 8.8 (L) 10/03/2023    HCT 25.9 (L) 10/03/2023    PLT 22 (LL) 10/03/2023    .8 (H) 10/03/2023    RDW 15.2 10/03/2023     WBC Differential:   Recent Labs   Lab 09/28/23  0449 10/01/23  1217 10/02/23  0613 10/03/23  0419   WBC 5.34 7.17 5.69 5.95   HGB 9.0* 9.7* 8.6* 8.8*   HCT 25.6* 28.4* 25.3* 25.9*   PLT 37* 31* 26* 22*   .6* 101.1* 103.7* 102.8*       BMP:   Lab Results   Component Value Date     (L) 10/03/2023    K 3.4 (L) 10/03/2023    CO2 19 (L) 10/03/2023    BUN 5.9 (L) 10/03/2023    CREATININE 0.58 (L) 10/03/2023    CALCIUM 10.3 (H) 10/03/2023    MG 1.50 (L) 10/03/2023    PHOS 3.9 10/03/2023     LFTs:   Lab Results   Component Value Date    ALBUMIN 2.1 (L) 10/03/2023    BILITOT 0.8 10/03/2023    AST 36 (H) 10/03/2023    ALKPHOS 187 (H) 10/03/2023    ALT 19 10/03/2023     Coags:   Lab Results   Component Value Date    INR 2.0 (H) 10/01/2023    PROTIME 22.8 (H) 10/01/2023    PTT 30.6 06/26/2022     FLP:   Lab Results   Component Value Date    CHOL 227 (H) 09/16/2019    HDL 59 09/16/2019    TRIG 184 (H) 09/16/2019     DM:   Lab Results   Component Value Date    CREATININE 0.58 (L) 10/03/2023     Thyroid:   Lab Results   Component Value Date    TSH 4.716 10/01/2023     Anemia:   Lab Results   Component Value Date    IRON 32 (L) 10/01/2023    TIBC 128 (L) 10/01/2023    FERRITIN 1,336.18 (H) 12/09/2022    QPNCFFSK23 >2,000 (H) 10/01/2023    FOLATE 9.4 10/01/2023     Cardiac:   Lab Results   Component Value Date    TROPONINI <0.010 10/01/2023    BNP 11.5 08/05/2023       Radiology:  Imaging Results              X-Ray Chest AP Portable (Final result)  Result time 10/01/23 13:16:54      Final result by Eric Dwyer MD (10/01/23 13:16:54)                    Impression:      No acute cardiopulmonary process.  Findings of chronic lung disease there      Electronically signed by: Eric Dwyer  Date:    10/01/2023  Time:    13:16               Narrative:    EXAMINATION:  XR CHEST AP PORTABLE    CLINICAL HISTORY:  Weakness;    TECHNIQUE:  Single view of the chest    COMPARISON:  09/21/2023    FINDINGS:  Prominent interstitial markings with no focal opacification.    The cardiomediastinal silhouette is within normal limits.    No acute osseous abnormality.                                      Current Medications:     Infusions:   sodium bicarbonate 100 mEq in sodium chloride 0.45% 1,000 mL infusion 100 mL/hr at 10/03/23 1216        Scheduled:   diphenhydrAMINE  25 mg Oral QHS    fondaparinux  7.5 mg Subcutaneous Daily    levothyroxine  50 mcg Oral Daily    magnesium sulfate IVPB  2 g Intravenous Once    pantoprazole  20 mg Oral Daily    sertraline  25 mg Oral Daily    sodium chloride  2,000 mg Oral TID        PRN:  albuterol-ipratropium, HYDROcodone-acetaminophen, hydrocodone-apap 7.5-325 MG/15 ML, melatonin, ondansetron, polyethylene glycol, sodium chloride 0.9%      Assessment & Plan:     1) 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  We will increase IVF until urine output is 100-150 cc/hour.  -calcitriol ordered and pending.      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 Underwent another cycle of carbo/taxol completed on 8/23/23.   -Currently follows Dr. Atkinson of Heme/Onc.   -Patient will need  Hospice/goals of care conversation this admission     3) Macrocytic anemia  - H/H 9.7/28.4,  on admission  - Has been consistently decreasing over the last several months  - B12 and folate wnl     4) Thrombocytopenia:  -Plts 31 on admit, 37 on 9/28  -continue to monitor for bleeding   -suspect downtrend is multifactorial:  Malignancy, anticoagulation, and dilution  -patient may require platelet transfusion tomorrow     5) Hyponatremia:   -Na 128 on admission.  Corrected to 133 and decreased once again 124 this morning  -Serum osm 275, urine osm 396, FABRICIO 46  -salt tabs added to medication regimen  -we will continue to monitor and reassess response to treatment     6) Hypothyroidism  - TSH 4.716  - will continue home levothyroxine 50 mcg     7) History of DVT of RUE:   - started patient on fondaparinux 7.5 mg daily     8) COPD  - continue duonebs PRN     9) History of Anxiety/Depression:  -Continue home Sertraline 25mg      CODE STATUS: Full Code  Access: Peripheral  Antibiotics: none  Diet: Soft and Bite sized  DVT Prophylaxis: SCD  Fluids: normal saline at 100 mL/hr     Disposition:  Continue current inpatient management.  Continue to monitor and replete/correct electrolytes as needed.  Goals of care conversation is needed with family and patient.    Natalio Gonzalez, DO  Internal Medicine HO-1

## 2023-10-04 NOTE — PLAN OF CARE
Problem: Adult Inpatient Plan of Care  Goal: Plan of Care Review  Outcome: Adequate for Care Transition  Goal: Patient-Specific Goal (Individualized)  Outcome: Adequate for Care Transition  Goal: Absence of Hospital-Acquired Illness or Injury  Outcome: Adequate for Care Transition  Goal: Optimal Comfort and Wellbeing  Outcome: Adequate for Care Transition  Goal: Readiness for Transition of Care  Outcome: Adequate for Care Transition     Problem: Fluid Imbalance (Pneumonia)  Goal: Fluid Balance  Outcome: Adequate for Care Transition     Problem: Infection (Pneumonia)  Goal: Resolution of Infection Signs and Symptoms  Outcome: Adequate for Care Transition     Problem: Respiratory Compromise (Pneumonia)  Goal: Effective Oxygenation and Ventilation  Outcome: Adequate for Care Transition     Problem: Skin Injury Risk Increased  Goal: Skin Health and Integrity  Outcome: Adequate for Care Transition

## 2023-10-04 NOTE — PLAN OF CARE
10/04/23 1556   Medicare Message   Important Message from Medicare regarding Discharge Appeal Rights Given to patient/caregiver;Explained to patient/caregiver;Signed/date by patient/caregiver   Date IMM was signed 10/04/23   Time IMM was signed 1921

## 2023-10-04 NOTE — PLAN OF CARE
Consult for hospice noted. Patient and family are in agreement and prefer [facility]. Referral sent via [facility]. Spoke to Charis with [facility], P: [phone]; she will be here soon to speak to patient and family.
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
## 2023-10-04 NOTE — PT/OT/SLP PROGRESS
Physical Therapy    Missed Treatment Session    Patient Name:  Josafat Boudreaux   MRN:  [id number]      -patient not seen at this time secondary to patient's spouse declined therapy session attempt today  -per patient's spouse - we agreed to hospice today, I don't want to wake him  -patient's spouse agreed to therapy checking on patient in a.m.  -will follow-up as patient is available/appropriate to participate and as therapists' schedule allows   pt placed into a gown and clothing/personal items in a bag labeled with his 
name.

## 2023-10-04 NOTE — DISCHARGE SUMMARY
[facility] Internal Medicine Discharge Summary    Admitting Physician: Luis A Bocanegra MD  Attending Physician: Luis A Bocanegra MD  Date of Admit: 10/1/2023  Date of Discharge: 10/4/2023    Condition: Serious  Outcome:  Discharge home with hospice  DISPOSITION: Hospice/Home          Discharge Diagnoses     Patient Active Problem List   Diagnosis    Closed basicervical fracture of femur    Shortness of breath    Dysphagia    New onset seizure    Laryngeal squamous cell carcinoma    Regional lymph node metastasis present    Squamous cell carcinoma of both lungs    Chronic obstructive pulmonary disease, unspecified    H/O head and neck radiation    Pneumonia    Lung cancer    Blastomycotic infection    On antineoplastic chemotherapy    Right arm cellulitis    Maintenance antineoplastic immunotherapy    Gastric fistula    Esophageal stenosis    Essential (primary) hypertension    Humoral hypercalcemia of malignancy    Moderate malnutrition    Tachycardia    Hypomagnesemia    Severe malnutrition    Hyponatremia    Hypophosphatemia       Principal Problem:  Humoral hypercalcemia of malignancy    Consultants and Procedures     Consultants:  IP CONSULT TO INTERNAL MEDICINE  IP CONSULT TO REGISTERED DIETITIAN/NUTRITIONIST  IP CONSULT TO SOCIAL WORK/CASE MANAGEMENT  IP CONSULT TO ENDOCRINOLOGY  IP CONSULT TO SOCIAL WORK/CASE MANAGEMENT    Procedures:   * No surgery found *     Brief Admission History      Josafat Boudreaux is a 57 y.o. male who with a history of COPD, hypertension, hypothyroidism, stage IV laryngeal SCC, bilateral lung mass found to be SCC, s/p laryngectomy with radical neck dissection (7/2022) who presented to the ED on 10/1/2023  with a primary complaint of increased confusion and weakness.  Patient is accompanied by his wife who assisted with history.  Patient's wife states that over the last day she is noticed patient has become more confused from his baseline.  She states that the she has noticed that he is  attempting to grab objects which are not there.  She has also noticed increased difficulty in him ambulating from the bed to the restroom and weakness sitting up during baths.  He denies other associated symptoms such as fever, chills, headaches, vision changes, chest pain, palpitations, shortness of breath, abdominal pain, constipation, urinary symptoms, and myalgias.  Patient was recently admitted on 9/21 for hypercalcemia and hypomagnesemia.  Patient was started on calcitonin and zoledronic acid.  Calcium downtrended initially but after several days began to trend upwards again.  Endocrinology was consulted who recommended another round of calcitonin at a higher dose of 400 units for a total of 5 doses.  He was started on denosumab subcutaneous therapy 120 mg once a week.  There was a mild decrease in calcium with initiation of this therapy.  Repeat denosumab therapy was favored over another round of zoledronic acid.  Patient was discharged with plateaued moderate hypercalcemia.    Hospital Course with Pertinent Findings     During admission, patient was treated with IV fluids and calcitonin for his hypercalcemia.  Endocrinology was consulted for assistance with correcting patient's electrolyte abnormalities.  Patient was eventually started on sodium chloride tablets 2 g t.i.d. along with IV fluids for correction of his hyponatremia due to suspected SIADH.  Patient also received multiple rounds of IV magnesium and IV/p.o.  Potassium for correction of his hypokalemia as well.  Potassium phosphate was also being used for correction of his hypophosphatemia.  Goals of care discussion were eventually brought up to patient and his family.  Discussion held with family today was held where patient's poor prognosis of less than 6 months was given to family along with realistic treatment outcomes for patients should he continue to pursue aggressive medical management.  Patient is currently not a candidate for chemotherapy  "at this time due to severity of his disease and his weakness.  Hospice and palliative care were discussed with patient and his family.  During discussion, family had opted that patient be sent home with hospice so they can continue to work on comfort measures for the patient and to enhance the quality of life the patient currently has left.  Case management was consulted for hospice assistance.  Patient's home Health program does have hospice program as well and patient will be going home with hospice with them.    Discharge physical exam:  Vitals  BP: 107/67  Temp: 98.7 °F (37.1 °C)  Temp Source: Oral  Pulse: (!) 124  Resp: 17  SpO2: (!) 94 %  Height: 5' 9" (175.3 cm)  Weight: 58.2 kg (128 lb 4.9 oz)    Physical Exam  Vitals and nursing note reviewed.   Constitutional:       Comments: Patient is cachectic   Eyes:      Extraocular Movements: Extraocular movements intact.   Cardiovascular:      Rate and Rhythm: Regular rhythm. Tachycardia present.      Pulses: Normal pulses.      Heart sounds: Normal heart sounds. No murmur heard.     No friction rub. No gallop.   Pulmonary:      Effort: Pulmonary effort is normal.      Breath sounds: Wheezing present. No rhonchi or rales.   Abdominal:      General: There is no distension.      Palpations: Abdomen is soft.      Tenderness: There is no abdominal tenderness.   Musculoskeletal:         General: No swelling or tenderness.   Skin:     General: Skin is warm and dry.   Neurological:      General: No focal deficit present.      Mental Status: He is alert. Mental status is at baseline.      Comments: AO x3          TIME SPENT ON DISCHARGE: 60 minutes    Discharge Medications        Medication List        ASK your doctor about these medications      albuterol-ipratropium 2.5 mg-0.5 mg/3 mL nebulizer solution  Commonly known as: DUO-NEB  Take 3 mLs by nebulization every 4 (four) hours as needed for Shortness of Breath or Wheezing. Rescue     apixaban 5 mg Tab  Commonly known " as: ELIQUIS  Take 1 tablet (5 mg total) by mouth As instructed.     diphenhydrAMINE 12.5 mg/5 mL elixir  Commonly known as: BENADRYL  Take 10 mLs (25 mg total) by mouth every evening.     famotidine 20 MG tablet  Commonly known as: PEPCID  Take 1 tablet (20 mg total) by mouth every evening.     glutamine 10 gram Pwpk     hydrocodone-apap 7.5-325 MG/15 ML oral solution  Commonly known as: HYCET  Take 10 mLs by mouth every 6 (six) hours as needed for Pain.     levothyroxine 50 MCG tablet  Commonly known as: SYNTHROID  Take 1 tablet (50 mcg total) by mouth once daily.     magnesium oxide 400 mg (241.3 mg magnesium) tablet  Commonly known as: MAG-OX  Take 1 tablet (400 mg total) by mouth 2 (two) times daily.     megestroL 400 mg/10 mL (40 mg/mL) Susp  Commonly known as: MEGACE     ondansetron 8 MG Tbdl  Commonly known as: ZOFRAN-ODT     pantoprazole 20 MG tablet  Commonly known as: PROTONIX  Take 1 tablet (20 mg total) by mouth once daily.     polyethylene glycol 17 gram Pwpk  Commonly known as: GLYCOLAX  Take 17 g by mouth daily as needed (daily as needed for constipation).     sertraline 25 MG tablet  Commonly known as: ZOLOFT  Take 1 tablet (25 mg total) by mouth once daily.     sodium chloride 3% 3 % nebulizer solution  Take 4 mLs by nebulization as needed for Other or Cough (wheezing).              Discharge Information:       Discharge home with hospice to focus on comfort measures for patient and family.      Wm Cassidy MD  [facility] Internal Medicine, -2

## 2023-10-04 NOTE — PLAN OF CARE
Charis came out to speak to patient and family. They are in agreement for discharge today. Long call MD notified and will put in discharge order and summary.

## 2023-10-04 NOTE — PROGRESS NOTES
[facility] Medicine Wards Progress Note     Resident Team: [facility] Medicine List 1  Attending Physician: Luis A Bocanegra MD  Resident: Clay Guzman  Intern: Natalio Gonzalez    Subjective:      Brief HPI:  Josafat Boudreaux is a 57 y.o. male who with a history of COPD, hypertension, hypothyroidism, stage IV laryngeal SCC, bilateral lung mass found to be SCC, s/p laryngectomy with radical neck dissection (7/2022) who presented to the ED on 10/1/2023  with a primary complaint of increased confusion and weakness.  Patient is accompanied by his wife who assisted with history.  Patient's wife states that over the last day she is noticed patient has become more confused from his baseline.  She states that the she has noticed that he is attempting to grab objects which are not there.  She has also noticed increased difficulty in him ambulating from the bed to the restroom and weakness sitting up during baths.  He denies other associated symptoms such as fever, chills, headaches, vision changes, chest pain, palpitations, shortness of breath, abdominal pain, constipation, urinary symptoms, and myalgias.     Patient was recently admitted on 9/21 for hypercalcemia and hypomagnesemia.  Patient was started on calcitonin and zoledronic acid.  Calcium downtrended initially but after several days began to trend upwards again.  Endocrinology was consulted who recommended another round of calcitonin at a higher dose of 400 units for a total of 5 doses.  He was started on denosumab subcutaneous therapy 120 mg once a week.  There was a mild decrease in calcium with initiation of this therapy.  Repeat denosumab therapy was favored over another round of zoledronic acid.  Patient was discharged with plateaued moderate hypercalcemia.     In the ED, was found to have an H&H of 9.7/28.4.  .  Platelets 31.  PT/INR 22.8/2.0.  Sodium 128.  Corrected calcium 13.9. Initial lactate 3.4, repeat 2.7. EKG displayed sinus tachycardia. CXR displayed no  cardiopulmonary process.  Patient was admitted to the Internal Medicine team for further care and management moving forward.    Interval History:   No acute events overnight.  Platelets found to be 21 this morning down from 22 yesterday.  Patient transfused 1 unit platelets.  Hyponatremia improved to 129 this morning, repeat BMP displayed sodium of 121 suspect this is due to pseudohyponatremia.    Goals of care conversation had with patient and his family today.  Family is electing for transition to comfort care and are agreeable to hospice at this time.  Mayela, of case management, in room during conversation and will arrange for hospice.    Review of Systems:  ROS completed and negative except as indicated above.     Objective:     Last 24 Hour Vital Signs:  BP  Min: 100/59  Max: 112/62  Temp  Av.3 °F (36.8 °C)  Min: 97.8 °F (36.6 °C)  Max: 98.8 °F (37.1 °C)  Pulse  Av.8  Min: 110  Max: 131  Resp  Av.3  Min: 17  Max: 20  SpO2  Av.6 %  Min: 91 %  Max: 95 %  Weight  Av.2 kg (128 lb 4.9 oz)  Min: 58.2 kg (128 lb 4.9 oz)  Max: 58.2 kg (128 lb 4.9 oz)  I/O last 3 completed shifts:  In: 4309.2 [P.O.:480; I.V.:3025.8; IV Piggyback:803.4]  Out: 2200 [Urine:2200]    Physical Examination:  Physical Exam  Vitals and nursing note reviewed.   Constitutional:       Comments: Patient is cachectic   Eyes:      Extraocular Movements: Extraocular movements intact.   Cardiovascular:      Rate and Rhythm: Regular rhythm. Tachycardia present.      Pulses: Normal pulses.      Heart sounds: Normal heart sounds. No murmur heard.     No friction rub. No gallop.   Pulmonary:      Effort: Pulmonary effort is normal.      Breath sounds: Wheezing present. No rhonchi or rales.   Abdominal:      General: There is no distension.      Palpations: Abdomen is soft.      Tenderness: There is no abdominal tenderness.   Musculoskeletal:         General: No swelling or tenderness.   Skin:     General: Skin is warm and dry.    Neurological:      General: No focal deficit present.      Mental Status: He is alert. Mental status is at baseline.      Comments: AO x3       Laboratory:  Most Recent Data:  CBC:   Lab Results   Component Value Date    WBC 5.52 10/04/2023    HGB 8.0 (L) 10/04/2023    HCT 23.9 (L) 10/04/2023    PLT 21 (LL) 10/04/2023    .9 (H) 10/04/2023    RDW 15.2 10/04/2023     WBC Differential:   Recent Labs   Lab 09/28/23  0449 10/01/23  1217 10/02/23  0613 10/03/23  0419 10/04/23  0409   WBC 5.34 7.17 5.69 5.95 5.52   HGB 9.0* 9.7* 8.6* 8.8* 8.0*   HCT 25.6* 28.4* 25.3* 25.9* 23.9*   PLT 37* 31* 26* 22* 21*   .6* 101.1* 103.7* 102.8* 103.9*       BMP:   Lab Results   Component Value Date     (L) 10/04/2023    K 3.7 10/04/2023    CO2 20 (L) 10/04/2023    BUN 6.6 (L) 10/04/2023    CREATININE 0.54 (L) 10/04/2023    CALCIUM 10.1 10/04/2023    MG 1.70 10/04/2023    PHOS 2.1 (L) 10/04/2023     LFTs:   Lab Results   Component Value Date    ALBUMIN 2.0 (L) 10/04/2023    BILITOT 0.8 10/03/2023    AST 36 (H) 10/03/2023    ALKPHOS 187 (H) 10/03/2023    ALT 19 10/03/2023     Coags:   Lab Results   Component Value Date    INR 2.0 (H) 10/01/2023    PROTIME 22.8 (H) 10/01/2023    PTT 30.6 06/26/2022     FLP:   Lab Results   Component Value Date    CHOL 227 (H) 09/16/2019    HDL 59 09/16/2019    TRIG 184 (H) 09/16/2019     DM:   Lab Results   Component Value Date    CREATININE 0.54 (L) 10/04/2023     Thyroid:   Lab Results   Component Value Date    TSH 4.716 10/01/2023     Anemia:   Lab Results   Component Value Date    IRON 32 (L) 10/01/2023    TIBC 128 (L) 10/01/2023    FERRITIN 1,336.18 (H) 12/09/2022    WMKBYKRW29 >2,000 (H) 10/01/2023    FOLATE 9.4 10/01/2023     Cardiac:   Lab Results   Component Value Date    TROPONINI <0.010 10/01/2023    BNP 11.5 08/05/2023       Radiology:  Imaging Results              X-Ray Chest AP Portable (Final result)  Result time 10/01/23 13:16:54      Final result by Eric Dwyer  MD (10/01/23 13:16:54)                   Impression:      No acute cardiopulmonary process.  Findings of chronic lung disease there      Electronically signed by: Eric Dwyer  Date:    10/01/2023  Time:    13:16               Narrative:    EXAMINATION:  XR CHEST AP PORTABLE    CLINICAL HISTORY:  Weakness;    TECHNIQUE:  Single view of the chest    COMPARISON:  09/21/2023    FINDINGS:  Prominent interstitial markings with no focal opacification.    The cardiomediastinal silhouette is within normal limits.    No acute osseous abnormality.                                      Current Medications:     Infusions:   sodium bicarbonate 100 mEq in sodium chloride 0.45% 1,000 mL infusion 100 mL/hr at 10/04/23 0608        Scheduled:   diphenhydrAMINE  25 mg Oral QHS    fondaparinux  7.5 mg Subcutaneous Daily    levothyroxine  50 mcg Oral Daily    megestroL  400 mg Oral Daily    pantoprazole  20 mg Oral Daily    sertraline  25 mg Oral Daily    sodium chloride  3,000 mg Oral TID        PRN:  0.9%  NaCl infusion (for blood administration), albuterol-ipratropium, HYDROcodone-acetaminophen, melatonin, ondansetron, polyethylene glycol, sodium chloride 0.9%      Assessment & Plan:     1) Recurrent Hypercalcemia secondary to malignancy  - Ca 13.9 corrected on admission; 11.8 on most current labs  - EKG displayed sinus tachycardia   - PTHrp was 27 which was collected on 9/13/23   - contacted the pharmacy about denosumab while inpatient      2) 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 Underwent another cycle of carbo/taxol completed on 8/23/23.   -Currently follows Dr. Atkinson of Heme/Onc.   -patient and his family are currently electing for hospice care at this time     3) Macrocytic anemia  - H/H 9.7/28.4, MCV  101 on admission  - Has been consistently decreasing over the last several months  - B12 and folate wnl     4) Thrombocytopenia:  -Plts 31 on admit, 37 on 9/28  -continue to monitor for bleeding   -suspect downtrend is multifactorial:  Malignancy, anticoagulation, and dilution  -platelets 21 this morning, patient given 1 unit of platelets     5) Hyponatremia:   -Na 128 on admission.  Corrected to 129 this morning.  -Serum osm 275, urine osm 396, FABRICIO 46  -salt tabs added to medication regimen  -we will continue to monitor and reassess response to treatment     6) Hypothyroidism  - TSH 4.716  - will continue home levothyroxine 50 mcg     7) History of DVT of RUE:   - started patient on fondaparinux 7.5 mg daily     8) COPD  - continue duonebs PRN     9) History of Anxiety/Depression:  -Continue home Sertraline 25mg      CODE STATUS: Full Code  Access: Peripheral  Antibiotics: none  Diet: Soft and Bite sized  DVT Prophylaxis: SCD  Fluids: normal saline at 100 mL/hr     Disposition:  Continue current inpatient management.  Continue to monitor and replete/correct electrolytes as needed.  Goals of care conversation with family today.  They are electing for hospice care.  Case management currently working on assisting with hospice placement.    Natalio Gonzalez, DO  Internal Medicine HO-1

## 2023-10-04 NOTE — CARE UPDATE
NaPhos 20 mmol ordered for repeat labs collected at 0000.       Esther Figueroa MD   [facility] Internal Medicine HO-1

## 2023-10-05 NOTE — PT/OT/SLP DISCHARGE
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  Macrocytic anemia  Thrombocytopenia:  Hyponatremia:   Hypothyroidism  History of DVT of RUE:   COPD  History of Anxiety/Depression:      Patient Active Problem List   Diagnosis    Closed basicervical fracture of femur    Shortness of breath    Dysphagia    New onset seizure    Laryngeal squamous cell carcinoma    Regional lymph node metastasis present    Squamous cell carcinoma of both lungs    Chronic obstructive pulmonary disease, unspecified    H/O head and neck radiation    Pneumonia    Lung cancer    Blastomycotic infection    On antineoplastic chemotherapy    Right arm cellulitis    Maintenance antineoplastic immunotherapy    Gastric fistula    Esophageal stenosis    Essential (primary) hypertension    Humoral hypercalcemia of malignancy    Moderate malnutrition    Tachycardia    Hypomagnesemia    Severe malnutrition    Hyponatremia    Hypophosphatemia     Recommendations - per last treatment session     Discharge Recommendations:  home with home health, home health PT   Discharge Equipment Recommendations: bedside commode, wheelchair, walker, rolling, shower chair     Assessment:     Refer to prior Physical Therapy note dated 10/03/2023 for last known functional status of patient.    Patient was unexpectedly discharged from hospital.  Refer to therapy's initial evaluation or last treatment note for patient's most recent functional status and goal achievement and therapists' recommendations. Patient was seen by therapy for evaluation only prior to hospital discharge.     -continued: cardiac chair positioning in bed, sitting edge of bed, standing edge of bed, side steps at edge of bed, as tolerated/appropriate, with  assistance and supervision (AS ORDERED BY M.D.)    Objective     GOALS: 0 OUT OF 5 STG's met by patient 2/2 patient seen for evaluation only prior to hospital discharge    Multidisciplinary Problems       Physical Therapy Goals       Problem: Physical Therapy    Goal Priority Disciplines Outcome Goal Variances Interventions   Physical Therapy Goal     PT, PT/OT      Description: Goals to be met by: DISCHARGE     Patient will increase functional independence with mobility by performing:    -. Supine to sit with Modified Hooker - NOT MET  -. Sit to supine with Modified Hooker - NOT MET  -. Rolling to Left and Right with Modified Hooker - NOT MET  -. Sit to stand transfer with Supervision - NOT MET  -. Gait  x 25 feet with Contact Guard Assistance using Rolling Walker - NOT MET  ESTABLISHED 10/03/2023           Plan     Patient Discharged to: home/hospice per chart.    10/5/2023

## 2023-10-05 NOTE — PT/OT/SLP DISCHARGE
Occupational Therapy Discharge Summary    Josafat Boudreaux  MRN: [id number]   Principal Problem: Humoral hypercalcemia of malignancy      Patient Discharged from acute Occupational Therapy on 10/4/23.  Please refer to prior OT note dated 10/3/23 for functional status.    Assessment:      Patient was discharged unexpectedly.  Information required to complete an accurate discharge summary is unknown.  Refer to therapy initial evaluation for most recent functional status and goal achievement.  Recommendations made may be found in medical record.    Objective:     GOALS:   Multidisciplinary Problems       Occupational Therapy Goals          Problem: Occupational Therapy    Goal Priority Disciplines Outcome Interventions   Occupational Therapy Goal     OT, PT/OT Appropriate for transfer to alternate level of care    Description: Pt will complete grooming in standing SBA  Pt will complete bed<>BSC t/f with RW SBA  Pt will increase standing endurance x 3 minutes without seated restbreak.                       Reasons for Discontinuation of Therapy Services  Transfer to alternate level of care.      Plan:     Patient Discharged to: Palliative Care/Hospice    10/5/2023

## 2023-10-06 NOTE — PHYSICIAN QUERY
PT Name: Josafat Boudreaux  MR #: [id number]    DOCUMENTATION CLARIFICATION     CDS/: Ewa Sterling RN CDS              Contact information: [email or web address]    This form is a permanent document in the medical record.     Query Date: 2023    By submitting this query, we are merely seeking further clarification of documentation.. Please utilize your independent clinical judgment when addressing the question(s) below.    The medical record contains the following:   Indicators  Supporting Clinical Findings Location in Medical Record     X Registered Dietician Diagnosis Malnutrition Context: chronic illness (10/02/23 1238)  Malnutrition Level: severe (10/02/23 1238)   Nutrition, N [address], 10/2     X Energy Intake Energy Intake (Malnutrition): less than 75% for greater than or equal to 1 month    Nutrition, N [address] RD, 10/2     X Weight Loss Weight Loss (Malnutrition): greater than 7.5% in 3 months    Nutrition, N Patout RD, 10/2     X Fat Loss Subcutaneous Fat (Malnutrition): mild depletion    Nutrition, N Patout RD, 10/2     X Muscle Loss Muscle Mass (Malnutrition): mild depletion    Nutrition, N Patout RD, 10/2    Edema/Fluid Accumulation      Reduced  Strength (by dynamometer)       X Weight, BMI, Usual Body Weight Wt: Last Weight: 58 kg (127 lb 13.9 oz)     BMI: 18.9    Usual body wt: Usual Body Weight (UBW), k.5 kg   Nutrition, N Patout RD, 10/2    Delayed Wound Healing       X Acute or Chronic Illness Acute illness: hypercalcemia due to malignancy, stage 4 laryngeal cancer with mets to lungs, macrocytic anemia, thrombocytopenia, hyponatremia, hypokalemia, hypomagnesemia, hypothyroidism     Chronic illness: history of COPD, hypertension, hypothyroidism, stage IV laryngeal SCC, bilateral lung mass found to be SCC,   HM, Dr Gonzalez/Dr. Bocanegra, 10/3    Social or Environmental Circumstances       X Treatment Regular diet   Resume home Megace to stimulate appetite  Suggest daily  MVI  Monitor Weight Weekly    CHRIS Arita RD, 10/2    Other       Academy of Nutrition and Dietetics (Academy) and the American Society for Parenteral and Enteral Nutrition (A.S.P.E.N.) Clinical Characteristics to support Malnutrition      Criteria for mild malnutrition is defined as 1 characteristic outlined above within the established moderate or severe parameters.  A minimum of 2 out of the 6 characteristics noted above are recommended for a diagnosis of moderate or severe malnutrition.  Chronic illness/injury is a disease/condition lasting 3 months or longer.    The noted clinical guidelines are only system guidelines and do not replace the providers clinical judgment.    Provider, please specify diagnosis associated with above clinical findings.    [ X ] Severe Malnutrition - a minimum of 2 of the 6 severe malnutrition characteristics noted above    [  ] Moderate Malnutrition - a minimum of 2 of the 6 moderate malnutrition characteristics noted above    [  ] Other Nutritional Diagnosis (please specify): _______       Please document in your progress notes daily for the duration of treatment until resolved and  include in your discharge summary.      References:    ZAKIYA Palomino, & LANI King (2022, April). Assessment and management of anorexia and cachexia in palliative care. Retrieved May 23, 2022, from https://www.Luminal.[employer]ion/contents/assessment-and-management-of-anorexia-and-cachexia-in-palliative-care?iddeyQqv=3612&source=see_link     MALIK Cortez, PhD, RD, Teodora DSOUZA P., PhD, RN, NATACHA Manjarrez MD, PhD, Oziel HOLGUIN A., MS, RD, [facility], MIKE Workman, MS, RD, The Academy Malnutrition Work Group, The A.S.P.E.N. Board of Directors. (2012). Consensus Statement: Academy of Nutrition and Dietetics and American Society for Parenteral and Enteral Nutrition: Characteristics Recommended for the Identification and Documentation of Adult Malnutrition (Undernutrition). Journal of Parenteral and Enteral  Nutrition, 363, 275-512. doi:10.1177/2104604274240412     Form No. 59819

## 2023-10-06 NOTE — PHYSICIAN QUERY
PT Name: Josafat Boudreaux  MR #: [id number]    DOCUMENTATION CLARIFICATION     CDS/: Ewa Sterling RN CDS             Contact information:[email or web address]  This form is a permanent document in the medical record.     Query Date: October 6, 2023    By submitting this query, we are merely seeking further clarification of documentation. Please utilize your independent clinical judgment when addressing the question(s) below.    The Medical Record contains the following:   Indicators   Supporting Clinical Findings Location in Medical Record    X AMS, Confusion,  LOC, etc.   Hypercalcemia of malignancy, symptomatic with altered mental status, poor p.o. intake    Presented to the ED on 10/1/2023  with a primary complaint of increased confusion and weakness.   H&Dr ALFORD Quintana/Dr. Bocanegra, 10/1     X Acute/Chronic Illness Acute illness:hypercalcemia due to malignancy, stage 4 laryngeal cancer with mets to lungs, macrocytic anemia, thrombocytopenia, hyponatremia, hypothyroidism, hypokalemia, hypomagnesemia.    Chronic illness: COPD, hypertension, hypothyroidism, stage IV laryngeal SCC, bilateral lung mass found to be SCC   H&P, Carlos Suazo/Dr. Bocanegra, 10/1    Radiology Findings       X Electrolyte Imbalance Corrected calcium on admission 13.9     10/01  10/01  10/02 10/03 10/04   Calcium 11.1 (H)  12.5(HH)  11.0 (H) 10.3 (H) 10.5 (H)        10/01 10/01 10/02 10/02 10/02 10/03   Sodium 128 (L) 132 (L) 131 (L) 130 (L) 128 (L) 128 (L)        H&P, Carlos Suazo/Dr. Bocanegra, 10/1     X Medication Will discontinue calcitonin for now and restart denosumab 120 mg subcu once weekly.  We will increase IVF until urine output is 100-150 cc/hour   Dr. Carlos VELÁSQUEZ/Dr. Bocanegra, 10/3     X Treatment         Monitor electrolytes Dr. Carlos VELÁSQUEZ/Dr. Bocanegra, 10/3    Other       The noted clinical guidelines are only system guidelines and do not replace the providers clinical judgment.    The National [place] of Neurologic  Disorders and Stroke (NINDS) of the NIH describes encephalopathy as any diffuse disease of the brain that alters brain function or structure.    Provider, please specify the diagnosis associated with above clinical findings.    [   ] Metabolic Encephalopathy - Due to electrolyte imbalance, metabolic derangements, or infectious processes, includes Septic Encephalopathy, Uremic Encephalopathy   [   ] Other Encephalopathy (please specify): ____________________   [ x  ]  Clinically Undetermined         Please document in your progress notes daily for the duration of treatment until resolved, and include in your discharge summary.    References:  ILIANA Massey RN, CCDS. (2018, June 9). Notes from the Instructor: Encephalopathy tips. Retrieved October 22, 2020, from https://acdis.org/articles/note-instructor-encephalopathy-tips    ICD-9-CM Coding Clinic First Quarter 2013, Effective with discharges: October 21, 2013 [facility] § Seizure with encephalopathy due to postictal state (2013).    ICD-10-CM/discoapi Integrated Codebook (Version V.20.8.10.0) [Computer software]. (2020). Retrieved October 21, 2020.    National [place] of Neurological Disorders and Stroke. (2019, March 27). Retrieved October 22, 2020, from https://www.ninds.nih.gov/Disorders/All-Disorders/Msheppstlsuhuh-Kjtdaiyfhtk-Qcmw    Form No. 17721

## 2023-10-10 NOTE — TELEPHONE ENCOUNTER
----- Message from Cheryl Lejeune sent at 10/10/2023  9:39 AM CDT -----  Regarding:   Please adriel pt as .  I'm not sure of the DOD.  His wife called today to inform us that pt is .

## 2024-07-24 NOTE — ED NOTES
Physician Progress Note      PATIENT:               HIRAM CHAVEZ  [facility] #:                  [id number]  :                       1942  ADMIT DATE:       2024 2:24 AM  DISCH DATE:        2024 11:16 AM  RESPONDING  PROVIDER #:        Eddi Felix DO          QUERY TEXT:    Patient admitted with shortness of breath and chest pain.  Pt noted to be in   \"...a wide-complex tachyarrhythmia...\" per H&P and had elevated troponin   levels. Also noted to have \"Non-STEMI, type I versus type II.\"  If possible   after study, can NSTEMI type can be further specified as:    The medical record reflects the following:  Risk Factors: Arrhythmia, CHF, COPD  Clinical Indicators: H&P \"...evaluation of shortness of breath.  This was of   an abrupt onset.  Upon ER arrival she was noted to be in a wide-complex   tachyarrhythmia and was rather symptomatic...Troponin x 2 were elevated...Wide   complex tachyarrhythmia, V. tach versus atrial fib/flutter with   aberrancy...Non-STEMI, type I versus type II...\", Critical Care Progress Note    \"...NSTEMI ( type II- demand)...\", Critical Care Progress Note  \"...A   fib with RvR in the settings of HFrEF- 20- 30%( S/p ICD)...NSTEMI ( type II-   demand)...\", Progress Note  \"...Non-STEMI, type I vers  Treatment: PO Aspirin, Eliquis, Lipitor, Isordil, Labs, Cardiology Consult,   Critical Care Consult    Thank you,  KELLY Gray, RN, [facility]  [phone]  Options provided:  -- NSTEMI  -- Type 2 MI  -- Other - I will add my own diagnosis  -- Disagree - Not applicable / Not valid  -- Disagree - Clinically unable to determine / Unknown  -- Refer to Clinical Documentation Reviewer    PROVIDER RESPONSE TEXT:    This patient had an NSTEMI.    Query created by: Curt Zhang on 2024 12:13 PM      Electronically signed by:  Eddi Felix DO 2024 5:41 AM           Pt c/o a broken trach clip

## 2024-07-26 NOTE — PLAN OF CARE
Pt met discharge home criteria. D/C instructions explained and given to patient. Pressure wrap placed on IV site d/t delayed clotting and bleeding through gauze. All questions and concerns addressed. Pt escorted to the vehicle via w/c, wife is driving.    25-Jul-2024 21:00

## 2024-12-16 NOTE — PROGRESS NOTES
HEMATOLOGY/ONCOLOGY OFFICE CLINIC VISIT    Visit Information:    Initial Evaluation:   Referring Provider:   Other providers:  Code status:    Diagnosis:  [id number] JENNYFER Laryngeal Squamous cell carcinoma     Present treatment:    Treatment/Oncology history:  --7/11/2022: total laryngectomy    Plan of care:     Imaging:  Ct neck 6/6/2022: A metastatic right level III cervical lymph node is present with short axis measurement of 1.6 cm.  This lymph node contains internal cystic change, typical of metastatic squamous cell carcinoma.  A left level III cervical lymph node shows short axis measurements of 1 cm, and is suspicious.    A left supraglottic mass measures approximately 2.4 cm in diameter (axial post-contrast image 39), presumably corresponding to the primary neoplasm.  Metastatic lymph nodes are also present in the inferior right paratracheal position, measuring 1.3 cm in short axis.  Metastatic lymph nodes are present in the superior right hilum, measuring 1.8 cm in short axis.  Ct H&N 6/27/2022: 1. There is mild stenosis at the origin of left proximal internal carotid artery secondary to dense calcified atheromatous plaque. 2. There is consolidation is right upper lobe. Additional ground-glass opacities are also seen on the remainder of the visualized lungs. These findings are consistent with an infectious process. Small right pleural effusion is seen. There is an ill-defined soft tissue mass in the right perihilar region which measures approximately 3.1 x 2.9 x 4.6 cm, of concern for neoplasm. Correlate clinically as regards further evaluation and followup with CT chest. There is an ill-defined enhancing soft tissue mass in the glottis infiltrating into the paraglottic spaces and the subglottic region with obliteration of the airway, of concern for a neoplasm. 3. Unremarkable CT angiogram of the head. Details and other findings as described above.  NM PET CT 8/1/2022: Postoperative changes of recent total  laryngectomy and lou dissection. bilateral hypermetabolic cervical lymphadenopathy.  A right cervical lymph node 1.7 x 1.8 cm, compared to 1.6 x 1.5 cm previously, SUV of 7.7. left cervical lymph node 8 x 10 mm and has a max SUV of 3.4.  A fluid collection posterior to the left internal jugular vein measures 2.4 x 2.4 cm. There is right hilar and mediastinal lymphadenopathy.  A precarinal lymph node 1.6 x 2.3 cm, compared to 1.3 x 1.7 cm previously, SUV of 13.8.  Right hilar lymphadenopathy measures 3 x 2.9 cm, compared to 2.1 x 2.3 cm previously, and has a max SUV of 9.8.  A suspected right hilar mass measures 1.8 x 3.6 cm SUV of 14.8     Pathology:  6/14/2022:  EBUS ENDOBRONCHIAL MASS RUL, CYTOLOGY: NEARLY ACELLULAR SPECIMEN CONSISTING OF FRESH BLOOD CLOT. NO ATYPICAL OR MALIGNANT CELLS IDENTIFIED.   7/11/2022: Bronchial Wash, RLL, right bronchial mass washings:  - Squamous cell carcinoma.    Bronchial Wash, LLL, left bronchial mass washings: - Squamous cell carcinoma.     7/11/2022 Laryngectomy:  1. Larynx, laryngeal biopsy frozen section : - Squamous cell carcinoma with necrosis.    2. Neck, Anterior, left neck level 3: - Number of lymph nodes involved by carcinoma:  1/7       - Size of metastatic deposit:  2.5 mm - Extranodal extension:  Not identified      3. Larynx, resection without nodes, total layrngectomy :  - Squamous cell carcinoma.  4. Neck, Anterior, left neck level 4: - Number of lymph nodes involved by carcinoma:  0/10  5. Neck, Anterior, right neck level 4:  - Number of lymph nodes involved by carcinoma:  0/11  6. Neck, Anterior, right neck level 3: - Number of lymph nodes involved by carcinoma:  0/9  7. Nasophaynx, inferior pharyngeal mucosa : - Benign squamous mucosa. - No evidence of malignancy.     8. Cartilage, superior cartilage margin : - No evidence of malignancy.    9. Lymph Node, pretracheal lymph  node :  - Number of lymph nodes involved by carcinoma:  0/3  [id number]       CLINICAL  HISTORY:       Patient: Josafat Boudreaux is a 56 y.o. male kindly refer her for laryngeal carcinoma.  Patient  was referred to ENT for further evaluation of hoarseness.  He did not have any difficulty swallowing or dry mouth at the time.  Hoarseness has been present for at least 4 months prior to evaluation. 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     Patient was scheduled to trach but on 06/10/2022 he was brought to the emergency room via air met with is short of breath.  He was found to have and oxygenation on the 40s when EMS was called.  He was placed on BiPAP but pCO2 increased so he has an emergent tracheostomy performed.  He has a PEG tube placed same hospitalization.  He underwent bronchoscopy with biopsy of the right upper lobe endobronchial lesion on 6/14/2022 cytology was negative for malignant cells.      On 7/11/2022 he underwent LARYNGECTOMY, WITH RADICAL NECK DISSECTION - Bilateral LARYNGOSCOPY, DIRECT, DIAGNOSTIC, WITH BRONCHOSCOPY AND ESOPHAGOSCOPY pathology report as above.  1/40 lymph nodes were involved with metastatic disease.  Bronchoscopy was repeated and biopsy of the left lower lobe and right lower lobe were both positive for squamous cell carcinoma.    He is here today with his wife.  He has recovered from surgery relatively well.  Tracheostomy in place.  He is unable to talk so the history was taken by electronic medical record and wife.    Patient has a brother with history of throat cancer. Patient used to smoke 1 and half pack per day since he was 60 years old.  He was smoking less recently.    Chief Complaint: No Concerns today      Interval History:  Patient presents to clinic today for follow up to discuss PET CT results and to discuss treatment recommendations/options. He is with his wife. Dr. Das recommends starting RT in a couple of weeks to allow more recovery time. He has follow up with Dr. Das this  afternoon, RT tentatively scheduled to start Monday. Overall, he is recovering well. Denies fever, chills or sweats. No chest pain or shortness of breath. Labs reviewed.  Peg tube and tracheostomy in placed noted during physical exam.      Past Medical History:   Diagnosis Date    Cancer     COPD (chronic obstructive pulmonary disease)     Dysphagia     Lung cancer     Vocal cord mass       Past Surgical History:   Procedure Laterality Date    DIRECT DIAGNOSTIC LARYNGOSCOPY WITH BRONCHOSCOPY AND ESOPHAGOSCOPY N/A 7/11/2022    Procedure: LARYNGOSCOPY, DIRECT, DIAGNOSTIC, WITH BRONCHOSCOPY AND ESOPHAGOSCOPY;  Surgeon: Emory Alonso MD;  Location: [facility] OR;  Service: ENT;  Laterality: N/A;    HIP SURGERY      HUMERUS FRACTURE SURGERY      INSERT ARTERIAL LINE  6/26/2022         TRACHEOSTOMY N/A 6/10/2022    Procedure: CREATION, TRACHEOSTOMY;  Surgeon: Junior Alonso MD;  Location: [facility] OR;  Service: ENT;  Laterality: N/A;     Family History   Problem Relation Age of Onset    Lung cancer Father     Throat cancer Brother      Social Connections: Not on file       Review of patient's allergies indicates:  No Known Allergies   Current Outpatient Medications on File Prior to Visit   Medication Sig Dispense Refill    acetaminophen (TYLENOL) 160 mg/5 mL Liqd Take 20.3 mLs (649.6 mg total) by mouth every 6 (six) hours as needed (Mild pain). 473 mL 0    diphenhydrAMINE (BENADRYL) 25 mg capsule 1 capsule (25 mg total) by Per G Tube route every 6 (six) hours as needed for Itching. 90 capsule 1    famotidine (PEPCID) 20 MG tablet Take 1 tablet (20 mg total) by mouth every evening. 30 tablet 11    hydrocortisone 1 % cream Apply topically 2 (two) times daily as needed (rash). Apply to rash 30 g 1    ibuprofen (ADVIL,MOTRIN) 100 mg/5 mL suspension Take 30 mLs (600 mg total) by mouth every 6 (six) hours as needed for Pain (mild to moderate). 354 mL 0    pantoprazole (PROTONIX) 20 MG tablet Take 1 tablet (20  mg total) by mouth once daily. 30 tablet 11    acetaminophen (TYLENOL) 325 MG tablet 2 tablets (650 mg total) by Per G Tube route every 6 (six) hours as needed. (Patient not taking: No sig reported) 30 tablet 1    albuterol-ipratropium (DUO-NEB) 2.5 mg-0.5 mg/3 mL nebulizer solution Take 3 mLs by nebulization every 4 (four) hours as needed for Shortness of Breath or Wheezing. Rescue (Patient not taking: No sig reported) 90 mL 1    albuterol-ipratropium (DUO-NEB) 2.5 mg-0.5 mg/3 mL nebulizer solution Take 3 mLs by nebulization every 6 (six) hours. Rescue (Patient not taking: No sig reported) 75 mL 0    budesonide (PULMICORT) 0.5 mg/2 mL nebulizer solution Take 2 mLs (0.5 mg total) by nebulization every 12 (twelve) hours. Controller (Patient not taking: No sig reported) 90 mL 1    calcium-vitamin D3 (OS-CARMELO 500 + D3) 500 mg-5 mcg (200 unit) per tablet 1 tablet by Per G Tube route 2 (two) times daily with meals. (Patient not taking: No sig reported)      [DISCONTINUED] aspirin (ECOTRIN) 81 MG EC tablet Take 81 mg by mouth once daily.       No current facility-administered medications on file prior to visit.      Review of Systems   Constitutional: Negative for activity change, appetite change, chills, diaphoresis, fatigue, fever and unexpected weight change.   HENT: Negative for nasal congestion, mouth sores, nosebleeds, postnasal drip, sinus pressure/congestion, sore throat and trouble swallowing.    Eyes: Negative for visual disturbance.   Respiratory: Negative for cough and shortness of breath.    Cardiovascular: Negative for chest pain and palpitations.   Gastrointestinal: Negative for abdominal distention, abdominal pain, blood in stool, change in bowel habit, constipation, diarrhea, nausea, vomiting and change in bowel habit.   Endocrine: Negative.    Genitourinary: Negative for bladder incontinence, decreased urine volume, difficulty urinating, dysuria, frequency, hematuria, scrotal swelling, testicular  "pain and urgency.   Musculoskeletal: Negative for arthralgias, back pain, gait problem, joint swelling, leg pain, myalgias and neck pain.   Integumentary:  Negative for rash.   Neurological: Negative for dizziness, tremors, seizures, syncope, speech difficulty, weakness, light-headedness, numbness, headaches and memory loss.   Hematological: Negative for adenopathy. Does not bruise/bleed easily.   Psychiatric/Behavioral: Negative for agitation, confusion, hallucinations, sleep disturbance and suicidal ideas. The patient is nervous/anxious.               Vitals:    08/04/22 1126   BP: 111/71   BP Location: Left arm   Patient Position: Sitting   Pulse: 108   Temp: 98 °F (36.7 °C)   SpO2: 97%   Weight: 65.3 kg (144 lb)   Height: 5' 9" (1.753 m)      Physical Exam  Vitals and nursing note reviewed.   Constitutional:       General: He is not in acute distress.     Appearance: Normal appearance.      Comments: thin   HENT:      Head: Normocephalic and atraumatic.      Mouth/Throat:      Mouth: Mucous membranes are moist.   Eyes:      General: No scleral icterus.     Extraocular Movements: Extraocular movements intact.      Conjunctiva/sclera: Conjunctivae normal.   Neck:      Vascular: No JVD.      Trachea: Tracheostomy present.      Comments: In place. Surgical site well healed.  Cardiovascular:      Rate and Rhythm: Normal rate and regular rhythm.      Heart sounds: No murmur heard.  Pulmonary:      Effort: Pulmonary effort is normal.      Breath sounds: Normal breath sounds. No wheezing or rhonchi.   Chest:      Chest wall: No tenderness.   Breasts:      Right: No axillary adenopathy or supraclavicular adenopathy.      Left: No axillary adenopathy or supraclavicular adenopathy.       Abdominal:      General: The ostomy site is clean. Bowel sounds are normal. There is no distension.      Palpations: Abdomen is soft.      Tenderness: There is no abdominal tenderness.   Musculoskeletal:         General: No swelling or " deformity.      Cervical back: Neck supple.   Lymphadenopathy:      Cervical: No cervical adenopathy.      Upper Body:      Right upper body: No supraclavicular or axillary adenopathy.      Left upper body: No supraclavicular or axillary adenopathy.      Lower Body: No right inguinal adenopathy. No left inguinal adenopathy.   Skin:     General: Skin is warm.      Coloration: Skin is not jaundiced.      Findings: No rash.   Neurological:      General: No focal deficit present.      Mental Status: He is alert and oriented to person, place, and time.   Psychiatric:         Attention and Perception: Attention normal.         Mood and Affect: Affect normal. Mood is anxious.         Behavior: Behavior is cooperative.         Cognition and Memory: Cognition normal.         Judgment: Judgment normal.       ECOG SCORE           Laboratory:  CBC with Differential:  Lab Results   Component Value Date    WBC 9.7 08/02/2022    RBC 4.20 (L) 08/02/2022    HGB 13.1 (L) 08/02/2022    HCT 40.0 (L) 08/02/2022    MCV 95.2 (H) 08/02/2022    MCH 31.2 (H) 08/02/2022    MCHC 32.8 (L) 08/02/2022    RDW 13.9 08/02/2022     08/02/2022    MPV 10.8 (H) 08/02/2022        CMP:  Sodium Level   Date Value Ref Range Status   08/02/2022 138 136 - 145 mmol/L Final     Potassium Level   Date Value Ref Range Status   08/02/2022 4.7 3.5 - 5.1 mmol/L Final     Carbon Dioxide   Date Value Ref Range Status   08/02/2022 31 (H) 22 - 29 mmol/L Final     Blood Urea Nitrogen   Date Value Ref Range Status   08/02/2022 12.1 8.4 - 25.7 mg/dL Final     Creatinine   Date Value Ref Range Status   08/02/2022 0.64 (L) 0.73 - 1.18 mg/dL Final     Calcium Level Total   Date Value Ref Range Status   08/02/2022 9.8 8.4 - 10.2 mg/dL Final     Albumin Level   Date Value Ref Range Status   08/02/2022 3.4 (L) 3.5 - 5.0 gm/dL Final     Bilirubin Total   Date Value Ref Range Status   08/02/2022 0.4 <=1.5 mg/dL Final     Alkaline Phosphatase   Date Value Ref Range Status    08/02/2022 117 40 - 150 unit/L Final     Aspartate Aminotransferase   Date Value Ref Range Status   08/02/2022 18 5 - 34 unit/L Final     Alanine Aminotransferase   Date Value Ref Range Status   08/02/2022 11 0 - 55 unit/L Final     Estimated GFR-Non    Date Value Ref Range Status   08/02/2022 >60 mls/min/1.73/m2 Final             Assessment:       1. Laryngeal cancer    2. Squamous cell carcinoma of both lungs      Laryngeal Squamous cell carcinoma -s/p total laryngectomy, 1/40 LN with metastatic disease.   Lung masses x 2, bilateral--> Squamous cell carcinoma    I discussed with the patient and his wife, diagnosis, prognosis and treatment recommendations.  Discussed briefly chemotherapy, discussed risk versus benefits as well as toxicities associated with it.   Patient with long history of smoking.  I discussed with them that head and neck cancer lung cancer are very common in smokers.  I explained to them that lung cancer could or could not be associated with his head and neck cancer and lung cancer could be a 2nd primary.  Either way he has it in both lungs therefore a stage IV.  As per patient report, I do not have the official report, he has 1 mass in each of the lung bases and nothing other placed.   He will see Dr. Das Monday.  I would like to discuss case with him after review his scans to see what will be best treatment strategy.      He most likely need RT to his neck and he will benefit of concommittant chemotherapy.  If he only have one lesion in each lung and not too big, maybe RT to each lesions may be an alternative as well +/- chemotherapy during and after RT.       Plan.:       Keep appt with Dr Das, this afternoon  I will discuss with Dr. Das  Will refer to Dr. Moreno for Mediport placement  RTC in 3 weeks for definitive treatment  recommendations  Encourage to call or message us for any questions or problems  The patient was given ample opportunity to ask questions, and to  the best of my abilities, all questions answered to satisfaction; patient demonstrated understanding of what we discussed and agreeable to the plan.       LENKA NELSON MD      Professional Services   I, Bessie Horowitz LPN, acted solely as a scribe for and in the presence of Dr. Lenka Nelson, who performed these services.           Addendum: Discussed with Dr Das. PET/CT showed progression of disease in his neck with bilateral hypermetabolic cervical lymphadenopathy. This progressed rather quick. He will need chemoRT to controlled disease in the neck. It also showed Hypermetabolic right hilar mass with mediastinal and right hilar lymphadenopathy and he has  Left mass biopsy proven Sq cell Carcinoma. Once he complete chemoradiation to neck will scan him again to eval lung disease and may benefit of RT to lung mass vs systemic therapy for his stage IV Lung cancer.  He will need Mediport placement.   Never smoker

## 2025-06-16 NOTE — DISCHARGE SUMMARY
[facility] Endoscopy  Discharge Note  Short Stay    Procedure(s) (LRB):  EGD (N/A)      OUTCOME: Patient tolerated treatment/procedure well without complication and is now ready for discharge.    DISPOSITION: Home or Self Care    FINAL DIAGNOSIS:  <principal problem not specified>    FOLLOWUP: In clinic    DISCHARGE INSTRUCTIONS:  No discharge procedures on file. NPO x 12 hrs then water only next 24 hrs, then clear liquids 24 hrs then full liquids x 3 days then soft diet     Clinical Reference Documents Added to Patient Instructions         Document    [facility] DISCHARGE INSTRUCTIONS (ENGLISH)            TIME SPENT ON DISCHARGE: 15 minutes  
no

## (undated) DEVICE — GLOVE PROTEXIS BLUE LATEX 7.5

## (undated) DEVICE — DRAIN JACKSON PRATT 10MM

## (undated) DEVICE — TROCAR ENDOPATH XCEL 11MM 10CM

## (undated) DEVICE — KIT SURGICAL COLON .25 1.1OZ

## (undated) DEVICE — GLOVE PROTEXIS HYDROGEL SZ7.5

## (undated) DEVICE — CLIP MANTIS ENDO CLOSURE

## (undated) DEVICE — SOL NACL IRR 1000ML BTL

## (undated) DEVICE — BAG SPEC RETRV ENDO 4X6IN DISP

## (undated) DEVICE — NDL 27G X 1 1/4

## (undated) DEVICE — GOWN X-LG STERILE BACK

## (undated) DEVICE — TRAY SKIN SCRUB WET PREMIUM

## (undated) DEVICE — GLOVE PROTEXIS LTX MICRO  7

## (undated) DEVICE — SEE MEDLINE ITEM 157117

## (undated) DEVICE — STAPLER SKIN ROTATING HEAD

## (undated) DEVICE — SUT 2-0 VICRYL / SH (J417)

## (undated) DEVICE — RELOAD ECHELON FLEX GRN 60MM

## (undated) DEVICE — COLLECTION SPECIMEN NEPTUNE

## (undated) DEVICE — TRACH TUBE CUFF FLEX DISP 7.5

## (undated) DEVICE — GLOVE PROTEXIS LTX MICRO  7.5

## (undated) DEVICE — CORD BIPOLAR 12 FOOT

## (undated) DEVICE — COVER MAYO STAND REINFRCD 30

## (undated) DEVICE — GOWN SMARTSLEEVE AAMI LVL4 LG

## (undated) DEVICE — KIT CANIST SUCTION 1200CC

## (undated) DEVICE — TRAY CATH FOL SIL URIMTR 16FR

## (undated) DEVICE — SUT SA85H SILK 2-0

## (undated) DEVICE — TIP SUCTION YANKAUER

## (undated) DEVICE — Device

## (undated) DEVICE — NDL GUARD

## (undated) DEVICE — KIT PEG PULL SAFETY 24FR

## (undated) DEVICE — SOL IRRI STRL WATER 1000ML

## (undated) DEVICE — HEMOSTAT SURGICEL 4X8IN

## (undated) DEVICE — MANIFOLD 4 PORT

## (undated) DEVICE — DRESSING TELFA STRL 4X3 LF

## (undated) DEVICE — SPNG CHERRY DISECT XRAY DTECT

## (undated) DEVICE — PACKING STRIP IDOFORM 1/4X5YD

## (undated) DEVICE — CULTSWAB+ AMIES W/O CHARC SNG

## (undated) DEVICE — GLOVE PROTEXIS BLUE LATEX 6.5

## (undated) DEVICE — ELECTRODE PATIENT RETURN DISP

## (undated) DEVICE — SUT CTD VICRYL 3-0 CR/SH

## (undated) DEVICE — SPONGE LAP STRL 18X18IN

## (undated) DEVICE — GLOVE PROTEXIS LTX MICRO 6

## (undated) DEVICE — GLOVE PROTEXIS LTX MICRO 6.5

## (undated) DEVICE — SEE MEDLINE ITEM 154981

## (undated) DEVICE — GAUZE VISTEC XR DTECT 16 4X4IN

## (undated) DEVICE — PACK HEAD & NECK

## (undated) DEVICE — TRAP MEDI-VAC SPEC MUCS 40CC

## (undated) DEVICE — BENZOIN TINCTURE 0.66ML

## (undated) DEVICE — GLOVE PROTEXIS BLUE LATEX 8

## (undated) DEVICE — SUT 2/0 30IN SILK BLK BRAI

## (undated) DEVICE — SYR IRRIGATION BULB STER 60ML

## (undated) DEVICE — STAPLER SKIN COUNT 35 W STPL

## (undated) DEVICE — KIT SURGICAL TURNOVER

## (undated) DEVICE — SUT PROLENE 0 MO6 30IN BLUE

## (undated) DEVICE — HANDLE DEVON RIGID OR LIGHT

## (undated) DEVICE — CONTAINER SPECIMEN 4.5OZ

## (undated) DEVICE — SUT VICRYL+ 27 UR-6 VIOL

## (undated) DEVICE — SUT SILK 2.0 BLK 18

## (undated) DEVICE — TUBING O2 FEMALE CONN 13FT

## (undated) DEVICE — STRIP WOUND PK IDOFORM 180X.5

## (undated) DEVICE — SUT SILK 2-0 SH 18IN BLACK

## (undated) DEVICE — SUPPORT ULNA NERVE PROTECTOR

## (undated) DEVICE — SUT CHROMIC 2-0 SH 27IN BRN

## (undated) DEVICE — STAPLER ECHELON FLEX 60MM 44CM

## (undated) DEVICE — KIT VALVE DEFENDO ENDOSCOPY

## (undated) DEVICE — ELECTRODE BLADE INSULATED 1 IN

## (undated) DEVICE — GAUZE SPONGE 4X4 12PLY

## (undated) DEVICE — APPLICATOR CHLORAPREP ORN 26ML

## (undated) DEVICE — SUT SILK 0 SH 30IN BLK BR

## (undated) DEVICE — SUT VICRYL PLUS 4-0 FS-2 27IN

## (undated) DEVICE — TUBING MEDI-VAC 20FT .25IN

## (undated) DEVICE — SUT VICRYL+ 2-0 SH 18IN

## (undated) DEVICE — SPONGE KITTNER 1/4X 5/8 L STRL

## (undated) DEVICE — NDL INSUF ULTRA VERESS 120MM

## (undated) DEVICE — DRAPE STERI LONG

## (undated) DEVICE — SUT 3-0 VICRYL / SH (J416)

## (undated) DEVICE — SYR BULB IRRIG ST 60 LF

## (undated) DEVICE — SOL 9P NACL IRR PIC IL

## (undated) DEVICE — VALVE OLYMPUS SCOPE BIOPSY

## (undated) DEVICE — CLOSURE SKIN STERI STRIP 1/2X4

## (undated) DEVICE — MARKER WRITESITE SKIN CHLRAPRP

## (undated) DEVICE — GLOVE PROTEXIS NEOPRN BRN SZ7

## (undated) DEVICE — SHEARS HARMONIC CRVD 9 CM

## (undated) DEVICE — GLOVE PROTEXIS PI SYN SURG 7.5

## (undated) DEVICE — SUT VICRYL BR 1 GEN 27 CT-1

## (undated) DEVICE — NDL HYPO REG 25G X 1 1/2

## (undated) DEVICE — GOWN POLY REINF BRTH SLV XL

## (undated) DEVICE — SYR 10CC LUER LOCK

## (undated) DEVICE — HOOK LONE STAR SHRP ELAS 5MM

## (undated) DEVICE — SUT VICRYL 4-0 RB1 27IN UD

## (undated) DEVICE — SEE MEDLINE ITEM 146292

## (undated) DEVICE — SUTURE SA83H SILK 4-0

## (undated) DEVICE — BLADE SURG STAINLESS STEEL #15

## (undated) DEVICE — TROCAR ENDOPATH XCEL 5X100MM

## (undated) DEVICE — SOLIDIFIER BOTTLE 1500CC

## (undated) DEVICE — SUT 3-0 12-18IN SILK

## (undated) DEVICE — CANNULA ENDOPATH XCEL 5X100MM

## (undated) DEVICE — BLADE SURG STAINLESS STEEL #10

## (undated) DEVICE — COUNTER NDL FOAM MAGNET 40/70

## (undated) DEVICE — STIMULATOR NRVE MINI VARI-STIM

## (undated) DEVICE — TOWEL OR DISP STRL BLUE 4/PK

## (undated) DEVICE — SUT SILK BLK. BR. 3-0 10

## (undated) DEVICE — HOLDER TRACH TUBE NECKBAND

## (undated) DEVICE — CLIP RESOLUTION 360 ULT 235CM

## (undated) DEVICE — TROCAR ENDOPATH XCEL 15MM 10CM

## (undated) DEVICE — SEE MEDLINE ITEM 157125

## (undated) DEVICE — SUT 2/0 30IN PROLENE MONO

## (undated) DEVICE — SOL NORMAL USPCA 0.9%

## (undated) DEVICE — PAD ABD 8X10 STERILE

## (undated) DEVICE — KIT ANTIFOG W/SPONG & FLUID

## (undated) DEVICE — TRACH TUBE CUFF FLEX DISP 8.5

## (undated) DEVICE — PENCIL ELECSURG ROCKER 15FT

## (undated) DEVICE — VALVE OLYMPUS SCOPE SUCTION

## (undated) DEVICE — HANDLE MEDIVAC SUC YANK BLBOUS

## (undated) DEVICE — SUT MCRYL PLUS 4-0 PS2 27IN